# Patient Record
Sex: MALE | Race: WHITE | NOT HISPANIC OR LATINO | URBAN - METROPOLITAN AREA
[De-identification: names, ages, dates, MRNs, and addresses within clinical notes are randomized per-mention and may not be internally consistent; named-entity substitution may affect disease eponyms.]

---

## 2017-03-12 ENCOUNTER — INPATIENT (INPATIENT)
Facility: HOSPITAL | Age: 52
LOS: 4 days | Discharge: HOME HEALTH | End: 2017-03-17
Attending: HOSPITALIST | Admitting: HOSPITALIST
Payer: MEDICARE

## 2017-03-12 VITALS
DIASTOLIC BLOOD PRESSURE: 68 MMHG | HEART RATE: 67 BPM | SYSTOLIC BLOOD PRESSURE: 218 MMHG | RESPIRATION RATE: 22 BRPM | TEMPERATURE: 98 F | OXYGEN SATURATION: 99 %

## 2017-03-12 DIAGNOSIS — R55 SYNCOPE AND COLLAPSE: ICD-10-CM

## 2017-03-12 DIAGNOSIS — F32.9 MAJOR DEPRESSIVE DISORDER, SINGLE EPISODE, UNSPECIFIED: ICD-10-CM

## 2017-03-12 DIAGNOSIS — E16.2 HYPOGLYCEMIA, UNSPECIFIED: ICD-10-CM

## 2017-03-12 DIAGNOSIS — R79.89 OTHER SPECIFIED ABNORMAL FINDINGS OF BLOOD CHEMISTRY: ICD-10-CM

## 2017-03-12 DIAGNOSIS — M25.571 PAIN IN RIGHT ANKLE AND JOINTS OF RIGHT FOOT: ICD-10-CM

## 2017-03-12 DIAGNOSIS — R63.8 OTHER SYMPTOMS AND SIGNS CONCERNING FOOD AND FLUID INTAKE: ICD-10-CM

## 2017-03-12 DIAGNOSIS — T14.90 INJURY, UNSPECIFIED: ICD-10-CM

## 2017-03-12 DIAGNOSIS — Z29.9 ENCOUNTER FOR PROPHYLACTIC MEASURES, UNSPECIFIED: ICD-10-CM

## 2017-03-12 DIAGNOSIS — I21.4 NON-ST ELEVATION (NSTEMI) MYOCARDIAL INFARCTION: ICD-10-CM

## 2017-03-12 DIAGNOSIS — R40.4 TRANSIENT ALTERATION OF AWARENESS: ICD-10-CM

## 2017-03-12 DIAGNOSIS — N17.9 ACUTE KIDNEY FAILURE, UNSPECIFIED: ICD-10-CM

## 2017-03-12 DIAGNOSIS — E11.649 TYPE 2 DIABETES MELLITUS WITH HYPOGLYCEMIA WITHOUT COMA: ICD-10-CM

## 2017-03-12 LAB
ALBUMIN SERPL ELPH-MCNC: 3.6 G/DL — SIGNIFICANT CHANGE UP (ref 3.3–5)
ALP SERPL-CCNC: 168 U/L — HIGH (ref 40–120)
ALT FLD-CCNC: 33 U/L — SIGNIFICANT CHANGE UP (ref 4–41)
AMPHET UR-MCNC: NEGATIVE — SIGNIFICANT CHANGE UP
APAP SERPL-MCNC: < 15 UG/ML — LOW (ref 15–25)
APPEARANCE UR: CLEAR — SIGNIFICANT CHANGE UP
AST SERPL-CCNC: 47 U/L — HIGH (ref 4–40)
BACTERIA # UR AUTO: SIGNIFICANT CHANGE UP
BARBITURATES MEASUREMENT: NEGATIVE — SIGNIFICANT CHANGE UP
BARBITURATES UR SCN-MCNC: NEGATIVE — SIGNIFICANT CHANGE UP
BASE EXCESS BLDV CALC-SCNC: -5.4 MMOL/L — SIGNIFICANT CHANGE UP
BASOPHILS # BLD AUTO: 0.03 K/UL — SIGNIFICANT CHANGE UP (ref 0–0.2)
BASOPHILS NFR BLD AUTO: 0.2 % — SIGNIFICANT CHANGE UP (ref 0–2)
BENZODIAZ SERPL-MCNC: NEGATIVE — SIGNIFICANT CHANGE UP
BENZODIAZ UR-MCNC: NEGATIVE — SIGNIFICANT CHANGE UP
BILIRUB SERPL-MCNC: 0.2 MG/DL — SIGNIFICANT CHANGE UP (ref 0.2–1.2)
BILIRUB UR-MCNC: NEGATIVE — SIGNIFICANT CHANGE UP
BLOOD GAS VENOUS - CREATININE: 3.32 MG/DL — HIGH (ref 0.5–1.3)
BLOOD UR QL VISUAL: HIGH
BUN SERPL-MCNC: 42 MG/DL — HIGH (ref 7–23)
CALCIUM SERPL-MCNC: 8.7 MG/DL — SIGNIFICANT CHANGE UP (ref 8.4–10.5)
CANNABINOIDS UR-MCNC: NEGATIVE — SIGNIFICANT CHANGE UP
CHLORIDE BLDV-SCNC: 117 MMOL/L — HIGH (ref 96–108)
CHLORIDE SERPL-SCNC: 110 MMOL/L — HIGH (ref 98–107)
CK MB BLD-MCNC: 1.7 — SIGNIFICANT CHANGE UP (ref 0–2.5)
CK MB BLD-MCNC: 14.83 NG/ML — HIGH (ref 1–6.6)
CK MB BLD-MCNC: 16.93 NG/ML — HIGH (ref 1–6.6)
CK SERPL-CCNC: 1022 U/L — HIGH (ref 30–200)
CK SERPL-CCNC: 984 U/L — HIGH (ref 30–200)
CK SERPL-CCNC: 984 U/L — HIGH (ref 30–200)
CO2 SERPL-SCNC: 17 MMOL/L — LOW (ref 22–31)
COCAINE METAB.OTHER UR-MCNC: NEGATIVE — SIGNIFICANT CHANGE UP
COLOR SPEC: SIGNIFICANT CHANGE UP
CREAT ?TM UR-MCNC: 26.52 MG/DL — SIGNIFICANT CHANGE UP
CREAT SERPL-MCNC: 3.13 MG/DL — HIGH (ref 0.5–1.3)
EOSINOPHIL # BLD AUTO: 0.02 K/UL — SIGNIFICANT CHANGE UP (ref 0–0.5)
EOSINOPHIL NFR BLD AUTO: 0.1 % — SIGNIFICANT CHANGE UP (ref 0–6)
ETHANOL BLD-MCNC: < 10 MG/DL — SIGNIFICANT CHANGE UP
GAS PNL BLDV: 135 MMOL/L — LOW (ref 136–146)
GLUCOSE BLDV-MCNC: 62 — LOW (ref 70–99)
GLUCOSE SERPL-MCNC: 61 MG/DL — LOW (ref 70–99)
GLUCOSE UR-MCNC: 250 — SIGNIFICANT CHANGE UP
HBA1C BLD-MCNC: 4.8 % — SIGNIFICANT CHANGE UP (ref 4–5.6)
HCO3 BLDV-SCNC: 19 MMOL/L — LOW (ref 20–27)
HCT VFR BLD CALC: 36.1 % — LOW (ref 39–50)
HCT VFR BLDV CALC: 35.7 % — LOW (ref 39–51)
HGB BLD-MCNC: 11.4 G/DL — LOW (ref 13–17)
HGB BLDV-MCNC: 11.6 G/DL — LOW (ref 13–17)
IMM GRANULOCYTES NFR BLD AUTO: 0.3 % — SIGNIFICANT CHANGE UP (ref 0–1.5)
KETONES UR-MCNC: NEGATIVE — SIGNIFICANT CHANGE UP
LACTATE BLDV-MCNC: 1.7 MMOL/L — SIGNIFICANT CHANGE UP (ref 0.5–2)
LEUKOCYTE ESTERASE UR-ACNC: NEGATIVE — SIGNIFICANT CHANGE UP
LITHIUM SERPL-MCNC: 1.56 MMOL/L — CRITICAL HIGH (ref 0.6–1.2)
LYMPHOCYTES # BLD AUTO: 13.4 % — SIGNIFICANT CHANGE UP (ref 13–44)
LYMPHOCYTES # BLD AUTO: 2.46 K/UL — SIGNIFICANT CHANGE UP (ref 1–3.3)
MAGNESIUM SERPL-MCNC: 2.9 MG/DL — HIGH (ref 1.6–2.6)
MCHC RBC-ENTMCNC: 30.2 PG — SIGNIFICANT CHANGE UP (ref 27–34)
MCHC RBC-ENTMCNC: 31.6 % — LOW (ref 32–36)
MCV RBC AUTO: 95.5 FL — SIGNIFICANT CHANGE UP (ref 80–100)
METHADONE UR-MCNC: NEGATIVE — SIGNIFICANT CHANGE UP
MONOCYTES # BLD AUTO: 0.79 K/UL — SIGNIFICANT CHANGE UP (ref 0–0.9)
MONOCYTES NFR BLD AUTO: 4.3 % — SIGNIFICANT CHANGE UP (ref 2–14)
MUCOUS THREADS # UR AUTO: SIGNIFICANT CHANGE UP
NEUTROPHILS # BLD AUTO: 15.03 K/UL — HIGH (ref 1.8–7.4)
NEUTROPHILS NFR BLD AUTO: 81.7 % — HIGH (ref 43–77)
NITRITE UR-MCNC: NEGATIVE — SIGNIFICANT CHANGE UP
OPIATES UR-MCNC: NEGATIVE — SIGNIFICANT CHANGE UP
OXYCODONE UR-MCNC: NEGATIVE — SIGNIFICANT CHANGE UP
PCO2 BLDV: 48 MMHG — SIGNIFICANT CHANGE UP (ref 41–51)
PCP UR-MCNC: NEGATIVE — SIGNIFICANT CHANGE UP
PH BLDV: 7.25 PH — LOW (ref 7.32–7.43)
PH UR: 6.5 — SIGNIFICANT CHANGE UP (ref 4.6–8)
PLATELET # BLD AUTO: 511 K/UL — HIGH (ref 150–400)
PMV BLD: 11.8 FL — SIGNIFICANT CHANGE UP (ref 7–13)
PO2 BLDV: 30 MMHG — LOW (ref 35–40)
POTASSIUM BLDV-SCNC: 5.2 MMOL/L — HIGH (ref 3.4–4.5)
POTASSIUM SERPL-MCNC: 5 MMOL/L — SIGNIFICANT CHANGE UP (ref 3.5–5.3)
POTASSIUM SERPL-SCNC: 5 MMOL/L — SIGNIFICANT CHANGE UP (ref 3.5–5.3)
POTASSIUM UR-SCNC: 6.6 MEQ/L — SIGNIFICANT CHANGE UP
PROT SERPL-MCNC: 7 G/DL — SIGNIFICANT CHANGE UP (ref 6–8.3)
PROT UR-MCNC: 500 — HIGH
PROT UR-MCNC: > 200 MG/DL — SIGNIFICANT CHANGE UP
RBC # BLD: 3.78 M/UL — LOW (ref 4.2–5.8)
RBC # FLD: 14.6 % — HIGH (ref 10.3–14.5)
RBC CASTS # UR COMP ASSIST: SIGNIFICANT CHANGE UP (ref 0–?)
SALICYLATES SERPL-MCNC: < 5 MG/DL — LOW (ref 15–30)
SAO2 % BLDV: 49.4 % — LOW (ref 60–85)
SODIUM SERPL-SCNC: 142 MMOL/L — SIGNIFICANT CHANGE UP (ref 135–145)
SODIUM UR-SCNC: 79 MEQ/L — SIGNIFICANT CHANGE UP
SP GR SPEC: 1.01 — SIGNIFICANT CHANGE UP (ref 1–1.03)
SQUAMOUS # UR AUTO: SIGNIFICANT CHANGE UP
TROPONIN T SERPL-MCNC: 0.12 NG/ML — HIGH (ref 0–0.06)
TROPONIN T SERPL-MCNC: 0.14 NG/ML — HIGH (ref 0–0.06)
TSH SERPL-MCNC: 1.28 UIU/ML — SIGNIFICANT CHANGE UP (ref 0.27–4.2)
UROBILINOGEN FLD QL: NORMAL E.U. — SIGNIFICANT CHANGE UP (ref 0.1–0.2)
UUN UR-MCNC: 213 MG/DL — SIGNIFICANT CHANGE UP
VALPROATE SERPL-MCNC: 50.4 UG/ML — SIGNIFICANT CHANGE UP (ref 50–100)
WBC # BLD: 18.38 K/UL — HIGH (ref 3.8–10.5)
WBC # FLD AUTO: 18.38 K/UL — HIGH (ref 3.8–10.5)
WBC UR QL: SIGNIFICANT CHANGE UP (ref 0–?)

## 2017-03-12 PROCEDURE — 99223 1ST HOSP IP/OBS HIGH 75: CPT | Mod: GC

## 2017-03-12 PROCEDURE — 71010: CPT | Mod: 26

## 2017-03-12 PROCEDURE — 70450 CT HEAD/BRAIN W/O DYE: CPT | Mod: 26

## 2017-03-12 PROCEDURE — 73610 X-RAY EXAM OF ANKLE: CPT | Mod: 26,RT

## 2017-03-12 RX ORDER — SODIUM CHLORIDE 9 MG/ML
1000 INJECTION, SOLUTION INTRAVENOUS
Qty: 0 | Refills: 0 | Status: DISCONTINUED | OUTPATIENT
Start: 2017-03-12 | End: 2017-03-12

## 2017-03-12 RX ORDER — DEXTROSE 50 % IN WATER 50 %
50 SYRINGE (ML) INTRAVENOUS ONCE
Qty: 0 | Refills: 0 | Status: COMPLETED | OUTPATIENT
Start: 2017-03-12 | End: 2017-03-12

## 2017-03-12 RX ORDER — ATORVASTATIN CALCIUM 80 MG/1
40 TABLET, FILM COATED ORAL AT BEDTIME
Qty: 0 | Refills: 0 | Status: DISCONTINUED | OUTPATIENT
Start: 2017-03-12 | End: 2017-03-17

## 2017-03-12 RX ORDER — LABETALOL HCL 100 MG
100 TABLET ORAL
Qty: 0 | Refills: 0 | Status: DISCONTINUED | OUTPATIENT
Start: 2017-03-12 | End: 2017-03-15

## 2017-03-12 RX ORDER — ACETAMINOPHEN 500 MG
650 TABLET ORAL EVERY 6 HOURS
Qty: 0 | Refills: 0 | Status: DISCONTINUED | OUTPATIENT
Start: 2017-03-12 | End: 2017-03-13

## 2017-03-12 RX ORDER — HEPARIN SODIUM 5000 [USP'U]/ML
5000 INJECTION INTRAVENOUS; SUBCUTANEOUS EVERY 8 HOURS
Qty: 0 | Refills: 0 | Status: DISCONTINUED | OUTPATIENT
Start: 2017-03-12 | End: 2017-03-17

## 2017-03-12 RX ORDER — METOPROLOL TARTRATE 50 MG
5 TABLET ORAL ONCE
Qty: 0 | Refills: 0 | Status: COMPLETED | OUTPATIENT
Start: 2017-03-12 | End: 2017-03-12

## 2017-03-12 RX ORDER — SODIUM CHLORIDE 9 MG/ML
1000 INJECTION INTRAMUSCULAR; INTRAVENOUS; SUBCUTANEOUS ONCE
Qty: 0 | Refills: 0 | Status: COMPLETED | OUTPATIENT
Start: 2017-03-12 | End: 2017-03-12

## 2017-03-12 RX ORDER — ASPIRIN/CALCIUM CARB/MAGNESIUM 324 MG
325 TABLET ORAL ONCE
Qty: 0 | Refills: 0 | Status: COMPLETED | OUTPATIENT
Start: 2017-03-12 | End: 2017-03-12

## 2017-03-12 RX ORDER — SODIUM CHLORIDE 9 MG/ML
1000 INJECTION, SOLUTION INTRAVENOUS
Qty: 0 | Refills: 0 | Status: DISCONTINUED | OUTPATIENT
Start: 2017-03-12 | End: 2017-03-13

## 2017-03-12 RX ORDER — ASPIRIN/CALCIUM CARB/MAGNESIUM 324 MG
81 TABLET ORAL DAILY
Qty: 0 | Refills: 0 | Status: DISCONTINUED | OUTPATIENT
Start: 2017-03-12 | End: 2017-03-16

## 2017-03-12 RX ORDER — SODIUM CHLORIDE 9 MG/ML
1000 INJECTION INTRAMUSCULAR; INTRAVENOUS; SUBCUTANEOUS
Qty: 0 | Refills: 0 | Status: DISCONTINUED | OUTPATIENT
Start: 2017-03-12 | End: 2017-03-12

## 2017-03-12 RX ADMIN — SODIUM CHLORIDE 100 MILLILITER(S): 9 INJECTION, SOLUTION INTRAVENOUS at 19:44

## 2017-03-12 RX ADMIN — Medication 50 MILLILITER(S): at 20:58

## 2017-03-12 RX ADMIN — Medication 2 MILLIGRAM(S): at 16:36

## 2017-03-12 RX ADMIN — SODIUM CHLORIDE 1000 MILLILITER(S): 9 INJECTION INTRAMUSCULAR; INTRAVENOUS; SUBCUTANEOUS at 16:35

## 2017-03-12 RX ADMIN — Medication 50 MILLILITER(S): at 16:35

## 2017-03-12 RX ADMIN — Medication 5 MILLIGRAM(S): at 17:22

## 2017-03-12 RX ADMIN — HEPARIN SODIUM 5000 UNIT(S): 5000 INJECTION INTRAVENOUS; SUBCUTANEOUS at 23:42

## 2017-03-12 RX ADMIN — Medication 2 MILLIGRAM(S): at 18:03

## 2017-03-12 RX ADMIN — Medication 325 MILLIGRAM(S): at 16:36

## 2017-03-12 RX ADMIN — SODIUM CHLORIDE 1000 MILLILITER(S): 9 INJECTION INTRAMUSCULAR; INTRAVENOUS; SUBCUTANEOUS at 17:00

## 2017-03-12 RX ADMIN — ATORVASTATIN CALCIUM 40 MILLIGRAM(S): 80 TABLET, FILM COATED ORAL at 23:42

## 2017-03-12 RX ADMIN — Medication 100 MILLIGRAM(S): at 23:42

## 2017-03-12 RX ADMIN — Medication 81 MILLIGRAM(S): at 23:42

## 2017-03-12 NOTE — ED PROVIDER NOTE - CONSTITUTIONAL, MLM
normal... Tremulous, awake, alert, oriented to person, place, time/situation and in no apparent distress.

## 2017-03-12 NOTE — ED PROVIDER NOTE - ATTENDING CONTRIBUTION TO CARE
DR. BURNETT, ATTENDING MD-  I performed a face to face bedside interview with patient regarding history of present illness, review of symptoms and past medical history. I completed an independent physical exam.  I have discussed patient's plan of care with the resident.   Documentation as above in the note.  HPI: 50 yo man w/ h/o hld, dm, depression p/w hypoglycemia  EXAM: Awake and alert, smiling. Head atraumatic, poor hygiene.   MDM: will obtain labs and imaging. Will need to admit.

## 2017-03-12 NOTE — H&P ADULT. - PROBLEM SELECTOR PROBLEM 4
Type 2 diabetes mellitus with hypoglycemia without coma, without long-term current use of insulin NEEL (acute kidney injury)

## 2017-03-12 NOTE — H&P ADULT. - ASSESSMENT
50 y/o M with PMHx of HLD, DM2, depression, and bipolar disorder p/w hypoglycemia and AMS. 52 y/o M with PMHx of HLD, DM2, depression, and bipolar disorder p/w hypoglycemia and AMS, found to have NSTEMI and NEEL.

## 2017-03-12 NOTE — H&P ADULT. - PROBLEM SELECTOR PLAN 6
- on olanzapine, and trazodone at home   - f/u psyc recs - on olanzapine, and trazodone at home   - EMS documents patient was on Klonopin (please verify medications with OP pharmacy in the AM)  - f/u psyc recs  - continues on 1:1 observation  - continued evaluation for any acute signs/symptoms

## 2017-03-12 NOTE — H&P ADULT. - PROBLEM SELECTOR PLAN 1
- - unclear cause, possible from lithium tox, Depakote level normal,   - Psyc consulted   - QTc prolongated >500 so no antipsychotics  - no acute path on CT head, f/u official read - unclear cause, possible from lithium tox, Depakote level normal,   - Psyc consulted   - QTc prolongated >500 so no antipsychotics  - no acute path on CT head, f/u official read  - Ativan PRN agitation, no antipsyc 2/2 prolonged QTc  - elevated WBC- no source of infection, cont to monitor - unclear cause, possible from lithium tox, Depakote level normal,   - could be due to combination of issues  - EMS documents patient's medication as klonopin; not in current medication formulary, but signs of mental disturbances in the ED could be secondary to withdrawal.  Toxicology negative  - Psyc consulted   - QTc prolongated >500 so no antipsychotics  - no acute path on CT head, f/u official read  - Ativan PRN agitation, no antipsyc 2/2 prolonged QTc  - elevated WBC- no source of infection (CXR negative, urinalysis negative, afebrile);  cont to monitor - but consider aspiration since patient was unable to clear secretions when found by EMS

## 2017-03-12 NOTE — H&P ADULT. - RS GEN PE MLT RESP DETAILS PC
good air movement/breath sounds equal/normal/airway patent/respirations non-labored/clear to auscultation bilaterally

## 2017-03-12 NOTE — H&P ADULT. - PROBLEM SELECTOR PLAN 8
- DVT ppx- HSQ - at base of right great toe, puncture-like  - no oozing  - in the setting of diabetes mellitus  - consider podiatry consult/wound care consult in the AM  - consider X-ray of the area if necessary

## 2017-03-12 NOTE — H&P ADULT. - PROBLEM SELECTOR PLAN 2
- Improved, on D5 NS @100cc/hr   - A1c 4.8 and taking a diabetic med - FS = 33, then 41 with patient unresponsive and not clearing secretions, per EMS' documentation  - A1c 4.8 and taking a diabetic med  - Improved, on D5 NS @100cc/hr   - FS Q2 hours for now since last finger stick while on D5 NS was 66 (will change IVF to D10 NS if still with hypoglycemic trend)  - f/u electrolytes

## 2017-03-12 NOTE — H&P ADULT. - HISTORY OF PRESENT ILLNESS
In ED,   T- 97.8, HR 67, /68, RR 22, SpO2- 99 onRA  Given 2L NS, 2amps dextrose, Ativan 2mg IVx2, lopressor 5mg IV,   Labs- WBC 18.38, Platelets 511, Cr 3.13, , Trops 0.14, lactate 1.4, A1c 4.5   U tox- clear   Cards consulted  CXR- clear lungs  CT head- 50 y/o M with PMHx of HLD, DM2, depression, and bipolar disorder p/w hypoglycemia and AMS. Due to AMS patient was unable to provide a history. History obtained from ED note and from mother at bedside. Mother states that the patient had been at his usual state of health until last night when he fell over 2-3 times due to weakness. After the last fall, he was unable to stand up without assistance, but was able to crawl into his bed. This morning, he did not wake up and when his mother went to check on him, he was unarousable sleeping with his eyes open. He also had nocturia x3 overnight. She states that she has never witnessed him acting this altered and unresponsive and doesn't have nocturia at baseline. She notified EMS, and he had a FS of 30 on arrival. They gave glucagon en route. In ED, pt had nausea, vomiting, agitation trying to kick staff and his mother, with possible hallucinations, so he was given 2mg ativan IV along with 2amps of dextrose. Pt's mental status did not improve, so a CT head was done. During the exam, patient ran out of CT room and had to be chased and was given another 2mg ativan IV. Patient still remained altered and pulled out his IV. One to one was started to monitor the patient.     Mother denies any F/C, nausea, vomiting, CP, SOB, cough, diarrhea, abdominal pain, dysuria, trauma, or skin rashes over before today. Patient takes lithium, Depakote, olanzapine, and trazodone for his depression and bipolar disorder.      In ED,   T- 97.8, HR 67, /68, RR 22, SpO2- 99 onRA  Given 2L NS, 2amps dextrose, Ativan 2mg IVx2, lopressor 5mg IV,   Labs- WBC 18.38, Platelets 511, Cr 3.13, , Trops 0.14, lactate 1.4, A1c 4.5   U tox- clear   Cards consulted  CXR- clear lungs  CT head- read pending 50 y/o M with PMHx of HLD, DM2, depression, and bipolar disorder p/w hypoglycemia and AMS. Due to AMS patient was unable to provide a history. History obtained from ED note and from mother at bedside. Mother states that the patient had been at his usual state of health until last night when he fell over 2-3 times due to weakness. After the last fall, he was unable to stand up without assistance, but was able to crawl into his bed. This morning, he did not wake up and when his mother went to check on him, he was unarousable sleeping with his eyes open. He also had nocturia x3 overnight. She states that she has never witnessed him acting this altered and unresponsive and doesn't have nocturia at baseline. She notified EMS, and he had a FS of 30 on arrival. They gave glucagon en route. In ED, pt had nausea, vomiting, agitation trying to kick staff and his mother, with possible hallucinations, so he was given 2mg ativan IV along with 2amps of dextrose. Pt's mental status did not improve, so a CT head was done. During the exam, patient ran out of CT room and had to be chased and was given another 2mg ativan IV. Patient still remained altered and pulled out his IV. One to one was started to monitor the patient.     Mother denies any F/C, nausea, vomiting, CP, SOB, cough, diarrhea, abdominal pain, dysuria, trauma, or skin rashes over before today. Patient takes lithium, Depakote, olanzapine, and trazodone for his depression and bipolar disorder.      In ED,   T- 97.8, HR 67, /68, RR 22, SpO2- 99 onRA  Given 2L NS, 2amps dextrose, Ativan 2mg IVx2, lopressor 5mg IV,   Labs- WBC 18.38, Platelets 511, Cr 3.13, , Trops 0.14, lactate 1.4, A1c 4.5   U tox- clear   Cards consulted  CXR- clear lungs  CT head-No CT evidence of acute intracranial hemorrhage, mass or displaced calvarial fracture. Right basal ganglia/internal capsule chronic-appearing lacunar infarcts. 52 y/o M with PMHx of HLD, DM2, depression, and bipolar disorder p/w hypoglycemia and AMS. Due to AMS patient was unable to provide a history. History obtained from ED note and from mother at bedside. Mother states that the patient had been at his usual state of health until last night when he fell over 2-3 times due to weakness. After the last fall, he was unable to stand up without assistance, but was able to crawl into his bed. This morning, he did not wake up and when his mother went to check on him, he was unarousable sleeping with his eyes open. He also had nocturia x3 overnight. She states that she has never witnessed him acting this altered and unresponsive and doesn't have nocturia at baseline. She notified EMS, and he had a FS of 33 upon their arrival, which was recorded at 41 16 minutes later.  Blood pressure and pulse were recorded at 228/116, 75 and 222/102, 74 - 10 minutes apart. Mother, per documentation, told the EMS team that patient was in the unresponsive state for approximately 2 hours prior to their arrival.  EMS documents that patient was unresponsive and unable to clear secretions when seen; suctioned.  Five minutes after EMS' intervention, patient became responsive.  They administered glucagon IM en route (at 15:28) because of unsuccessful attempts to obtain IV access. In ED, pt had nausea, vomiting, agitation trying to kick staff and his mother, with possible hallucinations, so he was given 2mg ativan IV along with 2amps of dextrose. Pt's mental status did not improve, so a CT head was done. During the exam, patient ran out of CT room and had to be chased and was given another 2mg ativan IV. Patient still remained altered and pulled out his IV. One to one was started to monitor the patient and maintain safety.    Mother denies any F/C, nausea, vomiting, CP, SOB, cough, diarrhea, abdominal pain, dysuria, trauma, or skin rashes over before today. Patient takes lithium, Depakote, olanzapine, and trazodone for his depression and bipolar disorder.      In ED,   T- 97.8, HR 67, /68, RR 22, SpO2- 99 onRA  Given 2L NS, 2amps dextrose, Ativan 2mg IVx2, lopressor 5mg IV,   Labs- WBC 18.38, Platelets 511, Cr 3.13, , Trops 0.14, lactate 1.4, A1c 4.5   U tox- clear   Cards consulted  CXR- clear lungs  CT head-No CT evidence of acute intracranial hemorrhage, mass or displaced calvarial fracture. Right basal ganglia/internal capsule chronic-appearing lacunar infarcts.

## 2017-03-12 NOTE — H&P ADULT. - PMH
Depression    Diabetes mellitus    High cholesterol Bipolar affective disorder in remission    Depression    Diabetes mellitus    High cholesterol

## 2017-03-12 NOTE — H&P ADULT. - PROBLEM SELECTOR PROBLEM 7
Prophylactic measure Nutrition, metabolism, and development symptoms Right ankle pain, unspecified chronicity

## 2017-03-12 NOTE — ED PROVIDER NOTE - PROGRESS NOTE DETAILS
Ciro MI- pt received as sign out, found to have prolonged qt with inverted t waves in 1, avl, v4, v5, v6 which is new and changed from old ekg, pt is shaking though denies alcohol abuse, suspicion for drug withdrawal. will try ativan, repeat ekg, called cardiology [facility] PGY3: Repeat EKG now w/ twi v4-v6, biphasic tw v3, mild elevation in v2/v3. Cardiology consulted, STEMI called. Per cardiology attending, no STEMI or emergent cath, likely admit on tele w/ heparin for ekg changes and elevated troponin. Awaiting official recommendations from cardiology. Ciro MI- mother at bedside who witnessed falls and live with him, pt is poor historian and gets agitated when mo is telling the history.Per mother, pt has h/o dm, bipolar and complaint with meds, takes trazodone, olanzapaine, divalproate and depakoe, glimipride for dm and was shaking and then fell 4 times isnce yesterday and today when he fell to the floor at ground level, he was unresponsive so mom called EMS and was found to be hypoglycemic, last Ciro MI- pt wet the bed and was found standing , pt is trying to his mother only. Will continue to observe and may need inpatient psych consult for behavioral issues as well Ciro MI- mother at bedside who witnessed falls and live with him, pt is poor historian and gets agitated when mo is telling the history. Mother denies any history pf alcohol or drug abuse. Per mother, pt has h/o dm, bipolar and complaint with meds, takes trazodone, olanzapaine, divalproate and depakote, glimipride for dm and was shaking and then fell 4 times since yesterday and today when he fell to the floor at ground level, he was unresponsive so mom called EMS and was found to be hypoglycemic, hypoglycemia resolved but pt found to have inverted t waves and st depression on the monitor, no chest pain, pt seen by cardio and recommend serial ck, trop.Baed on multipel falls and agitation towards mother, will do CT head to r/o any ICH form the falls, pt accepted to medicine on tele by Dr. Hernández Ciro MI- pt jumped off the ct table, ho hit to the head but he is repeatedly getting ot of bed, will give ativan

## 2017-03-12 NOTE — ED PROVIDER NOTE - MEDICAL DECISION MAKING DETAILS
52 yo man w/ hypoglycemia. Likely 2/2 to lack of po in setting of fall (potentially 2/2 etoh withdrawal); however, will investigate for infectious or cardiac etiology.

## 2017-03-12 NOTE — H&P ADULT. - PROBLEM SELECTOR PLAN 3
- EKG showed TWI in V4-V6, ST elevation in V3, CK and trops elevated  - Cards consulted- loaded with ASA, c/w 81mg  - c/w lipitor - EKG showed TWI in V4-V6, ST elevation in V3, CK and trops elevated  - Cards consulted- loaded with ASA, c/w 81mg  - c/w lipitor  - trending Adalid - EKG showed TWI in V4-V6, ST elevation in V3, CK and trops elevated ro 0.14  - Cards consulted- loaded with ASA, c/w 81mg  - c/w lipitor  - trending Adalid, repeat ECG  - TTE in AM (ordered)

## 2017-03-12 NOTE — H&P ADULT. - PROBLEM SELECTOR PLAN 7
- Diet- regular diet - although confused, patient indicates pain of the area, states pain is "on and off;" unable to provide additional information  - examination noted enlargement of the right ankle, compared with the left, with some possible patchy erythematous changes - could be chronic  - appears to be some tenderness to moderate palpation  - x-ray of the right ankle ordered (please follow-up)  - tylenol PRN mild pain  - periodic examination for any untoward changes

## 2017-03-12 NOTE — H&P ADULT. - PROBLEM SELECTOR PLAN 4
- BUN/Cr elevated from baseline  - Possible prerenal from dehydration vs lithium tox  - Giving IV fluids - BUN/Cr elevated from baseline  - Possible prerenal from dehydration vs lithium tox  - Giving IV fluids, check renal US and urine lytes - BUN/Cr elevated from baseline (currently 42/3.13, previously 27/1.61 in 05/2015)  - Possible prerenal from dehydration vs lithium tox  - Giving IV fluids, check renal US and urine lytes  - f/u for improvement  - consider nephrology consult if any sign of worsening

## 2017-03-12 NOTE — ED BEHAVIORAL HEALTH NOTE - BEHAVIORAL HEALTH NOTE
Pt seen, delirious, unable to participate meaningfully in interview making frequent irrelevant statements, collateral obtained by primary team from mother.     Hold current home regimen of Lithium 300mg BID, Zyprexa 20mg PO qHS, Trazodone 100mg qHS    Lower Depakote to 250mg PO BID.    Would continue serial EKG's, QTc remains over 500ms, AVOID ANTIPSYCHOTICS. F/u cardiology recommendations.    PRN ativan 1mg PO/IM/IV q6H PRN agitation.    Pt to be seen for full evaluation tomorrow. Pt seen, delirious, unable to participate meaningfully in interview at this time and making frequent irrelevant statements, collateral obtained by primary team from mother. Chart reviewed and pt has multiple medical issues with AMS and recommends as following:    Hold current home regimen of Lithium 300mg BID, Zyprexa 20mg PO qHS, Trazodone 100mg qHS since pt's EKG: abnormal with prolongation of QTc>500 although improving from 540 to 507 at this time and has NEEL and Lithium level: 1.56     Pt is on 500 mg Depakote BID and level: 50 today and recommends to lower Depakote to 250mg PO BID at this time to avoid discontinue symptoms and will re-evaluate in AM for continue tapering or other management depending on medical issues .    Would continue serial EKG's, QTc remains over 500ms, AVOID ANTIPSYCHOTICS. F/u cardiology recommendations.    PRN ativan 1mg PO/IM/IV q6H PRN agitation.    Pt to be seen for full evaluation tomorrow. Pt seen, delirious, unable to participate meaningfully in interview at this time and making frequent irrelevant statements, collateral obtained by primary team from mother. Chart reviewed and pt has multiple medical issues with AMS and recommends as following:    Hold current home regimen of Lithium 300mg BID, Zyprexa 20mg PO qHS, Trazodone 100mg qHS since pt's EKG: abnormal with prolongation of QTc>500 although improving from 540 to 507 at this time and has STEVE: BUN/Cr: 40/3.13 and Lithium level: 1.56     Pt is on 500 mg Depakote BID and level: 50.4 today and AST/ALT: 47/33and recommends to lower Depakote to 250mg PO BID at this time to avoid discontinue symptoms and ARF and will re-evaluate in AM for continue tapering or other management depending on medical issues.    Would continue to monitor QTC with serial EKG's, QTc remains over 500ms, AVOID ANTIPSYCHOTICS at this time and F/u cardiology recommendations.    PRN ativan 1mg PO/IM/IV q6H PRN agitation.    Pt will be followed up by psychiatric consult team for full evaluation tomorrow. Pt seen, delirious, unable to participate meaningfully in interview at this time and making frequent irrelevant statements, collateral obtained by primary team from mother. Chart reviewed and pt has multiple medical issues with AMS and recommends as following:    Hold current home regimen of Lithium 300mg BID, Zyprexa 20mg PO qHS, Trazodone 100mg qHS since pt's EKG: abnormal with prolongation of QTc>500 although improving from 540 to 507 at this time and has STEVE: BUN/Cr: 40/3.13 and Lithium level: 1.56     Pt is on 500 mg Depakote BID and level: 50.4 today and AST/ALT: 47/33and recommends to lower Depakote to 250mg PO BID at this time due to ARF and avoid discontinue symptoms and need to be re-evaluated in AM for continue tapering or other management depending on medical issues.    Would continue to monitor QTC with serial EKG's, QTc remains over 500ms, AVOID ANTIPSYCHOTICS at this time and F/u cardiology recommendations.    PRN ativan 1mg PO/IM/IV q6H PRN agitation.    The case was signed out to psychiatric consult team for full evaluation tomorrow Pt seen, delirious, unable to participate meaningfully in interview at this time and making frequent irrelevant statements, collateral obtained by primary team from mother. Chart reviewed and pt has multiple medical issues with AMS and recommends as following:    Hold current home regimen of Lithium 300mg BID, Zyprexa 20mg PO qHS, Trazodone 100mg qHS since pt's EKG: abnormal with prolongation of QTc>500 although improving from 540 to 507 at this time and has STEVE: BUN/Cr: 40/3.13 and Lithium level: 1.56     Pt is on 500 mg Depakote BID and level: 50.4 today and AST/ALT: 47/33and recommends to lower Depakote to 250mg PO BID at this time due to ARF and avoid discontinue symptoms and need to be re-evaluated in AM for continue tapering or other management depending on medical issues.    Would continue to monitor QTC with serial EKG's, QTc remains over 500ms, AVOID ANTIPSYCHOTICS at this time and F/u cardiology recommendations.    PRN ativan 1mg PO/IM/IV q6H PRN agitation.    The case was signed out to psychiatric consult team for full evaluation tomorrow    Further management can be supported by the following reference:   J Psychiatr Pract. 2014 Sep;20(5):338-44. doi: 10.1097/01.pra.[id number].  Management of psychosis and agitation in medical-surgical patients who have or are at risk for prolonged QT interval.  Francisco WATERS, Archie STOCKTON. Pt seen, delirious, unable to participate meaningfully in interview at this time and making frequent irrelevant statements, collateral obtained by primary team from mother. Chart reviewed and pt has multiple medical issues with AMS and recommends as following:    Hold current home regimen of Lithium 300mg BID, Zyprexa 20mg PO qHS, Trazodone 100mg qHS since pt's EKG: abnormal with prolongation of QTc>500 although improving from 540 to 507 at this time and has STEVE: BUN/Cr: 40/3.13 and Lithium level: 1.56     Pt is on 500 mg Depakote BID and level: 50.4 today and AST/ALT: 47/33and recommends to lower Depakote to 250mg PO BID at this time due to ARF and avoid discontinue symptoms and need to be re-evaluated in AM for continue tapering or other management depending on medical issues.    Would continue to monitor QTC with serial EKG's, QTc remains over 500ms, AVOID ANTIPSYCHOTICS at this time and F/u cardiology recommendations.    PRN ativan 1mg PO/IM/IV q6H PRN agitation.    Continue one-to-one observation for safety     The case was signed out to psychiatric consult team for full evaluation tomorrow    Further management can be supported by the following reference:   J Psychiatr Pract. 2014 Sep;20(5):338-44. doi: 10.1097/01.pra.[id number].  Management of psychosis and agitation in medical-surgical patients who have or are at risk for prolonged QT interval.  Francisco WATERS, Archie STOCKTON. Pt seen, delirious, unable to participate meaningfully in interview at this time and making frequent irrelevant statements, collateral obtained by primary team from mother. Chart reviewed and pt has multiple medical issues with AMS and recommends as following:    Hold current home regimen of Lithium 300mg BID, Zyprexa 20mg PO qHS, Trazodone 100mg qHS since pt's EKG: abnormal with prolongation of QTc>500 although improving from 540 to 507 at this time and has STEVE: BUN/Cr: 40/3.13 and Lithium level: 1.56     Pt is on 500 mg Depakote BID and level: 50.4 today and AST/ALT: 47/33and recommends to lower Depakote to 250mg PO BID at this time due to ARF and avoid discontinue symptoms and need to be re-evaluated in AM for continue tapering or other management depending on medical issues.    Would continue to monitor QTC with serial EKG's, QTc remains over 500ms, AVOID ANTIPSYCHOTICS at this time and F/u cardiology recommendations.    PRN ativan 1mg PO/IM/IV q6H PRN agitation- Benzo is better choice at this time due to significant QT prolongation but tries to use lower doses as possible since pt is delirious     Continue one-to-one observation for safety     The case was signed out to psychiatric consult team for full evaluation tomorrow    Further management can be supported by the following reference:   J Psychiatr Pract. 2014 Sep;20(5):338-44. doi: 10.1097/01.pra.[email or web address].  Management of psychosis and agitation in medical-surgical patients who have or are at risk for prolonged QT interval.  Francisco WATERS, Archie STOCKTON.

## 2017-03-12 NOTE — ED PROVIDER NOTE - OBJECTIVE STATEMENT
52 yo man w/ h/o hld, dm, depression p/w hypoglycemia. States last night he was feeling very unsteady and had fall for unknown reason. Today was found to be altered on floor, EMS noted FS 30 upon arrival. Takes glimepiride for DM. 50 yo man w/ h/o hld, dm, depression p/w hypoglycemia. States last night he was feeling very unsteady and had fall for unknown reason. Today was found to be altered on floor, EMS noted FS 30 upon arrival, received glucagon en route. Takes glimepiride for DM. Denies fever, chest pain, sob, cough, trauma.

## 2017-03-12 NOTE — ED ADULT TRIAGE NOTE - CHIEF COMPLAINT QUOTE
Pt unresponsive in field   finger stick in field 33 oral glucose and 1mg glucogan im given . Pts arrives awake  repeat finger stick 56. Pt vomiting on ambulance bay brought into TRA

## 2017-03-12 NOTE — ED ADULT NURSE NOTE - OBJECTIVE STATEMENT
Pt brought in by EMS initially unresponsive, low blood sugar of 33 taken in the field. Pt presents to ED awake, fs in amb bay 56 after receiving 1mg glucagon by EMS. Pt denies any present pain, tremulous. Pt denies sob breathing even and unlabored resp. Pt denies cp/discomfort, denies headache/dizziness. Abdomen soft, non-tender, non-distended. Skin is cool dry and intact. IVL placed, labs drawn and sent as ordered. 2amp's of d50 given, will recheck fs in 15 mins. Report given to primary RN Toña. Will continue to monitor.

## 2017-03-12 NOTE — H&P ADULT. - PROBLEM SELECTOR PROBLEM 5
Depression, unspecified depression type Type 2 diabetes mellitus with hypoglycemia without coma, without long-term current use of insulin

## 2017-03-12 NOTE — H&P ADULT. - PROBLEM SELECTOR PLAN 5
- A1c 4.8 and taking a diabetic med that starts with an "S"  - taken off metformin 2/2 hyperkalemia and worsening kidney function, also stopped glyburide per mother - A1c 4.8 and taking a diabetic med that starts with an "S"  - taken off metformin 2/2 hyperkalemia and worsening kidney function, also stopped glyburide per mother  - f/u with outpatient pharmacy in the AM re current medication list (please call)

## 2017-03-13 LAB
24R-OH-CALCIDIOL SERPL-MCNC: 5.2 NG/ML — LOW (ref 30–100)
ALBUMIN SERPL ELPH-MCNC: 3 G/DL — LOW (ref 3.3–5)
ALP SERPL-CCNC: 152 U/L — HIGH (ref 40–120)
ALT FLD-CCNC: 25 U/L — SIGNIFICANT CHANGE UP (ref 4–41)
AST SERPL-CCNC: 37 U/L — SIGNIFICANT CHANGE UP (ref 4–40)
BASOPHILS # BLD AUTO: 0.05 K/UL — SIGNIFICANT CHANGE UP (ref 0–0.2)
BASOPHILS NFR BLD AUTO: 0.4 % — SIGNIFICANT CHANGE UP (ref 0–2)
BILIRUB SERPL-MCNC: 0.3 MG/DL — SIGNIFICANT CHANGE UP (ref 0.2–1.2)
BUN SERPL-MCNC: 43 MG/DL — HIGH (ref 7–23)
CALCIUM SERPL-MCNC: 8.5 MG/DL — SIGNIFICANT CHANGE UP (ref 8.4–10.5)
CHLORIDE SERPL-SCNC: 110 MMOL/L — HIGH (ref 98–107)
CK MB BLD-MCNC: 12.89 NG/ML — HIGH (ref 1–6.6)
CK MB BLD-MCNC: 9.72 NG/ML — HIGH (ref 1–6.6)
CK SERPL-CCNC: 1013 U/L — HIGH (ref 30–200)
CK SERPL-CCNC: 811 U/L — HIGH (ref 30–200)
CO2 SERPL-SCNC: 19 MMOL/L — LOW (ref 22–31)
CREAT SERPL-MCNC: 2.99 MG/DL — HIGH (ref 0.5–1.3)
EOSINOPHIL # BLD AUTO: 0.11 K/UL — SIGNIFICANT CHANGE UP (ref 0–0.5)
EOSINOPHIL NFR BLD AUTO: 0.8 % — SIGNIFICANT CHANGE UP (ref 0–6)
FOLATE SERPL-MCNC: 5.4 NG/ML — SIGNIFICANT CHANGE UP (ref 4.7–20)
GLUCOSE SERPL-MCNC: 130 MG/DL — HIGH (ref 70–99)
HCT VFR BLD CALC: 33.8 % — LOW (ref 39–50)
HCT VFR BLD CALC: 34.4 % — LOW (ref 39–50)
HGB BLD-MCNC: 10.5 G/DL — LOW (ref 13–17)
HGB BLD-MCNC: 10.7 G/DL — LOW (ref 13–17)
IMM GRANULOCYTES NFR BLD AUTO: 0.2 % — SIGNIFICANT CHANGE UP (ref 0–1.5)
LITHIUM SERPL-MCNC: 1.39 MMOL/L — HIGH (ref 0.6–1.2)
LYMPHOCYTES # BLD AUTO: 2.78 K/UL — SIGNIFICANT CHANGE UP (ref 1–3.3)
LYMPHOCYTES # BLD AUTO: 20.2 % — SIGNIFICANT CHANGE UP (ref 13–44)
MAGNESIUM SERPL-MCNC: 2.7 MG/DL — HIGH (ref 1.6–2.6)
MCHC RBC-ENTMCNC: 29.7 PG — SIGNIFICANT CHANGE UP (ref 27–34)
MCHC RBC-ENTMCNC: 30 PG — SIGNIFICANT CHANGE UP (ref 27–34)
MCHC RBC-ENTMCNC: 31.1 % — LOW (ref 32–36)
MCHC RBC-ENTMCNC: 31.1 % — LOW (ref 32–36)
MCV RBC AUTO: 95.8 FL — SIGNIFICANT CHANGE UP (ref 80–100)
MCV RBC AUTO: 96.4 FL — SIGNIFICANT CHANGE UP (ref 80–100)
MONOCYTES # BLD AUTO: 1.27 K/UL — HIGH (ref 0–0.9)
MONOCYTES NFR BLD AUTO: 9.2 % — SIGNIFICANT CHANGE UP (ref 2–14)
NEUTROPHILS # BLD AUTO: 9.51 K/UL — HIGH (ref 1.8–7.4)
NEUTROPHILS NFR BLD AUTO: 69.2 % — SIGNIFICANT CHANGE UP (ref 43–77)
PHOSPHATE SERPL-MCNC: 4.8 MG/DL — HIGH (ref 2.5–4.5)
PLATELET # BLD AUTO: 387 K/UL — SIGNIFICANT CHANGE UP (ref 150–400)
PLATELET # BLD AUTO: 390 K/UL — SIGNIFICANT CHANGE UP (ref 150–400)
PMV BLD: 11.4 FL — SIGNIFICANT CHANGE UP (ref 7–13)
PMV BLD: 11.5 FL — SIGNIFICANT CHANGE UP (ref 7–13)
POTASSIUM SERPL-MCNC: 4.7 MMOL/L — SIGNIFICANT CHANGE UP (ref 3.5–5.3)
POTASSIUM SERPL-SCNC: 4.7 MMOL/L — SIGNIFICANT CHANGE UP (ref 3.5–5.3)
PROT SERPL-MCNC: 6 G/DL — SIGNIFICANT CHANGE UP (ref 6–8.3)
RBC # BLD: 3.53 M/UL — LOW (ref 4.2–5.8)
RBC # BLD: 3.57 M/UL — LOW (ref 4.2–5.8)
RBC # FLD: 14.6 % — HIGH (ref 10.3–14.5)
RBC # FLD: 14.7 % — HIGH (ref 10.3–14.5)
SODIUM SERPL-SCNC: 144 MMOL/L — SIGNIFICANT CHANGE UP (ref 135–145)
TROPONIN T SERPL-MCNC: 0.08 NG/ML — HIGH (ref 0–0.06)
TROPONIN T SERPL-MCNC: 0.11 NG/ML — HIGH (ref 0–0.06)
WBC # BLD: 13.75 K/UL — HIGH (ref 3.8–10.5)
WBC # BLD: 15.43 K/UL — HIGH (ref 3.8–10.5)
WBC # FLD AUTO: 13.75 K/UL — HIGH (ref 3.8–10.5)
WBC # FLD AUTO: 15.43 K/UL — HIGH (ref 3.8–10.5)

## 2017-03-13 PROCEDURE — 99254 IP/OBS CNSLTJ NEW/EST MOD 60: CPT | Mod: GC

## 2017-03-13 PROCEDURE — 99233 SBSQ HOSP IP/OBS HIGH 50: CPT | Mod: GC

## 2017-03-13 PROCEDURE — 99223 1ST HOSP IP/OBS HIGH 75: CPT

## 2017-03-13 PROCEDURE — 99222 1ST HOSP IP/OBS MODERATE 55: CPT | Mod: GC

## 2017-03-13 RX ORDER — CLONAZEPAM 1 MG
0.5 TABLET ORAL EVERY 12 HOURS
Qty: 0 | Refills: 0 | Status: DISCONTINUED | OUTPATIENT
Start: 2017-03-13 | End: 2017-03-17

## 2017-03-13 RX ORDER — ISOSORBIDE MONONITRATE 60 MG/1
20 TABLET, EXTENDED RELEASE ORAL
Qty: 0 | Refills: 0 | Status: DISCONTINUED | OUTPATIENT
Start: 2017-03-13 | End: 2017-03-13

## 2017-03-13 RX ORDER — ISOSORBIDE DINITRATE 5 MG/1
10 TABLET ORAL
Qty: 0 | Refills: 0 | Status: DISCONTINUED | OUTPATIENT
Start: 2017-03-13 | End: 2017-03-13

## 2017-03-13 RX ORDER — OLANZAPINE 15 MG/1
10 TABLET, FILM COATED ORAL AT BEDTIME
Qty: 0 | Refills: 0 | Status: DISCONTINUED | OUTPATIENT
Start: 2017-03-13 | End: 2017-03-14

## 2017-03-13 RX ORDER — ISOSORBIDE MONONITRATE 60 MG/1
30 TABLET, EXTENDED RELEASE ORAL DAILY
Qty: 0 | Refills: 0 | Status: DISCONTINUED | OUTPATIENT
Start: 2017-03-13 | End: 2017-03-13

## 2017-03-13 RX ORDER — ERGOCALCIFEROL 1.25 MG/1
50000 CAPSULE ORAL
Qty: 0 | Refills: 0 | Status: DISCONTINUED | OUTPATIENT
Start: 2017-03-13 | End: 2017-03-14

## 2017-03-13 RX ORDER — OLANZAPINE 15 MG/1
5 TABLET, FILM COATED ORAL EVERY 6 HOURS
Qty: 0 | Refills: 0 | Status: DISCONTINUED | OUTPATIENT
Start: 2017-03-13 | End: 2017-03-17

## 2017-03-13 RX ORDER — HYDRALAZINE HCL 50 MG
10 TABLET ORAL ONCE
Qty: 0 | Refills: 0 | Status: COMPLETED | OUTPATIENT
Start: 2017-03-13 | End: 2017-03-13

## 2017-03-13 RX ORDER — ACETAMINOPHEN 500 MG
650 TABLET ORAL EVERY 6 HOURS
Qty: 0 | Refills: 0 | Status: DISCONTINUED | OUTPATIENT
Start: 2017-03-13 | End: 2017-03-17

## 2017-03-13 RX ORDER — DIVALPROEX SODIUM 500 MG/1
1000 TABLET, DELAYED RELEASE ORAL AT BEDTIME
Qty: 0 | Refills: 0 | Status: DISCONTINUED | OUTPATIENT
Start: 2017-03-13 | End: 2017-03-17

## 2017-03-13 RX ORDER — SODIUM CHLORIDE 9 MG/ML
1000 INJECTION, SOLUTION INTRAVENOUS
Qty: 0 | Refills: 0 | Status: DISCONTINUED | OUTPATIENT
Start: 2017-03-13 | End: 2017-03-13

## 2017-03-13 RX ORDER — SODIUM BICARBONATE 1 MEQ/ML
650 SYRINGE (ML) INTRAVENOUS
Qty: 0 | Refills: 0 | Status: DISCONTINUED | OUTPATIENT
Start: 2017-03-13 | End: 2017-03-13

## 2017-03-13 RX ORDER — SODIUM BICARBONATE 1 MEQ/ML
650 SYRINGE (ML) INTRAVENOUS THREE TIMES A DAY
Qty: 0 | Refills: 0 | Status: DISCONTINUED | OUTPATIENT
Start: 2017-03-13 | End: 2017-03-17

## 2017-03-13 RX ORDER — ERGOCALCIFEROL 1.25 MG/1
50000 CAPSULE ORAL
Qty: 0 | Refills: 0 | Status: DISCONTINUED | OUTPATIENT
Start: 2017-03-13 | End: 2017-03-13

## 2017-03-13 RX ORDER — ISOSORBIDE DINITRATE 5 MG/1
10 TABLET ORAL
Qty: 0 | Refills: 0 | Status: DISCONTINUED | OUTPATIENT
Start: 2017-03-13 | End: 2017-03-14

## 2017-03-13 RX ORDER — SODIUM CHLORIDE 9 MG/ML
1000 INJECTION INTRAMUSCULAR; INTRAVENOUS; SUBCUTANEOUS
Qty: 0 | Refills: 0 | Status: DISCONTINUED | OUTPATIENT
Start: 2017-03-13 | End: 2017-03-15

## 2017-03-13 RX ADMIN — Medication 10 MILLIGRAM(S): at 08:50

## 2017-03-13 RX ADMIN — ISOSORBIDE DINITRATE 10 MILLIGRAM(S): 5 TABLET ORAL at 18:33

## 2017-03-13 RX ADMIN — DIVALPROEX SODIUM 1000 MILLIGRAM(S): 500 TABLET, DELAYED RELEASE ORAL at 21:16

## 2017-03-13 RX ADMIN — OLANZAPINE 10 MILLIGRAM(S): 15 TABLET, FILM COATED ORAL at 21:16

## 2017-03-13 RX ADMIN — SODIUM CHLORIDE 75 MILLILITER(S): 9 INJECTION, SOLUTION INTRAVENOUS at 05:33

## 2017-03-13 RX ADMIN — Medication 2 MILLIGRAM(S): at 02:36

## 2017-03-13 RX ADMIN — Medication 81 MILLIGRAM(S): at 12:12

## 2017-03-13 RX ADMIN — Medication 0.5 MILLIGRAM(S): at 12:12

## 2017-03-13 RX ADMIN — ISOSORBIDE MONONITRATE 20 MILLIGRAM(S): 60 TABLET, EXTENDED RELEASE ORAL at 12:28

## 2017-03-13 RX ADMIN — Medication 650 MILLIGRAM(S): at 21:17

## 2017-03-13 RX ADMIN — ATORVASTATIN CALCIUM 40 MILLIGRAM(S): 80 TABLET, FILM COATED ORAL at 21:17

## 2017-03-13 RX ADMIN — SODIUM CHLORIDE 100 MILLILITER(S): 9 INJECTION INTRAMUSCULAR; INTRAVENOUS; SUBCUTANEOUS at 23:06

## 2017-03-13 RX ADMIN — HEPARIN SODIUM 5000 UNIT(S): 5000 INJECTION INTRAVENOUS; SUBCUTANEOUS at 21:17

## 2017-03-13 RX ADMIN — Medication 100 MILLIGRAM(S): at 18:33

## 2017-03-13 RX ADMIN — HEPARIN SODIUM 5000 UNIT(S): 5000 INJECTION INTRAVENOUS; SUBCUTANEOUS at 05:19

## 2017-03-13 RX ADMIN — Medication 2 MILLIGRAM(S): at 04:32

## 2017-03-13 RX ADMIN — HEPARIN SODIUM 5000 UNIT(S): 5000 INJECTION INTRAVENOUS; SUBCUTANEOUS at 14:16

## 2017-03-13 RX ADMIN — SODIUM CHLORIDE 100 MILLILITER(S): 9 INJECTION INTRAMUSCULAR; INTRAVENOUS; SUBCUTANEOUS at 12:33

## 2017-03-13 RX ADMIN — Medication 100 MILLIGRAM(S): at 05:19

## 2017-03-13 NOTE — ED ADULT NURSE REASSESSMENT NOTE - NS ED NURSE REASSESS COMMENT FT1
Break coverage RN, received pt to [facility], pt noted to be agitated, pulling at lines and verbally abusive towards staff, medicated as ordered, will reassess.

## 2017-03-13 NOTE — PROVIDER CONTACT NOTE (OTHER) - ACTION/TREATMENT ORDERED:
medication given will monitor
awaiting medication from pharmacy then to dispense and closely monitor

## 2017-03-13 NOTE — PROVIDER CONTACT NOTE (OTHER) - ASSESSMENT
alert with confusion to date and year
denies chest pain, sob, or headache
patient asymptomatic
patient denies chest pain, sob, head ache

## 2017-03-14 LAB
BUN SERPL-MCNC: 40 MG/DL — HIGH (ref 7–23)
CALCIUM SERPL-MCNC: 8.1 MG/DL — LOW (ref 8.4–10.5)
CHLORIDE SERPL-SCNC: 114 MMOL/L — HIGH (ref 98–107)
CK MB BLD-MCNC: 8.42 NG/ML — HIGH (ref 1–6.6)
CK SERPL-CCNC: 621 U/L — HIGH (ref 30–200)
CO2 SERPL-SCNC: 17 MMOL/L — LOW (ref 22–31)
CREAT SERPL-MCNC: 3.1 MG/DL — HIGH (ref 0.5–1.3)
GLUCOSE SERPL-MCNC: 231 MG/DL — HIGH (ref 70–99)
HCT VFR BLD CALC: 32 % — LOW (ref 39–50)
HGB BLD-MCNC: 9.9 G/DL — LOW (ref 13–17)
LITHIUM SERPL-MCNC: 1.15 MMOL/L — SIGNIFICANT CHANGE UP (ref 0.6–1.2)
MAGNESIUM SERPL-MCNC: 2.5 MG/DL — SIGNIFICANT CHANGE UP (ref 1.6–2.6)
MCHC RBC-ENTMCNC: 30.3 PG — SIGNIFICANT CHANGE UP (ref 27–34)
MCHC RBC-ENTMCNC: 30.9 % — LOW (ref 32–36)
MCV RBC AUTO: 97.9 FL — SIGNIFICANT CHANGE UP (ref 80–100)
PHOSPHATE SERPL-MCNC: 4.4 MG/DL — SIGNIFICANT CHANGE UP (ref 2.5–4.5)
PLATELET # BLD AUTO: 401 K/UL — HIGH (ref 150–400)
PMV BLD: 12 FL — SIGNIFICANT CHANGE UP (ref 7–13)
POTASSIUM SERPL-MCNC: 5.1 MMOL/L — SIGNIFICANT CHANGE UP (ref 3.5–5.3)
POTASSIUM SERPL-SCNC: 5.1 MMOL/L — SIGNIFICANT CHANGE UP (ref 3.5–5.3)
PTH-INTACT SERPL-MCNC: 237.3 PG/ML — HIGH (ref 15–65)
RBC # BLD: 3.27 M/UL — LOW (ref 4.2–5.8)
RBC # FLD: 14.7 % — HIGH (ref 10.3–14.5)
SODIUM SERPL-SCNC: 145 MMOL/L — SIGNIFICANT CHANGE UP (ref 135–145)
SPECIMEN SOURCE: SIGNIFICANT CHANGE UP
SPECIMEN SOURCE: SIGNIFICANT CHANGE UP
TROPONIN T SERPL-MCNC: 0.09 NG/ML — HIGH (ref 0–0.06)
VIT B12 SERPL-MCNC: 802 PG/ML — SIGNIFICANT CHANGE UP (ref 200–900)
WBC # BLD: 14.08 K/UL — HIGH (ref 3.8–10.5)
WBC # FLD AUTO: 14.08 K/UL — HIGH (ref 3.8–10.5)

## 2017-03-14 PROCEDURE — 76770 US EXAM ABDO BACK WALL COMP: CPT | Mod: 26

## 2017-03-14 PROCEDURE — 93306 TTE W/DOPPLER COMPLETE: CPT | Mod: 26

## 2017-03-14 PROCEDURE — 99233 SBSQ HOSP IP/OBS HIGH 50: CPT | Mod: GC

## 2017-03-14 PROCEDURE — 99223 1ST HOSP IP/OBS HIGH 75: CPT | Mod: GC

## 2017-03-14 RX ORDER — GLUCAGON INJECTION, SOLUTION 0.5 MG/.1ML
1 INJECTION, SOLUTION SUBCUTANEOUS ONCE
Qty: 0 | Refills: 0 | Status: DISCONTINUED | OUTPATIENT
Start: 2017-03-14 | End: 2017-03-17

## 2017-03-14 RX ORDER — DIVALPROEX SODIUM 500 MG/1
500 TABLET, DELAYED RELEASE ORAL
Qty: 0 | Refills: 0 | Status: CANCELLED | OUTPATIENT
Start: 2018-02-15 | End: 2017-03-17

## 2017-03-14 RX ORDER — INSULIN LISPRO 100/ML
VIAL (ML) SUBCUTANEOUS
Qty: 0 | Refills: 0 | Status: DISCONTINUED | OUTPATIENT
Start: 2017-03-14 | End: 2017-03-14

## 2017-03-14 RX ORDER — OLANZAPINE 15 MG/1
20 TABLET, FILM COATED ORAL AT BEDTIME
Qty: 0 | Refills: 0 | Status: DISCONTINUED | OUTPATIENT
Start: 2017-03-14 | End: 2017-03-17

## 2017-03-14 RX ORDER — DEXTROSE 50 % IN WATER 50 %
25 SYRINGE (ML) INTRAVENOUS ONCE
Qty: 0 | Refills: 0 | Status: DISCONTINUED | OUTPATIENT
Start: 2017-03-14 | End: 2017-03-17

## 2017-03-14 RX ORDER — ISOSORBIDE DINITRATE 5 MG/1
10 TABLET ORAL ONCE
Qty: 0 | Refills: 0 | Status: COMPLETED | OUTPATIENT
Start: 2017-03-14 | End: 2017-03-14

## 2017-03-14 RX ORDER — INSULIN LISPRO 100/ML
VIAL (ML) SUBCUTANEOUS AT BEDTIME
Qty: 0 | Refills: 0 | Status: DISCONTINUED | OUTPATIENT
Start: 2017-03-14 | End: 2017-03-17

## 2017-03-14 RX ORDER — INSULIN LISPRO 100/ML
VIAL (ML) SUBCUTANEOUS AT BEDTIME
Qty: 0 | Refills: 0 | Status: DISCONTINUED | OUTPATIENT
Start: 2017-03-14 | End: 2017-03-14

## 2017-03-14 RX ORDER — DEXTROSE 50 % IN WATER 50 %
12.5 SYRINGE (ML) INTRAVENOUS ONCE
Qty: 0 | Refills: 0 | Status: DISCONTINUED | OUTPATIENT
Start: 2017-03-14 | End: 2017-03-17

## 2017-03-14 RX ORDER — ERGOCALCIFEROL 1.25 MG/1
50000 CAPSULE ORAL
Qty: 0 | Refills: 0 | Status: DISCONTINUED | OUTPATIENT
Start: 2017-03-14 | End: 2017-03-17

## 2017-03-14 RX ORDER — SODIUM CHLORIDE 9 MG/ML
1000 INJECTION, SOLUTION INTRAVENOUS
Qty: 0 | Refills: 0 | Status: DISCONTINUED | OUTPATIENT
Start: 2017-03-14 | End: 2017-03-17

## 2017-03-14 RX ORDER — DEXTROSE 50 % IN WATER 50 %
1 SYRINGE (ML) INTRAVENOUS ONCE
Qty: 0 | Refills: 0 | Status: DISCONTINUED | OUTPATIENT
Start: 2017-03-14 | End: 2017-03-17

## 2017-03-14 RX ORDER — INSULIN LISPRO 100/ML
VIAL (ML) SUBCUTANEOUS
Qty: 0 | Refills: 0 | Status: DISCONTINUED | OUTPATIENT
Start: 2017-03-14 | End: 2017-03-17

## 2017-03-14 RX ORDER — ISOSORBIDE DINITRATE 5 MG/1
20 TABLET ORAL
Qty: 0 | Refills: 0 | Status: DISCONTINUED | OUTPATIENT
Start: 2017-03-14 | End: 2017-03-15

## 2017-03-14 RX ADMIN — Medication 100 MILLIGRAM(S): at 17:27

## 2017-03-14 RX ADMIN — Medication 650 MILLIGRAM(S): at 16:40

## 2017-03-14 RX ADMIN — OLANZAPINE 20 MILLIGRAM(S): 15 TABLET, FILM COATED ORAL at 21:38

## 2017-03-14 RX ADMIN — Medication 100 MILLIGRAM(S): at 05:31

## 2017-03-14 RX ADMIN — Medication 81 MILLIGRAM(S): at 12:13

## 2017-03-14 RX ADMIN — SODIUM CHLORIDE 100 MILLILITER(S): 9 INJECTION INTRAMUSCULAR; INTRAVENOUS; SUBCUTANEOUS at 17:27

## 2017-03-14 RX ADMIN — Medication 650 MILLIGRAM(S): at 05:31

## 2017-03-14 RX ADMIN — ATORVASTATIN CALCIUM 40 MILLIGRAM(S): 80 TABLET, FILM COATED ORAL at 21:38

## 2017-03-14 RX ADMIN — DIVALPROEX SODIUM 1000 MILLIGRAM(S): 500 TABLET, DELAYED RELEASE ORAL at 21:39

## 2017-03-14 RX ADMIN — Medication 650 MILLIGRAM(S): at 21:38

## 2017-03-14 RX ADMIN — ISOSORBIDE DINITRATE 10 MILLIGRAM(S): 5 TABLET ORAL at 06:56

## 2017-03-14 RX ADMIN — HEPARIN SODIUM 5000 UNIT(S): 5000 INJECTION INTRAVENOUS; SUBCUTANEOUS at 21:39

## 2017-03-14 RX ADMIN — ERGOCALCIFEROL 50000 UNIT(S): 1.25 CAPSULE ORAL at 16:40

## 2017-03-14 RX ADMIN — HEPARIN SODIUM 5000 UNIT(S): 5000 INJECTION INTRAVENOUS; SUBCUTANEOUS at 05:31

## 2017-03-14 RX ADMIN — HEPARIN SODIUM 5000 UNIT(S): 5000 INJECTION INTRAVENOUS; SUBCUTANEOUS at 16:41

## 2017-03-14 RX ADMIN — ISOSORBIDE DINITRATE 10 MILLIGRAM(S): 5 TABLET ORAL at 05:31

## 2017-03-14 RX ADMIN — Medication 2: at 12:12

## 2017-03-14 RX ADMIN — ISOSORBIDE DINITRATE 20 MILLIGRAM(S): 5 TABLET ORAL at 17:26

## 2017-03-15 ENCOUNTER — TRANSCRIPTION ENCOUNTER (OUTPATIENT)
Age: 52
End: 2017-03-15

## 2017-03-15 LAB
ANA TITR SER: NEGATIVE — SIGNIFICANT CHANGE UP
APPEARANCE UR: CLEAR — SIGNIFICANT CHANGE UP
BILIRUB UR-MCNC: NEGATIVE — SIGNIFICANT CHANGE UP
BLOOD UR QL VISUAL: HIGH
BUN SERPL-MCNC: 44 MG/DL — HIGH (ref 7–23)
C3 SERPL-MCNC: 104.6 MG/DL — SIGNIFICANT CHANGE UP (ref 90–180)
C4 SERPL-MCNC: 37.9 MG/DL — SIGNIFICANT CHANGE UP (ref 10–40)
CALCIUM SERPL-MCNC: 7.8 MG/DL — LOW (ref 8.4–10.5)
CHLORIDE SERPL-SCNC: 113 MMOL/L — HIGH (ref 98–107)
CHLORIDE UR-SCNC: 57 MMOL/L — SIGNIFICANT CHANGE UP
CO2 SERPL-SCNC: 17 MMOL/L — LOW (ref 22–31)
COLOR SPEC: SIGNIFICANT CHANGE UP
CREAT ?TM UR-MCNC: 34.96 MG/DL — SIGNIFICANT CHANGE UP
CREAT SERPL-MCNC: 3.08 MG/DL — HIGH (ref 0.5–1.3)
GLUCOSE SERPL-MCNC: 90 MG/DL — SIGNIFICANT CHANGE UP (ref 70–99)
GLUCOSE UR-MCNC: 100 — SIGNIFICANT CHANGE UP
HBV CORE IGM SER-ACNC: NONREACTIVE — SIGNIFICANT CHANGE UP
HBV SURFACE AB SER-ACNC: NONREACTIVE — SIGNIFICANT CHANGE UP
HCT VFR BLD CALC: 29.6 % — LOW (ref 39–50)
HCV AB S/CO SERPL IA: 0.09 S/CO — SIGNIFICANT CHANGE UP
HCV AB SERPL-IMP: SIGNIFICANT CHANGE UP
HGB BLD-MCNC: 9.2 G/DL — LOW (ref 13–17)
HIV1 AG SER QL: SIGNIFICANT CHANGE UP
HIV1+2 AB SPEC QL: SIGNIFICANT CHANGE UP
HYALINE CASTS # UR AUTO: SIGNIFICANT CHANGE UP (ref 0–?)
KETONES UR-MCNC: NEGATIVE — SIGNIFICANT CHANGE UP
LEUKOCYTE ESTERASE UR-ACNC: NEGATIVE — SIGNIFICANT CHANGE UP
MAGNESIUM SERPL-MCNC: 2.4 MG/DL — SIGNIFICANT CHANGE UP (ref 1.6–2.6)
MCHC RBC-ENTMCNC: 30.2 PG — SIGNIFICANT CHANGE UP (ref 27–34)
MCHC RBC-ENTMCNC: 31.1 % — LOW (ref 32–36)
MCV RBC AUTO: 97 FL — SIGNIFICANT CHANGE UP (ref 80–100)
MUCOUS THREADS # UR AUTO: SIGNIFICANT CHANGE UP
NITRITE UR-MCNC: NEGATIVE — SIGNIFICANT CHANGE UP
OSMOLALITY UR: 263 MOSMO/KG — SIGNIFICANT CHANGE UP (ref 50–1200)
PH UR: 6.5 — SIGNIFICANT CHANGE UP (ref 4.6–8)
PHOSPHATE SERPL-MCNC: 4.4 MG/DL — SIGNIFICANT CHANGE UP (ref 2.5–4.5)
PLATELET # BLD AUTO: 366 K/UL — SIGNIFICANT CHANGE UP (ref 150–400)
PMV BLD: 12.2 FL — SIGNIFICANT CHANGE UP (ref 7–13)
POTASSIUM SERPL-MCNC: 5.2 MMOL/L — SIGNIFICANT CHANGE UP (ref 3.5–5.3)
POTASSIUM SERPL-SCNC: 5.2 MMOL/L — SIGNIFICANT CHANGE UP (ref 3.5–5.3)
POTASSIUM UR-SCNC: 9.7 MEQ/L — SIGNIFICANT CHANGE UP
PROT UR-MCNC: 300 — SIGNIFICANT CHANGE UP
PROT UR-MCNC: > 200 MG/DL — SIGNIFICANT CHANGE UP
PROT UR-MCNC: > 200 MG/DL — SIGNIFICANT CHANGE UP
RBC # BLD: 3.05 M/UL — LOW (ref 4.2–5.8)
RBC # FLD: 14.5 % — SIGNIFICANT CHANGE UP (ref 10.3–14.5)
RBC CASTS # UR COMP ASSIST: SIGNIFICANT CHANGE UP (ref 0–?)
SODIUM SERPL-SCNC: 143 MMOL/L — SIGNIFICANT CHANGE UP (ref 135–145)
SODIUM UR-SCNC: 74 MEQ/L — SIGNIFICANT CHANGE UP
SP GR SPEC: 1.01 — SIGNIFICANT CHANGE UP (ref 1–1.03)
UROBILINOGEN FLD QL: NORMAL E.U. — SIGNIFICANT CHANGE UP (ref 0.1–0.2)
WBC # BLD: 11.48 K/UL — HIGH (ref 3.8–10.5)
WBC # FLD AUTO: 11.48 K/UL — HIGH (ref 3.8–10.5)
WBC UR QL: SIGNIFICANT CHANGE UP (ref 0–?)

## 2017-03-15 PROCEDURE — 99233 SBSQ HOSP IP/OBS HIGH 50: CPT | Mod: GC

## 2017-03-15 PROCEDURE — 86334 IMMUNOFIX E-PHORESIS SERUM: CPT | Mod: 26

## 2017-03-15 PROCEDURE — 99232 SBSQ HOSP IP/OBS MODERATE 35: CPT

## 2017-03-15 RX ORDER — ISOSORBIDE MONONITRATE 60 MG/1
60 TABLET, EXTENDED RELEASE ORAL AT BEDTIME
Qty: 0 | Refills: 0 | Status: DISCONTINUED | OUTPATIENT
Start: 2017-03-15 | End: 2017-03-16

## 2017-03-15 RX ORDER — ISOSORBIDE DINITRATE 5 MG/1
20 TABLET ORAL THREE TIMES A DAY
Qty: 0 | Refills: 0 | Status: DISCONTINUED | OUTPATIENT
Start: 2017-03-15 | End: 2017-03-15

## 2017-03-15 RX ORDER — SODIUM POLYSTYRENE SULFONATE 4.1 MEQ/G
30 POWDER, FOR SUSPENSION ORAL
Qty: 0 | Refills: 0 | COMMUNITY

## 2017-03-15 RX ORDER — SODIUM BICARBONATE 1 MEQ/ML
1 SYRINGE (ML) INTRAVENOUS
Qty: 0 | Refills: 0 | COMMUNITY
Start: 2017-03-15

## 2017-03-15 RX ORDER — LABETALOL HCL 100 MG
100 TABLET ORAL
Qty: 0 | Refills: 0 | Status: DISCONTINUED | OUTPATIENT
Start: 2017-03-15 | End: 2017-03-16

## 2017-03-15 RX ORDER — BACLOFEN 100 %
1 POWDER (GRAM) MISCELLANEOUS
Qty: 0 | Refills: 0 | COMMUNITY

## 2017-03-15 RX ORDER — LITHIUM CARBONATE 300 MG/1
300 TABLET, EXTENDED RELEASE ORAL
Qty: 0 | Refills: 0 | COMMUNITY

## 2017-03-15 RX ORDER — ISOSORBIDE DINITRATE 5 MG/1
20 TABLET ORAL ONCE
Qty: 0 | Refills: 0 | Status: COMPLETED | OUTPATIENT
Start: 2017-03-15 | End: 2017-03-15

## 2017-03-15 RX ADMIN — HEPARIN SODIUM 5000 UNIT(S): 5000 INJECTION INTRAVENOUS; SUBCUTANEOUS at 13:22

## 2017-03-15 RX ADMIN — Medication 100 MILLIGRAM(S): at 05:25

## 2017-03-15 RX ADMIN — Medication 650 MILLIGRAM(S): at 21:51

## 2017-03-15 RX ADMIN — Medication 100 MILLIGRAM(S): at 17:34

## 2017-03-15 RX ADMIN — Medication 650 MILLIGRAM(S): at 13:23

## 2017-03-15 RX ADMIN — ISOSORBIDE DINITRATE 20 MILLIGRAM(S): 5 TABLET ORAL at 13:23

## 2017-03-15 RX ADMIN — ISOSORBIDE MONONITRATE 60 MILLIGRAM(S): 60 TABLET, EXTENDED RELEASE ORAL at 21:51

## 2017-03-15 RX ADMIN — ISOSORBIDE DINITRATE 20 MILLIGRAM(S): 5 TABLET ORAL at 10:11

## 2017-03-15 RX ADMIN — ATORVASTATIN CALCIUM 40 MILLIGRAM(S): 80 TABLET, FILM COATED ORAL at 21:49

## 2017-03-15 RX ADMIN — HEPARIN SODIUM 5000 UNIT(S): 5000 INJECTION INTRAVENOUS; SUBCUTANEOUS at 21:50

## 2017-03-15 RX ADMIN — OLANZAPINE 20 MILLIGRAM(S): 15 TABLET, FILM COATED ORAL at 21:51

## 2017-03-15 RX ADMIN — DIVALPROEX SODIUM 1000 MILLIGRAM(S): 500 TABLET, DELAYED RELEASE ORAL at 21:50

## 2017-03-15 RX ADMIN — ISOSORBIDE DINITRATE 20 MILLIGRAM(S): 5 TABLET ORAL at 05:25

## 2017-03-15 RX ADMIN — Medication 650 MILLIGRAM(S): at 05:25

## 2017-03-15 RX ADMIN — Medication 81 MILLIGRAM(S): at 13:07

## 2017-03-15 RX ADMIN — HEPARIN SODIUM 5000 UNIT(S): 5000 INJECTION INTRAVENOUS; SUBCUTANEOUS at 05:25

## 2017-03-15 NOTE — DISCHARGE NOTE ADULT - CARE PROVIDER_API CALL
Dawit Brar), Critical Care Medicine; Internal Medicine; Pulmonary Disease; Sleep Medicine  [address] [address]  Phone: [phone]  Fax: [phone] Dawit Brar), Critical Care Medicine; Internal Medicine; Pulmonary Disease; Sleep Medicine  [address] [facility] [address]  [place] [address]  Phone: [phone]  Fax: [phone]    Lucinda Beaver), Internal Medicine  [address] [place] [address]  Phone: [phone]  Fax: [phone]    Stu Veliz), Cardiovascular Disease; Internal Medicine; Nuclear Cardiology  [address] [place] [address] [place] [address]  Phone: [phone]  Fax: [phone]

## 2017-03-15 NOTE — DISCHARGE NOTE ADULT - SECONDARY DIAGNOSIS.
Bipolar affective disorder in remission Depression, unspecified depression type NEEL (acute kidney injury) NSTEMI, initial episode of care Hypertensive urgency

## 2017-03-15 NOTE — DISCHARGE NOTE ADULT - MEDICATION SUMMARY - MEDICATIONS TO STOP TAKING
I will STOP taking the medications listed below when I get home from the hospital:    metFORMIN  --  by mouth    glimepiride  --  by mouth    cefuroxime  --  by mouth    lithium  -- 300 milligram(s) by mouth 2 times a day    sodium polystyrene sulfonate oral and rectal powder  -- 30 gram(s) by mouth once a day, As Needed    baclofen 20 mg oral tablet  -- 1 tab(s) by mouth once a day

## 2017-03-15 NOTE — DISCHARGE NOTE ADULT - PATIENT PORTAL LINK FT
“You can access the [facility] Patient Portal, offered by [facility], by registering with the following website: [email or web address]”

## 2017-03-15 NOTE — DISCHARGE NOTE ADULT - HOSPITAL COURSE
50 y/o M with PMHx of HLD, DM2, depression, and bipolar disorder p/w hypoglycemia and AMS, found to have lithium toxicity, NSTEMI, NEEL and hypertensive urgency.     In ED,   T- 97.8, HR 67, /68, RR 22, SpO2- 99 onRA  Given 2L NS, 2amps dextrose, Ativan 2mg IVx2, lopressor 5mg IV,   Labs- WBC 18.38, Platelets 511, Cr 3.13, , Trops 0.14, lactate 1.4, A1c 4.5   U tox- clear   Cards consulted  CXR- clear lungs  CT head-No CT evidence of acute intracranial hemorrhage, mass or displaced calvarial fracture. Right basal ganglia/internal capsule chronic-appearing lacunar infarcts.    AMS-    NEEL on CKD-    DM2 c/b hypoglycemia-    NSTEMI w/ HFrEF 50 y/o M with PMHx of HLD, DM2, depression, and bipolar disorder p/w hypoglycemia and AMS, found to have lithium toxicity, NSTEMI, NEEL and hypertensive urgency.     In ED,   T- 97.8, HR 67, /68, RR 22, SpO2- 99 onRA  Given 2L NS, 2amps dextrose, Ativan 2mg IVx2, lopressor 5mg IV,   Labs- WBC 18.38, Platelets 511, Cr 3.13, , Trops 0.14, lactate 1.4, A1c 4.5   U tox- clear   Cards consulted  CXR- clear lungs  CT head-No CT evidence of acute intracranial hemorrhage, mass or displaced calvarial fracture. Right basal ganglia/internal capsule chronic-appearing lacunar infarcts.    AMS- CT head showed no acute path. The causes for the AMS include the hypoglycemia on admission and the lithium toxicity.     NEEL on CKD-    DM2 c/b hypoglycemia- Patient's A1c was 4.8 indicating he does not need any diabetes medications. All were stopped at discharge. He is to follow up with endocrine after discharge.     NSTEMI w/ HFrEF 52 y/o M with PMHx of HLD, DM2, depression, and bipolar disorder p/w hypoglycemia and AMS, found to have lithium toxicity, NSTEMI, NEEL and hypertensive urgency.     In ED,   T- 97.8, HR 67, /68, RR 22, SpO2- 99 onRA  Given 2L NS, 2amps dextrose, Ativan 2mg IVx2, lopressor 5mg IV,   Labs- WBC 18.38, Platelets 511, Cr 3.13, , Trops 0.14, lactate 1.4, A1c 4.5   U tox- clear   Cards consulted  CXR- clear lungs  CT head-No CT evidence of acute intracranial hemorrhage, mass or displaced calvarial fracture. Right basal ganglia/internal capsule chronic-appearing lacunar infarcts.    AMS- CT head showed no acute path. The causes for the AMS include the hypoglycemia on admission and the lithium toxicity. Due to the kidney failure, lithium will not be resumed. His mental status has returned to baseline during admission.     NEEL on CKD- Cr 1.6 in May 2015, and now >3, appears to be intrarenal. Nephrology following. Autoimmune workup has been neg so far, likely 2/2 HTN and DM2. Started on sodium bicarb and Vit D. Renal US showed Parenchymal renal disease.    DM2 c/b hypoglycemia- Patient's A1c was 4.8 indicating he does not need any diabetes medications. All were stopped at discharge. He is to follow up with endocrine after discharge.     NSTEMI w/ HFrEF-   TTE showed- 1. Severe concentric left ventricular hypertrophy. 2. Mild segmental left ventricular systolic dysfunction. The inferior wall is hypokinetic. 3. The right ventricle is not well visualized; grossly normal right ventricular systolic function.     Hypertension- BP was found to be very labile and in hypertensive urgency with systolics sustained >200 52 y/o M with PMHx of HLD, DM2, depression, and bipolar disorder p/w hypoglycemia and AMS, found to have lithium toxicity, NSTEMI, NEEL and hypertensive urgency.     In ED,   T- 97.8, HR 67, /68, RR 22, SpO2- 99 onRA  Given 2L NS, 2amps dextrose, Ativan 2mg IVx2, lopressor 5mg IV,   Labs- WBC 18.38, Platelets 511, Cr 3.13, , Trops 0.14, lactate 1.4, A1c 4.5   U tox- clear   Cards consulted  CXR- clear lungs  CT head-No CT evidence of acute intracranial hemorrhage, mass or displaced calvarial fracture. Right basal ganglia/internal capsule chronic-appearing lacunar infarcts.    AMS- CT head showed no acute path. The causes for the AMS include the hypoglycemia on admission and the lithium toxicity. Due to the kidney failure, lithium will not be resumed. His mental status has returned to baseline during admission.     NEEL on CKD- Cr 1.6 in May 2015, and now >3, appears to be intrarenal. Nephrology following. Autoimmune workup has been neg so far, likely 2/2 HTN and DM2. Started on sodium bicarb and Vit D. Renal US showed Parenchymal renal disease.    DM2 c/b hypoglycemia- Patient's A1c was 4.8 indicating he does not need any diabetes medications. All were stopped at discharge. He is to follow up with endocrine after discharge.     NSTEMI w/ HFrEF-   TTE showed- 1. Severe concentric left ventricular hypertrophy. 2. Mild segmental left ventricular systolic dysfunction. The inferior wall is hypokinetic. 3. The right ventricle is not well visualized; grossly normal right ventricular systolic function.   Patient presented with elevated Adalid (, Trops 0.14,) with EKG changes (TWI in V4-V6, ST elevation in V3), Cards was consulted but was not convinced pt had a MI. Started pt on labetalol and ASA.     Hypertension- BP was found to be very labile and in hypertensive urgency with systolics sustained >200. Patient was started on Isordil which was titrated up and then transitioned to Imdur before discharge. 50 y/o M with PMHx of HLD, DM2, depression, and bipolar disorder p/w hypoglycemia and AMS, found to have lithium toxicity, NSTEMI, NEEL and hypertensive urgency.     In ED,   T- 97.8, HR 67, /68, RR 22, SpO2- 99 onRA  Given 2L NS, 2amps dextrose, Ativan 2mg IVx2, lopressor 5mg IV,   Labs- WBC 18.38, Platelets 511, Cr 3.13, , Trops 0.14, lactate 1.4, A1c 4.5   U tox- clear   Cards consulted  CXR- clear lungs  CT head-No CT evidence of acute intracranial hemorrhage, mass or displaced calvarial fracture. Right basal ganglia/internal capsule chronic-appearing lacunar infarcts.    AMS- CT head showed no acute path. The causes for the AMS include the hypoglycemia on admission and the lithium toxicity. Due to the kidney failure, lithium will not be resumed. His mental status has returned to baseline during admission.     NEEL on CKD- Cr 1.6 in May 2015, and now >3, appears to be intrarenal. Nephrology following. Autoimmune workup has been neg so far, likely 2/2 HTN and DM2. Started on sodium bicarb and Vit D. Renal US showed Parenchymal renal disease. Patient will follow up with nephrology as an outpatient for further workup and management.     DM2 c/b hypoglycemia- Patient's A1c was 4.8 indicating he does not need any diabetes medications. All were stopped at discharge. He is to follow up with endocrine after discharge.     NSTEMI w/ HFrEF-   TTE showed- 1. Severe concentric left ventricular hypertrophy. 2. Mild segmental left ventricular systolic dysfunction. The inferior wall is hypokinetic. 3. The right ventricle is not well visualized; grossly normal right ventricular systolic function.   Patient presented with elevated Adalid (, Trops 0.14,) with EKG changes (TWI in V4-V6, ST elevation in V3), Cards was consulted but was not convinced pt had a MI. Started pt on labetalol and ASA. Patient will follow up with cardiology as an outpatient.     Hypertension- BP was found to be very labile and in hypertensive urgency with systolics sustained >200. Patient was started on Isordil which was titrated up and then transitioned to Imdur before discharge.     Patient was discharged home in stable condition with instructions to follow up with his PCP, nephrology, Cardiology, and psychiatry.   Patient also agreed to medication checks at home by [facility] staff. 52 y/o M with PMHx of HLD, DM2, depression, and bipolar disorder p/w hypoglycemia and AMS, found to have lithium toxicity, NSTEMI, NEEL and hypertensive urgency.     In ED,   T- 97.8, HR 67, /68, RR 22, SpO2- 99 onRA  Given 2L NS, 2amps dextrose, Ativan 2mg IVx2, lopressor 5mg IV,   Labs- WBC 18.38, Platelets 511, Cr 3.13, , Trops 0.14, lactate 1.4, A1c 4.5   U tox- clear   Cards consulted  CXR- clear lungs  CT head-No CT evidence of acute intracranial hemorrhage, mass or displaced calvarial fracture. Right basal ganglia/internal capsule chronic-appearing lacunar infarcts.    AMS- CT head showed no acute path. The causes for the AMS include the hypoglycemia on admission and the lithium toxicity. Due to the kidney failure, lithium will not be resumed. His mental status has returned to baseline during admission.     NEEL on CKD- Cr 1.6 in May 2015, and now >3, appears to be intrarenal. Nephrology following. Autoimmune workup has been neg so far, likely 2/2 HTN and DM2. Started on sodium bicarb and Vit D. Renal US showed Parenchymal renal disease. Patient will follow up with nephrology as an outpatient for further workup and management.   You will have a kidney biopsy     DM2 c/b hypoglycemia- Patient's A1c was 4.8 indicating he does not need any diabetes medications. All were stopped at discharge. He is to follow up with endocrine and his PCPafter discharge.     NSTEMI w/ HFrEF-   TTE showed- 1. Severe concentric left ventricular hypertrophy. 2. Mild segmental left ventricular systolic dysfunction. The inferior wall is hypokinetic. 3. The right ventricle is not well visualized; grossly normal right ventricular systolic function.   Patient presented with elevated Adalid (, Trops 0.14,) with EKG changes (TWI in V4-V6, ST elevation in V3), Cards was consulted but was not convinced pt had a MI. Started pt on labetalol and ASA. Patient will follow up with cardiology as an outpatient.     Hypertension- BP was found to be very labile and in hypertensive urgency with systolics sustained >200. Patient was started on Isordil which was titrated up and then transitioned to Imdur before discharge. Lasix was also started to control his hypertension and improve his kidney dysfunction.     Patient was discharged home in stable condition with instructions to follow up with his PCP, nephrology, Cardiology, and psychiatry.   Patient also agreed to medication checks at home by [facility] staff. 52 y/o M with PMHx of HLD, DM2, depression, and bipolar disorder p/w hypoglycemia and AMS, found to have lithium toxicity, NSTEMI, NEEL and hypertensive urgency.     In ED,   T- 97.8, HR 67, /68, RR 22, SpO2- 99 onRA  Given 2L NS, 2amps dextrose, Ativan 2mg IVx2, lopressor 5mg IV,   Labs- WBC 18.38, Platelets 511, Cr 3.13, , Trops 0.14, lactate 1.4, A1c 4.5   U tox- clear   Cards consulted  CXR- clear lungs  CT head-No CT evidence of acute intracranial hemorrhage, mass or displaced calvarial fracture. Right basal ganglia/internal capsule chronic-appearing lacunar infarcts.    AMS- CT head showed no acute path. The causes for the AMS include the hypoglycemia on admission and the lithium toxicity. Due to the kidney failure, lithium will not be resumed. His mental status has returned to baseline during admission.     NEEL on CKD- Cr 1.6 in May 2015, and now >3, appears to be intrarenal. Nephrology following. Autoimmune workup has been neg so far, likely 2/2 HTN and DM2. Started on sodium bicarb and Vit D. Renal US showed Parenchymal renal disease. Patient will follow up with nephrology as an outpatient for further workup and management.   You will have a kidney biopsy     DM2 c/b hypoglycemia- Patient's A1c was 4.8. Patient's home DM meds were stopped and he was started on Trajenta 5mg per endocrine. He is to follow up with endocrine and his PCP after discharge.     NSTEMI w/ HFrEF-   TTE showed- 1. Severe concentric left ventricular hypertrophy. 2. Mild segmental left ventricular systolic dysfunction. The inferior wall is hypokinetic. 3. The right ventricle is not well visualized; grossly normal right ventricular systolic function.   Patient presented with elevated Adalid (, Trops 0.14,) with EKG changes (TWI in V4-V6, ST elevation in V3), Cards was consulted but was not convinced pt had a MI. Started pt on labetalol and ASA. Labetalol was chaged to Coreg for better BP contriol and ASA was stopped for one week for renal biopsy. Patient will follow up with cardiology as an outpatient.     Hypertension- BP was found to be very labile and in hypertensive urgency with systolics sustained >200. Patient was started on Isordil which was titrated up and then transitioned to Imdur before discharge. Lasix was also started to control his hypertension and improve his kidney dysfunction.     Patient was discharged home in stable condition with instructions to follow up with his PCP, nephrology, Cardiology, and psychiatry.   Patient also agreed to medication checks at home by [facility] staff.   Patient is to resume ASA 81mg after the renal biopsy. 50 y/o M with PMHx of HLD, DM2, depression, and bipolar disorder p/w hypoglycemia and AMS, found to have lithium toxicity, NSTEMI, NEEL and hypertensive urgency.     In ED,   T- 97.8, HR 67, /68, RR 22, SpO2- 99 onRA  Given 2L NS, 2amps dextrose, Ativan 2mg IVx2, lopressor 5mg IV,   Labs- WBC 18.38, Platelets 511, Cr 3.13, , Trops 0.14, lactate 1.4, A1c 4.5   U tox- clear   Cards consulted  CXR- clear lungs  CT head-No CT evidence of acute intracranial hemorrhage, mass or displaced calvarial fracture. Right basal ganglia/internal capsule chronic-appearing lacunar infarcts.    AMS- CT head showed no acute path. The causes for the AMS include the hypoglycemia on admission and the lithium toxicity. Due to the kidney failure, lithium will not be resumed. His mental status has returned to baseline during admission.     NEEL on CKD- Cr 1.6 in May 2015, and now >3, appears to be intrarenal. Nephrology following. Autoimmune workup has been neg so far, likely 2/2 HTN and DM2. Started on sodium bicarb and Vit D. Renal US showed Parenchymal renal disease. Patient will follow up with nephrology as an outpatient for further workup and management.   You will have a kidney biopsy     DM2 c/b hypoglycemia- Patient's A1c was 4.8. Patient's home DM meds were stopped and he was started on Tradjenta 5mg per endocrine. He is to follow up with endocrine and his PCP after discharge.     NSTEMI w/ HFrEF-   TTE showed- 1. Severe concentric left ventricular hypertrophy. 2. Mild segmental left ventricular systolic dysfunction. The inferior wall is hypokinetic. 3. The right ventricle is not well visualized; grossly normal right ventricular systolic function.   Patient presented with elevated Adalid (, Trops 0.14,) with EKG changes (TWI in V4-V6, ST elevation in V3), Cards was consulted but was not convinced pt had a MI. Started pt on labetalol and ASA. Labetalol was changed to Coreg for better BP control and ASA was stopped for one week for renal biopsy. Patient will follow up with cardiology as an outpatient.     Hypertension- BP was found to be very labile and in hypertensive urgency with systolics sustained >200. Patient was started on Isordil which was titrated up and then transitioned to Imdur before discharge. Lasix was also started to control his hypertension and improve his kidney dysfunction.     Patient was discharged home in stable condition with instructions to follow up with his PCP, nephrology, Cardiology, and psychiatry.   Patient also agreed to medication checks at home by [facility] staff.   Patient is to resume ASA 81mg after the renal biopsy. 52 y/o M with PMHx of HLD, DM2, depression, and bipolar disorder p/w hypoglycemia and AMS, found to have lithium toxicity, NSTEMI, NEEL and hypertensive urgency.     In ED,   T- 97.8, HR 67, /68, RR 22, SpO2- 99 onRA  Given 2L NS, 2amps dextrose, Ativan 2mg IVx2, lopressor 5mg IV,   Labs- WBC 18.38, Platelets 511, Cr 3.13, , Trops 0.14, lactate 1.4, A1c 4.5   U tox- clear   Cards consulted  CXR- clear lungs  CT head-No CT evidence of acute intracranial hemorrhage, mass or displaced calvarial fracture. Right basal ganglia/internal capsule chronic-appearing lacunar infarcts.    AMS- CT head showed no acute path. The causes for the AMS include the hypoglycemia on admission and the lithium toxicity. Due to the kidney failure, lithium will not be resumed. His mental status has returned to baseline during admission.     NEEL on CKD- Cr 1.6 in May 2015, and now >3, appears to be intrarenal. Nephrology following. Autoimmune workup has been neg so far, likely 2/2 HTN and DM2. Started on sodium bicarb and Vit D. Renal US showed Parenchymal renal disease. Patient will follow up with nephrology as an outpatient for further workup and management.   He will have a kidney biopsy done as an outpatient. 3/20 at 3pm Interventional Radiology consultation and 3/23 at 1pm Kidney biopsy at [facility].     DM2 c/b hypoglycemia- Patient's A1c was 4.8. Patient's home DM meds were stopped and he was started on Tradjenta 5mg per endocrine. He is to follow up with endocrine and his PCP after discharge.     NSTEMI w/ HFrEF-   TTE showed- 1. Severe concentric left ventricular hypertrophy. 2. Mild segmental left ventricular systolic dysfunction. The inferior wall is hypokinetic. 3. The right ventricle is not well visualized; grossly normal right ventricular systolic function.   Patient presented with elevated Adalid (, Trops 0.14,) with EKG changes (TWI in V4-V6, ST elevation in V3), Cards was consulted but was not convinced pt had a MI. Started pt on labetalol and ASA. Labetalol was changed to Coreg for better BP control and ASA was stopped for one week for renal biopsy. Patient will follow up with cardiology as an outpatient.     Hypertension- BP was found to be very labile and in hypertensive urgency with systolics sustained >200. Patient was started on Isordil which was titrated up and then transitioned to Imdur before discharge. Lasix was also started to control his hypertension and improve his kidney dysfunction.     Patient was discharged home in stable condition with instructions to follow up with his PCP, nephrology, Cardiology, and psychiatry.   Patient also agreed to medication checks at home by [facility] staff.   Patient is to resume ASA 81mg after the renal biopsy. 50 y/o M with PMHx of HLD, DM2, depression, and bipolar disorder p/w toxic metabolic encephalopathy secondary to severe hypoglycemia secondary to medication, found to have NEEL and hypertensive urgency with NSTEMI and concern for  lithium toxicity.    In ED, T- 97.8, HR 67, /68, RR 22, SpO2- 99 onRA  Given 2L NS, 2amps dextrose, Ativan 2mg IVx2, lopressor 5mg IV,   Labs- WBC 18.38, Platelets 511, Cr 3.13, , Trops 0.14, lactate 1.4, A1c 4.5; U tox- clear   CXR- clear lungs  CT head-No CT evidence of acute intracranial hemorrhage, mass or displaced calvarial fracture. Right basal ganglia/internal capsule chronic-appearing lacunar infarcts.    Pt was admitted to the medicine service with telemetry monitoring. The causes for his encephalopathy include hypoglycemia on admission and the lithium toxicity. Due to the kidney failure, lithium will not be resumed. His mental status has returned to baseline during admission. He had NEEL on CKD with metabolic acidosis (Of note, Cr 1.6 in May 2015).  Nephrology service consulted.  Autoimmune workup has been neg so far; appears to be likely 2/2 HTN and DM2. Started on sodium bicarb and Vit D. Renal US showed parenchymal renal disease. Patient will follow up with nephrology as an outpatient for further workup and management.  He will have a kidney biopsy done as an outpatient. 3/20 at 3pm Interventional Radiology consultation and 3/23 at 1pm Kidney biopsy at [facility].     For diabetes, c/b hypoglycemia and an A1c of 4.8% - his home oral antiglycemic meds were stopped and he was started on Tradjenta 5mg per endocrinology recommendations. He is to follow up with endocrine and his PCP after discharge.     For Bipolar d/o, psychiatry recommended to discontinue Lithium given pt'e renal failure and optimize dosage of Depakote and Trazodone, with the addition of Zyprexa to his regimen.     For NSTEMI w/ HFrEF, he was monitored on telemetry and underwent TTE to evaluate LV function.     TTE showed- 1. Severe concentric left ventricular hypertrophy. 2. Mild segmental left ventricular systolic dysfunction. The inferior wall is hypokinetic. 3. The right ventricle is not well visualized; grossly normal right ventricular systolic function.   Patient presented with elevated Adalid (, Trops 0.14,) with EKG changes (TWI in V4-V6, ST elevation in V3), Cards was consulted but was not convinced pt had a MI. Started pt on labetalol and ASA. Labetalol was changed to Coreg for better BP control and ASA was stopped for one week for renal biopsy. Patient will follow up with cardiology as an outpatient.  Hypertension- BP was found to be very labile and in hypertensive urgency with systolics sustained >200. Patient was started on Isordil which was titrated up and then transitioned to Imdur before discharge. Lasix was also started to control his hypertension and improve his kidney dysfunction.     Patient was discharged home in stable condition with instructions to follow up with his PCP, nephrology, Cardiology, endocrinology, and psychiatry.   [facility] also [facility]   Patient is to resume ASA 81mg after the renal biopsy.

## 2017-03-15 NOTE — DISCHARGE NOTE ADULT - CARE PLAN
Principal Discharge DX:	Type 2 diabetes mellitus with hypoglycemia without coma, without long-term current use of insulin  Goal:	Follow up with your PCP  Instructions for follow-up, activity and diet:	Your blood sugars have been well controlled without any diabetes medication. Please stop taking any medications for diabetes to prevent having low blood sugar again.  Secondary Diagnosis:	Bipolar affective disorder in remission  Instructions for follow-up, activity and diet:	Your Bipolar disorder has been stable, but your lithium level was very high likely from your new kidney failure. Please stop taking your lithium to prevent lithium toxicity and follow up with your psychiatrist to determine a better medication regiment. Please continue your Depakote after discharge.  Secondary Diagnosis:	Depression, unspecified depression type  Instructions for follow-up, activity and diet:	Please continue your Olanzapine after discharge.  Secondary Diagnosis:	NEEL (acute kidney injury)  Instructions for follow-up, activity and diet:	Your kidney function has gotten worsen from last admission. Please follow up with your PCP and the nephrologists after discharge to monitor your kidney function.  Secondary Diagnosis:	NSTEMI, initial episode of care  Instructions for follow-up, activity and diet:	You also had a mild heart attack on admission as well. We started you on a baby aspirin and labetalol. You will need to follow up with cardiology after discharge for further management.  Secondary Diagnosis:	Hypertensive urgency  Instructions for follow-up, activity and diet:	Your blood pressures have been very high during this admission as well. We started you on new blood pressure medications to control it. Please follow up with your PCP after discharge. Principal Discharge DX:	Type 2 diabetes mellitus with hypoglycemia without coma, without long-term current use of insulin  Goal:	Follow up with your PCP  Instructions for follow-up, activity and diet:	Your blood sugars have been well controlled without any diabetes medication. Please stop taking any medications for diabetes to prevent having low blood sugar again.  Secondary Diagnosis:	Bipolar affective disorder in remission  Instructions for follow-up, activity and diet:	Your Bipolar disorder has been stable, but your lithium level was very high likely from your new kidney failure. Please stop taking your lithium to prevent lithium toxicity and follow up with your psychiatrist to determine a better medication regiment. Please continue your Depakote after discharge.  Secondary Diagnosis:	Depression, unspecified depression type  Instructions for follow-up, activity and diet:	Please continue your Olanzapine after discharge.  Secondary Diagnosis:	NEEL (acute kidney injury)  Instructions for follow-up, activity and diet:	Your kidney function has gotten worsen from last admission. We started you on lasix to help your kidney function. Please follow up with your PCP and the nephrologists after discharge to monitor your kidney function. You will also have a kidney biopsy done 1 week after discharge. You will need to start taking aspirin 81mg after the biopsy is done.  Secondary Diagnosis:	NSTEMI, initial episode of care  Instructions for follow-up, activity and diet:	You also had a mild heart attack on admission as well. We started you on a baby aspirin and Coreg. You will need to follow up with cardiology after discharge for further management. We stopped your aspirin for one week until you get your kidney biopsy.  Secondary Diagnosis:	Hypertensive urgency  Instructions for follow-up, activity and diet:	Your blood pressures have been very high during this admission as well. We started you on new blood pressure medications to control it. Please follow up with your PCP after discharge. Principal Discharge DX:	Type 2 diabetes mellitus with hypoglycemia without coma, without long-term current use of insulin  Goal:	Follow up with your PCP  Instructions for follow-up, activity and diet:	Your blood sugars have been well controlled without any diabetes medication. Please stop taking any medications you have at home for diabetes to prevent having low blood sugar again. We will start you on Trajenta 5mg on discharge.  Secondary Diagnosis:	Bipolar affective disorder in remission  Instructions for follow-up, activity and diet:	Your Bipolar disorder has been stable, but your lithium level was very high likely from your new kidney failure. Please stop taking your lithium to prevent lithium toxicity and follow up with your psychiatrist to determine a better medication regiment. Please continue your Depakote after discharge.  Secondary Diagnosis:	Depression, unspecified depression type  Instructions for follow-up, activity and diet:	Please continue your Olanzapine after discharge.  Secondary Diagnosis:	NEEL (acute kidney injury)  Instructions for follow-up, activity and diet:	Your kidney function has gotten worsen from last admission. We started you on lasix to help your kidney function. Please follow up with your PCP and the nephrologists after discharge to monitor your kidney function. You will also have a kidney biopsy done 1 week after discharge. You will need to start taking aspirin 81mg after the biopsy is done.  Secondary Diagnosis:	NSTEMI, initial episode of care  Instructions for follow-up, activity and diet:	You also had a mild heart attack on admission as well. We started you on a baby aspirin and Coreg. You will need to follow up with cardiology after discharge for further management. We stopped your aspirin for one week until you get your kidney biopsy.  Secondary Diagnosis:	Hypertensive urgency  Instructions for follow-up, activity and diet:	Your blood pressures have been very high during this admission as well. We started you on new blood pressure medications to control it. Please follow up with your PCP after discharge. Principal Discharge DX:	Type 2 diabetes mellitus with hypoglycemia without coma, without long-term current use of insulin  Goal:	Follow up with your PCP  Instructions for follow-up, activity and diet:	Your blood sugars have been well controlled without any diabetes medication. Please stop taking any medications you have at home for diabetes to prevent having low blood sugar again. We will start you on Tradjenta 5mg on discharge.  Secondary Diagnosis:	Bipolar affective disorder in remission  Instructions for follow-up, activity and diet:	Your Bipolar disorder has been stable, but your lithium level was very high likely from your new kidney failure. Please stop taking your lithium to prevent lithium toxicity and follow up with your psychiatrist to determine a better medication regiment. Please continue your Depakote after discharge.  Secondary Diagnosis:	Depression, unspecified depression type  Instructions for follow-up, activity and diet:	Please continue your Olanzapine after discharge.  Secondary Diagnosis:	NEEL (acute kidney injury)  Instructions for follow-up, activity and diet:	Your kidney function has gotten worsen from last admission. We started you on lasix to help your kidney function. Please follow up with your PCP and the nephrologists after discharge to monitor your kidney function. You will also have a kidney biopsy done 1 week after discharge. You will need to start taking aspirin 81mg after the biopsy is done.  Secondary Diagnosis:	NSTEMI, initial episode of care  Instructions for follow-up, activity and diet:	You also had a mild heart attack on admission as well. We started you on a baby aspirin and Coreg. You will need to follow up with cardiology after discharge for further management. We stopped your aspirin for one week until you get your kidney biopsy.  Secondary Diagnosis:	Hypertensive urgency  Instructions for follow-up, activity and diet:	Your blood pressures have been very high during this admission as well. We started you on new blood pressure medications to control it. Please follow up with your PCP after discharge.

## 2017-03-15 NOTE — DISCHARGE NOTE ADULT - MEDICATION SUMMARY - MEDICATIONS TO CHANGE
I will SWITCH the dose or number of times a day I take the medications listed below when I get home from the hospital:    ergocalciferol  --  by mouth

## 2017-03-15 NOTE — DISCHARGE NOTE ADULT - HOME CARE AGENCY
[facility] [phone].  Nurse to visit on the day after discharge.  Other appropriate services to be arranged thereafter.

## 2017-03-15 NOTE — DISCHARGE NOTE ADULT - MEDICATION SUMMARY - MEDICATIONS TO TAKE
I will START or STAY ON the medications listed below when I get home from the hospital:    sodium bicarbonate 650 mg oral tablet  -- 1 tab(s) by mouth 3 times a day  -- Indication: For Chronic Kidney Disease     isosorbide mononitrate 120 mg oral tablet, extended release  -- 1 tab(s) by mouth once a day (at bedtime)  -- Indication: For Hypertensive urgency    divalproex sodium  -- 500 milligram(s) by mouth 2 times a day  -- Indication: For Bipolar affective disorder in remission    traZODone  -- 100 milligram(s) by mouth once a day (at bedtime)  -- Indication: For Bipolar affective disorder in remission    Tradjenta 5 mg oral tablet  -- 1 tab(s) by mouth once a day  -- Check with your doctor before becoming pregnant.  Obtain medical advice before taking any non-prescription drugs as some may affect the action of this medication.    -- Indication: For Type 2 diabetes mellitus with hypoglycemia without coma, without long-term current use of insulin    atorvastatin  -- 40 milligram(s) by mouth once a day (at bedtime)  -- Indication: For NSTEMI, initial episode of care    OLANZapine 20 mg oral tablet  -- 1 tab(s) by mouth once a day (at bedtime)  -- Indication: For Bipolar affective disorder in remission    carvedilol 12.5 mg oral tablet  -- 1 tab(s) by mouth every 12 hours  -- Indication: For Hypertensive urgency    furosemide 40 mg oral tablet  -- 1 tab(s) by mouth once a day  -- Indication: For Chronic Kidney Disease     ergocalciferol 50,000 intl units (1.25 mg) oral capsule  -- 1 cap(s) by mouth once a week  -- Indication: For Chronic Kidney Disease

## 2017-03-15 NOTE — DISCHARGE NOTE ADULT - PLAN OF CARE
Follow up with your PCP Your blood sugars have been well controlled without any diabetes medication. Please stop taking any medications for diabetes to prevent having low blood sugar again. Your Bipolar disorder has been stable, but your lithium level was very high likely from your new kidney failure. Please stop taking your lithium to prevent lithium toxicity and follow up with your psychiatrist to determine a better medication regiment. Please continue your Depakote after discharge. Please continue your Olanzapine after discharge. Your kidney function has gotten worsen from last admission. Please follow up with your PCP and the nephrologists after discharge to monitor your kidney function. You also had a mild heart attack on admission as well. We started you on a baby aspirin and labetalol. You will need to follow up with cardiology after discharge for further management. Your blood pressures have been very high during this admission as well. We started you on new blood pressure medications to control it. Please follow up with your PCP after discharge. Your kidney function has gotten worsen from last admission. We started you on lasix to help your kidney function. Please follow up with your PCP and the nephrologists after discharge to monitor your kidney function. You will also have a kidney biopsy done 1 week after discharge. You will need to start taking aspirin 81mg after the biopsy is done. You also had a mild heart attack on admission as well. We started you on a baby aspirin and Coreg. You will need to follow up with cardiology after discharge for further management. We stopped your aspirin for one week until you get your kidney biopsy. Your blood sugars have been well controlled without any diabetes medication. Please stop taking any medications you have at home for diabetes to prevent having low blood sugar again. We will start you on Trajenta 5mg on discharge. Your blood sugars have been well controlled without any diabetes medication. Please stop taking any medications you have at home for diabetes to prevent having low blood sugar again. We will start you on Tradjenta 5mg on discharge.

## 2017-03-15 NOTE — DISCHARGE NOTE ADULT - ADDITIONAL INSTRUCTIONS
Please follow up with your PCP in 1-2 weeks after discharge.   Please call_________ and make an appointment with the nephrology clinic to monitor your kidney function.   Please call__________to make an appointment with Cardiology.   Please follow up with your psychiatrist in 1-2 weeks after discharge as well. Please follow up with your PCP in 1-2 weeks after discharge.   Please follow up in the nephrology clinic on [address] [place] [address] (2nd floor) on 3/28 at 1pm.   Please call [phone] to make an appointment with Cardiology under Dr. Stu Veliz.   Please follow up with your psychiatrist in 1-2 weeks after discharge as well. You can call [phone] (option 1) to schedule a an appointment with a new psychiatrist apart of [facility]. It may take up to 2 months to make a new appointment, so please follow up with your own psychiatrist until then. Please follow up with your PCP in 1-2 weeks after discharge.     Please follow up in the nephrology clinic on [address] [facility] [place] (2nd floor) on 3/28 at 1pm.   Kidney biopsy- You have 2 appointments made for the kidney biopsy. The first is a consultation and the second is the actual procedure. The office is located in [facility] on the second floor room [address].   3/20 at 3pm Interventional Radiology consultation and 3/23 at 1pm Kidney biopsy at [facility].    Please call [phone] to make an appointment with Cardiology under Dr. Stu Veliz.     Please follow up with your psychiatrist in 1-2 weeks after discharge as well. You can call [phone] (select option 1) to schedule a an appointment with a new psychiatrist apart of [facility]. It may take up to 2 months to make a new appointment, so please follow up with your own psychiatrist until then.

## 2017-03-15 NOTE — DISCHARGE NOTE ADULT - CARE PROVIDERS DIRECT ADDRESSES
,DirectAddress_Unknown,DirectAddress_Unknown ,DirectAddress_Unknown,[email or web address],DirectAddress_Unknown

## 2017-03-16 LAB
BUN SERPL-MCNC: 44 MG/DL — HIGH (ref 7–23)
BUN SERPL-MCNC: 50 MG/DL — HIGH (ref 7–23)
CALCIUM SERPL-MCNC: 8.1 MG/DL — LOW (ref 8.4–10.5)
CALCIUM SERPL-MCNC: 8.1 MG/DL — LOW (ref 8.4–10.5)
CHLORIDE SERPL-SCNC: 112 MMOL/L — HIGH (ref 98–107)
CHLORIDE SERPL-SCNC: 116 MMOL/L — HIGH (ref 98–107)
CO2 SERPL-SCNC: 17 MMOL/L — LOW (ref 22–31)
CO2 SERPL-SCNC: 18 MMOL/L — LOW (ref 22–31)
CREAT SERPL-MCNC: 3.13 MG/DL — HIGH (ref 0.5–1.3)
CREAT SERPL-MCNC: 3.48 MG/DL — HIGH (ref 0.5–1.3)
CREAT UR-MCNC: 35 MG/DL — SIGNIFICANT CHANGE UP
DSDNA AB SER-ACNC: <12 IU/ML — SIGNIFICANT CHANGE UP
GLUCOSE SERPL-MCNC: 112 MG/DL — HIGH (ref 70–99)
GLUCOSE SERPL-MCNC: 75 MG/DL — SIGNIFICANT CHANGE UP (ref 70–99)
HCT VFR BLD CALC: 25.4 % — LOW (ref 39–50)
HGB BLD-MCNC: 8.1 G/DL — LOW (ref 13–17)
MAGNESIUM SERPL-MCNC: 2.4 MG/DL — SIGNIFICANT CHANGE UP (ref 1.6–2.6)
MCHC RBC-ENTMCNC: 30.5 PG — SIGNIFICANT CHANGE UP (ref 27–34)
MCHC RBC-ENTMCNC: 31.9 % — LOW (ref 32–36)
MCV RBC AUTO: 95.5 FL — SIGNIFICANT CHANGE UP (ref 80–100)
MICROALBUMIN UR-MCNC: 231.3 MG/DL — SIGNIFICANT CHANGE UP
MICROALBUMIN/CREAT UR-RTO: 6609 UG/MG — HIGH (ref 0–30)
PHOSPHATE SERPL-MCNC: 4.5 MG/DL — SIGNIFICANT CHANGE UP (ref 2.5–4.5)
PLATELET # BLD AUTO: 314 K/UL — SIGNIFICANT CHANGE UP (ref 150–400)
PMV BLD: 11.5 FL — SIGNIFICANT CHANGE UP (ref 7–13)
POTASSIUM SERPL-MCNC: 5.5 MMOL/L — HIGH (ref 3.5–5.3)
POTASSIUM SERPL-MCNC: 5.7 MMOL/L — HIGH (ref 3.5–5.3)
POTASSIUM SERPL-SCNC: 5.5 MMOL/L — HIGH (ref 3.5–5.3)
POTASSIUM SERPL-SCNC: 5.7 MMOL/L — HIGH (ref 3.5–5.3)
RBC # BLD: 2.66 M/UL — LOW (ref 4.2–5.8)
RBC # FLD: 14.6 % — HIGH (ref 10.3–14.5)
SODIUM SERPL-SCNC: 142 MMOL/L — SIGNIFICANT CHANGE UP (ref 135–145)
SODIUM SERPL-SCNC: 144 MMOL/L — SIGNIFICANT CHANGE UP (ref 135–145)
WBC # BLD: 8.82 K/UL — SIGNIFICANT CHANGE UP (ref 3.8–10.5)
WBC # FLD AUTO: 8.82 K/UL — SIGNIFICANT CHANGE UP (ref 3.8–10.5)

## 2017-03-16 PROCEDURE — 99233 SBSQ HOSP IP/OBS HIGH 50: CPT | Mod: GC

## 2017-03-16 PROCEDURE — 93010 ELECTROCARDIOGRAM REPORT: CPT

## 2017-03-16 PROCEDURE — 99232 SBSQ HOSP IP/OBS MODERATE 35: CPT | Mod: GC

## 2017-03-16 RX ORDER — ISOSORBIDE MONONITRATE 60 MG/1
90 TABLET, EXTENDED RELEASE ORAL AT BEDTIME
Qty: 0 | Refills: 0 | Status: DISCONTINUED | OUTPATIENT
Start: 2017-03-16 | End: 2017-03-17

## 2017-03-16 RX ORDER — FUROSEMIDE 40 MG
40 TABLET ORAL ONCE
Qty: 0 | Refills: 0 | Status: COMPLETED | OUTPATIENT
Start: 2017-03-16 | End: 2017-03-16

## 2017-03-16 RX ORDER — ALBUTEROL 90 UG/1
2.5 AEROSOL, METERED ORAL ONCE
Qty: 0 | Refills: 0 | Status: DISCONTINUED | OUTPATIENT
Start: 2017-03-16 | End: 2017-03-16

## 2017-03-16 RX ORDER — FUROSEMIDE 40 MG
40 TABLET ORAL DAILY
Qty: 0 | Refills: 0 | Status: DISCONTINUED | OUTPATIENT
Start: 2017-03-16 | End: 2017-03-16

## 2017-03-16 RX ORDER — INSULIN HUMAN 100 [IU]/ML
10 INJECTION, SOLUTION SUBCUTANEOUS ONCE
Qty: 0 | Refills: 0 | Status: COMPLETED | OUTPATIENT
Start: 2017-03-16 | End: 2017-03-16

## 2017-03-16 RX ORDER — DEXTROSE 50 % IN WATER 50 %
50 SYRINGE (ML) INTRAVENOUS ONCE
Qty: 0 | Refills: 0 | Status: COMPLETED | OUTPATIENT
Start: 2017-03-16 | End: 2017-03-16

## 2017-03-16 RX ORDER — FUROSEMIDE 40 MG
40 TABLET ORAL DAILY
Qty: 0 | Refills: 0 | Status: DISCONTINUED | OUTPATIENT
Start: 2017-03-17 | End: 2017-03-17

## 2017-03-16 RX ORDER — SODIUM POLYSTYRENE SULFONATE 4.1 MEQ/G
30 POWDER, FOR SUSPENSION ORAL ONCE
Qty: 0 | Refills: 0 | Status: COMPLETED | OUTPATIENT
Start: 2017-03-16 | End: 2017-03-16

## 2017-03-16 RX ORDER — ALBUTEROL 90 UG/1
10 AEROSOL, METERED ORAL ONCE
Qty: 0 | Refills: 0 | Status: COMPLETED | OUTPATIENT
Start: 2017-03-16 | End: 2017-03-16

## 2017-03-16 RX ORDER — ISOSORBIDE MONONITRATE 60 MG/1
30 TABLET, EXTENDED RELEASE ORAL ONCE
Qty: 0 | Refills: 0 | Status: COMPLETED | OUTPATIENT
Start: 2017-03-16 | End: 2017-03-16

## 2017-03-16 RX ORDER — SODIUM POLYSTYRENE SULFONATE 4.1 MEQ/G
30 POWDER, FOR SUSPENSION ORAL ONCE
Qty: 0 | Refills: 0 | Status: DISCONTINUED | OUTPATIENT
Start: 2017-03-16 | End: 2017-03-16

## 2017-03-16 RX ORDER — CARVEDILOL PHOSPHATE 80 MG/1
12.5 CAPSULE, EXTENDED RELEASE ORAL EVERY 12 HOURS
Qty: 0 | Refills: 0 | Status: DISCONTINUED | OUTPATIENT
Start: 2017-03-16 | End: 2017-03-17

## 2017-03-16 RX ADMIN — Medication 50 MILLILITER(S): at 11:17

## 2017-03-16 RX ADMIN — ALBUTEROL 5 MILLIGRAM(S): 90 AEROSOL, METERED ORAL at 11:49

## 2017-03-16 RX ADMIN — Medication 650 MILLIGRAM(S): at 06:05

## 2017-03-16 RX ADMIN — DIVALPROEX SODIUM 1000 MILLIGRAM(S): 500 TABLET, DELAYED RELEASE ORAL at 21:32

## 2017-03-16 RX ADMIN — Medication 40 MILLIGRAM(S): at 18:42

## 2017-03-16 RX ADMIN — Medication 81 MILLIGRAM(S): at 12:15

## 2017-03-16 RX ADMIN — SODIUM POLYSTYRENE SULFONATE 30 GRAM(S): 4.1 POWDER, FOR SUSPENSION ORAL at 10:26

## 2017-03-16 RX ADMIN — Medication 100 MILLIGRAM(S): at 06:05

## 2017-03-16 RX ADMIN — HEPARIN SODIUM 5000 UNIT(S): 5000 INJECTION INTRAVENOUS; SUBCUTANEOUS at 06:05

## 2017-03-16 RX ADMIN — ISOSORBIDE MONONITRATE 90 MILLIGRAM(S): 60 TABLET, EXTENDED RELEASE ORAL at 21:32

## 2017-03-16 RX ADMIN — Medication 650 MILLIGRAM(S): at 12:15

## 2017-03-16 RX ADMIN — HEPARIN SODIUM 5000 UNIT(S): 5000 INJECTION INTRAVENOUS; SUBCUTANEOUS at 12:15

## 2017-03-16 RX ADMIN — Medication: at 11:40

## 2017-03-16 RX ADMIN — ISOSORBIDE MONONITRATE 30 MILLIGRAM(S): 60 TABLET, EXTENDED RELEASE ORAL at 07:48

## 2017-03-16 RX ADMIN — ATORVASTATIN CALCIUM 40 MILLIGRAM(S): 80 TABLET, FILM COATED ORAL at 21:32

## 2017-03-16 RX ADMIN — CARVEDILOL PHOSPHATE 12.5 MILLIGRAM(S): 80 CAPSULE, EXTENDED RELEASE ORAL at 17:48

## 2017-03-16 RX ADMIN — Medication 650 MILLIGRAM(S): at 21:32

## 2017-03-16 RX ADMIN — INSULIN HUMAN 10 UNIT(S): 100 INJECTION, SOLUTION SUBCUTANEOUS at 11:20

## 2017-03-16 RX ADMIN — OLANZAPINE 20 MILLIGRAM(S): 15 TABLET, FILM COATED ORAL at 21:32

## 2017-03-16 RX ADMIN — SODIUM POLYSTYRENE SULFONATE 30 GRAM(S): 4.1 POWDER, FOR SUSPENSION ORAL at 19:51

## 2017-03-17 VITALS — DIASTOLIC BLOOD PRESSURE: 59 MMHG | SYSTOLIC BLOOD PRESSURE: 149 MMHG | HEART RATE: 50 BPM

## 2017-03-17 LAB
BUN SERPL-MCNC: 51 MG/DL — HIGH (ref 7–23)
CALCIUM SERPL-MCNC: 7.8 MG/DL — LOW (ref 8.4–10.5)
CHLORIDE SERPL-SCNC: 110 MMOL/L — HIGH (ref 98–107)
CO2 SERPL-SCNC: 17 MMOL/L — LOW (ref 22–31)
CREAT SERPL-MCNC: 3.27 MG/DL — HIGH (ref 0.5–1.3)
GAS PNL BLDMV: SIGNIFICANT CHANGE UP
GLUCOSE SERPL-MCNC: 135 MG/DL — HIGH (ref 70–99)
HCT VFR BLD CALC: 25.8 % — LOW (ref 39–50)
HGB BLD-MCNC: 8.2 G/DL — LOW (ref 13–17)
MAGNESIUM SERPL-MCNC: 2.1 MG/DL — SIGNIFICANT CHANGE UP (ref 1.6–2.6)
MCHC RBC-ENTMCNC: 30.4 PG — SIGNIFICANT CHANGE UP (ref 27–34)
MCHC RBC-ENTMCNC: 31.8 % — LOW (ref 32–36)
MCV RBC AUTO: 95.6 FL — SIGNIFICANT CHANGE UP (ref 80–100)
PHOSPHATE SERPL-MCNC: 4.6 MG/DL — HIGH (ref 2.5–4.5)
PLATELET # BLD AUTO: 310 K/UL — SIGNIFICANT CHANGE UP (ref 150–400)
PMV BLD: 11.6 FL — SIGNIFICANT CHANGE UP (ref 7–13)
POTASSIUM SERPL-MCNC: 5.2 MMOL/L — SIGNIFICANT CHANGE UP (ref 3.5–5.3)
POTASSIUM SERPL-SCNC: 5.2 MMOL/L — SIGNIFICANT CHANGE UP (ref 3.5–5.3)
RBC # BLD: 2.7 M/UL — LOW (ref 4.2–5.8)
RBC # FLD: 14.7 % — HIGH (ref 10.3–14.5)
SODIUM SERPL-SCNC: 140 MMOL/L — SIGNIFICANT CHANGE UP (ref 135–145)
VIT B1 SERPL-MCNC: 159.5 NMOL/L — SIGNIFICANT CHANGE UP (ref 66.5–200)
WBC # BLD: 8.18 K/UL — SIGNIFICANT CHANGE UP (ref 3.8–10.5)
WBC # FLD AUTO: 8.18 K/UL — SIGNIFICANT CHANGE UP (ref 3.8–10.5)

## 2017-03-17 PROCEDURE — 99239 HOSP IP/OBS DSCHRG MGMT >30: CPT

## 2017-03-17 PROCEDURE — 99232 SBSQ HOSP IP/OBS MODERATE 35: CPT

## 2017-03-17 PROCEDURE — 99232 SBSQ HOSP IP/OBS MODERATE 35: CPT | Mod: GC

## 2017-03-17 RX ORDER — ISOSORBIDE MONONITRATE 60 MG/1
30 TABLET, EXTENDED RELEASE ORAL ONCE
Qty: 0 | Refills: 0 | Status: DISCONTINUED | OUTPATIENT
Start: 2017-03-17 | End: 2017-03-17

## 2017-03-17 RX ORDER — SODIUM BICARBONATE 1 MEQ/ML
1 SYRINGE (ML) INTRAVENOUS
Qty: 90 | Refills: 0 | OUTPATIENT
Start: 2017-03-17 | End: 2017-04-16

## 2017-03-17 RX ORDER — ISOSORBIDE MONONITRATE 60 MG/1
1 TABLET, EXTENDED RELEASE ORAL
Qty: 30 | Refills: 0 | OUTPATIENT
Start: 2017-03-17 | End: 2017-04-16

## 2017-03-17 RX ORDER — ISOSORBIDE MONONITRATE 60 MG/1
120 TABLET, EXTENDED RELEASE ORAL AT BEDTIME
Qty: 0 | Refills: 0 | Status: DISCONTINUED | OUTPATIENT
Start: 2017-03-17 | End: 2017-03-17

## 2017-03-17 RX ORDER — ERGOCALCIFEROL 1.25 MG/1
1 CAPSULE ORAL
Qty: 4 | Refills: 0 | OUTPATIENT
Start: 2017-03-17 | End: 2017-04-16

## 2017-03-17 RX ORDER — ISOSORBIDE DINITRATE 5 MG/1
10 TABLET ORAL ONCE
Qty: 0 | Refills: 0 | Status: COMPLETED | OUTPATIENT
Start: 2017-03-17 | End: 2017-03-17

## 2017-03-17 RX ORDER — CARVEDILOL PHOSPHATE 80 MG/1
1 CAPSULE, EXTENDED RELEASE ORAL
Qty: 60 | Refills: 0 | OUTPATIENT
Start: 2017-03-17 | End: 2017-04-16

## 2017-03-17 RX ORDER — LINAGLIPTIN 5 MG/1
1 TABLET, FILM COATED ORAL
Qty: 30 | Refills: 0 | OUTPATIENT
Start: 2017-03-17 | End: 2017-04-16

## 2017-03-17 RX ORDER — FUROSEMIDE 40 MG
1 TABLET ORAL
Qty: 30 | Refills: 0 | OUTPATIENT
Start: 2017-03-17 | End: 2017-04-16

## 2017-03-17 RX ADMIN — Medication 2: at 11:37

## 2017-03-17 RX ADMIN — HEPARIN SODIUM 5000 UNIT(S): 5000 INJECTION INTRAVENOUS; SUBCUTANEOUS at 13:35

## 2017-03-17 RX ADMIN — Medication 650 MILLIGRAM(S): at 13:35

## 2017-03-17 RX ADMIN — CARVEDILOL PHOSPHATE 12.5 MILLIGRAM(S): 80 CAPSULE, EXTENDED RELEASE ORAL at 18:00

## 2017-03-17 RX ADMIN — Medication 40 MILLIGRAM(S): at 06:57

## 2017-03-17 RX ADMIN — Medication 650 MILLIGRAM(S): at 06:15

## 2017-03-17 RX ADMIN — CARVEDILOL PHOSPHATE 12.5 MILLIGRAM(S): 80 CAPSULE, EXTENDED RELEASE ORAL at 06:15

## 2017-03-17 RX ADMIN — ISOSORBIDE DINITRATE 10 MILLIGRAM(S): 5 TABLET ORAL at 13:40

## 2017-03-18 LAB
BACTERIA BLD CULT: SIGNIFICANT CHANGE UP
BACTERIA BLD CULT: SIGNIFICANT CHANGE UP

## 2017-03-19 LAB
DEPRECATED KAPPA LC FREE/LAMBDA SER: 8.63 — SIGNIFICANT CHANGE UP (ref 2.04–10.37)
KAPPA LC UR-MCNC: 158 MG/L — HIGH (ref 1.35–24.19)
LAMBDA LC UR-MCNC: 18.3 MG/L — HIGH (ref 0.24–6.66)

## 2017-03-20 ENCOUNTER — APPOINTMENT (OUTPATIENT)
Dept: INTERVENTIONAL RADIOLOGY/VASCULAR | Facility: CLINIC | Age: 52
End: 2017-03-20

## 2017-03-20 VITALS — BODY MASS INDEX: 23.62 KG/M2 | WEIGHT: 165 LBS | HEIGHT: 70 IN | RESPIRATION RATE: 18 BRPM

## 2017-03-21 LAB
APTT BLD: 27.9 SEC
INR PPP: 0.94 RATIO
PT BLD: 10.6 SEC

## 2017-03-23 ENCOUNTER — OUTPATIENT (OUTPATIENT)
Dept: OUTPATIENT SERVICES | Facility: HOSPITAL | Age: 52
LOS: 1 days | End: 2017-03-23
Payer: COMMERCIAL

## 2017-03-23 DIAGNOSIS — N28.9 DISORDER OF KIDNEY AND URETER, UNSPECIFIED: ICD-10-CM

## 2017-03-23 DIAGNOSIS — N17.9 ACUTE KIDNEY FAILURE, UNSPECIFIED: ICD-10-CM

## 2017-03-23 PROCEDURE — 74150 CT ABDOMEN W/O CONTRAST: CPT | Mod: 26

## 2017-04-10 ENCOUNTER — APPOINTMENT (OUTPATIENT)
Dept: NEPHROLOGY | Facility: CLINIC | Age: 52
End: 2017-04-10

## 2017-04-28 ENCOUNTER — APPOINTMENT (OUTPATIENT)
Dept: GASTROENTEROLOGY | Facility: CLINIC | Age: 52
End: 2017-04-28

## 2017-05-12 ENCOUNTER — APPOINTMENT (OUTPATIENT)
Dept: CARDIOLOGY | Facility: CLINIC | Age: 52
End: 2017-05-12

## 2017-08-17 ENCOUNTER — INPATIENT (INPATIENT)
Facility: HOSPITAL | Age: 52
LOS: 5 days | Discharge: LEFT BEFORE DISCHARGE | End: 2017-08-23
Attending: SURGERY | Admitting: SURGERY
Payer: MEDICARE

## 2017-08-17 VITALS
SYSTOLIC BLOOD PRESSURE: 185 MMHG | DIASTOLIC BLOOD PRESSURE: 70 MMHG | TEMPERATURE: 98 F | HEART RATE: 72 BPM | RESPIRATION RATE: 14 BRPM | OXYGEN SATURATION: 100 %

## 2017-08-17 DIAGNOSIS — L03.115 CELLULITIS OF RIGHT LOWER LIMB: ICD-10-CM

## 2017-08-17 LAB
ALBUMIN SERPL ELPH-MCNC: 2.9 G/DL — LOW (ref 3.3–5)
ALP SERPL-CCNC: 73 U/L — SIGNIFICANT CHANGE UP (ref 40–120)
ALT FLD-CCNC: 40 U/L — SIGNIFICANT CHANGE UP (ref 4–41)
AST SERPL-CCNC: 22 U/L — SIGNIFICANT CHANGE UP (ref 4–40)
BASE EXCESS BLDV CALC-SCNC: 0 MMOL/L — SIGNIFICANT CHANGE UP
BASOPHILS # BLD AUTO: 0.02 K/UL — SIGNIFICANT CHANGE UP (ref 0–0.2)
BASOPHILS NFR BLD AUTO: 0.2 % — SIGNIFICANT CHANGE UP (ref 0–2)
BILIRUB SERPL-MCNC: 0.2 MG/DL — SIGNIFICANT CHANGE UP (ref 0.2–1.2)
BLOOD GAS VENOUS - CREATININE: 3.02 MG/DL — HIGH (ref 0.5–1.3)
BUN SERPL-MCNC: 50 MG/DL — HIGH (ref 7–23)
CALCIUM SERPL-MCNC: 8.7 MG/DL — SIGNIFICANT CHANGE UP (ref 8.4–10.5)
CHLORIDE BLDV-SCNC: 104 MMOL/L — SIGNIFICANT CHANGE UP (ref 96–108)
CHLORIDE SERPL-SCNC: 100 MMOL/L — SIGNIFICANT CHANGE UP (ref 98–107)
CO2 SERPL-SCNC: 22 MMOL/L — SIGNIFICANT CHANGE UP (ref 22–31)
CREAT SERPL-MCNC: 2.96 MG/DL — HIGH (ref 0.5–1.3)
EOSINOPHIL # BLD AUTO: 0.03 K/UL — SIGNIFICANT CHANGE UP (ref 0–0.5)
EOSINOPHIL NFR BLD AUTO: 0.2 % — SIGNIFICANT CHANGE UP (ref 0–6)
GAS PNL BLDV: 133 MMOL/L — LOW (ref 136–146)
GLUCOSE BLDV-MCNC: 208 — HIGH (ref 70–99)
GLUCOSE SERPL-MCNC: 208 MG/DL — HIGH (ref 70–99)
HCO3 BLDV-SCNC: 23 MMOL/L — SIGNIFICANT CHANGE UP (ref 20–27)
HCT VFR BLD CALC: 28.5 % — LOW (ref 39–50)
HCT VFR BLDV CALC: 29.8 % — LOW (ref 39–51)
HGB BLD-MCNC: 9.4 G/DL — LOW (ref 13–17)
HGB BLDV-MCNC: 9.6 G/DL — LOW (ref 13–17)
IMM GRANULOCYTES # BLD AUTO: 0.07 # — SIGNIFICANT CHANGE UP
IMM GRANULOCYTES NFR BLD AUTO: 0.5 % — SIGNIFICANT CHANGE UP (ref 0–1.5)
LACTATE BLDV-MCNC: 0.8 MMOL/L — SIGNIFICANT CHANGE UP (ref 0.5–2)
LYMPHOCYTES # BLD AUTO: 1.53 K/UL — SIGNIFICANT CHANGE UP (ref 1–3.3)
LYMPHOCYTES # BLD AUTO: 11.7 % — LOW (ref 13–44)
MCHC RBC-ENTMCNC: 29.6 PG — SIGNIFICANT CHANGE UP (ref 27–34)
MCHC RBC-ENTMCNC: 33 % — SIGNIFICANT CHANGE UP (ref 32–36)
MCV RBC AUTO: 89.6 FL — SIGNIFICANT CHANGE UP (ref 80–100)
MONOCYTES # BLD AUTO: 0.91 K/UL — HIGH (ref 0–0.9)
MONOCYTES NFR BLD AUTO: 6.9 % — SIGNIFICANT CHANGE UP (ref 2–14)
NEUTROPHILS # BLD AUTO: 10.55 K/UL — HIGH (ref 1.8–7.4)
NEUTROPHILS NFR BLD AUTO: 80.5 % — HIGH (ref 43–77)
NRBC # FLD: 0 — SIGNIFICANT CHANGE UP
PCO2 BLDV: 50 MMHG — SIGNIFICANT CHANGE UP (ref 41–51)
PH BLDV: 7.32 PH — SIGNIFICANT CHANGE UP (ref 7.32–7.43)
PLATELET # BLD AUTO: 374 K/UL — SIGNIFICANT CHANGE UP (ref 150–400)
PMV BLD: 10.2 FL — SIGNIFICANT CHANGE UP (ref 7–13)
PO2 BLDV: < 24 MMHG — LOW (ref 35–40)
POTASSIUM BLDV-SCNC: 4.6 MMOL/L — HIGH (ref 3.4–4.5)
POTASSIUM SERPL-MCNC: 4.9 MMOL/L — SIGNIFICANT CHANGE UP (ref 3.5–5.3)
POTASSIUM SERPL-SCNC: 4.9 MMOL/L — SIGNIFICANT CHANGE UP (ref 3.5–5.3)
PROT SERPL-MCNC: 6.9 G/DL — SIGNIFICANT CHANGE UP (ref 6–8.3)
RBC # BLD: 3.18 M/UL — LOW (ref 4.2–5.8)
RBC # FLD: 12.5 % — SIGNIFICANT CHANGE UP (ref 10.3–14.5)
SAO2 % BLDV: 27.2 % — LOW (ref 60–85)
SODIUM SERPL-SCNC: 137 MMOL/L — SIGNIFICANT CHANGE UP (ref 135–145)
WBC # BLD: 13.11 K/UL — HIGH (ref 3.8–10.5)
WBC # FLD AUTO: 13.11 K/UL — HIGH (ref 3.8–10.5)

## 2017-08-17 PROCEDURE — 73630 X-RAY EXAM OF FOOT: CPT | Mod: 26,RT

## 2017-08-17 PROCEDURE — 71010: CPT | Mod: 26

## 2017-08-17 PROCEDURE — 99222 1ST HOSP IP/OBS MODERATE 55: CPT

## 2017-08-17 RX ORDER — OLANZAPINE 15 MG/1
20 TABLET, FILM COATED ORAL AT BEDTIME
Qty: 0 | Refills: 0 | Status: DISCONTINUED | OUTPATIENT
Start: 2017-08-17 | End: 2017-08-23

## 2017-08-17 RX ORDER — HEPARIN SODIUM 5000 [USP'U]/ML
5000 INJECTION INTRAVENOUS; SUBCUTANEOUS EVERY 8 HOURS
Qty: 0 | Refills: 0 | Status: DISCONTINUED | OUTPATIENT
Start: 2017-08-17 | End: 2017-08-23

## 2017-08-17 RX ORDER — VANCOMYCIN HCL 1 G
1000 VIAL (EA) INTRAVENOUS ONCE
Qty: 0 | Refills: 0 | Status: COMPLETED | OUTPATIENT
Start: 2017-08-17 | End: 2017-08-18

## 2017-08-17 RX ORDER — INSULIN LISPRO 100/ML
VIAL (ML) SUBCUTANEOUS EVERY 6 HOURS
Qty: 0 | Refills: 0 | Status: DISCONTINUED | OUTPATIENT
Start: 2017-08-17 | End: 2017-08-21

## 2017-08-17 RX ORDER — CARVEDILOL PHOSPHATE 80 MG/1
12.5 CAPSULE, EXTENDED RELEASE ORAL EVERY 12 HOURS
Qty: 0 | Refills: 0 | Status: DISCONTINUED | OUTPATIENT
Start: 2017-08-17 | End: 2017-08-23

## 2017-08-17 RX ORDER — SODIUM CHLORIDE 9 MG/ML
1000 INJECTION, SOLUTION INTRAVENOUS
Qty: 0 | Refills: 0 | Status: DISCONTINUED | OUTPATIENT
Start: 2017-08-17 | End: 2017-08-19

## 2017-08-17 RX ORDER — PIPERACILLIN AND TAZOBACTAM 4; .5 G/20ML; G/20ML
3.38 INJECTION, POWDER, LYOPHILIZED, FOR SOLUTION INTRAVENOUS ONCE
Qty: 0 | Refills: 0 | Status: COMPLETED | OUTPATIENT
Start: 2017-08-17 | End: 2017-08-17

## 2017-08-17 RX ORDER — TRAZODONE HCL 50 MG
100 TABLET ORAL AT BEDTIME
Qty: 0 | Refills: 0 | Status: DISCONTINUED | OUTPATIENT
Start: 2017-08-17 | End: 2017-08-23

## 2017-08-17 RX ORDER — DIVALPROEX SODIUM 500 MG/1
1000 TABLET, DELAYED RELEASE ORAL AT BEDTIME
Qty: 0 | Refills: 0 | Status: DISCONTINUED | OUTPATIENT
Start: 2017-08-17 | End: 2017-08-23

## 2017-08-17 RX ORDER — ATORVASTATIN CALCIUM 80 MG/1
40 TABLET, FILM COATED ORAL AT BEDTIME
Qty: 0 | Refills: 0 | Status: DISCONTINUED | OUTPATIENT
Start: 2017-08-17 | End: 2017-08-23

## 2017-08-17 RX ADMIN — PIPERACILLIN AND TAZOBACTAM 200 GRAM(S): 4; .5 INJECTION, POWDER, LYOPHILIZED, FOR SOLUTION INTRAVENOUS at 22:13

## 2017-08-17 RX ADMIN — Medication 100 MILLIGRAM(S): at 21:18

## 2017-08-17 NOTE — H&P ADULT - ASSESSMENT
52M p/w infected non-healing wound of the right first toe  - IV hydration  - IV abx  - podiatry consult  - KIRSTEN PVRs  - nephrology consult

## 2017-08-17 NOTE — ED PROVIDER NOTE - ATTENDING CONTRIBUTION TO CARE
Locurto  pt with 1 week rt foot pain erythema  drainage    exam  pt in NAD  clear lungs  card RRR S1S2  rt foot  erythema  gt tooe and dorsum  ulcer seen  no active dischaerge  pulses intact  antibiotics ordered  surgery called

## 2017-08-17 NOTE — ED ADULT NURSE REASSESSMENT NOTE - NS ED NURSE REASSESS COMMENT FT1
pt. in NAD at this time, agreed to go to OR. as per MD Allore, Podiatrist, pt. will go to OR tomorrow morning. pt. on NPO. pt. admitted, verbal report given to HOMERO Nicolas. cary morel.

## 2017-08-17 NOTE — ED ADULT NURSE REASSESSMENT NOTE - NS ED NURSE REASSESS COMMENT FT1
pt. going for OR, report given to HOMERO Jones from OR. pt. currently refusing to go to OR, Podiatrist at bedside to get consent. awaits final dispo. will continue to provide nsg care

## 2017-08-17 NOTE — H&P ADULT - NSHPPHYSICALEXAM_GEN_ALL_CORE
General: NAD, appears slightly disheveled  Neurology: A&Ox3, nonfocal  Respiratory: CTA B/L  CV: RRR, S1S2, no murmurs, rubs or gallops  Abdominal: Soft, NT, ND   Extremities: RLE swollen, with some erythema starting at the right big toe with some extension up the foot  area of ulceration with some dry gangrene and surrounding purulent drainage  Palpable DP b/l

## 2017-08-17 NOTE — ED ADULT TRIAGE NOTE - CHIEF COMPLAINT QUOTE
Pt was sent in by his PMD for a foot infection. Pt right foot great toe is noted with an open ulcer and foul odor. Pt has HX of DM.

## 2017-08-17 NOTE — CONSULT NOTE ADULT - ASSESSMENT
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 Pt took hours to present to the ER and ate a small meal around 9 pm. Due to the increased risk with anesthesia, the pt, pt's mother, attending, and anesthesiologist all agreed that the best course of intervention was to wait the protocol 8 hours before taking pt for hallux amp.   - Bedside I & D performed in attempt to release some of the gas  - Dressed with betadine DSD  - Pt is scheduled for the OR at 7:30 am Friday 8/18  - MAR clearance requested  - Pt seen with attending

## 2017-08-17 NOTE — ED ADULT NURSE NOTE - OBJECTIVE STATEMENT
Patient received into room 7 AA&Ox3 sent by MD for pain to right great toe d/t infection - redness and swelling noted to great toe. VSS on RA. Patient denies chest pain, N/V, SOB, fever, chills at this time. 20g PIV in place to right AC, labs drawn - will continue to monitor.

## 2017-08-17 NOTE — ED PROVIDER NOTE - OBJECTIVE STATEMENT
53 y/o M with PMH of DM, HLD, bipolar p/w foot pain x 3-4 days. Pain is right 1st digit with redness, swelling, worsening, with pus drainage. Pt sent in by podiatry, Dr. Olivier. Pt denies fevers, chills, nausea, vomiting, dizziness, abd pain. Pt also c/o dry cough, chronic, smoker.

## 2017-08-17 NOTE — CONSULT NOTE ADULT - SUBJECTIVE AND OBJECTIVE BOX
Patient is a 52y old  Male who presents with a chief complaint of right great toe wound (17 Aug 2017 20:28)      HPI:  52M with DM, HLD, HTN, bipolar disorder sent from his podiatrist, Dr. Olivier, with right great toe pain. The patient states that he first noticed the pain about one week ago and his podiatrist had been debriding in the office. He does notice some increased redness today. Patient is ambulatory at baseline but does endorse claudication after 1-2 blocks. He also notes some "shooting pain" up his legs even while seated about once every ten minutes. Patient has had no fevers or chills, but has noticed some drainage from the site. (17 Aug 2017 20:28)    Pod consulted for hallux infection. Pt has been seen by Dr. Watkins for many years for the ulceration. Today in the office gas was noted on xray and pt was sent immediately to the ER. Pt did not show up to the ER for a few hours. Pt states that he ate a small meal around 9 pm. Pt denies N/V/F/C or SOB, but states that he has lost about 40 lbs in the past couple of months.       PAST MEDICAL & SURGICAL HISTORY:  Bipolar affective disorder in remission  High cholesterol  Depression  Diabetes mellitus  ORIF right lower leg- 1995      MEDICATIONS  (STANDING):  diVALproex DR 1000 milliGRAM(s) Oral at bedtime  traZODone 100 milliGRAM(s) Oral at bedtime  atorvastatin 40 milliGRAM(s) Oral at bedtime  OLANZapine 20 milliGRAM(s) Oral at bedtime  carvedilol 12.5 milliGRAM(s) Oral every 12 hours  vancomycin  IVPB 1000 milliGRAM(s) IV Intermittent once  heparin  Injectable 5000 Unit(s) SubCutaneous every 8 hours  lactated ringers. 1000 milliLiter(s) (75 mL/Hr) IV Continuous <Continuous>  insulin lispro (HumaLOG) corrective regimen sliding scale   SubCutaneous every 6 hours    MEDICATIONS  (PRN):      Allergies    No Known Allergies    Intolerances        VITALS:    Vital Signs Last 24 Hrs  T(C): 36.6 (17 Aug 2017 23:21), Max: 36.8 (17 Aug 2017 16:45)  T(F): 97.9 (17 Aug 2017 23:21), Max: 98.2 (17 Aug 2017 16:45)  HR: 55 (17 Aug 2017 23:21) (55 - 74)  BP: 202/77 (17 Aug 2017 23:21) (164/82 - 202/77)  BP(mean): --  RR: 18 (17 Aug 2017 23:21) (14 - 18)  SpO2: 100% (17 Aug 2017 23:21) (100% - 100%)    LABS:                          9.4    13.11 )-----------( 374      ( 17 Aug 2017 19:10 )             28.5       08-17    137  |  100  |  50<H>  ----------------------------<  208<H>  4.9   |  22  |  2.96<H>    Ca    8.7      17 Aug 2017 19:10    TPro  6.9  /  Alb  2.9<L>  /  TBili  0.2  /  DBili  x   /  AST  22  /  ALT  40  /  AlkPhos  73  08-17      CAPILLARY BLOOD GLUCOSE  214 (17 Aug 2017 16:45)              LOWER EXTREMITY PHYSICAL EXAM:    Vasular: DP/PT 1/4, B/L, CFT <3 seconds B/L, Temperature gradient warm, B/L.   Neuro: Epicritic sensation diminished lion.   Musculoskeletal/Ortho: No amputations or obvious deformity   Skin:   Wound #1: ulceration of the right medial hallux to subq tissue  Size: 2.3 x 2.7 cm x 1.5 cm  Wound bed: fibrogranular, with an unstageable eschar overlying a portion  Drainage: Purulent, and serosanguinous   Odor: Fecal  Periwound: Erythema  Etiology: pressure, neuropathy    Prelim XR of right foot demonstrates gas lateral to the hallux. Purulent drainage, malodor, surrounding erythema, and edema all suggest gas gangrene

## 2017-08-17 NOTE — ED ADULT NURSE REASSESSMENT NOTE - NS ED NURSE REASSESS COMMENT FT1
pt. a&ox3, appears in NAD, breathes with ease, noted redness and swelling on rt foot, diabetic ulcer noted. antibiotics started. pt. feeling agitated, wants to eat, MD made aware. on NPO as per MD. admitted. will continue to monitor

## 2017-08-17 NOTE — ED PROVIDER NOTE - MEDICAL DECISION MAKING DETAILS
diabetic foot, no crepitus, sent in by podiatry, labs, cultures, abx, vasc surgery c/s, admit for further w/u and management

## 2017-08-18 ENCOUNTER — RESULT REVIEW (OUTPATIENT)
Age: 52
End: 2017-08-18

## 2017-08-18 ENCOUNTER — TRANSCRIPTION ENCOUNTER (OUTPATIENT)
Age: 52
End: 2017-08-18

## 2017-08-18 DIAGNOSIS — E11.9 TYPE 2 DIABETES MELLITUS WITHOUT COMPLICATIONS: ICD-10-CM

## 2017-08-18 DIAGNOSIS — F31.9 BIPOLAR DISORDER, UNSPECIFIED: ICD-10-CM

## 2017-08-18 DIAGNOSIS — I77.1 STRICTURE OF ARTERY: ICD-10-CM

## 2017-08-18 DIAGNOSIS — I10 ESSENTIAL (PRIMARY) HYPERTENSION: ICD-10-CM

## 2017-08-18 DIAGNOSIS — E78.00 PURE HYPERCHOLESTEROLEMIA, UNSPECIFIED: ICD-10-CM

## 2017-08-18 DIAGNOSIS — Z01.818 ENCOUNTER FOR OTHER PREPROCEDURAL EXAMINATION: ICD-10-CM

## 2017-08-18 DIAGNOSIS — A48.0 GAS GANGRENE: ICD-10-CM

## 2017-08-18 LAB
APPEARANCE UR: CLEAR — SIGNIFICANT CHANGE UP
BILIRUB UR-MCNC: NEGATIVE — SIGNIFICANT CHANGE UP
BLOOD UR QL VISUAL: NEGATIVE — SIGNIFICANT CHANGE UP
COLOR SPEC: SIGNIFICANT CHANGE UP
GLUCOSE UR-MCNC: 300 — SIGNIFICANT CHANGE UP
GRAM STN WND: SIGNIFICANT CHANGE UP
GRAM STN WND: SIGNIFICANT CHANGE UP
HBA1C BLD-MCNC: 7.8 % — HIGH (ref 4–5.6)
KETONES UR-MCNC: NEGATIVE — SIGNIFICANT CHANGE UP
LEUKOCYTE ESTERASE UR-ACNC: NEGATIVE — SIGNIFICANT CHANGE UP
NITRITE UR-MCNC: NEGATIVE — SIGNIFICANT CHANGE UP
PH UR: 6.5 — SIGNIFICANT CHANGE UP (ref 4.6–8)
PROT UR-MCNC: 300 — SIGNIFICANT CHANGE UP
RBC CASTS # UR COMP ASSIST: SIGNIFICANT CHANGE UP (ref 0–?)
SP GR SPEC: 1.01 — SIGNIFICANT CHANGE UP (ref 1–1.03)
SPECIMEN SOURCE: SIGNIFICANT CHANGE UP
UROBILINOGEN FLD QL: NORMAL E.U. — SIGNIFICANT CHANGE UP (ref 0.1–0.2)
WBC UR QL: SIGNIFICANT CHANGE UP (ref 0–?)

## 2017-08-18 PROCEDURE — 73630 X-RAY EXAM OF FOOT: CPT | Mod: 26,RT

## 2017-08-18 PROCEDURE — 99232 SBSQ HOSP IP/OBS MODERATE 35: CPT

## 2017-08-18 PROCEDURE — 88305 TISSUE EXAM BY PATHOLOGIST: CPT | Mod: 26

## 2017-08-18 PROCEDURE — 99223 1ST HOSP IP/OBS HIGH 75: CPT | Mod: GC

## 2017-08-18 PROCEDURE — 88311 DECALCIFY TISSUE: CPT | Mod: 26

## 2017-08-18 PROCEDURE — 93010 ELECTROCARDIOGRAM REPORT: CPT

## 2017-08-18 RX ORDER — ONDANSETRON 8 MG/1
4 TABLET, FILM COATED ORAL ONCE
Qty: 0 | Refills: 0 | Status: DISCONTINUED | OUTPATIENT
Start: 2017-08-18 | End: 2017-08-18

## 2017-08-18 RX ORDER — VANCOMYCIN HCL 1 G
1000 VIAL (EA) INTRAVENOUS ONCE
Qty: 0 | Refills: 0 | Status: COMPLETED | OUTPATIENT
Start: 2017-08-18 | End: 2017-08-18

## 2017-08-18 RX ORDER — MORPHINE SULFATE 50 MG/1
2 CAPSULE, EXTENDED RELEASE ORAL EVERY 6 HOURS
Qty: 0 | Refills: 0 | Status: DISCONTINUED | OUTPATIENT
Start: 2017-08-18 | End: 2017-08-23

## 2017-08-18 RX ORDER — FUROSEMIDE 40 MG
40 TABLET ORAL DAILY
Qty: 0 | Refills: 0 | Status: DISCONTINUED | OUTPATIENT
Start: 2017-08-18 | End: 2017-08-23

## 2017-08-18 RX ORDER — ISOSORBIDE MONONITRATE 60 MG/1
120 TABLET, EXTENDED RELEASE ORAL DAILY
Qty: 0 | Refills: 0 | Status: DISCONTINUED | OUTPATIENT
Start: 2017-08-18 | End: 2017-08-23

## 2017-08-18 RX ORDER — OXYCODONE AND ACETAMINOPHEN 5; 325 MG/1; MG/1
1 TABLET ORAL EVERY 6 HOURS
Qty: 0 | Refills: 0 | Status: DISCONTINUED | OUTPATIENT
Start: 2017-08-18 | End: 2017-08-23

## 2017-08-18 RX ORDER — HYDRALAZINE HCL 50 MG
10 TABLET ORAL ONCE
Qty: 0 | Refills: 0 | Status: COMPLETED | OUTPATIENT
Start: 2017-08-18 | End: 2017-08-18

## 2017-08-18 RX ORDER — SODIUM BICARBONATE 1 MEQ/ML
650 SYRINGE (ML) INTRAVENOUS THREE TIMES A DAY
Qty: 0 | Refills: 0 | Status: DISCONTINUED | OUTPATIENT
Start: 2017-08-18 | End: 2017-08-23

## 2017-08-18 RX ORDER — PIPERACILLIN AND TAZOBACTAM 4; .5 G/20ML; G/20ML
3.38 INJECTION, POWDER, LYOPHILIZED, FOR SOLUTION INTRAVENOUS EVERY 12 HOURS
Qty: 0 | Refills: 0 | Status: DISCONTINUED | OUTPATIENT
Start: 2017-08-18 | End: 2017-08-21

## 2017-08-18 RX ORDER — ACETAMINOPHEN 500 MG
650 TABLET ORAL EVERY 6 HOURS
Qty: 0 | Refills: 0 | Status: DISCONTINUED | OUTPATIENT
Start: 2017-08-18 | End: 2017-08-23

## 2017-08-18 RX ORDER — VANCOMYCIN HCL 1 G
VIAL (EA) INTRAVENOUS
Qty: 0 | Refills: 0 | Status: DISCONTINUED | OUTPATIENT
Start: 2017-08-18 | End: 2017-08-20

## 2017-08-18 RX ORDER — HYDROMORPHONE HYDROCHLORIDE 2 MG/ML
0.25 INJECTION INTRAMUSCULAR; INTRAVENOUS; SUBCUTANEOUS
Qty: 0 | Refills: 0 | Status: DISCONTINUED | OUTPATIENT
Start: 2017-08-18 | End: 2017-08-18

## 2017-08-18 RX ORDER — VANCOMYCIN HCL 1 G
1000 VIAL (EA) INTRAVENOUS EVERY 24 HOURS
Qty: 0 | Refills: 0 | Status: DISCONTINUED | OUTPATIENT
Start: 2017-08-19 | End: 2017-08-20

## 2017-08-18 RX ADMIN — HEPARIN SODIUM 5000 UNIT(S): 5000 INJECTION INTRAVENOUS; SUBCUTANEOUS at 22:41

## 2017-08-18 RX ADMIN — CARVEDILOL PHOSPHATE 12.5 MILLIGRAM(S): 80 CAPSULE, EXTENDED RELEASE ORAL at 06:17

## 2017-08-18 RX ADMIN — OLANZAPINE 20 MILLIGRAM(S): 15 TABLET, FILM COATED ORAL at 22:33

## 2017-08-18 RX ADMIN — DIVALPROEX SODIUM 1000 MILLIGRAM(S): 500 TABLET, DELAYED RELEASE ORAL at 00:14

## 2017-08-18 RX ADMIN — Medication 10 MILLIGRAM(S): at 22:35

## 2017-08-18 RX ADMIN — SODIUM CHLORIDE 75 MILLILITER(S): 9 INJECTION, SOLUTION INTRAVENOUS at 21:48

## 2017-08-18 RX ADMIN — HEPARIN SODIUM 5000 UNIT(S): 5000 INJECTION INTRAVENOUS; SUBCUTANEOUS at 15:23

## 2017-08-18 RX ADMIN — Medication 250 MILLIGRAM(S): at 19:33

## 2017-08-18 RX ADMIN — SODIUM CHLORIDE 75 MILLILITER(S): 9 INJECTION, SOLUTION INTRAVENOUS at 13:10

## 2017-08-18 RX ADMIN — Medication 650 MILLIGRAM(S): at 22:32

## 2017-08-18 RX ADMIN — OLANZAPINE 20 MILLIGRAM(S): 15 TABLET, FILM COATED ORAL at 00:15

## 2017-08-18 RX ADMIN — PIPERACILLIN AND TAZOBACTAM 25 GRAM(S): 4; .5 INJECTION, POWDER, LYOPHILIZED, FOR SOLUTION INTRAVENOUS at 22:33

## 2017-08-18 RX ADMIN — SODIUM CHLORIDE 75 MILLILITER(S): 9 INJECTION, SOLUTION INTRAVENOUS at 00:09

## 2017-08-18 RX ADMIN — Medication 100 MILLIGRAM(S): at 22:33

## 2017-08-18 RX ADMIN — Medication 250 MILLIGRAM(S): at 00:09

## 2017-08-18 RX ADMIN — ISOSORBIDE MONONITRATE 120 MILLIGRAM(S): 60 TABLET, EXTENDED RELEASE ORAL at 17:40

## 2017-08-18 RX ADMIN — ATORVASTATIN CALCIUM 40 MILLIGRAM(S): 80 TABLET, FILM COATED ORAL at 00:11

## 2017-08-18 RX ADMIN — Medication 40 MILLIGRAM(S): at 17:40

## 2017-08-18 RX ADMIN — DIVALPROEX SODIUM 1000 MILLIGRAM(S): 500 TABLET, DELAYED RELEASE ORAL at 22:32

## 2017-08-18 RX ADMIN — Medication 100 MILLIGRAM(S): at 00:15

## 2017-08-18 RX ADMIN — Medication 10 MILLIGRAM(S): at 15:23

## 2017-08-18 RX ADMIN — ATORVASTATIN CALCIUM 40 MILLIGRAM(S): 80 TABLET, FILM COATED ORAL at 22:32

## 2017-08-18 RX ADMIN — CARVEDILOL PHOSPHATE 12.5 MILLIGRAM(S): 80 CAPSULE, EXTENDED RELEASE ORAL at 17:40

## 2017-08-18 RX ADMIN — CARVEDILOL PHOSPHATE 12.5 MILLIGRAM(S): 80 CAPSULE, EXTENDED RELEASE ORAL at 00:11

## 2017-08-18 NOTE — DISCHARGE NOTE ADULT - CARE PLAN
Instructions for follow-up, activity and diet:	Pt expressed that he has a 40lbs weight loss over the last few months. Advised pt to follow up as outpatient for workup regarding weight loss. Instructions for follow-up, activity and diet:	Pt expressed that he has a 40lbs weight loss over the last few months. Advised pt to follow up as outpatient for workup regarding weight loss.  Goal:	RIGHT foot partial first ray resection  Instructions for follow-up, activity and diet:	- Weight Bearing: Weight bearing as tolerated in forefoot relief shoe  - Antibiotics: Continue with antibiotics as prescribed till completion per ID  - Dressing: Keep dressing clean, dry and intact till follow-up with Dr. Watkins  - Follow-up: Follow-up in Dr. Watkins's office in 3-5 days at  [phone] Goal:	Ambulate, void, tolerate PO, control pain  Instructions for follow-up, activity and diet:	Pt expressed that he has a 40lbs weight loss over the last few months. Advised pt to follow up as outpatient for workup regarding weight loss.  8/21: pt was planned for a CT abdomen and pelvis that was scheduled for 1 pm, but pt adamantly refused the CT scan. Explained to the pt that he was scheduled and timing is often difficult for imaging. Pt continued to refuse. All RBA of obtaining the CT were explained and discussed in full detail. Pt expressed full understanding and continued to refuse.      It has been explained to the patient that given his current medical issues, it is incredibly important for him to follow up as an outpatient for his poorly controlled hypertension, diabetes, and his recent significant weight loss. Patient expressed full understanding of these issues and plans once discharged.  Goal:	RIGHT foot partial first ray resection  Instructions for follow-up, activity and diet:	- Weight Bearing: Weight bearing as tolerated in forefoot relief shoe  - Antibiotics: Continue with antibiotics as prescribed till completion per ID  - Dressing: Keep dressing clean, dry and intact till follow-up with Dr. Watkins  - Follow-up: Follow-up in Dr. Watkins's office in 3-5 days at  [phone] Goal:	Ambulate, void, tolerate PO, control pain  Instructions for follow-up, activity and diet:	Pt expressed that he has a 40lbs weight loss over the last few months. Advised pt to follow up as outpatient for workup regarding weight loss.  8/21: pt was planned for a CT abdomen and pelvis that was scheduled for 1 pm, but pt adamantly refused the CT scan. Explained to the pt that he was scheduled and timing is often difficult for imaging. Pt continued to refuse. All RBA of obtaining the CT were explained and discussed in full detail. Pt expressed full understanding and continued to refuse.  8/22: CT abdomen pelvis obtained. < from: CT Abdomen and Pelvis w/ Oral Cont (08.22.17 @ 14:48) >  IMPRESSION: Soft tissue thickening at the level of the GE junction, not apparent on   the prior exam. Recommend endoscopy to evaluate for possible pathology at   this level.  8/23: upper endoscopy scheduled today, but pt refused treatment. All RBA of tx were discussed along with the potential harms of refusing tx, pt expressed full understanding and refused treatment.  Also made patient aware that his potassium was elevated 5.8, and that he would need medication in order to treat the hyperkalemia. Pt refused to take the medication ordered, stated that he would like to eat breakfast this morning and sign out AMA. Pt was made aware of the risks associated with high potassium, pt stated he will follow up with his outside PMD to treat his high potassium.   It has been explained to the patient that given his current medical issues, it is incredibly important for him to follow up as an outpatient for his poorly controlled hypertension, diabetes, his recent significant weight loss, new findings on his CT scan, and elevated potassium. Patient expressed full understanding of these issues and plans prior to signing out against medical advice. All necessary prescriptions have been sent to the pt's pharmacy, stress immediate follow up with his PCP, Infectious Disease, Podiatry, Nephrology, and Gastroenterology doctors.  Goal:	RIGHT foot partial first ray resection  Instructions for follow-up, activity and diet:	- Weight Bearing: Weight bearing as tolerated in forefoot relief shoe  - Antibiotics: Continue with antibiotics as prescribed till completion per ID  - Dressing: Keep dressing clean, dry and intact till follow-up with Dr. Watkins  - Follow-up: Follow-up in Dr. Watkins's office in 3-5 days at  [phone]  Goal:	Signing out AMA  Instructions for follow-up, activity and diet:	Pt is not being discharged. Pt is leaving Against Medical Advice. All necessary paper work has been documented and signed by the patient. Goal:	Ambulate, void, tolerate PO, control pain  Instructions for follow-up, activity and diet:	Pt expressed that he has a 40lbs weight loss over the last few months. Advised pt to follow up as outpatient for workup regarding weight loss.  8/21: pt was planned for a CT abdomen and pelvis that was scheduled for 1 pm, but pt adamantly refused the CT scan. Explained to the pt that he was scheduled and timing is often difficult for imaging. Pt continued to refuse. All RBA of obtaining the CT were explained and discussed in full detail. Pt expressed full understanding and continued to refuse.  8/22: CT abdomen pelvis obtained. < from: CT Abdomen and Pelvis w/ Oral Cont (08.22.17 @ 14:48) >  IMPRESSION: Soft tissue thickening at the level of the GE junction, not apparent on   the prior exam. Recommend endoscopy to evaluate for possible pathology at   this level.  8/23: upper endoscopy scheduled today, but pt refused treatment. All RBA of tx were discussed along with the potential harms of refusing tx, pt expressed full understanding and refused treatment.  Also made patient aware that his potassium was elevated 5.8, and that he would need medication in order to treat the hyperkalemia. Pt refused to take the medication ordered, stated that he would like to eat breakfast this morning and sign out AMA. Pt was made aware of the risks associated with high potassium, pt stated he will follow up with his outside PMD to treat his high potassium.   It has been explained to the patient that given his current medical issues, it is incredibly important for him to follow up as an outpatient for his poorly controlled hypertension, diabetes, his recent significant weight loss, new findings on his CT scan, and elevated potassium. Patient expressed full understanding of these issues and plans prior to signing out against medical advice. All necessary prescriptions have been sent to the pt's pharmacy, stress immediate follow up with his PCP, Infectious Disease, Podiatry, Nephrology, and Gastroenterology doctors.  Goal:	RIGHT foot partial first ray resection  Instructions for follow-up, activity and diet:	- Weight Bearing: Weight bearing as tolerated in forefoot relief shoe  - Antibiotics: Continue with antibiotics as prescribed till completion per ID  - Dressing: Keep dressing clean, dry and intact till follow-up with Dr. Watkins  - Follow-up: Follow-up in Dr. Watkins's office in 3-5 days at  [phone]  Goal:	Signing out AMA  Instructions for follow-up, activity and diet:	Pt is not being discharged. Pt is leaving Against Medical Advice. All necessary paper work has been documented and signed by the patient. Principal Discharge DX:	Cellulitis of right lower extremity  Goal:	Ambulate, void, tolerate PO, control pain  Instructions for follow-up, activity and diet:	Pt expressed that he has a 40lbs weight loss over the last few months. Advised pt to follow up as outpatient for workup regarding weight loss.    8/21: pt was planned for a CT abdomen and pelvis that was scheduled for 1 pm, but pt adamantly refused the CT scan. Explained to the pt that he was scheduled and timing is often difficult for imaging. Pt continued to refuse. All Risk Benefits and Alternatives of obtaining the CT were explained and discussed in full detail. Pt expressed full understanding and continued to refuse.  8/22: CT abdomen pelvis obtained. < from: CT Abdomen and Pelvis w/ Oral Cont (08.22.17 @ 14:48) >  IMPRESSION: Soft tissue thickening at the level of the GE junction, not apparent on   the prior exam. Recommend endoscopy to evaluate for possible pathology at   this level.  8/23: upper endoscopy scheduled today, but pt refused. All Risk Benefits and Alternatives of treatment were discussed and risk of possible death if he refuses his workup and treatment was made clear to pt and pt expressed full understanding and refused treatment.  A Pt was made aware of the risks associated with high potassium, Pt stated he will follow up with his outside PMD for his high potassium monitoring.   It has been explained to the patient that given his current medical issues, it is incredibly important for him to follow up as an outpatient for his poorly controlled hypertension, diabetes, his recent significant weight loss, new findings on his CT scan, and elevated potassium. Patient expressed full understanding of these issues and plans prior to signing out against medical advice. All necessary prescriptions have been sent to the pt's pharmacy, stress immediate follow up with his PCP, Infectious Disease, Podiatry, Nephrology, and Gastroenterology doctors.  Goal:	RIGHT foot partial first ray resection  Instructions for follow-up, activity and diet:	- Weight Bearing: Weight bearing as tolerated in forefoot relief shoe  - Antibiotics: Continue with antibiotics as prescribed till completion per ID  - Dressing: Keep dressing clean, dry and intact till follow-up with Dr. Watkins  - Follow-up: Follow-up in Dr. Watkins's office in 3-5 days at  [phone]    -Follow up with infectious disease  within 5 days of The patient was discharged home in stable condition to be followed up as an outpatient.arge as final cultures are pending Principal Discharge DX:	Cellulitis of right lower extremity  Goal:	Ambulate, void, tolerate PO, control pain  Instructions for follow-up, activity and diet:	Pt expressed that he has a 40lbs weight loss over the last few months. Advised pt to follow up as outpatient for workup regarding weight loss.    8/21: pt was planned for a CT abdomen and pelvis that was scheduled for 1 pm, but pt adamantly refused the CT scan. Explained to the pt that he was scheduled and timing is often difficult for imaging. Pt continued to refuse. All Risk Benefits and Alternatives of obtaining the CT were explained and discussed in full detail. Pt expressed full understanding and continued to refuse.  8/22: CT abdomen pelvis obtained. < from: CT Abdomen and Pelvis w/ Oral Cont (08.22.17 @ 14:48) >  IMPRESSION: Soft tissue thickening at the level of the GE junction, not apparent on   the prior exam. Recommend endoscopy to evaluate for possible pathology at   this level.  8/23: upper endoscopy scheduled today, but pt refused. All Risk Benefits and Alternatives of treatment were discussed and risk of possible death if he refuses his workup and treatment was made clear to pt and pt expressed full understanding and refused treatment.  A Pt was made aware of the risks associated with high potassium, Pt stated he will follow up with his outside PMD for his high potassium monitoring.   It has been explained to the patient that given his current medical issues, it is incredibly important for him to follow up as an outpatient for his poorly controlled hypertension, diabetes, his recent significant weight loss, new findings on his CT scan, and elevated potassium. Patient expressed full understanding of these issues and plans prior to signing out against medical advice. All necessary prescriptions have been sent to the pt's pharmacy, stress immediate follow up with his PCP, Infectious Disease, Podiatry, Nephrology, and Gastroenterology doctors.  Goal:	RIGHT foot partial first ray resection  Instructions for follow-up, activity and diet:	- Weight Bearing: Weight bearing as tolerated in forefoot relief shoe  - Antibiotics: Continue with antibiotics as prescribed till completion per ID  - Dressing: Keep dressing clean, dry and intact till follow-up with Dr. Watkins  - Follow-up: Follow-up in Dr. Watkins's office in 3-5 days at  [phone]    -Follow up with infectious disease  within 5 days of The patient was discharged home in stable condition to be followed up as an outpatient.arge as final cultures are pending

## 2017-08-18 NOTE — DISCHARGE NOTE ADULT - CONDITIONS AT DISCHARGE
Pt is not being discharged. Pt is leaving Against Medical Advice. All necessary paper work has been documented and signed by the patient.

## 2017-08-18 NOTE — DISCHARGE NOTE ADULT - CARE PROVIDER_API CALL
Beny Watkins (DPGRIFFIN), Podiatric Medicine and Surgery  [address] [place] [address]  Phone: [phone]  Fax: [phone]    Justin Olivares (MD; MBBS), Infectious Disease; Internal Medicine  [address] [place] [address]  Phone: [phone]  Fax: [phone]    Benjamin Agrawal), Vascular Surgery  [address] [facility]  [address]  [place] [address]  Phone: [phone]  Fax: [phone]    Aidan Diaz), Internal Medicine; Nephrology  [address] [facility]  [address]  [place] [address]  Phone: [phone]  Fax: [phone] Beny Watkins (DPM), Podiatric Medicine and Surgery  [address] [place] [address]  Phone: [phone]  Fax: [phone]    Justin Olivares (MD; MBBS), Infectious Disease; Internal Medicine  [address] [place] [address]  Phone: [phone]  Fax: [phone]    Aidan Diaz (MD), Internal Medicine; Nephrology  [address] [place] [address]  Phone: [phone]  Fax: [phone]    Fritz Caban (DO), Internal Medicine  [address] [place] [address]  Phone: [phone]  Fax: [phone]

## 2017-08-18 NOTE — CHART NOTE - NSCHARTNOTEFT_GEN_A_CORE
Pt refusing BP meds, Heparin, insulin injections. Pt states he does not have high blood pressure and does not need to take his other medications. AAOx3. Explains that he knows when his body needs medications and right now he feels good. Explained to the pt that his blood pressure is elevated and the associated risks involved with dangerously high blood pressure. Also explained that his finger sticks are elevated (319) and it would behoove him to take his insulin. Furthermore, discussed the risks of not taking SQH and the reasons why he is ordered it. Pt agreed to take his meds after our lengthy discussion. His nurse has been notified. Pt refusing BP meds, Heparin, insulin injections. He is also refusing to have his vitals taken. Pt states he does not have high blood pressure and does not need to take his other medications. AAOx3. Explains that he knows when his body needs medications and right now he feels good. Explained to the pt that his blood pressure is elevated and the associated risks involved with dangerously high blood pressure. Also explained that his finger sticks are elevated (319) and it would behoove him to take his insulin. Furthermore, discussed the risks of not taking SQH and the reasons why he is ordered it. Pt agreed to take his meds after our lengthy discussion. His nurse has been notified.

## 2017-08-18 NOTE — DISCHARGE NOTE ADULT - HOSPITAL COURSE
Pt refusing BP meds, Heparin, insulin injections. He is also refusing to have his vitals taken. Pt states he does not have high blood pressure and does not need to take his other medications. AAOx3. Explains that he knows when his body needs medications and right now he feels good. Explained to the pt that his blood pressure is elevated and the associated risks involved with dangerously high blood pressure. Also explained that his finger sticks are elevated (319) and it would behoove him to take his insulin. Furthermore, discussed the risks of not taking SQH and the reasons why he is ordered it. Pt agreed to take his meds after our lengthy discussion. His nurse has been notified..    Pt expressed that he has a 40lbs weight loss over the last few months. Advised pt to follow up as outpatient for workup regarding weight loss. Patient is a 52M with DM, HLD, HTN, bipolar disorder was seen by his podiatrist on 8/17 for right great toe pain and was subsequently sent to the ER for concerns of gas gangrene on radiograph. Patient went for a  RF hallux amputation on 8/18.     In the days following his surgery the Pt refused BP meds, heparin, and insulin injections. He is also refusing to have his vitals taken. Pt states he does not have high blood pressure and does not need to take his other medications. AAOx3. Explains that he knows when his body needs medications and right now he feels good. Explained to the pt that his blood pressure is elevated and the associated risks involved with dangerously high blood pressure. Also explained that his finger sticks are elevated (319) and it would behoove him to take his insulin. Furthermore, discussed the risks of not taking SQH and the reasons why he is ordered it. Pt agreed to take his meds after our lengthy discussion. His nurse has been notified..    Pt expressed that he has a 40lbs weight loss over the last few months. Advised pt to follow up as outpatient for workup regarding weight loss.    In the context of the patient understanding his elevated blood pressure and blood glucose, he is otherwise hemodynamically stable and ready for discharge. It has been explained to the patient that given his current medical issues, it is incredibly important for him to follow up as an outpatient for his poorly controlled hypertension, diabetes, and his recent significant weight loss. Patient is a 52M with DM, HLD, HTN, bipolar disorder was seen by his podiatrist on 8/17 for right great toe pain and was subsequently sent to the ER for concerns of gas gangrene on radiograph. Patient went for a  RF hallux amputation on 8/18.     In the days following his surgery the Pt refused BP meds, heparin, and insulin injections. He is also refusing to have his vitals taken. Pt states he does not have high blood pressure and does not need to take his other medications. AAOx3. Explains that he knows when his body needs medications and right now he feels good. Explained to the pt that his blood pressure is elevated and the associated risks involved with dangerously high blood pressure. Also explained that his finger sticks are elevated (319) and it would behoove him to take his insulin. Furthermore, discussed the risks of not taking SQH and the reasons why he is ordered it. Pt agreed to take his meds after our lengthy discussion. His nurse has been notified..    Pt expressed that he has a 40lbs weight loss over the last few months. Advised pt to follow up as outpatient for workup regarding weight loss.    In the context of the patient understanding his elevated blood pressure and blood glucose, he is otherwise hemodynamically stable and ready for discharge. It has been explained to the patient that given his current medical issues, it is incredibly important for him to follow up as an outpatient for his poorly controlled hypertension, diabetes, and his recent significant weight loss. Patient expressed full understanding of these issues and plans once discharged. Patient is a 52M with DM, HLD, HTN, bipolar disorder was seen by his podiatrist on 8/17 for right great toe pain and was subsequently sent to the ER for concerns of gas gangrene on radiograph. Patient went for a  RF hallux amputation on 8/18.     In the days following his surgery the Pt refused BP meds, heparin, and insulin injections. He is also refusing to have his vitals taken. Pt states he does not have high blood pressure and does not need to take his other medications. AAOx3. Explains that he knows when his body needs medications and right now he feels good. Explained to the pt that his blood pressure is elevated and the associated risks involved with dangerously high blood pressure. Also explained that his finger sticks are elevated (319) and it would behoove him to take his insulin. Furthermore, discussed the risks of not taking SQH and the reasons why he is ordered it. Pt agreed to take his meds after our lengthy discussion. His nurse has been notified..    Pt expressed that he has a 40lbs weight loss over the last few months. Advised pt to follow up as outpatient for workup regarding weight loss.    8/21: pt was planned for a CT abdomen and pelvis that was scheduled for 1 pm, but pt adamantly refused the CT scan because it was too late in the afternoon and he did not want to miss his lunch. Explained to the pt that he was scheduled and timing is often difficult for imaging. Pt continued to refuse. All RBA of obtaining the CT were explained and discussed in full detail. Pt expressed full understanding and continued to refuse.    In the context of the patient understanding his elevated blood pressure and blood glucose, he is otherwise hemodynamically stable and ready for discharge. It has been explained to the patient that given his current medical issues, it is incredibly important for him to follow up as an outpatient for his poorly controlled hypertension, diabetes, and his recent significant weight loss. Patient expressed full understanding of these issues and plans once discharged. Patient is a 52M with DM, HLD, HTN, bipolar disorder was seen by his podiatrist on 8/17 for right great toe pain and was subsequently sent to the ER for concerns of gas gangrene on radiograph. Patient went for a  RF hallux amputation on 8/18.     In the days following his surgery the Pt refused BP meds, heparin, and insulin injections. He is also refusing to have his vitals taken. Pt states he does not have high blood pressure and does not need to take his other medications. AAOx3. Explains that he knows when his body needs medications and right now he feels good. Explained to the pt that his blood pressure is elevated and the associated risks involved with dangerously high blood pressure. Also explained that his finger sticks are elevated (319) and it would behoove him to take his insulin. Furthermore, discussed the risks of not taking SQH and the reasons why he is ordered it. Pt agreed to take his meds after our lengthy discussion. His nurse has been notified..    Pt expressed that he has a 40lbs weight loss over the last few months. Advised pt to follow up as outpatient for workup regarding weight loss.    8/21: pt was planned for a CT abdomen and pelvis that was scheduled for 1 pm, but pt adamantly refused the CT scan because it was too late in the afternoon and he did not want to miss his lunch. Explained to the pt that he was scheduled and timing is often difficult for imaging. Pt continued to refuse. All RBA of obtaining the CT were explained and discussed in full detail. Pt expressed full understanding and continued to refuse.    8/22: CT abdomen/Pelvis obtained. < from: CT Abdomen and Pelvis w/ Oral Cont (08.22.17 @ 14:48) >  IMPRESSION:   Soft tissue thickening at the level of the GE junction, not apparent on   the prior exam. Recommend endoscopy to evaluate for possible pathology at   this level.    Pt was scheduled for an upper endoscopy for 8/23 8/23: Pt refused upper endoscopy and asking to leave AMA. Blood work returned in the AM demonstrating high potassium levels (5.8), Kayexalate was ordered, but pt continued to refuse treatment and stated that he will see his PCP and take the medication as an outpatient. All risks of refusing treatment and signing out AMA were discussed in full detail. Pt coherently explained that he would like to go home and expressed full understanding of all the risks.  Pt was also seen by the Medical MAR, where the pt stated he does not want further treatment and will be leaving the hospital today AMA.    In the context of the patient understanding his elevated blood pressure, blood glucose, and potassium, he is otherwise hemodynamically stable. Patient is aware that we, as the medical providers, do not recommend that he leaves the hospital and should continue with his medical work up and treatment. It has been explained to the patient that given his current medical issues, it is incredibly important for him to follow up as an outpatient for his poorly controlled hypertension, diabetes, potassium, his recent significant weight loss, his RLE s/p amputation of the hallux, and his CT scan findings. Patient expressed full understanding of these issues and plans to follow up with his PCP and medical team as an outpatient.    Pt is not being discharged. Pt is leaving Against Medical Advice. All necessary paper work has been documented and signed by the patient. All necessary prescriptions have been sent to the pt's pharmacy, stress immediate follow up with his PCP, Infectious Disease, Podiatry, Nephrology, and Gastroenterology doctors Patient is a 52M with DM, HLD, HTN, bipolar disorder was seen by his podiatrist on 8/17 for right great toe pain and was subsequently sent to the ER for concerns of gas gangrene on radiograph. Patient went for a  RF hallux amputation on 8/18.     In the days following his surgery the Pt refused BP meds, heparin, and insulin injections. He is also refusing to have his vitals taken. Pt states he does not have high blood pressure and does not need to take his other medications. AAOx3. Explains that he knows when his body needs medications and right now he feels good. Explained to the pt that his blood pressure is elevated and the associated risks involved with dangerously high blood pressure. Also explained that his finger sticks are elevated (319) and it would behoove him to take his insulin. Furthermore, discussed the risks of not taking SQH and the reasons why he is ordered it. Pt agreed to take his meds after our lengthy discussion. His nurse has been notified..    Pt expressed that he has a 40lbs weight loss over the last few months. Advised pt to follow up as outpatient for workup regarding weight loss.    8/21: pt was planned for a CT abdomen and pelvis that was scheduled for 1 pm, but pt adamantly refused the CT scan because it was too late in the afternoon and he did not want to miss his lunch. Explained to the pt that he was scheduled and timing is often difficult for imaging. Pt continued to refuse. All RBA of obtaining the CT were explained and discussed in full detail. Pt expressed full understanding and continued to refuse.    8/22: CT abdomen/Pelvis obtained. < from: CT Abdomen and Pelvis w/ Oral Cont (08.22.17 @ 14:48) >  IMPRESSION:   Soft tissue thickening at the level of the GE junction, not apparent on   the prior exam. Recommend endoscopy to evaluate for possible pathology at   this level.    Pt was scheduled for an upper endoscopy for 8/23 8/23: Pt refused upper endoscopy and asking to leave AMA. Blood work returned in the AM demonstrating high potassium levels (5.8), Kayexalate was ordered, but pt continued to refuse treatment and stated that he will see his PCP and take the medication as an outpatient. All risks of refusing treatment and signing out AMA were discussed in full detail. Pt coherently explained that he would like to go home and expressed full understanding of all the risks.  Pt was also seen by the Medical MAR, where the pt stated he does not want further treatment and will be leaving the hospital today AMA.    In the context of the patient understanding his elevated blood pressure, blood glucose, and potassium, he is otherwise hemodynamically stable. Patient is aware that we, as the medical providers, do not recommend that he leaves the hospital and should continue with his medical work up and treatment. It has been explained to the patient that given his current medical issues, it is incredibly important for him to follow up as an outpatient for his poorly controlled hypertension, diabetes, potassium, his recent significant weight loss, his RLE s/p amputation of the hallux, and his CT scan findings. Patient expressed full understanding of these issues and plans to follow up with his PCP and medical team as an outpatient.    Pt is not being discharged. Pt is leaving Against Medical Advice. All necessary paper work has been documented and signed by the patient. All necessary prescriptions have been sent to the pt's pharmacy, stress immediate follow up with his PCP, Infectious Disease, Podiatry, Nephrology, and Gastroenterology doctors    All necessary prescriptions have been sent to the pt's pharmacy, stress immediate follow up with his PCP, Infectious Disease, Podiatry, Nephrology, and Gastroenterology doctors.  Please follow up with  - the gastrointestinal doctor for out patient endoscopy.    **Of note, pt refused inpatient workup for weight loss, CA 19-9 was elevated and concern for possible malignancy was made clear to the patient.  After thorough discussion pt stated that he understands the risks of not continuing workup and will follow up outpatient with vascular, his pmd, GI, podiatry and infectious disease for continued workup outpatient.  Pt able to repeat back and expresses full understanding of concern for malignancy, worsening malignancy and risk if untreated including death. Patient is a 52M with DM, HLD, HTN, bipolar disorder was seen by his podiatrist on 8/17 for right great toe pain and was subsequently sent to the ER for concerns of gas gangrene on radiograph. Patient went for a  RF hallux amputation on 8/18.     In the days following his surgery the Pt refused BP meds, heparin, and insulin injections. He is also refusing to have his vitals taken. Pt states he does not have high blood pressure and does not need to take his other medications. AAOx3. Explains that he knows when his body needs medications and right now he feels good. Explained to the pt that his blood pressure is elevated and the associated risks involved with dangerously high blood pressure. Also explained that his finger sticks are elevated (319) and it would behoove him to take his insulin. Furthermore, discussed the risks of not taking SQH and the reasons why he is ordered it. Pt agreed to take his meds after our lengthy discussion. His nurse has been notified..    Pt expressed that he has a 40lbs weight loss over the last few months. Advised pt to follow up as outpatient for workup regarding weight loss.    8/21: pt was planned for a CT abdomen and pelvis that was scheduled for 1 pm, but pt adamantly refused the CT scan because it was too late in the afternoon and he did not want to miss his lunch. Explained to the pt that he was scheduled and timing is often difficult for imaging. Pt continued to refuse. All RBA of obtaining the CT were explained and discussed in full detail. Pt expressed full understanding and continued to refuse.    8/22: CT abdomen/Pelvis obtained. < from: CT Abdomen and Pelvis w/ Oral Cont (08.22.17 @ 14:48) >  IMPRESSION:   Soft tissue thickening at the level of the GE junction, not apparent on   the prior exam. Recommend endoscopy to evaluate for possible pathology at   this level.    Pt was scheduled for an upper endoscopy for 8/23 8/23: Pt refused upper endoscopy and asking to leave AMA. Blood work returned in the AM demonstrating high potassium levels (5.8), Kayexalate was ordered, but pt continued to refuse treatment and stated that he will see his PCP and take the medication as an outpatient. All risks of refusing treatment and signing out AMA were discussed in full detail. Pt coherently explained that he would like to go home and expressed full understanding of all the risks.  Pt was also seen by the Medical MAR, where the pt stated he does not want further treatment and will be leaving the hospital today AMA.    In the context of the patient understanding his elevated blood pressure, blood glucose, and potassium, he is otherwise hemodynamically stable. Patient is aware that we, as the medical providers, do not recommend that he leaves the hospital and should continue with his medical work up and treatment. It has been explained to the patient that given his current medical issues, it is incredibly important for him to follow up as an outpatient for his poorly controlled hypertension, diabetes, potassium, his recent significant weight loss, his RLE s/p amputation of the hallux, and his CT scan findings. Patient expressed full understanding of these issues and plans to follow up with his PCP and medical team as an outpatient.    Pt is not being discharged. Pt is leaving Against Medical Advice. All necessary paper work has been documented and signed by the patient. All necessary prescriptions have been sent to the pt's pharmacy, stress immediate follow up with his PCP, Infectious Disease, Podiatry, Nephrology, and Gastroenterology doctors    All necessary prescriptions have been sent to the pt's pharmacy, stress immediate follow up with his PCP, Infectious Disease, Podiatry, Nephrology, and Gastroenterology doctors.  Please follow up with  - the gastrointestinal doctor for out patient endoscopy.    **Of note, pt was going to be discharged AMA as he refused treatment of hyperkalemia.  However, pt then agreed to take kaexalate today and tomorrow and was deemed stable for discharge after taking it. Also, pt refused inpatient workup for weight loss, CA 19-9 was elevated and concern for possible malignancy was made clear to the patient.  After thorough discussion pt stated that he understands the risks of not continuing workup and will follow up outpatient with vascular, his pmd, GI, podiatry and infectious disease for continued workup outpatient.  Pt able to repeat back and expresses full understanding of concern for malignancy, worsening malignancy and risk if untreated including death.

## 2017-08-18 NOTE — CONSULT NOTE ADULT - SUBJECTIVE AND OBJECTIVE BOX
52M with DM2, HLD, HTN, CKD, bipolar disorder sent from his podiatrist, Dr. Olivier, with right great toe pain. The patient states that he first noticed the pain about one week ago and his podiatrist had been debriding in the office. He does notice some increased redness today. In the ED Podiatry consulted for hallux infection. Pt has been seen by Dr. Watkins for many years for the ulceration. Today in the office gas was noted on xray and pt was sent immediately to the ER. Initially planned for immediate surgery but postponed as pt recently ate. Medicine consulted for pre-op clearance.    At bedside, pt denies any history of cardiac disease other than HTN. Pt notes that he typically takes his BP regularly. Pt denies any chest pain, RENEE, palpitations. Pt denies any respiratory disease history with no SOB, cough, wheezing. Pt states that he is able to walk 1-2 blocks without SOB but needs to stop 2/2 LE pain. Pt denies N/V/F/C. He notes a 40 lbs wt loss in the past couple of months.       PAST MEDICAL & SURGICAL HISTORY:  Bipolar affective disorder in remission  High cholesterol  Depression  Diabetes mellitus  ORIF right lower leg-       FAMILY HISTORY:  No pertinent family history in first degree relatives      SOCIAL HISTORY:  Smoking: Current Smoker  Substance Use: No  EtOH Use: No    Allergies  No Known Allergies      HOME MEDICATIONS:  · 	Tradjenta 5 mg oral tablet: 1 tab(s) orally once a day  · 	sodium bicarbonate 650 mg oral tablet: 1 tab(s) orally 3 times a day  · 	ergocalciferol 50,000 intl units (1.25 mg) oral capsule: 1 cap(s) orally once a week  · 	isosorbide mononitrate 120 mg oral tablet, extended release: 1 tab(s) orally once a day (at bedtime)  · 	carvedilol 12.5 mg oral tablet: 1 tab(s) orally every 12 hours  · 	furosemide 40 mg oral tablet: 1 tab(s) orally once a day  · 	divalproex sodium: 500 milligram(s) orally 2 times a day  · 	traZODone: 100 milligram(s) orally once a day (at bedtime)  · 	atorvastatin: 40 milligram(s) orally once a day (at bedtime)          · 	OLANZapine 20 mg oral tablet: 1 tab(s) orally once a day (at bedtime)    REVIEW OF SYSTEMS:  General:  No fever, fatigue, chills or weight change  Eyes:  No changes in vision  ENT:  No sore throat, rhinorrhea, epistaxis,   CV:  No chest pain or palpitations   Resp:  No SOB, cough, tachypnea or wheezing  GI: No abd pain, no nausea, no vomiting, no diarrhea or constipation  :  No dysuria  Muscle:  No muscle pain  Neuro:  No weakness, no paresthesia,   Heme:  No petechiae, ecchymosis, easy bruisability  Skin:  No rash, no edema    OBJECTIVE:  Vital Signs Last 24 Hrs  T(C): 36.6 (17 Aug 2017 23:21), Max: 36.8 (17 Aug 2017 16:45)  T(F): 97.9 (17 Aug 2017 23:21), Max: 98.2 (17 Aug 2017 16:45)  HR: 60 (18 Aug 2017 00:15) (55 - 74)  BP: 165/67 (18 Aug 2017 00:15) (164/82 - 202/77)  BP(mean): --  RR: 18 (17 Aug 2017 23:21) (14 - 18)  SpO2: 100% (17 Aug 2017 23:21) (100% - 100%)      PHYSICAL EXAM:  Constitutional: NAD  Eyes: PERRL, clear conjunctiva  ENMT: Moist mucus membranes  Respiratory: CTAB, no wheezing or crackles  Cardiovascular: RRR, no murmurs  Gastrointestinal: Soft NT/ND nl bowel sounds  Extremities: No pedal edema  Vascular: normal radial pulses  Neurological: AOx3  Skin: Warm and dry, r medial hallux bandaged, dressing C/D/I  Psychiatric: Normal affect         @ 07:01  -  - @ 02:31  --------------------------------------------------------  IN: 400 mL / OUT: 650 mL / NET: -250 mL      CAPILLARY BLOOD GLUCOSE  175 (18 Aug 2017 00:15)      HOSPITAL MEDICATIONS:  MEDICATIONS  (STANDING):  diVALproex DR 1000 milliGRAM(s) Oral at bedtime  traZODone 100 milliGRAM(s) Oral at bedtime  atorvastatin 40 milliGRAM(s) Oral at bedtime  OLANZapine 20 milliGRAM(s) Oral at bedtime  carvedilol 12.5 milliGRAM(s) Oral every 12 hours  heparin  Injectable 5000 Unit(s) SubCutaneous every 8 hours  lactated ringers. 1000 milliLiter(s) (75 mL/Hr) IV Continuous <Continuous>  insulin lispro (HumaLOG) corrective regimen sliding scale   SubCutaneous every 6 hours      LABS:                        9.4    13.11 )-----------( 374      ( 17 Aug 2017 19:10 )             28.5     Hgb Trend: 9.4<--      137  |  100  |  50<H>  ----------------------------<  208<H>  4.9   |  22  |  2.96<H>    Ca    8.7      17 Aug 2017 19:10    TPro  6.9  /  Alb  2.9<L>  /  TBili  0.2  /  DBili  x   /  AST  22  /  ALT  40  /  AlkPhos  73      Creatinine Trend: 2.96<--    Urinalysis Basic - ( 18 Aug 2017 01:32 )    Color: PLYEL / Appearance: CLEAR / S.006 / pH: 6.5  Gluc: 300 / Ketone: NEGATIVE  / Bili: NEGATIVE / Urobili: NORMAL E.U.   Blood: NEGATIVE / Protein: 300 / Nitrite: NEGATIVE   Leuk Esterase: NEGATIVE / RBC: 0-2 / WBC 0-2   Sq Epi: x / Non Sq Epi: x / Bacteria: x        Venous Blood Gas:   @ 19:10  7.32/50/< 24/23/27.2  VBG Lactate: 0.8      RADIOLOGY:  [X] Reviewed and interpreted by me  CXR : Clear lungs    EKG:  No EKG in chart from this visit, pt has previous EKG from 2017 which shows some T wave inversions in inferior and lateral leads. Per nurse pt refused EKG at bedside this evening.    TTE 3/2017:  1. Severe concentric left ventricular hypertrophy.  2. Mild segmental left ventricular systolic dysfunction.  The inferior wall is hypokinetic.  3. The right ventricle is not well visualized; grossly  normal right ventricular systolic function.

## 2017-08-18 NOTE — BRIEF OPERATIVE NOTE - OPERATION/FINDINGS
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 Good residual bone quality

## 2017-08-18 NOTE — PROGRESS NOTE ADULT - SUBJECTIVE AND OBJECTIVE BOX
Vascular Team Progress Note    S: Patient resting in bed. No acute events overnight. Denies fever/chills/N/V. Pain well controlled. Pt planned for OR today for RF hallux amputation with Dr. Watkins at 7:30AM    T(C): 37.2 (08-18-17 @ 06:51), Max: 37.2 (08-18-17 @ 06:51)  HR: 54 (08-18-17 @ 06:51) (54 - 74)  BP: 184/93 (08-18-17 @ 06:51) (144/67 - 202/77)  RR: 16 (08-18-17 @ 06:51) (14 - 18)  SpO2: 96% (08-18-17 @ 06:51) (96% - 100%)  Wt(kg): --    08-17 @ 07:01  -  08-18 @ 07:00  --------------------------------------------------------  IN:    IV PiggyBack: 250 mL    lactated ringers.: 150 mL  Total IN: 400 mL    OUT:    Voided: 1050 mL  Total OUT: 1050 mL    Total NET: -650 mL                            8.8    10.74 )-----------( 353      ( 18 Aug 2017 02:50 )             26.4     CAPILLARY BLOOD GLUCOSE  104 (18 Aug 2017 05:50)  105 (18 Aug 2017 05:10)  175 (18 Aug 2017 00:15)  214 (17 Aug 2017 16:45)      Physical Exam: General: NAD, appears slightly disheveled  Neurology: A&Ox3, nonfocal  Extremities: RLE swollen, with some erythema starting at the right big toe with some extension up the foot  area of ulceration with some dry gangrene and surrounding purulent drainage  Palpable DP b/l      A/P: 52yMale p/w infected non-healing wound of the right first toe   To OR today at for RF hallux amputation with Podiatry Dr. Watkins at 7:30AM as add-on.   CXR on sunrise.  EKG on sunrise.  MAR suggests further cardiac clearance, Podiatry has spoken to pt and understands has emergent soft tissue emphysema, some risk, is documented in chart. Vascular Team Progress Note    S: Patient resting in bed. No acute events overnight. Denies fever/chills/N/V. Pain well controlled. Pt planned for OR today for RF hallux amputation with Dr. Watkins at 7:30AM    T(C): 37.2 (08-18-17 @ 06:51), Max: 37.2 (08-18-17 @ 06:51)  HR: 54 (08-18-17 @ 06:51) (54 - 74)  BP: 184/93 (08-18-17 @ 06:51) (144/67 - 202/77)  RR: 16 (08-18-17 @ 06:51) (14 - 18)  SpO2: 96% (08-18-17 @ 06:51) (96% - 100%)  Wt(kg): --    08-17 @ 07:01  -  08-18 @ 07:00  --------------------------------------------------------  IN:    IV PiggyBack: 250 mL    lactated ringers.: 150 mL  Total IN: 400 mL    OUT:    Voided: 1050 mL  Total OUT: 1050 mL    Total NET: -650 mL                            8.8    10.74 )-----------( 353      ( 18 Aug 2017 02:50 )             26.4     08-18    136  |  102  |  51<H>  ----------------------------<  155<H>  4.5   |  21<L>  |  2.86<H>    Ca    8.6      18 Aug 2017 02:50    TPro  6.9  /  Alb  2.9<L>  /  TBili  0.2  /  DBili  x   /  AST  22  /  ALT  40  /  AlkPhos  73  08-17    PT/INR - ( 18 Aug 2017 02:50 )   PT: 10.2 SEC;   INR: 0.91          PTT - ( 18 Aug 2017 02:50 )  PTT:25.5 SEC    CAPILLARY BLOOD GLUCOSE  104 (18 Aug 2017 05:50)  105 (18 Aug 2017 05:10)  175 (18 Aug 2017 00:15)  214 (17 Aug 2017 16:45)      Physical Exam: General: NAD, appears slightly disheveled  Neurology: A&Ox3, nonfocal  Extremities: RLE swollen, with some erythema starting at the right big toe with some extension up the foot  area of ulceration with some dry gangrene and surrounding purulent drainage  Palpable DP b/l      A/P: 52yMale p/w infected non-healing wound of the right first toe   To OR today at for RF hallux amputation with Podiatry Dr. Watkins at 7:30AM as add-on.   CXR on sunrise.  EKG on sunrise.  MAR suggests further cardiac clearance, Podiatry has spoken to pt and understands has emergent soft tissue emphysema, some risk, is documented in chart.

## 2017-08-18 NOTE — DISCHARGE NOTE ADULT - PATIENT PORTAL LINK FT
“You can access the [employer] Patient Portal, offered by [facility], by registering with the following website: [email or web address]”

## 2017-08-18 NOTE — DISCHARGE NOTE ADULT - PROVIDER TOKENS
TOKEN:'[id number]:'[id number]' TOKEN:'[id number]:'[id number]'

## 2017-08-18 NOTE — CONSULT NOTE ADULT - PROBLEM SELECTOR RECOMMENDATION 9
- Pt has recent cardiac history without any further cardiac workup as outpt  - Pt must have new EKG and likely needs TTE to evaluate LV dysfunction  - Would recommend cardiac consult for clearance given - Pt has recent cardiac history without any further cardiac workup as outpt  - Pt must have new EKG and likely needs TTE to evaluate LV dysfunction  - Would recommend cardiac consult for clearance

## 2017-08-18 NOTE — DISCHARGE NOTE ADULT - MEDICATION SUMMARY - MEDICATIONS TO TAKE
I will START or STAY ON the medications listed below when I get home from the hospital:    Rolling Walker  -- Dispense 1 Rolling Walker  -- Indication: For PT    sodium bicarbonate 650 mg oral tablet  -- 1 tab(s) by mouth 3 times a day  -- Indication: For Renal disease    isosorbide mononitrate 120 mg oral tablet, extended release  -- 1 tab(s) by mouth once a day (at bedtime)  -- Indication: For HTN    divalproex sodium  -- 500 milligram(s) by mouth 2 times a day  -- Indication: For Bipolar affective disorder, remission status unspecified    traZODone  -- 100 milligram(s) by mouth once a day (at bedtime)  -- Indication: For Bipolar affective disorder, remission status unspecified    Tradjenta 5 mg oral tablet  -- 1 tab(s) by mouth once a day  -- Check with your doctor before becoming pregnant.  Obtain medical advice before taking any non-prescription drugs as some may affect the action of this medication.    -- Indication: For Diabetes    atorvastatin  -- 40 milligram(s) by mouth once a day (at bedtime)  -- Indication: For High cholesterol    OLANZapine 20 mg oral tablet  -- 1 tab(s) by mouth once a day (at bedtime)  -- Indication: For Bipolar affective disorder, remission status unspecified    carvedilol 12.5 mg oral tablet  -- 1 tab(s) by mouth every 12 hours  -- Indication: For HTN    furosemide 40 mg oral tablet  -- 1 tab(s) by mouth once a day  -- Indication: For HTN    ciprofloxacin 250 mg oral tablet  -- 1 tab(s) by mouth once a day  -- Indication: For Infection    ergocalciferol 50,000 intl units (1.25 mg) oral capsule  -- 1 cap(s) by mouth once a week  -- Indication: For Renal disease I will START or STAY ON the medications listed below when I get home from the hospital:    Rolling Walker  -- Dispense 1 Rolling Walker  -- Indication: For PT    sodium bicarbonate 650 mg oral tablet  -- 1 tab(s) by mouth 3 times a day  -- Indication: For Renal disease    isosorbide mononitrate 120 mg oral tablet, extended release  -- 1 tab(s) by mouth once a day (at bedtime)  -- Indication: For HTN    divalproex sodium  -- 500 milligram(s) by mouth 2 times a day  -- Indication: For Bipolar affective disorder, remission status unspecified    traZODone  -- 100 milligram(s) by mouth once a day (at bedtime)  -- Indication: For Bipolar affective disorder, remission status unspecified    Tradjenta 5 mg oral tablet  -- 1 tab(s) by mouth once a day  -- Check with your doctor before becoming pregnant.  Obtain medical advice before taking any non-prescription drugs as some may affect the action of this medication.    -- Indication: For Diabetes    atorvastatin  -- 40 milligram(s) by mouth once a day (at bedtime)  -- Indication: For High cholesterol    OLANZapine 20 mg oral tablet  -- 1 tab(s) by mouth once a day (at bedtime)  -- Indication: For Bipolar affective disorder, remission status unspecified    carvedilol 12.5 mg oral tablet  -- 1 tab(s) by mouth every 12 hours  -- Indication: For HTN    Kayexalate oral and rectal powder  -- 15 gram(s) by mouth and rectally once x 1 days  -- Not to be used with sorbitol  Shake well before use.    -- Indication: For Hyperkalemia    furosemide 40 mg oral tablet  -- 1 tab(s) by mouth once a day  -- Indication: For HTN    ciprofloxacin 250 mg oral tablet  -- 1 tab(s) by mouth once a day  -- Indication: For Infection    ergocalciferol 50,000 intl units (1.25 mg) oral capsule  -- 1 cap(s) by mouth once a week  -- Indication: For Renal disease I will START or STAY ON the medications listed below when I get home from the hospital:    Rolling Walker  -- Dispense 1 Rolling Walker  -- Indication: For PT    sodium bicarbonate 650 mg oral tablet  -- 1 tab(s) by mouth 3 times a day  -- Indication: For Renal disease    isosorbide mononitrate 120 mg oral tablet, extended release  -- 1 tab(s) by mouth once a day (at bedtime)  -- Indication: For HTN    divalproex sodium  -- 500 milligram(s) by mouth 2 times a day  -- Indication: For Bipolar affective disorder, remission status unspecified    traZODone  -- 100 milligram(s) by mouth once a day (at bedtime)  -- Indication: For Bipolar affective disorder, remission status unspecified    Tradjenta 5 mg oral tablet  -- 1 tab(s) by mouth once a day  -- Check with your doctor before becoming pregnant.  Obtain medical advice before taking any non-prescription drugs as some may affect the action of this medication.    -- Indication: For Diabetes    Prandin 0.5 mg oral tablet  -- 1 tab(s) by mouth 3 times a day (before meals)  -- Do not drink alcoholic beverages when taking this medication.  It is very important that you take or use this exactly as directed.  Do not skip doses or discontinue unless directed by your doctor.  Obtain medical advice before taking any non-prescription drugs as some may affect the action of this medication.    -- Indication: For Diabetes    atorvastatin  -- 40 milligram(s) by mouth once a day (at bedtime)  -- Indication: For High cholesterol    OLANZapine 20 mg oral tablet  -- 1 tab(s) by mouth once a day (at bedtime)  -- Indication: For Bipolar affective disorder, remission status unspecified    carvedilol 12.5 mg oral tablet  -- 1 tab(s) by mouth every 12 hours  -- Indication: For HTN    Kayexalate oral and rectal powder  -- 15 gram(s) by mouth and rectally once x 1 days  -- Not to be used with sorbitol  Shake well before use.    -- Indication: For Hyperkalemia    furosemide 40 mg oral tablet  -- 1 tab(s) by mouth once a day  -- Indication: For HTN    ciprofloxacin 250 mg oral tablet  -- 1 tab(s) by mouth once a day  -- Indication: For Infection    ergocalciferol 50,000 intl units (1.25 mg) oral capsule  -- 1 cap(s) by mouth once a week  -- Indication: For Renal disease

## 2017-08-18 NOTE — CONSULT NOTE ADULT - ASSESSMENT
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

## 2017-08-18 NOTE — DISCHARGE NOTE ADULT - ADDITIONAL INSTRUCTIONS
All necessary prescriptions have been sent to the pt's pharmacy, stress immediate follow up with his PCP, Infectious Disease, Podiatry, Nephrology, and Gastroenterology doctors All necessary prescriptions have been sent to the pt's pharmacy, stress immediate follow up with his PCP, Infectious Disease, Podiatry, Nephrology, and Gastroenterology doctors.  Please follow up with  - the gastrointestinal doctor for out patient endoscopy.    **Of note, pt refused inpatient workup for weight loss, CA 19-9 was elevated and concern for possible malignancy was made clear to the patient.  After thorough discussion pt stated that he understands the risks of not continuing workup and will follow up outpatient with vascular, his pmd, GI, podiatry and infectious disease for continued workup outpatient.  Pt able to repeat back and expresses full understanding of concern for malignancy, worsening malignancy and risk if untreated including death. All necessary prescriptions have been sent to the pt's pharmacy, stress immediate follow up with his PCP, Infectious Disease, Podiatry, Nephrology, and Gastroenterology doctors.  Please follow up with  - the gastrointestinal doctor for out patient endoscopy.    **Of note, pt refused inpatient workup for weight loss, CA 19-9 was elevated and concern for possible malignancy was made clear to the patient.  After thorough discussion pt stated that he understands the risks of not continuing workup and will follow up outpatient with vascular, his pmd (you were started on prandin sent to pharmacy), GI, podiatry and infectious disease for continued workup outpatient.  Pt able to repeat back and expresses full understanding of concern for malignancy, worsening malignancy and risk if untreated including death.    BRING ALL DISCHARGE PAPERWORK WITH YOU PLEASE TO YOUR FOLLOW UP APPOINTMENTS

## 2017-08-18 NOTE — PROGRESS NOTE ADULT - SUBJECTIVE AND OBJECTIVE BOX
Patient is a 52y old  Male who presents with a chief complaint of right foot (18 Aug 2017 00:17)      INTERVAL HPI/OVERNIGHT EVENTS:   Pt is scheduled for RF hallux amputation with Dr. Watkins at 7:30AM as add-on. Patient is aware of procedure and is NPO since midnight.    MEDICATIONS  (STANDING):  diVALproex DR 1000 milliGRAM(s) Oral at bedtime  traZODone 100 milliGRAM(s) Oral at bedtime  atorvastatin 40 milliGRAM(s) Oral at bedtime  OLANZapine 20 milliGRAM(s) Oral at bedtime  carvedilol 12.5 milliGRAM(s) Oral every 12 hours  heparin  Injectable 5000 Unit(s) SubCutaneous every 8 hours  lactated ringers. 1000 milliLiter(s) (75 mL/Hr) IV Continuous <Continuous>  insulin lispro (HumaLOG) corrective regimen sliding scale   SubCutaneous every 6 hours    MEDICATIONS  (PRN):      Allergies    No Known Allergies    Intolerances        Vital Signs Last 24 Hrs  T(C): 36.7 (18 Aug 2017 05:10), Max: 36.8 (17 Aug 2017 16:45)  T(F): 98.1 (18 Aug 2017 05:10), Max: 98.2 (17 Aug 2017 16:45)  HR: 59 (18 Aug 2017 05:10) (55 - 74)  BP: 144/67 (18 Aug 2017 05:10) (144/67 - 202/77)  BP(mean): --  RR: 16 (18 Aug 2017 05:10) (14 - 18)  SpO2: 99% (18 Aug 2017 05:10) (99% - 100%)    LABS:                        8.8    10.74 )-----------( 353      ( 18 Aug 2017 02:50 )             26.4     18    136  |  102  |  51<H>  ----------------------------<  155<H>  4.5   |  21<L>  |  2.86<H>    Ca    8.6      18 Aug 2017 02:50    TPro  6.9  /  Alb  2.9<L>  /  TBili  0.2  /  DBili  x   /  AST  22  /  ALT  40  /  AlkPhos  73  08-17    PT/INR - ( 18 Aug 2017 02:50 )   PT: 10.2 SEC;   INR: 0.91          PTT - ( 18 Aug 2017 02:50 )  PTT:25.5 SEC  Urinalysis Basic - ( 18 Aug 2017 01:32 )    Color: PLYEL / Appearance: CLEAR / S.006 / pH: 6.5  Gluc: 300 / Ketone: NEGATIVE  / Bili: NEGATIVE / Urobili: NORMAL E.U.   Blood: NEGATIVE / Protein: 300 / Nitrite: NEGATIVE   Leuk Esterase: NEGATIVE / RBC: 0-2 / WBC 0-2   Sq Epi: x / Non Sq Epi: x / Bacteria: x      CAPILLARY BLOOD GLUCOSE  104 (18 Aug 2017 05:50)  175 (18 Aug 2017 00:15)  214 (17 Aug 2017 16:45)          RADIOLOGY & ADDITIONAL TESTS:    Xray: gas hallux (resident read) waiting final read

## 2017-08-18 NOTE — DISCHARGE NOTE ADULT - CARE PROVIDERS DIRECT ADDRESSES
,DirectAddress_Unknown,[email or web address],DirectAddress_Unknown ,DirectAddress_Unknown,[email or web address],DirectAddress_Unknown,DirectAddress_Unknown

## 2017-08-18 NOTE — DISCHARGE NOTE ADULT - PLAN OF CARE
Pt expressed that he has a 40lbs weight loss over the last few months. Advised pt to follow up as outpatient for workup regarding weight loss. RIGHT foot partial first ray resection - Weight Bearing: Weight bearing as tolerated in forefoot relief shoe  - Antibiotics: Continue with antibiotics as prescribed till completion per ID  - Dressing: Keep dressing clean, dry and intact till follow-up with Dr. Watkins  - Follow-up: Follow-up in Dr. Watkins's office in 3-5 days at  [phone] Ambulate, void, tolerate PO, control pain Pt expressed that he has a 40lbs weight loss over the last few months. Advised pt to follow up as outpatient for workup regarding weight loss.  8/21: pt was planned for a CT abdomen and pelvis that was scheduled for 1 pm, but pt adamantly refused the CT scan. Explained to the pt that he was scheduled and timing is often difficult for imaging. Pt continued to refuse. All RBA of obtaining the CT were explained and discussed in full detail. Pt expressed full understanding and continued to refuse.      It has been explained to the patient that given his current medical issues, it is incredibly important for him to follow up as an outpatient for his poorly controlled hypertension, diabetes, and his recent significant weight loss. Patient expressed full understanding of these issues and plans once discharged. Signing out AMA Pt is not being discharged. Pt is leaving Against Medical Advice. All necessary paper work has been documented and signed by the patient. Pt expressed that he has a 40lbs weight loss over the last few months. Advised pt to follow up as outpatient for workup regarding weight loss.  8/21: pt was planned for a CT abdomen and pelvis that was scheduled for 1 pm, but pt adamantly refused the CT scan. Explained to the pt that he was scheduled and timing is often difficult for imaging. Pt continued to refuse. All RBA of obtaining the CT were explained and discussed in full detail. Pt expressed full understanding and continued to refuse.  8/22: CT abdomen pelvis obtained. < from: CT Abdomen and Pelvis w/ Oral Cont (08.22.17 @ 14:48) >  IMPRESSION: Soft tissue thickening at the level of the GE junction, not apparent on   the prior exam. Recommend endoscopy to evaluate for possible pathology at   this level.  8/23: upper endoscopy scheduled today, but pt refused treatment. All RBA of tx were discussed along with the potential harms of refusing tx, pt expressed full understanding and refused treatment.  Also made patient aware that his potassium was elevated 5.8, and that he would need medication in order to treat the hyperkalemia. Pt refused to take the medication ordered, stated that he would like to eat breakfast this morning and sign out AMA. Pt was made aware of the risks associated with high potassium, pt stated he will follow up with his outside PMD to treat his high potassium.   It has been explained to the patient that given his current medical issues, it is incredibly important for him to follow up as an outpatient for his poorly controlled hypertension, diabetes, his recent significant weight loss, new findings on his CT scan, and elevated potassium. Patient expressed full understanding of these issues and plans prior to signing out against medical advice. All necessary prescriptions have been sent to the pt's pharmacy, stress immediate follow up with his PCP, Infectious Disease, Podiatry, Nephrology, and Gastroenterology doctors. - Weight Bearing: Weight bearing as tolerated in forefoot relief shoe  - Antibiotics: Continue with antibiotics as prescribed till completion per ID  - Dressing: Keep dressing clean, dry and intact till follow-up with Dr. Watkins  - Follow-up: Follow-up in Dr. Watkins's office in 3-5 days at  [phone]    -Follow up with infectious disease  within 5 days of The patient was discharged home in stable condition to be followed up as an outpatientcrow as final cultures are pending Pt expressed that he has a 40lbs weight loss over the last few months. Advised pt to follow up as outpatient for workup regarding weight loss.    8/21: pt was planned for a CT abdomen and pelvis that was scheduled for 1 pm, but pt adamantly refused the CT scan. Explained to the pt that he was scheduled and timing is often difficult for imaging. Pt continued to refuse. All Risk Benefits and Alternatives of obtaining the CT were explained and discussed in full detail. Pt expressed full understanding and continued to refuse.  8/22: CT abdomen pelvis obtained. < from: CT Abdomen and Pelvis w/ Oral Cont (08.22.17 @ 14:48) >  IMPRESSION: Soft tissue thickening at the level of the GE junction, not apparent on   the prior exam. Recommend endoscopy to evaluate for possible pathology at   this level.  8/23: upper endoscopy scheduled today, but pt refused. All Risk Benefits and Alternatives of treatment were discussed and risk of possible death if he refuses his workup and treatment was made clear to pt and pt expressed full understanding and refused treatment.  A Pt was made aware of the risks associated with high potassium, Pt stated he will follow up with his outside PMD for his high potassium monitoring.   It has been explained to the patient that given his current medical issues, it is incredibly important for him to follow up as an outpatient for his poorly controlled hypertension, diabetes, his recent significant weight loss, new findings on his CT scan, and elevated potassium. Patient expressed full understanding of these issues and plans prior to signing out against medical advice. All necessary prescriptions have been sent to the pt's pharmacy, stress immediate follow up with his PCP, Infectious Disease, Podiatry, Nephrology, and Gastroenterology doctors.

## 2017-08-18 NOTE — PROGRESS NOTE ADULT - ASSESSMENT
To OR today at for RF hallux amputation with Dr. Watkins at 7:30AM as add-on.   CXR on sunrise.  EKG on sunrise.  MAR suggests further cardiac clearance, spoken with understands pt has emergent soft tissue emphysema, some risk, is documented in chart.  Consent signed and in chart.  Procedure was explained to patient in detail. All alternatives, risks and complications were discussed. All questions answered.

## 2017-08-19 ENCOUNTER — TRANSCRIPTION ENCOUNTER (OUTPATIENT)
Age: 52
End: 2017-08-19

## 2017-08-19 LAB
BUN SERPL-MCNC: 50 MG/DL — HIGH (ref 7–23)
CALCIUM SERPL-MCNC: 8.2 MG/DL — LOW (ref 8.4–10.5)
CHLORIDE SERPL-SCNC: 105 MMOL/L — SIGNIFICANT CHANGE UP (ref 98–107)
CO2 SERPL-SCNC: 22 MMOL/L — SIGNIFICANT CHANGE UP (ref 22–31)
CREAT SERPL-MCNC: 2.69 MG/DL — HIGH (ref 0.5–1.3)
GLUCOSE SERPL-MCNC: 105 MG/DL — HIGH (ref 70–99)
HCT VFR BLD CALC: 27.1 % — LOW (ref 39–50)
HGB BLD-MCNC: 8.9 G/DL — LOW (ref 13–17)
MAGNESIUM SERPL-MCNC: 2 MG/DL — SIGNIFICANT CHANGE UP (ref 1.6–2.6)
MCHC RBC-ENTMCNC: 29.7 PG — SIGNIFICANT CHANGE UP (ref 27–34)
MCHC RBC-ENTMCNC: 32.8 % — SIGNIFICANT CHANGE UP (ref 32–36)
MCV RBC AUTO: 90.3 FL — SIGNIFICANT CHANGE UP (ref 80–100)
NRBC # FLD: 0 — SIGNIFICANT CHANGE UP
PHOSPHATE SERPL-MCNC: 4.1 MG/DL — SIGNIFICANT CHANGE UP (ref 2.5–4.5)
PLATELET # BLD AUTO: 364 K/UL — SIGNIFICANT CHANGE UP (ref 150–400)
PMV BLD: 10.3 FL — SIGNIFICANT CHANGE UP (ref 7–13)
POTASSIUM SERPL-MCNC: 5.3 MMOL/L — SIGNIFICANT CHANGE UP (ref 3.5–5.3)
POTASSIUM SERPL-SCNC: 5.3 MMOL/L — SIGNIFICANT CHANGE UP (ref 3.5–5.3)
RBC # BLD: 3 M/UL — LOW (ref 4.2–5.8)
RBC # FLD: 12.3 % — SIGNIFICANT CHANGE UP (ref 10.3–14.5)
SODIUM SERPL-SCNC: 140 MMOL/L — SIGNIFICANT CHANGE UP (ref 135–145)
WBC # BLD: 9.41 K/UL — SIGNIFICANT CHANGE UP (ref 3.8–10.5)
WBC # FLD AUTO: 9.41 K/UL — SIGNIFICANT CHANGE UP (ref 3.8–10.5)

## 2017-08-19 PROCEDURE — 99232 SBSQ HOSP IP/OBS MODERATE 35: CPT

## 2017-08-19 RX ORDER — HYDRALAZINE HCL 50 MG
10 TABLET ORAL ONCE
Qty: 0 | Refills: 0 | Status: COMPLETED | OUTPATIENT
Start: 2017-08-19 | End: 2017-08-19

## 2017-08-19 RX ORDER — SODIUM CHLORIDE 9 MG/ML
3 INJECTION INTRAMUSCULAR; INTRAVENOUS; SUBCUTANEOUS EVERY 8 HOURS
Qty: 0 | Refills: 0 | Status: DISCONTINUED | OUTPATIENT
Start: 2017-08-19 | End: 2017-08-23

## 2017-08-19 RX ADMIN — Medication: at 23:33

## 2017-08-19 RX ADMIN — Medication 10 MILLIGRAM(S): at 14:36

## 2017-08-19 RX ADMIN — PIPERACILLIN AND TAZOBACTAM 25 GRAM(S): 4; .5 INJECTION, POWDER, LYOPHILIZED, FOR SOLUTION INTRAVENOUS at 17:48

## 2017-08-19 RX ADMIN — ISOSORBIDE MONONITRATE 120 MILLIGRAM(S): 60 TABLET, EXTENDED RELEASE ORAL at 12:55

## 2017-08-19 RX ADMIN — Medication 250 MILLIGRAM(S): at 21:18

## 2017-08-19 RX ADMIN — DIVALPROEX SODIUM 1000 MILLIGRAM(S): 500 TABLET, DELAYED RELEASE ORAL at 21:11

## 2017-08-19 RX ADMIN — Medication 10 MILLIGRAM(S): at 21:24

## 2017-08-19 RX ADMIN — CARVEDILOL PHOSPHATE 12.5 MILLIGRAM(S): 80 CAPSULE, EXTENDED RELEASE ORAL at 17:48

## 2017-08-19 RX ADMIN — SODIUM CHLORIDE 3 MILLILITER(S): 9 INJECTION INTRAMUSCULAR; INTRAVENOUS; SUBCUTANEOUS at 21:17

## 2017-08-19 RX ADMIN — ATORVASTATIN CALCIUM 40 MILLIGRAM(S): 80 TABLET, FILM COATED ORAL at 21:11

## 2017-08-19 RX ADMIN — HEPARIN SODIUM 5000 UNIT(S): 5000 INJECTION INTRAVENOUS; SUBCUTANEOUS at 21:11

## 2017-08-19 RX ADMIN — CARVEDILOL PHOSPHATE 12.5 MILLIGRAM(S): 80 CAPSULE, EXTENDED RELEASE ORAL at 05:44

## 2017-08-19 RX ADMIN — Medication 10 MILLIGRAM(S): at 19:53

## 2017-08-19 RX ADMIN — HEPARIN SODIUM 5000 UNIT(S): 5000 INJECTION INTRAVENOUS; SUBCUTANEOUS at 14:36

## 2017-08-19 RX ADMIN — OLANZAPINE 20 MILLIGRAM(S): 15 TABLET, FILM COATED ORAL at 21:11

## 2017-08-19 RX ADMIN — Medication 100 MILLIGRAM(S): at 21:11

## 2017-08-19 RX ADMIN — Medication 650 MILLIGRAM(S): at 14:36

## 2017-08-19 RX ADMIN — PIPERACILLIN AND TAZOBACTAM 25 GRAM(S): 4; .5 INJECTION, POWDER, LYOPHILIZED, FOR SOLUTION INTRAVENOUS at 09:51

## 2017-08-19 RX ADMIN — Medication 6: at 12:55

## 2017-08-19 RX ADMIN — Medication 2: at 17:47

## 2017-08-19 RX ADMIN — Medication 650 MILLIGRAM(S): at 21:11

## 2017-08-19 NOTE — PHYSICAL THERAPY INITIAL EVALUATION ADULT - ADDITIONAL COMMENTS
Patient lives in a home with mother; patient's bedroom is in basement with a flight of steps to enter

## 2017-08-19 NOTE — PROGRESS NOTE ADULT - ASSESSMENT
51 yo M s/p right foot hallux amp  - Pt seen and examined  - No signs of active infection present  - Drain left intact, will remove drain tomorrow, and throw final suture  - Cont abx   - Pt pain is well controlled at this time  - Awaiting OR data for d/c plan  - Pt seen with attending

## 2017-08-19 NOTE — PHYSICAL THERAPY INITIAL EVALUATION ADULT - GENERAL OBSERVATIONS, REHAB EVAL
Patient received supine in bed; No apparent distress. (+) lori Patient received supine in bed; No apparent distress. (+) heplock, right foot dressing

## 2017-08-19 NOTE — PROGRESS NOTE ADULT - ASSESSMENT
52M  s/p rt toe 1 amp - IV abx  continue woundcare  continue abx  d/w pt his recent wt loss approx 30 lbs in past 3 mo and recommended GI eval and malignancy w/u  pt wants to think about it and will let me know

## 2017-08-19 NOTE — PROGRESS NOTE ADULT - SUBJECTIVE AND OBJECTIVE BOX
Patient is a 52y old  Male who presents with a chief complaint of right foot (18 Aug 2017 17:55)       INTERVAL HPI/OVERNIGHT EVENTS:  Patient seen and evaluated at bedside.  Pt is resting comfortable in NAD. Denies N/V/F/C. Pain well controlled at this time.    Allergies    No Known Allergies    Intolerances        Vital Signs Last 24 Hrs  T(C): 36.8 (19 Aug 2017 11:42), Max: 37.3 (18 Aug 2017 13:10)  T(F): 98.3 (19 Aug 2017 11:42), Max: 99.1 (18 Aug 2017 13:10)  HR: 53 (19 Aug 2017 11:42) (53 - 69)  BP: 191/72 (19 Aug 2017 11:42) (152/69 - 194/56)  BP(mean): --  RR: 18 (19 Aug 2017 11:42) (16 - 18)  SpO2: 98% (19 Aug 2017 11:42) (98% - 99%)    LABS:                        8.9    9.41  )-----------( 364      ( 19 Aug 2017 06:30 )             27.1     08-19    140  |  105  |  50<H>  ----------------------------<  105<H>  5.3   |  22  |  2.69<H>    Ca    8.2<L>      19 Aug 2017 06:30  Phos  4.1     -  Mg     2.0     -19    TPro  6.9  /  Alb  2.9<L>  /  TBili  0.2  /  DBili  x   /  AST  22  /  ALT  40  /  AlkPhos  73  08-17    PT/INR - ( 18 Aug 2017 02:50 )   PT: 10.2 SEC;   INR: 0.91          PTT - ( 18 Aug 2017 02:50 )  PTT:25.5 SEC  Urinalysis Basic - ( 18 Aug 2017 01:32 )    Color: PLYEL / Appearance: CLEAR / S.006 / pH: 6.5  Gluc: 300 / Ketone: NEGATIVE  / Bili: NEGATIVE / Urobili: NORMAL E.U.   Blood: NEGATIVE / Protein: 300 / Nitrite: NEGATIVE   Leuk Esterase: NEGATIVE / RBC: 0-2 / WBC 0-2   Sq Epi: x / Non Sq Epi: x / Bacteria: x      CAPILLARY BLOOD GLUCOSE  215 (19 Aug 2017 12:34)  116 (19 Aug 2017 09:04)  248 (18 Aug 2017 21:27)  246 (18 Aug 2017 17:02)  319 (18 Aug 2017 13:07)          Lower Extremity Physical Exam:  s/p right foot hallux amputation. Sutures intact, skin edges well coapted, drain in place. No surrounding erythema, no drainage, no malodor, no purulence, no signs of active infection present.     RADIOLOGY & ADDITIONAL TESTS:  < from: Xray Foot AP + Lateral + Oblique, Right (17 @ 09:53) >  Currently status post partial 1st ray amputation through mid metatarsal   shaft with sharp and smooth osseous stump margin and with post surgical   changes and Penrose type drain in the overlying soft tissues.    No proximally tracking gas collections beyond the amputation site and no   focal areas of osteomyelitis.    Remainder of the foot is unchanged.    < end of copied text >

## 2017-08-19 NOTE — PROGRESS NOTE ADULT - SUBJECTIVE AND OBJECTIVE BOX
ANESTHESIA POSTOP CHECK    52y Male POSTOP DAY 1     No COMPLAINTS    NO APPARENT ANESTHESIA COMPLICATIONS

## 2017-08-19 NOTE — PROGRESS NOTE ADULT - SUBJECTIVE AND OBJECTIVE BOX
Vascular Team Progress Note    S: POD 1 s/p Partial amputation of first ray of right foot by open approach. Patient resting in bed. pt refusing insulin and BP meds overnight despite education. Denies fever/chills/N/V. Pain well controlled.       T(C): 36.8 (08-19-17 @ 05:37), Max: 37.3 (08-18-17 @ 13:10)  HR: 59 (08-19-17 @ 05:37) (59 - 69)  BP: 152/69 (08-19-17 @ 05:37) (152/69 - 194/56)  RR: 17 (08-19-17 @ 05:37) (12 - 18)  SpO2: 99% (08-19-17 @ 05:37) (98% - 100%)  Wt(kg): --    08-18 @ 07:01  -  08-19 @ 07:00  --------------------------------------------------------  IN:    IV PiggyBack: 250 mL    lactated ringers.: 1575 mL  Total IN: 1825 mL    OUT:    Voided: 2750 mL  Total OUT: 2750 mL    Total NET: -925 mL      Physical Exam: General: NAD, appears slightly disheveled  Neurology: A&Ox3, nonfocal  Extremities: Dressing intact, no heme/streakthrough appreciated. no tenderness to palpation, mild edema.   Palpable DP b/l      Culture - Surg Site Aerob/Anaer w/Gm St (08.18.17 @ 15:34)    Gram Stain Wound:   NOS^No Organisms Seen  WBC^White Blood Cells  QNTY CELLS IN GRAM STAIN: RARE (1+)        A/P: 52yMale POD 1 s/p Partial amputation of first ray of right foot by open approach with podiatry  - reg diet  - IV abx  - f/u gram stain/cultures  - wound care by podiatry   - encourage pt to take meds  - DVT ppx Vascular Team Progress Note    S: POD 1 s/p Partial amputation of first ray of right foot by open approach. Patient resting in bed. pt refusing insulin and BP meds overnight despite education. Denies fever/chills/N/V. Pain well controlled.       T(C): 36.8 (08-19-17 @ 05:37), Max: 37.3 (08-18-17 @ 13:10)  HR: 59 (08-19-17 @ 05:37) (59 - 69)  BP: 152/69 (08-19-17 @ 05:37) (152/69 - 194/56)  RR: 17 (08-19-17 @ 05:37) (12 - 18)  SpO2: 99% (08-19-17 @ 05:37) (98% - 100%)  Wt(kg): --    08-18 @ 07:01  -  08-19 @ 07:00  --------------------------------------------------------  IN:    IV PiggyBack: 250 mL    lactated ringers.: 1575 mL  Total IN: 1825 mL    OUT:    Voided: 2750 mL  Total OUT: 2750 mL    Total NET: -925 mL                          8.9    9.41  )-----------( 364      ( 19 Aug 2017 06:30 )             27.1   08-19    140  |  105  |  50<H>  ----------------------------<  105<H>  5.3   |  22  |  2.69<H>    Ca    8.2<L>      19 Aug 2017 06:30  Phos  4.1     08-19  Mg     2.0     08-19    TPro  6.9  /  Alb  2.9<L>  /  TBili  0.2  /  DBili  x   /  AST  22  /  ALT  40  /  AlkPhos  73  08-17      Physical Exam: General: NAD, appears slightly disheveled  Neurology: A&Ox3, nonfocal  Extremities: Dressing intact, no heme/streakthrough appreciated. no tenderness to palpation, mild edema.   Palpable DP b/l      Culture - Surg Site Aerob/Anaer w/Gm St (08.18.17 @ 15:34)    Gram Stain Wound:   NOS^No Organisms Seen  WBC^White Blood Cells  QNTY CELLS IN GRAM STAIN: RARE (1+)        A/P: 52yMale POD 1 s/p Partial amputation of first ray of right foot by open approach with podiatry  - reg diet  - IV abx  - f/u gram stain/cultures  - wound care by podiatry   - encourage pt to take meds  - DVT ppx

## 2017-08-20 LAB
BUN SERPL-MCNC: 50 MG/DL — HIGH (ref 7–23)
CALCIUM SERPL-MCNC: 8.4 MG/DL — SIGNIFICANT CHANGE UP (ref 8.4–10.5)
CHLORIDE SERPL-SCNC: 105 MMOL/L — SIGNIFICANT CHANGE UP (ref 98–107)
CO2 SERPL-SCNC: 21 MMOL/L — LOW (ref 22–31)
CREAT SERPL-MCNC: 2.94 MG/DL — HIGH (ref 0.5–1.3)
GLUCOSE SERPL-MCNC: 91 MG/DL — SIGNIFICANT CHANGE UP (ref 70–99)
HCT VFR BLD CALC: 26.5 % — LOW (ref 39–50)
HGB BLD-MCNC: 8.7 G/DL — LOW (ref 13–17)
MAGNESIUM SERPL-MCNC: 2 MG/DL — SIGNIFICANT CHANGE UP (ref 1.6–2.6)
MCHC RBC-ENTMCNC: 29.4 PG — SIGNIFICANT CHANGE UP (ref 27–34)
MCHC RBC-ENTMCNC: 32.8 % — SIGNIFICANT CHANGE UP (ref 32–36)
MCV RBC AUTO: 89.5 FL — SIGNIFICANT CHANGE UP (ref 80–100)
NRBC # FLD: 0 — SIGNIFICANT CHANGE UP
PHOSPHATE SERPL-MCNC: 4.3 MG/DL — SIGNIFICANT CHANGE UP (ref 2.5–4.5)
PLATELET # BLD AUTO: 340 K/UL — SIGNIFICANT CHANGE UP (ref 150–400)
PMV BLD: 10.4 FL — SIGNIFICANT CHANGE UP (ref 7–13)
POTASSIUM SERPL-MCNC: 5.1 MMOL/L — SIGNIFICANT CHANGE UP (ref 3.5–5.3)
POTASSIUM SERPL-SCNC: 5.1 MMOL/L — SIGNIFICANT CHANGE UP (ref 3.5–5.3)
RBC # BLD: 2.96 M/UL — LOW (ref 4.2–5.8)
RBC # FLD: 12.4 % — SIGNIFICANT CHANGE UP (ref 10.3–14.5)
SODIUM SERPL-SCNC: 138 MMOL/L — SIGNIFICANT CHANGE UP (ref 135–145)
VANCOMYCIN TROUGH SERPL-MCNC: 23.9 UG/ML — HIGH (ref 10–20)
WBC # BLD: 8.76 K/UL — SIGNIFICANT CHANGE UP (ref 3.8–10.5)
WBC # FLD AUTO: 8.76 K/UL — SIGNIFICANT CHANGE UP (ref 3.8–10.5)

## 2017-08-20 PROCEDURE — 99232 SBSQ HOSP IP/OBS MODERATE 35: CPT

## 2017-08-20 PROCEDURE — 93923 UPR/LXTR ART STDY 3+ LVLS: CPT | Mod: 26

## 2017-08-20 RX ORDER — VANCOMYCIN HCL 1 G
750 VIAL (EA) INTRAVENOUS EVERY 24 HOURS
Qty: 0 | Refills: 0 | Status: DISCONTINUED | OUTPATIENT
Start: 2017-08-20 | End: 2017-08-21

## 2017-08-20 RX ADMIN — OLANZAPINE 20 MILLIGRAM(S): 15 TABLET, FILM COATED ORAL at 22:43

## 2017-08-20 RX ADMIN — Medication 150 MILLIGRAM(S): at 23:11

## 2017-08-20 RX ADMIN — SODIUM CHLORIDE 3 MILLILITER(S): 9 INJECTION INTRAMUSCULAR; INTRAVENOUS; SUBCUTANEOUS at 22:43

## 2017-08-20 RX ADMIN — SODIUM CHLORIDE 3 MILLILITER(S): 9 INJECTION INTRAMUSCULAR; INTRAVENOUS; SUBCUTANEOUS at 05:18

## 2017-08-20 RX ADMIN — SODIUM CHLORIDE 3 MILLILITER(S): 9 INJECTION INTRAMUSCULAR; INTRAVENOUS; SUBCUTANEOUS at 14:29

## 2017-08-20 RX ADMIN — Medication 650 MILLIGRAM(S): at 22:43

## 2017-08-20 RX ADMIN — DIVALPROEX SODIUM 1000 MILLIGRAM(S): 500 TABLET, DELAYED RELEASE ORAL at 22:43

## 2017-08-20 RX ADMIN — CARVEDILOL PHOSPHATE 12.5 MILLIGRAM(S): 80 CAPSULE, EXTENDED RELEASE ORAL at 18:09

## 2017-08-20 RX ADMIN — CARVEDILOL PHOSPHATE 12.5 MILLIGRAM(S): 80 CAPSULE, EXTENDED RELEASE ORAL at 05:57

## 2017-08-20 RX ADMIN — Medication 650 MILLIGRAM(S): at 05:58

## 2017-08-20 RX ADMIN — Medication 40 MILLIGRAM(S): at 05:57

## 2017-08-20 RX ADMIN — Medication 100 MILLIGRAM(S): at 22:43

## 2017-08-20 RX ADMIN — ATORVASTATIN CALCIUM 40 MILLIGRAM(S): 80 TABLET, FILM COATED ORAL at 22:43

## 2017-08-20 RX ADMIN — HEPARIN SODIUM 5000 UNIT(S): 5000 INJECTION INTRAVENOUS; SUBCUTANEOUS at 05:57

## 2017-08-20 RX ADMIN — ISOSORBIDE MONONITRATE 120 MILLIGRAM(S): 60 TABLET, EXTENDED RELEASE ORAL at 14:27

## 2017-08-20 RX ADMIN — PIPERACILLIN AND TAZOBACTAM 25 GRAM(S): 4; .5 INJECTION, POWDER, LYOPHILIZED, FOR SOLUTION INTRAVENOUS at 05:58

## 2017-08-20 RX ADMIN — HEPARIN SODIUM 5000 UNIT(S): 5000 INJECTION INTRAVENOUS; SUBCUTANEOUS at 14:28

## 2017-08-20 RX ADMIN — Medication 6: at 23:11

## 2017-08-20 RX ADMIN — PIPERACILLIN AND TAZOBACTAM 25 GRAM(S): 4; .5 INJECTION, POWDER, LYOPHILIZED, FOR SOLUTION INTRAVENOUS at 18:09

## 2017-08-20 RX ADMIN — HEPARIN SODIUM 5000 UNIT(S): 5000 INJECTION INTRAVENOUS; SUBCUTANEOUS at 22:43

## 2017-08-20 RX ADMIN — Medication 6: at 14:25

## 2017-08-20 RX ADMIN — Medication 650 MILLIGRAM(S): at 14:27

## 2017-08-20 NOTE — PROGRESS NOTE ADULT - ASSESSMENT
patient is a 52 year old male POD2 s/p right foot hallux amp. Labs and physical exams are unremarkable    - penrose was removed with minimal drainage, retention suture tied off  - Cont with vanc and zosyn  - Pt pain is well controlled at this time  - Awaiting OR culturefor d/c plan  - d/w pt his recent wt loss approx 30 lbs in past 3 mo and recommended GI eval and malignancy w/u. inpatient/outpatient patient is a 52 year old male POD2 s/p right foot hallux amp. Labs and physical exams are unremarkable    - penrose was removed with minimal drainage, retention suture tied off  - Cont with vanc and zosyn  - Pt pain is well controlled at this time  - Awaiting OR culturefor d/c plan  - d/w pt his recent wt loss approx 30 lbs in past 3 mo and recommended GI eval and malignancy w/u. inpatient/outpatient  pt refuses  eval that this time

## 2017-08-20 NOTE — PROGRESS NOTE ADULT - SUBJECTIVE AND OBJECTIVE BOX
C-Team progress Note    STATUS POST: partial amputation of RLE hallux     POST OPERATIVE DAY #2    patient was seen and examined this morning. Patient BP was elevated overnight and he received two doses of hydralazine. There was no other event overnight. He does not report pain, fever, n/v, chest pain, or shortness of breath. Pain well controlled.       OBJECTIVE:   T(C): 37.1 (08-20-17 @ 05:20), Max: 37.4 (08-19-17 @ 17:24)  HR: 59 (08-20-17 @ 07:39) (53 - 68)  BP: 167/71 (08-20-17 @ 07:39) (160/62 - 195/73)  RR: 18 (08-20-17 @ 05:20) (18 - 18)  SpO2: 98% (08-20-17 @ 05:20) (98% - 100%)  Wt(kg): --  CAPILLARY BLOOD GLUCOSE  349 (19 Aug 2017 23:25)  193 (19 Aug 2017 17:24)  215 (19 Aug 2017 12:34)  116 (19 Aug 2017 09:04)        I&O's Detail    19 Aug 2017 07:01  -  20 Aug 2017 07:00  --------------------------------------------------------  IN:    Oral Fluid: 240 mL  Total IN: 240 mL    OUT:    Voided: 3150 mL  Total OUT: 3150 mL    Total NET: -2910 mL        PHYSICAL EXAM:  Gen: Well-nourished, well-developed, A&O x3, sleeping in bed in no acute distress  CV: RRR, normal S1, S2  Resp: CTAB, unlabored   Abdomen: Soft, nontender, nondistended  Extremities: All 4 extremities warm and well perfused, my edema on RLE  Dressing: clean, dry, intact, no tenderness to palpation, mild edema.   Palpable DP b/l C-Team progress Note    STATUS POST: partial amputation of RLE hallux     POST OPERATIVE DAY #2    patient was seen and examined this morning. Patient BP was elevated overnight and he received two doses of hydralazine. There was no other event overnight. He does not report pain, fever, n/v, chest pain, or shortness of breath. Pain well controlled.       OBJECTIVE:   T(C): 37.1 (08-20-17 @ 05:20), Max: 37.4 (08-19-17 @ 17:24)  HR: 59 (08-20-17 @ 07:39) (53 - 68)  BP: 167/71 (08-20-17 @ 07:39) (160/62 - 195/73)  RR: 18 (08-20-17 @ 05:20) (18 - 18)  SpO2: 98% (08-20-17 @ 05:20) (98% - 100%)  Wt(kg): --  CAPILLARY BLOOD GLUCOSE  349 (19 Aug 2017 23:25)  193 (19 Aug 2017 17:24)  215 (19 Aug 2017 12:34)  116 (19 Aug 2017 09:04)        I&O's Detail    19 Aug 2017 07:01  -  20 Aug 2017 07:00  --------------------------------------------------------  IN:    Oral Fluid: 240 mL  Total IN: 240 mL    OUT:    Voided: 3150 mL  Total OUT: 3150 mL    Total NET: -2910 mL                          8.7    8.76  )-----------( 340      ( 20 Aug 2017 05:27 )             26.5   08-20    138  |  105  |  50<H>  ----------------------------<  91  5.1   |  21<L>  |  2.94<H>    Ca    8.4      20 Aug 2017 05:22  Phos  4.3     08-20  Mg     2.0     08-20        PHYSICAL EXAM:  Gen: Well-nourished, well-developed, A&O x3, sleeping in bed in no acute distress  CV: RRR, normal S1, S2  Resp: CTAB, unlabored   Abdomen: Soft, nontender, nondistended  Extremities: All 4 extremities warm and well perfused, my edema on RLE  Dressing: clean, dry, intact, no tenderness to palpation, mild edema.   Palpable DP b/l

## 2017-08-20 NOTE — PROGRESS NOTE ADULT - SUBJECTIVE AND OBJECTIVE BOX
Patient is a 52y old  Male who presents with a chief complaint of right foot (18 Aug 2017 17:55)       INTERVAL HPI/OVERNIGHT EVENTS:  Patient seen and evaluated at bedside.  Pt is resting comfortable in NAD. Denies N/V/F/C.    Allergies    No Known Allergies    Intolerances        Vital Signs Last 24 Hrs  T(C): 37.1 (20 Aug 2017 05:20), Max: 37.4 (19 Aug 2017 17:24)  T(F): 98.7 (20 Aug 2017 05:20), Max: 99.3 (19 Aug 2017 17:24)  HR: 60 (20 Aug 2017 05:20) (53 - 68)  BP: 193/78 (20 Aug 2017 05:20) (160/62 - 195/73)  BP(mean): --  RR: 18 (20 Aug 2017 05:20) (18 - 18)  SpO2: 98% (20 Aug 2017 05:20) (98% - 100%)    LABS:                        8.7    8.76  )-----------( 340      ( 20 Aug 2017 05:27 )             26.5     08-20    138  |  105  |  50<H>  ----------------------------<  91  5.1   |  21<L>  |  2.94<H>    Ca    8.4      20 Aug 2017 05:22  Phos  4.3     08-20  Mg     2.0     08-20          CAPILLARY BLOOD GLUCOSE  349 (19 Aug 2017 23:25)  193 (19 Aug 2017 17:24)  215 (19 Aug 2017 12:34)  116 (19 Aug 2017 09:04)          Lower Extremity Physical Exam:  s/p right foot hallux amputation. Sutures intact, skin edges well coapted, drain in place. No surrounding erythema, no drainage, no malodor, no purulence, no signs of active infection present. Skin warm, good cap fill    RADIOLOGY & ADDITIONAL TESTS:

## 2017-08-20 NOTE — PROGRESS NOTE ADULT - ASSESSMENT
53 yo M s/p right foot hallux amp  - Pt seen and examined  - No signs of active infection present  - Drain removed with minimal drainage, retention suture tied off  - Cont abx   - Pt pain is well controlled at this time  - Awaiting OR data for d/c plan  - d/w attending

## 2017-08-21 DIAGNOSIS — R63.4 ABNORMAL WEIGHT LOSS: ICD-10-CM

## 2017-08-21 DIAGNOSIS — R94.6 ABNORMAL RESULTS OF THYROID FUNCTION STUDIES: ICD-10-CM

## 2017-08-21 DIAGNOSIS — E11.65 TYPE 2 DIABETES MELLITUS WITH HYPERGLYCEMIA: ICD-10-CM

## 2017-08-21 LAB
BUN SERPL-MCNC: 55 MG/DL — HIGH (ref 7–23)
BUN SERPL-MCNC: 55 MG/DL — HIGH (ref 7–23)
CALCIUM SERPL-MCNC: 8.5 MG/DL — SIGNIFICANT CHANGE UP (ref 8.4–10.5)
CALCIUM SERPL-MCNC: 8.9 MG/DL — SIGNIFICANT CHANGE UP (ref 8.4–10.5)
CHLORIDE SERPL-SCNC: 102 MMOL/L — SIGNIFICANT CHANGE UP (ref 98–107)
CHLORIDE SERPL-SCNC: 103 MMOL/L — SIGNIFICANT CHANGE UP (ref 98–107)
CO2 SERPL-SCNC: 23 MMOL/L — SIGNIFICANT CHANGE UP (ref 22–31)
CO2 SERPL-SCNC: 23 MMOL/L — SIGNIFICANT CHANGE UP (ref 22–31)
CREAT SERPL-MCNC: 3.33 MG/DL — HIGH (ref 0.5–1.3)
CREAT SERPL-MCNC: 3.7 MG/DL — HIGH (ref 0.5–1.3)
CULTURE - SURGICAL SITE: SIGNIFICANT CHANGE UP
GLUCOSE SERPL-MCNC: 161 MG/DL — HIGH (ref 70–99)
GLUCOSE SERPL-MCNC: 179 MG/DL — HIGH (ref 70–99)
GRAM STN WND: SIGNIFICANT CHANGE UP
HCT VFR BLD CALC: 29.4 % — LOW (ref 39–50)
HGB BLD-MCNC: 9.4 G/DL — LOW (ref 13–17)
MAGNESIUM SERPL-MCNC: 2 MG/DL — SIGNIFICANT CHANGE UP (ref 1.6–2.6)
MCHC RBC-ENTMCNC: 28.9 PG — SIGNIFICANT CHANGE UP (ref 27–34)
MCHC RBC-ENTMCNC: 32 % — SIGNIFICANT CHANGE UP (ref 32–36)
MCV RBC AUTO: 90.5 FL — SIGNIFICANT CHANGE UP (ref 80–100)
METHOD TYPE: SIGNIFICANT CHANGE UP
NRBC # FLD: 0 — SIGNIFICANT CHANGE UP
ORGANISM # SPEC MICROSCOPIC CNT: SIGNIFICANT CHANGE UP
PHOSPHATE SERPL-MCNC: 4.6 MG/DL — HIGH (ref 2.5–4.5)
PLATELET # BLD AUTO: 376 K/UL — SIGNIFICANT CHANGE UP (ref 150–400)
PMV BLD: 10.2 FL — SIGNIFICANT CHANGE UP (ref 7–13)
POTASSIUM SERPL-MCNC: 5.4 MMOL/L — HIGH (ref 3.5–5.3)
POTASSIUM SERPL-MCNC: 5.8 MMOL/L — HIGH (ref 3.5–5.3)
POTASSIUM SERPL-SCNC: 5.4 MMOL/L — HIGH (ref 3.5–5.3)
POTASSIUM SERPL-SCNC: 5.8 MMOL/L — HIGH (ref 3.5–5.3)
RBC # BLD: 3.25 M/UL — LOW (ref 4.2–5.8)
RBC # FLD: 12.3 % — SIGNIFICANT CHANGE UP (ref 10.3–14.5)
SODIUM SERPL-SCNC: 137 MMOL/L — SIGNIFICANT CHANGE UP (ref 135–145)
SODIUM SERPL-SCNC: 138 MMOL/L — SIGNIFICANT CHANGE UP (ref 135–145)
TSH SERPL-MCNC: 0.01 UIU/ML — LOW (ref 0.27–4.2)
WBC # BLD: 10.17 K/UL — SIGNIFICANT CHANGE UP (ref 3.8–10.5)
WBC # FLD AUTO: 10.17 K/UL — SIGNIFICANT CHANGE UP (ref 3.8–10.5)

## 2017-08-21 PROCEDURE — 99223 1ST HOSP IP/OBS HIGH 75: CPT

## 2017-08-21 PROCEDURE — 93010 ELECTROCARDIOGRAM REPORT: CPT

## 2017-08-21 PROCEDURE — 99232 SBSQ HOSP IP/OBS MODERATE 35: CPT

## 2017-08-21 PROCEDURE — 99255 IP/OBS CONSLTJ NEW/EST HI 80: CPT

## 2017-08-21 RX ORDER — GLUCAGON INJECTION, SOLUTION 0.5 MG/.1ML
1 INJECTION, SOLUTION SUBCUTANEOUS ONCE
Qty: 0 | Refills: 0 | Status: DISCONTINUED | OUTPATIENT
Start: 2017-08-21 | End: 2017-08-23

## 2017-08-21 RX ORDER — INSULIN LISPRO 100/ML
3 VIAL (ML) SUBCUTANEOUS
Qty: 0 | Refills: 0 | Status: DISCONTINUED | OUTPATIENT
Start: 2017-08-21 | End: 2017-08-23

## 2017-08-21 RX ORDER — DEXTROSE 50 % IN WATER 50 %
25 SYRINGE (ML) INTRAVENOUS ONCE
Qty: 0 | Refills: 0 | Status: DISCONTINUED | OUTPATIENT
Start: 2017-08-21 | End: 2017-08-23

## 2017-08-21 RX ORDER — SODIUM CHLORIDE 9 MG/ML
1000 INJECTION INTRAMUSCULAR; INTRAVENOUS; SUBCUTANEOUS ONCE
Qty: 0 | Refills: 0 | Status: COMPLETED | OUTPATIENT
Start: 2017-08-21 | End: 2017-08-22

## 2017-08-21 RX ORDER — HYDRALAZINE HCL 50 MG
10 TABLET ORAL ONCE
Qty: 0 | Refills: 0 | Status: COMPLETED | OUTPATIENT
Start: 2017-08-21 | End: 2017-08-21

## 2017-08-21 RX ORDER — SODIUM CHLORIDE 9 MG/ML
1000 INJECTION, SOLUTION INTRAVENOUS
Qty: 0 | Refills: 0 | Status: DISCONTINUED | OUTPATIENT
Start: 2017-08-21 | End: 2017-08-23

## 2017-08-21 RX ORDER — FUROSEMIDE 40 MG
40 TABLET ORAL ONCE
Qty: 0 | Refills: 0 | Status: COMPLETED | OUTPATIENT
Start: 2017-08-21 | End: 2017-08-21

## 2017-08-21 RX ORDER — DEXTROSE 50 % IN WATER 50 %
12.5 SYRINGE (ML) INTRAVENOUS ONCE
Qty: 0 | Refills: 0 | Status: DISCONTINUED | OUTPATIENT
Start: 2017-08-21 | End: 2017-08-23

## 2017-08-21 RX ORDER — INSULIN LISPRO 100/ML
VIAL (ML) SUBCUTANEOUS AT BEDTIME
Qty: 0 | Refills: 0 | Status: DISCONTINUED | OUTPATIENT
Start: 2017-08-21 | End: 2017-08-23

## 2017-08-21 RX ORDER — AZTREONAM 2 G
1 VIAL (EA) INJECTION
Qty: 14 | Refills: 0 | OUTPATIENT
Start: 2017-08-21 | End: 2017-08-28

## 2017-08-21 RX ORDER — CALCIUM GLUCONATE 100 MG/ML
1 VIAL (ML) INTRAVENOUS ONCE
Qty: 1 | Refills: 0 | Status: COMPLETED | OUTPATIENT
Start: 2017-08-21 | End: 2017-08-21

## 2017-08-21 RX ORDER — INSULIN LISPRO 100/ML
VIAL (ML) SUBCUTANEOUS
Qty: 0 | Refills: 0 | Status: DISCONTINUED | OUTPATIENT
Start: 2017-08-21 | End: 2017-08-23

## 2017-08-21 RX ORDER — DEXTROSE 50 % IN WATER 50 %
1 SYRINGE (ML) INTRAVENOUS ONCE
Qty: 0 | Refills: 0 | Status: DISCONTINUED | OUTPATIENT
Start: 2017-08-21 | End: 2017-08-23

## 2017-08-21 RX ORDER — HYDRALAZINE HCL 50 MG
10 TABLET ORAL ONCE
Qty: 0 | Refills: 0 | Status: DISCONTINUED | OUTPATIENT
Start: 2017-08-21 | End: 2017-08-21

## 2017-08-21 RX ORDER — CIPROFLOXACIN LACTATE 400MG/40ML
250 VIAL (ML) INTRAVENOUS DAILY
Qty: 0 | Refills: 0 | Status: DISCONTINUED | OUTPATIENT
Start: 2017-08-21 | End: 2017-08-23

## 2017-08-21 RX ADMIN — Medication 3: at 17:53

## 2017-08-21 RX ADMIN — SODIUM CHLORIDE 3 MILLILITER(S): 9 INJECTION INTRAMUSCULAR; INTRAVENOUS; SUBCUTANEOUS at 23:00

## 2017-08-21 RX ADMIN — Medication 4: at 12:57

## 2017-08-21 RX ADMIN — HEPARIN SODIUM 5000 UNIT(S): 5000 INJECTION INTRAVENOUS; SUBCUTANEOUS at 13:44

## 2017-08-21 RX ADMIN — CARVEDILOL PHOSPHATE 12.5 MILLIGRAM(S): 80 CAPSULE, EXTENDED RELEASE ORAL at 06:05

## 2017-08-21 RX ADMIN — SODIUM CHLORIDE 3 MILLILITER(S): 9 INJECTION INTRAMUSCULAR; INTRAVENOUS; SUBCUTANEOUS at 06:04

## 2017-08-21 RX ADMIN — Medication 10 MILLIGRAM(S): at 01:52

## 2017-08-21 RX ADMIN — DIVALPROEX SODIUM 1000 MILLIGRAM(S): 500 TABLET, DELAYED RELEASE ORAL at 23:04

## 2017-08-21 RX ADMIN — Medication 650 MILLIGRAM(S): at 06:04

## 2017-08-21 RX ADMIN — PIPERACILLIN AND TAZOBACTAM 25 GRAM(S): 4; .5 INJECTION, POWDER, LYOPHILIZED, FOR SOLUTION INTRAVENOUS at 06:05

## 2017-08-21 RX ADMIN — Medication 2: at 09:12

## 2017-08-21 RX ADMIN — Medication 650 MILLIGRAM(S): at 13:44

## 2017-08-21 RX ADMIN — Medication 100 MILLIGRAM(S): at 23:02

## 2017-08-21 RX ADMIN — ATORVASTATIN CALCIUM 40 MILLIGRAM(S): 80 TABLET, FILM COATED ORAL at 23:02

## 2017-08-21 RX ADMIN — Medication 3 UNIT(S): at 17:53

## 2017-08-21 RX ADMIN — ISOSORBIDE MONONITRATE 120 MILLIGRAM(S): 60 TABLET, EXTENDED RELEASE ORAL at 12:08

## 2017-08-21 RX ADMIN — Medication 40 MILLIGRAM(S): at 06:04

## 2017-08-21 RX ADMIN — HEPARIN SODIUM 5000 UNIT(S): 5000 INJECTION INTRAVENOUS; SUBCUTANEOUS at 23:03

## 2017-08-21 RX ADMIN — OLANZAPINE 20 MILLIGRAM(S): 15 TABLET, FILM COATED ORAL at 23:02

## 2017-08-21 RX ADMIN — Medication 40 MILLIGRAM(S): at 23:06

## 2017-08-21 RX ADMIN — Medication 1: at 23:03

## 2017-08-21 RX ADMIN — SODIUM CHLORIDE 3 MILLILITER(S): 9 INJECTION INTRAMUSCULAR; INTRAVENOUS; SUBCUTANEOUS at 12:08

## 2017-08-21 RX ADMIN — Medication 250 MILLIGRAM(S): at 12:55

## 2017-08-21 RX ADMIN — Medication 650 MILLIGRAM(S): at 23:02

## 2017-08-21 RX ADMIN — Medication 200 GRAM(S): at 23:23

## 2017-08-21 RX ADMIN — HEPARIN SODIUM 5000 UNIT(S): 5000 INJECTION INTRAVENOUS; SUBCUTANEOUS at 06:05

## 2017-08-21 RX ADMIN — CARVEDILOL PHOSPHATE 12.5 MILLIGRAM(S): 80 CAPSULE, EXTENDED RELEASE ORAL at 17:07

## 2017-08-21 NOTE — CONSULT NOTE ADULT - ASSESSMENT
52M with DM, HLD, HTN, bipolar disorder sent from his podiatrist, Dr. Olivier, with right great toe pain. The patient is now s/p partial amputation of RLE hallux. While admitted the patient offered concern over unintentional weight loss of 20-30lbs over the past 6 months. r/o malignancy

## 2017-08-21 NOTE — PROGRESS NOTE ADULT - SUBJECTIVE AND OBJECTIVE BOX
C-Team progress Note    STATUS POST: partial amputation of RLE hallux     POST OPERATIVE DAY #3    patient was seen and examined this morning. Patient BP was elevated overnight, received hydralazine. There was no other events overnight. He does not report pain, fever, n/v, chest pain, or shortness of breath. Pain well controlled.       OBJECTIVE:   T(C): 36.8 (08-21-17 @ 06:03), Max: 93.1 (08-20-17 @ 18:48)  HR: 57 (08-21-17 @ 07:15) (55 - 74)  BP: 145/74 (08-21-17 @ 07:15) (140/75 - 195/72)  RR: 16 (08-21-17 @ 06:03) (16 - 18)  SpO2: 98% (08-21-17 @ 06:03) (98% - 100%)  Wt(kg): --    08-20 @ 07:01  -  08-21 @ 07:00  --------------------------------------------------------  IN:  Total IN: 0 mL    OUT:    Voided: 3075 mL  Total OUT: 3075 mL    Total NET: -3075 mL      PHYSICAL EXAM:  Gen: Well-nourished, well-developed, A&O x3, sleeping in bed in no acute distress  Extremities: All 4 extremities warm and well perfused  Dressing: clean, dry, intact, no tenderness to palpation, mild edema.   Palpable DP b/l C-Team progress Note    STATUS POST: partial amputation of RLE hallux     POST OPERATIVE DAY #3    patient was seen and examined this morning. Patient BP was elevated overnight, received hydralazine. There was no other events overnight. He does not report pain, fever, n/v, chest pain, or shortness of breath. Pain well controlled.       OBJECTIVE:   T(C): 36.8 (08-21-17 @ 06:03), Max: 93.1 (08-20-17 @ 18:48)  HR: 57 (08-21-17 @ 07:15) (55 - 74)  BP: 145/74 (08-21-17 @ 07:15) (140/75 - 195/72)  RR: 16 (08-21-17 @ 06:03) (16 - 18)  SpO2: 98% (08-21-17 @ 06:03) (98% - 100%)  Wt(kg): --    08-20 @ 07:01  -  08-21 @ 07:00  --------------------------------------------------------  IN:  Total IN: 0 mL    OUT:    Voided: 3075 mL  Total OUT: 3075 mL    Total NET: -3075 mL                          9.4    10.17 )-----------( 376      ( 21 Aug 2017 05:25 )             29.4   08-21    138  |  103  |  55<H>  ----------------------------<  179<H>  5.4<H>   |  23  |  3.33<H>    Ca    8.9      21 Aug 2017 05:25  Phos  4.3     08-20  Mg     2.0     08-20        PHYSICAL EXAM:  Gen: Well-nourished, well-developed, A&O x3, sleeping in bed in no acute distress  Extremities: All 4 extremities warm and well perfused  Dressing: clean, dry, intact, no tenderness to palpation, mild edema.   Palpable DP b/l

## 2017-08-21 NOTE — CHART NOTE - NSCHARTNOTEFT_GEN_A_CORE
Pt refusing to take oral contrast and obtain CT abdomen/pelvis because it was too late in the afternoon and he did not want to miss his lunch. Explained to the pt that he was scheduled and timing is often difficult for imaging. Pt continued to refuse. All RBA of obtaining the CT were explained and discussed in full detail. Pt expressed full understanding and continued to refuse.

## 2017-08-21 NOTE — CONSULT NOTE ADULT - SUBJECTIVE AND OBJECTIVE BOX
Chief Complaint:  Patient is a 52y old  Male who presents with a chief complaint of R foot gas gangrene (18 Aug 2017 17:55)    Bipolar affective disorder in remission  High cholesterol  Depression  Diabetes mellitus  Diabetes mellitus  ORIF right lower leg-      HPI:  52M with DM, HLD, HTN, bipolar disorder sent from his podiatrist, Dr. Olivier, with right great toe pain. The patient is now s/p partial amputation of RLE hallux. While admitted the patient offered concern over unintentional weight loss of 20-30lbs over the past 6 months. He reports that during that time his appetite remained 'strong' and that he would eat and want to have more but just wouldnt be able to keep the weight on. He denies any family history of cancer in his family. He denies any changes to his diet, dysphagia, odynophagia, melena, brbpr, hematemesis, recent travel, recreational drug use or etoh abuse. Admits to some mild left abdominal tenderness. Denies EGD or Colonoscopy in the past,      No Known Allergies      diVALproex DR 1000 milliGRAM(s) Oral at bedtime  traZODone 100 milliGRAM(s) Oral at bedtime  atorvastatin 40 milliGRAM(s) Oral at bedtime  OLANZapine 20 milliGRAM(s) Oral at bedtime  carvedilol 12.5 milliGRAM(s) Oral every 12 hours  heparin  Injectable 5000 Unit(s) SubCutaneous every 8 hours  insulin lispro (HumaLOG) corrective regimen sliding scale   SubCutaneous every 6 hours  acetaminophen   Tablet. 650 milliGRAM(s) Oral every 6 hours PRN  oxyCODONE    5 mG/acetaminophen 325 mG 1 Tablet(s) Oral every 6 hours PRN  morphine  - Injectable 2 milliGRAM(s) IV Push every 6 hours PRN  furosemide    Tablet 40 milliGRAM(s) Oral daily  sodium bicarbonate 650 milliGRAM(s) Oral three times a day  isosorbide   mononitrate ER Tablet (IMDUR) 120 milliGRAM(s) Oral daily  sodium chloride 0.9% lock flush 3 milliLiter(s) IV Push every 8 hours  ciprofloxacin     Tablet 250 milliGRAM(s) Oral daily        FAMILY HISTORY:  No pertinent family history in first degree relatives        Review of Systems:    General:  +wt loss, No fevers, chills, night sweats, fatigue  Eyes:  Good vision, no reported pain  ENT:  No sore throat, pain, runny nose, dysphagia  CV:  No pain, palpitations, no lightheadedness  Resp:  No dyspnea, cough, tachypnea, wheezing  GI: denies n/v/d/c, abdominal pain, melena or brbpr   :  No pain, bleeding, incontinence, nocturia  Muscle:  No pain, weakness  Neuro:  No weakness, tingling, memory problems  Psych:  No fatigue, insomnia, mood problems, depression  Endocrine:  No polyuria, polydypsia, cold/heat intolerance  Heme:  No petechiae, ecchymosis, easy bruisability  Skin:  No rash, tattoos, scars, edema    Relevant Family History:   n/c    Relevant Social History: n/c      Physical Exam:    Vital Signs:  Vital Signs Last 24 Hrs  T(C): 37.1 (21 Aug 2017 12:06), Max: 93.1 (20 Aug 2017 18:48)  T(F): 98.7 (21 Aug 2017 12:06), Max: 199.6 (20 Aug 2017 18:48)  HR: 53 (21 Aug 2017 12:06) (53 - 74)  BP: 153/79 (21 Aug 2017 12:06) (137/76 - 195/72)  BP(mean): --  RR: 17 (21 Aug 2017 12:06) (16 - 18)  SpO2: 98% (21 Aug 2017 12:06) (98% - 100%)  Daily     Daily Weight in k (21 Aug 2017 01:33)    General:  Appears stated age, well-groomed, nad  HEENT:  NC/AT,  conjunctivae clear and pink, no thyromegaly, nodules, adenopathy, no JVD  Chest:  Full & symmetric excursion, no increased effort, breath sounds clear  Cardiovascular:  Regular rhythm, S1, S2, no murmur/rub/S3/S4, no abdominal bruit, no edema  Abdomen:  Soft, +kia ttp left abd, non-distended, normoactive bowel sounds,  no masses ,no hepatosplenomeagaly, no signs of chronic liver disease  Extremities:  no cyanosis,clubbing or edema  Skin:  No rash/erythema/ecchymoses/petechiae/wounds/abscess/warm/dry  Neuro/Psych:  A&Ox3  , no asterixis, no tremor, no encephalopathy    Laboratory:                            9.4    10.17 )-----------( 376      ( 21 Aug 2017 05:25 )             29.4     08-21    138  |  103  |  55<H>  ----------------------------<  179<H>  5.4<H>   |  23  |  3.33<H>    Ca    8.9      21 Aug 2017 05:25  Phos  4.3     08-20  Mg     2.0     08-20              Imaging:      CT Abd - pending

## 2017-08-21 NOTE — CONSULT NOTE ADULT - SUBJECTIVE AND OBJECTIVE BOX
HPI:  52M with DM2, HLD, HTN, bipolar disorder sent from his podiatrist, Dr. Olivier, with right great toe pain. The patient states that he first noticed the pain about one week ago and his podiatrist had been debriding in the office. History of DM2 for "a while." Patient takes a pill at home (tradjenta 5mg daily). He does not check fingersticks at home. Not adherent to DM diet. He is concerned about recent 30 lbs weight loss despite eating well and having a good appetite. He reports being told of a thyroid issue in he past but is unsure of any further details. Never took medications or had testing done for thyroid. He denies palpitations, heat/cold intolerance, changes in skin. he does report fatigue.  Per chart patient refused insulin and other meds overnight.      PAST MEDICAL & SURGICAL HISTORY:  Bipolar affective disorder in remission  High cholesterol  Depression  Diabetes mellitus  ORIF right lower leg- 1995      FAMILY HISTORY:  No pertinent family history in first degree relatives      Social History: + smoker    Outpatient Medications:   	Tradjenta 5 mg oral tablet: 1 tab(s) orally once a day  · 	sodium bicarbonate 650 mg oral tablet: 1 tab(s) orally 3 times a day  · 	ergocalciferol 50,000 intl units (1.25 mg) oral capsule: 1 cap(s) orally once a week  · 	isosorbide mononitrate 120 mg oral tablet, extended release: 1 tab(s) orally once a day (at bedtime)  · 	carvedilol 12.5 mg oral tablet: 1 tab(s) orally every 12 hours  · 	furosemide 40 mg oral tablet: 1 tab(s) orally once a day  · 	divalproex sodium: 500 milligram(s) orally 2 times a day  · 	traZODone: 100 milligram(s) orally once a day (at bedtime)  · 	atorvastatin: 40 milligram(s) orally once a day (at bedtime)  · 	OLANZapine 20 mg oral tablet: 1 tab(s) orally once a day (at bedtime)    . .    MEDICATIONS  (STANDING):  diVALproex DR 1000 milliGRAM(s) Oral at bedtime  traZODone 100 milliGRAM(s) Oral at bedtime  atorvastatin 40 milliGRAM(s) Oral at bedtime  OLANZapine 20 milliGRAM(s) Oral at bedtime  carvedilol 12.5 milliGRAM(s) Oral every 12 hours  heparin  Injectable 5000 Unit(s) SubCutaneous every 8 hours  insulin lispro (HumaLOG) corrective regimen sliding scale   SubCutaneous every 6 hours  furosemide    Tablet 40 milliGRAM(s) Oral daily  sodium bicarbonate 650 milliGRAM(s) Oral three times a day  isosorbide   mononitrate ER Tablet (IMDUR) 120 milliGRAM(s) Oral daily  sodium chloride 0.9% lock flush 3 milliLiter(s) IV Push every 8 hours  ciprofloxacin     Tablet 250 milliGRAM(s) Oral daily    MEDICATIONS  (PRN):  acetaminophen   Tablet. 650 milliGRAM(s) Oral every 6 hours PRN Mild Pain (1 - 3)  oxyCODONE    5 mG/acetaminophen 325 mG 1 Tablet(s) Oral every 6 hours PRN Moderate Pain (4 - 6)  morphine  - Injectable 2 milliGRAM(s) IV Push every 6 hours PRN Severe Pain (7 - 10)      Allergies    No Known Allergies    Intolerances      Review of Systems:  Constitutional: No fever  Eyes: No blurry vision  Neuro: No tremors  HEENT: No pain  Cardiovascular: No chest pain, palpitations  Respiratory: No SOB, no cough  GI: No nausea, vomiting, abdominal pain  : No dysuria  Endocrine: no polyuria, polydipsia  Hem/lymph: no swelling  Osteoporosis: no fractures    ALL OTHER SYSTEMS REVIEWED AND NEGATIVE      PHYSICAL EXAM:  VITALS: T(C): 37.1 (08-21-17 @ 12:06)  T(F): 98.7 (08-21-17 @ 12:06), Max: 199.6 (08-20-17 @ 18:48)  HR: 53 (08-21-17 @ 12:06) (53 - 74)  BP: 153/79 (08-21-17 @ 12:06) (137/76 - 195/72)  RR:  (16 - 18)  SpO2:  (98% - 100%)  Wt(kg): --  GENERAL: NAD, well-groomed, well-developed  EYES: No proptosis, no lid lag, anicteric  HEENT:  Atraumatic, Normocephalic, moist mucous membranes  THYROID: Normal size, no palpable nodules  RESPIRATORY: Clear to auscultation bilaterally; No rales, rhonchi, wheezing  CARDIOVASCULAR: Regular rate and rhythm; No murmurs; no peripheral edema  GI: Soft, nontender, non distended, normal bowel sounds  SKIN: R foot in dressing  MUSCULOSKELETAL: Full range of motion, normal strength  NEURO: extraocular movements intact, no tremor  PSYCH: Alert and oriented x 3, normal affect, normal mood    CAPILLARY BLOOD GLUCOSE  217 (08-21 @ 12:35)  178 (08-21 @ 08:50)  294 (08-20 @ 22:24)  270 (08-20 @ 12:38)  130 (08-20 @ 09:22)  349 (08-19 @ 23:25)  193 (08-19 @ 17:24)  215 (08-19 @ 12:34)  116 (08-19 @ 09:04)  248 (08-18 @ 21:27)  246 (08-18 @ 17:02)                            9.4    10.17 )-----------( 376      ( 21 Aug 2017 05:25 )             29.4       08-21    138  |  103  |  55<H>  ----------------------------<  179<H>  5.4<H>   |  23  |  3.33<H>    EGFR if : 23  EGFR if non : 20    Ca    8.9      08-21  Mg     2.0     08-20  Phos  4.3     08-20        Thyroid Function Tests:  08-21 @ 12:13 TSH 0.01 FreeT4 -- T3 -- Anti TPO -- Anti Thyroglobulin Ab -- TSI --      Hemoglobin A1C, Whole Blood: 7.8 % <H> [4.0 - 5.6] (08-18-17 @ 02:50)          Radiology:

## 2017-08-21 NOTE — PROGRESS NOTE ADULT - ASSESSMENT
51 yo M s/p right foot hallux amp  - Pt seen and examined  - No signs of active infection present  - Cont abx   - Pt pain is well controlled at this time  - Awaiting OR data for d/c plan  - Dressed w/ betadine & DSD  - d/w attending

## 2017-08-21 NOTE — CONSULT NOTE ADULT - ASSESSMENT
52M with bipolar disorder, DM2, CKD, a/w R great toe wound s/p partial amputation of R first ray, found with suppressed TSH.

## 2017-08-21 NOTE — CONSULT NOTE ADULT - PROBLEM SELECTOR RECOMMENDATION 9
Explained to patient that oral agents are not used inpatient.  He is agreeable to receiving insulin doses while inpatient.  Will add Humalog 3/3/3 and change correction scale to low qac and low bedtime scale. No Lantus at this time.  Upon dc resume Tradjenta 5mg daily and will consider adding prandin. Explained to patient that oral agents are not used inpatient.  He is agreeable to receiving insulin doses while inpatient.  Will add Humalog 3/3/3 and change correction scale to low qac and low bedtime scale. No Lantus at this time.  HbA1c 7.8%. Fair control, but goal < 7%  Upon dc resume Tradjenta 5mg daily and will consider adding prandin.

## 2017-08-21 NOTE — CONSULT NOTE ADULT - PROBLEM SELECTOR RECOMMENDATION 9
-daily labs  -check iron studies  -CT Abd/Pelvis pending   -encourage po intake  -will likely need inpatient vs outpatient colonoscopy  -consider checking CEA and CA 19-9  -further recommendations pending CT Abd results

## 2017-08-21 NOTE — PROGRESS NOTE ADULT - ASSESSMENT
52 year old male POD3 s/p right foot hallux amp  - Cont with vanc and zosyn. Plan for D/C with Bactrim x 7 days as per Pods  - Dressing changes by Pods  - Awaiting OR culturefor d/c plan  - d/w pt his recent wt loss approx 30 lbs in past 3 mo and recommended GI eval and malignancy w/u. GI recommended outpatient workup. Pt made aware and acknowledged.   - Dispo: D/c home planning

## 2017-08-21 NOTE — PROGRESS NOTE ADULT - SUBJECTIVE AND OBJECTIVE BOX
Patient is a 52y old  Male who presents with a chief complaint of right foot (18 Aug 2017 17:55)       INTERVAL HPI/OVERNIGHT EVENTS:  Patient seen and evaluated at bedside.  Pt is resting comfortable in NAD. Denies N/V/F/C.    Allergies  No Known Allergies      Vital Signs Last 24 Hrs  T(C): 36.8 (21 Aug 2017 06:03), Max: 93.1 (20 Aug 2017 18:48)  T(F): 98.2 (21 Aug 2017 06:03), Max: 199.6 (20 Aug 2017 18:48)  HR: 57 (21 Aug 2017 07:15) (55 - 74)  BP: 145/74 (21 Aug 2017 07:15) (140/75 - 195/72)  BP(mean): --  RR: 16 (21 Aug 2017 06:03) (16 - 18)  SpO2: 98% (21 Aug 2017 06:03) (98% - 100%)                          9.4    10.17 )-----------( 376      ( 21 Aug 2017 05:25 )             29.4   08-21    138  |  103  |  55<H>  ----------------------------<  179<H>  5.4<H>   |  23  |  3.33<H>    Ca    8.9      21 Aug 2017 05:25  Phos  4.3     08-20  Mg     2.0     08-20    CAPILLARY BLOOD GLUCOSE  294 (20 Aug 2017 22:24)  270 (20 Aug 2017 12:38)  130 (20 Aug 2017 09:22)        Lower Extremity Physical Exam:  s/p right foot hallux amputation. Sutures intact, skin edges well coapted. No surrounding erythema, no drainage, no malodor, no purulence, no signs of active acute infection present. Skin warm, good cap fill

## 2017-08-22 DIAGNOSIS — E11.9 TYPE 2 DIABETES MELLITUS WITHOUT COMPLICATIONS: ICD-10-CM

## 2017-08-22 DIAGNOSIS — I10 ESSENTIAL (PRIMARY) HYPERTENSION: ICD-10-CM

## 2017-08-22 DIAGNOSIS — N18.4 CHRONIC KIDNEY DISEASE, STAGE 4 (SEVERE): ICD-10-CM

## 2017-08-22 DIAGNOSIS — R19.7 DIARRHEA, UNSPECIFIED: ICD-10-CM

## 2017-08-22 LAB
-  AMIKACIN: SIGNIFICANT CHANGE UP
-  AZTREONAM: SIGNIFICANT CHANGE UP
-  CEFEPIME: SIGNIFICANT CHANGE UP
-  CEFTAZIDIME: SIGNIFICANT CHANGE UP
-  CIPROFLOXACIN: SIGNIFICANT CHANGE UP
-  GENTAMICIN: SIGNIFICANT CHANGE UP
-  IMIPENEM: SIGNIFICANT CHANGE UP
-  LEVOFLOXACIN: SIGNIFICANT CHANGE UP
-  MEROPENEM: SIGNIFICANT CHANGE UP
-  PIPERACILLIN/TAZOBACTAM: SIGNIFICANT CHANGE UP
-  TOBRAMYCIN: SIGNIFICANT CHANGE UP
BACTERIA BLD CULT: SIGNIFICANT CHANGE UP
BACTERIA BLD CULT: SIGNIFICANT CHANGE UP
BUN SERPL-MCNC: 57 MG/DL — HIGH (ref 7–23)
BUN SERPL-MCNC: 59 MG/DL — HIGH (ref 7–23)
CALCIUM SERPL-MCNC: 8.5 MG/DL — SIGNIFICANT CHANGE UP (ref 8.4–10.5)
CALCIUM SERPL-MCNC: 8.7 MG/DL — SIGNIFICANT CHANGE UP (ref 8.4–10.5)
CHLORIDE SERPL-SCNC: 104 MMOL/L — SIGNIFICANT CHANGE UP (ref 98–107)
CHLORIDE SERPL-SCNC: 107 MMOL/L — SIGNIFICANT CHANGE UP (ref 98–107)
CO2 SERPL-SCNC: 18 MMOL/L — LOW (ref 22–31)
CO2 SERPL-SCNC: 20 MMOL/L — LOW (ref 22–31)
CREAT SERPL-MCNC: 3.28 MG/DL — HIGH (ref 0.5–1.3)
CREAT SERPL-MCNC: 3.42 MG/DL — HIGH (ref 0.5–1.3)
CULTURE - SURGICAL SITE: SIGNIFICANT CHANGE UP
GLUCOSE SERPL-MCNC: 131 MG/DL — HIGH (ref 70–99)
GLUCOSE SERPL-MCNC: 351 MG/DL — HIGH (ref 70–99)
MAGNESIUM SERPL-MCNC: 2 MG/DL — SIGNIFICANT CHANGE UP (ref 1.6–2.6)
PHOSPHATE SERPL-MCNC: 4.8 MG/DL — HIGH (ref 2.5–4.5)
POTASSIUM SERPL-MCNC: 5.3 MMOL/L — SIGNIFICANT CHANGE UP (ref 3.5–5.3)
POTASSIUM SERPL-MCNC: 6.5 MMOL/L — CRITICAL HIGH (ref 3.5–5.3)
POTASSIUM SERPL-SCNC: 5.3 MMOL/L — SIGNIFICANT CHANGE UP (ref 3.5–5.3)
POTASSIUM SERPL-SCNC: 6.5 MMOL/L — CRITICAL HIGH (ref 3.5–5.3)
SODIUM SERPL-SCNC: 137 MMOL/L — SIGNIFICANT CHANGE UP (ref 135–145)
SODIUM SERPL-SCNC: 139 MMOL/L — SIGNIFICANT CHANGE UP (ref 135–145)
T3 SERPL-MCNC: 99.6 NG/DL — SIGNIFICANT CHANGE UP (ref 80–200)
T4 FREE SERPL-MCNC: 2.09 NG/DL — HIGH (ref 0.9–1.8)
THYROPEROXIDASE AB SERPL-ACNC: <10 IU/ML — SIGNIFICANT CHANGE UP (ref 0–34)
TSH SERPL-MCNC: < 0.1 UIU/ML — LOW (ref 0.27–4.2)

## 2017-08-22 PROCEDURE — 93010 ELECTROCARDIOGRAM REPORT: CPT

## 2017-08-22 PROCEDURE — 76775 US EXAM ABDO BACK WALL LIM: CPT | Mod: 26

## 2017-08-22 PROCEDURE — 76536 US EXAM OF HEAD AND NECK: CPT | Mod: 26

## 2017-08-22 PROCEDURE — 99232 SBSQ HOSP IP/OBS MODERATE 35: CPT

## 2017-08-22 PROCEDURE — 99254 IP/OBS CNSLTJ NEW/EST MOD 60: CPT | Mod: GC

## 2017-08-22 PROCEDURE — 74176 CT ABD & PELVIS W/O CONTRAST: CPT | Mod: 26

## 2017-08-22 PROCEDURE — 99222 1ST HOSP IP/OBS MODERATE 55: CPT | Mod: GC

## 2017-08-22 RX ORDER — HYDRALAZINE HCL 50 MG
10 TABLET ORAL ONCE
Qty: 0 | Refills: 0 | Status: COMPLETED | OUTPATIENT
Start: 2017-08-22 | End: 2017-08-22

## 2017-08-22 RX ORDER — HYDRALAZINE HCL 50 MG
25 TABLET ORAL THREE TIMES A DAY
Qty: 0 | Refills: 0 | Status: DISCONTINUED | OUTPATIENT
Start: 2017-08-22 | End: 2017-08-23

## 2017-08-22 RX ORDER — SODIUM POLYSTYRENE SULFONATE 4.1 MEQ/G
15 POWDER, FOR SUSPENSION ORAL ONCE
Qty: 0 | Refills: 0 | Status: DISCONTINUED | OUTPATIENT
Start: 2017-08-22 | End: 2017-08-22

## 2017-08-22 RX ORDER — LACTOBACILLUS ACIDOPHILUS 100MM CELL
1 CAPSULE ORAL DAILY
Qty: 0 | Refills: 0 | Status: DISCONTINUED | OUTPATIENT
Start: 2017-08-22 | End: 2017-08-23

## 2017-08-22 RX ORDER — DEXTROSE 50 % IN WATER 50 %
50 SYRINGE (ML) INTRAVENOUS ONCE
Qty: 0 | Refills: 0 | Status: COMPLETED | OUTPATIENT
Start: 2017-08-22 | End: 2017-08-22

## 2017-08-22 RX ORDER — INSULIN HUMAN 100 [IU]/ML
10 INJECTION, SOLUTION SUBCUTANEOUS ONCE
Qty: 0 | Refills: 0 | Status: COMPLETED | OUTPATIENT
Start: 2017-08-22 | End: 2017-08-22

## 2017-08-22 RX ORDER — SODIUM CHLORIDE 9 MG/ML
1000 INJECTION INTRAMUSCULAR; INTRAVENOUS; SUBCUTANEOUS ONCE
Qty: 0 | Refills: 0 | Status: COMPLETED | OUTPATIENT
Start: 2017-08-22 | End: 2017-08-22

## 2017-08-22 RX ORDER — DEXTROSE MONOHYDRATE, SODIUM CHLORIDE, AND POTASSIUM CHLORIDE 50; .745; 4.5 G/1000ML; G/1000ML; G/1000ML
1000 INJECTION, SOLUTION INTRAVENOUS
Qty: 0 | Refills: 0 | Status: DISCONTINUED | OUTPATIENT
Start: 2017-08-22 | End: 2017-08-23

## 2017-08-22 RX ORDER — FUROSEMIDE 40 MG
40 TABLET ORAL
Qty: 0 | Refills: 0 | Status: COMPLETED | OUTPATIENT
Start: 2017-08-22 | End: 2017-08-22

## 2017-08-22 RX ADMIN — Medication 650 MILLIGRAM(S): at 22:12

## 2017-08-22 RX ADMIN — SODIUM CHLORIDE 3 MILLILITER(S): 9 INJECTION INTRAMUSCULAR; INTRAVENOUS; SUBCUTANEOUS at 05:00

## 2017-08-22 RX ADMIN — HEPARIN SODIUM 5000 UNIT(S): 5000 INJECTION INTRAVENOUS; SUBCUTANEOUS at 13:34

## 2017-08-22 RX ADMIN — SODIUM CHLORIDE 1000 MILLILITER(S): 9 INJECTION INTRAMUSCULAR; INTRAVENOUS; SUBCUTANEOUS at 00:01

## 2017-08-22 RX ADMIN — Medication 10 MILLIGRAM(S): at 16:21

## 2017-08-22 RX ADMIN — SODIUM CHLORIDE 3 MILLILITER(S): 9 INJECTION INTRAMUSCULAR; INTRAVENOUS; SUBCUTANEOUS at 13:35

## 2017-08-22 RX ADMIN — INSULIN HUMAN 10 UNIT(S): 100 INJECTION, SOLUTION SUBCUTANEOUS at 04:54

## 2017-08-22 RX ADMIN — Medication 650 MILLIGRAM(S): at 13:35

## 2017-08-22 RX ADMIN — HEPARIN SODIUM 5000 UNIT(S): 5000 INJECTION INTRAVENOUS; SUBCUTANEOUS at 22:12

## 2017-08-22 RX ADMIN — Medication: at 13:34

## 2017-08-22 RX ADMIN — Medication 40 MILLIGRAM(S): at 06:00

## 2017-08-22 RX ADMIN — Medication 100 MILLIGRAM(S): at 22:12

## 2017-08-22 RX ADMIN — DEXTROSE MONOHYDRATE, SODIUM CHLORIDE, AND POTASSIUM CHLORIDE 50 MILLILITER(S): 50; .745; 4.5 INJECTION, SOLUTION INTRAVENOUS at 19:53

## 2017-08-22 RX ADMIN — Medication 1 TABLET(S): at 16:21

## 2017-08-22 RX ADMIN — Medication 3 UNIT(S): at 18:04

## 2017-08-22 RX ADMIN — ATORVASTATIN CALCIUM 40 MILLIGRAM(S): 80 TABLET, FILM COATED ORAL at 22:12

## 2017-08-22 RX ADMIN — Medication 4: at 18:04

## 2017-08-22 RX ADMIN — Medication 40 MILLIGRAM(S): at 07:17

## 2017-08-22 RX ADMIN — Medication 2: at 22:36

## 2017-08-22 RX ADMIN — Medication 3 UNIT(S): at 09:58

## 2017-08-22 RX ADMIN — DIVALPROEX SODIUM 1000 MILLIGRAM(S): 500 TABLET, DELAYED RELEASE ORAL at 22:12

## 2017-08-22 RX ADMIN — Medication 25 MILLIGRAM(S): at 22:36

## 2017-08-22 RX ADMIN — OLANZAPINE 20 MILLIGRAM(S): 15 TABLET, FILM COATED ORAL at 22:12

## 2017-08-22 RX ADMIN — Medication 50 MILLILITER(S): at 04:54

## 2017-08-22 RX ADMIN — Medication 250 MILLIGRAM(S): at 13:34

## 2017-08-22 RX ADMIN — CARVEDILOL PHOSPHATE 12.5 MILLIGRAM(S): 80 CAPSULE, EXTENDED RELEASE ORAL at 18:04

## 2017-08-22 RX ADMIN — Medication 650 MILLIGRAM(S): at 05:00

## 2017-08-22 RX ADMIN — SODIUM CHLORIDE 3 MILLILITER(S): 9 INJECTION INTRAMUSCULAR; INTRAVENOUS; SUBCUTANEOUS at 22:12

## 2017-08-22 RX ADMIN — SODIUM CHLORIDE 1000 MILLILITER(S): 9 INJECTION INTRAMUSCULAR; INTRAVENOUS; SUBCUTANEOUS at 06:00

## 2017-08-22 RX ADMIN — CARVEDILOL PHOSPHATE 12.5 MILLIGRAM(S): 80 CAPSULE, EXTENDED RELEASE ORAL at 05:00

## 2017-08-22 RX ADMIN — HEPARIN SODIUM 5000 UNIT(S): 5000 INJECTION INTRAVENOUS; SUBCUTANEOUS at 05:00

## 2017-08-22 RX ADMIN — ISOSORBIDE MONONITRATE 120 MILLIGRAM(S): 60 TABLET, EXTENDED RELEASE ORAL at 13:35

## 2017-08-22 RX ADMIN — Medication 3 UNIT(S): at 13:34

## 2017-08-22 NOTE — PROVIDER CONTACT NOTE (CRITICAL VALUE NOTIFICATION) - ASSESSMENT
Pt. is a&oX4, VSS. After being given 40mg IVP lasix, NS bolus X1, and 1 g calcium gluconate, repeat potassium went from 5.8 to 6.5.

## 2017-08-22 NOTE — CONSULT NOTE ADULT - SUBJECTIVE AND OBJECTIVE BOX
Patient is a 52y old  Male who presents with a chief complaint of R foot gas gangrene (18 Aug 2017 17:55)      52M with DM 2, HLD, HTN, bipolar disorder sent from his podiatrist, Dr. Olivier, with right great toe pain. Patient admitted to vascular service for partial ray amputation of right hallux. Patient doing well post-op.     MEDICATIONS  (STANDING):  diVALproex DR 1000 milliGRAM(s) Oral at bedtime  traZODone 100 milliGRAM(s) Oral at bedtime  atorvastatin 40 milliGRAM(s) Oral at bedtime  OLANZapine 20 milliGRAM(s) Oral at bedtime  carvedilol 12.5 milliGRAM(s) Oral every 12 hours  heparin  Injectable 5000 Unit(s) SubCutaneous every 8 hours  furosemide    Tablet 40 milliGRAM(s) Oral daily  sodium bicarbonate 650 milliGRAM(s) Oral three times a day  isosorbide   mononitrate ER Tablet (IMDUR) 120 milliGRAM(s) Oral daily  sodium chloride 0.9% lock flush 3 milliLiter(s) IV Push every 8 hours  ciprofloxacin     Tablet 250 milliGRAM(s) Oral daily  insulin lispro Injectable (HumaLOG) 3 Unit(s) SubCutaneous three times a day before meals  insulin lispro (HumaLOG) corrective regimen sliding scale   SubCutaneous three times a day before meals  insulin lispro (HumaLOG) corrective regimen sliding scale   SubCutaneous at bedtime  dextrose 5%. 1000 milliLiter(s) (50 mL/Hr) IV Continuous <Continuous>  dextrose 50% Injectable 12.5 Gram(s) IV Push once  dextrose 50% Injectable 25 Gram(s) IV Push once  dextrose 50% Injectable 25 Gram(s) IV Push once  lactobacillus acidophilus 1 Tablet(s) Oral daily  hydrALAZINE 25 milliGRAM(s) Oral three times a day    MEDICATIONS  (PRN):  acetaminophen   Tablet. 650 milliGRAM(s) Oral every 6 hours PRN Mild Pain (1 - 3)  oxyCODONE    5 mG/acetaminophen 325 mG 1 Tablet(s) Oral every 6 hours PRN Moderate Pain (4 - 6)  morphine  - Injectable 2 milliGRAM(s) IV Push every 6 hours PRN Severe Pain (7 - 10)  dextrose Gel 1 Dose(s) Oral once PRN Blood Glucose LESS THAN 70 milliGRAM(s)/deciLiter  glucagon  Injectable 1 milliGRAM(s) IntraMuscular once PRN Glucose <70 milliGRAM(s)/deciLiter        CAPILLARY BLOOD GLUCOSE  349 (22 Aug 2017 17:39)  238 (22 Aug 2017 12:30)  128 (22 Aug 2017 08:55)  279 (21 Aug 2017 21:38)        I&O's Summary    21 Aug 2017 07:01  -  22 Aug 2017 07:00  --------------------------------------------------------  IN: 0 mL / OUT: 2650 mL / NET: -2650 mL    22 Aug 2017 07:01  -  22 Aug 2017 17:56  --------------------------------------------------------  IN: 0 mL / OUT: 1600 mL / NET: -1600 mL        PHYSICAL EXAM:  GENERAL: NAD, well-developed  HEAD:  Atraumatic, Normocephalic  EYES: EOMI, PERRLA, conjunctiva and sclera clear  NECK: Supple, No JVD  CHEST/LUNG: Clear to auscultation bilaterally; No wheeze  HEART: Regular rate and rhythm; No murmurs, rubs, or gallops  ABDOMEN: Soft, Nontender, Nondistended; Bowel sounds present  EXTREMITIES:  2+ Peripheral Pulses, No clubbing, cyanosis, or edema  PSYCH: AAOx3  NEUROLOGY: non-focal  SKIN: No rashes or lesions    LABS:                        9.4    10.17 )-----------( 376      ( 21 Aug 2017 05:25 )             29.4     WBC Trend: 10.17<--, 8.76<--, 9.41<--  08-22    139  |  107  |  57<H>  ----------------------------<  131<H>  5.3   |  20<L>  |  3.28<H>    Ca    8.5      22 Aug 2017 07:30  Phos  4.8     08-22  Mg     2.0     08-22      Creatinine Trend: 3.28<--, 3.42<--, 3.70<--, 3.33<--, 2.94<--, 2.69<--              RADIOLOGY & ADDITIONAL TESTS:    Imaging Personally Reviewed:    Consultant(s) Notes Reviewed:      Care Discussed with Consultants/Other Providers: Patient is a 52y old  Male who presents with a chief complaint of R foot gas gangrene (18 Aug 2017 17:55)      52M with DM 2, HLD, HTN, bipolar disorder sent from his podiatrist, Dr. Olivier, with right great toe pain found to have gas gangrene. Patient admitted to vascular service for partial ray amputation of right hallux. Patient doing well post-op. Denies fever, CP, SOB, abdominal pain. Complains of 2 loose stools per day since admission. No melena, hematochezia. Consult for management of chronic medical problems particularly BP control.      MEDICATIONS  (STANDING):  diVALproex DR 1000 milliGRAM(s) Oral at bedtime  traZODone 100 milliGRAM(s) Oral at bedtime  atorvastatin 40 milliGRAM(s) Oral at bedtime  OLANZapine 20 milliGRAM(s) Oral at bedtime  carvedilol 12.5 milliGRAM(s) Oral every 12 hours  heparin  Injectable 5000 Unit(s) SubCutaneous every 8 hours  furosemide    Tablet 40 milliGRAM(s) Oral daily  sodium bicarbonate 650 milliGRAM(s) Oral three times a day  isosorbide   mononitrate ER Tablet (IMDUR) 120 milliGRAM(s) Oral daily  sodium chloride 0.9% lock flush 3 milliLiter(s) IV Push every 8 hours  ciprofloxacin     Tablet 250 milliGRAM(s) Oral daily  insulin lispro Injectable (HumaLOG) 3 Unit(s) SubCutaneous three times a day before meals  insulin lispro (HumaLOG) corrective regimen sliding scale   SubCutaneous three times a day before meals  insulin lispro (HumaLOG) corrective regimen sliding scale   SubCutaneous at bedtime  dextrose 5%. 1000 milliLiter(s) (50 mL/Hr) IV Continuous <Continuous>  dextrose 50% Injectable 12.5 Gram(s) IV Push once  dextrose 50% Injectable 25 Gram(s) IV Push once  dextrose 50% Injectable 25 Gram(s) IV Push once  lactobacillus acidophilus 1 Tablet(s) Oral daily  hydrALAZINE 25 milliGRAM(s) Oral three times a day    MEDICATIONS  (PRN):  acetaminophen   Tablet. 650 milliGRAM(s) Oral every 6 hours PRN Mild Pain (1 - 3)  oxyCODONE    5 mG/acetaminophen 325 mG 1 Tablet(s) Oral every 6 hours PRN Moderate Pain (4 - 6)  morphine  - Injectable 2 milliGRAM(s) IV Push every 6 hours PRN Severe Pain (7 - 10)  dextrose Gel 1 Dose(s) Oral once PRN Blood Glucose LESS THAN 70 milliGRAM(s)/deciLiter  glucagon  Injectable 1 milliGRAM(s) IntraMuscular once PRN Glucose <70 milliGRAM(s)/deciLiter        CAPILLARY BLOOD GLUCOSE  349 (22 Aug 2017 17:39)  238 (22 Aug 2017 12:30)  128 (22 Aug 2017 08:55)  279 (21 Aug 2017 21:38)        I&O's Summary    21 Aug 2017 07:01  -  22 Aug 2017 07:00  --------------------------------------------------------  IN: 0 mL / OUT: 2650 mL / NET: -2650 mL    22 Aug 2017 07:01  -  22 Aug 2017 17:56  --------------------------------------------------------  IN: 0 mL / OUT: 1600 mL / NET: -1600 mL        PHYSICAL EXAM:    VS: T 98, /80, HR 54, RR 18, 99% on RA    GENERAL: NAD, well-developed  HEAD:  Atraumatic, Normocephalic  EYES: EOMI, PERRLA, conjunctiva and sclera clear  NECK: Supple, No JVD  CHEST/LUNG: Clear to auscultation bilaterally; No wheeze  HEART: Regular rate and rhythm; No murmurs, rubs, or gallops  ABDOMEN: Soft, Nontender, Nondistended; Bowel sounds present  EXTREMITIES:  2+ Peripheral Pulses, No clubbing, cyanosis, or edema, right foot wrapped in bandage  PSYCH: AAOx3  NEUROLOGY: non-focal  SKIN: No rashes or lesions    LABS:                        9.4    10.17 )-----------( 376      ( 21 Aug 2017 05:25 )             29.4     WBC Trend: 10.17<--, 8.76<--, 9.41<--  08-22    139  |  107  |  57<H>  ----------------------------<  131<H>  5.3   |  20<L>  |  3.28<H>    Ca    8.5      22 Aug 2017 07:30  Phos  4.8     08-22  Mg     2.0     08-22      Creatinine Trend: 3.28<--, 3.42<--, 3.70<--, 3.33<--, 2.94<--, 2.69<--

## 2017-08-22 NOTE — CONSULT NOTE ADULT - ATTENDING COMMENTS
52 male with right 1st toe gangrene, s/p amputation with pseudomonas in cx  -stable  -F/u path  -cont cipro for now  -wound clean    Left message for Dr Watkins from podiatry to discuss case    Justin Olivares  Attending Physician   Division of Infectious Disease  Pager #[phone]  After 5pm/weekend #[phone]

## 2017-08-22 NOTE — CONSULT NOTE ADULT - ASSESSMENT
53 y/o w/ hx of diabetes, HTN, bipolar, CKD p/w infected non-healing ulcer of great toe complicated by osteo and gas gangrene.  Currently s/p R hallux amputation with clear margins on Xray, doing well.      -Wound cx growing pseudomonas, susceptible to cipro, can continue current regimen  -w/u for weight loss as per primary team 51 y/o w/ hx of diabetes, HTN, bipolar, CKD p/w infected non-healing ulcer of great toe complicated by osteo and gas gangrene.  Currently s/p R hallux amputation with clear margins on Xray, doing well.      -Wound cx growing multiple oganisms, will d/w attending regarding abx regimen.    -w/u for weight loss as per primary team 51 y/o w/ hx of diabetes, HTN, bipolar, CKD p/w infected non-healing ulcer of great toe complicated by osteo and gas gangrene.  Currently s/p R hallux amputation with clear margins on Xray, doing well.      -Continue with cipro for 4-5 days until path results return, will consider adding or dcing abx at that time  -w/u for weight loss as per primary team

## 2017-08-22 NOTE — CONSULT NOTE ADULT - PROBLEM SELECTOR RECOMMENDATION 3
- c/w ISS and Humalog 3 units TID  - Tradjenta 5 mg daily and start Prandin 0.5 mg TID per endocrinology
agree with atorvastatin 40mg daily
- Monitor FSG   - Insulin sliding scale

## 2017-08-22 NOTE — PROGRESS NOTE ADULT - SUBJECTIVE AND OBJECTIVE BOX
Subjective  51 yo with advanced diabetic nephropathy known to Dr. Diaz underwent resection of an infected toe on 8/18 His baseline creatinine is around 3.1      PHYSICAL EXAM:  Vitals:  T(F): 97.8 (08-22-17 @ 08:55), Max: 98.4 (08-21-17 @ 21:53)  HR: 54 (08-22-17 @ 08:55)  BP: 171/64 (08-22-17 @ 08:55)  BP(mean): --  RR: 18 (08-22-17 @ 08:55)  SpO2: 99% (08-22-17 @ 08:55)  Wt(kg): --  Constitutional: no acute distress  HEENT:  NC, ext ears nl, oropharynx clear,  nose nl  Neck: No JVD, bruit, adenopathy or thyromegaly  Eyes: PERRL, no discharge, no icterus  Respiratory: cta/p bilat, no wheezes, rales, nl effort  Cardiovascular: regular rate, S1 and S2 no rub or gallop  Abd: : BS+, soft, NT , no rebound or guarding, no bruits  Extremities: foot bandaged  Neurological: A/O x 3, nl coordination and tone    I and O's:    08-21 @ 07:01  -  08-22 @ 07:00  --------------------------------------------------------  IN: 0 mL / OUT: 2650 mL / NET: -2650 mL    08-22 @ 07:01  -  08-22 @ 12:27  --------------------------------------------------------  IN: 0 mL / OUT: 1000 mL / NET: -1000 mL            REVIEW OF SYSTEMS:c/o weakness    Allergies    No Known Allergies    Intolerances        MEDICATIONS  (STANDING):  diVALproex DR 1000 milliGRAM(s) Oral at bedtime  traZODone 100 milliGRAM(s) Oral at bedtime  atorvastatin 40 milliGRAM(s) Oral at bedtime  OLANZapine 20 milliGRAM(s) Oral at bedtime  carvedilol 12.5 milliGRAM(s) Oral every 12 hours  heparin  Injectable 5000 Unit(s) SubCutaneous every 8 hours  furosemide    Tablet 40 milliGRAM(s) Oral daily  sodium bicarbonate 650 milliGRAM(s) Oral three times a day  isosorbide   mononitrate ER Tablet (IMDUR) 120 milliGRAM(s) Oral daily  sodium chloride 0.9% lock flush 3 milliLiter(s) IV Push every 8 hours  ciprofloxacin     Tablet 250 milliGRAM(s) Oral daily  insulin lispro Injectable (HumaLOG) 3 Unit(s) SubCutaneous three times a day before meals  insulin lispro (HumaLOG) corrective regimen sliding scale   SubCutaneous three times a day before meals  insulin lispro (HumaLOG) corrective regimen sliding scale   SubCutaneous at bedtime  dextrose 5%. 1000 milliLiter(s) (50 mL/Hr) IV Continuous <Continuous>  dextrose 50% Injectable 12.5 Gram(s) IV Push once  dextrose 50% Injectable 25 Gram(s) IV Push once  dextrose 50% Injectable 25 Gram(s) IV Push once  lactobacillus acidophilus 1 Tablet(s) Oral daily      Sodium,Serum 139    08-22 @ 07:30  Sodium,Serum 137    08-22 @ 03:00  Sodium,Serum 137    08-21 @ 15:23  Sodium,Serum 138    08-21 @ 05:25  Sodium,Serum 138    08-20 @ 05:22  Sodium,Serum 140    08-19 @ 06:30    Potassium,Serum 5.3    08-22 @ 07:30  Potassium,Serum 6.5    08-22 @ 03:00  Potassium,Serum 5.8    08-21 @ 15:23  Potassium,Serum 5.4    08-21 @ 05:25  Potassium,Serum 5.1    08-20 @ 05:22  Potassium,Serum 5.3    08-19 @ 06:30    Chloride,Serum 107    08-22 @ 07:30  Chloride,Serum 104    08-22 @ 03:00  Chloride,Serum 102    08-21 @ 15:23  Chloride,Serum 103    08-21 @ 05:25  Chloride,Serum 105    08-20 @ 05:22  Chloride,Serum 105    08-19 @ 06:30    CO2, Serum 20    08-22 @ 07:30  CO2, Serum 18    08-22 @ 03:00  CO2, Serum 23    08-21 @ 15:23  CO2, Serum 23    08-21 @ 05:25  CO2, Serum 21    08-20 @ 05:22  CO2, Serum 22    08-19 @ 06:30    BUN 57    08-22 @ 07:30  BUN 59    08-22 @ 03:00  BUN 55    08-21 @ 15:23  BUN 55    08-21 @ 05:25  BUN 50    08-20 @ 05:22  BUN 50    08-19 @ 06:30    Creatinine, Serum 3.28    08-22 @ 07:30  Creatinine, Serum 3.42    08-22 @ 03:00  Creatinine, Serum 3.70    08-21 @ 15:23  Creatinine, Serum 3.33    08-21 @ 05:25  Creatinine, Serum 2.94    08-20 @ 05:22  Creatinine, Serum 2.69    08-19 @ 06:30      08-22    139  |  107  |  57<H>  ----------------------------<  131<H>  5.3   |  20<L>  |  3.28<H>    Ca    8.5      22 Aug 2017 07:30  Phos  4.8     08-22  Mg     2.0     08-22        Urine Studies:      Urine chemistry:   Urine Na:   Urine Creatinine:   Urine Protein/Cr ratio:  Urine K:   Urine Osm:   24 Hr urine studies:

## 2017-08-22 NOTE — CONSULT NOTE ADULT - PROBLEM SELECTOR RECOMMENDATION 4
- Possibly Abx associated  - If persistent watery diarrhea, consider checking stool sample for C. diff
- Continue

## 2017-08-22 NOTE — PROGRESS NOTE ADULT - SUBJECTIVE AND OBJECTIVE BOX
INTERVAL HPI/OVERNIGHT EVENTS:    pt with no abdominal complaints  denies n/v/d/c, abdominal pain, melena or brbpr     MEDICATIONS  (STANDING):  diVALproex DR 1000 milliGRAM(s) Oral at bedtime  traZODone 100 milliGRAM(s) Oral at bedtime  atorvastatin 40 milliGRAM(s) Oral at bedtime  OLANZapine 20 milliGRAM(s) Oral at bedtime  carvedilol 12.5 milliGRAM(s) Oral every 12 hours  heparin  Injectable 5000 Unit(s) SubCutaneous every 8 hours  furosemide    Tablet 40 milliGRAM(s) Oral daily  sodium bicarbonate 650 milliGRAM(s) Oral three times a day  isosorbide   mononitrate ER Tablet (IMDUR) 120 milliGRAM(s) Oral daily  sodium chloride 0.9% lock flush 3 milliLiter(s) IV Push every 8 hours  ciprofloxacin     Tablet 250 milliGRAM(s) Oral daily  insulin lispro Injectable (HumaLOG) 3 Unit(s) SubCutaneous three times a day before meals  insulin lispro (HumaLOG) corrective regimen sliding scale   SubCutaneous three times a day before meals  insulin lispro (HumaLOG) corrective regimen sliding scale   SubCutaneous at bedtime  dextrose 5%. 1000 milliLiter(s) (50 mL/Hr) IV Continuous <Continuous>  dextrose 50% Injectable 12.5 Gram(s) IV Push once  dextrose 50% Injectable 25 Gram(s) IV Push once  dextrose 50% Injectable 25 Gram(s) IV Push once    MEDICATIONS  (PRN):  acetaminophen   Tablet. 650 milliGRAM(s) Oral every 6 hours PRN Mild Pain (1 - 3)  oxyCODONE    5 mG/acetaminophen 325 mG 1 Tablet(s) Oral every 6 hours PRN Moderate Pain (4 - 6)  morphine  - Injectable 2 milliGRAM(s) IV Push every 6 hours PRN Severe Pain (7 - 10)  dextrose Gel 1 Dose(s) Oral once PRN Blood Glucose LESS THAN 70 milliGRAM(s)/deciLiter  glucagon  Injectable 1 milliGRAM(s) IntraMuscular once PRN Glucose <70 milliGRAM(s)/deciLiter      Allergies    No Known Allergies    Intolerances        Review of Systems:    General:  No wt loss, fevers, chills, night sweats, fatigue   Eyes:  Good vision, no reported pain  ENT:  No sore throat, pain, runny nose, dysphagia  CV:  No pain, palpitations, hypo/hypertension  Resp:  No dyspnea, cough, tachypnea, wheezing  GI:  No pain, No nausea, No vomiting, No diarrhea, No constipation, No weight loss, No fever, No pruritis, No rectal bleeding, No melena, No dysphagia  :  No pain, bleeding, incontinence, nocturia  Muscle:  No pain, weakness  Neuro:  No weakness, tingling, memory problems  Psych:  No fatigue, insomnia, mood problems, depression  Endocrine:  No polyuria, polydypsia, cold/heat intolerance  Heme:  No petechiae, ecchymosis, easy bruisability  Skin:  No rash, tattoos, scars, edema      Vital Signs Last 24 Hrs  T(C): 36.6 (22 Aug 2017 08:55), Max: 37.1 (21 Aug 2017 12:06)  T(F): 97.8 (22 Aug 2017 08:55), Max: 98.7 (21 Aug 2017 12:06)  HR: 54 (22 Aug 2017 08:55) (53 - 61)  BP: 171/64 (22 Aug 2017 08:55) (153/79 - 175/77)  BP(mean): --  RR: 18 (22 Aug 2017 08:55) (17 - 19)  SpO2: 99% (22 Aug 2017 08:55) (97% - 100%)    PHYSICAL EXAM:    Constitutional: NAD  HEENT: EOMI, throat clear  Neck: No LAD, supple  Respiratory: CTA and P  Cardiovascular: S1 and S2, RRR, no M  Gastrointestinal: BS+, soft, NT/ND, neg HSM,  Extremities: No peripheral edema, neg clubbing, cyanosis  Vascular: 2+ peripheral pulses  Neurological: A/O x 3, no focal deficits  Psychiatric: Normal mood, normal affect  Skin: No rashes      LABS:                        9.4    10.17 )-----------( 376      ( 21 Aug 2017 05:25 )             29.4     08-22    139  |  107  |  57<H>  ----------------------------<  131<H>  5.3   |  20<L>  |  3.28<H>    Ca    8.5      22 Aug 2017 07:30  Phos  4.8     08-22  Mg     2.0     08-22            RADIOLOGY & ADDITIONAL TESTS:

## 2017-08-22 NOTE — CONSULT NOTE ADULT - PROBLEM SELECTOR PROBLEM 3
Diabetes
High cholesterol
Type 2 diabetes mellitus without complication, without long-term current use of insulin

## 2017-08-22 NOTE — CONSULT NOTE ADULT - ASSESSMENT
52M with DM 2, HLD, HTN, bipolar disorder sent from his podiatrist, Dr. Olivier, with right great toe pain found to have gas gangrene s/p partial ray amputation of right hallux. Medicine consulted for uncontrolled HTN.

## 2017-08-22 NOTE — PROGRESS NOTE ADULT - ASSESSMENT
51 yo M s/p right foot hallux amp  - Pt seen and examined  - No signs of active infection present  - Cont abx   - Pt pain is well controlled at this time  - Awaiting OR data and ID recs for d/c plan  - Dressed w/ betadine & DSD  - d/w attending

## 2017-08-22 NOTE — CONSULT NOTE ADULT - SUBJECTIVE AND OBJECTIVE BOX
HPI:  52M with DM, HLD, HTN, bipolar disorder sent from his podiatrist, Dr. Olivier, with right great toe pain. The patient states that he first noticed the pain about one week PTA and his podiatrist had been debriding in the office. He also noticed some increased redness. Patient is ambulatory at baseline but does endorse claudication after 1-2 blocks. He also notes some "shooting pain" up his legs even while seated about once every ten minutes. Patient had no fevers or chills, but had noticed some drainage from the site. (17 Aug 2017 20:28)    Patient seen and examined at bedside, currently s/p R hallux amputation.  Denies any pain currently, states that it hurts when he walks.  Denies any N/V/F/C/CP/SOB diarrhea.  ROS otherwise negative.  Endorses a 30 lb weight loss after the past month, stated that he previously had decreased appetite but currently no issues eating.  Endorses a chronic cough that is sometimes productive of greenish yellow sputum, current smoker 1 PPD.      PAST MEDICAL & SURGICAL HISTORY:  Bipolar affective disorder in remission  High cholesterol  Depression  Diabetes mellitus  ORIF right lower leg- 1995      Allergies  No Known Allergies        ANTIMICROBIALS:  ciprofloxacin     Tablet 250 daily      OTHER MEDS: MEDICATIONS  (STANDING):  diVALproex DR 1000 at bedtime  traZODone 100 at bedtime  atorvastatin 40 at bedtime  OLANZapine 20 at bedtime  carvedilol 12.5 every 12 hours  heparin  Injectable 5000 every 8 hours  acetaminophen   Tablet. 650 every 6 hours PRN  oxyCODONE    5 mG/acetaminophen 325 mG 1 every 6 hours PRN  morphine  - Injectable 2 every 6 hours PRN  furosemide    Tablet 40 daily  isosorbide   mononitrate ER Tablet (IMDUR) 120 daily  insulin lispro Injectable (HumaLOG) 3 three times a day before meals  insulin lispro (HumaLOG) corrective regimen sliding scale  three times a day before meals  insulin lispro (HumaLOG) corrective regimen sliding scale  at bedtime  dextrose Gel 1 once PRN  dextrose 50% Injectable 12.5 once  dextrose 50% Injectable 25 once  dextrose 50% Injectable 25 once  glucagon  Injectable 1 once PRN      SOCIAL HISTORY: [ ] etoh [ ] tobacco [ ] former smoker [ ] IVDU    FAMILY HISTORY:  No pertinent family history in first degree relatives      REVIEW OF SYSTEMS  [  ] ROS unobtainable because:    [x  ] All other systems negative except as noted below:	    Constitutional:  [ ] fever [x ] weight loss  Skin:  [ ] rash [ ] phlebitis	  Eyes: [ ] icterus [ ] inflammation	  ENMT: [ ] discharge [ ] thrush [ ] ulcers [ ] exudates  Respiratory: [ ] dyspnea [ ] hemoptysis [x ] cough [ x] sputum	  Cardiovascular:  [ ] chest pain [ ] palpitations [ ] edema	  Gastrointestinal:  [ ] nausea [ ] vomiting [ ] diarrhea [ ] constipation [ ] pain	  Genitourinary:  [ ] dysuria [ ] frequency [ ] hematuria [ ] discharge [ ] flank pain  Musculoskeletal:  [ ] myalgias [ ] arthralgias [ ] arthritis	  Neurological:  [ ] headache [ ] seizures	  Psychiatric:  [ ] anxiety [x] depression	  Hematology/Lymphatics:  [ ] lymphadenopathy  Endocrine:  [ ] adrenal [ ] thyroid  Allergic/Immunologic:	 [ ] transplant [ ] seasonal    Vital Signs Last 24 Hrs  T(F): 97.8 (08-22-17 @ 08:55), Max: 199.6 (08-20-17 @ 18:48)    Vital Signs Last 24 Hrs  HR: 54 (08-22-17 @ 08:55) (53 - 61)  BP: 171/64 (08-22-17 @ 08:55) (153/79 - 175/77)  RR: 18 (08-22-17 @ 08:55)  SpO2: 99% (08-22-17 @ 08:55) (97% - 100%)  Wt(kg): --    PHYSICAL EXAM:  General: cachectic  EYES: [ x] PERRL [ ] white sclera [ ] icterus  ENT:  [x ] normal [ ] supple [ ] thrush [ ] pharyngeal exudate  Cardiovascular:   [ ] murmur [ x] normal [ ] PPM/AICD  Respiratory:  [ x] clear to ausculation bilaterally  GI:  [x ] soft, non-tender, normal bowel sounds  :  [ ] cobos x[ ] no CVA tenderness   Musculoskeletal:  x] no synovitis  Neurologic:  [ x] non-focal exam   Skin:  [ x] no rash  Lymph: [x ] no lymphadenopathy  Psychiatric:  [x ] appropriate affect [ x] alert & oriented  Lines:  [x ] no phlebitis [ ] central line                                9.4    10.17 )-----------( 376      ( 21 Aug 2017 05:25 )             29.4       08-22    139  |  107  |  57<H>  ----------------------------<  131<H>  5.3   |  20<L>  |  3.28<H>    Ca    8.5      22 Aug 2017 07:30  Phos  4.8     08-22  Mg     2.0     08-22          MICROBIOLOGY:    Wound cx growing pseudomonas            RADIOLOGY:  < from: Xray Foot AP + Lateral + Oblique, Right (08.18.17 @ 09:53) >  Currently status post partial 1st ray amputation through mid metatarsal   shaft with sharp and smooth osseous stump margin and with post surgical   changes and Penrose type drain in the overlying soft tissues.    No proximally tracking gas collections beyond the amputation site and no   focal areas of osteomyelitis.    < end of copied text > HPI:  52M with DM, HLD, HTN, bipolar disorder sent from his podiatrist, Dr. Olivier, with right great toe pain. The patient states that he first noticed the pain about one week PTA and his podiatrist had been debriding in the office. He also noticed some increased redness. Patient is ambulatory at baseline but does endorse claudication after 1-2 blocks. He also notes some "shooting pain" up his legs even while seated about once every ten minutes. Patient had no fevers or chills, but had noticed some drainage from the site. (17 Aug 2017 20:28)    Patient seen and examined at bedside, currently s/p R hallux amputation.  Denies any pain currently, states that it hurts when he walks.  Denies any N/V/F/C/CP/SOB diarrhea.  ROS otherwise negative.  Endorses a 30 lb weight loss after the past month, stated that he previously had decreased appetite but currently no issues eating.  Endorses a chronic cough that is sometimes productive of greenish yellow sputum, current smoker 1 PPD.      PAST MEDICAL & SURGICAL HISTORY:  Bipolar affective disorder in remission  High cholesterol  Depression  Diabetes mellitus  ORIF right lower leg- 1995      Allergies  No Known Allergies        ANTIMICROBIALS:  ciprofloxacin     Tablet 250 daily      OTHER MEDS: MEDICATIONS  (STANDING):  diVALproex DR 1000 at bedtime  traZODone 100 at bedtime  atorvastatin 40 at bedtime  OLANZapine 20 at bedtime  carvedilol 12.5 every 12 hours  heparin  Injectable 5000 every 8 hours  acetaminophen   Tablet. 650 every 6 hours PRN  oxyCODONE    5 mG/acetaminophen 325 mG 1 every 6 hours PRN  morphine  - Injectable 2 every 6 hours PRN  furosemide    Tablet 40 daily  isosorbide   mononitrate ER Tablet (IMDUR) 120 daily  insulin lispro Injectable (HumaLOG) 3 three times a day before meals  insulin lispro (HumaLOG) corrective regimen sliding scale  three times a day before meals  insulin lispro (HumaLOG) corrective regimen sliding scale  at bedtime  dextrose Gel 1 once PRN  dextrose 50% Injectable 12.5 once  dextrose 50% Injectable 25 once  dextrose 50% Injectable 25 once  glucagon  Injectable 1 once PRN      SOCIAL HISTORY: [ ] etoh [ ] tobacco [ ] former smoker [ ] IVDU    FAMILY HISTORY:  No pertinent family history in first degree relatives      REVIEW OF SYSTEMS  [  ] ROS unobtainable because:    [x  ] All other systems negative except as noted below:	    Constitutional:  [ ] fever [x ] weight loss  Skin:  [ ] rash [ ] phlebitis	  Eyes: [ ] icterus [ ] inflammation	  ENMT: [ ] discharge [ ] thrush [ ] ulcers [ ] exudates  Respiratory: [ ] dyspnea [ ] hemoptysis [x ] cough [ x] sputum	  Cardiovascular:  [ ] chest pain [ ] palpitations [ ] edema	  Gastrointestinal:  [ ] nausea [ ] vomiting [ ] diarrhea [ ] constipation [ ] pain	  Genitourinary:  [ ] dysuria [ ] frequency [ ] hematuria [ ] discharge [ ] flank pain  Musculoskeletal:  [ ] myalgias [ ] arthralgias [ ] arthritis	  Neurological:  [ ] headache [ ] seizures	  Psychiatric:  [ ] anxiety [x] depression	  Hematology/Lymphatics:  [ ] lymphadenopathy  Endocrine:  [ ] adrenal [ ] thyroid  Allergic/Immunologic:	 [ ] transplant [ ] seasonal    Vital Signs Last 24 Hrs  T(F): 97.8 (08-22-17 @ 08:55), Max: 199.6 (08-20-17 @ 18:48)    Vital Signs Last 24 Hrs  HR: 54 (08-22-17 @ 08:55) (53 - 61)  BP: 171/64 (08-22-17 @ 08:55) (153/79 - 175/77)  RR: 18 (08-22-17 @ 08:55)  SpO2: 99% (08-22-17 @ 08:55) (97% - 100%)  Wt(kg): --    PHYSICAL EXAM:  General: cachectic  EYES: [ x] PERRL [ ] white sclera [ ] icterus  ENT:  [x ] normal [ ] supple [ ] thrush [ ] pharyngeal exudate  Cardiovascular:   [ ] murmur [ x] normal [ ] PPM/AICD  Respiratory:  [ x] clear to ausculation bilaterally  GI:  [x ] soft, non-tender, normal bowel sounds  :  [ ] cobos x[ ] no CVA tenderness   Musculoskeletal:  x] no synovitis  Neurologic:  [ x] non-focal exam   Skin:  amputation c/d/i, no drainage, minimal erythema around suture site  Lymph: [x ] no lymphadenopathy  Psychiatric:  [x ] appropriate affect [ x] alert & oriented  Lines:  [x ] no phlebitis [ ] central line                                9.4    10.17 )-----------( 376      ( 21 Aug 2017 05:25 )             29.4       08-22    139  |  107  |  57<H>  ----------------------------<  131<H>  5.3   |  20<L>  |  3.28<H>    Ca    8.5      22 Aug 2017 07:30  Phos  4.8     08-22  Mg     2.0     08-22          MICROBIOLOGY:    Wound cx growing pseudomonas, staph species, corynebacter            RADIOLOGY:  < from: Xray Foot AP + Lateral + Oblique, Right (08.18.17 @ 09:53) >  Currently status post partial 1st ray amputation through mid metatarsal   shaft with sharp and smooth osseous stump margin and with post surgical   changes and Penrose type drain in the overlying soft tissues.    No proximally tracking gas collections beyond the amputation site and no   focal areas of osteomyelitis.    < end of copied text >

## 2017-08-22 NOTE — PROGRESS NOTE ADULT - ASSESSMENT
52 year old male POD4 s/p right foot hallux amp  - Cont with vanc and zosyn. Plan for D/C with Bactrim x 7 days as per Pods  - Dressing changes by Pods  - Awaiting OR culturefor d/c plan  - d/w pt his recent wt loss approx 30 lbs in past 3 mo and recommended GI eval and malignancy w/u. GI recommended outpatient workup. Pt made aware and acknowledged.   - Dispo: D/c home planning 52 year old male POD4 s/p right foot hallux amp  - Cont with vanc and zosyn. Plan for D/C with Bactrim x 7 days as per Pods  - Dressing changes by Pods  - Awaiting OR culturefor d/c plan  - d/w pt his recent wt loss approx 30 lbs in past 3 mo and recommended GI eval and malignancy w/u. GI recommended outpatient workup. Pt made aware and acknowledged.   continue  w/u for wt loss  - Dispo: D/c home planning 52 year old male POD4 s/p right foot hallux amp  culture is back and is positive for pseudomonas change vanc and zosyn to cipro 250 mg daily- Dressing changes by Pods  - HTN: Hydralazine 25 mg TID  - d/w pt his recent wt loss approx 30 lbs in past 3 mo and recommended GI eval and malignancy w/u. GI recommended outpatient workup. CT result is pending  continue  w/u for wt loss  -Tradjenta 5 mg daily and start Prandin 0.5 mg TID per endocrinology  -will likely need inpatient vs outpatient colonoscopy  -consider checking CEA and CA 19-9  - Dispo: D/c home planning 52 year old male POD4 s/p right foot hallux amp  culture is back and is positive for pseudomonas change vanc and zosyn to cipro 250 mg daily- Dressing changes by Pods  - HTN: Hydralazine 25 mg TID  - d/w pt his recent wt loss approx 30 lbs in past 3 mo and recommended GI eval and malignancy w/u. GI recommended outpatient workup. CT result is pending  continue  w/u for wt loss. Pt wants to do follow up outpatient.  All necessary prescriptions have been sent to the pt's pharmacy, stress immediate follow up with his PCP, Infectious Disease, Podiatry, Nephrology, and Gastroenterology doctors.  Pt told to f/u with  - the gastrointestinal doctor for out patient endoscopy.  -Pt noted to have hyperkalemia and took kaexalate prior to discharge.  Pt given prescription to take kaexalate tomorrow and follow up with his doctor within the week for repeat blood test.   **Of note, pt refused inpatient workup for weight loss, CA 19-9 was elevated and concern for possible malignancy was made clear to the patient.  After thorough discussion pt stated that he understands the risks of not continuing workup and will follow up outpatient with vascular, his pmd, GI, podiatry and infectious disease for continued workup outpatient.  Pt able to repeat back and expresses full understanding of concern for malignancy, worsening malignancy and risk if untreated including death.   -Tradjenta 5 mg daily and start Prandin 0.5 mg TID per endocrinology.  Rx sent to pharmacy  -will likely need outpatient colonoscopy  - Dispo: D/c home planning

## 2017-08-22 NOTE — PROGRESS NOTE ADULT - ASSESSMENT
Impression-51 yo with advanced DM nephropathy S/P resection of an infected toe   He has had severe hyperkalemia in the past and should remain on NaHCO3 with Kayexalate or Veltassa for K>5.8  Avoid ACE/ARB's  Low K diet

## 2017-08-22 NOTE — PROGRESS NOTE ADULT - SUBJECTIVE AND OBJECTIVE BOX
Chief Complaint: hyperthyroidism    History: Patient denies complaints. Asking about discharge.    MEDICATIONS  (STANDING):  diVALproex DR 1000 milliGRAM(s) Oral at bedtime  traZODone 100 milliGRAM(s) Oral at bedtime  atorvastatin 40 milliGRAM(s) Oral at bedtime  OLANZapine 20 milliGRAM(s) Oral at bedtime  carvedilol 12.5 milliGRAM(s) Oral every 12 hours  heparin  Injectable 5000 Unit(s) SubCutaneous every 8 hours  furosemide    Tablet 40 milliGRAM(s) Oral daily  sodium bicarbonate 650 milliGRAM(s) Oral three times a day  isosorbide   mononitrate ER Tablet (IMDUR) 120 milliGRAM(s) Oral daily  sodium chloride 0.9% lock flush 3 milliLiter(s) IV Push every 8 hours  ciprofloxacin     Tablet 250 milliGRAM(s) Oral daily  insulin lispro Injectable (HumaLOG) 3 Unit(s) SubCutaneous three times a day before meals  insulin lispro (HumaLOG) corrective regimen sliding scale   SubCutaneous three times a day before meals  insulin lispro (HumaLOG) corrective regimen sliding scale   SubCutaneous at bedtime  dextrose 5%. 1000 milliLiter(s) (50 mL/Hr) IV Continuous <Continuous>  dextrose 50% Injectable 12.5 Gram(s) IV Push once  dextrose 50% Injectable 25 Gram(s) IV Push once  dextrose 50% Injectable 25 Gram(s) IV Push once  lactobacillus acidophilus 1 Tablet(s) Oral daily    MEDICATIONS  (PRN):  acetaminophen   Tablet. 650 milliGRAM(s) Oral every 6 hours PRN Mild Pain (1 - 3)  oxyCODONE    5 mG/acetaminophen 325 mG 1 Tablet(s) Oral every 6 hours PRN Moderate Pain (4 - 6)  morphine  - Injectable 2 milliGRAM(s) IV Push every 6 hours PRN Severe Pain (7 - 10)  dextrose Gel 1 Dose(s) Oral once PRN Blood Glucose LESS THAN 70 milliGRAM(s)/deciLiter  glucagon  Injectable 1 milliGRAM(s) IntraMuscular once PRN Glucose <70 milliGRAM(s)/deciLiter      Allergies    No Known Allergies    Intolerances      Review of Systems:  Constitutional: No fever  Eyes: No blurry vision  Neuro: No tremors  HEENT: No pain  Cardiovascular: No chest pain, palpitations  Respiratory: No SOB, no cough  GI: No nausea, vomiting, abdominal pain  : No dysuria  Skin: no rash  Psych: no depression  Endocrine: no polyuria, polydipsia  Hem/lymph: no swelling  Osteoporosis: no fractures    ALL OTHER SYSTEMS REVIEWED AND NEGATIVE      PHYSICAL EXAM:  VITALS: T(C): 36.7 (08-22-17 @ 13:35)  T(F): 98 (08-22-17 @ 13:35), Max: 98.4 (08-21-17 @ 21:53)  HR: 54 (08-22-17 @ 13:35) (54 - 61)  BP: 187/80 (08-22-17 @ 13:35) (169/59 - 187/80)  RR:  (18 - 19)  SpO2:  (97% - 100%)  Wt(kg): --  GENERAL: NAD, well-groomed, well-developed  EYES: No proptosis, no lid lag, anicteric  HEENT:  Atraumatic, Normocephalic, moist mucous membranes  SKIN: Dry, intact, RLE with dressing  NEURO: extraocular movements intact, no tremor  PSYCH: Alert and oriented x 3, flat affect    CAPILLARY BLOOD GLUCOSE  238 (08-22 @ 12:30)  128 (08-22 @ 08:55)  279 (08-21 @ 21:38)  283 (08-21 @ 17:32)  217 (08-21 @ 12:35)  178 (08-21 @ 08:50)  294 (08-20 @ 22:24)  270 (08-20 @ 12:38)  130 (08-20 @ 09:22)  349 (08-19 @ 23:25)  193 (08-19 @ 17:24)      08-22    139  |  107  |  57<H>  ----------------------------<  131<H>  5.3   |  20<L>  |  3.28<H>    EGFR if : 24  EGFR if non : 20    Ca    8.5      08-22  Mg     2.0     08-22  Phos  4.8     08-22            Thyroid Function Tests:  08-22 @ 03:00 TSH < 0.10 FreeT4 2.09 T3 99.6 Anti TPO -- Anti Thyroglobulin Ab -- TSI --  08-21 @ 12:13 TSH 0.01 FreeT4 -- T3 -- Anti TPO -- Anti Thyroglobulin Ab -- TSI --      Hemoglobin A1C, Whole Blood: 7.8 % <H> [4.0 - 5.6] (08-18-17 @ 02:50)

## 2017-08-22 NOTE — PROGRESS NOTE ADULT - SUBJECTIVE AND OBJECTIVE BOX
C Team Progress Note    Status post: partial amputation of R hallux for gas gangere    POD #4    SUBJECTIVE:   patient was seen and examined this morning. His blood pressure was elevated overnight but otherwise, there was no acute event. He does not report pain, fever, n/v, chest pain, or shortness of breath. Yesterday, he refused to have CT scan, but today morning he is convinced to have it done later today      OBJECTIVE:   T(C): 36.6 (08-22-17 @ 08:55), Max: 37.1 (08-21-17 @ 12:06)  HR: 54 (08-22-17 @ 08:55) (53 - 61)  BP: 171/64 (08-22-17 @ 08:55) (153/79 - 175/77)  RR: 18 (08-22-17 @ 08:55) (17 - 19)  SpO2: 99% (08-22-17 @ 08:55) (97% - 100%)  Wt(kg): --  CAPILLARY BLOOD GLUCOSE  128 (22 Aug 2017 08:55)  279 (21 Aug 2017 21:38)  283 (21 Aug 2017 17:32)  217 (21 Aug 2017 12:35)        I&O's Detail    21 Aug 2017 07:01  -  22 Aug 2017 07:00  --------------------------------------------------------  IN:  Total IN: 0 mL    OUT:    Voided: 2650 mL  Total OUT: 2650 mL    Total NET: -2650 mL      22 Aug 2017 07:01  -  22 Aug 2017 10:48  --------------------------------------------------------  IN:  Total IN: 0 mL    OUT:    Voided: 500 mL  Total OUT: 500 mL    Total NET: -500 mL          PHYSICAL EXAM:  Gen: Well-nourished, well-developed, A&O x3, sleeping in bed in no acute distress  Extremities: All 4 extremities warm and well perfused  Dressing: clean, dry, intact, no tenderness to palpation, mild edema.   Palpable DP b/l

## 2017-08-22 NOTE — CONSULT NOTE ADULT - PROBLEM SELECTOR RECOMMENDATION 9
- Uncontrolled BP on Carvedilol and Lasix  - Start hydralazine 25 mg TID  - Can uptitrate hydralazine to 50 mg TID and/or start amlodipine 5 mg daily if BP still uncontrolled  - Goal BP <140/90

## 2017-08-22 NOTE — CONSULT NOTE ADULT - PROBLEM SELECTOR RECOMMENDATION 2
- Cr stable  - c/w NaHCO3  - Avoid nephrotoxins  - f/u renal recs
-now s/p partial amputation of RLE hallux  -podiatry/surgery following/appreciated  -bacid while on abx
R/o hyperthyroidism, given recent weight loss.  Repeat TSH  check free T4, Total T3,  TPO AB, TSH receptor Ab  thyroid ultrasound  already on beta blocker (carvedilol) with mild bradycardia.
- Monitor BP  - Continue home meds

## 2017-08-23 VITALS
DIASTOLIC BLOOD PRESSURE: 58 MMHG | RESPIRATION RATE: 17 BRPM | OXYGEN SATURATION: 96 % | HEART RATE: 54 BPM | SYSTOLIC BLOOD PRESSURE: 148 MMHG | TEMPERATURE: 98 F

## 2017-08-23 DIAGNOSIS — A49.8 OTHER BACTERIAL INFECTIONS OF UNSPECIFIED SITE: ICD-10-CM

## 2017-08-23 DIAGNOSIS — E87.5 HYPERKALEMIA: ICD-10-CM

## 2017-08-23 DIAGNOSIS — I96 GANGRENE, NOT ELSEWHERE CLASSIFIED: ICD-10-CM

## 2017-08-23 LAB
BUN SERPL-MCNC: 61 MG/DL — HIGH (ref 7–23)
CALCIUM SERPL-MCNC: 8.7 MG/DL — SIGNIFICANT CHANGE UP (ref 8.4–10.5)
CANCER AG19-9 SERPL-ACNC: 63.1 U/ML — HIGH
CEA SERPL-MCNC: 6.8 NG/ML — HIGH (ref 0.2–3.8)
CHLORIDE SERPL-SCNC: 104 MMOL/L — SIGNIFICANT CHANGE UP (ref 98–107)
CO2 SERPL-SCNC: 20 MMOL/L — LOW (ref 22–31)
CREAT SERPL-MCNC: 3.19 MG/DL — HIGH (ref 0.5–1.3)
GLUCOSE SERPL-MCNC: 202 MG/DL — HIGH (ref 70–99)
HCT VFR BLD CALC: 25.8 % — LOW (ref 39–50)
HGB BLD-MCNC: 8.5 G/DL — LOW (ref 13–17)
MCHC RBC-ENTMCNC: 29.6 PG — SIGNIFICANT CHANGE UP (ref 27–34)
MCHC RBC-ENTMCNC: 32.9 % — SIGNIFICANT CHANGE UP (ref 32–36)
MCV RBC AUTO: 89.9 FL — SIGNIFICANT CHANGE UP (ref 80–100)
NRBC # FLD: 0 — SIGNIFICANT CHANGE UP
PLATELET # BLD AUTO: 379 K/UL — SIGNIFICANT CHANGE UP (ref 150–400)
PMV BLD: 10.4 FL — SIGNIFICANT CHANGE UP (ref 7–13)
POTASSIUM SERPL-MCNC: 5.8 MMOL/L — HIGH (ref 3.5–5.3)
POTASSIUM SERPL-SCNC: 5.8 MMOL/L — HIGH (ref 3.5–5.3)
RBC # BLD: 2.87 M/UL — LOW (ref 4.2–5.8)
RBC # FLD: 12.4 % — SIGNIFICANT CHANGE UP (ref 10.3–14.5)
SODIUM SERPL-SCNC: 136 MMOL/L — SIGNIFICANT CHANGE UP (ref 135–145)
TSH RECEP AB FLD-ACNC: <0.5 IU/L — SIGNIFICANT CHANGE UP (ref 0–1.75)
WBC # BLD: 8.66 K/UL — SIGNIFICANT CHANGE UP (ref 3.8–10.5)
WBC # FLD AUTO: 8.66 K/UL — SIGNIFICANT CHANGE UP (ref 3.8–10.5)

## 2017-08-23 PROCEDURE — 99232 SBSQ HOSP IP/OBS MODERATE 35: CPT

## 2017-08-23 RX ORDER — REPAGLINIDE 1 MG/1
1 TABLET ORAL
Qty: 21 | Refills: 0 | OUTPATIENT
Start: 2017-08-23 | End: 2017-09-22

## 2017-08-23 RX ORDER — FUROSEMIDE 40 MG
20 TABLET ORAL ONCE
Qty: 0 | Refills: 0 | Status: DISCONTINUED | OUTPATIENT
Start: 2017-08-23 | End: 2017-08-23

## 2017-08-23 RX ORDER — CIPROFLOXACIN LACTATE 400MG/40ML
1 VIAL (ML) INTRAVENOUS
Qty: 5 | Refills: 0 | OUTPATIENT
Start: 2017-08-23 | End: 2017-08-28

## 2017-08-23 RX ORDER — SODIUM POLYSTYRENE SULFONATE 4.1 MEQ/G
15 POWDER, FOR SUSPENSION ORAL
Qty: 1 | Refills: 0 | OUTPATIENT
Start: 2017-08-23 | End: 2017-08-24

## 2017-08-23 RX ORDER — SODIUM POLYSTYRENE SULFONATE 4.1 MEQ/G
15 POWDER, FOR SUSPENSION ORAL ONCE
Qty: 0 | Refills: 0 | Status: DISCONTINUED | OUTPATIENT
Start: 2017-08-23 | End: 2017-08-23

## 2017-08-23 RX ORDER — SODIUM CHLORIDE 9 MG/ML
1000 INJECTION INTRAMUSCULAR; INTRAVENOUS; SUBCUTANEOUS
Qty: 0 | Refills: 0 | Status: DISCONTINUED | OUTPATIENT
Start: 2017-08-23 | End: 2017-08-23

## 2017-08-23 RX ADMIN — SODIUM CHLORIDE 3 MILLILITER(S): 9 INJECTION INTRAMUSCULAR; INTRAVENOUS; SUBCUTANEOUS at 05:30

## 2017-08-23 RX ADMIN — Medication 25 MILLIGRAM(S): at 05:31

## 2017-08-23 RX ADMIN — CARVEDILOL PHOSPHATE 12.5 MILLIGRAM(S): 80 CAPSULE, EXTENDED RELEASE ORAL at 05:31

## 2017-08-23 RX ADMIN — Medication 40 MILLIGRAM(S): at 05:31

## 2017-08-23 RX ADMIN — HEPARIN SODIUM 5000 UNIT(S): 5000 INJECTION INTRAVENOUS; SUBCUTANEOUS at 05:31

## 2017-08-23 RX ADMIN — Medication 650 MILLIGRAM(S): at 05:31

## 2017-08-23 NOTE — PROGRESS NOTE ADULT - ASSESSMENT
52 year old male POD5 s/p right foot hallux amp. Pt denying upper endoscopy and medication, would like to sign out AMA  - c/w cipro 250 mg daily- Dressing changes by Pods  - HTN: Hydralazine 25 mg TID  - CT results: Soft tissue thickening at the level of the GE junction, not apparent on the prior exam. Recommend endoscopy to evaluate for possible pathology at  this level.  - Normal thyroid u/s, renal u/s consistent with renal disease  -Tradjenta 5 mg daily and start Prandin 0.5 mg TID per endocrinology  - CEA elevated: 6.8. f/u CA 19-9  - Dispo: D/c home/signing out AMA 52 year old male POD5 s/p right foot hallux amp. Pt denying upper endoscopy and medication, would like to sign out AMA  - c/w cipro 250 mg daily- Dressing changes by Pods  - HTN: Hydralazine 25 mg TID  - CT results: Soft tissue thickening at the level of the GE junction, not apparent on the prior exam. Recommend endoscopy to evaluate for possible pathology at  this level.  - Normal thyroid u/s, renal u/s consistent with renal disease  -Tradjenta 5 mg daily and start Prandin 0.5 mg TID per endocrinology  - CEA elevated: 6.8. f/u CA 19-9  - Hyperkalemia: d/c KCl in IVF, kayexalate 15gm po x1, 2gm K diet  - Dispo: D/c home/signing out AMA

## 2017-08-23 NOTE — PROGRESS NOTE ADULT - PROBLEM SELECTOR PLAN 3
-patient refusing Kayexalate; discusses risks of not having K lowered, but wishing to sign out AMA, tired of procedures  -recommend low potassium diet as outpatient

## 2017-08-23 NOTE — PROGRESS NOTE ADULT - ATTENDING COMMENTS
as above
pt to be d/c ama as per pt request
Justin Olivares  Attending Physician   Division of Infectious Disease  Pager #[phone]  After 5pm/weekend #[phone]
as above
as above
Pt left AMA before I saw the patient today  Plan of care d/w Dr. Ayala

## 2017-08-23 NOTE — PROVIDER CONTACT NOTE (OTHER) - SITUATION
PT is refusing CT scan, requesting to go tomorrow
K 5.8
notified him of pt's bp elevated.
notified him that pt's bp still elevated.
patient bp is high

## 2017-08-23 NOTE — PROVIDER CONTACT NOTE (OTHER) - ASSESSMENT
awake and alert, no acute distress noted.
pt is asymptomatic
pt stable. asymptomatic.
Patient resting comfortably offering no complaints.  NAD noted.

## 2017-08-23 NOTE — PROGRESS NOTE ADULT - PROBLEM SELECTOR PROBLEM 2
Gas gangrene of foot
Low TSH level
Arterial insufficiency with ischemic ulcer
Arterial insufficiency with ischemic ulcer
Essential hypertension
Pseudomonas aeruginosa infection

## 2017-08-23 NOTE — PROGRESS NOTE ADULT - ASSESSMENT
52M with DM 2, HLD, HTN, bipolar disorder sent from his podiatrist, Dr. Olivier, with right great toe pain found to have gas gangrene s/p partial ray amputation of right hallux. Medicine consulted for uncontrolled HTN. Mild hyperkalemia this morning of 5.8. Patient wishing to leave AMA this morning.

## 2017-08-23 NOTE — PROGRESS NOTE ADULT - SUBJECTIVE AND OBJECTIVE BOX
Patient is a 52y Male  being evaluated for  hyperkalemia CKD 4      who reports no complaints overnight.    wants to go home  Voiding without difficulty. No dysuria, hematuria, urgency.      PHYSICAL EXAM:  Vitals:T(C): 36.7 (08-23-17 @ 05:29), Max: 36.9 (08-22-17 @ 22:32)  HR: 54 (08-23-17 @ 05:29) (54 - 60)  BP: 148/58 (08-23-17 @ 05:29) (148/58 - 187/80)  RR: 17 (08-23-17 @ 05:29) (17 - 20)  SpO2: 96% (08-23-17 @ 05:29) (96% - 99%)  Wt(kg): --    General: no acute distress  HEENT:  NC, ext ears nl, oropharynx clear,  nose nl  Neck: No JVD, bruit, adenopathy or thyromegaly  Eyes: PERRL, no discharge, no icterus  Respiratory: cta/p bilat, no wheezes, rales, nl effort  Cardiovascular: regular rate, S1 and S2 no rub or gallop  Abd: : BS+, soft, NT , no rebound or guarding, no bruits  Extremities: no edema, no cyanosis, palpable pulses      I and O's:  08-22 @ 07:01  -  08-23 @ 07:00  --------------------------------------------------------  IN: 600 mL / OUT: 2600 mL / NET: -2000 mL    08-23 @ 07:01  -  08-23 @ 08:43  --------------------------------------------------------  IN: 0 mL / OUT: 600 mL / NET: -600 mL              REVIEW OF SYSTEMS:  CONSTITUTIONAL: No weakness, fevers or chills  RESPIRATORY: No cough, wheezing, hemoptysis; No shortness of breath  CARDIOVASCULAR: No chest pain or palpitations  GI : no abd pain, nausea or vomiting  GENITOURINARY: No dysuria, frequency or hematuria  All other review of systems is negative unless indicated above.    Allergies    No Known Allergies    Intolerances          MEDICATIONS  (STANDING):  diVALproex DR 1000 milliGRAM(s) Oral at bedtime  traZODone 100 milliGRAM(s) Oral at bedtime  atorvastatin 40 milliGRAM(s) Oral at bedtime  OLANZapine 20 milliGRAM(s) Oral at bedtime  carvedilol 12.5 milliGRAM(s) Oral every 12 hours  heparin  Injectable 5000 Unit(s) SubCutaneous every 8 hours  furosemide    Tablet 40 milliGRAM(s) Oral daily  sodium bicarbonate 650 milliGRAM(s) Oral three times a day  isosorbide   mononitrate ER Tablet (IMDUR) 120 milliGRAM(s) Oral daily  sodium chloride 0.9% lock flush 3 milliLiter(s) IV Push every 8 hours  ciprofloxacin     Tablet 250 milliGRAM(s) Oral daily  insulin lispro Injectable (HumaLOG) 3 Unit(s) SubCutaneous three times a day before meals  insulin lispro (HumaLOG) corrective regimen sliding scale   SubCutaneous three times a day before meals  insulin lispro (HumaLOG) corrective regimen sliding scale   SubCutaneous at bedtime  dextrose 5%. 1000 milliLiter(s) (50 mL/Hr) IV Continuous <Continuous>  dextrose 50% Injectable 12.5 Gram(s) IV Push once  dextrose 50% Injectable 25 Gram(s) IV Push once  dextrose 50% Injectable 25 Gram(s) IV Push once  lactobacillus acidophilus 1 Tablet(s) Oral daily  hydrALAZINE 25 milliGRAM(s) Oral three times a day  sodium chloride 0.9%. 1000 milliLiter(s) (30 mL/Hr) IV Continuous <Continuous>  sodium polystyrene sulfonate Suspension 15 Gram(s) Oral once      Sodium,Serum 136    08-23 @ 05:41  Sodium,Serum 139    08-22 @ 07:30  Sodium,Serum 137    08-22 @ 03:00  Sodium,Serum 137    08-21 @ 15:23  Sodium,Serum 138    08-21 @ 05:25  Sodium,Serum 138    08-20 @ 05:22    Potassium,Serum 5.8    08-23 @ 05:41  Potassium,Serum 5.3    08-22 @ 07:30  Potassium,Serum 6.5    08-22 @ 03:00  Potassium,Serum 5.8    08-21 @ 15:23  Potassium,Serum 5.4    08-21 @ 05:25  Potassium,Serum 5.1    08-20 @ 05:22    Chloride,Serum 104    08-23 @ 05:41  Chloride,Serum 107    08-22 @ 07:30  Chloride,Serum 104    08-22 @ 03:00  Chloride,Serum 102    08-21 @ 15:23  Chloride,Serum 103    08-21 @ 05:25  Chloride,Serum 105    08-20 @ 05:22    CO2, Serum 20    08-23 @ 05:41  CO2, Serum 20    08-22 @ 07:30  CO2, Serum 18    08-22 @ 03:00  CO2, Serum 23    08-21 @ 15:23  CO2, Serum 23    08-21 @ 05:25  CO2, Serum 21    08-20 @ 05:22    BUN 61    08-23 @ 05:41  BUN 57    08-22 @ 07:30  BUN 59    08-22 @ 03:00  BUN 55    08-21 @ 15:23  BUN 55    08-21 @ 05:25  BUN 50    08-20 @ 05:22    Creatinine, Serum 3.19    08-23 @ 05:41  Creatinine, Serum 3.28    08-22 @ 07:30  Creatinine, Serum 3.42    08-22 @ 03:00  Creatinine, Serum 3.70    08-21 @ 15:23  Creatinine, Serum 3.33    08-21 @ 05:25  Creatinine, Serum 2.94    08-20 @ 05:22      08-23    136  |  104  |  61<H>  ----------------------------<  202<H>  5.8<H>   |  20<L>  |  3.19<H>    Ca    8.7      23 Aug 2017 05:41  Phos  4.8     08-22  Mg     2.0     08-22                              8.5    8.66  )-----------( 379      ( 23 Aug 2017 05:41 )             25.8

## 2017-08-23 NOTE — PROVIDER CONTACT NOTE (MEDICATION) - ASSESSMENT
pts blood sugar was 248. When told pt he was going to receive insulin via injection pt refused saying he only takes insulin PO. Pt educated on importance of receiving insulin. Pt continues to refuse. MD made aware.
Pt agitated and yelling, no acute distress. Pt states he is not taking the kaexalate and not going for his procedure this am.
Pt. is a&oX4. /72, other VSS. HR at baseline. Pt. denies pain or headache.

## 2017-08-23 NOTE — PROGRESS NOTE ADULT - PROBLEM SELECTOR PLAN 5
c/w ISS and Humalog 3 units TID  - Tradjenta 5 mg daily and start Prandin 0.5 mg TID per endocrinology.

## 2017-08-23 NOTE — PROGRESS NOTE ADULT - SUBJECTIVE AND OBJECTIVE BOX
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 His blood pressure was elevated overnight but otherwise, there were no acute event. Pt is refusing his upper endoscopy today and is requesting to leave AMA. All RBA of tx were discussed with the pt, but he continues to refuse his meds and would like to leave the hospital. Potassium elevated (5.8), but pt is refusing kayexalate. He does not report pain, fever, n/v, chest pain, or shortness of breath.       OBJECTIVE:   T(C): 36.7 (08-23-17 @ 05:29), Max: 36.9 (08-22-17 @ 22:32)  HR: 54 (08-23-17 @ 05:29) (54 - 60)  BP: 148/58 (08-23-17 @ 05:29) (148/58 - 187/80)  RR: 17 (08-23-17 @ 05:29) (17 - 20)  SpO2: 96% (08-23-17 @ 05:29) (96% - 99%)  Wt(kg): --    08-22 @ 07:01  -  08-23 @ 07:00  --------------------------------------------------------  IN:    dextrose 5% + sodium chloride 0.45% with potassium chloride 20 mEq/L: 600 mL  Total IN: 600 mL    OUT:    Voided: 2600 mL  Total OUT: 2600 mL    Total NET: -2000 mL      08-23 @ 07:01  -  08-23 @ 09:42  --------------------------------------------------------  IN:  Total IN: 0 mL    OUT:    Voided: 600 mL  Total OUT: 600 mL    Total NET: -600 mL        PHYSICAL EXAM:  Gen: A&O x3, sleeping in bed in no acute distress  Extremities: All 4 extremities warm and well perfused  Dressing: clean, dry, intact, no tenderness to palpation, mild edema.   Palpable DP b/l    Carcinoembryonic Antigen (08.23.17 @ 05:41)    Carcinoembryonic Antigen: 6.8: Reference Range: Non smokers: Less than 3.9 ng/mL  Smokers:     Less than 5.5 ng/mL    < from: CT Abdomen and Pelvis w/ Oral Cont (08.22.17 @ 14:48) >  IMPRESSION:   Soft tissue thickening at the level of the GE junction, not apparent on   the prior exam. Recommend endoscopy to evaluate for possible pathology at   this level.

## 2017-08-23 NOTE — PROGRESS NOTE ADULT - SUBJECTIVE AND OBJECTIVE BOX
Patient is a 52y old  Male who presents with a chief complaint of R foot gas gangrene (18 Aug 2017 17:55)      SUBJECTIVE / OVERNIGHT EVENTS:    POD #5 for parital amputation of R hallux for gas gangrene. Discussed case with primary team, patient wanting to leave AMA. When evaluating patient, patient able to verbalize understanding of possibility of worsening clinical progression. Refusing upper endoscopy since he is "tired of more tests." Has family at home (mother) who can help him with continuing care. Reports that he is trying to find a new PMD. Seen by ID, recommended continuing w/ ciprofloxacin x5 days. Patient denies any fevers, chills, pain, shortness of breath; reports he feels very hungry, wants to eat something this morning.      MEDICATIONS  (STANDING):  diVALproex DR 1000 milliGRAM(s) Oral at bedtime  traZODone 100 milliGRAM(s) Oral at bedtime  atorvastatin 40 milliGRAM(s) Oral at bedtime  OLANZapine 20 milliGRAM(s) Oral at bedtime  carvedilol 12.5 milliGRAM(s) Oral every 12 hours  heparin  Injectable 5000 Unit(s) SubCutaneous every 8 hours  furosemide    Tablet 40 milliGRAM(s) Oral daily  sodium bicarbonate 650 milliGRAM(s) Oral three times a day  isosorbide   mononitrate ER Tablet (IMDUR) 120 milliGRAM(s) Oral daily  sodium chloride 0.9% lock flush 3 milliLiter(s) IV Push every 8 hours  ciprofloxacin     Tablet 250 milliGRAM(s) Oral daily  insulin lispro Injectable (HumaLOG) 3 Unit(s) SubCutaneous three times a day before meals  insulin lispro (HumaLOG) corrective regimen sliding scale   SubCutaneous three times a day before meals  insulin lispro (HumaLOG) corrective regimen sliding scale   SubCutaneous at bedtime  dextrose 5%. 1000 milliLiter(s) (50 mL/Hr) IV Continuous <Continuous>  dextrose 50% Injectable 12.5 Gram(s) IV Push once  dextrose 50% Injectable 25 Gram(s) IV Push once  dextrose 50% Injectable 25 Gram(s) IV Push once  lactobacillus acidophilus 1 Tablet(s) Oral daily  hydrALAZINE 25 milliGRAM(s) Oral three times a day  sodium polystyrene sulfonate Suspension 15 Gram(s) Oral once    MEDICATIONS  (PRN):  acetaminophen   Tablet. 650 milliGRAM(s) Oral every 6 hours PRN Mild Pain (1 - 3)  oxyCODONE    5 mG/acetaminophen 325 mG 1 Tablet(s) Oral every 6 hours PRN Moderate Pain (4 - 6)  morphine  - Injectable 2 milliGRAM(s) IV Push every 6 hours PRN Severe Pain (7 - 10)  dextrose Gel 1 Dose(s) Oral once PRN Blood Glucose LESS THAN 70 milliGRAM(s)/deciLiter  glucagon  Injectable 1 milliGRAM(s) IntraMuscular once PRN Glucose <70 milliGRAM(s)/deciLiter    CAPILLARY BLOOD GLUCOSE  200 (23 Aug 2017 08:51)  315 (22 Aug 2017 22:32)  349 (22 Aug 2017 17:39)  238 (22 Aug 2017 12:30)    I&O's Summary    22 Aug 2017 07:01  -  23 Aug 2017 07:00  --------------------------------------------------------  IN: 600 mL / OUT: 2600 mL / NET: -2000 mL    23 Aug 2017 07:01  -  23 Aug 2017 11:59  --------------------------------------------------------  IN: 0 mL / OUT: 600 mL / NET: -600 mL    Vital Signs Last 24 Hrs  T(C): 36.7 (23 Aug 2017 05:29), Max: 36.9 (22 Aug 2017 22:32)  T(F): 98.1 (23 Aug 2017 05:29), Max: 98.5 (22 Aug 2017 22:32)  HR: 54 (23 Aug 2017 05:29) (54 - 60)  BP: 148/58 (23 Aug 2017 05:29) (148/58 - 187/80)  RR: 17 (23 Aug 2017 05:29) (17 - 20)  SpO2: 96% (23 Aug 2017 05:29) (96% - 99%)    PHYSICAL EXAM:  GENERAL: NAD, well-developed  HEAD:  Atraumatic, Normocephalic  EYES: EOMI, PERRLA, conjunctiva and sclera clear  NECK: Supple, No JVD  CHEST/LUNG: Clear to auscultation bilaterally; No wheeze  HEART: Regular rate and rhythm; No murmurs, rubs, or gallops  ABDOMEN: Soft, Nontender, Nondistended; Bowel sounds present  EXTREMITIES:  No clubbing, cyanosis, or edema; warm, well perfused. Dressing clean, dry, intact, no tenderness to palpation  PSYCH: AAOx3  NEUROLOGY: non-focal  SKIN: No rashes or lesions    LABS:                        8.5    8.66  )-----------( 379      ( 23 Aug 2017 05:41 )             25.8     WBC Trend: 8.66<--, 10.17<--, 8.76<--  08-23    136  |  104  |  61<H>  ----------------------------<  202<H>  5.8<H>   |  20<L>  |  3.19<H>    Ca    8.7      23 Aug 2017 05:41  Phos  4.8     08-22  Mg     2.0     08-22      Creatinine Trend: 3.19<--, 3.28<--, 3.42<--, 3.70<--, 3.33<--, 2.94<--              RADIOLOGY & ADDITIONAL TESTS:    Imaging Personally Reviewed:    Consultant(s) Notes Reviewed:  Infectious disease, vascular, nephrology  ID: per surgeon discussion, thinks all infected bone/tissue out; plan for cipro 250 mg po daily x 5 days for pseudomonas infection; f/u path - if margins still +ve, will need longer course abx.   Nephrology: d/c KCl in IVF; kayexalate 15gm po x1 (refusing); 2gm K diet    Care Discussed with Consultants/Other Providers: primary team

## 2017-08-23 NOTE — PROVIDER CONTACT NOTE (OTHER) - ACTION/TREATMENT ORDERED:
Lasix, Imdur and Coreg given as ordered. Will continue to monitor.
No actions at this time
Pt states he normally runs high blood pressures. Will continue to monitor and await orders if needed.
will f/u by md
MD aware, states to repeat BMP.  Awaiting order, will continue to monitor.

## 2017-08-23 NOTE — PROGRESS NOTE ADULT - ASSESSMENT
51 y/o w/ hx of diabetes, HTN, bipolar, CKD p/w infected non-healing ulcer of great toe complicated by osteo and gas gangrene.  Currently s/p R hallux amputation with clear margins on Xray, doing well.

## 2017-08-23 NOTE — PROGRESS NOTE ADULT - PROBLEM SELECTOR PLAN 6
Resolving; possibly Abx associated  - If persistent watery diarrhea, consider checking stool sample for C. diff

## 2017-08-23 NOTE — PROGRESS NOTE ADULT - SUBJECTIVE AND OBJECTIVE BOX
Patient is a 52y old  Male who presents with a chief complaint of R foot gas gangrene (18 Aug 2017 17:55)       INTERVAL HPI/OVERNIGHT EVENTS:  Patient seen and evaluated at bedside.  Pt is resting comfortable in NAD. Denies N/V/F/C.      Allergies    No Known Allergies    Intolerances        Vital Signs Last 24 Hrs  T(C): 36.7 (23 Aug 2017 05:29), Max: 36.9 (22 Aug 2017 22:32)  T(F): 98.1 (23 Aug 2017 05:29), Max: 98.5 (22 Aug 2017 22:32)  HR: 54 (23 Aug 2017 05:29) (54 - 60)  BP: 148/58 (23 Aug 2017 05:29) (148/58 - 187/80)  BP(mean): --  RR: 17 (23 Aug 2017 05:29) (17 - 20)  SpO2: 96% (23 Aug 2017 05:29) (96% - 99%)    LABS:                        8.5    8.66  )-----------( 379      ( 23 Aug 2017 05:41 )             25.8     08-23    136  |  104  |  61<H>  ----------------------------<  202<H>  5.8<H>   |  20<L>  |  3.19<H>    Ca    8.7      23 Aug 2017 05:41  Phos  4.8     08-22  Mg     2.0     08-22          CAPILLARY BLOOD GLUCOSE  315 (22 Aug 2017 22:32)  349 (22 Aug 2017 17:39)  238 (22 Aug 2017 12:30)  128 (22 Aug 2017 08:55)          Lower Extremity Physical Exam:  Patient is a 52y old  Male who presents with a chief complaint of R foot gas gangrene (18 Aug 2017 17:55)       INTERVAL HPI/OVERNIGHT EVENTS:  Patient seen and evaluated at bedside.  Pt is resting comfortable in NAD. Denies N/V/F/C.  Pain rated at X/10    Allergies    No Known Allergies    Intolerances        Vital Signs Last 24 Hrs  T(C): 36.7 (23 Aug 2017 05:29), Max: 36.9 (22 Aug 2017 22:32)  T(F): 98.1 (23 Aug 2017 05:29), Max: 98.5 (22 Aug 2017 22:32)  HR: 54 (23 Aug 2017 05:29) (54 - 60)  BP: 148/58 (23 Aug 2017 05:29) (148/58 - 187/80)  BP(mean): --  RR: 17 (23 Aug 2017 05:29) (17 - 20)  SpO2: 96% (23 Aug 2017 05:29) (96% - 99%)    LABS:                        8.5    8.66  )-----------( 379      ( 23 Aug 2017 05:41 )             25.8     08-23    136  |  104  |  61<H>  ----------------------------<  202<H>  5.8<H>   |  20<L>  |  3.19<H>    Ca    8.7      23 Aug 2017 05:41  Phos  4.8     08-22  Mg     2.0     08-22          CAPILLARY BLOOD GLUCOSE  315 (22 Aug 2017 22:32)  349 (22 Aug 2017 17:39)  238 (22 Aug 2017 12:30)  128 (22 Aug 2017 08:55)          Lower Extremity Physical Exam:  s/p right foot hallux amputation. Sutures intact, skin edges well coapted. No surrounding erythema, no drainage, no malodor, no purulence, no signs of active acute infection present. Skin warm, good cap fill

## 2017-08-23 NOTE — PROGRESS NOTE ADULT - PROBLEM SELECTOR PROBLEM 1
Arterial insufficiency with ischemic ulcer
Gas gangrene of foot
Uncontrolled type 2 diabetes mellitus with hyperglycemia, without long-term current use of insulin
Unintentional weight loss
Arterial insufficiency with ischemic ulcer
Gas gangrene of foot
Toe gangrene
Toe gangrene

## 2017-08-23 NOTE — PROGRESS NOTE ADULT - SUBJECTIVE AND OBJECTIVE BOX
NANCY SEPULVEDA 52y [id number]    Patient is a 52y old  Male who presents with a chief complaint of R foot gas gangrene (18 Aug 2017 17:55)      Follow Up/CC: Toe gangrene     Interval History/ROS: feels ok, signing out AMA    Allergies    No Known Allergies    Intolerances        ANTIMICROBIALS:  ciprofloxacin     Tablet 250 daily      MEDICATIONS  (STANDING):  diVALproex DR 1000 milliGRAM(s) Oral at bedtime  traZODone 100 milliGRAM(s) Oral at bedtime  atorvastatin 40 milliGRAM(s) Oral at bedtime  OLANZapine 20 milliGRAM(s) Oral at bedtime  carvedilol 12.5 milliGRAM(s) Oral every 12 hours  heparin  Injectable 5000 Unit(s) SubCutaneous every 8 hours  furosemide    Tablet 40 milliGRAM(s) Oral daily  sodium bicarbonate 650 milliGRAM(s) Oral three times a day  isosorbide   mononitrate ER Tablet (IMDUR) 120 milliGRAM(s) Oral daily  sodium chloride 0.9% lock flush 3 milliLiter(s) IV Push every 8 hours  ciprofloxacin     Tablet 250 milliGRAM(s) Oral daily  insulin lispro Injectable (HumaLOG) 3 Unit(s) SubCutaneous three times a day before meals  insulin lispro (HumaLOG) corrective regimen sliding scale   SubCutaneous three times a day before meals  insulin lispro (HumaLOG) corrective regimen sliding scale   SubCutaneous at bedtime  dextrose 5%. 1000 milliLiter(s) (50 mL/Hr) IV Continuous <Continuous>  dextrose 50% Injectable 12.5 Gram(s) IV Push once  dextrose 50% Injectable 25 Gram(s) IV Push once  dextrose 50% Injectable 25 Gram(s) IV Push once  lactobacillus acidophilus 1 Tablet(s) Oral daily  hydrALAZINE 25 milliGRAM(s) Oral three times a day  sodium chloride 0.9%. 1000 milliLiter(s) (30 mL/Hr) IV Continuous <Continuous>  sodium polystyrene sulfonate Suspension 15 Gram(s) Oral once    MEDICATIONS  (PRN):  acetaminophen   Tablet. 650 milliGRAM(s) Oral every 6 hours PRN Mild Pain (1 - 3)  oxyCODONE    5 mG/acetaminophen 325 mG 1 Tablet(s) Oral every 6 hours PRN Moderate Pain (4 - 6)  morphine  - Injectable 2 milliGRAM(s) IV Push every 6 hours PRN Severe Pain (7 - 10)  dextrose Gel 1 Dose(s) Oral once PRN Blood Glucose LESS THAN 70 milliGRAM(s)/deciLiter  glucagon  Injectable 1 milliGRAM(s) IntraMuscular once PRN Glucose <70 milliGRAM(s)/deciLiter        Vital Signs Last 24 Hrs  T(C): 36.7 (23 Aug 2017 05:29), Max: 36.9 (22 Aug 2017 22:32)  T(F): 98.1 (23 Aug 2017 05:29), Max: 98.5 (22 Aug 2017 22:32)  HR: 54 (23 Aug 2017 05:29) (54 - 60)  BP: 148/58 (23 Aug 2017 05:29) (148/58 - 187/80)  BP(mean): --  RR: 17 (23 Aug 2017 05:29) (17 - 20)  SpO2: 96% (23 Aug 2017 05:29) (96% - 99%)    CBC Full  -  ( 23 Aug 2017 05:41 )  WBC Count : 8.66 K/uL  Hemoglobin : 8.5 g/dL  Hematocrit : 25.8 %  Platelet Count - Automated : 379 K/uL  Mean Cell Volume : 89.9 fL  Mean Cell Hemoglobin : 29.6 pg  Mean Cell Hemoglobin Concentration : 32.9 %  Auto Neutrophil # : x  Auto Lymphocyte # : x  Auto Monocyte # : x  Auto Eosinophil # : x  Auto Basophil # : x  Auto Neutrophil % : x  Auto Lymphocyte % : x  Auto Monocyte % : x  Auto Eosinophil % : x  Auto Basophil % : x    08-23    136  |  104  |  61<H>  ----------------------------<  202<H>  5.8<H>   |  20<L>  |  3.19<H>    Ca    8.7      23 Aug 2017 05:41  Phos  4.8     08-22  Mg     2.0     08-22            MICROBIOLOGY:          v            RADIOLOGY    CXR:    CT HEAD:    CT CHEST:    CT ABDOMEN:    MRI:    OTHER:

## 2017-08-23 NOTE — PROGRESS NOTE ADULT - ASSESSMENT
Imp-  52y Male with CKD 4  s/p toe amp  stable renal function  hyperkalemia (has required kayexalate as outpt in past)    Rec  d/c KCl in IVF  kayexalate 15gm po x1   2gm K diet

## 2017-08-23 NOTE — PROGRESS NOTE ADULT - ASSESSMENT
51 yo M s/p right foot hallux amp  - Pt seen and examined  - No signs of active infection present  - Cont abx   - Pt pain is well controlled at this time  - Dressed w/ betadine & DSD

## 2017-08-23 NOTE — PROVIDER CONTACT NOTE (MEDICATION) - ACTION/TREATMENT ORDERED:
No action/treatment at this time. Continue to monitor FS.
MD Mabry notified, will given hydralazine and recheck BP.
none at this time

## 2017-08-23 NOTE — PROGRESS NOTE ADULT - PROBLEM SELECTOR PLAN 1
as above
continue woundcare and abx  d/c plan w ID recommendations
-continue w/ abx as per infectious disease and per primary team
as above  temitope/pvr pending
continue woundcare and abx  d/c plan w ID recommendations
-daily labs  -check iron studies  -CT Abd/Pelvis pending   -encourage po intake  -will likely need inpatient vs outpatient colonoscopy  -consider checking CEA and CA 19-9  -further recommendations pending CT Abd results
While inpatient continue with Humalog 3/3/3 plus low correction scales.    Upon dc resume Tradjenta 5mg daily and add prandin 0.5mg qac TID
-s/p amputation  -D/w Dr Styles - intraop thinks all infected bone/tissue out  -possibly signing out AMA

## 2017-08-23 NOTE — PROGRESS NOTE ADULT - SUBJECTIVE AND OBJECTIVE BOX
Hypertension overnight. Charting states that MD was made aware and approved one dose of hydralazine. I did not discuss this pressure with RN staff and did not write for any additional antihypertensive medication.    Delano Childers, PGY1

## 2017-08-23 NOTE — PROGRESS NOTE ADULT - PROBLEM SELECTOR PLAN 2
as above
- Started hydralazine 25 mg TID on 8/22/17  - OK to increase to hydralazine 50 mg TID and to follow up with outpatient PMD, may start amlodipine 5 mg QD  - Goal BP <140/90 (still above goal)
as above
-now s/p partial amputation of RLE hallux  -podiatry/surgery following/appreciated  -bacid while on abx
labwork consistent with mild hyperthyroidism.  Thyroid US showing no discrete nodules.  Etiology most likely thyroiditis vs. Graves' disease.   TSH receptor Ab and TPO Ab pending.  HR well controlled on beta blocker.  Patient can follow up as outpatient for further management and workup of hyperthyroidism.  F/u with endocrine at [phone].
-cipro 250 mg po daily x 5 days  -f/u path - if margins still +ve, will need longer course abx

## 2017-08-29 ENCOUNTER — APPOINTMENT (OUTPATIENT)
Dept: GASTROENTEROLOGY | Facility: CLINIC | Age: 52
End: 2017-08-29
Payer: MEDICARE

## 2017-08-29 DIAGNOSIS — R63.4 ABNORMAL WEIGHT LOSS: ICD-10-CM

## 2017-08-29 DIAGNOSIS — R93.5 ABNORMAL FINDINGS ON DIAGNOSTIC IMAGING OF OTHER ABDOMINAL REGIONS, INCLUDING RETROPERITONEUM: ICD-10-CM

## 2017-08-29 PROCEDURE — 99204 OFFICE O/P NEW MOD 45 MIN: CPT

## 2017-08-29 RX ORDER — DIVALPROEX SODIUM 125 MG/1
125 TABLET, DELAYED RELEASE ORAL
Refills: 0 | Status: ACTIVE | COMMUNITY

## 2017-09-01 VITALS
HEIGHT: 70 IN | WEIGHT: 164 LBS | OXYGEN SATURATION: 100 % | HEART RATE: 82 BPM | SYSTOLIC BLOOD PRESSURE: 130 MMHG | DIASTOLIC BLOOD PRESSURE: 70 MMHG | BODY MASS INDEX: 23.48 KG/M2 | TEMPERATURE: 98 F

## 2017-09-01 PROBLEM — R63.4 WEIGHT LOSS, ABNORMAL: Status: ACTIVE | Noted: 2017-09-01

## 2017-09-05 LAB — SURGICAL PATHOLOGY STUDY: SIGNIFICANT CHANGE UP

## 2017-09-07 ENCOUNTER — APPOINTMENT (OUTPATIENT)
Dept: GASTROENTEROLOGY | Facility: CLINIC | Age: 52
End: 2017-09-07

## 2017-09-13 ENCOUNTER — INPATIENT (INPATIENT)
Facility: HOSPITAL | Age: 52
LOS: 21 days | Discharge: HOME | End: 2017-10-05
Attending: SURGERY | Admitting: SURGERY
Payer: MEDICARE

## 2017-09-13 VITALS
SYSTOLIC BLOOD PRESSURE: 166 MMHG | HEART RATE: 64 BPM | DIASTOLIC BLOOD PRESSURE: 56 MMHG | OXYGEN SATURATION: 99 % | TEMPERATURE: 99 F | RESPIRATION RATE: 16 BRPM

## 2017-09-13 DIAGNOSIS — L08.9 LOCAL INFECTION OF THE SKIN AND SUBCUTANEOUS TISSUE, UNSPECIFIED: ICD-10-CM

## 2017-09-13 DIAGNOSIS — Z89.411 ACQUIRED ABSENCE OF RIGHT GREAT TOE: Chronic | ICD-10-CM

## 2017-09-13 LAB
ALBUMIN SERPL ELPH-MCNC: 3.2 G/DL — LOW (ref 3.3–5)
ALP SERPL-CCNC: 67 U/L — SIGNIFICANT CHANGE UP (ref 40–120)
ALT FLD-CCNC: 39 U/L — SIGNIFICANT CHANGE UP (ref 4–41)
APTT BLD: 25.6 SEC — LOW (ref 27.5–37.4)
AST SERPL-CCNC: 27 U/L — SIGNIFICANT CHANGE UP (ref 4–40)
BASOPHILS # BLD AUTO: 0.04 K/UL — SIGNIFICANT CHANGE UP (ref 0–0.2)
BASOPHILS NFR BLD AUTO: 0.2 % — SIGNIFICANT CHANGE UP (ref 0–2)
BILIRUB SERPL-MCNC: 0.2 MG/DL — SIGNIFICANT CHANGE UP (ref 0.2–1.2)
BLD GP AB SCN SERPL QL: NEGATIVE — SIGNIFICANT CHANGE UP
BUN SERPL-MCNC: 56 MG/DL — HIGH (ref 7–23)
CALCIUM SERPL-MCNC: 9.2 MG/DL — SIGNIFICANT CHANGE UP (ref 8.4–10.5)
CHLORIDE SERPL-SCNC: 99 MMOL/L — SIGNIFICANT CHANGE UP (ref 98–107)
CO2 SERPL-SCNC: 21 MMOL/L — LOW (ref 22–31)
CREAT SERPL-MCNC: 3.04 MG/DL — HIGH (ref 0.5–1.3)
CRP SERPL-MCNC: 223.5 MG/L — SIGNIFICANT CHANGE UP
EOSINOPHIL # BLD AUTO: 0.03 K/UL — SIGNIFICANT CHANGE UP (ref 0–0.5)
EOSINOPHIL NFR BLD AUTO: 0.1 % — SIGNIFICANT CHANGE UP (ref 0–6)
ERYTHROCYTE [SEDIMENTATION RATE] IN BLOOD: 116 MM/HR — HIGH (ref 1–15)
GLUCOSE SERPL-MCNC: 250 MG/DL — HIGH (ref 70–99)
HCT VFR BLD CALC: 27.7 % — LOW (ref 39–50)
HGB BLD-MCNC: 8.7 G/DL — LOW (ref 13–17)
IMM GRANULOCYTES # BLD AUTO: 0.14 # — SIGNIFICANT CHANGE UP
IMM GRANULOCYTES NFR BLD AUTO: 0.7 % — SIGNIFICANT CHANGE UP (ref 0–1.5)
INR BLD: 1 — SIGNIFICANT CHANGE UP (ref 0.88–1.17)
LYMPHOCYTES # BLD AUTO: 10.8 % — LOW (ref 13–44)
LYMPHOCYTES # BLD AUTO: 2.22 K/UL — SIGNIFICANT CHANGE UP (ref 1–3.3)
MCHC RBC-ENTMCNC: 28.9 PG — SIGNIFICANT CHANGE UP (ref 27–34)
MCHC RBC-ENTMCNC: 31.4 % — LOW (ref 32–36)
MCV RBC AUTO: 92 FL — SIGNIFICANT CHANGE UP (ref 80–100)
MONOCYTES # BLD AUTO: 1.6 K/UL — HIGH (ref 0–0.9)
MONOCYTES NFR BLD AUTO: 7.8 % — SIGNIFICANT CHANGE UP (ref 2–14)
NEUTROPHILS # BLD AUTO: 16.51 K/UL — HIGH (ref 1.8–7.4)
NEUTROPHILS NFR BLD AUTO: 80.4 % — HIGH (ref 43–77)
NRBC # FLD: 0 — SIGNIFICANT CHANGE UP
PLATELET # BLD AUTO: 334 K/UL — SIGNIFICANT CHANGE UP (ref 150–400)
PMV BLD: 10.7 FL — SIGNIFICANT CHANGE UP (ref 7–13)
POTASSIUM SERPL-MCNC: 4.9 MMOL/L — SIGNIFICANT CHANGE UP (ref 3.5–5.3)
POTASSIUM SERPL-SCNC: 4.9 MMOL/L — SIGNIFICANT CHANGE UP (ref 3.5–5.3)
PROT SERPL-MCNC: 7.1 G/DL — SIGNIFICANT CHANGE UP (ref 6–8.3)
PROTHROM AB SERPL-ACNC: 11.2 SEC — SIGNIFICANT CHANGE UP (ref 9.8–13.1)
RBC # BLD: 3.01 M/UL — LOW (ref 4.2–5.8)
RBC # FLD: 13.4 % — SIGNIFICANT CHANGE UP (ref 10.3–14.5)
REVIEW TO FOLLOW: YES — SIGNIFICANT CHANGE UP
RH IG SCN BLD-IMP: POSITIVE — SIGNIFICANT CHANGE UP
SODIUM SERPL-SCNC: 134 MMOL/L — LOW (ref 135–145)
WBC # BLD: 20.54 K/UL — HIGH (ref 3.8–10.5)
WBC # FLD AUTO: 20.54 K/UL — HIGH (ref 3.8–10.5)

## 2017-09-13 PROCEDURE — 99222 1ST HOSP IP/OBS MODERATE 55: CPT

## 2017-09-13 PROCEDURE — 73630 X-RAY EXAM OF FOOT: CPT | Mod: 26,RT

## 2017-09-13 RX ORDER — SODIUM CHLORIDE 9 MG/ML
1000 INJECTION, SOLUTION INTRAVENOUS
Qty: 0 | Refills: 0 | Status: DISCONTINUED | OUTPATIENT
Start: 2017-09-13 | End: 2017-10-05

## 2017-09-13 RX ORDER — OLANZAPINE 15 MG/1
20 TABLET, FILM COATED ORAL AT BEDTIME
Qty: 0 | Refills: 0 | Status: DISCONTINUED | OUTPATIENT
Start: 2017-09-13 | End: 2017-09-28

## 2017-09-13 RX ORDER — PIPERACILLIN AND TAZOBACTAM 4; .5 G/20ML; G/20ML
3.38 INJECTION, POWDER, LYOPHILIZED, FOR SOLUTION INTRAVENOUS EVERY 8 HOURS
Qty: 0 | Refills: 0 | Status: DISCONTINUED | OUTPATIENT
Start: 2017-09-14 | End: 2017-09-16

## 2017-09-13 RX ORDER — SODIUM BICARBONATE 1 MEQ/ML
650 SYRINGE (ML) INTRAVENOUS THREE TIMES A DAY
Qty: 0 | Refills: 0 | Status: DISCONTINUED | OUTPATIENT
Start: 2017-09-13 | End: 2017-09-28

## 2017-09-13 RX ORDER — DEXTROSE 50 % IN WATER 50 %
12.5 SYRINGE (ML) INTRAVENOUS ONCE
Qty: 0 | Refills: 0 | Status: DISCONTINUED | OUTPATIENT
Start: 2017-09-13 | End: 2017-10-05

## 2017-09-13 RX ORDER — PIPERACILLIN AND TAZOBACTAM 4; .5 G/20ML; G/20ML
3.38 INJECTION, POWDER, LYOPHILIZED, FOR SOLUTION INTRAVENOUS ONCE
Qty: 0 | Refills: 0 | Status: COMPLETED | OUTPATIENT
Start: 2017-09-13 | End: 2017-09-13

## 2017-09-13 RX ORDER — INSULIN LISPRO 100/ML
VIAL (ML) SUBCUTANEOUS AT BEDTIME
Qty: 0 | Refills: 0 | Status: DISCONTINUED | OUTPATIENT
Start: 2017-09-13 | End: 2017-09-28

## 2017-09-13 RX ORDER — DEXTROSE 50 % IN WATER 50 %
1 SYRINGE (ML) INTRAVENOUS ONCE
Qty: 0 | Refills: 0 | Status: DISCONTINUED | OUTPATIENT
Start: 2017-09-13 | End: 2017-10-05

## 2017-09-13 RX ORDER — INSULIN LISPRO 100/ML
VIAL (ML) SUBCUTANEOUS
Qty: 0 | Refills: 0 | Status: DISCONTINUED | OUTPATIENT
Start: 2017-09-13 | End: 2017-09-28

## 2017-09-13 RX ORDER — DEXTROSE 50 % IN WATER 50 %
25 SYRINGE (ML) INTRAVENOUS ONCE
Qty: 0 | Refills: 0 | Status: DISCONTINUED | OUTPATIENT
Start: 2017-09-13 | End: 2017-10-05

## 2017-09-13 RX ORDER — DIVALPROEX SODIUM 500 MG/1
500 TABLET, DELAYED RELEASE ORAL
Qty: 0 | Refills: 0 | Status: DISCONTINUED | OUTPATIENT
Start: 2017-09-13 | End: 2017-09-28

## 2017-09-13 RX ORDER — CARVEDILOL PHOSPHATE 80 MG/1
12.5 CAPSULE, EXTENDED RELEASE ORAL EVERY 12 HOURS
Qty: 0 | Refills: 0 | Status: DISCONTINUED | OUTPATIENT
Start: 2017-09-13 | End: 2017-09-28

## 2017-09-13 RX ORDER — ATORVASTATIN CALCIUM 80 MG/1
40 TABLET, FILM COATED ORAL AT BEDTIME
Qty: 0 | Refills: 0 | Status: DISCONTINUED | OUTPATIENT
Start: 2017-09-13 | End: 2017-09-28

## 2017-09-13 RX ORDER — TRAZODONE HCL 50 MG
100 TABLET ORAL AT BEDTIME
Qty: 0 | Refills: 0 | Status: DISCONTINUED | OUTPATIENT
Start: 2017-09-13 | End: 2017-09-28

## 2017-09-13 RX ORDER — VANCOMYCIN HCL 1 G
1000 VIAL (EA) INTRAVENOUS ONCE
Qty: 0 | Refills: 0 | Status: COMPLETED | OUTPATIENT
Start: 2017-09-13 | End: 2017-09-14

## 2017-09-13 RX ORDER — GLUCAGON INJECTION, SOLUTION 0.5 MG/.1ML
1 INJECTION, SOLUTION SUBCUTANEOUS ONCE
Qty: 0 | Refills: 0 | Status: DISCONTINUED | OUTPATIENT
Start: 2017-09-13 | End: 2017-10-05

## 2017-09-13 RX ORDER — HEPARIN SODIUM 5000 [USP'U]/ML
5000 INJECTION INTRAVENOUS; SUBCUTANEOUS EVERY 8 HOURS
Qty: 0 | Refills: 0 | Status: DISCONTINUED | OUTPATIENT
Start: 2017-09-13 | End: 2017-09-28

## 2017-09-13 RX ORDER — ISOSORBIDE MONONITRATE 60 MG/1
120 TABLET, EXTENDED RELEASE ORAL DAILY
Qty: 0 | Refills: 0 | Status: DISCONTINUED | OUTPATIENT
Start: 2017-09-13 | End: 2017-10-05

## 2017-09-13 RX ADMIN — PIPERACILLIN AND TAZOBACTAM 200 GRAM(S): 4; .5 INJECTION, POWDER, LYOPHILIZED, FOR SOLUTION INTRAVENOUS at 23:00

## 2017-09-13 RX ADMIN — HEPARIN SODIUM 5000 UNIT(S): 5000 INJECTION INTRAVENOUS; SUBCUTANEOUS at 23:27

## 2017-09-13 NOTE — ED PROVIDER NOTE - SKIN COLOR
R foot wound at incision site from great toe amputation, 2cm defect with purulence. +erythema, +tenderness to palpation, +edema. Palpable DP. B/L.

## 2017-09-13 NOTE — PROGRESS NOTE ADULT - ASSESSMENT
A/P: 52 year old male RF hallux amp site w/ exposed bone, infected, and RF lateral 5th met shaft wound 2/2 dressing, infected  - Pt was examined and evaluated  - Hallux surgical site flushed with sterile saline prior to examination. Exposed bone noted.   - Culture taken from surgical site  - Wound packed with sterile saline soaked gauze, and betadine'd applied to 5th met wound.   - No emergent signs warranting OR tonight  - Will continue to monitor  - Rec vasc surg consult as per last admit  - Rec GI consult as per last admit  - Rec ID consult   - Pod surg planning pending vasc rec on vascular status and healing potential  - c/w vanco, zosyn pending ED wound culture  - d/w attending     Thank you for the consult  Pod surg #[id number]

## 2017-09-13 NOTE — H&P ADULT - NSHPLABSRESULTS_GEN_ALL_CORE
Labs:                        8.7    20.54 )-----------( 334      ( 13 Sep 2017 21:41 )             27.7           PT/INR - ( 13 Sep 2017 21:41 )   PT: 11.2 SEC;   INR: 1.00          PTT - ( 13 Sep 2017 21:41 )  PTT:25.6 SEC        Comprehensive Metabolic Panel (09.13.17 @ 21:41)    Sodium, Serum: 134 mmol/L    Potassium, Serum: 4.9 mmol/L    Chloride, Serum: 99 mmol/L    Carbon Dioxide, Serum: 21 mmol/L    Blood Urea Nitrogen, Serum: 56 mg/dL    Creatinine, Serum: 3.04 mg/dL    Glucose, Serum: 250 mg/dL    Calcium, Total Serum: 9.2 mg/dL    Protein Total, Serum: 7.1 g/dL    Albumin, Serum: 3.2 g/dL    Bilirubin Total, Serum: 0.2 mg/dL    Alkaline Phosphatase, Serum: 67    Aspartate Aminotransferase (AST/SGOT): 27 u/L    Alanine Aminotransferase (ALT/SGPT): 39 u/L          Imaging  Xray R foot - pending read

## 2017-09-13 NOTE — PROGRESS NOTE ADULT - SUBJECTIVE AND OBJECTIVE BOX
Patient is a 52y old  Male who presents with a chief complaint of infected R 1st toe amp site (13 Sep 2017 22:10)     INTERVAL HPI/OVERNIGHT EVENTS:  Patient seen and evaluated at bedside.  Pt is resting comfortable in NAD. Denies N/V/F/C.  Pain rated at 2/10    Allergies    No Known Allergies    Intolerances    Vital Signs Last 24 Hrs  T(C): 37.3 (13 Sep 2017 18:01), Max: 37.3 (13 Sep 2017 18:01)  T(F): 99.2 (13 Sep 2017 18:01), Max: 99.2 (13 Sep 2017 18:01)  HR: 64 (13 Sep 2017 18:01) (64 - 64)  BP: 166/56 (13 Sep 2017 18:01) (166/56 - 166/56)  BP(mean): --  RR: 16 (13 Sep 2017 18:01) (16 - 16)  SpO2: 99% (13 Sep 2017 18:01) (99% - 99%)    LABS:                        8.7    20.54 )-----------( 334      ( 13 Sep 2017 21:41 )             27.7     09-13    134<L>  |  99  |  56<H>  ----------------------------<  250<H>  4.9   |  21<L>  |  3.04<H>    Ca    9.2      13 Sep 2017 21:41    TPro  7.1  /  Alb  3.2<L>  /  TBili  0.2  /  DBili  x   /  AST  27  /  ALT  39  /  AlkPhos  67  09-13    PT/INR - ( 13 Sep 2017 21:41 )   PT: 11.2 SEC;   INR: 1.00          PTT - ( 13 Sep 2017 21:41 )  PTT:25.6 SEC    C-Reactive Protein, Serum (09.13.17 @ 21:41)    C-Reactive Protein, Serum: 223.5 mg/L    Sedimentation Rate, Erythrocyte (09.13.17 @ 21:41)    Sedimentation Rate, Erythrocyte: 116 mm/hr    CAPILLARY BLOOD GLUCOSE  315 (13 Sep 2017 18:01)    Lower Extremity Physical Exam:  Vascular: DP/PT 2/4 B/L, CFT <3 sec x 10, Temp gradient warm to warm B/L  Neurology: Epicritic sensation intact to level of digits, B/L  Musculoskeletal/Ortho: No pain with palpation of hallux amp site. No pain with palpation of 5th met wound. No pain with ROM of 5th met wound.   Skin:   Wound #1: RF hallux amp site  Size: 1.3 cm x 1.4cm x bone  Etiology: surgical   Depth: Exposed bone  Wound bed: fibronectroic  Drainage: none  Odor: none  Periwound: hyperkeratotic    Increase erythema periwound streaking to dorsum of foot + demetri-wound erythema to 5th met wound. No purulent drainage. No fluctuance or crepitus noted to wounds.     RADIOLOGY & ADDITIONAL TESTS:

## 2017-09-13 NOTE — H&P ADULT - NSHPPHYSICALEXAM_GEN_ALL_CORE
Physical Exam  T(C): 37.3 (09-13-17 @ 18:01), Max: 37.3 (09-13-17 @ 18:01)  HR: 64 (09-13-17 @ 18:01) (64 - 64)  BP: 166/56 (09-13-17 @ 18:01) (166/56 - 166/56)  RR: 16 (09-13-17 @ 18:01) (16 - 16)  SpO2: 99% (09-13-17 @ 18:01) (99% - 99%)  Wt(kg): --  Tmax: T(C): , Max: 37.3 (09-13-17 @ 18:01)  Wt(kg): --    Gen: NAD  HEENT: normocephalic, atraumatic, no scleral icterus  CV: RRR  Pulm: nonlabored respirations  Abd: Soft, ND, NTP, no rebound, no guarding, no palpable organomegaly or pulsatile masses  Ext: warm, no edema B/L  R foot with 1 cm opening on medial aspect of 1st ray amp incision, purulent drainage, bone palpable.

## 2017-09-13 NOTE — H&P ADULT - ASSESSMENT
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  - admit to vascular surgery  - podiatry evaluation; will likely need further debridement, amputation given exposed bone on exam  - IV antibiotics  - Previous hospitalization started workup for unintentional weight loss; needs GI f/u. Will reconsult [facility]  - KIRSTEN/ PVRs from 8/20 reviewed  - Discussed with attending Tanja

## 2017-09-13 NOTE — ED PROVIDER NOTE - OBJECTIVE STATEMENT
52M with PMHx DM, HLD, HTN, Bipolar D/O and non-healing ulcer of the right foot s/p R great toe amputation August 2017 by Dr. Wtakins (Podiatry) sent to ED by Dr. Watkins for purulent drainage from surgical site wound. Patient states notes increased redness around the wound, denies increased pain. Denies F/C/NS. States he continues to take antibiotics for the wound. 52M with PMHx DM, HLD, HTN, Bipolar D/O and non-healing ulcer of the right foot s/p R great toe amputation August 2017 by Dr. Watkins (Podiatry) sent to ED by Dr. Watkins for purulent drainage from surgical site wound. Patient states notes increased redness around the wound, denies increased pain. Denies F/C/NS. States he continues to take Cipro for his wound. Patient ambulates with cane at baseline.

## 2017-09-13 NOTE — ED ADULT TRIAGE NOTE - CHIEF COMPLAINT QUOTE
s/p toe amputation on right foot 1 month ago, states that it has been infected for "a while" and he was " sent for an operation by a doctor".  Pt states that he has been taking Cipro for 1 month.   in triage

## 2017-09-13 NOTE — H&P ADULT - NSHPREVIEWOFSYSTEMS_GEN_ALL_CORE
REVIEW OF SYSTEMS:  CONSTITUTIONAL: +CHILLS. No fever, weight loss, or fatigue  EYES: No eye pain, visual disturbances, or discharge  RESPIRATORY: No cough, wheezing, chills or hemoptysis; No shortness of breath  CARDIOVASCULAR: No chest pain, palpitations, dizziness, or leg swelling  GASTROINTESTINAL: No abdominal or epigastric pain. No nausea, vomiting, or hematemesis; No diarrhea or constipation. No melena or hematochezia.  GENITOURINARY: No dysuria, frequency, hematuria, or incontinence  NEUROLOGICAL: No headaches, memory loss, loss of strength, numbness, or tremors  MUSCULOSKELETAL: +R FOOT pain

## 2017-09-13 NOTE — ED PROVIDER NOTE - ATTENDING CONTRIBUTION TO CARE
Dr. Hill: I have personally performed a face to face bedside history and physical examination of this patient. I have discussed the history, examination, review of systems, assessment and plan of management with the resident. I have reviewed the electronic medical record and amended it to reflect my history, review of systems, physical exam, assessment and plan.    Attending Exam - Dr. Hill: GEN: well appearing, NAD  HEENT: +PERRL, EOMI  RESP: CTAB, no signs of resipiratory distress CV: s1s2 RRR ABD: soft/non tender/non distended  MSK: s/p L big toe amputation / swelling, normal range of motion, spine grossly normal NEURO: alert, non focal exam SKIN: normal color / temperature / condition. L foot has open wound with no surrounding erythema, scant purulent DC

## 2017-09-13 NOTE — H&P ADULT - NSHPSOCIALHISTORY_GEN_ALL_CORE
Social  Current smoker, 1/2 to 3/4 ppd x 25 years  Rare EtOH, 2 drinks/month  Lives with mother  Retired [place]

## 2017-09-13 NOTE — H&P ADULT - HISTORY OF PRESENT ILLNESS
CC: Patient is a 52y old male who presents with a chief complaint of infected R 1st toe amp site      Patient is a 52y year old male with PMHx of bipolar disorder, DM, recently admitted 8/17-8-23 for 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 He is sent in today by his podiatrist Dr Olivier with concern for infection of his amp site. The patient reports that he has followed up with Dr. Olivier approximately 5 times in the past 3 weeks since discharge, and notes that his visit on Monday was fine, but today (Wednesday), Dr. Olivier was concerned for infection. Denies recent fevers; notes chills approx 1-2 weeks ago that resolved. Is ambulatory with a cane. Reports taking cipro daily since discharge, but has not followed up with Dr. Olivares as indicated in discharge instructions. Has not followed up with GI, but has an appointment with Dr. Cross for an EGD/colonoscopy.      PMH  Bipolar affective disorder in remission  Depression  Diabetes mellitus  Hyperlipidemia    PSH  Amputated great toe of right foot 8/18/2017  ORIF right lower leg- 1995    MEDS  see eMAR    Allergies  No Known Allergies or Intolerances

## 2017-09-13 NOTE — ED PROVIDER NOTE - MEDICAL DECISION MAKING DETAILS
52M with PMHx DM, HLD, HTN, Bipolar D/O and right non-healing foot ulcer s/p great toe amputation here with increased redness and purulent drainage from non-healing wound at surgical site.   -Denies F/C/NS. Check labs, Foot XR  -Consult Podiatry  -Surgical cultures growing pseudomonas, give dose of zosyn

## 2017-09-14 DIAGNOSIS — R68.81 EARLY SATIETY: ICD-10-CM

## 2017-09-14 DIAGNOSIS — N18.9 CHRONIC KIDNEY DISEASE, UNSPECIFIED: ICD-10-CM

## 2017-09-14 DIAGNOSIS — I77.1 STRICTURE OF ARTERY: ICD-10-CM

## 2017-09-14 LAB
BLD GP AB SCN SERPL QL: NEGATIVE — SIGNIFICANT CHANGE UP
BUN SERPL-MCNC: 57 MG/DL — HIGH (ref 7–23)
CALCIUM SERPL-MCNC: 8.8 MG/DL — SIGNIFICANT CHANGE UP (ref 8.4–10.5)
CHLORIDE SERPL-SCNC: 105 MMOL/L — SIGNIFICANT CHANGE UP (ref 98–107)
CO2 SERPL-SCNC: 20 MMOL/L — LOW (ref 22–31)
CREAT SERPL-MCNC: 2.85 MG/DL — HIGH (ref 0.5–1.3)
FERRITIN SERPL-MCNC: 386.1 NG/ML — SIGNIFICANT CHANGE UP (ref 30–400)
GLUCOSE SERPL-MCNC: 172 MG/DL — HIGH (ref 70–99)
GRAM STN SPEC: SIGNIFICANT CHANGE UP
HCT VFR BLD CALC: 21.7 % — LOW (ref 39–50)
HCT VFR BLD CALC: 22.1 % — LOW (ref 39–50)
HGB BLD-MCNC: 7 G/DL — CRITICAL LOW (ref 13–17)
HGB BLD-MCNC: 7.2 G/DL — LOW (ref 13–17)
IRON SATN MFR SERPL: 19 UG/DL — LOW (ref 45–165)
IRON SATN MFR SERPL: 194 UG/DL — SIGNIFICANT CHANGE UP (ref 155–535)
MAGNESIUM SERPL-MCNC: 2.1 MG/DL — SIGNIFICANT CHANGE UP (ref 1.6–2.6)
MCHC RBC-ENTMCNC: 29.3 PG — SIGNIFICANT CHANGE UP (ref 27–34)
MCHC RBC-ENTMCNC: 29.5 PG — SIGNIFICANT CHANGE UP (ref 27–34)
MCHC RBC-ENTMCNC: 32.3 % — SIGNIFICANT CHANGE UP (ref 32–36)
MCHC RBC-ENTMCNC: 32.6 % — SIGNIFICANT CHANGE UP (ref 32–36)
MCV RBC AUTO: 90.6 FL — SIGNIFICANT CHANGE UP (ref 80–100)
MCV RBC AUTO: 90.8 FL — SIGNIFICANT CHANGE UP (ref 80–100)
NRBC # FLD: 0 — SIGNIFICANT CHANGE UP
NRBC # FLD: 0 — SIGNIFICANT CHANGE UP
PHOSPHATE SERPL-MCNC: 3.9 MG/DL — SIGNIFICANT CHANGE UP (ref 2.5–4.5)
PLATELET # BLD AUTO: 260 K/UL — SIGNIFICANT CHANGE UP (ref 150–400)
PLATELET # BLD AUTO: 272 K/UL — SIGNIFICANT CHANGE UP (ref 150–400)
PMV BLD: 10.6 FL — SIGNIFICANT CHANGE UP (ref 7–13)
PMV BLD: 10.8 FL — SIGNIFICANT CHANGE UP (ref 7–13)
POTASSIUM SERPL-MCNC: 4.8 MMOL/L — SIGNIFICANT CHANGE UP (ref 3.5–5.3)
POTASSIUM SERPL-SCNC: 4.8 MMOL/L — SIGNIFICANT CHANGE UP (ref 3.5–5.3)
RBC # BLD: 2.39 M/UL — LOW (ref 4.2–5.8)
RBC # BLD: 2.44 M/UL — LOW (ref 4.2–5.8)
RBC # FLD: 13.3 % — SIGNIFICANT CHANGE UP (ref 10.3–14.5)
RBC # FLD: 13.4 % — SIGNIFICANT CHANGE UP (ref 10.3–14.5)
RH IG SCN BLD-IMP: POSITIVE — SIGNIFICANT CHANGE UP
SODIUM SERPL-SCNC: 138 MMOL/L — SIGNIFICANT CHANGE UP (ref 135–145)
SPECIMEN SOURCE: SIGNIFICANT CHANGE UP
UIBC SERPL-MCNC: 175 UG/DL — SIGNIFICANT CHANGE UP (ref 110–370)
WBC # BLD: 12.7 K/UL — HIGH (ref 3.8–10.5)
WBC # BLD: 12.75 K/UL — HIGH (ref 3.8–10.5)
WBC # FLD AUTO: 12.7 K/UL — HIGH (ref 3.8–10.5)
WBC # FLD AUTO: 12.75 K/UL — HIGH (ref 3.8–10.5)

## 2017-09-14 PROCEDURE — 99221 1ST HOSP IP/OBS SF/LOW 40: CPT | Mod: GC

## 2017-09-14 PROCEDURE — 99253 IP/OBS CNSLTJ NEW/EST LOW 45: CPT | Mod: GC

## 2017-09-14 PROCEDURE — 99232 SBSQ HOSP IP/OBS MODERATE 35: CPT

## 2017-09-14 RX ORDER — HYDRALAZINE HCL 50 MG
10 TABLET ORAL ONCE
Qty: 0 | Refills: 0 | Status: COMPLETED | OUTPATIENT
Start: 2017-09-14 | End: 2017-09-14

## 2017-09-14 RX ORDER — OXYCODONE HYDROCHLORIDE 5 MG/1
10 TABLET ORAL EVERY 4 HOURS
Qty: 0 | Refills: 0 | Status: DISCONTINUED | OUTPATIENT
Start: 2017-09-14 | End: 2017-09-20

## 2017-09-14 RX ORDER — ACETAMINOPHEN 500 MG
650 TABLET ORAL EVERY 6 HOURS
Qty: 0 | Refills: 0 | Status: DISCONTINUED | OUTPATIENT
Start: 2017-09-14 | End: 2017-09-28

## 2017-09-14 RX ORDER — OXYCODONE HYDROCHLORIDE 5 MG/1
5 TABLET ORAL EVERY 4 HOURS
Qty: 0 | Refills: 0 | Status: DISCONTINUED | OUTPATIENT
Start: 2017-09-14 | End: 2017-09-14

## 2017-09-14 RX ADMIN — Medication 8: at 19:05

## 2017-09-14 RX ADMIN — Medication 10 MILLIGRAM(S): at 22:51

## 2017-09-14 RX ADMIN — DIVALPROEX SODIUM 500 MILLIGRAM(S): 500 TABLET, DELAYED RELEASE ORAL at 22:51

## 2017-09-14 RX ADMIN — Medication 2: at 13:22

## 2017-09-14 RX ADMIN — Medication 100 MILLIGRAM(S): at 22:51

## 2017-09-14 RX ADMIN — Medication 250 MILLIGRAM(S): at 01:42

## 2017-09-14 RX ADMIN — OLANZAPINE 20 MILLIGRAM(S): 15 TABLET, FILM COATED ORAL at 00:02

## 2017-09-14 RX ADMIN — OXYCODONE HYDROCHLORIDE 10 MILLIGRAM(S): 5 TABLET ORAL at 21:42

## 2017-09-14 RX ADMIN — Medication 650 MILLIGRAM(S): at 22:52

## 2017-09-14 RX ADMIN — OLANZAPINE 20 MILLIGRAM(S): 15 TABLET, FILM COATED ORAL at 22:51

## 2017-09-14 RX ADMIN — PIPERACILLIN AND TAZOBACTAM 25 GRAM(S): 4; .5 INJECTION, POWDER, LYOPHILIZED, FOR SOLUTION INTRAVENOUS at 09:37

## 2017-09-14 RX ADMIN — HEPARIN SODIUM 5000 UNIT(S): 5000 INJECTION INTRAVENOUS; SUBCUTANEOUS at 22:51

## 2017-09-14 RX ADMIN — OXYCODONE HYDROCHLORIDE 10 MILLIGRAM(S): 5 TABLET ORAL at 20:48

## 2017-09-14 RX ADMIN — PIPERACILLIN AND TAZOBACTAM 25 GRAM(S): 4; .5 INJECTION, POWDER, LYOPHILIZED, FOR SOLUTION INTRAVENOUS at 18:55

## 2017-09-14 RX ADMIN — ATORVASTATIN CALCIUM 40 MILLIGRAM(S): 80 TABLET, FILM COATED ORAL at 22:51

## 2017-09-14 RX ADMIN — Medication 2: at 09:30

## 2017-09-14 RX ADMIN — Medication 100 MILLIGRAM(S): at 01:18

## 2017-09-14 RX ADMIN — ISOSORBIDE MONONITRATE 120 MILLIGRAM(S): 60 TABLET, EXTENDED RELEASE ORAL at 10:38

## 2017-09-14 RX ADMIN — ATORVASTATIN CALCIUM 40 MILLIGRAM(S): 80 TABLET, FILM COATED ORAL at 00:02

## 2017-09-14 RX ADMIN — Medication 650 MILLIGRAM(S): at 13:22

## 2017-09-14 RX ADMIN — CARVEDILOL PHOSPHATE 12.5 MILLIGRAM(S): 80 CAPSULE, EXTENDED RELEASE ORAL at 19:07

## 2017-09-14 RX ADMIN — CARVEDILOL PHOSPHATE 12.5 MILLIGRAM(S): 80 CAPSULE, EXTENDED RELEASE ORAL at 10:38

## 2017-09-14 NOTE — PHYSICAL THERAPY INITIAL EVALUATION ADULT - PERTINENT HX OF CURRENT PROBLEM, REHAB EVAL
Patient is a 52 y.o male with gangrene of his right 1st toe. Past medical history of bipolar and diabetes.

## 2017-09-14 NOTE — CONSULT NOTE ADULT - PROBLEM SELECTOR RECOMMENDATION 2
- please check iron, TIBC, ferritin  - please check reticulocyte count  - check the above labs PRIOR to any transfusion  - patient declined colonoscopy - it was explained to him that he may have colon mass/cancer that could be accounting for his anemia and weight loss, and that early detection could be life saving or prolonging, but despite this explanation patient refuses colonoscopy at this time

## 2017-09-14 NOTE — PROGRESS NOTE ADULT - SUBJECTIVE AND OBJECTIVE BOX
Patient is a 52y old  Male who presents with a chief complaint of right foot (14 Sep 2017 02:55)       INTERVAL HPI/OVERNIGHT EVENTS:  Patient seen and evaluated at bedside.  Pt is resting comfortable in NAD. Denies N/V/F/C.  Pain rated at X/10    Allergies    No Known Allergies    Intolerances        Vital Signs Last 24 Hrs  T(C): 37 (14 Sep 2017 09:45), Max: 37.3 (13 Sep 2017 18:01)  T(F): 98.6 (14 Sep 2017 09:45), Max: 99.2 (13 Sep 2017 18:01)  HR: 66 (14 Sep 2017 11:45) (63 - 80)  BP: 134/55 (14 Sep 2017 11:45) (134/55 - 209/82)  BP(mean): --  RR: 18 (14 Sep 2017 09:45) (16 - 18)  SpO2: 97% (14 Sep 2017 09:45) (97% - 100%)    LABS:                        7.0    12.70 )-----------( 272      ( 14 Sep 2017 12:06 )             21.7     09-14    138  |  105  |  57<H>  ----------------------------<  172<H>  4.8   |  20<L>  |  2.85<H>    Ca    8.8      14 Sep 2017 12:06  Phos  3.9     09-14  Mg     2.1     09-14    TPro  7.1  /  Alb  3.2<L>  /  TBili  0.2  /  DBili  x   /  AST  27  /  ALT  39  /  AlkPhos  67  09-13    PT/INR - ( 13 Sep 2017 21:41 )   PT: 11.2 SEC;   INR: 1.00          PTT - ( 13 Sep 2017 21:41 )  PTT:25.6 SEC    CAPILLARY BLOOD GLUCOSE  182 (14 Sep 2017 13:04)  152 (14 Sep 2017 09:01)  315 (13 Sep 2017 18:01)          Lower Extremity Physical Exam:  Vascular: DP/PT 2/4 B/L, CFT <3 sec x 10, Temp gradient warm to warm B/L  Neurology: Epicritic sensation intact to level of digits, B/L  Musculoskeletal/Ortho: No pain with palpation of hallux amp site. No pain with palpation of 5th met wound. No pain with ROM of 5th met wound.   Skin:   Wound #1: RF hallux amp site  Size: 1.3 cm x 1.4cm x bone  Etiology: surgical   Depth: Exposed bone  Wound bed: fibronectroic  Drainage: none  Odor: none  Periwound: hyperkeratotic    Increase erythema periwound streaking to dorsum of foot + demetri-wound erythema to 5th met wound. No purulent drainage. No fluctuance or crepitus noted to wounds.     RADIOLOGY & ADDITIONAL TESTS:

## 2017-09-14 NOTE — PROGRESS NOTE ADULT - SUBJECTIVE AND OBJECTIVE BOX
Vascular Surgery Progress Note    S: Pt seen this AM. No acute events overnight. Remains afebrile.       O: Physical Exam:  T(C): 37 (09-14-17 @ 09:45), Max: 37.3 (09-13-17 @ 18:01)  HR: 63 (09-14-17 @ 09:45) (63 - 80)  BP: 193/87 (09-14-17 @ 09:45) (166/56 - 209/82)  RR: 18 (09-14-17 @ 09:45) (16 - 18)  SpO2: 97% (09-14-17 @ 09:45) (97% - 100%)    09-14-17 @ 07:01  -  09-14-17 @ 10:31  --------------------------------------------------------  IN: 0 mL / OUT: 500 mL / NET: -500 mL        Gen: NAD  Resp: Non labored breathing  Cards: RRR -rgm  GI: NT/ND abd  Ext: R foot amputation site/5th digit infected but covered. Currently CDI    Labs:                          8.7    20.54 )-----------( 334      ( 13 Sep 2017 21:41 )             27.7 Vascular Surgery Progress Note    S: Pt seen this AM. No acute events overnight. Remains afebrile.   pt w/o new c/o    O: Physical Exam:  T(C): 37 (09-14-17 @ 09:45), Max: 37.3 (09-13-17 @ 18:01)  HR: 63 (09-14-17 @ 09:45) (63 - 80)  BP: 193/87 (09-14-17 @ 09:45) (166/56 - 209/82)  RR: 18 (09-14-17 @ 09:45) (16 - 18)  SpO2: 97% (09-14-17 @ 09:45) (97% - 100%)    09-14-17 @ 07:01  -  09-14-17 @ 10:31  --------------------------------------------------------  IN: 0 mL / OUT: 500 mL / NET: -500 mL        Gen: NAD cooperative   Resp: Non labored breathing  Cards: RRR -rgm  GI: NT/ND abd  Ext: R foot amputation site/5th digit infected but covered. Currently CDI limited healing and granulation tissue remains     Labs:                                             8.7    20.54 )-----------( 334      ( 13 Sep 2017 21:41 )             27.7

## 2017-09-14 NOTE — ED ADULT NURSE NOTE - OBJECTIVE STATEMENT
pt received to rm 9 a&ox3 and ambulatory at baseline. pt had rt big toe removed and now has infection to drain site. pt was told by his podiatrist to come to ed to have it checked. upon inspection pt has 1x.5 open wound to both sides of rt foot. redness around site noted as well. pt has hx of dm. pt denies cp, sob, fever/chills. 20g iv placed in rt ac, labs drawn. will continue to monitor.

## 2017-09-14 NOTE — CONSULT NOTE ADULT - SUBJECTIVE AND OBJECTIVE BOX
Chief Complaint:  Patient is a 52y old  Male who presents with a chief complaint of right foot (14 Sep 2017 02:55)      HPI: 51 yo man with DM and PVD here for wound infection from recent toe amputation who notes weight loss over the past many months associated with early satiety and decreased appetite. He denies dysphagia, odynophagia, nausea, vomiting, regurgitation. He reports having brown, formed BMs every other day. He denies melena or hematochezia. During prior hospitalization when he underwent toe amputation he had CT chest/abd/plvs for weight loss and was noted to have thickening of the distal esophagus and gastric cardia. He is a smoker. He denies EtOH use. He denies drug use. Of note he reports subjective fevers with night sweats intermittently.      Allergies:  No Known Allergies      Home Medications:    Hospital Medications:  heparin  Injectable 5000 Unit(s) SubCutaneous every 8 hours  sodium bicarbonate 650 milliGRAM(s) Oral three times a day  isosorbide   mononitrate ER Tablet (IMDUR) 120 milliGRAM(s) Oral daily  diVALproex  milliGRAM(s) Oral two times a day  traZODone 100 milliGRAM(s) Oral at bedtime  atorvastatin 40 milliGRAM(s) Oral at bedtime  OLANZapine 20 milliGRAM(s) Oral at bedtime  carvedilol 12.5 milliGRAM(s) Oral every 12 hours  insulin lispro (HumaLOG) corrective regimen sliding scale   SubCutaneous three times a day before meals  insulin lispro (HumaLOG) corrective regimen sliding scale   SubCutaneous at bedtime  dextrose 5%. 1000 milliLiter(s) IV Continuous <Continuous>  dextrose Gel 1 Dose(s) Oral once PRN  dextrose 50% Injectable 12.5 Gram(s) IV Push once  dextrose 50% Injectable 25 Gram(s) IV Push once  dextrose 50% Injectable 25 Gram(s) IV Push once  glucagon  Injectable 1 milliGRAM(s) IntraMuscular once PRN  piperacillin/tazobactam IVPB. 3.375 Gram(s) IV Intermittent every 8 hours      PMHX/PSHX:  Bipolar affective disorder in remission  High cholesterol  Depression  Diabetes mellitus  Diabetes mellitus  Amputated great toe of right foot  ORIF right lower leg-       Family history:  No pertinent family history in first degree relatives      Social History:     ROS:     General:  (+) wt loss, (+) fevers, (+) chills, (+) night sweats   Eyes:  Good vision, no reported pain  ENT:  No sore throat, pain, runny nose  CV:  No chest pain, SOB, palpitations, orthopnea  Resp:  No cough, SOB, wheezing  GI:  See HPI  :  No pain, bleeding, incontinence, nocturia  Muscle:  No pain, weakness  Neuro:  No weakness, tingling, memory problems  Psych:  No fatigue, insomnia, mood problems, depression  Endocrine:  No cold/heat intolerance  Heme:  No petechiae, ecchymosis, easy bruising  Skin:  No rash, edema      PHYSICAL EXAM:     GENERAL: NAD, frail given age  HEENT: sclera anicteric, mild temporal wasting  HEART:  RRR, no MRG, no edema  ABDOMEN:  Soft, non-tender, non-distended, no masses appreciated, no hepatosplenomegaly appreciated  EXTREMITIES:  wound dressing on R foot, s/p toe amputation  SKIN:  No rash  NEURO:  Sleepy, somnolent MS, orientedx3     Vital Signs:  Vital Signs Last 24 Hrs  T(C): 37 (14 Sep 2017 09:45), Max: 37.3 (13 Sep 2017 18:01)  T(F): 98.6 (14 Sep 2017 09:45), Max: 99.2 (13 Sep 2017 18:01)  HR: 64 (14 Sep 2017 10:44) (63 - 80)  BP: 187/66 (14 Sep 2017 10:44) (166/56 - 209/82)  BP(mean): --  RR: 18 (14 Sep 2017 09:45) (16 - 18)  SpO2: 97% (14 Sep 2017 09:45) (97% - 100%)  Daily     Daily Weight in k.6 (14 Sep 2017 02:55)    LABS:                        7.0    12.70 )-----------( 272      ( 14 Sep 2017 12:06 )             21.7     09-13    134<L>  |  99  |  56<H>  ----------------------------<  250<H>  4.9   |  21<L>  |  3.04<H>    Ca    9.2      13 Sep 2017 21:41    TPro  7.1  /  Alb  3.2<L>  /  TBili  0.2  /  DBili  x   /  AST  27  /  ALT  39  /  AlkPhos  67  09-13    LIVER FUNCTIONS - ( 13 Sep 2017 21:41 )  Alb: 3.2 g/dL / Pro: 7.1 g/dL / ALK PHOS: 67 u/L / ALT: 39 u/L / AST: 27 u/L / GGT: x           PT/INR - ( 13 Sep 2017 21:41 )   PT: 11.2 SEC;   INR: 1.00          PTT - ( 13 Sep 2017 21:41 )  PTT:25.6 SEC    Imaging:  < from: CT Abdomen and Pelvis w/ Oral Cont (17 @ 14:48) >  IMPRESSION:   Soft tissue thickening at the level of the GE junction, not apparent on   the prior exam. Recommend endoscopy to evaluate for possible pathology at   this level.    PERITONEUM: No ascites.    < end of copied text > Chief Complaint:  Patient is a 52y old  Male who presents with a chief complaint of right foot (14 Sep 2017 02:55)      HPI: 51 yo man with DM and PVD here for wound infection from recent toe amputation who notes weight loss over the past many months associated with early satiety and decreased appetite. He denies dysphagia, odynophagia, nausea, vomiting, regurgitation. He reports having brown, formed BMs every other day. He denies melena or hematochezia. During prior hospitalization when he underwent toe amputation he had CT chest/abd/plvs for weight loss and was noted to have thickening of the distal esophagus and gastric cardia. He is a smoker. He denies EtOH use. He denies drug use. Of note he reports subjective fevers with night sweats intermittently.      Allergies:  No Known Allergies      Home Medications:    Hospital Medications:  heparin  Injectable 5000 Unit(s) SubCutaneous every 8 hours  sodium bicarbonate 650 milliGRAM(s) Oral three times a day  isosorbide   mononitrate ER Tablet (IMDUR) 120 milliGRAM(s) Oral daily  diVALproex  milliGRAM(s) Oral two times a day  traZODone 100 milliGRAM(s) Oral at bedtime  atorvastatin 40 milliGRAM(s) Oral at bedtime  OLANZapine 20 milliGRAM(s) Oral at bedtime  carvedilol 12.5 milliGRAM(s) Oral every 12 hours  insulin lispro (HumaLOG) corrective regimen sliding scale   SubCutaneous three times a day before meals  insulin lispro (HumaLOG) corrective regimen sliding scale   SubCutaneous at bedtime  dextrose 5%. 1000 milliLiter(s) IV Continuous <Continuous>  dextrose Gel 1 Dose(s) Oral once PRN  dextrose 50% Injectable 12.5 Gram(s) IV Push once  dextrose 50% Injectable 25 Gram(s) IV Push once  dextrose 50% Injectable 25 Gram(s) IV Push once  glucagon  Injectable 1 milliGRAM(s) IntraMuscular once PRN  piperacillin/tazobactam IVPB. 3.375 Gram(s) IV Intermittent every 8 hours      PMHX/PSHX:  Bipolar affective disorder in remission  High cholesterol  Depression  Diabetes mellitus  Diabetes mellitus  Amputated great toe of right foot  ORIF right lower leg-       Family history:  Mother has had colonic polyps resected.  There is no other family history of peptic ulcer disease, gastric cancer, colon polyps, colon cancer, celiac disease, biliary, hepatic, pancreatic disease.  None of the female relatives have breast, uterine or ovarian cancer.       Social History:     ROS:     General:  (+) wt loss, (+) fevers, (+) chills, (+) night sweats   Eyes:  Good vision, no reported pain  ENT:  No sore throat, pain, runny nose  CV:  No chest pain, SOB, palpitations, orthopnea  Resp:  No cough, SOB, wheezing  GI:  See HPI  :  No pain, bleeding, incontinence, nocturia  Muscle:  No pain, weakness  Neuro:  No weakness, tingling, memory problems  Psych:  No fatigue, insomnia, mood problems, depression  Endocrine:  No cold/heat intolerance  Heme:  No petechiae, ecchymosis, easy bruising  Skin:  No rash, edema      PHYSICAL EXAM:     GENERAL: NAD, frail given age  HEENT: sclera anicteric, mild temporal wasting  HEART:  RRR, no MRG, no edema  ABDOMEN:  Soft, non-tender, non-distended, no masses appreciated, no hepatosplenomegaly appreciated  EXTREMITIES:  wound dressing on R foot, s/p toe amputation  SKIN:  No rash  NEURO:  Sleepy, somnolent MS, orientedx3     Vital Signs:  Vital Signs Last 24 Hrs  T(C): 37 (14 Sep 2017 09:45), Max: 37.3 (13 Sep 2017 18:01)  T(F): 98.6 (14 Sep 2017 09:45), Max: 99.2 (13 Sep 2017 18:01)  HR: 64 (14 Sep 2017 10:44) (63 - 80)  BP: 187/66 (14 Sep 2017 10:44) (166/56 - 209/82)  BP(mean): --  RR: 18 (14 Sep 2017 09:45) (16 - 18)  SpO2: 97% (14 Sep 2017 09:45) (97% - 100%)  Daily     Daily Weight in k.6 (14 Sep 2017 02:55)    LABS:                        7.0    12.70 )-----------( 272      ( 14 Sep 2017 12:06 )             21.7     09-13    134<L>  |  99  |  56<H>  ----------------------------<  250<H>  4.9   |  21<L>  |  3.04<H>    Ca    9.2      13 Sep 2017 21:41    TPro  7.1  /  Alb  3.2<L>  /  TBili  0.2  /  DBili  x   /  AST  27  /  ALT  39  /  AlkPhos  67  09-13    LIVER FUNCTIONS - ( 13 Sep 2017 21:41 )  Alb: 3.2 g/dL / Pro: 7.1 g/dL / ALK PHOS: 67 u/L / ALT: 39 u/L / AST: 27 u/L / GGT: x           PT/INR - ( 13 Sep 2017 21:41 )   PT: 11.2 SEC;   INR: 1.00          PTT - ( 13 Sep 2017 21:41 )  PTT:25.6 SEC    Imaging:  < from: CT Abdomen and Pelvis w/ Oral Cont (17 @ 14:48) >  IMPRESSION:   Soft tissue thickening at the level of the GE junction, not apparent on   the prior exam. Recommend endoscopy to evaluate for possible pathology at   this level.    PERITONEUM: No ascites.    < end of copied text >

## 2017-09-14 NOTE — PHYSICAL THERAPY INITIAL EVALUATION ADULT - ADDITIONAL COMMENTS
Patient reports he lives in an apartment with his mother and does not have to climb steps. Patient denies use of assistive device.

## 2017-09-14 NOTE — PROGRESS NOTE ADULT - ASSESSMENT
53 yo male with infected R foot hallux amp site and infected 5th digit:  - Pain control  - F/u KIRSTEN/PVR  - Wound washes and packing per Pods  - F/x wound cultures   - Cont IV abx 53 yo male with infected R foot hallux amp site and infected 5th digit:  - Pain control  - Wound washes and packing per Pods  - F/x wound cultures   - Cont IV abx

## 2017-09-14 NOTE — CONSULT NOTE ADULT - ASSESSMENT
51 yo man with DM, PVD, here with post-op wound infection, GI consulted for weight loss, early satiety and abnormal CT showing thickening of the distal esophagus/gastric cardia raising suspicion and concern for esophageal Ca (adenoCa more likely based on location vs SCC given h/o smoking) vs gastric Ca. Of note, patient is also significantly anemic - no work-up done yet, however no overt GI bleeding. Given PVD and CKD suspect patient has degree of anemia of chronic inflammation (common in PVD/osteomyelitis) vs decreased production given CKD vs GI blood loss - esophageal, gastric or colon cancer. 51 yo man with DM, PVD, here with post-op wound infection, GI consulted for weight loss, early satiety and abnormal CT showing thickening of the distal esophagus/gastric cardia raising suspicion and concern for esophageal/gastric cancer (adenocarcinoma more likely based on location vs SCC given h/o smoking) vs gastric Ca. Of note, patient is also significantly anemic - no work-up done yet, however no overt GI bleeding. Given PVD and CKD suspect patient has degree of anemia of chronic inflammation (common in PVD/osteomyelitis) vs decreased production given CKD vs GI blood loss due to esophageal, gastric or colon cancer increased risk due to mother's history of colonic polyps).   Patient does not wish colonoscopy at this time in spite of mother's advice to have colonoscopy.

## 2017-09-14 NOTE — PROGRESS NOTE ADULT - ASSESSMENT
A/P: 52 year old male RF hallux amp site w/ exposed bone, infected, and RF lateral 5th met shaft wound 2/2 dressing, infected  - Pt was examined and evaluated  - MRI ordered  - Wound packed with betadine gauze and betadine'd applied to 5th met wound.   - WBC trending down  - Pod surg planning pending [facility] rec on vascular status and healing potential, will follow angio on Monday, booked for OR Tuesday at 5:00PM  - c/w vanco, zosyn pending ED wound culture  - MRI ordered  - d/w attending

## 2017-09-15 ENCOUNTER — RESULT REVIEW (OUTPATIENT)
Age: 52
End: 2017-09-15

## 2017-09-15 ENCOUNTER — APPOINTMENT (OUTPATIENT)
Dept: GASTROENTEROLOGY | Facility: HOSPITAL | Age: 52
End: 2017-09-15

## 2017-09-15 LAB
BUN SERPL-MCNC: 55 MG/DL — HIGH (ref 7–23)
CALCIUM SERPL-MCNC: 8.7 MG/DL — SIGNIFICANT CHANGE UP (ref 8.4–10.5)
CHLORIDE SERPL-SCNC: 106 MMOL/L — SIGNIFICANT CHANGE UP (ref 98–107)
CO2 SERPL-SCNC: 20 MMOL/L — LOW (ref 22–31)
CREAT SERPL-MCNC: 3 MG/DL — HIGH (ref 0.5–1.3)
GLUCOSE SERPL-MCNC: 175 MG/DL — HIGH (ref 70–99)
HCT VFR BLD CALC: 23.4 % — LOW (ref 39–50)
HGB BLD-MCNC: 7.3 G/DL — LOW (ref 13–17)
MAGNESIUM SERPL-MCNC: 2.1 MG/DL — SIGNIFICANT CHANGE UP (ref 1.6–2.6)
MCHC RBC-ENTMCNC: 28.7 PG — SIGNIFICANT CHANGE UP (ref 27–34)
MCHC RBC-ENTMCNC: 31.2 % — LOW (ref 32–36)
MCV RBC AUTO: 92.1 FL — SIGNIFICANT CHANGE UP (ref 80–100)
NRBC # FLD: 0 — SIGNIFICANT CHANGE UP
PHOSPHATE SERPL-MCNC: 4.3 MG/DL — SIGNIFICANT CHANGE UP (ref 2.5–4.5)
PLATELET # BLD AUTO: 263 K/UL — SIGNIFICANT CHANGE UP (ref 150–400)
PMV BLD: 10.8 FL — SIGNIFICANT CHANGE UP (ref 7–13)
POTASSIUM SERPL-MCNC: 5.1 MMOL/L — SIGNIFICANT CHANGE UP (ref 3.5–5.3)
POTASSIUM SERPL-SCNC: 5.1 MMOL/L — SIGNIFICANT CHANGE UP (ref 3.5–5.3)
RBC # BLD: 2.54 M/UL — LOW (ref 4.2–5.8)
RBC # FLD: 13.3 % — SIGNIFICANT CHANGE UP (ref 10.3–14.5)
SODIUM SERPL-SCNC: 137 MMOL/L — SIGNIFICANT CHANGE UP (ref 135–145)
WBC # BLD: 10.26 K/UL — SIGNIFICANT CHANGE UP (ref 3.8–10.5)
WBC # FLD AUTO: 10.26 K/UL — SIGNIFICANT CHANGE UP (ref 3.8–10.5)

## 2017-09-15 PROCEDURE — 99232 SBSQ HOSP IP/OBS MODERATE 35: CPT

## 2017-09-15 PROCEDURE — 88312 SPECIAL STAINS GROUP 1: CPT | Mod: 26

## 2017-09-15 PROCEDURE — 88305 TISSUE EXAM BY PATHOLOGIST: CPT | Mod: 26

## 2017-09-15 PROCEDURE — 43239 EGD BIOPSY SINGLE/MULTIPLE: CPT | Mod: GC

## 2017-09-15 RX ORDER — PANTOPRAZOLE SODIUM 20 MG/1
40 TABLET, DELAYED RELEASE ORAL
Qty: 0 | Refills: 0 | Status: DISCONTINUED | OUTPATIENT
Start: 2017-09-15 | End: 2017-09-28

## 2017-09-15 RX ORDER — HYDRALAZINE HCL 50 MG
10 TABLET ORAL ONCE
Qty: 0 | Refills: 0 | Status: COMPLETED | OUTPATIENT
Start: 2017-09-15 | End: 2017-09-15

## 2017-09-15 RX ORDER — SODIUM CHLORIDE 9 MG/ML
1000 INJECTION, SOLUTION INTRAVENOUS
Qty: 0 | Refills: 0 | Status: DISCONTINUED | OUTPATIENT
Start: 2017-09-15 | End: 2017-09-15

## 2017-09-15 RX ADMIN — PIPERACILLIN AND TAZOBACTAM 25 GRAM(S): 4; .5 INJECTION, POWDER, LYOPHILIZED, FOR SOLUTION INTRAVENOUS at 01:31

## 2017-09-15 RX ADMIN — PIPERACILLIN AND TAZOBACTAM 25 GRAM(S): 4; .5 INJECTION, POWDER, LYOPHILIZED, FOR SOLUTION INTRAVENOUS at 09:56

## 2017-09-15 RX ADMIN — Medication 2: at 22:22

## 2017-09-15 RX ADMIN — Medication 650 MILLIGRAM(S): at 13:38

## 2017-09-15 RX ADMIN — OXYCODONE HYDROCHLORIDE 10 MILLIGRAM(S): 5 TABLET ORAL at 09:56

## 2017-09-15 RX ADMIN — ISOSORBIDE MONONITRATE 120 MILLIGRAM(S): 60 TABLET, EXTENDED RELEASE ORAL at 13:37

## 2017-09-15 RX ADMIN — Medication 650 MILLIGRAM(S): at 22:22

## 2017-09-15 RX ADMIN — OXYCODONE HYDROCHLORIDE 10 MILLIGRAM(S): 5 TABLET ORAL at 10:55

## 2017-09-15 RX ADMIN — SODIUM CHLORIDE 50 MILLILITER(S): 9 INJECTION, SOLUTION INTRAVENOUS at 06:09

## 2017-09-15 RX ADMIN — HEPARIN SODIUM 5000 UNIT(S): 5000 INJECTION INTRAVENOUS; SUBCUTANEOUS at 22:22

## 2017-09-15 RX ADMIN — Medication 100 MILLIGRAM(S): at 22:22

## 2017-09-15 RX ADMIN — OLANZAPINE 20 MILLIGRAM(S): 15 TABLET, FILM COATED ORAL at 22:22

## 2017-09-15 RX ADMIN — SODIUM CHLORIDE 50 MILLILITER(S): 9 INJECTION, SOLUTION INTRAVENOUS at 17:44

## 2017-09-15 RX ADMIN — HEPARIN SODIUM 5000 UNIT(S): 5000 INJECTION INTRAVENOUS; SUBCUTANEOUS at 13:37

## 2017-09-15 RX ADMIN — DIVALPROEX SODIUM 500 MILLIGRAM(S): 500 TABLET, DELAYED RELEASE ORAL at 22:22

## 2017-09-15 RX ADMIN — CARVEDILOL PHOSPHATE 12.5 MILLIGRAM(S): 80 CAPSULE, EXTENDED RELEASE ORAL at 17:43

## 2017-09-15 RX ADMIN — PIPERACILLIN AND TAZOBACTAM 25 GRAM(S): 4; .5 INJECTION, POWDER, LYOPHILIZED, FOR SOLUTION INTRAVENOUS at 17:42

## 2017-09-15 RX ADMIN — Medication 10 MILLIGRAM(S): at 23:45

## 2017-09-15 RX ADMIN — ATORVASTATIN CALCIUM 40 MILLIGRAM(S): 80 TABLET, FILM COATED ORAL at 22:22

## 2017-09-15 RX ADMIN — Medication: at 09:57

## 2017-09-15 RX ADMIN — Medication 2: at 17:48

## 2017-09-15 NOTE — PROGRESS NOTE ADULT - ASSESSMENT
53 yo male with infected R foot hallux amp site and infected 5th digit HOD2:  - Pain control  - Wound washes and packing per Pods  - F/x wound cultures  - Cont IV abx  - Plan for RLE angio 51 yo male with infected R foot hallux amp site and infected 5th digit HOD2:  - Pain control  - Wound washes and packing per Pods  - F/x wound cultures  - Cont IV abx  - Plan for RLE angio mon

## 2017-09-15 NOTE — PROGRESS NOTE ADULT - SUBJECTIVE AND OBJECTIVE BOX
Patient is a 52y old  Male who presents with a chief complaint of right foot (14 Sep 2017 02:55)       INTERVAL HPI/OVERNIGHT EVENTS:  Patient seen and evaluated at bedside.  Pt is resting comfortable in NAD. Denies N/V/F/C.      Allergies    No Known Allergies    Intolerances        Vital Signs Last 24 Hrs  T(C): 37.1 (15 Sep 2017 06:08), Max: 37.1 (14 Sep 2017 14:22)  T(F): 98.8 (15 Sep 2017 06:08), Max: 98.8 (14 Sep 2017 14:22)  HR: 64 (15 Sep 2017 06:08) (60 - 66)  BP: 141/68 (15 Sep 2017 06:08) (134/55 - 195/73)  BP(mean): --  RR: 17 (15 Sep 2017 06:08) (16 - 20)  SpO2: 98% (15 Sep 2017 06:08) (97% - 99%)    LABS:                        7.3    10.26 )-----------( 263      ( 15 Sep 2017 06:55 )             23.4     09-14    138  |  105  |  57<H>  ----------------------------<  172<H>  4.8   |  20<L>  |  2.85<H>    Ca    8.8      14 Sep 2017 12:06  Phos  3.9     09-14  Mg     2.1     09-14    TPro  7.1  /  Alb  3.2<L>  /  TBili  0.2  /  DBili  x   /  AST  27  /  ALT  39  /  AlkPhos  67  09-13    PT/INR - ( 13 Sep 2017 21:41 )   PT: 11.2 SEC;   INR: 1.00        PTT - ( 13 Sep 2017 21:41 )  PTT:25.6 SEC    CAPILLARY BLOOD GLUCOSE  202 (15 Sep 2017 07:41)  202 (15 Sep 2017 03:49)  161 (14 Sep 2017 21:31)  335 (14 Sep 2017 17:38)  182 (14 Sep 2017 13:04)  152 (14 Sep 2017 09:01)          Lower Extremity Physical Exam:  Vascular: DP/PT 2/4 B/L, CFT <3 sec x 10, Temp gradient warm to warm B/L  Neurology: Epicritic sensation intact to level of digits, B/L  Musculoskeletal/Ortho: No pain with palpation of hallux amp site. No pain with palpation of 5th met wound. No pain with ROM of 5th met wound.   Skin:   Wound #1: RF hallux amp site  Size: 1.3 cm x 1.4cm x bone  Etiology: surgical   Depth: Exposed bone  Wound bed: fibronectroic  Drainage: none  Odor: none  Periwound: hyperkeratotic    Erythema periwound streaking to dorsum of foot + demetri-wound erythema to 5th met wound. No purulent drainage. No fluctuance or crepitus noted to wounds.

## 2017-09-15 NOTE — PROGRESS NOTE ADULT - ASSESSMENT
- Pt was examined and evaluated  - Wound packed with betadine gauze and betadine'd applied to 5th met wound.   - Pod surg planning pending [facility] rec on vascular status and healing potential, will follow angio on Monday, booked for OR Tuesday at 5:00PM  - c/w vanco, zosyn pending ED wound culture  - awaiting MRI  - d/w attending

## 2017-09-16 DIAGNOSIS — E11.29 TYPE 2 DIABETES MELLITUS WITH OTHER DIABETIC KIDNEY COMPLICATION: ICD-10-CM

## 2017-09-16 DIAGNOSIS — F31.70 BIPOLAR DISORDER, CURRENTLY IN REMISSION, MOST RECENT EPISODE UNSPECIFIED: ICD-10-CM

## 2017-09-16 DIAGNOSIS — Z01.818 ENCOUNTER FOR OTHER PREPROCEDURAL EXAMINATION: ICD-10-CM

## 2017-09-16 DIAGNOSIS — L03.039 CELLULITIS OF UNSPECIFIED TOE: ICD-10-CM

## 2017-09-16 DIAGNOSIS — N18.4 CHRONIC KIDNEY DISEASE, STAGE 4 (SEVERE): ICD-10-CM

## 2017-09-16 DIAGNOSIS — I10 ESSENTIAL (PRIMARY) HYPERTENSION: ICD-10-CM

## 2017-09-16 LAB
-  CEFAZOLIN: SIGNIFICANT CHANGE UP
-  CIPROFLOXACIN: SIGNIFICANT CHANGE UP
-  CLINDAMYCIN: SIGNIFICANT CHANGE UP
-  DAPTOMYCIN: SIGNIFICANT CHANGE UP
-  ERYTHROMYCIN: SIGNIFICANT CHANGE UP
-  GENTAMICIN: SIGNIFICANT CHANGE UP
-  LINEZOLID: SIGNIFICANT CHANGE UP
-  MOXIFLOXACIN(AEROBIC): SIGNIFICANT CHANGE UP
-  OXACILLIN: SIGNIFICANT CHANGE UP
-  PENICILLIN: SIGNIFICANT CHANGE UP
-  RIFAMPIN.: SIGNIFICANT CHANGE UP
-  TETRACYCLINE: SIGNIFICANT CHANGE UP
-  TRIMETHOPRIM/SULFAMETHOXAZOLE: SIGNIFICANT CHANGE UP
-  VANCOMYCIN: SIGNIFICANT CHANGE UP
BUN SERPL-MCNC: 54 MG/DL — HIGH (ref 7–23)
BUN SERPL-MCNC: 58 MG/DL — HIGH (ref 7–23)
CALCIUM SERPL-MCNC: 8.6 MG/DL — SIGNIFICANT CHANGE UP (ref 8.4–10.5)
CALCIUM SERPL-MCNC: 8.8 MG/DL — SIGNIFICANT CHANGE UP (ref 8.4–10.5)
CHLORIDE SERPL-SCNC: 105 MMOL/L — SIGNIFICANT CHANGE UP (ref 98–107)
CHLORIDE SERPL-SCNC: 107 MMOL/L — SIGNIFICANT CHANGE UP (ref 98–107)
CO2 SERPL-SCNC: 20 MMOL/L — LOW (ref 22–31)
CO2 SERPL-SCNC: 21 MMOL/L — LOW (ref 22–31)
CREAT SERPL-MCNC: 3.19 MG/DL — HIGH (ref 0.5–1.3)
CREAT SERPL-MCNC: 3.29 MG/DL — HIGH (ref 0.5–1.3)
CULTURE RESULTS: SIGNIFICANT CHANGE UP
GLUCOSE SERPL-MCNC: 206 MG/DL — HIGH (ref 70–99)
GLUCOSE SERPL-MCNC: 207 MG/DL — HIGH (ref 70–99)
GRAM STN SPEC: SIGNIFICANT CHANGE UP
HCT VFR BLD CALC: 25.6 % — LOW (ref 39–50)
HGB BLD-MCNC: 8.2 G/DL — LOW (ref 13–17)
MCHC RBC-ENTMCNC: 29.7 PG — SIGNIFICANT CHANGE UP (ref 27–34)
MCHC RBC-ENTMCNC: 32 % — SIGNIFICANT CHANGE UP (ref 32–36)
MCV RBC AUTO: 92.8 FL — SIGNIFICANT CHANGE UP (ref 80–100)
METHOD TYPE: SIGNIFICANT CHANGE UP
NRBC # FLD: 0 — SIGNIFICANT CHANGE UP
ORGANISM # SPEC MICROSCOPIC CNT: SIGNIFICANT CHANGE UP
ORGANISM # SPEC MICROSCOPIC CNT: SIGNIFICANT CHANGE UP
PLATELET # BLD AUTO: 288 K/UL — SIGNIFICANT CHANGE UP (ref 150–400)
PMV BLD: 11 FL — SIGNIFICANT CHANGE UP (ref 7–13)
POTASSIUM SERPL-MCNC: 5.5 MMOL/L — HIGH (ref 3.5–5.3)
POTASSIUM SERPL-MCNC: 5.5 MMOL/L — HIGH (ref 3.5–5.3)
POTASSIUM SERPL-SCNC: 5.5 MMOL/L — HIGH (ref 3.5–5.3)
POTASSIUM SERPL-SCNC: 5.5 MMOL/L — HIGH (ref 3.5–5.3)
RBC # BLD: 2.76 M/UL — LOW (ref 4.2–5.8)
RBC # FLD: 13.2 % — SIGNIFICANT CHANGE UP (ref 10.3–14.5)
RETICS #: 0 10X6/UL — LOW (ref 0.02–0.07)
RETICS/RBC NFR: 1.5 % — SIGNIFICANT CHANGE UP (ref 0.5–2.5)
SODIUM SERPL-SCNC: 139 MMOL/L — SIGNIFICANT CHANGE UP (ref 135–145)
SODIUM SERPL-SCNC: 140 MMOL/L — SIGNIFICANT CHANGE UP (ref 135–145)
WBC # BLD: 9.03 K/UL — SIGNIFICANT CHANGE UP (ref 3.8–10.5)
WBC # FLD AUTO: 9.03 K/UL — SIGNIFICANT CHANGE UP (ref 3.8–10.5)

## 2017-09-16 PROCEDURE — 99223 1ST HOSP IP/OBS HIGH 75: CPT | Mod: GC

## 2017-09-16 PROCEDURE — 93010 ELECTROCARDIOGRAM REPORT: CPT

## 2017-09-16 RX ORDER — VANCOMYCIN HCL 1 G
VIAL (EA) INTRAVENOUS
Qty: 0 | Refills: 0 | Status: DISCONTINUED | OUTPATIENT
Start: 2017-09-16 | End: 2017-09-16

## 2017-09-16 RX ORDER — FUROSEMIDE 40 MG
80 TABLET ORAL ONCE
Qty: 0 | Refills: 0 | Status: COMPLETED | OUTPATIENT
Start: 2017-09-16 | End: 2017-09-16

## 2017-09-16 RX ORDER — PIPERACILLIN AND TAZOBACTAM 4; .5 G/20ML; G/20ML
3.38 INJECTION, POWDER, LYOPHILIZED, FOR SOLUTION INTRAVENOUS EVERY 12 HOURS
Qty: 0 | Refills: 0 | Status: DISCONTINUED | OUTPATIENT
Start: 2017-09-16 | End: 2017-09-22

## 2017-09-16 RX ORDER — SODIUM CHLORIDE 9 MG/ML
1000 INJECTION INTRAMUSCULAR; INTRAVENOUS; SUBCUTANEOUS ONCE
Qty: 0 | Refills: 0 | Status: COMPLETED | OUTPATIENT
Start: 2017-09-16 | End: 2017-09-16

## 2017-09-16 RX ORDER — SODIUM CHLORIDE 9 MG/ML
1000 INJECTION INTRAMUSCULAR; INTRAVENOUS; SUBCUTANEOUS
Qty: 0 | Refills: 0 | Status: DISCONTINUED | OUTPATIENT
Start: 2017-09-16 | End: 2017-09-18

## 2017-09-16 RX ORDER — VANCOMYCIN HCL 1 G
1000 VIAL (EA) INTRAVENOUS ONCE
Qty: 0 | Refills: 0 | Status: COMPLETED | OUTPATIENT
Start: 2017-09-16 | End: 2017-09-16

## 2017-09-16 RX ADMIN — DIVALPROEX SODIUM 500 MILLIGRAM(S): 500 TABLET, DELAYED RELEASE ORAL at 09:43

## 2017-09-16 RX ADMIN — ATORVASTATIN CALCIUM 40 MILLIGRAM(S): 80 TABLET, FILM COATED ORAL at 21:08

## 2017-09-16 RX ADMIN — Medication 6: at 13:11

## 2017-09-16 RX ADMIN — Medication 6: at 18:59

## 2017-09-16 RX ADMIN — DIVALPROEX SODIUM 500 MILLIGRAM(S): 500 TABLET, DELAYED RELEASE ORAL at 21:08

## 2017-09-16 RX ADMIN — PIPERACILLIN AND TAZOBACTAM 25 GRAM(S): 4; .5 INJECTION, POWDER, LYOPHILIZED, FOR SOLUTION INTRAVENOUS at 08:33

## 2017-09-16 RX ADMIN — HEPARIN SODIUM 5000 UNIT(S): 5000 INJECTION INTRAVENOUS; SUBCUTANEOUS at 21:08

## 2017-09-16 RX ADMIN — Medication 650 MILLIGRAM(S): at 06:01

## 2017-09-16 RX ADMIN — PIPERACILLIN AND TAZOBACTAM 25 GRAM(S): 4; .5 INJECTION, POWDER, LYOPHILIZED, FOR SOLUTION INTRAVENOUS at 18:59

## 2017-09-16 RX ADMIN — SODIUM CHLORIDE 1000 MILLILITER(S): 9 INJECTION INTRAMUSCULAR; INTRAVENOUS; SUBCUTANEOUS at 21:07

## 2017-09-16 RX ADMIN — Medication 80 MILLIGRAM(S): at 21:08

## 2017-09-16 RX ADMIN — Medication: at 22:27

## 2017-09-16 RX ADMIN — OLANZAPINE 20 MILLIGRAM(S): 15 TABLET, FILM COATED ORAL at 21:08

## 2017-09-16 RX ADMIN — Medication 100 MILLIGRAM(S): at 21:08

## 2017-09-16 RX ADMIN — Medication 2: at 09:42

## 2017-09-16 RX ADMIN — PIPERACILLIN AND TAZOBACTAM 25 GRAM(S): 4; .5 INJECTION, POWDER, LYOPHILIZED, FOR SOLUTION INTRAVENOUS at 00:57

## 2017-09-16 RX ADMIN — Medication 650 MILLIGRAM(S): at 15:18

## 2017-09-16 RX ADMIN — ISOSORBIDE MONONITRATE 120 MILLIGRAM(S): 60 TABLET, EXTENDED RELEASE ORAL at 13:11

## 2017-09-16 RX ADMIN — HEPARIN SODIUM 5000 UNIT(S): 5000 INJECTION INTRAVENOUS; SUBCUTANEOUS at 06:01

## 2017-09-16 RX ADMIN — Medication 250 MILLIGRAM(S): at 16:46

## 2017-09-16 RX ADMIN — SODIUM CHLORIDE 75 MILLILITER(S): 9 INJECTION INTRAMUSCULAR; INTRAVENOUS; SUBCUTANEOUS at 15:20

## 2017-09-16 RX ADMIN — Medication 650 MILLIGRAM(S): at 21:08

## 2017-09-16 RX ADMIN — SODIUM CHLORIDE 75 MILLILITER(S): 9 INJECTION INTRAMUSCULAR; INTRAVENOUS; SUBCUTANEOUS at 21:14

## 2017-09-16 RX ADMIN — CARVEDILOL PHOSPHATE 12.5 MILLIGRAM(S): 80 CAPSULE, EXTENDED RELEASE ORAL at 06:01

## 2017-09-16 RX ADMIN — PANTOPRAZOLE SODIUM 40 MILLIGRAM(S): 20 TABLET, DELAYED RELEASE ORAL at 06:01

## 2017-09-16 NOTE — PROGRESS NOTE ADULT - ASSESSMENT
51 yo male with infected R foot hallux amp site and infected 5th digit HOD3:  - Pain control  - Wound washes and packing per Pods  - F/x wound cultures  - Cont IV abx  - Plan for RLE angio mon  - f/u EGD results, biopsies 51 yo male with infected R foot hallux amp site and infected 5th digit HOD4:  - Pain control  - F/x wound cultures  - Cont IV abx  - Plan for RLE angio monday  - f/u EGD results, biopsies  - Wound washes and packing per Podiatry

## 2017-09-16 NOTE — CHART NOTE - NSCHARTNOTEFT_GEN_A_CORE
Paged earlier about pt's elevated SBP. Pt has a hx of not taking medications and today did not take his BP meds in the AM. Gave 10mg of hydralazine which did help bring the SBP down. Rechecking BP again to see if SBP is more appropriate. Pt has been comfortable and sleeping through elevated BP. Will continue to moniter and manage.    Team C ~[id number]

## 2017-09-16 NOTE — CONSULT NOTE ADULT - PROBLEM SELECTOR RECOMMENDATION 2
- wound cultures growing staph aureus, would add on vancomycin at 15-20mg/kg and check daily van levels prior to further doses given CKD   - can c/w zosyn until further culture data returns, but would change to q12h dosing given CKD  - WBC downtrending, c/t monitor   - pt remains afebrile and otherwise HD stable  - wound care per surgery and podiatry - wound cultures growing staph aureus, would add on vancomycin at 15-20mg/kg and check daily vanco levels prior to further doses given CKD   - can c/w zosyn until further culture data returns, but would change to q12h dosing given CKD  - WBC downtrending, c/t monitor   - pt remains afebrile and otherwise HD stable  - wound care per surgery and podiatry

## 2017-09-16 NOTE — CONSULT NOTE ADULT - PROBLEM SELECTOR RECOMMENDATION 5
pt with known CKD4, with mild increase in Cr and with hyperkalemia to 5.5 today   c/w sodium bicarb  given one dose Kayexalate now, repeat BMP after   check EKG  gentle IVF and close monitoring of BMP prior to and after angiogram pt with known CKD4, with mild increase in Cr and with hyperkalemia to 5.5 today   c/w sodium bicarb  given one dose Kayexalate now, repeat BMP after; place on low K diet    very gentle IVF and close monitoring of BMP prior to and after angiogram pt with known CKD4, with mild increase in Cr and with hyperkalemia to 5.5 today   c/w sodium bicarb  please give one dose Kayexalate now, repeat BMP after; place on low K diet    very gentle IVF and close monitoring of BMP prior to and after angiogram

## 2017-09-16 NOTE — CONSULT NOTE ADULT - PROBLEM SELECTOR RECOMMENDATION 6
likely 2/2 mild iron deficiency and chronic inflammation in setting of CKD  Hb stable and at baseline, c/t monitor, transfuse Hb <7.0  f/u GI recs and EGD biopsy results

## 2017-09-16 NOTE — PROGRESS NOTE ADULT - ASSESSMENT
- Pt was examined and evaluated  - Wound packed with betadine gauze and betadine'd applied to 5th met wound.   - Pod surg planning pending [facility] rec on vascular status and healing potential, will follow angio on Monday, booked for OR Tuesday at 5:00PM  - c/w vanco, zosyn pending ED wound culture  - awaiting MRI  - d/w attending - Pt was examined and evaluated  - Wound packed with betadine gauze and betadine'd applied to 5th met wound.   - Pod surg planning pending [facility] rec on vascular status and healing potential, will follow angio on Monday, booked for OR Tuesday at 5:00PM  - c/w vanco, zosyn pending ED wound culture  - awaiting MRI  - requesting medical clearance  - d/w attending

## 2017-09-16 NOTE — CONSULT NOTE ADULT - ASSESSMENT
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

## 2017-09-16 NOTE — CONSULT NOTE ADULT - PROBLEM SELECTOR RECOMMENDATION 9
- plan for podiatric surgery Tuesday for infected surgical site; pending vascular RLE angiogram for healing potential   - pt is medically optimized and is intermediate risk for intermediate surgery - plan for podiatric surgery Tuesday for infected surgical site; pending vascular RLE angiogram for healing potential - plan for podiatric surgery Tuesday for infected surgical site; pending vascular RLE angiogram for healing potential  - EKG reviewed without significant changes concerning for ischemia at this time - plan for podiatric surgery Tuesday for infected surgical site; pending vascular RLE angiogram for healing potential  - EKG reviewed without significant changes concerning for ischemia at this time  - pt is intermediate risk for intermediate procedure;

## 2017-09-16 NOTE — CONSULT NOTE ADULT - SUBJECTIVE AND OBJECTIVE BOX
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 Sent by his podiatrist Dr Olivier with concern for infection of his amp site at a follow up visit. Denies recent fevers; notes chills approx 1-2 weeks ago that resolved. Is ambulatory with a cane. Reports taking cipro daily since discharge, but has not followed up with Dr. Olivares.    Admitted with elevated WBC count and inflamm markers - started on vanco/zosyn, with improvement in WBC count.   Pt noted with anemia and hx of weight loss, early satiety with CT showing abnormal thickening at distal esophagus/gastric cardia. Was supposed to follow up with GI as outpt, but has not done so yet. Anemia at baseline, with iron studies showing mixed iron deficiency and chronic inflammation anemia. GI consulted and s/p EGD showing esophagitis and gastric nodule with bx taken.      PAST MEDICAL & SURGICAL HISTORY:  Bipolar affective disorder in remission  High cholesterol  Depression  Diabetes mellitus  Amputated great toe of right foot  ORIF right lower leg- 1995      Allergies    No Known Allergies    FAMILY HISTORY:  No pertinent family history in first degree relatives      Social History:  Smoking:  Alcohol:  Illicit Substance:     REVIEW OF SYSTEMS:  CONSTITUTIONAL: No fever, +weight loss, no fatigue  EYES: No eye pain, visual disturbances, or discharge  ENMT:  No difficulty hearing, tinnitus, vertigo; No sinus or throat pain  NECK: No pain or stiffness  RESPIRATORY: No cough, wheezing, chills or hemoptysis; No shortness of breath  CARDIOVASCULAR: No chest pain, palpitations, dizziness, or leg swelling  GASTROINTESTINAL: No abdominal or epigastric pain. No nausea, vomiting, or hematemesis; No diarrhea or constipation. No melena or hematochezia.  GENITOURINARY: No dysuria, frequency, hematuria, or incontinence  NEUROLOGICAL: No headaches, memory loss, loss of strength, numbness, or tremors  SKIN: No itching, burning, rashes, or lesions   MUSCULOSKELETAL: No joint pain or swelling; No muscle, back, or extremity pain  PSYCHIATRIC: No depression, anxiety, mood swings, or difficulty sleeping    MEDICATIONS  (STANDING):  heparin  Injectable 5000 Unit(s) SubCutaneous every 8 hours  sodium bicarbonate 650 milliGRAM(s) Oral three times a day  isosorbide   mononitrate ER Tablet (IMDUR) 120 milliGRAM(s) Oral daily  diVALproex  milliGRAM(s) Oral two times a day  traZODone 100 milliGRAM(s) Oral at bedtime  atorvastatin 40 milliGRAM(s) Oral at bedtime  OLANZapine 20 milliGRAM(s) Oral at bedtime  carvedilol 12.5 milliGRAM(s) Oral every 12 hours  insulin lispro (HumaLOG) corrective regimen sliding scale   SubCutaneous three times a day before meals  insulin lispro (HumaLOG) corrective regimen sliding scale   SubCutaneous at bedtime  dextrose 5%. 1000 milliLiter(s) (50 mL/Hr) IV Continuous <Continuous>  dextrose 50% Injectable 12.5 Gram(s) IV Push once  dextrose 50% Injectable 25 Gram(s) IV Push once  dextrose 50% Injectable 25 Gram(s) IV Push once  piperacillin/tazobactam IVPB. 3.375 Gram(s) IV Intermittent every 8 hours  pantoprazole    Tablet 40 milliGRAM(s) Oral before breakfast    MEDICATIONS  (PRN):  dextrose Gel 1 Dose(s) Oral once PRN Blood Glucose LESS THAN 70 milliGRAM(s)/deciliter  glucagon  Injectable 1 milliGRAM(s) IntraMuscular once PRN Glucose LESS THAN 70 milligrams/deciliter  oxyCODONE    IR 5 milliGRAM(s) Oral every 4 hours PRN Moderate Pain (4 - 6)  oxyCODONE    IR 10 milliGRAM(s) Oral every 4 hours PRN Severe Pain (7 - 10)  acetaminophen   Tablet. 650 milliGRAM(s) Oral every 6 hours PRN Mild Pain (1 - 3)      Vital Signs Last 24 Hrs  T(C): 36.9 (16 Sep 2017 05:59), Max: 36.9 (15 Sep 2017 13:32)  T(F): 98.4 (16 Sep 2017 05:59), Max: 98.5 (15 Sep 2017 13:32)  HR: 64 (16 Sep 2017 05:59) (54 - 65)  BP: 164/74 (16 Sep 2017 05:59) (159/70 - 191/60)  BP(mean): --  RR: 17 (16 Sep 2017 05:59) (16 - 18)  SpO2: 97% (16 Sep 2017 05:59) (96% - 99%)    PHYSICAL EXAM:  GENERAL: NAD, well-groomed, well-developed  HEAD:  Atraumatic, Normocephalic  EYES: EOMI, PERRLA, conjunctiva and sclera clear  ENMT: No tonsillar erythema, exudates, or enlargement; Moist mucous membranes, Good dentition, No lesions  NECK: Supple, No JVD, Normal thyroid  NERVOUS SYSTEM:  Alert & Oriented X3, Good concentration; Motor Strength 5/5 B/L upper and lower extremities; DTRs 2+ intact and symmetric  CHEST/LUNG: Clear to percussion bilaterally; No rales, rhonchi, wheezing, or rubs  HEART: Regular rate and rhythm; No murmurs, rubs, or gallops  ABDOMEN: Soft, Nontender, Nondistended; Bowel sounds present  EXTREMITIES:  2+ Peripheral Pulses, No clubbing, cyanosis, or edema  LYMPH: No lymphadenopathy noted  SKIN: No rashes or lesions    LABS:                        8.2    9.03  )-----------( 288      ( 16 Sep 2017 06:00 )             25.6     09-16    139  |  105  |  58<H>  ----------------------------<  206<H>  5.5<H>   |  21<L>  |  3.29<H>    Ca    8.8      16 Sep 2017 06:00  Phos  4.3     09-15  Mg     2.1     09-15            I&O's Summary    15 Sep 2017 07:01  -  16 Sep 2017 07:00  --------------------------------------------------------  IN: 1200 mL / OUT: 2075 mL / NET: -875 mL    16 Sep 2017 07:01  -  16 Sep 2017 10:41  --------------------------------------------------------  IN: 0 mL / OUT: 800 mL / NET: -800 mL        RADIOLOGY & ADDITIONAL TESTS:    Imaging Personally Reviewed:  [ ] YES  [ ] NO    Consultant(s) Notes Reviewed:  [ ] YES  [ ] NO    Care Discussed with Consultants/Other Providers [ ] YES  [ ] NO Patient is a 52y old  Male who presents with a chief complaint of right foot (14 Sep 2017 02:55)    INTERVAL HPI/OVERNIGHT EVENTS:  Patient is a 52y year old male with PMHx of bipolar disorder, DM, CKD, anemia, recently admitted 8/17-8-23 for 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 Sent by his podiatrist Dr Olivier with concern for infection of his amp site at a follow up visit. Denies recent fevers; notes chills approx 1-2 weeks ago that resolved. Is ambulatory with a cane. Reports taking cipro daily since discharge, but has not followed up with Dr. Olivares.    Admitted with elevated WBC count and inflamm markers - started on vanco/zosyn, with improvement in WBC count.   Pt noted with anemia and hx of weight loss, early satiety with CT showing abnormal thickening at distal esophagus/gastric cardia. Was supposed to follow up with GI as outpt, but has not done so yet. Anemia at baseline, with iron studies showing mixed iron deficiency and chronic inflammation anemia. GI consulted and s/p EGD showing esophagitis and gastric nodule with bx taken.      PAST MEDICAL & SURGICAL HISTORY:  Bipolar affective disorder in remission  High cholesterol  Depression  Diabetes mellitus  Amputated great toe of right foot  ORIF right lower leg- 1995      Allergies: No Known Allergies    FAMILY HISTORY:  No pertinent family history in first degree relatives    Social History:  Smoking: current smoker, ~20pack years  Alcohol: denies current use  Illicit Substance: occ THC use (last few weeks ago: denies other illicit drug use     REVIEW OF SYSTEMS:  CONSTITUTIONAL: No fever, +weight loss, no fatigue  EYES: No eye pain, visual disturbances, or discharge  ENMT:  No difficulty hearing, tinnitus, vertigo; No sinus or throat pain  NECK: No pain or stiffness  RESPIRATORY: No cough, wheezing, chills or hemoptysis; No shortness of breath  CARDIOVASCULAR: No chest pain, palpitations, dizziness, or leg swelling  GASTROINTESTINAL: No abdominal or epigastric pain. No nausea, vomiting, or hematemesis; No diarrhea or constipation. No melena or hematochezia.  GENITOURINARY: No dysuria, frequency, hematuria, or incontinence  NEUROLOGICAL: No headaches, memory loss, loss of strength, numbness, or tremors  SKIN: No itching, rashes, or lesions   MUSCULOSKELETAL: No joint pain or swelling; No muscle, back, or extremity pain  PSYCHIATRIC: No depression, anxiety, or difficulty sleeping    MEDICATIONS  (STANDING):  heparin  Injectable 5000 Unit(s) SubCutaneous every 8 hours  sodium bicarbonate 650 milliGRAM(s) Oral three times a day  isosorbide   mononitrate ER Tablet (IMDUR) 120 milliGRAM(s) Oral daily  diVALproex  milliGRAM(s) Oral two times a day  traZODone 100 milliGRAM(s) Oral at bedtime  atorvastatin 40 milliGRAM(s) Oral at bedtime  OLANZapine 20 milliGRAM(s) Oral at bedtime  carvedilol 12.5 milliGRAM(s) Oral every 12 hours  insulin lispro (HumaLOG) corrective regimen sliding scale   SubCutaneous three times a day before meals  insulin lispro (HumaLOG) corrective regimen sliding scale   SubCutaneous at bedtime  dextrose 5%. 1000 milliLiter(s) (50 mL/Hr) IV Continuous <Continuous>  dextrose 50% Injectable 12.5 Gram(s) IV Push once  dextrose 50% Injectable 25 Gram(s) IV Push once  dextrose 50% Injectable 25 Gram(s) IV Push once  piperacillin/tazobactam IVPB. 3.375 Gram(s) IV Intermittent every 8 hours  pantoprazole    Tablet 40 milliGRAM(s) Oral before breakfast    MEDICATIONS  (PRN):  dextrose Gel 1 Dose(s) Oral once PRN Blood Glucose LESS THAN 70 milliGRAM(s)/deciliter  glucagon  Injectable 1 milliGRAM(s) IntraMuscular once PRN Glucose LESS THAN 70 milligrams/deciliter  oxyCODONE    IR 5 milliGRAM(s) Oral every 4 hours PRN Moderate Pain (4 - 6)  oxyCODONE    IR 10 milliGRAM(s) Oral every 4 hours PRN Severe Pain (7 - 10)  acetaminophen   Tablet. 650 milliGRAM(s) Oral every 6 hours PRN Mild Pain (1 - 3)      Vital Signs Last 24 Hrs  T(C): 36.9 (16 Sep 2017 05:59), Max: 36.9 (15 Sep 2017 13:32)  T(F): 98.4 (16 Sep 2017 05:59), Max: 98.5 (15 Sep 2017 13:32)  HR: 64 (16 Sep 2017 05:59) (54 - 65)  BP: 164/74 (16 Sep 2017 05:59) (159/70 - 191/60)  BP(mean): --  RR: 17 (16 Sep 2017 05:59) (16 - 18)  SpO2: 97% (16 Sep 2017 05:59) (96% - 99%)    I&O's Summary    15 Sep 2017 07:01  -  16 Sep 2017 07:00  --------------------------------------------------------  IN: 1200 mL / OUT: 2075 mL / NET: -875 mL    16 Sep 2017 07:01  -  16 Sep 2017 10:41  --------------------------------------------------------  IN: 0 mL / OUT: 800 mL / NET: -800 mL      PHYSICAL EXAM:  GENERAL: NAD, thin man  HEAD:  Atraumatic, Normocephalic  EYES: EOMI, conjunctiva and sclera clear  ENMT: Moist mucous membranes  NECK: Supple, No JVD  NERVOUS SYSTEM:  Alert & Oriented X3, motor Strength 5/5 B/L upper and lower extremities  CHEST/LUNG: Clear to auscultation bilaterally; no rales, rhonchi, wheezing, or rubs  HEART: Regular rate and rhythm; No murmurs, rubs, or gallops  ABDOMEN: Soft, Nontender, Nondistended; Bowel sounds present  EXTREMITIES:  2+ Peripheral Pulses, No clubbing, cyanosis, or edema; R foot bandaged   LYMPH: No lymphadenopathy noted  SKIN: No rashes or lesions    LABS:                        8.2    9.03  )-----------( 288      ( 16 Sep 2017 06:00 )             25.6     09-16    139  |  105  |  58<H>  ----------------------------<  206<H>  5.5<H>   |  21<L>  |  3.29<H>    Ca    8.8      16 Sep 2017 06:00  Phos  4.3     09-15  Mg     2.1     09-15    EKG   Personally reviewed by me: Sinus bradycardia at 48BPM; with TWI in I, AVL and V6. Compared to prior EKGs from 8/2017, with reversal of TWI noted on prior EKG in leads V4/V5. Voltage criteria for LVH. . No STEs.     RADIOLOGY & ADDITIONAL TESTS:    Imaging Personally Reviewed:  [x] YES  [ ] NO    Consultant(s) Notes Reviewed:  [X] YES  [ ] NO    Care Discussed with Consultants/Other Providers [ ] YES  [ ] NO

## 2017-09-16 NOTE — PROGRESS NOTE ADULT - SUBJECTIVE AND OBJECTIVE BOX
Vascular Surgery (C Team)     Subjective: Pt seen/examined at bedside. No issues overnight. EGD yesterday. Tolerated procedure w/o issue. Pain well controlled. Afebrile. Denied fever, chills, n/v, SOB or CP.     MEDICATIONS  (STANDING):  heparin  Injectable 5000 Unit(s) SubCutaneous every 8 hours  sodium bicarbonate 650 milliGRAM(s) Oral three times a day  isosorbide   mononitrate ER Tablet (IMDUR) 120 milliGRAM(s) Oral daily  diVALproex  milliGRAM(s) Oral two times a day  traZODone 100 milliGRAM(s) Oral at bedtime  atorvastatin 40 milliGRAM(s) Oral at bedtime  OLANZapine 20 milliGRAM(s) Oral at bedtime  carvedilol 12.5 milliGRAM(s) Oral every 12 hours  insulin lispro (HumaLOG) corrective regimen sliding scale   SubCutaneous three times a day before meals  insulin lispro (HumaLOG) corrective regimen sliding scale   SubCutaneous at bedtime  dextrose 5%. 1000 milliLiter(s) (50 mL/Hr) IV Continuous <Continuous>  dextrose 50% Injectable 12.5 Gram(s) IV Push once  dextrose 50% Injectable 25 Gram(s) IV Push once  dextrose 50% Injectable 25 Gram(s) IV Push once  piperacillin/tazobactam IVPB. 3.375 Gram(s) IV Intermittent every 8 hours  pantoprazole    Tablet 40 milliGRAM(s) Oral before breakfast    MEDICATIONS  (PRN):  dextrose Gel 1 Dose(s) Oral once PRN Blood Glucose LESS THAN 70 milliGRAM(s)/deciliter  glucagon  Injectable 1 milliGRAM(s) IntraMuscular once PRN Glucose LESS THAN 70 milligrams/deciliter  oxyCODONE    IR 5 milliGRAM(s) Oral every 4 hours PRN Moderate Pain (4 - 6)  oxyCODONE    IR 10 milliGRAM(s) Oral every 4 hours PRN Severe Pain (7 - 10)  acetaminophen   Tablet. 650 milliGRAM(s) Oral every 6 hours PRN Mild Pain (1 - 3)    heparin  Injectable 5000 Unit(s) SubCutaneous every 8 hours  sodium bicarbonate 650 milliGRAM(s) Oral three times a day  isosorbide   mononitrate ER Tablet (IMDUR) 120 milliGRAM(s) Oral daily  diVALproex  milliGRAM(s) Oral two times a day  traZODone 100 milliGRAM(s) Oral at bedtime  atorvastatin 40 milliGRAM(s) Oral at bedtime  OLANZapine 20 milliGRAM(s) Oral at bedtime  carvedilol 12.5 milliGRAM(s) Oral every 12 hours  insulin lispro (HumaLOG) corrective regimen sliding scale   SubCutaneous three times a day before meals  insulin lispro (HumaLOG) corrective regimen sliding scale   SubCutaneous at bedtime  dextrose 5%. 1000 milliLiter(s) IV Continuous <Continuous>  dextrose Gel 1 Dose(s) Oral once PRN  dextrose 50% Injectable 12.5 Gram(s) IV Push once  dextrose 50% Injectable 25 Gram(s) IV Push once  dextrose 50% Injectable 25 Gram(s) IV Push once  glucagon  Injectable 1 milliGRAM(s) IntraMuscular once PRN  piperacillin/tazobactam IVPB. 3.375 Gram(s) IV Intermittent every 8 hours  oxyCODONE    IR 5 milliGRAM(s) Oral every 4 hours PRN  oxyCODONE    IR 10 milliGRAM(s) Oral every 4 hours PRN  acetaminophen   Tablet. 650 milliGRAM(s) Oral every 6 hours PRN  pantoprazole    Tablet 40 milliGRAM(s) Oral before breakfast    Allergies    No Known Allergies    Intolerances          Vital Signs Last 24 Hrs  T(C): 36.9 (16 Sep 2017 05:59), Max: 36.9 (15 Sep 2017 09:48)  T(F): 98.4 (16 Sep 2017 05:59), Max: 98.5 (15 Sep 2017 13:32)  HR: 64 (16 Sep 2017 05:59) (54 - 65)  BP: 164/74 (16 Sep 2017 05:59) (159/70 - 191/60)  BP(mean): --  RR: 17 (16 Sep 2017 05:59) (16 - 18)  SpO2: 97% (16 Sep 2017 05:59) (96% - 99%)    I&O's Summary    14 Sep 2017 07:01  -  15 Sep 2017 07:00  --------------------------------------------------------  IN: 250 mL / OUT: 2425 mL / NET: -2175 mL    15 Sep 2017 07:01  -  16 Sep 2017 06:37  --------------------------------------------------------  IN: 1200 mL / OUT: 2075 mL / NET: -875 mL        Physical Exam:  Gen: NAD   Resp: Non labored breathing  Cards: RRR -rgm  Abd: NT/ND   Ext: R foot amputation site/5th digit infected but covered. Currently CDI limited healing and granulation tissue remains    LABS:                        7.3    10.26 )-----------( 263      ( 15 Sep 2017 06:55 )             23.4     09-15    137  |  106  |  55<H>  ----------------------------<  175<H>  5.1   |  20<L>  |  3.00<H>    Ca    8.7      15 Sep 2017 06:55  Phos  4.3     09-15  Mg     2.1     09-15            CAPILLARY BLOOD GLUCOSE  280 (15 Sep 2017 21:35)  199 (15 Sep 2017 17:43)  119 (15 Sep 2017 13:32)  176 (15 Sep 2017 09:07)  202 (15 Sep 2017 07:41)          RADIOLOGY & ADDITIONAL TESTS: Vascular Surgery (C Team)     Subjective: Pt seen/examined at bedside. Elevated BP overnight but pt has hx of refusing medications. BP was managed with 10mg of hydralazine; improved. EGD yesterday. Waiting on bx results. Pain controlled. Afebrile. Denied fever, chills, n/v, SOB or CP.     MEDICATIONS  (STANDING):  heparin  Injectable 5000 Unit(s) SubCutaneous every 8 hours  sodium bicarbonate 650 milliGRAM(s) Oral three times a day  isosorbide   mononitrate ER Tablet (IMDUR) 120 milliGRAM(s) Oral daily  diVALproex  milliGRAM(s) Oral two times a day  traZODone 100 milliGRAM(s) Oral at bedtime  atorvastatin 40 milliGRAM(s) Oral at bedtime  OLANZapine 20 milliGRAM(s) Oral at bedtime  carvedilol 12.5 milliGRAM(s) Oral every 12 hours  insulin lispro (HumaLOG) corrective regimen sliding scale   SubCutaneous three times a day before meals  insulin lispro (HumaLOG) corrective regimen sliding scale   SubCutaneous at bedtime  dextrose 5%. 1000 milliLiter(s) (50 mL/Hr) IV Continuous <Continuous>  dextrose 50% Injectable 12.5 Gram(s) IV Push once  dextrose 50% Injectable 25 Gram(s) IV Push once  dextrose 50% Injectable 25 Gram(s) IV Push once  piperacillin/tazobactam IVPB. 3.375 Gram(s) IV Intermittent every 8 hours  pantoprazole    Tablet 40 milliGRAM(s) Oral before breakfast    MEDICATIONS  (PRN):  dextrose Gel 1 Dose(s) Oral once PRN Blood Glucose LESS THAN 70 milliGRAM(s)/deciliter  glucagon  Injectable 1 milliGRAM(s) IntraMuscular once PRN Glucose LESS THAN 70 milligrams/deciliter  oxyCODONE    IR 5 milliGRAM(s) Oral every 4 hours PRN Moderate Pain (4 - 6)  oxyCODONE    IR 10 milliGRAM(s) Oral every 4 hours PRN Severe Pain (7 - 10)  acetaminophen   Tablet. 650 milliGRAM(s) Oral every 6 hours PRN Mild Pain (1 - 3)    heparin  Injectable 5000 Unit(s) SubCutaneous every 8 hours  sodium bicarbonate 650 milliGRAM(s) Oral three times a day  isosorbide   mononitrate ER Tablet (IMDUR) 120 milliGRAM(s) Oral daily  diVALproex  milliGRAM(s) Oral two times a day  traZODone 100 milliGRAM(s) Oral at bedtime  atorvastatin 40 milliGRAM(s) Oral at bedtime  OLANZapine 20 milliGRAM(s) Oral at bedtime  carvedilol 12.5 milliGRAM(s) Oral every 12 hours  insulin lispro (HumaLOG) corrective regimen sliding scale   SubCutaneous three times a day before meals  insulin lispro (HumaLOG) corrective regimen sliding scale   SubCutaneous at bedtime  dextrose 5%. 1000 milliLiter(s) IV Continuous <Continuous>  dextrose Gel 1 Dose(s) Oral once PRN  dextrose 50% Injectable 12.5 Gram(s) IV Push once  dextrose 50% Injectable 25 Gram(s) IV Push once  dextrose 50% Injectable 25 Gram(s) IV Push once  glucagon  Injectable 1 milliGRAM(s) IntraMuscular once PRN  piperacillin/tazobactam IVPB. 3.375 Gram(s) IV Intermittent every 8 hours  oxyCODONE    IR 5 milliGRAM(s) Oral every 4 hours PRN  oxyCODONE    IR 10 milliGRAM(s) Oral every 4 hours PRN  acetaminophen   Tablet. 650 milliGRAM(s) Oral every 6 hours PRN  pantoprazole    Tablet 40 milliGRAM(s) Oral before breakfast    Allergies    No Known Allergies    Intolerances          Vital Signs Last 24 Hrs  T(C): 36.9 (16 Sep 2017 05:59), Max: 36.9 (15 Sep 2017 09:48)  T(F): 98.4 (16 Sep 2017 05:59), Max: 98.5 (15 Sep 2017 13:32)  HR: 64 (16 Sep 2017 05:59) (54 - 65)  BP: 164/74 (16 Sep 2017 05:59) (159/70 - 191/60)  BP(mean): --  RR: 17 (16 Sep 2017 05:59) (16 - 18)  SpO2: 97% (16 Sep 2017 05:59) (96% - 99%)    I&O's Summary    14 Sep 2017 07:01  -  15 Sep 2017 07:00  --------------------------------------------------------  IN: 250 mL / OUT: 2425 mL / NET: -2175 mL    15 Sep 2017 07:01  -  16 Sep 2017 06:37  --------------------------------------------------------  IN: 1200 mL / OUT: 2075 mL / NET: -875 mL        Physical Exam:  Gen: NAD   Resp: Non labored breathing  Cards: RRR -rgm  Abd: NT/ND   Ext: R foot amputation site/5th digit infected but covered. Currently CDI limited healing and granulation tissue remains    LABS:                        7.3    10.26 )-----------( 263      ( 15 Sep 2017 06:55 )             23.4     09-15    137  |  106  |  55<H>  ----------------------------<  175<H>  5.1   |  20<L>  |  3.00<H>    Ca    8.7      15 Sep 2017 06:55  Phos  4.3     09-15  Mg     2.1     09-15            CAPILLARY BLOOD GLUCOSE  280 (15 Sep 2017 21:35)  199 (15 Sep 2017 17:43)  119 (15 Sep 2017 13:32)  176 (15 Sep 2017 09:07)  202 (15 Sep 2017 07:41)          RADIOLOGY & ADDITIONAL TESTS:

## 2017-09-16 NOTE — PROGRESS NOTE ADULT - SUBJECTIVE AND OBJECTIVE BOX
Patient is a 52y old  Male who presents with a chief complaint of right foot (14 Sep 2017 02:55)       INTERVAL HPI/OVERNIGHT EVENTS:  Patient seen and evaluated at bedside.  Pt is resting comfortable in NAD. Denies N/V/F/C.  Pain rated at X/10    Allergies    No Known Allergies    Intolerances        Vital Signs Last 24 Hrs  T(C): 36.9 (16 Sep 2017 05:59), Max: 36.9 (15 Sep 2017 09:48)  T(F): 98.4 (16 Sep 2017 05:59), Max: 98.5 (15 Sep 2017 13:32)  HR: 64 (16 Sep 2017 05:59) (54 - 65)  BP: 164/74 (16 Sep 2017 05:59) (159/70 - 191/60)  BP(mean): --  RR: 17 (16 Sep 2017 05:59) (16 - 18)  SpO2: 97% (16 Sep 2017 05:59) (96% - 99%)    LABS:                        8.2    9.03  )-----------( 288      ( 16 Sep 2017 06:00 )             25.6     09-15    137  |  106  |  55<H>  ----------------------------<  175<H>  5.1   |  20<L>  |  3.00<H>    Ca    8.7      15 Sep 2017 06:55  Phos  4.3     09-15  Mg     2.1     09-15          CAPILLARY BLOOD GLUCOSE  181 (16 Sep 2017 08:49)  280 (15 Sep 2017 21:35)  199 (15 Sep 2017 17:43)  119 (15 Sep 2017 13:32)          Lower Extremity Physical Exam:  Vascular: DP/PT 2/4 B/L, CFT <3 sec x 10, Temp gradient warm to warm B/L  Neurology: Epicritic sensation intact to level of digits, B/L  Musculoskeletal/Ortho: No pain with palpation of hallux amp site. No pain with palpation of 5th met wound. No pain with ROM of 5th met wound.   Skin:   Wound #1: RF hallux amp site  Size: 1.3 cm x 1.4cm x bone  Etiology: surgical   Depth: Exposed bone  Wound bed: fibronectroic  Drainage: none  Odor: none  Periwound: hyperkeratotic    Erythema periwound streaking to dorsum of foot + demetri-wound erythema to 5th met wound. No purulent drainage. No fluctuance or crepitus noted to wounds.     RADIOLOGY & ADDITIONAL TESTS:

## 2017-09-16 NOTE — CONSULT NOTE ADULT - PROBLEM SELECTOR RECOMMENDATION 3
- BP not optimally controlled at this time, despite taking oral antiHTN medications per chart   - can consider starting Norvasc 10mg PO QD, will take 2-3 days prior to seeing affects  - c/w hydralazine 10mg IVP prn for SBP>180

## 2017-09-17 LAB
APPEARANCE UR: CLEAR — SIGNIFICANT CHANGE UP
APTT BLD: 24.2 SEC — LOW (ref 27.5–37.4)
BILIRUB UR-MCNC: NEGATIVE — SIGNIFICANT CHANGE UP
BLD GP AB SCN SERPL QL: NEGATIVE — SIGNIFICANT CHANGE UP
BLOOD UR QL VISUAL: NEGATIVE — SIGNIFICANT CHANGE UP
BUN SERPL-MCNC: 54 MG/DL — HIGH (ref 7–23)
BUN SERPL-MCNC: 59 MG/DL — HIGH (ref 7–23)
CALCIUM SERPL-MCNC: 8.3 MG/DL — LOW (ref 8.4–10.5)
CALCIUM SERPL-MCNC: 8.5 MG/DL — SIGNIFICANT CHANGE UP (ref 8.4–10.5)
CHLORIDE SERPL-SCNC: 104 MMOL/L — SIGNIFICANT CHANGE UP (ref 98–107)
CHLORIDE SERPL-SCNC: 106 MMOL/L — SIGNIFICANT CHANGE UP (ref 98–107)
CO2 SERPL-SCNC: 20 MMOL/L — LOW (ref 22–31)
CO2 SERPL-SCNC: 20 MMOL/L — LOW (ref 22–31)
COLOR SPEC: SIGNIFICANT CHANGE UP
CREAT SERPL-MCNC: 3.14 MG/DL — HIGH (ref 0.5–1.3)
CREAT SERPL-MCNC: 3.25 MG/DL — HIGH (ref 0.5–1.3)
GLUCOSE SERPL-MCNC: 200 MG/DL — HIGH (ref 70–99)
GLUCOSE SERPL-MCNC: 206 MG/DL — HIGH (ref 70–99)
GLUCOSE UR-MCNC: 50 — SIGNIFICANT CHANGE UP
HCT VFR BLD CALC: 23.4 % — LOW (ref 39–50)
HGB BLD-MCNC: 7.5 G/DL — LOW (ref 13–17)
INR BLD: 0.9 — SIGNIFICANT CHANGE UP (ref 0.88–1.17)
KETONES UR-MCNC: NEGATIVE — SIGNIFICANT CHANGE UP
LEUKOCYTE ESTERASE UR-ACNC: NEGATIVE — SIGNIFICANT CHANGE UP
MAGNESIUM SERPL-MCNC: 2 MG/DL — SIGNIFICANT CHANGE UP (ref 1.6–2.6)
MCHC RBC-ENTMCNC: 29.2 PG — SIGNIFICANT CHANGE UP (ref 27–34)
MCHC RBC-ENTMCNC: 32.1 % — SIGNIFICANT CHANGE UP (ref 32–36)
MCV RBC AUTO: 91.1 FL — SIGNIFICANT CHANGE UP (ref 80–100)
MUCOUS THREADS # UR AUTO: SIGNIFICANT CHANGE UP
NITRITE UR-MCNC: NEGATIVE — SIGNIFICANT CHANGE UP
NRBC # FLD: 0 — SIGNIFICANT CHANGE UP
PH UR: 6.5 — SIGNIFICANT CHANGE UP (ref 4.6–8)
PHOSPHATE SERPL-MCNC: 4.1 MG/DL — SIGNIFICANT CHANGE UP (ref 2.5–4.5)
PLATELET # BLD AUTO: 282 K/UL — SIGNIFICANT CHANGE UP (ref 150–400)
PMV BLD: 10.7 FL — SIGNIFICANT CHANGE UP (ref 7–13)
POTASSIUM SERPL-MCNC: 5.2 MMOL/L — SIGNIFICANT CHANGE UP (ref 3.5–5.3)
POTASSIUM SERPL-MCNC: 5.3 MMOL/L — SIGNIFICANT CHANGE UP (ref 3.5–5.3)
POTASSIUM SERPL-SCNC: 5.2 MMOL/L — SIGNIFICANT CHANGE UP (ref 3.5–5.3)
POTASSIUM SERPL-SCNC: 5.3 MMOL/L — SIGNIFICANT CHANGE UP (ref 3.5–5.3)
PROT UR-MCNC: 100 — SIGNIFICANT CHANGE UP
PROTHROM AB SERPL-ACNC: 10.1 SEC — SIGNIFICANT CHANGE UP (ref 9.8–13.1)
RBC # BLD: 2.57 M/UL — LOW (ref 4.2–5.8)
RBC # FLD: 13.1 % — SIGNIFICANT CHANGE UP (ref 10.3–14.5)
RBC CASTS # UR COMP ASSIST: SIGNIFICANT CHANGE UP (ref 0–?)
RH IG SCN BLD-IMP: POSITIVE — SIGNIFICANT CHANGE UP
SODIUM SERPL-SCNC: 136 MMOL/L — SIGNIFICANT CHANGE UP (ref 135–145)
SODIUM SERPL-SCNC: 137 MMOL/L — SIGNIFICANT CHANGE UP (ref 135–145)
SP GR SPEC: 1.01 — SIGNIFICANT CHANGE UP (ref 1–1.03)
UROBILINOGEN FLD QL: NORMAL E.U. — SIGNIFICANT CHANGE UP (ref 0.1–0.2)
VANCOMYCIN TROUGH SERPL-MCNC: 16.5 UG/ML — SIGNIFICANT CHANGE UP (ref 10–20)
WBC # BLD: 8.63 K/UL — SIGNIFICANT CHANGE UP (ref 3.8–10.5)
WBC # FLD AUTO: 8.63 K/UL — SIGNIFICANT CHANGE UP (ref 3.8–10.5)
WBC UR QL: SIGNIFICANT CHANGE UP (ref 0–?)

## 2017-09-17 PROCEDURE — 71010: CPT | Mod: 26

## 2017-09-17 RX ORDER — HYDRALAZINE HCL 50 MG
10 TABLET ORAL ONCE
Qty: 0 | Refills: 0 | Status: COMPLETED | OUTPATIENT
Start: 2017-09-17 | End: 2017-09-17

## 2017-09-17 RX ORDER — SODIUM CHLORIDE 9 MG/ML
1000 INJECTION INTRAMUSCULAR; INTRAVENOUS; SUBCUTANEOUS ONCE
Qty: 0 | Refills: 0 | Status: DISCONTINUED | OUTPATIENT
Start: 2017-09-17 | End: 2017-09-17

## 2017-09-17 RX ORDER — SODIUM CHLORIDE 9 MG/ML
1000 INJECTION, SOLUTION INTRAVENOUS ONCE
Qty: 0 | Refills: 0 | Status: DISCONTINUED | OUTPATIENT
Start: 2017-09-17 | End: 2017-09-17

## 2017-09-17 RX ORDER — SODIUM CHLORIDE 9 MG/ML
1000 INJECTION INTRAMUSCULAR; INTRAVENOUS; SUBCUTANEOUS ONCE
Qty: 0 | Refills: 0 | Status: DISCONTINUED | OUTPATIENT
Start: 2017-09-17 | End: 2017-09-18

## 2017-09-17 RX ORDER — ACETYLCYSTEINE 200 MG/ML
1200 VIAL (ML) MISCELLANEOUS
Qty: 0 | Refills: 0 | Status: DISCONTINUED | OUTPATIENT
Start: 2017-09-17 | End: 2017-09-17

## 2017-09-17 RX ORDER — SODIUM CHLORIDE 9 MG/ML
1000 INJECTION INTRAMUSCULAR; INTRAVENOUS; SUBCUTANEOUS ONCE
Qty: 0 | Refills: 0 | Status: COMPLETED | OUTPATIENT
Start: 2017-09-17 | End: 2017-09-17

## 2017-09-17 RX ORDER — FUROSEMIDE 40 MG
80 TABLET ORAL ONCE
Qty: 0 | Refills: 0 | Status: COMPLETED | OUTPATIENT
Start: 2017-09-17 | End: 2017-09-17

## 2017-09-17 RX ORDER — HYDRALAZINE HCL 50 MG
10 TABLET ORAL EVERY 4 HOURS
Qty: 0 | Refills: 0 | Status: DISCONTINUED | OUTPATIENT
Start: 2017-09-17 | End: 2017-09-28

## 2017-09-17 RX ORDER — AMLODIPINE BESYLATE 2.5 MG/1
5 TABLET ORAL DAILY
Qty: 0 | Refills: 0 | Status: DISCONTINUED | OUTPATIENT
Start: 2017-09-17 | End: 2017-09-18

## 2017-09-17 RX ORDER — ACETYLCYSTEINE 200 MG/ML
1200 VIAL (ML) MISCELLANEOUS
Qty: 0 | Refills: 0 | Status: COMPLETED | OUTPATIENT
Start: 2017-09-17 | End: 2017-09-19

## 2017-09-17 RX ADMIN — SODIUM CHLORIDE 75 MILLILITER(S): 9 INJECTION INTRAMUSCULAR; INTRAVENOUS; SUBCUTANEOUS at 18:42

## 2017-09-17 RX ADMIN — ISOSORBIDE MONONITRATE 120 MILLIGRAM(S): 60 TABLET, EXTENDED RELEASE ORAL at 13:18

## 2017-09-17 RX ADMIN — Medication 650 MILLIGRAM(S): at 14:07

## 2017-09-17 RX ADMIN — SODIUM CHLORIDE 1000 MILLILITER(S): 9 INJECTION INTRAMUSCULAR; INTRAVENOUS; SUBCUTANEOUS at 09:00

## 2017-09-17 RX ADMIN — SODIUM CHLORIDE 500 MILLILITER(S): 9 INJECTION INTRAMUSCULAR; INTRAVENOUS; SUBCUTANEOUS at 15:08

## 2017-09-17 RX ADMIN — Medication 10 MILLIGRAM(S): at 14:13

## 2017-09-17 RX ADMIN — HEPARIN SODIUM 5000 UNIT(S): 5000 INJECTION INTRAVENOUS; SUBCUTANEOUS at 23:03

## 2017-09-17 RX ADMIN — Medication 6: at 13:17

## 2017-09-17 RX ADMIN — PANTOPRAZOLE SODIUM 40 MILLIGRAM(S): 20 TABLET, DELAYED RELEASE ORAL at 06:04

## 2017-09-17 RX ADMIN — Medication 10 MILLIGRAM(S): at 22:22

## 2017-09-17 RX ADMIN — Medication 4: at 09:00

## 2017-09-17 RX ADMIN — OLANZAPINE 20 MILLIGRAM(S): 15 TABLET, FILM COATED ORAL at 23:05

## 2017-09-17 RX ADMIN — Medication 4: at 23:04

## 2017-09-17 RX ADMIN — PIPERACILLIN AND TAZOBACTAM 25 GRAM(S): 4; .5 INJECTION, POWDER, LYOPHILIZED, FOR SOLUTION INTRAVENOUS at 06:03

## 2017-09-17 RX ADMIN — HEPARIN SODIUM 5000 UNIT(S): 5000 INJECTION INTRAVENOUS; SUBCUTANEOUS at 06:04

## 2017-09-17 RX ADMIN — Medication 10 MILLIGRAM(S): at 23:33

## 2017-09-17 RX ADMIN — Medication 10 MILLIGRAM(S): at 02:23

## 2017-09-17 RX ADMIN — ATORVASTATIN CALCIUM 40 MILLIGRAM(S): 80 TABLET, FILM COATED ORAL at 23:03

## 2017-09-17 RX ADMIN — HEPARIN SODIUM 5000 UNIT(S): 5000 INJECTION INTRAVENOUS; SUBCUTANEOUS at 14:07

## 2017-09-17 RX ADMIN — DIVALPROEX SODIUM 500 MILLIGRAM(S): 500 TABLET, DELAYED RELEASE ORAL at 10:39

## 2017-09-17 RX ADMIN — Medication 4: at 18:39

## 2017-09-17 RX ADMIN — PIPERACILLIN AND TAZOBACTAM 25 GRAM(S): 4; .5 INJECTION, POWDER, LYOPHILIZED, FOR SOLUTION INTRAVENOUS at 18:40

## 2017-09-17 RX ADMIN — DIVALPROEX SODIUM 500 MILLIGRAM(S): 500 TABLET, DELAYED RELEASE ORAL at 23:03

## 2017-09-17 RX ADMIN — Medication 100 MILLIGRAM(S): at 23:03

## 2017-09-17 RX ADMIN — Medication 80 MILLIGRAM(S): at 09:00

## 2017-09-17 RX ADMIN — Medication 650 MILLIGRAM(S): at 23:03

## 2017-09-17 RX ADMIN — Medication 650 MILLIGRAM(S): at 06:04

## 2017-09-17 RX ADMIN — AMLODIPINE BESYLATE 5 MILLIGRAM(S): 2.5 TABLET ORAL at 10:39

## 2017-09-17 NOTE — PROGRESS NOTE ADULT - ASSESSMENT
- Pt was examined and evaluated  - Wound packed with betadine gauze and betadine'd applied to 5th met wound.   - Pod surg planning pending [facility] rec on vascular status and healing potential, will follow angio on Monday, booked for OR Tuesday at 5 PM  - c/w vanco, zosyn pending ED wound culture  - awaiting MRI  - Please document medical clearance  - d/w attending

## 2017-09-17 NOTE — PROVIDER CONTACT NOTE (OTHER) - ACTION/TREATMENT ORDERED:
Medication will be ordered for elevated BP. Will re-assess after administration of med. Will continue to monitor pt.

## 2017-09-17 NOTE — PROGRESS NOTE ADULT - SUBJECTIVE AND OBJECTIVE BOX
Vascular Surgery (C Team)     Subjective: Pt seen/examined at bedside. Overnight, was hypertensive to 220/70, received IV hydralazine and responded appropriately. Given persistent hyperkalemia patient received 1 liter bolus and Lasix, remained asymptomatic. Otherwise denied any fever, chills, CP, SOB, or uncontrolled pain this AM.     MEDICATIONS  (STANDING):  heparin  Injectable 5000 Unit(s) SubCutaneous every 8 hours  sodium bicarbonate 650 milliGRAM(s) Oral three times a day  isosorbide   mononitrate ER Tablet (IMDUR) 120 milliGRAM(s) Oral daily  diVALproex  milliGRAM(s) Oral two times a day  traZODone 100 milliGRAM(s) Oral at bedtime  atorvastatin 40 milliGRAM(s) Oral at bedtime  OLANZapine 20 milliGRAM(s) Oral at bedtime  carvedilol 12.5 milliGRAM(s) Oral every 12 hours  insulin lispro (HumaLOG) corrective regimen sliding scale   SubCutaneous three times a day before meals  insulin lispro (HumaLOG) corrective regimen sliding scale   SubCutaneous at bedtime  dextrose 5%. 1000 milliLiter(s) (50 mL/Hr) IV Continuous <Continuous>  dextrose 50% Injectable 12.5 Gram(s) IV Push once  dextrose 50% Injectable 25 Gram(s) IV Push once  dextrose 50% Injectable 25 Gram(s) IV Push once  pantoprazole    Tablet 40 milliGRAM(s) Oral before breakfast  sodium chloride 0.9%. 1000 milliLiter(s) (75 mL/Hr) IV Continuous <Continuous>  piperacillin/tazobactam IVPB. 3.375 Gram(s) IV Intermittent every 12 hours    MEDICATIONS  (PRN):  dextrose Gel 1 Dose(s) Oral once PRN Blood Glucose LESS THAN 70 milliGRAM(s)/deciliter  glucagon  Injectable 1 milliGRAM(s) IntraMuscular once PRN Glucose LESS THAN 70 milligrams/deciliter  oxyCODONE    IR 5 milliGRAM(s) Oral every 4 hours PRN Moderate Pain (4 - 6)  oxyCODONE    IR 10 milliGRAM(s) Oral every 4 hours PRN Severe Pain (7 - 10)  acetaminophen   Tablet. 650 milliGRAM(s) Oral every 6 hours PRN Mild Pain (1 - 3)    heparin  Injectable 5000 Unit(s) SubCutaneous every 8 hours  sodium bicarbonate 650 milliGRAM(s) Oral three times a day  isosorbide   mononitrate ER Tablet (IMDUR) 120 milliGRAM(s) Oral daily  diVALproex  milliGRAM(s) Oral two times a day  traZODone 100 milliGRAM(s) Oral at bedtime  atorvastatin 40 milliGRAM(s) Oral at bedtime  OLANZapine 20 milliGRAM(s) Oral at bedtime  carvedilol 12.5 milliGRAM(s) Oral every 12 hours  insulin lispro (HumaLOG) corrective regimen sliding scale   SubCutaneous three times a day before meals  insulin lispro (HumaLOG) corrective regimen sliding scale   SubCutaneous at bedtime  dextrose 5%. 1000 milliLiter(s) IV Continuous <Continuous>  dextrose Gel 1 Dose(s) Oral once PRN  dextrose 50% Injectable 12.5 Gram(s) IV Push once  dextrose 50% Injectable 25 Gram(s) IV Push once  dextrose 50% Injectable 25 Gram(s) IV Push once  glucagon  Injectable 1 milliGRAM(s) IntraMuscular once PRN  oxyCODONE    IR 5 milliGRAM(s) Oral every 4 hours PRN  oxyCODONE    IR 10 milliGRAM(s) Oral every 4 hours PRN  acetaminophen   Tablet. 650 milliGRAM(s) Oral every 6 hours PRN  pantoprazole    Tablet 40 milliGRAM(s) Oral before breakfast  sodium chloride 0.9%. 1000 milliLiter(s) IV Continuous <Continuous>  piperacillin/tazobactam IVPB. 3.375 Gram(s) IV Intermittent every 12 hours    Allergies    No Known Allergies    Intolerances          Vital Signs Last 24 Hrs  T(C): 36.9 (17 Sep 2017 06:01), Max: 37.1 (16 Sep 2017 21:57)  T(F): 98.5 (17 Sep 2017 06:01), Max: 98.7 (16 Sep 2017 21:57)  HR: 52 (17 Sep 2017 06:01) (51 - 59)  BP: 165/60 (17 Sep 2017 06:01) (136/54 - 220/70)  BP(mean): --  RR: 18 (17 Sep 2017 06:01) (17 - 18)  SpO2: 99% (17 Sep 2017 06:01) (98% - 99%)    I&O's Summary    15 Sep 2017 07:01  -  16 Sep 2017 07:00  --------------------------------------------------------  IN: 1200 mL / OUT: 2075 mL / NET: -875 mL    16 Sep 2017 07:01  -  17 Sep 2017 06:27  --------------------------------------------------------  IN: 1825 mL / OUT: 3300 mL / NET: -1475 mL        Physical Exam:  Gen: NAD   Resp: Non labored breathing  Cards: RRR -rgm  Abd: NT/ND   Ext: R foot amputation site/5th digit infected but covered. Currently CDI limited healing and granulation tissue remains    LABS:                        8.2    9.03  )-----------( 288      ( 16 Sep 2017 06:00 )             25.6     09-16    140  |  107  |  54<H>  ----------------------------<  207<H>  5.5<H>   |  20<L>  |  3.19<H>    Ca    8.6      16 Sep 2017 14:43  Phos  4.3     09-15  Mg     2.1     09-15            CAPILLARY BLOOD GLUCOSE  273 (16 Sep 2017 21:57)  257 (16 Sep 2017 16:58)  269 (16 Sep 2017 12:47)  181 (16 Sep 2017 08:49)          RADIOLOGY & ADDITIONAL TESTS: Pre- Op Note   Vascular Surgery (C Team)     Subjective: Pt seen/examined at bedside. Overnight, was hypertensive to 220/70, received IV hydralazine and responded appropriately. Given persistent hyperkalemia patient received 1 liter bolus and Lasix, remained asymptomatic. Otherwise denied any fever, chills, CP, SOB, or uncontrolled pain this AM.     MEDICATIONS  (STANDING):  heparin  Injectable 5000 Unit(s) SubCutaneous every 8 hours  sodium bicarbonate 650 milliGRAM(s) Oral three times a day  isosorbide   mononitrate ER Tablet (IMDUR) 120 milliGRAM(s) Oral daily  diVALproex  milliGRAM(s) Oral two times a day  traZODone 100 milliGRAM(s) Oral at bedtime  atorvastatin 40 milliGRAM(s) Oral at bedtime  OLANZapine 20 milliGRAM(s) Oral at bedtime  carvedilol 12.5 milliGRAM(s) Oral every 12 hours  insulin lispro (HumaLOG) corrective regimen sliding scale   SubCutaneous three times a day before meals  insulin lispro (HumaLOG) corrective regimen sliding scale   SubCutaneous at bedtime  dextrose 5%. 1000 milliLiter(s) (50 mL/Hr) IV Continuous <Continuous>  dextrose 50% Injectable 12.5 Gram(s) IV Push once  dextrose 50% Injectable 25 Gram(s) IV Push once  dextrose 50% Injectable 25 Gram(s) IV Push once  pantoprazole    Tablet 40 milliGRAM(s) Oral before breakfast  sodium chloride 0.9%. 1000 milliLiter(s) (75 mL/Hr) IV Continuous <Continuous>  piperacillin/tazobactam IVPB. 3.375 Gram(s) IV Intermittent every 12 hours    MEDICATIONS  (PRN):  dextrose Gel 1 Dose(s) Oral once PRN Blood Glucose LESS THAN 70 milliGRAM(s)/deciliter  glucagon  Injectable 1 milliGRAM(s) IntraMuscular once PRN Glucose LESS THAN 70 milligrams/deciliter  oxyCODONE    IR 5 milliGRAM(s) Oral every 4 hours PRN Moderate Pain (4 - 6)  oxyCODONE    IR 10 milliGRAM(s) Oral every 4 hours PRN Severe Pain (7 - 10)  acetaminophen   Tablet. 650 milliGRAM(s) Oral every 6 hours PRN Mild Pain (1 - 3)    heparin  Injectable 5000 Unit(s) SubCutaneous every 8 hours  sodium bicarbonate 650 milliGRAM(s) Oral three times a day  isosorbide   mononitrate ER Tablet (IMDUR) 120 milliGRAM(s) Oral daily  diVALproex  milliGRAM(s) Oral two times a day  traZODone 100 milliGRAM(s) Oral at bedtime  atorvastatin 40 milliGRAM(s) Oral at bedtime  OLANZapine 20 milliGRAM(s) Oral at bedtime  carvedilol 12.5 milliGRAM(s) Oral every 12 hours  insulin lispro (HumaLOG) corrective regimen sliding scale   SubCutaneous three times a day before meals  insulin lispro (HumaLOG) corrective regimen sliding scale   SubCutaneous at bedtime  dextrose 5%. 1000 milliLiter(s) IV Continuous <Continuous>  dextrose Gel 1 Dose(s) Oral once PRN  dextrose 50% Injectable 12.5 Gram(s) IV Push once  dextrose 50% Injectable 25 Gram(s) IV Push once  dextrose 50% Injectable 25 Gram(s) IV Push once  glucagon  Injectable 1 milliGRAM(s) IntraMuscular once PRN  oxyCODONE    IR 5 milliGRAM(s) Oral every 4 hours PRN  oxyCODONE    IR 10 milliGRAM(s) Oral every 4 hours PRN  acetaminophen   Tablet. 650 milliGRAM(s) Oral every 6 hours PRN  pantoprazole    Tablet 40 milliGRAM(s) Oral before breakfast  sodium chloride 0.9%. 1000 milliLiter(s) IV Continuous <Continuous>  piperacillin/tazobactam IVPB. 3.375 Gram(s) IV Intermittent every 12 hours    Allergies    No Known Allergies    Intolerances          Vital Signs Last 24 Hrs  T(C): 36.9 (17 Sep 2017 06:01), Max: 37.1 (16 Sep 2017 21:57)  T(F): 98.5 (17 Sep 2017 06:01), Max: 98.7 (16 Sep 2017 21:57)  HR: 52 (17 Sep 2017 06:01) (51 - 59)  BP: 165/60 (17 Sep 2017 06:01) (136/54 - 220/70)  BP(mean): --  RR: 18 (17 Sep 2017 06:01) (17 - 18)  SpO2: 99% (17 Sep 2017 06:01) (98% - 99%)    I&O's Summary    15 Sep 2017 07:01  -  16 Sep 2017 07:00  --------------------------------------------------------  IN: 1200 mL / OUT: 2075 mL / NET: -875 mL    16 Sep 2017 07:01  -  17 Sep 2017 06:27  --------------------------------------------------------  IN: 1825 mL / OUT: 3300 mL / NET: -1475 mL        Physical Exam:  Gen: NAD   Resp: Non labored breathing  Cards: RRR -rgm  Abd: NT/ND   Ext: R foot amputation site/5th digit infected but covered. Currently CDI limited healing and granulation tissue remains    LABS:                        8.2    9.03  )-----------( 288      ( 16 Sep 2017 06:00 )             25.6     09-16    140  |  107  |  54<H>  ----------------------------<  207<H>  5.5<H>   |  20<L>  |  3.19<H>    Ca    8.6      16 Sep 2017 14:43  Phos  4.3     09-15  Mg     2.1     09-15            CAPILLARY BLOOD GLUCOSE  273 (16 Sep 2017 21:57)  257 (16 Sep 2017 16:58)  269 (16 Sep 2017 12:47)  181 (16 Sep 2017 08:49)          RADIOLOGY & ADDITIONAL TESTS:

## 2017-09-17 NOTE — PROGRESS NOTE ADULT - ASSESSMENT
53 yo male with infected R foot hallux amp site and infected 5th digit HOD5:      - Plan for RLE angio tomorrow; consent in chart, NPO @ midnight, Pre-op labs sent.   - Monitor BP, consider starting amlodipine for HTN   - Pain control  -  Wound Cx growing MRSA  - Cont IV abx: Zosyn and Vanc  - f/u EGD results, biopsies  - Wound washes and packing per Podiatry 53 yo male with infected R foot hallux amp site and infected 5th digit HOD5:      - Plan for RLE angio tomorrow; consent in chart, NPO @ midnight, Pre-op labs sent.   - IV Lasix 80, I L NS bolus to keep patient in fluid balance in setting of NEEL  - Strict I's and O's every 4 hours  - Monitor BP, consider starting amlodipine for HTN   - Pain control  -  Wound Cx growing MRSA  - Cont IV abx: Zosyn and Vanc  - f/u EGD results, biopsies  - Wound washes and packing per Podiatry 51 yo male with infected R foot hallux amp site and infected 5th digit HOD5:      - Plan for RLE angio tomorrow; consent in chart, NPO @ midnight, Pre-op : CXR, EKG, T and S, PTT, PT/INR, CBC, UA  - IV Lasix 80, I L NS bolus to keep patient in fluid balance in setting of NEEL  - Strict I's and O's every 4 hours  - Monitor BP, consider starting amlodipine for HTN   - Pain control  -  Wound Cx growing MRSA  - Cont IV abx: Zosyn and Vanc  - f/u EGD results, biopsies  - Wound washes and packing per Podiatry

## 2017-09-17 NOTE — PROVIDER CONTACT NOTE (OTHER) - ACTION/TREATMENT ORDERED:
RN made MD De La Rosa aware that B/P was elevated. Hydralazine IVP was given as ordered and second bolus was only given after b/p decreased over 2 hours.  Pt also on maintenance fluid of NS @ 75cc/hr.

## 2017-09-17 NOTE — PROGRESS NOTE ADULT - SUBJECTIVE AND OBJECTIVE BOX
Patient is a 52y old  Male who presents with a chief complaint of right foot (14 Sep 2017 02:55)       INTERVAL HPI/OVERNIGHT EVENTS:  Patient seen and evaluated at bedside.  Pt is resting comfortable in NAD. Denies N/V/F/C.  Pain rated at X/10    Allergies    No Known Allergies    Intolerances    Vital Signs Last 24 Hrs  T(C): 36.9 (17 Sep 2017 06:01), Max: 37.1 (16 Sep 2017 21:57)  T(F): 98.5 (17 Sep 2017 06:01), Max: 98.7 (16 Sep 2017 21:57)  HR: 52 (17 Sep 2017 06:01) (51 - 59)  BP: 165/60 (17 Sep 2017 06:01) (136/54 - 220/70)  BP(mean): --  RR: 18 (17 Sep 2017 06:01) (17 - 18)  SpO2: 99% (17 Sep 2017 06:01) (98% - 99%)    LABS:                        7.5    8.63  )-----------( 282      ( 17 Sep 2017 06:15 )             23.4     09-17    137  |  106  |  59<H>  ----------------------------<  206<H>  5.3   |  20<L>  |  3.25<H>    Ca    8.5      17 Sep 2017 06:15  Phos  4.1     09-17  Mg     2.0     09-17      PTT - ( 17 Sep 2017 06:18 )  PTT:24.2 SEC    CAPILLARY BLOOD GLUCOSE  217 (17 Sep 2017 08:54)  273 (16 Sep 2017 21:57)  257 (16 Sep 2017 16:58)  269 (16 Sep 2017 12:47)    Lower Extremity Physical Exam:  VVascular: DP/PT 2/4 B/L, CFT <3 sec x 10, Temp gradient warm to warm B/L  Neurology: Epicritic sensation intact to level of digits, B/L  Musculoskeletal/Ortho: No pain with palpation of hallux amp site. No pain with palpation of 5th met wound. No pain with ROM of 5th met wound.   Skin:   Wound #1: RF hallux amp site  Size: 1.3 cm x 1.4cm x bone  Etiology: surgical   Depth: Exposed bone  Wound bed: fibronectroic  Drainage: none  Odor: none  Periwound: hyperkeratotic    Erythema periwound streaking to dorsum of foot + demetri-wound erythema to 5th met wound. No purulent drainage. No fluctuance or crepitus noted to wounds.     RADIOLOGY & ADDITIONAL TESTS:

## 2017-09-17 NOTE — PROVIDER CONTACT NOTE (OTHER) - RECOMMENDATIONS
Ns bolus ordered along with Lasix 80mg given as ordered. Chest Xray done. 2nd Ns 1000cc bolus ordered. Dr's made aware of b/p and bolus not given until b/p decreased.

## 2017-09-17 NOTE — CHART NOTE - NSCHARTNOTEFT_GEN_A_CORE
Notified by HOMERO Liu that she does not feel comfortable giving patient 1 liter Normal Saline Bolus. Explained necessity for bolus given hyperkalemia and planned RLE angiogram tomorrow morning. Patient ordered for IV hydralazine for SBP>180, explained she can give these medications as ordered to manage hypertension. HOMERO Liu stated she did not feel comfortable "playing with the patient's blood pressure" "I think he's going to stroke". Patient is asymptomatic , lying in bed comfortably. HOMERO Liu stated she would not give the patient the fluid bolus, the team should use other interventions for the hyperkalemia and prevention of further kidney injury with angiogram. Explained we would still like fluid bolus given.

## 2017-09-18 LAB
BUN SERPL-MCNC: 55 MG/DL — HIGH (ref 7–23)
CALCIUM SERPL-MCNC: 8.2 MG/DL — LOW (ref 8.4–10.5)
CHLORIDE SERPL-SCNC: 107 MMOL/L — SIGNIFICANT CHANGE UP (ref 98–107)
CO2 SERPL-SCNC: 18 MMOL/L — LOW (ref 22–31)
CREAT SERPL-MCNC: 3.08 MG/DL — HIGH (ref 0.5–1.3)
GLUCOSE SERPL-MCNC: 205 MG/DL — HIGH (ref 70–99)
HCT VFR BLD CALC: 21 % — CRITICAL LOW (ref 39–50)
HCT VFR BLD CALC: 25.1 % — LOW (ref 39–50)
HGB BLD-MCNC: 6.7 G/DL — CRITICAL LOW (ref 13–17)
HGB BLD-MCNC: 7.9 G/DL — LOW (ref 13–17)
MAGNESIUM SERPL-MCNC: 1.7 MG/DL — SIGNIFICANT CHANGE UP (ref 1.6–2.6)
MCHC RBC-ENTMCNC: 28.5 PG — SIGNIFICANT CHANGE UP (ref 27–34)
MCHC RBC-ENTMCNC: 29.4 PG — SIGNIFICANT CHANGE UP (ref 27–34)
MCHC RBC-ENTMCNC: 31.5 % — LOW (ref 32–36)
MCHC RBC-ENTMCNC: 31.9 % — LOW (ref 32–36)
MCV RBC AUTO: 90.6 FL — SIGNIFICANT CHANGE UP (ref 80–100)
MCV RBC AUTO: 92.1 FL — SIGNIFICANT CHANGE UP (ref 80–100)
NRBC # FLD: 0 — SIGNIFICANT CHANGE UP
NRBC # FLD: 0 — SIGNIFICANT CHANGE UP
PHOSPHATE SERPL-MCNC: 3.9 MG/DL — SIGNIFICANT CHANGE UP (ref 2.5–4.5)
PLATELET # BLD AUTO: 299 K/UL — SIGNIFICANT CHANGE UP (ref 150–400)
PLATELET # BLD AUTO: 300 K/UL — SIGNIFICANT CHANGE UP (ref 150–400)
PMV BLD: 10.3 FL — SIGNIFICANT CHANGE UP (ref 7–13)
PMV BLD: 10.6 FL — SIGNIFICANT CHANGE UP (ref 7–13)
POTASSIUM SERPL-MCNC: 5 MMOL/L — SIGNIFICANT CHANGE UP (ref 3.5–5.3)
POTASSIUM SERPL-SCNC: 5 MMOL/L — SIGNIFICANT CHANGE UP (ref 3.5–5.3)
RBC # BLD: 2.28 M/UL — LOW (ref 4.2–5.8)
RBC # BLD: 2.77 M/UL — LOW (ref 4.2–5.8)
RBC # FLD: 13.2 % — SIGNIFICANT CHANGE UP (ref 10.3–14.5)
RBC # FLD: 13.3 % — SIGNIFICANT CHANGE UP (ref 10.3–14.5)
SODIUM SERPL-SCNC: 137 MMOL/L — SIGNIFICANT CHANGE UP (ref 135–145)
WBC # BLD: 8.04 K/UL — SIGNIFICANT CHANGE UP (ref 3.8–10.5)
WBC # BLD: 8.29 K/UL — SIGNIFICANT CHANGE UP (ref 3.8–10.5)
WBC # FLD AUTO: 8.04 K/UL — SIGNIFICANT CHANGE UP (ref 3.8–10.5)
WBC # FLD AUTO: 8.29 K/UL — SIGNIFICANT CHANGE UP (ref 3.8–10.5)

## 2017-09-18 PROCEDURE — 36245 INS CATH ABD/L-EXT ART 1ST: CPT | Mod: RT

## 2017-09-18 PROCEDURE — 75625 CONTRAST EXAM ABDOMINL AORTA: CPT | Mod: 26

## 2017-09-18 PROCEDURE — 75710 ARTERY X-RAYS ARM/LEG: CPT | Mod: 26

## 2017-09-18 RX ORDER — AMLODIPINE BESYLATE 2.5 MG/1
5 TABLET ORAL ONCE
Qty: 0 | Refills: 0 | Status: COMPLETED | OUTPATIENT
Start: 2017-09-18 | End: 2017-09-18

## 2017-09-18 RX ORDER — SODIUM CHLORIDE 9 MG/ML
1000 INJECTION INTRAMUSCULAR; INTRAVENOUS; SUBCUTANEOUS
Qty: 0 | Refills: 0 | Status: DISCONTINUED | OUTPATIENT
Start: 2017-09-18 | End: 2017-09-19

## 2017-09-18 RX ORDER — AMLODIPINE BESYLATE 2.5 MG/1
10 TABLET ORAL DAILY
Qty: 0 | Refills: 0 | Status: DISCONTINUED | OUTPATIENT
Start: 2017-09-18 | End: 2017-09-28

## 2017-09-18 RX ORDER — SODIUM CHLORIDE 9 MG/ML
1000 INJECTION INTRAMUSCULAR; INTRAVENOUS; SUBCUTANEOUS
Qty: 0 | Refills: 0 | Status: DISCONTINUED | OUTPATIENT
Start: 2017-09-18 | End: 2017-09-18

## 2017-09-18 RX ADMIN — DIVALPROEX SODIUM 500 MILLIGRAM(S): 500 TABLET, DELAYED RELEASE ORAL at 22:24

## 2017-09-18 RX ADMIN — PIPERACILLIN AND TAZOBACTAM 25 GRAM(S): 4; .5 INJECTION, POWDER, LYOPHILIZED, FOR SOLUTION INTRAVENOUS at 05:14

## 2017-09-18 RX ADMIN — CARVEDILOL PHOSPHATE 12.5 MILLIGRAM(S): 80 CAPSULE, EXTENDED RELEASE ORAL at 17:19

## 2017-09-18 RX ADMIN — CARVEDILOL PHOSPHATE 12.5 MILLIGRAM(S): 80 CAPSULE, EXTENDED RELEASE ORAL at 05:16

## 2017-09-18 RX ADMIN — HEPARIN SODIUM 5000 UNIT(S): 5000 INJECTION INTRAVENOUS; SUBCUTANEOUS at 22:23

## 2017-09-18 RX ADMIN — Medication 650 MILLIGRAM(S): at 15:36

## 2017-09-18 RX ADMIN — ATORVASTATIN CALCIUM 40 MILLIGRAM(S): 80 TABLET, FILM COATED ORAL at 22:24

## 2017-09-18 RX ADMIN — AMLODIPINE BESYLATE 5 MILLIGRAM(S): 2.5 TABLET ORAL at 16:31

## 2017-09-18 RX ADMIN — Medication 650 MILLIGRAM(S): at 22:24

## 2017-09-18 RX ADMIN — OLANZAPINE 20 MILLIGRAM(S): 15 TABLET, FILM COATED ORAL at 22:24

## 2017-09-18 RX ADMIN — Medication 1200 MILLIGRAM(S): at 05:16

## 2017-09-18 RX ADMIN — HEPARIN SODIUM 5000 UNIT(S): 5000 INJECTION INTRAVENOUS; SUBCUTANEOUS at 15:36

## 2017-09-18 RX ADMIN — HEPARIN SODIUM 5000 UNIT(S): 5000 INJECTION INTRAVENOUS; SUBCUTANEOUS at 05:16

## 2017-09-18 RX ADMIN — Medication 650 MILLIGRAM(S): at 05:16

## 2017-09-18 RX ADMIN — Medication 1: at 22:23

## 2017-09-18 RX ADMIN — Medication 100 MILLIGRAM(S): at 22:24

## 2017-09-18 RX ADMIN — ISOSORBIDE MONONITRATE 120 MILLIGRAM(S): 60 TABLET, EXTENDED RELEASE ORAL at 15:35

## 2017-09-18 RX ADMIN — Medication 4: at 17:24

## 2017-09-18 RX ADMIN — Medication 1200 MILLIGRAM(S): at 17:19

## 2017-09-18 RX ADMIN — Medication 4: at 09:29

## 2017-09-18 RX ADMIN — SODIUM CHLORIDE 125 MILLILITER(S): 9 INJECTION INTRAMUSCULAR; INTRAVENOUS; SUBCUTANEOUS at 00:54

## 2017-09-18 RX ADMIN — SODIUM CHLORIDE 100 MILLILITER(S): 9 INJECTION INTRAMUSCULAR; INTRAVENOUS; SUBCUTANEOUS at 15:40

## 2017-09-18 RX ADMIN — Medication 10 MILLIGRAM(S): at 15:35

## 2017-09-18 RX ADMIN — SODIUM CHLORIDE 125 MILLILITER(S): 9 INJECTION INTRAMUSCULAR; INTRAVENOUS; SUBCUTANEOUS at 09:32

## 2017-09-18 RX ADMIN — AMLODIPINE BESYLATE 5 MILLIGRAM(S): 2.5 TABLET ORAL at 05:16

## 2017-09-18 RX ADMIN — PIPERACILLIN AND TAZOBACTAM 25 GRAM(S): 4; .5 INJECTION, POWDER, LYOPHILIZED, FOR SOLUTION INTRAVENOUS at 17:25

## 2017-09-18 NOTE — PROGRESS NOTE ADULT - ASSESSMENT
52y Male s/p angiogram  - Pain control  - NPO for podiatry OR tomorrow  - Out of bed and encourage early ambulation  - Incentive spirometry

## 2017-09-18 NOTE — PROGRESS NOTE ADULT - ASSESSMENT
53 yo male with infected R foot hallux amp site and infected 5th digit HOD6:  - Plan for RLE angio this AM   - NPO/IVF  - Strict I's and O's every 4 hours  - Pain control  - Cont IV abx: Zosyn and Vanc  - f/u EGD results, biopsies  - Wound washes and packing per Podiatry

## 2017-09-18 NOTE — CONSULT NOTE ADULT - ASSESSMENT
history of bipolar d/o - previously on lithium  DM  HTN  CKD 4  PAD- s/p right first ray partial amputation - now with potential infected site  s/p angiogram this am- given mucomyst/ IV fluids prior to procedure  monitor renal function closely post procedure  anemia- likely multifactorial- evidence of iron deficiency/ GE junction mass- continue GI evaluation  may benefit from ALCIDES in the future if no evidence of malignancy/ iron stores repleted  dose medications for GFR approx 20- 25 cc/ min  avoid NSAIDs  monitor vancomycin levels if continued  BP elevated- increase amlodipine to 10 mg daily, hold ACE/ ARB given advanced CKD/ angiogram  can add oral hydralazine 25 mg TId if remains elevated  will follow with you.     Thank you,

## 2017-09-18 NOTE — PROGRESS NOTE ADULT - SUBJECTIVE AND OBJECTIVE BOX
Post-procedure note    S: Patient underwent angiogram and tolerated procedure without  issue and sent back to floor.  Patient denies chest pain, shortness of breath, nausea, vomiting, lightheadedness, or dizziness.  Pain was well controlled.      O:T(C): 36.8 (09-18-17 @ 21:53), Max: 36.8 (09-18-17 @ 17:18)  HR: 62 (09-18-17 @ 21:53) (62 - 76)  BP: 152/59 (09-18-17 @ 21:53) (139/58 - 202/87)  RR: 21 (09-18-17 @ 21:53) (20 - 22)  SpO2: 99% (09-18-17 @ 21:53) (99% - 99%)  Wt(kg): --                        7.9    8.04  )-----------( 299      ( 18 Sep 2017 15:19 )             25.1        09-18    137  |  107  |  55<H>  ----------------------------<  205<H>  5.0   |  18<L>  |  3.08<H>    Ca    8.2<L>      18 Sep 2017 09:08  Phos  3.9     09-18  Mg     1.7     09-18      Gen: NAD   Resp: Non labored breathing  Cards: RRR  Abd: NT/ND  Ext: R foot amputation site/5th digit infected but covered. Currently CDI limited healing and granulation tissue remains

## 2017-09-18 NOTE — PROGRESS NOTE ADULT - SUBJECTIVE AND OBJECTIVE BOX
Vascular Surgery Progress Note    S: Pt seen this AM. Pain controlled. Planned for RLE angio this AM.      O: Physical Exam:  T(C): 36.9 (09-18-17 @ 05:12), Max: 37.1 (09-18-17 @ 01:37)  HR: 63 (09-18-17 @ 05:12) (51 - 88)  BP: 146/64 (09-18-17 @ 05:12) (146/64 - 200/81)  RR: 18 (09-18-17 @ 05:12) (18 - 20)  SpO2: 100% (09-18-17 @ 05:12) (98% - 100%)    09-17-17 @ 07:01  -  09-18-17 @ 07:00  --------------------------------------------------------  IN: 4075 mL / OUT: 3650 mL / NET: 425 mL    09-18-17 @ 07:01  -  09-18-17 @ 09:21  --------------------------------------------------------  IN: 0 mL / OUT: 750 mL / NET: -750 mL      Gen: NAD   Resp: Non labored breathing  Cards: RRR -rgm  Abd: NT/ND  Ext: R foot amputation site/5th digit infected but covered. Currently CDI limited healing and granulation tissue remains      Labs: Vascular Surgery Progress Note    S: Pt seen this AM. Pain controlled. Planned for RLE angio this AM.      O: Physical Exam:  T(C): 36.9 (09-18-17 @ 05:12), Max: 37.1 (09-18-17 @ 01:37)  HR: 63 (09-18-17 @ 05:12) (51 - 88)  BP: 146/64 (09-18-17 @ 05:12) (146/64 - 200/81)  RR: 18 (09-18-17 @ 05:12) (18 - 20)  SpO2: 100% (09-18-17 @ 05:12) (98% - 100%)    09-17-17 @ 07:01  -  09-18-17 @ 07:00  --------------------------------------------------------  IN: 4075 mL / OUT: 3650 mL / NET: 425 mL    09-18-17 @ 07:01  -  09-18-17 @ 09:21  --------------------------------------------------------  IN: 0 mL / OUT: 750 mL / NET: -750 mL      Gen: NAD   Resp: Non labored breathing  Cards: RRR -rgm  Abd: NT/ND  Ext: R foot amputation site/5th digit infected but covered. Currently CDI limited healing and granulation tissue remains      Labs:                        6.7    8.29  )-----------( 300      ( 18 Sep 2017 09:08 )             21.0   09-18    137  |  107  |  55<H>  ----------------------------<  205<H>  5.0   |  18<L>  |  3.08<H>    Ca    8.2<L>      18 Sep 2017 09:08  Phos  3.9     09-18  Mg     1.7     09-18

## 2017-09-18 NOTE — PROGRESS NOTE ADULT - SUBJECTIVE AND OBJECTIVE BOX
Patient is a 52y old  Male who presents with a chief complaint of right foot (14 Sep 2017 02:55)       INTERVAL HPI/OVERNIGHT EVENTS:  Patient seen and evaluated at bedside.  Pt is resting comfortable in NAD. Denies N/V/F/C.  Pain rated at 0/10. Pt concerned that bandage is wet. Doesnt know hoe it got wet.    Allergies    No Known Allergies    Intolerances        Vital Signs Last 24 Hrs  T(C): 36.9 (18 Sep 2017 05:12), Max: 37.1 (18 Sep 2017 01:37)  T(F): 98.5 (18 Sep 2017 05:12), Max: 98.7 (18 Sep 2017 01:37)  HR: 63 (18 Sep 2017 05:12) (51 - 88)  BP: 146/64 (18 Sep 2017 05:12) (146/64 - 200/81)  BP(mean): --  RR: 18 (18 Sep 2017 05:12) (18 - 20)  SpO2: 100% (18 Sep 2017 05:12) (98% - 100%)    LABS:                        7.5    8.63  )-----------( 282      ( 17 Sep 2017 06:15 )             23.4     09-17    136  |  104  |  54<H>  ----------------------------<  200<H>  5.2   |  20<L>  |  3.14<H>    Ca    8.3<L>      17 Sep 2017 21:15  Phos  4.1       Mg     2.0     -17      PT/INR - ( 17 Sep 2017 11:07 )   PT: 10.1 SEC;   INR: 0.90          PTT - ( 17 Sep 2017 06:18 )  PTT:24.2 SEC  Urinalysis Basic - ( 17 Sep 2017 10:23 )    Color: COLORL / Appearance: CLEAR / S.007 / pH: 6.5  Gluc: 50 / Ketone: NEGATIVE  / Bili: NEGATIVE / Urobili: NORMAL E.U.   Blood: NEGATIVE / Protein: 100 / Nitrite: NEGATIVE   Leuk Esterase: NEGATIVE / RBC: 0-2 / WBC 0-2   Sq Epi: x / Non Sq Epi: x / Bacteria: x      CAPILLARY BLOOD GLUCOSE  323 (17 Sep 2017 22:38)  238 (17 Sep 2017 18:04)  258 (17 Sep 2017 13:03)  217 (17 Sep 2017 08:54)          Lower Extremity Physical Exam:  Vascular: DP/PT 2/4 B/L, CFT <3 sec x 10, Temp gradient warm to warm B/L  Neurology: Epicritic sensation intact to level of digits, B/L  Musculoskeletal/Ortho: No pain with palpation of hallux amp site. No pain with palpation of 5th met wound. No pain with ROM of 5th met wound.   Skin:   Wound #1: RF hallux amp site  Size: 1.3 cm x 1.4cm x bone  Etiology: surgical   Depth: Exposed bone  Wound bed: fibronectroic  Drainage: none  Odor: none  Periwound: hyperkeratotic    RADIOLOGY & ADDITIONAL TESTS:

## 2017-09-18 NOTE — CONSULT NOTE ADULT - SUBJECTIVE AND OBJECTIVE BOX
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 He is sent in today by his podiatrist Dr Olivier with concern for infection of his amp site. The patient reports that he has followed up with Dr. Olivier approximately 5 times in the past 3 weeks since discharge, and notes that his visit on Monday was fine, but today (Wednesday), Dr. Olivier was concerned for infection. Denies recent fevers; notes chills approx 1-2 weeks ago that resolved. Is ambulatory with a cane. Reports taking cipro daily since discharge, but has not followed up with Dr. Olivares as indicated in discharge instructions. Has not followed up with GI, but has an appointment with Dr. Cross for an EGD/colonoscopy.    patient known to Dr. Diaz- history of CKD 4- recent creatinine 3.1 mg/dl . now with right leg wound/ infection and worsening anemia.   s/p angiogram this morning, he may require LE partial amputation once anemia issues are resolved. he denies chest pain / shortness of breath currently. only mild right leg pain. no urinary complaints currently. he was noted to have mass at GE junction on CT - had EGD/ biopsy results of which are pending.       PMH  Bipolar affective disorder in remission  Depression  Diabetes mellitus  Hyperlipidemia    PSH  Amputated great toe of right foot 2017  ORIF right lower leg-     MEDS  see eMAR    Allergies  No Known Allergies or Intolerances (13 Sep 2017 22:10)      PAST MEDICAL & SURGICAL HISTORY:  Bipolar affective disorder in remission  High cholesterol  Depression  Diabetes mellitus  Amputated great toe of right foot  ORIF right lower leg-       Allergies    No Known Allergies    Intolerances        Social History: + current smoker 1/2 PPD, rare etoh, no ivdu     FAMILY HISTORY:  No pertinent family history in first degree relatives      PHYSICAL EXAM:  T(F): 97.6 (17 @ 15:06), Max: 98.7 (17 @ 01:37)  HR: 62 (17 @ 15:06)  BP: 207/84 (17 @ 15:06)  BP(mean): --  RR: 22 (17 @ 15:06)  SpO2: 100% (17 @ 15:06)  Wt(kg): --    Constitutional: no acute distress, well developed  Head: NC AT  Mouth/Throat : clear and moist   Eyes: PERRL, no discharge, no icterus  Neck: No JVD, bruit, adenopathy   Respiratory: cta/p bilat, no wheezes, rales, nl effort  Cardiovascular: regular rate, S1 and S2 no rub or gallop  Abd: : +BS, soft, NT , no rebound or guarding, no bruits  Musculoskeletal -  no edema or tenderness  Lymph nodes: no cervical adenopathy  Extremities: right foot dressed, no edema, no rash   Neurological: A/O x 3, nl coordination and tone, nl reflexes  Skin: no rash or erythema      I and O's:     @ 07:01  -   @ 07:00  --------------------------------------------------------  IN: 4075 mL / OUT: 3650 mL / NET: 425 mL     @ 07:01  -   @ 15:34  --------------------------------------------------------  IN: 250 mL / OUT: 1150 mL / NET: -900 mL        Height (cm): 180.34 ( @ 22:04)  Weight (kg): 53.5 ( @ 22:04)  BMI (kg/m2): 16.5 ( @ 22:04)      REVIEW OF SYSTEMS  CONSTITUTIONAL: No weakness, fevers or chills  HENT : no sore throat  Eyes: no blurred vision or photphobia  RESPIRATORY: No cough, wheezing, hemoptysis; No shortness of breath  CARDIOVASCULAR: No chest pain or palpitations, orthopnea, PND  GI : no abd pains, nausea or vomiting, rectal bleeding, constipation, diarrhea, GERD, melena, n/v  GENITOURINARY: No dysuria, frequency or hematuria, flank pain, urgency  Musculoskeletal:  No back pain, joint pain, myalgias  Skin : no itching or rash  Neuro: No dizziness, focal weakness, HA  Endo/Heme: No polydipsia. No easy bruising  Psychiatric: No depression or memory loss  All other review of systems is negative unless indicated above.      MEDICATIONS  (STANDING):  heparin  Injectable 5000 Unit(s) SubCutaneous every 8 hours  sodium bicarbonate 650 milliGRAM(s) Oral three times a day  isosorbide   mononitrate ER Tablet (IMDUR) 120 milliGRAM(s) Oral daily  diVALproex  milliGRAM(s) Oral two times a day  traZODone 100 milliGRAM(s) Oral at bedtime  atorvastatin 40 milliGRAM(s) Oral at bedtime  OLANZapine 20 milliGRAM(s) Oral at bedtime  carvedilol 12.5 milliGRAM(s) Oral every 12 hours  insulin lispro (HumaLOG) corrective regimen sliding scale   SubCutaneous three times a day before meals  insulin lispro (HumaLOG) corrective regimen sliding scale   SubCutaneous at bedtime  dextrose 5%. 1000 milliLiter(s) (50 mL/Hr) IV Continuous <Continuous>  dextrose 50% Injectable 12.5 Gram(s) IV Push once  dextrose 50% Injectable 25 Gram(s) IV Push once  dextrose 50% Injectable 25 Gram(s) IV Push once  pantoprazole    Tablet 40 milliGRAM(s) Oral before breakfast  piperacillin/tazobactam IVPB. 3.375 Gram(s) IV Intermittent every 12 hours  amLODIPine   Tablet 5 milliGRAM(s) Oral daily  acetylcysteine  Oral Solution 1200 milliGRAM(s) Oral two times a day  sodium chloride 0.9%. 1000 milliLiter(s) (50 mL/Hr) IV Continuous <Continuous>  sodium chloride 0.9%. 1000 milliLiter(s) (100 mL/Hr) IV Continuous <Continuous>      LABS:  CBC Full  -  ( 18 Sep 2017 09:08 )  WBC Count : 8.29 K/uL  Hemoglobin : 6.7 g/dL  Hematocrit : 21.0 %  Platelet Count - Automated : 300 K/uL  Mean Cell Volume : 92.1 fL  Mean Cell Hemoglobin : 29.4 pg  Mean Cell Hemoglobin Concentration : 31.9 %  Auto Neutrophil # : x  Auto Lymphocyte # : x  Auto Monocyte # : x  Auto Eosinophil # : x  Auto Basophil # : x  Auto Neutrophil % : x  Auto Lymphocyte % : x  Auto Monocyte % : x  Auto Eosinophil % : x  Auto Basophil % : x        137  |  107  |  55<H>  ----------------------------<  205<H>  5.0   |  18<L>  |  3.08<H>    Ca    8.2<L>      18 Sep 2017 09:08  Phos  3.9       Mg     1.7             Urine Studies:  Urinalysis Basic - ( 17 Sep 2017 10:23 )    Color: COLORL / Appearance: CLEAR / S.007 / pH: 6.5  Gluc: 50 / Ketone: NEGATIVE  / Bili: NEGATIVE / Urobili: NORMAL E.U.   Blood: NEGATIVE / Protein: 100 / Nitrite: NEGATIVE   Leuk Esterase: NEGATIVE / RBC: 0-2 / WBC 0-2   Sq Epi: x / Non Sq Epi: x / Bacteria: x        Urine chemistry:   Urine Na:   Urine Creatinine:   Urine Protein/Cr ratio:  Urine K:   Urine Osm:   24 Hr urine studies:       Imp:  52y Male

## 2017-09-19 ENCOUNTER — RESULT REVIEW (OUTPATIENT)
Age: 52
End: 2017-09-19

## 2017-09-19 DIAGNOSIS — F31.9 BIPOLAR DISORDER, UNSPECIFIED: ICD-10-CM

## 2017-09-19 LAB
BUN SERPL-MCNC: 52 MG/DL — HIGH (ref 7–23)
CALCIUM SERPL-MCNC: 8.4 MG/DL — SIGNIFICANT CHANGE UP (ref 8.4–10.5)
CHLORIDE SERPL-SCNC: 109 MMOL/L — HIGH (ref 98–107)
CO2 SERPL-SCNC: 19 MMOL/L — LOW (ref 22–31)
CREAT SERPL-MCNC: 3.06 MG/DL — HIGH (ref 0.5–1.3)
GLUCOSE SERPL-MCNC: 156 MG/DL — HIGH (ref 70–99)
HCT VFR BLD CALC: 22.6 % — LOW (ref 39–50)
HGB BLD-MCNC: 7.2 G/DL — LOW (ref 13–17)
INR BLD: 0.91 — SIGNIFICANT CHANGE UP (ref 0.88–1.17)
MCHC RBC-ENTMCNC: 29.1 PG — SIGNIFICANT CHANGE UP (ref 27–34)
MCHC RBC-ENTMCNC: 31.9 % — LOW (ref 32–36)
MCV RBC AUTO: 91.5 FL — SIGNIFICANT CHANGE UP (ref 80–100)
NRBC # FLD: 0 — SIGNIFICANT CHANGE UP
PLATELET # BLD AUTO: 309 K/UL — SIGNIFICANT CHANGE UP (ref 150–400)
PMV BLD: 10.7 FL — SIGNIFICANT CHANGE UP (ref 7–13)
POTASSIUM SERPL-MCNC: 5.2 MMOL/L — SIGNIFICANT CHANGE UP (ref 3.5–5.3)
POTASSIUM SERPL-SCNC: 5.2 MMOL/L — SIGNIFICANT CHANGE UP (ref 3.5–5.3)
PROTHROM AB SERPL-ACNC: 10.2 SEC — SIGNIFICANT CHANGE UP (ref 9.8–13.1)
RBC # BLD: 2.47 M/UL — LOW (ref 4.2–5.8)
RBC # FLD: 13.6 % — SIGNIFICANT CHANGE UP (ref 10.3–14.5)
SODIUM SERPL-SCNC: 140 MMOL/L — SIGNIFICANT CHANGE UP (ref 135–145)
WBC # BLD: 9.31 K/UL — SIGNIFICANT CHANGE UP (ref 3.8–10.5)
WBC # FLD AUTO: 9.31 K/UL — SIGNIFICANT CHANGE UP (ref 3.8–10.5)

## 2017-09-19 PROCEDURE — 88305 TISSUE EXAM BY PATHOLOGIST: CPT | Mod: 26

## 2017-09-19 PROCEDURE — 99232 SBSQ HOSP IP/OBS MODERATE 35: CPT

## 2017-09-19 PROCEDURE — 88311 DECALCIFY TISSUE: CPT | Mod: 26

## 2017-09-19 PROCEDURE — 90792 PSYCH DIAG EVAL W/MED SRVCS: CPT

## 2017-09-19 PROCEDURE — 73718 MRI LOWER EXTREMITY W/O DYE: CPT | Mod: 26,RT

## 2017-09-19 PROCEDURE — 73630 X-RAY EXAM OF FOOT: CPT | Mod: 26,RT

## 2017-09-19 RX ORDER — FENTANYL CITRATE 50 UG/ML
25 INJECTION INTRAVENOUS
Qty: 0 | Refills: 0 | Status: DISCONTINUED | OUTPATIENT
Start: 2017-09-19 | End: 2017-09-20

## 2017-09-19 RX ORDER — OXYCODONE HYDROCHLORIDE 5 MG/1
5 TABLET ORAL EVERY 4 HOURS
Qty: 0 | Refills: 0 | Status: DISCONTINUED | OUTPATIENT
Start: 2017-09-19 | End: 2017-09-20

## 2017-09-19 RX ADMIN — Medication 10 MILLIGRAM(S): at 20:40

## 2017-09-19 RX ADMIN — Medication 650 MILLIGRAM(S): at 23:09

## 2017-09-19 RX ADMIN — PIPERACILLIN AND TAZOBACTAM 25 GRAM(S): 4; .5 INJECTION, POWDER, LYOPHILIZED, FOR SOLUTION INTRAVENOUS at 17:43

## 2017-09-19 RX ADMIN — HEPARIN SODIUM 5000 UNIT(S): 5000 INJECTION INTRAVENOUS; SUBCUTANEOUS at 23:10

## 2017-09-19 RX ADMIN — ATORVASTATIN CALCIUM 40 MILLIGRAM(S): 80 TABLET, FILM COATED ORAL at 23:09

## 2017-09-19 RX ADMIN — HEPARIN SODIUM 5000 UNIT(S): 5000 INJECTION INTRAVENOUS; SUBCUTANEOUS at 14:08

## 2017-09-19 RX ADMIN — CARVEDILOL PHOSPHATE 12.5 MILLIGRAM(S): 80 CAPSULE, EXTENDED RELEASE ORAL at 17:43

## 2017-09-19 RX ADMIN — DIVALPROEX SODIUM 500 MILLIGRAM(S): 500 TABLET, DELAYED RELEASE ORAL at 23:10

## 2017-09-19 RX ADMIN — Medication 100 MILLIGRAM(S): at 23:09

## 2017-09-19 RX ADMIN — SODIUM CHLORIDE 50 MILLILITER(S): 9 INJECTION INTRAMUSCULAR; INTRAVENOUS; SUBCUTANEOUS at 07:55

## 2017-09-19 RX ADMIN — SODIUM CHLORIDE 50 MILLILITER(S): 9 INJECTION INTRAMUSCULAR; INTRAVENOUS; SUBCUTANEOUS at 09:30

## 2017-09-19 RX ADMIN — ISOSORBIDE MONONITRATE 120 MILLIGRAM(S): 60 TABLET, EXTENDED RELEASE ORAL at 12:20

## 2017-09-19 RX ADMIN — Medication 1200 MILLIGRAM(S): at 05:21

## 2017-09-19 RX ADMIN — Medication 4: at 09:24

## 2017-09-19 RX ADMIN — HEPARIN SODIUM 5000 UNIT(S): 5000 INJECTION INTRAVENOUS; SUBCUTANEOUS at 05:22

## 2017-09-19 RX ADMIN — PANTOPRAZOLE SODIUM 40 MILLIGRAM(S): 20 TABLET, DELAYED RELEASE ORAL at 06:32

## 2017-09-19 RX ADMIN — PIPERACILLIN AND TAZOBACTAM 25 GRAM(S): 4; .5 INJECTION, POWDER, LYOPHILIZED, FOR SOLUTION INTRAVENOUS at 05:22

## 2017-09-19 RX ADMIN — Medication 650 MILLIGRAM(S): at 05:22

## 2017-09-19 RX ADMIN — AMLODIPINE BESYLATE 10 MILLIGRAM(S): 2.5 TABLET ORAL at 05:23

## 2017-09-19 RX ADMIN — CARVEDILOL PHOSPHATE 12.5 MILLIGRAM(S): 80 CAPSULE, EXTENDED RELEASE ORAL at 05:22

## 2017-09-19 RX ADMIN — Medication 650 MILLIGRAM(S): at 14:08

## 2017-09-19 RX ADMIN — OLANZAPINE 20 MILLIGRAM(S): 15 TABLET, FILM COATED ORAL at 23:09

## 2017-09-19 NOTE — BEHAVIORAL HEALTH ASSESSMENT NOTE - NSBHCHARTREVIEWVS_PSY_A_CORE FT
Vital Signs Last 24 Hrs  T(C): 36.8 (19 Sep 2017 14:08), Max: 37.1 (19 Sep 2017 00:54)  T(F): 98.2 (19 Sep 2017 14:08), Max: 98.8 (19 Sep 2017 00:54)  HR: 62 (19 Sep 2017 14:08) (56 - 76)  BP: 144/63 (19 Sep 2017 14:08) (139/58 - 202/87)  BP(mean): --  RR: 16 (19 Sep 2017 14:08) (16 - 22)  SpO2: 97% (19 Sep 2017 14:08) (97% - 100%)

## 2017-09-19 NOTE — BEHAVIORAL HEALTH ASSESSMENT NOTE - HPI (INCLUDE ILLNESS QUALITY, SEVERITY, DURATION, TIMING, CONTEXT, MODIFYING FACTORS, ASSOCIATED SIGNS AND SYMPTOMS)
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 He was sent in again by his podiatrist Dr Olivier with concern for infection of his amp site. The patient reports that he has followed up with Dr. Olivier approximately 5 times in the past 3 weeks since discharge, and Dr. Olivier was concerned for infection.  The patient is very drowsy and unwilling to speak much about his decision.  He says that he is willing to accept antibiotics and other medications for the infection.  He is willing to have an endoscopy but not willing to have a colonoscopy at this time.  His mother, ([phone]) says that her son had been doing better when he was on Lithium, but it had to be discontinued because of chronic renal insufficiency.  He has been stable on the Olanzapine. She understands that he should have a colonoscopy and says that she and his family will speak with him again about this. Pt has no acute symptoms of psychosis and no thoughts of harming himself or others.

## 2017-09-19 NOTE — BEHAVIORAL HEALTH ASSESSMENT NOTE - SUMMARY
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 He was sent in again by his podiatrist Dr Olivier with concern for infection of his amp site. The patient reports that he has followed up with Dr. Olivier approximately 5 times in the past 3 weeks since discharge, and Dr. Olivier was concerned for infection.  The patient is very drowsy and unwilling to speak much about his decision.  He says that he is willing to accept antibiotics and other medications for the infection.  He is willing to have an endoscopy but not willing to have a colonoscopy at this time.  His mother, ([phone]) says that her son had been doing better when he was on Lithium, but it had to be discontinued because of chronic renal insufficiency.  He has been stable on the Olanzapine. She understands that he should have a colonoscopy and says that she and his family will speak with him again about this. Pt has no acute symptoms of psychosis and no thoughts of harming himself or others. Pt has capacity to participate in medical decisions and discharge planning.

## 2017-09-19 NOTE — BEHAVIORAL HEALTH ASSESSMENT NOTE - NSBHCHARTREVIEWLAB_PSY_A_CORE FT
7.2    9.31  )-----------( 309      ( 19 Sep 2017 05:25 )             22.6   09-19    140  |  109<H>  |  52<H>  ----------------------------<  156<H>  5.2   |  19<L>  |  3.06<H>    Ca    8.4      19 Sep 2017 05:25  Phos  3.9     09-18  Mg     1.7     09-18

## 2017-09-19 NOTE — PROGRESS NOTE ADULT - SUBJECTIVE AND OBJECTIVE BOX
Subjective  Resting comfortably  S/P angiogram yesterday      PHYSICAL EXAM:  Vitals:  T(F): 97.6 (09-19-17 @ 11:01), Max: 98.8 (09-19-17 @ 00:54)  HR: 56 (09-19-17 @ 11:01)  BP: 187/75 (09-19-17 @ 11:01)  BP(mean): --  RR: 18 (09-19-17 @ 11:01)  SpO2: 100% (09-19-17 @ 11:01)  Wt(kg): --  Constitutional: no acute distress  HEENT:  NC, ext ears nl, oropharynx clear,  nose nl  Neck: No JVD, bruit, adenopathy or thyromegaly  Eyes: PERRL, no discharge, no icterus  Respiratory: cta/p bilat, no wheezes, rales, nl effort  Cardiovascular: regular rate, S1 and S2 no rub or gallop  Abd: : BS+, soft, NT , no rebound or guarding, no bruits  Extremities: left foot bandaged  Neurological: A/O x 3, nl coordination and tone    I and O's:    09-18 @ 07:01  -  09-19 @ 07:00  --------------------------------------------------------  IN: 350 mL / OUT: 2450 mL / NET: -2100 mL    09-19 @ 07:01  -  09-19 @ 11:13  --------------------------------------------------------  IN: 50 mL / OUT: 0 mL / NET: 50 mL        Height (cm): 180.34 (09-17 @ 22:04)  Weight (kg): 53.5 (09-17 @ 22:04)  BMI (kg/m2): 16.5 (09-17 @ 22:04)    REVIEW OF SYSTEMS:  CONSTITUTIONAL: No weakness, fevers or chills  RESPIRATORY: No cough, wheezing, hemoptysis; No shortness of breath  CARDIOVASCULAR: No chest pain or palpitations  GI : no abd pains, nausea or vomiting  GENITOURINARY: No dysuria, frequency or hematuria  All other review of systems is negative unless indicated above.    Allergies    No Known Allergies    Intolerances        MEDICATIONS  (STANDING):  heparin  Injectable 5000 Unit(s) SubCutaneous every 8 hours  sodium bicarbonate 650 milliGRAM(s) Oral three times a day  isosorbide   mononitrate ER Tablet (IMDUR) 120 milliGRAM(s) Oral daily  diVALproex  milliGRAM(s) Oral two times a day  traZODone 100 milliGRAM(s) Oral at bedtime  atorvastatin 40 milliGRAM(s) Oral at bedtime  OLANZapine 20 milliGRAM(s) Oral at bedtime  carvedilol 12.5 milliGRAM(s) Oral every 12 hours  insulin lispro (HumaLOG) corrective regimen sliding scale   SubCutaneous three times a day before meals  insulin lispro (HumaLOG) corrective regimen sliding scale   SubCutaneous at bedtime  dextrose 5%. 1000 milliLiter(s) (50 mL/Hr) IV Continuous <Continuous>  dextrose 50% Injectable 12.5 Gram(s) IV Push once  dextrose 50% Injectable 25 Gram(s) IV Push once  dextrose 50% Injectable 25 Gram(s) IV Push once  pantoprazole    Tablet 40 milliGRAM(s) Oral before breakfast  piperacillin/tazobactam IVPB. 3.375 Gram(s) IV Intermittent every 12 hours  acetylcysteine  Oral Solution 1200 milliGRAM(s) Oral two times a day  sodium chloride 0.9%. 1000 milliLiter(s) (50 mL/Hr) IV Continuous <Continuous>  sodium chloride 0.9%. 1000 milliLiter(s) (100 mL/Hr) IV Continuous <Continuous>  amLODIPine   Tablet 10 milliGRAM(s) Oral daily      Sodium,Serum 140    09-19 @ 05:25  Sodium,Serum 137    09-18 @ 09:08  Sodium,Serum 136    09-17 @ 21:15  Sodium,Serum 137    09-17 @ 06:15  Sodium,Serum 140    09-16 @ 14:43  Sodium,Serum 139    09-16 @ 06:00    Potassium,Serum 5.2    09-19 @ 05:25  Potassium,Serum 5.0    09-18 @ 09:08  Potassium,Serum 5.2    09-17 @ 21:15  Potassium,Serum 5.3    09-17 @ 06:15  Potassium,Serum 5.5    09-16 @ 14:43  Potassium,Serum 5.5    09-16 @ 06:00    Chloride,Serum 109    09-19 @ 05:25  Chloride,Serum 107    09-18 @ 09:08  Chloride,Serum 104    09-17 @ 21:15  Chloride,Serum 106    09-17 @ 06:15  Chloride,Serum 107    09-16 @ 14:43  Chloride,Serum 105    09-16 @ 06:00    CO2, Serum 19    09-19 @ 05:25  CO2, Serum 18    09-18 @ 09:08  CO2, Serum 20    09-17 @ 21:15  CO2, Serum 20    09-17 @ 06:15  CO2, Serum 20    09-16 @ 14:43  CO2, Serum 21    09-16 @ 06:00    BUN 52    09-19 @ 05:25  BUN 55    09-18 @ 09:08  BUN 54    09-17 @ 21:15  BUN 59    09-17 @ 06:15  BUN 54    09-16 @ 14:43  BUN 58    09-16 @ 06:00    Creatinine, Serum 3.06    09-19 @ 05:25  Creatinine, Serum 3.08    09-18 @ 09:08  Creatinine, Serum 3.14    09-17 @ 21:15  Creatinine, Serum 3.25    09-17 @ 06:15  Creatinine, Serum 3.19    09-16 @ 14:43  Creatinine, Serum 3.29    09-16 @ 06:00      09-19    140  |  109<H>  |  52<H>  ----------------------------<  156<H>  5.2   |  19<L>  |  3.06<H>    Ca    8.4      19 Sep 2017 05:25  Phos  3.9     09-18  Mg     1.7     09-18        Urine Studies:      Urine chemistry:   Urine Na:   Urine Creatinine:   Urine Protein/Cr ratio:  Urine K:   Urine Osm:   24 Hr urine studies:

## 2017-09-19 NOTE — BEHAVIORAL HEALTH ASSESSMENT NOTE - NSBHCHARTREVIEWINVESTIGATE_PSY_A_CORE FT
Ventricular Rate 52 BPM    Atrial Rate 52 BPM    P-R Interval 164 ms    QRS Duration 100 ms    Q-T Interval 482 ms    QTC Calculation(Bezet) 448 ms    P Axis 53 degrees    R Axis 66 degrees    T Axis 157 degrees    Diagnosis Line Sinus bradycardia  ST & T wave abnormality, consider inferolateral ischemia  Abnormal ECG

## 2017-09-19 NOTE — PROGRESS NOTE ADULT - SUBJECTIVE AND OBJECTIVE BOX
Patient is a 52y old  Male who presents with a chief complaint of right foot (14 Sep 2017 02:55)      INTERVAL HPI/OVERNIGHT EVENTS:   Pt is scheduled for revisional 1st met amp right foot w/ Dr Watkins. Patient is aware of procedure and is NPO since midnight.    MEDICATIONS  (STANDING):  heparin  Injectable 5000 Unit(s) SubCutaneous every 8 hours  sodium bicarbonate 650 milliGRAM(s) Oral three times a day  isosorbide   mononitrate ER Tablet (IMDUR) 120 milliGRAM(s) Oral daily  diVALproex  milliGRAM(s) Oral two times a day  traZODone 100 milliGRAM(s) Oral at bedtime  atorvastatin 40 milliGRAM(s) Oral at bedtime  OLANZapine 20 milliGRAM(s) Oral at bedtime  carvedilol 12.5 milliGRAM(s) Oral every 12 hours  insulin lispro (HumaLOG) corrective regimen sliding scale   SubCutaneous three times a day before meals  insulin lispro (HumaLOG) corrective regimen sliding scale   SubCutaneous at bedtime  dextrose 5%. 1000 milliLiter(s) (50 mL/Hr) IV Continuous <Continuous>  dextrose 50% Injectable 12.5 Gram(s) IV Push once  dextrose 50% Injectable 25 Gram(s) IV Push once  dextrose 50% Injectable 25 Gram(s) IV Push once  pantoprazole    Tablet 40 milliGRAM(s) Oral before breakfast  piperacillin/tazobactam IVPB. 3.375 Gram(s) IV Intermittent every 12 hours  acetylcysteine  Oral Solution 1200 milliGRAM(s) Oral two times a day  sodium chloride 0.9%. 1000 milliLiter(s) (50 mL/Hr) IV Continuous <Continuous>  sodium chloride 0.9%. 1000 milliLiter(s) (100 mL/Hr) IV Continuous <Continuous>  amLODIPine   Tablet 10 milliGRAM(s) Oral daily    MEDICATIONS  (PRN):  dextrose Gel 1 Dose(s) Oral once PRN Blood Glucose LESS THAN 70 milliGRAM(s)/deciliter  glucagon  Injectable 1 milliGRAM(s) IntraMuscular once PRN Glucose LESS THAN 70 milligrams/deciliter  oxyCODONE    IR 5 milliGRAM(s) Oral every 4 hours PRN Moderate Pain (4 - 6)  oxyCODONE    IR 10 milliGRAM(s) Oral every 4 hours PRN Severe Pain (7 - 10)  acetaminophen   Tablet. 650 milliGRAM(s) Oral every 6 hours PRN Mild Pain (1 - 3)  hydrALAZINE Injectable 10 milliGRAM(s) IV Push every 4 hours PRN SBP>160      Allergies    No Known Allergies    Intolerances        Vital Signs Last 24 Hrs  T(C): 36.9 (19 Sep 2017 05:21), Max: 37.1 (19 Sep 2017 00:54)  T(F): 98.4 (19 Sep 2017 05:21), Max: 98.8 (19 Sep 2017 00:54)  HR: 60 (19 Sep 2017 05:21) (57 - 76)  BP: 158/63 (19 Sep 2017 05:21) (139/58 - 207/84)  BP(mean): --  RR: 17 (19 Sep 2017 05:21) (17 - 22)  SpO2: 100% (19 Sep 2017 05:21) (99% - 100%)    LABS:                        7.9    8.04  )-----------( 299      ( 18 Sep 2017 15:19 )             25.1     09-18    137  |  107  |  55<H>  ----------------------------<  205<H>  5.0   |  18<L>  |  3.08<H>    Ca    8.2<L>      18 Sep 2017 09:08  Phos  3.9     09-18  Mg     1.7     -18      PT/INR - ( 17 Sep 2017 11:07 )   PT: 10.1 SEC;   INR: 0.90            Urinalysis Basic - ( 17 Sep 2017 10:23 )    Color: COLORL / Appearance: CLEAR / S.007 / pH: 6.5  Gluc: 50 / Ketone: NEGATIVE  / Bili: NEGATIVE / Urobili: NORMAL E.U.   Blood: NEGATIVE / Protein: 100 / Nitrite: NEGATIVE   Leuk Esterase: NEGATIVE / RBC: 0-2 / WBC 0-2   Sq Epi: x / Non Sq Epi: x / Bacteria: x      CAPILLARY BLOOD GLUCOSE  259 (18 Sep 2017 21:53)  238 (18 Sep 2017 17:41)  169 (18 Sep 2017 15:06)  226 (18 Sep 2017 08:35)          RADIOLOGY & ADDITIONAL TESTS:

## 2017-09-19 NOTE — PROGRESS NOTE ADULT - SUBJECTIVE AND OBJECTIVE BOX
[facility] GENERAL SURGERY POST-OP NOTE    SUBJECTIVE: Pt seen and evaluated at bedside. Resting comfortably in bed. Mother by bedside. Pain controlled. Denies nausea/vomiting, CP, palpitations, SOB, lightheaded, dizziness. Voiding. Tolerating PO intake.     Objective:  Gen: NAD, AAOx3  Pulm: b/l chest rise. No work on breathing  Card: RRR  Abd: soft, NT/ND. Dressings CDI  Ext: R foot extremity covered with dressing, CDI    Vital Signs Last 24 Hrs  T(C): 36.8 (19 Sep 2017 20:05), Max: 37.1 (19 Sep 2017 00:54)  T(F): 98.2 (19 Sep 2017 20:05), Max: 98.8 (19 Sep 2017 00:54)  HR: 53 (19 Sep 2017 20:45) (53 - 68)  BP: 169/61 (19 Sep 2017 20:45) (144/63 - 187/75)  BP(mean): --  RR: 15 (19 Sep 2017 20:45) (15 - 20)  SpO2: 98% (19 Sep 2017 20:45) (97% - 100%)  I&O's Summary    18 Sep 2017 07:01  -  19 Sep 2017 07:00  --------------------------------------------------------  IN: 350 mL / OUT: 2450 mL / NET: -2100 mL    19 Sep 2017 07:01  -  19 Sep 2017 23:24  --------------------------------------------------------  IN: 350 mL / OUT: 1150 mL / NET: -800 mL      I&O's Detail    18 Sep 2017 07:01  -  19 Sep 2017 07:00  --------------------------------------------------------  IN:    sodium chloride 0.9%: 250 mL    sodium chloride 0.9%.: 100 mL  Total IN: 350 mL    OUT:    Voided: 2450 mL  Total OUT: 2450 mL    Total NET: -2100 mL      19 Sep 2017 07:01  -  19 Sep 2017 23:24  --------------------------------------------------------  IN:    IV PiggyBack: 100 mL    sodium chloride 0.9%.: 250 mL  Total IN: 350 mL    OUT:    Voided: 1150 mL  Total OUT: 1150 mL    Total NET: -800 mL          MEDICATIONS  (STANDING):  heparin  Injectable 5000 Unit(s) SubCutaneous every 8 hours  sodium bicarbonate 650 milliGRAM(s) Oral three times a day  isosorbide   mononitrate ER Tablet (IMDUR) 120 milliGRAM(s) Oral daily  diVALproex  milliGRAM(s) Oral two times a day  traZODone 100 milliGRAM(s) Oral at bedtime  atorvastatin 40 milliGRAM(s) Oral at bedtime  OLANZapine 20 milliGRAM(s) Oral at bedtime  carvedilol 12.5 milliGRAM(s) Oral every 12 hours  insulin lispro (HumaLOG) corrective regimen sliding scale   SubCutaneous three times a day before meals  insulin lispro (HumaLOG) corrective regimen sliding scale   SubCutaneous at bedtime  dextrose 5%. 1000 milliLiter(s) (50 mL/Hr) IV Continuous <Continuous>  dextrose 50% Injectable 12.5 Gram(s) IV Push once  dextrose 50% Injectable 25 Gram(s) IV Push once  dextrose 50% Injectable 25 Gram(s) IV Push once  pantoprazole    Tablet 40 milliGRAM(s) Oral before breakfast  piperacillin/tazobactam IVPB. 3.375 Gram(s) IV Intermittent every 12 hours  sodium chloride 0.9%. 1000 milliLiter(s) (50 mL/Hr) IV Continuous <Continuous>  sodium chloride 0.9%. 1000 milliLiter(s) (100 mL/Hr) IV Continuous <Continuous>  amLODIPine   Tablet 10 milliGRAM(s) Oral daily    MEDICATIONS  (PRN):  dextrose Gel 1 Dose(s) Oral once PRN Blood Glucose LESS THAN 70 milliGRAM(s)/deciliter  glucagon  Injectable 1 milliGRAM(s) IntraMuscular once PRN Glucose LESS THAN 70 milligrams/deciliter  oxyCODONE    IR 5 milliGRAM(s) Oral every 4 hours PRN Moderate Pain (4 - 6)  oxyCODONE    IR 10 milliGRAM(s) Oral every 4 hours PRN Severe Pain (7 - 10)  acetaminophen   Tablet. 650 milliGRAM(s) Oral every 6 hours PRN Mild Pain (1 - 3)  hydrALAZINE Injectable 10 milliGRAM(s) IV Push every 4 hours PRN SBP>160  fentaNYL    Injectable 25 MICROGram(s) IV Push every 5 minutes PRN Moderate Pain  oxyCODONE    IR 5 milliGRAM(s) Oral every 4 hours PRN For moderate pain      LABS:                        7.2    9.31  )-----------( 309      ( 19 Sep 2017 05:25 )             22.6     09-19    140  |  109<H>  |  52<H>  ----------------------------<  156<H>  5.2   |  19<L>  |  3.06<H>    Ca    8.4      19 Sep 2017 05:25  Phos  3.9     09-18  Mg     1.7     09-18      PT/INR - ( 19 Sep 2017 05:25 )   PT: 10.2 SEC;   INR: 0.91                RADIOLOGY & ADDITIONAL STUDIES:      A/P: 52y Male  s/p   - Pain control  - Strict I/O's  - F/U GI fxn  - OOB/DVT ppx  - AM labs

## 2017-09-19 NOTE — BRIEF OPERATIVE NOTE - PROCEDURE
<<-----Click on this checkbox to enter Procedure Amputation of digit of foot  09/19/2017    Active  ERNESTINE

## 2017-09-19 NOTE — PROGRESS NOTE ADULT - SUBJECTIVE AND OBJECTIVE BOX
Vascular Surgery Progress Note    S: Pt seen at bedside. No acute events overnight. Remains afebrile.      O: Physical Exam:  T(C): 36.4 (09-19-17 @ 11:01), Max: 37.1 (09-19-17 @ 00:54)  HR: 56 (09-19-17 @ 11:01) (56 - 76)  BP: 187/75 (09-19-17 @ 11:01) (139/58 - 207/84)  RR: 18 (09-19-17 @ 11:01) (17 - 22)  SpO2: 100% (09-19-17 @ 11:01) (99% - 100%)    09-18-17 @ 07:01  -  09-19-17 @ 07:00  --------------------------------------------------------  IN: 350 mL / OUT: 2450 mL / NET: -2100 mL    09-19-17 @ 07:01  -  09-19-17 @ 11:16  --------------------------------------------------------  IN: 50 mL / OUT: 0 mL / NET: 50 mL      Gen: NAD   Resp: Non labored breathing  Cards: RRR -rgm  Abd: NT/ND  Ext: R foot amputation site/5th digit infected but covered. Currently CDI    Labs: Vascular Surgery Progress Note    S: Pt seen at bedside. No acute events overnight. Remains afebrile.      O: Physical Exam:  T(C): 36.4 (09-19-17 @ 11:01), Max: 37.1 (09-19-17 @ 00:54)  HR: 56 (09-19-17 @ 11:01) (56 - 76)  BP: 187/75 (09-19-17 @ 11:01) (139/58 - 207/84)  RR: 18 (09-19-17 @ 11:01) (17 - 22)  SpO2: 100% (09-19-17 @ 11:01) (99% - 100%)    09-18-17 @ 07:01  -  09-19-17 @ 07:00  --------------------------------------------------------  IN: 350 mL / OUT: 2450 mL / NET: -2100 mL    09-19-17 @ 07:01  -  09-19-17 @ 11:16  --------------------------------------------------------  IN: 50 mL / OUT: 0 mL / NET: 50 mL      Gen: NAD   Resp: Non labored breathing  Cards: RRR -rgm  Abd: NT/ND  Ext: R foot amputation site/5th digit infected but covered. Currently CDI    Labs:                        7.2    9.31  )-----------( 309      ( 19 Sep 2017 05:25 )             22.6   09-19    140  |  109<H>  |  52<H>  ----------------------------<  156<H>  5.2   |  19<L>  |  3.06<H>    Ca    8.4      19 Sep 2017 05:25  Phos  3.9     09-18  Mg     1.7     09-18    PT/INR - ( 19 Sep 2017 05:25 )   PT: 10.2 SEC;   INR: 0.91

## 2017-09-19 NOTE — PROGRESS NOTE ADULT - ASSESSMENT
53 yo male with infected R foot hallux amp site and infected 5th digit HOD7:  - RLE angio shows SFA occlusion; no vasc interventions until anemia workup resolves   - NPO/IVF  - OR tonight w/ Pods for right foot revisional 1st met amputation  - Pain control  - Cont IV abx: Zosyn and Vanc  - f/u EGD results, biopsies  - Wound washes and packing per Podiatry 53 yo male with infected R foot hallux amp site and infected 5th digit HOD7:  - RLE angio shows SFA diffuse dz and high grade stenosis proximally; no vasc interventions until anemia workup resolves since any  open vs endo intervention will need anticoag rx   - NPO/IVF  - OR tonight w/ Pods for right foot revisional 1st met amputation  - Pain control  - Cont IV abx: Zosyn and Vanc  - f/u EGD results, biopsies  - Wound washes and packing per Podiatry

## 2017-09-19 NOTE — PROGRESS NOTE ADULT - ASSESSMENT
Plan:   To OR today at 6pmwith Dr. meredith for right foot revisional 1st met amp.   CXR on sunrise.  EKG on sunrise.  Medical clearance since9/17 and documented in chart.  Consent signed and in chart.  Procedure was explained to patient in detail. All alternatives, risks and complications were discussed. All questions answered.

## 2017-09-19 NOTE — PROGRESS NOTE ADULT - ASSESSMENT
Advanced but stable CKD secondary to DM nephropathy S/P LE angiogram  OR today for amputation by podiatry  Continue NaHCO3  No post-op NSAIDS

## 2017-09-20 LAB
BACTERIA BLD CULT: SIGNIFICANT CHANGE UP
BACTERIA BLD CULT: SIGNIFICANT CHANGE UP
BLD GP AB SCN SERPL QL: NEGATIVE — SIGNIFICANT CHANGE UP
BUN SERPL-MCNC: 49 MG/DL — HIGH (ref 7–23)
BUN SERPL-MCNC: 50 MG/DL — HIGH (ref 7–23)
BUN SERPL-MCNC: 51 MG/DL — HIGH (ref 7–23)
CALCIUM SERPL-MCNC: 8.1 MG/DL — LOW (ref 8.4–10.5)
CALCIUM SERPL-MCNC: 8.1 MG/DL — LOW (ref 8.4–10.5)
CALCIUM SERPL-MCNC: 8.2 MG/DL — LOW (ref 8.4–10.5)
CHLORIDE SERPL-SCNC: 106 MMOL/L — SIGNIFICANT CHANGE UP (ref 98–107)
CHLORIDE SERPL-SCNC: 108 MMOL/L — HIGH (ref 98–107)
CHLORIDE SERPL-SCNC: 109 MMOL/L — HIGH (ref 98–107)
CO2 SERPL-SCNC: 17 MMOL/L — LOW (ref 22–31)
CO2 SERPL-SCNC: 18 MMOL/L — LOW (ref 22–31)
CO2 SERPL-SCNC: 19 MMOL/L — LOW (ref 22–31)
CREAT SERPL-MCNC: 2.9 MG/DL — HIGH (ref 0.5–1.3)
CREAT SERPL-MCNC: 2.92 MG/DL — HIGH (ref 0.5–1.3)
CREAT SERPL-MCNC: 2.93 MG/DL — HIGH (ref 0.5–1.3)
CULTURE - ACID FAST SMEAR CONCENTRATED: SIGNIFICANT CHANGE UP
FERRITIN SERPL-MCNC: 283.4 NG/ML — SIGNIFICANT CHANGE UP (ref 30–400)
GLUCOSE SERPL-MCNC: 103 MG/DL — HIGH (ref 70–99)
GLUCOSE SERPL-MCNC: 148 MG/DL — HIGH (ref 70–99)
GLUCOSE SERPL-MCNC: 268 MG/DL — HIGH (ref 70–99)
GRAM STN WND: SIGNIFICANT CHANGE UP
HCT VFR BLD CALC: 18.9 % — CRITICAL LOW (ref 39–50)
HCT VFR BLD CALC: 19.6 % — CRITICAL LOW (ref 39–50)
HGB BLD-MCNC: 5.9 G/DL — CRITICAL LOW (ref 13–17)
HGB BLD-MCNC: 6.1 G/DL — CRITICAL LOW (ref 13–17)
IRON SATN MFR SERPL: 215 UG/DL — SIGNIFICANT CHANGE UP (ref 155–535)
IRON SATN MFR SERPL: 56 UG/DL — SIGNIFICANT CHANGE UP (ref 45–165)
MAGNESIUM SERPL-MCNC: 1.7 MG/DL — SIGNIFICANT CHANGE UP (ref 1.6–2.6)
MCHC RBC-ENTMCNC: 28.4 PG — SIGNIFICANT CHANGE UP (ref 27–34)
MCHC RBC-ENTMCNC: 28.4 PG — SIGNIFICANT CHANGE UP (ref 27–34)
MCHC RBC-ENTMCNC: 31.1 % — LOW (ref 32–36)
MCHC RBC-ENTMCNC: 31.2 % — LOW (ref 32–36)
MCV RBC AUTO: 90.9 FL — SIGNIFICANT CHANGE UP (ref 80–100)
MCV RBC AUTO: 91.2 FL — SIGNIFICANT CHANGE UP (ref 80–100)
NRBC # FLD: 0 — SIGNIFICANT CHANGE UP
NRBC # FLD: 0 — SIGNIFICANT CHANGE UP
PHOSPHATE SERPL-MCNC: 4.1 MG/DL — SIGNIFICANT CHANGE UP (ref 2.5–4.5)
PLATELET # BLD AUTO: 307 K/UL — SIGNIFICANT CHANGE UP (ref 150–400)
PLATELET # BLD AUTO: 316 K/UL — SIGNIFICANT CHANGE UP (ref 150–400)
PMV BLD: 10.4 FL — SIGNIFICANT CHANGE UP (ref 7–13)
PMV BLD: 10.5 FL — SIGNIFICANT CHANGE UP (ref 7–13)
POTASSIUM SERPL-MCNC: 5.5 MMOL/L — HIGH (ref 3.5–5.3)
POTASSIUM SERPL-MCNC: 5.7 MMOL/L — HIGH (ref 3.5–5.3)
POTASSIUM SERPL-MCNC: 5.8 MMOL/L — HIGH (ref 3.5–5.3)
POTASSIUM SERPL-SCNC: 5.5 MMOL/L — HIGH (ref 3.5–5.3)
POTASSIUM SERPL-SCNC: 5.7 MMOL/L — HIGH (ref 3.5–5.3)
POTASSIUM SERPL-SCNC: 5.8 MMOL/L — HIGH (ref 3.5–5.3)
RBC # BLD: 2.08 M/UL — LOW (ref 4.2–5.8)
RBC # BLD: 2.15 M/UL — LOW (ref 4.2–5.8)
RBC # FLD: 13.5 % — SIGNIFICANT CHANGE UP (ref 10.3–14.5)
RBC # FLD: 13.5 % — SIGNIFICANT CHANGE UP (ref 10.3–14.5)
RH IG SCN BLD-IMP: POSITIVE — SIGNIFICANT CHANGE UP
SODIUM SERPL-SCNC: 136 MMOL/L — SIGNIFICANT CHANGE UP (ref 135–145)
SODIUM SERPL-SCNC: 139 MMOL/L — SIGNIFICANT CHANGE UP (ref 135–145)
SODIUM SERPL-SCNC: 140 MMOL/L — SIGNIFICANT CHANGE UP (ref 135–145)
SPECIMEN SOURCE: SIGNIFICANT CHANGE UP
SPECIMEN SOURCE: SIGNIFICANT CHANGE UP
UIBC SERPL-MCNC: 159 UG/DL — SIGNIFICANT CHANGE UP (ref 110–370)
WBC # BLD: 10.09 K/UL — SIGNIFICANT CHANGE UP (ref 3.8–10.5)
WBC # BLD: 11.68 K/UL — HIGH (ref 3.8–10.5)
WBC # FLD AUTO: 10.09 K/UL — SIGNIFICANT CHANGE UP (ref 3.8–10.5)
WBC # FLD AUTO: 11.68 K/UL — HIGH (ref 3.8–10.5)

## 2017-09-20 PROCEDURE — 93010 ELECTROCARDIOGRAM REPORT: CPT

## 2017-09-20 PROCEDURE — 99232 SBSQ HOSP IP/OBS MODERATE 35: CPT

## 2017-09-20 RX ORDER — SODIUM POLYSTYRENE SULFONATE 4.1 MEQ/G
15 POWDER, FOR SUSPENSION ORAL ONCE
Qty: 0 | Refills: 0 | Status: COMPLETED | OUTPATIENT
Start: 2017-09-20 | End: 2017-09-20

## 2017-09-20 RX ORDER — SODIUM POLYSTYRENE SULFONATE 4.1 MEQ/G
15 POWDER, FOR SUSPENSION ORAL ONCE
Qty: 0 | Refills: 0 | Status: DISCONTINUED | OUTPATIENT
Start: 2017-09-20 | End: 2017-09-20

## 2017-09-20 RX ORDER — FUROSEMIDE 40 MG
80 TABLET ORAL ONCE
Qty: 0 | Refills: 0 | Status: COMPLETED | OUTPATIENT
Start: 2017-09-20 | End: 2017-09-20

## 2017-09-20 RX ORDER — CALCIUM GLUCONATE 100 MG/ML
1 VIAL (ML) INTRAVENOUS DAILY
Qty: 0 | Refills: 0 | Status: DISCONTINUED | OUTPATIENT
Start: 2017-09-20 | End: 2017-09-28

## 2017-09-20 RX ORDER — SODIUM CHLORIDE 9 MG/ML
1000 INJECTION INTRAMUSCULAR; INTRAVENOUS; SUBCUTANEOUS ONCE
Qty: 0 | Refills: 0 | Status: COMPLETED | OUTPATIENT
Start: 2017-09-20 | End: 2017-09-20

## 2017-09-20 RX ORDER — ERYTHROPOIETIN 10000 [IU]/ML
10000 INJECTION, SOLUTION INTRAVENOUS; SUBCUTANEOUS ONCE
Qty: 0 | Refills: 0 | Status: COMPLETED | OUTPATIENT
Start: 2017-09-20 | End: 2017-09-20

## 2017-09-20 RX ADMIN — SODIUM CHLORIDE 500 MILLILITER(S): 9 INJECTION INTRAMUSCULAR; INTRAVENOUS; SUBCUTANEOUS at 18:07

## 2017-09-20 RX ADMIN — Medication 80 MILLIGRAM(S): at 18:06

## 2017-09-20 RX ADMIN — OXYCODONE HYDROCHLORIDE 10 MILLIGRAM(S): 5 TABLET ORAL at 00:27

## 2017-09-20 RX ADMIN — CARVEDILOL PHOSPHATE 12.5 MILLIGRAM(S): 80 CAPSULE, EXTENDED RELEASE ORAL at 05:53

## 2017-09-20 RX ADMIN — OXYCODONE HYDROCHLORIDE 10 MILLIGRAM(S): 5 TABLET ORAL at 15:03

## 2017-09-20 RX ADMIN — Medication 200 GRAM(S): at 18:39

## 2017-09-20 RX ADMIN — Medication 2: at 18:05

## 2017-09-20 RX ADMIN — PANTOPRAZOLE SODIUM 40 MILLIGRAM(S): 20 TABLET, DELAYED RELEASE ORAL at 06:03

## 2017-09-20 RX ADMIN — ATORVASTATIN CALCIUM 40 MILLIGRAM(S): 80 TABLET, FILM COATED ORAL at 21:18

## 2017-09-20 RX ADMIN — SODIUM POLYSTYRENE SULFONATE 15 GRAM(S): 4.1 POWDER, FOR SUSPENSION ORAL at 10:38

## 2017-09-20 RX ADMIN — ISOSORBIDE MONONITRATE 120 MILLIGRAM(S): 60 TABLET, EXTENDED RELEASE ORAL at 15:06

## 2017-09-20 RX ADMIN — HEPARIN SODIUM 5000 UNIT(S): 5000 INJECTION INTRAVENOUS; SUBCUTANEOUS at 21:16

## 2017-09-20 RX ADMIN — HEPARIN SODIUM 5000 UNIT(S): 5000 INJECTION INTRAVENOUS; SUBCUTANEOUS at 05:55

## 2017-09-20 RX ADMIN — OXYCODONE HYDROCHLORIDE 10 MILLIGRAM(S): 5 TABLET ORAL at 16:02

## 2017-09-20 RX ADMIN — PIPERACILLIN AND TAZOBACTAM 25 GRAM(S): 4; .5 INJECTION, POWDER, LYOPHILIZED, FOR SOLUTION INTRAVENOUS at 18:14

## 2017-09-20 RX ADMIN — OLANZAPINE 20 MILLIGRAM(S): 15 TABLET, FILM COATED ORAL at 21:17

## 2017-09-20 RX ADMIN — ERYTHROPOIETIN 10000 UNIT(S): 10000 INJECTION, SOLUTION INTRAVENOUS; SUBCUTANEOUS at 10:36

## 2017-09-20 RX ADMIN — Medication 4: at 09:29

## 2017-09-20 RX ADMIN — DIVALPROEX SODIUM 500 MILLIGRAM(S): 500 TABLET, DELAYED RELEASE ORAL at 21:18

## 2017-09-20 RX ADMIN — AMLODIPINE BESYLATE 10 MILLIGRAM(S): 2.5 TABLET ORAL at 05:53

## 2017-09-20 RX ADMIN — Medication 100 MILLIGRAM(S): at 21:18

## 2017-09-20 RX ADMIN — Medication 650 MILLIGRAM(S): at 15:03

## 2017-09-20 RX ADMIN — PIPERACILLIN AND TAZOBACTAM 25 GRAM(S): 4; .5 INJECTION, POWDER, LYOPHILIZED, FOR SOLUTION INTRAVENOUS at 05:55

## 2017-09-20 RX ADMIN — Medication 650 MILLIGRAM(S): at 21:17

## 2017-09-20 RX ADMIN — CARVEDILOL PHOSPHATE 12.5 MILLIGRAM(S): 80 CAPSULE, EXTENDED RELEASE ORAL at 18:06

## 2017-09-20 RX ADMIN — OXYCODONE HYDROCHLORIDE 10 MILLIGRAM(S): 5 TABLET ORAL at 01:00

## 2017-09-20 RX ADMIN — Medication 650 MILLIGRAM(S): at 05:53

## 2017-09-20 NOTE — PROGRESS NOTE BEHAVIORAL HEALTH - NSBHCHARTREVIEWLAB_PSY_A_CORE FT
5.9    11.68 )-----------( 307      ( 20 Sep 2017 13:34 )             18.9   09-20    139  |  108<H>  |  49<H>  ----------------------------<  103<H>  5.8<H>   |  18<L>  |  2.92<H>    Ca    8.1<L>      20 Sep 2017 13:34

## 2017-09-20 NOTE — PROGRESS NOTE ADULT - SUBJECTIVE AND OBJECTIVE BOX
Patient is a 52y Male  being evaluated for CKD4   who reports foot pain overnight.  Voiding without difficulty. No dysuria, hematuria, urgency.        PHYSICAL EXAM:  Vitals:T(C): 37.4 (09-20-17 @ 05:51), Max: 37.4 (09-20-17 @ 05:51)  HR: 76 (09-20-17 @ 05:51) (53 - 76)  BP: 173/68 (09-20-17 @ 05:51) (144/63 - 187/75)  RR: 18 (09-20-17 @ 05:51) (15 - 18)  SpO2: 100% (09-20-17 @ 05:51) (97% - 100%)  Wt(kg): --    General: no acute distress  HEENT:  NC, ext ears nl, oropharynx clear,  nose nl  Neck: No JVD, bruit, adenopathy or thyromegaly  Eyes: PERRL, no discharge, no icterus  Respiratory: cta/p bilat, no wheezes, rales, nl effort  Cardiovascular: regular rate, S1 and S2 no rub or gallop  Abd: : BS+, soft, NT , no rebound or guarding, no bruits  Extremities: dresiing to Rt foot no edema  Neurological: A/O x 3, nl coordination and tone    I and O's:  09-19 @ 07:01  -  09-20 @ 07:00  --------------------------------------------------------  IN: 830 mL / OUT: 2150 mL / NET: -1320 mL              REVIEW OF SYSTEMS:  CONSTITUTIONAL: No weakness, fevers or chills  RESPIRATORY: No cough, wheezing, hemoptysis; No shortness of breath  CARDIOVASCULAR: No chest pain or palpitations  GI : no abd pain, nausea or vomiting  GENITOURINARY: No dysuria, frequency or hematuria  All other review of systems is negative unless indicated above.    Allergies    No Known Allergies    Intolerances          MEDICATIONS  (STANDING):  heparin  Injectable 5000 Unit(s) SubCutaneous every 8 hours  sodium bicarbonate 650 milliGRAM(s) Oral three times a day  isosorbide   mononitrate ER Tablet (IMDUR) 120 milliGRAM(s) Oral daily  diVALproex  milliGRAM(s) Oral two times a day  traZODone 100 milliGRAM(s) Oral at bedtime  atorvastatin 40 milliGRAM(s) Oral at bedtime  OLANZapine 20 milliGRAM(s) Oral at bedtime  carvedilol 12.5 milliGRAM(s) Oral every 12 hours  insulin lispro (HumaLOG) corrective regimen sliding scale   SubCutaneous three times a day before meals  insulin lispro (HumaLOG) corrective regimen sliding scale   SubCutaneous at bedtime  dextrose 5%. 1000 milliLiter(s) (50 mL/Hr) IV Continuous <Continuous>  dextrose 50% Injectable 12.5 Gram(s) IV Push once  dextrose 50% Injectable 25 Gram(s) IV Push once  dextrose 50% Injectable 25 Gram(s) IV Push once  pantoprazole    Tablet 40 milliGRAM(s) Oral before breakfast  piperacillin/tazobactam IVPB. 3.375 Gram(s) IV Intermittent every 12 hours  amLODIPine   Tablet 10 milliGRAM(s) Oral daily  sodium polystyrene sulfonate Suspension 15 Gram(s) Oral once      Sodium,Serum 140    09-20 @ 05:45  Sodium,Serum 140    09-19 @ 05:25  Sodium,Serum 137    09-18 @ 09:08  Sodium,Serum 136    09-17 @ 21:15  Sodium,Serum 137    09-17 @ 06:15  Sodium,Serum 140    09-16 @ 14:43    Potassium,Serum 5.5    09-20 @ 05:45  Potassium,Serum 5.2    09-19 @ 05:25  Potassium,Serum 5.0    09-18 @ 09:08  Potassium,Serum 5.2    09-17 @ 21:15  Potassium,Serum 5.3    09-17 @ 06:15  Potassium,Serum 5.5    09-16 @ 14:43    Chloride,Serum 109    09-20 @ 05:45  Chloride,Serum 109    09-19 @ 05:25  Chloride,Serum 107    09-18 @ 09:08  Chloride,Serum 104    09-17 @ 21:15  Chloride,Serum 106    09-17 @ 06:15  Chloride,Serum 107    09-16 @ 14:43    CO2, Serum 17    09-20 @ 05:45  CO2, Serum 19    09-19 @ 05:25  CO2, Serum 18    09-18 @ 09:08  CO2, Serum 20    09-17 @ 21:15  CO2, Serum 20    09-17 @ 06:15  CO2, Serum 20    09-16 @ 14:43    BUN 51    09-20 @ 05:45  BUN 52    09-19 @ 05:25  BUN 55    09-18 @ 09:08  BUN 54    09-17 @ 21:15  BUN 59    09-17 @ 06:15  BUN 54    09-16 @ 14:43    Creatinine, Serum 2.90    09-20 @ 05:45  Creatinine, Serum 3.06    09-19 @ 05:25  Creatinine, Serum 3.08    09-18 @ 09:08  Creatinine, Serum 3.14    09-17 @ 21:15  Creatinine, Serum 3.25    09-17 @ 06:15  Creatinine, Serum 3.19    09-16 @ 14:43      09-20    140  |  109<H>  |  51<H>  ----------------------------<  268<H>  5.5<H>   |  17<L>  |  2.90<H>    Ca    8.1<L>      20 Sep 2017 05:45  Phos  3.9     09-18  Mg     1.7     09-18                              6.1    10.09 )-----------( 316      ( 20 Sep 2017 05:45 )             19.6

## 2017-09-20 NOTE — PROGRESS NOTE BEHAVIORAL HEALTH - NSBHCHARTREVIEWVS_PSY_A_CORE FT
Vital Signs Last 24 Hrs  T(C): 37.6 (20 Sep 2017 14:17), Max: 38 (20 Sep 2017 10:10)  T(F): 99.7 (20 Sep 2017 14:17), Max: 100.4 (20 Sep 2017 10:10)  HR: 71 (20 Sep 2017 14:17) (53 - 76)  BP: 154/68 (20 Sep 2017 14:17) (151/81 - 181/73)  BP(mean): --  RR: 18 (20 Sep 2017 14:17) (15 - 18)  SpO2: 95% (20 Sep 2017 14:17) (95% - 100%)

## 2017-09-20 NOTE — PROGRESS NOTE ADULT - SUBJECTIVE AND OBJECTIVE BOX
Patient is a 52y old  Male who presents with a chief complaint of right foot (14 Sep 2017 02:55)       INTERVAL HPI/OVERNIGHT EVENTS:  Patient seen and evaluated at bedside.  Pt is resting comfortable in NAD. Denies N/V/F/C.      Allergies    No Known Allergies    Intolerances        Vital Signs Last 24 Hrs  T(C): 37.4 (20 Sep 2017 05:51), Max: 37.4 (20 Sep 2017 05:51)  T(F): 99.4 (20 Sep 2017 05:51), Max: 99.4 (20 Sep 2017 05:51)  HR: 76 (20 Sep 2017 05:51) (53 - 76)  BP: 173/68 (20 Sep 2017 05:51) (144/63 - 187/75)  BP(mean): --  RR: 18 (20 Sep 2017 05:51) (15 - 18)  SpO2: 100% (20 Sep 2017 05:51) (97% - 100%)    LABS:                        6.1    10.09 )-----------( 316      ( 20 Sep 2017 05:45 )             19.6     09-20    140  |  109<H>  |  51<H>  ----------------------------<  268<H>  5.5<H>   |  17<L>  |  2.90<H>    Ca    8.1<L>      20 Sep 2017 05:45  Phos  3.9     09-18  Mg     1.7     09-18      PT/INR - ( 19 Sep 2017 05:25 )   PT: 10.2 SEC;   INR: 0.91              CAPILLARY BLOOD GLUCOSE  228 (20 Sep 2017 08:42)  112 (19 Sep 2017 17:41)  112 (19 Sep 2017 15:49)  121 (19 Sep 2017 12:55)  162 (19 Sep 2017 11:01)  219 (19 Sep 2017 09:09)          Lower Extremity Physical Exam:  s/p R foot 1st metatarsal resection. Sutures intact, CFT <2 s along all edges of incision site. Skin edges well coapted. No dehissence. No drainage, no hematoma, no fluctuance, no erythema, no edema, no clinical signs of active infection at this time.     Right lateral 5th met head has a small eschar. Periwound is normal skin, eschar is well adhered, no fluctuance, no erythema, no malodor no drainage. Eschar is stable. 1.5 x 1.5 cm

## 2017-09-20 NOTE — PROGRESS NOTE BEHAVIORAL HEALTH - SUMMARY
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 He was sent in again by his podiatrist Dr Olivier with concern for infection of his amp site. The patient reports that he has followed up with Dr. Olivier approximately 5 times in the past 3 weeks since discharge, and Dr. Olivier was concerned for infection.  The patient is very drowsy and unwilling to speak much about his decision.  He says that he is willing to accept antibiotics and other medications for the infection.  He is willing to have an endoscopy but not willing to have a colonoscopy at this time.  His mother, ([phone]) says that her son had been doing better when he was on Lithium, but it had to be discontinued because of chronic renal insufficiency.  He has been stable on the Olanzapine. She understands that he should have a colonoscopy and says that she and his family will speak with him again about this. Pt has no acute symptoms of psychosis and no thoughts of harming himself or others. Pt is currently drowsy and unable to talk about his medical care.

## 2017-09-20 NOTE — PROGRESS NOTE ADULT - ASSESSMENT
53 yo M s/p R foot 1st metatarsal resection  - Pt seen and examined  - No signs of active infection present. Surgical site appears to be viable and healing well  - R lateral 5th met head has an eschar that we will monitor closely. Likely 2/2 to bandage and pressure of lying in bed  - Z float boots to offload the eschar and prevent any heel pressure ulcers  - Betadine and padding applied to eschar, DSD to surgical site  - Awaiting final OR data   - Seen with attending

## 2017-09-20 NOTE — PROGRESS NOTE ADULT - ASSESSMENT
KD 4 - stable p angiogram  s/p toe amputation  mild hyperkalemia   kayexalate 15gm x1  2gm K diet  anemia - transfuse as per surgery  will give procrit 10,ooo units - check fe studies  monitor I&O's, electrolytes and renal function

## 2017-09-20 NOTE — PROGRESS NOTE ADULT - SUBJECTIVE AND OBJECTIVE BOX
Vascular Surgery (C Team)     Subjective: Pt seen/examined at bedside. No issues overnight. Post-op CBC showed Hct drop to 22.6 from 25.1 , patient made aware explained risk and benefits of blood transfusion patient adamantly refused any sort of blood products or transfusion. This morning pain well controlled . Denied any fever, chills, CP, SOB , nausea or vomiting     MEDICATIONS  (STANDING):  heparin  Injectable 5000 Unit(s) SubCutaneous every 8 hours  sodium bicarbonate 650 milliGRAM(s) Oral three times a day  isosorbide   mononitrate ER Tablet (IMDUR) 120 milliGRAM(s) Oral daily  diVALproex  milliGRAM(s) Oral two times a day  traZODone 100 milliGRAM(s) Oral at bedtime  atorvastatin 40 milliGRAM(s) Oral at bedtime  OLANZapine 20 milliGRAM(s) Oral at bedtime  carvedilol 12.5 milliGRAM(s) Oral every 12 hours  insulin lispro (HumaLOG) corrective regimen sliding scale   SubCutaneous three times a day before meals  insulin lispro (HumaLOG) corrective regimen sliding scale   SubCutaneous at bedtime  dextrose 5%. 1000 milliLiter(s) (50 mL/Hr) IV Continuous <Continuous>  dextrose 50% Injectable 12.5 Gram(s) IV Push once  dextrose 50% Injectable 25 Gram(s) IV Push once  dextrose 50% Injectable 25 Gram(s) IV Push once  pantoprazole    Tablet 40 milliGRAM(s) Oral before breakfast  piperacillin/tazobactam IVPB. 3.375 Gram(s) IV Intermittent every 12 hours  amLODIPine   Tablet 10 milliGRAM(s) Oral daily  sodium polystyrene sulfonate Suspension 15 Gram(s) Oral once  epoetin gustavo Injectable 51151 Unit(s) SubCutaneous once    MEDICATIONS  (PRN):  dextrose Gel 1 Dose(s) Oral once PRN Blood Glucose LESS THAN 70 milliGRAM(s)/deciliter  glucagon  Injectable 1 milliGRAM(s) IntraMuscular once PRN Glucose LESS THAN 70 milligrams/deciliter  oxyCODONE    IR 5 milliGRAM(s) Oral every 4 hours PRN Moderate Pain (4 - 6)  oxyCODONE    IR 10 milliGRAM(s) Oral every 4 hours PRN Severe Pain (7 - 10)  acetaminophen   Tablet. 650 milliGRAM(s) Oral every 6 hours PRN Mild Pain (1 - 3)  hydrALAZINE Injectable 10 milliGRAM(s) IV Push every 4 hours PRN SBP>160    heparin  Injectable 5000 Unit(s) SubCutaneous every 8 hours  sodium bicarbonate 650 milliGRAM(s) Oral three times a day  isosorbide   mononitrate ER Tablet (IMDUR) 120 milliGRAM(s) Oral daily  diVALproex  milliGRAM(s) Oral two times a day  traZODone 100 milliGRAM(s) Oral at bedtime  atorvastatin 40 milliGRAM(s) Oral at bedtime  OLANZapine 20 milliGRAM(s) Oral at bedtime  carvedilol 12.5 milliGRAM(s) Oral every 12 hours  insulin lispro (HumaLOG) corrective regimen sliding scale   SubCutaneous three times a day before meals  insulin lispro (HumaLOG) corrective regimen sliding scale   SubCutaneous at bedtime  dextrose 5%. 1000 milliLiter(s) IV Continuous <Continuous>  dextrose Gel 1 Dose(s) Oral once PRN  dextrose 50% Injectable 12.5 Gram(s) IV Push once  dextrose 50% Injectable 25 Gram(s) IV Push once  dextrose 50% Injectable 25 Gram(s) IV Push once  glucagon  Injectable 1 milliGRAM(s) IntraMuscular once PRN  oxyCODONE    IR 5 milliGRAM(s) Oral every 4 hours PRN  oxyCODONE    IR 10 milliGRAM(s) Oral every 4 hours PRN  acetaminophen   Tablet. 650 milliGRAM(s) Oral every 6 hours PRN  pantoprazole    Tablet 40 milliGRAM(s) Oral before breakfast  piperacillin/tazobactam IVPB. 3.375 Gram(s) IV Intermittent every 12 hours  hydrALAZINE Injectable 10 milliGRAM(s) IV Push every 4 hours PRN  amLODIPine   Tablet 10 milliGRAM(s) Oral daily  sodium polystyrene sulfonate Suspension 15 Gram(s) Oral once  epoetin gustavo Injectable 45728 Unit(s) SubCutaneous once    Allergies    No Known Allergies    Intolerances          Vital Signs Last 24 Hrs  T(C): 37.4 (20 Sep 2017 05:51), Max: 37.4 (20 Sep 2017 05:51)  T(F): 99.4 (20 Sep 2017 05:51), Max: 99.4 (20 Sep 2017 05:51)  HR: 76 (20 Sep 2017 05:51) (53 - 76)  BP: 173/68 (20 Sep 2017 05:51) (144/63 - 187/75)  BP(mean): --  RR: 18 (20 Sep 2017 05:51) (15 - 18)  SpO2: 100% (20 Sep 2017 05:51) (97% - 100%)    I&O's Summary    19 Sep 2017 07:01  -  20 Sep 2017 07:00  --------------------------------------------------------  IN: 830 mL / OUT: 2150 mL / NET: -1320 mL        Physical Exam:  Gen: NAD, AAOx3  Pulm: breathing comfortably on RA  Card: RRR  Abd: soft, NT/ND.  Ext: R foot extremity covered with dressing, CDI    LABS:                        6.1    10.09 )-----------( 316      ( 20 Sep 2017 05:45 )             19.6     09-20    140  |  109<H>  |  51<H>  ----------------------------<  268<H>  5.5<H>   |  17<L>  |  2.90<H>    Ca    8.1<L>      20 Sep 2017 05:45  Phos  3.9     09-18  Mg     1.7     09-18      PT/INR - ( 19 Sep 2017 05:25 )   PT: 10.2 SEC;   INR: 0.91                CAPILLARY BLOOD GLUCOSE  112 (19 Sep 2017 17:41)  112 (19 Sep 2017 15:49)  121 (19 Sep 2017 12:55)  162 (19 Sep 2017 11:01)  219 (19 Sep 2017 09:09)          RADIOLOGY & ADDITIONAL TESTS:

## 2017-09-20 NOTE — PROGRESS NOTE ADULT - ASSESSMENT
A/P: 52y Male POD 1 s/p Right 1st toe amputation   - Pain control  - Strict I/O's  - F/U GI fxn  - OOB/DVT ppx  - AM labs  - Nephrology reccs appreciated:  give procrit 10,ooo units ,  check fe studies, monitor I&O's, electrolytes and renal function, 2gm K diet  - Psych reccs appreciated: Patient has capacity to participate in his medical decision making A/P: 52y Male POD 1 s/p Right 1st toe amputation     - d/w pt colonoscopy w/u for the anemia and wt loss  d/w pt that a rle revasc intervention will not be possible till this w/u is completed  pt to decide

## 2017-09-20 NOTE — PROGRESS NOTE BEHAVIORAL HEALTH - NSBHFUPINTERVALHXFT_PSY_A_CORE
Pt's parents are at his bedside expecting their son to have a blood transfusion soon.  They are concerned and want to encourage him to have a colonoscopy once he is able to have one.

## 2017-09-21 ENCOUNTER — TRANSCRIPTION ENCOUNTER (OUTPATIENT)
Age: 52
End: 2017-09-21

## 2017-09-21 DIAGNOSIS — D50.0 IRON DEFICIENCY ANEMIA SECONDARY TO BLOOD LOSS (CHRONIC): ICD-10-CM

## 2017-09-21 LAB
BUN SERPL-MCNC: 45 MG/DL — HIGH (ref 7–23)
BUN SERPL-MCNC: 47 MG/DL — HIGH (ref 7–23)
CALCIUM SERPL-MCNC: 8.3 MG/DL — LOW (ref 8.4–10.5)
CALCIUM SERPL-MCNC: 8.7 MG/DL — SIGNIFICANT CHANGE UP (ref 8.4–10.5)
CHLORIDE SERPL-SCNC: 104 MMOL/L — SIGNIFICANT CHANGE UP (ref 98–107)
CHLORIDE SERPL-SCNC: 105 MMOL/L — SIGNIFICANT CHANGE UP (ref 98–107)
CO2 SERPL-SCNC: 19 MMOL/L — LOW (ref 22–31)
CO2 SERPL-SCNC: 19 MMOL/L — LOW (ref 22–31)
CREAT SERPL-MCNC: 3.06 MG/DL — HIGH (ref 0.5–1.3)
CREAT SERPL-MCNC: 3.12 MG/DL — HIGH (ref 0.5–1.3)
GLUCOSE SERPL-MCNC: 84 MG/DL — SIGNIFICANT CHANGE UP (ref 70–99)
GLUCOSE SERPL-MCNC: 90 MG/DL — SIGNIFICANT CHANGE UP (ref 70–99)
HCT VFR BLD CALC: 21.4 % — LOW (ref 39–50)
HCT VFR BLD CALC: 23.7 % — LOW (ref 39–50)
HGB BLD-MCNC: 7 G/DL — CRITICAL LOW (ref 13–17)
HGB BLD-MCNC: 7.9 G/DL — LOW (ref 13–17)
MAGNESIUM SERPL-MCNC: 1.6 MG/DL — SIGNIFICANT CHANGE UP (ref 1.6–2.6)
MCHC RBC-ENTMCNC: 29 PG — SIGNIFICANT CHANGE UP (ref 27–34)
MCHC RBC-ENTMCNC: 29.5 PG — SIGNIFICANT CHANGE UP (ref 27–34)
MCHC RBC-ENTMCNC: 32.7 % — SIGNIFICANT CHANGE UP (ref 32–36)
MCHC RBC-ENTMCNC: 33.3 % — SIGNIFICANT CHANGE UP (ref 32–36)
MCV RBC AUTO: 88.4 FL — SIGNIFICANT CHANGE UP (ref 80–100)
MCV RBC AUTO: 88.8 FL — SIGNIFICANT CHANGE UP (ref 80–100)
NRBC # FLD: 0 — SIGNIFICANT CHANGE UP
NRBC # FLD: 0 — SIGNIFICANT CHANGE UP
PHOSPHATE SERPL-MCNC: 5.3 MG/DL — HIGH (ref 2.5–4.5)
PLATELET # BLD AUTO: 311 K/UL — SIGNIFICANT CHANGE UP (ref 150–400)
PLATELET # BLD AUTO: 317 K/UL — SIGNIFICANT CHANGE UP (ref 150–400)
PMV BLD: 10.3 FL — SIGNIFICANT CHANGE UP (ref 7–13)
PMV BLD: 10.4 FL — SIGNIFICANT CHANGE UP (ref 7–13)
POTASSIUM SERPL-MCNC: 4.9 MMOL/L — SIGNIFICANT CHANGE UP (ref 3.5–5.3)
POTASSIUM SERPL-MCNC: 4.9 MMOL/L — SIGNIFICANT CHANGE UP (ref 3.5–5.3)
POTASSIUM SERPL-SCNC: 4.9 MMOL/L — SIGNIFICANT CHANGE UP (ref 3.5–5.3)
POTASSIUM SERPL-SCNC: 4.9 MMOL/L — SIGNIFICANT CHANGE UP (ref 3.5–5.3)
RBC # BLD: 2.41 M/UL — LOW (ref 4.2–5.8)
RBC # BLD: 2.68 M/UL — LOW (ref 4.2–5.8)
RBC # FLD: 13.6 % — SIGNIFICANT CHANGE UP (ref 10.3–14.5)
RBC # FLD: 13.7 % — SIGNIFICANT CHANGE UP (ref 10.3–14.5)
SODIUM SERPL-SCNC: 137 MMOL/L — SIGNIFICANT CHANGE UP (ref 135–145)
SODIUM SERPL-SCNC: 139 MMOL/L — SIGNIFICANT CHANGE UP (ref 135–145)
SURGICAL PATHOLOGY STUDY: SIGNIFICANT CHANGE UP
TRANSFERRIN SERPL-MCNC: 182 MG/DL — LOW (ref 200–360)
WBC # BLD: 12.24 K/UL — HIGH (ref 3.8–10.5)
WBC # BLD: 12.54 K/UL — HIGH (ref 3.8–10.5)
WBC # FLD AUTO: 12.24 K/UL — HIGH (ref 3.8–10.5)
WBC # FLD AUTO: 12.54 K/UL — HIGH (ref 3.8–10.5)

## 2017-09-21 PROCEDURE — 99232 SBSQ HOSP IP/OBS MODERATE 35: CPT | Mod: GC

## 2017-09-21 PROCEDURE — 99232 SBSQ HOSP IP/OBS MODERATE 35: CPT

## 2017-09-21 RX ORDER — SODIUM POLYSTYRENE SULFONATE 4.1 MEQ/G
15 POWDER, FOR SUSPENSION ORAL ONCE
Qty: 0 | Refills: 0 | Status: COMPLETED | OUTPATIENT
Start: 2017-09-21 | End: 2017-09-21

## 2017-09-21 RX ADMIN — DIVALPROEX SODIUM 500 MILLIGRAM(S): 500 TABLET, DELAYED RELEASE ORAL at 12:50

## 2017-09-21 RX ADMIN — HEPARIN SODIUM 5000 UNIT(S): 5000 INJECTION INTRAVENOUS; SUBCUTANEOUS at 13:02

## 2017-09-21 RX ADMIN — OLANZAPINE 20 MILLIGRAM(S): 15 TABLET, FILM COATED ORAL at 21:27

## 2017-09-21 RX ADMIN — Medication 2: at 21:29

## 2017-09-21 RX ADMIN — HEPARIN SODIUM 5000 UNIT(S): 5000 INJECTION INTRAVENOUS; SUBCUTANEOUS at 21:27

## 2017-09-21 RX ADMIN — Medication 650 MILLIGRAM(S): at 05:46

## 2017-09-21 RX ADMIN — SODIUM POLYSTYRENE SULFONATE 15 GRAM(S): 4.1 POWDER, FOR SUSPENSION ORAL at 19:14

## 2017-09-21 RX ADMIN — Medication 650 MILLIGRAM(S): at 21:27

## 2017-09-21 RX ADMIN — CARVEDILOL PHOSPHATE 12.5 MILLIGRAM(S): 80 CAPSULE, EXTENDED RELEASE ORAL at 19:08

## 2017-09-21 RX ADMIN — DIVALPROEX SODIUM 500 MILLIGRAM(S): 500 TABLET, DELAYED RELEASE ORAL at 21:27

## 2017-09-21 RX ADMIN — HEPARIN SODIUM 5000 UNIT(S): 5000 INJECTION INTRAVENOUS; SUBCUTANEOUS at 05:45

## 2017-09-21 RX ADMIN — CARVEDILOL PHOSPHATE 12.5 MILLIGRAM(S): 80 CAPSULE, EXTENDED RELEASE ORAL at 05:46

## 2017-09-21 RX ADMIN — ISOSORBIDE MONONITRATE 120 MILLIGRAM(S): 60 TABLET, EXTENDED RELEASE ORAL at 12:51

## 2017-09-21 RX ADMIN — PIPERACILLIN AND TAZOBACTAM 25 GRAM(S): 4; .5 INJECTION, POWDER, LYOPHILIZED, FOR SOLUTION INTRAVENOUS at 05:45

## 2017-09-21 RX ADMIN — PANTOPRAZOLE SODIUM 40 MILLIGRAM(S): 20 TABLET, DELAYED RELEASE ORAL at 07:37

## 2017-09-21 RX ADMIN — AMLODIPINE BESYLATE 10 MILLIGRAM(S): 2.5 TABLET ORAL at 05:46

## 2017-09-21 RX ADMIN — PIPERACILLIN AND TAZOBACTAM 25 GRAM(S): 4; .5 INJECTION, POWDER, LYOPHILIZED, FOR SOLUTION INTRAVENOUS at 19:08

## 2017-09-21 RX ADMIN — Medication 650 MILLIGRAM(S): at 13:02

## 2017-09-21 RX ADMIN — ATORVASTATIN CALCIUM 40 MILLIGRAM(S): 80 TABLET, FILM COATED ORAL at 21:27

## 2017-09-21 RX ADMIN — Medication 4: at 19:09

## 2017-09-21 RX ADMIN — Medication 200 GRAM(S): at 14:17

## 2017-09-21 RX ADMIN — Medication 2: at 12:50

## 2017-09-21 RX ADMIN — Medication 100 MILLIGRAM(S): at 21:27

## 2017-09-21 NOTE — PROGRESS NOTE ADULT - SUBJECTIVE AND OBJECTIVE BOX
Patient is a 52y old  Male who presents with a chief complaint of right foot (14 Sep 2017 02:55)       INTERVAL HPI/OVERNIGHT EVENTS:  Patient seen and evaluated at bedside.  Pt is resting comfortable in NAD. Denies N/V/F/C.      Allergies    No Known Allergies    Intolerances        Vital Signs Last 24 Hrs  T(C): 36.9 (21 Sep 2017 05:23), Max: 38 (20 Sep 2017 10:10)  T(F): 98.4 (21 Sep 2017 05:23), Max: 100.4 (20 Sep 2017 10:10)  HR: 60 (21 Sep 2017 05:23) (58 - 77)  BP: 166/60 (21 Sep 2017 05:23) (154/68 - 168/67)  BP(mean): --  RR: 16 (21 Sep 2017 05:23) (16 - 20)  SpO2: 99% (21 Sep 2017 05:23) (95% - 99%)    LABS:                        7.9    12.24 )-----------( 317      ( 21 Sep 2017 06:30 )             23.7     09-21    139  |  105  |  45<H>  ----------------------------<  84  4.9   |  19<L>  |  3.12<H>    Ca    8.7      21 Sep 2017 06:00  Phos  5.3     09-21  Mg     1.6     09-21          CAPILLARY BLOOD GLUCOSE  102 (21 Sep 2017 08:41)  114 (20 Sep 2017 21:28)  154 (20 Sep 2017 17:45)  108 (20 Sep 2017 12:44)          Lower Extremity Physical Exam:  s/p R foot 1st metatarsal resection. Sutures intact, CFT <2 s along all edges of incision site. Skin edges well coapted. No dehissence. No drainage, no hematoma, no fluctuance, no erythema, no edema, no clinical signs of active infection at this time.     Right lateral 5th met head has a small eschar. Periwound is normal skin, eschar is well adhered, no fluctuance, no erythema, no malodor no drainage. Eschar is stable. 1.5 x 1.5 cm    Appearance improving

## 2017-09-21 NOTE — PROGRESS NOTE ADULT - SUBJECTIVE AND OBJECTIVE BOX
Patient is a 52y old  Male who presents with a chief complaint of right foot (14 Sep 2017 02:55)      Vascular Surgery Attending Progress Note    Interval HPI:  pt wants to proceed w colonoscopy at this time     Medications:  heparin  Injectable 5000 Unit(s) SubCutaneous every 8 hours  sodium bicarbonate 650 milliGRAM(s) Oral three times a day  isosorbide   mononitrate ER Tablet (IMDUR) 120 milliGRAM(s) Oral daily  diVALproex  milliGRAM(s) Oral two times a day  traZODone 100 milliGRAM(s) Oral at bedtime  atorvastatin 40 milliGRAM(s) Oral at bedtime  OLANZapine 20 milliGRAM(s) Oral at bedtime  carvedilol 12.5 milliGRAM(s) Oral every 12 hours  insulin lispro (HumaLOG) corrective regimen sliding scale   SubCutaneous three times a day before meals  insulin lispro (HumaLOG) corrective regimen sliding scale   SubCutaneous at bedtime  dextrose 5%. 1000 milliLiter(s) IV Continuous <Continuous>  dextrose Gel 1 Dose(s) Oral once PRN  dextrose 50% Injectable 12.5 Gram(s) IV Push once  dextrose 50% Injectable 25 Gram(s) IV Push once  dextrose 50% Injectable 25 Gram(s) IV Push once  glucagon  Injectable 1 milliGRAM(s) IntraMuscular once PRN  oxyCODONE    IR 5 milliGRAM(s) Oral every 4 hours PRN  oxyCODONE    IR 10 milliGRAM(s) Oral every 4 hours PRN  acetaminophen   Tablet. 650 milliGRAM(s) Oral every 6 hours PRN  pantoprazole    Tablet 40 milliGRAM(s) Oral before breakfast  piperacillin/tazobactam IVPB. 3.375 Gram(s) IV Intermittent every 12 hours  hydrALAZINE Injectable 10 milliGRAM(s) IV Push every 4 hours PRN  amLODIPine   Tablet 10 milliGRAM(s) Oral daily  calcium gluconate IVPB 1 Gram(s) IV Intermittent daily      Vital Signs Last 24 Hrs  T(C): 36.9 (21 Sep 2017 05:23), Max: 38 (20 Sep 2017 10:10)  T(F): 98.4 (21 Sep 2017 05:23), Max: 100.4 (20 Sep 2017 10:10)  HR: 60 (21 Sep 2017 05:23) (58 - 77)  BP: 166/60 (21 Sep 2017 05:23) (154/68 - 168/67)  BP(mean): --  RR: 16 (21 Sep 2017 05:23) (16 - 20)  SpO2: 99% (21 Sep 2017 05:23) (95% - 99%)  I&O's Summary    20 Sep 2017 07:01  -  21 Sep 2017 07:00  --------------------------------------------------------  IN: 0 mL / OUT: 4550 mL / NET: -4550 mL    21 Sep 2017 07:01  -  21 Sep 2017 09:20  --------------------------------------------------------  IN: 0 mL / OUT: 700 mL / NET: -700 mL        Physical Exam:  Neuro  A&Ox3 VSS  Vascular:  stable dressing c/d/i  Musculoskeletal:wnl    LABS:                        7.9    12.24 )-----------( 317      ( 21 Sep 2017 06:30 )             23.7     09-21    139  |  105  |  45<H>  ----------------------------<  84  4.9   |  19<L>  |  3.12<H>    Ca    8.7      21 Sep 2017 06:00  Phos  5.3     09-21  Mg     1.6     09-21          DEREJE JACOBO MD  [phone]

## 2017-09-21 NOTE — PROGRESS NOTE ADULT - ASSESSMENT
CKD 4 - stable s/p angiogram  s/p toe amputation  mild hyperkalemia - improved after kayexelate   continue 2gm K diet  anemia - transfuse PRBCs prn  as per surgery  given procrit 10,ooo units   monitor I&O's, electrolytes and renal function

## 2017-09-21 NOTE — PROGRESS NOTE ADULT - ASSESSMENT
51 yo M s/p R foot 1st metatarsal resection  - Pt seen and examined  - No signs of active infection present. Surgical site appears to be viable and healing well  - R lateral 5th met head has an eschar that we will monitor closely. Likely 2/2 to bandage and pressure of lying in bed  - Z float boots to offload the eschar and prevent any heel pressure ulcers  - H/H up to 7.9 after receiving transfusion  - Betadine and padding applied to eschar, DSD to surgical site  - Awaiting final OR data   - Seen by attending

## 2017-09-21 NOTE — PROGRESS NOTE ADULT - PROBLEM SELECTOR PLAN 1
- plan for colonoscopy, tentatively scheduled for early next week   - patient ate solid food for lunch today so cannot prep for procedure tomorrow

## 2017-09-21 NOTE — PROGRESS NOTE ADULT - SUBJECTIVE AND OBJECTIVE BOX
Patient is a 52y Male  being evaluated for                 who reports no complaints overnight.        PHYSICAL EXAM:  Vitals:  T(F): 98.4 (09-21-17 @ 05:23), Max: 100.4 (09-20-17 @ 10:10)  HR: 60 (09-21-17 @ 05:23)  BP: 166/60 (09-21-17 @ 05:23)  BP(mean): --  RR: 16 (09-21-17 @ 05:23)  SpO2: 99% (09-21-17 @ 05:23)  Wt(kg): --  Constitutional: no acute distress  HEENT:  NC, ext ears nl, oropharynx clear,  nose nl  Neck: No JVD, bruit, adenopathy or thyromegaly  Eyes: PERRL, no discharge, no icterus  Respiratory: cta/p bilat, no wheezes, rales, nl effort  Cardiovascular: regular rate, S1 and S2 no rub or gallop  Abd: : BS+, soft, NT , no rebound or guarding, no bruits  Extremities: no edema or cyanosis, palpable pulses  Neurological: A/O x 3, nl coordination and tone    I and O's:    09-20 @ 07:01  -  09-21 @ 07:00  --------------------------------------------------------  IN: 0 mL / OUT: 4550 mL / NET: -4550 mL        Height (cm): 180.34 (09-19 @ 15:54)  Weight (kg): 53.5 (09-19 @ 15:54)  BMI (kg/m2): 16.5 (09-19 @ 15:54)    REVIEW OF SYSTEMS:  CONSTITUTIONAL: No weakness, fevers or chills  RESPIRATORY: No cough, wheezing, hemoptysis; No shortness of breath  CARDIOVASCULAR: No chest pain or palpitations  GI : no abd pains, nausea or vomiting  GENITOURINARY: No dysuria, frequency or hematuria  All other review of systems is negative unless indicated above.    Allergies    No Known Allergies    Intolerances        MEDICATIONS  (STANDING):  heparin  Injectable 5000 Unit(s) SubCutaneous every 8 hours  sodium bicarbonate 650 milliGRAM(s) Oral three times a day  isosorbide   mononitrate ER Tablet (IMDUR) 120 milliGRAM(s) Oral daily  diVALproex  milliGRAM(s) Oral two times a day  traZODone 100 milliGRAM(s) Oral at bedtime  atorvastatin 40 milliGRAM(s) Oral at bedtime  OLANZapine 20 milliGRAM(s) Oral at bedtime  carvedilol 12.5 milliGRAM(s) Oral every 12 hours  insulin lispro (HumaLOG) corrective regimen sliding scale   SubCutaneous three times a day before meals  insulin lispro (HumaLOG) corrective regimen sliding scale   SubCutaneous at bedtime  dextrose 5%. 1000 milliLiter(s) (50 mL/Hr) IV Continuous <Continuous>  dextrose 50% Injectable 12.5 Gram(s) IV Push once  dextrose 50% Injectable 25 Gram(s) IV Push once  dextrose 50% Injectable 25 Gram(s) IV Push once  pantoprazole    Tablet 40 milliGRAM(s) Oral before breakfast  piperacillin/tazobactam IVPB. 3.375 Gram(s) IV Intermittent every 12 hours  amLODIPine   Tablet 10 milliGRAM(s) Oral daily  calcium gluconate IVPB 1 Gram(s) IV Intermittent daily      LABS:  CBC Full  -  ( 21 Sep 2017 06:30 )  WBC Count : 12.24 K/uL  Hemoglobin : 7.9 g/dL  Hematocrit : 23.7 %  Platelet Count - Automated : 317 K/uL  Mean Cell Volume : 88.4 fL  Mean Cell Hemoglobin : 29.5 pg  Mean Cell Hemoglobin Concentration : 33.3 %  Auto Neutrophil # : x  Auto Lymphocyte # : x  Auto Monocyte # : x  Auto Eosinophil # : x  Auto Basophil # : x  Auto Neutrophil % : x  Auto Lymphocyte % : x  Auto Monocyte % : x  Auto Eosinophil % : x  Auto Basophil % : x    09-21    139  |  105  |  45<H>  ----------------------------<  84  4.9   |  19<L>  |  3.12<H>    Ca    8.7      21 Sep 2017 06:00  Phos  5.3     09-21  Mg     1.6     09-21        Urine Studies:      Urine chemistry:   Urine Na:   Urine Creatinine:   Urine Protein/Cr ratio:  Urine K:   Urine Osm:   24 Hr urine studies:       Imp:  52y Male Patient is a 52y Male  being evaluated for  CKD 4              no distress, no specific complaints         PHYSICAL EXAM:  Vitals:  T(F): 98.4 (09-21-17 @ 05:23), Max: 100.4 (09-20-17 @ 10:10)  HR: 60 (09-21-17 @ 05:23)  BP: 166/60 (09-21-17 @ 05:23)  BP(mean): --  RR: 16 (09-21-17 @ 05:23)  SpO2: 99% (09-21-17 @ 05:23)  Wt(kg): --  Constitutional: no acute distress  HEENT:  NC, ext ears nl, oropharynx clear,  nose nl  Neck: No JVD, bruit, adenopathy or thyromegaly  Eyes: PERRL, no discharge, no icterus  Respiratory: cta/p bilat, no wheezes, rales, nl effort  Cardiovascular: regular rate, S1 and S2 no rub or gallop  Abd: : BS+, soft, NT , no rebound or guarding, no bruits  Extremities: no edema right foot dressed,   Neurological: A/O x 3, nl coordination and tone    I and O's:    09-20 @ 07:01  -  09-21 @ 07:00  --------------------------------------------------------  IN: 0 mL / OUT: 4550 mL / NET: -4550 mL        Height (cm): 180.34 (09-19 @ 15:54)  Weight (kg): 53.5 (09-19 @ 15:54)  BMI (kg/m2): 16.5 (09-19 @ 15:54)    REVIEW OF SYSTEMS:  CONSTITUTIONAL: No weakness, fevers or chills  RESPIRATORY: No cough, wheezing, hemoptysis; No shortness of breath  CARDIOVASCULAR: No chest pain or palpitations  GI : no abd pains, nausea or vomiting  GENITOURINARY: No dysuria, frequency or hematuria  All other review of systems is negative unless indicated above.    Allergies    No Known Allergies    Intolerances        MEDICATIONS  (STANDING):  heparin  Injectable 5000 Unit(s) SubCutaneous every 8 hours  sodium bicarbonate 650 milliGRAM(s) Oral three times a day  isosorbide   mononitrate ER Tablet (IMDUR) 120 milliGRAM(s) Oral daily  diVALproex  milliGRAM(s) Oral two times a day  traZODone 100 milliGRAM(s) Oral at bedtime  atorvastatin 40 milliGRAM(s) Oral at bedtime  OLANZapine 20 milliGRAM(s) Oral at bedtime  carvedilol 12.5 milliGRAM(s) Oral every 12 hours  insulin lispro (HumaLOG) corrective regimen sliding scale   SubCutaneous three times a day before meals  insulin lispro (HumaLOG) corrective regimen sliding scale   SubCutaneous at bedtime  dextrose 5%. 1000 milliLiter(s) (50 mL/Hr) IV Continuous <Continuous>  dextrose 50% Injectable 12.5 Gram(s) IV Push once  dextrose 50% Injectable 25 Gram(s) IV Push once  dextrose 50% Injectable 25 Gram(s) IV Push once  pantoprazole    Tablet 40 milliGRAM(s) Oral before breakfast  piperacillin/tazobactam IVPB. 3.375 Gram(s) IV Intermittent every 12 hours  amLODIPine   Tablet 10 milliGRAM(s) Oral daily  calcium gluconate IVPB 1 Gram(s) IV Intermittent daily      LABS:  CBC Full  -  ( 21 Sep 2017 06:30 )  WBC Count : 12.24 K/uL  Hemoglobin : 7.9 g/dL  Hematocrit : 23.7 %  Platelet Count - Automated : 317 K/uL  Mean Cell Volume : 88.4 fL  Mean Cell Hemoglobin : 29.5 pg  Mean Cell Hemoglobin Concentration : 33.3 %  Auto Neutrophil # : x  Auto Lymphocyte # : x  Auto Monocyte # : x  Auto Eosinophil # : x  Auto Basophil # : x  Auto Neutrophil % : x  Auto Lymphocyte % : x  Auto Monocyte % : x  Auto Eosinophil % : x  Auto Basophil % : x    09-21    139  |  105  |  45<H>  ----------------------------<  84  4.9   |  19<L>  |  3.12<H>    Ca    8.7      21 Sep 2017 06:00  Phos  5.3     09-21  Mg     1.6     09-21        Urine Studies:      Urine chemistry:   Urine Na:   Urine Creatinine:   Urine Protein/Cr ratio:  Urine K:   Urine Osm:   24 Hr urine studies:       Imp:  52y Male

## 2017-09-21 NOTE — PROGRESS NOTE ADULT - ASSESSMENT
52y Male POD 2 s/p Right 1st toe amputation     - Patient now agreeable to colonoscopy, reconsulted GI, will see patient to prep/consent for colonoscopy   - Attending has discussed with pt that a rle revasc intervention not possible until this w/u is completed  - Pods : awaiting final OR data, Betadine and padding applied to eschar, DSD to surgical site  - f/u AM labs

## 2017-09-21 NOTE — PROGRESS NOTE ADULT - SUBJECTIVE AND OBJECTIVE BOX
Chief Complaint:  Patient is a 52y old  Male who presents with a chief complaint of right foot (14 Sep 2017 02:55)      Interval Events: Gi team recalled as patient is now agreeable to colonoscopy. No abd pain, no overt GI bleeding.     Allergies:  No Known Allergies      Hospital Medications:  heparin  Injectable 5000 Unit(s) SubCutaneous every 8 hours  sodium bicarbonate 650 milliGRAM(s) Oral three times a day  isosorbide   mononitrate ER Tablet (IMDUR) 120 milliGRAM(s) Oral daily  diVALproex  milliGRAM(s) Oral two times a day  traZODone 100 milliGRAM(s) Oral at bedtime  atorvastatin 40 milliGRAM(s) Oral at bedtime  OLANZapine 20 milliGRAM(s) Oral at bedtime  carvedilol 12.5 milliGRAM(s) Oral every 12 hours  insulin lispro (HumaLOG) corrective regimen sliding scale   SubCutaneous three times a day before meals  insulin lispro (HumaLOG) corrective regimen sliding scale   SubCutaneous at bedtime  dextrose 5%. 1000 milliLiter(s) IV Continuous <Continuous>  dextrose Gel 1 Dose(s) Oral once PRN  dextrose 50% Injectable 12.5 Gram(s) IV Push once  dextrose 50% Injectable 25 Gram(s) IV Push once  dextrose 50% Injectable 25 Gram(s) IV Push once  glucagon  Injectable 1 milliGRAM(s) IntraMuscular once PRN  oxyCODONE    IR 5 milliGRAM(s) Oral every 4 hours PRN  oxyCODONE    IR 10 milliGRAM(s) Oral every 4 hours PRN  acetaminophen   Tablet. 650 milliGRAM(s) Oral every 6 hours PRN  pantoprazole    Tablet 40 milliGRAM(s) Oral before breakfast  piperacillin/tazobactam IVPB. 3.375 Gram(s) IV Intermittent every 12 hours  hydrALAZINE Injectable 10 milliGRAM(s) IV Push every 4 hours PRN  amLODIPine   Tablet 10 milliGRAM(s) Oral daily  calcium gluconate IVPB 1 Gram(s) IV Intermittent daily  sodium polystyrene sulfonate Suspension 15 Gram(s) Oral once      PMHX/PSHX:  Bipolar affective disorder in remission  High cholesterol  Depression  Diabetes mellitus  Diabetes mellitus  Amputated great toe of right foot  ORIF right lower leg- 1995      Family history:  No pertinent family history in first degree relatives      ROS:     General:  No wt loss, fevers, chills, night sweats, fatigue   Eyes:  Good vision, no reported pain  ENT:  No sore throat, pain, runny nose  CV:  No chest pain, palpitations  Resp:  No cough, wheezing, SOB  GI:  See HPI  :  No pain, bleeding, incontinence, nocturia  Muscle:  No pain, weakness  Neuro:  No weakness, tingling, memory problems  Psych:  No fatigue, insomnia, mood problems, depression  Endocrine:  No cold/heat intolerance  Heme:  No petechiae, ecchymosis, easy bruising  Skin:  No rash, edema      PHYSICAL EXAM:   Vital Signs:  Vital Signs Last 24 Hrs  T(C): 37.1 (21 Sep 2017 10:31), Max: 37.5 (21 Sep 2017 01:54)  T(F): 98.8 (21 Sep 2017 10:31), Max: 99.5 (21 Sep 2017 01:54)  HR: 81 (21 Sep 2017 10:31) (58 - 81)  BP: 150/70 (21 Sep 2017 10:31) (150/70 - 168/67)  BP(mean): --  RR: 18 (21 Sep 2017 10:31) (16 - 20)  SpO2: 94% (21 Sep 2017 10:31) (94% - 99%)  Daily     Daily     GENERAL:  NAD  HEENT:  sclera anicteric  CHEST:  no respiratory distress, CTAB  HEART:  RRR, no MRG, no edema  ABDOMEN:  Soft, non-tender, non-distended, no masses , no hepato-splenomegaly,   EXTREMITIES:  toe amputation  SKIN:  No rash  NEURO:  Alert, oriented      LABS:                        7.9    12.24 )-----------( 317      ( 21 Sep 2017 06:30 )             23.7     09-21    139  |  105  |  45<H>  ----------------------------<  84  4.9   |  19<L>  |  3.12<H>    Ca    8.7      21 Sep 2017 06:00  Phos  5.3     09-21  Mg     1.6     09-21      Ferritin, Serum: 283.4 ng/mL   Iron Total, Serum: 56 ug/dL  Total Iron Binding Capacity.: 215 ug/dL                     Imaging:

## 2017-09-21 NOTE — PROGRESS NOTE ADULT - ASSESSMENT
A/P: 52y Male POD 1 s/p Right 1st toe amputation     -will  d/w GI to proceed colonoscopy w/u for the anemia and wt loss  d/w pt that a rle revasc intervention will not be possible till this w/u is completed

## 2017-09-21 NOTE — PROGRESS NOTE ADULT - SUBJECTIVE AND OBJECTIVE BOX
Vascular Surgery (C Team)     Subjective: Pt seen/examined at bedside. In evening, patient became agreeable to blood transfusion for is anemia, received 2 U of pRBC, tolerated the transfusion without issue. Evening labs revealed elevated potassium , patient did not want kayexalate enema but was agreeable to furosemide for removal of the excess potassium. Otherwise, this morning endorsed good pain control. Tolerating diet. Denied n/v, fever, chills, SOB or CP.     MEDICATIONS  (STANDING):  heparin  Injectable 5000 Unit(s) SubCutaneous every 8 hours  sodium bicarbonate 650 milliGRAM(s) Oral three times a day  isosorbide   mononitrate ER Tablet (IMDUR) 120 milliGRAM(s) Oral daily  diVALproex  milliGRAM(s) Oral two times a day  traZODone 100 milliGRAM(s) Oral at bedtime  atorvastatin 40 milliGRAM(s) Oral at bedtime  OLANZapine 20 milliGRAM(s) Oral at bedtime  carvedilol 12.5 milliGRAM(s) Oral every 12 hours  insulin lispro (HumaLOG) corrective regimen sliding scale   SubCutaneous three times a day before meals  insulin lispro (HumaLOG) corrective regimen sliding scale   SubCutaneous at bedtime  dextrose 5%. 1000 milliLiter(s) (50 mL/Hr) IV Continuous <Continuous>  dextrose 50% Injectable 12.5 Gram(s) IV Push once  dextrose 50% Injectable 25 Gram(s) IV Push once  dextrose 50% Injectable 25 Gram(s) IV Push once  pantoprazole    Tablet 40 milliGRAM(s) Oral before breakfast  piperacillin/tazobactam IVPB. 3.375 Gram(s) IV Intermittent every 12 hours  amLODIPine   Tablet 10 milliGRAM(s) Oral daily  calcium gluconate IVPB 1 Gram(s) IV Intermittent daily    MEDICATIONS  (PRN):  dextrose Gel 1 Dose(s) Oral once PRN Blood Glucose LESS THAN 70 milliGRAM(s)/deciliter  glucagon  Injectable 1 milliGRAM(s) IntraMuscular once PRN Glucose LESS THAN 70 milligrams/deciliter  oxyCODONE    IR 5 milliGRAM(s) Oral every 4 hours PRN Moderate Pain (4 - 6)  oxyCODONE    IR 10 milliGRAM(s) Oral every 4 hours PRN Severe Pain (7 - 10)  acetaminophen   Tablet. 650 milliGRAM(s) Oral every 6 hours PRN Mild Pain (1 - 3)  hydrALAZINE Injectable 10 milliGRAM(s) IV Push every 4 hours PRN SBP>160    heparin  Injectable 5000 Unit(s) SubCutaneous every 8 hours  sodium bicarbonate 650 milliGRAM(s) Oral three times a day  isosorbide   mononitrate ER Tablet (IMDUR) 120 milliGRAM(s) Oral daily  diVALproex  milliGRAM(s) Oral two times a day  traZODone 100 milliGRAM(s) Oral at bedtime  atorvastatin 40 milliGRAM(s) Oral at bedtime  OLANZapine 20 milliGRAM(s) Oral at bedtime  carvedilol 12.5 milliGRAM(s) Oral every 12 hours  insulin lispro (HumaLOG) corrective regimen sliding scale   SubCutaneous three times a day before meals  insulin lispro (HumaLOG) corrective regimen sliding scale   SubCutaneous at bedtime  dextrose 5%. 1000 milliLiter(s) IV Continuous <Continuous>  dextrose Gel 1 Dose(s) Oral once PRN  dextrose 50% Injectable 12.5 Gram(s) IV Push once  dextrose 50% Injectable 25 Gram(s) IV Push once  dextrose 50% Injectable 25 Gram(s) IV Push once  glucagon  Injectable 1 milliGRAM(s) IntraMuscular once PRN  oxyCODONE    IR 5 milliGRAM(s) Oral every 4 hours PRN  oxyCODONE    IR 10 milliGRAM(s) Oral every 4 hours PRN  acetaminophen   Tablet. 650 milliGRAM(s) Oral every 6 hours PRN  pantoprazole    Tablet 40 milliGRAM(s) Oral before breakfast  piperacillin/tazobactam IVPB. 3.375 Gram(s) IV Intermittent every 12 hours  hydrALAZINE Injectable 10 milliGRAM(s) IV Push every 4 hours PRN  amLODIPine   Tablet 10 milliGRAM(s) Oral daily  calcium gluconate IVPB 1 Gram(s) IV Intermittent daily    Allergies    No Known Allergies    Intolerances          Vital Signs Last 24 Hrs  T(C): 37.1 (21 Sep 2017 10:31), Max: 37.6 (20 Sep 2017 14:17)  T(F): 98.8 (21 Sep 2017 10:31), Max: 99.7 (20 Sep 2017 14:17)  HR: 81 (21 Sep 2017 10:31) (58 - 81)  BP: 150/70 (21 Sep 2017 10:31) (150/70 - 168/67)  BP(mean): --  RR: 18 (21 Sep 2017 10:31) (16 - 20)  SpO2: 94% (21 Sep 2017 10:31) (94% - 99%)    I&O's Summary    20 Sep 2017 07:01  -  21 Sep 2017 07:00  --------------------------------------------------------  IN: 0 mL / OUT: 4550 mL / NET: -4550 mL    21 Sep 2017 07:01  -  21 Sep 2017 13:34  --------------------------------------------------------  IN: 0 mL / OUT: 1475 mL / NET: -1475 mL        Physical Exam:  Gen: NAD, AAOx3  Pulm: breathing comfortably on RA  Card: RRR  Abd: soft, NT/ND.  Ext: R foot extremity covered with dressing, CDI    LABS:                        7.9    12.24 )-----------( 317      ( 21 Sep 2017 06:30 )             23.7     09-21    139  |  105  |  45<H>  ----------------------------<  84  4.9   |  19<L>  |  3.12<H>    Ca    8.7      21 Sep 2017 06:00  Phos  5.3     09-21  Mg     1.6     09-21            CAPILLARY BLOOD GLUCOSE  193 (21 Sep 2017 12:40)  102 (21 Sep 2017 08:41)  114 (20 Sep 2017 21:28)  154 (20 Sep 2017 17:45)          RADIOLOGY & ADDITIONAL TESTS:

## 2017-09-21 NOTE — PROGRESS NOTE ADULT - ASSESSMENT
53 yo man with PVD, DM, anemia (AOCD) and recent significant weight loss (40 lbs) had EGD last week which was normal. Patient with FHx of colon polyps, initially patient was declining colonoscopy but now is agreeable.

## 2017-09-22 LAB
-  CEFAZOLIN: SIGNIFICANT CHANGE UP
-  CIPROFLOXACIN: SIGNIFICANT CHANGE UP
-  CLINDAMYCIN: SIGNIFICANT CHANGE UP
-  DAPTOMYCIN: SIGNIFICANT CHANGE UP
-  ERYTHROMYCIN: SIGNIFICANT CHANGE UP
-  GENTAMICIN: SIGNIFICANT CHANGE UP
-  LINEZOLID: SIGNIFICANT CHANGE UP
-  MOXIFLOXACIN(AEROBIC): SIGNIFICANT CHANGE UP
-  OXACILLIN: SIGNIFICANT CHANGE UP
-  PENICILLIN: SIGNIFICANT CHANGE UP
-  RIFAMPIN.: SIGNIFICANT CHANGE UP
-  TETRACYCLINE: SIGNIFICANT CHANGE UP
-  TRIMETHOPRIM/SULFAMETHOXAZOLE: SIGNIFICANT CHANGE UP
-  VANCOMYCIN: SIGNIFICANT CHANGE UP
BUN SERPL-MCNC: 46 MG/DL — HIGH (ref 7–23)
CALCIUM SERPL-MCNC: 8.2 MG/DL — LOW (ref 8.4–10.5)
CHLORIDE SERPL-SCNC: 101 MMOL/L — SIGNIFICANT CHANGE UP (ref 98–107)
CO2 SERPL-SCNC: 18 MMOL/L — LOW (ref 22–31)
CREAT SERPL-MCNC: 3.38 MG/DL — HIGH (ref 0.5–1.3)
CULTURE - SURGICAL SITE: SIGNIFICANT CHANGE UP
GLUCOSE SERPL-MCNC: 191 MG/DL — HIGH (ref 70–99)
GRAM STN WND: SIGNIFICANT CHANGE UP
HCT VFR BLD CALC: 20.7 % — CRITICAL LOW (ref 39–50)
HGB BLD-MCNC: 6.8 G/DL — CRITICAL LOW (ref 13–17)
MCHC RBC-ENTMCNC: 28.7 PG — SIGNIFICANT CHANGE UP (ref 27–34)
MCHC RBC-ENTMCNC: 32.9 % — SIGNIFICANT CHANGE UP (ref 32–36)
MCV RBC AUTO: 87.3 FL — SIGNIFICANT CHANGE UP (ref 80–100)
METHOD TYPE: SIGNIFICANT CHANGE UP
NRBC # FLD: 0 — SIGNIFICANT CHANGE UP
ORGANISM # SPEC MICROSCOPIC CNT: SIGNIFICANT CHANGE UP
ORGANISM # SPEC MICROSCOPIC CNT: SIGNIFICANT CHANGE UP
PLATELET # BLD AUTO: 362 K/UL — SIGNIFICANT CHANGE UP (ref 150–400)
PMV BLD: 10.4 FL — SIGNIFICANT CHANGE UP (ref 7–13)
POTASSIUM SERPL-MCNC: 4.9 MMOL/L — SIGNIFICANT CHANGE UP (ref 3.5–5.3)
POTASSIUM SERPL-SCNC: 4.9 MMOL/L — SIGNIFICANT CHANGE UP (ref 3.5–5.3)
RBC # BLD: 2.37 M/UL — LOW (ref 4.2–5.8)
RBC # FLD: 13.4 % — SIGNIFICANT CHANGE UP (ref 10.3–14.5)
SODIUM SERPL-SCNC: 135 MMOL/L — SIGNIFICANT CHANGE UP (ref 135–145)
SPECIMEN SOURCE: SIGNIFICANT CHANGE UP
WBC # BLD: 12.8 K/UL — HIGH (ref 3.8–10.5)
WBC # FLD AUTO: 12.8 K/UL — HIGH (ref 3.8–10.5)

## 2017-09-22 PROCEDURE — 99232 SBSQ HOSP IP/OBS MODERATE 35: CPT

## 2017-09-22 PROCEDURE — 99232 SBSQ HOSP IP/OBS MODERATE 35: CPT | Mod: GC

## 2017-09-22 RX ORDER — VANCOMYCIN HCL 1 G
1000 VIAL (EA) INTRAVENOUS ONCE
Qty: 0 | Refills: 0 | Status: COMPLETED | OUTPATIENT
Start: 2017-09-22 | End: 2017-09-22

## 2017-09-22 RX ADMIN — Medication 2: at 22:40

## 2017-09-22 RX ADMIN — Medication 200 GRAM(S): at 11:14

## 2017-09-22 RX ADMIN — ATORVASTATIN CALCIUM 40 MILLIGRAM(S): 80 TABLET, FILM COATED ORAL at 22:39

## 2017-09-22 RX ADMIN — OLANZAPINE 20 MILLIGRAM(S): 15 TABLET, FILM COATED ORAL at 22:39

## 2017-09-22 RX ADMIN — Medication 100 MILLIGRAM(S): at 22:39

## 2017-09-22 RX ADMIN — Medication 650 MILLIGRAM(S): at 05:31

## 2017-09-22 RX ADMIN — HEPARIN SODIUM 5000 UNIT(S): 5000 INJECTION INTRAVENOUS; SUBCUTANEOUS at 13:24

## 2017-09-22 RX ADMIN — Medication 250 MILLIGRAM(S): at 18:58

## 2017-09-22 RX ADMIN — Medication 2: at 09:39

## 2017-09-22 RX ADMIN — ISOSORBIDE MONONITRATE 120 MILLIGRAM(S): 60 TABLET, EXTENDED RELEASE ORAL at 11:15

## 2017-09-22 RX ADMIN — HEPARIN SODIUM 5000 UNIT(S): 5000 INJECTION INTRAVENOUS; SUBCUTANEOUS at 22:40

## 2017-09-22 RX ADMIN — CARVEDILOL PHOSPHATE 12.5 MILLIGRAM(S): 80 CAPSULE, EXTENDED RELEASE ORAL at 05:31

## 2017-09-22 RX ADMIN — HEPARIN SODIUM 5000 UNIT(S): 5000 INJECTION INTRAVENOUS; SUBCUTANEOUS at 05:31

## 2017-09-22 RX ADMIN — DIVALPROEX SODIUM 500 MILLIGRAM(S): 500 TABLET, DELAYED RELEASE ORAL at 09:39

## 2017-09-22 RX ADMIN — PIPERACILLIN AND TAZOBACTAM 25 GRAM(S): 4; .5 INJECTION, POWDER, LYOPHILIZED, FOR SOLUTION INTRAVENOUS at 05:31

## 2017-09-22 RX ADMIN — Medication 650 MILLIGRAM(S): at 22:39

## 2017-09-22 RX ADMIN — AMLODIPINE BESYLATE 10 MILLIGRAM(S): 2.5 TABLET ORAL at 05:31

## 2017-09-22 RX ADMIN — Medication 650 MILLIGRAM(S): at 13:24

## 2017-09-22 RX ADMIN — DIVALPROEX SODIUM 500 MILLIGRAM(S): 500 TABLET, DELAYED RELEASE ORAL at 22:39

## 2017-09-22 RX ADMIN — PANTOPRAZOLE SODIUM 40 MILLIGRAM(S): 20 TABLET, DELAYED RELEASE ORAL at 07:00

## 2017-09-22 RX ADMIN — Medication 4: at 13:24

## 2017-09-22 NOTE — PROGRESS NOTE ADULT - ASSESSMENT
1-Slight worsening of renal function probably related to severe anemia  Colonoscopy pending  Procrit for the anemia of CKD  ?transfusion

## 2017-09-22 NOTE — DIETITIAN INITIAL EVALUATION ADULT. - NS AS NUTRI INTERV MEDICAL AND FOOD SUPPLEMENTS
Commercial beverage/3)  Add Glucerna Shake 8oz. 3x daily (will provide additional ~660kcals, 30g protein) 3)  Add Nepro 2x daily (850 kcals, 38.2g protein)./Commercial beverage

## 2017-09-22 NOTE — DIETITIAN INITIAL EVALUATION ADULT. - OTHER INFO
Patient seen for length of stay.  Patient was unaware of UBW, but reported weight loss but also was unsure of time period.  Medical Record notes recent 40 pounds loss.  Patient reports consuming 90%-100% of his tray.  Patient was open to trying a supplement.  Patient was provided a refresh on diabetes education.  Patient was not fully awake during conversation and needs reinforcement of the information.  Recommend for patient to receive counseling as an outpatient for both Diabetes Nutrition and Renal Nutrition.  No known food allergies.

## 2017-09-22 NOTE — DIETITIAN INITIAL EVALUATION ADULT. - NS AS NUTRI INTERV MEALS SNACK
[place] - modified diet/1)  continue with consistent carbohydrate diet with renal restrictions, low sodium and low potassium, 2)  Monitor weights, labs, BM's, skin integrity, p.o. intake./General/healthful diet/Carbohydrate - modified diet

## 2017-09-22 NOTE — DIETITIAN INITIAL EVALUATION ADULT. - ORAL INTAKE PTA
Patient reports good appetite and normal intake PTA, however diet recall reveals two meals of fast food for lunch and dinner each day.  Father reported that patient also consumes iced coffee in the mornings./fair

## 2017-09-22 NOTE — DIETITIAN INITIAL EVALUATION ADULT. - PERTINENT LABORATORY DATA
H&H 6.8/20.7 (L), CO2 18 (L), Creatinine 3.38 (H), Glucose 191 (H), Calcium 8.2 (L), Phosphorus 5.3 (H), 8/18 - HgbA1C 7.8

## 2017-09-22 NOTE — PROGRESS NOTE ADULT - SUBJECTIVE AND OBJECTIVE BOX
Subjective  Feels tired      PHYSICAL EXAM:  Vitals:  T(F): 98.4 (09-22-17 @ 05:24), Max: 98.8 (09-21-17 @ 10:31)  HR: 64 (09-22-17 @ 05:24)  BP: 160/60 (09-22-17 @ 05:24)  BP(mean): --  RR: 16 (09-22-17 @ 05:24)  SpO2: 100% (09-22-17 @ 05:24)  Wt(kg): --  Constitutional: no acute distress  HEENT:  NC, ext ears nl, oropharynx clear,  nose nl  Neck: No JVD, bruit, adenopathy or thyromegaly  Eyes: PERRL, no discharge, no icterus  Respiratory: cta/p bilat, no wheezes, rales, nl effort  Cardiovascular: regular rate, S1 and S2 no rub or gallop  Abd: : BS+, soft, NT , no rebound or guarding, no bruits  Extremities: no edema or cyanosis, palpable pulses  Neurological: A/O x 3, nl coordination and tone    I and O's:    09-21 @ 07:01  -  09-22 @ 07:00  --------------------------------------------------------  IN: 0 mL / OUT: 3625 mL / NET: -3625 mL    09-22 @ 07:01  -  09-22 @ 09:30  --------------------------------------------------------  IN: 0 mL / OUT: 600 mL / NET: -600 mL        Height (cm): 180.34 (09-19 @ 15:54)  Weight (kg): 53.5 (09-19 @ 15:54)  BMI (kg/m2): 16.5 (09-19 @ 15:54)    REVIEW OF SYSTEMS:  CONSTITUTIONAL: No weakness, fevers or chills  RESPIRATORY: No cough, wheezing, hemoptysis; No shortness of breath  CARDIOVASCULAR: No chest pain or palpitations  GI : no abd pains, nausea or vomiting  GENITOURINARY: No dysuria, frequency or hematuria  All other review of systems is negative unless indicated above.    Allergies    No Known Allergies    Intolerances        MEDICATIONS  (STANDING):  heparin  Injectable 5000 Unit(s) SubCutaneous every 8 hours  sodium bicarbonate 650 milliGRAM(s) Oral three times a day  isosorbide   mononitrate ER Tablet (IMDUR) 120 milliGRAM(s) Oral daily  diVALproex  milliGRAM(s) Oral two times a day  traZODone 100 milliGRAM(s) Oral at bedtime  atorvastatin 40 milliGRAM(s) Oral at bedtime  OLANZapine 20 milliGRAM(s) Oral at bedtime  carvedilol 12.5 milliGRAM(s) Oral every 12 hours  insulin lispro (HumaLOG) corrective regimen sliding scale   SubCutaneous three times a day before meals  insulin lispro (HumaLOG) corrective regimen sliding scale   SubCutaneous at bedtime  dextrose 5%. 1000 milliLiter(s) (50 mL/Hr) IV Continuous <Continuous>  dextrose 50% Injectable 12.5 Gram(s) IV Push once  dextrose 50% Injectable 25 Gram(s) IV Push once  dextrose 50% Injectable 25 Gram(s) IV Push once  pantoprazole    Tablet 40 milliGRAM(s) Oral before breakfast  piperacillin/tazobactam IVPB. 3.375 Gram(s) IV Intermittent every 12 hours  amLODIPine   Tablet 10 milliGRAM(s) Oral daily  calcium gluconate IVPB 1 Gram(s) IV Intermittent daily      Sodium,Serum 135    09-22 @ 05:15  Sodium,Serum 139    09-21 @ 06:00  Sodium,Serum 137    09-21 @ 00:50  Sodium,Serum 136    09-20 @ 16:30  Sodium,Serum 139    09-20 @ 13:34  Sodium,Serum 140    09-20 @ 05:45  Sodium,Serum 140    09-19 @ 05:25    Potassium,Serum 4.9    09-22 @ 05:15  Potassium,Serum 4.9    09-21 @ 06:00  Potassium,Serum 4.9    09-21 @ 00:50  Potassium,Serum 5.7    09-20 @ 16:30  Potassium,Serum 5.8    09-20 @ 13:34  Potassium,Serum 5.5    09-20 @ 05:45  Potassium,Serum 5.2    09-19 @ 05:25    Chloride,Serum 101    09-22 @ 05:15  Chloride,Serum 105    09-21 @ 06:00  Chloride,Serum 104    09-21 @ 00:50  Chloride,Serum 106    09-20 @ 16:30  Chloride,Serum 108    09-20 @ 13:34  Chloride,Serum 109    09-20 @ 05:45  Chloride,Serum 109    09-19 @ 05:25    CO2, Serum 18    09-22 @ 05:15  CO2, Serum 19    09-21 @ 06:00  CO2, Serum 19    09-21 @ 00:50  CO2, Serum 19    09-20 @ 16:30  CO2, Serum 18    09-20 @ 13:34  CO2, Serum 17    09-20 @ 05:45  CO2, Serum 19    09-19 @ 05:25    BUN 46    09-22 @ 05:15  BUN 45    09-21 @ 06:00  BUN 47    09-21 @ 00:50  BUN 50    09-20 @ 16:30  BUN 49    09-20 @ 13:34  BUN 51    09-20 @ 05:45  BUN 52    09-19 @ 05:25    Creatinine, Serum 3.38    09-22 @ 05:15  Creatinine, Serum 3.12    09-21 @ 06:00  Creatinine, Serum 3.06    09-21 @ 00:50  Creatinine, Serum 2.93    09-20 @ 16:30  Creatinine, Serum 2.92    09-20 @ 13:34  Creatinine, Serum 2.90    09-20 @ 05:45  Creatinine, Serum 3.06    09-19 @ 05:25      09-22    135  |  101  |  46<H>  ----------------------------<  191<H>  4.9   |  18<L>  |  3.38<H>    Ca    8.2<L>      22 Sep 2017 05:15  Phos  5.3     09-21  Mg     1.6     09-21        Urine Studies:      Urine chemistry:   Urine Na:   Urine Creatinine:   Urine Protein/Cr ratio:  Urine K:   Urine Osm:   24 Hr urine studies:

## 2017-09-22 NOTE — PROGRESS NOTE ADULT - ASSESSMENT
51 yo man with PVD, DM, anemia (AOCD) and recent significant weight loss (40 lbs) had EGD last week which was normal. Patient with FHx of colon polyps, initially patient was declining colonoscopy but now is agreeable.

## 2017-09-22 NOTE — PROGRESS NOTE ADULT - SUBJECTIVE AND OBJECTIVE BOX
Patient is a 52y old  Male who presents with a chief complaint of right foot (14 Sep 2017 02:55)  S/p right partial 1st ray amputation, POD #3.       INTERVAL HPI/OVERNIGHT EVENTS:  Patient seen and evaluated at bedside.  Pt is resting comfortable in NAD. Denies N/V/F/C.  Pain rated at 2/10    Allergies    No Known Allergies    Intolerances        Vital Signs Last 24 Hrs  T(C): 36.9 (22 Sep 2017 05:24), Max: 37.1 (21 Sep 2017 10:31)  T(F): 98.4 (22 Sep 2017 05:24), Max: 98.8 (21 Sep 2017 10:31)  HR: 64 (22 Sep 2017 05:24) (60 - 81)  BP: 160/60 (22 Sep 2017 05:24) (150/70 - 175/75)  BP(mean): --  RR: 16 (22 Sep 2017 05:24) (16 - 19)  SpO2: 100% (22 Sep 2017 05:24) (94% - 100%)    LABS:                        6.8    12.80 )-----------( 362      ( 22 Sep 2017 05:15 )             20.7     09-22    135  |  101  |  46<H>  ----------------------------<  191<H>  4.9   |  18<L>  |  3.38<H>    Ca    8.2<L>      22 Sep 2017 05:15  Phos  5.3     09-21  Mg     1.6     09-21          CAPILLARY BLOOD GLUCOSE  280 (21 Sep 2017 21:25)  231 (21 Sep 2017 17:47)  193 (21 Sep 2017 12:40)  102 (21 Sep 2017 08:41)          Lower Extremity Physical Exam:  s/p R foot 1st metatarsal resection. Sutures intact, CFT <2 s along all edges of incision site. Skin edges well coapted. No dehiscence. Released 1 x suture expressed approx 10cc of hematoma, no abscess, no erythema, no edema, no clinical signs of active infection at this time.    Right lateral 5th met head has a small eschar. Periwound is normal skin, eschar is well adhered, no fluctuance, no erythema, no malodor no drainage. Eschar is stable. 1.5 x 1.5 cm      RADIOLOGY & ADDITIONAL TESTS:

## 2017-09-22 NOTE — PROGRESS NOTE ADULT - PROBLEM SELECTOR PLAN 1
- serial CBC, transfuse as needed to maintain hgb > 7  - plan for colonoscopy on Monday  - clear liquid diet on Sunday  - NPO after midnight Sunday -> Monday

## 2017-09-22 NOTE — PROGRESS NOTE ADULT - ASSESSMENT
52y Male POD 3 s/p Right 1st toe amputation:  - Pain control  - Tolerating diet  - GI on board for colonoscopy for most likely next Monday 9/25   - Attending has discussed with pt that a rle revasc intervention not possible until this w/u is completed  - Pods : awaiting final OR data, Betadine and padding applied to eschar, DSD to surgical site  - f/u AM labs 52y Male POD 3 s/p Right 1st toe amputation:  - Pain control  - Tolerating diet  - GI on board for colonoscopy for most likely next Monday 9/25   - Attending has discussed with pt that a rle revasc intervention not possible until this w/u is completed  - Pods : awaiting final OR data, Betadine and padding applied to eschar, DSD to surgical site  - f/u AM labs  following  completion of GI w/u will d/w pt rle revasc intervention

## 2017-09-22 NOTE — PROGRESS NOTE ADULT - ASSESSMENT
53 yo M s/p R foot 1st metatarsal resection and lateral 5th met eschar  - Pt seen and examined  - No signs of active infection present. Surgical site appears to be viable and healing well  - Removed 1 x suture, expressed approx 10cc hematoma from surgical site, flushed and packed  - R lateral 5th met head has an eschar that we will monitor closely. Likely 2/2 to bandage and pressure of lying in bed  - Z float boots to offload the eschar and prevent any heel pressure ulcers  - Betadine and padding applied to eschar, DSD to surgical site  - Awaiting final OR data   - Seen by attending

## 2017-09-22 NOTE — DIETITIAN INITIAL EVALUATION ADULT. - ENERGY NEEDS
Ht:  71", Wt:  117.9 pounds, , %IBW 69%, BMI 16.4  No edema currently noted, Ronal Score 20, skin intact  Fluids per medical team.

## 2017-09-22 NOTE — DIETITIAN INITIAL EVALUATION ADULT. - DIET TYPE
renal replacement pts:no protein restr,no conc K & phos, low sodium/regular/consistent carbohydrate (no snacks)

## 2017-09-22 NOTE — CHART NOTE - NSCHARTNOTEFT_GEN_A_CORE
NUTRITION SERVICES                                                                                  MALNUTRITION ALERT     Attention Health Care Provider: Upon nutritional assessment by the Registered Dietitian your patient was determined to meet criteria / has evidence of the following diagnosis/diagnoses:    [ ] Mild Protein Calorie Malnutrition   [ ] Moderate Protein Calorie Malnutrition   [x ] Severe Protein Calorie Malnutrition   [ ] Unspecified Protein Calorie Malnutrition   [ ] Underweight / BMI <19  [ ] Morbid Obesity / BMI >40          By signing this assessment you are acknowledging the diagnosis/diagnoses.       PLAN OF CARE: Refer to Initial Dietitian Evaluation or Nutrition Follow-Up Documentation for Nutritional Recommendations.   1)  continue with consistent carbohydrate diet with renal restrictions, low sodium and low potassium,   2)  Monitor weights, labs, BM's, skin integrity, p.o. intake.  3)  Add Nepro 2x daily (850 kcals, 38.2g protein).

## 2017-09-22 NOTE — PROGRESS NOTE ADULT - SUBJECTIVE AND OBJECTIVE BOX
Chief Complaint:  Patient is a 52y old  Male who presents with a chief complaint of right foot (14 Sep 2017 02:55)      Interval Events: Patient receiving 2 U PRBCs today for hgb 6.8, decreased from 7.0 yesterday. No abd pain, no overt GI bleeding. Patient reports having green BM today.     Allergies:  No Known Allergies      Hospital Medications:  heparin  Injectable 5000 Unit(s) SubCutaneous every 8 hours  sodium bicarbonate 650 milliGRAM(s) Oral three times a day  isosorbide   mononitrate ER Tablet (IMDUR) 120 milliGRAM(s) Oral daily  diVALproex  milliGRAM(s) Oral two times a day  traZODone 100 milliGRAM(s) Oral at bedtime  atorvastatin 40 milliGRAM(s) Oral at bedtime  OLANZapine 20 milliGRAM(s) Oral at bedtime  carvedilol 12.5 milliGRAM(s) Oral every 12 hours  insulin lispro (HumaLOG) corrective regimen sliding scale   SubCutaneous three times a day before meals  insulin lispro (HumaLOG) corrective regimen sliding scale   SubCutaneous at bedtime  dextrose 5%. 1000 milliLiter(s) IV Continuous <Continuous>  dextrose Gel 1 Dose(s) Oral once PRN  dextrose 50% Injectable 12.5 Gram(s) IV Push once  dextrose 50% Injectable 25 Gram(s) IV Push once  dextrose 50% Injectable 25 Gram(s) IV Push once  glucagon  Injectable 1 milliGRAM(s) IntraMuscular once PRN  oxyCODONE    IR 5 milliGRAM(s) Oral every 4 hours PRN  oxyCODONE    IR 10 milliGRAM(s) Oral every 4 hours PRN  acetaminophen   Tablet. 650 milliGRAM(s) Oral every 6 hours PRN  pantoprazole    Tablet 40 milliGRAM(s) Oral before breakfast  piperacillin/tazobactam IVPB. 3.375 Gram(s) IV Intermittent every 12 hours  hydrALAZINE Injectable 10 milliGRAM(s) IV Push every 4 hours PRN  amLODIPine   Tablet 10 milliGRAM(s) Oral daily  calcium gluconate IVPB 1 Gram(s) IV Intermittent daily  sodium polystyrene sulfonate Suspension 15 Gram(s) Oral once      PMHX/PSHX:  Bipolar affective disorder in remission  High cholesterol  Depression  Diabetes mellitus  Diabetes mellitus  Amputated great toe of right foot  ORIF right lower leg- 1995      Family history:  No pertinent family history in first degree relatives      ROS:     General:  (+) wt loss, fevers, chills, night sweats, fatigue   Eyes:  Good vision, no reported pain  ENT:  No sore throat, pain, runny nose  CV:  No chest pain, palpitations  Resp:  No cough, wheezing, SOB  GI:  See HPI  :  No pain, bleeding, incontinence, nocturia  Muscle:  RLE pain with standing  Neuro:  No weakness, tingling, memory problems  Psych:  No fatigue, insomnia, mood problems, depression  Endocrine:  No cold/heat intolerance  Heme:  No petechiae, ecchymosis, easy bruising  Skin:  No rash, edema      PHYSICAL EXAM:   Vital Signs Last 24 Hrs  T(C): 36.9 (22 Sep 2017 16:00), Max: 37 (22 Sep 2017 01:19)  T(F): 98.5 (22 Sep 2017 16:00), Max: 98.6 (22 Sep 2017 01:19)  HR: 56 (22 Sep 2017 16:00) (51 - 64)  BP: 151/58 (22 Sep 2017 16:00) (134/61 - 179/73)  BP(mean): --  RR: 16 (22 Sep 2017 16:00) (16 - 19)  SpO2: 100% (22 Sep 2017 16:00) (96% - 100%)    GENERAL:  NAD  HEENT:  sclera anicteric  CHEST:  no respiratory distress, CTAB  HEART:  RRR, no MRG, no edema  ABDOMEN:  Soft, non-tender, non-distended, no masses , no hepato-splenomegaly,   EXTREMITIES:  toe amputation  SKIN:  No rash  NEURO:  Alert, oriented      LABS:                           6.8    12.80 )-----------( 362      ( 22 Sep 2017 05:15 )             20.7     09-22    135  |  101  |  46<H>  ----------------------------<  191<H>  4.9   |  18<L>  |  3.38<H>    Ca    8.2<L>      22 Sep 2017 05:15  Phos  5.3     09-21  Mg     1.6     09-21        Ferritin, Serum: 283.4 ng/mL   Iron Total, Serum: 56 ug/dL  Total Iron Binding Capacity.: 215 ug/dL                     Imaging: (14 Sep 2017 02:55)      Interval Events: Patient receiving 2 U PRBCs today for hgb 6.8, decreased from 7.0 yesterday. No abd pain, no overt GI bleeding. Patient reports having green BM today.     Allergies:  No Known Allergies      Hospital Medications:  heparin  Injectable 5000 Unit(s) SubCutaneous every 8 hours  sodium bicarbonate 650 milliGRAM(s) Oral three times a day  isosorbide   mononitrate ER Tablet (IMDUR) 120 milliGRAM(s) Oral daily  diVALproex  milliGRAM(s) Oral two times a day  traZODone 100 milliGRAM(s) Oral at bedtime  atorvastatin 40 milliGRAM(s) Oral at bedtime  OLANZapine 20 milliGRAM(s) Oral at bedtime  carvedilol 12.5 milliGRAM(s) Oral every 12 hours  insulin lispro (HumaLOG) corrective regimen sliding scale   SubCutaneous three times a day before meals  insulin lispro (HumaLOG) corrective regimen sliding scale   SubCutaneous at bedtime  dextrose 5%. 1000 milliLiter(s) IV Continuous <Continuous>  dextrose Gel 1 Dose(s) Oral once PRN  dextrose 50% Injectable 12.5 Gram(s) IV Push once  dextrose 50% Injectable 25 Gram(s) IV Push once  dextrose 50% Injectable 25 Gram(s) IV Push once  glucagon  Injectable 1 milliGRAM(s) IntraMuscular once PRN  oxyCODONE    IR 5 milliGRAM(s) Oral every 4 hours PRN  oxyCODONE    IR 10 milliGRAM(s) Oral every 4 hours PRN  acetaminophen   Tablet. 650 milliGRAM(s) Oral every 6 hours PRN  pantoprazole    Tablet 40 milliGRAM(s) Oral before breakfast  piperacillin/tazobactam IVPB. 3.375 Gram(s) IV Intermittent every 12 hours  hydrALAZINE Injectable 10 milliGRAM(s) IV Push every 4 hours PRN  amLODIPine   Tablet 10 milliGRAM(s) Oral daily  calcium gluconate IVPB 1 Gram(s) IV Intermittent daily  sodium polystyrene sulfonate Suspension 15 Gram(s) Oral once      PMHX/PSHX:  Bipolar affective disorder in remission  High cholesterol  Depression  Diabetes mellitus  Diabetes mellitus  Amputated great toe of right foot  ORIF right lower leg- 1995      Family history:  No pertinent family history in first degree relatives      ROS:     General:  (+) wt loss, fevers, chills, night sweats, fatigue   Eyes:  Good vision, no reported pain  ENT:  No sore throat, pain, runny nose  CV:  No chest pain, palpitations  Resp:  No cough, wheezing, SOB  GI:  See HPI  :  No pain, bleeding, incontinence, nocturia  Muscle:  RLE pain with standing  Neuro:  No weakness, tingling, memory problems  Psych:  No fatigue, insomnia, mood problems, depression  Endocrine:  No cold/heat intolerance  Heme:  No petechiae, ecchymosis, easy bruising  Skin:  No rash, edema      PHYSICAL EXAM:   Vital Signs Last 24 Hrs  T(C): 36.9 (22 Sep 2017 16:00), Max: 37 (22 Sep 2017 01:19)  T(F): 98.5 (22 Sep 2017 16:00), Max: 98.6 (22 Sep 2017 01:19)  HR: 56 (22 Sep 2017 16:00) (51 - 64)  BP: 151/58 (22 Sep 2017 16:00) (134/61 - 179/73)  BP(mean): --  RR: 16 (22 Sep 2017 16:00) (16 - 19)  SpO2: 100% (22 Sep 2017 16:00) (96% - 100%)    GENERAL:  NAD  HEENT:  sclera anicteric  CHEST:  no respiratory distress, CTAB  HEART:  RRR, no MRG, no edema  ABDOMEN:  Soft, non-tender, non-distended, no masses , no hepato-splenomegaly,   EXTREMITIES:  toe amputation  SKIN:  No rash  NEURO:  Alert, oriented      LABS:                           6.8    12.80 )-----------( 362      ( 22 Sep 2017 05:15 )             20.7     09-22    135  |  101  |  46<H>  ----------------------------<  191<H>  4.9   |  18<L>  |  3.38<H>    Ca    8.2<L>      22 Sep 2017 05:15  Phos  5.3     09-21  Mg     1.6     09-21        Ferritin, Serum: 283.4 ng/mL   Iron Total, Serum: 56 ug/dL  Total Iron Binding Capacity.: 215 ug/dL                     Imaging:

## 2017-09-22 NOTE — DIETITIAN INITIAL EVALUATION ADULT. - NS AS NUTRI INTERV ED CONTENT
4)  Provided very basic diabetes nutrition education, since patient was very tired.  5) Patient is recommended to see outpatient dietitian for both diabetes and renal nutrition education.  Handout provided for type 2 diabetes education./Recommended modifications/Purpose of the nutrition education 4)  Provided very basic diabetes nutrition education, since patient was very tired.  5) Patient is recommended to see outpatient dietitian for both diabetes and renal nutrition education.  Handout provided for type 2 diabetes education, and for high and low potassium and phosphorus foods./Purpose of the nutrition education/Recommended modifications

## 2017-09-22 NOTE — PROGRESS NOTE ADULT - SUBJECTIVE AND OBJECTIVE BOX
Vascular Surgery Progress Note    S: Pt seen at bedside. Pain controlled. Tolerating diet.       O: Physical Exam:  T(C): 36.9 (09-22-17 @ 05:24), Max: 37.1 (09-21-17 @ 10:31)  HR: 64 (09-22-17 @ 05:24) (60 - 81)  BP: 160/60 (09-22-17 @ 05:24) (150/70 - 175/75)  RR: 16 (09-22-17 @ 05:24) (16 - 19)  SpO2: 100% (09-22-17 @ 05:24) (94% - 100%)    09-21-17 @ 07:01  -  09-22-17 @ 07:00  --------------------------------------------------------  IN: 0 mL / OUT: 3625 mL / NET: -3625 mL        Gen: NAD   Pulm: non labored breathing  Card: RRR -rgm  Abd: soft, NT/ND  Ext: R foot extremity covered with dressing, CDI      Labs: Vascular Surgery Progress Note    S: Pt seen at bedside. Pain controlled. Tolerating diet. pt states that he is willing to proceed w colonoscopy      O: Physical Exam:  T(C): 36.9 (09-22-17 @ 05:24), Max: 37.1 (09-21-17 @ 10:31)  HR: 64 (09-22-17 @ 05:24) (60 - 81)  BP: 160/60 (09-22-17 @ 05:24) (150/70 - 175/75)  RR: 16 (09-22-17 @ 05:24) (16 - 19)  SpO2: 100% (09-22-17 @ 05:24) (94% - 100%)    09-21-17 @ 07:01  -  09-22-17 @ 07:00  --------------------------------------------------------  IN: 0 mL / OUT: 3625 mL / NET: -3625 mL        Gen: NAD   Pulm: non labored breathing  Card: RRR -rgm  Abd: soft, NT/ND  Ext: R foot extremity covered with dressing, CDI      Labs:                          6.8    12.80 )-----------( 362      ( 22 Sep 2017 05:15 )             20.7   09-22    135  |  101  |  46<H>  ----------------------------<  191<H>  4.9   |  18<L>  |  3.38<H>    Ca    8.2<L>      22 Sep 2017 05:15

## 2017-09-23 LAB
BUN SERPL-MCNC: 53 MG/DL — HIGH (ref 7–23)
CALCIUM SERPL-MCNC: 8.7 MG/DL — SIGNIFICANT CHANGE UP (ref 8.4–10.5)
CHLORIDE SERPL-SCNC: 105 MMOL/L — SIGNIFICANT CHANGE UP (ref 98–107)
CO2 SERPL-SCNC: 20 MMOL/L — LOW (ref 22–31)
CREAT SERPL-MCNC: 3.1 MG/DL — HIGH (ref 0.5–1.3)
GLUCOSE SERPL-MCNC: 157 MG/DL — HIGH (ref 70–99)
HCT VFR BLD CALC: 28.7 % — LOW (ref 39–50)
HGB BLD-MCNC: 9.6 G/DL — LOW (ref 13–17)
MCHC RBC-ENTMCNC: 29.2 PG — SIGNIFICANT CHANGE UP (ref 27–34)
MCHC RBC-ENTMCNC: 33.4 % — SIGNIFICANT CHANGE UP (ref 32–36)
MCV RBC AUTO: 87.2 FL — SIGNIFICANT CHANGE UP (ref 80–100)
NRBC # FLD: 0 — SIGNIFICANT CHANGE UP
PLATELET # BLD AUTO: 419 K/UL — HIGH (ref 150–400)
PMV BLD: 10.5 FL — SIGNIFICANT CHANGE UP (ref 7–13)
POTASSIUM SERPL-MCNC: 4.6 MMOL/L — SIGNIFICANT CHANGE UP (ref 3.5–5.3)
POTASSIUM SERPL-SCNC: 4.6 MMOL/L — SIGNIFICANT CHANGE UP (ref 3.5–5.3)
RBC # BLD: 3.29 M/UL — LOW (ref 4.2–5.8)
RBC # FLD: 13.7 % — SIGNIFICANT CHANGE UP (ref 10.3–14.5)
SODIUM SERPL-SCNC: 139 MMOL/L — SIGNIFICANT CHANGE UP (ref 135–145)
VANCOMYCIN FLD-MCNC: 12.2 UG/ML — SIGNIFICANT CHANGE UP
WBC # BLD: 13.77 K/UL — HIGH (ref 3.8–10.5)
WBC # FLD AUTO: 13.77 K/UL — HIGH (ref 3.8–10.5)

## 2017-09-23 RX ORDER — VANCOMYCIN HCL 1 G
1000 VIAL (EA) INTRAVENOUS EVERY 24 HOURS
Qty: 0 | Refills: 0 | Status: DISCONTINUED | OUTPATIENT
Start: 2017-09-23 | End: 2017-09-23

## 2017-09-23 RX ORDER — VANCOMYCIN HCL 1 G
1000 VIAL (EA) INTRAVENOUS ONCE
Qty: 0 | Refills: 0 | Status: COMPLETED | OUTPATIENT
Start: 2017-09-23 | End: 2017-09-23

## 2017-09-23 RX ORDER — HYDRALAZINE HCL 50 MG
10 TABLET ORAL ONCE
Qty: 0 | Refills: 0 | Status: COMPLETED | OUTPATIENT
Start: 2017-09-23 | End: 2017-09-23

## 2017-09-23 RX ADMIN — Medication 6: at 13:18

## 2017-09-23 RX ADMIN — DIVALPROEX SODIUM 500 MILLIGRAM(S): 500 TABLET, DELAYED RELEASE ORAL at 23:51

## 2017-09-23 RX ADMIN — CARVEDILOL PHOSPHATE 12.5 MILLIGRAM(S): 80 CAPSULE, EXTENDED RELEASE ORAL at 05:25

## 2017-09-23 RX ADMIN — HEPARIN SODIUM 5000 UNIT(S): 5000 INJECTION INTRAVENOUS; SUBCUTANEOUS at 13:18

## 2017-09-23 RX ADMIN — HEPARIN SODIUM 5000 UNIT(S): 5000 INJECTION INTRAVENOUS; SUBCUTANEOUS at 05:25

## 2017-09-23 RX ADMIN — HEPARIN SODIUM 5000 UNIT(S): 5000 INJECTION INTRAVENOUS; SUBCUTANEOUS at 23:52

## 2017-09-23 RX ADMIN — Medication 10 MILLIGRAM(S): at 20:01

## 2017-09-23 RX ADMIN — Medication 650 MILLIGRAM(S): at 13:19

## 2017-09-23 RX ADMIN — PANTOPRAZOLE SODIUM 40 MILLIGRAM(S): 20 TABLET, DELAYED RELEASE ORAL at 06:39

## 2017-09-23 RX ADMIN — Medication 650 MILLIGRAM(S): at 23:52

## 2017-09-23 RX ADMIN — AMLODIPINE BESYLATE 10 MILLIGRAM(S): 2.5 TABLET ORAL at 05:25

## 2017-09-23 RX ADMIN — Medication 200 GRAM(S): at 18:43

## 2017-09-23 RX ADMIN — CARVEDILOL PHOSPHATE 12.5 MILLIGRAM(S): 80 CAPSULE, EXTENDED RELEASE ORAL at 18:08

## 2017-09-23 RX ADMIN — OLANZAPINE 20 MILLIGRAM(S): 15 TABLET, FILM COATED ORAL at 23:51

## 2017-09-23 RX ADMIN — ISOSORBIDE MONONITRATE 120 MILLIGRAM(S): 60 TABLET, EXTENDED RELEASE ORAL at 13:18

## 2017-09-23 RX ADMIN — Medication 650 MILLIGRAM(S): at 05:25

## 2017-09-23 RX ADMIN — Medication 100 MILLIGRAM(S): at 23:51

## 2017-09-23 RX ADMIN — ATORVASTATIN CALCIUM 40 MILLIGRAM(S): 80 TABLET, FILM COATED ORAL at 23:50

## 2017-09-23 RX ADMIN — Medication 4: at 18:08

## 2017-09-23 RX ADMIN — Medication 250 MILLIGRAM(S): at 22:07

## 2017-09-23 NOTE — PROGRESS NOTE ADULT - ASSESSMENT
Patient is a 52y old  Male who presents with a chief complaint of right foot (14 Sep 2017 02:55)       INTERVAL HPI/OVERNIGHT EVENTS:  Patient seen and evaluated at bedside.  Pt is resting comfortable in NAD. Denies N/V/F/C.  Pain rated at X/10    Allergies    No Known Allergies    Intolerances        Vital Signs Last 24 Hrs  T(C): 36.8 (23 Sep 2017 05:23), Max: 37 (22 Sep 2017 09:43)  T(F): 98.2 (23 Sep 2017 05:23), Max: 98.6 (22 Sep 2017 09:43)  HR: 60 (23 Sep 2017 05:23) (51 - 61)  BP: 160/47 (23 Sep 2017 05:23) (134/61 - 211/87)  BP(mean): --  RR: 17 (23 Sep 2017 05:23) (16 - 20)  SpO2: 95% (23 Sep 2017 05:23) (95% - 100%)    LABS:                        9.6    13.77 )-----------( 419      ( 23 Sep 2017 05:30 )             28.7     09-23    139  |  105  |  53<H>  ----------------------------<  157<H>  4.6   |  20<L>  |  3.10<H>    Ca    8.7      23 Sep 2017 05:30          CAPILLARY BLOOD GLUCOSE  267 (22 Sep 2017 21:20)  140 (22 Sep 2017 17:49)  235 (22 Sep 2017 12:59)  162 (22 Sep 2017 09:03)      53 yo M s/p R foot 1st metatarsal resection and lateral 5th met eschar  - Pt seen and examined  - No signs of active infection present. Surgical site appears to be viable and healing well  - Please have Z float boots on at all times in bed   - Betadine and padding applied to eschar, DSD to surgical site  - OR culture is growing MRSA  - Awaiting final OR data   - Seen w/ attending

## 2017-09-23 NOTE — PROGRESS NOTE ADULT - ASSESSMENT
1-Slight worsening of renal function probably related to severe anemia now improved after transfusion  Colonoscopy pending monday  Procrit for the anemia of CKD  Continue NaHCO3 supplementation

## 2017-09-23 NOTE — PROGRESS NOTE ADULT - SUBJECTIVE AND OBJECTIVE BOX
Subjective  Feels better today      PHYSICAL EXAM:  Vitals:  T(F): 98.2 (09-23-17 @ 05:23), Max: 98.6 (09-22-17 @ 21:20)  HR: 60 (09-23-17 @ 05:23)  BP: 160/47 (09-23-17 @ 05:23)  BP(mean): --  RR: 17 (09-23-17 @ 05:23)  SpO2: 95% (09-23-17 @ 05:23)  Wt(kg): --  Constitutional: no acute distress  HEENT:  NC, ext ears nl, oropharynx clear,  nose nl  Neck: No JVD, bruit, adenopathy or thyromegaly  Eyes: PERRL, no discharge, no icterus  Respiratory: cta/p bilat, no wheezes, rales, nl effort  Cardiovascular: regular rate, S1 and S2 no rub or gallop  Abd: : BS+, soft, NT , no rebound or guarding, no bruits  Extremities: right foot bandaged  Neurological: A/O x 3, nl coordination and tone    I and O's:    09-22 @ 07:01  -  09-23 @ 07:00  --------------------------------------------------------  IN: 300 mL / OUT: 2475 mL / NET: -2175 mL    09-23 @ 07:01  -  09-23 @ 10:02  --------------------------------------------------------  IN: 0 mL / OUT: 700 mL / NET: -700 mL            REVIEW OF SYSTEMS:  CONSTITUTIONAL: No weakness, fevers or chills  RESPIRATORY: No cough, wheezing, hemoptysis; No shortness of breath  CARDIOVASCULAR: No chest pain or palpitations  GI : no abd pains, nausea or vomiting  GENITOURINARY: No dysuria, frequency or hematuria  All other review of systems is negative unless indicated above.    Allergies    No Known Allergies    Intolerances        MEDICATIONS  (STANDING):  heparin  Injectable 5000 Unit(s) SubCutaneous every 8 hours  sodium bicarbonate 650 milliGRAM(s) Oral three times a day  isosorbide   mononitrate ER Tablet (IMDUR) 120 milliGRAM(s) Oral daily  diVALproex  milliGRAM(s) Oral two times a day  traZODone 100 milliGRAM(s) Oral at bedtime  atorvastatin 40 milliGRAM(s) Oral at bedtime  OLANZapine 20 milliGRAM(s) Oral at bedtime  carvedilol 12.5 milliGRAM(s) Oral every 12 hours  insulin lispro (HumaLOG) corrective regimen sliding scale   SubCutaneous three times a day before meals  insulin lispro (HumaLOG) corrective regimen sliding scale   SubCutaneous at bedtime  dextrose 5%. 1000 milliLiter(s) (50 mL/Hr) IV Continuous <Continuous>  dextrose 50% Injectable 12.5 Gram(s) IV Push once  dextrose 50% Injectable 25 Gram(s) IV Push once  dextrose 50% Injectable 25 Gram(s) IV Push once  pantoprazole    Tablet 40 milliGRAM(s) Oral before breakfast  amLODIPine   Tablet 10 milliGRAM(s) Oral daily  calcium gluconate IVPB 1 Gram(s) IV Intermittent daily  vancomycin  IVPB 1000 milliGRAM(s) IV Intermittent every 24 hours      Sodium,Serum 139    09-23 @ 05:30  Sodium,Serum 135    09-22 @ 05:15  Sodium,Serum 139    09-21 @ 06:00  Sodium,Serum 137    09-21 @ 00:50  Sodium,Serum 136    09-20 @ 16:30  Sodium,Serum 139    09-20 @ 13:34  Sodium,Serum 140    09-20 @ 05:45    Potassium,Serum 4.6    09-23 @ 05:30  Potassium,Serum 4.9    09-22 @ 05:15  Potassium,Serum 4.9    09-21 @ 06:00  Potassium,Serum 4.9    09-21 @ 00:50  Potassium,Serum 5.7    09-20 @ 16:30  Potassium,Serum 5.8    09-20 @ 13:34  Potassium,Serum 5.5    09-20 @ 05:45    Chloride,Serum 105    09-23 @ 05:30  Chloride,Serum 101    09-22 @ 05:15  Chloride,Serum 105    09-21 @ 06:00  Chloride,Serum 104    09-21 @ 00:50  Chloride,Serum 106    09-20 @ 16:30  Chloride,Serum 108    09-20 @ 13:34  Chloride,Serum 109    09-20 @ 05:45    CO2, Serum 20    09-23 @ 05:30  CO2, Serum 18    09-22 @ 05:15  CO2, Serum 19    09-21 @ 06:00  CO2, Serum 19    09-21 @ 00:50  CO2, Serum 19    09-20 @ 16:30  CO2, Serum 18    09-20 @ 13:34  CO2, Serum 17    09-20 @ 05:45    BUN 53    09-23 @ 05:30  BUN 46    09-22 @ 05:15  BUN 45    09-21 @ 06:00  BUN 47    09-21 @ 00:50  BUN 50    09-20 @ 16:30  BUN 49    09-20 @ 13:34  BUN 51    09-20 @ 05:45    Creatinine, Serum 3.10    09-23 @ 05:30  Creatinine, Serum 3.38    09-22 @ 05:15  Creatinine, Serum 3.12    09-21 @ 06:00  Creatinine, Serum 3.06    09-21 @ 00:50  Creatinine, Serum 2.93    09-20 @ 16:30  Creatinine, Serum 2.92    09-20 @ 13:34  Creatinine, Serum 2.90    09-20 @ 05:45      09-23    139  |  105  |  53<H>  ----------------------------<  157<H>  4.6   |  20<L>  |  3.10<H>    Ca    8.7      23 Sep 2017 05:30        Urine Studies:      Urine chemistry:   Urine Na:   Urine Creatinine:   Urine Protein/Cr ratio:  Urine K:   Urine Osm:   24 Hr urine studies:

## 2017-09-23 NOTE — PROGRESS NOTE ADULT - ASSESSMENT
52y Male POD 4 s/p Right 1st toe amputation:  - Pain control  - Tolerating diet  - GI on board for colonoscopy for most likely next Monday 9/25. Prep tomorrow   - Attending has discussed with pt that a rle revasc intervention not possible until this w/u is completed  - Pods : MRSA +, will cont on Vancomycin with appropriate dosing. Betadine and padding applied to eschar, DSD to surgical site  - F/u AM labs

## 2017-09-23 NOTE — PROGRESS NOTE ADULT - SUBJECTIVE AND OBJECTIVE BOX
Vascular Surgery Progress Note    S: Pt seen this AM. Pain controlled. Remains afebrile. Tolerating PO intake.      O: Physical Exam:  T(C): 36.6 (09-23-17 @ 10:10), Max: 37 (09-22-17 @ 21:20)  HR: 62 (09-23-17 @ 10:10) (51 - 62)  BP: 169/74 (09-23-17 @ 10:10) (134/61 - 211/87)  RR: 16 (09-23-17 @ 10:10) (16 - 20)  SpO2: 96% (09-23-17 @ 10:10) (95% - 100%)    09-22-17 @ 07:01  -  09-23-17 @ 07:00  --------------------------------------------------------  IN: 300 mL / OUT: 2475 mL / NET: -2175 mL    09-23-17 @ 07:01  -  09-23-17 @ 11:29  --------------------------------------------------------  IN: 0 mL / OUT: 700 mL / NET: -700 mL      Gen: NAD   Pulm: non labored breathing  Card: RRR -rgm  Abd: soft, NT/ND  Ext: R foot extremity covered with dressing, CDI    Labs:

## 2017-09-23 NOTE — PROGRESS NOTE ADULT - SUBJECTIVE AND OBJECTIVE BOX
Patient is a 52y old  Male who presents with a chief complaint of right foot (14 Sep 2017 02:55)       INTERVAL HPI/OVERNIGHT EVENTS:  Patient seen and evaluated at bedside.  Pt is resting comfortable in NAD. Denies N/V/F/C.      Allergies    No Known Allergies    Intolerances        Vital Signs Last 24 Hrs  T(C): 36.8 (23 Sep 2017 05:23), Max: 37 (22 Sep 2017 09:43)  T(F): 98.2 (23 Sep 2017 05:23), Max: 98.6 (22 Sep 2017 09:43)  HR: 60 (23 Sep 2017 05:23) (51 - 61)  BP: 160/47 (23 Sep 2017 05:23) (134/61 - 211/87)  BP(mean): --  RR: 17 (23 Sep 2017 05:23) (16 - 20)  SpO2: 95% (23 Sep 2017 05:23) (95% - 100%)    LABS:                        9.6    13.77 )-----------( 419      ( 23 Sep 2017 05:30 )             28.7     09-23    139  |  105  |  53<H>  ----------------------------<  157<H>  4.6   |  20<L>  |  3.10<H>    Ca    8.7      23 Sep 2017 05:30          CAPILLARY BLOOD GLUCOSE  267 (22 Sep 2017 21:20)  140 (22 Sep 2017 17:49)  235 (22 Sep 2017 12:59)  162 (22 Sep 2017 09:03)          Lower Extremity Physical Exam:  s/p R foot 1st metatarsal resection. Sutures intact, CFT <2 s along all edges of incision site. Skin edges well coapted. No dehiscence, minimal amount of drainage noted from hematoma site.     Right lateral 5th met head has a small eschar. Periwound is normal skin, eschar is well adhered, no fluctuance, no erythema, no malodor no drainage. Eschar is stable. 1.5 x 1.5 cm

## 2017-09-24 LAB
APTT BLD: 26.4 SEC — LOW (ref 27.5–37.4)
BLD GP AB SCN SERPL QL: NEGATIVE — SIGNIFICANT CHANGE UP
BUN SERPL-MCNC: 56 MG/DL — HIGH (ref 7–23)
CALCIUM SERPL-MCNC: 8.6 MG/DL — SIGNIFICANT CHANGE UP (ref 8.4–10.5)
CHLORIDE SERPL-SCNC: 107 MMOL/L — SIGNIFICANT CHANGE UP (ref 98–107)
CO2 SERPL-SCNC: 21 MMOL/L — LOW (ref 22–31)
CREAT SERPL-MCNC: 2.93 MG/DL — HIGH (ref 0.5–1.3)
GLUCOSE SERPL-MCNC: 216 MG/DL — HIGH (ref 70–99)
HCT VFR BLD CALC: 29.7 % — LOW (ref 39–50)
HGB BLD-MCNC: 10 G/DL — LOW (ref 13–17)
INR BLD: 0.95 — SIGNIFICANT CHANGE UP (ref 0.88–1.17)
MCHC RBC-ENTMCNC: 29.9 PG — SIGNIFICANT CHANGE UP (ref 27–34)
MCHC RBC-ENTMCNC: 33.7 % — SIGNIFICANT CHANGE UP (ref 32–36)
MCV RBC AUTO: 88.7 FL — SIGNIFICANT CHANGE UP (ref 80–100)
NRBC # FLD: 0 — SIGNIFICANT CHANGE UP
PLATELET # BLD AUTO: 461 K/UL — HIGH (ref 150–400)
PMV BLD: 9.9 FL — SIGNIFICANT CHANGE UP (ref 7–13)
POTASSIUM SERPL-MCNC: 5.3 MMOL/L — SIGNIFICANT CHANGE UP (ref 3.5–5.3)
POTASSIUM SERPL-SCNC: 5.3 MMOL/L — SIGNIFICANT CHANGE UP (ref 3.5–5.3)
PROTHROM AB SERPL-ACNC: 10.6 SEC — SIGNIFICANT CHANGE UP (ref 9.8–13.1)
RBC # BLD: 3.35 M/UL — LOW (ref 4.2–5.8)
RBC # FLD: 13.5 % — SIGNIFICANT CHANGE UP (ref 10.3–14.5)
RH IG SCN BLD-IMP: POSITIVE — SIGNIFICANT CHANGE UP
SODIUM SERPL-SCNC: 140 MMOL/L — SIGNIFICANT CHANGE UP (ref 135–145)
VANCOMYCIN TROUGH SERPL-MCNC: 20.9 UG/ML — HIGH (ref 10–20)
WBC # BLD: 13.88 K/UL — HIGH (ref 3.8–10.5)
WBC # FLD AUTO: 13.88 K/UL — HIGH (ref 3.8–10.5)

## 2017-09-24 PROCEDURE — 99232 SBSQ HOSP IP/OBS MODERATE 35: CPT

## 2017-09-24 PROCEDURE — 71010: CPT | Mod: 26

## 2017-09-24 RX ORDER — SOD SULF/SODIUM/NAHCO3/KCL/PEG
1000 SOLUTION, RECONSTITUTED, ORAL ORAL EVERY 4 HOURS
Qty: 0 | Refills: 0 | Status: COMPLETED | OUTPATIENT
Start: 2017-09-24 | End: 2017-09-24

## 2017-09-24 RX ORDER — HYDRALAZINE HCL 50 MG
10 TABLET ORAL ONCE
Qty: 0 | Refills: 0 | Status: COMPLETED | OUTPATIENT
Start: 2017-09-24 | End: 2017-09-24

## 2017-09-24 RX ORDER — SODIUM CHLORIDE 9 MG/ML
1000 INJECTION INTRAMUSCULAR; INTRAVENOUS; SUBCUTANEOUS
Qty: 0 | Refills: 0 | Status: DISCONTINUED | OUTPATIENT
Start: 2017-09-24 | End: 2017-09-25

## 2017-09-24 RX ORDER — FLUCONAZOLE 150 MG/1
TABLET ORAL
Qty: 0 | Refills: 0 | Status: DISCONTINUED | OUTPATIENT
Start: 2017-09-24 | End: 2017-09-28

## 2017-09-24 RX ORDER — FLUCONAZOLE 150 MG/1
400 TABLET ORAL ONCE
Qty: 0 | Refills: 0 | Status: COMPLETED | OUTPATIENT
Start: 2017-09-24 | End: 2017-09-24

## 2017-09-24 RX ORDER — FLUCONAZOLE 150 MG/1
200 TABLET ORAL DAILY
Qty: 0 | Refills: 0 | Status: DISCONTINUED | OUTPATIENT
Start: 2017-09-25 | End: 2017-09-28

## 2017-09-24 RX ADMIN — FLUCONAZOLE 400 MILLIGRAM(S): 150 TABLET ORAL at 17:33

## 2017-09-24 RX ADMIN — SODIUM CHLORIDE 75 MILLILITER(S): 9 INJECTION INTRAMUSCULAR; INTRAVENOUS; SUBCUTANEOUS at 11:31

## 2017-09-24 RX ADMIN — ATORVASTATIN CALCIUM 40 MILLIGRAM(S): 80 TABLET, FILM COATED ORAL at 21:24

## 2017-09-24 RX ADMIN — Medication 100 MILLIGRAM(S): at 23:02

## 2017-09-24 RX ADMIN — Medication 2: at 09:34

## 2017-09-24 RX ADMIN — Medication 10 MILLIGRAM(S): at 21:24

## 2017-09-24 RX ADMIN — OLANZAPINE 20 MILLIGRAM(S): 15 TABLET, FILM COATED ORAL at 23:02

## 2017-09-24 RX ADMIN — Medication 10 MILLIGRAM(S): at 17:33

## 2017-09-24 RX ADMIN — HEPARIN SODIUM 5000 UNIT(S): 5000 INJECTION INTRAVENOUS; SUBCUTANEOUS at 21:25

## 2017-09-24 RX ADMIN — Medication 4: at 17:32

## 2017-09-24 RX ADMIN — Medication 650 MILLIGRAM(S): at 15:52

## 2017-09-24 RX ADMIN — Medication 10 MILLIGRAM(S): at 23:01

## 2017-09-24 RX ADMIN — CARVEDILOL PHOSPHATE 12.5 MILLIGRAM(S): 80 CAPSULE, EXTENDED RELEASE ORAL at 06:55

## 2017-09-24 RX ADMIN — DIVALPROEX SODIUM 500 MILLIGRAM(S): 500 TABLET, DELAYED RELEASE ORAL at 23:02

## 2017-09-24 RX ADMIN — PANTOPRAZOLE SODIUM 40 MILLIGRAM(S): 20 TABLET, DELAYED RELEASE ORAL at 06:55

## 2017-09-24 RX ADMIN — AMLODIPINE BESYLATE 10 MILLIGRAM(S): 2.5 TABLET ORAL at 06:55

## 2017-09-24 RX ADMIN — CARVEDILOL PHOSPHATE 12.5 MILLIGRAM(S): 80 CAPSULE, EXTENDED RELEASE ORAL at 17:34

## 2017-09-24 RX ADMIN — SODIUM CHLORIDE 75 MILLILITER(S): 9 INJECTION INTRAMUSCULAR; INTRAVENOUS; SUBCUTANEOUS at 21:24

## 2017-09-24 RX ADMIN — Medication 650 MILLIGRAM(S): at 21:24

## 2017-09-24 RX ADMIN — Medication 200 GRAM(S): at 12:47

## 2017-09-24 RX ADMIN — DIVALPROEX SODIUM 500 MILLIGRAM(S): 500 TABLET, DELAYED RELEASE ORAL at 09:34

## 2017-09-24 RX ADMIN — HEPARIN SODIUM 5000 UNIT(S): 5000 INJECTION INTRAVENOUS; SUBCUTANEOUS at 06:55

## 2017-09-24 RX ADMIN — HEPARIN SODIUM 5000 UNIT(S): 5000 INJECTION INTRAVENOUS; SUBCUTANEOUS at 13:42

## 2017-09-24 RX ADMIN — Medication 1000 MILLILITER(S): at 17:35

## 2017-09-24 RX ADMIN — Medication 1000 MILLILITER(S): at 21:25

## 2017-09-24 RX ADMIN — Medication 650 MILLIGRAM(S): at 06:55

## 2017-09-24 RX ADMIN — ISOSORBIDE MONONITRATE 120 MILLIGRAM(S): 60 TABLET, EXTENDED RELEASE ORAL at 11:32

## 2017-09-24 NOTE — PROGRESS NOTE ADULT - ASSESSMENT
52y Male POD 5 s/p Right 1st toe amputation:  - Pain control  - On CLD  - GI on board for colonoscopy for most likely tomorrow. Prep today   - Attending has discussed with pt that a rle revasc intervention not possible until this w/u is completed  - Pods : MRSA +, will cont on Vancomycin with appropriate dosing. Betadine and padding applied to eschar, DSD to surgical site  - F/u AM labs

## 2017-09-24 NOTE — PROGRESS NOTE ADULT - SUBJECTIVE AND OBJECTIVE BOX
Patient is a 52y old  Male who presents with a chief complaint of right foot (14 Sep 2017 02:55)       INTERVAL HPI/OVERNIGHT EVENTS:  Patient seen and evaluated at bedside.  Pt is resting comfortable in NAD. Denies N/V/F/C.    Allergies    No Known Allergies    Intolerances        Vital Signs Last 24 Hrs  T(C): 36.9 (24 Sep 2017 05:26), Max: 37.1 (24 Sep 2017 01:43)  T(F): 98.5 (24 Sep 2017 05:26), Max: 98.7 (24 Sep 2017 01:43)  HR: 60 (24 Sep 2017 05:26) (50 - 62)  BP: 169/71 (24 Sep 2017 05:26) (158/65 - 185/79)  BP(mean): --  RR: 16 (24 Sep 2017 05:26) (16 - 20)  SpO2: 97% (24 Sep 2017 05:26) (96% - 97%)    LABS:                        10.0   13.88 )-----------( 461      ( 24 Sep 2017 06:20 )             29.7     09-24    140  |  107  |  56<H>  ----------------------------<  216<H>  5.3   |  21<L>  |  2.93<H>    Ca    8.6      24 Sep 2017 06:20      PT/INR - ( 24 Sep 2017 06:20 )   PT: 10.6 SEC;   INR: 0.95          PTT - ( 24 Sep 2017 06:20 )  PTT:26.4 SEC    CAPILLARY BLOOD GLUCOSE  121 (23 Sep 2017 22:19)  236 (23 Sep 2017 18:31)  278 (23 Sep 2017 12:54)  148 (23 Sep 2017 08:46)          Lower Extremity Physical Exam:  s/p R foot 1st metatarsal resection. Sutures intact, CFT <2 s along all edges of incision site. Skin edges well coapted with central popped suture for prior hematoma. No dehiscence, minimal amount of drainage noted from hematoma site -  serosanguinous.     Right lateral 5th met head has a small eschar. Periwound is normal skin, eschar is well adhered, no fluctuance, no erythema, no malodor no drainage. Eschar is stable. 1.5 x 1.5 cm    RADIOLOGY & ADDITIONAL TESTS:

## 2017-09-24 NOTE — PROVIDER CONTACT NOTE (OTHER) - ACTION/TREATMENT ORDERED:
Provider aware and states will order another dose of Hydralazine 10mg IV push. Will administer as ordered and continue to monitor.

## 2017-09-24 NOTE — PROVIDER CONTACT NOTE (OTHER) - ACTION/TREATMENT ORDERED:
Provider aware, states to recheck on patient's L arm and then will reassess plan. Will recheck and notifty provider.

## 2017-09-24 NOTE — PROGRESS NOTE ADULT - SUBJECTIVE AND OBJECTIVE BOX
GASTROENTEROLOGY PROGRESS NOTE:    CC: Wet gangrene of RLE s/p toe amputation, anemia       Interval Events: No acute overnight events, seen by vascular surgery and podiatry this morning. No overt GI bleeding. Hgb has been stable over last 24 hours, pt is agreeable to colonoscopy tomorrow.     Allergies:  No Known Allergies      Medications: reviewed     PMHX/PSHX:  Bipolar affective disorder in remission  High cholesterol  Depression  Diabetes mellitus  Diabetes mellitus  Amputated great toe of right foot  ORIF right lower leg-       Family history:  No pertinent family history in first degree relatives      ROS:     General:  (+) wt loss, fevers, chills, night sweats, fatigue   Eyes:  Good vision, no reported pain  ENT:  No sore throat, pain, runny nose  CV:  No chest pain, palpitations  Resp:  No cough, wheezing, SOB  GI:  no abdominal pain, no overt bleeding   :  No pain, bleeding, incontinence, nocturia  Muscle:  RLE pain with standing  Neuro:  No weakness, tingling, memory problems  Psych:  No fatigue, insomnia, mood problems, depression  Endocrine:  No cold/heat intolerance  Heme:  No petechiae, ecchymosis, easy bruising  Skin:  +LE skin lesion with toe amputation       PHYSICAL EXAM:   GENERAL:  NAD  HEENT:  sclera anicteric  CHEST:  no respiratory distress, CTAB  HEART:  RRR, no MRG, no edema  ABDOMEN:  Soft, non-tender, non-distended, no masses , no hepato-splenomegaly,   EXTREMITIES:  toe amputation  SKIN:  No rash  NEURO:  Alert, oriented          Vital Signs:  Vital Signs Last 24 Hrs  T(C): 36.9 (24 Sep 2017 05:26), Max: 37.1 (24 Sep 2017 01:43)  T(F): 98.5 (24 Sep 2017 05:26), Max: 98.7 (24 Sep 2017 01:43)  HR: 60 (24 Sep 2017 05:26) (50 - 62)  BP: 169/71 (24 Sep 2017 05:26) (158/65 - 185/79)  BP(mean): --  RR: 16 (24 Sep 2017 05:26) (16 - 20)  SpO2: 97% (24 Sep 2017 05:26) (96% - 97%)  Daily     Daily Weight in k.7 (24 Sep 2017 01:43)    LABS:                        10.0   13.88 )-----------( 461      ( 24 Sep 2017 06:20 )             29.7   Hemoglobin: 10.0 g/dL (17 @ 06:20)  Hemoglobin: 9.6 g/dL (17 @ 05:30)  [facility]  Hemoglobin: 6.8 g/dL (17 @ 05:15)  Hemoglobin: 7.9 g/dL (17 @ 06:30)  Hemoglobin: 7.0 g/dL (17 @ 00:50)  [facility]        140  |  107  |  56<H>  ----------------------------<  216<H>  5.3   |  21<L>  |  2.93<H>    Ca    8.6      24 Sep 2017 06:20        PT/INR - ( 24 Sep 2017 06:20 )   PT: 10.6 SEC;   INR: 0.95          PTT - ( 24 Sep 2017 06:20 )  PTT:26.4 SEC        Iron with Total Binding Capacity (17 @ 13:34)    Iron Total, Serum: 56 ug/dL    Unsaturated Iron Binding Capacity: 159 ug/dL    Total Iron Binding Capacity.: 215 ug/dL    Ferritin, Serum: 283.4 ng/mL (17 @ 13:34)          Imaging:    < from: Upper Endoscopy (09.15.17 @ 11:54) >  [facility]  _______________________________________________________________________________  Patient Name: Nancy Lloyd            Procedure Date: 9/15/2017 11:54 AM  MRN: [id number]                     Account Number: [id number]  YOB: 1965              Admit Type: Inpatient  Room: 3                               Gender: Male  Attending MD: COLLETTE KWAN MD    _______________________________________________________________________________     Procedure:           Upper GI endoscopy  Indications:         Abnormal CT of the GI tract showing thickening of the                        distal esophgus  Providers:           COLLETTE KWAN MD, AMRIT LERMA MD (Fellow)  Medicines:           See theAnesthesia note for documentation of the                        administered medications  Complications:       No immediate complications.  Procedure:           Pre-Anesthesia Assessment:                       - Prior to the procedure, a History and Physical was                        performed, and patient medications, allergies and                        sensitivities were reviewed. The patient's tolerance of                        previous anesthesia was reviewed.                       - The risks and benefits of the procedure and the                        sedation options and risks were discussed with the                        patient. All questions were answered and informed                        consent was obtained.       - Patient identification and proposed procedure were                        verified prior to the procedure by the physician, the                        nurse and the anesthesiologist. The procedure was                        verified in the endoscopy suite.                       After obtaining informed consent, the endoscope was                        passed under direct vision. Throughout the procedure,                        the patient's blood pressure, pulse, and oxygen          saturations were monitored continuously. The Endoscope                        was introduced through the mouth, and advanced to the                        second part of duodenum. The upper GI endoscopy was                        accomplished without difficulty. The patient tolerated                        the procedure well.                                                                                   Findings:       LA Grade B (one or more mucosal breaks greater than 5 mm, not extending        between the tops of two mucosal folds) esophagitis with no bleeding was        found 30 cm from the incisors. There was an esophageal nodule in the        distal esophagus in the region of the esophgitis. Biopsies were taken        with a cold forceps for histology and to assess for Ramirez's esophagus.       The exam of the esophagus was otherwise normal.       Scattered moderate inflammation characterized by erythema was found in        the gastric body consistent with punctate contusions. Biopsies were        taken with a cold forceps (antrum and incisura) for Helicobacter pylori        testing.       The exam of the stomach was otherwise normal.       The examined duodenum was normal.                                       Impression:          - LA Grade B reflux esophagitis. Rule out Ramirez's                        esophagus. Biopsied.                       - Esophageal nodule in the distal esophgus. Biopsied.   - Gastritis. Biopsied.                       - Normal examined duodenum.  Recommendation:      - Await pathology results.                       - Use Protonix (pantoprazole) 40 mg PO daily.                       - Given family history of colon polyps, anemia (elevated                        ferritin, consistent with anemia of chronic                        inflammation) and no prior colon cancer screening,                        patient should have colonoscopy.    < end of copied text >    Surgical Pathology Report (09.15.17 @ 15:59)    Surgical Pathology Report:   ACCESSION No:  [id number]    NANCY LLOYD                       3        Addendum Report          Addendum  PAS stain for fungus was performed on Specimens 2, 4, 5 and 6.    Fungal spores are identified in Specimen 6; no fungal organisms  are identified in Specimens 2, 4 and 5.    Verified by: Marsha Swan M.D.  (Electronic Signature)  Reported on: 17 10:29 EDT,6 [place]  [address]  _________________________________________________________________  ____      Surgical Final Report          Final Diagnosis  1-Gastric, biopsy:  - Mild chronic inactive gastritis, Warthin-Starry stain is  negative for H.pylori like  microorganisms    2-Esophageal nodule, biopsy:  - Hyperplastic squamous mucosa with mild chronic  inflamamtion  - Detached fragments of acute inflammatory exudates,  consistent wtih ulcer  - PAS stain pending and addendum to follow    3-Esophagus, @ 41cm, biopsy:  - Unremarkable squamous mucosa and columnar mucosa with  intestinal metaplasia  and mild chronic inflammation, negative for dysplasia  - Clinical correlation is necessary for diagnosis of  Ramirez's Esophagus    4-Esophagus, @ 37cm, biopsy:  - Squamous mucosa with marked acute and chronic  inflamamtion  - PAS stain pending and addendum to follow    5-Esophagus, @ 34 cm, biopsy:  - Squamous mucosa with marked acute and chronic  inflamamtion  - PAS stain pending and addendum to follow            NANCY LLOYD                       3        Surgical Final Report            6-Esophagus, @31 cm, biopsy:  - Squamous mucosa with mild chronic and acute inflamamtion  - Detached fragments of acute inflammatory exudates,  consistent wtih ulcer  - PAS stain pending and addendum to follow    Verified by: TEE DUNN M.D.  (Electronic Signature)  Reported on: 17 15:02 EDT,6 [place]  [address]  _________________________________________________________________  ____    Clinical History  abnormal CT showing distal esophageal thickening    Specimen(s) Submitted  1-Gastric, biopsy  2-Esophageal nodule  3-Esophagus, @ 41cm  4-Esophagus, @ 37cm  5-Esophagus, @ 34 cm  6-Esophagus, @31 cm    Gross Description  1.   The specimen is received in formalin and the specimen  container is labeled: Gastric.  It consistsof two fragments of  tan-pink, soft tissue, each measures 0.2 cm in greatest  dimension.  Entirely submitted.  One cassette.    2.   The specimen is received in formalin and the specimen  container is labeled: Esophageal nodule.  It consists of two  fragments of tan-pink, soft tissue, measuring 0.1 and 0.2 cm in  greatest dimension.  Entirely submitted.  One cassette.    3.   The specimen is received in formalin and the specimen  container is labeled: Esophageal  41cm.  It consists of two  fragments of tan-pink, soft tissue, each measures 0.2 cm in  greatest dimension.  Entirely submitted.  One cassette.    4.   The specimen is received in formalin and the specimen  container is labeled: Esophageal at 37 cm.  It consists of two  fragments of tan-pink, soft tissue, less than 0.1 and 0.2 cm in  greatest dimension.  Entirely submitted.  One cassette.    5.   The specimen is received in formalin and the specimen  container is labeled: Esophageal at 34 cm.  It consists of            NANCY LLOYD                       3        Surgical Final Report          two fragments of tan-pink, soft tissue, each measures 0.2 cm in  greatest dimension.  Entirely submitted.  One cassette.    6.   The specimen is received in formalin and the specimen  container is labeled: Esophageal  31 cm.  It consists of two  fragments of tan-pink, soft tissue, measuring 0.1 and 0.2 cm in  greatest dimension.  Entirely submitted.  One cassette.    In addition to other data that may appear on the specimen  containers, all labels have been inspected to confirm the  presence of the patient's name and date of birth.    XW  Fri  September 15, 2017 06:15 PM

## 2017-09-24 NOTE — PROGRESS NOTE ADULT - SUBJECTIVE AND OBJECTIVE BOX
Subjective  Feels tired      PHYSICAL EXAM:  Vitals:  T(F): 98 (09-24-17 @ 11:47), Max: 98.7 (09-24-17 @ 01:43)  HR: 52 (09-24-17 @ 11:47)  BP: 146/63 (09-24-17 @ 11:47)  BP(mean): --  RR: 18 (09-24-17 @ 11:47)  SpO2: 97% (09-24-17 @ 11:47)  Wt(kg): --  Constitutional: no acute distress  HEENT:  NC, ext ears nl, oropharynx clear,  nose nl  Neck: No JVD, bruit, adenopathy or thyromegaly  Eyes: PERRL, no discharge, no icterus  Respiratory: cta/p bilat, no wheezes, rales, nl effort  Cardiovascular: regular rate, S1 and S2 no rub or gallop  Abd: : BS+, soft, NT , no rebound or guarding, no bruits  Extremities: foot bandaged  Neurological: A/O x 3, nl coordination and tone    I and O's:    09-23 @ 07:01  -  09-24 @ 07:00  --------------------------------------------------------  IN: 0 mL / OUT: 2200 mL / NET: -2200 mL            REVIEW OF SYSTEMS:  CONSTITUTIONAL: No weakness, fevers or chills  RESPIRATORY: No cough, wheezing, hemoptysis; No shortness of breath  CARDIOVASCULAR: No chest pain or palpitations  GI : no abd pains, nausea or vomiting  GENITOURINARY: No dysuria, frequency or hematuria  All other review of systems is negative unless indicated above.    Allergies    No Known Allergies    Intolerances        MEDICATIONS  (STANDING):  heparin  Injectable 5000 Unit(s) SubCutaneous every 8 hours  sodium bicarbonate 650 milliGRAM(s) Oral three times a day  isosorbide   mononitrate ER Tablet (IMDUR) 120 milliGRAM(s) Oral daily  diVALproex  milliGRAM(s) Oral two times a day  traZODone 100 milliGRAM(s) Oral at bedtime  atorvastatin 40 milliGRAM(s) Oral at bedtime  OLANZapine 20 milliGRAM(s) Oral at bedtime  carvedilol 12.5 milliGRAM(s) Oral every 12 hours  insulin lispro (HumaLOG) corrective regimen sliding scale   SubCutaneous three times a day before meals  insulin lispro (HumaLOG) corrective regimen sliding scale   SubCutaneous at bedtime  dextrose 5%. 1000 milliLiter(s) (50 mL/Hr) IV Continuous <Continuous>  dextrose 50% Injectable 12.5 Gram(s) IV Push once  dextrose 50% Injectable 25 Gram(s) IV Push once  dextrose 50% Injectable 25 Gram(s) IV Push once  pantoprazole    Tablet 40 milliGRAM(s) Oral before breakfast  amLODIPine   Tablet 10 milliGRAM(s) Oral daily  calcium gluconate IVPB 1 Gram(s) IV Intermittent daily  polyethylene glycol/electrolyte Solution 1000 milliLiter(s) Oral every 4 hours  bisacodyl 10 milliGRAM(s) Oral every 4 hours  sodium chloride 0.9%. 1000 milliLiter(s) (75 mL/Hr) IV Continuous <Continuous>      Sodium,Serum 140    09-24 @ 06:20  Sodium,Serum 139    09-23 @ 05:30  Sodium,Serum 135    09-22 @ 05:15  Sodium,Serum 139    09-21 @ 06:00  Sodium,Serum 137    09-21 @ 00:50  Sodium,Serum 136    09-20 @ 16:30  Sodium,Serum 139    09-20 @ 13:34    Potassium,Serum 5.3    09-24 @ 06:20  Potassium,Serum 4.6    09-23 @ 05:30  Potassium,Serum 4.9    09-22 @ 05:15  Potassium,Serum 4.9    09-21 @ 06:00  Potassium,Serum 4.9    09-21 @ 00:50  Potassium,Serum 5.7    09-20 @ 16:30  Potassium,Serum 5.8    09-20 @ 13:34    Chloride,Serum 107    09-24 @ 06:20  Chloride,Serum 105    09-23 @ 05:30  Chloride,Serum 101    09-22 @ 05:15  Chloride,Serum 105    09-21 @ 06:00  Chloride,Serum 104    09-21 @ 00:50  Chloride,Serum 106    09-20 @ 16:30  Chloride,Serum 108    09-20 @ 13:34    CO2, Serum 21    09-24 @ 06:20  CO2, Serum 20    09-23 @ 05:30  CO2, Serum 18    09-22 @ 05:15  CO2, Serum 19    09-21 @ 06:00  CO2, Serum 19    09-21 @ 00:50  CO2, Serum 19    09-20 @ 16:30  CO2, Serum 18    09-20 @ 13:34    BUN 56    09-24 @ 06:20  BUN 53    09-23 @ 05:30  BUN 46    09-22 @ 05:15  BUN 45    09-21 @ 06:00  BUN 47    09-21 @ 00:50  BUN 50    09-20 @ 16:30  BUN 49    09-20 @ 13:34    Creatinine, Serum 2.93    09-24 @ 06:20  Creatinine, Serum 3.10    09-23 @ 05:30  Creatinine, Serum 3.38    09-22 @ 05:15  Creatinine, Serum 3.12    09-21 @ 06:00  Creatinine, Serum 3.06    09-21 @ 00:50  Creatinine, Serum 2.93    09-20 @ 16:30  Creatinine, Serum 2.92    09-20 @ 13:34      09-24    140  |  107  |  56<H>  ----------------------------<  216<H>  5.3   |  21<L>  |  2.93<H>    Ca    8.6      24 Sep 2017 06:20        Urine Studies:      Urine chemistry:   Urine Na:   Urine Creatinine:   Urine Protein/Cr ratio:  Urine K:   Urine Osm:   24 Hr urine studies:

## 2017-09-24 NOTE — PROVIDER CONTACT NOTE (OTHER) - ACTION/TREATMENT ORDERED:
Provider aware and states to administer Hydralazine as ordered PRN and reassess BP. Will administer medication and reassess patient and continue to monitor.

## 2017-09-24 NOTE — PROGRESS NOTE ADULT - ASSESSMENT
53 yo M s/p R foot 1st metatarsal resection and well adhered lateral 5th met eschar  - Pt seen and examined  - No signs of active infection present. Surgical site appears to be viable and healing well  - Please have Z float boots on at all times in bed   - Betadine and padding applied to eschar, DSD to surgical site  - OR culture is growing MRSA  - Awaiting final OR data   - Planning for delayed primary closure tomorrow at 8AM bedside for popped suture site (prior hematoma)  - d/w attending

## 2017-09-24 NOTE — PROGRESS NOTE ADULT - ASSESSMENT
Impression:    51 yo man with PVD, DM, anemia (anemia of chronic inflammation) and recent significant weight loss (40 lbs) presenting with wet gangrene of RLE s/p toe amputation (culture +MRSA), s/p EGD consistent with esophagitis (pathology shows fungal spores), concern for possible underlying colonic malignancy in setting of weight loss and persistent anemia requiring PRBC.   1)Persistent Anemia- requiring PRBC x 3, blood work consistent with anemia of chronic inflammation but no etiology for weight loss found to date. Concern for underlying colonic malignancy.   2)Fungal Esophagitis  3)MRSA infection of RLE, wet gangrene s/p toe amputation, plan for possible bedside delayed primary closure tomorrow)- d/w Podiatry team, will be giving only local anesthesia, plan for procedure if needed at 8am Monday    Plan:  1)Pt agreeable to colonoscopy tomorrow, Clear liquid diet today, NPO past MN, plan for Colonoscopy tomorrow afternoon  Risks, benefits, alternatives described to patient including, but not limited to, bleeding, infection, organ damage, perforation, missed lesions and risks of anesthesia. Patient understands and agrees to these risks.  2)Would start oral Fluconazole for treatment of fungal esophagitis  3)f/u Podiatry and vacular surgery recs, c/w antibiotics for MRSA infection   4)c/w PPI PO daily, pathology from EGD reviewed

## 2017-09-24 NOTE — PROGRESS NOTE ADULT - SUBJECTIVE AND OBJECTIVE BOX
Vascular Surgery Progress Note    S: Pt seen this AM. Pain controlled. Remains afebrile. Overnight, high BPs that were managed with hydralazine 10mg.      O: Physical Exam:  T(C): 36.9 (09-24-17 @ 05:26), Max: 37.1 (09-24-17 @ 01:43)  HR: 60 (09-24-17 @ 05:26) (50 - 62)  BP: 169/71 (09-24-17 @ 05:26) (158/65 - 185/79)  RR: 16 (09-24-17 @ 05:26) (16 - 20)  SpO2: 97% (09-24-17 @ 05:26) (96% - 97%)    09-23-17 @ 07:01  -  09-24-17 @ 07:00  --------------------------------------------------------  IN: 0 mL / OUT: 2200 mL / NET: -2200 mL    Gen: NAD   Pulm: non labored breathing  Card: RRR -rgm  Abd: soft, NT/ND  Ext: R foot extremity covered with dressing, CDI      Labs:

## 2017-09-25 LAB
BUN SERPL-MCNC: 43 MG/DL — HIGH (ref 7–23)
CALCIUM SERPL-MCNC: 9 MG/DL — SIGNIFICANT CHANGE UP (ref 8.4–10.5)
CHLORIDE SERPL-SCNC: 109 MMOL/L — HIGH (ref 98–107)
CO2 SERPL-SCNC: 16 MMOL/L — LOW (ref 22–31)
CREAT SERPL-MCNC: 2.8 MG/DL — HIGH (ref 0.5–1.3)
GLUCOSE SERPL-MCNC: 135 MG/DL — HIGH (ref 70–99)
HCT VFR BLD CALC: 31.3 % — LOW (ref 39–50)
HGB BLD-MCNC: 10.5 G/DL — LOW (ref 13–17)
MAGNESIUM SERPL-MCNC: 1.9 MG/DL — SIGNIFICANT CHANGE UP (ref 1.6–2.6)
MCHC RBC-ENTMCNC: 29.8 PG — SIGNIFICANT CHANGE UP (ref 27–34)
MCHC RBC-ENTMCNC: 33.5 % — SIGNIFICANT CHANGE UP (ref 32–36)
MCV RBC AUTO: 88.9 FL — SIGNIFICANT CHANGE UP (ref 80–100)
NRBC # FLD: 0 — SIGNIFICANT CHANGE UP
PHOSPHATE SERPL-MCNC: 4.7 MG/DL — HIGH (ref 2.5–4.5)
PLATELET # BLD AUTO: 470 K/UL — HIGH (ref 150–400)
PMV BLD: 9.9 FL — SIGNIFICANT CHANGE UP (ref 7–13)
POTASSIUM SERPL-MCNC: 5.1 MMOL/L — SIGNIFICANT CHANGE UP (ref 3.5–5.3)
POTASSIUM SERPL-SCNC: 5.1 MMOL/L — SIGNIFICANT CHANGE UP (ref 3.5–5.3)
RBC # BLD: 3.52 M/UL — LOW (ref 4.2–5.8)
RBC # FLD: 13.8 % — SIGNIFICANT CHANGE UP (ref 10.3–14.5)
SODIUM SERPL-SCNC: 140 MMOL/L — SIGNIFICANT CHANGE UP (ref 135–145)
VANCOMYCIN FLD-MCNC: 14.8 UG/ML — SIGNIFICANT CHANGE UP
WBC # BLD: 12.74 K/UL — HIGH (ref 3.8–10.5)
WBC # FLD AUTO: 12.74 K/UL — HIGH (ref 3.8–10.5)

## 2017-09-25 PROCEDURE — 99232 SBSQ HOSP IP/OBS MODERATE 35: CPT

## 2017-09-25 PROCEDURE — 45378 DIAGNOSTIC COLONOSCOPY: CPT | Mod: GC

## 2017-09-25 RX ORDER — SOD SULF/SODIUM/NAHCO3/KCL/PEG
1000 SOLUTION, RECONSTITUTED, ORAL ORAL EVERY 12 HOURS
Qty: 0 | Refills: 0 | Status: COMPLETED | OUTPATIENT
Start: 2017-09-26 | End: 2017-09-27

## 2017-09-25 RX ORDER — SOD SULF/SODIUM/NAHCO3/KCL/PEG
1000 SOLUTION, RECONSTITUTED, ORAL ORAL EVERY 6 HOURS
Qty: 0 | Refills: 0 | Status: DISCONTINUED | OUTPATIENT
Start: 2017-09-25 | End: 2017-09-25

## 2017-09-25 RX ORDER — SODIUM CHLORIDE 9 MG/ML
1000 INJECTION INTRAMUSCULAR; INTRAVENOUS; SUBCUTANEOUS
Qty: 0 | Refills: 0 | Status: DISCONTINUED | OUTPATIENT
Start: 2017-09-25 | End: 2017-09-25

## 2017-09-25 RX ADMIN — Medication 100 MILLIGRAM(S): at 22:40

## 2017-09-25 RX ADMIN — Medication 650 MILLIGRAM(S): at 22:40

## 2017-09-25 RX ADMIN — Medication 650 MILLIGRAM(S): at 05:29

## 2017-09-25 RX ADMIN — CARVEDILOL PHOSPHATE 12.5 MILLIGRAM(S): 80 CAPSULE, EXTENDED RELEASE ORAL at 18:12

## 2017-09-25 RX ADMIN — OLANZAPINE 20 MILLIGRAM(S): 15 TABLET, FILM COATED ORAL at 22:40

## 2017-09-25 RX ADMIN — Medication 5 MILLIGRAM(S): at 18:11

## 2017-09-25 RX ADMIN — ATORVASTATIN CALCIUM 40 MILLIGRAM(S): 80 TABLET, FILM COATED ORAL at 22:40

## 2017-09-25 RX ADMIN — AMLODIPINE BESYLATE 10 MILLIGRAM(S): 2.5 TABLET ORAL at 05:29

## 2017-09-25 RX ADMIN — SODIUM CHLORIDE 50 MILLILITER(S): 9 INJECTION INTRAMUSCULAR; INTRAVENOUS; SUBCUTANEOUS at 13:07

## 2017-09-25 RX ADMIN — FLUCONAZOLE 200 MILLIGRAM(S): 150 TABLET ORAL at 13:07

## 2017-09-25 RX ADMIN — SODIUM CHLORIDE 50 MILLILITER(S): 9 INJECTION INTRAMUSCULAR; INTRAVENOUS; SUBCUTANEOUS at 05:30

## 2017-09-25 RX ADMIN — PANTOPRAZOLE SODIUM 40 MILLIGRAM(S): 20 TABLET, DELAYED RELEASE ORAL at 05:30

## 2017-09-25 RX ADMIN — HEPARIN SODIUM 5000 UNIT(S): 5000 INJECTION INTRAVENOUS; SUBCUTANEOUS at 05:30

## 2017-09-25 RX ADMIN — ISOSORBIDE MONONITRATE 120 MILLIGRAM(S): 60 TABLET, EXTENDED RELEASE ORAL at 13:07

## 2017-09-25 RX ADMIN — Medication 4: at 23:14

## 2017-09-25 RX ADMIN — HEPARIN SODIUM 5000 UNIT(S): 5000 INJECTION INTRAVENOUS; SUBCUTANEOUS at 22:40

## 2017-09-25 RX ADMIN — DIVALPROEX SODIUM 500 MILLIGRAM(S): 500 TABLET, DELAYED RELEASE ORAL at 22:40

## 2017-09-25 RX ADMIN — DIVALPROEX SODIUM 500 MILLIGRAM(S): 500 TABLET, DELAYED RELEASE ORAL at 09:53

## 2017-09-25 RX ADMIN — Medication 200 GRAM(S): at 13:06

## 2017-09-25 NOTE — PROGRESS NOTE ADULT - SUBJECTIVE AND OBJECTIVE BOX
Vascular Surgery Progress Note    S: Pt seen this AM. Pain controlled. Remains afebrile. Patient became hypertensive overnight again and was treated with Hydralazine.     O: Physical Exam:  T(C): 36.9 (09-24-17 @ 05:26), Max: 37.1 (09-24-17 @ 01:43)  HR: 60 (09-24-17 @ 05:26) (50 - 62)  BP: 169/71 (09-24-17 @ 05:26) (158/65 - 185/79)  RR: 16 (09-24-17 @ 05:26) (16 - 20)  SpO2: 97% (09-24-17 @ 05:26) (96% - 97%)    09-23-17 @ 07:01  -  09-24-17 @ 07:00  --------------------------------------------------------  IN: 0 mL / OUT: 2200 mL / NET: -2200 mL    Gen: NAD   Pulm: non labored breathing  Abd: soft, NT/ND  Ext: R foot extremity covered with dressing, CDI      Labs:                        10.5   12.74 )-----------( 470      ( 25 Sep 2017 05:35 )             31.3     09-25    140  |  109<H>  |  43<H>  ----------------------------<  135<H>  5.1   |  16<L>  |  2.80<H>    Ca    9.0      25 Sep 2017 05:35  Phos  4.7     09-25  Mg     1.9     09-25        PT/INR - ( 24 Sep 2017 06:20 )   PT: 10.6 SEC;   INR: 0.95          PTT - ( 24 Sep 2017 06:20 )  PTT:26.4 SEC Vascular Surgery Progress Note    S: Pt seen this AM. Pain controlled. Remains afebrile. Patient became hypertensive overnight again and was treated with Hydralazine.     O: Physical Exam:  T(C): 36.9 (09-24-17 @ 05:26), Max: 37.1 (09-24-17 @ 01:43)  HR: 60 (09-24-17 @ 05:26) (50 - 62)  BP: 169/71 (09-24-17 @ 05:26) (158/65 - 185/79)  RR: 16 (09-24-17 @ 05:26) (16 - 20)  SpO2: 97% (09-24-17 @ 05:26) (96% - 97%)    09-23-17 @ 07:01  -  09-24-17 @ 07:00  --------------------------------------------------------  IN: 0 mL / OUT: 2200 mL / NET: -2200 mL    Gen: NAD   Pulm: non labored breathing  Abd: soft, NT/ND  Ext: R foot extremity covered with dressing, CDI  foot wound stable       Labs:                        10.5   12.74 )-----------( 470      ( 25 Sep 2017 05:35 )             31.3     09-25    140  |  109<H>  |  43<H>  ----------------------------<  135<H>  5.1   |  16<L>  |  2.80<H>    Ca    9.0      25 Sep 2017 05:35  Phos  4.7     09-25  Mg     1.9     09-25        PT/INR - ( 24 Sep 2017 06:20 )   PT: 10.6 SEC;   INR: 0.95          PTT - ( 24 Sep 2017 06:20 )  PTT:26.4 SEC

## 2017-09-25 NOTE — PROGRESS NOTE ADULT - ASSESSMENT
52y Male POD  s/p Right 1st toe amputation:  - colonoscopy today to evaluate for malignancy  - Pain control  - Pods : MRSA +, will cont on Vancomycin with appropriate dosing. Betadine and padding applied to eschar, DSD to surgical site  - f/u GI recs  - appreciate renal recs- consider starting ace/arb for HTN 52y Male POD  s/p Right 1st toe amputation:  - colonoscopy to evaluate for malignancy not completed due to limited prep  - Pain control  - Pods : MRSA +, will cont on Vancomycin with appropriate dosing. Betadine and padding applied to eschar, DSD to surgical site  - f/u GI recs  - appreciate renal recs- consider starting ace/arb for HTN

## 2017-09-25 NOTE — PROGRESS NOTE ADULT - ASSESSMENT
53 yo M s/p R foot 1st metatarsal resection and well adhered lateral 5th met eschar  - Pt seen and examined  - No signs of active infection present. Surgical site appears to be viable and healing well  - Please have Z float boots on at all times in bed   - Betadine and padding applied to eschar, DSD to surgical site  - OR culture is growing MRSA  - Awaiting final OR data   - 3.5ml of 1% lidocaine plain was used in a linton block fashion. Delayed primary closure performed using 4-0 nylon. Patient tolerated procedure well.   - d/w attending

## 2017-09-25 NOTE — PROGRESS NOTE ADULT - SUBJECTIVE AND OBJECTIVE BOX
EGD report (full report to follow on [place])    Findings:  A moderate amount of stool was found in the entire colon, interfering with visualization.  Lavage of the area was performed using a moderate amount of sterile water, resulting in incomplete clearance with continued poor visualization.  A 10 mm polyp was found in the ascending colon.  The polyp was sessile. No large masses grossly appreciated.    Impression  - The procedure was aborted due to poor bowel prep with stool present.   - Stool in the entire examined colon. Poor visualization.  - One10 mm polyp in the ascending colon.   - Was able to reach the cecum but did not insert colonoscopy.  - No specimens collected.     Recommendations  - Return patient to [facility] for ongoing care.   - NPO today.  - Plan for repeat prep tonight and repeat colonoscopy tomorrow (we will order Moviprep) EGD report (full report to follow on [place])    Findings:  A moderate amount of stool was found in the entire colon, interfering with visualization.  Lavage of the area was performed using a moderate amount of sterile water, resulting in incomplete clearance with continued poor visualization.  A 10 mm polyp was found in the ascending colon.  The polyp was sessile. No large masses grossly appreciated.    Impression  - The procedure was aborted due to poor bowel prep with stool present.   - Stool in the entire examined colon. Poor visualization.  - One10 mm polyp in the ascending colon.   - Was able to reach the cecum but did not insert colonoscopy.  - No specimens collected.     Recommendations  - Return patient to hospital [facility] for ongoing care.   - Clears for today and tomorrow and NPO after midnight Tuesday night  - Plan for repeat prep Tuesday night and repeat Colonoscopy on Tuesday (patient refusing to have repeat colonoscopy tomorrow, we will order Moviprep)

## 2017-09-25 NOTE — PROVIDER CONTACT NOTE (OTHER) - ACTION/TREATMENT ORDERED:
Provider aware and states to hold Hydralazine for now. Provider also states will decrease IV fluids to 50ml/hr. Will follow orders and continue to monitor patient.

## 2017-09-25 NOTE — PROVIDER CONTACT NOTE (OTHER) - ACTION/TREATMENT ORDERED:
Provider aware and states will order another 1x dose of Hydralazine 10mg IV push.  Will administer as ordered and continue to monitor.

## 2017-09-25 NOTE — PROVIDER CONTACT NOTE (OTHER) - SITUATION
Pt's BP now 167/68. HR 61. Pt has PRN stating to administer Hydralazine 10mg IV push for Systolic over 160. However, patient already received Hydralazine 10mg IV push 2 times.

## 2017-09-25 NOTE — PROGRESS NOTE ADULT - SUBJECTIVE AND OBJECTIVE BOX
Patient is a 52y old  Male who presents with a chief complaint of right foot (14 Sep 2017 02:55)       INTERVAL HPI/OVERNIGHT EVENTS:  Patient seen and evaluated at bedside.  Pt is resting comfortable in NAD. Denies N/V/F/C.  Pain rated at 0/10    Allergies    No Known Allergies    Intolerances    Vital Signs Last 24 Hrs  T(C): 36.9 (25 Sep 2017 05:28), Max: 36.9 (25 Sep 2017 05:28)  T(F): 98.5 (25 Sep 2017 05:28), Max: 98.5 (25 Sep 2017 05:28)  HR: 61 (25 Sep 2017 05:28) (50 - 73)  BP: 159/71 (25 Sep 2017 05:28) (128/65 - 199/84)  BP(mean): --  RR: 16 (25 Sep 2017 05:28) (16 - 18)  SpO2: 98% (25 Sep 2017 05:28) (95% - 98%)    LABS:                        10.5   12.74 )-----------( 470      ( 25 Sep 2017 05:35 )             31.3     09-25    140  |  109<H>  |  43<H>  ----------------------------<  135<H>  5.1   |  16<L>  |  2.80<H>    Ca    9.0      25 Sep 2017 05:35  Phos  4.7     09-25  Mg     1.9     09-25      PT/INR - ( 24 Sep 2017 06:20 )   PT: 10.6 SEC;   INR: 0.95          PTT - ( 24 Sep 2017 06:20 )  PTT:26.4 SEC    CAPILLARY BLOOD GLUCOSE  122 (24 Sep 2017 22:32)  274 (24 Sep 2017 17:25)  110 (24 Sep 2017 12:30)  168 (24 Sep 2017 09:08)    Lower Extremity Physical Exam:  s/p R foot 1st metatarsal resection. Sutures intact, CFT <2 s along all edges of incision site. Skin edges well coapted with central popped suture for prior hematoma. No dehiscence, minimal amount of drainage noted from hematoma site -  serosanguinous.     Right lateral 5th met head has a small eschar. Periwound is normal skin, eschar is well adhered, no fluctuance, no erythema, no malodor no drainage. Eschar is stable. 1.5 x 1.5 cm    RADIOLOGY & ADDITIONAL TESTS:

## 2017-09-25 NOTE — PROVIDER CONTACT NOTE (OTHER) - SITUATION
MD Palmer #[id number] informed that patient's mom brought him pizza and he ate it. Patient's mom was called earlier by the day nurse and was informed that patient could only have a clear diet.

## 2017-09-25 NOTE — PROGRESS NOTE ADULT - SUBJECTIVE AND OBJECTIVE BOX
Patient is a 52y Male  being evaluated for CKD 4              no specific complaints        PHYSICAL EXAM:  Vitals:  T(F): 98.5 (09-25-17 @ 05:28), Max: 98.5 (09-25-17 @ 05:28)  HR: 61 (09-25-17 @ 05:28)  BP: 159/71 (09-25-17 @ 05:28)  BP(mean): --  RR: 16 (09-25-17 @ 05:28)  SpO2: 98% (09-25-17 @ 05:28)  Wt(kg): --  Constitutional: no acute distress  HEENT:  NC, ext ears nl, oropharynx clear,  nose nl  Neck: No JVD, bruit, adenopathy or thyromegaly  Eyes: PERRL, no discharge, no icterus  Respiratory: cta/p bilat, no wheezes, rales, nl effort  Cardiovascular: regular rate, S1 and S2 no rub or gallop  Abd: : BS+, soft, NT , no rebound or guarding, no bruits  Extremities: no edema or cyanosis, palpable pulses  Neurological: A/O x 3, nl coordination and tone    I and O's:    09-24 @ 07:01  -  09-25 @ 07:00  --------------------------------------------------------  IN: 0 mL / OUT: 2100 mL / NET: -2100 mL            REVIEW OF SYSTEMS:  CONSTITUTIONAL: No weakness, fevers or chills  RESPIRATORY: No cough, wheezing, hemoptysis; No shortness of breath  CARDIOVASCULAR: No chest pain or palpitations  GI : no abd pains, nausea or vomiting  GENITOURINARY: No dysuria, frequency or hematuria  All other review of systems is negative unless indicated above.    Allergies    No Known Allergies    Intolerances        MEDICATIONS  (STANDING):  heparin  Injectable 5000 Unit(s) SubCutaneous every 8 hours  sodium bicarbonate 650 milliGRAM(s) Oral three times a day  isosorbide   mononitrate ER Tablet (IMDUR) 120 milliGRAM(s) Oral daily  diVALproex  milliGRAM(s) Oral two times a day  traZODone 100 milliGRAM(s) Oral at bedtime  atorvastatin 40 milliGRAM(s) Oral at bedtime  OLANZapine 20 milliGRAM(s) Oral at bedtime  carvedilol 12.5 milliGRAM(s) Oral every 12 hours  insulin lispro (HumaLOG) corrective regimen sliding scale   SubCutaneous three times a day before meals  insulin lispro (HumaLOG) corrective regimen sliding scale   SubCutaneous at bedtime  dextrose 5%. 1000 milliLiter(s) (50 mL/Hr) IV Continuous <Continuous>  dextrose 50% Injectable 12.5 Gram(s) IV Push once  dextrose 50% Injectable 25 Gram(s) IV Push once  dextrose 50% Injectable 25 Gram(s) IV Push once  pantoprazole    Tablet 40 milliGRAM(s) Oral before breakfast  amLODIPine   Tablet 10 milliGRAM(s) Oral daily  calcium gluconate IVPB 1 Gram(s) IV Intermittent daily  fluconAZOLE   Tablet      fluconAZOLE   Tablet 200 milliGRAM(s) Oral daily  sodium chloride 0.9%. 1000 milliLiter(s) (50 mL/Hr) IV Continuous <Continuous>      LABS:  CBC Full  -  ( 25 Sep 2017 05:35 )  WBC Count : 12.74 K/uL  Hemoglobin : 10.5 g/dL  Hematocrit : 31.3 %  Platelet Count - Automated : 470 K/uL  Mean Cell Volume : 88.9 fL  Mean Cell Hemoglobin : 29.8 pg  Mean Cell Hemoglobin Concentration : 33.5 %  Auto Neutrophil # : x  Auto Lymphocyte # : x  Auto Monocyte # : x  Auto Eosinophil # : x  Auto Basophil # : x  Auto Neutrophil % : x  Auto Lymphocyte % : x  Auto Monocyte % : x  Auto Eosinophil % : x  Auto Basophil % : x    09-25    140  |  109<H>  |  43<H>  ----------------------------<  135<H>  5.1   |  16<L>  |  2.80<H>    Ca    9.0      25 Sep 2017 05:35  Phos  4.7     09-25  Mg     1.9     09-25        Urine Studies:      Urine chemistry:   Urine Na:   Urine Creatinine:   Urine Protein/Cr ratio:  Urine K:   Urine Osm:   24 Hr urine studies:       Imp:  52y Male Patient is a 52y Male  being evaluated for CKD 4              no specific complaints/ no SOB, minimal foot pain         PHYSICAL EXAM:  Vitals:  T(F): 98.5 (09-25-17 @ 05:28), Max: 98.5 (09-25-17 @ 05:28)  HR: 61 (09-25-17 @ 05:28)  BP: 159/71 (09-25-17 @ 05:28)  BP(mean): --  RR: 16 (09-25-17 @ 05:28)  SpO2: 98% (09-25-17 @ 05:28)  Wt(kg): --  Constitutional: no acute distress  HEENT:  NC, ext ears nl, oropharynx clear,  nose nl  Neck: No JVD, bruit, adenopathy or thyromegaly  Eyes: PERRL, no discharge, no icterus  Respiratory: cta/p bilat, no wheezes, rales, nl effort  Cardiovascular: regular rate, S1 and S2 no rub or gallop  Abd: : BS+, soft, NT , no rebound or guarding, no bruits  Extremities: no edema or cyanosis, right foot dressed   Neurological: A/O x 3, nl coordination and tone    I and O's:    09-24 @ 07:01  -  09-25 @ 07:00  --------------------------------------------------------  IN: 0 mL / OUT: 2100 mL / NET: -2100 mL            REVIEW OF SYSTEMS:  CONSTITUTIONAL: No weakness, fevers or chills  RESPIRATORY: No cough, wheezing, hemoptysis; No shortness of breath  CARDIOVASCULAR: No chest pain or palpitations  GI : no abd pains, nausea or vomiting  GENITOURINARY: No dysuria, frequency or hematuria  All other review of systems is negative unless indicated above.    Allergies    No Known Allergies    Intolerances        MEDICATIONS  (STANDING):  heparin  Injectable 5000 Unit(s) SubCutaneous every 8 hours  sodium bicarbonate 650 milliGRAM(s) Oral three times a day  isosorbide   mononitrate ER Tablet (IMDUR) 120 milliGRAM(s) Oral daily  diVALproex  milliGRAM(s) Oral two times a day  traZODone 100 milliGRAM(s) Oral at bedtime  atorvastatin 40 milliGRAM(s) Oral at bedtime  OLANZapine 20 milliGRAM(s) Oral at bedtime  carvedilol 12.5 milliGRAM(s) Oral every 12 hours  insulin lispro (HumaLOG) corrective regimen sliding scale   SubCutaneous three times a day before meals  insulin lispro (HumaLOG) corrective regimen sliding scale   SubCutaneous at bedtime  dextrose 5%. 1000 milliLiter(s) (50 mL/Hr) IV Continuous <Continuous>  dextrose 50% Injectable 12.5 Gram(s) IV Push once  dextrose 50% Injectable 25 Gram(s) IV Push once  dextrose 50% Injectable 25 Gram(s) IV Push once  pantoprazole    Tablet 40 milliGRAM(s) Oral before breakfast  amLODIPine   Tablet 10 milliGRAM(s) Oral daily  calcium gluconate IVPB 1 Gram(s) IV Intermittent daily  fluconAZOLE   Tablet      fluconAZOLE   Tablet 200 milliGRAM(s) Oral daily  sodium chloride 0.9%. 1000 milliLiter(s) (50 mL/Hr) IV Continuous <Continuous>      LABS:  CBC Full  -  ( 25 Sep 2017 05:35 )  WBC Count : 12.74 K/uL  Hemoglobin : 10.5 g/dL  Hematocrit : 31.3 %  Platelet Count - Automated : 470 K/uL  Mean Cell Volume : 88.9 fL  Mean Cell Hemoglobin : 29.8 pg  Mean Cell Hemoglobin Concentration : 33.5 %  Auto Neutrophil # : x  Auto Lymphocyte # : x  Auto Monocyte # : x  Auto Eosinophil # : x  Auto Basophil # : x  Auto Neutrophil % : x  Auto Lymphocyte % : x  Auto Monocyte % : x  Auto Eosinophil % : x  Auto Basophil % : x    09-25    140  |  109<H>  |  43<H>  ----------------------------<  135<H>  5.1   |  16<L>  |  2.80<H>    Ca    9.0      25 Sep 2017 05:35  Phos  4.7     09-25  Mg     1.9     09-25        Urine Studies:      Urine chemistry:   Urine Na:   Urine Creatinine:   Urine Protein/Cr ratio:  Urine K:   Urine Osm:   24 Hr urine studies:       Imp:  52y Male

## 2017-09-25 NOTE — CONSULT NOTE ADULT - SUBJECTIVE AND OBJECTIVE BOX
CHIEF COMPLAINT:    HISTORY OF PRESENT ILLNESS: Pt is a 53 y/o man with h/o DM, HTN, PAD, CKD who p/w c/o           Allergies    No Known Allergies    Intolerances    	    MEDICATIONS:  heparin  Injectable 5000 Unit(s) SubCutaneous every 8 hours  isosorbide   mononitrate ER Tablet (IMDUR) 120 milliGRAM(s) Oral daily  carvedilol 12.5 milliGRAM(s) Oral every 12 hours  hydrALAZINE Injectable 10 milliGRAM(s) IV Push every 4 hours PRN  amLODIPine   Tablet 10 milliGRAM(s) Oral daily  enalapril 5 milliGRAM(s) Oral daily    fluconAZOLE   Tablet      fluconAZOLE   Tablet 200 milliGRAM(s) Oral daily      diVALproex  milliGRAM(s) Oral two times a day  traZODone 100 milliGRAM(s) Oral at bedtime  OLANZapine 20 milliGRAM(s) Oral at bedtime  acetaminophen   Tablet. 650 milliGRAM(s) Oral every 6 hours PRN    pantoprazole    Tablet 40 milliGRAM(s) Oral before breakfast    atorvastatin 40 milliGRAM(s) Oral at bedtime  insulin lispro (HumaLOG) corrective regimen sliding scale   SubCutaneous three times a day before meals  insulin lispro (HumaLOG) corrective regimen sliding scale   SubCutaneous at bedtime  dextrose Gel 1 Dose(s) Oral once PRN  dextrose 50% Injectable 12.5 Gram(s) IV Push once  dextrose 50% Injectable 25 Gram(s) IV Push once  dextrose 50% Injectable 25 Gram(s) IV Push once  glucagon  Injectable 1 milliGRAM(s) IntraMuscular once PRN    sodium bicarbonate 650 milliGRAM(s) Oral three times a day  dextrose 5%. 1000 milliLiter(s) IV Continuous <Continuous>  calcium gluconate IVPB 1 Gram(s) IV Intermittent daily  sodium chloride 0.9%. 1000 milliLiter(s) IV Continuous <Continuous>      PAST MEDICAL & SURGICAL HISTORY:  Bipolar affective disorder in remission  High cholesterol  Depression  Diabetes mellitus  Amputated great toe of right foot  ORIF right lower leg- 1995      FAMILY HISTORY:  No pertinent family history in first degree relatives      SOCIAL HISTORY:    [ ] Non-smoker  [ ] Smoker  [ ] Alcohol      REVIEW OF SYSTEMS:  General: no fatigue/malaise, weight loss/gain.  Skin: no rashes.  Ophthalmologic: no blurred vision, no loss of vision. 	  ENT: no sore throat, rhinorrhea, sinus congestion.  Respiratory: no SOB, cough or wheeze.  Gastrointestinal:  no N/V/D, no melena/hematemesis/hematochezia.  Genitourinary: no dysuria/hesitancy or hematuria.  Musculoskeletal: no myalgias or arthralgias.  Neurological: no changes in vision or hearing, no lightheadedness/dizziness, no syncope/near syncope	  Psychiatric: no unusual stress/anxiety.   Hematology/Lymphatics: no unusual bleeding, bruising and no lymphadenopathy.  Endocrine: no unusual thirst.   All others negative except as stated above and in HPI.    PHYSICAL EXAM:  T(C): 36.9 (09-25-17 @ 09:41), Max: 36.9 (09-25-17 @ 01:23)  HR: 70 (09-25-17 @ 09:41) (50 - 73)  BP: 161/59 (09-25-17 @ 09:41) (159/71 - 199/84)  RR: 18 (09-25-17 @ 09:41) (16 - 18)  SpO2: 98% (09-25-17 @ 09:41) (95% - 98%)  Wt(kg): --  I&O's Summary    24 Sep 2017 07:01  -  25 Sep 2017 07:00  --------------------------------------------------------  IN: 0 mL / OUT: 2100 mL / NET: -2100 mL    25 Sep 2017 07:01  -  25 Sep 2017 14:54  --------------------------------------------------------  IN: 0 mL / OUT: 575 mL / NET: -575 mL        Appearance: Normal	  HEENT:   Normal oral mucosa, PERRL, EOMI	  Lymphatic: No lymphadenopathy  Cardiovascular: Normal S1 S2, No JVD, No murmurs, No edema  Respiratory: Lungs clear to auscultation	  Psychiatry: A & O x 3, Mood & affect appropriate  Gastrointestinal:  Soft, Non-tender, + BS	  Skin: No rashes, No ecchymoses, No cyanosis	  Neurologic: Non-focal  Extremities: Normal range of motion, No clubbing, cyanosis or edema  Vascular: Peripheral pulses palpable 2+ bilaterally        LABS:	 	    CBC Full  -  ( 25 Sep 2017 05:35 )  WBC Count : 12.74 K/uL  Hemoglobin : 10.5 g/dL  Hematocrit : 31.3 %  Platelet Count - Automated : 470 K/uL  Mean Cell Volume : 88.9 fL  Mean Cell Hemoglobin : 29.8 pg  Mean Cell Hemoglobin Concentration : 33.5 %  Auto Neutrophil # : x  Auto Lymphocyte # : x  Auto Monocyte # : x  Auto Eosinophil # : x  Auto Basophil # : x  Auto Neutrophil % : x  Auto Lymphocyte % : x  Auto Monocyte % : x  Auto Eosinophil % : x  Auto Basophil % : x    09-25    140  |  109<H>  |  43<H>  ----------------------------<  135<H>  5.1   |  16<L>  |  2.80<H>  09-24    140  |  107  |  56<H>  ----------------------------<  216<H>  5.3   |  21<L>  |  2.93<H>    Ca    9.0      25 Sep 2017 05:35  Ca    8.6      24 Sep 2017 06:20  Phos  4.7     09-25  Mg     1.9     09-25    TELEMETRY: 	    ECG: Sinus Rhythm with Biphasic T Wave in V6 (lateral TWIs noted on previous tracings), LVH by Voltage Criteria 	      PREVIOUS DIAGNOSTIC TESTING:      Echocardiogram:    < from: Transthoracic Echocardiogram (03.14.17 @ 18:55) >  CONCLUSIONS:  1. Severe concentric left ventricular hypertrophy.  2. Mild segmental left ventricular systolic dysfunction.  The inferior wall is hypokinetic.  3. The right ventricle is not well visualized; grossly  normal right ventricular systolic function.    1.	ASSESSMENT/PLAN: CHIEF COMPLAINT:    HISTORY OF PRESENT ILLNESS: Pt is a 51 y/o man with h/o DM, HTN, PAD, CKD who presented with Toe Gangrene. Cardiology consulted for pre-operative risk stratification for possible SFA Bypass. The patient reports that prior to recent toe amputation, he was able to ambulate easily for more than one block or up one flight of steps without dyspnea or chest pain. He follows with a Cardiologist, but does appear to have seen him recently.     Allergies    No Known Allergies    Intolerances    	    MEDICATIONS:  heparin  Injectable 5000 Unit(s) SubCutaneous every 8 hours  isosorbide   mononitrate ER Tablet (IMDUR) 120 milliGRAM(s) Oral daily  carvedilol 12.5 milliGRAM(s) Oral every 12 hours  hydrALAZINE Injectable 10 milliGRAM(s) IV Push every 4 hours PRN  amLODIPine   Tablet 10 milliGRAM(s) Oral daily  enalapril 5 milliGRAM(s) Oral daily    fluconAZOLE   Tablet      fluconAZOLE   Tablet 200 milliGRAM(s) Oral daily      diVALproex  milliGRAM(s) Oral two times a day  traZODone 100 milliGRAM(s) Oral at bedtime  OLANZapine 20 milliGRAM(s) Oral at bedtime  acetaminophen   Tablet. 650 milliGRAM(s) Oral every 6 hours PRN    pantoprazole    Tablet 40 milliGRAM(s) Oral before breakfast    atorvastatin 40 milliGRAM(s) Oral at bedtime  insulin lispro (HumaLOG) corrective regimen sliding scale   SubCutaneous three times a day before meals  insulin lispro (HumaLOG) corrective regimen sliding scale   SubCutaneous at bedtime  dextrose Gel 1 Dose(s) Oral once PRN  dextrose 50% Injectable 12.5 Gram(s) IV Push once  dextrose 50% Injectable 25 Gram(s) IV Push once  dextrose 50% Injectable 25 Gram(s) IV Push once  glucagon  Injectable 1 milliGRAM(s) IntraMuscular once PRN    sodium bicarbonate 650 milliGRAM(s) Oral three times a day  dextrose 5%. 1000 milliLiter(s) IV Continuous <Continuous>  calcium gluconate IVPB 1 Gram(s) IV Intermittent daily  sodium chloride 0.9%. 1000 milliLiter(s) IV Continuous <Continuous>      PAST MEDICAL & SURGICAL HISTORY:  Bipolar affective disorder in remission  High cholesterol  Depression  Diabetes mellitus  Amputated great toe of right foot  ORIF right lower leg- 1995      FAMILY HISTORY:  No pertinent family history in first degree relatives      SOCIAL HISTORY: Current Smoker        REVIEW OF SYSTEMS:  General: no fatigue/malaise, weight loss/gain.  Skin: no rashes.  Ophthalmologic: no blurred vision, no loss of vision. 	  ENT: no sore throat, rhinorrhea, sinus congestion.  Respiratory: no SOB, cough or wheeze.  Gastrointestinal:  no N/V/D, no melena/hematemesis/hematochezia.  Genitourinary: no dysuria/hesitancy or hematuria.  Musculoskeletal: no myalgias or arthralgias.  Neurological: no changes in vision or hearing, no lightheadedness/dizziness, no syncope/near syncope	  Psychiatric: no unusual stress/anxiety.   Hematology/Lymphatics: no unusual bleeding, bruising and no lymphadenopathy.  Endocrine: no unusual thirst.   All others negative except as stated above and in HPI.    PHYSICAL EXAM:  T(C): 36.9 (09-25-17 @ 09:41), Max: 36.9 (09-25-17 @ 01:23)  HR: 70 (09-25-17 @ 09:41) (50 - 73)  BP: 161/59 (09-25-17 @ 09:41) (159/71 - 199/84)  RR: 18 (09-25-17 @ 09:41) (16 - 18)  SpO2: 98% (09-25-17 @ 09:41) (95% - 98%)  Wt(kg): --  I&O's Summary    24 Sep 2017 07:01  -  25 Sep 2017 07:00  --------------------------------------------------------  IN: 0 mL / OUT: 2100 mL / NET: -2100 mL    25 Sep 2017 07:01  -  25 Sep 2017 14:54  --------------------------------------------------------  IN: 0 mL / OUT: 575 mL / NET: -575 mL        Appearance: Normal	  HEENT:   Normal oral mucosa, PERRL, EOMI	  Lymphatic: No lymphadenopathy  Cardiovascular: Normal S1 S2, No JVD, No murmurs, No edema  Respiratory: Lungs clear to auscultation	  Psychiatry: A & O x 3, Mood & affect appropriate  Gastrointestinal:  Soft, Non-tender, + BS	  Skin: No rashes, No ecchymoses, No cyanosis	  Neurologic: Non-focal  Extremities: RLE in bandage        LABS:	 	    CBC Full  -  ( 25 Sep 2017 05:35 )  WBC Count : 12.74 K/uL  Hemoglobin : 10.5 g/dL  Hematocrit : 31.3 %  Platelet Count - Automated : 470 K/uL  Mean Cell Volume : 88.9 fL  Mean Cell Hemoglobin : 29.8 pg  Mean Cell Hemoglobin Concentration : 33.5 %  Auto Neutrophil # : x  Auto Lymphocyte # : x  Auto Monocyte # : x  Auto Eosinophil # : x  Auto Basophil # : x  Auto Neutrophil % : x  Auto Lymphocyte % : x  Auto Monocyte % : x  Auto Eosinophil % : x  Auto Basophil % : x    09-25    140  |  109<H>  |  43<H>  ----------------------------<  135<H>  5.1   |  16<L>  |  2.80<H>  09-24    140  |  107  |  56<H>  ----------------------------<  216<H>  5.3   |  21<L>  |  2.93<H>    Ca    9.0      25 Sep 2017 05:35  Ca    8.6      24 Sep 2017 06:20  Phos  4.7     09-25  Mg     1.9     09-25    ECG: Sinus Rhythm with Biphasic T Wave in V6 (lateral TWIs noted on previous tracings), LVH by Voltage Criteria 	      PREVIOUS DIAGNOSTIC TESTING:      Echocardiogram:    < from: Transthoracic Echocardiogram (03.14.17 @ 18:55) >  CONCLUSIONS:  1. Severe concentric left ventricular hypertrophy.  2. Mild segmental left ventricular systolic dysfunction.  The inferior wall is hypokinetic.  3. The right ventricle is not well visualized; grossly  normal right ventricular systolic function.    ASSESSMENT/PLAN: 51 y/o man with h/o DM, HTN, PAD, CKD who presented with Toe Gangrene. Cardiology consulted for pre-operative risk stratification for possible SFA Bypass    1) Pre-Operative Risk Stratification - the patient appears to have >4 METS functional capacity and there is no clear signs of active cardiac disease  - will finalize risk assessment and recommendations further testing (if needed) with attending

## 2017-09-25 NOTE — PROGRESS NOTE ADULT - ASSESSMENT
CKD 4- renal function stable/ near baseline  anemia- H/H improved- s/p procrit   continue current management  if BP remains high consider cautious diuretic or ACE/ ARB CKD 4- renal function stable/ near baseline  anemia- H/H improved- s/p procrit   continue current management  if BP remains high consider cautious diuretic or ACE/ ARB  PAD- s/p right foot/ ray amputation- management per podiatry/ vascular surgery  avoid NSAIDs

## 2017-09-26 LAB
APTT BLD: 26.5 SEC — LOW (ref 27.5–37.4)
BUN SERPL-MCNC: 40 MG/DL — HIGH (ref 7–23)
CALCIUM SERPL-MCNC: 8.9 MG/DL — SIGNIFICANT CHANGE UP (ref 8.4–10.5)
CHLORIDE SERPL-SCNC: 106 MMOL/L — SIGNIFICANT CHANGE UP (ref 98–107)
CO2 SERPL-SCNC: 19 MMOL/L — LOW (ref 22–31)
CREAT SERPL-MCNC: 2.78 MG/DL — HIGH (ref 0.5–1.3)
GLUCOSE SERPL-MCNC: 261 MG/DL — HIGH (ref 70–99)
HCT VFR BLD CALC: 29.6 % — LOW (ref 39–50)
HGB BLD-MCNC: 9.5 G/DL — LOW (ref 13–17)
INR BLD: 0.95 — SIGNIFICANT CHANGE UP (ref 0.88–1.17)
MCHC RBC-ENTMCNC: 29.4 PG — SIGNIFICANT CHANGE UP (ref 27–34)
MCHC RBC-ENTMCNC: 32.1 % — SIGNIFICANT CHANGE UP (ref 32–36)
MCV RBC AUTO: 91.6 FL — SIGNIFICANT CHANGE UP (ref 80–100)
NRBC # FLD: 0 — SIGNIFICANT CHANGE UP
PLATELET # BLD AUTO: 489 K/UL — HIGH (ref 150–400)
PMV BLD: 10 FL — SIGNIFICANT CHANGE UP (ref 7–13)
POTASSIUM SERPL-MCNC: 4.9 MMOL/L — SIGNIFICANT CHANGE UP (ref 3.5–5.3)
POTASSIUM SERPL-SCNC: 4.9 MMOL/L — SIGNIFICANT CHANGE UP (ref 3.5–5.3)
PROTHROM AB SERPL-ACNC: 10.6 SEC — SIGNIFICANT CHANGE UP (ref 9.8–13.1)
RBC # BLD: 3.23 M/UL — LOW (ref 4.2–5.8)
RBC # FLD: 13.8 % — SIGNIFICANT CHANGE UP (ref 10.3–14.5)
SODIUM SERPL-SCNC: 138 MMOL/L — SIGNIFICANT CHANGE UP (ref 135–145)
SPECIMEN SOURCE: SIGNIFICANT CHANGE UP
WBC # BLD: 11.7 K/UL — HIGH (ref 3.8–10.5)
WBC # FLD AUTO: 11.7 K/UL — HIGH (ref 3.8–10.5)

## 2017-09-26 PROCEDURE — 99232 SBSQ HOSP IP/OBS MODERATE 35: CPT | Mod: GC

## 2017-09-26 PROCEDURE — 99232 SBSQ HOSP IP/OBS MODERATE 35: CPT

## 2017-09-26 PROCEDURE — 93306 TTE W/DOPPLER COMPLETE: CPT | Mod: 26

## 2017-09-26 RX ORDER — VANCOMYCIN HCL 1 G
500 VIAL (EA) INTRAVENOUS
Qty: 0 | Refills: 0 | Status: DISCONTINUED | OUTPATIENT
Start: 2017-09-26 | End: 2017-09-26

## 2017-09-26 RX ORDER — VANCOMYCIN HCL 1 G
500 VIAL (EA) INTRAVENOUS ONCE
Qty: 0 | Refills: 0 | Status: COMPLETED | OUTPATIENT
Start: 2017-09-26 | End: 2017-09-26

## 2017-09-26 RX ADMIN — Medication 200 GRAM(S): at 13:45

## 2017-09-26 RX ADMIN — Medication 100 MILLIGRAM(S): at 22:17

## 2017-09-26 RX ADMIN — DIVALPROEX SODIUM 500 MILLIGRAM(S): 500 TABLET, DELAYED RELEASE ORAL at 22:16

## 2017-09-26 RX ADMIN — CARVEDILOL PHOSPHATE 12.5 MILLIGRAM(S): 80 CAPSULE, EXTENDED RELEASE ORAL at 05:45

## 2017-09-26 RX ADMIN — AMLODIPINE BESYLATE 10 MILLIGRAM(S): 2.5 TABLET ORAL at 05:45

## 2017-09-26 RX ADMIN — PANTOPRAZOLE SODIUM 40 MILLIGRAM(S): 20 TABLET, DELAYED RELEASE ORAL at 05:49

## 2017-09-26 RX ADMIN — Medication 650 MILLIGRAM(S): at 05:45

## 2017-09-26 RX ADMIN — HEPARIN SODIUM 5000 UNIT(S): 5000 INJECTION INTRAVENOUS; SUBCUTANEOUS at 22:16

## 2017-09-26 RX ADMIN — Medication 650 MILLIGRAM(S): at 13:44

## 2017-09-26 RX ADMIN — ATORVASTATIN CALCIUM 40 MILLIGRAM(S): 80 TABLET, FILM COATED ORAL at 22:16

## 2017-09-26 RX ADMIN — Medication 1000 MILLILITER(S): at 18:46

## 2017-09-26 RX ADMIN — Medication 2: at 18:35

## 2017-09-26 RX ADMIN — Medication 100 MILLIGRAM(S): at 02:16

## 2017-09-26 RX ADMIN — ISOSORBIDE MONONITRATE 120 MILLIGRAM(S): 60 TABLET, EXTENDED RELEASE ORAL at 13:45

## 2017-09-26 RX ADMIN — Medication 2: at 09:44

## 2017-09-26 RX ADMIN — HEPARIN SODIUM 5000 UNIT(S): 5000 INJECTION INTRAVENOUS; SUBCUTANEOUS at 13:45

## 2017-09-26 RX ADMIN — FLUCONAZOLE 200 MILLIGRAM(S): 150 TABLET ORAL at 13:45

## 2017-09-26 RX ADMIN — Medication 10 MILLIGRAM(S): at 09:58

## 2017-09-26 RX ADMIN — Medication 10 MILLIGRAM(S): at 22:18

## 2017-09-26 RX ADMIN — Medication 650 MILLIGRAM(S): at 22:16

## 2017-09-26 RX ADMIN — Medication 5 MILLIGRAM(S): at 05:49

## 2017-09-26 RX ADMIN — HEPARIN SODIUM 5000 UNIT(S): 5000 INJECTION INTRAVENOUS; SUBCUTANEOUS at 05:45

## 2017-09-26 RX ADMIN — OLANZAPINE 20 MILLIGRAM(S): 15 TABLET, FILM COATED ORAL at 22:17

## 2017-09-26 RX ADMIN — DIVALPROEX SODIUM 500 MILLIGRAM(S): 500 TABLET, DELAYED RELEASE ORAL at 09:44

## 2017-09-26 NOTE — PROGRESS NOTE ADULT - ASSESSMENT
51 yo M s/p R foot 1st metatarsal resection and well adhered lateral 5th met eschar  - Pt seen and examined  - No signs of active infection present. Surgical site appears to be viable and healing well  - Please have Z float boots on at all times in bed   - Betadine and padding applied to eschar, DSD to surgical site  - OR culture is growing MRSA  - Awaiting final OR data   - d/w attending

## 2017-09-26 NOTE — PROGRESS NOTE ADULT - SUBJECTIVE AND OBJECTIVE BOX
Subjective  Resting comfortably      PHYSICAL EXAM:  Vitals:  T(F): 97.9 (09-26-17 @ 09:52), Max: 98 (09-26-17 @ 05:41)  HR: 58 (09-26-17 @ 09:52)  BP: 162/74 (09-26-17 @ 09:52)  BP(mean): --  RR: 19 (09-26-17 @ 09:52)  SpO2: 99% (09-26-17 @ 09:52)  Wt(kg): --  Constitutional: no acute distress  HEENT:  NC, ext ears nl, oropharynx clear,  nose nl  Neck: No JVD, bruit, adenopathy or thyromegaly  Eyes: PERRL, no discharge, no icterus  Respiratory: cta/p bilat, no wheezes, rales, nl effort  Cardiovascular: regular rate, S1 and S2 no rub or gallop  Abd: : BS+, soft, NT , no rebound or guarding, no bruits  Extremities: foot bandaged  Neurological: A/O x 3, nl coordination and tone    I and O's:    09-25 @ 07:01  -  09-26 @ 07:00  --------------------------------------------------------  IN: 100 mL / OUT: 1775 mL / NET: -1675 mL            REVIEW OF SYSTEMS:  CONSTITUTIONAL: No weakness, fevers or chills  RESPIRATORY: No cough, wheezing, hemoptysis; No shortness of breath  CARDIOVASCULAR: No chest pain or palpitations  GI : no abd pains, nausea or vomiting  GENITOURINARY: No dysuria, frequency or hematuria  All other review of systems is negative unless indicated above.    Allergies    No Known Allergies    Intolerances        MEDICATIONS  (STANDING):  heparin  Injectable 5000 Unit(s) SubCutaneous every 8 hours  sodium bicarbonate 650 milliGRAM(s) Oral three times a day  isosorbide   mononitrate ER Tablet (IMDUR) 120 milliGRAM(s) Oral daily  diVALproex  milliGRAM(s) Oral two times a day  traZODone 100 milliGRAM(s) Oral at bedtime  atorvastatin 40 milliGRAM(s) Oral at bedtime  OLANZapine 20 milliGRAM(s) Oral at bedtime  carvedilol 12.5 milliGRAM(s) Oral every 12 hours  insulin lispro (HumaLOG) corrective regimen sliding scale   SubCutaneous three times a day before meals  insulin lispro (HumaLOG) corrective regimen sliding scale   SubCutaneous at bedtime  dextrose 5%. 1000 milliLiter(s) (50 mL/Hr) IV Continuous <Continuous>  dextrose 50% Injectable 12.5 Gram(s) IV Push once  dextrose 50% Injectable 25 Gram(s) IV Push once  dextrose 50% Injectable 25 Gram(s) IV Push once  pantoprazole    Tablet 40 milliGRAM(s) Oral before breakfast  amLODIPine   Tablet 10 milliGRAM(s) Oral daily  calcium gluconate IVPB 1 Gram(s) IV Intermittent daily  fluconAZOLE   Tablet      fluconAZOLE   Tablet 200 milliGRAM(s) Oral daily  enalapril 5 milliGRAM(s) Oral daily  polyethylene glycol/electrolyte Solution 1000 milliLiter(s) Oral every 12 hours  bisacodyl 10 milliGRAM(s) Oral once      Sodium,Serum 138    09-26 @ 06:00  Sodium,Serum 140    09-25 @ 05:35  Sodium,Serum 140    09-24 @ 06:20  Sodium,Serum 139    09-23 @ 05:30    Potassium,Serum 4.9    09-26 @ 06:00  Potassium,Serum 5.1    09-25 @ 05:35  Potassium,Serum 5.3    09-24 @ 06:20  Potassium,Serum 4.6    09-23 @ 05:30    Chloride,Serum 106    09-26 @ 06:00  Chloride,Serum 109    09-25 @ 05:35  Chloride,Serum 107    09-24 @ 06:20  Chloride,Serum 105    09-23 @ 05:30    CO2, Serum 19    09-26 @ 06:00  CO2, Serum 16    09-25 @ 05:35  CO2, Serum 21    09-24 @ 06:20  CO2, Serum 20    09-23 @ 05:30    BUN 40    09-26 @ 06:00  BUN 43    09-25 @ 05:35  BUN 56    09-24 @ 06:20  BUN 53    09-23 @ 05:30    Creatinine, Serum 2.78    09-26 @ 06:00  Creatinine, Serum 2.80    09-25 @ 05:35  Creatinine, Serum 2.93    09-24 @ 06:20  Creatinine, Serum 3.10    09-23 @ 05:30      09-26    138  |  106  |  40<H>  ----------------------------<  261<H>  4.9   |  19<L>  |  2.78<H>    Ca    8.9      26 Sep 2017 06:00  Phos  4.7     09-25  Mg     1.9     09-25        Urine Studies:      Urine chemistry:   Urine Na:   Urine Creatinine:   Urine Protein/Cr ratio:  Urine K:   Urine Osm:   24 Hr urine studies:

## 2017-09-26 NOTE — CHART NOTE - NSCHARTNOTEFT_GEN_A_CORE
Patient refusing Vein Mapping for OR planning. Had long discussion with patient over necessity to go to vascular lab for operation planning, currently schedule for OR on Thursday. 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 Wound healing is compromised by lack of blood flow and that recurrent infection puts him at the risk of needing a future amputation of the lower extremity. Patient repeated back all risks, and each portion of the discussion. Alert and fully oriented, continues to refused vein mapping and now refusing bypass surgery for Thursday. He understands that without the vein mapping, he will not be able to go to the OR and that tomorrow he is to have a colonoscopy for persistent anemia so it cannot be done then.

## 2017-09-26 NOTE — PROGRESS NOTE ADULT - SUBJECTIVE AND OBJECTIVE BOX
Patient is a 52y old  Male who presents with a chief complaint of right foot (14 Sep 2017 02:55)       INTERVAL HPI/OVERNIGHT EVENTS:  Patient seen and evaluated at bedside.  Pt is resting comfortable in NAD. Denies N/V/F/C.  Pain rated at 0/10    Allergies    No Known Allergies    Intolerances    Vital Signs Last 24 Hrs  T(C): 36.7 (26 Sep 2017 05:41), Max: 36.9 (25 Sep 2017 09:41)  T(F): 98 (26 Sep 2017 05:41), Max: 98.5 (25 Sep 2017 09:41)  HR: 58 (26 Sep 2017 05:41) (51 - 99)  BP: 154/66 (26 Sep 2017 05:41) (154/66 - 189/81)  BP(mean): --  RR: 18 (26 Sep 2017 05:41) (18 - 18)  SpO2: 99% (26 Sep 2017 05:41) (97% - 100%)    LABS:                        9.5    11.70 )-----------( 489      ( 26 Sep 2017 06:00 )             29.6     09-26    138  |  106  |  40<H>  ----------------------------<  261<H>  4.9   |  19<L>  |  2.78<H>    Ca    8.9      26 Sep 2017 06:00  Phos  4.7     09-25  Mg     1.9     09-25      PT/INR - ( 26 Sep 2017 06:00 )   PT: 10.6 SEC;   INR: 0.95          PTT - ( 26 Sep 2017 06:00 )  PTT:26.5 SEC    CAPILLARY BLOOD GLUCOSE  315 (25 Sep 2017 22:50)  129 (25 Sep 2017 18:00)  135 (25 Sep 2017 12:50)  127 (25 Sep 2017 08:46)    Lower Extremity Physical Exam:  s/p R foot 1st metatarsal resection. Sutures intact, CFT <2 s along all edges of incision site. Skin edges well coapted with central popped suture for prior hematoma. No dehiscence, minimal amount of drainage noted from hematoma site -  serosanguinous.     Right lateral 5th met head has a small eschar. Periwound is normal skin, eschar is well adhered, no fluctuance, no erythema, no malodor no drainage. Eschar is stable. 1.5 x 1.5 cm    RADIOLOGY & ADDITIONAL TESTS:

## 2017-09-26 NOTE — PROGRESS NOTE ADULT - SUBJECTIVE AND OBJECTIVE BOX
Patient is a 52y old  Male who presents with a chief complaint of right foot (14 Sep 2017 02:55)      Vascular Surgery Attending Progress Note    Interval HPI: pt is refusing  colonoscopy at this time     Medications:  heparin  Injectable 5000 Unit(s) SubCutaneous every 8 hours  sodium bicarbonate 650 milliGRAM(s) Oral three times a day  isosorbide   mononitrate ER Tablet (IMDUR) 120 milliGRAM(s) Oral daily  diVALproex  milliGRAM(s) Oral two times a day  traZODone 100 milliGRAM(s) Oral at bedtime  atorvastatin 40 milliGRAM(s) Oral at bedtime  OLANZapine 20 milliGRAM(s) Oral at bedtime  carvedilol 12.5 milliGRAM(s) Oral every 12 hours  insulin lispro (HumaLOG) corrective regimen sliding scale   SubCutaneous three times a day before meals  insulin lispro (HumaLOG) corrective regimen sliding scale   SubCutaneous at bedtime  dextrose 5%. 1000 milliLiter(s) IV Continuous <Continuous>  dextrose Gel 1 Dose(s) Oral once PRN  dextrose 50% Injectable 12.5 Gram(s) IV Push once  dextrose 50% Injectable 25 Gram(s) IV Push once  dextrose 50% Injectable 25 Gram(s) IV Push once  glucagon  Injectable 1 milliGRAM(s) IntraMuscular once PRN  acetaminophen   Tablet. 650 milliGRAM(s) Oral every 6 hours PRN  pantoprazole    Tablet 40 milliGRAM(s) Oral before breakfast  hydrALAZINE Injectable 10 milliGRAM(s) IV Push every 4 hours PRN  amLODIPine   Tablet 10 milliGRAM(s) Oral daily  calcium gluconate IVPB 1 Gram(s) IV Intermittent daily  fluconAZOLE   Tablet      fluconAZOLE   Tablet 200 milliGRAM(s) Oral daily  polyethylene glycol/electrolyte Solution 1000 milliLiter(s) Oral every 12 hours  enalapril 5 milliGRAM(s) Oral daily      Vital Signs Last 24 Hrs  T(C): 36.7 (26 Sep 2017 18:33), Max: 36.8 (26 Sep 2017 13:41)  T(F): 98.1 (26 Sep 2017 18:33), Max: 98.2 (26 Sep 2017 13:41)  HR: 56 (26 Sep 2017 18:33) (51 - 74)  BP: 135/66 (26 Sep 2017 18:33) (130/63 - 162/74)  BP(mean): --  RR: 16 (26 Sep 2017 18:33) (16 - 19)  SpO2: 100% (26 Sep 2017 18:33) (98% - 100%)  I&O's Summary    25 Sep 2017 07:01  -  26 Sep 2017 07:00  --------------------------------------------------------  IN: 100 mL / OUT: 1775 mL / NET: -1675 mL    26 Sep 2017 07:01  -  26 Sep 2017 19:50  --------------------------------------------------------  IN: 0 mL / OUT: 750 mL / NET: -750 mL        Physical Exam:  Neuro  A&Ox3 VSS  Vascular:  wound ischemia remains     LABS:                        9.5    11.70 )-----------( 489      ( 26 Sep 2017 06:00 )             29.6     09-26    138  |  106  |  40<H>  ----------------------------<  261<H>  4.9   |  19<L>  |  2.78<H>    Ca    8.9      26 Sep 2017 06:00  Phos  4.7     09-25  Mg     1.9     09-25      PT/INR - ( 26 Sep 2017 06:00 )   PT: 10.6 SEC;   INR: 0.95          PTT - ( 26 Sep 2017 06:00 )  PTT:26.5 SEC    DEREJE JACOBO MD  [phone]

## 2017-09-26 NOTE — PROGRESS NOTE ADULT - ASSESSMENT
52y Male POD  s/p Right 1st toe amputation:  - colonoscopy to evaluate for malignancy not completed due to limited prep  - Pain control  - Pods : MRSA +, will cont on Vancomycin with appropriate dosing. Betadine and padding applied to eschar, DSD to surgical site  - f/u GI recs  - appreciate renal recs- consider starting ace/arb for HTN

## 2017-09-26 NOTE — PROGRESS NOTE ADULT - ASSESSMENT
Impression:    1) Anemia of chronic disease, no overt GI bleeding, colonoscopy done 9/25 - poor bowel prep, no obvious colon mass   2) Fungal esophagitis  3) MRSA infection of RLE, wet gangrene s/p top amputation    Plan:  - plan for repeat colonoscopy tomorrow, clear liquid today, NPO after midnight - risks/benefits/alternatives discussed.   - continue with fluconazole for fungal esophagitis   - daily PPI (take 30 minutes prior to breakfast)

## 2017-09-26 NOTE — PROGRESS NOTE ADULT - SUBJECTIVE AND OBJECTIVE BOX
Chief Complaint:  anemia       Interval Events: s/p colonoscopy yesterday - poor bowel prep, no obvious colon mass seen. No acute events overnight. No abdominal pain/nausea/vomting.     Allergies:  No Known Allergies      Home Medications:    Hospital Medications:  heparin  Injectable 5000 Unit(s) SubCutaneous every 8 hours  sodium bicarbonate 650 milliGRAM(s) Oral three times a day  isosorbide   mononitrate ER Tablet (IMDUR) 120 milliGRAM(s) Oral daily  diVALproex  milliGRAM(s) Oral two times a day  traZODone 100 milliGRAM(s) Oral at bedtime  atorvastatin 40 milliGRAM(s) Oral at bedtime  OLANZapine 20 milliGRAM(s) Oral at bedtime  carvedilol 12.5 milliGRAM(s) Oral every 12 hours  insulin lispro (HumaLOG) corrective regimen sliding scale   SubCutaneous three times a day before meals  insulin lispro (HumaLOG) corrective regimen sliding scale   SubCutaneous at bedtime  dextrose 5%. 1000 milliLiter(s) IV Continuous <Continuous>  dextrose Gel 1 Dose(s) Oral once PRN  dextrose 50% Injectable 12.5 Gram(s) IV Push once  dextrose 50% Injectable 25 Gram(s) IV Push once  dextrose 50% Injectable 25 Gram(s) IV Push once  glucagon  Injectable 1 milliGRAM(s) IntraMuscular once PRN  acetaminophen   Tablet. 650 milliGRAM(s) Oral every 6 hours PRN  pantoprazole    Tablet 40 milliGRAM(s) Oral before breakfast  hydrALAZINE Injectable 10 milliGRAM(s) IV Push every 4 hours PRN  amLODIPine   Tablet 10 milliGRAM(s) Oral daily  calcium gluconate IVPB 1 Gram(s) IV Intermittent daily  fluconAZOLE   Tablet      fluconAZOLE   Tablet 200 milliGRAM(s) Oral daily  enalapril 5 milliGRAM(s) Oral daily  polyethylene glycol/electrolyte Solution 1000 milliLiter(s) Oral every 12 hours  bisacodyl 10 milliGRAM(s) Oral once      PMHX/PSHX:  Bipolar affective disorder in remission  High cholesterol  Depression  Diabetes mellitus  Diabetes mellitus  Amputated great toe of right foot  ORIF right lower leg-       Family history:  No pertinent family history in first degree relatives      ROS: as per HPI       PHYSICAL EXAM:   Vital Signs:  Vital Signs Last 24 Hrs  T(C): 36.6 (26 Sep 2017 09:52), Max: 36.7 (26 Sep 2017 05:41)  T(F): 97.9 (26 Sep 2017 09:52), Max: 98 (26 Sep 2017 05:41)  HR: 58 (26 Sep 2017 09:52) (51 - 99)  BP: 162/74 (26 Sep 2017 09:52) (154/66 - 189/81)  BP(mean): --  RR: 19 (26 Sep 2017 09:52) (18 - 19)  SpO2: 99% (26 Sep 2017 09:52) (97% - 100%)  Daily     Daily Weight in k.8 (26 Sep 2017 01:29)    GENERAL:  NAD  HEENT:  sclera anicteric  CHEST:  no respiratory distress, CTAB  HEART:  RRR, no MRG, no edema  ABDOMEN:  Soft, non-tender, non-distended, no masses , no hepato-splenomegaly,   EXTREMITIES:  toe amputation  SKIN:  No rash  NEURO:  Alert, oriented      LABS:                        9.5    11.70 )-----------( 489      ( 26 Sep 2017 06:00 )             29.6     09-26    138  |  106  |  40<H>  ----------------------------<  261<H>  4.9   |  19<L>  |  2.78<H>    Ca    8.9      26 Sep 2017 06:00  Phos  4.7     09-25  Mg     1.9     09-25        PT/INR - ( 26 Sep 2017 06:00 )   PT: 10.6 SEC;   INR: 0.95          PTT - ( 26 Sep 2017 06:00 )  PTT:26.5 SEC        Imaging:

## 2017-09-27 ENCOUNTER — RESULT REVIEW (OUTPATIENT)
Age: 52
End: 2017-09-27

## 2017-09-27 LAB
BLD GP AB SCN SERPL QL: NEGATIVE — SIGNIFICANT CHANGE UP
BUN SERPL-MCNC: 37 MG/DL — HIGH (ref 7–23)
CALCIUM SERPL-MCNC: 8.8 MG/DL — SIGNIFICANT CHANGE UP (ref 8.4–10.5)
CHLORIDE SERPL-SCNC: 107 MMOL/L — SIGNIFICANT CHANGE UP (ref 98–107)
CO2 SERPL-SCNC: 18 MMOL/L — LOW (ref 22–31)
CREAT SERPL-MCNC: 2.84 MG/DL — HIGH (ref 0.5–1.3)
GLUCOSE SERPL-MCNC: 92 MG/DL — SIGNIFICANT CHANGE UP (ref 70–99)
HCT VFR BLD CALC: 28.6 % — LOW (ref 39–50)
HGB BLD-MCNC: 9.5 G/DL — LOW (ref 13–17)
MAGNESIUM SERPL-MCNC: 1.8 MG/DL — SIGNIFICANT CHANGE UP (ref 1.6–2.6)
MCHC RBC-ENTMCNC: 30.1 PG — SIGNIFICANT CHANGE UP (ref 27–34)
MCHC RBC-ENTMCNC: 33.2 % — SIGNIFICANT CHANGE UP (ref 32–36)
MCV RBC AUTO: 90.5 FL — SIGNIFICANT CHANGE UP (ref 80–100)
NRBC # FLD: 0 — SIGNIFICANT CHANGE UP
PHOSPHATE SERPL-MCNC: 4.9 MG/DL — HIGH (ref 2.5–4.5)
PLATELET # BLD AUTO: 461 K/UL — HIGH (ref 150–400)
PMV BLD: 9.9 FL — SIGNIFICANT CHANGE UP (ref 7–13)
POTASSIUM SERPL-MCNC: 4.7 MMOL/L — SIGNIFICANT CHANGE UP (ref 3.5–5.3)
POTASSIUM SERPL-SCNC: 4.7 MMOL/L — SIGNIFICANT CHANGE UP (ref 3.5–5.3)
RBC # BLD: 3.16 M/UL — LOW (ref 4.2–5.8)
RBC # FLD: 13.7 % — SIGNIFICANT CHANGE UP (ref 10.3–14.5)
RH IG SCN BLD-IMP: POSITIVE — SIGNIFICANT CHANGE UP
SODIUM SERPL-SCNC: 141 MMOL/L — SIGNIFICANT CHANGE UP (ref 135–145)
SURGICAL PATHOLOGY STUDY: SIGNIFICANT CHANGE UP
VANCOMYCIN FLD-MCNC: 15 UG/ML — SIGNIFICANT CHANGE UP
WBC # BLD: 9.17 K/UL — SIGNIFICANT CHANGE UP (ref 3.8–10.5)
WBC # FLD AUTO: 9.17 K/UL — SIGNIFICANT CHANGE UP (ref 3.8–10.5)

## 2017-09-27 PROCEDURE — 88305 TISSUE EXAM BY PATHOLOGIST: CPT | Mod: 26

## 2017-09-27 PROCEDURE — 99232 SBSQ HOSP IP/OBS MODERATE 35: CPT | Mod: GC

## 2017-09-27 PROCEDURE — 99232 SBSQ HOSP IP/OBS MODERATE 35: CPT

## 2017-09-27 RX ORDER — VANCOMYCIN HCL 1 G
500 VIAL (EA) INTRAVENOUS ONCE
Qty: 0 | Refills: 0 | Status: COMPLETED | OUTPATIENT
Start: 2017-09-27 | End: 2017-09-27

## 2017-09-27 RX ORDER — SODIUM CHLORIDE 9 MG/ML
1000 INJECTION, SOLUTION INTRAVENOUS
Qty: 0 | Refills: 0 | Status: DISCONTINUED | OUTPATIENT
Start: 2017-09-27 | End: 2017-09-28

## 2017-09-27 RX ADMIN — HEPARIN SODIUM 5000 UNIT(S): 5000 INJECTION INTRAVENOUS; SUBCUTANEOUS at 22:34

## 2017-09-27 RX ADMIN — Medication 100 MILLIGRAM(S): at 14:45

## 2017-09-27 RX ADMIN — ATORVASTATIN CALCIUM 40 MILLIGRAM(S): 80 TABLET, FILM COATED ORAL at 22:34

## 2017-09-27 RX ADMIN — ISOSORBIDE MONONITRATE 120 MILLIGRAM(S): 60 TABLET, EXTENDED RELEASE ORAL at 12:28

## 2017-09-27 RX ADMIN — CARVEDILOL PHOSPHATE 12.5 MILLIGRAM(S): 80 CAPSULE, EXTENDED RELEASE ORAL at 20:09

## 2017-09-27 RX ADMIN — HEPARIN SODIUM 5000 UNIT(S): 5000 INJECTION INTRAVENOUS; SUBCUTANEOUS at 06:09

## 2017-09-27 RX ADMIN — Medication 650 MILLIGRAM(S): at 22:34

## 2017-09-27 RX ADMIN — Medication 1000 MILLILITER(S): at 06:10

## 2017-09-27 RX ADMIN — Medication 100 MILLIGRAM(S): at 22:34

## 2017-09-27 RX ADMIN — OLANZAPINE 20 MILLIGRAM(S): 15 TABLET, FILM COATED ORAL at 22:34

## 2017-09-27 RX ADMIN — FLUCONAZOLE 200 MILLIGRAM(S): 150 TABLET ORAL at 12:28

## 2017-09-27 RX ADMIN — DIVALPROEX SODIUM 500 MILLIGRAM(S): 500 TABLET, DELAYED RELEASE ORAL at 22:34

## 2017-09-27 RX ADMIN — AMLODIPINE BESYLATE 10 MILLIGRAM(S): 2.5 TABLET ORAL at 06:10

## 2017-09-27 RX ADMIN — Medication 200 GRAM(S): at 14:10

## 2017-09-27 RX ADMIN — HEPARIN SODIUM 5000 UNIT(S): 5000 INJECTION INTRAVENOUS; SUBCUTANEOUS at 13:13

## 2017-09-27 RX ADMIN — Medication 650 MILLIGRAM(S): at 13:13

## 2017-09-27 RX ADMIN — Medication 650 MILLIGRAM(S): at 06:09

## 2017-09-27 RX ADMIN — CARVEDILOL PHOSPHATE 12.5 MILLIGRAM(S): 80 CAPSULE, EXTENDED RELEASE ORAL at 06:10

## 2017-09-27 RX ADMIN — Medication 4: at 22:34

## 2017-09-27 RX ADMIN — Medication 5 MILLIGRAM(S): at 06:10

## 2017-09-27 NOTE — PROVIDER CONTACT NOTE (OTHER) - ACTION/TREATMENT ORDERED:
MD aware of HTN. Given PRN hydralazine as per order. MD aware of HTN. Given PRN hydralazine as per order.  Addendum: PT and family refusing hydralazine despite elevated BP. Educated on risks, states he needs time for it to come down.

## 2017-09-27 NOTE — PROGRESS NOTE ADULT - SUBJECTIVE AND OBJECTIVE BOX
Pt seen and examined. No complaints. tolerated bowel prep.             Objective:   Vitals:   Vital Signs Last 24 Hrs  T(C): 36.6 (27 Sep 2017 09:49), Max: 36.9 (27 Sep 2017 01:07)  T(F): 97.8 (27 Sep 2017 09:49), Max: 98.4 (27 Sep 2017 01:07)  HR: 52 (27 Sep 2017 09:49) (52 - 60)  BP: 129/61 (27 Sep 2017 09:49) (129/61 - 174/67)  BP(mean): --  RR: 18 (27 Sep 2017 09:49) (16 - 18)  SpO2: 99% (27 Sep 2017 09:49) (98% - 100%)  In/Outs:    09-26 @ 07:01  -  09-27 @ 07:00  --------------------------------------------------------  IN:  Total IN: 0 mL    OUT:    Voided: 1100 mL  Total OUT: 1100 mL    Total NET: -1100 mL        Physical Exam  General: NAD   Abdomen: soft, NT, ND  Rt foot dressing c/d/i    MEDICATIONS  (STANDING):  heparin  Injectable 5000 Unit(s) SubCutaneous every 8 hours  sodium bicarbonate 650 milliGRAM(s) Oral three times a day  isosorbide   mononitrate ER Tablet (IMDUR) 120 milliGRAM(s) Oral daily  diVALproex  milliGRAM(s) Oral two times a day  traZODone 100 milliGRAM(s) Oral at bedtime  atorvastatin 40 milliGRAM(s) Oral at bedtime  OLANZapine 20 milliGRAM(s) Oral at bedtime  carvedilol 12.5 milliGRAM(s) Oral every 12 hours  insulin lispro (HumaLOG) corrective regimen sliding scale   SubCutaneous three times a day before meals  insulin lispro (HumaLOG) corrective regimen sliding scale   SubCutaneous at bedtime  dextrose 5%. 1000 milliLiter(s) (50 mL/Hr) IV Continuous <Continuous>  dextrose 50% Injectable 12.5 Gram(s) IV Push once  dextrose 50% Injectable 25 Gram(s) IV Push once  dextrose 50% Injectable 25 Gram(s) IV Push once  pantoprazole    Tablet 40 milliGRAM(s) Oral before breakfast  amLODIPine   Tablet 10 milliGRAM(s) Oral daily  calcium gluconate IVPB 1 Gram(s) IV Intermittent daily  fluconAZOLE   Tablet      fluconAZOLE   Tablet 200 milliGRAM(s) Oral daily    MEDICATIONS  (PRN):  dextrose Gel 1 Dose(s) Oral once PRN Blood Glucose LESS THAN 70 milliGRAM(s)/deciliter  glucagon  Injectable 1 milliGRAM(s) IntraMuscular once PRN Glucose LESS THAN 70 milligrams/deciliter  acetaminophen   Tablet. 650 milliGRAM(s) Oral every 6 hours PRN Mild Pain (1 - 3)  hydrALAZINE Injectable 10 milliGRAM(s) IV Push every 4 hours PRN SBP>160      LABS:                        9.5    9.17  )-----------( 461      ( 27 Sep 2017 05:30 )             28.6     09-27    141  |  107  |  37<H>  ----------------------------<  92  4.7   |  18<L>  |  2.84<H>    Ca    8.8      27 Sep 2017 05:30  Phos  4.9     09-27  Mg     1.8     09-27      PT/INR - ( 26 Sep 2017 06:00 )   PT: 10.6 SEC;   INR: 0.95          PTT - ( 26 Sep 2017 06:00 )  PTT:26.5 SEC      A/P: 52M s/p R foot 1st metatarsal resection and well adhered lateral 5th met eschar  Chronic limb ischemia 2/2 R SFA occlusion  - Vanco level daily in AM, will dose by level  - pain control  - BP control  - appreciate cardiac clearance  - colonoscopy today  - tentative plan for R L ext bypass tomorrow Pt seen and examined. No complaints. tolerated bowel prep.   s/p colonoscopy and polypectomy           Objective:   Vitals:   Vital Signs Last 24 Hrs  T(C): 36.6 (27 Sep 2017 09:49), Max: 36.9 (27 Sep 2017 01:07)  T(F): 97.8 (27 Sep 2017 09:49), Max: 98.4 (27 Sep 2017 01:07)  HR: 52 (27 Sep 2017 09:49) (52 - 60)  BP: 129/61 (27 Sep 2017 09:49) (129/61 - 174/67)  BP(mean): --  RR: 18 (27 Sep 2017 09:49) (16 - 18)  SpO2: 99% (27 Sep 2017 09:49) (98% - 100%)  In/Outs:    09-26 @ 07:01  -  09-27 @ 07:00  --------------------------------------------------------  IN:  Total IN: 0 mL    OUT:    Voided: 1100 mL  Total OUT: 1100 mL    Total NET: -1100 mL        Physical Exam  General: NAD   Abdomen: soft, NT, ND  Rt foot dressing c/d/i    MEDICATIONS  (STANDING):  heparin  Injectable 5000 Unit(s) SubCutaneous every 8 hours  sodium bicarbonate 650 milliGRAM(s) Oral three times a day  isosorbide   mononitrate ER Tablet (IMDUR) 120 milliGRAM(s) Oral daily  diVALproex  milliGRAM(s) Oral two times a day  traZODone 100 milliGRAM(s) Oral at bedtime  atorvastatin 40 milliGRAM(s) Oral at bedtime  OLANZapine 20 milliGRAM(s) Oral at bedtime  carvedilol 12.5 milliGRAM(s) Oral every 12 hours  insulin lispro (HumaLOG) corrective regimen sliding scale   SubCutaneous three times a day before meals  insulin lispro (HumaLOG) corrective regimen sliding scale   SubCutaneous at bedtime  dextrose 5%. 1000 milliLiter(s) (50 mL/Hr) IV Continuous <Continuous>  dextrose 50% Injectable 12.5 Gram(s) IV Push once  dextrose 50% Injectable 25 Gram(s) IV Push once  dextrose 50% Injectable 25 Gram(s) IV Push once  pantoprazole    Tablet 40 milliGRAM(s) Oral before breakfast  amLODIPine   Tablet 10 milliGRAM(s) Oral daily  calcium gluconate IVPB 1 Gram(s) IV Intermittent daily  fluconAZOLE   Tablet      fluconAZOLE   Tablet 200 milliGRAM(s) Oral daily    MEDICATIONS  (PRN):  dextrose Gel 1 Dose(s) Oral once PRN Blood Glucose LESS THAN 70 milliGRAM(s)/deciliter  glucagon  Injectable 1 milliGRAM(s) IntraMuscular once PRN Glucose LESS THAN 70 milligrams/deciliter  acetaminophen   Tablet. 650 milliGRAM(s) Oral every 6 hours PRN Mild Pain (1 - 3)  hydrALAZINE Injectable 10 milliGRAM(s) IV Push every 4 hours PRN SBP>160      LABS:                        9.5    9.17  )-----------( 461      ( 27 Sep 2017 05:30 )             28.6     09-27    141  |  107  |  37<H>  ----------------------------<  92  4.7   |  18<L>  |  2.84<H>    Ca    8.8      27 Sep 2017 05:30  Phos  4.9     09-27  Mg     1.8     09-27      PT/INR - ( 26 Sep 2017 06:00 )   PT: 10.6 SEC;   INR: 0.95          PTT - ( 26 Sep 2017 06:00 )  PTT:26.5 SEC

## 2017-09-27 NOTE — CHART NOTE - NSCHARTNOTEFT_GEN_A_CORE
Source: Patient [ X ]    Family [ ]     other [ X ]    Patient seen for length of stay and malnutrition follow-up. Patient NPO for colonoscopy secondary to persistent anemia today. Patient repeatedly asks for food, drinks, supplements during visit. Denies any issues chewing/swallowing, nausea/vomiting/constipation/diarrhea at this time.       CURRENT DIET :   NPO after midnight   Clear Liquids       _______WEIGHTS:________  # with 40# weight loss   Admit (9/14) 54.6kg/120.3#    Current (9/27) 52.7kg/116.1#       Nutrition focused physical exam conducted - found signs of malnutrition [ ]absent [ ]present   Subcutaneous fat loss: [moderate ] Orbital fat pads region, [ moderate ]Buccal fat region, [ moderate]Triceps region,  [ moderate ]Ribs region  Muscle wasting: [severe ]Temples region, [severe]Clavicle region, [moderate]Shoulder region, [severe ]Interosseous region  EDEMA: 2+ right foot   SKIN: left groin, 2nd right toe skin wounds; no pressure injuries noted       _______PERTINENT MEDICATIONS:________     sodium bicarbonate  isosorbide   mononitrate ER Tablet (IMDUR)  atorvastatin  carvedilol  insulin lispro (HumaLOG) corrective regimen sliding scale  insulin lispro (HumaLOG) corrective regimen sliding scale  dextrose 5%.  dextrose 50% Injectable  dextrose 50% Injectable  dextrose 50% Injectable  pantoprazole    Tablet  amLODIPine   Tablet  calcium gluconate IVPB      _______PERTINENT LABS:_________    Sodium, Serum: 141 (09-27 @ 05:30)  Potassium, Serum: 4.7 (09-27 @ 05:30)  Glucose, Serum: 92 (09-27 @ 05:30)  Blood Urea Nitrogen, Serum: 37 <H> (09-27 @ 05:30)  Phosphorus Level, Serum: 4.9 <H> (09-27 @ 05:30)  Magnesium, Serum: 1.8 (09-27 @ 05:30)      FINGER STICKS:   CAPILLARY BLOOD GLUCOSE  113 (27 Sep 2017 12:53)  110 (27 Sep 2017 08:44)  102 (27 Sep 2017 06:40)  128 (26 Sep 2017 22:46)  179 (26 Sep 2017 18:11)        Estimated Needs:   [X] no change since previous assessment  [ ] recalculated:       Previous Nutrition Diagnosis:   [ X ] Malnutrition, severe  Nutrition Diagnosis is [ X ] ongoing  [ ] resolved [ ] not applicable          ___________________ADDITIONAL RECOMMENDATIONS___________________  1) Resume PO diet when medically feasible: Consistent Carbohydrate (evening snack), No Concentrated Phosphorus as tolerated   2) Upon diet resumed, please order Nepro 2x daily (850 kcals, 38.2g protein) for supplementation              Mabel Moses, GILBERT, CDN, CDE (Pager [phone])

## 2017-09-27 NOTE — PROGRESS NOTE ADULT - SUBJECTIVE AND OBJECTIVE BOX
GI Procedure Note (Full Note to follow on sunrise)    Procedure: Colonoscopy    Indication: Anemia, previous C-scope with poor bowel prep    Findings:   - The perianal and digital rectal examinations were normal.   - A 6 mm polyp was found in the ascending colon. The polyp was removed with a cold snare.  Resection and retrieval were complete.  - Internal hemorrhoids were found during retroflexion.  - The exam was otherwise without abnormality.    Impression:  - One 6 mm polyp in the ascending colon, removed with a cold snare.  Resected and retrieved.   - Internal hemorrhoids.   - The examination was otherwise normal.    Recommendations:   - Return patient to [facility] for ongoing care.   - Resume regular diet.   - Care as per primary team.   - Repeat colonoscopy in 5 years.    Daniel Abraham   GI Fellow

## 2017-09-27 NOTE — PROVIDER CONTACT NOTE (OTHER) - BACKGROUND
PT /86 upon admission back from colonoscopy. Unable to reach MD at that time. Given 1800 coreg as per order.

## 2017-09-27 NOTE — PROGRESS NOTE ADULT - ASSESSMENT
52y Male s/p colonoscopy and polypectomy   pt at this time is refusing  rle bypass for foot and cierra salvage   pt and fam understand this   at this time   d/c plan w platal and woundcare and abx as per id

## 2017-09-27 NOTE — PROVIDER CONTACT NOTE (OTHER) - RECOMMENDATIONS
BP treatment? PT has PRN hydralazine order for SBP> 160. MD asked to order diet for patient (as pt already eating )

## 2017-09-27 NOTE — PROGRESS NOTE ADULT - ASSESSMENT
Imp-  52y Male with CKD 4  chronic met acidosis - stable - cont oral bicarbonate  PVD s/p toe amp  HTN  pt has had issues with chronic hyperkalemia as outpt - has had to be maintained on oral kayexalate, therefore would not recommend ACE or ARB (I stopped enalapril)  can add oral hydralazine if needed

## 2017-09-27 NOTE — PROGRESS NOTE ADULT - SUBJECTIVE AND OBJECTIVE BOX
Patient is a 52y Male  being evaluated for   CKD 4  drowsy, no new complaints      PHYSICAL EXAM:  Vitals:T(C): 36.5 (09-27-17 @ 06:07), Max: 36.9 (09-27-17 @ 01:07)  HR: 58 (09-27-17 @ 06:07) (56 - 60)  BP: 152/69 (09-27-17 @ 06:07) (130/63 - 174/67)  RR: 16 (09-27-17 @ 06:07) (16 - 19)  SpO2: 98% (09-27-17 @ 06:07) (98% - 100%)  Wt(kg): --    General: no acute distress  HEENT:  NC, ext ears nl, oropharynx clear,  nose nl  Neck: No JVD, bruit, adenopathy or thyromegaly  Eyes: PERRL, no discharge, no icterus  Respiratory: cta/p bilat, no wheezes, rales, nl effort  Cardiovascular: regular rate, S1 and S2 no rub or gallop  Abd: : BS+, soft, NT , no rebound or guarding, no bruits  Extremities: no edema, no cyanosis    I and O's:  09-26 @ 07:01  -  09-27 @ 07:00  --------------------------------------------------------  IN: 0 mL / OUT: 1100 mL / NET: -1100 mL              REVIEW OF SYSTEMS:  CONSTITUTIONAL: No weakness, fevers or chills  RESPIRATORY: No cough, wheezing, hemoptysis; No shortness of breath  CARDIOVASCULAR: No chest pain or palpitations  GI : no abd pain, nausea or vomiting  GENITOURINARY: No dysuria, frequency or hematuria  All other review of systems is negative unless indicated above.    Allergies    No Known Allergies    Intolerances          MEDICATIONS  (STANDING):  heparin  Injectable 5000 Unit(s) SubCutaneous every 8 hours  sodium bicarbonate 650 milliGRAM(s) Oral three times a day  isosorbide   mononitrate ER Tablet (IMDUR) 120 milliGRAM(s) Oral daily  diVALproex  milliGRAM(s) Oral two times a day  traZODone 100 milliGRAM(s) Oral at bedtime  atorvastatin 40 milliGRAM(s) Oral at bedtime  OLANZapine 20 milliGRAM(s) Oral at bedtime  carvedilol 12.5 milliGRAM(s) Oral every 12 hours  insulin lispro (HumaLOG) corrective regimen sliding scale   SubCutaneous three times a day before meals  insulin lispro (HumaLOG) corrective regimen sliding scale   SubCutaneous at bedtime  dextrose 5%. 1000 milliLiter(s) (50 mL/Hr) IV Continuous <Continuous>  dextrose 50% Injectable 12.5 Gram(s) IV Push once  dextrose 50% Injectable 25 Gram(s) IV Push once  dextrose 50% Injectable 25 Gram(s) IV Push once  pantoprazole    Tablet 40 milliGRAM(s) Oral before breakfast  amLODIPine   Tablet 10 milliGRAM(s) Oral daily  calcium gluconate IVPB 1 Gram(s) IV Intermittent daily  fluconAZOLE   Tablet      fluconAZOLE   Tablet 200 milliGRAM(s) Oral daily      Sodium,Serum 141    09-27 @ 05:30  Sodium,Serum 138    09-26 @ 06:00  Sodium,Serum 140    09-25 @ 05:35  Sodium,Serum 140    09-24 @ 06:20    Potassium,Serum 4.7    09-27 @ 05:30  Potassium,Serum 4.9    09-26 @ 06:00  Potassium,Serum 5.1    09-25 @ 05:35  Potassium,Serum 5.3    09-24 @ 06:20    Chloride,Serum 107    09-27 @ 05:30  Chloride,Serum 106    09-26 @ 06:00  Chloride,Serum 109    09-25 @ 05:35  Chloride,Serum 107    09-24 @ 06:20    CO2, Serum 18    09-27 @ 05:30  CO2, Serum 19    09-26 @ 06:00  CO2, Serum 16    09-25 @ 05:35  CO2, Serum 21    09-24 @ 06:20    BUN 37    09-27 @ 05:30  BUN 40    09-26 @ 06:00  BUN 43    09-25 @ 05:35  BUN 56    09-24 @ 06:20    Creatinine, Serum 2.84    09-27 @ 05:30  Creatinine, Serum 2.78    09-26 @ 06:00  Creatinine, Serum 2.80    09-25 @ 05:35  Creatinine, Serum 2.93    09-24 @ 06:20      09-27    141  |  107  |  37<H>  ----------------------------<  92  4.7   |  18<L>  |  2.84<H>    Ca    8.8      27 Sep 2017 05:30  Phos  4.9     09-27  Mg     1.8     09-27                              9.5    9.17  )-----------( 461      ( 27 Sep 2017 05:30 )             28.6

## 2017-09-28 ENCOUNTER — TRANSCRIPTION ENCOUNTER (OUTPATIENT)
Age: 52
End: 2017-09-28

## 2017-09-28 LAB
BASE EXCESS BLDA CALC-SCNC: -4.1 MMOL/L — SIGNIFICANT CHANGE UP
BUN SERPL-MCNC: 38 MG/DL — HIGH (ref 7–23)
CA-I BLDA-SCNC: 1.28 MMOL/L — SIGNIFICANT CHANGE UP (ref 1.15–1.29)
CALCIUM SERPL-MCNC: 8.7 MG/DL — SIGNIFICANT CHANGE UP (ref 8.4–10.5)
CHLORIDE SERPL-SCNC: 104 MMOL/L — SIGNIFICANT CHANGE UP (ref 98–107)
CO2 SERPL-SCNC: 21 MMOL/L — LOW (ref 22–31)
CREAT SERPL-MCNC: 2.84 MG/DL — HIGH (ref 0.5–1.3)
GLUCOSE BLDA-MCNC: 156 MG/DL — HIGH (ref 70–99)
GLUCOSE SERPL-MCNC: 221 MG/DL — HIGH (ref 70–99)
HCO3 BLDA-SCNC: 21 MMOL/L — LOW (ref 22–26)
HCT VFR BLD CALC: 28.8 % — LOW (ref 39–50)
HCT VFR BLDA CALC: 30.5 % — LOW (ref 39–51)
HGB BLD-MCNC: 9.3 G/DL — LOW (ref 13–17)
HGB BLDA-MCNC: 9.8 G/DL — LOW (ref 13–17)
MAGNESIUM SERPL-MCNC: 1.8 MG/DL — SIGNIFICANT CHANGE UP (ref 1.6–2.6)
MCHC RBC-ENTMCNC: 29.2 PG — SIGNIFICANT CHANGE UP (ref 27–34)
MCHC RBC-ENTMCNC: 32.3 % — SIGNIFICANT CHANGE UP (ref 32–36)
MCV RBC AUTO: 90.3 FL — SIGNIFICANT CHANGE UP (ref 80–100)
NRBC # FLD: 0 — SIGNIFICANT CHANGE UP
PCO2 BLDA: 39 MMHG — SIGNIFICANT CHANGE UP (ref 35–48)
PH BLDA: 7.35 PH — SIGNIFICANT CHANGE UP (ref 7.35–7.45)
PHOSPHATE SERPL-MCNC: 3.8 MG/DL — SIGNIFICANT CHANGE UP (ref 2.5–4.5)
PLATELET # BLD AUTO: 504 K/UL — HIGH (ref 150–400)
PMV BLD: 10.2 FL — SIGNIFICANT CHANGE UP (ref 7–13)
PO2 BLDA: 139 MMHG — HIGH (ref 83–108)
POTASSIUM BLDA-SCNC: 4.8 MMOL/L — HIGH (ref 3.4–4.5)
POTASSIUM SERPL-MCNC: 4.9 MMOL/L — SIGNIFICANT CHANGE UP (ref 3.5–5.3)
POTASSIUM SERPL-SCNC: 4.9 MMOL/L — SIGNIFICANT CHANGE UP (ref 3.5–5.3)
RBC # BLD: 3.19 M/UL — LOW (ref 4.2–5.8)
RBC # FLD: 13.7 % — SIGNIFICANT CHANGE UP (ref 10.3–14.5)
SAO2 % BLDA: 98.7 % — SIGNIFICANT CHANGE UP (ref 95–99)
SODIUM BLDA-SCNC: 138 MMOL/L — SIGNIFICANT CHANGE UP (ref 136–146)
SODIUM SERPL-SCNC: 140 MMOL/L — SIGNIFICANT CHANGE UP (ref 135–145)
VANCOMYCIN TROUGH SERPL-MCNC: 19.2 UG/ML — SIGNIFICANT CHANGE UP (ref 10–20)
WBC # BLD: 9.15 K/UL — SIGNIFICANT CHANGE UP (ref 3.8–10.5)
WBC # FLD AUTO: 9.15 K/UL — SIGNIFICANT CHANGE UP (ref 3.8–10.5)

## 2017-09-28 PROCEDURE — 45385 COLONOSCOPY W/LESION REMOVAL: CPT | Mod: GC

## 2017-09-28 PROCEDURE — 35656 BPG FEMORAL-POPLITEAL: CPT | Mod: RT

## 2017-09-28 RX ORDER — HYDROMORPHONE HYDROCHLORIDE 2 MG/ML
30 INJECTION INTRAMUSCULAR; INTRAVENOUS; SUBCUTANEOUS
Qty: 0 | Refills: 0 | Status: DISCONTINUED | OUTPATIENT
Start: 2017-09-28 | End: 2017-10-02

## 2017-09-28 RX ORDER — ONDANSETRON 8 MG/1
4 TABLET, FILM COATED ORAL EVERY 6 HOURS
Qty: 0 | Refills: 0 | Status: DISCONTINUED | OUTPATIENT
Start: 2017-09-28 | End: 2017-10-02

## 2017-09-28 RX ORDER — ATORVASTATIN CALCIUM 80 MG/1
40 TABLET, FILM COATED ORAL AT BEDTIME
Qty: 0 | Refills: 0 | Status: DISCONTINUED | OUTPATIENT
Start: 2017-09-28 | End: 2017-10-05

## 2017-09-28 RX ORDER — ACETAMINOPHEN 500 MG
650 TABLET ORAL EVERY 6 HOURS
Qty: 0 | Refills: 0 | Status: DISCONTINUED | OUTPATIENT
Start: 2017-09-28 | End: 2017-10-05

## 2017-09-28 RX ORDER — TRAZODONE HCL 50 MG
100 TABLET ORAL AT BEDTIME
Qty: 0 | Refills: 0 | Status: DISCONTINUED | OUTPATIENT
Start: 2017-09-28 | End: 2017-10-05

## 2017-09-28 RX ORDER — INSULIN LISPRO 100/ML
VIAL (ML) SUBCUTANEOUS
Qty: 0 | Refills: 0 | Status: DISCONTINUED | OUTPATIENT
Start: 2017-09-28 | End: 2017-10-05

## 2017-09-28 RX ORDER — OLANZAPINE 15 MG/1
20 TABLET, FILM COATED ORAL AT BEDTIME
Qty: 0 | Refills: 0 | Status: DISCONTINUED | OUTPATIENT
Start: 2017-09-28 | End: 2017-10-05

## 2017-09-28 RX ORDER — AMLODIPINE BESYLATE 2.5 MG/1
10 TABLET ORAL DAILY
Qty: 0 | Refills: 0 | Status: DISCONTINUED | OUTPATIENT
Start: 2017-09-28 | End: 2017-10-05

## 2017-09-28 RX ORDER — HYDROMORPHONE HYDROCHLORIDE 2 MG/ML
0.5 INJECTION INTRAMUSCULAR; INTRAVENOUS; SUBCUTANEOUS
Qty: 0 | Refills: 0 | Status: DISCONTINUED | OUTPATIENT
Start: 2017-09-28 | End: 2017-10-02

## 2017-09-28 RX ORDER — SODIUM CHLORIDE 9 MG/ML
1000 INJECTION, SOLUTION INTRAVENOUS
Qty: 0 | Refills: 0 | Status: DISCONTINUED | OUTPATIENT
Start: 2017-09-28 | End: 2017-09-29

## 2017-09-28 RX ORDER — DIVALPROEX SODIUM 500 MG/1
500 TABLET, DELAYED RELEASE ORAL
Qty: 0 | Refills: 0 | Status: DISCONTINUED | OUTPATIENT
Start: 2017-09-28 | End: 2017-10-05

## 2017-09-28 RX ORDER — SODIUM BICARBONATE 1 MEQ/ML
650 SYRINGE (ML) INTRAVENOUS THREE TIMES A DAY
Qty: 0 | Refills: 0 | Status: DISCONTINUED | OUTPATIENT
Start: 2017-09-28 | End: 2017-09-29

## 2017-09-28 RX ORDER — GABAPENTIN 400 MG/1
100 CAPSULE ORAL EVERY 12 HOURS
Qty: 0 | Refills: 0 | Status: DISCONTINUED | OUTPATIENT
Start: 2017-09-28 | End: 2017-10-05

## 2017-09-28 RX ORDER — HEPARIN SODIUM 5000 [USP'U]/ML
5000 INJECTION INTRAVENOUS; SUBCUTANEOUS EVERY 8 HOURS
Qty: 0 | Refills: 0 | Status: DISCONTINUED | OUTPATIENT
Start: 2017-09-28 | End: 2017-10-05

## 2017-09-28 RX ORDER — HYDRALAZINE HCL 50 MG
10 TABLET ORAL EVERY 4 HOURS
Qty: 0 | Refills: 0 | Status: DISCONTINUED | OUTPATIENT
Start: 2017-09-28 | End: 2017-10-05

## 2017-09-28 RX ORDER — PANTOPRAZOLE SODIUM 20 MG/1
40 TABLET, DELAYED RELEASE ORAL
Qty: 0 | Refills: 0 | Status: DISCONTINUED | OUTPATIENT
Start: 2017-09-28 | End: 2017-10-05

## 2017-09-28 RX ORDER — CALCIUM GLUCONATE 100 MG/ML
1 VIAL (ML) INTRAVENOUS DAILY
Qty: 0 | Refills: 0 | Status: DISCONTINUED | OUTPATIENT
Start: 2017-09-28 | End: 2017-09-28

## 2017-09-28 RX ORDER — NALOXONE HYDROCHLORIDE 4 MG/.1ML
0.1 SPRAY NASAL
Qty: 0 | Refills: 0 | Status: DISCONTINUED | OUTPATIENT
Start: 2017-09-28 | End: 2017-10-02

## 2017-09-28 RX ORDER — INSULIN LISPRO 100/ML
VIAL (ML) SUBCUTANEOUS EVERY 6 HOURS
Qty: 0 | Refills: 0 | Status: DISCONTINUED | OUTPATIENT
Start: 2017-09-28 | End: 2017-09-28

## 2017-09-28 RX ORDER — FLUCONAZOLE 150 MG/1
200 TABLET ORAL DAILY
Qty: 0 | Refills: 0 | Status: DISCONTINUED | OUTPATIENT
Start: 2017-09-28 | End: 2017-10-05

## 2017-09-28 RX ORDER — CARVEDILOL PHOSPHATE 80 MG/1
12.5 CAPSULE, EXTENDED RELEASE ORAL EVERY 12 HOURS
Qty: 0 | Refills: 0 | Status: DISCONTINUED | OUTPATIENT
Start: 2017-09-28 | End: 2017-10-05

## 2017-09-28 RX ORDER — INSULIN LISPRO 100/ML
VIAL (ML) SUBCUTANEOUS AT BEDTIME
Qty: 0 | Refills: 0 | Status: DISCONTINUED | OUTPATIENT
Start: 2017-09-28 | End: 2017-10-05

## 2017-09-28 RX ADMIN — HYDROMORPHONE HYDROCHLORIDE 30 MILLILITER(S): 2 INJECTION INTRAMUSCULAR; INTRAVENOUS; SUBCUTANEOUS at 14:05

## 2017-09-28 RX ADMIN — ATORVASTATIN CALCIUM 40 MILLIGRAM(S): 80 TABLET, FILM COATED ORAL at 23:23

## 2017-09-28 RX ADMIN — CARVEDILOL PHOSPHATE 12.5 MILLIGRAM(S): 80 CAPSULE, EXTENDED RELEASE ORAL at 17:35

## 2017-09-28 RX ADMIN — HEPARIN SODIUM 5000 UNIT(S): 5000 INJECTION INTRAVENOUS; SUBCUTANEOUS at 06:37

## 2017-09-28 RX ADMIN — Medication 650 MILLIGRAM(S): at 17:34

## 2017-09-28 RX ADMIN — DIVALPROEX SODIUM 500 MILLIGRAM(S): 500 TABLET, DELAYED RELEASE ORAL at 17:35

## 2017-09-28 RX ADMIN — Medication 3: at 23:23

## 2017-09-28 RX ADMIN — Medication 650 MILLIGRAM(S): at 06:37

## 2017-09-28 RX ADMIN — SODIUM CHLORIDE 50 MILLILITER(S): 9 INJECTION, SOLUTION INTRAVENOUS at 17:33

## 2017-09-28 RX ADMIN — ISOSORBIDE MONONITRATE 120 MILLIGRAM(S): 60 TABLET, EXTENDED RELEASE ORAL at 18:11

## 2017-09-28 RX ADMIN — OLANZAPINE 20 MILLIGRAM(S): 15 TABLET, FILM COATED ORAL at 23:24

## 2017-09-28 RX ADMIN — HYDROMORPHONE HYDROCHLORIDE 30 MILLILITER(S): 2 INJECTION INTRAMUSCULAR; INTRAVENOUS; SUBCUTANEOUS at 20:38

## 2017-09-28 RX ADMIN — Medication 100 MILLIGRAM(S): at 23:24

## 2017-09-28 RX ADMIN — FLUCONAZOLE 200 MILLIGRAM(S): 150 TABLET ORAL at 18:11

## 2017-09-28 RX ADMIN — Medication 10 MILLIGRAM(S): at 19:39

## 2017-09-28 RX ADMIN — SODIUM CHLORIDE 50 MILLILITER(S): 9 INJECTION, SOLUTION INTRAVENOUS at 13:28

## 2017-09-28 RX ADMIN — SODIUM CHLORIDE 100 MILLILITER(S): 9 INJECTION, SOLUTION INTRAVENOUS at 06:36

## 2017-09-28 RX ADMIN — HYDROMORPHONE HYDROCHLORIDE 30 MILLILITER(S): 2 INJECTION INTRAMUSCULAR; INTRAVENOUS; SUBCUTANEOUS at 17:33

## 2017-09-28 RX ADMIN — Medication 2: at 06:37

## 2017-09-28 RX ADMIN — HEPARIN SODIUM 5000 UNIT(S): 5000 INJECTION INTRAVENOUS; SUBCUTANEOUS at 17:36

## 2017-09-28 RX ADMIN — Medication 3: at 17:34

## 2017-09-28 RX ADMIN — Medication 650 MILLIGRAM(S): at 23:24

## 2017-09-28 RX ADMIN — SODIUM CHLORIDE 50 MILLILITER(S): 9 INJECTION, SOLUTION INTRAVENOUS at 23:24

## 2017-09-28 RX ADMIN — GABAPENTIN 100 MILLIGRAM(S): 400 CAPSULE ORAL at 18:11

## 2017-09-28 RX ADMIN — PANTOPRAZOLE SODIUM 40 MILLIGRAM(S): 20 TABLET, DELAYED RELEASE ORAL at 06:36

## 2017-09-28 NOTE — BRIEF OPERATIVE NOTE - POST-OP DX
Osteomyelitis of ankle or foot, acute, right  09/19/2017    Active  Ralph Pizarro  Peripheral arterial disease  09/28/2017    Active  Scott Montes
Osteomyelitis of ankle or foot, acute, right  09/19/2017    Active  Ralph Pizarro

## 2017-09-28 NOTE — PROVIDER CONTACT NOTE (OTHER) - ACTION/TREATMENT ORDERED:
MD aware of elevated BP and med administration. As per MD given PRN hydralazine if BP still elevated on recheck.

## 2017-09-28 NOTE — PRE-OP CHECKLIST - PATIENT PROBLEMS/NEEDS
Patient expressed no known problems or needs

## 2017-09-28 NOTE — PROGRESS NOTE ADULT - SUBJECTIVE AND OBJECTIVE BOX
Vascular Surgery Post-Op Note    Pre-Op Dx: SFA occlusion  Procedure: Creation of femoropopliteal arterial bypass using graft  Amputation of digit of foot    Surgeon: Dr. Agrawal    Subjective:   Pt seen and examined at the bedside. Pt w/ no complaints. Denies F/C/N/V. Pain controlled with medication.     Vital Signs Last 24 Hrs  T(C): 36.8 (28 Sep 2017 15:00), Max: 36.8 (28 Sep 2017 04:34)  T(F): 98.2 (28 Sep 2017 15:00), Max: 98.3 (28 Sep 2017 04:34)  HR: 56 (28 Sep 2017 16:00) (44 - 75)  BP: 151/64 (28 Sep 2017 15:45) (125/44 - 195/69)  BP(mean): --  RR: 22 (28 Sep 2017 16:00) (11 - 22)  SpO2: 98% (28 Sep 2017 16:00) (96% - 100%)    Physical Exam:  General: NAD, resting comfortably in bed  Neuro: A/O x 3, no focal deficits  Pulmonary: Nonlabored breathing, no respiratory distress  Incision: groin incision c,d,i. Medial knee incision covered with ace wrap.  dressing c,d,i  Extremities: Left palpable PT/DP.  Right palpable PT.  DP covered by ace wrap.        LABS:                        9.3    9.15  )-----------( 504      ( 28 Sep 2017 06:00 )             28.8     09-28    140  |  104  |  38<H>  ----------------------------<  221<H>  4.9   |  21<L>  |  2.84<H>    Ca    8.7      28 Sep 2017 06:00  Phos  3.8     09-28  Mg     1.8     09-28        CAPILLARY BLOOD GLUCOSE  186 (28 Sep 2017 12:45)  218 (28 Sep 2017 06:33)  218 (28 Sep 2017 04:34)  346 (27 Sep 2017 22:22)      Assessment:52y Male s/p Creation of femoropopliteal arterial bypass using graft, doing well    Plan:   - stable to leave PACU back to floor  - regular diet  - ivf  - pain control  - keep cobos in place overnight  - q4 pulse checks, contact team if exam changes  - dvt ppx    [phone]

## 2017-09-28 NOTE — PRE-OP CHECKLIST - HOW ADMINISTERED
See MAR for last dose taken

## 2017-09-28 NOTE — BRIEF OPERATIVE NOTE - OPERATION/FINDINGS
Right Femoral to above knee popliteal artery bypass using 6mm ringed PTFE. patient had strongly palpable DP and PT pulses post-op
XXX cc of purulent fluid. Bone cortex of cuneiform hard, white, and void of all necrotic tissue.

## 2017-09-28 NOTE — PRE-OP CHECKLIST - HAIR REMOVAL
hair removal not indicated

## 2017-09-28 NOTE — PROGRESS NOTE ADULT - ASSESSMENT
52y Male s/p colonoscopy and polypectomy   - OR today for fem pop bypass  - f/u GI recs  - pain control  - regular diet post op  -dvt ppx

## 2017-09-28 NOTE — PROVIDER CONTACT NOTE (MEDICATION) - ACTION/TREATMENT ORDERED:
Md aware. Do not give hydralazine since patient will be going to OR.
C team notified during rounds, OK to hold morning meds.
MD Medina notified, will give hydralazine 10mg and recheck BP manually in 1 hour.
MD Zamora notified, will give hydralazine 10mg and recheck BP in 1 hour.

## 2017-09-28 NOTE — PROVIDER CONTACT NOTE (MEDICATION) - SITUATION
Patients /72; HR 58. Unable to give coreg and norvasc due to parameters. Hydralazine IVP prdered PRN for bp >160. do you want me to give that instead

## 2017-09-28 NOTE — BRIEF OPERATIVE NOTE - PROCEDURE
<<-----Click on this checkbox to enter Procedure Creation of femoropopliteal arterial bypass using graft  09/28/2017    Active  [id number]

## 2017-09-28 NOTE — PROGRESS NOTE ADULT - SUBJECTIVE AND OBJECTIVE BOX
Pt seen and examined. No complaints. He is still agreeable to going to the operating room today for bypass.     Objective:   T(C): 36.8 (09-28-17 @ 07:43), Max: 36.8 (09-28-17 @ 04:34)  HR: 55 (09-28-17 @ 07:43) (53 - 70)  BP: 195/69 (09-28-17 @ 07:43) (158/65 - 195/69)  RR: 16 (09-28-17 @ 07:43) (16 - 18)  SpO2: 98% (09-28-17 @ 07:43) (96% - 100%)  Wt(kg): --    09-27 @ 07:01  -  09-28 @ 07:00  --------------------------------------------------------  IN:    dextrose 5% + sodium chloride 0.45%.: 1200 mL    IV PiggyBack: 200 mL  Total IN: 1400 mL    OUT:    Voided: 1000 mL  Total OUT: 1000 mL    Total NET: 400 mL      Physical Exam  General: NAD   Abdomen: soft, NT, ND  Rt foot dressing c/d/i                          9.3    9.15  )-----------( 504      ( 28 Sep 2017 06:00 )             28.8     09-28    140  |  104  |  38<H>  ----------------------------<  221<H>  4.9   |  21<L>  |  2.84<H>    Ca    8.7      28 Sep 2017 06:00  Phos  3.8     09-28  Mg     1.8     09-28

## 2017-09-29 ENCOUNTER — TRANSCRIPTION ENCOUNTER (OUTPATIENT)
Age: 52
End: 2017-09-29

## 2017-09-29 LAB
BUN SERPL-MCNC: 41 MG/DL — HIGH (ref 7–23)
BUN SERPL-MCNC: 42 MG/DL — HIGH (ref 7–23)
CALCIUM SERPL-MCNC: 8.4 MG/DL — SIGNIFICANT CHANGE UP (ref 8.4–10.5)
CALCIUM SERPL-MCNC: 8.6 MG/DL — SIGNIFICANT CHANGE UP (ref 8.4–10.5)
CHLORIDE SERPL-SCNC: 103 MMOL/L — SIGNIFICANT CHANGE UP (ref 98–107)
CHLORIDE SERPL-SCNC: 105 MMOL/L — SIGNIFICANT CHANGE UP (ref 98–107)
CO2 SERPL-SCNC: 18 MMOL/L — LOW (ref 22–31)
CO2 SERPL-SCNC: 18 MMOL/L — LOW (ref 22–31)
CREAT SERPL-MCNC: 2.71 MG/DL — HIGH (ref 0.5–1.3)
CREAT SERPL-MCNC: 2.86 MG/DL — HIGH (ref 0.5–1.3)
GLUCOSE SERPL-MCNC: 252 MG/DL — HIGH (ref 70–99)
GLUCOSE SERPL-MCNC: 275 MG/DL — HIGH (ref 70–99)
HCT VFR BLD CALC: 28.5 % — LOW (ref 39–50)
HGB BLD-MCNC: 9.4 G/DL — LOW (ref 13–17)
MAGNESIUM SERPL-MCNC: 1.7 MG/DL — SIGNIFICANT CHANGE UP (ref 1.6–2.6)
MCHC RBC-ENTMCNC: 29.7 PG — SIGNIFICANT CHANGE UP (ref 27–34)
MCHC RBC-ENTMCNC: 33 % — SIGNIFICANT CHANGE UP (ref 32–36)
MCV RBC AUTO: 90.2 FL — SIGNIFICANT CHANGE UP (ref 80–100)
NRBC # FLD: 0 — SIGNIFICANT CHANGE UP
PHOSPHATE SERPL-MCNC: 3.7 MG/DL — SIGNIFICANT CHANGE UP (ref 2.5–4.5)
PLATELET # BLD AUTO: 352 K/UL — SIGNIFICANT CHANGE UP (ref 150–400)
PMV BLD: SIGNIFICANT CHANGE UP FL (ref 7–13)
POTASSIUM SERPL-MCNC: 4.9 MMOL/L — SIGNIFICANT CHANGE UP (ref 3.5–5.3)
POTASSIUM SERPL-MCNC: 5.8 MMOL/L — HIGH (ref 3.5–5.3)
POTASSIUM SERPL-SCNC: 4.9 MMOL/L — SIGNIFICANT CHANGE UP (ref 3.5–5.3)
POTASSIUM SERPL-SCNC: 5.8 MMOL/L — HIGH (ref 3.5–5.3)
RBC # BLD: 3.16 M/UL — LOW (ref 4.2–5.8)
RBC # FLD: SIGNIFICANT CHANGE UP % (ref 10.3–14.5)
SODIUM SERPL-SCNC: 135 MMOL/L — SIGNIFICANT CHANGE UP (ref 135–145)
SODIUM SERPL-SCNC: 135 MMOL/L — SIGNIFICANT CHANGE UP (ref 135–145)
SURGICAL PATHOLOGY STUDY: SIGNIFICANT CHANGE UP
VANCOMYCIN TROUGH SERPL-MCNC: 12.3 UG/ML — SIGNIFICANT CHANGE UP (ref 10–20)
WBC # BLD: 17.19 K/UL — HIGH (ref 3.8–10.5)
WBC # FLD AUTO: 17.19 K/UL — HIGH (ref 3.8–10.5)

## 2017-09-29 RX ORDER — SODIUM CHLORIDE 9 MG/ML
1000 INJECTION INTRAMUSCULAR; INTRAVENOUS; SUBCUTANEOUS
Qty: 0 | Refills: 0 | Status: DISCONTINUED | OUTPATIENT
Start: 2017-09-29 | End: 2017-10-04

## 2017-09-29 RX ORDER — VANCOMYCIN HCL 1 G
500 VIAL (EA) INTRAVENOUS ONCE
Qty: 0 | Refills: 0 | Status: COMPLETED | OUTPATIENT
Start: 2017-09-29 | End: 2017-09-29

## 2017-09-29 RX ORDER — FUROSEMIDE 40 MG
80 TABLET ORAL ONCE
Qty: 0 | Refills: 0 | Status: COMPLETED | OUTPATIENT
Start: 2017-09-29 | End: 2017-09-29

## 2017-09-29 RX ORDER — SODIUM POLYSTYRENE SULFONATE 4.1 MEQ/G
30 POWDER, FOR SUSPENSION ORAL ONCE
Qty: 0 | Refills: 0 | Status: COMPLETED | OUTPATIENT
Start: 2017-09-29 | End: 2017-09-29

## 2017-09-29 RX ORDER — SODIUM BICARBONATE 1 MEQ/ML
1300 SYRINGE (ML) INTRAVENOUS THREE TIMES A DAY
Qty: 0 | Refills: 0 | Status: DISCONTINUED | OUTPATIENT
Start: 2017-09-29 | End: 2017-10-05

## 2017-09-29 RX ORDER — SODIUM CHLORIDE 9 MG/ML
1000 INJECTION INTRAMUSCULAR; INTRAVENOUS; SUBCUTANEOUS ONCE
Qty: 0 | Refills: 0 | Status: COMPLETED | OUTPATIENT
Start: 2017-09-29 | End: 2017-09-29

## 2017-09-29 RX ORDER — CILOSTAZOL 100 MG/1
100 TABLET ORAL DAILY
Qty: 0 | Refills: 0 | Status: DISCONTINUED | OUTPATIENT
Start: 2017-09-29 | End: 2017-10-05

## 2017-09-29 RX ORDER — LANOLIN ALCOHOL/MO/W.PET/CERES
3 CREAM (GRAM) TOPICAL ONCE
Qty: 0 | Refills: 0 | Status: COMPLETED | OUTPATIENT
Start: 2017-09-29 | End: 2017-09-29

## 2017-09-29 RX ORDER — INSULIN GLARGINE 100 [IU]/ML
8 INJECTION, SOLUTION SUBCUTANEOUS AT BEDTIME
Qty: 0 | Refills: 0 | Status: DISCONTINUED | OUTPATIENT
Start: 2017-09-29 | End: 2017-10-05

## 2017-09-29 RX ADMIN — Medication 100 MILLIGRAM(S): at 10:19

## 2017-09-29 RX ADMIN — Medication 10 MILLIGRAM(S): at 22:44

## 2017-09-29 RX ADMIN — Medication 2: at 22:40

## 2017-09-29 RX ADMIN — HYDROMORPHONE HYDROCHLORIDE 30 MILLILITER(S): 2 INJECTION INTRAMUSCULAR; INTRAVENOUS; SUBCUTANEOUS at 20:28

## 2017-09-29 RX ADMIN — SODIUM CHLORIDE 30 MILLILITER(S): 9 INJECTION INTRAMUSCULAR; INTRAVENOUS; SUBCUTANEOUS at 10:20

## 2017-09-29 RX ADMIN — PANTOPRAZOLE SODIUM 40 MILLIGRAM(S): 20 TABLET, DELAYED RELEASE ORAL at 06:33

## 2017-09-29 RX ADMIN — Medication 5: at 18:18

## 2017-09-29 RX ADMIN — HEPARIN SODIUM 5000 UNIT(S): 5000 INJECTION INTRAVENOUS; SUBCUTANEOUS at 18:20

## 2017-09-29 RX ADMIN — Medication 650 MILLIGRAM(S): at 06:33

## 2017-09-29 RX ADMIN — HYDROMORPHONE HYDROCHLORIDE 30 MILLILITER(S): 2 INJECTION INTRAMUSCULAR; INTRAVENOUS; SUBCUTANEOUS at 08:03

## 2017-09-29 RX ADMIN — Medication 3 MILLIGRAM(S): at 01:42

## 2017-09-29 RX ADMIN — GABAPENTIN 100 MILLIGRAM(S): 400 CAPSULE ORAL at 06:33

## 2017-09-29 RX ADMIN — Medication 100 MILLIGRAM(S): at 22:40

## 2017-09-29 RX ADMIN — GABAPENTIN 100 MILLIGRAM(S): 400 CAPSULE ORAL at 18:20

## 2017-09-29 RX ADMIN — ISOSORBIDE MONONITRATE 120 MILLIGRAM(S): 60 TABLET, EXTENDED RELEASE ORAL at 11:57

## 2017-09-29 RX ADMIN — SODIUM CHLORIDE 1000 MILLILITER(S): 9 INJECTION INTRAMUSCULAR; INTRAVENOUS; SUBCUTANEOUS at 10:19

## 2017-09-29 RX ADMIN — ATORVASTATIN CALCIUM 40 MILLIGRAM(S): 80 TABLET, FILM COATED ORAL at 22:40

## 2017-09-29 RX ADMIN — Medication 80 MILLIGRAM(S): at 11:55

## 2017-09-29 RX ADMIN — Medication 1300 MILLIGRAM(S): at 22:40

## 2017-09-29 RX ADMIN — DIVALPROEX SODIUM 500 MILLIGRAM(S): 500 TABLET, DELAYED RELEASE ORAL at 18:20

## 2017-09-29 RX ADMIN — SODIUM POLYSTYRENE SULFONATE 30 GRAM(S): 4.1 POWDER, FOR SUSPENSION ORAL at 08:03

## 2017-09-29 RX ADMIN — OLANZAPINE 20 MILLIGRAM(S): 15 TABLET, FILM COATED ORAL at 22:40

## 2017-09-29 RX ADMIN — Medication 3: at 12:46

## 2017-09-29 RX ADMIN — Medication 1300 MILLIGRAM(S): at 13:49

## 2017-09-29 RX ADMIN — CILOSTAZOL 100 MILLIGRAM(S): 100 TABLET ORAL at 18:22

## 2017-09-29 RX ADMIN — DIVALPROEX SODIUM 500 MILLIGRAM(S): 500 TABLET, DELAYED RELEASE ORAL at 06:33

## 2017-09-29 RX ADMIN — HEPARIN SODIUM 5000 UNIT(S): 5000 INJECTION INTRAVENOUS; SUBCUTANEOUS at 10:46

## 2017-09-29 RX ADMIN — HEPARIN SODIUM 5000 UNIT(S): 5000 INJECTION INTRAVENOUS; SUBCUTANEOUS at 01:35

## 2017-09-29 RX ADMIN — Medication 10 MILLIGRAM(S): at 06:32

## 2017-09-29 RX ADMIN — FLUCONAZOLE 200 MILLIGRAM(S): 150 TABLET ORAL at 11:57

## 2017-09-29 RX ADMIN — INSULIN GLARGINE 8 UNIT(S): 100 INJECTION, SOLUTION SUBCUTANEOUS at 22:41

## 2017-09-29 RX ADMIN — Medication 2: at 10:18

## 2017-09-29 NOTE — DISCHARGE NOTE ADULT - MEDICATION SUMMARY - MEDICATIONS TO TAKE
I will START or STAY ON the medications listed below when I get home from the hospital:    Rolling Walker  -- Dispense 1 Rolling Walker  -- Indication: For walking    acetaminophen 325 mg oral tablet  -- 2 tab(s) by mouth every 6 hours, As needed, Mild Pain (1 - 3)  -- Indication: For Pain    oxyCODONE 5 mg oral tablet  -- 1 tab(s) by mouth every 4 hours, As needed, Moderate and Severe Pain  -- Indication: For Pain    isosorbide mononitrate 120 mg oral tablet, extended release  -- 1 tab(s) by mouth once a day (at bedtime)  -- Indication: For Angina    divalproex sodium  -- 500 milligram(s) by mouth 2 times a day  -- Indication: For seizure    gabapentin 100 mg oral capsule  -- 1 cap(s) by mouth every 12 hours  -- Indication: For seizure    traZODone  -- 100 milligram(s) by mouth once a day (at bedtime)  -- Indication: For Bipolar 1 disorder    Tradjenta 5 mg oral tablet  -- 1 tab(s) by mouth once a day  -- Check with your doctor before becoming pregnant.  Obtain medical advice before taking any non-prescription drugs as some may affect the action of this medication.    -- Indication: For diabetes    Prandin 0.5 mg oral tablet  -- 1 tab(s) by mouth 3 times a day (before meals)  -- Do not drink alcoholic beverages when taking this medication.  It is very important that you take or use this exactly as directed.  Do not skip doses or discontinue unless directed by your doctor.  Obtain medical advice before taking any non-prescription drugs as some may affect the action of this medication.    -- Indication: For diabetes     OLANZapine 20 mg oral tablet  -- 1 tab(s) by mouth once a day (at bedtime)  -- Indication: For Bipolar 1 disorder    carvedilol 12.5 mg oral tablet  -- 1 tab(s) by mouth every 12 hours  -- Indication: For hypertension    amLODIPine 10 mg oral tablet  -- 1 tab(s) by mouth once a day  -- Indication: For hypertension    Kayexalate oral and rectal powder  -- 15 gram(s) by mouth and rectally once x 1 days  -- Not to be used with sorbitol  Shake well before use.    -- Indication: For hyperkalemia    melatonin 3 mg oral tablet  -- 1 tab(s) by mouth once a day (at bedtime)  -- Indication: For insomnia    pantoprazole 40 mg oral delayed release tablet  -- 1 tab(s) by mouth once a day (before a meal)  -- Indication: For GERD    ergocalciferol 50,000 intl units (1.25 mg) oral capsule  -- 1 cap(s) by mouth once a week  -- Indication: For CKD (chronic kidney disease) stage 4, GFR 15-29 ml/min

## 2017-09-29 NOTE — PROGRESS NOTE ADULT - ASSESSMENT
53 yo M s/p R foot 1st metatarsal resection and well adhered lateral 5th met eschar  - Pt seen and examined  - No signs of active infection present. Surgical site appears to be viable and healing well  - Please have Z float boots on at all times in bed   - Betadine and padding applied to eschar, DSD to surgical site  - stable from podiatry stand point   - d/w attending

## 2017-09-29 NOTE — PROGRESS NOTE ADULT - SUBJECTIVE AND OBJECTIVE BOX
C Team Surgery Progress Note     S: Patient resting comfortably on morning rounds. Pain well-controlled currently.        MEDICATIONS  (STANDING):  isosorbide   mononitrate ER Tablet (IMDUR) 120 milliGRAM(s) Oral daily  dextrose 5%. 1000 milliLiter(s) (50 mL/Hr) IV Continuous <Continuous>  dextrose 50% Injectable 12.5 Gram(s) IV Push once  dextrose 50% Injectable 25 Gram(s) IV Push once  dextrose 50% Injectable 25 Gram(s) IV Push once  insulin lispro (HumaLOG) corrective regimen sliding scale   SubCutaneous at bedtime  heparin  Injectable 5000 Unit(s) SubCutaneous every 8 hours  diVALproex  milliGRAM(s) Oral two times a day  traZODone 100 milliGRAM(s) Oral at bedtime  atorvastatin 40 milliGRAM(s) Oral at bedtime  OLANZapine 20 milliGRAM(s) Oral at bedtime  carvedilol 12.5 milliGRAM(s) Oral every 12 hours  pantoprazole    Tablet 40 milliGRAM(s) Oral before breakfast  amLODIPine   Tablet 10 milliGRAM(s) Oral daily  fluconAZOLE   Tablet 200 milliGRAM(s) Oral daily  HYDROmorphone PCA (1 mG/mL) 30 milliLiter(s) PCA Continuous PCA Continuous  gabapentin 100 milliGRAM(s) Oral every 12 hours  insulin lispro (HumaLOG) corrective regimen sliding scale   SubCutaneous three times a day before meals  sodium chloride 0.9%. 1000 milliLiter(s) (30 mL/Hr) IV Continuous <Continuous>  sodium bicarbonate 1300 milliGRAM(s) Oral three times a day  cilostazol 100 milliGRAM(s) Oral daily    MEDICATIONS  (PRN):  dextrose Gel 1 Dose(s) Oral once PRN Blood Glucose LESS THAN 70 milliGRAM(s)/deciliter  glucagon  Injectable 1 milliGRAM(s) IntraMuscular once PRN Glucose LESS THAN 70 milligrams/deciliter  acetaminophen   Tablet. 650 milliGRAM(s) Oral every 6 hours PRN Mild Pain (1 - 3)  hydrALAZINE Injectable 10 milliGRAM(s) IV Push every 4 hours PRN SBP>160  HYDROmorphone PCA (1 mG/mL) Rescue Clinician Bolus 0.5 milliGRAM(s) IV Push every 15 minutes PRN for Pain Scale GREATER THAN 6  naloxone Injectable 0.1 milliGRAM(s) IV Push every 3 minutes PRN For ANY of the following changes in patient status:  A. RR LESS THAN 10 breaths per minute, B. Oxygen saturation LESS THAN 90%, C. Sedation score of 6  ondansetron Injectable 4 milliGRAM(s) IV Push every 6 hours PRN Nausea      Physical Exam:    Afebrile, HR: 61, BP: 146/57, RR: 18, 96%        09-28-17 @ 07:01  -  09-29-17 @ 07:00  --------------------------------------------------------  IN: 590 mL / OUT: 1525 mL / NET: -935 mL    09-29-17 @ 07:01 - 09-29-17 @ 12:27  --------------------------------------------------------  IN: 0 mL / OUT: 800 mL / NET: -800 mL      General: NAD, AOx3  RLE: Dressing clean, dry, intact.   Pulses exam: palpable DP and PT       LABS:                        9.4    17.19 )-----------( 352      ( 29 Sep 2017 06:50 )             28.5     09-29    135  |  105  |  42<H>  ----------------------------<  252<H>  5.8<H>   |  18<L>  |  2.86<H>    Ca    8.6      29 Sep 2017 06:50  Phos  3.7     09-29  Mg     1.7     09-29 C Team Surgery Progress Note     S: Patient resting comfortably on morning rounds. Pain well-controlled currently.        MEDICATIONS  (STANDING):  isosorbide   mononitrate ER Tablet (IMDUR) 120 milliGRAM(s) Oral daily  dextrose 5%. 1000 milliLiter(s) (50 mL/Hr) IV Continuous <Continuous>  dextrose 50% Injectable 12.5 Gram(s) IV Push once  dextrose 50% Injectable 25 Gram(s) IV Push once  dextrose 50% Injectable 25 Gram(s) IV Push once  insulin lispro (HumaLOG) corrective regimen sliding scale   SubCutaneous at bedtime  heparin  Injectable 5000 Unit(s) SubCutaneous every 8 hours  diVALproex  milliGRAM(s) Oral two times a day  traZODone 100 milliGRAM(s) Oral at bedtime  atorvastatin 40 milliGRAM(s) Oral at bedtime  OLANZapine 20 milliGRAM(s) Oral at bedtime  carvedilol 12.5 milliGRAM(s) Oral every 12 hours  pantoprazole    Tablet 40 milliGRAM(s) Oral before breakfast  amLODIPine   Tablet 10 milliGRAM(s) Oral daily  fluconAZOLE   Tablet 200 milliGRAM(s) Oral daily  HYDROmorphone PCA (1 mG/mL) 30 milliLiter(s) PCA Continuous PCA Continuous  gabapentin 100 milliGRAM(s) Oral every 12 hours  insulin lispro (HumaLOG) corrective regimen sliding scale   SubCutaneous three times a day before meals  sodium chloride 0.9%. 1000 milliLiter(s) (30 mL/Hr) IV Continuous <Continuous>  sodium bicarbonate 1300 milliGRAM(s) Oral three times a day  cilostazol 100 milliGRAM(s) Oral daily    MEDICATIONS  (PRN):  dextrose Gel 1 Dose(s) Oral once PRN Blood Glucose LESS THAN 70 milliGRAM(s)/deciliter  glucagon  Injectable 1 milliGRAM(s) IntraMuscular once PRN Glucose LESS THAN 70 milligrams/deciliter  acetaminophen   Tablet. 650 milliGRAM(s) Oral every 6 hours PRN Mild Pain (1 - 3)  hydrALAZINE Injectable 10 milliGRAM(s) IV Push every 4 hours PRN SBP>160  HYDROmorphone PCA (1 mG/mL) Rescue Clinician Bolus 0.5 milliGRAM(s) IV Push every 15 minutes PRN for Pain Scale GREATER THAN 6  naloxone Injectable 0.1 milliGRAM(s) IV Push every 3 minutes PRN For ANY of the following changes in patient status:  A. RR LESS THAN 10 breaths per minute, B. Oxygen saturation LESS THAN 90%, C. Sedation score of 6  ondansetron Injectable 4 milliGRAM(s) IV Push every 6 hours PRN Nausea      Physical Exam:    Afebrile, HR: 61, BP: 146/57, RR: 18, 96%        09-28-17 @ 07:01  -  09-29-17 @ 07:00  --------------------------------------------------------  IN: 590 mL / OUT: 1525 mL / NET: -935 mL    09-29-17 @ 07:01 - 09-29-17 @ 12:27  --------------------------------------------------------  IN: 0 mL / OUT: 800 mL / NET: -800 mL      General: NAD, AOx3  RLE: Dressing clean, dry, intact.   Pulses exam: palpable DP and PT  both +2 bounding   rle warm well perf       LABS:                        9.4    17.19 )-----------( 352      ( 29 Sep 2017 06:50 )             28.5     09-29    135  |  105  |  42<H>  ----------------------------<  252<H>  5.8<H>   |  18<L>  |  2.86<H>    Ca    8.6      29 Sep 2017 06:50  Phos  3.7     09-29  Mg     1.7     09-29

## 2017-09-29 NOTE — PROGRESS NOTE ADULT - ASSESSMENT
A/P: 52M w/ non-healing wound RLE, SFA occlusion now s/p Right Fem - above knee Pop Bypass     - pain control   - regular diet as tolerated   - am labs  - F/U Renal recommendations   - pletal, SQH  - OOB to chair            C Team Surgery   [id number]

## 2017-09-29 NOTE — DISCHARGE NOTE ADULT - PATIENT PORTAL LINK FT
“You can access the [facility] Patient Portal, offered by [facility], by registering with the following website: [email or web address]”

## 2017-09-29 NOTE — DISCHARGE NOTE ADULT - ADDITIONAL INSTRUCTIONS
Please follow up with Dr. Agrawal within 1-2 weeks after discharge from the hospital. You may call [phone] to schedule an appointment. Please follow up with Dr. Diaz within 1-2 weeks from discharge as well.

## 2017-09-29 NOTE — DISCHARGE NOTE ADULT - PLAN OF CARE
- weight bearing as tolerated to the heel of right foot   - daily dressing changes with betadine, 4x4 gauze, ABD pads, and kerlix  - follow up with Dr. Watkins within one week of discharge (call  to make an appointment) foot wound healing CKD IV Stop lisinopril for now  Low potassium Diet  Follow up with Dr. Diaz this week to possibly resume your lisinopril Antibiotics for MRSA in culture from right toe, bone negative for OM continue antibiotics for ..... GI Repeat colonoscopy in 5 years Stop lisinopril for now  Low potassium low phosphorus Diet, 2 cans of nephro daily  Follow up with Dr. Diaz this week to possibly resume your lisinopril continue antibiotics until 10/4 Stop lisinopril for now, continue daily lasix  Low potassium low phosphorus Diet, 2 cans of nephro daily  Follow up with Dr. Diaz this week to possibly resume your lisinopril

## 2017-09-29 NOTE — DISCHARGE NOTE ADULT - INSTRUCTIONS
Resume a low potassium diet.  Take kayexalate daily to keep potassium low.  Also eat foods low in sugar to help keep diabetes in control.

## 2017-09-29 NOTE — DISCHARGE NOTE ADULT - CARE PLAN
Goal:	foot wound healing  Instructions for follow-up, activity and diet:	- weight bearing as tolerated to the heel of right foot   - daily dressing changes with betadine, 4x4 gauze, ABD pads, and kerlix  - follow up with Dr. Watkins within one week of discharge (call  to make an appointment) Goal:	foot wound healing  Instructions for follow-up, activity and diet:	- weight bearing as tolerated to the heel of right foot   - daily dressing changes with betadine, 4x4 gauze, ABD pads, and kerlix  - follow up with Dr. Watkins within one week of discharge (call  to make an appointment)  Goal:	CKD IV  Instructions for follow-up, activity and diet:	Stop lisinopril for now  Low potassium Diet  Follow up with Dr. Diaz this week to possibly resume your lisinopril  Goal:	Antibiotics for MRSA in culture from right toe, bone negative for OM  Instructions for follow-up, activity and diet:	continue antibiotics for .....  Goal:	GI  Instructions for follow-up, activity and diet:	Repeat colonoscopy in 5 years Goal:	foot wound healing  Instructions for follow-up, activity and diet:	- weight bearing as tolerated to the heel of right foot   - daily dressing changes with betadine, 4x4 gauze, ABD pads, and kerlix  - follow up with Dr. Watkins within one week of discharge (call  to make an appointment)  Goal:	CKD IV  Instructions for follow-up, activity and diet:	Stop lisinopril for now  Low potassium low phosphorus Diet, 2 cans of nephro daily  Follow up with Dr. Diaz this week to possibly resume your lisinopril  Goal:	Antibiotics for MRSA in culture from right toe, bone negative for OM  Instructions for follow-up, activity and diet:	continue antibiotics until 10/4  Goal:	GI  Instructions for follow-up, activity and diet:	Repeat colonoscopy in 5 years Goal:	foot wound healing  Instructions for follow-up, activity and diet:	- weight bearing as tolerated to the heel of right foot   - daily dressing changes with betadine, 4x4 gauze, ABD pads, and kerlix  - follow up with Dr. Watkins within one week of discharge (call  to make an appointment)  Goal:	CKD IV  Instructions for follow-up, activity and diet:	Stop lisinopril for now, continue daily lasix  Low potassium low phosphorus Diet, 2 cans of nephro daily  Follow up with Dr. Diaz this week to possibly resume your lisinopril  Goal:	Antibiotics for MRSA in culture from right toe, bone negative for OM  Instructions for follow-up, activity and diet:	continue antibiotics until 10/4  Goal:	GI  Instructions for follow-up, activity and diet:	Repeat colonoscopy in 5 years Principal Discharge DX:	Arterial insufficiency with ischemic ulcer  Goal:	foot wound healing  Instructions for follow-up, activity and diet:	- weight bearing as tolerated to the heel of right foot   - daily dressing changes with betadine, 4x4 gauze, ABD pads, and kerlix  - follow up with Dr. Watkins within one week of discharge (call  to make an appointment)  Goal:	CKD IV  Instructions for follow-up, activity and diet:	Stop lisinopril for now, continue daily lasix  Low potassium low phosphorus Diet, 2 cans of nephro daily  Follow up with Dr. Diaz this week to possibly resume your lisinopril  Goal:	Antibiotics for MRSA in culture from right toe, bone negative for OM  Instructions for follow-up, activity and diet:	continue antibiotics until 10/4  Goal:	GI  Instructions for follow-up, activity and diet:	Repeat colonoscopy in 5 years

## 2017-09-29 NOTE — PROGRESS NOTE ADULT - SUBJECTIVE AND OBJECTIVE BOX
Patient is a 52y old  Male who presents with a chief complaint of right foot (14 Sep 2017 02:55)       INTERVAL HPI/OVERNIGHT EVENTS:  Patient seen and evaluated at bedside.  Pt is resting comfortable in NAD. Denies N/V/F/C.      Allergies    No Known Allergies    Intolerances        Vital Signs Last 24 Hrs  T(C): 36.4 (29 Sep 2017 06:29), Max: 36.9 (29 Sep 2017 01:28)  T(F): 97.5 (29 Sep 2017 06:29), Max: 98.5 (29 Sep 2017 01:28)  HR: 56 (29 Sep 2017 06:29) (44 - 75)  BP: 169/69 (29 Sep 2017 06:29) (125/44 - 183/78)  BP(mean): --  RR: 18 (29 Sep 2017 06:29) (11 - 22)  SpO2: 97% (29 Sep 2017 06:29) (96% - 100%)    LABS:                        9.4    17.19 )-----------( 352      ( 29 Sep 2017 06:50 )             28.5     09-29    135  |  105  |  42<H>  ----------------------------<  252<H>  5.8<H>   |  18<L>  |  2.86<H>    Ca    8.6      29 Sep 2017 06:50  Phos  3.7     09-29  Mg     1.7     09-29          CAPILLARY BLOOD GLUCOSE  397 (28 Sep 2017 22:16)  286 (28 Sep 2017 17:29)  186 (28 Sep 2017 12:45)          Lower Extremity Physical Exam:  s/p R foot 1st metatarsal resection. Sutures intact, CFT <2 s along all edges of incision site. Skin edges well coapted with central popped suture for prior hematoma. No dehiscence, minimal amount of drainage noted from hematoma site -  serosanguinous.     Right lateral 5th met head has a small eschar. Periwound is normal skin, eschar is well adhered, no fluctuance, no erythema, no malodor no drainage. Eschar is stable. 1.5 x 1.5 cm

## 2017-09-29 NOTE — PROGRESS NOTE ADULT - ASSESSMENT
1-Stable post-op renal function with mild hyperkalemia  Will restrict dietary K  Start NaHCo3  Repeat K later this afternoon

## 2017-09-29 NOTE — DISCHARGE NOTE ADULT - HOSPITAL COURSE
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 He was sent in on 9/13 by his podiatrist Dr Olivier with concern for infection of his amp site. He has a non-healing ulcer at medial aspect of toe amp site. He went for an angiogram revealing R SFA occlusion, he went for a 1st digit toe amputation on 9/19 with podiatry wound culture growing MRSA, bone negative. ID called for recommendations.   Patient scheduled for outaptient scope and had one in house, A 6 mm polyp was found in the ascending colon and was biopsied and revealed Tubular adenoma.  Patient with CKD IV, during admission his cr increased to 3.36 and K to 5.5, as per renal hold lisinopril for now and put patient on low potassium diet. 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 He was sent in on 9/13 by his podiatrist Dr Olivier with concern for infection of his amp site. He has a non-healing ulcer at medial aspect of toe amp site. He went for an angiogram revealing R SFA occlusion, he went for a 1st digit toe amputation on 9/19 with podiatry wound culture growing MRSA, bone negative. ID called for recommendations.   Patient scheduled for outaptient scope and had one in house, A 6 mm polyp was found in the ascending colon and was biopsied and revealed Tubular adenoma.  Patient with CKD IV, during admission his cr increased to 3.36 and K to 5.5, as per renal hold lisinopril for now and put patient on low potassium low phosphorus diet. 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 He was sent in on 9/13 by his podiatrist Dr Olivier with concern for infection of his amp site. He has a non-healing ulcer at medial aspect of toe amp site. He went for an angiogram revealing R SFA occlusion, he went for a 1st digit toe amputation on 9/19 with podiatry wound culture growing MRSA, bone negative. ID called for recommendations.   Patient scheduled for outaptient scope and had one in house, A 6 mm polyp was found in the ascending colon and was biopsied and revealed Tubular adenoma, he is to follow up with gi in 5 years.  Patient with CKD IV, during admission his cr increased to 3.56 and K to 5.8, as per renal hold lisinopril for now and put patient on low potassium low phosphorus diet. Resume home lasix. Patient with Malnutrition, severe, seen by nutritionist.  During hospital course patients diet was slowly advanced as tolerated.  At this time, pt is tolerating a regular diet.  Pt has been deemed stable for discharge at this time.

## 2017-09-29 NOTE — DISCHARGE NOTE ADULT - CARE PROVIDERS DIRECT ADDRESSES
[email or web address]

## 2017-09-29 NOTE — DISCHARGE NOTE ADULT - CARE PROVIDER_API CALL
Benjamin Agrawal), Vascular Surgery  [address] [facility]  [address]  [place] [address]  Phone: [phone]  Fax: [phone]    Aidan Diaz), Internal Medicine; Nephrology  [address] [facility]  [address]  [place] [address]  Phone: [phone]  Fax: [phone]    Gabriel Cross), Gastroenterology; Internal Medicine  [address] [facility]  [address]  [place] [address]  Phone: [phone]  Fax: [phone]

## 2017-09-29 NOTE — PROGRESS NOTE ADULT - SUBJECTIVE AND OBJECTIVE BOX
Subjective  S/P LE bypass      PHYSICAL EXAM:  Vitals:  T(F): 97.5 (09-29-17 @ 06:29), Max: 98.5 (09-29-17 @ 01:28)  HR: 56 (09-29-17 @ 06:29)  BP: 169/69 (09-29-17 @ 06:29)  BP(mean): --  RR: 18 (09-29-17 @ 06:29)  SpO2: 97% (09-29-17 @ 06:29)  Wt(kg): --  Constitutional: no acute distress  HEENT:  NC, ext ears nl, oropharynx clear,  nose nl  Neck: No JVD, bruit, adenopathy or thyromegaly  Eyes: PERRL, no discharge, no icterus  Respiratory: cta/p bilat, no wheezes, rales, nl effort  Cardiovascular: regular rate, S1 and S2 no rub or gallop  Abd: : BS+, soft, NT , no rebound or guarding, no bruits  Extremities: right foot bandaged  Neurological: A/O x 3, nl coordination and tone    I and O's:    09-28 @ 07:01  -  09-29 @ 07:00  --------------------------------------------------------  IN: 590 mL / OUT: 1525 mL / NET: -935 mL    09-29 @ 07:01  -  09-29 @ 09:38  --------------------------------------------------------  IN: 0 mL / OUT: 800 mL / NET: -800 mL        Height (cm): 180.34 (09-28 @ 04:34)  Weight (kg): 53.5 (09-28 @ 04:34)  BMI (kg/m2): 16.5 (09-28 @ 04:34)    REVIEW OF SYSTEMS:  CONSTITUTIONAL: No weakness, fevers or chills  RESPIRATORY: No cough, wheezing, hemoptysis; No shortness of breath  CARDIOVASCULAR: No chest pain or palpitations  GI : no abd pains, nausea or vomiting  GENITOURINARY: No dysuria, frequency or hematuria  All other review of systems is negative unless indicated above.    Allergies    No Known Allergies    Intolerances        MEDICATIONS  (STANDING):  isosorbide   mononitrate ER Tablet (IMDUR) 120 milliGRAM(s) Oral daily  dextrose 5%. 1000 milliLiter(s) (50 mL/Hr) IV Continuous <Continuous>  dextrose 50% Injectable 12.5 Gram(s) IV Push once  dextrose 50% Injectable 25 Gram(s) IV Push once  dextrose 50% Injectable 25 Gram(s) IV Push once  insulin lispro (HumaLOG) corrective regimen sliding scale   SubCutaneous at bedtime  heparin  Injectable 5000 Unit(s) SubCutaneous every 8 hours  sodium bicarbonate 650 milliGRAM(s) Oral three times a day  diVALproex  milliGRAM(s) Oral two times a day  traZODone 100 milliGRAM(s) Oral at bedtime  atorvastatin 40 milliGRAM(s) Oral at bedtime  OLANZapine 20 milliGRAM(s) Oral at bedtime  carvedilol 12.5 milliGRAM(s) Oral every 12 hours  pantoprazole    Tablet 40 milliGRAM(s) Oral before breakfast  amLODIPine   Tablet 10 milliGRAM(s) Oral daily  fluconAZOLE   Tablet 200 milliGRAM(s) Oral daily  HYDROmorphone PCA (1 mG/mL) 30 milliLiter(s) PCA Continuous PCA Continuous  gabapentin 100 milliGRAM(s) Oral every 12 hours  insulin lispro (HumaLOG) corrective regimen sliding scale   SubCutaneous three times a day before meals  sodium chloride 0.9%. 1000 milliLiter(s) (30 mL/Hr) IV Continuous <Continuous>  vancomycin  IVPB 500 milliGRAM(s) IV Intermittent once  sodium chloride 0.9% Bolus 1000 milliLiter(s) IV Bolus once  furosemide   Injectable 80 milliGRAM(s) IV Push once      Sodium,Serum 135    09-29 @ 06:50  Sodium,Serum 140    09-28 @ 06:00  Sodium,Serum 141    09-27 @ 05:30  Sodium,Serum 138    09-26 @ 06:00    Potassium,Serum 5.8    09-29 @ 06:50  Potassium,Serum 4.9    09-28 @ 06:00  Potassium,Serum 4.7    09-27 @ 05:30  Potassium,Serum 4.9    09-26 @ 06:00    Chloride,Serum 105    09-29 @ 06:50  Chloride,Serum 104    09-28 @ 06:00  Chloride,Serum 107    09-27 @ 05:30  Chloride,Serum 106    09-26 @ 06:00    CO2, Serum 18    09-29 @ 06:50  CO2, Serum 21    09-28 @ 06:00  CO2, Serum 18    09-27 @ 05:30  CO2, Serum 19    09-26 @ 06:00    BUN 42    09-29 @ 06:50  BUN 38    09-28 @ 06:00  BUN 37    09-27 @ 05:30  BUN 40    09-26 @ 06:00    Creatinine, Serum 2.86    09-29 @ 06:50  Creatinine, Serum 2.84    09-28 @ 06:00  Creatinine, Serum 2.84    09-27 @ 05:30  Creatinine, Serum 2.78    09-26 @ 06:00      09-29    135  |  105  |  42<H>  ----------------------------<  252<H>  5.8<H>   |  18<L>  |  2.86<H>    Ca    8.6      29 Sep 2017 06:50  Phos  3.7     09-29  Mg     1.7     09-29        Urine Studies:      Urine chemistry:   Urine Na:   Urine Creatinine:   Urine Protein/Cr ratio:  Urine K:   Urine Osm:   24 Hr urine studies:

## 2017-09-29 NOTE — PROGRESS NOTE ADULT - SUBJECTIVE AND OBJECTIVE BOX
Anesthesia Pain Management Service    SUBJECTIVE: Patient is doing well with IV PCA and no significant problems reported.    Pain Scale Score	At rest: ___ 	With Activity: ___ 	[X ] Refer to charted pain scores    THERAPY:    [ ] IV PCA Morphine		[ ] 5 mg/mL	[ ] 1 mg/mL  [X ] IV PCA Hydromorphone	[ ] 5 mg/mL	[X ] 1 mg/mL  [ ] IV PCA Fentanyl		[ ] 50 micrograms/mL    Demand dose __0.2_ lockout __6_ (minutes) Continuous Rate _0__ Total: ___  Daily      MEDICATIONS  (STANDING):  isosorbide   mononitrate ER Tablet (IMDUR) 120 milliGRAM(s) Oral daily  dextrose 5%. 1000 milliLiter(s) (50 mL/Hr) IV Continuous <Continuous>  dextrose 50% Injectable 12.5 Gram(s) IV Push once  dextrose 50% Injectable 25 Gram(s) IV Push once  dextrose 50% Injectable 25 Gram(s) IV Push once  insulin lispro (HumaLOG) corrective regimen sliding scale   SubCutaneous at bedtime  heparin  Injectable 5000 Unit(s) SubCutaneous every 8 hours  diVALproex  milliGRAM(s) Oral two times a day  traZODone 100 milliGRAM(s) Oral at bedtime  atorvastatin 40 milliGRAM(s) Oral at bedtime  OLANZapine 20 milliGRAM(s) Oral at bedtime  carvedilol 12.5 milliGRAM(s) Oral every 12 hours  pantoprazole    Tablet 40 milliGRAM(s) Oral before breakfast  amLODIPine   Tablet 10 milliGRAM(s) Oral daily  fluconAZOLE   Tablet 200 milliGRAM(s) Oral daily  HYDROmorphone PCA (1 mG/mL) 30 milliLiter(s) PCA Continuous PCA Continuous  gabapentin 100 milliGRAM(s) Oral every 12 hours  insulin lispro (HumaLOG) corrective regimen sliding scale   SubCutaneous three times a day before meals  sodium chloride 0.9%. 1000 milliLiter(s) (30 mL/Hr) IV Continuous <Continuous>  sodium bicarbonate 1300 milliGRAM(s) Oral three times a day  cilostazol 100 milliGRAM(s) Oral daily    MEDICATIONS  (PRN):  dextrose Gel 1 Dose(s) Oral once PRN Blood Glucose LESS THAN 70 milliGRAM(s)/deciliter  glucagon  Injectable 1 milliGRAM(s) IntraMuscular once PRN Glucose LESS THAN 70 milligrams/deciliter  acetaminophen   Tablet. 650 milliGRAM(s) Oral every 6 hours PRN Mild Pain (1 - 3)  hydrALAZINE Injectable 10 milliGRAM(s) IV Push every 4 hours PRN SBP>160  HYDROmorphone PCA (1 mG/mL) Rescue Clinician Bolus 0.5 milliGRAM(s) IV Push every 15 minutes PRN for Pain Scale GREATER THAN 6  naloxone Injectable 0.1 milliGRAM(s) IV Push every 3 minutes PRN For ANY of the following changes in patient status:  A. RR LESS THAN 10 breaths per minute, B. Oxygen saturation LESS THAN 90%, C. Sedation score of 6  ondansetron Injectable 4 milliGRAM(s) IV Push every 6 hours PRN Nausea      OBJECTIVE:    Sedation Score:	[ X] Alert	[ ] Drowsy 	[ ] Arousable	[ ] Asleep	[ ] Unresponsive    Side Effects:	[X ] None	[ ] Nausea	[ ] Vomiting	[ ] Pruritus  		[ ] Other:    Vital Signs Last 24 Hrs  T(C): 37 (29 Sep 2017 11:52), Max: 37 (29 Sep 2017 11:52)  T(F): 98.6 (29 Sep 2017 11:52), Max: 98.6 (29 Sep 2017 11:52)  HR: 61 (29 Sep 2017 11:52) (51 - 65)  BP: 146/57 (29 Sep 2017 11:52) (146/57 - 183/78)  BP(mean): --  RR: 18 (29 Sep 2017 10:00) (18 - 22)  SpO2: 96% (29 Sep 2017 11:52) (96% - 99%)    ASSESSMENT/ PLAN    Therapy to  be:	[ ] Continue   [ X] Discontinued   [X ] Change to prn Analgesics    Documentation and Verification of current medications:   [X] Done	[ ] Not done, not elligible    Comments: PRN Oral/IV opioids and/or Adjuvant medication to be ordered at this point.

## 2017-09-30 LAB
BUN SERPL-MCNC: 49 MG/DL — HIGH (ref 7–23)
CALCIUM SERPL-MCNC: 8.5 MG/DL — SIGNIFICANT CHANGE UP (ref 8.4–10.5)
CHLORIDE SERPL-SCNC: 106 MMOL/L — SIGNIFICANT CHANGE UP (ref 98–107)
CO2 SERPL-SCNC: 18 MMOL/L — LOW (ref 22–31)
CREAT SERPL-MCNC: 2.94 MG/DL — HIGH (ref 0.5–1.3)
GLUCOSE SERPL-MCNC: 160 MG/DL — HIGH (ref 70–99)
HCT VFR BLD CALC: 28.1 % — LOW (ref 39–50)
HGB BLD-MCNC: 9.2 G/DL — LOW (ref 13–17)
MAGNESIUM SERPL-MCNC: 1.5 MG/DL — LOW (ref 1.6–2.6)
MCHC RBC-ENTMCNC: 29.9 PG — SIGNIFICANT CHANGE UP (ref 27–34)
MCHC RBC-ENTMCNC: 32.7 % — SIGNIFICANT CHANGE UP (ref 32–36)
MCV RBC AUTO: 91.2 FL — SIGNIFICANT CHANGE UP (ref 80–100)
NRBC # FLD: 0 — SIGNIFICANT CHANGE UP
PHOSPHATE SERPL-MCNC: 3.7 MG/DL — SIGNIFICANT CHANGE UP (ref 2.5–4.5)
PLATELET # BLD AUTO: 359 K/UL — SIGNIFICANT CHANGE UP (ref 150–400)
PMV BLD: 10.1 FL — SIGNIFICANT CHANGE UP (ref 7–13)
POTASSIUM SERPL-MCNC: 5.2 MMOL/L — SIGNIFICANT CHANGE UP (ref 3.5–5.3)
POTASSIUM SERPL-SCNC: 5.2 MMOL/L — SIGNIFICANT CHANGE UP (ref 3.5–5.3)
RBC # BLD: 3.08 M/UL — LOW (ref 4.2–5.8)
RBC # FLD: 13.9 % — SIGNIFICANT CHANGE UP (ref 10.3–14.5)
SODIUM SERPL-SCNC: 139 MMOL/L — SIGNIFICANT CHANGE UP (ref 135–145)
VANCOMYCIN TROUGH SERPL-MCNC: 14 UG/ML — SIGNIFICANT CHANGE UP (ref 10–20)
WBC # BLD: 13.85 K/UL — HIGH (ref 3.8–10.5)
WBC # FLD AUTO: 13.85 K/UL — HIGH (ref 3.8–10.5)

## 2017-09-30 RX ORDER — LANOLIN ALCOHOL/MO/W.PET/CERES
3 CREAM (GRAM) TOPICAL ONCE
Qty: 0 | Refills: 0 | Status: COMPLETED | OUTPATIENT
Start: 2017-09-30 | End: 2017-09-30

## 2017-09-30 RX ORDER — VANCOMYCIN HCL 1 G
500 VIAL (EA) INTRAVENOUS ONCE
Qty: 0 | Refills: 0 | Status: COMPLETED | OUTPATIENT
Start: 2017-09-30 | End: 2017-09-30

## 2017-09-30 RX ORDER — LISINOPRIL 2.5 MG/1
5 TABLET ORAL DAILY
Qty: 0 | Refills: 0 | Status: DISCONTINUED | OUTPATIENT
Start: 2017-09-30 | End: 2017-10-02

## 2017-09-30 RX ADMIN — HEPARIN SODIUM 5000 UNIT(S): 5000 INJECTION INTRAVENOUS; SUBCUTANEOUS at 09:55

## 2017-09-30 RX ADMIN — LISINOPRIL 5 MILLIGRAM(S): 2.5 TABLET ORAL at 09:55

## 2017-09-30 RX ADMIN — Medication 3 MILLIGRAM(S): at 01:39

## 2017-09-30 RX ADMIN — HYDROMORPHONE HYDROCHLORIDE 30 MILLILITER(S): 2 INJECTION INTRAMUSCULAR; INTRAVENOUS; SUBCUTANEOUS at 08:14

## 2017-09-30 RX ADMIN — Medication 1300 MILLIGRAM(S): at 05:48

## 2017-09-30 RX ADMIN — DIVALPROEX SODIUM 500 MILLIGRAM(S): 500 TABLET, DELAYED RELEASE ORAL at 05:48

## 2017-09-30 RX ADMIN — FLUCONAZOLE 200 MILLIGRAM(S): 150 TABLET ORAL at 12:20

## 2017-09-30 RX ADMIN — Medication 3 MILLIGRAM(S): at 23:10

## 2017-09-30 RX ADMIN — HEPARIN SODIUM 5000 UNIT(S): 5000 INJECTION INTRAVENOUS; SUBCUTANEOUS at 18:51

## 2017-09-30 RX ADMIN — ISOSORBIDE MONONITRATE 120 MILLIGRAM(S): 60 TABLET, EXTENDED RELEASE ORAL at 12:22

## 2017-09-30 RX ADMIN — Medication 100 MILLIGRAM(S): at 22:55

## 2017-09-30 RX ADMIN — HYDROMORPHONE HYDROCHLORIDE 30 MILLILITER(S): 2 INJECTION INTRAMUSCULAR; INTRAVENOUS; SUBCUTANEOUS at 20:36

## 2017-09-30 RX ADMIN — Medication 1300 MILLIGRAM(S): at 22:55

## 2017-09-30 RX ADMIN — SODIUM CHLORIDE 30 MILLILITER(S): 9 INJECTION INTRAMUSCULAR; INTRAVENOUS; SUBCUTANEOUS at 20:37

## 2017-09-30 RX ADMIN — GABAPENTIN 100 MILLIGRAM(S): 400 CAPSULE ORAL at 18:52

## 2017-09-30 RX ADMIN — Medication 1: at 18:50

## 2017-09-30 RX ADMIN — HEPARIN SODIUM 5000 UNIT(S): 5000 INJECTION INTRAVENOUS; SUBCUTANEOUS at 01:49

## 2017-09-30 RX ADMIN — PANTOPRAZOLE SODIUM 40 MILLIGRAM(S): 20 TABLET, DELAYED RELEASE ORAL at 06:00

## 2017-09-30 RX ADMIN — ATORVASTATIN CALCIUM 40 MILLIGRAM(S): 80 TABLET, FILM COATED ORAL at 22:55

## 2017-09-30 RX ADMIN — Medication 1300 MILLIGRAM(S): at 13:30

## 2017-09-30 RX ADMIN — CARVEDILOL PHOSPHATE 12.5 MILLIGRAM(S): 80 CAPSULE, EXTENDED RELEASE ORAL at 18:52

## 2017-09-30 RX ADMIN — Medication 500 MILLIGRAM(S): at 13:30

## 2017-09-30 RX ADMIN — AMLODIPINE BESYLATE 10 MILLIGRAM(S): 2.5 TABLET ORAL at 05:48

## 2017-09-30 RX ADMIN — GABAPENTIN 100 MILLIGRAM(S): 400 CAPSULE ORAL at 05:49

## 2017-09-30 RX ADMIN — OLANZAPINE 20 MILLIGRAM(S): 15 TABLET, FILM COATED ORAL at 22:54

## 2017-09-30 RX ADMIN — Medication 10 MILLIGRAM(S): at 02:44

## 2017-09-30 RX ADMIN — SODIUM CHLORIDE 30 MILLILITER(S): 9 INJECTION INTRAMUSCULAR; INTRAVENOUS; SUBCUTANEOUS at 08:14

## 2017-09-30 RX ADMIN — INSULIN GLARGINE 8 UNIT(S): 100 INJECTION, SOLUTION SUBCUTANEOUS at 22:55

## 2017-09-30 RX ADMIN — CILOSTAZOL 100 MILLIGRAM(S): 100 TABLET ORAL at 12:20

## 2017-09-30 RX ADMIN — Medication 1: at 09:54

## 2017-09-30 RX ADMIN — SODIUM CHLORIDE 30 MILLILITER(S): 9 INJECTION INTRAMUSCULAR; INTRAVENOUS; SUBCUTANEOUS at 12:19

## 2017-09-30 RX ADMIN — CARVEDILOL PHOSPHATE 12.5 MILLIGRAM(S): 80 CAPSULE, EXTENDED RELEASE ORAL at 05:48

## 2017-09-30 RX ADMIN — Medication 10 MILLIGRAM(S): at 07:30

## 2017-09-30 RX ADMIN — DIVALPROEX SODIUM 500 MILLIGRAM(S): 500 TABLET, DELAYED RELEASE ORAL at 18:52

## 2017-09-30 NOTE — PROGRESS NOTE ADULT - ASSESSMENT
52M w/ non-healing wound RLE, SFA occlusion now POD 2 s/p Right Fem- above knee Pop Bypass     - pain control   - regular diet as tolerated   - Daily labs, daily vanc trough; give 500mg IV vanc today to maintain trough level b/w 10-20 (today's vanc trough was 14.0)  - F/U Renal recommendations   - Continue pletal 50BID, SQH  - OOB to chair   -Physical Therapy consulted as patient has not been seen post-operatively.  They will evaluate patient and determine what type of rehab/care he needs.  Will plan dispo to rehab based on PT's recs.  Spoke to  about rehab planning.  Will f/u with  (Pgr 1161) once PT has recommendations.  Likely patient will be discharged to rehab on Monday. 52M w/ non-healing wound RLE, SFA occlusion now POD 2 s/p Right Fem- above knee Pop Bypass with rle excellent perfusion      - pain control   - regular diet as tolerated   - Daily labs, daily vanc trough; give 500mg IV vanc today to maintain trough level b/w 10-20 (today's vanc trough was 14.0)  - F/U Renal recommendations   - Continue pletal 50BID, SQH  - OOB to chair   -Physical Therapy consulted as patient has not been seen post-operatively.  They will evaluate patient and determine what type of rehab/care he needs.  Will plan dispo to rehab based on PT's recs.  Spoke to  about rehab planning.  Will f/u with  (Pgr 1161) once PT has recommendations.  Likely patient will be discharged to rehab on Monday.

## 2017-09-30 NOTE — PROGRESS NOTE ADULT - SUBJECTIVE AND OBJECTIVE BOX
Surgery C Team (Vascular) Progress Note:    HD 17 POD 2 s/p fem-pop bypass    Subjective:  No acute overnight events. Patient examined at bedside, resting comfortably.  He remained asleep during the exam.  No apparent complaints.    Objective:  T(C): 36.8 (30 Sep 2017 12:22), Max: 37.1 (30 Sep 2017 05:43)  T(F): 98.2 (30 Sep 2017 12:22), Max: 98.8 (30 Sep 2017 05:43)  HR: 68 (30 Sep 2017 12:22) (59 - 75)  BP: 131/53 (30 Sep 2017 12:22) (131/53 - 167/64)  RR: 18 (30 Sep 2017 09:35) (18 - 20)  SpO2: 96% (30 Sep 2017 12:22) (94% - 99%)                          9.2    13.85 )-----------( 359      ( 30 Sep 2017 06:30 )             28.1       09-30    139  |  106  |  49<H>  ----------------------------<  160<H>  5.2   |  18<L>  |  2.94<H>    Ca    8.5      30 Sep 2017 06:30  Phos  3.7     09-30  Mg     1.5     09-30        I&O's Detail    29 Sep 2017 07:01  -  30 Sep 2017 07:00  --------------------------------------------------------  IN:    sodium chloride 0.9%.: 660 mL  Total IN: 660 mL    OUT:    Indwelling Catheter - Urethral: 3100 mL    Voided: 2200 mL  Total OUT: 5300 mL    Total NET: -4640 mL      30 Sep 2017 07:01  -  30 Sep 2017 14:10  --------------------------------------------------------  IN:  Total IN: 0 mL    OUT:    Voided: 500 mL  Total OUT: 500 mL    Total NET: -500 mL    Focused Physical Exam:  General: NAD  Resp: Nonlabored breathing  Extremities: RLE Dressing clean, dry, intact - no sign of infection.  Pulses exam: palpable DP and PT  both +2.  RLE warm well perfused.

## 2017-09-30 NOTE — PROGRESS NOTE ADULT - SUBJECTIVE AND OBJECTIVE BOX
having mild- mod pain right foot  no other complaints  urinating without problem    MEDICATIONS  (STANDING):  isosorbide   mononitrate ER Tablet (IMDUR) 120 milliGRAM(s) Oral daily  dextrose 5%. 1000 milliLiter(s) (50 mL/Hr) IV Continuous <Continuous>  dextrose 50% Injectable 12.5 Gram(s) IV Push once  dextrose 50% Injectable 25 Gram(s) IV Push once  dextrose 50% Injectable 25 Gram(s) IV Push once  insulin lispro (HumaLOG) corrective regimen sliding scale   SubCutaneous at bedtime  heparin  Injectable 5000 Unit(s) SubCutaneous every 8 hours  diVALproex  milliGRAM(s) Oral two times a day  traZODone 100 milliGRAM(s) Oral at bedtime  atorvastatin 40 milliGRAM(s) Oral at bedtime  OLANZapine 20 milliGRAM(s) Oral at bedtime  carvedilol 12.5 milliGRAM(s) Oral every 12 hours  pantoprazole    Tablet 40 milliGRAM(s) Oral before breakfast  amLODIPine   Tablet 10 milliGRAM(s) Oral daily  fluconAZOLE   Tablet 200 milliGRAM(s) Oral daily  HYDROmorphone PCA (1 mG/mL) 30 milliLiter(s) PCA Continuous PCA Continuous  gabapentin 100 milliGRAM(s) Oral every 12 hours  insulin lispro (HumaLOG) corrective regimen sliding scale   SubCutaneous three times a day before meals  sodium chloride 0.9%. 1000 milliLiter(s) (30 mL/Hr) IV Continuous <Continuous>  sodium bicarbonate 1300 milliGRAM(s) Oral three times a day  cilostazol 100 milliGRAM(s) Oral daily  insulin glargine Injectable (LANTUS) 8 Unit(s) SubCutaneous at bedtime    MEDICATIONS  (PRN):  dextrose Gel 1 Dose(s) Oral once PRN Blood Glucose LESS THAN 70 milliGRAM(s)/deciliter  glucagon  Injectable 1 milliGRAM(s) IntraMuscular once PRN Glucose LESS THAN 70 milligrams/deciliter  acetaminophen   Tablet. 650 milliGRAM(s) Oral every 6 hours PRN Mild Pain (1 - 3)  hydrALAZINE Injectable 10 milliGRAM(s) IV Push every 4 hours PRN SBP>160  HYDROmorphone PCA (1 mG/mL) Rescue Clinician Bolus 0.5 milliGRAM(s) IV Push every 15 minutes PRN for Pain Scale GREATER THAN 6  naloxone Injectable 0.1 milliGRAM(s) IV Push every 3 minutes PRN For ANY of the following changes in patient status:  A. RR LESS THAN 10 breaths per minute, B. Oxygen saturation LESS THAN 90%, C. Sedation score of 6  ondansetron Injectable 4 milliGRAM(s) IV Push every 6 hours PRN Nausea    Vital Signs Last 24 Hrs  T(C): 37.1 (30 Sep 2017 05:43), Max: 37.1 (30 Sep 2017 05:43)  T(F): 98.8 (30 Sep 2017 05:43), Max: 98.8 (30 Sep 2017 05:43)  HR: 75 (30 Sep 2017 05:43) (59 - 75)  BP: 166/66 (30 Sep 2017 05:43) (146/57 - 168/71)  BP(mean): --  RR: 18 (30 Sep 2017 05:43) (18 - 20)  SpO2: 96% (30 Sep 2017 05:43) (94% - 99%)    exam:-   comfortable but sleepy (just awoke)  lungs-clear  CV- s1 s2 holosystolic murmur  Extrem- no edema, right foot with dressing    09-29    135  |  103  |  41<H>  ----------------------------<  275<H>  4.9   |  18<L>  |  2.71<H>    Ca    8.4      29 Sep 2017 14:20  Phos  3.7     09-29  Mg     1.7     09-29                          9.2    13.85 )-----------( 359      ( 30 Sep 2017 06:30 )             28.1

## 2017-09-30 NOTE — PROGRESS NOTE ADULT - ASSESSMENT
CKD at baseline  met acidosis now on oral bicarb  no other changes made  would continue to follow biochem profile   will follow

## 2017-10-01 LAB
BUN SERPL-MCNC: 62 MG/DL — HIGH (ref 7–23)
CALCIUM SERPL-MCNC: 8.5 MG/DL — SIGNIFICANT CHANGE UP (ref 8.4–10.5)
CHLORIDE SERPL-SCNC: 106 MMOL/L — SIGNIFICANT CHANGE UP (ref 98–107)
CO2 SERPL-SCNC: 19 MMOL/L — LOW (ref 22–31)
CREAT SERPL-MCNC: 3.36 MG/DL — HIGH (ref 0.5–1.3)
GLUCOSE SERPL-MCNC: 157 MG/DL — HIGH (ref 70–99)
HCT VFR BLD CALC: 28.4 % — LOW (ref 39–50)
HGB BLD-MCNC: 9.3 G/DL — LOW (ref 13–17)
MAGNESIUM SERPL-MCNC: 1.8 MG/DL — SIGNIFICANT CHANGE UP (ref 1.6–2.6)
MCHC RBC-ENTMCNC: 29.4 PG — SIGNIFICANT CHANGE UP (ref 27–34)
MCHC RBC-ENTMCNC: 32.7 % — SIGNIFICANT CHANGE UP (ref 32–36)
MCV RBC AUTO: 89.9 FL — SIGNIFICANT CHANGE UP (ref 80–100)
NRBC # FLD: 0 — SIGNIFICANT CHANGE UP
PHOSPHATE SERPL-MCNC: 5.9 MG/DL — HIGH (ref 2.5–4.5)
PLATELET # BLD AUTO: 357 K/UL — SIGNIFICANT CHANGE UP (ref 150–400)
PMV BLD: 10.3 FL — SIGNIFICANT CHANGE UP (ref 7–13)
POTASSIUM SERPL-MCNC: 4.9 MMOL/L — SIGNIFICANT CHANGE UP (ref 3.5–5.3)
POTASSIUM SERPL-SCNC: 4.9 MMOL/L — SIGNIFICANT CHANGE UP (ref 3.5–5.3)
RBC # BLD: 3.16 M/UL — LOW (ref 4.2–5.8)
RBC # FLD: 13.7 % — SIGNIFICANT CHANGE UP (ref 10.3–14.5)
SODIUM SERPL-SCNC: 140 MMOL/L — SIGNIFICANT CHANGE UP (ref 135–145)
VANCOMYCIN FLD-MCNC: 11 UG/ML — SIGNIFICANT CHANGE UP
WBC # BLD: 11.62 K/UL — HIGH (ref 3.8–10.5)
WBC # FLD AUTO: 11.62 K/UL — HIGH (ref 3.8–10.5)

## 2017-10-01 RX ORDER — VANCOMYCIN HCL 1 G
500 VIAL (EA) INTRAVENOUS ONCE
Qty: 0 | Refills: 0 | Status: COMPLETED | OUTPATIENT
Start: 2017-10-01 | End: 2017-10-01

## 2017-10-01 RX ADMIN — Medication 1300 MILLIGRAM(S): at 06:11

## 2017-10-01 RX ADMIN — SODIUM CHLORIDE 30 MILLILITER(S): 9 INJECTION INTRAMUSCULAR; INTRAVENOUS; SUBCUTANEOUS at 08:35

## 2017-10-01 RX ADMIN — DIVALPROEX SODIUM 500 MILLIGRAM(S): 500 TABLET, DELAYED RELEASE ORAL at 06:11

## 2017-10-01 RX ADMIN — Medication 1300 MILLIGRAM(S): at 22:59

## 2017-10-01 RX ADMIN — LISINOPRIL 5 MILLIGRAM(S): 2.5 TABLET ORAL at 06:10

## 2017-10-01 RX ADMIN — ISOSORBIDE MONONITRATE 120 MILLIGRAM(S): 60 TABLET, EXTENDED RELEASE ORAL at 12:21

## 2017-10-01 RX ADMIN — CARVEDILOL PHOSPHATE 12.5 MILLIGRAM(S): 80 CAPSULE, EXTENDED RELEASE ORAL at 06:11

## 2017-10-01 RX ADMIN — HEPARIN SODIUM 5000 UNIT(S): 5000 INJECTION INTRAVENOUS; SUBCUTANEOUS at 17:47

## 2017-10-01 RX ADMIN — Medication 3: at 12:20

## 2017-10-01 RX ADMIN — ATORVASTATIN CALCIUM 40 MILLIGRAM(S): 80 TABLET, FILM COATED ORAL at 22:58

## 2017-10-01 RX ADMIN — INSULIN GLARGINE 8 UNIT(S): 100 INJECTION, SOLUTION SUBCUTANEOUS at 22:59

## 2017-10-01 RX ADMIN — GABAPENTIN 100 MILLIGRAM(S): 400 CAPSULE ORAL at 17:48

## 2017-10-01 RX ADMIN — AMLODIPINE BESYLATE 10 MILLIGRAM(S): 2.5 TABLET ORAL at 06:10

## 2017-10-01 RX ADMIN — Medication 1: at 09:39

## 2017-10-01 RX ADMIN — Medication 3: at 17:47

## 2017-10-01 RX ADMIN — OLANZAPINE 20 MILLIGRAM(S): 15 TABLET, FILM COATED ORAL at 22:58

## 2017-10-01 RX ADMIN — Medication 1300 MILLIGRAM(S): at 14:24

## 2017-10-01 RX ADMIN — GABAPENTIN 100 MILLIGRAM(S): 400 CAPSULE ORAL at 06:11

## 2017-10-01 RX ADMIN — HYDROMORPHONE HYDROCHLORIDE 30 MILLILITER(S): 2 INJECTION INTRAMUSCULAR; INTRAVENOUS; SUBCUTANEOUS at 08:34

## 2017-10-01 RX ADMIN — PANTOPRAZOLE SODIUM 40 MILLIGRAM(S): 20 TABLET, DELAYED RELEASE ORAL at 06:11

## 2017-10-01 RX ADMIN — HEPARIN SODIUM 5000 UNIT(S): 5000 INJECTION INTRAVENOUS; SUBCUTANEOUS at 01:19

## 2017-10-01 RX ADMIN — CARVEDILOL PHOSPHATE 12.5 MILLIGRAM(S): 80 CAPSULE, EXTENDED RELEASE ORAL at 17:48

## 2017-10-01 RX ADMIN — HYDROMORPHONE HYDROCHLORIDE 30 MILLILITER(S): 2 INJECTION INTRAMUSCULAR; INTRAVENOUS; SUBCUTANEOUS at 20:11

## 2017-10-01 RX ADMIN — DIVALPROEX SODIUM 500 MILLIGRAM(S): 500 TABLET, DELAYED RELEASE ORAL at 17:48

## 2017-10-01 RX ADMIN — FLUCONAZOLE 200 MILLIGRAM(S): 150 TABLET ORAL at 12:21

## 2017-10-01 RX ADMIN — Medication 100 MILLIGRAM(S): at 09:56

## 2017-10-01 RX ADMIN — CILOSTAZOL 100 MILLIGRAM(S): 100 TABLET ORAL at 12:21

## 2017-10-01 RX ADMIN — Medication 100 MILLIGRAM(S): at 22:58

## 2017-10-01 RX ADMIN — HEPARIN SODIUM 5000 UNIT(S): 5000 INJECTION INTRAVENOUS; SUBCUTANEOUS at 08:38

## 2017-10-01 RX ADMIN — Medication 1: at 22:58

## 2017-10-01 NOTE — PROGRESS NOTE ADULT - SUBJECTIVE AND OBJECTIVE BOX
Patient is a 52y old  Male who presents with a chief complaint of right foot (14 Sep 2017 02:55)       INTERVAL HPI/OVERNIGHT EVENTS:  Patient seen and evaluated at bedside.  Pt is resting comfortable in NAD. Denies N/V/F/C.      Allergies    No Known Allergies    Intolerances        Vital Signs Last 24 Hrs  T(C): 36.8 (01 Oct 2017 06:08), Max: 36.9 (30 Sep 2017 22:02)  T(F): 98.3 (01 Oct 2017 06:08), Max: 98.4 (30 Sep 2017 22:02)  HR: 64 (01 Oct 2017 06:08) (64 - 68)  BP: 148/70 (01 Oct 2017 06:08) (131/53 - 148/70)  BP(mean): --  RR: 18 (01 Oct 2017 06:08) (18 - 18)  SpO2: 96% (01 Oct 2017 06:08) (94% - 98%)    LABS:                        9.3    11.62 )-----------( 357      ( 01 Oct 2017 06:25 )             28.4     10-01    140  |  106  |  62<H>  ----------------------------<  157<H>  4.9   |  19<L>  |  3.36<H>    Ca    8.5      01 Oct 2017 06:25  Phos  5.9     10-01  Mg     1.8     10-01          CAPILLARY BLOOD GLUCOSE  152 (01 Oct 2017 08:51)  250 (30 Sep 2017 22:02)  167 (30 Sep 2017 18:13)  104 (30 Sep 2017 12:53)          Lower Extremity Physical Exam:  s/p R foot 1st metatarsal resection. Sutures intact, CFT <2 s along all edges of incision site. Skin edges well coapted with central popped suture for prior hematoma. No dehiscence, minimal amount of drainage noted from hematoma site -  serosanguinous.     Right lateral 5th met head has a small eschar. Periwound is normal skin, eschar is well adhered, no fluctuance, no erythema, no malodor no drainage. Eschar is stable. 1.5 x 1.5 cm

## 2017-10-01 NOTE — PROGRESS NOTE ADULT - PROBLEM SELECTOR PLAN 1
pletal 50 bid  continue abx f/u vanco level  tent d/c to rehab mon pletal 50 bid  continue abx f/u vanco level  tent d/c to rehab mon after picc placement

## 2017-10-01 NOTE — PROGRESS NOTE ADULT - SUBJECTIVE AND OBJECTIVE BOX
Surgery C Team (Vascular) Progress Note:    Subjective:  Patient seen and examined.  No complaints overnight,     Objective:  T(C): 36.8 (10-01-17 @ 06:08), Max: 36.9 (09-30-17 @ 22:02)  HR: 64 (10-01-17 @ 06:08) (64 - 68)  BP: 148/70 (10-01-17 @ 06:08) (131/53 - 148/70)  RR: 18 (10-01-17 @ 06:08) (18 - 18)  SpO2: 96% (10-01-17 @ 06:08) (94% - 98%)  Wt(kg): --    09-30 @ 07:01  -  10-01 @ 07:00  --------------------------------------------------------  IN:    sodium chloride 0.9%.: 540 mL  Total IN: 540 mL    OUT:    Voided: 1200 mL  Total OUT: 1200 mL    Total NET: -660 mL      10-01 @ 07:01  -  10-01 @ 10:16  --------------------------------------------------------  IN:    IV PiggyBack: 100 mL    Oral Fluid: 240 mL    sodium chloride 0.9%.: 90 mL  Total IN: 430 mL    OUT:    Voided: 600 mL  Total OUT: 600 mL    Total NET: -170 mL      Physical Exam:  General: NAD  Resp: Nonlabored breathing  Extremities: RLE Dressing clean, dry, intact - no sign of infection.  Pulses exam: palpable DP and PT  both +2.  RLE warm well perfused.

## 2017-10-01 NOTE — PROGRESS NOTE ADULT - ASSESSMENT
51 yo M s/p R foot 1st metatarsal resection and well adhered lateral 5th met eschar  - Pt seen and examined  - No signs of active infection present. Surgical site appears to be viable and healing well  - Please have Z float boots on at all times in bed   - Betadine and padding applied to eschar, DSD to surgical site  - attending recommends 2 weeks of IV vancomycin   - d/w attending

## 2017-10-01 NOTE — PROGRESS NOTE ADULT - ASSESSMENT
52M w/ non-healing wound RLE, SFA occlusion now POD 3 s/p Right Fem- above knee Pop Bypass with rle excellent perfusion   - pain control   - regular diet as tolerated   - 500mg IV Vanc daily  - Daily labs, daily vanc trough  - IR for PICC tomorrow  - Continue pletal 50BID, SQH  - OOB to chair  -Physical Therapy consulted as patient has not been seen post-operatively.  They will evaluate patient and determine what type of rehab/care he needs.  Will plan dispo to rehab based on PT's recs.  Spoke to  about rehab planning.  Will f/u with  (Pgr 1161) once PT has recommendations.  Likely patient will be discharged to rehab on Monday.

## 2017-10-02 LAB
BUN SERPL-MCNC: 62 MG/DL — HIGH (ref 7–23)
CALCIUM SERPL-MCNC: 8.4 MG/DL — SIGNIFICANT CHANGE UP (ref 8.4–10.5)
CHLORIDE SERPL-SCNC: 105 MMOL/L — SIGNIFICANT CHANGE UP (ref 98–107)
CO2 SERPL-SCNC: 20 MMOL/L — LOW (ref 22–31)
CREAT SERPL-MCNC: 3.36 MG/DL — HIGH (ref 0.5–1.3)
GLUCOSE SERPL-MCNC: 165 MG/DL — HIGH (ref 70–99)
HCT VFR BLD CALC: 28.8 % — LOW (ref 39–50)
HGB BLD-MCNC: 9.1 G/DL — LOW (ref 13–17)
MAGNESIUM SERPL-MCNC: 2 MG/DL — SIGNIFICANT CHANGE UP (ref 1.6–2.6)
MCHC RBC-ENTMCNC: 28.8 PG — SIGNIFICANT CHANGE UP (ref 27–34)
MCHC RBC-ENTMCNC: 31.6 % — LOW (ref 32–36)
MCV RBC AUTO: 91.1 FL — SIGNIFICANT CHANGE UP (ref 80–100)
NRBC # FLD: 0 — SIGNIFICANT CHANGE UP
PHOSPHATE SERPL-MCNC: 4.6 MG/DL — HIGH (ref 2.5–4.5)
PLATELET # BLD AUTO: 347 K/UL — SIGNIFICANT CHANGE UP (ref 150–400)
PMV BLD: 10.6 FL — SIGNIFICANT CHANGE UP (ref 7–13)
POTASSIUM SERPL-MCNC: 5.5 MMOL/L — HIGH (ref 3.5–5.3)
POTASSIUM SERPL-SCNC: 5.5 MMOL/L — HIGH (ref 3.5–5.3)
RBC # BLD: 3.16 M/UL — LOW (ref 4.2–5.8)
RBC # FLD: 13.7 % — SIGNIFICANT CHANGE UP (ref 10.3–14.5)
SODIUM SERPL-SCNC: 139 MMOL/L — SIGNIFICANT CHANGE UP (ref 135–145)
VANCOMYCIN FLD-MCNC: 15.6 UG/ML — SIGNIFICANT CHANGE UP
WBC # BLD: 12.3 K/UL — HIGH (ref 3.8–10.5)
WBC # FLD AUTO: 12.3 K/UL — HIGH (ref 3.8–10.5)

## 2017-10-02 PROCEDURE — 99222 1ST HOSP IP/OBS MODERATE 55: CPT | Mod: GC

## 2017-10-02 RX ORDER — OXYCODONE HYDROCHLORIDE 5 MG/1
1 TABLET ORAL
Qty: 0 | Refills: 0 | COMMUNITY
Start: 2017-10-02

## 2017-10-02 RX ORDER — LANOLIN ALCOHOL/MO/W.PET/CERES
3 CREAM (GRAM) TOPICAL AT BEDTIME
Qty: 0 | Refills: 0 | Status: DISCONTINUED | OUTPATIENT
Start: 2017-10-02 | End: 2017-10-05

## 2017-10-02 RX ORDER — OXYCODONE HYDROCHLORIDE 5 MG/1
5 TABLET ORAL EVERY 4 HOURS
Qty: 0 | Refills: 0 | Status: DISCONTINUED | OUTPATIENT
Start: 2017-10-02 | End: 2017-10-05

## 2017-10-02 RX ORDER — PANTOPRAZOLE SODIUM 20 MG/1
1 TABLET, DELAYED RELEASE ORAL
Qty: 0 | Refills: 0 | COMMUNITY
Start: 2017-10-02

## 2017-10-02 RX ORDER — HYDROMORPHONE HYDROCHLORIDE 2 MG/ML
0.5 INJECTION INTRAMUSCULAR; INTRAVENOUS; SUBCUTANEOUS EVERY 6 HOURS
Qty: 0 | Refills: 0 | Status: DISCONTINUED | OUTPATIENT
Start: 2017-10-02 | End: 2017-10-05

## 2017-10-02 RX ORDER — VANCOMYCIN HCL 1 G
500 VIAL (EA) INTRAVENOUS ONCE
Qty: 0 | Refills: 0 | Status: COMPLETED | OUTPATIENT
Start: 2017-10-02 | End: 2017-10-02

## 2017-10-02 RX ORDER — ACETAMINOPHEN 500 MG
2 TABLET ORAL
Qty: 0 | Refills: 0 | DISCHARGE
Start: 2017-10-02

## 2017-10-02 RX ORDER — GABAPENTIN 400 MG/1
1 CAPSULE ORAL
Qty: 0 | Refills: 0 | COMMUNITY
Start: 2017-10-02

## 2017-10-02 RX ORDER — AMLODIPINE BESYLATE 2.5 MG/1
1 TABLET ORAL
Qty: 0 | Refills: 0 | COMMUNITY
Start: 2017-10-02

## 2017-10-02 RX ORDER — LANOLIN ALCOHOL/MO/W.PET/CERES
1 CREAM (GRAM) TOPICAL
Qty: 0 | Refills: 0 | COMMUNITY
Start: 2017-10-02

## 2017-10-02 RX ADMIN — Medication 1300 MILLIGRAM(S): at 14:43

## 2017-10-02 RX ADMIN — GABAPENTIN 100 MILLIGRAM(S): 400 CAPSULE ORAL at 05:14

## 2017-10-02 RX ADMIN — Medication 1: at 23:40

## 2017-10-02 RX ADMIN — PANTOPRAZOLE SODIUM 40 MILLIGRAM(S): 20 TABLET, DELAYED RELEASE ORAL at 06:16

## 2017-10-02 RX ADMIN — INSULIN GLARGINE 8 UNIT(S): 100 INJECTION, SOLUTION SUBCUTANEOUS at 23:38

## 2017-10-02 RX ADMIN — HEPARIN SODIUM 5000 UNIT(S): 5000 INJECTION INTRAVENOUS; SUBCUTANEOUS at 19:35

## 2017-10-02 RX ADMIN — DIVALPROEX SODIUM 500 MILLIGRAM(S): 500 TABLET, DELAYED RELEASE ORAL at 05:13

## 2017-10-02 RX ADMIN — HEPARIN SODIUM 5000 UNIT(S): 5000 INJECTION INTRAVENOUS; SUBCUTANEOUS at 00:13

## 2017-10-02 RX ADMIN — Medication 100 MILLIGRAM(S): at 23:39

## 2017-10-02 RX ADMIN — SODIUM CHLORIDE 30 MILLILITER(S): 9 INJECTION INTRAMUSCULAR; INTRAVENOUS; SUBCUTANEOUS at 12:34

## 2017-10-02 RX ADMIN — CILOSTAZOL 100 MILLIGRAM(S): 100 TABLET ORAL at 12:29

## 2017-10-02 RX ADMIN — Medication 3: at 17:59

## 2017-10-02 RX ADMIN — AMLODIPINE BESYLATE 10 MILLIGRAM(S): 2.5 TABLET ORAL at 05:13

## 2017-10-02 RX ADMIN — Medication 1300 MILLIGRAM(S): at 23:39

## 2017-10-02 RX ADMIN — OLANZAPINE 20 MILLIGRAM(S): 15 TABLET, FILM COATED ORAL at 23:37

## 2017-10-02 RX ADMIN — HYDROMORPHONE HYDROCHLORIDE 30 MILLILITER(S): 2 INJECTION INTRAMUSCULAR; INTRAVENOUS; SUBCUTANEOUS at 08:20

## 2017-10-02 RX ADMIN — CARVEDILOL PHOSPHATE 12.5 MILLIGRAM(S): 80 CAPSULE, EXTENDED RELEASE ORAL at 17:59

## 2017-10-02 RX ADMIN — SODIUM CHLORIDE 30 MILLILITER(S): 9 INJECTION INTRAMUSCULAR; INTRAVENOUS; SUBCUTANEOUS at 08:21

## 2017-10-02 RX ADMIN — ISOSORBIDE MONONITRATE 120 MILLIGRAM(S): 60 TABLET, EXTENDED RELEASE ORAL at 12:29

## 2017-10-02 RX ADMIN — ATORVASTATIN CALCIUM 40 MILLIGRAM(S): 80 TABLET, FILM COATED ORAL at 23:37

## 2017-10-02 RX ADMIN — FLUCONAZOLE 200 MILLIGRAM(S): 150 TABLET ORAL at 12:29

## 2017-10-02 RX ADMIN — Medication 2: at 14:42

## 2017-10-02 RX ADMIN — LISINOPRIL 5 MILLIGRAM(S): 2.5 TABLET ORAL at 05:14

## 2017-10-02 RX ADMIN — SODIUM CHLORIDE 30 MILLILITER(S): 9 INJECTION INTRAMUSCULAR; INTRAVENOUS; SUBCUTANEOUS at 23:39

## 2017-10-02 RX ADMIN — HEPARIN SODIUM 5000 UNIT(S): 5000 INJECTION INTRAVENOUS; SUBCUTANEOUS at 12:29

## 2017-10-02 RX ADMIN — GABAPENTIN 100 MILLIGRAM(S): 400 CAPSULE ORAL at 17:59

## 2017-10-02 RX ADMIN — Medication 1300 MILLIGRAM(S): at 05:13

## 2017-10-02 RX ADMIN — DIVALPROEX SODIUM 500 MILLIGRAM(S): 500 TABLET, DELAYED RELEASE ORAL at 17:59

## 2017-10-02 RX ADMIN — Medication 3 MILLIGRAM(S): at 23:39

## 2017-10-02 RX ADMIN — CARVEDILOL PHOSPHATE 12.5 MILLIGRAM(S): 80 CAPSULE, EXTENDED RELEASE ORAL at 05:13

## 2017-10-02 RX ADMIN — Medication 100 MILLIGRAM(S): at 12:34

## 2017-10-02 NOTE — CHART NOTE - NSCHARTNOTEFT_GEN_A_CORE
Source: Patient [ X ]    Family [ ]     other [ ]    Patient seen for length of stay, malnutrition follow-up. Observed patient with 100% PO intake at lunch today. Denies food allergies, GI distress (nausea/vomiting/constipation/diarrhea), or issues with chewing/swallowing. On visit, patient dismissed this RD, stating that everything is fine and does not need anything.   9/28 OR for fem-pop bypass      CURRENT DIET :   Consistent Carbohydrate (no snack), No Concentrated Potassium       _______WEIGHTS:________  (9/29) 117.9#/ 53.5kg   Admit (9/14) 120.3#/ 54.6kg   Reflects 2.4# weight loss possible     Nutrition focused physical exam conducted - found signs of malnutrition [ ]absent [ X ]present   Subcutaneous fat loss: [moderate ] Orbital fat pads region, [ moderate ]Buccal fat region, [ moderate]Triceps region,  [ moderate ]Ribs region  Muscle wasting: [severe ]Temples region, [severe]Clavicle region, [moderate]Shoulder region, [severe ]Interosseous region    EDEMA: none  SKIN: left groin, right groin, right leg, 2nd right toe skin wounds; no pressure injuries noted       _______PERTINENT MEDICATIONS:________     isosorbide   mononitrate ER Tablet (IMDUR)  dextrose 5%.  dextrose 50% Injectable  dextrose 50% Injectable  dextrose 50% Injectable  insulin lispro (HumaLOG) corrective regimen sliding scale  atorvastatin  carvedilol  pantoprazole    Tablet  amLODIPine   Tablet  insulin lispro (HumaLOG) corrective regimen sliding scale  sodium chloride 0.9%.  sodium bicarbonate  insulin glargine Injectable (LANTUS)  lisinopril      _______PERTINENT LABS:_________    Sodium, Serum: 139 (10-02 @ 06:10)  Potassium, Serum: 5.5 <H> (10-02 @ 06:10)  Glucose, Serum: 165 <H> (10-02 @ 06:10)  Blood Urea Nitrogen, Serum: 62 <H> (10-02 @ 06:10)  Magnesium, Serum: 2.0 (10-02 @ 06:10)  Phosphorus Level, Serum: 4.6 <H> (10-02 @ 06:10)      FINGER STICKS:   CAPILLARY BLOOD GLUCOSE  241 (02 Oct 2017 13:02)  145 (02 Oct 2017 09:46)  251 (01 Oct 2017 21:23)  279 (01 Oct 2017 17:43)        Estimated Needs:   [ X ] no change since previous assessment  [ ] recalculated:       Previous Nutrition Diagnosis:   [ X ] Malnutrition, severe  Nutrition Diagnosis is [ X ] ongoing  [ ] resolved [ ] not applicable          New Nutrition Diagnosis: [ X ] not applicable    [ ] Inadequate Protein Energy Intake   [ ]Inadequate Oral Intake   [ ] Excessive Energy Intake   [ ] Underweight   [ ] Increased Nutrient Needs   [ ] Overweight/Obesity   [ ] Altered GI Function   [ ] Unintended Weight Loss   [ ] Food & Nutrition Related Knowledge Deficit  [ ] Limited Adherence to nutrition related recommendations   [ ] Malnutrition    [ ] other:            ___________________ADDITIONAL RECOMMENDATIONS___________________  1) Recommend Consistent Carbohydrate (no snack), No Concentrated Potassium and No Concentrated Phosphorus diet   2) Recommend Nepro 2x daily (850 kcals, 38.2g protein).           Mabel Moses, MARIAMN, CDN, CDE (Pager [phone])

## 2017-10-02 NOTE — PROGRESS NOTE ADULT - SUBJECTIVE AND OBJECTIVE BOX
Anesthesia Pain Management Service- Attending Addendum    SUBJECTIVE: Patient's pain control adequate- no using IV PCA    Therapy:	  [ X] IV PCA	   [ ] Epidural           [ ] s/p Spinal Opoid              [ ] Postpartum infusion	  [ ] Patient controlled regional anesthesia (PCRA)    [ ] prn Analgesics    Allergies    No Known Allergies    Intolerances      MEDICATIONS  (STANDING):  isosorbide   mononitrate ER Tablet (IMDUR) 120 milliGRAM(s) Oral daily  dextrose 5%. 1000 milliLiter(s) (50 mL/Hr) IV Continuous <Continuous>  dextrose 50% Injectable 12.5 Gram(s) IV Push once  dextrose 50% Injectable 25 Gram(s) IV Push once  dextrose 50% Injectable 25 Gram(s) IV Push once  insulin lispro (HumaLOG) corrective regimen sliding scale   SubCutaneous at bedtime  heparin  Injectable 5000 Unit(s) SubCutaneous every 8 hours  diVALproex  milliGRAM(s) Oral two times a day  traZODone 100 milliGRAM(s) Oral at bedtime  atorvastatin 40 milliGRAM(s) Oral at bedtime  OLANZapine 20 milliGRAM(s) Oral at bedtime  carvedilol 12.5 milliGRAM(s) Oral every 12 hours  pantoprazole    Tablet 40 milliGRAM(s) Oral before breakfast  amLODIPine   Tablet 10 milliGRAM(s) Oral daily  fluconAZOLE   Tablet 200 milliGRAM(s) Oral daily  gabapentin 100 milliGRAM(s) Oral every 12 hours  insulin lispro (HumaLOG) corrective regimen sliding scale   SubCutaneous three times a day before meals  sodium chloride 0.9%. 1000 milliLiter(s) (30 mL/Hr) IV Continuous <Continuous>  sodium bicarbonate 1300 milliGRAM(s) Oral three times a day  cilostazol 100 milliGRAM(s) Oral daily  insulin glargine Injectable (LANTUS) 8 Unit(s) SubCutaneous at bedtime  lisinopril 5 milliGRAM(s) Oral daily  melatonin 3 milliGRAM(s) Oral at bedtime    MEDICATIONS  (PRN):  dextrose Gel 1 Dose(s) Oral once PRN Blood Glucose LESS THAN 70 milliGRAM(s)/deciliter  glucagon  Injectable 1 milliGRAM(s) IntraMuscular once PRN Glucose LESS THAN 70 milligrams/deciliter  acetaminophen   Tablet. 650 milliGRAM(s) Oral every 6 hours PRN Mild Pain (1 - 3)  hydrALAZINE Injectable 10 milliGRAM(s) IV Push every 4 hours PRN SBP>160  oxyCODONE    IR 5 milliGRAM(s) Oral every 4 hours PRN Moderate and Severe Pain  HYDROmorphone  Injectable 0.5 milliGRAM(s) IV Push every 6 hours PRN Severe Pain unrelieved by Oxycodone IR      OBJECTIVE:   [X] No new signs     [ ] Other:    Side Effects:  [X ] None			[ ] Other:      ASSESSMENT/PLAN  -Discontinue current therapy    [ ] Therapy changed to:    [ ] IV PCA       [ ] Epidural     [ X] prn Analgesics     Comments: Pain management per primary team, APS to sign off

## 2017-10-02 NOTE — CHART NOTE - NSCHARTNOTEFT_GEN_A_CORE
Patient hyperkalemic today to 5.5, nephrology called, spoke with Dr. Yeung who recommended stopping lisinopril and placing patient on low potassium diet. Discussed with C team surgery    Iveth Jang [phone]

## 2017-10-02 NOTE — PROGRESS NOTE ADULT - ASSESSMENT
CKD4  PAD s/p LE bypass  metabolic acidosis- stable on oral bicarbonate   creatinine slightly above baseline post- operatively  likely in response to resumption of ACE inhibitor  stable over past 24 hours  non oliguric/ lytes acceptable   continue current management, consider hold lisinopril temporarily if creatinine rising  monitor vancomycin levels  no NSAIDs  no current indication for dialysis

## 2017-10-02 NOTE — PROGRESS NOTE ADULT - SUBJECTIVE AND OBJECTIVE BOX
Patient is a 52y Male  being evaluated for NEEL/ CKD 4    no new complaints, no shortness of breath     PHYSICAL EXAM:  Vitals:  T(F): 98.3 (10-02-17 @ 05:12), Max: 98.7 (10-02-17 @ 01:09)  HR: 70 (10-02-17 @ 05:12)  BP: 152/67 (10-02-17 @ 05:12)  BP(mean): --  RR: 19 (10-02-17 @ 05:12)  SpO2: 100% (10-02-17 @ 05:12)  Wt(kg): --  Constitutional: no acute distress  HEENT:  NC, ext ears nl, oropharynx clear,  nose nl  Neck: No JVD, bruit, adenopathy or thyromegaly  Eyes: PERRL, no discharge, no icterus  Respiratory: cta/p bilat, no wheezes, rales, nl effort  Cardiovascular: regular rate, S1 and S2 no rub or gallop  Abd: : BS+, soft, NT , no rebound or guarding, no bruits  Extremities: no edema or cyanosis, palpable pulses  Neurological: A/O x 3, nl coordination and tone    I and O's:    10-01 @ 07:01  -  10-02 @ 07:00  --------------------------------------------------------  IN: 1450 mL / OUT: 3930 mL / NET: -2480 mL    10-02 @ 07:01  -  10-02 @ 11:53  --------------------------------------------------------  IN: 0 mL / OUT: 700 mL / NET: -700 mL            REVIEW OF SYSTEMS:  CONSTITUTIONAL: No weakness, fevers or chills  RESPIRATORY: No cough, wheezing, hemoptysis; No shortness of breath  CARDIOVASCULAR: No chest pain or palpitations  GI : no abd pains, nausea or vomiting  GENITOURINARY: No dysuria, frequency or hematuria  All other review of systems is negative unless indicated above.    Allergies    No Known Allergies    Intolerances        MEDICATIONS  (STANDING):  isosorbide   mononitrate ER Tablet (IMDUR) 120 milliGRAM(s) Oral daily  dextrose 5%. 1000 milliLiter(s) (50 mL/Hr) IV Continuous <Continuous>  dextrose 50% Injectable 12.5 Gram(s) IV Push once  dextrose 50% Injectable 25 Gram(s) IV Push once  dextrose 50% Injectable 25 Gram(s) IV Push once  insulin lispro (HumaLOG) corrective regimen sliding scale   SubCutaneous at bedtime  heparin  Injectable 5000 Unit(s) SubCutaneous every 8 hours  diVALproex  milliGRAM(s) Oral two times a day  traZODone 100 milliGRAM(s) Oral at bedtime  atorvastatin 40 milliGRAM(s) Oral at bedtime  OLANZapine 20 milliGRAM(s) Oral at bedtime  carvedilol 12.5 milliGRAM(s) Oral every 12 hours  pantoprazole    Tablet 40 milliGRAM(s) Oral before breakfast  amLODIPine   Tablet 10 milliGRAM(s) Oral daily  fluconAZOLE   Tablet 200 milliGRAM(s) Oral daily  gabapentin 100 milliGRAM(s) Oral every 12 hours  insulin lispro (HumaLOG) corrective regimen sliding scale   SubCutaneous three times a day before meals  sodium chloride 0.9%. 1000 milliLiter(s) (30 mL/Hr) IV Continuous <Continuous>  sodium bicarbonate 1300 milliGRAM(s) Oral three times a day  cilostazol 100 milliGRAM(s) Oral daily  insulin glargine Injectable (LANTUS) 8 Unit(s) SubCutaneous at bedtime  lisinopril 5 milliGRAM(s) Oral daily  melatonin 3 milliGRAM(s) Oral at bedtime  vancomycin  IVPB 500 milliGRAM(s) IV Intermittent once      LABS:  CBC Full  -  ( 02 Oct 2017 06:10 )  WBC Count : 12.30 K/uL  Hemoglobin : 9.1 g/dL  Hematocrit : 28.8 %  Platelet Count - Automated : 347 K/uL  Mean Cell Volume : 91.1 fL  Mean Cell Hemoglobin : 28.8 pg  Mean Cell Hemoglobin Concentration : 31.6 %  Auto Neutrophil # : x  Auto Lymphocyte # : x  Auto Monocyte # : x  Auto Eosinophil # : x  Auto Basophil # : x  Auto Neutrophil % : x  Auto Lymphocyte % : x  Auto Monocyte % : x  Auto Eosinophil % : x  Auto Basophil % : x    10-02    139  |  105  |  62<H>  ----------------------------<  165<H>  5.5<H>   |  20<L>  |  3.36<H>    Ca    8.4      02 Oct 2017 06:10  Phos  4.6     10-02  Mg     2.0     10-02        Urine Studies:      Urine chemistry:   Urine Na:   Urine Creatinine:   Urine Protein/Cr ratio:  Urine K:   Urine Osm:   24 Hr urine studies:       Imp:  52y Male Patient is a 52y Male  being evaluated for NEEL/ CKD 4    no new complaints, no shortness of breath / minimal leg pain     PHYSICAL EXAM:  Vitals:  T(F): 98.3 (10-02-17 @ 05:12), Max: 98.7 (10-02-17 @ 01:09)  HR: 70 (10-02-17 @ 05:12)  BP: 152/67 (10-02-17 @ 05:12)  BP(mean): --  RR: 19 (10-02-17 @ 05:12)  SpO2: 100% (10-02-17 @ 05:12)  Wt(kg): --  Constitutional: no acute distress  HEENT:  NC, ext ears nl, oropharynx clear,  nose nl  Neck: No JVD, bruit, adenopathy or thyromegaly  Eyes: PERRL, no discharge, no icterus  Respiratory: cta/p bilat, no wheezes, rales, nl effort  Cardiovascular: regular rate, S1 and S2 no rub or gallop  Abd: : BS+, soft, NT , no rebound or guarding, no bruits  Extremities: no edema or cyanosis, palpable pulses  Neurological: A/O x 3, nl coordination and tone    I and O's:    10-01 @ 07:01  -  10-02 @ 07:00  --------------------------------------------------------  IN: 1450 mL / OUT: 3930 mL / NET: -2480 mL    10-02 @ 07:01  -  10-02 @ 11:53  --------------------------------------------------------  IN: 0 mL / OUT: 700 mL / NET: -700 mL            REVIEW OF SYSTEMS:  CONSTITUTIONAL: No weakness, fevers or chills  RESPIRATORY: No cough, wheezing, hemoptysis; No shortness of breath  CARDIOVASCULAR: No chest pain or palpitations  GI : no abd pains, nausea or vomiting  GENITOURINARY: No dysuria, frequency or hematuria  All other review of systems is negative unless indicated above.    Allergies    No Known Allergies    Intolerances        MEDICATIONS  (STANDING):  isosorbide   mononitrate ER Tablet (IMDUR) 120 milliGRAM(s) Oral daily  dextrose 5%. 1000 milliLiter(s) (50 mL/Hr) IV Continuous <Continuous>  dextrose 50% Injectable 12.5 Gram(s) IV Push once  dextrose 50% Injectable 25 Gram(s) IV Push once  dextrose 50% Injectable 25 Gram(s) IV Push once  insulin lispro (HumaLOG) corrective regimen sliding scale   SubCutaneous at bedtime  heparin  Injectable 5000 Unit(s) SubCutaneous every 8 hours  diVALproex  milliGRAM(s) Oral two times a day  traZODone 100 milliGRAM(s) Oral at bedtime  atorvastatin 40 milliGRAM(s) Oral at bedtime  OLANZapine 20 milliGRAM(s) Oral at bedtime  carvedilol 12.5 milliGRAM(s) Oral every 12 hours  pantoprazole    Tablet 40 milliGRAM(s) Oral before breakfast  amLODIPine   Tablet 10 milliGRAM(s) Oral daily  fluconAZOLE   Tablet 200 milliGRAM(s) Oral daily  gabapentin 100 milliGRAM(s) Oral every 12 hours  insulin lispro (HumaLOG) corrective regimen sliding scale   SubCutaneous three times a day before meals  sodium chloride 0.9%. 1000 milliLiter(s) (30 mL/Hr) IV Continuous <Continuous>  sodium bicarbonate 1300 milliGRAM(s) Oral three times a day  cilostazol 100 milliGRAM(s) Oral daily  insulin glargine Injectable (LANTUS) 8 Unit(s) SubCutaneous at bedtime  lisinopril 5 milliGRAM(s) Oral daily  melatonin 3 milliGRAM(s) Oral at bedtime  vancomycin  IVPB 500 milliGRAM(s) IV Intermittent once      LABS:  CBC Full  -  ( 02 Oct 2017 06:10 )  WBC Count : 12.30 K/uL  Hemoglobin : 9.1 g/dL  Hematocrit : 28.8 %  Platelet Count - Automated : 347 K/uL  Mean Cell Volume : 91.1 fL  Mean Cell Hemoglobin : 28.8 pg  Mean Cell Hemoglobin Concentration : 31.6 %  Auto Neutrophil # : x  Auto Lymphocyte # : x  Auto Monocyte # : x  Auto Eosinophil # : x  Auto Basophil # : x  Auto Neutrophil % : x  Auto Lymphocyte % : x  Auto Monocyte % : x  Auto Eosinophil % : x  Auto Basophil % : x    10-02    139  |  105  |  62<H>  ----------------------------<  165<H>  5.5<H>   |  20<L>  |  3.36<H>    Ca    8.4      02 Oct 2017 06:10  Phos  4.6     10-02  Mg     2.0     10-02        Urine Studies:      Urine chemistry:   Urine Na:   Urine Creatinine:   Urine Protein/Cr ratio:  Urine K:   Urine Osm:   24 Hr urine studies:       Imp:  52y Male

## 2017-10-02 NOTE — CHART NOTE - NSCHARTNOTEFT_GEN_A_CORE
Patient Age: 52y    Patient Gender:Male    Procedure (including site / side if known): Pic line    Diagnosis / Indication: MRSA OM Right foot    Interventional Radiology Attending Physician:    Ordering Attending Physician:    Pertinent Medical History:    PAST MEDICAL & SURGICAL HISTORY:  Bipolar affective disorder in remission  High cholesterol  Depression  Diabetes mellitus  Amputated great toe of right foot  ORIF right lower leg- 1995        Pertinent Labs:                         9.1    12.30 )-----------( 347      ( 02 Oct 2017 06:10 )             28.8       10-02    139  |  105  |  62<H>  ----------------------------<  165<H>  5.5<H>   |  20<L>  |  3.36<H>    Ca    8.4      02 Oct 2017 06:10  Phos  4.6     10-02  Mg     2.0     10-02            Patient and Family aware:   [x]Y   [  ]N

## 2017-10-02 NOTE — CONSULT NOTE ADULT - ASSESSMENT
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 Readmitted 9/13/17 for concern of surgical site infection, s/p revisional right foot first ray amputation 9/19 with culture growing MRSA and pathology showing chronic osteomyelitis. 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 Readmitted 9/13/17 for concern of surgical site infection, s/p revisional right foot first ray amputation 9/19 with culture growing MRSA and pathology showing chronic osteomyelitis. Also underwent lower extremity angiogram 9/18 showing SFA occlusion s/p Right Femoral to above knee popliteal artery bypass 9/28. 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 s/p lower extremity angiogram 9/18 showing SFA occlusion s/p Right Femoral to above knee popliteal artery bypass 9/28.   Clinically stable and afebrile, on day 12 of Vancomycin IV with levels at goal. Should not need 6 weeks of antibiotics for osteomyelitis as the margins were clear on pathology. Vascular surgical site is clean.     Recommend  -completed 14 days of Vancomycin 500mg IV daily - complete two more days inpatient rather than placing a PICC line, also given poor renal function   -monitor renal function and vancomycin levels     Discussed with Dr Davis and primary team

## 2017-10-02 NOTE — CONSULT NOTE ADULT - SUBJECTIVE AND OBJECTIVE BOX
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 He was readmitted 9/13/17 for concern of surgical site infection.   He denies fevers or chills. He is just tired.     PMH  Bipolar affective disorder in remission  Depression  Diabetes mellitus  Hyperlipidemia    PSH  Amputated great toe of right foot 8/18/2017  ORIF right lower leg- 1995    MEDS  see eMAR    Allergies  No Known Allergies or Intolerances (13 Sep 2017 22:10)      PAST MEDICAL & SURGICAL HISTORY:  Bipolar affective disorder in remission  High cholesterol  Depression  Diabetes mellitus  Amputated great toe of right foot  ORIF right lower leg- 1995      Social history:     Smoking,    ETOH,      IVDU       FAMILY HISTORY:  No pertinent family history in first degree relatives  - Reviewed,Non contributory   REVIEW OF SYSTEMS  General:	Denies any malaise fatigue or chills. Fevers absent    Skin:No rash  	  Ophthalmologic:Denies any visual complaints,discharge redness or photophobia  	  ENMT:No nasal discharge,headache,sinus congestion or throat pain.No dental complaints    Respiratory and Thorax:No cough,sputum or chest pain.Denies shortness of breath  	  Cardiovascular:	No chest pain,palpitaions or dizziness    Gastrointestinal:	NO nausea,abdominal pain or diarrhea.    Genitourinary:	No dysuria,frequency. No flank pain    Musculoskeletal:	No joint swelling or pain.No weakness    Neurological:No confusion,diziness.No extremity weakness.No bladder or bowel incontinence	    Psychiatric:No delusions or hallucinations	    Hematology/Lymphatics:	No LN swelling.No gum bleeding     Endocrine:	No recent weight gain or loss.No abnormal heat/cold intolerance    Allergic/Immunologic:	No hives or rash   Allergies    No Known Allergies    Intolerances        Antimicrobials:    fluconAZOLE   Tablet 200 milliGRAM(s) Oral daily  vancomycin  IVPB 500 milliGRAM(s) IV Intermittent once        Vital Signs Last 24 Hrs  T(C): 36.8 (02 Oct 2017 05:12), Max: 37.1 (02 Oct 2017 01:09)  T(F): 98.3 (02 Oct 2017 05:12), Max: 98.7 (02 Oct 2017 01:09)  HR: 70 (02 Oct 2017 05:12) (66 - 70)  BP: 152/67 (02 Oct 2017 05:12) (141/58 - 158/61)  BP(mean): --  RR: 19 (02 Oct 2017 05:12) (15 - 19)  SpO2: 100% (02 Oct 2017 05:12) (97% - 100%)    PHYSICAL EXAM:Pleasant patient in no acute distress.      Constitutional:Comfortable.Awake and alert  No cachexia     Eyes:PERRL EOMI.NO discharge or conjunctival injection    ENMT:No sinus tenderness.No thrush.No pharyngeal exudate or erythema.Fair dental hygiene    Neck:Supple,No LN,no JVD      Respiratory:Good air entry bilaterally,CTA    Cardiovascular:S1 S2 wnl, No murmurs,rub or gallops    Gastrointestinal:Soft BS(+) no tenderness no masses ,No rebound or guarding    Genitourinary:No CVA tendereness     Rectal:    Extremities:No cyanosis,clubbing or edema.    Vascular:peripheral pulses felt    Neurological:AAO X 3,No grossly focal deficits    Skin:No rash     Lymph Nodes:No palpable LNs    Musculoskeletal:No joint swelling or LOM    Psychiatric:Affect normal.                                9.1    12.30 )-----------( 347      ( 02 Oct 2017 06:10 )             28.8         10-02    139  |  105  |  62<H>  ----------------------------<  165<H>  5.5<H>   |  20<L>  |  3.36<H>    Ca    8.4      02 Oct 2017 06:10  Phos  4.6     10-02  Mg     2.0     10-02        RECENT CULTURES:  Culture - Surg Site Aerob/Anaer w/Gm St (09.19.17 @ 20:39)    -  Ciprofloxacin: R >2 LUZMARIA    -  Cefazolin: R 16 LUZMARIA    -  Trimethoprim/Sulfamethoxazole: S <=0.5/9.5 LUZMARIA    -  Vancomycin: S 2 LUZMARIA    -  Tetra/Doxy: S <=4 LUZMARIA    -  Penicillin: R >8 LUZMARIA    -  Oxacillin: R >2 LUZMARIA    -  Rifampin: S <=1 LUZMARIA    -  Linezolid: S 4 LUZMARIA    -  Moxifloxacin(Aerobic): R 2 LUZMARIA    -  Clindamycin: S <=0.5 LUZMARIA    -  Daptomycin: S 1 LUZMARIA    -  Gentamicin: S <=4 LUZMARIA    -  Erythromycin: R >4 LUZMARIA    Organism Identification: Staph. aureus *MRSA*    Pathology 9/19/17   Final Diagnosis  1. Clean margin medial cuneiform, excision  - Bony tissue, negative for osteomyelitis    2. First metatarsal bone, resection  - Bony tissue with chronic osteomyelitis    Radiology: HPI:  52M with Bipolar Disorder, DM and CKD, was admitted in August for gas gangrene of his right foot first digit s/p partial ray amputation 8/18 by podiatry with cultures growing pansensitive Pseudomonas and he was discharged on Ciprofloxacin for at least 5 days pending pathology report. Readmitted 9/13/17 for concern of surgical site infection, s/p revisional right foot first ray amputation 9/19 with culture growing MRSA and pathology showing chronic osteomyelitis with clean margins. Also underwent lower extremity angiogram 9/18 showing SFA occlusion s/p Right Femoral to above knee popliteal artery bypass 9/28.   He denies fevers or chills. Constipated for two days and has pain to his foot and right groin but otherwise feels ok. He is just tired.   ID consulted for assistance with antibiotics.     Allergies  No Known Allergies or Intolerances (13 Sep 2017 22:10)    PAST MEDICAL & SURGICAL HISTORY:  Bipolar affective disorder in remission  High cholesterol  Depression  Diabetes mellitus  Amputated great toe of right foot  ORIF right lower leg- 1995    Social history:     Smoking - former, quit since being in the hospital two weeks ago, ETOH occasional, former cocaine   Lives home with his mother     FAMILY HISTORY:  Noncontributory     REVIEW OF SYSTEMS  General: Fatigue, no fevers or chills.     Skin:No rash  	  Ophthalmologic:Denies any visual complaints,discharge redness or photophobia  	  ENMT:No nasal discharge,headache,sinus congestion or throat pain.No dental complaints    Respiratory and Thorax:No cough,sputum or chest pain.Denies shortness of breath  	  Cardiovascular:	No chest pain,palpitaions or dizziness    Gastrointestinal:	NO nausea,abdominal pain or diarrhea. Constipation.     Genitourinary:	No dysuria,frequency. No flank pain    Musculoskeletal:	 foot pain as per HPI     Neurological:No confusion,diziness.No extremity weakness.No bladder or bowel incontinence	    Psychiatric:No delusions or hallucinations	    Hematology/Lymphatics:	No LN swelling.No gum bleeding     Endocrine:	No recent weight gain or loss.No abnormal heat/cold intolerance    Allergic/Immunologic:	No hives or rash   Allergies    No Known Allergies    Intolerances    Antimicrobials:    fluconAZOLE   Tablet 200 milliGRAM(s) Oral daily  vancomycin  IVPB 500 milliGRAM(s) IV Intermittent once    Vital Signs Last 24 Hrs  T(C): 36.8 (02 Oct 2017 05:12), Max: 37.1 (02 Oct 2017 01:09)  T(F): 98.3 (02 Oct 2017 05:12), Max: 98.7 (02 Oct 2017 01:09)  HR: 70 (02 Oct 2017 05:12) (66 - 70)  BP: 152/67 (02 Oct 2017 05:12) (141/58 - 158/61)  BP(mean): --  RR: 19 (02 Oct 2017 05:12) (15 - 19)  SpO2: 100% (02 Oct 2017 05:12) (97% - 100%)    PHYSICAL EXAM:  Constitutional: Sleeping deeply, wakes up slowly. Comfortable.Awake and alert  No cachexia     Eyes:PERRL EOMI.NO discharge or conjunctival injection    ENMT:No sinus tenderness.No thrush.No pharyngeal exudate or erythema.Fair dental hygiene    Neck:Supple,No LN,no JVD      Respiratory:Good air entry bilaterally,CTA    Cardiovascular:S1 S2 wnl, No murmurs,rub or gallops    Gastrointestinal:Soft BS(+) no tenderness no masses ,No rebound or guarding    Genitourinary:No CVA tendereness     Rectal:    Extremities:No cyanosis,clubbing or edema.    Vascular:peripheral pulses felt    Neurological:AAO X 3,No grossly focal deficits    Skin:No rash     Lymph Nodes:No palpable LNs    Musculoskeletal:No joint swelling or LOM    Psychiatric:Affect normal.                                9.1    12.30 )-----------( 347      ( 02 Oct 2017 06:10 )             28.8         10-02    139  |  105  |  62<H>  ----------------------------<  165<H>  5.5<H>   |  20<L>  |  3.36<H>    Ca    8.4      02 Oct 2017 06:10  Phos  4.6     10-02  Mg     2.0     10-02        RECENT CULTURES:  Culture - Surg Site Aerob/Anaer w/Gm St (09.19.17 @ 20:39)    -  Ciprofloxacin: R >2 LUZMARIA    -  Cefazolin: R 16 LUZMARIA    -  Trimethoprim/Sulfamethoxazole: S <=0.5/9.5 LUZMARIA    -  Vancomycin: S 2 LUZMARIA    -  Tetra/Doxy: S <=4 LUZMARIA    -  Penicillin: R >8 LUZMARIA    -  Oxacillin: R >2 LUZMARIA    -  Rifampin: S <=1 LUZMARIA    -  Linezolid: S 4 LUZMARIA    -  Moxifloxacin(Aerobic): R 2 LUZMARIA    -  Clindamycin: S <=0.5 LUZMARIA    -  Daptomycin: S 1 LUZMARIA    -  Gentamicin: S <=4 LUZMARIA    -  Erythromycin: R >4 LUZMARIA    Organism Identification: Staph. aureus *MRSA*    Pathology 9/19/17   Final Diagnosis  1. Clean margin medial cuneiform, excision  - Bony tissue, negative for osteomyelitis    2. First metatarsal bone, resection  - Bony tissue with chronic osteomyelitis    Radiology: HPI:  52M with Bipolar Disorder, DM and CKD, was admitted in August for gas gangrene of his right foot first digit s/p partial ray amputation 8/18 by podiatry with cultures growing pansensitive Pseudomonas and he was discharged on Ciprofloxacin for at least 5 days pending pathology report. Readmitted 9/13/17 for concern of surgical site infection, s/p revisional right foot first ray amputation 9/19 with culture growing MRSA and pathology showing chronic osteomyelitis with clean margins. Also underwent lower extremity angiogram 9/18 showing SFA occlusion s/p Right Femoral to above knee popliteal artery bypass 9/28.   He denies fevers or chills. Constipated for two days and has pain to his foot and right groin but otherwise feels ok. He is just tired.   ID consulted for assistance with antibiotics.     Allergies  No Known Allergies or Intolerances (13 Sep 2017 22:10)    PAST MEDICAL & SURGICAL HISTORY:  Bipolar affective disorder in remission  High cholesterol  Depression  Diabetes mellitus  Amputated great toe of right foot  ORIF right lower leg- 1995    Social history:     Smoking - former, quit since being in the hospital two weeks ago, ETOH occasional, former cocaine   Lives home with his mother     FAMILY HISTORY:  Noncontributory     REVIEW OF SYSTEMS  General: Fatigue, no fevers or chills.     Skin:No rash  	  Ophthalmologic:Denies any visual complaints,discharge redness or photophobia  	  ENMT:No nasal discharge,headache,sinus congestion or throat pain.No dental complaints    Respiratory and Thorax:No cough,sputum or chest pain.Denies shortness of breath  	  Cardiovascular:	No chest pain,palpitaions or dizziness    Gastrointestinal:	NO nausea,abdominal pain or diarrhea. Constipation.     Genitourinary:	No dysuria,frequency. No flank pain    Musculoskeletal:	 foot pain as per HPI     Neurological:No confusion,diziness.No extremity weakness.No bladder or bowel incontinence	    Psychiatric:No delusions or hallucinations	    Hematology/Lymphatics:	No LN swelling.No gum bleeding     Endocrine:	No recent weight gain or loss.No abnormal heat/cold intolerance    Allergic/Immunologic:	No hives or rash   Allergies    No Known Allergies    Intolerances    Antimicrobials:    fluconAZOLE   Tablet 200 milliGRAM(s) Oral daily  vancomycin  IVPB 500 milliGRAM(s) IV Intermittent once    Vital Signs Last 24 Hrs  T(C): 36.8 (02 Oct 2017 05:12), Max: 37.1 (02 Oct 2017 01:09)  T(F): 98.3 (02 Oct 2017 05:12), Max: 98.7 (02 Oct 2017 01:09)  HR: 70 (02 Oct 2017 05:12) (66 - 70)  BP: 152/67 (02 Oct 2017 05:12) (141/58 - 158/61)  BP(mean): --  RR: 19 (02 Oct 2017 05:12) (15 - 19)  SpO2: 100% (02 Oct 2017 05:12) (97% - 100%)    PHYSICAL EXAM:  Constitutional: Sleeping deeply, wakes up slowly, groggy. Comfortable. Chronically ill appearing. No cachexia   Eyes: PER. NO discharge or conjunctival injection  ENMT: No sinus tenderness. No thrush. No pharyngeal exudate or erythema. Fair dental hygiene  Neck: Supple, No LN, no JVD  Respiratory: Good air entry bilaterally, CTA  Cardiovascular:S1 S2 wnl, No murmurs, rub or gallops  Gastrointestinal: Soft BS(+) no tenderness no masses ,No rebound or guarding    Genitourinary:No CVA tendereness     Rectal:    Extremities:No cyanosis,clubbing or edema.    Vascular:peripheral pulses felt    Neurological:AAO X 3,No grossly focal deficits    Skin:No rash     Lymph Nodes:No palpable LNs    Musculoskeletal:No joint swelling or LOM    Psychiatric:Affect normal.                                9.1    12.30 )-----------( 347      ( 02 Oct 2017 06:10 )             28.8         10-02    139  |  105  |  62<H>  ----------------------------<  165<H>  5.5<H>   |  20<L>  |  3.36<H>    Ca    8.4      02 Oct 2017 06:10  Phos  4.6     10-02  Mg     2.0     10-02        RECENT CULTURES:  Culture - Surg Site Aerob/Anaer w/Gm St (09.19.17 @ 20:39)    -  Ciprofloxacin: R >2 LUZMARIA    -  Cefazolin: R 16 LUZMARIA    -  Trimethoprim/Sulfamethoxazole: S <=0.5/9.5 LUZMARIA    -  Vancomycin: S 2 LUZMARIA    -  Tetra/Doxy: S <=4 LUZMARIA    -  Penicillin: R >8 LUZMARIA    -  Oxacillin: R >2 LUZMARIA    -  Rifampin: S <=1 LUZMARIA    -  Linezolid: S 4 LUZMARIA    -  Moxifloxacin(Aerobic): R 2 LUZMARIA    -  Clindamycin: S <=0.5 LUZMARIA    -  Daptomycin: S 1 LUZMARIA    -  Gentamicin: S <=4 LUZMARIA    -  Erythromycin: R >4 LUZMARIA    Organism Identification: Staph. aureus *MRSA*    Pathology 9/19/17   Final Diagnosis  1. Clean margin medial cuneiform, excision  - Bony tissue, negative for osteomyelitis    2. First metatarsal bone, resection  - Bony tissue with chronic osteomyelitis    Radiology: HPI:  52M with Bipolar Disorder, DM and CKD, was admitted in August for gas gangrene of his right foot first digit s/p partial ray amputation 8/18 by podiatry with cultures growing pansensitive Pseudomonas and he was discharged on Ciprofloxacin for at least 5 days pending pathology report. Readmitted 9/13/17 for concern of surgical site infection, s/p revisional right foot first ray amputation 9/19 with culture growing MRSA and pathology showing chronic osteomyelitis with clean margins. Also underwent lower extremity angiogram 9/18 showing SFA occlusion s/p Right Femoral to above knee popliteal artery bypass 9/28.   He denies fevers or chills. Constipated for two days and has pain to his foot and right groin but otherwise feels ok. He is just tired.   ID consulted for assistance with antibiotics.     Allergies  No Known Allergies or Intolerances (13 Sep 2017 22:10)    PAST MEDICAL & SURGICAL HISTORY:  Bipolar affective disorder in remission  High cholesterol  Depression  Diabetes mellitus  Amputated great toe of right foot  ORIF right lower leg- 1995    Social history:     Smoking - former, quit since being in the hospital two weeks ago, ETOH occasional, former cocaine   Lives home with his mother     FAMILY HISTORY:  Noncontributory     REVIEW OF SYSTEMS  General: Fatigue, no fevers or chills.     Skin:No rash  	  Ophthalmologic:Denies any visual complaints,discharge redness or photophobia  	  ENMT:No nasal discharge,headache,sinus congestion or throat pain.No dental complaints    Respiratory and Thorax:No cough,sputum or chest pain.Denies shortness of breath  	  Cardiovascular:	No chest pain,palpitaions or dizziness    Gastrointestinal:	NO nausea,abdominal pain or diarrhea. Constipation.     Genitourinary:	No dysuria,frequency. No flank pain    Musculoskeletal:	 foot pain as per HPI     Neurological:No confusion,diziness.No extremity weakness.No bladder or bowel incontinence	    Psychiatric:No delusions or hallucinations	    Hematology/Lymphatics:	No LN swelling.No gum bleeding     Endocrine:	No recent weight gain or loss.No abnormal heat/cold intolerance    Allergic/Immunologic:	No hives or rash   Allergies    No Known Allergies    Intolerances    Antimicrobials:    fluconAZOLE   Tablet 200 milliGRAM(s) Oral daily  vancomycin  IVPB 500 milliGRAM(s) IV Intermittent once    Vital Signs Last 24 Hrs  T(C): 36.8 (02 Oct 2017 05:12), Max: 37.1 (02 Oct 2017 01:09)  T(F): 98.3 (02 Oct 2017 05:12), Max: 98.7 (02 Oct 2017 01:09)  HR: 70 (02 Oct 2017 05:12) (66 - 70)  BP: 152/67 (02 Oct 2017 05:12) (141/58 - 158/61)  BP(mean): --  RR: 19 (02 Oct 2017 05:12) (15 - 19)  SpO2: 100% (02 Oct 2017 05:12) (97% - 100%)    PHYSICAL EXAM:  Constitutional: Sleeping deeply, wakes up slowly, groggy. Comfortable. Chronically ill appearing. No cachexia   Eyes: PER. NO discharge or conjunctival injection  ENMT: No sinus tenderness. No thrush. No pharyngeal exudate or erythema. Fair dental hygiene  Neck: Supple, No LN, no JVD  Respiratory: Good air entry bilaterally, CTA  Cardiovascular:S1 S2 wnl, No murmurs, rub or gallops  Gastrointestinal: Soft BS(+) no tenderness no masses ,No rebound or guarding  Genitourinary:No CVA tendereness   Extremities:No cyanosis,clubbing or edema.  Neurological:AAO X 3,No grossly focal deficits  Skin:No rash. Right inguinal area surgical site intact, staples in place, no drainage or erythema or signs of infection   Lymph Nodes: No palpable LNs  Musculoskeletal: Right foot bandaged, patient declined undressing and exam   Psychiatric: grumpy                           9.1    12.30 )-----------( 347      ( 02 Oct 2017 06:10 )             28.8         10-02    139  |  105  |  62<H>  ----------------------------<  165<H>  5.5<H>   |  20<L>  |  3.36<H>    Ca    8.4      02 Oct 2017 06:10  Phos  4.6     10-02  Mg     2.0     10-02        RECENT CULTURES:  Culture - Surg Site Aerob/Anaer w/Gm St (09.19.17 @ 20:39)    -  Ciprofloxacin: R >2 LUZMARIA    -  Cefazolin: R 16 LUZMARIA    -  Trimethoprim/Sulfamethoxazole: S <=0.5/9.5 LUZMARIA    -  Vancomycin: S 2 LUZMARIA    -  Tetra/Doxy: S <=4 LUZMARIA    -  Penicillin: R >8 LUZMARIA    -  Oxacillin: R >2 ULZMARIA    -  Rifampin: S <=1 LUZMARIA    -  Linezolid: S 4 LUZMARIA    -  Moxifloxacin(Aerobic): R 2 LUZMARIA    -  Clindamycin: S <=0.5 LUZMARIA    -  Daptomycin: S 1 LUZMARIA    -  Gentamicin: S <=4 LUZMARIA    -  Erythromycin: R >4 LUZMARIA    Organism Identification: Staph. aureus *MRSA*    Pathology 9/19/17   Final Diagnosis  1. Clean margin medial cuneiform, excision  - Bony tissue, negative for osteomyelitis    2. First metatarsal bone, resection  - Bony tissue with chronic osteomyelitis    Radiology:  MRI Foot w/o Cont, Right (09.19.17 @ 13:13)   Diffuse 1st metatarsal stump remnant osteomyelitis.    No suspected discrete drainable fluid collections despite lack of IV   contrast.    Generalized atrophy of the plantar musculature. Mild generalized   increased T2 signal in some of the remnant plantar muscle tissue may   reflect degree of inflammatory reaction versus ongoing atrophic change.

## 2017-10-02 NOTE — CONSULT NOTE ADULT - CONSULT REASON
CKD 4
Foot infection
Pre-op medical clearance
Weight loss and abnormal CT scan
Pre-Op Risk Stratification

## 2017-10-02 NOTE — CONSULT NOTE ADULT - ATTENDING COMMENTS
No active cardiac conditions. May safely proceed with planned procedure, including the use of GET or sedation as clinically indicated.
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 Readmitted 9/13/17 for concern of surgical site infection, s/p revisional right foot first ray amputation 9/19 with culture growing MRSA and pathology showing chronic osteomyelitis of infection site, but had clean margins at borders.   Also underwent lower extremity angiogram 9/18 showing SFA occlusion s/p Right Femoral to above knee popliteal artery bypass 9/28.     Now day #12 of IV vancomycin s/p surgery.  Would complete 2 more days of vanco 500 mg IV daily.  Trough has been acceptable.  Monitor renal function.  No need for PICC as had clean margins and pt will be getting 2 weeks of antibiotic post surgery.  D/w Vascular.      Karen Davis MD  [phone] (pager)  [phone] (office)
I have seen and evaluated the patient with the GI Fellow and GI Team.  I agree with the findings, formulation and plan of care as documented in the resident / fellows note, except as noted.
Pt seen and examined.  Agree w/ Dr. Damon's note as above.   Pt is medically optimized for OR debridement, planned for Monday.

## 2017-10-02 NOTE — PROGRESS NOTE ADULT - ASSESSMENT
52M w/ non-healing wound RLE, SFA occlusion now POD 4 s/p Right Fem- above knee Pop Bypass with rle excellent perfusion   - 500mg IV Vanc daily for 2 weeks as per PODS attending  - PICC today in IR  - daily vanc trough as patient has been very sensitive to it  - upward trending BUN/Cr, f/u renal recs  - incisions to open air  - pain control   - regular diet as tolerated   - Continue pletal 50BID, SQH  - OOB to chair  - f/u PT recs for dispo

## 2017-10-02 NOTE — PROGRESS NOTE ADULT - SUBJECTIVE AND OBJECTIVE BOX
Surgery C Team (Vascular) Progress Note:    Subjective:  Patient seen and examined.  No complaints overnight.     T(C): 36.8 (10-02-17 @ 05:12), Max: 37.1 (10-02-17 @ 01:09)  HR: 70 (10-02-17 @ 05:12) (62 - 70)  BP: 152/67 (10-02-17 @ 05:12) (141/58 - 158/63)  RR: 19 (10-02-17 @ 05:12) (15 - 19)  SpO2: 100% (10-02-17 @ 05:12) (97% - 100%)  Wt(kg): --    10-01 @ 07:01  -  10-02 @ 07:00  --------------------------------------------------------  IN:    IV PiggyBack: 100 mL    Oral Fluid: 720 mL    sodium chloride 0.9%.: 630 mL  Total IN: 1450 mL    OUT:    Voided: 3930 mL  Total OUT: 3930 mL    Total NET: -2480 mL      Physical Exam:  General: NAD  Resp: Nonlabored breathing  Extremities: RLE groin and thigh incisions clean, dry, intact. no erythema or exudates.  R foot dressing intact.  No sign of active foot infection, ampuation healing well.  Pulses exam: palpable DP and PT  both +2.  RLE warm well perfused.

## 2017-10-03 LAB
BUN SERPL-MCNC: 63 MG/DL — HIGH (ref 7–23)
BUN SERPL-MCNC: 64 MG/DL — HIGH (ref 7–23)
BUN SERPL-MCNC: 65 MG/DL — HIGH (ref 7–23)
CALCIUM SERPL-MCNC: 8.1 MG/DL — LOW (ref 8.4–10.5)
CALCIUM SERPL-MCNC: 8.5 MG/DL — SIGNIFICANT CHANGE UP (ref 8.4–10.5)
CALCIUM SERPL-MCNC: 8.9 MG/DL — SIGNIFICANT CHANGE UP (ref 8.4–10.5)
CHLORIDE SERPL-SCNC: 104 MMOL/L — SIGNIFICANT CHANGE UP (ref 98–107)
CHLORIDE SERPL-SCNC: 107 MMOL/L — SIGNIFICANT CHANGE UP (ref 98–107)
CHLORIDE SERPL-SCNC: 112 MMOL/L — HIGH (ref 98–107)
CO2 SERPL-SCNC: 21 MMOL/L — LOW (ref 22–31)
CO2 SERPL-SCNC: 22 MMOL/L — SIGNIFICANT CHANGE UP (ref 22–31)
CO2 SERPL-SCNC: 22 MMOL/L — SIGNIFICANT CHANGE UP (ref 22–31)
CREAT SERPL-MCNC: 3.34 MG/DL — HIGH (ref 0.5–1.3)
CREAT SERPL-MCNC: 3.38 MG/DL — HIGH (ref 0.5–1.3)
CREAT SERPL-MCNC: 3.57 MG/DL — HIGH (ref 0.5–1.3)
GLUCOSE SERPL-MCNC: 182 MG/DL — HIGH (ref 70–99)
GLUCOSE SERPL-MCNC: 229 MG/DL — HIGH (ref 70–99)
GLUCOSE SERPL-MCNC: 297 MG/DL — HIGH (ref 70–99)
HCT VFR BLD CALC: 26 % — LOW (ref 39–50)
HGB BLD-MCNC: 8.3 G/DL — LOW (ref 13–17)
MCHC RBC-ENTMCNC: 29 PG — SIGNIFICANT CHANGE UP (ref 27–34)
MCHC RBC-ENTMCNC: 31.9 % — LOW (ref 32–36)
MCV RBC AUTO: 90.9 FL — SIGNIFICANT CHANGE UP (ref 80–100)
NRBC # FLD: 0 — SIGNIFICANT CHANGE UP
PLATELET # BLD AUTO: 341 K/UL — SIGNIFICANT CHANGE UP (ref 150–400)
PMV BLD: 10.9 FL — SIGNIFICANT CHANGE UP (ref 7–13)
POTASSIUM SERPL-MCNC: 5.8 MMOL/L — HIGH (ref 3.5–5.3)
POTASSIUM SERPL-MCNC: 6.3 MMOL/L — CRITICAL HIGH (ref 3.5–5.3)
POTASSIUM SERPL-MCNC: 6.8 MMOL/L — CRITICAL HIGH (ref 3.5–5.3)
POTASSIUM SERPL-SCNC: 5.8 MMOL/L — HIGH (ref 3.5–5.3)
POTASSIUM SERPL-SCNC: 6.3 MMOL/L — CRITICAL HIGH (ref 3.5–5.3)
POTASSIUM SERPL-SCNC: 6.8 MMOL/L — CRITICAL HIGH (ref 3.5–5.3)
RBC # BLD: 2.86 M/UL — LOW (ref 4.2–5.8)
RBC # FLD: 13.4 % — SIGNIFICANT CHANGE UP (ref 10.3–14.5)
SODIUM SERPL-SCNC: 137 MMOL/L — SIGNIFICANT CHANGE UP (ref 135–145)
SODIUM SERPL-SCNC: 139 MMOL/L — SIGNIFICANT CHANGE UP (ref 135–145)
SODIUM SERPL-SCNC: 145 MMOL/L — SIGNIFICANT CHANGE UP (ref 135–145)
VANCOMYCIN TROUGH SERPL-MCNC: 17.3 UG/ML — SIGNIFICANT CHANGE UP (ref 10–20)
WBC # BLD: 10.51 K/UL — HIGH (ref 3.8–10.5)
WBC # FLD AUTO: 10.51 K/UL — HIGH (ref 3.8–10.5)

## 2017-10-03 PROCEDURE — 93010 ELECTROCARDIOGRAM REPORT: CPT

## 2017-10-03 RX ORDER — INSULIN HUMAN 100 [IU]/ML
10 INJECTION, SOLUTION SUBCUTANEOUS ONCE
Qty: 0 | Refills: 0 | Status: COMPLETED | OUTPATIENT
Start: 2017-10-03 | End: 2017-10-03

## 2017-10-03 RX ORDER — DEXTROSE 50 % IN WATER 50 %
25 SYRINGE (ML) INTRAVENOUS ONCE
Qty: 0 | Refills: 0 | Status: COMPLETED | OUTPATIENT
Start: 2017-10-03 | End: 2017-10-03

## 2017-10-03 RX ORDER — VANCOMYCIN HCL 1 G
500 VIAL (EA) INTRAVENOUS ONCE
Qty: 0 | Refills: 0 | Status: COMPLETED | OUTPATIENT
Start: 2017-10-03 | End: 2017-10-03

## 2017-10-03 RX ORDER — CALCIUM GLUCONATE 100 MG/ML
1 VIAL (ML) INTRAVENOUS ONCE
Qty: 0 | Refills: 0 | Status: COMPLETED | OUTPATIENT
Start: 2017-10-03 | End: 2017-10-03

## 2017-10-03 RX ORDER — ALBUTEROL 90 UG/1
2.5 AEROSOL, METERED ORAL ONCE
Qty: 0 | Refills: 0 | Status: COMPLETED | OUTPATIENT
Start: 2017-10-03 | End: 2017-10-03

## 2017-10-03 RX ORDER — SODIUM POLYSTYRENE SULFONATE 4.1 MEQ/G
30 POWDER, FOR SUSPENSION ORAL ONCE
Qty: 0 | Refills: 0 | Status: COMPLETED | OUTPATIENT
Start: 2017-10-03 | End: 2017-10-03

## 2017-10-03 RX ORDER — SODIUM POLYSTYRENE SULFONATE 4.1 MEQ/G
30 POWDER, FOR SUSPENSION ORAL ONCE
Qty: 0 | Refills: 0 | Status: DISCONTINUED | OUTPATIENT
Start: 2017-10-03 | End: 2017-10-03

## 2017-10-03 RX ORDER — FUROSEMIDE 40 MG
40 TABLET ORAL DAILY
Qty: 0 | Refills: 0 | Status: DISCONTINUED | OUTPATIENT
Start: 2017-10-03 | End: 2017-10-05

## 2017-10-03 RX ADMIN — INSULIN HUMAN 10 UNIT(S): 100 INJECTION, SOLUTION SUBCUTANEOUS at 07:20

## 2017-10-03 RX ADMIN — Medication 2: at 18:06

## 2017-10-03 RX ADMIN — HEPARIN SODIUM 5000 UNIT(S): 5000 INJECTION INTRAVENOUS; SUBCUTANEOUS at 21:21

## 2017-10-03 RX ADMIN — INSULIN GLARGINE 8 UNIT(S): 100 INJECTION, SOLUTION SUBCUTANEOUS at 22:18

## 2017-10-03 RX ADMIN — Medication 40 MILLIGRAM(S): at 11:27

## 2017-10-03 RX ADMIN — FLUCONAZOLE 200 MILLIGRAM(S): 150 TABLET ORAL at 11:27

## 2017-10-03 RX ADMIN — HEPARIN SODIUM 5000 UNIT(S): 5000 INJECTION INTRAVENOUS; SUBCUTANEOUS at 06:10

## 2017-10-03 RX ADMIN — Medication 25 MILLILITER(S): at 07:18

## 2017-10-03 RX ADMIN — Medication 25 MILLILITER(S): at 19:06

## 2017-10-03 RX ADMIN — PANTOPRAZOLE SODIUM 40 MILLIGRAM(S): 20 TABLET, DELAYED RELEASE ORAL at 06:10

## 2017-10-03 RX ADMIN — DIVALPROEX SODIUM 500 MILLIGRAM(S): 500 TABLET, DELAYED RELEASE ORAL at 06:10

## 2017-10-03 RX ADMIN — OLANZAPINE 20 MILLIGRAM(S): 15 TABLET, FILM COATED ORAL at 21:20

## 2017-10-03 RX ADMIN — INSULIN HUMAN 10 UNIT(S): 100 INJECTION, SOLUTION SUBCUTANEOUS at 19:07

## 2017-10-03 RX ADMIN — Medication 100 GRAM(S): at 19:06

## 2017-10-03 RX ADMIN — Medication 1300 MILLIGRAM(S): at 06:10

## 2017-10-03 RX ADMIN — ALBUTEROL 2.5 MILLIGRAM(S): 90 AEROSOL, METERED ORAL at 19:45

## 2017-10-03 RX ADMIN — GABAPENTIN 100 MILLIGRAM(S): 400 CAPSULE ORAL at 18:05

## 2017-10-03 RX ADMIN — Medication 1300 MILLIGRAM(S): at 13:09

## 2017-10-03 RX ADMIN — Medication 100 MILLIGRAM(S): at 21:20

## 2017-10-03 RX ADMIN — CILOSTAZOL 100 MILLIGRAM(S): 100 TABLET ORAL at 11:27

## 2017-10-03 RX ADMIN — Medication 25 MILLILITER(S): at 23:13

## 2017-10-03 RX ADMIN — ATORVASTATIN CALCIUM 40 MILLIGRAM(S): 80 TABLET, FILM COATED ORAL at 21:20

## 2017-10-03 RX ADMIN — CARVEDILOL PHOSPHATE 12.5 MILLIGRAM(S): 80 CAPSULE, EXTENDED RELEASE ORAL at 18:05

## 2017-10-03 RX ADMIN — ISOSORBIDE MONONITRATE 120 MILLIGRAM(S): 60 TABLET, EXTENDED RELEASE ORAL at 11:27

## 2017-10-03 RX ADMIN — Medication 1300 MILLIGRAM(S): at 21:21

## 2017-10-03 RX ADMIN — Medication 100 GRAM(S): at 07:18

## 2017-10-03 RX ADMIN — Medication 3 MILLIGRAM(S): at 23:13

## 2017-10-03 RX ADMIN — SODIUM CHLORIDE 30 MILLILITER(S): 9 INJECTION INTRAMUSCULAR; INTRAVENOUS; SUBCUTANEOUS at 21:21

## 2017-10-03 RX ADMIN — Medication 1: at 22:18

## 2017-10-03 RX ADMIN — ALBUTEROL 2.5 MILLIGRAM(S): 90 AEROSOL, METERED ORAL at 23:17

## 2017-10-03 RX ADMIN — DIVALPROEX SODIUM 500 MILLIGRAM(S): 500 TABLET, DELAYED RELEASE ORAL at 18:05

## 2017-10-03 RX ADMIN — CARVEDILOL PHOSPHATE 12.5 MILLIGRAM(S): 80 CAPSULE, EXTENDED RELEASE ORAL at 06:11

## 2017-10-03 RX ADMIN — SODIUM POLYSTYRENE SULFONATE 30 GRAM(S): 4.1 POWDER, FOR SUSPENSION ORAL at 18:50

## 2017-10-03 RX ADMIN — AMLODIPINE BESYLATE 10 MILLIGRAM(S): 2.5 TABLET ORAL at 06:10

## 2017-10-03 RX ADMIN — Medication 1: at 13:09

## 2017-10-03 RX ADMIN — Medication 100 MILLIGRAM(S): at 09:48

## 2017-10-03 RX ADMIN — GABAPENTIN 100 MILLIGRAM(S): 400 CAPSULE ORAL at 06:10

## 2017-10-03 RX ADMIN — HEPARIN SODIUM 5000 UNIT(S): 5000 INJECTION INTRAVENOUS; SUBCUTANEOUS at 13:09

## 2017-10-03 RX ADMIN — INSULIN HUMAN 10 UNIT(S): 100 INJECTION, SOLUTION SUBCUTANEOUS at 23:14

## 2017-10-03 NOTE — PROGRESS NOTE ADULT - ASSESSMENT
1-Progressive renal failure-voiding well w/o clinical evidence of lower urinary tract obstruction  Cannot exclude nephrotoxic effect of vancomycin  He is on NaHCO3 and a low K diet  IV insulin given this AM with D50  Will give another dose of Kayexalate today-Veltassa not available  Will restart loop diuretic to increase K secretion

## 2017-10-03 NOTE — PROGRESS NOTE ADULT - SUBJECTIVE AND OBJECTIVE BOX
Patient is a 52y old  Male who presents with a chief complaint of right foot (14 Sep 2017 02:55)       INTERVAL HPI/OVERNIGHT EVENTS:  Patient seen and evaluated at bedside.  Pt is resting comfortable in NAD. Denies N/V/F/C.  Pain rated at 0/10    Allergies    No Known Allergies    Intolerances    Vital Signs Last 24 Hrs  T(C): 37 (03 Oct 2017 06:09), Max: 37 (03 Oct 2017 06:09)  T(F): 98.6 (03 Oct 2017 06:09), Max: 98.6 (03 Oct 2017 06:09)  HR: 74 (03 Oct 2017 06:09) (66 - 74)  BP: 152/61 (03 Oct 2017 06:09) (135/86 - 160/69)  BP(mean): --  RR: 19 (03 Oct 2017 06:09) (16 - 21)  SpO2: 99% (03 Oct 2017 06:09) (95% - 100%)    LABS:                        8.3    10.51 )-----------( 341      ( 03 Oct 2017 05:10 )             26.0     10-03    139  |  107  |  65<H>  ----------------------------<  229<H>  5.8<H>   |  21<L>  |  3.57<H>    Ca    8.1<L>      03 Oct 2017 05:10  Phos  4.6     10-02  Mg     2.0     10-02    CAPILLARY BLOOD GLUCOSE  168 (03 Oct 2017 07:22)  279 (02 Oct 2017 22:46)  294 (02 Oct 2017 17:27)  241 (02 Oct 2017 13:02)  145 (02 Oct 2017 09:46)    Lower Extremity Physical Exam:  s/p R foot 1st metatarsal resection. Sutures intact, CFT <2 s along all edges of incision site. Skin edges well coapted with central popped suture for prior hematoma. No dehiscence, minimal amount of drainage noted from hematoma site -  serosanguinous.     Right lateral 5th met head has a small eschar. Periwound is normal skin, eschar is well adhered, no fluctuance, no erythema, no malodor no drainage. Eschar is stable. 1.5 x 1.5 cm    RADIOLOGY & ADDITIONAL TESTS:

## 2017-10-03 NOTE — PROGRESS NOTE ADULT - SUBJECTIVE AND OBJECTIVE BOX
Subjective  Sleeping this AM      PHYSICAL EXAM:  Vitals:  T(F): 97.9 (10-03-17 @ 09:11), Max: 98.6 (10-03-17 @ 06:09)  HR: 71 (10-03-17 @ 09:11)  BP: 144/58 (10-03-17 @ 09:11)  BP(mean): --  RR: 20 (10-03-17 @ 09:11)  SpO2: 96% (10-03-17 @ 09:11)  Wt(kg): --  Constitutional: no acute distress  HEENT:  NC, ext ears nl, oropharynx clear,  nose nl  Neck: No JVD, bruit, adenopathy or thyromegaly  Eyes: PERRL, no discharge, no icterus  Respiratory: cta/p bilat, no wheezes, rales, nl effort  Cardiovascular: regular rate, S1 and S2 no rub or gallop  Abd: : BS+, soft, NT , no rebound or guarding, no bruits  Extremities: foot bandaged  Neurological: A/O x 3, nl coordination and tone    I and O's:    10-02 @ 07:01  -  10-03 @ 07:00  --------------------------------------------------------  IN: 640 mL / OUT: 3900 mL / NET: -3260 mL            REVIEW OF SYSTEMS:  CONSTITUTIONAL: No weakness, fevers or chills  RESPIRATORY: No cough, wheezing, hemoptysis; No shortness of breath  CARDIOVASCULAR: No chest pain or palpitations  GI : no abd pains, nausea or vomiting  GENITOURINARY: No dysuria, frequency or hematuria  All other review of systems is negative unless indicated above.    Allergies    No Known Allergies    Intolerances        MEDICATIONS  (STANDING):  amLODIPine   Tablet 10 milliGRAM(s) Oral daily  atorvastatin 40 milliGRAM(s) Oral at bedtime  carvedilol 12.5 milliGRAM(s) Oral every 12 hours  cilostazol 100 milliGRAM(s) Oral daily  dextrose 5%. 1000 milliLiter(s) (50 mL/Hr) IV Continuous <Continuous>  dextrose 50% Injectable 12.5 Gram(s) IV Push once  dextrose 50% Injectable 25 Gram(s) IV Push once  dextrose 50% Injectable 25 Gram(s) IV Push once  diVALproex  milliGRAM(s) Oral two times a day  fluconAZOLE   Tablet 200 milliGRAM(s) Oral daily  gabapentin 100 milliGRAM(s) Oral every 12 hours  heparin  Injectable 5000 Unit(s) SubCutaneous every 8 hours  insulin glargine Injectable (LANTUS) 8 Unit(s) SubCutaneous at bedtime  insulin lispro (HumaLOG) corrective regimen sliding scale   SubCutaneous three times a day before meals  insulin lispro (HumaLOG) corrective regimen sliding scale   SubCutaneous at bedtime  isosorbide   mononitrate ER Tablet (IMDUR) 120 milliGRAM(s) Oral daily  melatonin 3 milliGRAM(s) Oral at bedtime  OLANZapine 20 milliGRAM(s) Oral at bedtime  pantoprazole    Tablet 40 milliGRAM(s) Oral before breakfast  sodium bicarbonate 1300 milliGRAM(s) Oral three times a day  sodium chloride 0.9%. 1000 milliLiter(s) (30 mL/Hr) IV Continuous <Continuous>  traZODone 100 milliGRAM(s) Oral at bedtime      Sodium,Serum 139    10-03 @ 05:10  Sodium,Serum 139    10-02 @ 06:10  Sodium,Serum 140    10-01 @ 06:25  Sodium,Serum 139    09-30 @ 06:30  Sodium,Serum 135    09-29 @ 14:20    Potassium,Serum 5.8    10-03 @ 05:10  Potassium,Serum 5.5    10-02 @ 06:10  Potassium,Serum 4.9    10-01 @ 06:25  Potassium,Serum 5.2    09-30 @ 06:30  Potassium,Serum 4.9    09-29 @ 14:20    Chloride,Serum 107    10-03 @ 05:10  Chloride,Serum 105    10-02 @ 06:10  Chloride,Serum 106    10-01 @ 06:25  Chloride,Serum 106    09-30 @ 06:30  Chloride,Serum 103    09-29 @ 14:20    CO2, Serum 21    10-03 @ 05:10  CO2, Serum 20    10-02 @ 06:10  CO2, Serum 19    10-01 @ 06:25  CO2, Serum 18    09-30 @ 06:30  CO2, Serum 18    09-29 @ 14:20    BUN 65    10-03 @ 05:10  BUN 62    10-02 @ 06:10  BUN 62    10-01 @ 06:25  BUN 49    09-30 @ 06:30  BUN 41    09-29 @ 14:20    Creatinine, Serum 3.57    10-03 @ 05:10  Creatinine, Serum 3.36    10-02 @ 06:10  Creatinine, Serum 3.36    10-01 @ 06:25  Creatinine, Serum 2.94    09-30 @ 06:30  Creatinine, Serum 2.71    09-29 @ 14:20      10-03    139  |  107  |  65<H>  ----------------------------<  229<H>  5.8<H>   |  21<L>  |  3.57<H>    Ca    8.1<L>      03 Oct 2017 05:10  Phos  4.6     10-02  Mg     2.0     10-02        Urine Studies:      Urine chemistry:   Urine Na:   Urine Creatinine:   Urine Protein/Cr ratio:  Urine K:   Urine Osm:   24 Hr urine studies:

## 2017-10-03 NOTE — PROGRESS NOTE ADULT - ASSESSMENT
52M w/ non-healing wound RLE, SFA occlusion now POD 5 s/p Right Fem- above knee Pop Bypass with rle excellent perfusion   - follow up K+ and glucose levels  - 500mg IV Vanc until tomorrow per ID  - kayexelate for hyperkalema and restart loop diuretic per renal  - incisions to open air  - pain control   - regular diet as tolerated   - Continue pletal 50BID, SQH  - OOB to chair  - f/u PT recs for dispo

## 2017-10-03 NOTE — PROGRESS NOTE ADULT - SUBJECTIVE AND OBJECTIVE BOX
Surgery C Team (Vascular) Progress Note:    Subjective:  Patient seen and examined.  No complaints overnight. Potassium 5.8 this morning, patient received insulin, calcium, and glucose.     T(C): 36.6 (10-03-17 @ 09:11), Max: 37 (10-03-17 @ 06:09)  HR: 71 (10-03-17 @ 09:11) (66 - 74)  BP: 144/58 (10-03-17 @ 09:11) (135/86 - 160/69)  RR: 20 (10-03-17 @ 09:11) (16 - 21)  SpO2: 96% (10-03-17 @ 09:11) (95% - 100%)  Wt(kg): --    10-02 @ 07:01  -  10-03 @ 07:00  --------------------------------------------------------  IN:    IV PiggyBack: 100 mL    sodium chloride 0.9%.: 540 mL  Total IN: 640 mL    OUT:    Voided: 3900 mL  Total OUT: 3900 mL    Total NET: -3260 mL      Physical Exam:  General: NAD  Resp: Nonlabored breathing  Extremities: RLE groin and thigh incisions clean, dry, intact. no erythema or exudates.  R foot dressing intact.  No sign of active foot infection, ampuation healing well.  Pulses exam: palpable DP and PT  both +2.  RLE warm well perfused. Surgery C Team (Vascular) Progress Note:    Subjective:  Patient seen and examined.  No complaints overnight. Potassium 5.8 this morning, patient received insulin, calcium, and glucose.     T(C): 36.6 (10-03-17 @ 09:11), Max: 37 (10-03-17 @ 06:09)  HR: 71 (10-03-17 @ 09:11) (66 - 74)  BP: 144/58 (10-03-17 @ 09:11) (135/86 - 160/69)  RR: 20 (10-03-17 @ 09:11) (16 - 21)  SpO2: 96% (10-03-17 @ 09:11) (95% - 100%)  Wt(kg): --    10-02 @ 07:01  -  10-03 @ 07:00  --------------------------------------------------------  IN:    IV PiggyBack: 100 mL    sodium chloride 0.9%.: 540 mL  Total IN: 640 mL    OUT:    Voided: 3900 mL  Total OUT: 3900 mL    Total NET: -3260 mL      Physical Exam:  General: NAD  Resp: Nonlabored breathing  Extremities: RLE groin and thigh incisions clean, dry, intact. no erythema or exudates.  R foot dressing intact.  No sign of active foot infection, ampuation healing well.  Pulses exam: palpable DP and PT  both +2.  RLE warm well perfused.                          8.3    10.51 )-----------( 341      ( 03 Oct 2017 05:10 )             26.0   10-03    145  |  112<H>  |  64<H>  ----------------------------<  182<H>  6.8<HH>   |  22  |  3.38<H>    Ca    8.9      03 Oct 2017 16:20  Phos  4.6     10-02  Mg     2.0     10-02

## 2017-10-04 LAB
BLD GP AB SCN SERPL QL: NEGATIVE — SIGNIFICANT CHANGE UP
BUN SERPL-MCNC: 60 MG/DL — HIGH (ref 7–23)
BUN SERPL-MCNC: 64 MG/DL — HIGH (ref 7–23)
BUN SERPL-MCNC: 65 MG/DL — HIGH (ref 7–23)
CALCIUM SERPL-MCNC: 8.4 MG/DL — SIGNIFICANT CHANGE UP (ref 8.4–10.5)
CALCIUM SERPL-MCNC: 8.5 MG/DL — SIGNIFICANT CHANGE UP (ref 8.4–10.5)
CALCIUM SERPL-MCNC: 8.6 MG/DL — SIGNIFICANT CHANGE UP (ref 8.4–10.5)
CHLORIDE SERPL-SCNC: 101 MMOL/L — SIGNIFICANT CHANGE UP (ref 98–107)
CHLORIDE SERPL-SCNC: 105 MMOL/L — SIGNIFICANT CHANGE UP (ref 98–107)
CHLORIDE SERPL-SCNC: 107 MMOL/L — SIGNIFICANT CHANGE UP (ref 98–107)
CO2 SERPL-SCNC: 20 MMOL/L — LOW (ref 22–31)
CO2 SERPL-SCNC: 23 MMOL/L — SIGNIFICANT CHANGE UP (ref 22–31)
CO2 SERPL-SCNC: 23 MMOL/L — SIGNIFICANT CHANGE UP (ref 22–31)
CREAT SERPL-MCNC: 3.24 MG/DL — HIGH (ref 0.5–1.3)
CREAT SERPL-MCNC: 3.35 MG/DL — HIGH (ref 0.5–1.3)
CREAT SERPL-MCNC: 3.39 MG/DL — HIGH (ref 0.5–1.3)
GLUCOSE SERPL-MCNC: 176 MG/DL — HIGH (ref 70–99)
GLUCOSE SERPL-MCNC: 207 MG/DL — HIGH (ref 70–99)
GLUCOSE SERPL-MCNC: 343 MG/DL — HIGH (ref 70–99)
HCT VFR BLD CALC: 26.7 % — LOW (ref 39–50)
HGB BLD-MCNC: 8.5 G/DL — LOW (ref 13–17)
MCHC RBC-ENTMCNC: 28.9 PG — SIGNIFICANT CHANGE UP (ref 27–34)
MCHC RBC-ENTMCNC: 31.8 % — LOW (ref 32–36)
MCV RBC AUTO: 90.8 FL — SIGNIFICANT CHANGE UP (ref 80–100)
NRBC # FLD: 0 — SIGNIFICANT CHANGE UP
PLATELET # BLD AUTO: 337 K/UL — SIGNIFICANT CHANGE UP (ref 150–400)
PMV BLD: 10.8 FL — SIGNIFICANT CHANGE UP (ref 7–13)
POTASSIUM SERPL-MCNC: 5.5 MMOL/L — HIGH (ref 3.5–5.3)
POTASSIUM SERPL-MCNC: 5.9 MMOL/L — HIGH (ref 3.5–5.3)
POTASSIUM SERPL-MCNC: 6.2 MMOL/L — CRITICAL HIGH (ref 3.5–5.3)
POTASSIUM SERPL-SCNC: 5.5 MMOL/L — HIGH (ref 3.5–5.3)
POTASSIUM SERPL-SCNC: 5.9 MMOL/L — HIGH (ref 3.5–5.3)
POTASSIUM SERPL-SCNC: 6.2 MMOL/L — CRITICAL HIGH (ref 3.5–5.3)
RBC # BLD: 2.94 M/UL — LOW (ref 4.2–5.8)
RBC # FLD: 13.5 % — SIGNIFICANT CHANGE UP (ref 10.3–14.5)
RH IG SCN BLD-IMP: POSITIVE — SIGNIFICANT CHANGE UP
SODIUM SERPL-SCNC: 138 MMOL/L — SIGNIFICANT CHANGE UP (ref 135–145)
SODIUM SERPL-SCNC: 140 MMOL/L — SIGNIFICANT CHANGE UP (ref 135–145)
SODIUM SERPL-SCNC: 142 MMOL/L — SIGNIFICANT CHANGE UP (ref 135–145)
VANCOMYCIN FLD-MCNC: 21.1 UG/ML — SIGNIFICANT CHANGE UP
WBC # BLD: 9.83 K/UL — SIGNIFICANT CHANGE UP (ref 3.8–10.5)
WBC # FLD AUTO: 9.83 K/UL — SIGNIFICANT CHANGE UP (ref 3.8–10.5)

## 2017-10-04 PROCEDURE — 99232 SBSQ HOSP IP/OBS MODERATE 35: CPT

## 2017-10-04 RX ORDER — INSULIN HUMAN 100 [IU]/ML
10 INJECTION, SOLUTION SUBCUTANEOUS ONCE
Qty: 0 | Refills: 0 | Status: COMPLETED | OUTPATIENT
Start: 2017-10-04 | End: 2017-10-04

## 2017-10-04 RX ORDER — ALBUTEROL 90 UG/1
2.5 AEROSOL, METERED ORAL ONCE
Qty: 0 | Refills: 0 | Status: COMPLETED | OUTPATIENT
Start: 2017-10-04 | End: 2017-10-04

## 2017-10-04 RX ORDER — SODIUM POLYSTYRENE SULFONATE 4.1 MEQ/G
30 POWDER, FOR SUSPENSION ORAL ONCE
Qty: 0 | Refills: 0 | Status: COMPLETED | OUTPATIENT
Start: 2017-10-04 | End: 2017-10-04

## 2017-10-04 RX ORDER — DEXTROSE 50 % IN WATER 50 %
50 SYRINGE (ML) INTRAVENOUS ONCE
Qty: 0 | Refills: 0 | Status: COMPLETED | OUTPATIENT
Start: 2017-10-04 | End: 2017-10-04

## 2017-10-04 RX ORDER — VANCOMYCIN HCL 1 G
500 VIAL (EA) INTRAVENOUS ONCE
Qty: 0 | Refills: 0 | Status: COMPLETED | OUTPATIENT
Start: 2017-10-04 | End: 2017-10-04

## 2017-10-04 RX ADMIN — DIVALPROEX SODIUM 500 MILLIGRAM(S): 500 TABLET, DELAYED RELEASE ORAL at 05:36

## 2017-10-04 RX ADMIN — Medication 100 MILLIGRAM(S): at 22:24

## 2017-10-04 RX ADMIN — FLUCONAZOLE 200 MILLIGRAM(S): 150 TABLET ORAL at 11:55

## 2017-10-04 RX ADMIN — Medication 1300 MILLIGRAM(S): at 05:36

## 2017-10-04 RX ADMIN — Medication 1: at 22:24

## 2017-10-04 RX ADMIN — CARVEDILOL PHOSPHATE 12.5 MILLIGRAM(S): 80 CAPSULE, EXTENDED RELEASE ORAL at 18:07

## 2017-10-04 RX ADMIN — Medication 3: at 13:10

## 2017-10-04 RX ADMIN — CILOSTAZOL 100 MILLIGRAM(S): 100 TABLET ORAL at 11:55

## 2017-10-04 RX ADMIN — ATORVASTATIN CALCIUM 40 MILLIGRAM(S): 80 TABLET, FILM COATED ORAL at 22:24

## 2017-10-04 RX ADMIN — Medication 1300 MILLIGRAM(S): at 22:24

## 2017-10-04 RX ADMIN — Medication 3 MILLIGRAM(S): at 22:24

## 2017-10-04 RX ADMIN — ALBUTEROL 2.5 MILLIGRAM(S): 90 AEROSOL, METERED ORAL at 06:56

## 2017-10-04 RX ADMIN — Medication 40 MILLIGRAM(S): at 05:36

## 2017-10-04 RX ADMIN — AMLODIPINE BESYLATE 10 MILLIGRAM(S): 2.5 TABLET ORAL at 05:36

## 2017-10-04 RX ADMIN — INSULIN HUMAN 10 UNIT(S): 100 INJECTION, SOLUTION SUBCUTANEOUS at 06:52

## 2017-10-04 RX ADMIN — GABAPENTIN 100 MILLIGRAM(S): 400 CAPSULE ORAL at 05:36

## 2017-10-04 RX ADMIN — ISOSORBIDE MONONITRATE 120 MILLIGRAM(S): 60 TABLET, EXTENDED RELEASE ORAL at 11:55

## 2017-10-04 RX ADMIN — INSULIN GLARGINE 8 UNIT(S): 100 INJECTION, SOLUTION SUBCUTANEOUS at 22:24

## 2017-10-04 RX ADMIN — SODIUM POLYSTYRENE SULFONATE 30 GRAM(S): 4.1 POWDER, FOR SUSPENSION ORAL at 09:12

## 2017-10-04 RX ADMIN — PANTOPRAZOLE SODIUM 40 MILLIGRAM(S): 20 TABLET, DELAYED RELEASE ORAL at 05:36

## 2017-10-04 RX ADMIN — Medication 50 MILLILITER(S): at 06:52

## 2017-10-04 RX ADMIN — Medication 5: at 18:07

## 2017-10-04 RX ADMIN — HEPARIN SODIUM 5000 UNIT(S): 5000 INJECTION INTRAVENOUS; SUBCUTANEOUS at 13:10

## 2017-10-04 RX ADMIN — Medication 100 MILLIGRAM(S): at 14:45

## 2017-10-04 RX ADMIN — OLANZAPINE 20 MILLIGRAM(S): 15 TABLET, FILM COATED ORAL at 22:24

## 2017-10-04 RX ADMIN — GABAPENTIN 100 MILLIGRAM(S): 400 CAPSULE ORAL at 18:07

## 2017-10-04 RX ADMIN — Medication 1300 MILLIGRAM(S): at 13:10

## 2017-10-04 RX ADMIN — HEPARIN SODIUM 5000 UNIT(S): 5000 INJECTION INTRAVENOUS; SUBCUTANEOUS at 22:24

## 2017-10-04 RX ADMIN — DIVALPROEX SODIUM 500 MILLIGRAM(S): 500 TABLET, DELAYED RELEASE ORAL at 18:07

## 2017-10-04 RX ADMIN — HEPARIN SODIUM 5000 UNIT(S): 5000 INJECTION INTRAVENOUS; SUBCUTANEOUS at 05:36

## 2017-10-04 NOTE — PROVIDER CONTACT NOTE (CRITICAL VALUE NOTIFICATION) - ASSESSMENT
Patient is asymptomatic
pt axox3. v/s/s. no s/s of bleeding noted. Right foot dsg c/d/i.
Denies CP/SOB.  NAD noted.
AOx3, denies dizziness / lightheadedness, no SOB
Denies CP/SOB.  NAD noted.  MD assessed patient ~15 minutes ago.
pt is asymptomatic

## 2017-10-04 NOTE — CHART NOTE - NSCHARTNOTESELECT_GEN_ALL_CORE
Hyperkalemia/Event Note
Event Note
Event Note/PreIR Note
Event Note/Vascular Surgery
Hyperkalemia/Event Note
MALNUTRITION ALERT/Nutrition Services
NUTRITION FOLLOW-UP/Nutrition Services
NUTRITION FOLLOW-UP/Nutrition Services
Refusal of Care/Event Note

## 2017-10-04 NOTE — PROGRESS NOTE ADULT - ASSESSMENT
Imp-  52y Male with hx CKD 4  chronic hyperkalemia (maintained on kayexalate as an outpt)  recent hyperkalemia likely due to ACEI use (now dc'ed)  ordered for insulin and albuterol  will redose kayexalate 30gm today   cont oral bicarbonate and lasix  2gm K diet (reviewed with pt - he was eating dried friuts and nuts from home)  No indications for dialysis Imp-  52y Male with hx CKD 4  chronic hyperkalemia (maintained on kayexalate as an outpt)  recent hyperkalemia likely due to ACEI use (now dc'ed)  ordered for insulin and albuterol  will redose kayexalate 30gm today   cont oral bicarbonate and lasix  2gm K diet (reviewed with pt - he was eating dried friuts and nuts from home)  No indications for dialysis  Avoid ACE/ARB and NSAIDS

## 2017-10-04 NOTE — PROGRESS NOTE ADULT - SUBJECTIVE AND OBJECTIVE BOX
52yPatient is a 52y old  Male who presents with a chief complaint of right foot (14 Sep 2017 02:55)      Interval history:  No new complaints.  Feeling well.  No fevers or chills.    Day #14 of vancomycin today    ROS: otherwise negative      Antimicrobials:    fluconAZOLE   Tablet 200 milliGRAM(s) Oral daily      Vital Signs Last 24 Hrs  T(C): 36.8 (10-04-17 @ 11:33), Max: 37.1 (10-04-17 @ 05:24)  T(F): 98.3 (10-04-17 @ 11:33), Max: 98.7 (10-04-17 @ 05:24)  HR: 71 (10-04-17 @ 11:33) (63 - 76)  BP: 137/64 (10-04-17 @ 11:33) (130/51 - 153/62)  BP(mean): --  RR: 16 (10-04-17 @ 11:33) (16 - 18)  SpO2: 97% (10-04-17 @ 11:33) (94% - 98%)    PHYSICAL EXAM:  Constitutional: Awake, no distress.  Eyes: PER. NO discharge or conjunctival injection  ENMT: No sinus tenderness. No thrush. No pharyngeal exudate or erythema.   Neck: Supple, No LN, no JVD  Respiratory: Good air entry bilaterally, CTA  Cardiovascular:S1 S2 wnl, No murmurs, rub or gallops  Gastrointestinal: Soft BS(+) no tenderness no masses ,No rebound or guarding  Genitourinary:No CVA tendereness   Extremities:No cyanosis,clubbing or edema.  Neurological:AAO X 3,No grossly focal deficits  Skin:No rash. Right inguinal area surgical site intact, staples in place, no drainage or erythema or signs of infection. Right inner thigh incision c/d/i. staples in place.  Lymph Nodes: No palpable LNs  Musculoskeletal: Right foot bandaged, patient declined undressing and exam. Reviewed podiatry note, no signs of infection.                             8.5    9.83  )-----------( 337      ( 04 Oct 2017 04:00 )             26.7   10-04    142  |  107  |  60<H>  ----------------------------<  176<H>  6.2<HH>   |  20<L>  |  3.39<H>    Ca    8.6      04 Oct 2017 04:00            RECENT CULTURES:  Culture - Yeast and Fungus (09.19.17 @ 20:39)    Culture - Yeast and Fungus:   CULTURE NEGATIVE FOR YEASTS AND MOLDS AFTER 2 DAYS  CULTURE NEGATIVE FOR YEASTS AND MOLDS   AFTER 5 DAYS  CULTURE NEGATIVE FOR YEASTS AND MOLDS   AFTER 1 WEEK  CULTURE NEGATIVE FOR YEASTS AND MOLDS   AFTER 2 WEEKS    Specimen Source: FOOT    Culture - Surg Site Aerob/Anaer w/Gm St (09.19.17 @ 20:39)    Gram Stain Wound:   WBC White Blood Cells  QNTY CELLS IN GRAM STAIN: RARE (1+)  NOS^No Organisms Seen    -  Cefazolin: R 16 LUZMARIA    -  Ciprofloxacin: R >2 LUZMARIA    -  Clindamycin: S <=0.5 LUZMARIA    -  Daptomycin: S 1 LUZMARIA    -  Erythromycin: R >4 LUZMARIA    -  Gentamicin: S <=4 LUZMARIA    -  Linezolid: S 4 LUZMARIA    -  Moxifloxacin(Aerobic): R 2 LUZMARIA    -  Oxacillin: R >2 LUZMARIA    -  Penicillin: R >8 LUZMARIA    -  Rifampin: S <=1 LUZMARIA    -  Tetra/Doxy: S <=4 LUZMARIA    -  Trimethoprim/Sulfamethoxazole: S <=0.5/9.5 LUZMARIA    -  Vancomycin: S 2 LUZMARIA    Culture - Surgical Site:   RARE  SEE  FOR CRITICAL NOTIFICATION.    Specimen Source: FOOT    Organism Identification: Staph. aureus *MRSA*    Organism: Staph. aureus *MRSA*  OXICILLIN-RESISTANT staphylococci should be regarded as  RESISTANT to ALL other Beta-Lactams regardless of the  in-vitro results obtained.  These include: ALL  Penicillins, Cephalosporins, Amoxicillin-clavulanic  acid, Ticarcillin-clavulanic acid,  Ampicillin-sulbactam, and Imipenem.    Method Type: MICROSCAN POS COMBO 34    Culture - Acid Fast - Other w/Smear (09.19.17 @ 20:39)    Culture - Acid Fast - Other w/Smear:   ------------------ PRELIMINARY --------------------             NO ACID FAST BACILLI ISOLATED  AFTER 2 WEEKS' INCUBATION    Specimen Source: FOOT      NANCY SEPULVEDA JOE                       1        Surgical Final Report          Final Diagnosis  1. Clean margin medial cuneiform, excision  - Bony tissue, negative for osteomyelitis    2. First metatarsal bone, resection  - Bony tissue with chronic osteomyelitis    Verified by: Price Marques MD  (Electronic Signature)  Reported on: 09/27/17 17:07 EDT,[address] [place], [place] [id number]  _________________________________________________________________  ____    Clinical History  .....    Specimen(s) Submitted  1     Clean margin medial cuneiform  2     1st metatarsal infection    Gross Description  1.   The specimen is received in formalin and the specimen  container is labeled: Clear margin medial cuneiform.  It consists  of a 0.8 x 0.5 x 0.4 cm aggregate of three fragments of dark tan  bony tissue.  Entirely submitted.  One cassette following  fixation and decalcification.    2.   The specimen is received in formalin and the specimen  container is labeled: First metatarsal infected.  It consists of  a 5.0 cm in length portion of tan bone with maximum diameter  ranging from 1.2-3.5 cm.  The resection margin is shaved.  The  metatarsal head shows intact shiny articular cartilage.  Representative sections submitted.  Two cassettes following  fixation and decalcification:  1A= resection margin  1B= longitudinal sections with articular head    In addition to other data that may appear on the specimen  containers, all labels have been inspected to confirm the  presence of the patient's name and date of birth.  JD 09/22/17 03:29

## 2017-10-04 NOTE — PROVIDER CONTACT NOTE (OTHER) - ASSESSMENT
BP-220/70 HR-59. Pt was bolused earlier this night despite already elevated blood pressure based off of MD orders due to hyperkalemia. Lasix 80mg PO was given for bolus. Pt remains asymptomatic. MD made aware of situation
pt BP-195/77. Pt was screaming/yelling at mother at bedside. Pt was re-assessed 15 minutes later with bp still high. Pt has 1U PRBC infusing at this time. Pt was told to remain calm. Pt refusing any medication related to lowering his blood pressure.
pt blood pressure elevated after 1L Bolus. (181/89) HR-59. all other v/s/s. Pt does not complain of distress/discomfort. Lung sounds clear. Asymptomatic.
/74. HR 64. PT not sitting still/ compliant for BP. PT concerned about eating.
/78. PT denies pain.
AOX3, hx of bipolar dso.
AOx4, denies chest pain, HA, no SOB. Denies any pain.
AOx4, denies chest pain, HA, no SOB. Denies pain.
Patient axox4 , however refuses to comply with diet orders. VSS.
Patient sleeping at present, NAD noted.
Pt alert and oriented and asymptomatic. Pt denies chest pain and SOB.
Pt alert and oriented, asymptomatic, denies chest pain/SOB. Pt ordered for Normal Saline @75
Pt alert and oriented, denies chest pain/SOB. Pt asymptomatic. Pt has already received 2 doses of Hydralazine 10mg IV push
Pt alert and oriented, no distress, asymptomatic, denies chest pain/SOB.
Pt resting comfortably, asymptomatic. Pt denies chest pain/SOB.
Pt's K+ was 5.3. Dr. De La Rosa aware. Also made aware of elevated b/p.
pt is asymptomatic with a BP of 209/82. pt is aware of high BP and is refusing any medication that would have been prescribed stating "let the pressure drop on its own"

## 2017-10-04 NOTE — PROVIDER CONTACT NOTE (CRITICAL VALUE NOTIFICATION) - PERSON GIVING RESULT:
Hematology Salazar Melendez
Jonah Massey
Mario Blackburn
Salazar Melendez
Sharron Bean
chemistry, Francesco Naqvi
Dora Olivares

## 2017-10-04 NOTE — CHART NOTE - NSCHARTNOTEFT_GEN_A_CORE
RN called resident to discuss critical value   K 6.2  Insulin 10 mg iv push + D50 50mg ordered, albuterol ordered    Patient was refusing but now agreeable     Will call Renal this am, will follow up bmp at 10 am, RN aware to draw bmp RN called resident to discuss critical value   K 6.2  Insulin 10 mg iv push + D50 50mg ordered, albuterol ordered    Patient was refusing but now agreeable     Will call Renal this am, will follow up bmp at 5pm, Discussed with Dr. Diaz (nephrology)

## 2017-10-04 NOTE — PROGRESS NOTE ADULT - SUBJECTIVE AND OBJECTIVE BOX
Patient is a 52y Male  being evaluated for   CKD, hyperkalemia       who reports no complaints overnight.        PHYSICAL EXAM:  Vitals:T(C): 37.1 (10-04-17 @ 05:24), Max: 37.1 (10-04-17 @ 05:24)  HR: 63 (10-04-17 @ 06:56) (63 - 76)  BP: 151/67 (10-04-17 @ 05:24) (130/51 - 153/62)  RR: 18 (10-04-17 @ 05:24) (16 - 20)  SpO2: 96% (10-04-17 @ 05:24) (94% - 98%)  Wt(kg): --    General: no acute distress  HEENT:  NC, ext ears nl, oropharynx clear,  nose nl  Neck: No JVD, bruit, adenopathy or thyromegaly  Eyes: PERRL, no discharge, no icterus  Respiratory: cta/p bilat, no wheezes, rales, nl effort  Cardiovascular: regular rate, S1 and S2 no rub or gallop  Abd: : BS+, soft, NT , no rebound or guarding, no bruits  Extremities: no edema, no cyanosis,dressing to LE      I and O's:  10-03 @ 07:01  -  10-04 @ 07:00  --------------------------------------------------------  IN: 580 mL / OUT: 2800 mL / NET: -2220 mL    10-04 @ 07:01  -  10-04 @ 08:39  --------------------------------------------------------  IN: 0 mL / OUT: 700 mL / NET: -700 mL              REVIEW OF SYSTEMS:  CONSTITUTIONAL: No weakness, fevers or chills  RESPIRATORY: No cough No wheezing No hemoptysis; No shortness of breath  CARDIOVASCULAR: No chest pain No palpitations  GI : no abd pain No nausea No vomiting  GENITOURINARY: No dysuria, frequency or hematuria  All other review of systems is negative unless indicated above.    Allergies    No Known Allergies    Intolerances          MEDICATIONS  (STANDING):  amLODIPine   Tablet 10 milliGRAM(s) Oral daily  atorvastatin 40 milliGRAM(s) Oral at bedtime  carvedilol 12.5 milliGRAM(s) Oral every 12 hours  cilostazol 100 milliGRAM(s) Oral daily  dextrose 5%. 1000 milliLiter(s) (50 mL/Hr) IV Continuous <Continuous>  dextrose 50% Injectable 12.5 Gram(s) IV Push once  dextrose 50% Injectable 25 Gram(s) IV Push once  dextrose 50% Injectable 25 Gram(s) IV Push once  diVALproex  milliGRAM(s) Oral two times a day  fluconAZOLE   Tablet 200 milliGRAM(s) Oral daily  furosemide    Tablet 40 milliGRAM(s) Oral daily  gabapentin 100 milliGRAM(s) Oral every 12 hours  heparin  Injectable 5000 Unit(s) SubCutaneous every 8 hours  insulin glargine Injectable (LANTUS) 8 Unit(s) SubCutaneous at bedtime  insulin lispro (HumaLOG) corrective regimen sliding scale   SubCutaneous three times a day before meals  insulin lispro (HumaLOG) corrective regimen sliding scale   SubCutaneous at bedtime  isosorbide   mononitrate ER Tablet (IMDUR) 120 milliGRAM(s) Oral daily  melatonin 3 milliGRAM(s) Oral at bedtime  OLANZapine 20 milliGRAM(s) Oral at bedtime  pantoprazole    Tablet 40 milliGRAM(s) Oral before breakfast  sodium bicarbonate 1300 milliGRAM(s) Oral three times a day  sodium chloride 0.9%. 1000 milliLiter(s) (30 mL/Hr) IV Continuous <Continuous>  sodium polystyrene sulfonate Suspension 30 Gram(s) Oral once  traZODone 100 milliGRAM(s) Oral at bedtime      Sodium,Serum 142    10-04 @ 04:00  Sodium,Serum 140    10-04 @ 00:40  Sodium,Serum 137    10-03 @ 21:40  Sodium,Serum 145    10-03 @ 16:20  Sodium,Serum 139    10-03 @ 05:10  Sodium,Serum 139    10-02 @ 06:10  Sodium,Serum 140    10-01 @ 06:25    Potassium,Serum 6.2    10-04 @ 04:00  Potassium,Serum 5.9    10-04 @ 00:40  Potassium,Serum 6.3    10-03 @ 21:40  Potassium,Serum 6.8    10-03 @ 16:20  Potassium,Serum 5.8    10-03 @ 05:10  Potassium,Serum 5.5    10-02 @ 06:10  Potassium,Serum 4.9    10-01 @ 06:25    Chloride,Serum 107    10-04 @ 04:00  Chloride,Serum 105    10-04 @ 00:40  Chloride,Serum 104    10-03 @ 21:40  Chloride,Serum 112    10-03 @ 16:20  Chloride,Serum 107    10-03 @ 05:10  Chloride,Serum 105    10-02 @ 06:10  Chloride,Serum 106    10-01 @ 06:25    CO2, Serum 20    10-04 @ 04:00  CO2, Serum 23    10-04 @ 00:40  CO2, Serum 22    10-03 @ 21:40  CO2, Serum 22    10-03 @ 16:20  CO2, Serum 21    10-03 @ 05:10  CO2, Serum 20    10-02 @ 06:10  CO2, Serum 19    10-01 @ 06:25    BUN 60    10-04 @ 04:00  BUN 64    10-04 @ 00:40  BUN 63    10-03 @ 21:40  BUN 64    10-03 @ 16:20  BUN 65    10-03 @ 05:10  BUN 62    10-02 @ 06:10  BUN 62    10-01 @ 06:25    Creatinine, Serum 3.39    10-04 @ 04:00  Creatinine, Serum 3.35    10-04 @ 00:40  Creatinine, Serum 3.34    10-03 @ 21:40  Creatinine, Serum 3.38    10-03 @ 16:20  Creatinine, Serum 3.57    10-03 @ 05:10  Creatinine, Serum 3.36    10-02 @ 06:10  Creatinine, Serum 3.36    10-01 @ 06:25      10-04    142  |  107  |  60<H>  ----------------------------<  176<H>  6.2<HH>   |  20<L>  |  3.39<H>    Ca    8.6      04 Oct 2017 04:00                              8.5    9.83  )-----------( 337      ( 04 Oct 2017 04:00 )             26.7

## 2017-10-04 NOTE — PROGRESS NOTE ADULT - ASSESSMENT
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 Readmitted 9/13/17 for concern of surgical site infection, s/p revisional right foot first ray amputation 9/19 with culture growing MRSA and pathology showing chronic osteomyelitis of infection site, but had clean margins at borders.   Also underwent lower extremity angiogram 9/18 showing SFA occlusion s/p Right Femoral to above knee popliteal artery bypass 9/28.     Now day #14 of IV vancomycin s/p surgery.  Would complete vancomycin today.    No need for PICC as had clean margins and pt will be getting 2 weeks of antibiotic post surgery.    Will sign off at this time.    Please re-consult as needed.    Karen Davis MD  [phone] (pager)  [phone] (office)

## 2017-10-04 NOTE — PROVIDER CONTACT NOTE (CRITICAL VALUE NOTIFICATION) - ACTION/TREATMENT ORDERED:
no further interventions at this time.  get occult blood next time patient has bowel movement
awaiting orders
MD aware, will order treatment.  Awaiting order, will continue to monitor.
1 unit PRBC ordered to be transfused.
MD aware, stat EKG to be done.  Will continue to monitor.
none at this time; will continue to monitor.

## 2017-10-05 VITALS
RESPIRATION RATE: 18 BRPM | DIASTOLIC BLOOD PRESSURE: 71 MMHG | SYSTOLIC BLOOD PRESSURE: 145 MMHG | OXYGEN SATURATION: 97 % | HEART RATE: 75 BPM | TEMPERATURE: 98 F

## 2017-10-05 LAB
BUN SERPL-MCNC: 70 MG/DL — HIGH (ref 7–23)
CALCIUM SERPL-MCNC: 8.2 MG/DL — LOW (ref 8.4–10.5)
CHLORIDE SERPL-SCNC: 100 MMOL/L — SIGNIFICANT CHANGE UP (ref 98–107)
CO2 SERPL-SCNC: 25 MMOL/L — SIGNIFICANT CHANGE UP (ref 22–31)
CREAT SERPL-MCNC: 3.22 MG/DL — HIGH (ref 0.5–1.3)
GLUCOSE SERPL-MCNC: 144 MG/DL — HIGH (ref 70–99)
POTASSIUM SERPL-MCNC: 5 MMOL/L — SIGNIFICANT CHANGE UP (ref 3.5–5.3)
POTASSIUM SERPL-SCNC: 5 MMOL/L — SIGNIFICANT CHANGE UP (ref 3.5–5.3)
SODIUM SERPL-SCNC: 136 MMOL/L — SIGNIFICANT CHANGE UP (ref 135–145)

## 2017-10-05 RX ORDER — ATORVASTATIN CALCIUM 80 MG/1
40 TABLET, FILM COATED ORAL
Qty: 0 | Refills: 0 | COMMUNITY

## 2017-10-05 RX ADMIN — GABAPENTIN 100 MILLIGRAM(S): 400 CAPSULE ORAL at 17:06

## 2017-10-05 RX ADMIN — DIVALPROEX SODIUM 500 MILLIGRAM(S): 500 TABLET, DELAYED RELEASE ORAL at 05:29

## 2017-10-05 RX ADMIN — HEPARIN SODIUM 5000 UNIT(S): 5000 INJECTION INTRAVENOUS; SUBCUTANEOUS at 05:29

## 2017-10-05 RX ADMIN — DIVALPROEX SODIUM 500 MILLIGRAM(S): 500 TABLET, DELAYED RELEASE ORAL at 17:04

## 2017-10-05 RX ADMIN — CILOSTAZOL 100 MILLIGRAM(S): 100 TABLET ORAL at 10:55

## 2017-10-05 RX ADMIN — FLUCONAZOLE 200 MILLIGRAM(S): 150 TABLET ORAL at 10:56

## 2017-10-05 RX ADMIN — PANTOPRAZOLE SODIUM 40 MILLIGRAM(S): 20 TABLET, DELAYED RELEASE ORAL at 05:29

## 2017-10-05 RX ADMIN — Medication 3: at 17:03

## 2017-10-05 RX ADMIN — CARVEDILOL PHOSPHATE 12.5 MILLIGRAM(S): 80 CAPSULE, EXTENDED RELEASE ORAL at 17:04

## 2017-10-05 RX ADMIN — Medication 1300 MILLIGRAM(S): at 05:29

## 2017-10-05 RX ADMIN — GABAPENTIN 100 MILLIGRAM(S): 400 CAPSULE ORAL at 05:29

## 2017-10-05 RX ADMIN — Medication 40 MILLIGRAM(S): at 05:29

## 2017-10-05 RX ADMIN — ISOSORBIDE MONONITRATE 120 MILLIGRAM(S): 60 TABLET, EXTENDED RELEASE ORAL at 10:55

## 2017-10-05 RX ADMIN — AMLODIPINE BESYLATE 10 MILLIGRAM(S): 2.5 TABLET ORAL at 05:29

## 2017-10-05 RX ADMIN — CARVEDILOL PHOSPHATE 12.5 MILLIGRAM(S): 80 CAPSULE, EXTENDED RELEASE ORAL at 05:29

## 2017-10-05 NOTE — PROGRESS NOTE ADULT - PROBLEM SELECTOR PLAN 2
GI w/u ongoing
as above  or cancelled
as above  tent OR thurs for rle bypass if cleared by GI and cleared by cardiology
renal input appreciated
renal input appreciated will monitor closely to see of pt need hd  and shiley placement
GI w/u ongoing
as above
as above  or cancelled

## 2017-10-05 NOTE — PROGRESS NOTE ADULT - NSHPATTENDINGPLANDISCUSS_GEN_ALL_CORE
surg ho
surg ho
surg hop
surg ho
surg hop
surg ho
surg hop
surg ho
surg ho

## 2017-10-05 NOTE — PROGRESS NOTE ADULT - SUBJECTIVE AND OBJECTIVE BOX
C TEAM SURGERY PROGRESS NOTE    SUBJECTIVE: Patient seen and examined at bedside, patient without complaints.     Vital Signs Last 24 Hrs  T(C): 37.1 (05 Oct 2017 05:28), Max: 37.1 (05 Oct 2017 01:17)  T(F): 98.7 (05 Oct 2017 05:28), Max: 98.7 (05 Oct 2017 01:17)  HR: 65 (05 Oct 2017 05:28) (65 - 79)  BP: 151/67 (05 Oct 2017 05:28) (133/53 - 171/72)  BP(mean): --  RR: 16 (05 Oct 2017 05:28) (16 - 20)  SpO2: 95% (05 Oct 2017 05:28) (94% - 99%)  I&O's Detail    04 Oct 2017 07:01  -  05 Oct 2017 07:00  --------------------------------------------------------  IN:    IV PiggyBack: 100 mL    sodium chloride 0.9%: 240 mL  Total IN: 340 mL    OUT:    Voided: 4400 mL  Total OUT: 4400 mL    Total NET: -4060 mL        MEDICATIONS  (STANDING):  amLODIPine   Tablet 10 milliGRAM(s) Oral daily  atorvastatin 40 milliGRAM(s) Oral at bedtime  carvedilol 12.5 milliGRAM(s) Oral every 12 hours  cilostazol 100 milliGRAM(s) Oral daily  dextrose 5%. 1000 milliLiter(s) (50 mL/Hr) IV Continuous <Continuous>  dextrose 50% Injectable 12.5 Gram(s) IV Push once  dextrose 50% Injectable 25 Gram(s) IV Push once  dextrose 50% Injectable 25 Gram(s) IV Push once  diVALproex  milliGRAM(s) Oral two times a day  fluconAZOLE   Tablet 200 milliGRAM(s) Oral daily  furosemide    Tablet 40 milliGRAM(s) Oral daily  gabapentin 100 milliGRAM(s) Oral every 12 hours  heparin  Injectable 5000 Unit(s) SubCutaneous every 8 hours  insulin glargine Injectable (LANTUS) 8 Unit(s) SubCutaneous at bedtime  insulin lispro (HumaLOG) corrective regimen sliding scale   SubCutaneous three times a day before meals  insulin lispro (HumaLOG) corrective regimen sliding scale   SubCutaneous at bedtime  isosorbide   mononitrate ER Tablet (IMDUR) 120 milliGRAM(s) Oral daily  melatonin 3 milliGRAM(s) Oral at bedtime  OLANZapine 20 milliGRAM(s) Oral at bedtime  pantoprazole    Tablet 40 milliGRAM(s) Oral before breakfast  sodium bicarbonate 1300 milliGRAM(s) Oral three times a day  traZODone 100 milliGRAM(s) Oral at bedtime    MEDICATIONS  (PRN):  acetaminophen   Tablet. 650 milliGRAM(s) Oral every 6 hours PRN Mild Pain (1 - 3)  dextrose Gel 1 Dose(s) Oral once PRN Blood Glucose LESS THAN 70 milliGRAM(s)/deciliter  glucagon  Injectable 1 milliGRAM(s) IntraMuscular once PRN Glucose LESS THAN 70 milligrams/deciliter  hydrALAZINE Injectable 10 milliGRAM(s) IV Push every 4 hours PRN SBP>160  HYDROmorphone  Injectable 0.5 milliGRAM(s) IV Push every 6 hours PRN Severe Pain unrelieved by Oxycodone IR  oxyCODONE    IR 5 milliGRAM(s) Oral every 4 hours PRN Moderate and Severe Pain    Physical Exam:  General: NAD  Resp: Nonlabored breathing  Extremities: RLE groin and thigh incisions clean, dry, intact. no erythema or exudates.  R foot dressing intact.  No sign of active foot infection, ampuation healing well.  Pulses exam: palpable DP and PT  both +2.  RLE warm well perfused.    LABS:                        8.5    9.83  )-----------( 337      ( 04 Oct 2017 04:00 )             26.7     10-05    136  |  100  |  70<H>  ----------------------------<  144<H>  5.0   |  25  |  3.22<H>    Ca    8.2<L>      05 Oct 2017 05:10      52M w/ non-healing wound RLE, SFA occlusion now POD 5 s/p Right Fem- above knee Pop Bypass with rle excellent perfusion   - follow up K+ and glucose levels  - kayexelate for hyperkalema and restart loop diuretic per renal  - incisions to open air  - pain control   - regular diet as tolerated   - Continue pletal 50BID, SQH  - OOB to chair  - f/u PT recs for dispo

## 2017-10-05 NOTE — PROGRESS NOTE ADULT - PROBLEM SELECTOR PROBLEM 2
Anemia due to blood loss
Arterial insufficiency with ischemic ulcer
Arterial insufficiency with ischemic ulcer
CKD (chronic kidney disease) stage 4, GFR 15-29 ml/min
CKD (chronic kidney disease) stage 4, GFR 15-29 ml/min
Anemia due to blood loss
Arterial insufficiency with ischemic ulcer
Arterial insufficiency with ischemic ulcer

## 2017-10-05 NOTE — PROGRESS NOTE ADULT - ASSESSMENT
51 yo M s/p R foot 1st metatarsal resection and well adhered lateral 5th met eschar  - Pt seen and examined  - No signs of active infection present. Surgical site appears to be viable and healing well  - Please have Z float boots on at all times in bed   - Betadine and padding applied to eschar, DSD to surgical site  - antibiotics per ID recs  - remaining sutures removed at bedside - pt tolerated procedure well  - stable for discharge from podiatry stand point  - seen w/ attending

## 2017-10-05 NOTE — PROGRESS NOTE ADULT - SUBJECTIVE AND OBJECTIVE BOX
Patient is no longer on contact. Patient finished antibiotic course per infectious disease. Stable for discharge to rehab at present time.

## 2017-10-05 NOTE — PROGRESS NOTE ADULT - SUBJECTIVE AND OBJECTIVE BOX
Patient is a 52y old  Male who presents with a chief complaint of RLE infection (29 Sep 2017 11:42)       INTERVAL HPI/OVERNIGHT EVENTS:  Patient seen and evaluated at bedside.  Pt is resting comfortable in NAD. Denies N/V/F/C.      Allergies    No Known Allergies    Intolerances        Vital Signs Last 24 Hrs  T(C): 36.8 (05 Oct 2017 10:08), Max: 37.1 (05 Oct 2017 01:17)  T(F): 98.2 (05 Oct 2017 10:08), Max: 98.7 (05 Oct 2017 01:17)  HR: 67 (05 Oct 2017 10:08) (65 - 79)  BP: 164/78 (05 Oct 2017 10:08) (150/58 - 171/72)  BP(mean): --  RR: 19 (05 Oct 2017 10:08) (16 - 20)  SpO2: 97% (05 Oct 2017 10:08) (94% - 99%)    LABS:                        8.5    9.83  )-----------( 337      ( 04 Oct 2017 04:00 )             26.7     10-05    136  |  100  |  70<H>  ----------------------------<  144<H>  5.0   |  25  |  3.22<H>    Ca    8.2<L>      05 Oct 2017 05:10          CAPILLARY BLOOD GLUCOSE  257 (05 Oct 2017 13:07)  137 (05 Oct 2017 08:35)  291 (04 Oct 2017 22:15)  366 (04 Oct 2017 17:49)          Lower Extremity Physical Exam:  s/p R foot 1st metatarsal resection. 2 sutures intact, CFT <2 s along all edges of incision site. Skin edges well coapted with central popped suture for prior hematoma. No dehiscence    Right lateral 5th met head has a small eschar. Periwound is normal skin, eschar is well adhered, no fluctuance, no erythema, no malodor no drainage. Eschar is stable. 1.5 x 1.5 cm

## 2017-10-05 NOTE — PROGRESS NOTE ADULT - ATTENDING COMMENTS
Vascular sx attending covering for Dr. Agrawal  Pt. was seen and examined. Agree with above.   R leg PVD s/p 4th toe amp, pending revascularization when medically optimized.   No acute events overnight. Hemodynamically stable  Pt. is pending colonoscopy by GI on Monday for anemia.   Hg is stable now at 9  will continue to trend  bowel prep tomorrow    Plan d/w the team.
as above
I have seen and examined this patient with Dr. Cheng. Agree with above.
as above
I have seen and examined this pt with Dr Cheng. Agree with above.
as above

## 2017-10-05 NOTE — PROGRESS NOTE ADULT - PROBLEM SELECTOR PROBLEM 1
Anemia in chronic kidney disease, unspecified CKD stage
Anemia in chronic kidney disease, unspecified CKD stage
Anemia due to blood loss
Anemia due to blood loss
Anemia in chronic kidney disease, unspecified CKD stage
Arterial insufficiency with ischemic ulcer
Anemia due to blood loss
Arterial insufficiency with ischemic ulcer
Anemia due to blood loss
Arterial insufficiency with ischemic ulcer

## 2017-10-05 NOTE — PROGRESS NOTE ADULT - ASSESSMENT
52M w/ non-healing wound RLE, SFA occlusion now POD 5 s/p Right Fem- above knee Pop Bypass with rle excellent perfusion   - follow up K+ and glucose levels  - f/u renal recommendations  d/c plan if cleared by renal

## 2017-10-17 LAB — FUNGUS SPEC QL CULT: SIGNIFICANT CHANGE UP

## 2017-10-23 ENCOUNTER — APPOINTMENT (OUTPATIENT)
Dept: VASCULAR SURGERY | Facility: CLINIC | Age: 52
End: 2017-10-23
Payer: MEDICARE

## 2017-10-23 VITALS
HEIGHT: 70 IN | HEART RATE: 73 BPM | BODY MASS INDEX: 23.48 KG/M2 | SYSTOLIC BLOOD PRESSURE: 202 MMHG | WEIGHT: 164 LBS | DIASTOLIC BLOOD PRESSURE: 100 MMHG | TEMPERATURE: 97.6 F

## 2017-10-23 VITALS — HEART RATE: 73 BPM | SYSTOLIC BLOOD PRESSURE: 189 MMHG | DIASTOLIC BLOOD PRESSURE: 98 MMHG

## 2017-10-23 PROCEDURE — 99024 POSTOP FOLLOW-UP VISIT: CPT

## 2017-10-23 RX ORDER — CLONAZEPAM 0.5 MG/1
0.5 TABLET ORAL
Qty: 30 | Refills: 0 | Status: ACTIVE | COMMUNITY
Start: 2017-05-01

## 2017-10-27 ENCOUNTER — CHART COPY (OUTPATIENT)
Age: 52
End: 2017-10-27

## 2017-10-27 DIAGNOSIS — L03.119 CELLULITIS OF UNSPECIFIED PART OF LIMB: ICD-10-CM

## 2017-10-31 LAB — ACID FAST STN SPEC: SIGNIFICANT CHANGE UP

## 2017-11-07 ENCOUNTER — APPOINTMENT (OUTPATIENT)
Dept: VASCULAR SURGERY | Facility: CLINIC | Age: 52
End: 2017-11-07
Payer: MEDICARE

## 2017-11-07 VITALS
TEMPERATURE: 97.9 F | BODY MASS INDEX: 23.48 KG/M2 | HEIGHT: 70 IN | HEART RATE: 69 BPM | WEIGHT: 164 LBS | SYSTOLIC BLOOD PRESSURE: 166 MMHG | DIASTOLIC BLOOD PRESSURE: 88 MMHG

## 2017-11-07 PROCEDURE — 93926 LOWER EXTREMITY STUDY: CPT | Mod: RT

## 2017-11-07 PROCEDURE — 99406 BEHAV CHNG SMOKING 3-10 MIN: CPT

## 2017-11-07 PROCEDURE — 99024 POSTOP FOLLOW-UP VISIT: CPT

## 2018-02-28 ENCOUNTER — APPOINTMENT (OUTPATIENT)
Dept: VASCULAR SURGERY | Facility: CLINIC | Age: 53
End: 2018-02-28
Payer: MEDICARE

## 2018-02-28 VITALS
TEMPERATURE: 97.5 F | HEART RATE: 70 BPM | WEIGHT: 164 LBS | HEIGHT: 70 IN | DIASTOLIC BLOOD PRESSURE: 93 MMHG | BODY MASS INDEX: 23.48 KG/M2 | SYSTOLIC BLOOD PRESSURE: 219 MMHG

## 2018-02-28 PROCEDURE — 99213 OFFICE O/P EST LOW 20 MIN: CPT | Mod: 25

## 2018-02-28 PROCEDURE — 99406 BEHAV CHNG SMOKING 3-10 MIN: CPT

## 2018-05-02 NOTE — PRE-OP CHECKLIST - TEMPERATURE IN CELSIUS (DEGREES C)
Floor nurse asked for me to call [facility] to verify pts BISOPROLOL.  Called [facility] @ [phone] to verify dose of BISOPROLOL, called nurse and pharmacist of the changes.  
36.8
36.9
37.1
36.9

## 2018-06-04 NOTE — PROGRESS NOTE ADULT - PROVIDER SPECIALTY LIST ADULT
Anesthesia
Gastroenterology
Nephrology
Pain Medicine
Pain Medicine
Podiatry
This is  4 weeks  Postop examination   following LEEP   patient denied having bleeding discharge or cramps   her path report was normal   a previous biopsies showed ELIANA 2       Physical exam:    Normal vulva vagina   cervix is well-healed   uterus anteverted flexed mobile no adnexal masses were noted       impression normal postop recovery     plan recheck Pap 6 months
Vascular Surgery
Nephrology
Surgery
Gastroenterology
Infectious Disease
Nephrology
Nephrology
Podiatry
Vascular Surgery
Gastroenterology
Gastroenterology

## 2018-06-22 ENCOUNTER — APPOINTMENT (OUTPATIENT)
Dept: VASCULAR SURGERY | Facility: CLINIC | Age: 53
End: 2018-06-22
Payer: MEDICARE

## 2018-06-22 VITALS
BODY MASS INDEX: 22.96 KG/M2 | SYSTOLIC BLOOD PRESSURE: 233 MMHG | HEIGHT: 71 IN | DIASTOLIC BLOOD PRESSURE: 104 MMHG | HEART RATE: 67 BPM | TEMPERATURE: 97.5 F | WEIGHT: 164 LBS

## 2018-06-22 VITALS — DIASTOLIC BLOOD PRESSURE: 108 MMHG | HEART RATE: 65 BPM | SYSTOLIC BLOOD PRESSURE: 240 MMHG

## 2018-06-22 PROCEDURE — 93926 LOWER EXTREMITY STUDY: CPT | Mod: RT

## 2018-06-22 PROCEDURE — 99214 OFFICE O/P EST MOD 30 MIN: CPT

## 2018-06-22 RX ORDER — ERYTHROPOIETIN 20000 [IU]/ML
20000 INJECTION, SOLUTION INTRAVENOUS; SUBCUTANEOUS
Qty: 4 | Refills: 0 | Status: ACTIVE | COMMUNITY
Start: 2018-03-02

## 2018-07-24 PROBLEM — F31.70 BIPOLAR DISORDER, CURRENTLY IN REMISSION, MOST RECENT EPISODE UNSPECIFIED: Chronic | Status: ACTIVE | Noted: 2017-03-12

## 2018-07-31 ENCOUNTER — RECORD ABSTRACTING (OUTPATIENT)
Age: 53
End: 2018-07-31

## 2018-08-20 ENCOUNTER — APPOINTMENT (OUTPATIENT)
Dept: PULMONOLOGY | Facility: CLINIC | Age: 53
End: 2018-08-20

## 2018-08-30 ENCOUNTER — APPOINTMENT (OUTPATIENT)
Dept: PULMONOLOGY | Facility: CLINIC | Age: 53
End: 2018-08-30

## 2018-09-05 ENCOUNTER — APPOINTMENT (OUTPATIENT)
Dept: VASCULAR SURGERY | Facility: CLINIC | Age: 53
End: 2018-09-05
Payer: MEDICARE

## 2018-09-05 VITALS
DIASTOLIC BLOOD PRESSURE: 93 MMHG | WEIGHT: 160 LBS | HEART RATE: 74 BPM | TEMPERATURE: 98.9 F | SYSTOLIC BLOOD PRESSURE: 191 MMHG | HEIGHT: 71 IN | BODY MASS INDEX: 22.4 KG/M2

## 2018-09-05 DIAGNOSIS — I73.9 PERIPHERAL VASCULAR DISEASE, UNSPECIFIED: ICD-10-CM

## 2018-09-05 PROCEDURE — 99214 OFFICE O/P EST MOD 30 MIN: CPT

## 2018-09-24 ENCOUNTER — RX RENEWAL (OUTPATIENT)
Age: 53
End: 2018-09-24

## 2018-10-12 ENCOUNTER — RX RENEWAL (OUTPATIENT)
Age: 53
End: 2018-10-12

## 2018-10-17 ENCOUNTER — APPOINTMENT (OUTPATIENT)
Age: 53
End: 2018-10-17
Payer: MEDICARE

## 2018-10-17 ENCOUNTER — APPOINTMENT (OUTPATIENT)
Dept: VASCULAR SURGERY | Facility: CLINIC | Age: 53
End: 2018-10-17
Payer: MEDICARE

## 2018-10-17 VITALS
WEIGHT: 160 LBS | HEIGHT: 70 IN | BODY MASS INDEX: 22.9 KG/M2 | SYSTOLIC BLOOD PRESSURE: 159 MMHG | DIASTOLIC BLOOD PRESSURE: 89 MMHG | TEMPERATURE: 98 F | HEART RATE: 66 BPM

## 2018-10-17 PROCEDURE — 99214 OFFICE O/P EST MOD 30 MIN: CPT

## 2018-10-17 PROCEDURE — 99406 BEHAV CHNG SMOKING 3-10 MIN: CPT

## 2018-10-17 PROCEDURE — 93880 EXTRACRANIAL BILAT STUDY: CPT

## 2018-10-22 ENCOUNTER — APPOINTMENT (OUTPATIENT)
Dept: CT IMAGING | Facility: IMAGING CENTER | Age: 53
End: 2018-10-22

## 2018-10-24 ENCOUNTER — APPOINTMENT (OUTPATIENT)
Dept: PULMONOLOGY | Facility: CLINIC | Age: 53
End: 2018-10-24
Payer: MEDICARE

## 2018-10-24 ENCOUNTER — LABORATORY RESULT (OUTPATIENT)
Age: 53
End: 2018-10-24

## 2018-10-24 VITALS
RESPIRATION RATE: 16 BRPM | HEART RATE: 69 BPM | TEMPERATURE: 97.8 F | OXYGEN SATURATION: 95 % | BODY MASS INDEX: 22.9 KG/M2 | WEIGHT: 160 LBS | SYSTOLIC BLOOD PRESSURE: 194 MMHG | HEIGHT: 70 IN | DIASTOLIC BLOOD PRESSURE: 89 MMHG

## 2018-10-24 DIAGNOSIS — Z86.79 PERSONAL HISTORY OF OTHER DISEASES OF THE CIRCULATORY SYSTEM: ICD-10-CM

## 2018-10-24 LAB
ALBUMIN: 150
BILIRUB UR QL STRIP: NEGATIVE
CLARITY UR: CLEAR
COLLECTION METHOD: NORMAL
CREATININE: 50
GLUCOSE BLDC GLUCOMTR-MCNC: 92
GLUCOSE UR-MCNC: 100
HBA1C MFR BLD HPLC: 6.1
HCG UR QL: 0.2 EU/DL
HGB UR QL STRIP.AUTO: NORMAL
KETONES UR-MCNC: NEGATIVE
LEUKOCYTE ESTERASE UR QL STRIP: NEGATIVE
MICROALBUMIN/CREAT UR TEST STR-RTO: NORMAL
NITRITE UR QL STRIP: NEGATIVE
PH UR STRIP: 7
PROT UR STRIP-MCNC: 300
SP GR UR STRIP: 1.02

## 2018-10-24 PROCEDURE — 82962 GLUCOSE BLOOD TEST: CPT

## 2018-10-24 PROCEDURE — 82570 ASSAY OF URINE CREATININE: CPT | Mod: QW

## 2018-10-24 PROCEDURE — 83036 HEMOGLOBIN GLYCOSYLATED A1C: CPT | Mod: QW

## 2018-10-24 PROCEDURE — G0009: CPT

## 2018-10-24 PROCEDURE — 90670 PCV13 VACCINE IM: CPT

## 2018-10-24 PROCEDURE — 99214 OFFICE O/P EST MOD 30 MIN: CPT | Mod: 25

## 2018-10-24 PROCEDURE — 81002 URINALYSIS NONAUTO W/O SCOPE: CPT | Mod: 59

## 2018-10-24 PROCEDURE — 81003 URINALYSIS AUTO W/O SCOPE: CPT | Mod: QW

## 2018-10-24 PROCEDURE — 36415 COLL VENOUS BLD VENIPUNCTURE: CPT

## 2018-10-24 PROCEDURE — 82044 UR ALBUMIN SEMIQUANTITATIVE: CPT | Mod: QW

## 2018-10-25 PROBLEM — Z86.79 HISTORY OF ABNORMAL ELECTROCARDIOGRAPHY: Status: RESOLVED | Noted: 2018-10-25 | Resolved: 2018-10-25

## 2018-10-25 RX ORDER — CILOSTAZOL 50 MG/1
50 TABLET ORAL
Qty: 60 | Refills: 6 | Status: DISCONTINUED | COMMUNITY
Start: 2018-06-22 | End: 2018-10-25

## 2018-10-25 RX ORDER — CILOSTAZOL 50 MG/1
50 TABLET ORAL
Qty: 60 | Refills: 6 | Status: DISCONTINUED | COMMUNITY
Start: 2017-10-23 | End: 2018-10-25

## 2018-10-25 RX ORDER — SULFAMETHOXAZOLE AND TRIMETHOPRIM 800; 160 MG/1; MG/1
800-160 TABLET ORAL
Refills: 0 | Status: DISCONTINUED | COMMUNITY
End: 2018-10-25

## 2018-10-25 RX ORDER — CEPHALEXIN 500 MG/1
500 CAPSULE ORAL 3 TIMES DAILY
Qty: 30 | Refills: 1 | Status: DISCONTINUED | COMMUNITY
Start: 2017-10-27 | End: 2018-10-25

## 2018-10-31 ENCOUNTER — RX RENEWAL (OUTPATIENT)
Age: 53
End: 2018-10-31

## 2018-11-26 ENCOUNTER — APPOINTMENT (OUTPATIENT)
Dept: MRI IMAGING | Facility: IMAGING CENTER | Age: 53
End: 2018-11-26
Payer: MEDICARE

## 2018-11-26 ENCOUNTER — OUTPATIENT (OUTPATIENT)
Dept: OUTPATIENT SERVICES | Facility: HOSPITAL | Age: 53
LOS: 1 days | End: 2018-11-26
Payer: MEDICARE

## 2018-11-26 DIAGNOSIS — Z00.8 ENCOUNTER FOR OTHER GENERAL EXAMINATION: ICD-10-CM

## 2018-11-26 DIAGNOSIS — Z89.411 ACQUIRED ABSENCE OF RIGHT GREAT TOE: Chronic | ICD-10-CM

## 2018-11-26 PROCEDURE — 70551 MRI BRAIN STEM W/O DYE: CPT | Mod: 26

## 2018-11-26 PROCEDURE — 82565 ASSAY OF CREATININE: CPT

## 2018-11-26 PROCEDURE — 70551 MRI BRAIN STEM W/O DYE: CPT

## 2018-12-03 LAB
25(OH)D3 SERPL-MCNC: 10.3 NG/ML
ALBUMIN SERPL ELPH-MCNC: 3.6 G/DL
ALP BLD-CCNC: 118 U/L
ALT SERPL-CCNC: 17 U/L
ANION GAP SERPL CALC-SCNC: 16 MMOL/L
AST SERPL-CCNC: 18 U/L
BASOPHILS # BLD AUTO: 0.04 K/UL
BASOPHILS NFR BLD AUTO: 0.5 %
BILIRUB SERPL-MCNC: <0.2 MG/DL
BUN SERPL-MCNC: 84 MG/DL
CALCIUM SERPL-MCNC: 7.9 MG/DL
CHLORIDE SERPL-SCNC: 106 MMOL/L
CHOLEST SERPL-MCNC: 400 MG/DL
CHOLEST/HDLC SERPL: 7.5 RATIO
CO2 SERPL-SCNC: 19 MMOL/L
CREAT SERPL-MCNC: 6.19 MG/DL
EOSINOPHIL # BLD AUTO: 0.25 K/UL
EOSINOPHIL NFR BLD AUTO: 3.1 %
GLUCOSE SERPL-MCNC: 96 MG/DL
HCT VFR BLD CALC: 28.9 %
HCV AB SER QL: NONREACTIVE
HCV S/CO RATIO: 0.12 S/CO
HDLC SERPL-MCNC: 53 MG/DL
HGB BLD-MCNC: 9.5 G/DL
IMM GRANULOCYTES NFR BLD AUTO: 0.5 %
LDLC SERPL CALC-MCNC: 313 MG/DL
LYMPHOCYTES # BLD AUTO: 2.47 K/UL
LYMPHOCYTES NFR BLD AUTO: 30.9 %
MAN DIFF?: NORMAL
MCHC RBC-ENTMCNC: 31.4 PG
MCHC RBC-ENTMCNC: 32.9 GM/DL
MCV RBC AUTO: 95.4 FL
MONOCYTES # BLD AUTO: 0.69 K/UL
MONOCYTES NFR BLD AUTO: 8.6 %
NEUTROPHILS # BLD AUTO: 4.5 K/UL
NEUTROPHILS NFR BLD AUTO: 56.4 %
PLATELET # BLD AUTO: 300 K/UL
POTASSIUM SERPL-SCNC: 5.9 MMOL/L
PROT SERPL-MCNC: 6.5 G/DL
PSA SERPL-MCNC: 1.11 NG/ML
RBC # BLD: 3.03 M/UL
RBC # FLD: 15 %
SODIUM SERPL-SCNC: 141 MMOL/L
T3 SERPL-MCNC: 59 NG/DL
T3RU NFR SERPL: 0.99 INDEX
T4 FREE SERPL-MCNC: 0.8 NG/DL
T4 SERPL-MCNC: 4.3 UG/DL
TRIGL SERPL-MCNC: 172 MG/DL
TSH SERPL-ACNC: 1.15 UIU/ML
WBC # FLD AUTO: 7.99 K/UL

## 2018-12-11 ENCOUNTER — APPOINTMENT (OUTPATIENT)
Dept: VASCULAR SURGERY | Facility: CLINIC | Age: 53
End: 2018-12-11
Payer: MEDICARE

## 2018-12-11 VITALS
HEIGHT: 70 IN | TEMPERATURE: 97.9 F | DIASTOLIC BLOOD PRESSURE: 95 MMHG | BODY MASS INDEX: 22.9 KG/M2 | WEIGHT: 160 LBS | HEART RATE: 62 BPM | SYSTOLIC BLOOD PRESSURE: 190 MMHG

## 2018-12-11 PROCEDURE — 93923 UPR/LXTR ART STDY 3+ LVLS: CPT

## 2018-12-11 PROCEDURE — 99214 OFFICE O/P EST MOD 30 MIN: CPT | Mod: 25

## 2018-12-11 PROCEDURE — 99406 BEHAV CHNG SMOKING 3-10 MIN: CPT

## 2018-12-11 PROCEDURE — 99214 OFFICE O/P EST MOD 30 MIN: CPT

## 2018-12-18 ENCOUNTER — APPOINTMENT (OUTPATIENT)
Dept: VASCULAR SURGERY | Facility: CLINIC | Age: 53
End: 2018-12-18
Payer: MEDICARE

## 2018-12-18 VITALS
DIASTOLIC BLOOD PRESSURE: 75 MMHG | WEIGHT: 165 LBS | TEMPERATURE: 97.5 F | SYSTOLIC BLOOD PRESSURE: 189 MMHG | HEART RATE: 63 BPM | BODY MASS INDEX: 23.62 KG/M2 | HEIGHT: 70 IN

## 2018-12-18 DIAGNOSIS — M79.89 OTHER SPECIFIED SOFT TISSUE DISORDERS: ICD-10-CM

## 2018-12-18 PROCEDURE — 93970 EXTREMITY STUDY: CPT

## 2018-12-18 PROCEDURE — 99214 OFFICE O/P EST MOD 30 MIN: CPT

## 2018-12-18 PROCEDURE — 93926 LOWER EXTREMITY STUDY: CPT | Mod: RT

## 2018-12-26 ENCOUNTER — APPOINTMENT (OUTPATIENT)
Dept: PULMONOLOGY | Facility: CLINIC | Age: 53
End: 2018-12-26
Payer: MEDICARE

## 2018-12-26 VITALS
OXYGEN SATURATION: 98 % | TEMPERATURE: 97.6 F | DIASTOLIC BLOOD PRESSURE: 95 MMHG | SYSTOLIC BLOOD PRESSURE: 182 MMHG | HEART RATE: 64 BPM | RESPIRATION RATE: 16 BRPM

## 2018-12-26 LAB — GLUCOSE BLDC GLUCOMTR-MCNC: 94

## 2018-12-26 PROCEDURE — 36415 COLL VENOUS BLD VENIPUNCTURE: CPT

## 2018-12-26 PROCEDURE — 82962 GLUCOSE BLOOD TEST: CPT

## 2018-12-26 PROCEDURE — 99214 OFFICE O/P EST MOD 30 MIN: CPT | Mod: 25

## 2018-12-29 ENCOUNTER — RECORD ABSTRACTING (OUTPATIENT)
Age: 53
End: 2018-12-29

## 2018-12-30 LAB
ALBUMIN SERPL ELPH-MCNC: 3.4 G/DL
ALP BLD-CCNC: 108 U/L
ALT SERPL-CCNC: 22 U/L
ANION GAP SERPL CALC-SCNC: 12 MMOL/L
AST SERPL-CCNC: 22 U/L
BASOPHILS # BLD AUTO: 0.03 K/UL
BASOPHILS NFR BLD AUTO: 0.3 %
BILIRUB SERPL-MCNC: <0.2 MG/DL
BUN SERPL-MCNC: 67 MG/DL
CALCIUM SERPL-MCNC: 8.1 MG/DL
CHLORIDE SERPL-SCNC: 105 MMOL/L
CO2 SERPL-SCNC: 20 MMOL/L
CREAT SERPL-MCNC: 5.67 MG/DL
EOSINOPHIL # BLD AUTO: 0.26 K/UL
EOSINOPHIL NFR BLD AUTO: 2.4 %
GLUCOSE SERPL-MCNC: 100 MG/DL
HCT VFR BLD CALC: 28.9 %
HGB BLD-MCNC: 9.1 G/DL
IMM GRANULOCYTES NFR BLD AUTO: 1.6 %
LYMPHOCYTES # BLD AUTO: 2.11 K/UL
LYMPHOCYTES NFR BLD AUTO: 19.2 %
MAN DIFF?: NORMAL
MCHC RBC-ENTMCNC: 31.5 GM/DL
MCHC RBC-ENTMCNC: 31.6 PG
MCV RBC AUTO: 100.3 FL
MONOCYTES # BLD AUTO: 0.9 K/UL
MONOCYTES NFR BLD AUTO: 8.2 %
NEUTROPHILS # BLD AUTO: 7.51 K/UL
NEUTROPHILS NFR BLD AUTO: 68.3 %
PLATELET # BLD AUTO: 324 K/UL
POTASSIUM SERPL-SCNC: 5.6 MMOL/L
PROT SERPL-MCNC: 6.3 G/DL
RBC # BLD: 2.88 M/UL
RBC # FLD: 15.7 %
SODIUM SERPL-SCNC: 137 MMOL/L
WBC # FLD AUTO: 10.99 K/UL

## 2019-01-04 ENCOUNTER — APPOINTMENT (OUTPATIENT)
Dept: PULMONOLOGY | Facility: CLINIC | Age: 54
End: 2019-01-04

## 2019-01-08 ENCOUNTER — APPOINTMENT (OUTPATIENT)
Dept: VASCULAR SURGERY | Facility: CLINIC | Age: 54
End: 2019-01-08
Payer: MEDICARE

## 2019-01-08 VITALS
BODY MASS INDEX: 23.62 KG/M2 | WEIGHT: 165 LBS | DIASTOLIC BLOOD PRESSURE: 92 MMHG | HEIGHT: 70 IN | TEMPERATURE: 98 F | HEART RATE: 67 BPM | SYSTOLIC BLOOD PRESSURE: 186 MMHG
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
## 2019-01-08 PROCEDURE — 99213 OFFICE O/P EST LOW 20 MIN: CPT | Mod: 25

## 2019-01-08 PROCEDURE — 99406 BEHAV CHNG SMOKING 3-10 MIN: CPT
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
## 2019-01-08 NOTE — DATA REVIEWED
[FreeTextEntry1] : offered pt rt fem pop byp duplex today pt wants to kaia \par \par 11/7/2017 RLE Arterial Duplex patent rt fem pop byp \par \par 6/22/2018 RLE Arterial Duplex patent rt cfa to ak pop a  ptfe byp no flow restrictive lesions\par \par 10/17/2018 Carotid Duplex  Rt ICA  less 50% stenosis \par                          Lt  ICA  less 50% stenosis  \par                          Lion Ant Vertebral Arterial Flow \par \par 12/11/2018 TEMITOPE/PVR RLE no sig art occ dz LLE mild ifragenic art occ dz rt temitope .86 lt temitope .70\par \par 12/18/2018  RLE Arterial Duplex patent fem pop no hematoma no flow restrictions\par \par 12/18/2018  venous Duplex lion le no acute dvt svt\par \par

## 2019-01-08 NOTE — PHYSICAL EXAM
[Normal Breath Sounds] : Normal breath sounds [Right Carotid Bruit] : right carotid bruit heard [Left Carotid Bruit] : left carotid bruit heard [2+] : right 2+ [1+] : left 1+ [No HSM] : no hepatosplenomegaly [No Rash or Lesion] : No rash or lesion [Alert] : alert [Oriented to Person] : oriented to person [Oriented to Place] : oriented to place [Oriented to Time] : oriented to time [Calm] : calm [JVD] : no jugular venous distention  [Ankle Swelling (On Exam)] : not present [Varicose Veins Of Lower Extremities] : not present [] : not present [Abdomen Masses] : No abdominal masses [Tender] : was nontender [Stool Sample Taken] : No stool obtained  on rectal exam [de-identified] : nad [de-identified] : wnl [FreeTextEntry1] : mod art insuff w mod trophic skin changes \par no ulcers \par rle warm well perfused \par lion le grossly normal motor and sensory fx \par rt groin and alexandrea medial prox 1/2 of thigh ecchimotic changes have resolved and no  tenderness,\par palp lumpin qenvf4th unchanged  [de-identified] : wnl [de-identified] : wnl no rle deficits noted  [de-identified] : cn 2-12 lion grossly intact [de-identified] : cooperative

## 2019-01-08 NOTE — HISTORY OF PRESENT ILLNESS
[FreeTextEntry1] : pt still smokes sev  cigs per day\par pt is on pletal 50 bid \par since last ov pt states a rt groin lump remain no pain c/o\par pt is completing a po abx regimen rx by Dr Brar\par pt also c/o ongoing rt foot numbness unchanged from last ov
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
## 2019-01-09 ENCOUNTER — APPOINTMENT (OUTPATIENT)
Dept: PULMONOLOGY | Facility: CLINIC | Age: 54
End: 2019-01-09
Payer: MEDICARE

## 2019-01-09 VITALS — OXYGEN SATURATION: 99 % | HEART RATE: 66 BPM | DIASTOLIC BLOOD PRESSURE: 81 MMHG | SYSTOLIC BLOOD PRESSURE: 175 MMHG

## 2019-01-09 DIAGNOSIS — L02.91 CUTANEOUS ABSCESS, UNSPECIFIED: ICD-10-CM

## 2019-01-09 PROCEDURE — 99213 OFFICE O/P EST LOW 20 MIN: CPT

## 2019-01-09 RX ORDER — ISOSORBIDE MONONITRATE 60 MG/1
60 TABLET, EXTENDED RELEASE ORAL
Refills: 0 | Status: DISCONTINUED | COMMUNITY
End: 2019-01-09

## 2019-01-10 PROBLEM — L02.91 ABSCESS: Status: ACTIVE | Noted: 2018-12-26

## 2019-01-10 RX ORDER — CILOSTAZOL 50 MG/1
50 TABLET ORAL
Qty: 60 | Refills: 6 | Status: DISCONTINUED | COMMUNITY
Start: 2017-11-07 | End: 2019-01-10

## 2019-01-10 RX ORDER — CLINDAMYCIN HYDROCHLORIDE 300 MG/1
300 CAPSULE ORAL EVERY 6 HOURS
Qty: 28 | Refills: 0 | Status: DISCONTINUED | COMMUNITY
Start: 2018-12-26 | End: 2019-01-09

## 2019-01-10 NOTE — ASSESSMENT
[FreeTextEntry1] : The abscess area appears improved. Whether this represented spontaneous improvement or response to antibiotics is unclear.\par \par Reviewed medication renewed gabapentin

## 2019-01-10 NOTE — PHYSICAL EXAM
[Normal Conjunctiva] : the conjunctiva exhibited no abnormalities [Eyelids - No Xanthelasma] : the eyelids demonstrated no xanthelasmas [Neck Appearance] : the appearance of the neck was normal [Neck Cervical Mass (___cm)] : no neck mass was observed [Jugular Venous Distention Increased] : there was no jugular-venous distention [Thyroid Diffuse Enlargement] : the thyroid was not enlarged [Thyroid Nodule] : there were no palpable thyroid nodules [Heart Rate And Rhythm] : heart rate and rhythm were normal [Heart Sounds] : normal S1 and S2 [Murmurs] : no murmurs present [Respiration, Rhythm And Depth] : normal respiratory rhythm and effort [Exaggerated Use Of Accessory Muscles For Inspiration] : no accessory muscle use [Auscultation Breath Sounds / Voice Sounds] : lungs were clear to auscultation bilaterally [Abdomen Soft] : soft [Abdomen Tenderness] : non-tender [] : no hepato-splenomegaly [Abdomen Mass (___ Cm)] : no abdominal mass palpated [FreeTextEntry1] : very poor dentition [FreeTextEntry2] : Prior abnormal area improved with only a minimal area of hardness appreciated.
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
## 2019-01-19 NOTE — REVIEW OF SYSTEMS
Patient verbalizes understanding of DCI and followup care  Ambulatory off unit without assistance  Family driving home        Donald Humphrey RN  01/19/19 [id number] [Fatigue] : fatigue [Poor Appetite] : poor appetite [As Noted in HPI] : as noted in HPI [Numbness] : numbness [Negative] : Hematologic [FreeTextEntry6] : pain in R thigh

## 2019-02-05 ENCOUNTER — APPOINTMENT (OUTPATIENT)
Dept: PULMONOLOGY | Facility: CLINIC | Age: 54
End: 2019-02-05
Payer: MEDICARE

## 2019-02-05 VITALS — DIASTOLIC BLOOD PRESSURE: 100 MMHG | SYSTOLIC BLOOD PRESSURE: 190 MMHG

## 2019-02-05 VITALS — OXYGEN SATURATION: 95 % | RESPIRATION RATE: 14 BRPM | TEMPERATURE: 98.4 F | HEART RATE: 62 BPM

## 2019-02-05 DIAGNOSIS — M70.20 OLECRANON BURSITIS, UNSPECIFIED ELBOW: ICD-10-CM

## 2019-02-05 PROCEDURE — 99213 OFFICE O/P EST LOW 20 MIN: CPT | Mod: 25

## 2019-02-05 PROCEDURE — 36415 COLL VENOUS BLD VENIPUNCTURE: CPT

## 2019-02-06 LAB
ALBUMIN SERPL ELPH-MCNC: 3.5 G/DL
ALP BLD-CCNC: 79 U/L
ALT SERPL-CCNC: 23 U/L
ANION GAP SERPL CALC-SCNC: 14 MMOL/L
AST SERPL-CCNC: 24 U/L
BACTERIA UR CULT: NORMAL
BASOPHILS # BLD AUTO: 0.02 K/UL
BASOPHILS NFR BLD AUTO: 0.2 %
BILIRUB SERPL-MCNC: <0.2 MG/DL
BUN SERPL-MCNC: 90 MG/DL
CALCIUM SERPL-MCNC: 8.6 MG/DL
CHLORIDE SERPL-SCNC: 106 MMOL/L
CO2 SERPL-SCNC: 20 MMOL/L
CREAT SERPL-MCNC: 6.05 MG/DL
EOSINOPHIL # BLD AUTO: 0.14 K/UL
EOSINOPHIL NFR BLD AUTO: 1.7 %
GLUCOSE SERPL-MCNC: 77 MG/DL
HCT VFR BLD CALC: 26.9 %
HGB BLD-MCNC: 8.2 G/DL
IMM GRANULOCYTES NFR BLD AUTO: 0.7 %
LYMPHOCYTES # BLD AUTO: 2.59 K/UL
LYMPHOCYTES NFR BLD AUTO: 31.7 %
MAN DIFF?: NORMAL
MCHC RBC-ENTMCNC: 30.5 GM/DL
MCHC RBC-ENTMCNC: 30.8 PG
MCV RBC AUTO: 101.1 FL
MONOCYTES # BLD AUTO: 0.89 K/UL
MONOCYTES NFR BLD AUTO: 10.9 %
NEUTROPHILS # BLD AUTO: 4.48 K/UL
NEUTROPHILS NFR BLD AUTO: 54.8 %
PLATELET # BLD AUTO: 324 K/UL
POTASSIUM SERPL-SCNC: 5.7 MMOL/L
PROT SERPL-MCNC: 6.1 G/DL
RBC # BLD: 2.66 M/UL
RBC # FLD: 15.1 %
SODIUM SERPL-SCNC: 139 MMOL/L
URATE SERPL-MCNC: 7.6 MG/DL
WBC # FLD AUTO: 8.18 K/UL

## 2019-02-07 NOTE — REVIEW OF SYSTEMS
[Fatigue] : fatigue [Poor Appetite] : poor appetite [As Noted in HPI] : as noted in HPI [Numbness] : numbness [Negative] : Hematologic [FreeTextEntry6] : pain in R thigh

## 2019-02-07 NOTE — HISTORY OF PRESENT ILLNESS
[FreeTextEntry1] : Here for swelling over right elbow. Groin pain improved. Does not have scheduled appointment with nephrology for followup

## 2019-02-07 NOTE — PHYSICAL EXAM
[Normal Conjunctiva] : the conjunctiva exhibited no abnormalities [Eyelids - No Xanthelasma] : the eyelids demonstrated no xanthelasmas [Neck Appearance] : the appearance of the neck was normal [Neck Cervical Mass (___cm)] : no neck mass was observed [Jugular Venous Distention Increased] : there was no jugular-venous distention [Thyroid Diffuse Enlargement] : the thyroid was not enlarged [Thyroid Nodule] : there were no palpable thyroid nodules [Heart Rate And Rhythm] : heart rate and rhythm were normal [Heart Sounds] : normal S1 and S2 [Murmurs] : no murmurs present [Respiration, Rhythm And Depth] : normal respiratory rhythm and effort [Exaggerated Use Of Accessory Muscles For Inspiration] : no accessory muscle use [Auscultation Breath Sounds / Voice Sounds] : lungs were clear to auscultation bilaterally [Abdomen Soft] : soft [Abdomen Tenderness] : non-tender [] : no hepato-splenomegaly [Abdomen Mass (___ Cm)] : no abdominal mass palpated [FreeTextEntry1] : Swelling noted over right elbow consistent with olecranon bursitis. No to minimal redness. [FreeTextEntry2] : Prior abnormal area improved with only a minimal area of hardness appreciated. [Deep Tendon Reflexes (DTR)] : deep tendon reflexes were 2+ and symmetric [Sensation] : the sensory exam was normal to light touch and pinprick [No Focal Deficits] : no focal deficits

## 2019-02-07 NOTE — ASSESSMENT
[FreeTextEntry1] : Acute on chronic kidney disease and evidence of olecranon bursitis. This is probably not related to hyperuricemia and is more likely traumatic. Given history of diabetes patient had drainage and I referred him for surgical consultation.\par \par His creatinine continues to worsen and he will need a dialysis access placed shortly. I referred him back to nephrology. He should probably begin receiving medication to boost his hemoglobin as he is coming more anemic related to his kidney disease

## 2019-02-14 ENCOUNTER — APPOINTMENT (OUTPATIENT)
Dept: PULMONOLOGY | Facility: CLINIC | Age: 54
End: 2019-02-14

## 2019-03-12 ENCOUNTER — APPOINTMENT (OUTPATIENT)
Dept: VASCULAR SURGERY | Facility: CLINIC | Age: 54
End: 2019-03-12
Payer: MEDICARE

## 2019-03-12 ENCOUNTER — APPOINTMENT (OUTPATIENT)
Dept: PULMONOLOGY | Facility: CLINIC | Age: 54
End: 2019-03-12

## 2019-03-12 VITALS
HEART RATE: 59 BPM | DIASTOLIC BLOOD PRESSURE: 92 MMHG | TEMPERATURE: 97.9 F | WEIGHT: 165 LBS | SYSTOLIC BLOOD PRESSURE: 220 MMHG | HEIGHT: 71 IN | BODY MASS INDEX: 23.1 KG/M2

## 2019-03-12 PROCEDURE — 99213 OFFICE O/P EST LOW 20 MIN: CPT | Mod: 25

## 2019-03-12 PROCEDURE — G0365: CPT

## 2019-03-12 PROCEDURE — 99406 BEHAV CHNG SMOKING 3-10 MIN: CPT

## 2019-03-12 NOTE — HISTORY OF PRESENT ILLNESS
[FreeTextEntry1] : pt still smokes sev  cigs per day\par pt is on pletal 50 bid \par since last ov pt states a rt groin lump remain w slight discomfort \par pt was referrrd back by Dr Diaz for eval for HD access\par pt is rt handed

## 2019-03-12 NOTE — PHYSICAL EXAM
[Normal Breath Sounds] : Normal breath sounds [Right Carotid Bruit] : right carotid bruit heard [Left Carotid Bruit] : left carotid bruit heard [1+] : left 1+ [No HSM] : no hepatosplenomegaly [No Rash or Lesion] : No rash or lesion [Alert] : alert [Oriented to Person] : oriented to person [Oriented to Place] : oriented to place [Oriented to Time] : oriented to time [Calm] : calm [JVD] : no jugular venous distention  [2+] : left 2+ [Ankle Swelling (On Exam)] : not present [Varicose Veins Of Lower Extremities] : not present [] : not present [Abdomen Masses] : No abdominal masses [Tender] : was nontender [Stool Sample Taken] : No stool obtained  on rectal exam [de-identified] : nad [de-identified] : wnl [FreeTextEntry1] : mod art insuff w mod trophic skin changes \par no ulcers \par rle warm well perfused \par lion le grossly normal motor and sensory fx \par rt groin no  tenderness,\par palp lumpin groin.5cm [de-identified] : wnl [de-identified] : wnl no rle deficits noted  [de-identified] : cn 2-12 lion grossly intact [de-identified] : cooperative

## 2019-03-12 NOTE — ASSESSMENT
[Arterial/Venous Disease] : arterial/venous disease [Medication Management] : medication management [Foot care/Footwear] : foot care/footwear [Smoking Cessation] : smoking cessation [FreeTextEntry1] : impression arterial insuff s/p bypass  doing well despite pt ongoing smoking\par pt presents for eval for HD access \par \par [employer] managemnt exercise program, protective measures\par continue pletal 50 bid\par ov w rle fem pop byp  s/o stenosis 12mo 6//2019\par bedside d/w pt and mother  to cut back and quit smoking pt is aware of the dec  byp patency due to smoking \par ov w temitope/pvr s/o art insuff 12mo  12/2019\par ov w carotid duplex s/o stenosis 3 years  10/2021\par Indications risks and benefits of arm dialysis access were explained to pt who understands\par d/w pt lue avf poss g due to limited vein resource pt wants to consider rue avf hd access\par pt will call w final decision re hd access laterality\par \par letter faxed to Dr LISSETTE Watkins DPM, Dr TED Diaz MD, Dr LIA Brar MD

## 2019-03-12 NOTE — DATA REVIEWED
[FreeTextEntry1] : offered pt rt fem pop byp duplex today pt wants to kaia \par \par 11/7/2017 RLE Arterial Duplex patent rt fem pop byp \par \par 6/22/2018 RLE Arterial Duplex patent rt cfa to ak pop a  ptfe byp no flow restrictive lesions\par \par 10/17/2018 Carotid Duplex  Rt ICA  less 50% stenosis \par                          Lt  ICA  less 50% stenosis  \par                          Lion Ant Vertebral Arterial Flow \par \par 12/11/2018 TEMITOPE/PVR RLE no sig art occ dz LLE mild ifragenic art occ dz rt temitope .86 lt temitope .70\par \par 12/18/2018  RLE Arterial Duplex patent fem pop no hematoma no flow restrictions\par \par 12/18/2018  venous Duplex lion le no acute dvt svt\par \par  3/12/2019 Vein Mapping  RUE adequate  cephalic vein from wrist , adequate basilic vein from elbow\par                                           LUE  limited vein resource  cephalic at wrist level,, mod basilic vein resource at elbow\par \par

## 2019-03-25 ENCOUNTER — APPOINTMENT (OUTPATIENT)
Dept: PULMONOLOGY | Facility: CLINIC | Age: 54
End: 2019-03-25
Payer: MEDICARE

## 2019-03-25 VITALS
DIASTOLIC BLOOD PRESSURE: 88 MMHG | SYSTOLIC BLOOD PRESSURE: 169 MMHG | HEART RATE: 60 BPM | OXYGEN SATURATION: 97 % | RESPIRATION RATE: 16 BRPM

## 2019-03-25 DIAGNOSIS — Z01.818 ENCOUNTER FOR OTHER PREPROCEDURAL EXAMINATION: ICD-10-CM

## 2019-03-25 PROCEDURE — 99213 OFFICE O/P EST LOW 20 MIN: CPT

## 2019-03-26 ENCOUNTER — OUTPATIENT (OUTPATIENT)
Dept: OUTPATIENT SERVICES | Facility: HOSPITAL | Age: 54
LOS: 1 days | End: 2019-03-26
Payer: MEDICARE

## 2019-03-26 VITALS
TEMPERATURE: 98 F | HEART RATE: 62 BPM | SYSTOLIC BLOOD PRESSURE: 166 MMHG | HEIGHT: 71 IN | OXYGEN SATURATION: 98 % | WEIGHT: 162.04 LBS | DIASTOLIC BLOOD PRESSURE: 90 MMHG | RESPIRATION RATE: 14 BRPM

## 2019-03-26 DIAGNOSIS — E11.9 TYPE 2 DIABETES MELLITUS WITHOUT COMPLICATIONS: ICD-10-CM

## 2019-03-26 DIAGNOSIS — N18.9 CHRONIC KIDNEY DISEASE, UNSPECIFIED: ICD-10-CM

## 2019-03-26 DIAGNOSIS — Z89.411 ACQUIRED ABSENCE OF RIGHT GREAT TOE: Chronic | ICD-10-CM

## 2019-03-26 DIAGNOSIS — I10 ESSENTIAL (PRIMARY) HYPERTENSION: ICD-10-CM

## 2019-03-26 DIAGNOSIS — R06.83 SNORING: ICD-10-CM

## 2019-03-26 LAB
ALBUMIN SERPL ELPH-MCNC: 3.5 G/DL — SIGNIFICANT CHANGE UP (ref 3.3–5)
ALP SERPL-CCNC: 85 U/L — SIGNIFICANT CHANGE UP (ref 40–120)
ALT FLD-CCNC: 18 U/L — SIGNIFICANT CHANGE UP (ref 4–41)
ANION GAP SERPL CALC-SCNC: 12 MMO/L — SIGNIFICANT CHANGE UP (ref 7–14)
AST SERPL-CCNC: 20 U/L — SIGNIFICANT CHANGE UP (ref 4–40)
BILIRUB SERPL-MCNC: < 0.2 MG/DL — LOW (ref 0.2–1.2)
BUN SERPL-MCNC: 91 MG/DL — HIGH (ref 7–23)
CALCIUM SERPL-MCNC: 8.6 MG/DL — SIGNIFICANT CHANGE UP (ref 8.4–10.5)
CHLORIDE SERPL-SCNC: 107 MMOL/L — SIGNIFICANT CHANGE UP (ref 98–107)
CO2 SERPL-SCNC: 20 MMOL/L — LOW (ref 22–31)
CREAT SERPL-MCNC: 6.87 MG/DL — HIGH (ref 0.5–1.3)
GLUCOSE SERPL-MCNC: 66 MG/DL — LOW (ref 70–99)
HBA1C BLD-MCNC: 4.6 % — SIGNIFICANT CHANGE UP (ref 4–5.6)
HCT VFR BLD CALC: 34.1 % — LOW (ref 39–50)
HGB BLD-MCNC: 10.6 G/DL — LOW (ref 13–17)
MCHC RBC-ENTMCNC: 31.1 % — LOW (ref 32–36)
MCHC RBC-ENTMCNC: 31.4 PG — SIGNIFICANT CHANGE UP (ref 27–34)
MCV RBC AUTO: 100.9 FL — HIGH (ref 80–100)
NRBC # FLD: 0 K/UL — SIGNIFICANT CHANGE UP (ref 0–0)
PLATELET # BLD AUTO: 310 K/UL — SIGNIFICANT CHANGE UP (ref 150–400)
PMV BLD: 10.7 FL — SIGNIFICANT CHANGE UP (ref 7–13)
POTASSIUM SERPL-MCNC: 5 MMOL/L — SIGNIFICANT CHANGE UP (ref 3.5–5.3)
POTASSIUM SERPL-SCNC: 5 MMOL/L — SIGNIFICANT CHANGE UP (ref 3.5–5.3)
PROT SERPL-MCNC: 7 G/DL — SIGNIFICANT CHANGE UP (ref 6–8.3)
RBC # BLD: 3.38 M/UL — LOW (ref 4.2–5.8)
RBC # FLD: 14.6 % — HIGH (ref 10.3–14.5)
SODIUM SERPL-SCNC: 139 MMOL/L — SIGNIFICANT CHANGE UP (ref 135–145)
WBC # BLD: 9.86 K/UL — SIGNIFICANT CHANGE UP (ref 3.8–10.5)
WBC # FLD AUTO: 9.86 K/UL — SIGNIFICANT CHANGE UP (ref 3.8–10.5)

## 2019-03-26 PROCEDURE — 93010 ELECTROCARDIOGRAM REPORT: CPT

## 2019-03-26 RX ORDER — TRAZODONE HCL 50 MG
100 TABLET ORAL
Qty: 0 | Refills: 0 | COMMUNITY

## 2019-03-26 RX ORDER — SODIUM CHLORIDE 9 MG/ML
1000 INJECTION INTRAMUSCULAR; INTRAVENOUS; SUBCUTANEOUS
Qty: 0 | Refills: 0 | Status: DISCONTINUED | OUTPATIENT
Start: 2019-04-01 | End: 2019-04-16

## 2019-03-26 RX ORDER — OLANZAPINE 15 MG/1
1 TABLET, FILM COATED ORAL
Qty: 0 | Refills: 0 | COMMUNITY

## 2019-03-26 RX ORDER — DIVALPROEX SODIUM 500 MG/1
500 TABLET, DELAYED RELEASE ORAL
Qty: 0 | Refills: 0 | COMMUNITY

## 2019-03-26 NOTE — H&P PST ADULT - HISTORY OF PRESENT ILLNESS
53 year old male with a history of Progressive renal disease. Patient was referred to Dr. North. Pre op diagnosis: Chronic kidney disease, unspecified. Patient is scheduled for Left arm arterio venous fistula, possible graft scheduled on 4/1/2019.     Patient is a poor historian. Patient is a 53 year old male with a history of Progressive renal disease. Patient was referred to Dr. North. Pre op diagnosis: Chronic kidney disease, unspecified. Patient is scheduled for Left arm arterio venous fistula, possible graft scheduled on 4/1/2019.     Patient is a poor historian.

## 2019-03-26 NOTE — H&P PST ADULT - RS GEN PE MLT RESP DETAILS PC
respirations non-labored/no rales/normal/no chest wall tenderness/airway patent/breath sounds equal/no intercostal retractions/good air movement/no rhonchi/no wheezes/clear to auscultation bilaterally no rhonchi/no rales/respirations non-labored/airway patent/no chest wall tenderness/breath sounds equal/good air movement/no wheezes/clear to auscultation bilaterally

## 2019-03-26 NOTE — H&P PST ADULT - NEGATIVE PSYCHIATRIC SYMPTOMS
no insomnia/no paranoia/no mood swings/no agitation/no visual hallucinations/no suicidal ideation/no anxiety/no auditory hallucinations/no hyperactivity

## 2019-03-26 NOTE — H&P PST ADULT - NEGATIVE BREAST SYMPTOMS
no nipple discharge R/no breast tenderness L/no breast lump L/no breast lump R/no nipple discharge L/no breast tenderness R

## 2019-03-26 NOTE — H&P PST ADULT - NEUROLOGICAL DETAILS
responds to pain/responds to verbal commands/normal strength/alert and oriented x 3/cranial nerves intact/no spontaneous movement responds to verbal commands/alert and oriented x 3/normal strength/responds to pain/sensation intact

## 2019-03-26 NOTE — H&P PST ADULT - GASTROINTESTINAL DETAILS
nontender/no masses palpable/no rebound tenderness/bowel sounds normal/no bruit/no rigidity/soft/no guarding/no distention nontender/no distention/bowel sounds normal/no rebound tenderness/no rigidity/no guarding/soft

## 2019-03-26 NOTE — H&P PST ADULT - LYMPHATIC
anterior cervical L/supraclavicular R/supraclavicular L/posterior cervical R/anterior cervical R/posterior cervical L

## 2019-03-26 NOTE — H&P PST ADULT - NSICDXPROBLEM_GEN_ALL_CORE_FT
PROBLEM DIAGNOSES  Problem: Chronic kidney disease, unspecified  Assessment and Plan: Patient is scheduled for Left arm arterio venous fistula, possible graft scheduled on 4/1/2019.   Preop instructions , surgical scrub provided. Pt stated understanding.  Pending medical evaluation per surgeon and PST -Patient is a poor historian. Patient with elevated BP - Initial reading 170/100, repeat 166/90. - Dr. Brar- [phone].   Discussed case with Dr. Lund.       Problem: Hypertension  Assessment and Plan: Patient instructed to take Carvedilol in the AM of surgery with a sip of water.     Problem: DM (diabetes mellitus)  Assessment and Plan: Patient with Diabetes - OR booking notified.   Patient instructed last dose of Tradjenta is on 3/31/2019 am.     Problem: Snoring  Assessment and Plan: RAMANA precautions, OR booking notified.

## 2019-03-26 NOTE — H&P PST ADULT - NEGATIVE NEUROLOGICAL SYMPTOMS
no loss of consciousness/no hemiparesis/no headache/no weakness/no facial palsy/no transient paralysis/no generalized seizures/no focal seizures/no tremors/no vertigo/no paresthesias/no confusion

## 2019-03-26 NOTE — H&P PST ADULT - NEGATIVE MUSCULOSKELETAL SYMPTOMS
no muscle cramps/no muscle weakness/no joint swelling/no myalgia/no leg pain R/no arthritis/no leg pain L/no neck pain/no arm pain L/no arm pain R

## 2019-03-26 NOTE — H&P PST ADULT - SKIN COMMENTS
right great toe exostosis, hallux valgus-  no drainage noted, no erythema, warmth or swelling-  wound 0.5 cm x 1 cm Right great toe ambulation - scar noted

## 2019-03-26 NOTE — H&P PST ADULT - NSICDXPASTMEDICALHX_GEN_ALL_CORE_FT
PAST MEDICAL HISTORY:  Anemia     Arterial insufficiency     Bipolar affective disorder in remission     Chronic kidney disease, unspecified     Depression     Diabetes mellitus Patient does not routinely check FS.    High cholesterol     Hypertension

## 2019-03-26 NOTE — H&P PST ADULT - NEGATIVE ENMT SYMPTOMS
no post-nasal discharge/no abnormal taste sensation/no gum bleeding/no sinus symptoms/no recurrent cold sores/no dysphagia/no hearing difficulty/no vertigo/no nasal congestion/no throat pain/no ear pain/no tinnitus/no nasal discharge

## 2019-03-26 NOTE — H&P PST ADULT - NEGATIVE GENERAL GENITOURINARY SYMPTOMS
no hematuria/no flank pain L/no urinary hesitancy/no flank pain R/no nocturia/no urine discoloration/normal urinary frequency/no incontinence/no dysuria/no bladder infections

## 2019-03-27 ENCOUNTER — APPOINTMENT (OUTPATIENT)
Dept: ORTHOPEDIC SURGERY | Facility: CLINIC | Age: 54
End: 2019-03-27

## 2019-03-31 ENCOUNTER — TRANSCRIPTION ENCOUNTER (OUTPATIENT)
Age: 54
End: 2019-03-31

## 2019-04-01 ENCOUNTER — OUTPATIENT (OUTPATIENT)
Dept: OUTPATIENT SERVICES | Facility: HOSPITAL | Age: 54
LOS: 1 days | Discharge: HOME | End: 2019-04-01
Payer: MEDICARE

## 2019-04-01 ENCOUNTER — APPOINTMENT (OUTPATIENT)
Dept: VASCULAR SURGERY | Facility: HOSPITAL | Age: 54
End: 2019-04-01

## 2019-04-01 VITALS
OXYGEN SATURATION: 98 % | SYSTOLIC BLOOD PRESSURE: 165 MMHG | TEMPERATURE: 98 F | HEART RATE: 57 BPM | RESPIRATION RATE: 16 BRPM | WEIGHT: 162.04 LBS | DIASTOLIC BLOOD PRESSURE: 75 MMHG | HEIGHT: 71 IN

## 2019-04-01 VITALS
HEART RATE: 51 BPM | RESPIRATION RATE: 15 BRPM | OXYGEN SATURATION: 97 % | DIASTOLIC BLOOD PRESSURE: 65 MMHG | SYSTOLIC BLOOD PRESSURE: 147 MMHG

## 2019-04-01 DIAGNOSIS — N18.9 CHRONIC KIDNEY DISEASE, UNSPECIFIED: ICD-10-CM

## 2019-04-01 DIAGNOSIS — Z89.411 ACQUIRED ABSENCE OF RIGHT GREAT TOE: Chronic | ICD-10-CM

## 2019-04-01 PROBLEM — Z01.818 PREOPERATIVE EXAMINATION: Status: ACTIVE | Noted: 2019-04-01

## 2019-04-01 LAB
GLUCOSE BLDV-MCNC: 85 — SIGNIFICANT CHANGE UP (ref 70–99)
POTASSIUM BLDV-SCNC: 4.8 MMOL/L — HIGH (ref 3.4–4.5)

## 2019-04-01 PROCEDURE — 36821 AV FUSION DIRECT ANY SITE: CPT | Mod: LT

## 2019-04-01 RX ORDER — ACETAMINOPHEN WITH CODEINE 300MG-30MG
1 TABLET ORAL
Qty: 15 | Refills: 0
Start: 2019-04-01

## 2019-04-01 NOTE — HISTORY OF PRESENT ILLNESS
[Smoker] : smoker [(Patient denies any chest pain, claudication, dyspnea on exertion, orthopnea, palpitations or syncope)] : Patient denies any chest pain, claudication, dyspnea on exertion, orthopnea, palpitations or syncope [Coronary Artery Disease] : no coronary artery disease [Asthma] : no asthma [COPD] : no COPD [FreeTextEntry1] : Dialysis access [FreeTextEntry2] : 4/1/19 [FreeTextEntry3] : [facility] [FreeTextEntry4] : Progressive renal disease

## 2019-04-01 NOTE — ASU DISCHARGE PLAN (ADULT/PEDIATRIC) - CALL YOUR DOCTOR IF YOU HAVE ANY OF THE FOLLOWING:
Fever greater than (need to indicate Fahrenheit or Celsius)/Wound/Surgical Site with redness, or foul smelling discharge or pus/Swelling that gets worse/Pain not relieved by Medications/Bleeding that does not stop

## 2019-04-01 NOTE — CHART NOTE - NSCHARTNOTEFT_GEN_A_CORE
Pre-op diagnosis: end stage renal disease    Post-op diagnosis: end stage renal disease    Procedure: left radiocephalic arteriovenous fistula creation     No complication    Disposition: home

## 2019-04-01 NOTE — PHYSICAL EXAM

## 2019-04-01 NOTE — ASU DISCHARGE PLAN (ADULT/PEDIATRIC) - ASU DC SPECIAL INSTRUCTIONSFT
Please keep your arm sling on, please keep your left arm and hand elevated with pillow or cushion when sitting or sleeping.   The outer dressing can come out on Wednesday, please leave the steri-strips intact and paint with betadine daily.   Please follow up with Dr. Agrawal within 1 month of your discharge, please call office to set up the appointment.

## 2019-04-04 ENCOUNTER — APPOINTMENT (OUTPATIENT)
Dept: PULMONOLOGY | Facility: CLINIC | Age: 54
End: 2019-04-04

## 2019-04-29 PROBLEM — I10 ESSENTIAL (PRIMARY) HYPERTENSION: Chronic | Status: ACTIVE | Noted: 2019-03-26

## 2019-04-29 PROBLEM — D64.9 ANEMIA, UNSPECIFIED: Chronic | Status: ACTIVE | Noted: 2019-03-26

## 2019-04-29 PROBLEM — I77.1 STRICTURE OF ARTERY: Chronic | Status: ACTIVE | Noted: 2019-03-26

## 2019-05-03 ENCOUNTER — APPOINTMENT (OUTPATIENT)
Dept: PULMONOLOGY | Facility: CLINIC | Age: 54
End: 2019-05-03
Payer: MEDICARE

## 2019-05-03 VITALS — SYSTOLIC BLOOD PRESSURE: 190 MMHG | HEART RATE: 65 BPM | OXYGEN SATURATION: 100 % | DIASTOLIC BLOOD PRESSURE: 90 MMHG

## 2019-05-03 LAB
GLUCOSE BLDC GLUCOMTR-MCNC: 103
HBA1C MFR BLD HPLC: 5.8

## 2019-05-03 PROCEDURE — 99214 OFFICE O/P EST MOD 30 MIN: CPT | Mod: 25

## 2019-05-03 PROCEDURE — 83036 HEMOGLOBIN GLYCOSYLATED A1C: CPT | Mod: QW

## 2019-05-03 PROCEDURE — 36415 COLL VENOUS BLD VENIPUNCTURE: CPT

## 2019-05-03 PROCEDURE — 82962 GLUCOSE BLOOD TEST: CPT

## 2019-05-04 NOTE — ASSESSMENT
[FreeTextEntry1] : advanced kidney disease\par will need HD soon\par anemia secondary to kidney disease\par

## 2019-05-04 NOTE — PHYSICAL EXAM
[Normal Conjunctiva] : the conjunctiva exhibited no abnormalities [Eyelids - No Xanthelasma] : the eyelids demonstrated no xanthelasmas [Neck Appearance] : the appearance of the neck was normal [Neck Cervical Mass (___cm)] : no neck mass was observed [Jugular Venous Distention Increased] : there was no jugular-venous distention [Thyroid Diffuse Enlargement] : the thyroid was not enlarged [Thyroid Nodule] : there were no palpable thyroid nodules [Heart Rate And Rhythm] : heart rate and rhythm were normal [Heart Sounds] : normal S1 and S2 [Murmurs] : no murmurs present [Respiration, Rhythm And Depth] : normal respiratory rhythm and effort [Exaggerated Use Of Accessory Muscles For Inspiration] : no accessory muscle use [Auscultation Breath Sounds / Voice Sounds] : lungs were clear to auscultation bilaterally [Abdomen Soft] : soft [] : no hepato-splenomegaly [Abdomen Tenderness] : non-tender [Abdomen Mass (___ Cm)] : no abdominal mass palpated [FreeTextEntry1] : graft access warm [Deep Tendon Reflexes (DTR)] : deep tendon reflexes were 2+ and symmetric [Sensation] : the sensory exam was normal to light touch and pinprick [No Focal Deficits] : no focal deficits

## 2019-05-06 LAB
ALBUMIN SERPL ELPH-MCNC: 3.3 G/DL
ALP BLD-CCNC: 71 U/L
ALT SERPL-CCNC: 21 U/L
ANION GAP SERPL CALC-SCNC: 15 MMOL/L
AST SERPL-CCNC: 20 U/L
BASOPHILS # BLD AUTO: 0.04 K/UL
BASOPHILS NFR BLD AUTO: 0.4 %
BILIRUB SERPL-MCNC: <0.2 MG/DL
BUN SERPL-MCNC: 94 MG/DL
CALCIUM SERPL-MCNC: 7.9 MG/DL
CHLORIDE SERPL-SCNC: 104 MMOL/L
CO2 SERPL-SCNC: 20 MMOL/L
CREAT SERPL-MCNC: 7.25 MG/DL
EOSINOPHIL # BLD AUTO: 0.42 K/UL
EOSINOPHIL NFR BLD AUTO: 4.4 %
GLUCOSE SERPL-MCNC: 95 MG/DL
HCT VFR BLD CALC: 26.8 %
HGB BLD-MCNC: 7.9 G/DL
IMM GRANULOCYTES NFR BLD AUTO: 1.1 %
LYMPHOCYTES # BLD AUTO: 2.72 K/UL
LYMPHOCYTES NFR BLD AUTO: 28.4 %
MAN DIFF?: NORMAL
MCHC RBC-ENTMCNC: 29.5 GM/DL
MCHC RBC-ENTMCNC: 30.6 PG
MCV RBC AUTO: 103.9 FL
MONOCYTES # BLD AUTO: 1 K/UL
MONOCYTES NFR BLD AUTO: 10.4 %
NEUTROPHILS # BLD AUTO: 5.29 K/UL
NEUTROPHILS NFR BLD AUTO: 55.3 %
PLATELET # BLD AUTO: 272 K/UL
POTASSIUM SERPL-SCNC: 5.3 MMOL/L
PROT SERPL-MCNC: 6.6 G/DL
RBC # BLD: 2.58 M/UL
RBC # FLD: 14.8 %
SODIUM SERPL-SCNC: 139 MMOL/L
TSH SERPL-ACNC: 1.28 UIU/ML
WBC # FLD AUTO: 9.58 K/UL

## 2019-05-13 ENCOUNTER — APPOINTMENT (OUTPATIENT)
Dept: VASCULAR SURGERY | Facility: CLINIC | Age: 54
End: 2019-05-13
Payer: MEDICARE

## 2019-05-13 ENCOUNTER — APPOINTMENT (OUTPATIENT)
Dept: VASCULAR SURGERY | Facility: CLINIC | Age: 54
End: 2019-05-13

## 2019-05-13 VITALS
WEIGHT: 165 LBS | TEMPERATURE: 97.5 F | BODY MASS INDEX: 23.1 KG/M2 | SYSTOLIC BLOOD PRESSURE: 200 MMHG | HEART RATE: 59 BPM | DIASTOLIC BLOOD PRESSURE: 102 MMHG | HEIGHT: 71 IN

## 2019-05-13 PROCEDURE — 99024 POSTOP FOLLOW-UP VISIT: CPT

## 2019-05-13 NOTE — ASSESSMENT
[Arterial/Venous Disease] : arterial/venous disease [Medication Management] : medication management [Foot care/Footwear] : foot care/footwear [Smoking Cessation] : smoking cessation [FreeTextEntry1] : impression arterial insuff s/p bypass  doing well despite pt ongoing smoking\par lue avf maturing well \par \par [facility] manag[facility]nt exercise program, protective measures\par continue pletal 50 bid\par bedside d/w pt and mother  to cut back and quit smoking pt is aware of the dec  byp patency due to smoking \par 10 min at bedside\par ov w temitope/pvr s/o art insuff 12mo  12/2019\par ov w rle fem pop byp  s/o stenosis 12mo 12//2019\par ov w carotid duplex s/o stenosis 3 years  10/2021\par ov 1 mo to re eval lue avf \par \par letter faxed to Dr TED Diaz MD, Dr LIA Brar MD

## 2019-05-13 NOTE — HISTORY OF PRESENT ILLNESS
[FreeTextEntry1] : pt still smokes 7-8 cigs per day and sev  pot joints per day \par pt is on pletal 50 bid \par pt f/u for lue avf eval

## 2019-05-13 NOTE — PHYSICAL EXAM
[Right Carotid Bruit] : right carotid bruit heard [Left Carotid Bruit] : left carotid bruit heard [Normal Breath Sounds] : Normal breath sounds [2+] : right 2+ [1+] : left 1+ [No HSM] : no hepatosplenomegaly [Alert] : alert [Oriented to Person] : oriented to person [No Rash or Lesion] : No rash or lesion [Oriented to Time] : oriented to time [Oriented to Place] : oriented to place [Calm] : calm [JVD] : no jugular venous distention  [Varicose Veins Of Lower Extremities] : not present [Ankle Swelling (On Exam)] : not present [] : not present [Abdomen Masses] : No abdominal masses [Stool Sample Taken] : No stool obtained  on rectal exam [Tender] : was nontender [FreeTextEntry1] : mod art insuff w mod trophic skin changes \par no ulcers \par rle warm well perfused \par lion le grossly normal motor and sensory fx \par lue avf w good bruit and thrill  [de-identified] : wnl [de-identified] : nad [de-identified] : cn 2-12 lion grossly intact [de-identified] : wnl no rle deficits noted  [de-identified] : wnl [de-identified] : cooperative

## 2019-06-04 ENCOUNTER — RX RENEWAL (OUTPATIENT)
Age: 54
End: 2019-06-04

## 2019-06-05 ENCOUNTER — RX RENEWAL (OUTPATIENT)
Age: 54
End: 2019-06-05

## 2019-06-18 ENCOUNTER — APPOINTMENT (OUTPATIENT)
Dept: PULMONOLOGY | Facility: CLINIC | Age: 54
End: 2019-06-18

## 2019-06-19 ENCOUNTER — APPOINTMENT (OUTPATIENT)
Dept: PULMONOLOGY | Facility: CLINIC | Age: 54
End: 2019-06-19

## 2019-06-26 ENCOUNTER — APPOINTMENT (OUTPATIENT)
Dept: VASCULAR SURGERY | Facility: CLINIC | Age: 54
End: 2019-06-26

## 2019-07-01 ENCOUNTER — RX RENEWAL (OUTPATIENT)
Age: 54
End: 2019-07-01

## 2019-07-12 ENCOUNTER — INPATIENT (INPATIENT)
Facility: HOSPITAL | Age: 54
LOS: 6 days | Discharge: NURSING, REHAB OR LONG-TERM CARE | End: 2019-07-19
Attending: HOSPITALIST | Admitting: HOSPITALIST
Payer: MEDICARE

## 2019-07-12 VITALS
DIASTOLIC BLOOD PRESSURE: 64 MMHG | RESPIRATION RATE: 16 BRPM | HEART RATE: 88 BPM | TEMPERATURE: 99 F | SYSTOLIC BLOOD PRESSURE: 166 MMHG | OXYGEN SATURATION: 100 %

## 2019-07-12 DIAGNOSIS — Z89.411 ACQUIRED ABSENCE OF RIGHT GREAT TOE: Chronic | ICD-10-CM

## 2019-07-12 LAB
ALBUMIN SERPL ELPH-MCNC: 3 G/DL — LOW (ref 3.3–5)
ALP SERPL-CCNC: 57 U/L — SIGNIFICANT CHANGE UP (ref 40–120)
ALT FLD-CCNC: 25 U/L — SIGNIFICANT CHANGE UP (ref 4–41)
ANION GAP SERPL CALC-SCNC: 16 MMO/L — HIGH (ref 7–14)
APAP SERPL-MCNC: < 15 UG/ML — LOW (ref 15–25)
APPEARANCE UR: CLEAR — SIGNIFICANT CHANGE UP
AST SERPL-CCNC: 33 U/L — SIGNIFICANT CHANGE UP (ref 4–40)
BACTERIA # UR AUTO: NEGATIVE — SIGNIFICANT CHANGE UP
BASE EXCESS BLDV CALC-SCNC: -9.1 MMOL/L — SIGNIFICANT CHANGE UP
BASOPHILS # BLD AUTO: 0.01 K/UL — SIGNIFICANT CHANGE UP (ref 0–0.2)
BASOPHILS NFR BLD AUTO: 0.1 % — SIGNIFICANT CHANGE UP (ref 0–2)
BILIRUB SERPL-MCNC: < 0.2 MG/DL — LOW (ref 0.2–1.2)
BILIRUB UR-MCNC: NEGATIVE — SIGNIFICANT CHANGE UP
BLOOD GAS VENOUS - CREATININE: 8.89 MG/DL — HIGH (ref 0.5–1.3)
BLOOD GAS VENOUS - FIO2: 21 — SIGNIFICANT CHANGE UP
BLOOD UR QL VISUAL: SIGNIFICANT CHANGE UP
BUN SERPL-MCNC: 124 MG/DL — HIGH (ref 7–23)
CALCIUM SERPL-MCNC: 8 MG/DL — LOW (ref 8.4–10.5)
CHLORIDE BLDV-SCNC: 110 MMOL/L — HIGH (ref 96–108)
CHLORIDE SERPL-SCNC: 101 MMOL/L — SIGNIFICANT CHANGE UP (ref 98–107)
CO2 SERPL-SCNC: 17 MMOL/L — LOW (ref 22–31)
COLOR SPEC: SIGNIFICANT CHANGE UP
CREAT SERPL-MCNC: 8.78 MG/DL — HIGH (ref 0.5–1.3)
EOSINOPHIL # BLD AUTO: 0.01 K/UL — SIGNIFICANT CHANGE UP (ref 0–0.5)
EOSINOPHIL NFR BLD AUTO: 0.1 % — SIGNIFICANT CHANGE UP (ref 0–6)
ETHANOL BLD-MCNC: < 10 MG/DL — SIGNIFICANT CHANGE UP
GAS PNL BLDV: 131 MMOL/L — LOW (ref 136–146)
GLUCOSE BLDV-MCNC: 71 MG/DL — SIGNIFICANT CHANGE UP (ref 70–99)
GLUCOSE SERPL-MCNC: 76 MG/DL — SIGNIFICANT CHANGE UP (ref 70–99)
GLUCOSE UR-MCNC: 50 — SIGNIFICANT CHANGE UP
HCO3 BLDV-SCNC: 17 MMOL/L — LOW (ref 20–27)
HCT VFR BLD CALC: 23.3 % — LOW (ref 39–50)
HCT VFR BLDV CALC: 23.6 % — LOW (ref 39–51)
HGB BLD-MCNC: 7.2 G/DL — LOW (ref 13–17)
HGB BLDV-MCNC: 7.6 G/DL — LOW (ref 13–17)
HYALINE CASTS # UR AUTO: NEGATIVE — SIGNIFICANT CHANGE UP
IMM GRANULOCYTES NFR BLD AUTO: 4.1 % — HIGH (ref 0–1.5)
KETONES UR-MCNC: NEGATIVE — SIGNIFICANT CHANGE UP
LACTATE BLDV-MCNC: 0.5 MMOL/L — SIGNIFICANT CHANGE UP (ref 0.5–2)
LEUKOCYTE ESTERASE UR-ACNC: NEGATIVE — SIGNIFICANT CHANGE UP
LYMPHOCYTES # BLD AUTO: 1.5 K/UL — SIGNIFICANT CHANGE UP (ref 1–3.3)
LYMPHOCYTES # BLD AUTO: 12 % — LOW (ref 13–44)
MCHC RBC-ENTMCNC: 30.9 % — LOW (ref 32–36)
MCHC RBC-ENTMCNC: 31.9 PG — SIGNIFICANT CHANGE UP (ref 27–34)
MCV RBC AUTO: 103.1 FL — HIGH (ref 80–100)
MONOCYTES # BLD AUTO: 0.86 K/UL — SIGNIFICANT CHANGE UP (ref 0–0.9)
MONOCYTES NFR BLD AUTO: 6.9 % — SIGNIFICANT CHANGE UP (ref 2–14)
NEUTROPHILS # BLD AUTO: 9.56 K/UL — HIGH (ref 1.8–7.4)
NEUTROPHILS NFR BLD AUTO: 76.8 % — SIGNIFICANT CHANGE UP (ref 43–77)
NITRITE UR-MCNC: NEGATIVE — SIGNIFICANT CHANGE UP
NRBC # FLD: 0 K/UL — SIGNIFICANT CHANGE UP (ref 0–0)
OB PNL STL: NEGATIVE — SIGNIFICANT CHANGE UP
PCO2 BLDV: 44 MMHG — SIGNIFICANT CHANGE UP (ref 41–51)
PH BLDV: 7.22 PH — LOW (ref 7.32–7.43)
PH UR: 6.5 — SIGNIFICANT CHANGE UP (ref 5–8)
PLATELET # BLD AUTO: 272 K/UL — SIGNIFICANT CHANGE UP (ref 150–400)
PMV BLD: 10.1 FL — SIGNIFICANT CHANGE UP (ref 7–13)
PO2 BLDV: 44 MMHG — HIGH (ref 35–40)
POTASSIUM BLDV-SCNC: 4.7 MMOL/L — HIGH (ref 3.4–4.5)
POTASSIUM SERPL-MCNC: 4.9 MMOL/L — SIGNIFICANT CHANGE UP (ref 3.5–5.3)
POTASSIUM SERPL-SCNC: 4.9 MMOL/L — SIGNIFICANT CHANGE UP (ref 3.5–5.3)
PROT SERPL-MCNC: 6.5 G/DL — SIGNIFICANT CHANGE UP (ref 6–8.3)
PROT UR-MCNC: 300 — HIGH
RBC # BLD: 2.26 M/UL — LOW (ref 4.2–5.8)
RBC # FLD: 16.9 % — HIGH (ref 10.3–14.5)
RBC CASTS # UR COMP ASSIST: SIGNIFICANT CHANGE UP (ref 0–?)
SALICYLATES SERPL-MCNC: < 5 MG/DL — LOW (ref 15–30)
SAO2 % BLDV: 69.8 % — SIGNIFICANT CHANGE UP (ref 60–85)
SODIUM SERPL-SCNC: 134 MMOL/L — LOW (ref 135–145)
SP GR SPEC: 1.01 — SIGNIFICANT CHANGE UP (ref 1–1.04)
SQUAMOUS # UR AUTO: SIGNIFICANT CHANGE UP
UROBILINOGEN FLD QL: NORMAL — SIGNIFICANT CHANGE UP
WBC # BLD: 12.45 K/UL — HIGH (ref 3.8–10.5)
WBC # FLD AUTO: 12.45 K/UL — HIGH (ref 3.8–10.5)
WBC UR QL: SIGNIFICANT CHANGE UP (ref 0–?)

## 2019-07-12 PROCEDURE — 71045 X-RAY EXAM CHEST 1 VIEW: CPT | Mod: 26

## 2019-07-12 RX ORDER — SODIUM CHLORIDE 9 MG/ML
1000 INJECTION INTRAMUSCULAR; INTRAVENOUS; SUBCUTANEOUS ONCE
Refills: 0 | Status: COMPLETED | OUTPATIENT
Start: 2019-07-12 | End: 2019-07-12

## 2019-07-12 RX ADMIN — SODIUM CHLORIDE 1000 MILLILITER(S): 9 INJECTION INTRAMUSCULAR; INTRAVENOUS; SUBCUTANEOUS at 20:51

## 2019-07-12 NOTE — ED PROVIDER NOTE - ATTENDING CONTRIBUTION TO CARE
I have personally performed a face to face bedside history and physical examination of this patient. I have discussed the history, examination, review of systems, assessment and plan of management with the resident. I have reviewed the electronic medical record and amended it to reflect my history, review of systems, physical exam, assessment and plan.    4yo M htn, bipolar, dm, ckd, anemia p/w generalized weakness x several days.  Was found in front of a car repair shop where he states he had to sit down because he was too fatigued.  Denies syncope, trauma, chest pain.  Reports exertional sob.  Denies illicit drug use or etoh.   VSS afebrile.  Exam non-toxic appearing, +pale, no focal neuro deficit, abdomen soft, lungs clear.   unclear etiology of sx at this time.  No clear infectious etiology.  Possible metabolic, amando, anemia.  Will send labs.  Dispo pending.

## 2019-07-12 NOTE — ED PROVIDER NOTE - CLINICAL SUMMARY MEDICAL DECISION MAKING FREE TEXT BOX
Generalized weakness unclear on etiology. Hx of recent vertiginous episode but none since. Labs, fluids, ekg, CTH, reassess.

## 2019-07-12 NOTE — ED PROVIDER NOTE - PMH
Anemia    Arterial insufficiency    Bipolar affective disorder in remission    Chronic kidney disease, unspecified    Depression    Diabetes mellitus  Patient does not routinely check FS.  High cholesterol    Hypertension

## 2019-07-12 NOTE — ED ADULT NURSE NOTE - OBJECTIVE STATEMENT
ems called and picked up patient outside of car shop for appearing weak and altered . Endorsing weakness x 1 day. Currently A&Ox3. Denies CP, SOB, dizziness, n/v/d. hx Bipolar, DM  Pt is in room 20. Pt is alert and oriented times three. Pt has a positive thrill and bruit to left arm that is being prepped for dialysis. Pt evaluated by MD. IVL placed to right ac 29 gauge and labs drawn and sent. Waiting for further orders and dispo.   HOMERO De Jesus

## 2019-07-12 NOTE — ED PROVIDER NOTE - PHYSICAL EXAMINATION
Gen: Jaundice appearing, NAD  Head: NCAT  HEENT: PERRL, MMM, normal conjunctiva, neck supple  Lung: CTAB, no adventitious sounds  CV: RRR, no murmurs  Abd: soft, NTND, no rebound or guarding, no CVAT  MSK: No edema, no visible deformities  Neuro: CN II-XII grossly intact. 5/5 strength and normal sensation in all extremities. Ambulatory with stable gait. minimal slurring of words (pt thinks voice is same)  Skin: Warm and dry, no evidence of rash  Psych: normal mood and affect

## 2019-07-12 NOTE — ED ADULT TRIAGE NOTE - CHIEF COMPLAINT QUOTE
ems called and picked up patient outside of car shop for appearing weak and altered . Endorsing weakness x 1 day. Currently A&Ox3. Denies CP, SOB, dizziness, n/v/d. hx Bipolar, DM

## 2019-07-12 NOTE — ED PROVIDER NOTE - OBJECTIVE STATEMENT
mother: [phone] 52yo M htn, bipolar, dm, ckd, anemia p/w generalized weakness x several days. BIBEMS for feeling weak and sitting in front of a car shop. Denies fever, chills, melena, n/v/d, cp, sob, cough. UPDATE: Per mother, pt has been having increasing falls chronically and falling asleep easily. Unsure what workup his doctors have done for him for sx. Pt denies recent falls but states had a "vertigo episode" last week. No prior hx. No current dizziness, focal numbness, weakness. Denies etoh use.    mother: [phone]

## 2019-07-13 ENCOUNTER — TRANSCRIPTION ENCOUNTER (OUTPATIENT)
Age: 54
End: 2019-07-13

## 2019-07-13 DIAGNOSIS — N18.6 END STAGE RENAL DISEASE: ICD-10-CM

## 2019-07-13 DIAGNOSIS — E78.00 PURE HYPERCHOLESTEROLEMIA, UNSPECIFIED: ICD-10-CM

## 2019-07-13 DIAGNOSIS — F31.9 BIPOLAR DISORDER, UNSPECIFIED: ICD-10-CM

## 2019-07-13 DIAGNOSIS — I10 ESSENTIAL (PRIMARY) HYPERTENSION: ICD-10-CM

## 2019-07-13 DIAGNOSIS — D72.829 ELEVATED WHITE BLOOD CELL COUNT, UNSPECIFIED: ICD-10-CM

## 2019-07-13 DIAGNOSIS — Z29.9 ENCOUNTER FOR PROPHYLACTIC MEASURES, UNSPECIFIED: ICD-10-CM

## 2019-07-13 DIAGNOSIS — G93.41 METABOLIC ENCEPHALOPATHY: ICD-10-CM

## 2019-07-13 DIAGNOSIS — D64.9 ANEMIA, UNSPECIFIED: ICD-10-CM

## 2019-07-13 DIAGNOSIS — E11.9 TYPE 2 DIABETES MELLITUS WITHOUT COMPLICATIONS: ICD-10-CM

## 2019-07-13 DIAGNOSIS — E87.2 ACIDOSIS: ICD-10-CM

## 2019-07-13 DIAGNOSIS — N19 UNSPECIFIED KIDNEY FAILURE: ICD-10-CM

## 2019-07-13 DIAGNOSIS — E83.39 OTHER DISORDERS OF PHOSPHORUS METABOLISM: ICD-10-CM

## 2019-07-13 LAB
ANION GAP SERPL CALC-SCNC: 18 MMO/L — HIGH (ref 7–14)
ANISOCYTOSIS BLD QL: SLIGHT — SIGNIFICANT CHANGE UP
APTT BLD: 29.7 SEC — SIGNIFICANT CHANGE UP (ref 27.5–36.3)
BASE EXCESS BLDV CALC-SCNC: -10.7 MMOL/L — SIGNIFICANT CHANGE UP
BASOPHILS # BLD AUTO: 0.05 K/UL — SIGNIFICANT CHANGE UP (ref 0–0.2)
BASOPHILS NFR BLD AUTO: 0.4 % — SIGNIFICANT CHANGE UP (ref 0–2)
BASOPHILS NFR SPEC: 0 % — SIGNIFICANT CHANGE UP (ref 0–2)
BLASTS # FLD: 0 % — SIGNIFICANT CHANGE UP (ref 0–0)
BUN SERPL-MCNC: 126 MG/DL — HIGH (ref 7–23)
CALCIUM SERPL-MCNC: 8.2 MG/DL — LOW (ref 8.4–10.5)
CHLORIDE SERPL-SCNC: 104 MMOL/L — SIGNIFICANT CHANGE UP (ref 98–107)
CO2 SERPL-SCNC: 14 MMOL/L — LOW (ref 22–31)
CREAT SERPL-MCNC: 8.47 MG/DL — HIGH (ref 0.5–1.3)
EOSINOPHIL # BLD AUTO: 0.07 K/UL — SIGNIFICANT CHANGE UP (ref 0–0.5)
EOSINOPHIL NFR BLD AUTO: 0.5 % — SIGNIFICANT CHANGE UP (ref 0–6)
EOSINOPHIL NFR FLD: 0 % — SIGNIFICANT CHANGE UP (ref 0–6)
FOLATE SERPL-MCNC: 5.1 NG/ML — SIGNIFICANT CHANGE UP (ref 4.7–20)
GAS PNL BLDV: 134 MMOL/L — LOW (ref 136–146)
GLUCOSE BLDV-MCNC: 75 MG/DL — SIGNIFICANT CHANGE UP (ref 70–99)
GLUCOSE SERPL-MCNC: 79 MG/DL — SIGNIFICANT CHANGE UP (ref 70–99)
HBV SURFACE AG SER-ACNC: NEGATIVE — SIGNIFICANT CHANGE UP
HCO3 BLDV-SCNC: 16 MMOL/L — LOW (ref 20–27)
HCT VFR BLD CALC: 25.1 % — LOW (ref 39–50)
HCT VFR BLDV CALC: 23.2 % — LOW (ref 39–51)
HGB BLD-MCNC: 7.4 G/DL — LOW (ref 13–17)
HGB BLDV-MCNC: 7.4 G/DL — LOW (ref 13–17)
IMM GRANULOCYTES NFR BLD AUTO: 4.9 % — HIGH (ref 0–1.5)
INR BLD: 0.89 — SIGNIFICANT CHANGE UP (ref 0.88–1.17)
LYMPHOCYTES # BLD AUTO: 1.86 K/UL — SIGNIFICANT CHANGE UP (ref 1–3.3)
LYMPHOCYTES # BLD AUTO: 13.6 % — SIGNIFICANT CHANGE UP (ref 13–44)
LYMPHOCYTES NFR SPEC AUTO: 12.2 % — LOW (ref 13–44)
MACROCYTES BLD QL: SLIGHT — SIGNIFICANT CHANGE UP
MAGNESIUM SERPL-MCNC: 2.6 MG/DL — SIGNIFICANT CHANGE UP (ref 1.6–2.6)
MCHC RBC-ENTMCNC: 29.5 % — LOW (ref 32–36)
MCHC RBC-ENTMCNC: 30.5 PG — SIGNIFICANT CHANGE UP (ref 27–34)
MCV RBC AUTO: 103.3 FL — HIGH (ref 80–100)
METAMYELOCYTES # FLD: 0.9 % — SIGNIFICANT CHANGE UP (ref 0–1)
MONOCYTES # BLD AUTO: 1.34 K/UL — HIGH (ref 0–0.9)
MONOCYTES NFR BLD AUTO: 9.8 % — SIGNIFICANT CHANGE UP (ref 2–14)
MONOCYTES NFR BLD: 2.6 % — SIGNIFICANT CHANGE UP (ref 2–9)
MYELOCYTES NFR BLD: 0 % — SIGNIFICANT CHANGE UP (ref 0–0)
NEUTROPHIL AB SER-ACNC: 81.7 % — HIGH (ref 43–77)
NEUTROPHILS # BLD AUTO: 9.68 K/UL — HIGH (ref 1.8–7.4)
NEUTROPHILS NFR BLD AUTO: 70.8 % — SIGNIFICANT CHANGE UP (ref 43–77)
NEUTS BAND # BLD: 1.7 % — SIGNIFICANT CHANGE UP (ref 0–6)
NRBC # FLD: 0.02 K/UL — SIGNIFICANT CHANGE UP (ref 0–0)
OTHER - HEMATOLOGY %: 0 — SIGNIFICANT CHANGE UP
OVALOCYTES BLD QL SMEAR: SLIGHT — SIGNIFICANT CHANGE UP
PCO2 BLDV: 44 MMHG — SIGNIFICANT CHANGE UP (ref 41–51)
PH BLDV: 7.19 PH — CRITICAL LOW (ref 7.32–7.43)
PHOSPHATE SERPL-MCNC: 6.6 MG/DL — HIGH (ref 2.5–4.5)
PLATELET # BLD AUTO: 288 K/UL — SIGNIFICANT CHANGE UP (ref 150–400)
PLATELET COUNT - ESTIMATE: NORMAL — SIGNIFICANT CHANGE UP
PMV BLD: 10.1 FL — SIGNIFICANT CHANGE UP (ref 7–13)
PO2 BLDV: 108 MMHG — HIGH (ref 35–40)
POIKILOCYTOSIS BLD QL AUTO: SLIGHT — SIGNIFICANT CHANGE UP
POTASSIUM BLDV-SCNC: 4.2 MMOL/L — SIGNIFICANT CHANGE UP (ref 3.4–4.5)
POTASSIUM SERPL-MCNC: 4.5 MMOL/L — SIGNIFICANT CHANGE UP (ref 3.5–5.3)
POTASSIUM SERPL-SCNC: 4.5 MMOL/L — SIGNIFICANT CHANGE UP (ref 3.5–5.3)
PROMYELOCYTES # FLD: 0 % — SIGNIFICANT CHANGE UP (ref 0–0)
PROTHROM AB SERPL-ACNC: 9.8 SEC — SIGNIFICANT CHANGE UP (ref 9.8–13.1)
RBC # BLD: 2.43 M/UL — LOW (ref 4.2–5.8)
RBC # FLD: 16.7 % — HIGH (ref 10.3–14.5)
SAO2 % BLDV: 96.7 % — HIGH (ref 60–85)
SODIUM SERPL-SCNC: 136 MMOL/L — SIGNIFICANT CHANGE UP (ref 135–145)
TSH SERPL-MCNC: 1.54 UIU/ML — SIGNIFICANT CHANGE UP (ref 0.27–4.2)
VARIANT LYMPHS # BLD: 0.9 % — SIGNIFICANT CHANGE UP
VIT B12 SERPL-MCNC: 1244 PG/ML — HIGH (ref 200–900)
WBC # BLD: 13.67 K/UL — HIGH (ref 3.8–10.5)
WBC # FLD AUTO: 13.67 K/UL — HIGH (ref 3.8–10.5)

## 2019-07-13 PROCEDURE — 99221 1ST HOSP IP/OBS SF/LOW 40: CPT | Mod: 57

## 2019-07-13 PROCEDURE — 99223 1ST HOSP IP/OBS HIGH 75: CPT | Mod: GC

## 2019-07-13 PROCEDURE — 70450 CT HEAD/BRAIN W/O DYE: CPT | Mod: 26

## 2019-07-13 PROCEDURE — 71045 X-RAY EXAM CHEST 1 VIEW: CPT | Mod: 26

## 2019-07-13 RX ORDER — SODIUM CHLORIDE 9 MG/ML
1000 INJECTION, SOLUTION INTRAVENOUS
Refills: 0 | Status: DISCONTINUED | OUTPATIENT
Start: 2019-07-14 | End: 2019-07-15

## 2019-07-13 RX ORDER — DIVALPROEX SODIUM 500 MG/1
500 TABLET, DELAYED RELEASE ORAL THREE TIMES A DAY
Refills: 0 | Status: DISCONTINUED | OUTPATIENT
Start: 2019-07-13 | End: 2019-07-19

## 2019-07-13 RX ORDER — CILOSTAZOL 100 MG/1
50 TABLET ORAL
Refills: 0 | Status: DISCONTINUED | OUTPATIENT
Start: 2019-07-13 | End: 2019-07-19

## 2019-07-13 RX ORDER — HEPARIN SODIUM 5000 [USP'U]/ML
5000 INJECTION INTRAVENOUS; SUBCUTANEOUS EVERY 12 HOURS
Refills: 0 | Status: DISCONTINUED | OUTPATIENT
Start: 2019-07-13 | End: 2019-07-19

## 2019-07-13 RX ORDER — INSULIN LISPRO 100/ML
VIAL (ML) SUBCUTANEOUS
Refills: 0 | Status: DISCONTINUED | OUTPATIENT
Start: 2019-07-13 | End: 2019-07-19

## 2019-07-13 RX ORDER — AMLODIPINE BESYLATE 2.5 MG/1
10 TABLET ORAL DAILY
Refills: 0 | Status: DISCONTINUED | OUTPATIENT
Start: 2019-07-13 | End: 2019-07-19

## 2019-07-13 RX ORDER — TRAZODONE HCL 50 MG
100 TABLET ORAL DAILY
Refills: 0 | Status: DISCONTINUED | OUTPATIENT
Start: 2019-07-13 | End: 2019-07-19

## 2019-07-13 RX ORDER — HYDRALAZINE HCL 50 MG
50 TABLET ORAL
Refills: 0 | Status: DISCONTINUED | OUTPATIENT
Start: 2019-07-13 | End: 2019-07-16

## 2019-07-13 RX ORDER — OLANZAPINE 15 MG/1
20 TABLET, FILM COATED ORAL AT BEDTIME
Refills: 0 | Status: DISCONTINUED | OUTPATIENT
Start: 2019-07-13 | End: 2019-07-19

## 2019-07-13 RX ORDER — ONDANSETRON 8 MG/1
4 TABLET, FILM COATED ORAL EVERY 6 HOURS
Refills: 0 | Status: DISCONTINUED | OUTPATIENT
Start: 2019-07-13 | End: 2019-07-19

## 2019-07-13 RX ORDER — INSULIN LISPRO 100/ML
VIAL (ML) SUBCUTANEOUS AT BEDTIME
Refills: 0 | Status: DISCONTINUED | OUTPATIENT
Start: 2019-07-13 | End: 2019-07-19

## 2019-07-13 RX ORDER — DEXTROSE 50 % IN WATER 50 %
25 SYRINGE (ML) INTRAVENOUS ONCE
Refills: 0 | Status: DISCONTINUED | OUTPATIENT
Start: 2019-07-13 | End: 2019-07-19

## 2019-07-13 RX ORDER — SODIUM BICARBONATE 1 MEQ/ML
650 SYRINGE (ML) INTRAVENOUS
Refills: 0 | Status: DISCONTINUED | OUTPATIENT
Start: 2019-07-13 | End: 2019-07-19

## 2019-07-13 RX ORDER — GABAPENTIN 400 MG/1
100 CAPSULE ORAL THREE TIMES A DAY
Refills: 0 | Status: DISCONTINUED | OUTPATIENT
Start: 2019-07-13 | End: 2019-07-16

## 2019-07-13 RX ORDER — DEXTROSE 50 % IN WATER 50 %
12.5 SYRINGE (ML) INTRAVENOUS ONCE
Refills: 0 | Status: DISCONTINUED | OUTPATIENT
Start: 2019-07-13 | End: 2019-07-19

## 2019-07-13 RX ORDER — SODIUM CHLORIDE 9 MG/ML
1000 INJECTION, SOLUTION INTRAVENOUS
Refills: 0 | Status: DISCONTINUED | OUTPATIENT
Start: 2019-07-13 | End: 2019-07-19

## 2019-07-13 RX ORDER — GLUCAGON INJECTION, SOLUTION 0.5 MG/.1ML
1 INJECTION, SOLUTION SUBCUTANEOUS ONCE
Refills: 0 | Status: DISCONTINUED | OUTPATIENT
Start: 2019-07-13 | End: 2019-07-19

## 2019-07-13 RX ORDER — CARVEDILOL PHOSPHATE 80 MG/1
12.5 CAPSULE, EXTENDED RELEASE ORAL EVERY 12 HOURS
Refills: 0 | Status: DISCONTINUED | OUTPATIENT
Start: 2019-07-13 | End: 2019-07-16

## 2019-07-13 RX ORDER — DEXTROSE 50 % IN WATER 50 %
15 SYRINGE (ML) INTRAVENOUS ONCE
Refills: 0 | Status: DISCONTINUED | OUTPATIENT
Start: 2019-07-13 | End: 2019-07-19

## 2019-07-13 RX ORDER — FERROUS SULFATE 325(65) MG
325 TABLET ORAL DAILY
Refills: 0 | Status: DISCONTINUED | OUTPATIENT
Start: 2019-07-13 | End: 2019-07-19

## 2019-07-13 RX ADMIN — DIVALPROEX SODIUM 500 MILLIGRAM(S): 500 TABLET, DELAYED RELEASE ORAL at 23:00

## 2019-07-13 RX ADMIN — ONDANSETRON 4 MILLIGRAM(S): 8 TABLET, FILM COATED ORAL at 17:48

## 2019-07-13 RX ADMIN — HEPARIN SODIUM 5000 UNIT(S): 5000 INJECTION INTRAVENOUS; SUBCUTANEOUS at 17:48

## 2019-07-13 RX ADMIN — Medication 650 MILLIGRAM(S): at 17:48

## 2019-07-13 RX ADMIN — GABAPENTIN 100 MILLIGRAM(S): 400 CAPSULE ORAL at 23:00

## 2019-07-13 RX ADMIN — CARVEDILOL PHOSPHATE 12.5 MILLIGRAM(S): 80 CAPSULE, EXTENDED RELEASE ORAL at 17:48

## 2019-07-13 RX ADMIN — GABAPENTIN 100 MILLIGRAM(S): 400 CAPSULE ORAL at 15:23

## 2019-07-13 RX ADMIN — OLANZAPINE 20 MILLIGRAM(S): 15 TABLET, FILM COATED ORAL at 23:58

## 2019-07-13 RX ADMIN — CILOSTAZOL 50 MILLIGRAM(S): 100 TABLET ORAL at 23:00

## 2019-07-13 RX ADMIN — AMLODIPINE BESYLATE 10 MILLIGRAM(S): 2.5 TABLET ORAL at 17:48

## 2019-07-13 RX ADMIN — Medication 50 MILLIGRAM(S): at 17:48

## 2019-07-13 RX ADMIN — Medication 325 MILLIGRAM(S): at 15:23

## 2019-07-13 RX ADMIN — DIVALPROEX SODIUM 500 MILLIGRAM(S): 500 TABLET, DELAYED RELEASE ORAL at 15:23

## 2019-07-13 NOTE — PROGRESS NOTE ADULT - PROBLEM SELECTOR PLAN 5
Afebrile and asymptomatic (no cough, Afebrile and no infectious symptoms (no cough, dysuria, diarrhea). CXR without consolidations.   - Unclear if leukocytosis is new vs baseline - previously, his WBC ranged from 9-13. Pt is afebrile with no infectious symptoms (no cough, dysuria, diarrhea). CXR without consolidations. Low concern for active infection at the moment.  - trend CBC and monitor wbc Unclear if leukocytosis is new vs baseline - previously, his WBC ranged from 9-13. Pt is afebrile with no infectious symptoms (no cough, dysuria, diarrhea). CXR without consolidations, negative UA. Low concern for active infection at the moment.  - trend CBC and monitor wbc

## 2019-07-13 NOTE — CHART NOTE - NSCHARTNOTEFT_GEN_A_CORE
Pre-operative Dx:  Procedure:  Surgeon:    Labs:                          7.4    13.67 )-----------( 288      ( 13 Jul 2019 07:09 )             25.1     07-13    136  |  104  |  126<H>  ----------------------------<  79  4.5   |  14<L>  |  8.47<H>    Ca    8.2<L>      13 Jul 2019 07:09  Phos  6.6     07-13  Mg     2.6     07-13    TPro  6.5  /  Alb  3.0<L>  /  TBili  < 0.2<L>  /  DBili  x   /  AST  33  /  ALT  25  /  AlkPhos  57  07-12    LIVER FUNCTIONS - ( 12 Jul 2019 20:05 )  Alb: 3.0 g/dL / Pro: 6.5 g/dL / ALK PHOS: 57 u/L / ALT: 25 u/L / AST: 33 u/L / GGT: x           PT/INR - ( 13 Jul 2019 17:51 )   PT: 9.8 SEC;   INR: 0.89          PTT - ( 13 Jul 2019 17:51 )  PTT:29.7 SEC  CAPILLARY BLOOD GLUCOSE      POCT Blood Glucose.: 92 mg/dL (13 Jul 2019 17:33)  POCT Blood Glucose.: 141 mg/dL (13 Jul 2019 12:50)  POCT Blood Glucose.: 83 mg/dL (13 Jul 2019 08:33)      Type & Screen:  Urinalysis:    CXR:  EKG:    Plan:  - OR For ***  - NPO at Midnight  - IVF while NPO  - HD tonight to optimize  - Cleared by ***  - Follow up AM labs: CBC, CMP  - VTE PPX  - Consent Obtained from *** Pre-operative Dx: HD Access  Procedure:  Surgeon: Tanja DING     Labs:                          7.4    13.67 )-----------( 288      ( 2019 07:09 )             25.1     -    136  |  104  |  126<H>  ----------------------------<  79  4.5   |  14<L>  |  8.47<H>    Ca    8.2<L>      2019 07:09  Phos  6.6       Mg     2.6         TPro  6.5  /  Alb  3.0<L>  /  TBili  < 0.2<L>  /  DBili  x   /  AST  33  /  ALT  25  /  AlkPhos  57      LIVER FUNCTIONS - ( 2019 20:05 )  Alb: 3.0 g/dL / Pro: 6.5 g/dL / ALK PHOS: 57 u/L / ALT: 25 u/L / AST: 33 u/L / GGT: x           PT/INR - ( 2019 17:51 )   PT: 9.8 SEC;   INR: 0.89          PTT - ( 2019 17:51 )  PTT:29.7 SEC  CAPILLARY BLOOD GLUCOSE      POCT Blood Glucose.: 92 mg/dL (2019 17:33)  POCT Blood Glucose.: 141 mg/dL (2019 12:50)  POCT Blood Glucose.: 83 mg/dL (2019 08:33)      Type & Screen: ***   Urinalysis: ***    CXR: 2019; Mild pulmonary edema  EK2019;  Sinus rhythm with 1st degree A-V block, Cannot rule out Anterior infarct , age undetermined  Abnormal ECG    Plan:  - OR For ***  - NPO at Midnight  - IVF while NPO  - HD tonight to optimize  - Cleared by ***  - Follow up AM labs: CBC, CMP  - VTE PPX  - Consent Obtained from *** Pre-operative Dx: HD Access  Procedure:  Surgeon: Tanja DING     Labs:                          7.4    13.67 )-----------( 288      ( 2019 07:09 )             25.1     -    136  |  104  |  126<H>  ----------------------------<  79  4.5   |  14<L>  |  8.47<H>    Ca    8.2<L>      2019 07:09  Phos  6.6       Mg     2.6         TPro  6.5  /  Alb  3.0<L>  /  TBili  < 0.2<L>  /  DBili  x   /  AST  33  /  ALT  25  /  AlkPhos  57      LIVER FUNCTIONS - ( 2019 20:05 )  Alb: 3.0 g/dL / Pro: 6.5 g/dL / ALK PHOS: 57 u/L / ALT: 25 u/L / AST: 33 u/L / GGT: x           PT/INR - ( 2019 17:51 )   PT: 9.8 SEC;   INR: 0.89          PTT - ( 2019 17:51 )  PTT:29.7 SEC  CAPILLARY BLOOD GLUCOSE      POCT Blood Glucose.: 92 mg/dL (2019 17:33)  POCT Blood Glucose.: 141 mg/dL (2019 12:50)  POCT Blood Glucose.: 83 mg/dL (2019 08:33)      Type & Screen: ***   Urinalysis: ***    CXR: 2019; Mild pulmonary edema  [id number];  Sinus rhythm with 1st degree A-V block, Cannot rule out Anterior infarct , age undetermined  Abnormal ECG    Plan:  - OR For ***  - NPO at Midnight  - IVF while NPO  - HD tonight to optimize  - Cleared by Primary Medicine Team    - Follow up AM labs: CBC, CMP  - VTE PPX  - Consent Obtained from *** Pre-operative Dx: HD Access  Procedure:  Surgeon: Tanja DING     Labs:                          7.4    13.67 )-----------( 288      ( 2019 07:09 )             25.1     -    136  |  104  |  126<H>  ----------------------------<  79  4.5   |  14<L>  |  8.47<H>    Ca    8.2<L>      2019 07:09  Phos  6.6       Mg     2.6         TPro  6.5  /  Alb  3.0<L>  /  TBili  < 0.2<L>  /  DBili  x   /  AST  33  /  ALT  25  /  AlkPhos  57      LIVER FUNCTIONS - ( 2019 20:05 )  Alb: 3.0 g/dL / Pro: 6.5 g/dL / ALK PHOS: 57 u/L / ALT: 25 u/L / AST: 33 u/L / GGT: x           PT/INR - ( 2019 17:51 )   PT: 9.8 SEC;   INR: 0.89          PTT - ( 2019 17:51 )  PTT:29.7 SEC  CAPILLARY BLOOD GLUCOSE      POCT Blood Glucose.: 92 mg/dL (2019 17:33)  POCT Blood Glucose.: 141 mg/dL (2019 12:50)  POCT Blood Glucose.: 83 mg/dL (2019 08:33)      Type & Screen: ***   Urinalysis: 2019; negative for UTI  CXR: 2019; Mild pulmonary edema  [id number];  Sinus rhythm with 1st degree A-V block, Cannot rule out Anterior infarct , age undetermined  Abnormal ECG    Plan:  - OR For ***  - NPO at Midnight  - IVF while NPO  - HD tonight to optimize  - Cleared by Primary Medicine Team    - Follow up AM labs: CBC, CMP  - VTE PPX  - Consent Obtained from *** Pre-operative Dx: HD Access  Procedure: left arm fistulagram, possible revision, possible right permacath  Surgeon: Tanja DING     Labs:                          7.4    13.67 )-----------( 288      ( 2019 07:09 )             25.1     07-13    136  |  104  |  126<H>  ----------------------------<  79  4.5   |  14<L>  |  8.47<H>    Ca    8.2<L>      2019 07:09  Phos  6.6       Mg     2.6         TPro  6.5  /  Alb  3.0<L>  /  TBili  < 0.2<L>  /  DBili  x   /  AST  33  /  ALT  25  /  AlkPhos  57  07-12    LIVER FUNCTIONS - ( 2019 20:05 )  Alb: 3.0 g/dL / Pro: 6.5 g/dL / ALK PHOS: 57 u/L / ALT: 25 u/L / AST: 33 u/L / GGT: x           PT/INR - ( 2019 17:51 )   PT: 9.8 SEC;   INR: 0.89          PTT - ( 2019 17:51 )  PTT:29.7 SEC      CAPILLARY BLOOD GLUCOSE      POCT Blood Glucose.: 92 mg/dL (2019 17:33)  POCT Blood Glucose.: 141 mg/dL (2019 12:50)  POCT Blood Glucose.: 83 mg/dL (2019 08:33)      Type & Screen: (2019); ***  Urinalysis: 2019; negative for UTI  CXR: 2019; Mild pulmonary edema  [id number];  Sinus rhythm with 1st degree A-V block, Cannot rule out Anterior infarct , age undetermined  Abnormal ECG    Plan:  - OR For left arm fistulagram, possible revision, possible right permacath  - NPO at Midnight  - IVF while NPO  - HD tonight to optimize  - Cleared by Primary Medicine Team    - Follow up AM labs: CBC, CMP  - VTE PPX  - Consent Obtained from Mr. Lloyd    Vascular Team C #[id number]

## 2019-07-13 NOTE — PROGRESS NOTE ADULT - PROBLEM SELECTOR PLAN 4
Patient is on tradjenta at home.  - Will start on SSI and renal diet. hyperphosphatemia i/s/o of ESRD.   - f/u PTH  - renal-low-phos diet  - trend phos level

## 2019-07-13 NOTE — PROGRESS NOTE ADULT - SUBJECTIVE AND OBJECTIVE BOX
Verena Garcia PGY1  pager: [phone]    Patient is a 53y old  Male who presents with a chief complaint of Generalized weakness (2019 05:50) due to uremia      INTERVAL HPI/OVERNIGHT EVENTS:  Further history taken from mother: pt has been more sleepy over the past 6 months - sleeping 16-20 hours per day. He has been falling more frequently over the past 3 days due to poor balance - last fall was yesterday. She denies LOC, head trauma with fall or seizures. Pt has been increasingly weak - unable to get up from bed when lying down. Per mother, pt does not have problems with memory - he is able to recognize people and take his medications. No pruritis, bruising, or n/v.     This morning pt is very drowsy, easily arousable, but quickly falls back asleep. He is able to follow commands and answer questions. He reports mild SOB. Denies nausea/vomiting, swelling of LE.         MEDICATIONS  (STANDING):  amLODIPine   Tablet 10 milliGRAM(s) Oral daily  carvedilol 12.5 milliGRAM(s) Oral every 12 hours  cilostazol 50 milliGRAM(s) Oral two times a day  dextrose 5%. 1000 milliLiter(s) (50 mL/Hr) IV Continuous <Continuous>  dextrose 50% Injectable 12.5 Gram(s) IV Push once  dextrose 50% Injectable 25 Gram(s) IV Push once  dextrose 50% Injectable 25 Gram(s) IV Push once  diVALproex  milliGRAM(s) Oral three times a day  ferrous    sulfate 325 milliGRAM(s) Oral daily  gabapentin 100 milliGRAM(s) Oral three times a day  hydrALAZINE 50 milliGRAM(s) Oral two times a day  insulin lispro (HumaLOG) corrective regimen sliding scale   SubCutaneous three times a day before meals  insulin lispro (HumaLOG) corrective regimen sliding scale   SubCutaneous at bedtime  OLANZapine 20 milliGRAM(s) Oral daily  sodium bicarbonate 650 milliGRAM(s) Oral two times a day  traZODone 100 milliGRAM(s) Oral daily    MEDICATIONS  (PRN):  dextrose 40% Gel 15 Gram(s) Oral once PRN Blood Glucose LESS THAN 70 milliGRAM(s)/deciliter  glucagon  Injectable 1 milliGRAM(s) IntraMuscular once PRN Glucose LESS THAN 70 milligrams/deciliter      Allergies: No Known Allergies      Vital Signs Last 24 Hrs  T(C): 36.4 (2019 01:43), Max: 37.1 (2019 16:28)  T(F): 97.5 (2019 01:43), Max: 98.7 (2019 16:28)  HR: 64 (2019 06:17) (62 - 88)  BP: 177/63 (2019 06:17) (127/83 - 177/63)  RR: 19 (2019 06:17) (16 - 19)  SpO2: 95% (2019 06:17) (95% - 100%)    PHYSICAL EXAM:  GENERAL: Drowsy, easily arousable.   HEENT: Atraumatic, mild erythema of the L conjunctiva. EOMI, dry oral mucosa  CHEST/LUNG: Difficult to assess lung sounds with transmitted upper airway sound. No dyspnea  HEART: Regular rate and rhythm; S1 and S2,  no murmurs, rubs, or gallops.  ABDOMEN: Soft, not tender to palpation.  No masses or HSM appreciated.  No distension.  Bowel sounds present.  EXTREMITIES: Asterixis present. No clubbing, cyanosis, or edema.    SKIN: ecchymosis in left arm.   NEURO:  Alert and oriented x 2 (self and location).     LABS:                                   7.4    13.67 )-----------( 288      ( 2019 07:09 )             25.1     *new leukocytosis (baseline WBC ~9)  *macrocytic anemia        136  |  104  |  126<H>  ----------------------------<  79  4.5   |  14<L>  |  8.47<H>    Ca    8.2<L>      2019 07:09  Phos  6.6     07-13  Mg     2.6     07-13    TPro  6.5  /  Alb  3.0<L>  /  TBili  < 0.2<L>  /  DBili  x   /  AST  33  /  ALT  25  /  AlkPhos  57  07-12    *creatinine 8.47 from 6.87 (3/2019)     pH: 7.19     Urinalysis Basic - ( 2019 21:21 )    Color: LIGHT YELLOW / Appearance: CLEAR / S.011 / pH: 6.5  Gluc: 50 / Ketone: NEGATIVE  / Bili: NEGATIVE / Urobili: NORMAL   Blood: TRACE / Protein: 300 / Nitrite: NEGATIVE   Leuk Esterase: NEGATIVE / RBC: 3-5 / WBC 3-5   Sq Epi: OCC / Non Sq Epi: x / Bacteria: NEGATIVE      CT Head: No acute intracranial hemorrhage, mass effect, midline shift or extra axial collections. Redemonstration of multiple punctate chronic appearing right sided basal ganglia infarcts extending to centrum semiovale.If symptoms persists, consider short-term follow-up or MRI may be obtained for further evaluation provided no MR contraindications.    CXR: Mild pulmonary edema. Verena Garcia PGY1  pager: [phone]    Patient is a 53y old  Male who presents with a chief complaint of Generalized weakness (13 Jul 2019 05:50) due to uremia      INTERVAL HPI/OVERNIGHT EVENTS:  Further history taken from mother: pt has been more sleepy over the past 6 months - sleeping 16-20 hours per day. He has been falling more frequently over the past 3 days due to poor balance - last fall was yesterday. She denies LOC, head trauma with fall or seizures. Pt has been increasingly weak - unable to get up from bed when lying down. Per mother, pt does not have problems with memory - he is able to recognize people and take his medications. No pruritis, bruising, or n/v.     This morning pt is very drowsy, easily arousable, but quickly falls back asleep. He is able to follow commands and answer questions. He reports mild SOB. Denies nausea/vomiting, swelling of LE.         MEDICATIONS  (STANDING):  amLODIPine   Tablet 10 milliGRAM(s) Oral daily  carvedilol 12.5 milliGRAM(s) Oral every 12 hours  cilostazol 50 milliGRAM(s) Oral two times a day  dextrose 5%. 1000 milliLiter(s) (50 mL/Hr) IV Continuous <Continuous>  dextrose 50% Injectable 12.5 Gram(s) IV Push once  dextrose 50% Injectable 25 Gram(s) IV Push once  dextrose 50% Injectable 25 Gram(s) IV Push once  diVALproex  milliGRAM(s) Oral three times a day  ferrous    sulfate 325 milliGRAM(s) Oral daily  gabapentin 100 milliGRAM(s) Oral three times a day  hydrALAZINE 50 milliGRAM(s) Oral two times a day  insulin lispro (HumaLOG) corrective regimen sliding scale   SubCutaneous three times a day before meals  insulin lispro (HumaLOG) corrective regimen sliding scale   SubCutaneous at bedtime  OLANZapine 20 milliGRAM(s) Oral daily  sodium bicarbonate 650 milliGRAM(s) Oral two times a day  traZODone 100 milliGRAM(s) Oral daily    MEDICATIONS  (PRN):  dextrose 40% Gel 15 Gram(s) Oral once PRN Blood Glucose LESS THAN 70 milliGRAM(s)/deciliter  glucagon  Injectable 1 milliGRAM(s) IntraMuscular once PRN Glucose LESS THAN 70 milligrams/deciliter      Allergies: No Known Allergies      Vital Signs Last 24 Hrs  T(C): 36.4 (13 Jul 2019 01:43), Max: 37.1 (12 Jul 2019 16:28)  T(F): 97.5 (13 Jul 2019 01:43), Max: 98.7 (12 Jul 2019 16:28)  HR: 64 (13 Jul 2019 06:17) (62 - 88)  BP: 177/63 (13 Jul 2019 06:17) (127/83 - 177/63)  RR: 19 (13 Jul 2019 06:17) (16 - 19)  SpO2: 95% (13 Jul 2019 06:17) (95% - 100%)    PHYSICAL EXAM:  GENERAL: Drowsy, easily arousable.   HEENT: Atraumatic, mild erythema of the L conjunctiva, no ocular discharge. EOMI, dry oral mucosa  CHEST/LUNG: Difficult to assess lung sounds with transmitted upper airway sound. No tachypnea.   HEART: Regular rate and rhythm; S1 and S2,  no murmurs, rubs, or gallops.  ABDOMEN: Soft, not tender to palpation.  No masses or HSM appreciated.  No distension.  Bowel sounds present.  EXTREMITIES: Asterixis present. No clubbing, cyanosis, or edema.    SKIN: ecchymosis in left arm.   NEURO:  Alert and oriented x 2 (self and location).     LABS:                                   7.4    13.67 )-----------( 288      ( 13 Jul 2019 07:09 )             25.1     *leukocytosis - unclear what his baseline is, previous WBC usually range from 9-14   *macrocytic anemia (10.6 in 3/2019)    136  |  104  |  126<H>  ----------------------------<  79  4.5   |  14<L>  |  8.47<H>    *creatinine 8.47 from 6.87 (3/2019)   Ca    9.0 corrected      13 Jul 2019 07:09  Phos  6.6     07-13  Mg     2.6     07-13    TPro  6.5  /  Alb  3.0<L>  /  TBili  < 0.2<L>  /  DBili  x   /  AST  33  /  ALT  25  /  AlkPhos  57  07-12    pH: 7.19     B12: 1244    Urinalysis Basic - ( 12 Jul 2019 21:21 ) 300 protein, 3-5wbc, negative bacteria, negative leuk esterase    CT Head: No acute intracranial hemorrhage, mass effect, midline shift or extra axial collections. Redemonstration of multiple punctate chronic appearing right sided basal ganglia infarcts extending to centrum semiovale.If symptoms persists, consider short-term follow-up or MRI may be obtained for further evaluation provided no MR contraindications.    CXR: Mild pulmonary edema.

## 2019-07-13 NOTE — PROGRESS NOTE ADULT - PROBLEM SELECTOR PLAN 3
Pt has worsening ESRD, creatinine 8.47 from 6.87 (3/2019). Patient had AV fistula place in 4/2019.  - f/u with vascular regarding AV fistula  - nephrology follow, recs appreciated    - dialysis Pt has worsening ESRD, creatinine 8.47 from 6.87 (3/2019) and hyperphosphatemia. Patient had AV fistula place in 4/2019.  - f/u with vascular regarding AV fistula  - nephrology following, recs appreciated    - dialysis Pt has worsening ESRD, creatinine 8.47 from 6.87 (3/2019) and hyperphosphatemia. Patient had AV fistula place in 4/2019, never had HD before.  - f/u with vascular regarding AV fistula  - nephrology following, recs appreciated    - dialysis Pt has worsening ESRD, creatinine 8.47 from 6.87 (3/2019) and hyperphosphatemia. Patient had AV fistula place in 4/2019, never had HD before.  - nephrology following, recs appreciated    - dialysis Pt has worsening ESRD, creatinine 8.47 from 6.87 (3/2019) and hyperphosphatemia. Patient had AV fistula place in 4/2019, never had HD before. Attempted to use AV fistula today, unable to access. Pt will go to OR for AV fistula revision tomorrow.   - nephrology and vascular following, recs appreciated    - OR tomorrow for AV fistula revision  - dialysis

## 2019-07-13 NOTE — CONSULT NOTE ADULT - PROBLEM SELECTOR RECOMMENDATION 3
Patient with anemia in the setting of ESRD. Hemoglobin below target range. Recommend sending Iron studies prior to considering ALCIDES therapy. Monitor hemoglobin. Transfuse PRN

## 2019-07-13 NOTE — CHART NOTE - NSCHARTNOTEFT_GEN_A_CORE
Spoke with medicine team, Dr. Ananda Garcia, indicated that avf is ok to trial dialysis. If further intervention is needed, a shiley can be placed Monday, and further AVF revision can be pursued with the Vascular Team C.    D/W Dr. Agrawal  Vascular Team C #[id number]

## 2019-07-13 NOTE — PROGRESS NOTE ADULT - PROBLEM SELECTOR PLAN 2
Patient with pH of 7.19, likely 2/2 uremia.  - urgent HD as above Patient with pH of 7.19, likely 2/2 uremia.   - urgent HD as above Patient with pH of 7.19, likely 2/2 uremia.   - urgent HD as above  - c/w sodium bicarb

## 2019-07-13 NOTE — H&P ADULT - PROBLEM SELECTOR PLAN 1
Patient with AMS, BUN of >120, not yet on HD.  - Will c/s Dr. Diaz this AM for urgent HD.  - F/u EKG. CTH shows chronic changes.

## 2019-07-13 NOTE — CHART NOTE - NSCHARTNOTEFT_GEN_A_CORE
Pt is a class II risk for a low risk procedure per Revised Cardiac Risk Index, with the only risk factor being elevated creatinine

## 2019-07-13 NOTE — H&P ADULT - ATTENDING COMMENTS
Patient was seen and evaluated, agree with above with the following additions.     53 y .o. man with multiple medical co-morbidities now with end stage renal disease  - Nephrology evaluation for urgent HD  - PO sodium bicarb for metabolic acidosis  - Consider erythropoietin

## 2019-07-13 NOTE — H&P ADULT - NSHPPHYSICALEXAM_GEN_ALL_CORE
Vital Signs Last 24 Hrs  T(C): 36.4 (13 Jul 2019 01:43), Max: 37.1 (12 Jul 2019 16:28)  T(F): 97.5 (13 Jul 2019 01:43), Max: 98.7 (12 Jul 2019 16:28)  HR: 64 (13 Jul 2019 06:17) (62 - 88)  BP: 177/63 (13 Jul 2019 06:17) (127/83 - 177/63)  BP(mean): --  RR: 19 (13 Jul 2019 06:17) (16 - 19)  SpO2: 95% (13 Jul 2019 06:17) (95% - 100%)

## 2019-07-13 NOTE — CONSULT NOTE ADULT - PROBLEM SELECTOR RECOMMENDATION 9
Patient with uremic encephalopathy secondary to [facility] sCr and BUN on 5/3/2019 was 7.25 and 94 respectively. Today BUN is 124 and sCr 8.47.  Patient had AV fistula placed 2 months ago in anticipation of hemodialysis. Discussed with patient and patient's mother, agreeable for HD. Recommend 1st session of HD today. Monitor labs Patient with uremic encephalopathy secondary to ESRD. [facility]EL reviewed, sCr and BUN on 5/3/2019 was 7.25 and 94 respectively. Today BUN is 124 and sCr 8.47.  Patient had AV fistula placed 2 months ago in anticipation of hemodialysis. Discussed with patient and patient's mother, agreeable for HD. Recommend 1st session of HD today for uremic encephalopathy. Monitor labs

## 2019-07-13 NOTE — PROGRESS NOTE ADULT - ASSESSMENT
53 M with HTN, bipolar disorder, DM2, ESRD not yet on HD s/p AV fistula 4/2019, presents with generalized weakness and somnolence, admitted for uremic encephalopathy and metabolic acidosis, with plans for urgent HD. 52yo M with HTN, bipolar disorder, DM2, ESRD s/p AV fistula 4/2019 not yet on HD , presents with generalized weakness and somnolence, admitted for uremic encephalopathy and metabolic acidosis, with plans for urgent HD.

## 2019-07-13 NOTE — H&P ADULT - HISTORY OF PRESENT ILLNESS
54yo M htn, bipolar, dm, ckd, anemia p/w generalized weakness x several days. BIBEMS for feeling weak and sitting in front of a car shop. Denies fever, chills, melena, n/v/d, cp, sob, cough. UPDATE: Per mother, pt has been having increasing falls chronically and falling asleep easily. Unsure what workup his doctors have done for him for sx. Pt denies recent falls but states had a "vertigo episode" last week. No prior hx. No current dizziness, focal numbness, weakness. Denies etoh use.    	mother: [phone] 54 M with HTN, bipolar disorder, DM2, ESRD not yet on HD, anemia presenting with generalized weakness for several days. Patient was brought to ED by EMS; he was sitting in front of a car shop, statin he felt weak. As per ED note, patient's mother reports he's been having increased falls, also falling asleep easily.    Patient had L arm AV fistula placed in 4/2019. I spoke to mother briefly; patient has not started HD yet. He follows with Dr. Diaz for renal. In ED, BUN was elevated to >120, Cr>8 and pH of 7.22. CTH was done for AMS, which showed 'redemonstration of multiple punctate chronic appearing right sided basal ganglia infarcts extending to centrum semiovale'.    mother: [phone] 53 M with HTN, bipolar disorder, DM2, ESRD not yet on HD, anemia presenting with generalized weakness for several days. Patient was brought to ED by EMS; he was sitting in front of a car shop, statin he felt weak. As per ED note, patient's mother reports he's been having increased falls, also falling asleep easily.    Patient had L arm AV fistula placed in 4/2019. I spoke to mother briefly; patient has not started HD yet. He follows with Dr. Diaz for renal. In ED, BUN was elevated to >120, Cr>8 and pH of 7.22. CTH was done for AMS, which showed 'redemonstration of multiple punctate chronic appearing right sided basal ganglia infarcts extending to centrum semiovale.    mother: [phone]

## 2019-07-13 NOTE — CONSULT NOTE ADULT - SUBJECTIVE AND OBJECTIVE BOX
General Surgery Consult  Consulting surgical team:  Consulting attending:    HPI:  53 M with HTN, bipolar disorder, DM2, ESRD not yet on HD, anemia presenting with generalized weakness for several days. Patient was brought to ED by EMS; he was sitting in front of a car shop, statin he felt weak. As per ED note, patient's mother reports he's been having increased falls, also falling asleep easily.    Patient had L arm AV fistula placed in 2019. I spoke to mother briefly; patient has not started HD yet. He follows with Dr. Diaz for renal. In ED, BUN was elevated to >120, Cr>8 and pH of 7.22. CTH was done for AMS, which showed 'redemonstration of multiple punctate chronic appearing right sided basal ganglia infarcts extending to centrum semiovale.    mother: [phone] (2019 05:50)      PAST MEDICAL HISTORY:  Anemia  Arterial insufficiency  Hypertension  Chronic kidney disease, unspecified  Bipolar affective disorder in remission  High cholesterol  Depression  Diabetes mellitus  Diabetes mellitus      PAST SURGICAL HISTORY:  Amputated great toe of right foot  ORIF right lower leg-       MEDICATIONS:  amLODIPine   Tablet 10 milliGRAM(s) Oral daily  carvedilol 12.5 milliGRAM(s) Oral every 12 hours  cilostazol 50 milliGRAM(s) Oral two times a day  dextrose 40% Gel 15 Gram(s) Oral once PRN  dextrose 5%. 1000 milliLiter(s) IV Continuous <Continuous>  dextrose 50% Injectable 12.5 Gram(s) IV Push once  dextrose 50% Injectable 25 Gram(s) IV Push once  dextrose 50% Injectable 25 Gram(s) IV Push once  diVALproex  milliGRAM(s) Oral three times a day  ferrous    sulfate 325 milliGRAM(s) Oral daily  gabapentin 100 milliGRAM(s) Oral three times a day  glucagon  Injectable 1 milliGRAM(s) IntraMuscular once PRN  heparin  Injectable 5000 Unit(s) SubCutaneous every 12 hours  hydrALAZINE 50 milliGRAM(s) Oral two times a day  insulin lispro (HumaLOG) corrective regimen sliding scale   SubCutaneous three times a day before meals  insulin lispro (HumaLOG) corrective regimen sliding scale   SubCutaneous at bedtime  OLANZapine 20 milliGRAM(s) Oral at bedtime  ondansetron    Tablet 4 milliGRAM(s) Oral every 6 hours PRN  sodium bicarbonate 650 milliGRAM(s) Oral two times a day  traZODone 100 milliGRAM(s) Oral daily      ALLERGIES:  No Known Allergies      VITALS & I/Os:  Vital Signs Last 24 Hrs  T(C): 36.4 (2019 01:43), Max: 37.1 (2019 16:28)  T(F): 97.5 (2019 01:43), Max: 98.7 (2019 16:28)  HR: 64 (2019 06:17) (62 - 88)  BP: 177/63 (2019 06:17) (127/83 - 177/63)  BP(mean): --  RR: 19 (2019 06:17) (16 - 19)  SpO2: 95% (2019 06:17) (95% - 100%)    I&O's Summary      PHYSICAL EXAM:  General: No acute distress  Respiratory: Nonlabored  Cardiovascular: RRR  Abdominal: Soft, nondistended, nontender. No rebound or guarding. No organomegaly, no palpable mass.  Extremities: Warm    LABS:                        7.4    13.67 )-----------( 288      ( 2019 07:09 )             25.1     07-13    136  |  104  |  126<H>  ----------------------------<  79  4.5   |  14<L>  |  8.47<H>    Ca    8.2<L>      2019 07:09  Phos  6.6     07-13  Mg     2.6     07-13    TPro  6.5  /  Alb  3.0<L>  /  TBili  < 0.2<L>  /  DBili  x   /  AST  33  /  ALT  25  /  AlkPhos  57  07-12    Lactate:  07-12 @ 20:05  0.5              Urinalysis Basic - ( 2019 21:21 )    Color: LIGHT YELLOW / Appearance: CLEAR / S.011 / pH: 6.5  Gluc: 50 / Ketone: NEGATIVE  / Bili: NEGATIVE / Urobili: NORMAL   Blood: TRACE / Protein: 300 / Nitrite: NEGATIVE   Leuk Esterase: NEGATIVE / RBC: 3-5 / WBC 3-5   Sq Epi: OCC / Non Sq Epi: x / Bacteria: NEGATIVE        IMAGING: Vascular Surgery Consult  Consulting surgical team: C-Team  Consulting attending: Dr. Agrawal    HPI:  53 M with HTN, bipolar disorder, DM2, ESRD not yet on HD, anemia presenting with generalized weakness for several days. Patient was brought to ED by EMS; he was sitting in front of a car shop, statin he felt weak. As per ED note, patient's mother reports he's been having increased falls, also falling asleep easily.    Patient had L arm radiocecphalic AV fistula placed in 2019. Patient has not started HD yet. He follows with Dr. Diaz for renal. In ED, BUN was elevated to >120, Cr>8 and pH of 7.22.   CTH was done for AMS, which showed 'redemonstration of multiple punctate chronic appearing right sided basal ganglia infarcts extending to centrum semiovale.    Patient has not pain in the arm, normal strength, sensation in the hand (left). He has not used the fistula yet for HD as he has not required HD.       PAST MEDICAL HISTORY:  Anemia  Arterial insufficiency  Hypertension  Chronic kidney disease, unspecified  Bipolar affective disorder in remission  High cholesterol  Depression  Diabetes mellitus  Diabetes mellitus      PAST SURGICAL HISTORY:  Amputated great toe of right foot  ORIF right lower leg-       MEDICATIONS:  amLODIPine   Tablet 10 milliGRAM(s) Oral daily  carvedilol 12.5 milliGRAM(s) Oral every 12 hours  cilostazol 50 milliGRAM(s) Oral two times a day  dextrose 40% Gel 15 Gram(s) Oral once PRN  dextrose 5%. 1000 milliLiter(s) IV Continuous <Continuous>  dextrose 50% Injectable 12.5 Gram(s) IV Push once  dextrose 50% Injectable 25 Gram(s) IV Push once  dextrose 50% Injectable 25 Gram(s) IV Push once  diVALproex  milliGRAM(s) Oral three times a day  ferrous    sulfate 325 milliGRAM(s) Oral daily  gabapentin 100 milliGRAM(s) Oral three times a day  glucagon  Injectable 1 milliGRAM(s) IntraMuscular once PRN  heparin  Injectable 5000 Unit(s) SubCutaneous every 12 hours  hydrALAZINE 50 milliGRAM(s) Oral two times a day  insulin lispro (HumaLOG) corrective regimen sliding scale   SubCutaneous three times a day before meals  insulin lispro (HumaLOG) corrective regimen sliding scale   SubCutaneous at bedtime  OLANZapine 20 milliGRAM(s) Oral at bedtime  ondansetron    Tablet 4 milliGRAM(s) Oral every 6 hours PRN  sodium bicarbonate 650 milliGRAM(s) Oral two times a day  traZODone 100 milliGRAM(s) Oral daily      ALLERGIES:  No Known Allergies      VITALS & I/Os:  Vital Signs Last 24 Hrs  T(C): 36.4 (2019 01:43), Max: 37.1 (2019 16:28)  T(F): 97.5 (2019 01:43), Max: 98.7 (2019 16:28)  HR: 64 (2019 06:17) (62 - 88)  BP: 177/63 (2019 06:17) (127/83 - 177/63)  BP(mean): --  RR: 19 (2019 06:17) (16 - 19)  SpO2: 95% (2019 06:17) (95% - 100%)    I&O's Summary      PHYSICAL EXAM:  General: No acute distress  Respiratory: Nonlabored  Cardiovascular: RRR  Abdominal: Soft, nondistended, nontender.   Extremities: Warm, Left arm AVF with thrill over forearm, palpable left radial and ulnar. Right DP/PT palpable, Right pop/fem palpable.     LABS:                        7.4    13.67 )-----------( 288      ( 2019 07:09 )             25.1     07-13    136  |  104  |  126<H>  ----------------------------<  79  4.5   |  14<L>  |  8.47<H>    Ca    8.2<L>      2019 07:09  Phos  6.6     07-13  Mg     2.6     07-13    TPro  6.5  /  Alb  3.0<L>  /  TBili  < 0.2<L>  /  DBili  x   /  AST  33  /  ALT  25  /  AlkPhos  57  07-12    Lactate:  07-12 @ 20:05  0.5              Urinalysis Basic - ( 2019 21:21 )    Color: LIGHT YELLOW / Appearance: CLEAR / S.011 / pH: 6.5  Gluc: 50 / Ketone: NEGATIVE  / Bili: NEGATIVE / Urobili: NORMAL   Blood: TRACE / Protein: 300 / Nitrite: NEGATIVE   Leuk Esterase: NEGATIVE / RBC: 3-5 / WBC 3-5   Sq Epi: OCC / Non Sq Epi: x / Bacteria: NEGATIVE        IMAGING: Vascular Surgery Consult  Consulting surgical team: C-Team  Consulting attending: Dr. Agrawal    HPI:  53 M with HTN, bipolar disorder, DM2, ESRD not yet on HD, anemia presenting with generalized weakness for several days. Patient was brought to ED by EMS; he was sitting in front of a car shop, statin he felt weak. As per ED note, patient's mother reports he's been having increased falls, also falling asleep easily.    Patient had L arm radiocecphalic AV fistula placed in 2019. Patient has not started HD yet. He follows with Dr. Diaz for renal. In ED, BUN was elevated to >120, Cr>8 and pH of 7.22.   CTH was done for AMS, which showed 'redemonstration of multiple punctate chronic appearing right sided basal ganglia infarcts extending to centrum semiovale.    Patient has not pain in the arm, normal strength, sensation in the hand (left). He has not used the fistula yet for HD as he has not required HD.       PAST MEDICAL HISTORY:  Anemia  Arterial insufficiency  Hypertension  Chronic kidney disease, unspecified  Bipolar affective disorder in remission  High cholesterol  Depression  Diabetes mellitus  Diabetes mellitus      PAST SURGICAL HISTORY:  Amputated great toe of right foot  ORIF right lower leg-       MEDICATIONS:  amLODIPine   Tablet 10 milliGRAM(s) Oral daily  carvedilol 12.5 milliGRAM(s) Oral every 12 hours  cilostazol 50 milliGRAM(s) Oral two times a day  dextrose 40% Gel 15 Gram(s) Oral once PRN  dextrose 5%. 1000 milliLiter(s) IV Continuous <Continuous>  dextrose 50% Injectable 12.5 Gram(s) IV Push once  dextrose 50% Injectable 25 Gram(s) IV Push once  dextrose 50% Injectable 25 Gram(s) IV Push once  diVALproex  milliGRAM(s) Oral three times a day  ferrous    sulfate 325 milliGRAM(s) Oral daily  gabapentin 100 milliGRAM(s) Oral three times a day  glucagon  Injectable 1 milliGRAM(s) IntraMuscular once PRN  heparin  Injectable 5000 Unit(s) SubCutaneous every 12 hours  hydrALAZINE 50 milliGRAM(s) Oral two times a day  insulin lispro (HumaLOG) corrective regimen sliding scale   SubCutaneous three times a day before meals  insulin lispro (HumaLOG) corrective regimen sliding scale   SubCutaneous at bedtime  OLANZapine 20 milliGRAM(s) Oral at bedtime  ondansetron    Tablet 4 milliGRAM(s) Oral every 6 hours PRN  sodium bicarbonate 650 milliGRAM(s) Oral two times a day  traZODone 100 milliGRAM(s) Oral daily      ALLERGIES:  No Known Allergies      VITALS & I/Os:  Vital Signs Last 24 Hrs  T(C): 36.4 (2019 01:43), Max: 37.1 (2019 16:28)  T(F): 97.5 (2019 01:43), Max: 98.7 (2019 16:28)  HR: 64 (2019 06:17) (62 - 88)  BP: 177/63 (2019 06:17) (127/83 - 177/63)  BP(mean): --  RR: 19 (2019 06:17) (16 - 19)  SpO2: 95% (2019 06:17) (95% - 100%)    I&O's Summary      PHYSICAL EXAM:  General: No acute distress  Respiratory: Nonlabored  Cardiovascular: RRR  Abdominal: Soft, nondistended, nontender.   Extremities: Warm, Left arm AVF with thrill over forearm, palpable left radial and ulnar. Right DP/PT palpable, Right pop/fem palpable.     LABS:                        7.4    13.67 )-----------( 288      ( 2019 07:09 )             25.1     07-13    136  |  104  |  126<H>  ----------------------------<  79  4.5   |  14<L>  |  8.47<H>    Ca    8.2<L>      2019 07:09  Phos  6.6     07-13  Mg     2.6     07-13    TPro  6.5  /  Alb  3.0<L>  /  TBili  < 0.2<L>  /  DBili  x   /  AST  33  /  ALT  25  /  AlkPhos  57  07-12    Lactate:  07-12 @ 20:05  0.5              Urinalysis Basic - ( 2019 21:21 )    Color: LIGHT YELLOW / Appearance: CLEAR / S.011 / pH: 6.5  Gluc: 50 / Ketone: NEGATIVE  / Bili: NEGATIVE / Urobili: NORMAL   Blood: TRACE / Protein: 300 / Nitrite: NEGATIVE   Leuk Esterase: NEGATIVE / RBC: 3-5 / WBC 3-5   Sq Epi: OCC / Non Sq Epi: x / Bacteria: NEGATIVE

## 2019-07-13 NOTE — H&P ADULT - ASSESSMENT
53 M with HTN, bipolar disorder, DM2, ESRD not yet on HD, anemia presenting with generalized weakness for several days, admitted for uremic encephalopathy, likely requiring HD initiation. 53 M with HTN, bipolar disorder, DM2, ESRD not yet on HD, anemia presenting with generalized weakness for several days, admitted for uremic encephalopathy and metabolic acidosis, likely needs HD initiation.

## 2019-07-13 NOTE — CONSULT NOTE ADULT - ASSESSMENT
- Attending to see patient shortly  - Full note to follow 53M Hx DM, Htn, Right fem to above knee pop bypass with PTFE 9/17, Left radiocephalic AVF 4/1/19 presents with fatigue for 3 days in need of HD for uremia.     - Attending to see patient shortly  - Full note to follow 53M Hx DM, Htn, Right fem to above knee pop bypass with PTFE 9/17, Left radiocephalic AVF 4/1/19 presents with fatigue for 3 days in need of HD for uremia.     -pt is cleared to try lue avf hd access  if there are any issues will need lue avf fistulogram and possible revision   will follow

## 2019-07-13 NOTE — H&P ADULT - NSHPLABSRESULTS_GEN_ALL_CORE
CBC Full  -  ( 12 Jul 2019 20:05 )  WBC Count : 12.45 K/uL  RBC Count : 2.26 M/uL  Hemoglobin : 7.2 g/dL  Hematocrit : 23.3 %  Platelet Count - Automated : 272 K/uL  Mean Cell Volume : 103.1 fL  Mean Cell Hemoglobin : 31.9 pg  Mean Cell Hemoglobin Concentration : 30.9 %  Auto Neutrophil # : 9.56 K/uL  Auto Lymphocyte # : 1.50 K/uL  Auto Monocyte # : 0.86 K/uL  Auto Eosinophil # : 0.01 K/uL  Auto Basophil # : 0.01 K/uL  Auto Neutrophil % : 76.8 %  Auto Lymphocyte % : 12.0 %  Auto Monocyte % : 6.9 %  Auto Eosinophil % : 0.1 %  Auto Basophil % : 0.1 %    07-12    134<L>  |  101  |  124<H>  ----------------------------<  76  4.9   |  17<L>  |  8.78<H>    Ca    8.0<L>      12 Jul 2019 20:05    TPro  6.5  /  Alb  3.0<L>  /  TBili  < 0.2<L>  /  DBili  x   /  AST  33  /  ALT  25  /  AlkPhos  57  07-12 CBC Full  -  ( 12 Jul 2019 20:05 )  WBC Count : 12.45 K/uL  RBC Count : 2.26 M/uL  Hemoglobin : 7.2 g/dL  Hematocrit : 23.3 %  Platelet Count - Automated : 272 K/uL  Mean Cell Volume : 103.1 fL  Mean Cell Hemoglobin : 31.9 pg  Mean Cell Hemoglobin Concentration : 30.9 %  Auto Neutrophil # : 9.56 K/uL  Auto Lymphocyte # : 1.50 K/uL  Auto Monocyte # : 0.86 K/uL  Auto Eosinophil # : 0.01 K/uL  Auto Basophil # : 0.01 K/uL  Auto Neutrophil % : 76.8 %  Auto Lymphocyte % : 12.0 %  Auto Monocyte % : 6.9 %  Auto Eosinophil % : 0.1 %  Auto Basophil % : 0.1 %    07-12    134<L>  |  101  |  124<H>  ----------------------------<  76  4.9   |  17<L>  |  8.78<H>    Ca    8.0<L>      12 Jul 2019 20:05    TPro  6.5  /  Alb  3.0<L>  /  TBili  < 0.2<L>  /  DBili  x   /  AST  33  /  ALT  25  /  AlkPhos  57  07-12    CTH:  No acute intracranial hemorrhage, mass effect, midline shift or extra   axial collections.   Redemonstration of multiple punctate chronic appearing right sided basal   ganglia infarcts extending to centrum semiovale. If symptoms persists,   consider short-term follow-up or MRI may be obtained for further   evaluation provided no MR contraindications.

## 2019-07-13 NOTE — CONSULT NOTE ADULT - PROBLEM SELECTOR RECOMMENDATION 4
Pt. with hyperphosphatemia in setting of ESRD. Check intact PTH level. Low phosphorus diet advised. Monitor serum phosphorus

## 2019-07-13 NOTE — PATIENT PROFILE ADULT - NSPRESCRALCFREQ_GEN_A_NUR
Patient will continue on Cosopt (Timolol/Dorzolamide) which is a blue top drop 2x/day (12 hours apart) in the RIGHT EYE ONLY and Alphagan (Brimonidine) which is a purple top drop 2x/day (12 hours apart) in the RIGHT EYE ONLY, and Travatan which is a teal top drop at bedtime in the RIGHT EYE ONLY.  Patient will return to clinic in 3-4 months with repeat IOP check, visual field test and discussion about further surgery in the right eye.        Never

## 2019-07-13 NOTE — CHART NOTE - NSCHARTNOTEFT_GEN_A_CORE
Attempted to contact surgery multiple times to consult for the patient's AV fistula.    Paged [phone]. Got a response and was asked to page [phone] for consults.    Paged [phone] 3 separate times and have not received a response after over 1.5 hours.    Paged [phone] 2 more times and received no response after 45 minutes of waiting.    Called [phone] (listed spectra for vascular surgery) and received no call back.    Will continue to escalate. Attempted to contact surgery multiple times to consult for the patient's AV fistula.    Paged [phone]. Got a response and was asked to page [phone] for consults.    Paged [phone] 3 separate times and have not received a response after over 1.5 hours.    Paged [phone] 2 more times and received no response after 45 minutes of waiting.    Called [phone] (listed spectra for vascular surgery) and received no call back.    Will continue to escalate.    Edit: Received a call back from vascular after 1 hour and multiple pages.

## 2019-07-13 NOTE — PROGRESS NOTE ADULT - PROBLEM SELECTOR PLAN 6
pt with macrocytic anemia likely from Epo injection. stable, chronic   - monitor CBC pt with macrocytic anemia, likely from Epo injection. B12 and folate are normal. Current hbg and hct are lower than previous admission. However, no signs of active bleed.   - monitor CBC pt with macrocytic anemia, likely from Epo injection. B12 and folate are normal.  Current hbg and hct are lower than previous admission. However, no signs of active bleed. Iron studies pending  - monitor CBC  - f/u iron studies

## 2019-07-13 NOTE — PROGRESS NOTE ADULT - PROBLEM SELECTOR PLAN 1
Patient currently with AMS, asterixis, BUN of >120, creatinine >8, pH 7.19, not yet on HD. Private nephrologist is Dr. Diaz ([phone]).  - nephrology following, recs appreciated  - urgent HD   - F/u EKG for qtc. Pt with uremic encephalopathy secondary to ESRD. Currently with AMS (A&Ox2), asterixis, BUN of 124, creatinine 8.47, pH 7.19, not yet on HD. Private nephrologist is Dr. Aidan Diaz ([phone]) from [facility].  - nephrology following, recs appreciated  - urgent HD today  - F/u EKG for qtc. Pt with uremic encephalopathy secondary to ESRD. Currently with AMS (A&Ox2), asterixis, BUN of 124, creatinine 8.47, pH 7.19, not yet on HD. Private nephrologist is Dr. Aidan Diaz ([phone]) from [facility].  - nephrology following, recs appreciated  - urgent HD today (pending shiley cath by vascular)  - F/u EKG for qtc. Pt with uremic encephalopathy secondary to ESRD. Currently with AMS (A&Ox2), asterixis, BUN of 124, creatinine 8.47, pH 7.19, not yet on HD. Private nephrologist is Dr. Aidan Diaz ([phone]) from [facility].  - nephrology following, recs appreciated  - urgent HD today via shiley cath  - F/u EKG for qtc. Pt with uremic encephalopathy secondary to ESRD. Currently with AMS (A&Ox2), asterixis, BUN of 124, creatinine 8.47, pH 7.19, not yet on HD. Private nephrologist is Dr. Aidan Diaz ([phone]) from [facility]. Pt had HD today. He is at risk for dialysis disequilibrium syndrome.   - nephrology following, recs appreciated  - urgent HD today via shiley cath  - F/u EKG for qtc.  - Will monitor for dialysis disequilibrium syndrome (headache, nausea, blurred vision, dizziness)

## 2019-07-14 LAB
ALBUMIN SERPL ELPH-MCNC: 2.5 G/DL — LOW (ref 3.3–5)
ALP SERPL-CCNC: 56 U/L — SIGNIFICANT CHANGE UP (ref 40–120)
ALT FLD-CCNC: 22 U/L — SIGNIFICANT CHANGE UP (ref 4–41)
ANION GAP SERPL CALC-SCNC: 15 MMO/L — HIGH (ref 7–14)
APTT BLD: 28.1 SEC — SIGNIFICANT CHANGE UP (ref 27.5–36.3)
AST SERPL-CCNC: 25 U/L — SIGNIFICANT CHANGE UP (ref 4–40)
BASOPHILS # BLD AUTO: 0.04 K/UL — SIGNIFICANT CHANGE UP (ref 0–0.2)
BASOPHILS NFR BLD AUTO: 0.4 % — SIGNIFICANT CHANGE UP (ref 0–2)
BILIRUB SERPL-MCNC: < 0.2 MG/DL — LOW (ref 0.2–1.2)
BLD GP AB SCN SERPL QL: NEGATIVE — SIGNIFICANT CHANGE UP
BUN SERPL-MCNC: 101 MG/DL — HIGH (ref 7–23)
CALCIUM SERPL-MCNC: 7.8 MG/DL — LOW (ref 8.4–10.5)
CHLORIDE SERPL-SCNC: 105 MMOL/L — SIGNIFICANT CHANGE UP (ref 98–107)
CO2 SERPL-SCNC: 17 MMOL/L — LOW (ref 22–31)
CREAT SERPL-MCNC: 7.12 MG/DL — HIGH (ref 0.5–1.3)
EOSINOPHIL # BLD AUTO: 0.06 K/UL — SIGNIFICANT CHANGE UP (ref 0–0.5)
EOSINOPHIL NFR BLD AUTO: 0.5 % — SIGNIFICANT CHANGE UP (ref 0–6)
FERRITIN SERPL-MCNC: 416.1 NG/ML — HIGH (ref 30–400)
GLUCOSE SERPL-MCNC: 138 MG/DL — HIGH (ref 70–99)
HCT VFR BLD CALC: 23 % — LOW (ref 39–50)
HGB BLD-MCNC: 7.1 G/DL — LOW (ref 13–17)
IMM GRANULOCYTES NFR BLD AUTO: 3.8 % — HIGH (ref 0–1.5)
INR BLD: 0.89 — SIGNIFICANT CHANGE UP (ref 0.88–1.17)
IRON SATN MFR SERPL: 192 UG/DL — SIGNIFICANT CHANGE UP (ref 155–535)
IRON SATN MFR SERPL: 75 UG/DL — SIGNIFICANT CHANGE UP (ref 45–165)
LYMPHOCYTES # BLD AUTO: 1.34 K/UL — SIGNIFICANT CHANGE UP (ref 1–3.3)
LYMPHOCYTES # BLD AUTO: 12.2 % — LOW (ref 13–44)
MAGNESIUM SERPL-MCNC: 2.3 MG/DL — SIGNIFICANT CHANGE UP (ref 1.6–2.6)
MCHC RBC-ENTMCNC: 30.9 % — LOW (ref 32–36)
MCHC RBC-ENTMCNC: 31.6 PG — SIGNIFICANT CHANGE UP (ref 27–34)
MCV RBC AUTO: 102.2 FL — HIGH (ref 80–100)
MONOCYTES # BLD AUTO: 0.86 K/UL — SIGNIFICANT CHANGE UP (ref 0–0.9)
MONOCYTES NFR BLD AUTO: 7.8 % — SIGNIFICANT CHANGE UP (ref 2–14)
NEUTROPHILS # BLD AUTO: 8.25 K/UL — HIGH (ref 1.8–7.4)
NEUTROPHILS NFR BLD AUTO: 75.3 % — SIGNIFICANT CHANGE UP (ref 43–77)
NRBC # FLD: 0 K/UL — SIGNIFICANT CHANGE UP (ref 0–0)
PHOSPHATE SERPL-MCNC: 5.5 MG/DL — HIGH (ref 2.5–4.5)
PLATELET # BLD AUTO: 303 K/UL — SIGNIFICANT CHANGE UP (ref 150–400)
PMV BLD: 10.4 FL — SIGNIFICANT CHANGE UP (ref 7–13)
POTASSIUM SERPL-MCNC: 4.3 MMOL/L — SIGNIFICANT CHANGE UP (ref 3.5–5.3)
POTASSIUM SERPL-SCNC: 4.3 MMOL/L — SIGNIFICANT CHANGE UP (ref 3.5–5.3)
PROT SERPL-MCNC: 5.8 G/DL — LOW (ref 6–8.3)
PROTHROM AB SERPL-ACNC: 9.8 SEC — SIGNIFICANT CHANGE UP (ref 9.8–13.1)
PTH-INTACT SERPL-MCNC: 271.7 PG/ML — HIGH (ref 15–65)
RBC # BLD: 2.25 M/UL — LOW (ref 4.2–5.8)
RBC # FLD: 16.5 % — HIGH (ref 10.3–14.5)
RH IG SCN BLD-IMP: POSITIVE — SIGNIFICANT CHANGE UP
SODIUM SERPL-SCNC: 137 MMOL/L — SIGNIFICANT CHANGE UP (ref 135–145)
UIBC SERPL-MCNC: 116.5 UG/DL — SIGNIFICANT CHANGE UP (ref 110–370)
WBC # BLD: 10.97 K/UL — HIGH (ref 3.8–10.5)
WBC # FLD AUTO: 10.97 K/UL — HIGH (ref 3.8–10.5)

## 2019-07-14 PROCEDURE — 75827 VEIN X-RAY CHEST: CPT | Mod: 26,RT

## 2019-07-14 PROCEDURE — 71045 X-RAY EXAM CHEST 1 VIEW: CPT | Mod: 26

## 2019-07-14 PROCEDURE — 99233 SBSQ HOSP IP/OBS HIGH 50: CPT | Mod: GC

## 2019-07-14 PROCEDURE — 36901 INTRO CATH DIALYSIS CIRCUIT: CPT | Mod: LT

## 2019-07-14 PROCEDURE — 99232 SBSQ HOSP IP/OBS MODERATE 35: CPT | Mod: GC

## 2019-07-14 PROCEDURE — 36832 AV FISTULA REVISION OPEN: CPT | Mod: LT,59

## 2019-07-14 PROCEDURE — 36558 INSERT TUNNELED CV CATH: CPT | Mod: RT

## 2019-07-14 RX ADMIN — DIVALPROEX SODIUM 500 MILLIGRAM(S): 500 TABLET, DELAYED RELEASE ORAL at 13:33

## 2019-07-14 RX ADMIN — GABAPENTIN 100 MILLIGRAM(S): 400 CAPSULE ORAL at 06:34

## 2019-07-14 RX ADMIN — Medication 50 MILLIGRAM(S): at 06:34

## 2019-07-14 RX ADMIN — GABAPENTIN 100 MILLIGRAM(S): 400 CAPSULE ORAL at 13:32

## 2019-07-14 RX ADMIN — CILOSTAZOL 50 MILLIGRAM(S): 100 TABLET ORAL at 06:34

## 2019-07-14 RX ADMIN — Medication 325 MILLIGRAM(S): at 13:33

## 2019-07-14 RX ADMIN — DIVALPROEX SODIUM 500 MILLIGRAM(S): 500 TABLET, DELAYED RELEASE ORAL at 06:34

## 2019-07-14 RX ADMIN — AMLODIPINE BESYLATE 10 MILLIGRAM(S): 2.5 TABLET ORAL at 06:35

## 2019-07-14 RX ADMIN — SODIUM CHLORIDE 75 MILLILITER(S): 9 INJECTION, SOLUTION INTRAVENOUS at 00:02

## 2019-07-14 RX ADMIN — Medication 650 MILLIGRAM(S): at 06:34

## 2019-07-14 RX ADMIN — Medication 100 MILLIGRAM(S): at 13:32

## 2019-07-14 RX ADMIN — Medication 650 MILLIGRAM(S): at 18:23

## 2019-07-14 RX ADMIN — CILOSTAZOL 50 MILLIGRAM(S): 100 TABLET ORAL at 18:23

## 2019-07-14 RX ADMIN — CARVEDILOL PHOSPHATE 12.5 MILLIGRAM(S): 80 CAPSULE, EXTENDED RELEASE ORAL at 06:34

## 2019-07-14 NOTE — PROGRESS NOTE ADULT - PROBLEM SELECTOR PLAN 7
- Low sodium diet  - Continue home hydralazine, amlodipine, carvedilol Patient is on tradjenta at home.  - Will start on SSI and renal diet.

## 2019-07-14 NOTE — PROGRESS NOTE ADULT - PROBLEM SELECTOR PLAN 6
pt with macrocytic anemia, likely from Epo injection. B12 and folate are normal.  Current hbg and hct are lower than previous admission. However, no signs of active bleed. Iron studies pending  - monitor CBC  - f/u iron studies hypertensive last night as BP meds were held for HD. BP controlled with meds.   - Low sodium diet  - Continue home hydralazine, amlodipine, carvedilol

## 2019-07-14 NOTE — PROGRESS NOTE ADULT - SUBJECTIVE AND OBJECTIVE BOX
[facility] DIVISION OF KIDNEY DISEASES AND HYPERTENSION -- FOLLOW UP NOTE  --------------------------------------------------------------------------------  HPI: 53 M with HTN, bipolar disorder, DM2, ESRD not yet on HD, anemia, was brought in by EMS for generalized weakness. Nephrology was consulted for uremic encephalopathy. Patient reported having increasing weakness and lethargy at home. Follows with a Nephrologist - Dr. Diaz and had an AV fistula placed 2 months ago by Dr. Agrawal in anticipation for HD.  [facility] reviewed, last sCr prior to admission was 7.25 and BUN of 94 on 5/3/2019. On admission, sCr is 8.78 and BUN is 124. Attempted to have HD through previously placed AV fistula but patient had infiltrates while cannulating. Had non tunneled HD catheter placed in [facility] on 7/13/2019 and had 1 dialysis session. Patient is planned for AV fistula revision today.    Patient evaluated at bedside, sleeping comfortably. Patient only complains of hunger, no other complaints at this time.    PAST HISTORY  --------------------------------------------------------------------------------  No significant changes to PMH, PSH, FHx, SHx, unless otherwise noted    ALLERGIES & MEDICATIONS  --------------------------------------------------------------------------------  Allergies    No Known Allergies    Intolerances      Standing Inpatient Medications  amLODIPine   Tablet 10 milliGRAM(s) Oral daily  carvedilol 12.5 milliGRAM(s) Oral every 12 hours  cilostazol 50 milliGRAM(s) Oral two times a day  dextrose 5% + sodium chloride 0.45%. 1000 milliLiter(s) IV Continuous <Continuous>  dextrose 5%. 1000 milliLiter(s) IV Continuous <Continuous>  dextrose 50% Injectable 12.5 Gram(s) IV Push once  dextrose 50% Injectable 25 Gram(s) IV Push once  dextrose 50% Injectable 25 Gram(s) IV Push once  diVALproex  milliGRAM(s) Oral three times a day  ferrous    sulfate 325 milliGRAM(s) Oral daily  gabapentin 100 milliGRAM(s) Oral three times a day  heparin  Injectable 5000 Unit(s) SubCutaneous every 12 hours  hydrALAZINE 50 milliGRAM(s) Oral two times a day  insulin lispro (HumaLOG) corrective regimen sliding scale   SubCutaneous three times a day before meals  insulin lispro (HumaLOG) corrective regimen sliding scale   SubCutaneous at bedtime  OLANZapine 20 milliGRAM(s) Oral at bedtime  sodium bicarbonate 650 milliGRAM(s) Oral two times a day  traZODone 100 milliGRAM(s) Oral daily    REVIEW OF SYSTEMS  --------------------------------------------------------------------------------  Gen: No fevers/chills  Skin: No rashes  Respiratory: No dyspnea, cough  CV: No chest pain  GI: No abdominal pain, diarrhea  : No dysuria, hematuria  MSK: No  edema  Psych: No significant depression    All other systems were reviewed and are negative, except as noted.    VITALS/PHYSICAL EXAM  --------------------------------------------------------------------------------  T(C): 36.6 (07-14-19 @ 06:31), Max: 37 (07-14-19 @ 02:19)  HR: 66 (07-14-19 @ 06:31) (60 - 67)  BP: 148/80 (07-14-19 @ 06:31) (105/83 - 190/85)  RR: 17 (07-14-19 @ 06:31) (17 - 18)  SpO2: 94% (07-14-19 @ 06:31) (94% - 99%)  Wt(kg): --  Height (cm): 177.8 (07-13-19 @ 06:17)  Weight (kg): 75.6 (07-13-19 @ 06:17)  BMI (kg/m2): 23.9 (07-13-19 @ 06:17)  BSA (m2): 1.93 (07-13-19 @ 06:17)      07-13-19 @ 07:01  -  07-14-19 @ 07:00  --------------------------------------------------------  IN: 400 mL / OUT: 630 mL / NET: -230 mL        Physical Exam:  	Gen: NAD, still drowsy  	HEENT: MMM  	Pulm: CTA B/L  	CV: S1S2  	Abd: Soft, +BS   	Ext: No LE edema B/L  	Neuro: Awake  	Skin: Warm and dry  	Vascular access: LUE AV fistula with bruit and thrill, overlying skin intact, no signs of bleeding. RIJ non tunneled HD catheter, no hematoma or signs of bleeding    LABS/STUDIES  --------------------------------------------------------------------------------              7.1    10.97 >-----------<  303      [07-14-19 @ 02:43]              23.0     137  |  105  |  101  ----------------------------<  138      [07-14-19 @ 02:43]  4.3   |  17  |  7.12        Ca     7.8     [07-14-19 @ 02:43]      Mg     2.3     [07-14-19 @ 02:43]      Phos  5.5     [07-14-19 @ 02:43]    TPro  5.8  /  Alb  2.5  /  TBili  < 0.2  /  DBili  x   /  AST  25  /  ALT  22  /  AlkPhos  56  [07-14-19 @ 02:43]    PT/INR: PT 9.8  , INR 0.89       [07-14-19 @ 02:43]  PTT: 28.1       [07-14-19 @ 02:43]      Creatinine Trend:  SCr 7.12 [07-14 @ 02:43]  SCr 8.47 [07-13 @ 07:09]  SCr 8.78 [07-12 @ 20:05]

## 2019-07-14 NOTE — PROGRESS NOTE ADULT - PROBLEM SELECTOR PLAN 3
Patient with anemia in the setting of ESRD. Hemoglobin below target range. Iron studies consistent with AOCD. Monitor hemoglobin. Transfuse PRN.

## 2019-07-14 NOTE — PROGRESS NOTE ADULT - PROBLEM SELECTOR PLAN 4
Pt. with hyperphosphatemia in setting of ESRD. Low phosphorus diet advised. Monitor serum phosphorus.

## 2019-07-14 NOTE — PROGRESS NOTE ADULT - PROBLEM SELECTOR PLAN 1
Patient with uremic encephalopathy secondary to ESRD. [facility] reviewed, sCr and BUN on 5/3/2019 was 7.25 and 94 respectively. On admission BUN is 124 and sCr 8.47. Went for 1st dialysis yesterday through RIJ non tunneled HD catheter, tolerated well. BUN post HD is 101. Patient had AV fistula placed 2 months ago in anticipation of hemodialysis, attempted to cannulate but had infiltrates. Planned for revision today. Will schedule for 2nd HD session tomorrow. Monitor labs.

## 2019-07-14 NOTE — PROGRESS NOTE ADULT - SUBJECTIVE AND OBJECTIVE BOX
Patient is a 54y old  Male who presents with a chief complaint of Generalized weakness (2019 13:13)      INTERVAL HPI/OVERNIGHT EVENTS:  Overnight pt had first 2-hour HD via shiley cath, with no fluid removal. He was made NPO for AV fistula revision today.     REVIEW OF SYSTEMS:    MEDICATIONS  (STANDING):  amLODIPine   Tablet 10 milliGRAM(s) Oral daily  carvedilol 12.5 milliGRAM(s) Oral every 12 hours  cilostazol 50 milliGRAM(s) Oral two times a day  dextrose 5% + sodium chloride 0.45%. 1000 milliLiter(s) (75 mL/Hr) IV Continuous <Continuous>  dextrose 5%. 1000 milliLiter(s) (50 mL/Hr) IV Continuous <Continuous>  dextrose 50% Injectable 12.5 Gram(s) IV Push once  dextrose 50% Injectable 25 Gram(s) IV Push once  dextrose 50% Injectable 25 Gram(s) IV Push once  diVALproex  milliGRAM(s) Oral three times a day  ferrous    sulfate 325 milliGRAM(s) Oral daily  gabapentin 100 milliGRAM(s) Oral three times a day  heparin  Injectable 5000 Unit(s) SubCutaneous every 12 hours  hydrALAZINE 50 milliGRAM(s) Oral two times a day  insulin lispro (HumaLOG) corrective regimen sliding scale   SubCutaneous three times a day before meals  insulin lispro (HumaLOG) corrective regimen sliding scale   SubCutaneous at bedtime  OLANZapine 20 milliGRAM(s) Oral at bedtime  sodium bicarbonate 650 milliGRAM(s) Oral two times a day  traZODone 100 milliGRAM(s) Oral daily    MEDICATIONS  (PRN):  dextrose 40% Gel 15 Gram(s) Oral once PRN Blood Glucose LESS THAN 70 milliGRAM(s)/deciliter  glucagon  Injectable 1 milliGRAM(s) IntraMuscular once PRN Glucose LESS THAN 70 milligrams/deciliter  ondansetron    Tablet 4 milliGRAM(s) Oral every 6 hours PRN Nausea and/or Vomiting      Allergies    No Known Allergies    Intolerances        Vital Signs Last 24 Hrs  T(C): 37 (2019 02:19), Max: 37 (2019 02:19)  T(F): 98.6 (2019 02:19), Max: 98.6 (2019 02:19)  HR: 62 (2019 02:19) (60 - 67)  BP: 166/62 (2019 02:19) (105/83 - 190/85)  BP(mean): --  RR: 17 (2019 02:19) (17 - 18)  SpO2: 98% (2019 02:19) (98% - 99%)    PHYSICAL EXAM:  GENERAL: NAD.  CHEST/LUNG: Clear to auscultation; No rales, rhonchi, or wheezing.  Respiratory effort does not appear labored.  HEART: Regular rate and rhythm; S1 and S2,  no murmurs, rubs, or gallops.  ABDOMEN: Soft, not tender to palpation.  No masses or HSM appreciated.  No distension.  Bowel sounds present.  EXTREMITIES:  No clubbing, cyanosis, or edema.  Moves all extremities with strength 5/5.  SKIN: No obvious rashes or lesions.  Turgor okay.  NEURO:  Alert and oriented x 3, no focal sensory or  motor deficit, DTR 2+ bilaterally.    LABS:                        7.1    10.97 )-----------( 303      ( 2019 02:43 )             23.0     07-14    137  |  105  |  101<H>  ----------------------------<  138<H>  4.3   |  17<L>  |  7.12<H>    Ca    7.8<L>      2019 02:43  Phos  5.5     07-14  Mg     2.3     07-14    TPro  5.8<L>  /  Alb  2.5<L>  /  TBili  < 0.2<L>  /  DBili  x   /  AST  25  /  ALT  22  /  AlkPhos  56  07-14    PT/INR - ( 2019 02:43 )   PT: 9.8 SEC;   INR: 0.89          PTT - ( 2019 02:43 )  PTT:28.1 SEC  Urinalysis Basic - ( 2019 21:21 )    Color: LIGHT YELLOW / Appearance: CLEAR / S.011 / pH: 6.5  Gluc: 50 / Ketone: NEGATIVE  / Bili: NEGATIVE / Urobili: NORMAL   Blood: TRACE / Protein: 300 / Nitrite: NEGATIVE   Leuk Esterase: NEGATIVE / RBC: 3-5 / WBC 3-5   Sq Epi: OCC / Non Sq Epi: x / Bacteria: NEGATIVE      CAPILLARY BLOOD GLUCOSE      POCT Blood Glucose.: 92 mg/dL (2019 22:44)  POCT Blood Glucose.: 92 mg/dL (2019 17:33)  POCT Blood Glucose.: 141 mg/dL (2019 12:50)  POCT Blood Glucose.: 83 mg/dL (2019 08:33) Patient is a 54y old  Male who presents with a chief complaint of Generalized weakness (13 Jul 2019 13:13)      INTERVAL HPI/OVERNIGHT EVENTS:  Overnight pt had first 2-hour HD via shiley cath, with no fluid removal. He was made NPO for AV fistula revision today. This morning pt appeared more awake and confused than yesterday. Today he is A&Ox1 (yesterday A&Ox2), but able to stay awake with eyes closed during our interaction. Denies dizziness, nausea, pain, SOB, and headache.       MEDICATIONS  (STANDING):  amLODIPine   Tablet 10 milliGRAM(s) Oral daily  carvedilol 12.5 milliGRAM(s) Oral every 12 hours  cilostazol 50 milliGRAM(s) Oral two times a day  dextrose 5% + sodium chloride 0.45%. 1000 milliLiter(s) (75 mL/Hr) IV Continuous <Continuous>  dextrose 5%. 1000 milliLiter(s) (50 mL/Hr) IV Continuous <Continuous>  dextrose 50% Injectable 12.5 Gram(s) IV Push once  dextrose 50% Injectable 25 Gram(s) IV Push once  dextrose 50% Injectable 25 Gram(s) IV Push once  diVALproex  milliGRAM(s) Oral three times a day  ferrous    sulfate 325 milliGRAM(s) Oral daily  gabapentin 100 milliGRAM(s) Oral three times a day  heparin  Injectable 5000 Unit(s) SubCutaneous every 12 hours  hydrALAZINE 50 milliGRAM(s) Oral two times a day  insulin lispro (HumaLOG) corrective regimen sliding scale   SubCutaneous three times a day before meals  insulin lispro (HumaLOG) corrective regimen sliding scale   SubCutaneous at bedtime  OLANZapine 20 milliGRAM(s) Oral at bedtime  sodium bicarbonate 650 milliGRAM(s) Oral two times a day  traZODone 100 milliGRAM(s) Oral daily    MEDICATIONS  (PRN):  dextrose 40% Gel 15 Gram(s) Oral once PRN Blood Glucose LESS THAN 70 milliGRAM(s)/deciliter  glucagon  Injectable 1 milliGRAM(s) IntraMuscular once PRN Glucose LESS THAN 70 milligrams/deciliter  ondansetron    Tablet 4 milliGRAM(s) Oral every 6 hours PRN Nausea and/or Vomiting      Allergies: No Known Allergies        Vital Signs Last 24 Hrs  T(C): 37 (14 Jul 2019 02:19), Max: 37 (14 Jul 2019 02:19)  T(F): 98.6 (14 Jul 2019 02:19), Max: 98.6 (14 Jul 2019 02:19)  HR: 62 (14 Jul 2019 02:19) (60 - 67)  BP: 166/62 (14 Jul 2019 02:19) (105/83 - 190/85)  RR: 17 (14 Jul 2019 02:19) (17 - 18)  SpO2: 98% (14 Jul 2019 02:19) (98% - 99%)    PHYSICAL EXAM:  GENERAL: More awake than yesterday, although eyes closed during interaction  HEENT: PERRL, mild erythema of both eyes without purulent discharge.   CHEST/LUNG: Clear to auscultation; No rales, rhonchi, or wheezing.  Respiratory effort does not appear labored.  HEART: Regular rate and rhythm; S1 and S2,  no murmurs, rubs, or gallops.  ABDOMEN: Soft, not tender to palpation.  No masses or HSM appreciated.  No distension.  Bowel sounds present.  EXTREMITIES: +asterixis. No clubbing, cyanosis, or edema.  Moves all extremities with strength 5/5.  SKIN: No obvious rashes or lesions.  Turgor okay.  NEURO:  Alert and oriented x 1 (to self only).       LABS:                        7.1    10.97 )-----------( 303      ( 14 Jul 2019 02:43 )             23.0     - WBC: 10.97 (down from 13.67)  - H&H: 7.1/23.0 (down from 7.4/25.1)  - Iron studies: serum iron WNL, TIBC WNL, ferritin 416.1 (elevated)      137  |  105  |  101<H>  ----------------------------<  138<H>  4.3   |  17<L>  |  7.12<H>    -  (down from 126)  - Creatinine 7.12 (down from 8.47)    Ca   9.0 corrected      14 Jul 2019 02:43  Phos  5.5 (down from 6.6)    07-14  Mg     2.3     07-14  PTH: 271.7 (elevated)    TPro  5.8<L>  /  Alb  2.5<L>  /  TBili  < 0.2<L>  /  DBili  x   /  AST  25  /  ALT  22  /  AlkPhos  56  07-14        CAPILLARY BLOOD GLUCOSE      POCT Blood Glucose.: 92 mg/dL (13 Jul 2019 22:44)  POCT Blood Glucose.: 92 mg/dL (13 Jul 2019 17:33)  POCT Blood Glucose.: 141 mg/dL (13 Jul 2019 12:50)  POCT Blood Glucose.: 83 mg/dL (13 Jul 2019 08:33) Patient is a 54y old  Male who presents with a chief complaint of Generalized weakness (13 Jul 2019 13:13)      INTERVAL HPI/OVERNIGHT EVENTS:  Overnight pt had first 2-hour HD via shiley cath, with no fluid removal. He was made NPO for AV fistula revision today. This morning pt appeared more awake and confused than yesterday. Today he is A&Ox1 (yesterday A&Ox2), but able to stay awake with eyes closed during our interaction. Denies dizziness, nausea, pain, SOB, and headache.       MEDICATIONS  (STANDING):  amLODIPine   Tablet 10 milliGRAM(s) Oral daily  carvedilol 12.5 milliGRAM(s) Oral every 12 hours  cilostazol 50 milliGRAM(s) Oral two times a day  dextrose 5% + sodium chloride 0.45%. 1000 milliLiter(s) (75 mL/Hr) IV Continuous <Continuous>  dextrose 5%. 1000 milliLiter(s) (50 mL/Hr) IV Continuous <Continuous>  dextrose 50% Injectable 12.5 Gram(s) IV Push once  dextrose 50% Injectable 25 Gram(s) IV Push once  dextrose 50% Injectable 25 Gram(s) IV Push once  diVALproex  milliGRAM(s) Oral three times a day  ferrous    sulfate 325 milliGRAM(s) Oral daily  gabapentin 100 milliGRAM(s) Oral three times a day  heparin  Injectable 5000 Unit(s) SubCutaneous every 12 hours  hydrALAZINE 50 milliGRAM(s) Oral two times a day  insulin lispro (HumaLOG) corrective regimen sliding scale   SubCutaneous three times a day before meals  insulin lispro (HumaLOG) corrective regimen sliding scale   SubCutaneous at bedtime  OLANZapine 20 milliGRAM(s) Oral at bedtime  sodium bicarbonate 650 milliGRAM(s) Oral two times a day  traZODone 100 milliGRAM(s) Oral daily    MEDICATIONS  (PRN):  dextrose 40% Gel 15 Gram(s) Oral once PRN Blood Glucose LESS THAN 70 milliGRAM(s)/deciliter  glucagon  Injectable 1 milliGRAM(s) IntraMuscular once PRN Glucose LESS THAN 70 milligrams/deciliter  ondansetron    Tablet 4 milliGRAM(s) Oral every 6 hours PRN Nausea and/or Vomiting      Allergies: No Known Allergies        Vital Signs Last 24 Hrs  T(C): 37 (14 Jul 2019 02:19), Max: 37 (14 Jul 2019 02:19)  T(F): 98.6 (14 Jul 2019 02:19), Max: 98.6 (14 Jul 2019 02:19)  HR: 62 (14 Jul 2019 02:19) (60 - 67)  BP: 166/62 (14 Jul 2019 02:19) (105/83 - 190/85)  RR: 17 (14 Jul 2019 02:19) (17 - 18)  SpO2: 98% (14 Jul 2019 02:19) (98% - 99%)    PHYSICAL EXAM:  GENERAL: More awake than yesterday, although eyes closed during interaction  HEENT: PERRL, mild erythema of both eyes without purulent discharge. R sided shiley cath in place.    CHEST/LUNG: Clear to auscultation; No rales, rhonchi, or wheezing.  Respiratory effort does not appear labored.  HEART: Regular rate and rhythm; S1 and S2,  no murmurs, rubs, or gallops.  ABDOMEN: Soft, not tender to palpation.  No masses or HSM appreciated.  No distension.  Bowel sounds present.  EXTREMITIES: +asterixis. No clubbing, cyanosis, or edema.  Moves all extremities with strength 5/5.  SKIN: No obvious rashes or lesions.  Turgor okay.  NEURO:  Alert and oriented x 1 (to self only).       LABS:                        7.1    10.97 )-----------( 303      ( 14 Jul 2019 02:43 )             23.0     - WBC: 10.97 (down from 13.67)  - H&H: 7.1/23.0 (down from 7.4/25.1)  - Iron studies: serum iron WNL, TIBC WNL, ferritin 416.1 (elevated)      137  |  105  |  101<H>  ----------------------------<  138<H>  4.3   |  17<L>  |  7.12<H>    -  (down from 126)  - Creatinine 7.12 (down from 8.47)    Ca   9.0 corrected      14 Jul 2019 02:43  Phos  5.5 (down from 6.6)    07-14  Mg     2.3     07-14  PTH: 271.7 (elevated)    TPro  5.8<L>  /  Alb  2.5<L>  /  TBili  < 0.2<L>  /  DBili  x   /  AST  25  /  ALT  22  /  AlkPhos  56  07-14        CAPILLARY BLOOD GLUCOSE      POCT Blood Glucose.: 92 mg/dL (13 Jul 2019 22:44)  POCT Blood Glucose.: 92 mg/dL (13 Jul 2019 17:33)  POCT Blood Glucose.: 141 mg/dL (13 Jul 2019 12:50)  POCT Blood Glucose.: 83 mg/dL (13 Jul 2019 08:33)

## 2019-07-14 NOTE — PROGRESS NOTE ADULT - PROBLEM SELECTOR PLAN 3
Pt has worsening ESRD, creatinine 8.47 from 6.87 (3/2019) and hyperphosphatemia. Patient had AV fistula place in 4/2019, had first HD yesterday via shiley cath. Pt will go to OR today AV fistula revision.   - nephrology and vascular following, recs appreciated    - OR today for AV fistula revision  - dialysis (7/13)

## 2019-07-14 NOTE — PROGRESS NOTE ADULT - ASSESSMENT
52yo M with HTN, bipolar disorder, DM2, ESRD s/p AV fistula 4/2019 not yet on HD , presents with generalized weakness and somnolence, admitted for uremic encephalopathy and metabolic acidosis, s/p HD x1 (7/13)

## 2019-07-14 NOTE — PROGRESS NOTE ADULT - PROBLEM SELECTOR PLAN 5
Unclear if leukocytosis is new vs baseline - previously, his WBC ranged from 9-13. Pt is afebrile with no infectious symptoms (no cough, dysuria, diarrhea). CXR without consolidations, negative UA. Low concern for active infection at the moment.  - trend CBC and monitor wbc pt with macrocytic anemia, likely from Epo injection. B12 and folate are normal. Iron studies with anemia of chronic disease. Current hbg and hct are lower than previous admission. However, no signs of active bleed.  - f/u iron studies  - transfuse for Hgb <7.0

## 2019-07-14 NOTE — PROGRESS NOTE ADULT - PROBLEM SELECTOR PLAN 4
hyperphosphatemia i/s/o of ESRD.   - f/u PTH  - renal-low-phos diet  - trend phos level hyperphosphatemia i/s/o of ESRD, with secondary hyperparathyroidism. Levels improved post-HD.  - renal-low-phos diet  - trend phos level

## 2019-07-14 NOTE — PROGRESS NOTE ADULT - SUBJECTIVE AND OBJECTIVE BOX
Patient is a 54y old  Male who presents with a chief complaint of Generalized weakness (14 Jul 2019 08:40)      Vascular Surgery Attending Progress Note    Interval HPI: pt w/o c/o     Medications:  amLODIPine   Tablet 10 milliGRAM(s) Oral daily  carvedilol 12.5 milliGRAM(s) Oral every 12 hours  cilostazol 50 milliGRAM(s) Oral two times a day  dextrose 40% Gel 15 Gram(s) Oral once PRN  dextrose 5% + sodium chloride 0.45%. 1000 milliLiter(s) IV Continuous <Continuous>  dextrose 5%. 1000 milliLiter(s) IV Continuous <Continuous>  dextrose 50% Injectable 12.5 Gram(s) IV Push once  dextrose 50% Injectable 25 Gram(s) IV Push once  dextrose 50% Injectable 25 Gram(s) IV Push once  diVALproex  milliGRAM(s) Oral three times a day  ferrous    sulfate 325 milliGRAM(s) Oral daily  gabapentin 100 milliGRAM(s) Oral three times a day  glucagon  Injectable 1 milliGRAM(s) IntraMuscular once PRN  heparin  Injectable 5000 Unit(s) SubCutaneous every 12 hours  hydrALAZINE 50 milliGRAM(s) Oral two times a day  insulin lispro (HumaLOG) corrective regimen sliding scale   SubCutaneous three times a day before meals  insulin lispro (HumaLOG) corrective regimen sliding scale   SubCutaneous at bedtime  OLANZapine 20 milliGRAM(s) Oral at bedtime  ondansetron    Tablet 4 milliGRAM(s) Oral every 6 hours PRN  sodium bicarbonate 650 milliGRAM(s) Oral two times a day  traZODone 100 milliGRAM(s) Oral daily      Vital Signs Last 24 Hrs  T(C): 36.6 (14 Jul 2019 08:40), Max: 37 (14 Jul 2019 02:19)  T(F): 97.8 (14 Jul 2019 08:40), Max: 98.6 (14 Jul 2019 02:19)  HR: 56 (14 Jul 2019 08:40) (56 - 67)  BP: 137/57 (14 Jul 2019 08:40) (105/83 - 190/85)  BP(mean): --  RR: 16 (14 Jul 2019 08:40) (16 - 18)  SpO2: 95% (14 Jul 2019 08:40) (94% - 99%)  I&O's Summary    13 Jul 2019 07:01 - 14 Jul 2019 07:00  --------------------------------------------------------  IN: 400 mL / OUT: 630 mL / NET: -230 mL        Physical Exam:  Neuro  A&Ox3 VSS  Vascular:  yvettee cherif andrea  good bruit and thrill     LABS:                        7.1    10.97 )-----------( 303      ( 14 Jul 2019 02:43 )             23.0     07-14    137  |  105  |  101<H>  ----------------------------<  138<H>  4.3   |  17<L>  |  7.12<H>    Ca    7.8<L>      14 Jul 2019 02:43  Phos  5.5     07-14  Mg     2.3     07-14    TPro  5.8<L>  /  Alb  2.5<L>  /  TBili  < 0.2<L>  /  DBili  x   /  AST  25  /  ALT  22  /  AlkPhos  56  07-14    PT/INR - ( 14 Jul 2019 02:43 )   PT: 9.8 SEC;   INR: 0.89          PTT - ( 14 Jul 2019 02:43 )  PTT:28.1 SEC    DEREJE JACOBO MD  [phone]

## 2019-07-14 NOTE — PROGRESS NOTE ADULT - PROBLEM SELECTOR PLAN 1
Pt with uremic encephalopathy secondary to ESRD, s/p HD x1 (7/13). Currently with AMS (A&Ox2), asterixis, BUN of 124, creatinine 8.47, pH 7.19. Private nephrologist is Dr. Aidan Diaz ([phone]) from [facility]. He is at risk for dialysis disequilibrium syndrome.   - nephrology following, recs appreciated  - s/p HD yesterday (x 1) via shiley cath  - F/u EKG for qtc.  - Will monitor for dialysis disequilibrium syndrome (headache, nausea, blurred vision, dizziness) Pt with uremic encephalopathy secondary to ESRD, s/p HD x1 (7/13). Currently with AMS (A&Ox1, more awake), asterixis, improved BUN (101 from 124), and improved creatinine (7.12 from 8.47). Private nephrologist is Dr. Aidan Diaz ([phone]) from [facility]. He is at risk for dialysis disequilibrium syndrome.   - nephrology following, recs appreciated  - AV fistula revision today, 2nd HD tomorrow   - s/p HD yesterday (x 1) via shiley cath  - F/u EKG for qtc.  - Will monitor for dialysis disequilibrium syndrome (headache, nausea, blurred vision, dizziness)

## 2019-07-14 NOTE — PROGRESS NOTE ADULT - ASSESSMENT
53M Hx DM, Htn, Right fem to above knee pop bypass with PTFE 9/17, Left radiocephalic AVF 4/1/19 presents with fatigue for 3 days in need of HD for uremia.     -unable to access lue avf for HD yesterday  lyssaley placed  will proceed a lue avf fistulogram and possible revision possible rt permacath   Indications risks and benefits of left  arm  AV fistulogram, poos revision poss rt permacath were explained to patient who understands and consents

## 2019-07-15 DIAGNOSIS — N18.6 END STAGE RENAL DISEASE: ICD-10-CM

## 2019-07-15 LAB
ALBUMIN SERPL ELPH-MCNC: 2.6 G/DL — LOW (ref 3.3–5)
ALP SERPL-CCNC: 56 U/L — SIGNIFICANT CHANGE UP (ref 40–120)
ALT FLD-CCNC: 20 U/L — SIGNIFICANT CHANGE UP (ref 4–41)
ANION GAP SERPL CALC-SCNC: 14 MMO/L — SIGNIFICANT CHANGE UP (ref 7–14)
AST SERPL-CCNC: 23 U/L — SIGNIFICANT CHANGE UP (ref 4–40)
BASOPHILS # BLD AUTO: 0.05 K/UL — SIGNIFICANT CHANGE UP (ref 0–0.2)
BASOPHILS NFR BLD AUTO: 0.6 % — SIGNIFICANT CHANGE UP (ref 0–2)
BILIRUB SERPL-MCNC: < 0.2 MG/DL — LOW (ref 0.2–1.2)
BUN SERPL-MCNC: 97 MG/DL — HIGH (ref 7–23)
CALCIUM SERPL-MCNC: 7.9 MG/DL — LOW (ref 8.4–10.5)
CHLORIDE SERPL-SCNC: 107 MMOL/L — SIGNIFICANT CHANGE UP (ref 98–107)
CO2 SERPL-SCNC: 19 MMOL/L — LOW (ref 22–31)
CREAT SERPL-MCNC: 7.38 MG/DL — HIGH (ref 0.5–1.3)
EOSINOPHIL # BLD AUTO: 0.07 K/UL — SIGNIFICANT CHANGE UP (ref 0–0.5)
EOSINOPHIL NFR BLD AUTO: 0.8 % — SIGNIFICANT CHANGE UP (ref 0–6)
GLUCOSE SERPL-MCNC: 62 MG/DL — LOW (ref 70–99)
HBV CORE AB SER-ACNC: NONREACTIVE — SIGNIFICANT CHANGE UP
HBV SURFACE AB SER-ACNC: <3 MLU/ML — LOW
HCT VFR BLD CALC: 23.9 % — LOW (ref 39–50)
HCV AB S/CO SERPL IA: 0.1 S/CO — SIGNIFICANT CHANGE UP (ref 0–0.99)
HCV AB SERPL-IMP: SIGNIFICANT CHANGE UP
HGB BLD-MCNC: 7.2 G/DL — LOW (ref 13–17)
IMM GRANULOCYTES NFR BLD AUTO: 4.1 % — HIGH (ref 0–1.5)
LYMPHOCYTES # BLD AUTO: 1.5 K/UL — SIGNIFICANT CHANGE UP (ref 1–3.3)
LYMPHOCYTES # BLD AUTO: 17 % — SIGNIFICANT CHANGE UP (ref 13–44)
MAGNESIUM SERPL-MCNC: 2.2 MG/DL — SIGNIFICANT CHANGE UP (ref 1.6–2.6)
MCHC RBC-ENTMCNC: 30.1 % — LOW (ref 32–36)
MCHC RBC-ENTMCNC: 31 PG — SIGNIFICANT CHANGE UP (ref 27–34)
MCV RBC AUTO: 103 FL — HIGH (ref 80–100)
MONOCYTES # BLD AUTO: 0.85 K/UL — SIGNIFICANT CHANGE UP (ref 0–0.9)
MONOCYTES NFR BLD AUTO: 9.6 % — SIGNIFICANT CHANGE UP (ref 2–14)
NEUTROPHILS # BLD AUTO: 5.99 K/UL — SIGNIFICANT CHANGE UP (ref 1.8–7.4)
NEUTROPHILS NFR BLD AUTO: 67.9 % — SIGNIFICANT CHANGE UP (ref 43–77)
NRBC # FLD: 0.03 K/UL — SIGNIFICANT CHANGE UP (ref 0–0)
PHOSPHATE SERPL-MCNC: 6.4 MG/DL — HIGH (ref 2.5–4.5)
PLATELET # BLD AUTO: 267 K/UL — SIGNIFICANT CHANGE UP (ref 150–400)
PMV BLD: 10.2 FL — SIGNIFICANT CHANGE UP (ref 7–13)
POTASSIUM SERPL-MCNC: 4.6 MMOL/L — SIGNIFICANT CHANGE UP (ref 3.5–5.3)
POTASSIUM SERPL-SCNC: 4.6 MMOL/L — SIGNIFICANT CHANGE UP (ref 3.5–5.3)
PROT SERPL-MCNC: 6.1 G/DL — SIGNIFICANT CHANGE UP (ref 6–8.3)
RBC # BLD: 2.32 M/UL — LOW (ref 4.2–5.8)
RBC # FLD: 16.5 % — HIGH (ref 10.3–14.5)
SODIUM SERPL-SCNC: 140 MMOL/L — SIGNIFICANT CHANGE UP (ref 135–145)
WBC # BLD: 8.82 K/UL — SIGNIFICANT CHANGE UP (ref 3.8–10.5)
WBC # FLD AUTO: 8.82 K/UL — SIGNIFICANT CHANGE UP (ref 3.8–10.5)

## 2019-07-15 PROCEDURE — 99232 SBSQ HOSP IP/OBS MODERATE 35: CPT | Mod: GC

## 2019-07-15 PROCEDURE — 99233 SBSQ HOSP IP/OBS HIGH 50: CPT | Mod: GC

## 2019-07-15 RX ORDER — CHLORHEXIDINE GLUCONATE 213 G/1000ML
1 SOLUTION TOPICAL
Refills: 0 | Status: DISCONTINUED | OUTPATIENT
Start: 2019-07-15 | End: 2019-07-19

## 2019-07-15 RX ADMIN — AMLODIPINE BESYLATE 10 MILLIGRAM(S): 2.5 TABLET ORAL at 06:05

## 2019-07-15 RX ADMIN — Medication 650 MILLIGRAM(S): at 06:05

## 2019-07-15 RX ADMIN — Medication 100 MILLIGRAM(S): at 15:43

## 2019-07-15 RX ADMIN — CARVEDILOL PHOSPHATE 12.5 MILLIGRAM(S): 80 CAPSULE, EXTENDED RELEASE ORAL at 06:05

## 2019-07-15 RX ADMIN — CHLORHEXIDINE GLUCONATE 1 APPLICATION(S): 213 SOLUTION TOPICAL at 10:00

## 2019-07-15 RX ADMIN — HEPARIN SODIUM 5000 UNIT(S): 5000 INJECTION INTRAVENOUS; SUBCUTANEOUS at 06:06

## 2019-07-15 RX ADMIN — GABAPENTIN 100 MILLIGRAM(S): 400 CAPSULE ORAL at 15:43

## 2019-07-15 RX ADMIN — Medication 325 MILLIGRAM(S): at 17:57

## 2019-07-15 RX ADMIN — HEPARIN SODIUM 5000 UNIT(S): 5000 INJECTION INTRAVENOUS; SUBCUTANEOUS at 17:57

## 2019-07-15 RX ADMIN — OLANZAPINE 20 MILLIGRAM(S): 15 TABLET, FILM COATED ORAL at 21:40

## 2019-07-15 RX ADMIN — Medication 650 MILLIGRAM(S): at 17:57

## 2019-07-15 RX ADMIN — GABAPENTIN 100 MILLIGRAM(S): 400 CAPSULE ORAL at 21:40

## 2019-07-15 RX ADMIN — DIVALPROEX SODIUM 500 MILLIGRAM(S): 500 TABLET, DELAYED RELEASE ORAL at 21:40

## 2019-07-15 RX ADMIN — CILOSTAZOL 50 MILLIGRAM(S): 100 TABLET ORAL at 17:57

## 2019-07-15 RX ADMIN — DIVALPROEX SODIUM 500 MILLIGRAM(S): 500 TABLET, DELAYED RELEASE ORAL at 06:05

## 2019-07-15 RX ADMIN — GABAPENTIN 100 MILLIGRAM(S): 400 CAPSULE ORAL at 06:05

## 2019-07-15 RX ADMIN — CARVEDILOL PHOSPHATE 12.5 MILLIGRAM(S): 80 CAPSULE, EXTENDED RELEASE ORAL at 17:57

## 2019-07-15 RX ADMIN — Medication 50 MILLIGRAM(S): at 06:06

## 2019-07-15 RX ADMIN — DIVALPROEX SODIUM 500 MILLIGRAM(S): 500 TABLET, DELAYED RELEASE ORAL at 15:44

## 2019-07-15 RX ADMIN — CILOSTAZOL 50 MILLIGRAM(S): 100 TABLET ORAL at 06:05

## 2019-07-15 RX ADMIN — Medication 50 MILLIGRAM(S): at 17:57

## 2019-07-15 NOTE — PROGRESS NOTE ADULT - ASSESSMENT
Assessment/Plan:  54y Male now POD#0 s/p left arm fistulogram, ligation of accessory vein, exchange of Shiley for RIJ permacath    - Pain control  - Regular diet  - DVT ppx: Lovenox  - Out of bed and encourage early ambulation  - Incentive spirometry    Dispo: medicine floor    Vascular Surgery  [phone]

## 2019-07-15 NOTE — PROGRESS NOTE ADULT - SUBJECTIVE AND OBJECTIVE BOX
[facility] DIVISION OF KIDNEY DISEASES AND HYPERTENSION -- FOLLOW UP NOTE  --------------------------------------------------------------------------------  HPI: 53 M with HTN, bipolar disorder, DM2, ESRD not yet on HD, anemia, was brought in by EMS for generalized weakness. Nephrology was consulted for uremic encephalopathy. Patient reported having increasing weakness and lethargy at home. Follows with a Nephrologist - Dr. Diaz and had an AV fistula placed 2 months ago by Dr. Agrawal in anticipation for HD.  [facility] reviewed, last sCr prior to admission was 7.25 and BUN of 94 on 5/3/2019. On admission, sCr is 8.78 and BUN is 124. Attempted to have HD through previously placed AV fistula but patient had infiltrates while cannulating. Had non tunneled HD catheter placed in [facility] on 7/13/2019 and had 1 dialysis session. Patient went for AV fistula revision on 7/13/2019 and had a [facility] tunneled HD catheter placed as well.    Patient evaluated at bedside, sleeping comfortably. Denies any new complaints at this time.    PAST HISTORY  --------------------------------------------------------------------------------  No significant changes to PMH, PSH, FHx, SHx, unless otherwise noted    ALLERGIES & MEDICATIONS  --------------------------------------------------------------------------------  Allergies    No Known Allergies    Intolerances    Standing Inpatient Medications  amLODIPine   Tablet 10 milliGRAM(s) Oral daily  carvedilol 12.5 milliGRAM(s) Oral every 12 hours  cilostazol 50 milliGRAM(s) Oral two times a day  dextrose 5% + sodium chloride 0.45%. 1000 milliLiter(s) IV Continuous <Continuous>  dextrose 5%. 1000 milliLiter(s) IV Continuous <Continuous>  dextrose 50% Injectable 12.5 Gram(s) IV Push once  dextrose 50% Injectable 25 Gram(s) IV Push once  dextrose 50% Injectable 25 Gram(s) IV Push once  diVALproex  milliGRAM(s) Oral three times a day  ferrous    sulfate 325 milliGRAM(s) Oral daily  gabapentin 100 milliGRAM(s) Oral three times a day  heparin  Injectable 5000 Unit(s) SubCutaneous every 12 hours  hydrALAZINE 50 milliGRAM(s) Oral two times a day  insulin lispro (HumaLOG) corrective regimen sliding scale   SubCutaneous three times a day before meals  insulin lispro (HumaLOG) corrective regimen sliding scale   SubCutaneous at bedtime  OLANZapine 20 milliGRAM(s) Oral at bedtime  sodium bicarbonate 650 milliGRAM(s) Oral two times a day  traZODone 100 milliGRAM(s) Oral daily    REVIEW OF SYSTEMS  --------------------------------------------------------------------------------  Gen: (+) drowsy  Skin: No rashes  Respiratory: No dyspnea, cough  CV: No chest pain  GI: No abdominal pain, diarrhea  : No dysuria, hematuria  MSK: No  edema    All other systems were reviewed and are negative, except as noted.    VITALS/PHYSICAL EXAM  --------------------------------------------------------------------------------  T(C): 36.4 (07-15-19 @ 05:14), Max: 36.8 (07-15-19 @ 01:45)  HR: 64 (07-15-19 @ 05:14) (48 - 64)  BP: 153/106 (07-15-19 @ 05:14) (110/65 - 153/106)  RR: 17 (07-15-19 @ 05:14) (9 - 18)  SpO2: 93% (07-15-19 @ 05:14) (93% - 100%)  Wt(kg): --  Height (cm): 177.8 (07-14-19 @ 08:40)  Weight (kg): 75.6 (07-14-19 @ 08:40)  BMI (kg/m2): 23.9 (07-14-19 @ 08:40)  BSA (m2): 1.93 (07-14-19 @ 08:40)        Physical Exam:  	Gen: NAD, still drowsy  	HEENT: MMM  	Pulm: CTA B/L  	CV: S1S2  	Abd: Soft, +BS   	Ext: No LE edema B/L  	Neuro: Awake  	Skin: Warm and dry  	Vascular access: LUE AV fistula with bruit, overlying skin intact, no signs of bleeding. RIJ tunneled HD catheter, no hematoma or signs of bleeding    LABS/STUDIES  --------------------------------------------------------------------------------              7.1    10.97 >-----------<  303      [07-14-19 @ 02:43]              23.0     137  |  105  |  101  ----------------------------<  138      [07-14-19 @ 02:43]  4.3   |  17  |  7.12        Ca     7.8     [07-14-19 @ 02:43]      Mg     2.3     [07-14-19 @ 02:43]      Phos  5.5     [07-14-19 @ 02:43]    TPro  5.8  /  Alb  2.5  /  TBili  < 0.2  /  DBili  x   /  AST  25  /  ALT  22  /  AlkPhos  56  [07-14-19 @ 02:43]    PT/INR: PT 9.8  , INR 0.89       [07-14-19 @ 02:43]  PTT: 28.1       [07-14-19 @ 02:43]    Creatinine Trend:  SCr 7.12 [07-14 @ 02:43]  SCr 8.47 [07-13 @ 07:09]  SCr 8.78 [07-12 @ 20:05] [facility] DIVISION OF KIDNEY DISEASES AND HYPERTENSION -- FOLLOW UP NOTE  --------------------------------------------------------------------------------  HPI: 53 M with HTN, bipolar disorder, DM2, ESRD not yet on HD, anemia, was brought in by EMS for generalized weakness. Nephrology was consulted for uremic encephalopathy. Patient reported having increasing weakness and lethargy at home. Follows with nephrologist Dr. Diaz as outpatient and had an AV fistula placed 2 months ago by Dr. Agrawal in anticipation for HD.  [facility] reviewed, last Scr prior to admission was 7.25 and BUN of 94 on 5/3/2019. On admission, Scr is 8.78 and BUN is 124. Attempted to have HD through previously placed AV fistula but AVF infiltrated during cannulation. Had RIJ non tunneled HD catheter placed on 7/13/2019 and was initiated on HD. Patient subsequently underwent AVF revision and RIJ tunneled HD catheter placement on 7/14/19.    Patient evaluated at bedside, sleeping comfortably. Denies any new complaints at this time.    PAST HISTORY  --------------------------------------------------------------------------------  No significant changes to PMH, PSH, FHx, SHx, unless otherwise noted    ALLERGIES & MEDICATIONS  --------------------------------------------------------------------------------  Allergies    No Known Allergies    Intolerances    Standing Inpatient Medications  amLODIPine   Tablet 10 milliGRAM(s) Oral daily  carvedilol 12.5 milliGRAM(s) Oral every 12 hours  cilostazol 50 milliGRAM(s) Oral two times a day  dextrose 5% + sodium chloride 0.45%. 1000 milliLiter(s) IV Continuous <Continuous>  dextrose 5%. 1000 milliLiter(s) IV Continuous <Continuous>  dextrose 50% Injectable 12.5 Gram(s) IV Push once  dextrose 50% Injectable 25 Gram(s) IV Push once  dextrose 50% Injectable 25 Gram(s) IV Push once  diVALproex  milliGRAM(s) Oral three times a day  ferrous    sulfate 325 milliGRAM(s) Oral daily  gabapentin 100 milliGRAM(s) Oral three times a day  heparin  Injectable 5000 Unit(s) SubCutaneous every 12 hours  hydrALAZINE 50 milliGRAM(s) Oral two times a day  insulin lispro (HumaLOG) corrective regimen sliding scale   SubCutaneous three times a day before meals  insulin lispro (HumaLOG) corrective regimen sliding scale   SubCutaneous at bedtime  OLANZapine 20 milliGRAM(s) Oral at bedtime  sodium bicarbonate 650 milliGRAM(s) Oral two times a day  traZODone 100 milliGRAM(s) Oral daily    REVIEW OF SYSTEMS  --------------------------------------------------------------------------------  Gen: (+) drowsy  Respiratory: No dyspnea  CV: No chest pain  GI: No abdominal pain  MSK: No  edema    All other systems were reviewed and are negative, except as noted.    VITALS/PHYSICAL EXAM  --------------------------------------------------------------------------------  T(C): 36.4 (07-15-19 @ 05:14), Max: 36.8 (07-15-19 @ 01:45)  HR: 64 (07-15-19 @ 05:14) (48 - 64)  BP: 153/106 (07-15-19 @ 05:14) (110/65 - 153/106)  RR: 17 (07-15-19 @ 05:14) (9 - 18)  SpO2: 93% (07-15-19 @ 05:14) (93% - 100%)  Wt(kg): --  Height (cm): 177.8 (07-14-19 @ 08:40)  Weight (kg): 75.6 (07-14-19 @ 08:40)  BMI (kg/m2): 23.9 (07-14-19 @ 08:40)  BSA (m2): 1.93 (07-14-19 @ 08:40)    Physical Exam:  	Gen: NAD, still drowsy  	HEENT: MMM, no JVD  	Pulm: CTA B/L  	CV: S1S2  	Abd: Soft, +BS   	Ext: No LE edema B/L  	Neuro: Awake  	Skin: Warm and dry  	Vascular access: LUE AV fistula with bruit, overlying skin intact, no signs of bleeding. RIJ tunneled HD catheter site: no bleeding    LABS/STUDIES  --------------------------------------------------------------------------------              7.1    10.97 >-----------<  303      [07-14-19 @ 02:43]              23.0     137  |  105  |  101  ----------------------------<  138      [07-14-19 @ 02:43]  4.3   |  17  |  7.12        Ca     7.8     [07-14-19 @ 02:43]      Mg     2.3     [07-14-19 @ 02:43]      Phos  5.5     [07-14-19 @ 02:43]    TPro  5.8  /  Alb  2.5  /  TBili  < 0.2  /  DBili  x   /  AST  25  /  ALT  22  /  AlkPhos  56  [07-14-19 @ 02:43]    Creatinine Trend:  SCr 7.12 [07-14 @ 02:43]  SCr 8.47 [07-13 @ 07:09]  SCr 8.78 [07-12 @ 20:05]

## 2019-07-15 NOTE — PROGRESS NOTE ADULT - PROBLEM SELECTOR PLAN 4
Pt. with hyperphosphatemia in setting of ESRD. Low phosphorus diet advised. Monitor serum phosphorus. Pt. with hyperphosphatemia in setting of ESRD. Low phosphorus diet advised. Monitor serum phosphorus

## 2019-07-15 NOTE — PROGRESS NOTE ADULT - SUBJECTIVE AND OBJECTIVE BOX
Patient is a 54y old  Male who presents with a chief complaint of Generalized weakness (15 Jul 2019 01:02)      INTERVAL HPI/OVERNIGHT EVENTS:  Pt had AV fistula revision and RIJ permacath placement yesterday. Also was bradycardic to HR 48, coreg and hydralazine were held. This morning ___      MEDICATIONS  (STANDING):  amLODIPine   Tablet 10 milliGRAM(s) Oral daily  carvedilol 12.5 milliGRAM(s) Oral every 12 hours  cilostazol 50 milliGRAM(s) Oral two times a day  dextrose 5% + sodium chloride 0.45%. 1000 milliLiter(s) (75 mL/Hr) IV Continuous <Continuous>  dextrose 5%. 1000 milliLiter(s) (50 mL/Hr) IV Continuous <Continuous>  dextrose 50% Injectable 12.5 Gram(s) IV Push once  dextrose 50% Injectable 25 Gram(s) IV Push once  dextrose 50% Injectable 25 Gram(s) IV Push once  diVALproex  milliGRAM(s) Oral three times a day  ferrous    sulfate 325 milliGRAM(s) Oral daily  gabapentin 100 milliGRAM(s) Oral three times a day  heparin  Injectable 5000 Unit(s) SubCutaneous every 12 hours  hydrALAZINE 50 milliGRAM(s) Oral two times a day  insulin lispro (HumaLOG) corrective regimen sliding scale   SubCutaneous three times a day before meals  insulin lispro (HumaLOG) corrective regimen sliding scale   SubCutaneous at bedtime  OLANZapine 20 milliGRAM(s) Oral at bedtime  sodium bicarbonate 650 milliGRAM(s) Oral two times a day  traZODone 100 milliGRAM(s) Oral daily    MEDICATIONS  (PRN):  dextrose 40% Gel 15 Gram(s) Oral once PRN Blood Glucose LESS THAN 70 milliGRAM(s)/deciliter  glucagon  Injectable 1 milliGRAM(s) IntraMuscular once PRN Glucose LESS THAN 70 milligrams/deciliter  ondansetron    Tablet 4 milliGRAM(s) Oral every 6 hours PRN Nausea and/or Vomiting      Allergies    No Known Allergies        Vital Signs Last 24 Hrs  T(C): 36.4 (15 Jul 2019 05:14), Max: 36.8 (15 Jul 2019 01:45)  T(F): 97.6 (15 Jul 2019 05:14), Max: 98.3 (15 Jul 2019 01:45)  HR: 64 (15 Jul 2019 05:14) (48 - 64)  BP: 153/106 (15 Jul 2019 05:14) (110/65 - 153/106)  BP(mean): 70 (15 Jul 2019 00:00) (65 - 83)  RR: 17 (15 Jul 2019 05:14) (9 - 18)  SpO2: 93% (15 Jul 2019 05:14) (93% - 100%)    PHYSICAL EXAM:  GENERAL: NAD.  CHEST/LUNG: Clear to auscultation; No rales, rhonchi, or wheezing.  Respiratory effort does not appear labored.  HEART: Regular rate and rhythm; S1 and S2,  no murmurs, rubs, or gallops.  ABDOMEN: Soft, not tender to palpation.  No masses or HSM appreciated.  No distension.  Bowel sounds present.  EXTREMITIES:  No clubbing, cyanosis, or edema.  Moves all extremities with strength 5/5.  SKIN: No obvious rashes or lesions.  Turgor okay.  NEURO:  Alert and oriented x 3, no focal sensory or  motor deficit, DTR 2+ bilaterally.    LABS: Patient is a 54y old  Male who presents with a chief complaint of Generalized weakness (15 Jul 2019 01:02)      INTERVAL HPI/OVERNIGHT EVENTS:  Pt had AV fistula revision and RIJ permacath placement yesterday. Also was bradycardic to HR 48, coreg and hydralazine were held. This morning pt is A&Ox2 (to self and place) - improved from yesterday. Denies pain, dizziness, nausea, SOB.       MEDICATIONS  (STANDING):  amLODIPine   Tablet 10 milliGRAM(s) Oral daily  carvedilol 12.5 milliGRAM(s) Oral every 12 hours  cilostazol 50 milliGRAM(s) Oral two times a day  dextrose 5% + sodium chloride 0.45%. 1000 milliLiter(s) (75 mL/Hr) IV Continuous <Continuous>  dextrose 5%. 1000 milliLiter(s) (50 mL/Hr) IV Continuous <Continuous>  dextrose 50% Injectable 12.5 Gram(s) IV Push once  dextrose 50% Injectable 25 Gram(s) IV Push once  dextrose 50% Injectable 25 Gram(s) IV Push once  diVALproex  milliGRAM(s) Oral three times a day  ferrous    sulfate 325 milliGRAM(s) Oral daily  gabapentin 100 milliGRAM(s) Oral three times a day  heparin  Injectable 5000 Unit(s) SubCutaneous every 12 hours  hydrALAZINE 50 milliGRAM(s) Oral two times a day  insulin lispro (HumaLOG) corrective regimen sliding scale   SubCutaneous three times a day before meals  insulin lispro (HumaLOG) corrective regimen sliding scale   SubCutaneous at bedtime  OLANZapine 20 milliGRAM(s) Oral at bedtime  sodium bicarbonate 650 milliGRAM(s) Oral two times a day  traZODone 100 milliGRAM(s) Oral daily    MEDICATIONS  (PRN):  dextrose 40% Gel 15 Gram(s) Oral once PRN Blood Glucose LESS THAN 70 milliGRAM(s)/deciliter  glucagon  Injectable 1 milliGRAM(s) IntraMuscular once PRN Glucose LESS THAN 70 milligrams/deciliter  ondansetron    Tablet 4 milliGRAM(s) Oral every 6 hours PRN Nausea and/or Vomiting      Allergies    No Known Allergies        Vital Signs Last 24 Hrs  T(C): 36.4 (15 Jul 2019 05:14), Max: 36.8 (15 Jul 2019 01:45)  T(F): 97.6 (15 Jul 2019 05:14), Max: 98.3 (15 Jul 2019 01:45)  HR: 64 (15 Jul 2019 05:14) (48 - 64)  BP: 153/106 (15 Jul 2019 05:14) (110/65 - 153/106)  BP(mean): 70 (15 Jul 2019 00:00) (65 - 83)  RR: 17 (15 Jul 2019 05:14) (9 - 18)  SpO2: 93% (15 Jul 2019 05:14) (93% - 100%)    PHYSICAL EXAM:  GENERAL: Awake and less sleepy than initial presentation, eyes closed throughout interaction (unchanged from yesterday)  HEENT: PERRL, erythema of conjunctiva. RIJ permacath in place, site dry and clean.   CHEST/LUNG: Clear to auscultation; No rales, rhonchi, or wheezing.  Respiratory effort does not appear labored.  HEART: Regular rate and rhythm; S1 and S2,  no murmurs, rubs, or gallops.  ABDOMEN: Soft, not tender to palpation.  No masses or HSM appreciated.  No distension.  Bowel sounds present.  EXTREMITIES: +asterixis, L AVFistula revision site with dried blood stained gauze. WWP, moving all extremities   NEURO:  Alert and oriented x 2, improved from yesterday    LABS:	 	    CBC Full  -  ( 15 Jul 2019 05:50 )  WBC Count : 8.82 K/uL  Hemoglobin : 7.2 g/dL (stable from 7.1)  Hematocrit : 23.9 % (stable from 23.0)  Platelet Count - Automated : 267 K/uL  Mean Cell Volume : 103.0 fL  Mean Cell Hemoglobin : 31.0 pg  Mean Cell Hemoglobin Concentration : 30.1 %  Auto Neutrophil # : 5.99 K/uL  Auto Lymphocyte # : 1.50 K/uL  Auto Monocyte # : 0.85 K/uL  Auto Eosinophil # : 0.07 K/uL  Auto Basophil # : 0.05 K/uL  Auto Neutrophil % : 67.9 %  Auto Lymphocyte % : 17.0 %  Auto Monocyte % : 9.6 %  Auto Eosinophil % : 0.8 %  Auto Basophil % : 0.6 %    07-15    140  |  107  |  97<H>  ----------------------------<  62<L>  4.6   |  19<L>  |  7.38<H>  07-14    BUN: 126 ->101->97  Creatinine: 8.47 -> 7.12 -> 7.38      Ca    9.0 corrected      15 Jul 2019 05:50  Phos  6.4  (from 5.5)  Mg     2.2          TPro  6.1  /  Alb  2.6<L>  /  TBili  < 0.2<L>  /  DBili  x   /  AST  23  /  ALT  20  /  AlkPhos  56  07-15 Patient is a 54y old  Male who presents with a chief complaint of Generalized weakness (15 Jul 2019 01:02)      INTERVAL HPI/OVERNIGHT EVENTS:  Pt had AV fistula revision and RIJ permacath placement yesterday. Also was bradycardic to HR 48, coreg and hydralazine were held. This morning pt is A&Ox2 (to self and place) - improved from yesterday. Denies pain, dizziness, nausea, SOB.       MEDICATIONS  (STANDING):  amLODIPine   Tablet 10 milliGRAM(s) Oral daily  carvedilol 12.5 milliGRAM(s) Oral every 12 hours  cilostazol 50 milliGRAM(s) Oral two times a day  dextrose 5% + sodium chloride 0.45%. 1000 milliLiter(s) (75 mL/Hr) IV Continuous <Continuous>  dextrose 5%. 1000 milliLiter(s) (50 mL/Hr) IV Continuous <Continuous>  dextrose 50% Injectable 12.5 Gram(s) IV Push once  dextrose 50% Injectable 25 Gram(s) IV Push once  dextrose 50% Injectable 25 Gram(s) IV Push once  diVALproex  milliGRAM(s) Oral three times a day  ferrous    sulfate 325 milliGRAM(s) Oral daily  gabapentin 100 milliGRAM(s) Oral three times a day  heparin  Injectable 5000 Unit(s) SubCutaneous every 12 hours  hydrALAZINE 50 milliGRAM(s) Oral two times a day  insulin lispro (HumaLOG) corrective regimen sliding scale   SubCutaneous three times a day before meals  insulin lispro (HumaLOG) corrective regimen sliding scale   SubCutaneous at bedtime  OLANZapine 20 milliGRAM(s) Oral at bedtime  sodium bicarbonate 650 milliGRAM(s) Oral two times a day  traZODone 100 milliGRAM(s) Oral daily    MEDICATIONS  (PRN):  dextrose 40% Gel 15 Gram(s) Oral once PRN Blood Glucose LESS THAN 70 milliGRAM(s)/deciliter  glucagon  Injectable 1 milliGRAM(s) IntraMuscular once PRN Glucose LESS THAN 70 milligrams/deciliter  ondansetron    Tablet 4 milliGRAM(s) Oral every 6 hours PRN Nausea and/or Vomiting      Allergies    No Known Allergies        Vital Signs Last 24 Hrs  T(C): 36.4 (15 Jul 2019 05:14), Max: 36.8 (15 Jul 2019 01:45)  T(F): 97.6 (15 Jul 2019 05:14), Max: 98.3 (15 Jul 2019 01:45)  HR: 64 (15 Jul 2019 05:14) (48 - 64)  BP: 153/106 (15 Jul 2019 05:14) (110/65 - 153/106)  BP(mean): 70 (15 Jul 2019 00:00) (65 - 83)  RR: 17 (15 Jul 2019 05:14) (9 - 18)  SpO2: 93% (15 Jul 2019 05:14) (93% - 100%)    PHYSICAL EXAM:  GENERAL: Awake and less sleepy than initial presentation, eyes closed throughout interaction (unchanged from yesterday)  HEENT: PERRL, erythema of conjunctiva. RIJ permacath in place, site dry and clean.   CHEST/LUNG: Clear to auscultation; No rales, rhonchi, or wheezing.  Respiratory effort does not appear labored.  HEART: Regular rate and rhythm; S1 and S2,  no murmurs, rubs, or gallops.  ABDOMEN: Soft, not tender to palpation.  No masses or HSM appreciated.  No distension.  Bowel sounds present.  EXTREMITIES: +asterixis, L AVFistula revision site with dried blood stained gauze. WWP, moving all extremities   NEURO:  Alert and oriented x 2, improved from yesterday    LABS:	 	    CBC Full  -  ( 15 Jul 2019 05:50 )  WBC Count : 8.82 K/uL  Hemoglobin : 7.2 g/dL (stable from 7.1)  Hematocrit : 23.9 % (stable from 23.0)  Platelet Count - Automated : 267 K/uL  Mean Cell Volume : 103.0 fL  Mean Cell Hemoglobin : 31.0 pg  Mean Cell Hemoglobin Concentration : 30.1 %      140  |  107  |  97<H>  ----------------------------<  62<L>  4.6   |  19<L>  |  7.38<H>      BUN: 126 ->101->97  Creatinine: 8.47 -> 7.12 -> 7.38      Ca    9.0 corrected      15 Jul 2019 05:50  Phos  6.4  (from 5.5)  Mg     2.2          TPro  6.1  /  Alb  2.6<L>  /  TBili  < 0.2<L>  /  DBili  x   /  AST  23  /  ALT  20  /  AlkPhos  56  07-15

## 2019-07-15 NOTE — PROGRESS NOTE ADULT - PROBLEM SELECTOR PLAN 4
hyperphosphatemia i/s/o of ESRD, with secondary hyperparathyroidism. Levels improved post-HD.  - renal-low-phos diet  - trend phos level Patient with pH of 7.19, likely 2/2 uremia.   - urgent HD as above  - c/w sodium bicarb

## 2019-07-15 NOTE — PROGRESS NOTE ADULT - PROBLEM SELECTOR PLAN 2
Patient with pH of 7.19, likely 2/2 uremia.   - urgent HD as above  - c/w sodium bicarb Pt has worsening ESRD, creatinine 8.47 from 6.87 (3/2019) and hyperphosphatemia. Patient had AV fistula place in 4/2019, AV fistula revision 7/14 and RIJ permacath placement 7/14.   - nephrology and vascular following, recs appreciated    - HD today  - s/p HD x1 (7/13)

## 2019-07-15 NOTE — PROGRESS NOTE ADULT - ASSESSMENT
54yo M with HTN, bipolar disorder, DM2, ESRD s/p AV fistula 4/2019 not yet on HD , presents with generalized weakness and somnolence, admitted for uremic encephalopathy and metabolic acidosis, s/p HD x1 (7/13), AV fistula revision and RIJ permacath (7/14). 52yo M with HTN, bipolar disorder, DM2, ESRD s/p AV fistula 4/2019, presents with generalized weakness and somnolence, admitted for uremic encephalopathy and metabolic acidosis, s/p HD x1 (7/13), AV fistula revision and RIJ permacath (7/14). 54yo M with HTN, bipolar disorder, DM2, ESRD s/p AV fistula 4/2019, presents with generalized weakness and somnolence, admitted for uremic encephalopathy and metabolic acidosis, s/p AV fistula revision and RIJ permacath placement (7/14) , HD x2 (7/13, 7/15).

## 2019-07-15 NOTE — PROGRESS NOTE ADULT - PROBLEM SELECTOR PLAN 3
Patient with anemia in the setting of ESRD. Hemoglobin below target range. Iron studies consistent with AOCD. Monitor hemoglobin. Transfuse PRN. Patient with anemia in the setting of ESRD. Hemoglobin below target range. Hemoglobin low/stable at 7.2 today. Consider blood transfusion. Monitor hemoglobin

## 2019-07-15 NOTE — PROGRESS NOTE ADULT - SUBJECTIVE AND OBJECTIVE BOX
SURGERY POST-OP NOTE:    S: Patient underwent and tolerated procedure without issue and sent to PACU. Patient denies chest pain, shortness of breath, nausea, vomiting, lightheadedness, or dizziness. Pain was well controlled.      O:  T(C): 36.4 (07-15-19 @ 01:00), Max: 36.4 (07-15-19 @ 01:00)  HR: 58 (07-15-19 @ 01:00) (48 - 59)  BP: 129/52 (07-15-19 @ 01:00) (110/65 - 140/62)  RR: 13 (07-15-19 @ 01:00) (9 - 16)  SpO2: 94% (07-15-19 @ 01:00) (94% - 100%)  Wt(kg): --                        7.1    10.97 )-----------( 303      ( 14 Jul 2019 02:43 )             23.0        07-14    137  |  105  |  101<H>  ----------------------------<  138<H>  4.3   |  17<L>  |  7.12<H>    Ca    7.8<L>      14 Jul 2019 02:43  Phos  5.5     07-14  Mg     2.3     07-14    CXR: RIJ in place, no PTX    Gen: NAD, resting in bed, alert and responding appropriately  Resp: Non-labored respirations  Abd: Soft, nontender, nondistended  Ext:   Vasc: SURGERY POST-OP NOTE:    S: Patient underwent and tolerated procedure without issue and sent to PACU. Patient denies chest pain, shortness of breath, nausea, vomiting, lightheadedness, or dizziness. Pain was well controlled.      O:  T(C): 36.4 (07-15-19 @ 01:00), Max: 36.4 (07-15-19 @ 01:00)  HR: 58 (07-15-19 @ 01:00) (48 - 59)  BP: 129/52 (07-15-19 @ 01:00) (110/65 - 140/62)  RR: 13 (07-15-19 @ 01:00) (9 - 16)  SpO2: 94% (07-15-19 @ 01:00) (94% - 100%)  Wt(kg): --                        7.1    10.97 )-----------( 303      ( 14 Jul 2019 02:43 )             23.0        07-14    137  |  105  |  101<H>  ----------------------------<  138<H>  4.3   |  17<L>  |  7.12<H>    Ca    7.8<L>      14 Jul 2019 02:43  Phos  5.5     07-14  Mg     2.3     07-14    CXR: reviewed. RIJ in place, no PTX    Gen: NAD, resting in bed, responding appropriately, RIJ permacath in place, dressing c/d/i  CV: normal S1, s2  Resp: Non-labored respirations  Abd: Soft, nontender, nondistended  Vasc: distal UE LE warm, well perfused b/l; palpable thrill in left arm, radial pulse palpable; dressingsx3 c/d/i

## 2019-07-15 NOTE — PROGRESS NOTE ADULT - PROBLEM SELECTOR PLAN 1
Pt with uremic encephalopathy secondary to ESRD, s/p HD x1 (7/13) and AV fistula revision and RIJ permacath placement (7/14). Currently with AMS (A&Ox1, more awake), asterixis, improved BUN (101 from 124), and improved creatinine (7.12 from 8.47). Private nephrologist is Dr. Aidan Diaz ([phone]) from [facility]. Pt is at risk for dialysis disequilibrium syndrome.   - nephrology following, recs appreciated  - HD today  - Will monitor for dialysis disequilibrium syndrome (headache, nausea, blurred vision, dizziness)  - s/p AV fistula revision (7/14) and RIJ permacath placement (7/14)   - s/p HD x1 (7/13) Pt with uremic encephalopathy secondary to ESRD, s/p HD x1 (7/13) and AV fistula revision and RIJ permacath placement (7/14). Currently with improved AMS (A&Ox2, more awake), asterixis, improved BUN. Private nephrologist is Dr. Aidan Diaz ([phone]) from [facility]. Pt is at risk for dialysis disequilibrium syndrome.   - nephrology following, recs appreciated  - HD today  - Will monitor for dialysis disequilibrium syndrome (headache, nausea, blurred vision, dizziness)  - s/p AV fistula revision (7/14) and RIJ permacath placement (7/14)   - s/p HD x1 (7/13) Pt with uremic encephalopathy secondary to ESRD, s/p HD x2 (7/13, 7/15) and AV fistula revision and RIJ permacath placement (7/14). Currently with improved AMS (A&Ox2, more awake), asterixis, improved BUN. Private nephrologist is Dr. Aidan Diaz ([phone]) from [facility]. Pt is at risk for dialysis disequilibrium syndrome.   - nephrology following, recs appreciated  - HD today  - Will monitor for dialysis disequilibrium syndrome (headache, nausea, blurred vision, dizziness)  - s/p AV fistula revision (7/14) and RIJ permacath placement (7/14)   - s/p HD x2 (7/13, 7/15)

## 2019-07-15 NOTE — PROGRESS NOTE ADULT - PROBLEM SELECTOR PLAN 6
hypertensive last night as BP meds were held for HD. BP controlled with meds.   - Low sodium diet  - Continue home hydralazine, amlodipine, carvedilol

## 2019-07-15 NOTE — PROGRESS NOTE ADULT - PROBLEM SELECTOR PLAN 1
Patient with uremic encephalopathy secondary to ESRD. [facility] reviewed, sCr and BUN on 5/3/2019 was 7.25 and 94 respectively. On admission BUN is 124 and sCr 8.47. Went for 1st dialysis on 7/13/2019 through RIJ non tunneled HD catheter, tolerated well. BUN post HD is 101. Patient had AV fistula placed 2 months ago in anticipation of hemodialysis, attempted to cannulate but had infiltrates. Patient went for revision of AV fistula and had tunneled RIJ HD catheter placed on 7/14/2019. Plan for 2nd HD session today. Monitor labs. Pt. with advanced CKD, initiated on long-term HD on 7/13/19 for uremic encephalopathy. Pt. underwent AVF revision and RIJ tunneled HD catheter placement on 7/14/19. Plan for second HD treatment today. Monitor labs

## 2019-07-15 NOTE — PROGRESS NOTE ADULT - ASSESSMENT
54M now POD#1 s/p L arm fistulogram, ligation of accessory vein, exchange of shiley for RIJ permcath      - please proceed with dialysis  - pain ctrl PRN  - care per primary team      Ludwin Flores PGY-2  Vascular Surgery  [id number] 54M now POD#1 s/p L arm fistulogram, ligation of accessory vein, exchange of shiley for RIJ permcath      - please proceed with dialysis via right permacath   - pain ctrl PRN  - care per primary team  f/u ov in 2 weeks to elizabeth tello avf revision       Ludwin Flores PGY-2  Vascular Surgery  [phone]

## 2019-07-15 NOTE — PROGRESS NOTE ADULT - PROBLEM SELECTOR PLAN 3
Pt has worsening ESRD, creatinine 8.47 from 6.87 (3/2019) and hyperphosphatemia. Patient had AV fistula place in 4/2019, AV fistula revision 7/14 and RIJ permacath placement 7/14.   - nephrology and vascular following, recs appreciated    - HD today  - s/p HD x1 (7/13) hyperphosphatemia i/s/o of ESRD, with secondary hyperparathyroidism.   - renal-low-phos diet  - trend phos level  - HD today hyperphosphatemia i/s/o of ESRD, with secondary hyperparathyroidism.   - renal-low-phos diet  - trend phos level  - HD today  - per renal, no need for phosphate binder at this moment

## 2019-07-15 NOTE — PROGRESS NOTE ADULT - PROBLEM SELECTOR PLAN 2
BP at target range. Monitor BP on current BP medication. Low salt diet.  No UF with HD today. BP elevated. Plan for HD today. Low salt diet. Monitor BP

## 2019-07-15 NOTE — PROGRESS NOTE ADULT - SUBJECTIVE AND OBJECTIVE BOX
Vascular Surgery Progress Note     S: Pt seen and examined at bedside during AM rounds  - s/p AVF fistulogram, cutdown and ligation of accessory branches, RIJ permcath placement  - Pt states that pain is minimal  - denies f/c, n/v, CP, abd pain, SOB, lightheadedness.     O:    ***PHYSICAL EXAM***    Gen: NAD, laying in bed  HEENT: NC/AT  RESP: nonlabored breathing  EXT: LUE w/ weak thrill noted in the AVF site, L radial and ulnar pulses palpable, warm to touch.     MEDICATIONS  (STANDING):  amLODIPine   Tablet 10 milliGRAM(s) Oral daily  carvedilol 12.5 milliGRAM(s) Oral every 12 hours  cilostazol 50 milliGRAM(s) Oral two times a day  dextrose 5%. 1000 milliLiter(s) (50 mL/Hr) IV Continuous <Continuous>  dextrose 50% Injectable 12.5 Gram(s) IV Push once  dextrose 50% Injectable 25 Gram(s) IV Push once  dextrose 50% Injectable 25 Gram(s) IV Push once  diVALproex  milliGRAM(s) Oral three times a day  ferrous    sulfate 325 milliGRAM(s) Oral daily  gabapentin 100 milliGRAM(s) Oral three times a day  heparin  Injectable 5000 Unit(s) SubCutaneous every 12 hours  hydrALAZINE 50 milliGRAM(s) Oral two times a day  insulin lispro (HumaLOG) corrective regimen sliding scale   SubCutaneous three times a day before meals  insulin lispro (HumaLOG) corrective regimen sliding scale   SubCutaneous at bedtime  OLANZapine 20 milliGRAM(s) Oral at bedtime  sodium bicarbonate 650 milliGRAM(s) Oral two times a day  traZODone 100 milliGRAM(s) Oral daily    MEDICATIONS  (PRN):  dextrose 40% Gel 15 Gram(s) Oral once PRN Blood Glucose LESS THAN 70 milliGRAM(s)/deciliter  glucagon  Injectable 1 milliGRAM(s) IntraMuscular once PRN Glucose LESS THAN 70 milligrams/deciliter  ondansetron    Tablet 4 milliGRAM(s) Oral every 6 hours PRN Nausea and/or Vomiting          Vital Signs Last 24 Hrs  T(C): 36.4 (15 Jul 2019 05:14), Max: 36.8 (15 Jul 2019 01:45)  T(F): 97.6 (15 Jul 2019 05:14), Max: 98.3 (15 Jul 2019 01:45)  HR: 64 (15 Jul 2019 05:14) (48 - 64)  BP: 153/106 (15 Jul 2019 05:14) (110/65 - 153/106)  BP(mean): 70 (15 Jul 2019 00:00) (65 - 83)  RR: 17 (15 Jul 2019 05:14) (9 - 18)  SpO2: 93% (15 Jul 2019 05:14) (93% - 100%)          LABS:                        7.2    8.82  )-----------( 267      ( 15 Jul 2019 05:50 )             23.9     07-14    137  |  105  |  101<H>  ----------------------------<  138<H>  4.3   |  17<L>  |  7.12<H>    Ca    7.8<L>      14 Jul 2019 02:43  Phos  5.5     07-14  Mg     2.3     07-14    TPro  5.8<L>  /  Alb  2.5<L>  /  TBili  < 0.2<L>  /  DBili  x   /  AST  25  /  ALT  22  /  AlkPhos  56  07-14 Vascular Surgery Progress Note     S: Pt seen and examined at bedside during AM rounds  - s/p AVF fistulogram, cutdown and ligation of accessory branches, RIJ permcath placement  - Pt states that pain is minimal  - denies f/c, n/v, CP, abd pain, SOB, lightheadedness.     O:    ***PHYSICAL EXAM***    Gen: NAD, laying in bed  HEENT: NC/AT  RESP: nonlabored breathing  EXT: LUE w/ thrill noted in the AVF site, L radial and ulnar pulses palpable, warm to touch.     MEDICATIONS  (STANDING):  amLODIPine   Tablet 10 milliGRAM(s) Oral daily  carvedilol 12.5 milliGRAM(s) Oral every 12 hours  cilostazol 50 milliGRAM(s) Oral two times a day  dextrose 5%. 1000 milliLiter(s) (50 mL/Hr) IV Continuous <Continuous>  dextrose 50% Injectable 12.5 Gram(s) IV Push once  dextrose 50% Injectable 25 Gram(s) IV Push once  dextrose 50% Injectable 25 Gram(s) IV Push once  diVALproex  milliGRAM(s) Oral three times a day  ferrous    sulfate 325 milliGRAM(s) Oral daily  gabapentin 100 milliGRAM(s) Oral three times a day  heparin  Injectable 5000 Unit(s) SubCutaneous every 12 hours  hydrALAZINE 50 milliGRAM(s) Oral two times a day  insulin lispro (HumaLOG) corrective regimen sliding scale   SubCutaneous three times a day before meals  insulin lispro (HumaLOG) corrective regimen sliding scale   SubCutaneous at bedtime  OLANZapine 20 milliGRAM(s) Oral at bedtime  sodium bicarbonate 650 milliGRAM(s) Oral two times a day  traZODone 100 milliGRAM(s) Oral daily    MEDICATIONS  (PRN):  dextrose 40% Gel 15 Gram(s) Oral once PRN Blood Glucose LESS THAN 70 milliGRAM(s)/deciliter  glucagon  Injectable 1 milliGRAM(s) IntraMuscular once PRN Glucose LESS THAN 70 milligrams/deciliter  ondansetron    Tablet 4 milliGRAM(s) Oral every 6 hours PRN Nausea and/or Vomiting          Vital Signs Last 24 Hrs  T(C): 36.4 (15 Jul 2019 05:14), Max: 36.8 (15 Jul 2019 01:45)  T(F): 97.6 (15 Jul 2019 05:14), Max: 98.3 (15 Jul 2019 01:45)  HR: 64 (15 Jul 2019 05:14) (48 - 64)  BP: 153/106 (15 Jul 2019 05:14) (110/65 - 153/106)  BP(mean): 70 (15 Jul 2019 00:00) (65 - 83)  RR: 17 (15 Jul 2019 05:14) (9 - 18)  SpO2: 93% (15 Jul 2019 05:14) (93% - 100%)          LABS:                        7.2    8.82  )-----------( 267      ( 15 Jul 2019 05:50 )             23.9     07-14    137  |  105  |  101<H>  ----------------------------<  138<H>  4.3   |  17<L>  |  7.12<H>    Ca    7.8<L>      14 Jul 2019 02:43  Phos  5.5     07-14  Mg     2.3     07-14    TPro  5.8<L>  /  Alb  2.5<L>  /  TBili  < 0.2<L>  /  DBili  x   /  AST  25  /  ALT  22  /  AlkPhos  56  07-14

## 2019-07-15 NOTE — PROGRESS NOTE ADULT - PROBLEM SELECTOR PLAN 5
pt with macrocytic anemia, likely from Epo injection. B12 and folate are normal. Iron studies with anemia of chronic disease. Current hbg and hct are lower than previous admission. However, no signs of active bleed.  - f/u iron studies  - transfuse for Hgb <7.0 pt with acute on chronic macrocytic anemia. B12 and folate are normal. Iron studies with anemia of chronic disease. Current hbg and hct are lower than previous admission. However, no signs of active bleed.  - Per renal, pt would qualify for epo injections  - transfuse for Hgb <7.0

## 2019-07-16 ENCOUNTER — TRANSCRIPTION ENCOUNTER (OUTPATIENT)
Age: 54
End: 2019-07-16

## 2019-07-16 DIAGNOSIS — R00.1 BRADYCARDIA, UNSPECIFIED: ICD-10-CM

## 2019-07-16 LAB
ALBUMIN SERPL ELPH-MCNC: 2.6 G/DL — LOW (ref 3.3–5)
ALP SERPL-CCNC: 58 U/L — SIGNIFICANT CHANGE UP (ref 40–120)
ALT FLD-CCNC: 14 U/L — SIGNIFICANT CHANGE UP (ref 4–41)
ANION GAP SERPL CALC-SCNC: 13 MMO/L — SIGNIFICANT CHANGE UP (ref 7–14)
ANISOCYTOSIS BLD QL: SLIGHT — SIGNIFICANT CHANGE UP
AST SERPL-CCNC: 21 U/L — SIGNIFICANT CHANGE UP (ref 4–40)
BASOPHILS # BLD AUTO: 0.04 K/UL — SIGNIFICANT CHANGE UP (ref 0–0.2)
BASOPHILS NFR BLD AUTO: 0.5 % — SIGNIFICANT CHANGE UP (ref 0–2)
BASOPHILS NFR SPEC: 0 % — SIGNIFICANT CHANGE UP (ref 0–2)
BILIRUB SERPL-MCNC: < 0.2 MG/DL — LOW (ref 0.2–1.2)
BLASTS # FLD: 0 % — SIGNIFICANT CHANGE UP (ref 0–0)
BLD GP AB SCN SERPL QL: NEGATIVE — SIGNIFICANT CHANGE UP
BUN SERPL-MCNC: 62 MG/DL — HIGH (ref 7–23)
CALCIUM SERPL-MCNC: 7.9 MG/DL — LOW (ref 8.4–10.5)
CHLORIDE SERPL-SCNC: 104 MMOL/L — SIGNIFICANT CHANGE UP (ref 98–107)
CO2 SERPL-SCNC: 23 MMOL/L — SIGNIFICANT CHANGE UP (ref 22–31)
CREAT SERPL-MCNC: 5.59 MG/DL — HIGH (ref 0.5–1.3)
EOSINOPHIL # BLD AUTO: 0.11 K/UL — SIGNIFICANT CHANGE UP (ref 0–0.5)
EOSINOPHIL NFR BLD AUTO: 1.3 % — SIGNIFICANT CHANGE UP (ref 0–6)
EOSINOPHIL NFR FLD: 1.8 % — SIGNIFICANT CHANGE UP (ref 0–6)
GLUCOSE SERPL-MCNC: 87 MG/DL — SIGNIFICANT CHANGE UP (ref 70–99)
HCT VFR BLD CALC: 22.3 % — LOW (ref 39–50)
HCT VFR BLD CALC: 28.4 % — LOW (ref 39–50)
HGB BLD-MCNC: 6.7 G/DL — CRITICAL LOW (ref 13–17)
HGB BLD-MCNC: 9.2 G/DL — LOW (ref 13–17)
IMM GRANULOCYTES NFR BLD AUTO: 4 % — HIGH (ref 0–1.5)
LYMPHOCYTES # BLD AUTO: 1.76 K/UL — SIGNIFICANT CHANGE UP (ref 1–3.3)
LYMPHOCYTES # BLD AUTO: 20.3 % — SIGNIFICANT CHANGE UP (ref 13–44)
LYMPHOCYTES NFR SPEC AUTO: 18.4 % — SIGNIFICANT CHANGE UP (ref 13–44)
MACROCYTES BLD QL: SLIGHT — SIGNIFICANT CHANGE UP
MAGNESIUM SERPL-MCNC: 2.3 MG/DL — SIGNIFICANT CHANGE UP (ref 1.6–2.6)
MCHC RBC-ENTMCNC: 30 % — LOW (ref 32–36)
MCHC RBC-ENTMCNC: 30 PG — SIGNIFICANT CHANGE UP (ref 27–34)
MCV RBC AUTO: 100 FL — SIGNIFICANT CHANGE UP (ref 80–100)
METAMYELOCYTES # FLD: 0.9 % — SIGNIFICANT CHANGE UP (ref 0–1)
MICROCYTES BLD QL: SLIGHT — SIGNIFICANT CHANGE UP
MONOCYTES # BLD AUTO: 1.02 K/UL — HIGH (ref 0–0.9)
MONOCYTES NFR BLD AUTO: 11.8 % — SIGNIFICANT CHANGE UP (ref 2–14)
MONOCYTES NFR BLD: 7 % — SIGNIFICANT CHANGE UP (ref 2–9)
MYELOCYTES NFR BLD: 0.9 % — HIGH (ref 0–0)
NEUTROPHIL AB SER-ACNC: 64.9 % — SIGNIFICANT CHANGE UP (ref 43–77)
NEUTROPHILS # BLD AUTO: 5.38 K/UL — SIGNIFICANT CHANGE UP (ref 1.8–7.4)
NEUTROPHILS NFR BLD AUTO: 62.1 % — SIGNIFICANT CHANGE UP (ref 43–77)
NEUTS BAND # BLD: 2.6 % — SIGNIFICANT CHANGE UP (ref 0–6)
NRBC # FLD: 0.02 K/UL — SIGNIFICANT CHANGE UP (ref 0–0)
OTHER - HEMATOLOGY %: 0 — SIGNIFICANT CHANGE UP
OVALOCYTES BLD QL SMEAR: SLIGHT — SIGNIFICANT CHANGE UP
PHOSPHATE SERPL-MCNC: 5.7 MG/DL — HIGH (ref 2.5–4.5)
PLATELET # BLD AUTO: 234 K/UL — SIGNIFICANT CHANGE UP (ref 150–400)
PLATELET COUNT - ESTIMATE: NORMAL — SIGNIFICANT CHANGE UP
PMV BLD: 10.2 FL — SIGNIFICANT CHANGE UP (ref 7–13)
POIKILOCYTOSIS BLD QL AUTO: SLIGHT — SIGNIFICANT CHANGE UP
POLYCHROMASIA BLD QL SMEAR: SLIGHT — SIGNIFICANT CHANGE UP
POTASSIUM SERPL-MCNC: 3.9 MMOL/L — SIGNIFICANT CHANGE UP (ref 3.5–5.3)
POTASSIUM SERPL-SCNC: 3.9 MMOL/L — SIGNIFICANT CHANGE UP (ref 3.5–5.3)
PROMYELOCYTES # FLD: 0 % — SIGNIFICANT CHANGE UP (ref 0–0)
PROT SERPL-MCNC: 5.9 G/DL — LOW (ref 6–8.3)
RBC # BLD: 2.23 M/UL — LOW (ref 4.2–5.8)
RBC # FLD: 15.9 % — HIGH (ref 10.3–14.5)
RH IG SCN BLD-IMP: POSITIVE — SIGNIFICANT CHANGE UP
SODIUM SERPL-SCNC: 140 MMOL/L — SIGNIFICANT CHANGE UP (ref 135–145)
SPECIMEN SOURCE: SIGNIFICANT CHANGE UP
VARIANT LYMPHS # BLD: 3.5 % — SIGNIFICANT CHANGE UP
WBC # BLD: 8.66 K/UL — SIGNIFICANT CHANGE UP (ref 3.8–10.5)
WBC # FLD AUTO: 8.66 K/UL — SIGNIFICANT CHANGE UP (ref 3.8–10.5)

## 2019-07-16 PROCEDURE — 90935 HEMODIALYSIS ONE EVALUATION: CPT

## 2019-07-16 PROCEDURE — 99233 SBSQ HOSP IP/OBS HIGH 50: CPT | Mod: GC

## 2019-07-16 RX ORDER — CARVEDILOL PHOSPHATE 80 MG/1
6.25 CAPSULE, EXTENDED RELEASE ORAL EVERY 12 HOURS
Refills: 0 | Status: DISCONTINUED | OUTPATIENT
Start: 2019-07-16 | End: 2019-07-16

## 2019-07-16 RX ORDER — CARVEDILOL PHOSPHATE 80 MG/1
6.25 CAPSULE, EXTENDED RELEASE ORAL EVERY 12 HOURS
Refills: 0 | Status: DISCONTINUED | OUTPATIENT
Start: 2019-07-16 | End: 2019-07-19

## 2019-07-16 RX ORDER — ERYTHROPOIETIN 10000 [IU]/ML
4000 INJECTION, SOLUTION INTRAVENOUS; SUBCUTANEOUS
Refills: 0 | Status: DISCONTINUED | OUTPATIENT
Start: 2019-07-16 | End: 2019-07-18

## 2019-07-16 RX ORDER — GABAPENTIN 400 MG/1
100 CAPSULE ORAL DAILY
Refills: 0 | Status: DISCONTINUED | OUTPATIENT
Start: 2019-07-16 | End: 2019-07-19

## 2019-07-16 RX ORDER — HYDRALAZINE HCL 50 MG
50 TABLET ORAL EVERY 8 HOURS
Refills: 0 | Status: DISCONTINUED | OUTPATIENT
Start: 2019-07-16 | End: 2019-07-19

## 2019-07-16 RX ADMIN — Medication 50 MILLIGRAM(S): at 21:35

## 2019-07-16 RX ADMIN — Medication 325 MILLIGRAM(S): at 13:12

## 2019-07-16 RX ADMIN — OLANZAPINE 20 MILLIGRAM(S): 15 TABLET, FILM COATED ORAL at 21:35

## 2019-07-16 RX ADMIN — HEPARIN SODIUM 5000 UNIT(S): 5000 INJECTION INTRAVENOUS; SUBCUTANEOUS at 17:35

## 2019-07-16 RX ADMIN — CILOSTAZOL 50 MILLIGRAM(S): 100 TABLET ORAL at 17:32

## 2019-07-16 RX ADMIN — Medication 50 MILLIGRAM(S): at 13:12

## 2019-07-16 RX ADMIN — Medication 650 MILLIGRAM(S): at 06:11

## 2019-07-16 RX ADMIN — GABAPENTIN 100 MILLIGRAM(S): 400 CAPSULE ORAL at 06:12

## 2019-07-16 RX ADMIN — CILOSTAZOL 50 MILLIGRAM(S): 100 TABLET ORAL at 06:12

## 2019-07-16 RX ADMIN — GABAPENTIN 100 MILLIGRAM(S): 400 CAPSULE ORAL at 13:12

## 2019-07-16 RX ADMIN — CHLORHEXIDINE GLUCONATE 1 APPLICATION(S): 213 SOLUTION TOPICAL at 13:12

## 2019-07-16 RX ADMIN — Medication 100 MILLIGRAM(S): at 13:12

## 2019-07-16 RX ADMIN — ERYTHROPOIETIN 4000 UNIT(S): 10000 INJECTION, SOLUTION INTRAVENOUS; SUBCUTANEOUS at 07:56

## 2019-07-16 RX ADMIN — Medication 650 MILLIGRAM(S): at 17:32

## 2019-07-16 RX ADMIN — DIVALPROEX SODIUM 500 MILLIGRAM(S): 500 TABLET, DELAYED RELEASE ORAL at 21:35

## 2019-07-16 RX ADMIN — DIVALPROEX SODIUM 500 MILLIGRAM(S): 500 TABLET, DELAYED RELEASE ORAL at 13:12

## 2019-07-16 RX ADMIN — CARVEDILOL PHOSPHATE 6.25 MILLIGRAM(S): 80 CAPSULE, EXTENDED RELEASE ORAL at 17:35

## 2019-07-16 RX ADMIN — DIVALPROEX SODIUM 500 MILLIGRAM(S): 500 TABLET, DELAYED RELEASE ORAL at 06:12

## 2019-07-16 RX ADMIN — HEPARIN SODIUM 5000 UNIT(S): 5000 INJECTION INTRAVENOUS; SUBCUTANEOUS at 06:12

## 2019-07-16 NOTE — PROGRESS NOTE ADULT - PROBLEM SELECTOR PLAN 6
hypertensive last night as BP meds were held for HD. BP controlled with meds.   - Low sodium diet  - Continue home hydralazine, amlodipine, carvedilol - Low sodium diet  - Continue home hydralazine, amlodipine, carvedilol Patient with pH of 7.19, likely 2/2 uremia.   - urgent HD as above  - c/w sodium bicarb hyperphosphatemia i/s/o of ESRD, with secondary hyperparathyroidism.   - renal-low-phos diet  - trend phos level  - HD today  - per renal, no need for phosphate binder at this moment

## 2019-07-16 NOTE — PROGRESS NOTE ADULT - ASSESSMENT
52yo M with HTN, bipolar disorder, DM2, ESRD s/p AV fistula 4/2019, presents with generalized weakness and somnolence, admitted for uremic encephalopathy and metabolic acidosis, s/p AV fistula revision and RIJ permacath placement (7/14) , HD x2 (7/13, 7/15). 52yo M with HTN, bipolar disorder, DM2, ESRD s/p AV fistula 4/2019, presents with generalized weakness and somnolence, admitted for uremic encephalopathy and metabolic acidosis, s/p AV fistula revision and RIJ permacath placement (7/14) , HD x3 (7/13, 7/15, 7/16). 54yo M with HTN, bipolar disorder, schizophrenia, DM2, ESRD s/p AV fistula 4/2019, presents with generalized weakness and somnolence, admitted for uremic encephalopathy and metabolic acidosis, s/p AV fistula revision and RIJ permacath placement (7/14) , HD x3 (7/13, 7/15, 7/16).

## 2019-07-16 NOTE — PROGRESS NOTE ADULT - PROBLEM SELECTOR PLAN 7
Patient is on tradjenta at home.  - Will start on SSI and renal diet. Patient is on tradjenta at home.  - SSI and renal diet.  - f/u A1c Patient with pH of 7.19, likely 2/2 uremia.   - urgent HD as above  - c/w sodium bicarb

## 2019-07-16 NOTE — PROGRESS NOTE ADULT - PROBLEM SELECTOR PLAN 1
Pt. tolerating his 3rd HD treatment. BP stable during HD rounds. HD catheter functioning well. Pt. with anemia, initiated on ALCIDES treatment. Pt. to receive blood transfusion during HD today as per primary medical team. Monitor BP and labs

## 2019-07-16 NOTE — DISCHARGE NOTE PROVIDER - HOSPITAL COURSE
The patient is a 54 year old man with a PMH of ESRD s/p L arm AV fistula in 4/2019, never HD, HTN, DM2, who presented for generalized weakness and somnolence.         Patient was brought to ED by EMS; he was sitting in front of a car shop, stated he felt weak. Patient's mother reported he's been having increased falls, also falling asleep easily. In ED, BUN was elevated to 124, Cr 8.78 and pH of 7.22, found to have uremic encephalopathy and metabolic acidosis. CT of the head was performed due to AMS that showed redemonstration of multiple punctate chronic appearing right sided basal ganglia infarcts extending to centrum semiovale.  Patient received first round of dialysis via shiley cath on 7/13. Patient is s/p AV fistula revision and right IJ permacath placement on 7/14. Patient has received dialysis 2x via permacath (7/15, 7/16). Patient received 1 unit of pRBC on 7/16 for Hgb of 6.7. The patient is a 54 year old man with a PMH of ESRD s/p L arm AV fistula in 4/2019 (no HD prior to admission), HTN, DM2, who presented for generalized weakness and somnolence. Patient was brought to ED by EMS; he was sitting in front of a car shop, stated he felt weak. Patient's mother reported he's been having increased falls, also falling asleep easily. In ED, BUN was elevated to 124, Cr 8.78 and pH of 7.22, found to have uremic encephalopathy and metabolic acidosis. Also found with hyperphosphatemia (??? level??), which improved with low-phos diet and HD. CT of the head was performed due to AMS that showed chronic right sided basal ganglia infarcts extending to centrum semiovale.  Patient received first round of dialysis via shiley cath on 7/13. He had AV fistula revision and right IJ permacath placement on 7/14. Patient has received dialysis 2x via permacath (7/15, 7/16). Patient received 1 unit of pRBC on 7/16 for Hgb of 6.7. He was started on Epo injections by Renal for anemia from ESRD. The patient is a 54 year old man with a PMH of ESRD s/p L arm AV fistula in 4/2019 (no HD prior to admission), HTN, DM2, who presented for generalized weakness and somnolence. In the ED, physical exam was notable for AMS (initially A&Ox1) with asterixis. Workup was notable for elevated BUN to 124, Cr 8.78 and pH of 7.22. He was found to have uremic encephalopathy, metabolic acidosis, and hyperphosphatemia (6.6), which all improved with a renal-low-phos diet and multiple sessions of HD. CT of the head was performed due to AMS showed chronic right sided basal ganglia infarcts. On medicine floor, patient received first round of dialysis via shiley cath on 7/13. He had AV fistula revision and right IJ permacath placement on 7/14. Patient also received dialysis 2x via permacath (7/15, 7/16). For his anemia, pt received 2 units of pRBC on 7/16 for Hgb of 6.7. He was started on Epo injections by Renal. Additionally, pt was hypertensive to  and bradycardic to 48. Hydralazine 50BID was increased to TID. Coreg 12.5mg BID was decreased to 6.25mg BID. The patient is a 54 year old man with a PMH of ESRD s/p L arm AV fistula in 4/2019 (no HD prior to admission), HTN, DM2, who presented for generalized weakness and somnolence. In the ED, physical exam was notable for AMS (initially A&Ox1) with asterixis. Workup was notable for elevated BUN to 124, Cr 8.78 and pH of 7.22. He was found to have uremic encephalopathy, metabolic acidosis, and hyperphosphatemia (6.6), which all improved with a renal-low-phos diet and multiple sessions of HD. CT of the head was performed due to AMS showed chronic right sided basal ganglia infarcts. On medicine floor, patient received first round of dialysis via shiley cath on 7/13. He had AV fistula revision and right IJ permacath placement on 7/14. Patient has received dialysis 3x via permacath (7/15, 7/16, 7/18). For his anemia, pt received 2 units of pRBC on 7/16 for Hgb of 6.7. He was started on Epo injections by Renal. Additionally, pt was hypertensive to  and bradycardic to 48. Hydralazine 50BID was increased to TID, Coreg 12.5mg BID was decreased to 6.25mg BID, and lisinopril added 2.5 mg oral daily. Per nephrology recommendation, patient was started on sevelamer 800mg TID. The patient is a 54 year old man with a PMH of ESRD s/p L arm AV fistula in 4/2019 (no HD prior to admission), HTN, DM2, who presented for generalized weakness and somnolence. In the ED, physical exam was notable for AMS (initially A&Ox1) with asterixis. Workup was notable for elevated BUN to 124, Cr 8.78 and pH of 7.22. He was found to have uremic encephalopathy, metabolic acidosis, and hyperphosphatemia (6.6), which all improved with a renal-low-phos diet and multiple sessions of HD (total 4 sessions). CT of the head was performed due to AMS showed chronic right sided basal ganglia infarcts. On medicine floor, patient received first round of dialysis via shiley cath on 7/13. He had AV fistula revision and right IJ permacath placement on 7/14. Patient has received dialysis 3x via permacath (7/15, 7/16, 7/18). For his anemia, pt received 2 units of pRBC on 7/16 for Hgb of 6.7. He was started on epogen injections by Renal. Additionally, pt was hypertensive to  and bradycardic to 48. Hydralazine 50BID was increased to TID, Coreg 12.5mg BID was decreased to 6.25mg BID, and lisinopril 2.5 mg PO daily was added. Per nephrology recommendation, patient was started on sevelamer 800mg TID. The patient is a 54 year old man with a PMH of ESRD s/p L arm AV fistula in 4/2019 (no HD prior to admission), HTN, DM2, who presented for generalized weakness and somnolence. In the ED, physical exam was notable for AMS (initially A&Ox1) with asterixis. Workup was notable for elevated BUN to 124, Cr 8.78 and pH of 7.22. He was found to have uremic encephalopathy, metabolic acidosis, and hyperphosphatemia (6.6), which all improved with a renal-low-phos diet and multiple sessions of HD (total 4 sessions). CT of the head was performed due to AMS showed chronic right sided basal ganglia infarcts. On medicine floor, patient received first round of dialysis via shiley cath on 7/13. He had AV fistula revision and right IJ permacath placement on 7/14. Patient has received dialysis 3x via permacath (7/15, 7/16, 7/18). For his anemia, pt received 2 units of pRBC on 7/16 for Hgb of 6.7. He was started on epogen injections by Renal. Additionally, pt was hypertensive to  and bradycardic to 48. Hydralazine 50BID was increased to TID, Coreg 12.5mg BID was decreased to 6.25mg BID, and lisinopril 2.5 mg PO daily was added. Per nephrology recommendation, patient was started on sevelamer 800mg TID. Patient discharged to rehab where he will continue to receive HD.

## 2019-07-16 NOTE — PROGRESS NOTE ADULT - PROBLEM SELECTOR PLAN 5
pt with acute on chronic macrocytic anemia. B12 and folate are normal. Iron studies with anemia of chronic disease. Current hbg and hct are lower than previous admission. However, no signs of active bleed.  - Per renal, pt would qualify for epo injections  - transfuse for Hgb <7.0 pt with acute on chronic macrocytic anemia. B12 and folate are normal. Iron studies with anemia of chronic disease. Current hbg and hct are lower than previous admission. However, no signs of active bleed.  - epo injections 4000U (TTS)  - transfuse for Hgb <7.0  - s/p 1U pRBC (7/16) hyperphosphatemia i/s/o of ESRD, with secondary hyperparathyroidism.   - renal-low-phos diet  - trend phos level  - HD today  - per renal, no need for phosphate binder at this moment Pt has worsening ESRD, creatinine 8.47 from 6.87 (3/2019) and hyperphosphatemia. Patient had AV fistula place in 4/2019, AV fistula revision 7/14 and RIJ permacath placement 7/14.   - nephrology following, recs appreciated    - HD today  - f/u with  Smtiha regarding outpatient HD  - s/p HD x3

## 2019-07-16 NOTE — PROGRESS NOTE ADULT - SUBJECTIVE AND OBJECTIVE BOX
Patient is a 54y old  Male who presents with a chief complaint of Generalized weakness (15 Jul 2019 08:01)      INTERVAL HPI/OVERNIGHT EVENTS:  No acute events overnight.       MEDICATIONS  (STANDING):  amLODIPine   Tablet 10 milliGRAM(s) Oral daily  carvedilol 12.5 milliGRAM(s) Oral every 12 hours  chlorhexidine 4% Liquid 1 Application(s) Topical <User Schedule>  cilostazol 50 milliGRAM(s) Oral two times a day  dextrose 5%. 1000 milliLiter(s) (50 mL/Hr) IV Continuous <Continuous>  dextrose 50% Injectable 12.5 Gram(s) IV Push once  dextrose 50% Injectable 25 Gram(s) IV Push once  dextrose 50% Injectable 25 Gram(s) IV Push once  diVALproex  milliGRAM(s) Oral three times a day  epoetin gustavo Injectable 4000 Unit(s) SubCutaneous <User Schedule>  ferrous    sulfate 325 milliGRAM(s) Oral daily  gabapentin 100 milliGRAM(s) Oral three times a day  heparin  Injectable 5000 Unit(s) SubCutaneous every 12 hours  hydrALAZINE 50 milliGRAM(s) Oral two times a day  insulin lispro (HumaLOG) corrective regimen sliding scale   SubCutaneous three times a day before meals  insulin lispro (HumaLOG) corrective regimen sliding scale   SubCutaneous at bedtime  OLANZapine 20 milliGRAM(s) Oral at bedtime  sodium bicarbonate 650 milliGRAM(s) Oral two times a day  traZODone 100 milliGRAM(s) Oral daily    MEDICATIONS  (PRN):  dextrose 40% Gel 15 Gram(s) Oral once PRN Blood Glucose LESS THAN 70 milliGRAM(s)/deciliter  glucagon  Injectable 1 milliGRAM(s) IntraMuscular once PRN Glucose LESS THAN 70 milligrams/deciliter  ondansetron    Tablet 4 milliGRAM(s) Oral every 6 hours PRN Nausea and/or Vomiting      Allergies    No Known Allergies    Intolerances        Vital Signs Last 24 Hrs  T(C): 36.4 (16 Jul 2019 05:49), Max: 36.7 (15 Jul 2019 09:20)  T(F): 97.6 (16 Jul 2019 05:49), Max: 98 (15 Jul 2019 09:20)  HR: 62 (16 Jul 2019 05:49) (61 - 68)  BP: 161/74 (16 Jul 2019 05:49) (126/69 - 161/74)    RR: 17 (16 Jul 2019 05:49) (16 - 18)  SpO2: 95% (16 Jul 2019 05:49) (94% - 96%)    PHYSICAL EXAM:  GENERAL: NAD.  CHEST/LUNG: Clear to auscultation; No rales, rhonchi, or wheezing.  Respiratory effort does not appear labored.  HEART: Regular rate and rhythm; S1 and S2,  no murmurs, rubs, or gallops.  ABDOMEN: Soft, not tender to palpation.  No masses or HSM appreciated.  No distension.  Bowel sounds present.  EXTREMITIES:  No clubbing, cyanosis, or edema.  Moves all extremities with strength 5/5.  SKIN: No obvious rashes or lesions.  Turgor okay.  NEURO:  Alert and oriented x 3, no focal sensory or  motor deficit, DTR 2+ bilaterally.    LABS:                        7.2    8.82  )-----------( 267      ( 15 Jul 2019 05:50 )             23.9     07-15    140  |  107  |  97<H>  ----------------------------<  62<L>  4.6   |  19<L>  |  7.38<H>    Ca    7.9<L>      15 Jul 2019 05:50  Phos  6.4     07-15  Mg     2.2     07-15    TPro  6.1  /  Alb  2.6<L>  /  TBili  < 0.2<L>  /  DBili  x   /  AST  23  /  ALT  20  /  AlkPhos  56  07-15        CAPILLARY BLOOD GLUCOSE      POCT Blood Glucose.: 70 mg/dL (16 Jul 2019 05:46)  POCT Blood Glucose.: 100 mg/dL (15 Jul 2019 21:35)  POCT Blood Glucose.: 100 mg/dL (15 Jul 2019 17:29)  POCT Blood Glucose.: 120 mg/dL (15 Jul 2019 12:30)  POCT Blood Glucose.: 145 mg/dL (15 Jul 2019 10:33)  POCT Blood Glucose.: 74 mg/dL (15 Jul 2019 08:37) Verena Garcia PGY1  Pager [phone]    Patient is a 54y old  Male who presents with a chief complaint of Generalized weakness (15 Jul 2019 08:01)      INTERVAL HPI/OVERNIGHT EVENTS:  No acute events overnight. Pt had HD this morning.       MEDICATIONS  (STANDING):  amLODIPine   Tablet 10 milliGRAM(s) Oral daily  carvedilol 12.5 milliGRAM(s) Oral every 12 hours  chlorhexidine 4% Liquid 1 Application(s) Topical <User Schedule>  cilostazol 50 milliGRAM(s) Oral two times a day  dextrose 5%. 1000 milliLiter(s) (50 mL/Hr) IV Continuous <Continuous>  dextrose 50% Injectable 12.5 Gram(s) IV Push once  dextrose 50% Injectable 25 Gram(s) IV Push once  dextrose 50% Injectable 25 Gram(s) IV Push once  diVALproex  milliGRAM(s) Oral three times a day  epoetin gustavo Injectable 4000 Unit(s) SubCutaneous <User Schedule>  ferrous    sulfate 325 milliGRAM(s) Oral daily  gabapentin 100 milliGRAM(s) Oral three times a day  heparin  Injectable 5000 Unit(s) SubCutaneous every 12 hours  hydrALAZINE 50 milliGRAM(s) Oral two times a day  insulin lispro (HumaLOG) corrective regimen sliding scale   SubCutaneous three times a day before meals  insulin lispro (HumaLOG) corrective regimen sliding scale   SubCutaneous at bedtime  OLANZapine 20 milliGRAM(s) Oral at bedtime  sodium bicarbonate 650 milliGRAM(s) Oral two times a day  traZODone 100 milliGRAM(s) Oral daily    MEDICATIONS  (PRN):  dextrose 40% Gel 15 Gram(s) Oral once PRN Blood Glucose LESS THAN 70 milliGRAM(s)/deciliter  glucagon  Injectable 1 milliGRAM(s) IntraMuscular once PRN Glucose LESS THAN 70 milligrams/deciliter  ondansetron    Tablet 4 milliGRAM(s) Oral every 6 hours PRN Nausea and/or Vomiting      Allergies    No Known Allergies    Intolerances      Vital Signs Last 24 Hrs  T(C): 36.4 (16 Jul 2019 05:49), Max: 36.7 (15 Jul 2019 09:20)  T(F): 97.6 (16 Jul 2019 05:49), Max: 98 (15 Jul 2019 09:20)  HR: 62 (16 Jul 2019 05:49) (61 - 68)  BP: 161/74 (16 Jul 2019 05:49) (126/69 - 161/74)    RR: 17 (16 Jul 2019 05:49) (16 - 18)  SpO2: 95% (16 Jul 2019 05:49) (94% - 96%)    PHYSICAL EXAM:  GENERAL: NAD.  CHEST/LUNG: Clear to auscultation; No rales, rhonchi, or wheezing.  Respiratory effort does not appear labored.  HEART: Regular rate and rhythm; S1 and S2,  no murmurs, rubs, or gallops.  ABDOMEN: Soft, not tender to palpation.  No masses or HSM appreciated.  No distension.  Bowel sounds present.  EXTREMITIES:  No clubbing, cyanosis, or edema.  Moves all extremities with strength 5/5.  SKIN: No obvious rashes or lesions.  Turgor okay.  NEURO:  Alert and oriented x 3, no focal sensory or  motor deficit, DTR 2+ bilaterally.    LABS: Verena Garcia PGY1  Pager [phone]    Patient is a 54y old  Male who presents with a chief complaint of Generalized weakness (15 Jul 2019 08:01)      INTERVAL HPI/OVERNIGHT EVENTS:  No acute events overnight. Pt had HD this morning.       MEDICATIONS  (STANDING):  amLODIPine   Tablet 10 milliGRAM(s) Oral daily  carvedilol 12.5 milliGRAM(s) Oral every 12 hours  chlorhexidine 4% Liquid 1 Application(s) Topical <User Schedule>  cilostazol 50 milliGRAM(s) Oral two times a day  dextrose 5%. 1000 milliLiter(s) (50 mL/Hr) IV Continuous <Continuous>  dextrose 50% Injectable 12.5 Gram(s) IV Push once  dextrose 50% Injectable 25 Gram(s) IV Push once  dextrose 50% Injectable 25 Gram(s) IV Push once  diVALproex  milliGRAM(s) Oral three times a day  epoetin gustavo Injectable 4000 Unit(s) SubCutaneous <User Schedule>  ferrous    sulfate 325 milliGRAM(s) Oral daily  gabapentin 100 milliGRAM(s) Oral three times a day  heparin  Injectable 5000 Unit(s) SubCutaneous every 12 hours  hydrALAZINE 50 milliGRAM(s) Oral two times a day  insulin lispro (HumaLOG) corrective regimen sliding scale   SubCutaneous three times a day before meals  insulin lispro (HumaLOG) corrective regimen sliding scale   SubCutaneous at bedtime  OLANZapine 20 milliGRAM(s) Oral at bedtime  sodium bicarbonate 650 milliGRAM(s) Oral two times a day  traZODone 100 milliGRAM(s) Oral daily    MEDICATIONS  (PRN):  dextrose 40% Gel 15 Gram(s) Oral once PRN Blood Glucose LESS THAN 70 milliGRAM(s)/deciliter  glucagon  Injectable 1 milliGRAM(s) IntraMuscular once PRN Glucose LESS THAN 70 milligrams/deciliter  ondansetron    Tablet 4 milliGRAM(s) Oral every 6 hours PRN Nausea and/or Vomiting      Allergies    No Known Allergies    Intolerances      Vital Signs Last 24 Hrs  T(C): 36.4 (16 Jul 2019 05:49), Max: 36.7 (15 Jul 2019 09:20)  T(F): 97.6 (16 Jul 2019 05:49), Max: 98 (15 Jul 2019 09:20)  HR: 62 (16 Jul 2019 05:49) (61 - 68)  BP: 161/74 (16 Jul 2019 05:49) (126/69 - 161/74)    RR: 17 (16 Jul 2019 05:49) (16 - 18)  SpO2: 95% (16 Jul 2019 05:49) (94% - 96%)    PHYSICAL EXAM:  GENERAL: NAD.  CHEST/LUNG: Clear to auscultation; No rales, rhonchi, or wheezing.  Respiratory effort does not appear labored.  HEART: Regular rate and rhythm; S1 and S2,  no murmurs, rubs, or gallops.  ABDOMEN: Soft, not tender to palpation.  No masses or HSM appreciated.  No distension.  Bowel sounds present.  EXTREMITIES:  No clubbing, cyanosis, or edema.  Moves all extremities with strength 5/5.  SKIN: No obvious rashes or lesions.  Turgor okay.  NEURO:  Alert and oriented x 3, no focal sensory or  motor deficit, DTR 2+ bilaterally.    LABS:  LABS:	 	    CBC Full  -  ( 16 Jul 2019 06:30 )  WBC Count : 8.66 K/uL  Hemoglobin : 6.7 g/dL (down from 7.2)  Hematocrit : 22.3 % (down from 23.9)  Platelet Count - Automated : 234 K/uL  Mean Cell Volume : 100.0 fL  Mean Cell Hemoglobin : 30.0 pg  Mean Cell Hemoglobin Concentration : 30.0 %  Auto Neutrophil # : 5.38 K/uL  Auto Lymphocyte # : 1.76 K/uL  Auto Monocyte # : 1.02 K/uL  Auto Eosinophil # : 0.11 K/uL  Auto Basophil # : 0.04 K/uL  Auto Neutrophil % : 62.1 %  Auto Lymphocyte % : 20.3 %  Auto Monocyte % : 11.8 %  Auto Eosinophil % : 1.3 %  Auto Basophil % : 0.5 %    07-16    140  |  104  |  62<H>  ----------------------------<  87  3.9   |  23  |  5.59<H>    *BUN: 97 ->62    Ca  9.0 corrected     16 Jul 2019 06:30  Phos  5.7   (down from 6.4)  Mg     2.3     07-16      TPro  5.9<L>  /  Alb  2.6<L>  /  TBili  < 0.2<L>  /  DBili  x   /  AST  21  /  ALT  14  /  AlkPhos  58  07-16 Verena Garcia PGY1  Pager [phone]    Patient is a 54y old  Male who presents with a chief complaint of Generalized weakness (15 Jul 2019 08:01)      INTERVAL HPI/OVERNIGHT EVENTS:  No acute events overnight. Pt had HD this morning. After HD pt seemed more somnolent than usual. Easily arousable, but less responsive to questions. He is A&Ox2 (self and place). Denies nausea, headache, pain, or SOB.     Spoke to PCP and private nephrologist - pt has schizophrenia, has a history of not going to his appointments. Both physicians are concerned about pt compliance with outpatient dialysis.       Allergies  No Known Allergies  Intolerances      Vital Signs Last 24 Hrs  T(C): 36.4 (16 Jul 2019 05:49), Max: 36.7 (15 Jul 2019 09:20)  T(F): 97.6 (16 Jul 2019 05:49), Max: 98 (15 Jul 2019 09:20)  HR: 62 (16 Jul 2019 05:49) (61 - 68)  BP: 161/74 (16 Jul 2019 05:49) (126/69 - 161/74)    RR: 17 (16 Jul 2019 05:49) (16 - 18)  SpO2: 95% (16 Jul 2019 05:49) (94% - 96%)    PHYSICAL EXAM:  GENERAL: Awake, but answers after several repetition of questioning.   HEENT: EOMI, MMM.    CHEST/LUNG: Clear to auscultation; No rales, rhonchi, or wheezing.  Respiratory effort does not appear labored.  HEART: Regular rate and rhythm; S1 and S2,  no murmurs, rubs, or gallops.  ABDOMEN: Soft, not tender to palpation.  No masses or HSM appreciated.  No distension.   EXTREMITIES: Improved asterixis (no asterixis in R hand, mild in L hand). No clubbing, cyanosis, or edema.  Moves all extremities with strength 5/5.  SKIN: No obvious rashes or lesions.  Turgor okay.  NEURO:  Alert and oriented x 2 (self and place)    LABS:  CBC Full  -  ( 16 Jul 2019 06:30 )  WBC Count : 8.66 K/uL  Hemoglobin : 6.7 g/dL (down from 7.2)  Hematocrit : 22.3 % (down from 23.9)  Platelet Count - Automated : 234 K/uL  Mean Cell Volume : 100.0 fL  Mean Cell Hemoglobin : 30.0 pg  Mean Cell Hemoglobin Concentration : 30.0 %  Auto Neutrophil # : 5.38 K/uL  Auto Lymphocyte # : 1.76 K/uL  Auto Monocyte # : 1.02 K/uL  Auto Eosinophil # : 0.11 K/uL  Auto Basophil # : 0.04 K/uL  Auto Neutrophil % : 62.1 %  Auto Lymphocyte % : 20.3 %  Auto Monocyte % : 11.8 %  Auto Eosinophil % : 1.3 %  Auto Basophil % : 0.5 %    07-16    140  |  104  |  62<H>  ----------------------------<  87  3.9   |  23  |  5.59<H>    *BUN: 97 ->62    Ca  9.0 corrected     16 Jul 2019 06:30  Phos  5.7   (down from 6.4)  Mg     2.3     07-16      TPro  5.9<L>  /  Alb  2.6<L>  /  TBili  < 0.2<L>  /  DBili  x   /  AST  21  /  ALT  14  /  AlkPhos  58  07-16

## 2019-07-16 NOTE — DISCHARGE NOTE PROVIDER - NSDCCPCAREPLAN_GEN_ALL_CORE_FT
PRINCIPAL DISCHARGE DIAGNOSIS  Diagnosis: Uremia  Assessment and Plan of Treatment: You were admitted for uremic encephalopathy - this means that you were confused and more sleepy than usual because a toxin (urea nitrogen) was building up in your blood. This happened because your kidney condition was getting worst and your kidney could not filter out the toxin. The kidney doctors dialyzed you ___ times, which helped improve your mental status. PRINCIPAL DISCHARGE DIAGNOSIS  Diagnosis: Uremia  Assessment and Plan of Treatment: You were admitted for uremic encephalopathy - this means that you were confused and more sleepy than usual because a toxin (urea nitrogen) was building up in your blood. This happened because your kidney condition was getting worst and your kidney could not filter out the toxin. The kidney doctors dialyzed you 4 times, which helped improve your mental status. You had a permacath placed in your chest for dialysis. You also had surgery to revise the fistula in your forearm. You should continue to get dialysis during your time at rehab and when you leave rehab. You should follow up with the Vascular Doctor on August 2nd (Friday) 2:30pm so that they can check your fistula. You should also follow up with your primary care doctor on July 23rd(Tuesday) at 12:40pm, and your Kidney doctor on August 5th (Monday) at 3:30pm. If you feel dizzy, confused, tired or unwell in any way, please return to the emergency room.      SECONDARY DISCHARGE DIAGNOSES  Diagnosis: Hypertension  Assessment and Plan of Treatment: Your blood pressure was high during your hospital stay. We increased the frequency of your hydralazine 50mg from twice a day to three times a day. You should continue to take hydralazine 50mg three times a day. We also added a new medication (Lisinopril 2.5mg) which you should continue to take once daily. Please continue to take amlodipine once daily. We decreased your dose of Coreg (also known as carvedilol) from 12.5mg to 6.25mg because your heart rate was low. Please continue to take the Coreg 6.25 twice a day. Please follow up with your primary care doctor regarding your blood pressure. If you feel dizzy, have a headache, or feel unwell, please return to the emergency room.    Diagnosis: Hyperphosphatemia  Assessment and Plan of Treatment: Your phosphate level was higher than normal because your kidney could not excrete it. The phosphate level improved with dialysis. The Kidney doctors also started you on Sevelamer - a medication to help lower the level of phosphate in your body. You should continue to take Sevelamer 800mg 3 times a day. Please follow up with your Kidney doctor to monitor this level. If you feel weak, dizzy or confused, please return to the emergency room.    Diagnosis: Anemia  Assessment and Plan of Treatment: Your blood count was low during your hospital stay. You received 2 units of blood and were given epogen injection. The epogen injections help your body make more red blood cells. You will continue to get these injections during your dialysis session. You should follow up with your kidney doctor. If you feel dizzy, confused or well, please return to the emergency room. PRINCIPAL DISCHARGE DIAGNOSIS  Diagnosis: Uremia  Assessment and Plan of Treatment: You were admitted for uremic encephalopathy - this means that you were confused and more sleepy than usual because a toxin (urea nitrogen) was building up in your blood. This happened because your kidney condition was getting worst and your kidney could not filter out the toxin. The kidney doctors dialyzed you 4 times, which helped improve your mental status. You had a permacath placed in your chest for dialysis. You also had surgery to revise the fistula in your forearm. You should continue to get dialysis during your time at rehab and when you leave rehab. You should follow up with the Vascular Doctor on August 2nd (Friday) 2:30pm so that they can check your fistula. You should also follow up with your primary care doctor on July 23rd(Tuesday) at 12:40pm, and your Kidney doctor on August 5th (Monday) at 3:30pm. If you feel dizzy, confused, fevers/chills, chest pain, difficulty breathing, vomiting/inability to eat, tired or unwell in any way, please return to the emergency room.      SECONDARY DISCHARGE DIAGNOSES  Diagnosis: Hypertension  Assessment and Plan of Treatment: Your blood pressure was high during your hospital stay. We increased the frequency of your hydralazine 50mg from twice a day to three times a day. You should continue to take hydralazine 50mg three times a day. We also added a new medication (Lisinopril 2.5mg) which you should continue to take once daily. Please continue to take amlodipine once daily. We decreased your dose of Coreg (also known as carvedilol) from 12.5mg to 6.25mg because your heart rate was low. Please continue to take the Coreg 6.25 twice a day. Please follow up with your primary care doctor regarding your blood pressure. If you feel dizzy, have a headache, or feel unwell, please return to the emergency room.    Diagnosis: Hyperphosphatemia  Assessment and Plan of Treatment: Your phosphate level was higher than normal because your kidney could not excrete it. The phosphate level improved with dialysis. The Kidney doctors also started you on Sevelamer - a medication to help lower the level of phosphate in your body. You should continue to take Sevelamer 800mg 3 times a day. Please follow up with your Kidney doctor to monitor this level. If you feel weak, dizzy or confused, please return to the emergency room.    Diagnosis: Anemia  Assessment and Plan of Treatment: Your blood count was low during your hospital stay. You received 2 units of blood and were given epogen injection. The epogen injections help your body make more red blood cells. You will continue to get these injections during your dialysis session. You should follow up with your kidney doctor. If you feel dizzy, confused or well, please return to the emergency room.

## 2019-07-16 NOTE — PROGRESS NOTE ADULT - ASSESSMENT
Pt. with advanced CKD, initiated on HD during current hospital stay. Pt. deemed ESRD. Pt. seen during HD today. /80 at time of HD rounds.

## 2019-07-16 NOTE — DISCHARGE NOTE PROVIDER - NSDCFUADDAPPT_GEN_ALL_CORE_FT
Vascular appointment:   Dr. Agrawal - August 2nd (Friday) 2:30pm   [address], [address], [place], [place] [address] [phone]    Primary Care:  Dr. Brar - July 23rd(Tuesday) 12:40pm  [address], [place], [place] [id number] [phone]    Renal appointment:   Dr. Aidan Diaz - August 5th (Monday) at 3:30pm  [address], [place], [place] [id number] [phone]

## 2019-07-16 NOTE — PROGRESS NOTE ADULT - PROBLEM SELECTOR PLAN 1
Pt with uremic encephalopathy secondary to ESRD, s/p HD x2 (7/13, 7/15) and AV fistula revision and RIJ permacath placement (7/14). Currently with improved AMS (A&Ox2, more awake), asterixis, improved BUN. Private nephrologist is Dr. Aidan Diaz ([phone]) from [facility]. Pt is at risk for dialysis disequilibrium syndrome.   - nephrology following, recs appreciated  - Will monitor for dialysis disequilibrium syndrome (headache, nausea, blurred vision, dizziness)  - s/p AV fistula revision (7/14) and RIJ permacath placement (7/14)   - s/p HD x2 (7/13, 7/15) Pt with uremic encephalopathy secondary to ESRD, s/p HD x3 (7/13, 7/15, 7/16) and AV fistula revision and RIJ permacath placement (7/14). Currently with improved AMS (A&Ox2, more awake), asterixis, improved BUN. Private nephrologist is Dr. Aidan Diaz ([phone]) from [facility]. Pt is at risk for dialysis disequilibrium syndrome.   - nephrology following, recs appreciated  - Will monitor for dialysis disequilibrium syndrome (headache, nausea, blurred vision, dizziness)  - s/p AV fistula revision (7/14) and RIJ permacath placement (7/14)   - s/p HD x3 (7/13, 7/15, 7/16) Pt with uremic encephalopathy secondary to ESRD, s/p HD x3 (7/13, 7/15, 7/16) and AV fistula revision and RIJ permacath placement (7/14). Currently with improved AMS (A&Ox2, more awake), improved asterixis, improved BUN. Private nephrologist is Dr. Aidan Diaz ([phone]) from [facility]. Pt is at risk for dialysis disequilibrium syndrome.   - nephrology following, recs appreciated  - Will monitor for dialysis disequilibrium syndrome (headache, nausea, blurred vision, dizziness)  - s/p AV fistula revision (7/14) and RIJ permacath placement (7/14)   - s/p HD x3 (7/13, 7/15, 7/16)

## 2019-07-16 NOTE — PROGRESS NOTE ADULT - PROBLEM SELECTOR PLAN 3
hyperphosphatemia i/s/o of ESRD, with secondary hyperparathyroidism.   - renal-low-phos diet  - trend phos level  - HD today  - per renal, no need for phosphate binder at this moment pt with acute on chronic macrocytic anemia. B12 and folate are normal. Iron studies with anemia of chronic disease. Current hbg and hct are lower than previous admission. However, no signs of active bleed. Goal Hgb: 8.5 for outpatient dialysis  - epo injections 4000U (TTS)  - transfuse for Hgb <7.0  - s/p 2U pRBC (7/16)

## 2019-07-16 NOTE — PROGRESS NOTE ADULT - PROBLEM SELECTOR PLAN 2
Pt has worsening ESRD, creatinine 8.47 from 6.87 (3/2019) and hyperphosphatemia. Patient had AV fistula place in 4/2019, AV fistula revision 7/14 and RIJ permacath placement 7/14.   - nephrology and vascular following, recs appreciated    - HD today  - s/p HD x1 (7/13) Pt has worsening ESRD, creatinine 8.47 from 6.87 (3/2019) and hyperphosphatemia. Patient had AV fistula place in 4/2019, AV fistula revision 7/14 and RIJ permacath placement 7/14.   - nephrology following, recs appreciated    - HD today  - f/u with  [facility] regarding outpatient HD  - s/p HD x3 uncontrolled BP.  - Low sodium diet  - hydralazine 50 BID increased to TID   - Continue home hydralazine, amlodipine, carvedilol

## 2019-07-16 NOTE — PROGRESS NOTE ADULT - SUBJECTIVE AND OBJECTIVE BOX
[facility] DIVISION OF KIDNEY DISEASES AND HYPERTENSION --   --------------------------------------------------------------------------------  Chief Complaint: ESRD/Ongoing hemodialysis requirement    HPI: Pt. with advanced renal failure/CKD, initiated on long-term HD for uremia on 7/13/19. Pt. follows with nephrologist Dr. Diaz as outpatient and had an LUE AVF created ~2 months ago by Dr. Agrawal. AVF infiltrated during cannulation hence pt. underwent RIJ non tunneled HD catheter placement on 7/13/19. Pt. subsequently underwent AVF revision and RIJ tunneled HD catheter placement on 7/14/19.    Pt. seen and examined during HD today. Pt. says he feels better and denies CP, SOB, HA or dizziness ng HD rounds.    PAST HISTORY  --------------------------------------------------------------------------------  No significant changes to PMH, PSH, FHx, SHx, unless otherwise noted    ALLERGIES & MEDICATIONS  --------------------------------------------------------------------------------  Allergies    No Known Allergies    Intolerances    Standing Inpatient Medications  amLODIPine   Tablet 10 milliGRAM(s) Oral daily  carvedilol 12.5 milliGRAM(s) Oral every 12 hours  chlorhexidine 4% Liquid 1 Application(s) Topical <User Schedule>  cilostazol 50 milliGRAM(s) Oral two times a day  dextrose 5%. 1000 milliLiter(s) IV Continuous <Continuous>  dextrose 50% Injectable 12.5 Gram(s) IV Push once  dextrose 50% Injectable 25 Gram(s) IV Push once  dextrose 50% Injectable 25 Gram(s) IV Push once  diVALproex  milliGRAM(s) Oral three times a day  epoetin gustavo Injectable 4000 Unit(s) SubCutaneous <User Schedule>  ferrous    sulfate 325 milliGRAM(s) Oral daily  gabapentin 100 milliGRAM(s) Oral three times a day  heparin  Injectable 5000 Unit(s) SubCutaneous every 12 hours  hydrALAZINE 50 milliGRAM(s) Oral two times a day  insulin lispro (HumaLOG) corrective regimen sliding scale   SubCutaneous three times a day before meals  insulin lispro (HumaLOG) corrective regimen sliding scale   SubCutaneous at bedtime  OLANZapine 20 milliGRAM(s) Oral at bedtime  sodium bicarbonate 650 milliGRAM(s) Oral two times a day  traZODone 100 milliGRAM(s) Oral daily    PRN Inpatient Medications  dextrose 40% Gel 15 Gram(s) Oral once PRN  glucagon  Injectable 1 milliGRAM(s) IntraMuscular once PRN  ondansetron    Tablet 4 milliGRAM(s) Oral every 6 hours PRN    REVIEW OF SYSTEMS  --------------------------------------------------------------------------------  Gen: No fever  Respiratory: No dyspnea  CV: No chest pain  GI: No abdominal pain  Neuro: No dizziness  LE: No edema  VITALS/PHYSICAL EXAM  --------------------------------------------------------------------------------  T(C): 36.7 (07-16-19 @ 06:47), Max: 36.7 (07-15-19 @ 09:20)  HR: 62 (07-16-19 @ 06:47) (61 - 68)  BP: 173/84 (07-16-19 @ 06:47) (126/69 - 173/84)  RR: 16 (07-16-19 @ 06:47) (16 - 18)  SpO2: 95% (07-16-19 @ 05:49) (94% - 96%)  Wt(kg): --  Height (cm): 177.8 (07-14-19 @ 08:40)  Weight (kg): 75.6 (07-14-19 @ 08:40)  BMI (kg/m2): 23.9 (07-14-19 @ 08:40)  BSA (m2): 1.93 (07-14-19 @ 08:40)    07-15-19 @ 07:01  -  07-16-19 @ 07:00  --------------------------------------------------------  IN: 400 mL / OUT: 900 mL / NET: -500 mL    Physical Exam:  Gen: resting, NAD  	HEENT: No JVD  	Pulm: CTA B/L  	CV: S1S2+  	Abd: Soft  	LE: No edema  	Vascular access: Right IJ tunneled HD catheter site: no bleeding, LUE AVF site: surgical stiches seen, bruit heard  LABS/STUDIES  --------------------------------------------------------------------------------              6.7    8.66  >-----------<  234      [07-16-19 @ 06:30]              22.3     140  |  104  |  62  ----------------------------<  87      [07-16-19 @ 06:30]  3.9   |  23  |  5.59        Ca     7.9     [07-16-19 @ 06:30]      Mg     2.3     [07-16-19 @ 06:30]      Phos  5.7     [07-16-19 @ 06:30]    TPro  5.9  /  Alb  2.6  /  TBili  < 0.2  /  DBili  x   /  AST  21  /  ALT  14  /  AlkPhos  58  [07-16-19 @ 06:30]    HBsAb <3.0      [07-15-19 @ 10:17]  HBsAg NEGATIVE      [07-13-19 @ 07:09]  HBcAb Nonreactive      [07-15-19 @ 10:17]  HCV 0.10, Nonreactive Hepatitis C AB  S/CO Ratio                        Interpretation  < 1.00                                   Non-Reactive  1.00 - 4.99                         Weakly-Reactive  >= 5.00                                Reactive  Non-Reactive: [place] with a non-reactive HCV antibody  result is considered uninfected.  No further action is  needed unless recent infection is suspected.  In these  cases, consider repeat testing later to detect  seroconversion..  Weakly-Reactive: HCV antibody test is abnormal, HCV RNA  Qualitative test will follow.  Reactive: HCV antibody test is abnormal, HCV RNA  Qualitative test will follow.  Note: HCV antibody testing is performed on the [employer]   system.      [07-15-19 @ 10:17]

## 2019-07-16 NOTE — PROGRESS NOTE ADULT - PROBLEM SELECTOR PLAN 4
Patient with pH of 7.19, likely 2/2 uremia.   - urgent HD as above  - c/w sodium bicarb Pt has worsening ESRD, creatinine 8.47 from 6.87 (3/2019) and hyperphosphatemia. Patient had AV fistula place in 4/2019, AV fistula revision 7/14 and RIJ permacath placement 7/14.   - nephrology following, recs appreciated    - HD today  - f/u with  [facility] regarding outpatient HD  - s/p HD x3 bradycardic to 48 today.  - coreg decreased from 12.5 BID to 6.25 BID.

## 2019-07-17 LAB
ALBUMIN SERPL ELPH-MCNC: 2.5 G/DL — LOW (ref 3.3–5)
ALP SERPL-CCNC: 58 U/L — SIGNIFICANT CHANGE UP (ref 40–120)
ALT FLD-CCNC: 9 U/L — SIGNIFICANT CHANGE UP (ref 4–41)
ANION GAP SERPL CALC-SCNC: 13 MMO/L — SIGNIFICANT CHANGE UP (ref 7–14)
AST SERPL-CCNC: 22 U/L — SIGNIFICANT CHANGE UP (ref 4–40)
BASOPHILS # BLD AUTO: 0.07 K/UL — SIGNIFICANT CHANGE UP (ref 0–0.2)
BASOPHILS NFR BLD AUTO: 0.8 % — SIGNIFICANT CHANGE UP (ref 0–2)
BILIRUB SERPL-MCNC: < 0.2 MG/DL — LOW (ref 0.2–1.2)
BUN SERPL-MCNC: 40 MG/DL — HIGH (ref 7–23)
CALCIUM SERPL-MCNC: 7.9 MG/DL — LOW (ref 8.4–10.5)
CHLORIDE SERPL-SCNC: 102 MMOL/L — SIGNIFICANT CHANGE UP (ref 98–107)
CO2 SERPL-SCNC: 25 MMOL/L — SIGNIFICANT CHANGE UP (ref 22–31)
CREAT SERPL-MCNC: 4.11 MG/DL — HIGH (ref 0.5–1.3)
EOSINOPHIL # BLD AUTO: 0.11 K/UL — SIGNIFICANT CHANGE UP (ref 0–0.5)
EOSINOPHIL NFR BLD AUTO: 1.2 % — SIGNIFICANT CHANGE UP (ref 0–6)
GLUCOSE SERPL-MCNC: 56 MG/DL — LOW (ref 70–99)
HBA1C BLD-MCNC: 4.7 % — SIGNIFICANT CHANGE UP (ref 4–5.6)
HCT VFR BLD CALC: 28.6 % — LOW (ref 39–50)
HGB BLD-MCNC: 9.3 G/DL — LOW (ref 13–17)
IMM GRANULOCYTES NFR BLD AUTO: 4.3 % — HIGH (ref 0–1.5)
LYMPHOCYTES # BLD AUTO: 2.07 K/UL — SIGNIFICANT CHANGE UP (ref 1–3.3)
LYMPHOCYTES # BLD AUTO: 22.6 % — SIGNIFICANT CHANGE UP (ref 13–44)
MAGNESIUM SERPL-MCNC: 2 MG/DL — SIGNIFICANT CHANGE UP (ref 1.6–2.6)
MCHC RBC-ENTMCNC: 30.9 PG — SIGNIFICANT CHANGE UP (ref 27–34)
MCHC RBC-ENTMCNC: 32.5 % — SIGNIFICANT CHANGE UP (ref 32–36)
MCV RBC AUTO: 95 FL — SIGNIFICANT CHANGE UP (ref 80–100)
MONOCYTES # BLD AUTO: 1.41 K/UL — HIGH (ref 0–0.9)
MONOCYTES NFR BLD AUTO: 15.4 % — HIGH (ref 2–14)
NEUTROPHILS # BLD AUTO: 5.09 K/UL — SIGNIFICANT CHANGE UP (ref 1.8–7.4)
NEUTROPHILS NFR BLD AUTO: 55.7 % — SIGNIFICANT CHANGE UP (ref 43–77)
NRBC # FLD: 0 K/UL — SIGNIFICANT CHANGE UP (ref 0–0)
PHOSPHATE SERPL-MCNC: 5.2 MG/DL — HIGH (ref 2.5–4.5)
PLATELET # BLD AUTO: 248 K/UL — SIGNIFICANT CHANGE UP (ref 150–400)
PMV BLD: 9.9 FL — SIGNIFICANT CHANGE UP (ref 7–13)
POTASSIUM SERPL-MCNC: 3.9 MMOL/L — SIGNIFICANT CHANGE UP (ref 3.5–5.3)
POTASSIUM SERPL-SCNC: 3.9 MMOL/L — SIGNIFICANT CHANGE UP (ref 3.5–5.3)
PROT SERPL-MCNC: 6.2 G/DL — SIGNIFICANT CHANGE UP (ref 6–8.3)
RBC # BLD: 3.01 M/UL — LOW (ref 4.2–5.8)
RBC # FLD: 17.8 % — HIGH (ref 10.3–14.5)
SODIUM SERPL-SCNC: 140 MMOL/L — SIGNIFICANT CHANGE UP (ref 135–145)
WBC # BLD: 9.14 K/UL — SIGNIFICANT CHANGE UP (ref 3.8–10.5)
WBC # FLD AUTO: 9.14 K/UL — SIGNIFICANT CHANGE UP (ref 3.8–10.5)

## 2019-07-17 PROCEDURE — 99232 SBSQ HOSP IP/OBS MODERATE 35: CPT | Mod: GC

## 2019-07-17 RX ORDER — SEVELAMER CARBONATE 2400 MG/1
800 POWDER, FOR SUSPENSION ORAL
Refills: 0 | Status: DISCONTINUED | OUTPATIENT
Start: 2019-07-17 | End: 2019-07-19

## 2019-07-17 RX ADMIN — CILOSTAZOL 50 MILLIGRAM(S): 100 TABLET ORAL at 17:57

## 2019-07-17 RX ADMIN — CARVEDILOL PHOSPHATE 6.25 MILLIGRAM(S): 80 CAPSULE, EXTENDED RELEASE ORAL at 17:57

## 2019-07-17 RX ADMIN — Medication 325 MILLIGRAM(S): at 13:32

## 2019-07-17 RX ADMIN — DIVALPROEX SODIUM 500 MILLIGRAM(S): 500 TABLET, DELAYED RELEASE ORAL at 05:20

## 2019-07-17 RX ADMIN — DIVALPROEX SODIUM 500 MILLIGRAM(S): 500 TABLET, DELAYED RELEASE ORAL at 13:31

## 2019-07-17 RX ADMIN — CILOSTAZOL 50 MILLIGRAM(S): 100 TABLET ORAL at 05:21

## 2019-07-17 RX ADMIN — CHLORHEXIDINE GLUCONATE 1 APPLICATION(S): 213 SOLUTION TOPICAL at 10:30

## 2019-07-17 RX ADMIN — AMLODIPINE BESYLATE 10 MILLIGRAM(S): 2.5 TABLET ORAL at 05:21

## 2019-07-17 RX ADMIN — OLANZAPINE 20 MILLIGRAM(S): 15 TABLET, FILM COATED ORAL at 22:30

## 2019-07-17 RX ADMIN — DIVALPROEX SODIUM 500 MILLIGRAM(S): 500 TABLET, DELAYED RELEASE ORAL at 22:29

## 2019-07-17 RX ADMIN — Medication 100 MILLIGRAM(S): at 13:31

## 2019-07-17 RX ADMIN — Medication 650 MILLIGRAM(S): at 05:20

## 2019-07-17 RX ADMIN — Medication 2: at 12:30

## 2019-07-17 RX ADMIN — Medication 650 MILLIGRAM(S): at 17:57

## 2019-07-17 RX ADMIN — Medication 50 MILLIGRAM(S): at 05:20

## 2019-07-17 RX ADMIN — HEPARIN SODIUM 5000 UNIT(S): 5000 INJECTION INTRAVENOUS; SUBCUTANEOUS at 05:21

## 2019-07-17 RX ADMIN — Medication 50 MILLIGRAM(S): at 22:29

## 2019-07-17 RX ADMIN — CARVEDILOL PHOSPHATE 6.25 MILLIGRAM(S): 80 CAPSULE, EXTENDED RELEASE ORAL at 05:20

## 2019-07-17 RX ADMIN — HEPARIN SODIUM 5000 UNIT(S): 5000 INJECTION INTRAVENOUS; SUBCUTANEOUS at 17:57

## 2019-07-17 RX ADMIN — GABAPENTIN 100 MILLIGRAM(S): 400 CAPSULE ORAL at 13:32

## 2019-07-17 RX ADMIN — Medication 50 MILLIGRAM(S): at 13:32

## 2019-07-17 NOTE — PROGRESS NOTE ADULT - PROBLEM SELECTOR PLAN 8
Patient is on tradjenta at home.  - SSI and renal diet.  - f/u A1c pt with bipolar and schizophrenia. Per PCP, pt requires multiple repetitions of questions at baseline.  - continue home olanzapine DVT: HSQ   Renal diet  trazodone for sleep

## 2019-07-17 NOTE — PROGRESS NOTE ADULT - PROBLEM SELECTOR PLAN 1
Pt. with advanced CKD, initiated on long-term HD on 7/13/19 for uremic encephalopathy. Pt. underwent AVF revision and RIJ tunneled HD catheter placement on 7/14/19. Went for 3rd HD treatment yesterday. Plan for maintenance HD treatment tomorrow. Monitor labs

## 2019-07-17 NOTE — PROGRESS NOTE ADULT - PROBLEM SELECTOR PLAN 5
Pt has worsening ESRD, creatinine 8.47 from 6.87 (3/2019) and hyperphosphatemia. Patient had AV fistula place in 4/2019, AV fistula revision 7/14 and RIJ permacath placement 7/14.   - nephrology following, recs appreciated    - HD today  - f/u with  [facility] regarding outpatient HD  - s/p HD x3 hyperphosphatemia i/s/o of ESRD, with secondary hyperparathyroidism.   - renal-low-phos diet  - trend phos level  - HD today  - per renal, no need for phosphate binder at this moment

## 2019-07-17 NOTE — PROGRESS NOTE ADULT - ASSESSMENT
54yo M with HTN, bipolar disorder, schizophrenia, DM2, ESRD s/p AV fistula 4/2019, presents with generalized weakness and somnolence, admitted for uremic encephalopathy and metabolic acidosis, s/p AV fistula revision and RIJ permacath placement (7/14) , HD x3 (7/13, 7/15, 7/16). 52yo M with HTN, bipolar disorder, schizophrenia, CKD s/p AV fistula 4/2019, presents with generalized weakness and somnolence, admitted for uremic encephalopathy and metabolic acidosis, s/p AV fistula revision and RIJ permacath placement (7/14) , HD x3 (7/13, 7/15, 7/16).

## 2019-07-17 NOTE — PROGRESS NOTE ADULT - PROBLEM SELECTOR PLAN 6
hyperphosphatemia i/s/o of ESRD, with secondary hyperparathyroidism.   - renal-low-phos diet  - trend phos level  - HD today  - per renal, no need for phosphate binder at this moment Patient with pH of 7.19, likely 2/2 uremia.   - urgent HD as above  - c/w sodium bicarb Patient with pH of 7.19 on admission, likely 2/2 uremia.   - urgent HD as above  - c/w sodium bicarb

## 2019-07-17 NOTE — PROGRESS NOTE ADULT - SUBJECTIVE AND OBJECTIVE BOX
Patient is a 54y old  Male who presents with a chief complaint of Generalized weakness (16 Jul 2019 08:33)      INTERVAL HPI/OVERNIGHT EVENTS:  Yesterday hydralazine BID was increased to TID - SBP last night was elevated to 176, but better controlled this morning between 128-159. Coreg was decreased from 12.5mg to 6.25mg - HR was better, no bradycardia since. This morning ____.      MEDICATIONS  (STANDING):  amLODIPine   Tablet 10 milliGRAM(s) Oral daily  carvedilol 6.25 milliGRAM(s) Oral every 12 hours  chlorhexidine 4% Liquid 1 Application(s) Topical <User Schedule>  cilostazol 50 milliGRAM(s) Oral two times a day  dextrose 5%. 1000 milliLiter(s) (50 mL/Hr) IV Continuous <Continuous>  dextrose 50% Injectable 12.5 Gram(s) IV Push once  dextrose 50% Injectable 25 Gram(s) IV Push once  dextrose 50% Injectable 25 Gram(s) IV Push once  diVALproex  milliGRAM(s) Oral three times a day  epoetin gustavo Injectable 4000 Unit(s) SubCutaneous <User Schedule>  ferrous    sulfate 325 milliGRAM(s) Oral daily  gabapentin 100 milliGRAM(s) Oral daily  heparin  Injectable 5000 Unit(s) SubCutaneous every 12 hours  hydrALAZINE 50 milliGRAM(s) Oral every 8 hours  insulin lispro (HumaLOG) corrective regimen sliding scale   SubCutaneous three times a day before meals  insulin lispro (HumaLOG) corrective regimen sliding scale   SubCutaneous at bedtime  OLANZapine 20 milliGRAM(s) Oral at bedtime  sodium bicarbonate 650 milliGRAM(s) Oral two times a day  traZODone 100 milliGRAM(s) Oral daily    MEDICATIONS  (PRN):  dextrose 40% Gel 15 Gram(s) Oral once PRN Blood Glucose LESS THAN 70 milliGRAM(s)/deciliter  glucagon  Injectable 1 milliGRAM(s) IntraMuscular once PRN Glucose LESS THAN 70 milligrams/deciliter  ondansetron    Tablet 4 milliGRAM(s) Oral every 6 hours PRN Nausea and/or Vomiting      Allergies    No Known Allergies    Intolerances      Vital Signs Last 24 Hrs  T(C): 36.7 (17 Jul 2019 05:17), Max: 37 (16 Jul 2019 21:29)  T(F): 98.1 (17 Jul 2019 05:17), Max: 98.6 (16 Jul 2019 21:29)  HR: 65 (17 Jul 2019 05:17) (62 - 71)  BP: 159/66 (17 Jul 2019 05:17) (128/84 - 180/70)  RR: 17 (17 Jul 2019 05:17) (16 - 18)  SpO2: 95% (17 Jul 2019 05:17) (93% - 97%)    PHYSICAL EXAM:  GENERAL: NAD.  CHEST/LUNG: Clear to auscultation; No rales, rhonchi, or wheezing.  Respiratory effort does not appear labored.  HEART: Regular rate and rhythm; S1 and S2,  no murmurs, rubs, or gallops.  ABDOMEN: Soft, not tender to palpation.  No masses or HSM appreciated.  No distension.  Bowel sounds present.  EXTREMITIES:  No clubbing, cyanosis, or edema.  Moves all extremities with strength 5/5.  SKIN: No obvious rashes or lesions.  Turgor okay.  NEURO:  Alert and oriented x 3, no focal sensory or  motor deficit, DTR 2+ bilaterally.    LABS:    H&H: 9.2/ 28.4 (from 6.7/ 22.3) Patient is a 54y old  Male who presents with a chief complaint of Generalized weakness (16 Jul 2019 08:33)      INTERVAL HPI/OVERNIGHT EVENTS:  Yesterday hydralazine BID was increased to TID - SBP last night was elevated to 176, but better controlled this morning between 128-159. Coreg was decreased from 12.5mg to 6.25mg - HR was better, no bradycardia since. This morning pt is easily arousable, and A&Ox2. Mental status unchanged from yesterday. He denies nausea, dizziness, pain, or headaches.       MEDICATIONS  (STANDING):  amLODIPine   Tablet 10 milliGRAM(s) Oral daily  carvedilol 6.25 milliGRAM(s) Oral every 12 hours  chlorhexidine 4% Liquid 1 Application(s) Topical <User Schedule>  cilostazol 50 milliGRAM(s) Oral two times a day  dextrose 5%. 1000 milliLiter(s) (50 mL/Hr) IV Continuous <Continuous>  dextrose 50% Injectable 12.5 Gram(s) IV Push once  dextrose 50% Injectable 25 Gram(s) IV Push once  dextrose 50% Injectable 25 Gram(s) IV Push once  diVALproex  milliGRAM(s) Oral three times a day  epoetin gustavo Injectable 4000 Unit(s) SubCutaneous <User Schedule>  ferrous    sulfate 325 milliGRAM(s) Oral daily  gabapentin 100 milliGRAM(s) Oral daily  heparin  Injectable 5000 Unit(s) SubCutaneous every 12 hours  hydrALAZINE 50 milliGRAM(s) Oral every 8 hours  insulin lispro (HumaLOG) corrective regimen sliding scale   SubCutaneous three times a day before meals  insulin lispro (HumaLOG) corrective regimen sliding scale   SubCutaneous at bedtime  OLANZapine 20 milliGRAM(s) Oral at bedtime  sodium bicarbonate 650 milliGRAM(s) Oral two times a day  traZODone 100 milliGRAM(s) Oral daily    MEDICATIONS  (PRN):  dextrose 40% Gel 15 Gram(s) Oral once PRN Blood Glucose LESS THAN 70 milliGRAM(s)/deciliter  glucagon  Injectable 1 milliGRAM(s) IntraMuscular once PRN Glucose LESS THAN 70 milligrams/deciliter  ondansetron    Tablet 4 milliGRAM(s) Oral every 6 hours PRN Nausea and/or Vomiting      Allergies    No Known Allergies    Intolerances      Vital Signs Last 24 Hrs  T(C): 36.7 (17 Jul 2019 05:17), Max: 37 (16 Jul 2019 21:29)  T(F): 98.1 (17 Jul 2019 05:17), Max: 98.6 (16 Jul 2019 21:29)  HR: 65 (17 Jul 2019 05:17) (62 - 71)  BP: 159/66 (17 Jul 2019 05:17) (128/84 - 180/70)  RR: 17 (17 Jul 2019 05:17) (16 - 18)  SpO2: 95% (17 Jul 2019 05:17) (93% - 97%)    PHYSICAL EXAM:  GENERAL: NAD. Easily arousabe, but requires several repetitions of questions  HEENT: mild erythema of bilateral conjunctiva, PERRL, EOMI.   CHEST/LUNG: Clear to auscultation; No rales, rhonchi, or wheezing.  Respiratory effort does not appear labored.  HEART: Regular rate and rhythm; S1 and S2,  no murmurs, rubs, or gallops.  ABDOMEN: Soft, not tender to palpation.  No masses or HSM appreciated.  No distension.  Bowel sounds present.  EXTREMITIES: No asterixis of R hand, mild asterixis of L hand. Hematoma of R arm and L forearm. No clubbing, cyanosis, or edema.    SKIN: Hematoma of R arm and L forearm  NEURO:  Alert and oriented x 2, unchanged from yesterday    LABS:    H&H: 9.2/ 28.4 (from 6.7/ 22.3) Patient is a 54y old  Male who presents with a chief complaint of Generalized weakness (16 Jul 2019 08:33)      INTERVAL HPI/OVERNIGHT EVENTS:  Yesterday hydralazine BID was increased to TID - SBP last night was elevated to 176, but better controlled this morning between 128-159. Coreg was decreased from 12.5mg to 6.25mg - HR was better, no bradycardia since. This morning pt is easily arousable, and A&Ox2. Mental status unchanged from yesterday. He denies nausea, dizziness, pain, or headaches.       MEDICATIONS  (STANDING):  amLODIPine   Tablet 10 milliGRAM(s) Oral daily  carvedilol 6.25 milliGRAM(s) Oral every 12 hours  chlorhexidine 4% Liquid 1 Application(s) Topical <User Schedule>  cilostazol 50 milliGRAM(s) Oral two times a day  dextrose 5%. 1000 milliLiter(s) (50 mL/Hr) IV Continuous <Continuous>  dextrose 50% Injectable 12.5 Gram(s) IV Push once  dextrose 50% Injectable 25 Gram(s) IV Push once  dextrose 50% Injectable 25 Gram(s) IV Push once  diVALproex  milliGRAM(s) Oral three times a day  epoetin gustavo Injectable 4000 Unit(s) SubCutaneous <User Schedule>  ferrous    sulfate 325 milliGRAM(s) Oral daily  gabapentin 100 milliGRAM(s) Oral daily  heparin  Injectable 5000 Unit(s) SubCutaneous every 12 hours  hydrALAZINE 50 milliGRAM(s) Oral every 8 hours  insulin lispro (HumaLOG) corrective regimen sliding scale   SubCutaneous three times a day before meals  insulin lispro (HumaLOG) corrective regimen sliding scale   SubCutaneous at bedtime  OLANZapine 20 milliGRAM(s) Oral at bedtime  sodium bicarbonate 650 milliGRAM(s) Oral two times a day  traZODone 100 milliGRAM(s) Oral daily    MEDICATIONS  (PRN):  dextrose 40% Gel 15 Gram(s) Oral once PRN Blood Glucose LESS THAN 70 milliGRAM(s)/deciliter  glucagon  Injectable 1 milliGRAM(s) IntraMuscular once PRN Glucose LESS THAN 70 milligrams/deciliter  ondansetron    Tablet 4 milliGRAM(s) Oral every 6 hours PRN Nausea and/or Vomiting      Allergies    No Known Allergies    Intolerances      Vital Signs Last 24 Hrs  T(C): 36.7 (17 Jul 2019 05:17), Max: 37 (16 Jul 2019 21:29)  T(F): 98.1 (17 Jul 2019 05:17), Max: 98.6 (16 Jul 2019 21:29)  HR: 65 (17 Jul 2019 05:17) (62 - 71)  BP: 159/66 (17 Jul 2019 05:17) (128/84 - 180/70)  RR: 17 (17 Jul 2019 05:17) (16 - 18)  SpO2: 95% (17 Jul 2019 05:17) (93% - 97%)    PHYSICAL EXAM:  GENERAL: NAD. Easily arousabe, but requires several repetitions of questions  HEENT: mild erythema of bilateral conjunctiva, PERRL, EOMI.   CHEST/LUNG: Clear to auscultation; No rales, rhonchi, or wheezing.  Respiratory effort does not appear labored.  HEART: Regular rate and rhythm; S1 and S2,  no murmurs, rubs, or gallops.  ABDOMEN: Soft, not tender to palpation.  No masses or HSM appreciated.  No distension.  Bowel sounds present.  EXTREMITIES: No asterixis of R hand, mild asterixis of L hand. Hematoma of R arm and L forearm. No clubbing, cyanosis, or edema.    SKIN: Hematoma of R arm and L forearm  NEURO:  Alert and oriented x 2, unchanged from yesterday    LABS:                               9.3    9.14  )-----------( 248      ( 17 Jul 2019 05:13 )             28.6     07-17    140  |  102  |  40<H>  ----------------------------<  56<L>  3.9   |  25  |  4.11<H>    BUN: 40 from 62  Creatinine 4.11 from 5.59    Phos  5.2 from 5.7    07-17  Mg     2.0     07-17    TPro  6.2  /  Alb  2.5<L>  /  TBili  < 0.2<L>  /  DBili  x   /  AST  22  /  ALT  9   /  AlkPhos  58  07-17    HgA1c: 4.7 Patient is a 54y old  Male who presents with a chief complaint of Generalized weakness (16 Jul 2019 08:33)      INTERVAL HPI/OVERNIGHT EVENTS:  Yesterday hydralazine BID was increased to TID - SBP last night was elevated to 176, but better controlled this morning between 128-159. Coreg was decreased from 12.5mg to 6.25mg - HR was better, no bradycardia since. This morning pt is easily arousable, and A&Ox2. Mental status unchanged from yesterday. He denies nausea, dizziness, pain, or headaches.       MEDICATIONS  (STANDING):  amLODIPine   Tablet 10 milliGRAM(s) Oral daily  carvedilol 6.25 milliGRAM(s) Oral every 12 hours  chlorhexidine 4% Liquid 1 Application(s) Topical <User Schedule>  cilostazol 50 milliGRAM(s) Oral two times a day  dextrose 5%. 1000 milliLiter(s) (50 mL/Hr) IV Continuous <Continuous>  dextrose 50% Injectable 12.5 Gram(s) IV Push once  dextrose 50% Injectable 25 Gram(s) IV Push once  dextrose 50% Injectable 25 Gram(s) IV Push once  diVALproex  milliGRAM(s) Oral three times a day  epoetin gustavo Injectable 4000 Unit(s) SubCutaneous <User Schedule>  ferrous    sulfate 325 milliGRAM(s) Oral daily  gabapentin 100 milliGRAM(s) Oral daily  heparin  Injectable 5000 Unit(s) SubCutaneous every 12 hours  hydrALAZINE 50 milliGRAM(s) Oral every 8 hours  insulin lispro (HumaLOG) corrective regimen sliding scale   SubCutaneous three times a day before meals  insulin lispro (HumaLOG) corrective regimen sliding scale   SubCutaneous at bedtime  OLANZapine 20 milliGRAM(s) Oral at bedtime  sodium bicarbonate 650 milliGRAM(s) Oral two times a day  traZODone 100 milliGRAM(s) Oral daily    MEDICATIONS  (PRN):  dextrose 40% Gel 15 Gram(s) Oral once PRN Blood Glucose LESS THAN 70 milliGRAM(s)/deciliter  glucagon  Injectable 1 milliGRAM(s) IntraMuscular once PRN Glucose LESS THAN 70 milligrams/deciliter  ondansetron    Tablet 4 milliGRAM(s) Oral every 6 hours PRN Nausea and/or Vomiting      Allergies    No Known Allergies    Intolerances      Vital Signs Last 24 Hrs  T(C): 36.7 (17 Jul 2019 05:17), Max: 37 (16 Jul 2019 21:29)  T(F): 98.1 (17 Jul 2019 05:17), Max: 98.6 (16 Jul 2019 21:29)  HR: 65 (17 Jul 2019 05:17) (62 - 71)  BP: 159/66 (17 Jul 2019 05:17) (128/84 - 180/70)  RR: 17 (17 Jul 2019 05:17) (16 - 18)  SpO2: 95% (17 Jul 2019 05:17) (93% - 97%)    PHYSICAL EXAM:  GENERAL: NAD. Easily arousabe, but requires several repetitions of questions  HEENT: mild erythema of bilateral conjunctiva, PERRL, EOMI.   CHEST/LUNG: Clear to auscultation; No rales, rhonchi, or wheezing.  Respiratory effort does not appear labored.  HEART: Regular rate and rhythm; S1 and S2,  no murmurs, rubs, or gallops.  ABDOMEN: Soft, not tender to palpation.  No masses or HSM appreciated.  No distension.  Bowel sounds present.  EXTREMITIES: No asterixis of R hand, mild asterixis of L hand. Hematoma of R arm and L forearm. No clubbing, cyanosis, or edema.    SKIN: Hematoma of R arm and L forearm  NEURO:  Alert and oriented x 2, unchanged from yesterday    LABS:                               9.3    9.14  )-----------( 248      ( 17 Jul 2019 05:13 )             28.6     07-17    140  |  102  |  40<H>  ----------------------------<  56<L>  3.9   |  25  |  4.11<H>    BUN: 40 from 62  Creatinine 4.11 from 5.59    Phos  5.2 from 5.7    07-17  Mg     2.0     07-17    TPro  6.2  /  Alb  2.5<L>  /  TBili  < 0.2<L>  /  DBili  x   /  AST  22  /  ALT  9   /  AlkPhos  58  07-17    HgA1c: 4.7    Renal note reviewed

## 2019-07-17 NOTE — PROGRESS NOTE ADULT - SUBJECTIVE AND OBJECTIVE BOX
[facility] DIVISION OF KIDNEY DISEASES AND HYPERTENSION -- FOLLOW UP NOTE  --------------------------------------------------------------------------------  HPI: Pt. with advanced renal failure/CKD, initiated on long-term HD for uremia on 7/13/19. Pt. follows with nephrologist Dr. Diaz as outpatient and had an LUE AVF created ~2 months ago by Dr. Agrawal. AVF infiltrated during cannulation hence pt. underwent RIJ non tunneled HD catheter placement on 7/13/19. Pt. subsequently underwent AVF revision and RIJ tunneled HD catheter placement on 7/14/19. Patient went for 3rd dialysis session yesterday.     Patient evaluated at bedside, sleeping completely flat. No signs of volume overload, in no acute distress.    PAST HISTORY  --------------------------------------------------------------------------------  No significant changes to PMH, PSH, FHx, SHx, unless otherwise noted    ALLERGIES & MEDICATIONS  --------------------------------------------------------------------------------  Allergies    No Known Allergies    Intolerances    Standing Inpatient Medications  amLODIPine   Tablet 10 milliGRAM(s) Oral daily  carvedilol 6.25 milliGRAM(s) Oral every 12 hours  chlorhexidine 4% Liquid 1 Application(s) Topical <User Schedule>  cilostazol 50 milliGRAM(s) Oral two times a day  dextrose 5%. 1000 milliLiter(s) IV Continuous <Continuous>  dextrose 50% Injectable 12.5 Gram(s) IV Push once  dextrose 50% Injectable 25 Gram(s) IV Push once  dextrose 50% Injectable 25 Gram(s) IV Push once  diVALproex  milliGRAM(s) Oral three times a day  epoetin gustavo Injectable 4000 Unit(s) SubCutaneous <User Schedule>  ferrous    sulfate 325 milliGRAM(s) Oral daily  gabapentin 100 milliGRAM(s) Oral daily  heparin  Injectable 5000 Unit(s) SubCutaneous every 12 hours  hydrALAZINE 50 milliGRAM(s) Oral every 8 hours  insulin lispro (HumaLOG) corrective regimen sliding scale   SubCutaneous three times a day before meals  insulin lispro (HumaLOG) corrective regimen sliding scale   SubCutaneous at bedtime  OLANZapine 20 milliGRAM(s) Oral at bedtime  sodium bicarbonate 650 milliGRAM(s) Oral two times a day  traZODone 100 milliGRAM(s) Oral daily    REVIEW OF SYSTEMS  --------------------------------------------------------------------------------  Respiratory: No dyspnea  CV: No chest pain  GI: No abdominal pain  Neuro: No dizziness  LE: No edema    All other systems were reviewed and are negative, except as noted.    VITALS/PHYSICAL EXAM  --------------------------------------------------------------------------------  T(C): 36.7 (07-17-19 @ 05:17), Max: 37 (07-16-19 @ 21:29)  HR: 65 (07-17-19 @ 05:17) (62 - 71)  BP: 159/66 (07-17-19 @ 05:17) (128/84 - 176/72)  RR: 17 (07-17-19 @ 05:17) (17 - 18)  SpO2: 95% (07-17-19 @ 05:17) (95% - 97%)  Wt(kg): --    07-16-19 @ 07:01  -  07-17-19 @ 07:00  --------------------------------------------------------  IN: 910 mL / OUT: 2300 mL / NET: -1390 mL    07-17-19 @ 07:01  -  07-17-19 @ 13:16  --------------------------------------------------------  IN: 0 mL / OUT: 600 mL / NET: -600 mL        Physical Exam:  	Gen: NAD  	HEENT: MMM  	Pulm: CTA B/L  	CV: S1S2  	Abd: Soft, +BS   	Ext: No LE edema B/L  	Neuro: Sleeping but arousable  	Skin: Warm and dry  	Vascular access: Right IJ tunneled HD catheter, no hematoma, no signs of bleeding. LUE AV fistula with overlying ecchymosis, (+) bruit    LABS/STUDIES  --------------------------------------------------------------------------------              9.3    9.14  >-----------<  248      [07-17-19 @ 05:13]              28.6     140  |  102  |  40  ----------------------------<  56      [07-17-19 @ 05:13]  3.9   |  25  |  4.11        Ca     7.9     [07-17-19 @ 05:13]      Mg     2.0     [07-17-19 @ 05:13]      Phos  5.2     [07-17-19 @ 05:13]    TPro  6.2  /  Alb  2.5  /  TBili  < 0.2  /  DBili  x   /  AST  22  /  ALT  9   /  AlkPhos  58  [07-17-19 @ 05:13]    Creatinine Trend:  SCr 4.11 [07-17 @ 05:13]  SCr 5.59 [07-16 @ 06:30]  SCr 7.38 [07-15 @ 05:50]  SCr 7.12 [07-14 @ 02:43]  SCr 8.47 [07-13 @ 07:09]

## 2019-07-17 NOTE — PROGRESS NOTE ADULT - PROBLEM SELECTOR PLAN 3
pt with acute on chronic macrocytic anemia. B12 and folate are normal. Iron studies with anemia of chronic disease. Current hbg and hct are lower than previous admission. However, no signs of active bleed. Goal Hgb: 8.5 for outpatient dialysis  - epo injections 4000U (TTS)  - transfuse for Hgb <7.0  - s/p 2U pRBC (7/16) pt with acute on chronic macrocytic anemia likely from ESRD, on Epo. B12 and folate are normal. Iron studies with anemia of chronic disease.   - epo injections 4000U (TTS)  - transfuse for Hgb <7.0  - s/p 2U pRBC (7/16)

## 2019-07-17 NOTE — PHYSICAL THERAPY INITIAL EVALUATION ADULT - IMPAIRMENTS FOUND, PT EVAL
posture/cognitive impairment/aerobic capacity/endurance/arousal, attention, and cognition/gait, locomotion, and balance/gross motor/muscle strength

## 2019-07-17 NOTE — PROGRESS NOTE ADULT - PROBLEM SELECTOR PLAN 1
Pt with uremic encephalopathy secondary to ESRD, s/p HD x3 (7/13, 7/15, 7/16) and AV fistula revision and RIJ permacath placement (7/14). Currently with improved AMS (A&Ox2, more awake), improved asterixis, improved BUN. Private nephrologist is Dr. Aidan Diaz ([phone]) from [facility]. Pt is at risk for dialysis disequilibrium syndrome.   - nephrology following, recs appreciated  - Will monitor for dialysis disequilibrium syndrome (headache, nausea, blurred vision, dizziness)  - s/p AV fistula revision (7/14) and RIJ permacath placement (7/14)   - s/p HD x3 (7/13, 7/15, 7/16) Pt with uremic encephalopathy secondary to ESRD, s/p HD x3 (7/13, 7/15, 7/16) and AV fistula revision and RIJ permacath placement (7/14). Pt's mental status is likely at baseline, as per PCP pt previously required multiple repetitions of questions. Private nephrologist is Dr. Aidan Diaz ([phone]) from [facility]. Pt is at risk for dialysis disequilibrium syndrome.   - nephrology following, recs appreciated  - Will monitor for dialysis disequilibrium syndrome (headache, nausea, blurred vision, dizziness)  - s/p AV fistula revision (7/14) and RIJ permacath placement (7/14)   - s/p HD x3 (7/13, 7/15, 7/16)

## 2019-07-17 NOTE — PROGRESS NOTE ADULT - PROBLEM SELECTOR PLAN 7
Patient with pH of 7.19, likely 2/2 uremia.   - urgent HD as above  - c/w sodium bicarb Patient is on tradjenta at home.  - SSI and renal diet.  - f/u A1c pt with bipolar and schizophrenia. Per PCP, pt requires multiple repetitions of questions at baseline.  - continue home olanzapine

## 2019-07-17 NOTE — PROGRESS NOTE ADULT - PROBLEM SELECTOR PLAN 10
DVT: HSQ   Renal diet  trazodone for sleep

## 2019-07-17 NOTE — PROGRESS NOTE ADULT - PROBLEM SELECTOR PLAN 3
Patient with anemia in the setting of ESRD. Hemoglobin below target range. Received 2 units PRBCs on 7/16/2019, repeat Hgb 9.3 mg/dl. Continue Epogen. Monitor hemoglobin

## 2019-07-17 NOTE — PROGRESS NOTE ADULT - PROBLEM SELECTOR PLAN 2
uncontrolled BP.  - Low sodium diet  - hydralazine 50 BID increased to TID   - Continue home hydralazine, amlodipine, carvedilol SBP elevated to 170s overnight. Will monitor BP today  - Low sodium diet  - c/w hydralazine 50 TID   - Continue home amlodipine, carvedilol   - consider adding lisinopril if BP remains uncontrolled (ACE-inhibitor in proteinuric AKD may slow rate of CKD progression) ?? SBP elevated to 170s overnight. Will monitor BP today  - Low sodium diet  - c/w hydralazine 50 TID   - Continue home amlodipine, carvedilol   - consider adding lisinopril if BP remains uncontrolled (ACE-inhibitor in proteinuric AKD may slow rate of CKD progression)

## 2019-07-17 NOTE — PHYSICAL THERAPY INITIAL EVALUATION ADULT - PERTINENT HX OF CURRENT PROBLEM, REHAB EVAL
patient presents with generalized weakness and deconditioning. Past medical history includes: bipolar and schizophrenia

## 2019-07-17 NOTE — PROGRESS NOTE ADULT - PROBLEM SELECTOR PLAN 4
bradycardic to 48 today.  - coreg decreased from 12.5 BID to 6.25 BID. Pt has worsening ESRD, creatinine 8.47 from 6.87 (3/2019) and hyperphosphatemia. Patient had AV fistula place in 4/2019, AV fistula revision 7/14 and RIJ permacath placement 7/14.   - nephrology following, recs appreciated    - HD today  - f/u with  [facility] regarding outpatient HD  - s/p HD x3

## 2019-07-18 LAB
ALBUMIN SERPL ELPH-MCNC: 2.5 G/DL — LOW (ref 3.3–5)
ALP SERPL-CCNC: 59 U/L — SIGNIFICANT CHANGE UP (ref 40–120)
ALT FLD-CCNC: 9 U/L — SIGNIFICANT CHANGE UP (ref 4–41)
ANION GAP SERPL CALC-SCNC: 12 MMO/L — SIGNIFICANT CHANGE UP (ref 7–14)
AST SERPL-CCNC: 23 U/L — SIGNIFICANT CHANGE UP (ref 4–40)
BACTERIA NPH CULT: SIGNIFICANT CHANGE UP
BASOPHILS # BLD AUTO: 0.08 K/UL — SIGNIFICANT CHANGE UP (ref 0–0.2)
BASOPHILS NFR BLD AUTO: 0.9 % — SIGNIFICANT CHANGE UP (ref 0–2)
BILIRUB SERPL-MCNC: < 0.2 MG/DL — LOW (ref 0.2–1.2)
BUN SERPL-MCNC: 55 MG/DL — HIGH (ref 7–23)
CALCIUM SERPL-MCNC: 8 MG/DL — LOW (ref 8.4–10.5)
CHLORIDE SERPL-SCNC: 102 MMOL/L — SIGNIFICANT CHANGE UP (ref 98–107)
CO2 SERPL-SCNC: 25 MMOL/L — SIGNIFICANT CHANGE UP (ref 22–31)
CREAT SERPL-MCNC: 5.06 MG/DL — HIGH (ref 0.5–1.3)
EOSINOPHIL # BLD AUTO: 0.15 K/UL — SIGNIFICANT CHANGE UP (ref 0–0.5)
EOSINOPHIL NFR BLD AUTO: 1.6 % — SIGNIFICANT CHANGE UP (ref 0–6)
GLUCOSE SERPL-MCNC: 61 MG/DL — LOW (ref 70–99)
HCT VFR BLD CALC: 28.8 % — LOW (ref 39–50)
HGB BLD-MCNC: 9.3 G/DL — LOW (ref 13–17)
IMM GRANULOCYTES NFR BLD AUTO: 4.1 % — HIGH (ref 0–1.5)
LYMPHOCYTES # BLD AUTO: 2.59 K/UL — SIGNIFICANT CHANGE UP (ref 1–3.3)
LYMPHOCYTES # BLD AUTO: 27.8 % — SIGNIFICANT CHANGE UP (ref 13–44)
MAGNESIUM SERPL-MCNC: 2.1 MG/DL — SIGNIFICANT CHANGE UP (ref 1.6–2.6)
MCHC RBC-ENTMCNC: 30.2 PG — SIGNIFICANT CHANGE UP (ref 27–34)
MCHC RBC-ENTMCNC: 32.3 % — SIGNIFICANT CHANGE UP (ref 32–36)
MCV RBC AUTO: 93.5 FL — SIGNIFICANT CHANGE UP (ref 80–100)
MONOCYTES # BLD AUTO: 1.29 K/UL — HIGH (ref 0–0.9)
MONOCYTES NFR BLD AUTO: 13.8 % — SIGNIFICANT CHANGE UP (ref 2–14)
NEUTROPHILS # BLD AUTO: 4.84 K/UL — SIGNIFICANT CHANGE UP (ref 1.8–7.4)
NEUTROPHILS NFR BLD AUTO: 51.8 % — SIGNIFICANT CHANGE UP (ref 43–77)
NRBC # FLD: 0.02 K/UL — SIGNIFICANT CHANGE UP (ref 0–0)
PHOSPHATE SERPL-MCNC: 5.9 MG/DL — HIGH (ref 2.5–4.5)
PLATELET # BLD AUTO: 226 K/UL — SIGNIFICANT CHANGE UP (ref 150–400)
PMV BLD: 9.6 FL — SIGNIFICANT CHANGE UP (ref 7–13)
POTASSIUM SERPL-MCNC: 4.2 MMOL/L — SIGNIFICANT CHANGE UP (ref 3.5–5.3)
POTASSIUM SERPL-SCNC: 4.2 MMOL/L — SIGNIFICANT CHANGE UP (ref 3.5–5.3)
PROT SERPL-MCNC: 6.3 G/DL — SIGNIFICANT CHANGE UP (ref 6–8.3)
RBC # BLD: 3.08 M/UL — LOW (ref 4.2–5.8)
RBC # FLD: 17.4 % — HIGH (ref 10.3–14.5)
SODIUM SERPL-SCNC: 139 MMOL/L — SIGNIFICANT CHANGE UP (ref 135–145)
WBC # BLD: 9.33 K/UL — SIGNIFICANT CHANGE UP (ref 3.8–10.5)
WBC # FLD AUTO: 9.33 K/UL — SIGNIFICANT CHANGE UP (ref 3.8–10.5)

## 2019-07-18 PROCEDURE — 99232 SBSQ HOSP IP/OBS MODERATE 35: CPT | Mod: GC

## 2019-07-18 RX ORDER — ERYTHROPOIETIN 10000 [IU]/ML
4000 INJECTION, SOLUTION INTRAVENOUS; SUBCUTANEOUS
Refills: 0 | Status: DISCONTINUED | OUTPATIENT
Start: 2019-07-18 | End: 2019-07-19

## 2019-07-18 RX ORDER — LISINOPRIL 2.5 MG/1
2.5 TABLET ORAL DAILY
Refills: 0 | Status: DISCONTINUED | OUTPATIENT
Start: 2019-07-18 | End: 2019-07-19

## 2019-07-18 RX ADMIN — GABAPENTIN 100 MILLIGRAM(S): 400 CAPSULE ORAL at 12:54

## 2019-07-18 RX ADMIN — CILOSTAZOL 50 MILLIGRAM(S): 100 TABLET ORAL at 06:17

## 2019-07-18 RX ADMIN — DIVALPROEX SODIUM 500 MILLIGRAM(S): 500 TABLET, DELAYED RELEASE ORAL at 06:17

## 2019-07-18 RX ADMIN — DIVALPROEX SODIUM 500 MILLIGRAM(S): 500 TABLET, DELAYED RELEASE ORAL at 21:54

## 2019-07-18 RX ADMIN — ERYTHROPOIETIN 4000 UNIT(S): 10000 INJECTION, SOLUTION INTRAVENOUS; SUBCUTANEOUS at 06:17

## 2019-07-18 RX ADMIN — DIVALPROEX SODIUM 500 MILLIGRAM(S): 500 TABLET, DELAYED RELEASE ORAL at 14:01

## 2019-07-18 RX ADMIN — SEVELAMER CARBONATE 800 MILLIGRAM(S): 2400 POWDER, FOR SUSPENSION ORAL at 09:11

## 2019-07-18 RX ADMIN — OLANZAPINE 20 MILLIGRAM(S): 15 TABLET, FILM COATED ORAL at 21:54

## 2019-07-18 RX ADMIN — Medication 100 MILLIGRAM(S): at 12:55

## 2019-07-18 RX ADMIN — Medication 50 MILLIGRAM(S): at 14:01

## 2019-07-18 RX ADMIN — HEPARIN SODIUM 5000 UNIT(S): 5000 INJECTION INTRAVENOUS; SUBCUTANEOUS at 18:30

## 2019-07-18 RX ADMIN — SEVELAMER CARBONATE 800 MILLIGRAM(S): 2400 POWDER, FOR SUSPENSION ORAL at 18:30

## 2019-07-18 RX ADMIN — Medication 325 MILLIGRAM(S): at 12:55

## 2019-07-18 RX ADMIN — SEVELAMER CARBONATE 800 MILLIGRAM(S): 2400 POWDER, FOR SUSPENSION ORAL at 12:54

## 2019-07-18 RX ADMIN — Medication 650 MILLIGRAM(S): at 06:17

## 2019-07-18 RX ADMIN — CHLORHEXIDINE GLUCONATE 1 APPLICATION(S): 213 SOLUTION TOPICAL at 12:55

## 2019-07-18 RX ADMIN — HEPARIN SODIUM 5000 UNIT(S): 5000 INJECTION INTRAVENOUS; SUBCUTANEOUS at 06:17

## 2019-07-18 RX ADMIN — Medication 50 MILLIGRAM(S): at 09:11

## 2019-07-18 RX ADMIN — AMLODIPINE BESYLATE 10 MILLIGRAM(S): 2.5 TABLET ORAL at 09:11

## 2019-07-18 RX ADMIN — CARVEDILOL PHOSPHATE 6.25 MILLIGRAM(S): 80 CAPSULE, EXTENDED RELEASE ORAL at 09:12

## 2019-07-18 RX ADMIN — Medication 50 MILLIGRAM(S): at 21:54

## 2019-07-18 RX ADMIN — Medication 650 MILLIGRAM(S): at 18:30

## 2019-07-18 RX ADMIN — CILOSTAZOL 50 MILLIGRAM(S): 100 TABLET ORAL at 18:30

## 2019-07-18 RX ADMIN — CARVEDILOL PHOSPHATE 6.25 MILLIGRAM(S): 80 CAPSULE, EXTENDED RELEASE ORAL at 18:29

## 2019-07-18 NOTE — PROGRESS NOTE ADULT - PROBLEM SELECTOR PLAN 3
Patient with anemia in the setting of ESRD. Hemoglobin below target range. Received 2 units PRBCs on 7/16/2019, repeat Hgb 9.3 mg/dl and stable since 2 days. Continue Epogen. Monitor hemoglobin

## 2019-07-18 NOTE — PROGRESS NOTE ADULT - PROBLEM SELECTOR PLAN 3
pt with acute on chronic macrocytic anemia likely from ESRD, on Epo. B12 and folate are normal. Iron studies with anemia of chronic disease.   - epo injections 4000U (TTS)  - transfuse for Hgb <7.0  - s/p 2U pRBC (7/16)

## 2019-07-18 NOTE — PROGRESS NOTE ADULT - PROBLEM SELECTOR PLAN 5
hyperphosphatemia i/s/o of ESRD, with secondary hyperparathyroidism.   - renal-low-phos diet  - trend phos level  - HD today  - c/w sevelamer

## 2019-07-18 NOTE — PROGRESS NOTE ADULT - PROBLEM SELECTOR PLAN 2
SBP elevated to 170s overnight. Will monitor BP today  - Low sodium diet  - c/w hydralazine 50 TID   - Continue home amlodipine, carvedilol   - consider adding lisinopril if BP remains uncontrolled (ACE-inhibitor in proteinuric AKD may slow rate of CKD progression) SBP elevated to 170s overnight. Will monitor BP today  - Low sodium diet  - lisinopril 2.5mg  - c/w hydralazine 50 TID   - Continue home amlodipine, carvedilol SBP elevated to 170s overnight. Will monitor BP today  - Low sodium diet  - started lisinopril 2.5mg  - c/w hydralazine 50 TID   - Continue home amlodipine, carvedilol

## 2019-07-18 NOTE — PROGRESS NOTE ADULT - ASSESSMENT
54yo M with HTN, bipolar disorder, schizophrenia, CKD s/p AV fistula 4/2019, presents with generalized weakness and somnolence, admitted for uremic encephalopathy and metabolic acidosis, s/p AV fistula revision and RIJ permacath placement (7/14) , HD x3 (7/13, 7/15, 7/16). 54yo M with HTN, bipolar disorder, schizophrenia, CKD s/p AV fistula 4/2019, presents with generalized weakness and somnolence, admitted for uremic encephalopathy and metabolic acidosis, s/p AV fistula revision and RIJ permacath placement (7/14) , HD x4 (7/13, 7/15, 7/16, 7/18).

## 2019-07-18 NOTE — PROGRESS NOTE ADULT - PROBLEM SELECTOR PLAN 1
Pt. with advanced CKD, initiated on long-term HD on 7/13/19 for uremic encephalopathy. Pt. underwent AVF revision and RIJ tunneled HD catheter placement on 7/14/19. Currently receiving maintenance HD through R IJ tunneled catheter. HD nurse reports some blood flow issues, will continue to monitor. Monitor labs

## 2019-07-18 NOTE — PROGRESS NOTE ADULT - SUBJECTIVE AND OBJECTIVE BOX
Patient is a 54y old  Male who presents with a chief complaint of Generalized weakness (17 Jul 2019 13:16)      INTERVAL HPI/OVERNIGHT EVENTS:  Pt was hypertensive to  overnight, asymptomatic. Per Renal recs, pt was started on sevelamer yesterday.     REVIEW OF SYSTEMS:    MEDICATIONS  (STANDING):  amLODIPine   Tablet 10 milliGRAM(s) Oral daily  carvedilol 6.25 milliGRAM(s) Oral every 12 hours  chlorhexidine 4% Liquid 1 Application(s) Topical <User Schedule>  cilostazol 50 milliGRAM(s) Oral two times a day  dextrose 5%. 1000 milliLiter(s) (50 mL/Hr) IV Continuous <Continuous>  dextrose 50% Injectable 12.5 Gram(s) IV Push once  dextrose 50% Injectable 25 Gram(s) IV Push once  dextrose 50% Injectable 25 Gram(s) IV Push once  diVALproex  milliGRAM(s) Oral three times a day  epoetin gustavo Injectable 4000 Unit(s) SubCutaneous <User Schedule>  ferrous    sulfate 325 milliGRAM(s) Oral daily  gabapentin 100 milliGRAM(s) Oral daily  heparin  Injectable 5000 Unit(s) SubCutaneous every 12 hours  hydrALAZINE 50 milliGRAM(s) Oral every 8 hours  insulin lispro (HumaLOG) corrective regimen sliding scale   SubCutaneous three times a day before meals  insulin lispro (HumaLOG) corrective regimen sliding scale   SubCutaneous at bedtime  OLANZapine 20 milliGRAM(s) Oral at bedtime  sevelamer carbonate 800 milliGRAM(s) Oral three times a day with meals  sodium bicarbonate 650 milliGRAM(s) Oral two times a day  traZODone 100 milliGRAM(s) Oral daily    MEDICATIONS  (PRN):  dextrose 40% Gel 15 Gram(s) Oral once PRN Blood Glucose LESS THAN 70 milliGRAM(s)/deciliter  glucagon  Injectable 1 milliGRAM(s) IntraMuscular once PRN Glucose LESS THAN 70 milligrams/deciliter  ondansetron    Tablet 4 milliGRAM(s) Oral every 6 hours PRN Nausea and/or Vomiting      Allergies    No Known Allergies    Intolerances        Vital Signs Last 24 Hrs  T(C): 36.7 (18 Jul 2019 05:12), Max: 36.9 (17 Jul 2019 13:28)  T(F): 98 (18 Jul 2019 05:12), Max: 98.5 (17 Jul 2019 13:28)  HR: 63 (18 Jul 2019 05:12) (61 - 67)  BP: 184/78 (18 Jul 2019 05:12) (158/63 - 184/78)  BP(mean): --  RR: 18 (18 Jul 2019 05:12) (16 - 18)  SpO2: 97% (18 Jul 2019 05:12) (96% - 97%)    PHYSICAL EXAM:  GENERAL: NAD.  CHEST/LUNG: Clear to auscultation; No rales, rhonchi, or wheezing.  Respiratory effort does not appear labored.  HEART: Regular rate and rhythm; S1 and S2,  no murmurs, rubs, or gallops.  ABDOMEN: Soft, not tender to palpation.  No masses or HSM appreciated.  No distension.  Bowel sounds present.  EXTREMITIES:  No clubbing, cyanosis, or edema.  Moves all extremities with strength 5/5.  SKIN: No obvious rashes or lesions.  Turgor okay.  NEURO:  Alert and oriented x 3, no focal sensory or  motor deficit, DTR 2+ bilaterally.    LABS:                        9.3    9.14  )-----------( 248      ( 17 Jul 2019 05:13 )             28.6     07-17    140  |  102  |  40<H>  ----------------------------<  56<L>  3.9   |  25  |  4.11<H>    Ca    7.9<L>      17 Jul 2019 05:13  Phos  5.2     07-17  Mg     2.0     07-17    TPro  6.2  /  Alb  2.5<L>  /  TBili  < 0.2<L>  /  DBili  x   /  AST  22  /  ALT  9   /  AlkPhos  58  07-17        CAPILLARY BLOOD GLUCOSE      POCT Blood Glucose.: 81 mg/dL (18 Jul 2019 06:01)  POCT Blood Glucose.: 91 mg/dL (17 Jul 2019 22:25)  POCT Blood Glucose.: 75 mg/dL (17 Jul 2019 17:31)  POCT Blood Glucose.: 193 mg/dL (17 Jul 2019 12:18)  POCT Blood Glucose.: 135 mg/dL (17 Jul 2019 09:37)  POCT Blood Glucose.: 115 mg/dL (17 Jul 2019 09:20)  POCT Blood Glucose.: 82 mg/dL (17 Jul 2019 09:03)  POCT Blood Glucose.: 61 mg/dL (17 Jul 2019 08:44)  POCT Blood Glucose.: 61 mg/dL (17 Jul 2019 08:42)      Culture - Nose (collected 15 Jul 2019 17:40)  Source: NOSE  Preliminary Report (16 Jul 2019 09:43):    CULTURE IN PROGRESS, FURTHER REPORT TO FOLLOW. Patient is a 54y old  Male who presents with a chief complaint of Generalized weakness (17 Jul 2019 13:16)      INTERVAL HPI/OVERNIGHT EVENTS:  Pt was hypertensive to  overnight, started lisinopril 2.5 this morning. Per Renal recs, pt was started on sevelamer yesterday. This morning pt is at HD.     REVIEW OF SYSTEMS:    MEDICATIONS  (STANDING):  amLODIPine   Tablet 10 milliGRAM(s) Oral daily  carvedilol 6.25 milliGRAM(s) Oral every 12 hours  chlorhexidine 4% Liquid 1 Application(s) Topical <User Schedule>  cilostazol 50 milliGRAM(s) Oral two times a day  dextrose 5%. 1000 milliLiter(s) (50 mL/Hr) IV Continuous <Continuous>  dextrose 50% Injectable 12.5 Gram(s) IV Push once  dextrose 50% Injectable 25 Gram(s) IV Push once  dextrose 50% Injectable 25 Gram(s) IV Push once  diVALproex  milliGRAM(s) Oral three times a day  epoetin gustavo Injectable 4000 Unit(s) SubCutaneous <User Schedule>  ferrous    sulfate 325 milliGRAM(s) Oral daily  gabapentin 100 milliGRAM(s) Oral daily  heparin  Injectable 5000 Unit(s) SubCutaneous every 12 hours  hydrALAZINE 50 milliGRAM(s) Oral every 8 hours  insulin lispro (HumaLOG) corrective regimen sliding scale   SubCutaneous three times a day before meals  insulin lispro (HumaLOG) corrective regimen sliding scale   SubCutaneous at bedtime  OLANZapine 20 milliGRAM(s) Oral at bedtime  sevelamer carbonate 800 milliGRAM(s) Oral three times a day with meals  sodium bicarbonate 650 milliGRAM(s) Oral two times a day  traZODone 100 milliGRAM(s) Oral daily    MEDICATIONS  (PRN):  dextrose 40% Gel 15 Gram(s) Oral once PRN Blood Glucose LESS THAN 70 milliGRAM(s)/deciliter  glucagon  Injectable 1 milliGRAM(s) IntraMuscular once PRN Glucose LESS THAN 70 milligrams/deciliter  ondansetron    Tablet 4 milliGRAM(s) Oral every 6 hours PRN Nausea and/or Vomiting      Allergies    No Known Allergies    Intolerances        Vital Signs Last 24 Hrs  T(C): 36.7 (18 Jul 2019 05:12), Max: 36.9 (17 Jul 2019 13:28)  T(F): 98 (18 Jul 2019 05:12), Max: 98.5 (17 Jul 2019 13:28)  HR: 63 (18 Jul 2019 05:12) (61 - 67)  BP: 184/78 (18 Jul 2019 05:12) (158/63 - 184/78)  BP(mean): --  RR: 18 (18 Jul 2019 05:12) (16 - 18)  SpO2: 97% (18 Jul 2019 05:12) (96% - 97%)    PHYSICAL EXAM:  GENERAL: NAD.  CHEST/LUNG: Clear to auscultation; No rales, rhonchi, or wheezing.  Respiratory effort does not appear labored.  HEART: Regular rate and rhythm; S1 and S2,  no murmurs, rubs, or gallops.  ABDOMEN: Soft, not tender to palpation.  No masses or HSM appreciated.  No distension.  Bowel sounds present.  EXTREMITIES:  No clubbing, cyanosis, or edema.  Moves all extremities with strength 5/5.  SKIN: No obvious rashes or lesions.  Turgor okay.  NEURO:  Alert and oriented x 3, no focal sensory or  motor deficit, DTR 2+ bilaterally.      Labs:                        9.3    9.33  )-----------( 226      ( 18 Jul 2019 06:25 )             28.8     07-18    139  |  102  |  55<H>  ----------------------------<  61<L>  4.2   |  25  |  5.06<H>    Ca    9.2 corrected    18 Jul 2019 06:25  Phos  5.9     07-18  Mg     2.1     07-18    TPro  6.3  /  Alb  2.5<L>  /  TBili  < 0.2<L>  /  DBili  x   /  AST  23  /  ALT  9   /  AlkPhos  59  07-18 Patient is a 54y old  Male who presents with a chief complaint of Generalized weakness (17 Jul 2019 13:16)      INTERVAL HPI/OVERNIGHT EVENTS:  Pt was hypertensive to  overnight, started lisinopril 2.5 this morning. Per Renal recs, pt was started on sevelamer yesterday. This morning pt is at HD.     REVIEW OF SYSTEMS:    MEDICATIONS  (STANDING):  amLODIPine   Tablet 10 milliGRAM(s) Oral daily  carvedilol 6.25 milliGRAM(s) Oral every 12 hours  chlorhexidine 4% Liquid 1 Application(s) Topical <User Schedule>  cilostazol 50 milliGRAM(s) Oral two times a day  dextrose 5%. 1000 milliLiter(s) (50 mL/Hr) IV Continuous <Continuous>  dextrose 50% Injectable 12.5 Gram(s) IV Push once  dextrose 50% Injectable 25 Gram(s) IV Push once  dextrose 50% Injectable 25 Gram(s) IV Push once  diVALproex  milliGRAM(s) Oral three times a day  epoetin gustavo Injectable 4000 Unit(s) SubCutaneous <User Schedule>  ferrous    sulfate 325 milliGRAM(s) Oral daily  gabapentin 100 milliGRAM(s) Oral daily  heparin  Injectable 5000 Unit(s) SubCutaneous every 12 hours  hydrALAZINE 50 milliGRAM(s) Oral every 8 hours  insulin lispro (HumaLOG) corrective regimen sliding scale   SubCutaneous three times a day before meals  insulin lispro (HumaLOG) corrective regimen sliding scale   SubCutaneous at bedtime  OLANZapine 20 milliGRAM(s) Oral at bedtime  sevelamer carbonate 800 milliGRAM(s) Oral three times a day with meals  sodium bicarbonate 650 milliGRAM(s) Oral two times a day  traZODone 100 milliGRAM(s) Oral daily    MEDICATIONS  (PRN):  dextrose 40% Gel 15 Gram(s) Oral once PRN Blood Glucose LESS THAN 70 milliGRAM(s)/deciliter  glucagon  Injectable 1 milliGRAM(s) IntraMuscular once PRN Glucose LESS THAN 70 milligrams/deciliter  ondansetron    Tablet 4 milliGRAM(s) Oral every 6 hours PRN Nausea and/or Vomiting      Allergies    No Known Allergies    Intolerances        Vital Signs Last 24 Hrs  T(C): 36.7 (18 Jul 2019 05:12), Max: 36.9 (17 Jul 2019 13:28)  T(F): 98 (18 Jul 2019 05:12), Max: 98.5 (17 Jul 2019 13:28)  HR: 63 (18 Jul 2019 05:12) (61 - 67)  BP: 184/78 (18 Jul 2019 05:12) (158/63 - 184/78)  BP(mean): --  RR: 18 (18 Jul 2019 05:12) (16 - 18)  SpO2: 97% (18 Jul 2019 05:12) (96% - 97%)    PHYSICAL EXAM:  GENERAL: NAD.  CHEST/LUNG: Clear to auscultation; No rales, rhonchi, or wheezing.  Respiratory effort does not appear labored.  HEART: Regular rate and rhythm; S1 and S2,  no murmurs, rubs, or gallops.  ABDOMEN: Soft, not tender to palpation.  No masses or HSM appreciated.  No distension.  Bowel sounds present.  EXTREMITIES:  No clubbing, cyanosis, or edema.  Moves all extremities with strength 5/5.  SKIN: No obvious rashes or lesions.  Turgor okay.  NEURO:  Alert and oriented x 3, no focal sensory or  motor deficit, DTR 2+ bilaterally.      Labs:                        9.3    9.33  )-----------( 226      ( 18 Jul 2019 06:25 )             28.8     07-18    139  |  102  |  55<H>  ----------------------------<  61<L>  4.2   |  25  |  5.06<H>    Ca    9.2 corrected    18 Jul 2019 06:25  Phos  5.9     07-18  Mg     2.1     07-18    TPro  6.3  /  Alb  2.5<L>  /  TBili  < 0.2<L>  /  DBili  x   /  AST  23  /  ALT  9   /  AlkPhos  59  07-18    Renal note reviewed Patient is a 54y old  Male who presents with a chief complaint of Generalized weakness (17 Jul 2019 13:16)      INTERVAL HPI/OVERNIGHT EVENTS:  Pt was hypertensive to  overnight, started lisinopril 2.5 this morning. Per Renal recs, pt was started on sevelamer yesterday. This morning pt is at HD. This afternoon pt is sitting up at the edge of his bed. He is much more awake and interactive. Denies chest pain, headache, abdominal pain, dizziness.        MEDICATIONS  (STANDING):  amLODIPine   Tablet 10 milliGRAM(s) Oral daily  carvedilol 6.25 milliGRAM(s) Oral every 12 hours  chlorhexidine 4% Liquid 1 Application(s) Topical <User Schedule>  cilostazol 50 milliGRAM(s) Oral two times a day  dextrose 5%. 1000 milliLiter(s) (50 mL/Hr) IV Continuous <Continuous>  dextrose 50% Injectable 12.5 Gram(s) IV Push once  dextrose 50% Injectable 25 Gram(s) IV Push once  dextrose 50% Injectable 25 Gram(s) IV Push once  diVALproex  milliGRAM(s) Oral three times a day  epoetin gustavo Injectable 4000 Unit(s) SubCutaneous <User Schedule>  ferrous    sulfate 325 milliGRAM(s) Oral daily  gabapentin 100 milliGRAM(s) Oral daily  heparin  Injectable 5000 Unit(s) SubCutaneous every 12 hours  hydrALAZINE 50 milliGRAM(s) Oral every 8 hours  insulin lispro (HumaLOG) corrective regimen sliding scale   SubCutaneous three times a day before meals  insulin lispro (HumaLOG) corrective regimen sliding scale   SubCutaneous at bedtime  OLANZapine 20 milliGRAM(s) Oral at bedtime  sevelamer carbonate 800 milliGRAM(s) Oral three times a day with meals  sodium bicarbonate 650 milliGRAM(s) Oral two times a day  traZODone 100 milliGRAM(s) Oral daily    MEDICATIONS  (PRN):  dextrose 40% Gel 15 Gram(s) Oral once PRN Blood Glucose LESS THAN 70 milliGRAM(s)/deciliter  glucagon  Injectable 1 milliGRAM(s) IntraMuscular once PRN Glucose LESS THAN 70 milligrams/deciliter  ondansetron    Tablet 4 milliGRAM(s) Oral every 6 hours PRN Nausea and/or Vomiting      Allergies    No Known Allergies    Intolerances        Vital Signs Last 24 Hrs  T(C): 36.7 (18 Jul 2019 05:12), Max: 36.9 (17 Jul 2019 13:28)  T(F): 98 (18 Jul 2019 05:12), Max: 98.5 (17 Jul 2019 13:28)  HR: 63 (18 Jul 2019 05:12) (61 - 67)  BP: 184/78 (18 Jul 2019 05:12) (158/63 - 184/78)  RR: 18 (18 Jul 2019 05:12) (16 - 18)  SpO2: 97% (18 Jul 2019 05:12) (96% - 97%)    PHYSICAL EXAM:  GENERAL: NAD. Sitting up at the edge of his bed  HEENT: EOMI, PERRL, MMM  CHEST/LUNG: Clear to auscultation; No rales, rhonchi, or wheezing.  Respiratory effort does not appear labored.  HEART: Regular rate and rhythm; S1 and S2,  no murmurs, rubs, or gallops.  ABDOMEN: Soft, not tender to palpation.  No masses or HSM appreciated.  No distension.  Bowel sounds present.  : condom cath  EXTREMITIES:  No clubbing, cyanosis, or edema.  Moves all extremities with strength 5/5. No asterixis.   NEURO:  Alert and oriented x 2, no focal sensory or  motor deficit, DTR 2+ bilaterally.      Labs:                        9.3    9.33  )-----------( 226      ( 18 Jul 2019 06:25 )             28.8     07-18    139  |  102  |  55<H>  ----------------------------<  61<L>  4.2   |  25  |  5.06<H>    Ca    9.2 corrected    18 Jul 2019 06:25  Phos  5.9     07-18  Mg     2.1     07-18    TPro  6.3  /  Alb  2.5<L>  /  TBili  < 0.2<L>  /  DBili  x   /  AST  23  /  ALT  9   /  AlkPhos  59  07-18    Renal note reviewed

## 2019-07-18 NOTE — PROGRESS NOTE ADULT - PROBLEM SELECTOR PLAN 7
pt with bipolar and schizophrenia. Per PCP, pt requires multiple repetitions of questions at baseline.  - continue home olanzapine DVT: HSQ   Renal diet  trazodone for sleep

## 2019-07-18 NOTE — PROGRESS NOTE ADULT - PROBLEM SELECTOR PLAN 1
Pt with uremic encephalopathy secondary to ESRD, s/p HD x3 (7/13, 7/15, 7/16) and AV fistula revision and RIJ permacath placement (7/14). Pt's mental status is likely at baseline, as per PCP pt previously required multiple repetitions of questions. Private nephrologist is Dr. Aidan Diaz ([phone]) from [facility]. Pt is at risk for dialysis disequilibrium syndrome.   - nephrology following, recs appreciated  - dialysis today   - Will monitor for dialysis disequilibrium syndrome (headache, nausea, blurred vision, dizziness)  - s/p AV fistula revision (7/14) and RIJ permacath placement (7/14)   - s/p HD x3 (7/13, 7/15, 7/16, 7/18) Pt with uremic encephalopathy secondary to ESRD, s/p HD x3 (7/13, 7/15, 7/16) and AV fistula revision and RIJ permacath placement (7/14). Pt's mental status is likely at baseline, as per PCP pt previously required multiple repetitions of questions. Private nephrolog[facility][phone][facility]   - nephrology following, recs appreciated  - dialysis today   - s/p AV fistula revision (7/14) and RIJ permacath placement (7/14)   - s/p HD x3 (7/13, 7/15, 7/16, 7/18) Pt with uremic encephalopathy secondary to ESRD, s/p HD x4 (7/13, 7/15, 7/16, 7/18) and AV fistula revision and RIJ permacath placement (7/14). Pt's mental status is likely at baseline, as per PCP pt previously required multiple repetitions of questions. Private nephrologist is Dr[facility] ([phone]   - nephrology following, recs appreciated  - dialysis today   - s/p AV fistula revision (7/14) and RIJ permacath placement (7/14)   - s/p HD x4 (7/13, 7/15, 7/16, 7/18)

## 2019-07-18 NOTE — PROGRESS NOTE ADULT - PROBLEM SELECTOR PLAN 4
Pt. with hyperphosphatemia in setting of ESRD. Recommend starting Renvela 800 mg TID with meals. Low phosphorus diet advised. Monitor serum phosphorus

## 2019-07-18 NOTE — PROGRESS NOTE ADULT - PROBLEM SELECTOR PLAN 4
Pt has worsening ESRD, creatinine 8.47 from 6.87 (3/2019) and hyperphosphatemia. Patient had AV fistula place in 4/2019, AV fistula revision 7/14 and RIJ permacath placement 7/14.   - nephrology following, recs appreciated    - HD today  - f/u with  [facility] regarding outpatient HD  - s/p HD x3 Pt has worsening ESRD, creatinine 8.47 from 6.87 (3/2019) and hyperphosphatemia. Patient had AV fistula place in 4/2019, AV fistula revision 7/14 and RIJ permacath placement 7/14.   - nephrology following, recs appreciated    - HD today  - f/u with social work regarding outpatient HD  - s/p HD x3 Pt has worsening ESRD, creatinine 8.47 from 6.87 (3/2019) and hyperphosphatemia. Patient had AV fistula place in 4/2019, AV fistula revision 7/14 and RIJ permacath placement 7/14.   - nephrology following, recs appreciated    - HD today  - f/u with social work regarding outpatient HD  - s/p HD x4

## 2019-07-18 NOTE — PROGRESS NOTE ADULT - SUBJECTIVE AND OBJECTIVE BOX
[facility] DIVISION OF KIDNEY DISEASES AND HYPERTENSION -- FOLLOW UP NOTE  --------------------------------------------------------------------------------  HPI:  Pt. with advanced renal failure/CKD, initiated on long-term HD for uremia on 7/13/19. Pt. follows with nephrologist Dr. Diaz as outpatient and had an LUE AVF created ~2 months ago by Dr. Agrawal. AVF infiltrated during cannulation hence pt. underwent RIJ non tunneled HD catheter placement on 7/13/19. Pt. subsequently underwent AVF revision and RIJ tunneled HD catheter placement on 7/14/19. Patient currently on maintenance HD.    Patient evaluated in dialysis center, denies any complaints. Nurse reports lower blood flow through HD catheter. BP tolerating. More talkative and awake today.    PAST HISTORY  --------------------------------------------------------------------------------  No significant changes to PMH, PSH, FHx, SHx, unless otherwise noted    ALLERGIES & MEDICATIONS  --------------------------------------------------------------------------------  Allergies    No Known Allergies    Intolerances    Standing Inpatient Medications  amLODIPine   Tablet 10 milliGRAM(s) Oral daily  carvedilol 6.25 milliGRAM(s) Oral every 12 hours  chlorhexidine 4% Liquid 1 Application(s) Topical <User Schedule>  cilostazol 50 milliGRAM(s) Oral two times a day  dextrose 5%. 1000 milliLiter(s) IV Continuous <Continuous>  dextrose 50% Injectable 12.5 Gram(s) IV Push once  dextrose 50% Injectable 25 Gram(s) IV Push once  dextrose 50% Injectable 25 Gram(s) IV Push once  diVALproex  milliGRAM(s) Oral three times a day  epoetin gustavo Injectable 4000 Unit(s) SubCutaneous <User Schedule>  ferrous    sulfate 325 milliGRAM(s) Oral daily  gabapentin 100 milliGRAM(s) Oral daily  heparin  Injectable 5000 Unit(s) SubCutaneous every 12 hours  hydrALAZINE 50 milliGRAM(s) Oral every 8 hours  insulin lispro (HumaLOG) corrective regimen sliding scale   SubCutaneous three times a day before meals  insulin lispro (HumaLOG) corrective regimen sliding scale   SubCutaneous at bedtime  lisinopril 2.5 milliGRAM(s) Oral daily  OLANZapine 20 milliGRAM(s) Oral at bedtime  sevelamer carbonate 800 milliGRAM(s) Oral three times a day with meals  sodium bicarbonate 650 milliGRAM(s) Oral two times a day  traZODone 100 milliGRAM(s) Oral daily    REVIEW OF SYSTEMS  --------------------------------------------------------------------------------  Respiratory: No dyspnea  CV: No chest pain  GI: No abdominal pain  Neuro: No dizziness  LE: No edema    All other systems were reviewed and are negative, except as noted.    VITALS/PHYSICAL EXAM  --------------------------------------------------------------------------------  T(C): 37.1 (07-18-19 @ 10:15), Max: 37.1 (07-18-19 @ 10:15)  HR: 62 (07-18-19 @ 10:15) (61 - 67)  BP: 114/62 (07-18-19 @ 10:15) (114/62 - 185/82)  RR: 18 (07-18-19 @ 10:15) (16 - 18)  SpO2: 97% (07-18-19 @ 05:12) (96% - 97%)  Wt(kg): --        07-17-19 @ 07:01  -  07-18-19 @ 07:00  --------------------------------------------------------  IN: 0 mL / OUT: 2200 mL / NET: -2200 mL    Physical Exam:  	Gen: NAD  	HEENT: MMM  	Pulm: CTA B/L  	CV: S1S2  	Abd: Soft, +BS   	Ext: No LE edema B/L  	Neuro: Sleeping but arousable  	Skin: Warm and dry  	Vascular access: Right IJ tunneled HD catheter, no hematoma, no signs of bleeding. LUE AV fistula with overlying ecchymosis, (+) bruit    LABS/STUDIES  --------------------------------------------------------------------------------              9.3    9.33  >-----------<  226      [07-18-19 @ 06:25]              28.8     139  |  102  |  55  ----------------------------<  61      [07-18-19 @ 06:25]  4.2   |  25  |  5.06        Ca     8.0     [07-18-19 @ 06:25]      Mg     2.1     [07-18-19 @ 06:25]      Phos  5.9     [07-18-19 @ 06:25]    TPro  6.3  /  Alb  2.5  /  TBili  < 0.2  /  DBili  x   /  AST  23  /  ALT  9   /  AlkPhos  59  [07-18-19 @ 06:25]    Creatinine Trend:  SCr 5.06 [07-18 @ 06:25]  SCr 4.11 [07-17 @ 05:13]  SCr 5.59 [07-16 @ 06:30]  SCr 7.38 [07-15 @ 05:50]  SCr 7.12 [07-14 @ 02:43]

## 2019-07-18 NOTE — PROGRESS NOTE ADULT - PROBLEM SELECTOR PLAN 6
Patient with pH of 7.19 on admission, likely 2/2 uremia.   - urgent HD as above  - c/w sodium bicarb pt with bipolar and schizophrenia. Per PCP, pt requires multiple repetitions of questions at baseline.  - continue home olanzapine

## 2019-07-19 ENCOUNTER — TRANSCRIPTION ENCOUNTER (OUTPATIENT)
Age: 54
End: 2019-07-19

## 2019-07-19 VITALS — SYSTOLIC BLOOD PRESSURE: 147 MMHG | DIASTOLIC BLOOD PRESSURE: 59 MMHG | HEART RATE: 66 BPM

## 2019-07-19 DIAGNOSIS — N18.5 CHRONIC KIDNEY DISEASE, STAGE 5: ICD-10-CM

## 2019-07-19 LAB
ALBUMIN SERPL ELPH-MCNC: 2.5 G/DL — LOW (ref 3.3–5)
ALP SERPL-CCNC: 59 U/L — SIGNIFICANT CHANGE UP (ref 40–120)
ALT FLD-CCNC: 8 U/L — SIGNIFICANT CHANGE UP (ref 4–41)
ANION GAP SERPL CALC-SCNC: 11 MMO/L — SIGNIFICANT CHANGE UP (ref 7–14)
AST SERPL-CCNC: 23 U/L — SIGNIFICANT CHANGE UP (ref 4–40)
BASOPHILS # BLD AUTO: 0.09 K/UL — SIGNIFICANT CHANGE UP (ref 0–0.2)
BASOPHILS NFR BLD AUTO: 1 % — SIGNIFICANT CHANGE UP (ref 0–2)
BILIRUB SERPL-MCNC: < 0.2 MG/DL — LOW (ref 0.2–1.2)
BUN SERPL-MCNC: 44 MG/DL — HIGH (ref 7–23)
CALCIUM SERPL-MCNC: 8.4 MG/DL — SIGNIFICANT CHANGE UP (ref 8.4–10.5)
CHLORIDE SERPL-SCNC: 99 MMOL/L — SIGNIFICANT CHANGE UP (ref 98–107)
CO2 SERPL-SCNC: 25 MMOL/L — SIGNIFICANT CHANGE UP (ref 22–31)
CREAT SERPL-MCNC: 4.33 MG/DL — HIGH (ref 0.5–1.3)
EOSINOPHIL # BLD AUTO: 0.16 K/UL — SIGNIFICANT CHANGE UP (ref 0–0.5)
EOSINOPHIL NFR BLD AUTO: 1.7 % — SIGNIFICANT CHANGE UP (ref 0–6)
GLUCOSE SERPL-MCNC: 65 MG/DL — LOW (ref 70–99)
HCT VFR BLD CALC: 31 % — LOW (ref 39–50)
HGB BLD-MCNC: 9.8 G/DL — LOW (ref 13–17)
IMM GRANULOCYTES NFR BLD AUTO: 4.8 % — HIGH (ref 0–1.5)
LYMPHOCYTES # BLD AUTO: 2.58 K/UL — SIGNIFICANT CHANGE UP (ref 1–3.3)
LYMPHOCYTES # BLD AUTO: 27.7 % — SIGNIFICANT CHANGE UP (ref 13–44)
MAGNESIUM SERPL-MCNC: 2.1 MG/DL — SIGNIFICANT CHANGE UP (ref 1.6–2.6)
MCHC RBC-ENTMCNC: 30.4 PG — SIGNIFICANT CHANGE UP (ref 27–34)
MCHC RBC-ENTMCNC: 31.6 % — LOW (ref 32–36)
MCV RBC AUTO: 96.3 FL — SIGNIFICANT CHANGE UP (ref 80–100)
MONOCYTES # BLD AUTO: 1.36 K/UL — HIGH (ref 0–0.9)
MONOCYTES NFR BLD AUTO: 14.6 % — HIGH (ref 2–14)
NEUTROPHILS # BLD AUTO: 4.67 K/UL — SIGNIFICANT CHANGE UP (ref 1.8–7.4)
NEUTROPHILS NFR BLD AUTO: 50.2 % — SIGNIFICANT CHANGE UP (ref 43–77)
NRBC # FLD: 0 K/UL — SIGNIFICANT CHANGE UP (ref 0–0)
PHOSPHATE SERPL-MCNC: 4.9 MG/DL — HIGH (ref 2.5–4.5)
PLATELET # BLD AUTO: 252 K/UL — SIGNIFICANT CHANGE UP (ref 150–400)
PMV BLD: 9.6 FL — SIGNIFICANT CHANGE UP (ref 7–13)
POTASSIUM SERPL-MCNC: 4.5 MMOL/L — SIGNIFICANT CHANGE UP (ref 3.5–5.3)
POTASSIUM SERPL-SCNC: 4.5 MMOL/L — SIGNIFICANT CHANGE UP (ref 3.5–5.3)
PROT SERPL-MCNC: 6.3 G/DL — SIGNIFICANT CHANGE UP (ref 6–8.3)
RBC # BLD: 3.22 M/UL — LOW (ref 4.2–5.8)
RBC # FLD: 16.8 % — HIGH (ref 10.3–14.5)
SODIUM SERPL-SCNC: 135 MMOL/L — SIGNIFICANT CHANGE UP (ref 135–145)
WBC # BLD: 9.31 K/UL — SIGNIFICANT CHANGE UP (ref 3.8–10.5)
WBC # FLD AUTO: 9.31 K/UL — SIGNIFICANT CHANGE UP (ref 3.8–10.5)

## 2019-07-19 PROCEDURE — 99239 HOSP IP/OBS DSCHRG MGMT >30: CPT

## 2019-07-19 RX ORDER — ERYTHROPOIETIN 10000 [IU]/ML
0 INJECTION, SOLUTION INTRAVENOUS; SUBCUTANEOUS
Qty: 0 | Refills: 0 | DISCHARGE
Start: 2019-07-19

## 2019-07-19 RX ORDER — LISINOPRIL 2.5 MG/1
1 TABLET ORAL
Qty: 0 | Refills: 0 | DISCHARGE
Start: 2019-07-19

## 2019-07-19 RX ORDER — HYDRALAZINE HCL 50 MG
1 TABLET ORAL
Qty: 0 | Refills: 0 | DISCHARGE

## 2019-07-19 RX ORDER — LINAGLIPTIN 5 MG/1
1 TABLET, FILM COATED ORAL
Qty: 0 | Refills: 0 | DISCHARGE

## 2019-07-19 RX ORDER — PATIROMER 8.4 G/1
1 POWDER, FOR SUSPENSION ORAL
Qty: 0 | Refills: 0 | DISCHARGE

## 2019-07-19 RX ORDER — SEVELAMER CARBONATE 2400 MG/1
1 POWDER, FOR SUSPENSION ORAL
Qty: 0 | Refills: 0 | DISCHARGE
Start: 2019-07-19

## 2019-07-19 RX ORDER — CARVEDILOL PHOSPHATE 80 MG/1
1 CAPSULE, EXTENDED RELEASE ORAL
Qty: 0 | Refills: 0 | DISCHARGE

## 2019-07-19 RX ADMIN — DIVALPROEX SODIUM 500 MILLIGRAM(S): 500 TABLET, DELAYED RELEASE ORAL at 13:02

## 2019-07-19 RX ADMIN — SEVELAMER CARBONATE 800 MILLIGRAM(S): 2400 POWDER, FOR SUSPENSION ORAL at 13:03

## 2019-07-19 RX ADMIN — Medication 100 MILLIGRAM(S): at 13:03

## 2019-07-19 RX ADMIN — AMLODIPINE BESYLATE 10 MILLIGRAM(S): 2.5 TABLET ORAL at 06:11

## 2019-07-19 RX ADMIN — LISINOPRIL 2.5 MILLIGRAM(S): 2.5 TABLET ORAL at 06:11

## 2019-07-19 RX ADMIN — Medication 2: at 13:01

## 2019-07-19 RX ADMIN — Medication 650 MILLIGRAM(S): at 06:11

## 2019-07-19 RX ADMIN — HEPARIN SODIUM 5000 UNIT(S): 5000 INJECTION INTRAVENOUS; SUBCUTANEOUS at 18:33

## 2019-07-19 RX ADMIN — HEPARIN SODIUM 5000 UNIT(S): 5000 INJECTION INTRAVENOUS; SUBCUTANEOUS at 06:11

## 2019-07-19 RX ADMIN — DIVALPROEX SODIUM 500 MILLIGRAM(S): 500 TABLET, DELAYED RELEASE ORAL at 06:11

## 2019-07-19 RX ADMIN — CILOSTAZOL 50 MILLIGRAM(S): 100 TABLET ORAL at 18:33

## 2019-07-19 RX ADMIN — GABAPENTIN 100 MILLIGRAM(S): 400 CAPSULE ORAL at 13:03

## 2019-07-19 RX ADMIN — Medication 650 MILLIGRAM(S): at 18:33

## 2019-07-19 RX ADMIN — CHLORHEXIDINE GLUCONATE 1 APPLICATION(S): 213 SOLUTION TOPICAL at 13:03

## 2019-07-19 RX ADMIN — Medication 50 MILLIGRAM(S): at 06:11

## 2019-07-19 RX ADMIN — CARVEDILOL PHOSPHATE 6.25 MILLIGRAM(S): 80 CAPSULE, EXTENDED RELEASE ORAL at 06:11

## 2019-07-19 RX ADMIN — CARVEDILOL PHOSPHATE 6.25 MILLIGRAM(S): 80 CAPSULE, EXTENDED RELEASE ORAL at 18:33

## 2019-07-19 RX ADMIN — SEVELAMER CARBONATE 800 MILLIGRAM(S): 2400 POWDER, FOR SUSPENSION ORAL at 18:33

## 2019-07-19 RX ADMIN — Medication 325 MILLIGRAM(S): at 13:04

## 2019-07-19 RX ADMIN — CILOSTAZOL 50 MILLIGRAM(S): 100 TABLET ORAL at 06:11

## 2019-07-19 RX ADMIN — SEVELAMER CARBONATE 800 MILLIGRAM(S): 2400 POWDER, FOR SUSPENSION ORAL at 10:19

## 2019-07-19 NOTE — PROVIDER CONTACT NOTE (OTHER) - RECOMMENDATIONS
AVF needs to dilate more
apple juice 15gm
Notify MD
apple juice 15gm
continue to monitor.
give BP medications.
hold Coreg and Hydralazine as per parameters
notify MD, hold hydralazine as per parameters
notify MD. Administer scheduled BP medication. Continue to monitor.

## 2019-07-19 NOTE — PROGRESS NOTE ADULT - REASON FOR ADMISSION
Generalized weakness

## 2019-07-19 NOTE — PROVIDER CONTACT NOTE (OTHER) - SITUATION
FS 61
/78
/85. pts BP meds were held due to pt going to dialysis.
BP outside of parameter. IX=891/78
FS 61 at 8:42 and 08:44 Pt FS at 9:03 is 82
Pt had heart rate in the fifties at the end of treatment. Very lethargic
Pt scheduled for his 1st HD today, but unable to start HD. AVF with + bruit +thrill but when cannulating pt, access infiltrates.  Have tried cannulating 3x with 2 different nurses.
hr 58
low heart rate
patient /106

## 2019-07-19 NOTE — PROVIDER CONTACT NOTE (OTHER) - NAME OF MD/NP/PA/DO NOTIFIED:
Dr Velazquez
Alisson
Dr. Albarado
Dr. Garcia
Dr. Garcia
Dr. Naranjo #[id number]
Dr. Velazquez
Dr. Wynn, Dr. Escobedo
MD Vinson
Verena Lanier

## 2019-07-19 NOTE — MEDICAL STUDENT PROGRESS NOTE(EDUCATION) - SUBJECTIVE AND OBJECTIVE BOX
The patient is a 54 year old man with a PMH of ESRD with no prior dialysis before admission s/p AV fistula revision and right IJ permacath placement, HTN, schizophrenia who was admitted on 7/13 for generalized weakness.    Overnight events: No acute overnight events. Blood pressure has improved since the previous night (152/79 @ 21:15)     Subjective: Patient is awake and conversational. He complains that he has not been sleeping well because he is cold. He has been eating. Denies headaches, nausea, dizziness.     Vital Signs Last 24 Hrs  T(C): 36.6 (19 Jul 2019 05:43), Max: 37.1 (18 Jul 2019 10:15)  T(F): 97.9 (19 Jul 2019 05:43), Max: 98.7 (18 Jul 2019 10:15)  HR: 59 (19 Jul 2019 05:43) (59 - 69)  BP: 175/68 (19 Jul 2019 05:43) (107/94 - 175/68)  BP(mean): --  RR: 17 (19 Jul 2019 05:43) (17 - 18)  SpO2: 98% (19 Jul 2019 05:43) (97% - 98%)    I&O's Summary    18 Jul 2019 07:01  -  19 Jul 2019 07:00  --------------------------------------------------------  IN: 400 mL / OUT: 1860 mL / NET: -1460 mL        PHYSICAL EXAM:  GENERAL: Awake, lying in bed  CHEST/LUNG: Clear to auscultation; No rales, rhonchi, or wheezing.  Respiratory effort does not appear labored.  HEART: Regular rate and rhythm; S1 and S2,  no murmurs, rubs, or gallops.  ABDOMEN: Soft, not tender to palpation, non-distended.  Bowel sounds present.  EXTREMITIES:  No clubbing, cyanosis, or edema.   NEURO:  Alert and oriented x 2    LABS:                                   9.8    9.31  )-----------( 252      ( 19 Jul 2019 06:30 )             31.0     Hemoglobin 7/18-7/19: 9.3 --> 9.8  Hct 7/18-7/19: 28.8 --> 31    07-19    135  |  99  |  44<H>  ----------------------------<  65<L>  4.5   |  25  |  4.33<H>    BUN 7/18-7/19: 55 --> 44  Cr 7/18-7/19: 5.9 --> 4.9    Ca    8.4      19 Jul 2019 06:30  Phos  4.9     07-19  Mg     2.1     07-19    Phos 7/18-7/19: 5.9 --> 4.9    TPro  6.3  /  Alb  2.5<L>  /  TBili  < 0.2<L>  /  DBili  x   /  AST  23  /  ALT  8   /  AlkPhos  59  07-19          CAPILLARY BLOOD GLUCOSE      POCT Blood Glucose.: 127 mg/dL (18 Jul 2019 21:43)  POCT Blood Glucose.: 103 mg/dL (18 Jul 2019 17:17)  POCT Blood Glucose.: 101 mg/dL (18 Jul 2019 12:26)  POCT Blood Glucose.: 77 mg/dL (18 Jul 2019 08:38) The patient is a 54 year old man with a PMH of ESRD with no prior dialysis before admission s/p AV fistula revision and right IJ permacath placement, HTN, schizophrenia who was admitted on 7/13 for generalized weakness.    Overnight events: No acute overnight events. Blood pressure has improved since the previous night (152/79 @ 21:15)     Subjective: Patient is awake and conversational. He complains that he has not been sleeping well because he is cold. He has been eating. Denies headaches, nausea, dizziness.     MEDICATIONS  (PRN):  dextrose 40% Gel 15 Gram(s) Oral once PRN Blood Glucose LESS THAN 70 milliGRAM(s)/deciliter  glucagon  Injectable 1 milliGRAM(s) IntraMuscular once PRN Glucose LESS THAN 70 milligrams/deciliter  ondansetron    Tablet 4 milliGRAM(s) Oral every 6 hours PRN Nausea and/or Vomiting    MEDICATIONS  (STANDING):  amLODIPine   Tablet 10 milliGRAM(s) Oral daily  carvedilol 6.25 milliGRAM(s) Oral every 12 hours  chlorhexidine 4% Liquid 1 Application(s) Topical <User Schedule>  cilostazol 50 milliGRAM(s) Oral two times a day  dextrose 5%. 1000 milliLiter(s) (50 mL/Hr) IV Continuous <Continuous>  dextrose 50% Injectable 12.5 Gram(s) IV Push once  dextrose 50% Injectable 25 Gram(s) IV Push once  dextrose 50% Injectable 25 Gram(s) IV Push once  diVALproex  milliGRAM(s) Oral three times a day  epoetin gustavo Injectable 4000 Unit(s) IV Push <User Schedule>  ferrous    sulfate 325 milliGRAM(s) Oral daily  gabapentin 100 milliGRAM(s) Oral daily  heparin  Injectable 5000 Unit(s) SubCutaneous every 12 hours  hydrALAZINE 50 milliGRAM(s) Oral every 8 hours  insulin lispro (HumaLOG) corrective regimen sliding scale   SubCutaneous three times a day before meals  insulin lispro (HumaLOG) corrective regimen sliding scale   SubCutaneous at bedtime  lisinopril 2.5 milliGRAM(s) Oral daily  OLANZapine 20 milliGRAM(s) Oral at bedtime  sevelamer carbonate 800 milliGRAM(s) Oral three times a day with meals  sodium bicarbonate 650 milliGRAM(s) Oral two times a day  traZODone 100 milliGRAM(s) Oral daily      Objective    Vital Signs Last 24 Hrs  T(C): 36.6 (19 Jul 2019 05:43), Max: 37.1 (18 Jul 2019 10:15)  T(F): 97.9 (19 Jul 2019 05:43), Max: 98.7 (18 Jul 2019 10:15)  HR: 59 (19 Jul 2019 05:43) (59 - 69)  BP: 175/68 (19 Jul 2019 05:43) (107/94 - 175/68)  BP(mean): --  RR: 17 (19 Jul 2019 05:43) (17 - 18)  SpO2: 98% (19 Jul 2019 05:43) (97% - 98%)    I&O's Summary    18 Jul 2019 07:01  -  19 Jul 2019 07:00  --------------------------------------------------------  IN: 400 mL / OUT: 1860 mL / NET: -1460 mL        PHYSICAL EXAM:  GENERAL: Awake, lying in bed  CHEST/LUNG: Clear to auscultation; No rales, rhonchi, or wheezing.  Respiratory effort does not appear labored.  HEART: Regular rate and rhythm; S1 and S2,  no murmurs, rubs, or gallops.  ABDOMEN: Soft, not tender to palpation, non-distended.  Bowel sounds present.  EXTREMITIES:  No clubbing, cyanosis, or edema.   NEURO:  Alert and oriented x 2    LABS:                                   9.8    9.31  )-----------( 252      ( 19 Jul 2019 06:30 )             31.0     Hemoglobin 7/18-7/19: 9.3 --> 9.8  Hct 7/18-7/19: 28.8 --> 31    07-19    135  |  99  |  44<H>  ----------------------------<  65<L>  4.5   |  25  |  4.33<H>    BUN 7/18-7/19: 55 --> 44  Cr 7/18-7/19: 5.9 --> 4.9    Ca    8.4      19 Jul 2019 06:30  Phos  4.9     07-19  Mg     2.1     07-19    Phos 7/18-7/19: 5.9 --> 4.9    TPro  6.3  /  Alb  2.5<L>  /  TBili  < 0.2<L>  /  DBili  x   /  AST  23  /  ALT  8   /  AlkPhos  59  07-19          CAPILLARY BLOOD GLUCOSE      POCT Blood Glucose.: 127 mg/dL (18 Jul 2019 21:43)  POCT Blood Glucose.: 103 mg/dL (18 Jul 2019 17:17)  POCT Blood Glucose.: 101 mg/dL (18 Jul 2019 12:26)  POCT Blood Glucose.: 77 mg/dL (18 Jul 2019 08:38)

## 2019-07-19 NOTE — PROGRESS NOTE ADULT - PROBLEM SELECTOR PLAN 5
hyperphosphatemia i/s/o of ESRD, with secondary hyperparathyroidism.   - renal-low-phos diet  - trend phos level  - HD today  - c/w sevelamer pt with bipolar and schizophrenia. Per PCP, pt requires multiple repetitions of questions at baseline.  - continue home olanzapine

## 2019-07-19 NOTE — PROVIDER CONTACT NOTE (OTHER) - DATE AND TIME:
17-Jul-2019 08:45
13-Jul-2019 14:15
13-Jul-2019 17:00
13-Jul-2019 17:43
14-Jul-2019 18:30
15-Jul-2019 06:48
16-Jul-2019 09:35
17-Jul-2019 09:05
18-Jul-2019 05:30
19-Jul-2019 13:25

## 2019-07-19 NOTE — PROGRESS NOTE ADULT - PROBLEM SELECTOR PLAN 4
Pt has worsening ESRD, creatinine 8.47 from 6.87 (3/2019) and hyperphosphatemia. Patient had AV fistula place in 4/2019, AV fistula revision 7/14 and RIJ permacath placement 7/14.   - nephrology following, recs appreciated    - HD today  - f/u with social work regarding outpatient HD  - s/p HD x4 hyperphosphatemia i/s/o of ESRD, with secondary hyperparathyroidism.   - renal-low-phos diet  - trend phos level  - c/w sevelamer

## 2019-07-19 NOTE — PROGRESS NOTE ADULT - ASSESSMENT
52yo M with HTN, bipolar disorder, schizophrenia, CKD s/p AV fistula 4/2019, presents with generalized weakness and somnolence, admitted for uremic encephalopathy and metabolic acidosis, s/p AV fistula revision and RIJ permacath placement (7/14) , HD x4 (7/13, 7/15, 7/16, 7/18).

## 2019-07-19 NOTE — PROGRESS NOTE ADULT - SUBJECTIVE AND OBJECTIVE BOX
Patient is a 54y old  Male who presents with a chief complaint of Generalized weakness (18 Jul 2019 11:27)      INTERVAL HPI/OVERNIGHT EVENTS:  Started Lisinopril 2.5 yesterday - BP overnight was better controlled, although  this morning.     Vital Signs Last 24 Hrs  T(C): 36.6 (19 Jul 2019 05:43), Max: 37.1 (18 Jul 2019 10:15)  T(F): 97.9 (19 Jul 2019 05:43), Max: 98.7 (18 Jul 2019 10:15)  HR: 59 (19 Jul 2019 05:43) (59 - 69)  BP: 175/68 (19 Jul 2019 05:43) (107/94 - 175/68)  RR: 17 (19 Jul 2019 05:43) (17 - 18)  SpO2: 98% (19 Jul 2019 05:43) (97% - 98%)    PHYSICAL EXAM:  GENERAL: NAD.  CHEST/LUNG: Clear to auscultation; No rales, rhonchi, or wheezing.  Respiratory effort does not appear labored.  HEART: Regular rate and rhythm; S1 and S2,  no murmurs, rubs, or gallops.  ABDOMEN: Soft, not tender to palpation.  No masses or HSM appreciated.  No distension.  Bowel sounds present.  EXTREMITIES:  No clubbing, cyanosis, or edema.  Moves all extremities with strength 5/5.  SKIN: No obvious rashes or lesions.  Turgor okay.  NEURO:  Alert and oriented x 3, no focal sensory or  motor deficit, DTR 2+ bilaterally.    LABS:                        9.8    9.31  )-----------( 252      ( 19 Jul 2019 06:30 )             31.0         CAPILLARY BLOOD GLUCOSE      POCT Blood Glucose.: 127 mg/dL (18 Jul 2019 21:43)  POCT Blood Glucose.: 103 mg/dL (18 Jul 2019 17:17)  POCT Blood Glucose.: 101 mg/dL (18 Jul 2019 12:26)  POCT Blood Glucose.: 77 mg/dL (18 Jul 2019 08:38) Patient is a 54y old  Male who presents with a chief complaint of Generalized weakness (18 Jul 2019 11:27)      INTERVAL HPI/OVERNIGHT EVENTS:  Started Lisinopril 2.5 yesterday - BP overnight was better controlled, although  this morning. This morning pt is much more awake and interactive. He is still A&Ox2. Denies nausea, dizziness, headache, SOB, pain.     Vital Signs Last 24 Hrs  T(C): 36.6 (19 Jul 2019 05:43), Max: 37.1 (18 Jul 2019 10:15)  T(F): 97.9 (19 Jul 2019 05:43), Max: 98.7 (18 Jul 2019 10:15)  HR: 59 (19 Jul 2019 05:43) (59 - 69)  BP: 175/68 (19 Jul 2019 05:43) (107/94 - 175/68)  RR: 17 (19 Jul 2019 05:43) (17 - 18)  SpO2: 98% (19 Jul 2019 05:43) (97% - 98%)    PHYSICAL EXAM:  GENERAL: NAD. awake and requesting for things  HEENT: PERRL, EOMI, MMM  CHEST/LUNG: Permacath site clean. Clear to auscultation; No rales, rhonchi, or wheezing.  Respiratory effort does not appear labored.  HEART: Regular rate and rhythm; S1 and S2,  no murmurs, rubs, or gallops.  ABDOMEN: Soft, not tender to palpation.  No masses or HSM appreciated.  No distension.  Bowel sounds present.  EXTREMITIES:  No clubbing, cyanosis, or edema.  Moves all extremities   SKIN: No obvious rashes or lesions.  Turgor okay.  NEURO:  Alert and oriented x2    LABS:                        9.8    9.31  )-----------( 252      ( 19 Jul 2019 06:30 )             31.0     07-19    135  |  99  |  44<H>  ----------------------------<  65<L>  4.5   |  25  |  4.33<H>    Ca    9.6 corrected     19 Jul 2019 06:30  Phos  4.9  (from 5.9)   07-19  Mg     2.1     07-19    TPro  6.3  /  Alb  2.5<L>  /  TBili  < 0.2<L>  /  DBili  x   /  AST  23  /  ALT  8   /  AlkPhos  59  07-19      CAPILLARY BLOOD GLUCOSE      POCT Blood Glucose.: 127 mg/dL (18 Jul 2019 21:43)  POCT Blood Glucose.: 103 mg/dL (18 Jul 2019 17:17)  POCT Blood Glucose.: 101 mg/dL (18 Jul 2019 12:26)  POCT Blood Glucose.: 77 mg/dL (18 Jul 2019 08:38) Patient is a 54y old  Male who presents with a chief complaint of Generalized weakness (18 Jul 2019 11:27)      INTERVAL HPI/OVERNIGHT EVENTS:  Started Lisinopril 2.5 yesterday - BP overnight was better controlled, although  this morning. This morning pt is much more awake and interactive. He is still A&Ox2. Denies nausea, dizziness, headache, SOB, pain.     Vital Signs Last 24 Hrs  T(C): 36.6 (19 Jul 2019 05:43), Max: 37.1 (18 Jul 2019 10:15)  T(F): 97.9 (19 Jul 2019 05:43), Max: 98.7 (18 Jul 2019 10:15)  HR: 59 (19 Jul 2019 05:43) (59 - 69)  BP: 175/68 (19 Jul 2019 05:43) (107/94 - 175/68)  RR: 17 (19 Jul 2019 05:43) (17 - 18)  SpO2: 98% (19 Jul 2019 05:43) (97% - 98%)    PHYSICAL EXAM:  GENERAL: NAD. awake and requesting for things  HEENT: PERRL, EOMI, MMM  CHEST/LUNG: Permacath site clean. Clear to auscultation; No rales, rhonchi, or wheezing.  Respiratory effort does not appear labored.  HEART: Regular rate and rhythm; S1 and S2,  no murmurs, rubs, or gallops.  ABDOMEN: Soft, not tender to palpation.  No masses or HSM appreciated.  No distension.  Bowel sounds present.  EXTREMITIES:  No clubbing, cyanosis, or edema.  Moves all extremities   SKIN: No obvious rashes or lesions.  Turgor okay.  NEURO:  Alert and oriented x2    LABS:                        9.8    9.31  )-----------( 252      ( 19 Jul 2019 06:30 )             31.0     07-19    135  |  99  |  44<H>  ----------------------------<  65<L>  4.5   |  25  |  4.33<H>    Ca    9.6 corrected     19 Jul 2019 06:30  Phos  4.9  (from 5.9)   07-19  Mg     2.1     07-19    TPro  6.3  /  Alb  2.5<L>  /  TBili  < 0.2<L>  /  DBili  x   /  AST  23  /  ALT  8   /  AlkPhos  59  07-19      CAPILLARY BLOOD GLUCOSE      POCT Blood Glucose.: 127 mg/dL (18 Jul 2019 21:43)  POCT Blood Glucose.: 103 mg/dL (18 Jul 2019 17:17)  POCT Blood Glucose.: 101 mg/dL (18 Jul 2019 12:26)  POCT Blood Glucose.: 77 mg/dL (18 Jul 2019 08:38)      Renal note reviewed.  discussed with renal Dr. arroyo, stable for dc

## 2019-07-19 NOTE — PROGRESS NOTE ADULT - PROVIDER SPECIALTY LIST ADULT
Internal Medicine
Nephrology
Vascular Surgery
Internal Medicine
Internal Medicine

## 2019-07-19 NOTE — PROGRESS NOTE ADULT - ATTENDING COMMENTS
I have seen and examined the patient today and agree with  the  evaluation, assessment and plan of the surgical house officer
I have seen and examined the patient today and agree with  the  evaluation, assessment and plan of the surgical house officer
Pt seen and examined with HS on above date.  Agree with above HS note.  Plan discussed with House staff.    54 M bipolar, CKD, a/w uremic encephalopathy, now ESRD on HD.  s/p permacath, AVF revision.  Now on regular HD.  o/p HD center setup.  Anemia improved s/p PRBCs.  Plan for rehab placement.
Pt seen and examined with HS on above date.  Agree with above HS note.  Plan discussed with House staff.    54 M bipolar, CKD, a/w uremic encephalopathy, now ESRD on HD.  s/p permacath, AVF revision.  HTN remains uncontrolled, will change hydralazine to TID.  worsening anemia this AM, will transfuse.
Pt seen and examined with HS on above date.  Agree with above HS note.  Plan discussed with House staff.    54 M bipolar, CKD, a/w uremic encephalopathy, now ESRD on HD.  s/p permacath, AVF revision.  Now on regular HD.  anemia stable, stable for dc to rehab prior to o/p new HD center.  dc time 40 minutes
Pt seen and examined with HS on above date.  Agree with above HS note.  Plan discussed with House staff.    54 M bipolar, CKD, a/w uremic encephalopathy, now ESRD on HD.  s/p permacath, AVF revision.
Patient is a 54yo M with ESRD s/p AV fistula 4/2019 not yet on HD admitted for uremic encephalopathy s/p first session of HD yesterday via [facility]. Patient still with signs of delirium.   - f/u renal for HD   - f/u vascular surgery. Plan for AV fistulogram today  - renally adjust meds when indicated  - f/u SW to set up outpatient HD
Pt seen and examined with HS on above date.  Agree with above HS note.  Plan discussed with House staff.    54 M bipolar, CKD, a/w uremic encephalopathy, now ESRD on HD.  s/p permacath, AVF revision.  Now on regular HD.  o/p HD center setup, may need rehab placement as well. Anemia improved s/p PRBCs

## 2019-07-19 NOTE — MEDICAL STUDENT PROGRESS NOTE(EDUCATION) - NS MD HP STUD ASPLAN ASSES FT
The patient is a 54 year old man with a PMH of ESRD with no prior dialysis before admission s/p AV fistula revision and right IJ permacath placement, HTN, schizophrenia who presented with weakness and lethargy secondary to uremic encephalopathy. He has received dialysis 4 times and his mental status has improved since receiving dialysis.

## 2019-07-19 NOTE — MEDICAL STUDENT PROGRESS NOTE(EDUCATION) - NS MD HP STUD ASPLAN PLAN FT
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 Pt's mental status is likely at baseline according to PCP and private nephrologist (Dr. Aidan Diaz [phone]).  -Nephrology following, recommendations appreciated   -Dialysis, plan for outpatient dialysis, f/u with social work for outpatient plan  -Trend CMP    2) Essential hypertension  /68 this morning  -Low sodium diet  -Continue amlodipine 10mg oral daily, carvedilol 6.25 mg oral every 12 hours, hydralazine 50 mg every 8 hours, lisinopril 2.5 mg oral daily    3) Anemia  Acute on chronic macrocytic anemia likely from ESRD, on Epo. B12 and folate are normal. Iron studies with anemia of chronic disease. S/p 2 units pRBC (7/16)  - Epo injections 4000U (TTS)  - Transfuse for Hgb <7.0    4) Hyperphosphatemia  -Low phos diet  -Joseph phos  -Continue sevelamer    5) Bipolar disorder and schizophrenia  -Continue Olanzapine 20 mg oral at bedtime, divalproex 500 mg oral 3 times per day

## 2019-07-19 NOTE — PROVIDER CONTACT NOTE (OTHER) - ASSESSMENT
Pt resting comfortably
/78. BP medications held due to pt going for dialysis.
/85.
HR-59 Asymptomatic
No acute distress noted
Pt resting comfortably
patient resting. no complaints of pain or acute distress noted.
pt asymptomatic with no signs of distress noted

## 2019-07-19 NOTE — DISCHARGE NOTE NURSING/CASE MANAGEMENT/SOCIAL WORK - NSDCDPATPORTLINK_GEN_ALL_CORE
You can access the [facility] Patient Portal, offered by [facility], by registering with the following website: [email or web address]

## 2019-07-19 NOTE — PROVIDER CONTACT NOTE (OTHER) - BACKGROUND
Hx of DM
53 y/o male admitted with renal failure, history of HTN
As per Dr. Garcia that is pts baseline
Hx of DM
Hx of DM
patient admitted with renal failure
pt admitted for ESRD
pt admitted with Renal failure. PMH of anemia, HTN, DM.
pt admitted with renal failure. PMH CKD, DM, high cholesterol, anemia.

## 2019-07-19 NOTE — PROGRESS NOTE ADULT - PROBLEM SELECTOR PLAN 1
Pt with uremic encephalopathy secondary to ESRD, s/p HD x4 (7/13, 7/15, 7/16, 7/18) and AV fistula revision and RIJ permacath placement (7/14). Pt's mental status is likely at baseline, as per PCP pt previously required multiple repetitions of questions. Private nephrologist is Dr[facility] Aidan Diaz ([phone][facility]   - nephrology following, recs appreciated  - dialysis today   - s/p AV fistula revision (7/14) and RIJ permacath placement (7/14)   - s/p HD x4 (7/13, 7/15, 7/16, 7/18) Patient had AV fistula place in 4/2019, AV fistula revision 7/14 and RIJ permacath placement 7/14. s/p HDx4  - nephrology following, recs appreciated    - f/u with social work regarding outpatient HD  - s/p HD x4 (7/13, 7/15, 7/16, 7/18).

## 2019-07-19 NOTE — PROVIDER CONTACT NOTE (OTHER) - REASON
/78
/85
BP outside of parameter
Fs 82 at 9:03
Heart rate
Unable to initiate 1st HD today, unable to cannulate new AVF
hr outside of parameters
low heart rate
patient /106
FS @ 8:42 was 61 repeated at 08:44 61

## 2019-07-19 NOTE — DISCHARGE NOTE NURSING/CASE MANAGEMENT/SOCIAL WORK - NSDCFUADDAPPT_GEN_ALL_CORE_FT
Vascular appointment:   Dr. Agrawal - August 2nd (Friday) 2:30pm   [address], [address], [place], [place] [address] [phone]    Primary Care:  Dr. Brar - July 23rd(Tuesday) 12:40pm  [address], [place], [place] [address] [phone]    Renal appointment:   Dr. Aidan Diaz - August 5th (Monday) at 3:30pm  [address], [place], [place] [id number] [phone]

## 2019-07-19 NOTE — PROGRESS NOTE ADULT - PROBLEM SELECTOR PLAN 2
SBP elevated to 170s overnight. Will monitor BP today  - Low sodium diet  - started lisinopril 2.5mg  - c/w hydralazine 50 TID   - Continue home amlodipine, carvedilol - Low sodium diet  - c/w lisinopril 2.5mg  - c/w hydralazine 50 TID   - Continue home amlodipine, carvedilol

## 2019-07-19 NOTE — PROVIDER CONTACT NOTE (OTHER) - ACTION/TREATMENT ORDERED:
None at this time  Pt may return to floor
Will continue to update
pt provided with apple juice 15gm
Coreg and Hydralazine held as per parameters, continue to monitor
MD made aware. no interventions ordered at this time.
MD made aware. no other interventions ordered at this time.
MD notified. BP medication given. Continue to monitor.
Patient to be going to dialysis this AM. Send AM BP medications with patient to dialysis and have them adjust BP medication administration based on fluid removal and BP after dialysis
apple juice 15gm
continue to monitor

## 2019-07-22 ENCOUNTER — MOBILE ON CALL (OUTPATIENT)
Age: 54
End: 2019-07-22

## 2019-07-23 ENCOUNTER — APPOINTMENT (OUTPATIENT)
Dept: PULMONOLOGY | Facility: CLINIC | Age: 54
End: 2019-07-23

## 2019-08-02 ENCOUNTER — APPOINTMENT (OUTPATIENT)
Dept: VASCULAR SURGERY | Facility: CLINIC | Age: 54
End: 2019-08-02

## 2019-09-03 ENCOUNTER — APPOINTMENT (OUTPATIENT)
Dept: VASCULAR SURGERY | Facility: CLINIC | Age: 54
End: 2019-09-03
Payer: MEDICARE

## 2019-09-03 VITALS
HEIGHT: 71 IN | WEIGHT: 160 LBS | TEMPERATURE: 98.8 F | DIASTOLIC BLOOD PRESSURE: 69 MMHG | BODY MASS INDEX: 22.4 KG/M2 | HEART RATE: 70 BPM | SYSTOLIC BLOOD PRESSURE: 143 MMHG

## 2019-09-03 DIAGNOSIS — T82.898A OTHER SPECIFIED COMPLICATION OF VASCULAR PROSTHETIC DEVICES, IMPLANTS AND GRAFTS, INITIAL ENCOUNTER: ICD-10-CM

## 2019-09-03 PROCEDURE — 99406 BEHAV CHNG SMOKING 3-10 MIN: CPT

## 2019-09-03 PROCEDURE — 93990 DOPPLER FLOW TESTING: CPT | Mod: LT

## 2019-09-03 PROCEDURE — 99024 POSTOP FOLLOW-UP VISIT: CPT

## 2019-09-03 RX ORDER — CHROMIUM 200 MCG
TABLET ORAL
Refills: 0 | Status: ACTIVE | COMMUNITY

## 2019-09-03 RX ORDER — HYDRALAZINE HYDROCHLORIDE 50 MG/1
TABLET ORAL
Refills: 0 | Status: ACTIVE | COMMUNITY

## 2019-09-03 NOTE — PHYSICAL EXAM
[Normal Breath Sounds] : Normal breath sounds [Left Carotid Bruit] : left carotid bruit heard [Right Carotid Bruit] : right carotid bruit heard [2+] : right 2+ [1+] : left 1+ [No HSM] : no hepatosplenomegaly [No Rash or Lesion] : No rash or lesion [Alert] : alert [Oriented to Person] : oriented to person [Oriented to Place] : oriented to place [Oriented to Time] : oriented to time [Calm] : calm [JVD] : no jugular venous distention  [Ankle Swelling (On Exam)] : not present [Varicose Veins Of Lower Extremities] : not present [] : not present [Abdomen Masses] : No abdominal masses [Tender] : was nontender [de-identified] : nad [Stool Sample Taken] : No stool obtained  on rectal exam [de-identified] : wnl [FreeTextEntry1] : mod art insuff w mod trophic skin changes \par no ulcers \par rle warm well perfused \par lion le grossly normal motor and sensory fx \par lue avf w good bruit and thrill  [de-identified] : wnl [de-identified] : wnl no rle deficits noted  [de-identified] : cn 2-12 lion grossly intact [de-identified] : cooperative

## 2019-09-03 NOTE — DATA REVIEWED
[FreeTextEntry1] : offered pt rt fem pop byp duplex today pt wants to kaia \par \par 11/7/2017 RLE Arterial Duplex patent rt fem pop byp \par \par 6/22/2018 RLE Arterial Duplex patent rt cfa to ak pop a  ptfe byp no flow restrictive lesions\par \par 10/17/2018 Carotid Duplex  Rt ICA  less 50% stenosis \par                          Lt  ICA  less 50% stenosis  \par                          Lion Ant Vertebral Arterial Flow \par \par 12/11/2018 TEMITOPE/PVR RLE no sig art occ dz LLE mild ifragenic art occ dz rt temitope .86 lt temitope .70\par \par 12/18/2018  RLE Arterial Duplex patent fem pop no hematoma no flow restrictions\par \par 12/18/2018  venous Duplex lion le no acute dvt svt\par \par  3/12/2019 Vein Mapping  RUE adequate  cephalic vein from wrist , adequate basilic vein from elbow\par                                           LUE  limited vein resource  cephalic at wrist level,, mod basilic vein resource at elbow\par \par 9/3/2019  LUE AVF flow  558 ml/min  w mid avf narrowing and sig for tributaries\par \par

## 2019-09-03 NOTE — ASSESSMENT
[Arterial/Venous Disease] : arterial/venous disease [Medication Management] : medication management [Foot care/Footwear] : foot care/footwear [Smoking Cessation] : smoking cessation [FreeTextEntry1] : impression arterial insuff s/p bypass  doing well despite pt ongoing smoking\par lue avf w slow maturation \par \par [facility] managemnt exercise program, protective measures\par continue pletal 50 bid\par bedside d/w pt and mother  to cut back and quit smoking pt is aware of the dec  byp patency due to smoking \par 10 min at bedside\par ov w temitope/pvr s/o art insuff 12mo  12/2019\par ov w rle fem pop byp  s/o stenosis 12mo 12//2019\par ov w carotid duplex s/o stenosis 3 years  10/2021\par Indications risks and benefits of arm dialysis access fistulogram and possible intervention were explained to pt and mother who understand pt wants to proceed \par \par \par letter faxed to Dr TED Diaz MD, Dr LIA Brar MD

## 2019-09-03 NOTE — HISTORY OF PRESENT ILLNESS
[FreeTextEntry1] : pt still smokes 7-8 cigs per day and sev  pot joints per day \par pt is on pletal 50 bid \par pt is s/p lue avf presents for eval\par pt is receiving  rt sided permacath hd

## 2019-09-05 NOTE — PROGRESS NOTE ADULT - PROBLEM/PLAN-6
DISPLAY PLAN FREE TEXT Consent (Lip)/Introductory Paragraph: The rationale for Mohs was explained to the patient and consent was obtained. The risks, benefits and alternatives to therapy were discussed in detail. Specifically, the risks of lip deformity, changes in the oral aperture, infection, scarring, bleeding, prolonged wound healing, incomplete removal, allergy to anesthesia, nerve injury and recurrence were addressed. Prior to the procedure, the treatment site was clearly identified and confirmed by the patient. All components of Universal Protocol/PAUSE Rule completed.

## 2019-09-12 ENCOUNTER — APPOINTMENT (OUTPATIENT)
Dept: PULMONOLOGY | Facility: CLINIC | Age: 54
End: 2019-09-12

## 2019-09-13 ENCOUNTER — OUTPATIENT (OUTPATIENT)
Dept: OUTPATIENT SERVICES | Facility: HOSPITAL | Age: 54
LOS: 1 days | End: 2019-09-13

## 2019-09-13 ENCOUNTER — RX RENEWAL (OUTPATIENT)
Age: 54
End: 2019-09-13

## 2019-09-13 VITALS
DIASTOLIC BLOOD PRESSURE: 70 MMHG | OXYGEN SATURATION: 96 % | TEMPERATURE: 97 F | HEART RATE: 78 BPM | WEIGHT: 177.91 LBS | SYSTOLIC BLOOD PRESSURE: 146 MMHG | RESPIRATION RATE: 14 BRPM | HEIGHT: 70 IN

## 2019-09-13 DIAGNOSIS — Z89.411 ACQUIRED ABSENCE OF RIGHT GREAT TOE: Chronic | ICD-10-CM

## 2019-09-13 DIAGNOSIS — E11.9 TYPE 2 DIABETES MELLITUS WITHOUT COMPLICATIONS: ICD-10-CM

## 2019-09-13 DIAGNOSIS — N18.9 CHRONIC KIDNEY DISEASE, UNSPECIFIED: ICD-10-CM

## 2019-09-13 DIAGNOSIS — Z98.890 OTHER SPECIFIED POSTPROCEDURAL STATES: Chronic | ICD-10-CM

## 2019-09-13 DIAGNOSIS — N18.6 END STAGE RENAL DISEASE: ICD-10-CM

## 2019-09-13 LAB
ALBUMIN SERPL ELPH-MCNC: 3.8 G/DL — SIGNIFICANT CHANGE UP (ref 3.3–5)
ALP SERPL-CCNC: 68 U/L — SIGNIFICANT CHANGE UP (ref 40–120)
ALT FLD-CCNC: 16 U/L — SIGNIFICANT CHANGE UP (ref 4–41)
ANION GAP SERPL CALC-SCNC: 17 MMO/L — HIGH (ref 7–14)
ANISOCYTOSIS BLD QL: SLIGHT — SIGNIFICANT CHANGE UP
AST SERPL-CCNC: 17 U/L — SIGNIFICANT CHANGE UP (ref 4–40)
BASOPHILS # BLD AUTO: 0.02 K/UL — SIGNIFICANT CHANGE UP (ref 0–0.2)
BASOPHILS NFR BLD AUTO: 0.2 % — SIGNIFICANT CHANGE UP (ref 0–2)
BASOPHILS NFR SPEC: 0.9 % — SIGNIFICANT CHANGE UP (ref 0–2)
BILIRUB DIRECT SERPL-MCNC: < 0.1 MG/DL — LOW (ref 0.1–0.2)
BILIRUB SERPL-MCNC: < 0.2 MG/DL — LOW (ref 0.2–1.2)
BLASTS # FLD: 0 % — SIGNIFICANT CHANGE UP (ref 0–0)
BUN SERPL-MCNC: 75 MG/DL — HIGH (ref 7–23)
CALCIUM SERPL-MCNC: 8.6 MG/DL — SIGNIFICANT CHANGE UP (ref 8.4–10.5)
CHLORIDE SERPL-SCNC: 97 MMOL/L — LOW (ref 98–107)
CO2 SERPL-SCNC: 23 MMOL/L — SIGNIFICANT CHANGE UP (ref 22–31)
CREAT SERPL-MCNC: 6.65 MG/DL — HIGH (ref 0.5–1.3)
EOSINOPHIL # BLD AUTO: 0.09 K/UL — SIGNIFICANT CHANGE UP (ref 0–0.5)
EOSINOPHIL NFR BLD AUTO: 0.8 % — SIGNIFICANT CHANGE UP (ref 0–6)
EOSINOPHIL NFR FLD: 1.7 % — SIGNIFICANT CHANGE UP (ref 0–6)
GLUCOSE SERPL-MCNC: 197 MG/DL — HIGH (ref 70–99)
HBA1C BLD-MCNC: 4.7 % — SIGNIFICANT CHANGE UP (ref 4–5.6)
HCT VFR BLD CALC: 26 % — LOW (ref 39–50)
HGB BLD-MCNC: 7.8 G/DL — LOW (ref 13–17)
IMM GRANULOCYTES NFR BLD AUTO: 2 % — HIGH (ref 0–1.5)
LYMPHOCYTES # BLD AUTO: 1.77 K/UL — SIGNIFICANT CHANGE UP (ref 1–3.3)
LYMPHOCYTES # BLD AUTO: 15.3 % — SIGNIFICANT CHANGE UP (ref 13–44)
LYMPHOCYTES NFR SPEC AUTO: 16.4 % — SIGNIFICANT CHANGE UP (ref 13–44)
MACROCYTES BLD QL: SLIGHT — SIGNIFICANT CHANGE UP
MCHC RBC-ENTMCNC: 30 % — LOW (ref 32–36)
MCHC RBC-ENTMCNC: 32.2 PG — SIGNIFICANT CHANGE UP (ref 27–34)
MCV RBC AUTO: 107.4 FL — HIGH (ref 80–100)
METAMYELOCYTES # FLD: 0.9 % — SIGNIFICANT CHANGE UP (ref 0–1)
MONOCYTES # BLD AUTO: 1.39 K/UL — HIGH (ref 0–0.9)
MONOCYTES NFR BLD AUTO: 12 % — SIGNIFICANT CHANGE UP (ref 2–14)
MONOCYTES NFR BLD: 6 % — SIGNIFICANT CHANGE UP (ref 2–9)
MYELOCYTES NFR BLD: 0 % — SIGNIFICANT CHANGE UP (ref 0–0)
NEUTROPHIL AB SER-ACNC: 71.5 % — SIGNIFICANT CHANGE UP (ref 43–77)
NEUTROPHILS # BLD AUTO: 8.1 K/UL — HIGH (ref 1.8–7.4)
NEUTROPHILS NFR BLD AUTO: 69.7 % — SIGNIFICANT CHANGE UP (ref 43–77)
NEUTS BAND # BLD: 0.9 % — SIGNIFICANT CHANGE UP (ref 0–6)
NRBC # FLD: 0 K/UL — SIGNIFICANT CHANGE UP (ref 0–0)
OTHER - HEMATOLOGY %: 0 — SIGNIFICANT CHANGE UP
PLATELET # BLD AUTO: 323 K/UL — SIGNIFICANT CHANGE UP (ref 150–400)
PLATELET COUNT - ESTIMATE: NORMAL — SIGNIFICANT CHANGE UP
PMV BLD: 9.7 FL — SIGNIFICANT CHANGE UP (ref 7–13)
POIKILOCYTOSIS BLD QL AUTO: SLIGHT — SIGNIFICANT CHANGE UP
POLYCHROMASIA BLD QL SMEAR: SLIGHT — SIGNIFICANT CHANGE UP
POTASSIUM SERPL-MCNC: 4.2 MMOL/L — SIGNIFICANT CHANGE UP (ref 3.5–5.3)
POTASSIUM SERPL-SCNC: 4.2 MMOL/L — SIGNIFICANT CHANGE UP (ref 3.5–5.3)
PROMYELOCYTES # FLD: 0 % — SIGNIFICANT CHANGE UP (ref 0–0)
PROT SERPL-MCNC: 7 G/DL — SIGNIFICANT CHANGE UP (ref 6–8.3)
RBC # BLD: 2.42 M/UL — LOW (ref 4.2–5.8)
RBC # FLD: 17.6 % — HIGH (ref 10.3–14.5)
SODIUM SERPL-SCNC: 137 MMOL/L — SIGNIFICANT CHANGE UP (ref 135–145)
VARIANT LYMPHS # BLD: 1.7 % — SIGNIFICANT CHANGE UP
WBC # BLD: 11.6 K/UL — HIGH (ref 3.8–10.5)
WBC # FLD AUTO: 11.6 K/UL — HIGH (ref 3.8–10.5)

## 2019-09-13 RX ORDER — CARVEDILOL PHOSPHATE 80 MG/1
1 CAPSULE, EXTENDED RELEASE ORAL
Qty: 0 | Refills: 0 | DISCHARGE

## 2019-09-13 RX ORDER — FERROUS SULFATE 325(65) MG
0 TABLET ORAL
Qty: 0 | Refills: 0 | DISCHARGE

## 2019-09-13 RX ORDER — SODIUM BICARBONATE 1 MEQ/ML
1 SYRINGE (ML) INTRAVENOUS
Qty: 0 | Refills: 0 | DISCHARGE

## 2019-09-13 RX ORDER — SODIUM CHLORIDE 9 MG/ML
1000 INJECTION INTRAMUSCULAR; INTRAVENOUS; SUBCUTANEOUS
Refills: 0 | Status: DISCONTINUED | OUTPATIENT
Start: 2019-09-16 | End: 2019-10-04

## 2019-09-13 NOTE — ASU PATIENT PROFILE, ADULT - PSH
Amputated great toe of right foot    ORIF right lower leg- 1995    S/P arteriovenous (AV) fistula creation  7/2019

## 2019-09-13 NOTE — H&P PST ADULT - NSICDXPASTSURGICALHX_GEN_ALL_CORE_FT
Hospitalist Daily Progress Note    Piedad Ann  81 year old female  [id number]'  Code Status:Full Resuscitation    Hospital Day: 5      Date: 4/16/2017     Reason for admission: L Groin wound    Subjective : S/P Surgical debridement. Patient had a necrotic wound, left groin, skin, soft tissue, and fascia. Continue on PRN Blood/ Platelets transfusion.          Vitals:    04/16/17 0731   BP: 145/65   Pulse: 88   Resp: 18   Temp: 97.6 °F (36.4 °C)     Weight    04/13/17 0015 04/14/17 0632 04/15/17 0600 04/16/17 0500   Weight: 106.8 kg 108.7 kg 108.9 kg 115.1 kg     I/O last 3 completed shifts:  In: 2797.4 [P.O.:1400; I.V.:1097.4; Blood:300]  Out: 550 [Urine:550]  Last stool occurrence: 1 (04/15/17 2137)    Physical Examination :    General : Awake, Alert, Oriented x 3. No acute distress .  HEENT : Head is normocephalic, atraumatic. PEDRO. Oral mucosa moist.    Neck : Supple, No JVD, No LAD.  Lungs : Clear to auscultation bilaterally. No wheezes, crackles, or rhonchi.  Heart : Regular rate and rhythm. No murmurs, gallops, or rubs.  Abdomen : Soft, positive bowel sounds, Non tender, Non distended.  Musculoskeletal : No clubbing, cyanosis, or edema in bilateral lower extremities.  Skin : Multiple bruises , Looks mild pale.Transverse surgical incision of the left groin area is clean without purulence or drainage. There is no crepitance, cellulitis or necrosis.   Neurological : Grossly non focal.          Medications :     • cloNIDine  0.2 mg Oral BID   • sertraline  200 mg Oral Daily   • pantoprazole  40 mg Oral QAM AC   • levothyroxine  50 mcg Oral QAM AC   • clonazePAM  1 mg Oral Nightly   • DULoxetine  90 mg Oral Daily   • polyethylene glycol  17 g Oral Daily   • docusate sodium-sennosides  1 tablet Oral BID   • ergocalciferol  50,000 Units Oral Q14 Days   • sodium chloride (PF)  2 mL Injection 2 times per day   • ceFEPime (MAXIPIME) IVPB for Most Indications  1,000 mg Intravenous Q24H   • metroNIDAZOLE (FLAGYL)  IVPB  500 mg Intravenous 3 times per day     • sodium chloride 0.9% infusion     • sodium chloride 0.9% infusion     • sodium chloride 0.9% infusion     • sodium chloride 0.9% infusion     • sodium chloride 0.9% infusion         Laboratory data :      Recent Labs  Lab 04/16/17  0550 04/15/17  1901 04/15/17  0945 04/15/17  0600 04/14/17  1815  04/14/17  0540  04/13/17  0530 04/12/17  2145   WBC 1.4*  --   --   --  1.1*  --  1.0*  --  0.9* 1.0*   HCT 25.1*  --   --  21.4* 22.9*  < > 19.5*  < > 15.9* 16.5*   HGB 8.2* 8.4*  --  7.0* 7.5*  < > 6.5*  < > 5.2* 5.6*   PLT 32*  --  40*  --  48*  --  13*  < > 5* 3*   INR  --   --   --   --   --   --   --   --  1.1 1.1   PTT  --   --   --   --   --   --   --   --   --  26   SODIUM 140  --   --   --   --   --  137  --  132* 132*   POTASSIUM 5.1  --   --   --   --   --  4.9  --  5.0 5.2*   CHLORIDE 108*  --   --   --   --   --  106  --  100 98   CO2 25  --   --   --   --   --  26  --  24 27   CALCIUM 8.5  --   --   --   --   --  7.8*  --  7.8* 7.7*   GLUCOSE 110*  --   --   --   --   --  102*  --  112* 140*   BUN 61*  --   --   --   --   --  73*  --  83* 83*   CREATININE 1.72*  --   --   --   --   --  2.21*  --  2.57* 2.49*   AST  --   --   --   --   --   --   --   --  27 31   GPT  --   --   --   --   --   --   --   --  46 49   ALKPT  --   --   --   --   --   --   --   --  118* 142*   BILIRUBIN  --   --   --   --   --   --   --   --  0.5 0.4   ALBUMIN  --   --   --   --   --   --   --   --  2.6* 2.6*   GFRNA 27  --   --   --   --   --  20  --  17 17   < > = values in this interval not displayed.    Imaging Studies : Reviewed       Assessment / Plan:    · Necrotic wound, left groin: S/P debridement and irrigation. Wound Care and ID are on board. Continue on Cefepime, Vancomycin and Metronidazole.   · Gram negative bacteremia ( Proteus Mirabilis, seems pan sensitive ),most likely caused by above   · Hx of MDS: Transfusion dependent, poor prognosis. Palliative Care ,  and  Hem/Onc were consulted. I had a discussion with her in regards goals of care including code status. Patient wants to continue full code, even though she knows that the condition will not improve.Daughter/Son were contacted . Daughter understands patient's prognosis and she will like to talk to her later today or tomorrow.    · Acute over chronic kidney disease ( baseline Cr is around 1.8): Gentle IV fluids , for now , avoid nephrotoxic agents . Hold lasix . NO signs of fluid overload   · Type 2 DM: Controlled , A1c is 6.5 ( 3/20/17)  · Depression: On clonazepam, duloxetine and sertraline         Patient wants to be Full Code for now at least until next month , which will be her [birth year].       Discharge planning :    Barriers to discharge : See above   Expected date of discharge : Undetermined   Disposition : Undetermined     More than 25 minutes spent on this encounter with greater than 50% spent in coordinating/counseling the care of this patient and in discussing the plan of care with the patient, family, and other healthcare providers.       Arik Montague MD  Pager number [phone]                        PAST SURGICAL HISTORY:  Amputated great toe of right foot     ORIF right lower leg- 1995     S/P arteriovenous (AV) fistula creation 7/2019

## 2019-09-13 NOTE — H&P PST ADULT - HISTORY OF PRESENT ILLNESS
Pt. is a 3 yo male that has hemodialysis M, W, F. Pt. is a 53 yo male that has hemodialysis M, W, F.

## 2019-09-13 NOTE — H&P PST ADULT - NSICDXPROBLEM_GEN_ALL_CORE_FT
PROBLEM DIAGNOSES  Problem: ESRD on hemodialysis  Assessment and Plan: Pt. is scheduled for left arm fistulogram possible revision 9/16/19.  Pt. verbalized understanding of instructions as outlined and discussed on the instruction sheet.  Discussed with Dr. Krishna.    Problem: Type II diabetes mellitus  Assessment and Plan: Pt. was instructed no Tradjenta morning of surgery. PROBLEM DIAGNOSES  Problem: ESRD on hemodialysis  Assessment and Plan: Pt. is scheduled for left arm fistulogram possible revision 9/16/19.  Pt. verbalized understanding of instructions as outlined and discussed on the instruction sheet.  Discussed with Dr. Krishna.  Pt. to have dialysis Saturday due to surgery on Monday.     Problem: Type II diabetes mellitus  Assessment and Plan: Pt. was instructed no Tradjenta morning of surgery.

## 2019-09-15 ENCOUNTER — TRANSCRIPTION ENCOUNTER (OUTPATIENT)
Age: 54
End: 2019-09-15

## 2019-09-16 ENCOUNTER — OUTPATIENT (OUTPATIENT)
Dept: OUTPATIENT SERVICES | Facility: HOSPITAL | Age: 54
LOS: 1 days | Discharge: HOME | End: 2019-09-16
Payer: MEDICARE

## 2019-09-16 ENCOUNTER — APPOINTMENT (OUTPATIENT)
Dept: VASCULAR SURGERY | Facility: HOSPITAL | Age: 54
End: 2019-09-16

## 2019-09-16 VITALS
SYSTOLIC BLOOD PRESSURE: 153 MMHG | TEMPERATURE: 99 F | HEART RATE: 65 BPM | RESPIRATION RATE: 15 BRPM | HEIGHT: 70 IN | OXYGEN SATURATION: 92 % | WEIGHT: 177.91 LBS | DIASTOLIC BLOOD PRESSURE: 62 MMHG

## 2019-09-16 VITALS
SYSTOLIC BLOOD PRESSURE: 147 MMHG | HEART RATE: 55 BPM | DIASTOLIC BLOOD PRESSURE: 57 MMHG | OXYGEN SATURATION: 93 % | RESPIRATION RATE: 14 BRPM

## 2019-09-16 DIAGNOSIS — N18.9 CHRONIC KIDNEY DISEASE, UNSPECIFIED: ICD-10-CM

## 2019-09-16 DIAGNOSIS — Z89.411 ACQUIRED ABSENCE OF RIGHT GREAT TOE: Chronic | ICD-10-CM

## 2019-09-16 DIAGNOSIS — Z98.890 OTHER SPECIFIED POSTPROCEDURAL STATES: Chronic | ICD-10-CM

## 2019-09-16 LAB
GLUCOSE BLDV-MCNC: 86 MG/DL — SIGNIFICANT CHANGE UP (ref 70–99)
POTASSIUM BLDV-SCNC: 5.1 MMOL/L — HIGH (ref 3.4–4.5)

## 2019-09-16 PROCEDURE — 36902 INTRO CATH DIALYSIS CIRCUIT: CPT | Mod: 58

## 2019-09-16 PROCEDURE — 99222 1ST HOSP IP/OBS MODERATE 55: CPT | Mod: 25

## 2019-09-16 RX ORDER — SODIUM CHLORIDE 9 MG/ML
1000 INJECTION, SOLUTION INTRAVENOUS
Refills: 0 | Status: DISCONTINUED | OUTPATIENT
Start: 2019-09-16 | End: 2019-10-04

## 2019-09-16 NOTE — ASU DISCHARGE PLAN (ADULT/PEDIATRIC) - CALL YOUR DOCTOR IF YOU HAVE ANY OF THE FOLLOWING:
Bleeding that does not stop/Swelling that gets worse Pain not relieved by Medications/Fever greater than (need to indicate Fahrenheit or Celsius)/Swelling that gets worse/Nausea and vomiting that does not stop/Bleeding that does not stop

## 2019-09-16 NOTE — ASU DISCHARGE PLAN (ADULT/PEDIATRIC) - CARE PROVIDER_API CALL
Benjamin Agrawal)  Vascular Surgery  [address] [facility], [address]  [place] [address]  Phone: [phone]  Fax: [phone]  Follow Up Time: 1 week

## 2019-09-16 NOTE — BRIEF OPERATIVE NOTE - OPERATION/FINDINGS
proximal stenosis, 6mm deployed followed by a 7mm balloon with good result on completion fistulagram

## 2019-09-16 NOTE — ASU DISCHARGE PLAN (ADULT/PEDIATRIC) - NURSING INSTRUCTIONS
you revieved a block to left arm you must wear sling and protect arm until all the feelings and sensation returns

## 2019-10-01 ENCOUNTER — APPOINTMENT (OUTPATIENT)
Dept: VASCULAR SURGERY | Facility: CLINIC | Age: 54
End: 2019-10-01
Payer: MEDICARE

## 2019-10-01 VITALS
TEMPERATURE: 98.9 F | DIASTOLIC BLOOD PRESSURE: 75 MMHG | HEART RATE: 76 BPM | SYSTOLIC BLOOD PRESSURE: 198 MMHG | HEIGHT: 70 IN

## 2019-10-01 PROCEDURE — 99024 POSTOP FOLLOW-UP VISIT: CPT

## 2019-10-01 RX ORDER — CILOSTAZOL 50 MG/1
50 TABLET ORAL
Qty: 60 | Refills: 6 | Status: ACTIVE | COMMUNITY
Start: 2019-10-01 | End: 1900-01-01

## 2019-10-01 NOTE — ASSESSMENT
[Arterial/Venous Disease] : arterial/venous disease [Medication Management] : medication management [Foot care/Footwear] : foot care/footwear [Smoking Cessation] : smoking cessation [FreeTextEntry1] : impression arterial insuff s/p bypass  doing well despite pt ongoing smoking\par lue avf  maturation \par \par [employer] managemnt exercise program, protective measures\par continue pletal 50 bid\par bedside d/w pt and mother  to cut back and quit smoking pt is aware of the dec  byp patency due to smoking \par 10 min at bedside\par ov w temitope/pvr s/o art insuff 12mo  12/2019\par ov w rle fem pop byp  s/o stenosis 12mo 12//2019\par ov w carotid duplex s/o stenosis 3 years  10/2021\par ov w lue avf duplex s/o stenosis to re eval for hd start 2-3 weeks \par \par \par letter faxed to Dr TED Diaz MD

## 2019-10-01 NOTE — PHYSICAL EXAM
[Normal Breath Sounds] : Normal breath sounds [Right Carotid Bruit] : right carotid bruit heard [Left Carotid Bruit] : left carotid bruit heard [2+] : right 2+ [1+] : left 1+ [No HSM] : no hepatosplenomegaly [No Rash or Lesion] : No rash or lesion [Alert] : alert [Oriented to Person] : oriented to person [Oriented to Place] : oriented to place [Oriented to Time] : oriented to time [Calm] : calm [JVD] : no jugular venous distention  [Ankle Swelling (On Exam)] : not present [Varicose Veins Of Lower Extremities] : not present [] : not present [Abdomen Masses] : No abdominal masses [Tender] : was nontender [Stool Sample Taken] : No stool obtained  on rectal exam [de-identified] : nad [de-identified] : wnl [FreeTextEntry1] : mod art insuff w mod trophic skin changes \par no ulcers \par rle warm well perfused \par lion le grossly normal motor and sensory fx \par lue avf w good to excellent bruit and thrill  [de-identified] : wnl [de-identified] : wnl no rle deficits noted  [de-identified] : cn 2-12 lion grossly intact [de-identified] : cooperative

## 2019-10-01 NOTE — HISTORY OF PRESENT ILLNESS
[FreeTextEntry1] : pt still smokes cigs and pot\par pt is on pletal 50 bid \par pt is s/p lue avf BA maturation \par pt is receiving  rt sided permacath hd

## 2019-10-13 ENCOUNTER — MOBILE ON CALL (OUTPATIENT)
Age: 54
End: 2019-10-13

## 2019-10-16 ENCOUNTER — APPOINTMENT (OUTPATIENT)
Dept: VASCULAR SURGERY | Facility: CLINIC | Age: 54
End: 2019-10-16

## 2019-10-16 ENCOUNTER — RX RENEWAL (OUTPATIENT)
Age: 54
End: 2019-10-16

## 2019-10-17 ENCOUNTER — RX RENEWAL (OUTPATIENT)
Age: 54
End: 2019-10-17

## 2019-10-18 ENCOUNTER — RX RENEWAL (OUTPATIENT)
Age: 54
End: 2019-10-18

## 2019-10-28 ENCOUNTER — APPOINTMENT (OUTPATIENT)
Dept: PULMONOLOGY | Facility: CLINIC | Age: 54
End: 2019-10-28
Payer: MEDICARE

## 2019-10-28 VITALS
HEART RATE: 67 BPM | OXYGEN SATURATION: 97 % | RESPIRATION RATE: 15 BRPM | DIASTOLIC BLOOD PRESSURE: 74 MMHG | SYSTOLIC BLOOD PRESSURE: 138 MMHG

## 2019-10-28 DIAGNOSIS — T30.0 BURN OF UNSPECIFIED BODY REGION, UNSPECIFIED DEGREE: ICD-10-CM

## 2019-10-28 DIAGNOSIS — Z23 ENCOUNTER FOR IMMUNIZATION: ICD-10-CM

## 2019-10-28 LAB — GLUCOSE BLDC GLUCOMTR-MCNC: 102

## 2019-10-28 PROCEDURE — 82962 GLUCOSE BLOOD TEST: CPT

## 2019-10-28 PROCEDURE — 90674 CCIIV4 VAC NO PRSV 0.5 ML IM: CPT

## 2019-10-28 PROCEDURE — G0008: CPT

## 2019-10-28 PROCEDURE — 99213 OFFICE O/P EST LOW 20 MIN: CPT | Mod: 25

## 2019-10-28 PROCEDURE — 83036 HEMOGLOBIN GLYCOSYLATED A1C: CPT | Mod: QW

## 2019-10-28 RX ORDER — LINAGLIPTIN 5 MG/1
5 TABLET, FILM COATED ORAL
Qty: 30 | Refills: 0 | Status: DISCONTINUED | COMMUNITY
Start: 2017-08-29 | End: 2019-10-28

## 2019-10-28 RX ORDER — OMEPRAZOLE 20 MG/1
20 TABLET, DELAYED RELEASE ORAL
Refills: 0 | Status: DISCONTINUED | COMMUNITY
End: 2019-10-28

## 2019-10-28 RX ORDER — REPAGLINIDE 0.5 MG/1
0.5 TABLET ORAL
Refills: 0 | Status: DISCONTINUED | COMMUNITY
End: 2019-10-28

## 2019-10-28 RX ORDER — RENAGEL 800 MG/1
TABLET ORAL
Refills: 0 | Status: DISCONTINUED | COMMUNITY
End: 2019-10-28

## 2019-10-28 RX ORDER — SODIUM BICARBONATE 650 MG/1
650 TABLET ORAL
Refills: 0 | Status: DISCONTINUED | COMMUNITY
End: 2019-10-28

## 2019-10-28 RX ORDER — DIVALPROEX SODIUM 500 MG/1
500 TABLET, DELAYED RELEASE ORAL
Qty: 90 | Refills: 0 | Status: DISCONTINUED | COMMUNITY
Start: 2017-05-01 | End: 2019-10-28

## 2019-10-28 RX ORDER — CILOSTAZOL 50 MG/1
50 TABLET ORAL
Qty: 60 | Refills: 6 | Status: DISCONTINUED | COMMUNITY
Start: 2018-12-11 | End: 2019-10-28

## 2019-10-28 RX ORDER — AMLODIPINE BESYLATE 10 MG/1
10 TABLET ORAL
Refills: 0 | Status: DISCONTINUED | COMMUNITY
End: 2019-10-28

## 2019-10-28 NOTE — REVIEW OF SYSTEMS
[Fatigue] : no fatigue [Poor Appetite] : normal appetite  [As Noted in HPI] : as noted in HPI [Numbness] : numbness [Negative] : Hematologic [FreeTextEntry6] : pain in R thigh

## 2019-10-28 NOTE — ASSESSMENT
[FreeTextEntry1] : stop trajenta\par to follow up with vascular tomorrow, may need wound care assessment

## 2019-10-28 NOTE — HISTORY OF PRESENT ILLNESS
[FreeTextEntry1] : now on HD\par Still has permcath in place while access matures\par getting labs in HD\par \par has burn on R foot, healing. \par

## 2019-10-28 NOTE — PHYSICAL EXAM
[Normal Conjunctiva] : the conjunctiva exhibited no abnormalities [Eyelids - No Xanthelasma] : the eyelids demonstrated no xanthelasmas [Neck Appearance] : the appearance of the neck was normal [Neck Cervical Mass (___cm)] : no neck mass was observed [Jugular Venous Distention Increased] : there was no jugular-venous distention [Thyroid Diffuse Enlargement] : the thyroid was not enlarged [Thyroid Nodule] : there were no palpable thyroid nodules [Heart Rate And Rhythm] : heart rate and rhythm were normal [Heart Sounds] : normal S1 and S2 [Murmurs] : no murmurs present [Respiration, Rhythm And Depth] : normal respiratory rhythm and effort [Exaggerated Use Of Accessory Muscles For Inspiration] : no accessory muscle use [Auscultation Breath Sounds / Voice Sounds] : lungs were clear to auscultation bilaterally [Abdomen Soft] : soft [Abdomen Tenderness] : non-tender [] : no hepato-splenomegaly [Abdomen Mass (___ Cm)] : no abdominal mass palpated [FreeTextEntry1] : burn to foot, second degree, healing [Deep Tendon Reflexes (DTR)] : deep tendon reflexes were 2+ and symmetric [Sensation] : the sensory exam was normal to light touch and pinprick [No Focal Deficits] : no focal deficits

## 2019-10-29 ENCOUNTER — APPOINTMENT (OUTPATIENT)
Dept: VASCULAR SURGERY | Facility: CLINIC | Age: 54
End: 2019-10-29
Payer: MEDICARE

## 2019-10-29 VITALS
DIASTOLIC BLOOD PRESSURE: 74 MMHG | TEMPERATURE: 98 F | BODY MASS INDEX: 22.9 KG/M2 | WEIGHT: 160 LBS | HEART RATE: 67 BPM | SYSTOLIC BLOOD PRESSURE: 154 MMHG | HEIGHT: 70 IN

## 2019-10-29 LAB — HBA1C MFR BLD HPLC: 4.4

## 2019-10-29 PROCEDURE — 93990 DOPPLER FLOW TESTING: CPT

## 2019-10-29 PROCEDURE — 99406 BEHAV CHNG SMOKING 3-10 MIN: CPT

## 2019-10-29 PROCEDURE — 99213 OFFICE O/P EST LOW 20 MIN: CPT

## 2019-10-29 NOTE — HISTORY OF PRESENT ILLNESS
[FreeTextEntry1] : pt still smokes cigs and pot\par pt is on pletal 50 bid \par pt is s/p lue avf BA maturation \par pt is receiving  rt sided permacath hd   [de-identified] : pt states about 1 week ago spilled hot coffee on his right foot developed a wound/blister\par which [facility] silvadene and po abx Keflex \par pt states  wound has been dec in size\par pt states now mild discomfort\par pt is s/p lue avf BAM\par pt continues to smoke 5-6 cig per day

## 2019-10-29 NOTE — PHYSICAL EXAM
[Normal Breath Sounds] : Normal breath sounds [Right Carotid Bruit] : right carotid bruit heard [Left Carotid Bruit] : left carotid bruit heard [2+] : right 2+ [1+] : left 1+ [No HSM] : no hepatosplenomegaly [No Rash or Lesion] : No rash or lesion [Alert] : alert [Oriented to Person] : oriented to person [Oriented to Place] : oriented to place [Oriented to Time] : oriented to time [Calm] : calm [JVD] : no jugular venous distention  [Ankle Swelling (On Exam)] : not present [Varicose Veins Of Lower Extremities] : not present [] : not present [Abdomen Masses] : No abdominal masses [Tender] : was nontender [Stool Sample Taken] : No stool obtained  on rectal exam [de-identified] : nad [de-identified] : wnl [FreeTextEntry1] : mod art insuff w mod trophic skin changes \par no ulcers \par rle warm well perfused \par lue avf w good bruit and thrill \par right foot wound w mod granulation tissue 5cm diam \par no drainage \par lion le grossly normal motor and sensory fx \par lue avf w good to excellent bruit and thrill  [de-identified] : wnl [de-identified] : wnl no rle deficits noted  [de-identified] : cn 2-12 lion grossly intact [de-identified] : cooperative

## 2019-10-29 NOTE — ASSESSMENT
[Arterial/Venous Disease] : arterial/venous disease [Medication Management] : medication management [Foot care/Footwear] : foot care/footwear [Smoking Cessation] : smoking cessation [FreeTextEntry1] : impression arterial insuff s/p bypass  doing well despite pt ongoing smoking\par lue avf\par \par [employer] managemnt exercise program, protective measures, continue rt foot woundcare \par continue pletal 50 bid\par bedside d/w pt and mother  to cut back and quit smoking pt is aware of the dec  byp patency due to smoking \par 10 min at bedside\par ov w temitope/pvr s/o art insuff 12mo  12/2019\par ov w rle fem pop byp  s/o stenosis 12mo 12//2019\par ov w carotid duplex s/o stenosis 3 years  10/2021\par lue avf marked\par may start lue avf hd access\par rto after 3 hd sessions for rt permacath d/c\par \par letter faxed to Dr TED Diaz MD

## 2019-10-29 NOTE — DATA REVIEWED
[FreeTextEntry1] : offered pt rt fem pop byp duplex today pt wants to kaia \par \par 11/7/2017 RLE Arterial Duplex patent rt fem pop byp \par \par 6/22/2018 RLE Arterial Duplex patent rt cfa to ak pop a  ptfe byp no flow restrictive lesions\par \par 10/17/2018 Carotid Duplex  Rt ICA  less 50% stenosis \par                          Lt  ICA  less 50% stenosis  \par                          Lion Ant Vertebral Arterial Flow \par \par 12/11/2018 TEMITOPE/PVR RLE no sig art occ dz LLE mild ifragenic art occ dz rt temitope .86 lt temitope .70\par \par 12/18/2018  RLE Arterial Duplex patent fem pop no hematoma no flow restrictions\par \par 12/18/2018  venous Duplex lion le no acute dvt svt\par \par  3/12/2019 Vein Mapping  RUE adequate  cephalic vein from wrist , adequate basilic vein from elbow\par                                           LUE  limited vein resource  cephalic at wrist level,, mod basilic vein resource at elbow\par \par 9/3/2019  LUE AVF flow  558 ml/min  w mid avf narrowing and sig for tributaries\par \par 10/29/2019 LUE AVF flow  522 ml/min

## 2019-11-05 ENCOUNTER — APPOINTMENT (OUTPATIENT)
Dept: VASCULAR SURGERY | Facility: CLINIC | Age: 54
End: 2019-11-05

## 2019-11-06 ENCOUNTER — APPOINTMENT (OUTPATIENT)
Dept: VASCULAR SURGERY | Facility: CLINIC | Age: 54
End: 2019-11-06
Payer: MEDICARE

## 2019-11-06 VITALS
HEIGHT: 70 IN | WEIGHT: 164 LBS | BODY MASS INDEX: 23.48 KG/M2 | DIASTOLIC BLOOD PRESSURE: 72 MMHG | SYSTOLIC BLOOD PRESSURE: 164 MMHG | HEART RATE: 61 BPM

## 2019-11-06 PROCEDURE — 99214 OFFICE O/P EST MOD 30 MIN: CPT

## 2019-11-06 PROCEDURE — 99406 BEHAV CHNG SMOKING 3-10 MIN: CPT

## 2019-11-06 NOTE — ASSESSMENT
[Arterial/Venous Disease] : arterial/venous disease [Medication Management] : medication management [Foot care/Footwear] : foot care/footwear [Smoking Cessation] : smoking cessation [FreeTextEntry1] : impression arterial insuff s/p bypass  doing well despite pt ongoing smoking w rt foot wound healing \par lue avf functioning \par \par [employer] manag[employer]nt exercise program, protective measures, continue rt foot woundcare \par continue pletal 50 bid\par bedside d/w pt and mother  to cut back and quit smoking pt is aware of the dec  byp patency due to smoking \par 10 min at bedside\par ov w temitope/pvr s/o art insuff 12mo  12/2019\par ov w rle fem pop byp  s/o stenosis 12mo 12//2019\par ov w carotid duplex s/o stenosis 3 years  10/2021\par continue lue avf hd access\par rto after 3 hd sessions for rt permacath d/c\par \par letter faxed to Dr TED Diaz MD

## 2019-11-06 NOTE — PHYSICAL EXAM
[Normal Breath Sounds] : Normal breath sounds [Right Carotid Bruit] : right carotid bruit heard [Left Carotid Bruit] : left carotid bruit heard [2+] : right 2+ [1+] : left 1+ [No HSM] : no hepatosplenomegaly [No Rash or Lesion] : No rash or lesion [Alert] : alert [Oriented to Person] : oriented to person [Oriented to Place] : oriented to place [Oriented to Time] : oriented to time [Calm] : calm [JVD] : no jugular venous distention  [Ankle Swelling (On Exam)] : not present [Varicose Veins Of Lower Extremities] : not present [] : not present [Abdomen Masses] : No abdominal masses [Tender] : was nontender [Stool Sample Taken] : No stool obtained  on rectal exam [de-identified] : nad [de-identified] : wnl [FreeTextEntry1] : mod art insuff w mod trophic skin changes \par rle warm well perfused \par lue avf w good bruit and thrill \par right foot wound w eschar 3cm diam \par no drainage no cellulitis\par lion le grossly normal motor and sensory fx \par lue avf w good to excellent bruit and thrill  [de-identified] : wnl [de-identified] : wnl no rle deficits noted  [de-identified] : cn 2-12 lion grossly intact [de-identified] : cooperative

## 2019-11-06 NOTE — HISTORY OF PRESENT ILLNESS
[FreeTextEntry1] : pt still smokes cigs and pot\par pt is on pletal 50 bid \par pt is s/p lue avf BA maturation \par pt is receiving  rt sided permacath hd   [de-identified] : pt underwent only 1 session of hd via lue avf w 2 needles\par pt c/o right foot dorsum mild discomfort over the wound since last ov

## 2019-11-19 ENCOUNTER — APPOINTMENT (OUTPATIENT)
Dept: VASCULAR SURGERY | Facility: CLINIC | Age: 54
End: 2019-11-19
Payer: MEDICARE

## 2019-11-19 VITALS — DIASTOLIC BLOOD PRESSURE: 91 MMHG | SYSTOLIC BLOOD PRESSURE: 220 MMHG | HEART RATE: 67 BPM

## 2019-11-19 VITALS
HEART RATE: 68 BPM | HEIGHT: 70 IN | DIASTOLIC BLOOD PRESSURE: 82 MMHG | SYSTOLIC BLOOD PRESSURE: 207 MMHG | BODY MASS INDEX: 22.9 KG/M2 | TEMPERATURE: 97.8 F | WEIGHT: 160 LBS

## 2019-11-19 PROCEDURE — 36589 REMOVAL TUNNELED CV CATH: CPT | Mod: RT

## 2019-11-19 PROCEDURE — 99213 OFFICE O/P EST LOW 20 MIN: CPT | Mod: 25

## 2019-11-19 PROCEDURE — 99406 BEHAV CHNG SMOKING 3-10 MIN: CPT

## 2019-11-19 NOTE — PROCEDURE
[FreeTextEntry1] :  Right anterior chest wall appropriately prepped and draped in a sterile fashion\par 10cc 1% Lidocaine was administered  subcutaneously  over the permacath tract w good local anesthesia\par Using  hemostat  the tunnel was undermined and the catheter was removed in its entirety \par Permacath was removed in its entirety  pressure dressing was applied w good hemostasis\par Pt tolerated procedure well\par

## 2019-11-19 NOTE — ASSESSMENT
[Arterial/Venous Disease] : arterial/venous disease [Medication Management] : medication management [Foot care/Footwear] : foot care/footwear [Smoking Cessation] : smoking cessation [FreeTextEntry1] : impression arterial insuff s/p bypass  doing well despite pt ongoing smoking w rt foot wound healing \par lue avf functioning \par \par [employer] managemnt exercise program, protective measures, continue rt foot woundcare \par continue pletal 50 bid\par bedside d/w pt and mother  to cut back and quit smoking pt is aware of the dec  byp patency due to smoking \par 10 min at bedside\par ov w temitope/pvr s/o art insuff 12mo  12/2019\par ov w rle fem pop byp  s/o stenosis 12mo 12//2019\par ov w carotid duplex s/o stenosis 3 years  10/2021\par continue lue avf hd access\par ov w lue avf duplex s/o stenosis 6mo march 2020 \par \par letter faxed to Dr TED Diaz MD

## 2019-11-19 NOTE — PHYSICAL EXAM
[Normal Breath Sounds] : Normal breath sounds [Right Carotid Bruit] : right carotid bruit heard [Left Carotid Bruit] : left carotid bruit heard [2+] : right 2+ [1+] : left 1+ [No HSM] : no hepatosplenomegaly [No Rash or Lesion] : No rash or lesion [Oriented to Person] : oriented to person [Alert] : alert [Oriented to Place] : oriented to place [Calm] : calm [Oriented to Time] : oriented to time [JVD] : no jugular venous distention  [Ankle Swelling (On Exam)] : not present [Varicose Veins Of Lower Extremities] : not present [] : not present [Abdomen Masses] : No abdominal masses [Tender] : was nontender [Stool Sample Taken] : No stool obtained  on rectal exam [de-identified] : nad [de-identified] : wnl [FreeTextEntry1] : mod art insuff w mod trophic skin changes \par rle warm well perfused \par right foot wound w eschar 1.5cm diam \par no drainage no cellulitis\par lue avf w good to excellent bruit and thrill  [de-identified] : wnl [de-identified] : wnl no rle deficits noted  [de-identified] : cn 2-12 lion grossly intact [de-identified] : cooperative

## 2019-11-19 NOTE — HISTORY OF PRESENT ILLNESS
[FreeTextEntry1] : pt still smokes cigs and pot\par pt is on pletal 50 bid \par pt is s/p lue avf BA maturation \par pt is receiving  rt sided permacath hd   [de-identified] : pt underwent  3 hd session via lue w/o c/o \par pt  states right foot wound is feeling better

## 2019-12-10 ENCOUNTER — APPOINTMENT (OUTPATIENT)
Dept: PULMONOLOGY | Facility: CLINIC | Age: 54
End: 2019-12-10
Payer: MEDICARE

## 2019-12-10 VITALS
HEART RATE: 65 BPM | OXYGEN SATURATION: 94 % | TEMPERATURE: 97.4 F | SYSTOLIC BLOOD PRESSURE: 190 MMHG | DIASTOLIC BLOOD PRESSURE: 82 MMHG

## 2019-12-10 PROCEDURE — 99213 OFFICE O/P EST LOW 20 MIN: CPT

## 2019-12-10 RX ORDER — SILVER SULFADIAZINE 10 MG/G
1 CREAM TOPICAL
Refills: 0 | Status: DISCONTINUED | COMMUNITY
End: 2019-12-10

## 2019-12-10 RX ORDER — CEPHALEXIN 500 MG/1
500 CAPSULE ORAL
Refills: 0 | Status: DISCONTINUED | COMMUNITY
End: 2019-12-10

## 2019-12-10 NOTE — HISTORY OF PRESENT ILLNESS
[FreeTextEntry1] : Overall feeling good. \par Burn on foot healed\par Off all diabetes medications\par \par

## 2019-12-10 NOTE — PHYSICAL EXAM
[Normal Conjunctiva] : the conjunctiva exhibited no abnormalities [Eyelids - No Xanthelasma] : the eyelids demonstrated no xanthelasmas [Neck Appearance] : the appearance of the neck was normal [Neck Cervical Mass (___cm)] : no neck mass was observed [Jugular Venous Distention Increased] : there was no jugular-venous distention [Thyroid Nodule] : there were no palpable thyroid nodules [Thyroid Diffuse Enlargement] : the thyroid was not enlarged [Heart Rate And Rhythm] : heart rate and rhythm were normal [Heart Sounds] : normal S1 and S2 [Murmurs] : no murmurs present [Respiration, Rhythm And Depth] : normal respiratory rhythm and effort [Auscultation Breath Sounds / Voice Sounds] : lungs were clear to auscultation bilaterally [Exaggerated Use Of Accessory Muscles For Inspiration] : no accessory muscle use [Abdomen Soft] : soft [] : no hepato-splenomegaly [Abdomen Mass (___ Cm)] : no abdominal mass palpated [Abdomen Tenderness] : non-tender [FreeTextEntry1] : burn to foot, second degree, healing [Deep Tendon Reflexes (DTR)] : deep tendon reflexes were 2+ and symmetric [Sensation] : the sensory exam was normal to light touch and pinprick [No Focal Deficits] : no focal deficits

## 2019-12-10 NOTE — ASSESSMENT
[FreeTextEntry1] : Followup blood pressure check at dialysis tomorrow\par Patient requesting prostate test\par Ordered PSA to be drawn at dialysis

## 2019-12-10 NOTE — REVIEW OF SYSTEMS
[Fatigue] : no fatigue [As Noted in HPI] : as noted in HPI [Poor Appetite] : normal appetite  [Numbness] : numbness [FreeTextEntry6] : pain in R thigh [Negative] : Hematologic

## 2019-12-30 ENCOUNTER — RX RENEWAL (OUTPATIENT)
Age: 54
End: 2019-12-30

## 2020-01-07 NOTE — BRIEF OPERATIVE NOTE - ELECTIVE PROCEDURE
Yes currently no response to light touch and deep pressure on all extremities; spontaneously moves extremities but not clear if it is a response to tactile stimuli

## 2020-01-10 NOTE — PATIENT PROFILE ADULT. - PRO ANTICIPATED DISCH DISP
-Take medications as prescribed  -OTC medications as directed/discussed  -Ensure adequate fluid, nutrition and rest.  -Consider a COOL MIST HUMIDIFIER in bedroom and sleep with the door closed.   -Avoid exposure to cigarette smoke  -Sleep with head elevated to help sinuses drain.  -Ensure proper hand washing and covering when coughing.  -Breathe deeply when showering to help promote drainage.  -Warm compresses to sinuses to help promote drainage.    Follow-up with primary care provider in the next 3-5 days for re-evaluation. If symptoms worsen or new symptoms develop, please follow-up with the [facility] or seek further evaluation at the nearest Emergency Room.    Patient Education     Viral Upper Respiratory Illness (Child)  Your child has a viral upper respiratory illness (URI), which is another term for the common cold. The virus is contagious during the first few days. It is spread through the air by coughing, sneezing, or by direct contact (touching your sick child then touching your own eyes, nose, or mouth). Frequent handwashing will decrease risk of spread. Most viral illnesses resolve within 7 to 14 days with rest and simple home remedies. However, they may sometimes last up to 4 weeks. Antibiotics will not kill a virus and are generally not prescribed for this condition.    Home care  · Fluids. Fever increases water loss from the body. Encourage your child to drink lots of fluids to loosen lung secretions and make it easier to breathe. For infants under 1 year old, continue regular formula or breast feedings. Between feedings, give oral rehydration solution. This is available from drugstores and grocery stores without a prescription. For children over 1 year old, give plenty of fluids, such as water, juice, gelatin water, soda without caffeine, ginger ale, lemonade, or ice pops.  · Eating. If your child doesn't want to eat solid foods, it's OK for a few days, as long as he or she drinks lots of fluid.  · Rest:  Keep children with fever at home resting or playing quietly until the fever is gone. Encourage frequent naps. Your child may return to day care or school when the fever is gone and he or she is eating well and feeling better.  · Sleep. Periods of sleeplessness and irritability are common. A congested child will sleep best with the head and upper body propped up on pillows or with the head of the bed frame raised on a 6-inch block.   · Cough. Coughing is a normal part of this illness. A cool mist humidifier at the bedside may be helpful. Be sure to clean the humidifier every day to prevent mold. Over-the-counter cough and cold medicines have not proved to be any more helpful than a placebo (syrup with no medicine in it). In addition, these medicines can produce serious side effects, especially in infants under 2 years of age. Do not give over-the-counter cough and cold medicines to children under 6 years unless your healthcare provider has specifically advised you to do so. Also, don’t expose your child to cigarette smoke. It can make the cough worse.  · Nasal congestion. Suction the nose of infants with a bulb syringe. You may put 2 to 3 drops of saltwater (saline) nose drops in each nostril before suctioning. This helps thin and remove secretions. Saline nose drops are available without a prescription. You can also use 1/4 teaspoon of table salt dissolved in 1 cup of water.  · Fever. Use children’s acetaminophen for fever, fussiness, or discomfort, unless another medicine was prescribed. In infants over 6 months of age, you may use children’s ibuprofen or acetaminophen. If your child has chronic liver or kidney disease or has ever had a stomach ulcer or gastrointestinal bleeding, talk with your healthcare provider before using these medicines. Aspirin should never be given to anyone younger than 18 years of age who is ill with a viral infection or fever. It may cause severe liver or brain damage.  · Preventing  spread. Washing your hands before and after touching your sick child will help prevent a new infection. It will also help prevent the spread of this viral illness to yourself and other children.  Follow-up care  Follow up with your healthcare provider, or as advised.  When to seek medical advice  For a usually healthy child, call your child's healthcare provider right away if any of these occur:  · A fever, as follows:  ? Your child is 3 months old or younger and has a fever of 100.4°F (38°C) or higher. Get medical care right away. Fever in a young baby can be a sign of a dangerous infection.  ? Your child is of any age and has repeated fevers above 104°F (40°C).  ? Your child is younger than 2 years of age and a fever of 100.4°F (38°C) continues for more than 1 day.  ? Your child is 2 years old or older and a fever of 100.4°F (38°C) continues for more than 3 days.  · Earache, sinus pain, stiff or painful neck, headache, repeated diarrhea, or vomiting.  · Unusual fussiness.  · A new rash appears.  · Your child is dehydrated, with one or more of these symptoms:  ? No tears when crying.  ? “Sunken” eyes or a dry mouth.  ? No wet diapers for 8 hours in infants.  ? Reduced urine output in older children.  Call 911  Call 911 if any of these occur:  · Increased wheezing or difficulty breathing  · Unusual drowsiness or confusion  · Fast breathing:  ? Birth to 6 weeks: over 60 breaths per minute  ? 6 weeks to 2 years: over 45 breaths per minute  ? 3 to 6 years: over 35 breaths per minute  ? 7 to 10 years: over 30 breaths per minute  ? Older than 10 years: over 25 breaths per minute  Date Last Reviewed: 9/13/2015  © 4744-7258 [employer]. [address] [place] [address], [place] [address]. All rights reserved. This information is not intended as a substitute for professional medical care. Always follow your healthcare professional's instructions.           Patient Education     Kid Care: Colds  Colds are a common  childhood illness. The following suggestions should help your child get back up to speed soon. If your child hasn’t had a fever for the past 24 hours and feels okay, he or she can return to regular activities at school and at play. You can help prevent future colds by following the tips at the end of this sheet.    There is no cure for the common cold. An older child usually does not need to see a doctor unless the cold becomes serious. If your child is 3 months or younger, call your health care provider at the first sign of illness. A young baby's cold can become more serious very quickly. It can develop into a serious problem such as pneumonia.  Ease congestion  · Use a cool-mist vaporizer to help loosen mucus. Don’t use a hot-steam vaporizer with a young child, who could get burned. Make sure to clean the vaporizer often to help prevent mold growth.  · Try over-the-counter saline nasal sprays. They’re safe for children. These are not the same as nasal decongestant sprays, which may make symptoms worse.  · Use a bulb syringe to clear the nose of a child too young to blow his or her nose. Wash the bulb syringe often in hot, soapy water. Be sure to rinse out all of the soap and drain all of the water before using it again.  Soothe a sore throat  · Offer plenty of liquids to keep the throat moist and reduce pain. Good choices include ice chips, water, or frozen fruit bars.  · Give children age 4 or older throat drops or lozenges to keep the throat moist and soothe pain.  · Give ibuprofen or acetaminophen as advised by your child's healthcare provider to relieve pain. Never give aspirin to a child under age 18 who has a cold or flu. It could cause a rare but serious condition called Reye’s syndrome.  Before you give your child medicine  Cold and cough medications should not be used for children under the age of 6, according to the American Academy of Pediatrics. These medications do not work on young children and may  cause harmful side effects. If your child is age 6 or older, use care when giving cold and cough medications. Always follow your doctor’s advice.   Quiet a cough  · Serve warm fluids such as soup to help loosen mucus.  · Use a cool-mist vaporizer to ease croup. Croup causes dry, barking coughs.  · Use cough medicine for children age 6 or older only if advised by your child’s doctor.  Preventing colds  To help children stay healthy:  · Teach children to wash their hands often. This includes before eating and after using the bathroom, playing with animals, or coughing or sneezing. Carry an alcohol-based hand gel containing at least 60% alcohol. This is for times when soap and water aren’t available.  · Remind children not to touch their eyes, nose, and mouth.  Tips for proper handwashing  Use warm water and plenty of soap. Work up a good lather.  · Clean the whole hand, under the nails, between the fingers, and up the wrists.  · Wash for at least 10-15 seconds. This is about as long as it takes to say the alphabet or sing “Happy Birthday.” Don’t just wash--scrub well.  · Rinse well. Let the water run down the fingers, not up the wrists.  · In a public restroom, use a paper towel to turn off the faucet and open the door.  When to call the doctor  Call your child's healthcare provider right away if your child has any of these fever symptoms:  · In an infant under 3 months old, a temperature of 100.4°F (38.0°C) or higher  · In a child of any age who has a temperature that rises more than once to 104°F (40°C) or higher  · A fever that lasts more than 24-hours in a child under 2 years old, or for 3 days in a child 2 years or older  · A seizure caused by the fever  Also call the provider right away if your child has any of these other symptoms:  · Your child looks very ill or is unusually fussy or drowsy  · Severe ear pain or sore throat  · Unexplained rash  · Repeated vomiting and diarrhea  · Rapid breathing or shortness  of breath  · A stiff neck or severe headache  · Difficulty swallowing  · Persistent brown, green, or bloody mucus  · Signs of dehydration, which include severe thirst, dark yellow urine, infrequent urination, dull or sunken eyes, dry skin, and dry or cracked lips  · Your child's symptoms seem to be getting worse  · Your child doesn’t look or act right to you   Date Last Reviewed: 11/1/2016  © 8320-1259 [employer] [address] [place] [address]. All rights reserved. This information is not intended as a substitute for professional medical care. Always follow your healthcare professional's instructions.            home

## 2020-01-14 ENCOUNTER — APPOINTMENT (OUTPATIENT)
Dept: VASCULAR SURGERY | Facility: CLINIC | Age: 55
End: 2020-01-14
Payer: MEDICARE

## 2020-01-14 VITALS — DIASTOLIC BLOOD PRESSURE: 57 MMHG | SYSTOLIC BLOOD PRESSURE: 170 MMHG | HEIGHT: 70 IN | HEART RATE: 58 BPM

## 2020-01-14 PROCEDURE — 99406 BEHAV CHNG SMOKING 3-10 MIN: CPT

## 2020-01-14 PROCEDURE — 93923 UPR/LXTR ART STDY 3+ LVLS: CPT

## 2020-01-14 PROCEDURE — 93926 LOWER EXTREMITY STUDY: CPT | Mod: RT

## 2020-01-14 PROCEDURE — 99214 OFFICE O/P EST MOD 30 MIN: CPT

## 2020-01-14 NOTE — DATA REVIEWED
[FreeTextEntry1] : offered pt rt fem pop byp duplex today pt wants to kaia \par \par 11/7/2017 RLE Arterial Duplex patent rt fem pop byp \par \par 6/22/2018 RLE Arterial Duplex patent rt cfa to ak pop a  ptfe byp no flow restrictive lesions\par \par 10/17/2018 Carotid Duplex  Rt ICA  less 50% stenosis \par                          Lt  ICA  less 50% stenosis  \par                          Lion Ant Vertebral Arterial Flow \par \par 12/11/2018 TEMITOPE/PVR RLE no sig art occ dz LLE mild ifragenic art occ dz rt temitope .86 lt temitope .70\par \par 12/18/2018  RLE Arterial Duplex patent fem pop no hematoma no flow restrictions\par \par 12/18/2018  venous Duplex lion le no acute dvt svt\par \par  3/12/2019 Vein Mapping  RUE adequate  cephalic vein from wrist , adequate basilic vein from elbow\par                                           LUE  limited vein resource  cephalic at wrist level,, mod basilic vein resource at elbow\par \par 9/3/2019  LUE AVF flow  558 ml/min  w mid avf narrowing and sig for tributaries\par \par 10/29/2019 LUE AVF flow  522 ml/min  \par \par 1/14/2020 RLE Arterial Duplex patent fem pop bypass  no flow restrictions\par \par 1/14/2020 TEMITOPE/PVR RLE no sig art occ dz LLE mild infragenic art occ dz rt temitope .93 lt temitope .76\par

## 2020-01-14 NOTE — HISTORY OF PRESENT ILLNESS
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

## 2020-01-14 NOTE — PHYSICAL EXAM
[Right Carotid Bruit] : right carotid bruit heard [Normal Breath Sounds] : Normal breath sounds [Left Carotid Bruit] : left carotid bruit heard [1+] : right 1+ [2+] : right 2+ [No HSM] : no hepatosplenomegaly [No Rash or Lesion] : No rash or lesion [Alert] : alert [Oriented to Person] : oriented to person [Oriented to Place] : oriented to place [Oriented to Time] : oriented to time [Calm] : calm [JVD] : no jugular venous distention  [Ankle Swelling (On Exam)] : not present [Varicose Veins Of Lower Extremities] : not present [] : not present [Abdomen Masses] : No abdominal masses [Tender] : was nontender [Stool Sample Taken] : No stool obtained  on rectal exam [de-identified] : nad [de-identified] : wnl [FreeTextEntry1] : mod art insuff w mod trophic skin changes \par rle warm well perfused \par no wounds \par lue avf w good to excellent bruit and thrill  [de-identified] : wnl [de-identified] : wnl  [de-identified] : cn 2-12 lion grossly intact [de-identified] : cooperative

## 2020-02-14 ENCOUNTER — APPOINTMENT (OUTPATIENT)
Dept: PULMONOLOGY | Facility: CLINIC | Age: 55
End: 2020-02-14
Payer: MEDICARE

## 2020-02-14 VITALS
BODY MASS INDEX: 22.9 KG/M2 | HEART RATE: 59 BPM | WEIGHT: 160 LBS | HEIGHT: 70 IN | OXYGEN SATURATION: 94 % | DIASTOLIC BLOOD PRESSURE: 83 MMHG | SYSTOLIC BLOOD PRESSURE: 157 MMHG | TEMPERATURE: 97.9 F | RESPIRATION RATE: 16 BRPM

## 2020-02-14 LAB
GLUCOSE BLDC GLUCOMTR-MCNC: 147
HBA1C MFR BLD HPLC: 4.9

## 2020-02-14 PROCEDURE — 83036 HEMOGLOBIN GLYCOSYLATED A1C: CPT | Mod: QW

## 2020-02-14 PROCEDURE — 82962 GLUCOSE BLOOD TEST: CPT

## 2020-02-14 PROCEDURE — 99213 OFFICE O/P EST LOW 20 MIN: CPT | Mod: 25

## 2020-02-16 NOTE — HISTORY OF PRESENT ILLNESS
[Former] : former [Never] : never [TextBox_4] : Diabetes end-stage renal disease on hemodialysis\par Questioning need for medication

## 2020-02-16 NOTE — PHYSICAL EXAM
[Normal Oropharynx] : normal oropharynx [No Acute Distress] : no acute distress [Normal Appearance] : normal appearance [No Neck Mass] : no neck mass [Normal S1, S2] : normal s1, s2 [Normal Rate/Rhythm] : normal rate/rhythm [No Murmurs] : no murmurs [No Resp Distress] : no resp distress [Clear to Auscultation Bilaterally] : clear to auscultation bilaterally [No Abnormalities] : no abnormalities [Benign] : benign [Normal Gait] : normal gait [No Clubbing] : no clubbing [No Cyanosis] : no cyanosis [No Edema] : no edema [FROM] : FROM [Normal Color/ Pigmentation] : normal color/ pigmentation [No Focal Deficits] : no focal deficits [Oriented x3] : oriented x3 [Normal Affect] : normal affect

## 2020-02-16 NOTE — ASSESSMENT
[FreeTextEntry1] : Based on A1c patient does not appear to be requiring ongoing diabetic medication.

## 2020-04-02 RX ORDER — GABAPENTIN 100 MG/1
100 CAPSULE ORAL
Qty: 90 | Refills: 3 | Status: ACTIVE | COMMUNITY
Start: 2018-10-31 | End: 1900-01-01

## 2020-04-29 NOTE — ASU PATIENT PROFILE, ADULT - NS TRANSFER HEARING AID
Pt requesting zofran in anticipation of starting chemo next week. Dusty'd up med and sending to Dr. Tineo.   none

## 2020-06-16 ENCOUNTER — APPOINTMENT (OUTPATIENT)
Dept: PULMONOLOGY | Facility: CLINIC | Age: 55
End: 2020-06-16
Payer: MEDICARE

## 2020-06-16 VITALS
OXYGEN SATURATION: 96 % | HEART RATE: 58 BPM | SYSTOLIC BLOOD PRESSURE: 130 MMHG | DIASTOLIC BLOOD PRESSURE: 74 MMHG | TEMPERATURE: 97.5 F

## 2020-06-16 DIAGNOSIS — M25.562 PAIN IN LEFT KNEE: ICD-10-CM

## 2020-06-16 LAB — PSA SERPL-MCNC: 1.03 NG/ML

## 2020-06-16 PROCEDURE — 99213 OFFICE O/P EST LOW 20 MIN: CPT | Mod: 25

## 2020-06-16 PROCEDURE — 36415 COLL VENOUS BLD VENIPUNCTURE: CPT

## 2020-06-17 PROBLEM — M25.562 KNEE PAIN, LEFT: Status: ACTIVE | Noted: 2020-06-16

## 2020-06-17 LAB
SARS-COV-2 IGG SERPL IA-ACNC: 0.01 INDEX
SARS-COV-2 IGG SERPL QL IA: NEGATIVE

## 2020-06-17 NOTE — ASSESSMENT
[FreeTextEntry1] : Prostate exam normal will defer work-up-check PSA\par Orthopedic evaluation for left knee swelling

## 2020-06-17 NOTE — HISTORY OF PRESENT ILLNESS
[Never] : never [Current] : current [TextBox_4] : recent injury to knee.\par says its "broken"\par able to bear weight but swollen\par Getting labs in dialysis\par Otherwise feeling okay.\par Does complain of urinary frequency and urgency

## 2020-06-17 NOTE — PHYSICAL EXAM
[Normal Appearance] : normal appearance [No Acute Distress] : no acute distress [Normal Oropharynx] : normal oropharynx [Normal Rate/Rhythm] : normal rate/rhythm [No Neck Mass] : no neck mass [No Resp Distress] : no resp distress [Normal S1, S2] : normal s1, s2 [No Murmurs] : no murmurs [No Abnormalities] : no abnormalities [Clear to Auscultation Bilaterally] : clear to auscultation bilaterally [Benign] : benign [Normal Gait] : normal gait [Normal Color/ Pigmentation] : normal color/ pigmentation [Oriented x3] : oriented x3 [No Focal Deficits] : no focal deficits [Normal Affect] : normal affect [TextBox_89] : Rectal normal prostate [TextBox_105] : left knee swollen, limited ROM

## 2020-07-17 ENCOUNTER — APPOINTMENT (OUTPATIENT)
Dept: PULMONOLOGY | Facility: CLINIC | Age: 55
End: 2020-07-17
Payer: MEDICARE

## 2020-07-17 VITALS — OXYGEN SATURATION: 96 % | HEART RATE: 71 BPM | RESPIRATION RATE: 15 BRPM | TEMPERATURE: 97.9 F

## 2020-07-17 VITALS — HEART RATE: 80 BPM | SYSTOLIC BLOOD PRESSURE: 200 MMHG | DIASTOLIC BLOOD PRESSURE: 90 MMHG

## 2020-07-17 PROCEDURE — 99213 OFFICE O/P EST LOW 20 MIN: CPT

## 2020-07-17 RX ORDER — CARVEDILOL 12.5 MG/1
12.5 TABLET, FILM COATED ORAL
Refills: 0 | Status: DISCONTINUED | COMMUNITY
End: 2020-07-17

## 2020-07-17 NOTE — PHYSICAL EXAM
[Normal Oropharynx] : normal oropharynx [No Acute Distress] : no acute distress [Normal Rate/Rhythm] : normal rate/rhythm [No Neck Mass] : no neck mass [Normal Appearance] : normal appearance [No Resp Distress] : no resp distress [No Murmurs] : no murmurs [Normal S1, S2] : normal s1, s2 [No Abnormalities] : no abnormalities [Clear to Auscultation Bilaterally] : clear to auscultation bilaterally [Normal Color/ Pigmentation] : normal color/ pigmentation [Normal Gait] : normal gait [Benign] : benign [Oriented x3] : oriented x3 [Normal Affect] : normal affect [No Focal Deficits] : no focal deficits [TextBox_89] : Rectal normal prostate [TextBox_105] : left knee swollen, limited ROM

## 2020-07-17 NOTE — HISTORY OF PRESENT ILLNESS
[Never] : never [Current] : current [TextBox_4] : Follow-up end-stage renal disease on hemodialysis.  Concerned about elevated blood pressure.  Also notes that he sweats when he eats spicy food.  I reassured him about the sweating issue that is probably normal his blood pressure was quite high apparently his Coreg was not renewed.

## 2020-08-04 ENCOUNTER — APPOINTMENT (OUTPATIENT)
Dept: PULMONOLOGY | Facility: CLINIC | Age: 55
End: 2020-08-04
Payer: MEDICARE

## 2020-08-04 VITALS
RESPIRATION RATE: 16 BRPM | HEART RATE: 64 BPM | TEMPERATURE: 98 F | DIASTOLIC BLOOD PRESSURE: 79 MMHG | OXYGEN SATURATION: 97 % | SYSTOLIC BLOOD PRESSURE: 185 MMHG

## 2020-08-04 PROCEDURE — 99214 OFFICE O/P EST MOD 30 MIN: CPT

## 2020-08-06 NOTE — PHYSICAL EXAM
[No Acute Distress] : no acute distress [Normal Oropharynx] : normal oropharynx [Normal Appearance] : normal appearance [No Neck Mass] : no neck mass [Normal Rate/Rhythm] : normal rate/rhythm [Normal S1, S2] : normal s1, s2 [No Murmurs] : no murmurs [No Resp Distress] : no resp distress [Clear to Auscultation Bilaterally] : clear to auscultation bilaterally [No Abnormalities] : no abnormalities [Benign] : benign [No Clubbing] : no clubbing [No Cyanosis] : no cyanosis [No Edema] : no edema [FROM] : FROM [Normal Color/ Pigmentation] : normal color/ pigmentation [No Focal Deficits] : no focal deficits [Oriented x3] : oriented x3 [Normal Affect] : normal affect [TextBox_99] : low back tenderness

## 2020-08-06 NOTE — HISTORY OF PRESENT ILLNESS
[TextBox_4] : Fell at [employer] and fall\par Hurt back\par No pain radiating down legs\par No weakness\par Nuys syncope

## 2020-08-06 NOTE — ASSESSMENT
[FreeTextEntry1] : Discussed with renal\par Pain management challenging given history of prior substance abuse and end-stage renal disease\par Recommend low-dose NSAID for pain control.\par Also increase gabapentin

## 2020-08-10 ENCOUNTER — APPOINTMENT (OUTPATIENT)
Dept: VASCULAR SURGERY | Facility: CLINIC | Age: 55
End: 2020-08-10
Payer: MEDICARE

## 2020-08-10 ENCOUNTER — RX RENEWAL (OUTPATIENT)
Age: 55
End: 2020-08-10

## 2020-08-10 PROCEDURE — 93990 DOPPLER FLOW TESTING: CPT

## 2020-08-10 RX ORDER — CILOSTAZOL 50 MG/1
50 TABLET ORAL
Qty: 180 | Refills: 3 | Status: ACTIVE | COMMUNITY
Start: 2020-01-14 | End: 1900-01-01

## 2020-08-12 ENCOUNTER — APPOINTMENT (OUTPATIENT)
Dept: VASCULAR SURGERY | Facility: CLINIC | Age: 55
End: 2020-08-12
Payer: MEDICARE

## 2020-08-12 DIAGNOSIS — R25.2 CRAMP AND SPASM: ICD-10-CM

## 2020-08-12 DIAGNOSIS — G47.62 SLEEP RELATED LEG CRAMPS: ICD-10-CM

## 2020-08-12 PROCEDURE — 99406 BEHAV CHNG SMOKING 3-10 MIN: CPT

## 2020-08-12 PROCEDURE — 99213 OFFICE O/P EST LOW 20 MIN: CPT | Mod: 25

## 2020-08-12 NOTE — DATA REVIEWED
[FreeTextEntry1] : offered pt rt fem pop byp duplex today pt wants to kaia \par \par 11/7/2017 RLE Arterial Duplex patent rt fem pop byp \par \par 6/22/2018 RLE Arterial Duplex patent rt cfa to ak pop a  ptfe byp no flow restrictive lesions\par \par 10/17/2018 Carotid Duplex  Rt ICA  less 50% stenosis \par                          Lt  ICA  less 50% stenosis  \par                          Lion Ant Vertebral Arterial Flow \par \par 12/11/2018 TEMITOPE/PVR RLE no sig art occ dz LLE mild ifragenic art occ dz rt temitope .86 lt temitope .70\par \par 12/18/2018  RLE Arterial Duplex patent fem pop no hematoma no flow restrictions\par \par 12/18/2018  venous Duplex lion le no acute dvt svt\par \par  3/12/2019 Vein Mapping  RUE adequate  cephalic vein from wrist , adequate basilic vein from elbow\par                                           LUE  limited vein resource  cephalic at wrist level,, mod basilic vein resource at elbow\par \par 9/3/2019  LUE AVF flow  558 ml/min  w mid avf narrowing and sig for tributaries\par \par 10/29/2019 LUE AVF flow  522 ml/min  \par \par 1/14/2020 RLE Arterial Duplex patent fem pop bypass  no flow restrictions\par \par 1/14/2020 TEMITOPE/PVR RLE no sig art occ dz LLE mild infragenic art occ dz rt temitope .93 lt temitope .76\par \par 8/10/2020  LUE AVF Duplex AVF flow 720 ml/min , no flow restrictive lesions \par

## 2020-08-12 NOTE — HISTORY OF PRESENT ILLNESS
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 ortho

## 2020-08-12 NOTE — ASSESSMENT
[Arterial/Venous Disease] : arterial/venous disease [Medication Management] : medication management [Foot care/Footwear] : foot care/footwear [Smoking Cessation] : smoking cessation [FreeTextEntry1] : impression arterial insuff s/p bypass  doing well despite pt ongoing smoking and functioning lue avf \par \par [facility] managemnt exercise program, protective measures, continue rt foot woundcare \par continue pletal 50 bid\par bedside d/w pt and mother  to cut back and quit smoking pt is aware of the dec  byp patency due to smoking \par 10 min at bedside\par ov or tlephonic  w temitope/pvr s/o art insuff 12mo  jan /2021\par ov or telephonic w rle fem pop byp  s/o stenosis 12mo jan //2021\par ov w carotid duplex s/o stenosis 3 years  10/2021\par ov w lue avf duplex s/o stenosis 6-7mo  feb/march 2021\par rto if any issues\par Telephonic visit  time duration 11 min\par \par \par letter faxed to Dr TED Diaz MD

## 2020-08-12 NOTE — PHYSICAL EXAM
[Oriented to Person] : oriented to person [Alert] : alert [Oriented to Place] : oriented to place [Oriented to Time] : oriented to time [Calm] : calm [JVD] : no jugular venous distention  [Ankle Swelling (On Exam)] : not present [Varicose Veins Of Lower Extremities] : not present [] : not present [de-identified] : no resp distress [de-identified] : cooperative

## 2020-08-12 NOTE — REASON FOR VISIT
[Follow-Up: _____] : a [unfilled] follow-up visit [Home] : at home, [unfilled] , at the time of the visit. [Medical Office: ([facility])___] : at the medical office located in  [Other:____] : [unfilled] [Verbal consent obtained from patient] : the patient, [unfilled] [FreeTextEntry1] : i broke my left knee

## 2020-08-18 ENCOUNTER — APPOINTMENT (OUTPATIENT)
Dept: PULMONOLOGY | Facility: CLINIC | Age: 55
End: 2020-08-18
Payer: MEDICARE

## 2020-08-18 VITALS
OXYGEN SATURATION: 93 % | DIASTOLIC BLOOD PRESSURE: 74 MMHG | SYSTOLIC BLOOD PRESSURE: 174 MMHG | HEART RATE: 59 BPM | TEMPERATURE: 97.9 F

## 2020-08-18 PROCEDURE — 99213 OFFICE O/P EST LOW 20 MIN: CPT

## 2020-08-18 RX ORDER — NAPROXEN 500 MG/1
500 TABLET ORAL
Qty: 14 | Refills: 0 | Status: DISCONTINUED | COMMUNITY
Start: 2020-08-04 | End: 2020-08-18

## 2020-08-18 NOTE — HISTORY OF PRESENT ILLNESS
[Current] : current [TextBox_4] : PRIOR: Fell at [employer] and fall\par Hurt back\par No pain radiating down legs\par No weakness\par Nuys syncope\par \par CURRENT: pain better\par still with some pain bending

## 2020-09-14 ENCOUNTER — APPOINTMENT (OUTPATIENT)
Dept: RADIOLOGY | Facility: IMAGING CENTER | Age: 55
End: 2020-09-14
Payer: MEDICARE

## 2020-09-14 ENCOUNTER — OUTPATIENT (OUTPATIENT)
Dept: OUTPATIENT SERVICES | Facility: HOSPITAL | Age: 55
LOS: 1 days | End: 2020-09-14
Payer: MEDICARE

## 2020-09-14 DIAGNOSIS — Z89.411 ACQUIRED ABSENCE OF RIGHT GREAT TOE: Chronic | ICD-10-CM

## 2020-09-14 DIAGNOSIS — Z98.890 OTHER SPECIFIED POSTPROCEDURAL STATES: Chronic | ICD-10-CM

## 2020-09-14 DIAGNOSIS — M54.9 DORSALGIA, UNSPECIFIED: ICD-10-CM

## 2020-09-14 PROCEDURE — 72100 X-RAY EXAM L-S SPINE 2/3 VWS: CPT

## 2020-09-14 PROCEDURE — 72100 X-RAY EXAM L-S SPINE 2/3 VWS: CPT | Mod: 26

## 2020-09-18 ENCOUNTER — RX RENEWAL (OUTPATIENT)
Age: 55
End: 2020-09-18

## 2020-09-21 ENCOUNTER — APPOINTMENT (OUTPATIENT)
Dept: PULMONOLOGY | Facility: CLINIC | Age: 55
End: 2020-09-21
Payer: MEDICARE

## 2020-09-21 VITALS
TEMPERATURE: 97.6 F | RESPIRATION RATE: 16 BRPM | OXYGEN SATURATION: 96 % | SYSTOLIC BLOOD PRESSURE: 221 MMHG | HEART RATE: 55 BPM | DIASTOLIC BLOOD PRESSURE: 86 MMHG

## 2020-09-21 DIAGNOSIS — I10 ESSENTIAL (PRIMARY) HYPERTENSION: ICD-10-CM

## 2020-09-21 PROCEDURE — 99213 OFFICE O/P EST LOW 20 MIN: CPT

## 2020-09-21 RX ORDER — NIFEDIPINE 60 MG/1
60 TABLET, FILM COATED, EXTENDED RELEASE ORAL DAILY
Qty: 30 | Refills: 1 | Status: ACTIVE | COMMUNITY
Start: 2020-09-21 | End: 1900-01-01

## 2020-09-21 RX ORDER — AMLODIPINE BESYLATE 5 MG/1
5 TABLET ORAL
Qty: 30 | Refills: 0 | Status: COMPLETED | COMMUNITY
Start: 2018-08-03 | End: 2020-09-21

## 2020-09-21 NOTE — HISTORY OF PRESENT ILLNESS
[Current] : current [Never] : never [Former] : former [TextBox_4] : CAL 55 year male  came to the office today offered complaints of High blood pressure \par the [facility] and is again high in the office today. \par He said he has been adherent to his medications to lower BP. \par Cal continues to smoke tobacco against medical advised with no plans on quitting or cutting back. \par He is otherwise in his usual state of health\par

## 2020-09-22 ENCOUNTER — LABORATORY RESULT (OUTPATIENT)
Age: 55
End: 2020-09-22

## 2020-09-23 LAB
25(OH)D3 SERPL-MCNC: 59.8 NG/ML
ALBUMIN SERPL ELPH-MCNC: 4 G/DL
ALP BLD-CCNC: 83 U/L
ALT SERPL-CCNC: 11 U/L
ANION GAP SERPL CALC-SCNC: 13 MMOL/L
AST SERPL-CCNC: 14 U/L
BASOPHILS # BLD AUTO: 0.02 K/UL
BASOPHILS NFR BLD AUTO: 0.3 %
BILIRUB SERPL-MCNC: 0.3 MG/DL
BUN SERPL-MCNC: 44 MG/DL
CALCIUM SERPL-MCNC: 9.1 MG/DL
CHLORIDE SERPL-SCNC: 100 MMOL/L
CHOLEST SERPL-MCNC: 189 MG/DL
CHOLEST/HDLC SERPL: 3.9 RATIO
CO2 SERPL-SCNC: 23 MMOL/L
CREAT SERPL-MCNC: 7.03 MG/DL
EOSINOPHIL # BLD AUTO: 0.09 K/UL
EOSINOPHIL NFR BLD AUTO: 1.4 %
GLUCOSE SERPL-MCNC: 65 MG/DL
HCT VFR BLD CALC: 44.2 %
HDLC SERPL-MCNC: 48 MG/DL
HGB BLD-MCNC: 13.3 G/DL
IMM GRANULOCYTES NFR BLD AUTO: 0.5 %
LDLC SERPL CALC-MCNC: 114 MG/DL
LYMPHOCYTES # BLD AUTO: 2.01 K/UL
LYMPHOCYTES NFR BLD AUTO: 31 %
MAN DIFF?: NORMAL
MCHC RBC-ENTMCNC: 30.1 GM/DL
MCHC RBC-ENTMCNC: 32.3 PG
MCV RBC AUTO: 107.3 FL
MONOCYTES # BLD AUTO: 0.8 K/UL
MONOCYTES NFR BLD AUTO: 12.3 %
NEUTROPHILS # BLD AUTO: 3.54 K/UL
NEUTROPHILS NFR BLD AUTO: 54.5 %
PLATELET # BLD AUTO: 219 K/UL
POTASSIUM SERPL-SCNC: 5.1 MMOL/L
PROT SERPL-MCNC: 6.7 G/DL
RBC # BLD: 4.12 M/UL
RBC # FLD: 15.8 %
SODIUM SERPL-SCNC: 136 MMOL/L
T4 FREE SERPL-MCNC: 0.9 NG/DL
TRIGL SERPL-MCNC: 135 MG/DL
TSH SERPL-ACNC: 1.23 UIU/ML
WBC # FLD AUTO: 6.49 K/UL

## 2020-10-03 NOTE — H&P ADULT - NEGATIVE RESPIRATORY AND THORAX SYMPTOMS
Umbilical hernia noticed one month ago when she delivered a baby.  Seen here one week ago for manual reduction, returns for increased pain to site and n/v.
no cough/no dyspnea/no wheezing

## 2020-10-16 ENCOUNTER — APPOINTMENT (OUTPATIENT)
Dept: PULMONOLOGY | Facility: CLINIC | Age: 55
End: 2020-10-16

## 2020-10-20 ENCOUNTER — APPOINTMENT (OUTPATIENT)
Dept: PULMONOLOGY | Facility: CLINIC | Age: 55
End: 2020-10-20
Payer: MEDICARE

## 2020-10-20 VITALS
SYSTOLIC BLOOD PRESSURE: 162 MMHG | TEMPERATURE: 97.8 F | HEART RATE: 52 BPM | OXYGEN SATURATION: 95 % | DIASTOLIC BLOOD PRESSURE: 70 MMHG

## 2020-10-20 PROCEDURE — 99213 OFFICE O/P EST LOW 20 MIN: CPT | Mod: 25

## 2020-10-20 PROCEDURE — 90686 IIV4 VACC NO PRSV 0.5 ML IM: CPT

## 2020-10-20 PROCEDURE — G0008: CPT

## 2020-10-20 RX ORDER — NIFEDIPINE 90 MG/1
90 TABLET, EXTENDED RELEASE ORAL
Qty: 90 | Refills: 1 | Status: ACTIVE | COMMUNITY
Start: 2020-10-20 | End: 1900-01-01

## 2020-10-22 NOTE — HISTORY OF PRESENT ILLNESS
[TextBox_4] : Follow-up for hypertension\par Change to nifedipine at last visit\par For blood pressure recheck

## 2020-11-07 ENCOUNTER — RX RENEWAL (OUTPATIENT)
Age: 55
End: 2020-11-07

## 2020-11-19 NOTE — REASON FOR VISIT
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 The surgical margins are positive for tumor cells. [Follow-Up: _____] : a [unfilled] follow-up visit [FreeTextEntry1] : pt presents for permacath d/c

## 2020-12-28 ENCOUNTER — APPOINTMENT (OUTPATIENT)
Dept: PULMONOLOGY | Facility: CLINIC | Age: 55
End: 2020-12-28
Payer: MEDICARE

## 2020-12-28 VITALS
HEIGHT: 70 IN | TEMPERATURE: 98.2 F | BODY MASS INDEX: 22.19 KG/M2 | WEIGHT: 155 LBS | DIASTOLIC BLOOD PRESSURE: 90 MMHG | SYSTOLIC BLOOD PRESSURE: 214 MMHG

## 2020-12-28 DIAGNOSIS — M54.5 LOW BACK PAIN: ICD-10-CM

## 2020-12-28 DIAGNOSIS — R20.0 ANESTHESIA OF SKIN: ICD-10-CM

## 2020-12-28 PROCEDURE — 99214 OFFICE O/P EST MOD 30 MIN: CPT

## 2020-12-28 RX ORDER — CYCLOBENZAPRINE HYDROCHLORIDE 5 MG/1
5 TABLET, FILM COATED ORAL 3 TIMES DAILY
Qty: 60 | Refills: 3 | Status: ACTIVE | COMMUNITY
Start: 2020-12-28 | End: 1900-01-01

## 2020-12-29 ENCOUNTER — APPOINTMENT (OUTPATIENT)
Dept: PULMONOLOGY | Facility: CLINIC | Age: 55
End: 2020-12-29

## 2020-12-29 PROBLEM — R20.0 RIGHT LEG NUMBNESS: Status: ACTIVE | Noted: 2018-10-17

## 2020-12-29 NOTE — PHYSICAL EXAM
[No Acute Distress] : no acute distress [Well Nourished] : well nourished [Well Developed] : well developed [Well-Appearing] : well-appearing [Normal Sclera/Conjunctiva] : normal sclera/conjunctiva [PERRL] : pupils equal round and reactive to light [EOMI] : extraocular movements intact [Normal Outer Ear/Nose] : the outer ears and nose were normal in appearance [Normal Oropharynx] : the oropharynx was normal [No JVD] : no jugular venous distention [No Lymphadenopathy] : no lymphadenopathy [Supple] : supple [Thyroid Normal, No Nodules] : the thyroid was normal and there were no nodules present [No Respiratory Distress] : no respiratory distress  [No Accessory Muscle Use] : no accessory muscle use [Clear to Auscultation] : lungs were clear to auscultation bilaterally [Normal Rate] : normal rate  [Regular Rhythm] : with a regular rhythm [Normal S1, S2] : normal S1 and S2 [No Murmur] : no murmur heard [No Carotid Bruits] : no carotid bruits [No Abdominal Bruit] : a ~M bruit was not heard ~T in the abdomen [No Varicosities] : no varicosities [Pedal Pulses Present] : the pedal pulses are present [No Edema] : there was no peripheral edema [No Palpable Aorta] : no palpable aorta [No Extremity Clubbing/Cyanosis] : no extremity clubbing/cyanosis [Soft] : abdomen soft [Non Tender] : non-tender [Non-distended] : non-distended [No Masses] : no abdominal mass palpated [No HSM] : no HSM [Normal Bowel Sounds] : normal bowel sounds [Normal Posterior Cervical Nodes] : no posterior cervical lymphadenopathy [Normal Anterior Cervical Nodes] : no anterior cervical lymphadenopathy [No CVA Tenderness] : no CVA  tenderness [No Spinal Tenderness] : no spinal tenderness [Grossly Normal Strength/Tone] : grossly normal strength/tone [No Rash] : no rash [Coordination Grossly Intact] : coordination grossly intact [No Focal Deficits] : no focal deficits [Normal Gait] : normal gait [Normal Affect] : the affect was normal [Normal Insight/Judgement] : insight and judgment were intact [de-identified] : Lower back pain/tenderness

## 2020-12-29 NOTE — PLAN
[FreeTextEntry1] : Tylenol/Flexeril as needed for lower back pain\par Get lumbar spine x-ray to rule out fracture

## 2020-12-29 NOTE — ASSESSMENT
[FreeTextEntry1] : Lower back pain positive secondary to back spasm, rule out lumbar spine fracture from fall

## 2021-01-05 ENCOUNTER — APPOINTMENT (OUTPATIENT)
Dept: RADIOLOGY | Facility: IMAGING CENTER | Age: 56
End: 2021-01-05
Payer: MEDICARE

## 2021-01-05 ENCOUNTER — OUTPATIENT (OUTPATIENT)
Dept: OUTPATIENT SERVICES | Facility: HOSPITAL | Age: 56
LOS: 1 days | End: 2021-01-05
Payer: MEDICARE

## 2021-01-05 DIAGNOSIS — M54.5 LOW BACK PAIN: ICD-10-CM

## 2021-01-05 DIAGNOSIS — Z98.890 OTHER SPECIFIED POSTPROCEDURAL STATES: Chronic | ICD-10-CM

## 2021-01-05 DIAGNOSIS — Z89.411 ACQUIRED ABSENCE OF RIGHT GREAT TOE: Chronic | ICD-10-CM

## 2021-01-05 PROCEDURE — 72110 X-RAY EXAM L-2 SPINE 4/>VWS: CPT | Mod: 26

## 2021-01-05 PROCEDURE — 72110 X-RAY EXAM L-2 SPINE 4/>VWS: CPT

## 2021-01-11 ENCOUNTER — APPOINTMENT (OUTPATIENT)
Dept: VASCULAR SURGERY | Facility: CLINIC | Age: 56
End: 2021-01-11

## 2021-01-13 ENCOUNTER — APPOINTMENT (OUTPATIENT)
Dept: VASCULAR SURGERY | Facility: CLINIC | Age: 56
End: 2021-01-13

## 2021-01-15 ENCOUNTER — APPOINTMENT (OUTPATIENT)
Dept: PULMONOLOGY | Facility: CLINIC | Age: 56
End: 2021-01-15

## 2021-01-19 ENCOUNTER — APPOINTMENT (OUTPATIENT)
Dept: PULMONOLOGY | Facility: CLINIC | Age: 56
End: 2021-01-19
Payer: MEDICARE

## 2021-01-19 VITALS — TEMPERATURE: 97.5 F

## 2021-01-19 VITALS
SYSTOLIC BLOOD PRESSURE: 201 MMHG | OXYGEN SATURATION: 95 % | RESPIRATION RATE: 16 BRPM | TEMPERATURE: 98.2 F | DIASTOLIC BLOOD PRESSURE: 74 MMHG | HEART RATE: 54 BPM

## 2021-01-19 DIAGNOSIS — M54.9 DORSALGIA, UNSPECIFIED: ICD-10-CM

## 2021-01-19 PROCEDURE — 99214 OFFICE O/P EST MOD 30 MIN: CPT

## 2021-01-19 NOTE — PROCEDURE
[FreeTextEntry1] : \par  Xray Lumbosacral Spine + Obliques             Final\par \par No Documents Attached\par \par \par \par \par   EXAM:  LUMBO-SACRAL SPINE W OBLIQUES\par \par \par PROCEDURE DATE:  01/05/2021\par \par \par \par INTERPRETATION:  CLINICAL INDICATION: back pain\par \par EXAM:\par AP lateral and bilateral oblique lumbosacral spine from 1/5/2021 at 1309. Compared to prior study from 9/14/2020.\par \par IMPRESSION:\par Transitional morphology lumbosacral junction vertebra has appearance of an incompletely lumbarized S1 segment.\par \par Broad slight lumbar levocurvature.\par \par No compression fractures, spondylolistheses, or spondylolysis defects.\par \par Minimal degenerative disc margin bony spurring otherwise preserved intervertebral disc spaces.\par \par Unremarkable SI joints and partially visualized hips.\par \par No lytic or blastic lesions.\par \par Small peripherally calcified nodular densities again noted in upper left epigastric region.\par \par Atherosclerotic abdominal aortic calcifications.\par \par \par \par \par \par \par JAYSON GRIMM M.D., ATTENDING RADIOLOGIST\par This document has been electronically signed. Jan 5 2021  3:29PM\par \par  \par \par  Ordered by: ERLIN JACK       Collected/Examined: 05Jan2021 01:09PM       \par Verified by: ERLIN JACK 06Jan2021 01:41PM       \par  Result Communication: No patient communication needed at this time;\par Stage: Final       \par  Performed at: [facility] the [facility]       Resulted: 05Jan2021 03:33PM       Last Updated: 06Jan2021 01:41PM       Accession: [id number]

## 2021-01-19 NOTE — PROGRESS NOTE ADULT - PROBLEM/PLAN-5
DISPLAY PLAN FREE TEXT
Colchicine Counseling:  Patient counseled regarding adverse effects including but not limited to stomach upset (nausea, vomiting, stomach pain, or diarrhea).  Patient instructed to limit alcohol consumption while taking this medication.  Colchicine may reduce blood counts especially with prolonged use.  The patient understands that monitoring of kidney function and blood counts may be required, especially at baseline. The patient verbalized understanding of the proper use and possible adverse effects of colchicine.  All of the patient's questions and concerns were addressed.

## 2021-01-19 NOTE — ASSESSMENT
[FreeTextEntry1] : Advised patient to take ibuprofen and Flexeril as needed for back pain.\par Will get ultrasound of abdominal aorta to rule out abdominal aortic aneurysm.

## 2021-01-19 NOTE — DISCUSSION/SUMMARY
[FreeTextEntry1] : Improving back spasm from recent fall.  Abdominal aortic calcification, rule out abdominal aortic aneurysm

## 2021-01-29 NOTE — PROVIDER CONTACT NOTE (MEDICATION) - ASSESSMENT
[Normal Development] : development
[None] : No known medical problems
[No Elimination Concerns] : elimination
[No Skin Concerns] : skin
[Normal Sleep Pattern] : sleep
asymptomatic
Pt. is a&oX4, VSS. Pt. due for carvedilol and sodium bicarb- pt. does not want to take meds before EGD/colonoscopy.
Pt. is a&oX4. /68, other VSS. Pt. denies headache or pain, no SOB assessed.
Pt. is a&oX4. /86, other VSS. Pt. denies pain or H/A. No SOB assessed.
[School] : school
[Development and Mental Health] : development and mental health
[Nutrition and Physical Activity] : nutrition and physical activity
[Oral Health] : oral health
[Safety] : safety
[No Medications] : ~He/She~ is not on any medications
[Patient] : patient
[de-identified] : reconnected with GI as pt has poor weight gain, hx of EoE. 
[FreeTextEntry1] : 7y M seen for [facility].\par Vaccines UTD.\par Anticipatory guidance as discussed above.\par GI f/u as previously arranged.\par MOC to arrange routine dental visit.\par 5-2-1-0 reviewed.\par RTO 1 year for [facility].\par

## 2021-02-10 ENCOUNTER — RX RENEWAL (OUTPATIENT)
Age: 56
End: 2021-02-10

## 2021-02-20 ENCOUNTER — EMERGENCY (EMERGENCY)
Facility: HOSPITAL | Age: 56
LOS: 1 days | Discharge: HOME | End: 2021-02-20
Attending: EMERGENCY MEDICINE | Admitting: EMERGENCY MEDICINE
Payer: MEDICARE

## 2021-02-20 VITALS
OXYGEN SATURATION: 96 % | TEMPERATURE: 98 F | DIASTOLIC BLOOD PRESSURE: 77 MMHG | RESPIRATION RATE: 18 BRPM | HEART RATE: 70 BPM | SYSTOLIC BLOOD PRESSURE: 165 MMHG | HEIGHT: 70 IN

## 2021-02-20 DIAGNOSIS — Z89.411 ACQUIRED ABSENCE OF RIGHT GREAT TOE: Chronic | ICD-10-CM

## 2021-02-20 DIAGNOSIS — Z98.890 OTHER SPECIFIED POSTPROCEDURAL STATES: Chronic | ICD-10-CM

## 2021-02-20 PROCEDURE — 99283 EMERGENCY DEPT VISIT LOW MDM: CPT | Mod: GC

## 2021-02-20 RX ORDER — ACETAMINOPHEN 500 MG
975 TABLET ORAL ONCE
Refills: 0 | Status: COMPLETED | OUTPATIENT
Start: 2021-02-20 | End: 2021-02-20

## 2021-02-20 RX ORDER — LIDOCAINE 4 G/100G
1 CREAM TOPICAL ONCE
Refills: 0 | Status: COMPLETED | OUTPATIENT
Start: 2021-02-20 | End: 2021-02-20

## 2021-02-20 RX ADMIN — LIDOCAINE 1 PATCH: 4 CREAM TOPICAL at 21:50

## 2021-02-20 RX ADMIN — Medication 975 MILLIGRAM(S): at 21:50

## 2021-02-20 NOTE — ED PROVIDER NOTE - ATTENDING CONTRIBUTION TO CARE
55M c/o low back pain, 24hr s ago slipped in snow and landed on his bottom.  Did slightly hit his head and no LOC or HA.  no saddle anesthesia or focal deficits.  Hasn't taken any  meds yet.  Pt finished dialysis then came here.  No systemic complaints.  No midline tenderness of spine or deformity or bruising.  No abd tenderness.  no extremity pain or deformity.  Plan rx pain med, can follow up with PMD as outpt.    VS:  unremarkable except HTN    GEN - mild distress LBP;   A+O x3   HEAD - NC/AT     ENT - PEERL, EOMI, mucous membranes    moist , no discharge      NECK: Neck supple, non-tender without lymphadenopathy, no masses, no JVD  PULM - CTA b/l,  symmetric breath sounds  COR -  normal heart sounds    ABD - , ND, NT, soft,  BACK - no CVA tenderness, nontender spine   (+)Mild PVM spasm lumbar  EXTREMS - no edema, no deformity, warm and well perfused  (+)LUE fistula (+)thrill, distal hand well perfused.   SKIN - no rash    or bruising      NEUROLOGIC - alert, face symmetric, speech fluent, sensation nl, motor no focal deficit.

## 2021-02-20 NOTE — ED ADULT NURSE NOTE - OBJECTIVE STATEMENT
Pt arrives to Ed c/o lower back pain.  Pt assessed by MD.  Pt requesting food.  Pt provided meal.  Pt medicated as per EMAR.

## 2021-02-20 NOTE — ED PROVIDER NOTE - PATIENT PORTAL LINK FT
You can access the FollowMyHealth Patient Portal offered by [facility] by registering at the following website: [email or web address]. By joining [facility]’s FollowMyHealth portal, you will also be able to view your health information using other applications (apps) compatible with our system.

## 2021-02-20 NOTE — ED PROVIDER NOTE - NSFOLLOWUPINSTRUCTIONS_ED_ALL_ED_FT
You were seen in the Emergency Department for back pain.  1) Advance activity as tolerated.    2) Continue all previously prescribed medications as directed.    3) Follow up with your primary care physician in 24-48 hours - take copies of your results.    4) Return to the Emergency Department for worsening or persistent symptoms, and/or ANY NEW OR CONCERNING SYMPTOMS as described below.    Back Pain    Back pain is very common in adults. The cause of back pain is rarely dangerous and the pain often gets better over time. The cause of your back pain may not be known and may include strain of muscles or ligaments, degeneration of the spinal disks, or arthritis. Occasionally the pain may radiate down your leg(s). Over-the-counter medicines to reduce pain and inflammation are often the most helpful. Stretching and remaining active frequently helps the healing process.     Low Back Strain  A strain is a stretch or tear in a muscle or the strong cords of tissue that attach muscle to bone (tendons). Strains of the lower back (lumbar spine) are a common cause of low back pain. A strain occurs when muscles or tendons are torn or are stretched beyond their limits. The muscles may become inflamed, resulting in pain and sudden muscle tightening (spasms). A strain can happen suddenly due to an injury (trauma), or it can develop gradually due to overuse.    What increases the risk?  The following factors may increase your risk of getting this condition:  Playing contact sports.  Participating in sports or activities that put excessive stress on the back and require a lot of bending and twisting, including:  Lifting weights or heavy objects, Gymnastics, Soccer, Figure skating, Snowboarding, Being overweight or obese, Having poor strength and flexibility.    What are the signs or symptoms?  Symptoms of this condition may include:  Sharp or dull pain in the lower back that does not go away. Pain may extend to the buttocks.  Stiffness.  Limited range of motion.  Inability to stand up straight due to stiffness or pain.  Muscle spasms.    How is this diagnosed?  This condition may be diagnosed based on:  Your symptoms, Your medical history, A physical exam, Your health care provider may push on certain areas of your back to determine the source of your pain. You may be asked to bend forward, backward, and side to side to assess the severity of your pain and your range of motion.  Imaging tests, such as:  X-rays, MRI.    How is this treated?  Treatment for this condition may include:  Applying heat and cold to the affected area.  Medicines to help relieve pain and to relax your muscles (muscle relaxants).  NSAIDs to help reduce swelling and discomfort.  Physical therapy.  When your symptoms improve, it is important to gradually return to your normal routine as soon as possible to reduce pain, avoid stiffness, and avoid loss of muscle strength. Generally, symptoms should improve within 6 weeks of treatment. However, recovery time varies.    Follow these instructions at home:  Managing pain, stiffness, and swelling     If directed, apply ice to the injured area during the first 24 hours after your injury.  Put ice in a plastic bag.  Place a towel between your skin and the bag.  Leave the ice on for 20 minutes, 2–3 times a day.  If directed, apply heat to the affected area as often as told by your health care provider. Use the heat source that your health care provider recommends, such as a moist heat pack or a heating pad.  Place a towel between your skin and the heat source.  Leave the heat on for 20–30 minutes.  Remove the heat if your skin turns bright red. This is especially important if you are unable to feel pain, heat, or cold. You may have a greater risk of getting burned.  Activity     Rest and return to your normal activities as told by your health care provider. Ask your health care provider what activities are safe for you.  Avoid activities that take a lot of effort (are strenuous) for as long as told by your health care provider.  Do exercises as told by your health care provider.  General instructions     Take over-the-counter and prescription medicines only as told by your health care provider.  If you have questions or concerns about safety while taking pain medicine, talk with your health care provider.  Do not drive or operate heavy machinery until you know how your pain medicine affects you.  Do not use any tobacco products, such as cigarettes, chewing tobacco, and e-cigarettes. Tobacco can delay bone healing. If you need help quitting, ask your health care provider.  Keep all follow-up visits as told by your health care provider. This is important.  How is this prevented?  Image Image Image Image Image   Warm up and stretch before being active.  Cool down and stretch after being active.  Give your body time to rest between periods of activity.  Avoid:  Being physically inactive for long periods at a time.  Exercising or playing sports when you are tired or in pain.  Use correct form when playing sports and lifting heavy objects.  Use good posture when sitting and standing.  Maintain a healthy weight.  Sleep on a mattress with medium firmness to support your back.  Make sure to use equipment that fits you, including shoes that fit well.  Be safe and responsible while being active to avoid falls.  Do at least 150 minutes of moderate-intensity exercise each week, such as brisk walking or water aerobics. Try a form of exercise that takes stress off your back, such as swimming or stationary cycling.  Maintain physical fitness, including:  Strength.  Flexibility.  Cardiovascular fitness.  Endurance.  Contact a health care provider if:  Your back pain does not improve after 6 weeks of treatment.  Your symptoms get worse.  Get help right away if:  Your back pain is severe.  You are unable to stand or walk.  You develop pain in your legs.  You develop weakness in your buttocks or legs.  You have difficulty controlling when you urinate or when you have a bowel movement.  This information is not intended to replace advice given to you by your health care provider. Make sure you discuss any questions you have with your health care provider.      SEEK IMMEDIATE MEDICAL CARE IF YOU HAVE ANY OF THE FOLLOWING SYMPTOMS: bowel or bladder control problems, unusual weakness or numbness in your arms or legs, nausea or vomiting, abdominal pain, fever, dizziness/lightheadedness.]

## 2021-02-20 NOTE — PROVIDER CONTACT NOTE (OTHER) - ASSESSMENT
Per provider, Dr. Law, pt is cleared and in need of ambulette home. SW met pt at bedside. Pt confirmed address as [address] [place] [id number]. Pt confirmed having keys to enter home. Transportation coordinated via [facility] ([phone]), p/u by 1AM, trip #[id number].

## 2021-02-20 NOTE — ED PROVIDER NOTE - OBJECTIVE STATEMENT
54 year old man with a PMH of ESRD (T Th S) s/p L arm AV fistula in 4/2019, HTN, DM2 finished his dialysis today, then came by EMS due to lower back pain. States he fell about 26 hours ago into a field of snow, fell on his bottom, no head trauma, no loc, recalls all events. States since this evening he has had slight difficulty walking but can still ambulate. Did not take any tylenol/ibuprofne today, no urinary retention (makes little urine 2/2 kidney issues), no saddle aneshtesia, no changes in strength/sensation.

## 2021-02-20 NOTE — ED ADULT TRIAGE NOTE - CHIEF COMPLAINT QUOTE
pt states lower back pain since yesterday after walking at [employer].   pt had dialysis today and com-pleted it

## 2021-02-20 NOTE — ED PROVIDER NOTE - PHYSICAL EXAMINATION
[Const] well-appearing, resting comfortably, no acute distress  [HEENT] PERRL, EOMI, moist mucus membranes  [Neck] Supple, trachea midline  [CV] +S1/S2, no m/r/g appreciated  [Lungs] Clear to auscultations bilaterally, no adventitious lung sounds  [Abd] soft, non-tender, nondistended in all 4 quadrants  [MSK] mild sacral paraspinal tenderness on right, no midline c/t/l TTP no stepoffs ecchymosis deformity/signs of trauma  [Skin] warm, dry, well-perfused  [Neuro] A&Ox3, Cranial Nerves II-XII intact, ambulatory with some minimal assistance

## 2021-02-20 NOTE — ED ADULT NURSE NOTE - ED STAT RN HANDOFF DETAILS
Patient discharged by provider with instructions.  Patient leaving ED by wheelchair with [facility].

## 2021-02-20 NOTE — ED PROVIDER NOTE - CLINICAL SUMMARY MEDICAL DECISION MAKING FREE TEXT BOX
54 y/o M esrd htn dm2 presents with lower back pain s/p fall 26 hours ago, ambulatory, no FND, no midline spinal tenderness. Improved with supportive care, feels well for discharge, social work to assist with transport.

## 2021-02-24 ENCOUNTER — APPOINTMENT (OUTPATIENT)
Dept: VASCULAR SURGERY | Facility: CLINIC | Age: 56
End: 2021-02-24

## 2021-03-23 ENCOUNTER — APPOINTMENT (OUTPATIENT)
Dept: VASCULAR SURGERY | Facility: CLINIC | Age: 56
End: 2021-03-23

## 2021-03-30 ENCOUNTER — APPOINTMENT (OUTPATIENT)
Dept: VASCULAR SURGERY | Facility: CLINIC | Age: 56
End: 2021-03-30
Payer: MEDICARE

## 2021-03-30 VITALS
DIASTOLIC BLOOD PRESSURE: 75 MMHG | WEIGHT: 160 LBS | HEART RATE: 52 BPM | BODY MASS INDEX: 22.4 KG/M2 | HEIGHT: 71 IN | SYSTOLIC BLOOD PRESSURE: 229 MMHG | TEMPERATURE: 96.2 F

## 2021-03-30 DIAGNOSIS — T82.898D OTHER SPECIFIED COMPLICATION OF VASCULAR PROSTHETIC DEVICES, IMPLANTS AND GRAFTS, SUBSEQUENT ENCOUNTER: ICD-10-CM

## 2021-03-30 PROCEDURE — 93990 DOPPLER FLOW TESTING: CPT | Mod: LT

## 2021-03-30 PROCEDURE — 99214 OFFICE O/P EST MOD 30 MIN: CPT

## 2021-03-30 RX ORDER — CILOSTAZOL 50 MG/1
50 TABLET ORAL
Qty: 60 | Refills: 6 | Status: ACTIVE | COMMUNITY
Start: 2021-03-30 | End: 1900-01-01

## 2021-03-30 RX ORDER — SEVELAMER CARBONATE 800 MG/1
800 TABLET, FILM COATED ORAL
Qty: 90 | Refills: 0 | Status: ACTIVE | COMMUNITY
Start: 2021-03-23

## 2021-03-30 NOTE — DATA REVIEWED
[FreeTextEntry1] : offered pt rt fem pop byp duplex today pt wants to kaia \par \par 11/7/2017 RLE Arterial Duplex patent rt fem pop byp \par \par 6/22/2018 RLE Arterial Duplex patent rt cfa to ak pop a  ptfe byp no flow restrictive lesions\par \par 10/17/2018 Carotid Duplex  Rt ICA  less 50% stenosis \par                          Lt  ICA  less 50% stenosis  \par                          Lion Ant Vertebral Arterial Flow \par \par 12/11/2018 TEMITOPE/PVR RLE no sig art occ dz LLE mild ifragenic art occ dz rt temitope .86 lt temitope .70\par \par 12/18/2018  RLE Arterial Duplex patent fem pop no hematoma no flow restrictions\par \par 12/18/2018  venous Duplex lion le no acute dvt svt\par \par  3/12/2019 Vein Mapping  RUE adequate  cephalic vein from wrist , adequate basilic vein from elbow\par                                           LUE  limited vein resource  cephalic at wrist level,, mod basilic vein resource at elbow\par \par 9/3/2019  LUE AVF flow  558 ml/min  w mid avf narrowing and sig for tributaries\par \par 10/29/2019 LUE AVF flow  522 ml/min  \par \par 1/14/2020 RLE Arterial Duplex patent fem pop bypass  no flow restrictions\par \par 1/14/2020 TEMITOPE/PVR RLE no sig art occ dz LLE mild infragenic art occ dz rt temitope .93 lt temitope .76\par \par 8/10/2020  LUE AVF Duplex AVF flow 720 ml/min , no flow restrictive lesions \par  \par 3/30/2021 LUE AVF Duplex AVF flow 816 ml/min , sig for  elevated  velocities at prox level  \par                                       w diam dec at anastomosis \par \par

## 2021-03-30 NOTE — HISTORY OF PRESENT ILLNESS
[FreeTextEntry1] : pt still smokes cigs and pot\par pt is on pletal 50 bid \par pt is s/p lue avf BA maturation \par pt is receiving  rt sided permacath hd   [de-identified] : pt  states no lue avf hd issues  via lue avf \par pt has no other c/o \par pt is on pletal 50 bid

## 2021-03-30 NOTE — REASON FOR VISIT
[Follow-Up: _____] : a [unfilled] follow-up visit [Home] : at home, [unfilled] , at the time of the visit. [Medical Office: ([facility])___] : at the medical office located in  [Other:____] : [unfilled] [Verbal consent obtained from patient] : the patient, [unfilled] [FreeTextEntry1] : i have leg poor circulation

## 2021-03-30 NOTE — PHYSICAL EXAM
[JVD] : no jugular venous distention  [Normal Breath Sounds] : Normal breath sounds [Abdomen Masses] : No abdominal masses [No HSM] : no hepatosplenomegaly [Tender] : was nontender [Stool Sample Taken] : No stool obtained  on rectal exam [Alert] : alert [Oriented to Person] : oriented to person [Oriented to Place] : oriented to place [Oriented to Time] : oriented to time [Calm] : calm [de-identified] : nad [de-identified] : wnl [FreeTextEntry1] : lue avf w mod bruit and thrill \par Mild arterial insufficiency w moderate trophic skin changes \par no wounds/ulcers\par  [de-identified] : wml [de-identified] : wml [de-identified] : cooperative [de-identified] : Rajesh Cranial nerves 2-12 rajesh grossly intact

## 2021-03-30 NOTE — ASSESSMENT
[Arterial/Venous Disease] : arterial/venous disease [Medication Management] : medication management [Foot care/Footwear] : foot care/footwear [Smoking Cessation] : smoking cessation [FreeTextEntry1] : impression arterial insuff s/p bypass , lue avf w  US findings of stenosis \par \par [employer] managemnt exercise program, protective measures\par continue pletal 50 bid\par bedside d/w pt and mother  to cut back and quit smoking pt is aware of the dec  byp patency due to smoking \par 10 min at bedside\par d/w pt lue avf fistulogram and possible BA maturation\par indications risks and benefits d/w pt\par pt consents and wants to proceed \par ov w carotid duplex s/o stenosis 3 years  10/2021\par temitope/pvr s/o art insuff  and rle fem pop byp  s/o stenosis next avail after intervention \par \par \par letter faxed to Dr TED Diaz MD

## 2021-04-09 ENCOUNTER — OUTPATIENT (OUTPATIENT)
Dept: OUTPATIENT SERVICES | Facility: HOSPITAL | Age: 56
LOS: 1 days | End: 2021-04-09
Payer: MEDICARE

## 2021-04-09 VITALS
HEIGHT: 70 IN | WEIGHT: 156.97 LBS | DIASTOLIC BLOOD PRESSURE: 50 MMHG | TEMPERATURE: 97 F | HEART RATE: 58 BPM | RESPIRATION RATE: 16 BRPM | OXYGEN SATURATION: 98 % | SYSTOLIC BLOOD PRESSURE: 160 MMHG

## 2021-04-09 DIAGNOSIS — E11.9 TYPE 2 DIABETES MELLITUS WITHOUT COMPLICATIONS: ICD-10-CM

## 2021-04-09 DIAGNOSIS — Z01.812 ENCOUNTER FOR PREPROCEDURAL LABORATORY EXAMINATION: ICD-10-CM

## 2021-04-09 DIAGNOSIS — Z98.890 OTHER SPECIFIED POSTPROCEDURAL STATES: Chronic | ICD-10-CM

## 2021-04-09 DIAGNOSIS — Z89.411 ACQUIRED ABSENCE OF RIGHT GREAT TOE: Chronic | ICD-10-CM

## 2021-04-09 DIAGNOSIS — N18.6 END STAGE RENAL DISEASE: ICD-10-CM

## 2021-04-09 DIAGNOSIS — I10 ESSENTIAL (PRIMARY) HYPERTENSION: ICD-10-CM

## 2021-04-09 DIAGNOSIS — F31.9 BIPOLAR DISORDER, UNSPECIFIED: ICD-10-CM

## 2021-04-09 LAB
A1C WITH ESTIMATED AVERAGE GLUCOSE RESULT: 5.2 % — SIGNIFICANT CHANGE UP (ref 4–5.6)
ANION GAP SERPL CALC-SCNC: 19 MMOL/L — HIGH (ref 7–14)
BLD GP AB SCN SERPL QL: NEGATIVE — SIGNIFICANT CHANGE UP
BUN SERPL-MCNC: 71 MG/DL — HIGH (ref 7–23)
CALCIUM SERPL-MCNC: 9.4 MG/DL — SIGNIFICANT CHANGE UP (ref 8.4–10.5)
CHLORIDE SERPL-SCNC: 95 MMOL/L — LOW (ref 98–107)
CO2 SERPL-SCNC: 21 MMOL/L — LOW (ref 22–31)
CREAT SERPL-MCNC: 7.69 MG/DL — HIGH (ref 0.5–1.3)
ESTIMATED AVERAGE GLUCOSE: 103 MG/DL — SIGNIFICANT CHANGE UP (ref 68–114)
GLUCOSE SERPL-MCNC: 81 MG/DL — SIGNIFICANT CHANGE UP (ref 70–99)
HCT VFR BLD CALC: 37.7 % — LOW (ref 39–50)
HGB BLD-MCNC: 11.7 G/DL — LOW (ref 13–17)
MCHC RBC-ENTMCNC: 31 GM/DL — LOW (ref 32–36)
MCHC RBC-ENTMCNC: 31.4 PG — SIGNIFICANT CHANGE UP (ref 27–34)
MCV RBC AUTO: 101.1 FL — HIGH (ref 80–100)
NRBC # BLD: 0 /100 WBCS — SIGNIFICANT CHANGE UP
NRBC # FLD: 0 K/UL — SIGNIFICANT CHANGE UP
PLATELET # BLD AUTO: 330 K/UL — SIGNIFICANT CHANGE UP (ref 150–400)
POTASSIUM SERPL-MCNC: 6.1 MMOL/L — HIGH (ref 3.5–5.3)
POTASSIUM SERPL-SCNC: 6.1 MMOL/L — HIGH (ref 3.5–5.3)
RBC # BLD: 3.73 M/UL — LOW (ref 4.2–5.8)
RBC # FLD: 15.9 % — HIGH (ref 10.3–14.5)
RH IG SCN BLD-IMP: POSITIVE — SIGNIFICANT CHANGE UP
SODIUM SERPL-SCNC: 135 MMOL/L — SIGNIFICANT CHANGE UP (ref 135–145)
WBC # BLD: 8.64 K/UL — SIGNIFICANT CHANGE UP (ref 3.8–10.5)
WBC # FLD AUTO: 8.64 K/UL — SIGNIFICANT CHANGE UP (ref 3.8–10.5)

## 2021-04-09 PROCEDURE — 93010 ELECTROCARDIOGRAM REPORT: CPT

## 2021-04-09 RX ORDER — FOLIC ACID 0.8 MG
1 TABLET ORAL
Qty: 0 | Refills: 0 | DISCHARGE

## 2021-04-09 RX ORDER — DIVALPROEX SODIUM 500 MG/1
1 TABLET, DELAYED RELEASE ORAL
Qty: 0 | Refills: 0 | DISCHARGE

## 2021-04-09 RX ORDER — HYDRALAZINE HCL 50 MG
1 TABLET ORAL
Qty: 0 | Refills: 0 | DISCHARGE

## 2021-04-09 RX ORDER — GABAPENTIN 400 MG/1
1 CAPSULE ORAL
Qty: 0 | Refills: 0 | DISCHARGE

## 2021-04-09 RX ORDER — AMLODIPINE BESYLATE 2.5 MG/1
1 TABLET ORAL
Qty: 0 | Refills: 0 | DISCHARGE

## 2021-04-09 RX ORDER — LINAGLIPTIN 5 MG/1
1 TABLET, FILM COATED ORAL
Qty: 0 | Refills: 0 | DISCHARGE

## 2021-04-09 NOTE — H&P PST ADULT - ADDITIONAL PE
Very poor historian Very poor historian, medication list obtained from pt pharmacy, several attempts  via telephone made with pt to confirm medications  by having him reading his medication bottles at home, left on hold and other attempts unanswered.  Medical eval requested.

## 2021-04-09 NOTE — H&P PST ADULT - NEGATIVE OPHTHALMOLOGIC SYMPTOMS
glass for reading/no diplopia/no photophobia/no lacrimation L/no lacrimation R/no blurred vision L/no blurred vision R

## 2021-04-09 NOTE — H&P PST ADULT - NSICDXPROBLEM_GEN_ALL_CORE_FT
PROBLEM DIAGNOSES  Problem: Encounter for preprocedure screening laboratory testing for COVID-19  Assessment and Plan: per pt scheduled within 72 hrs of this surgery     Problem: Hypertension  Assessment and Plan: Pt instructed to take   hydralazine, carvedilol on morning of surgery.  Elevated b/p during PST visit denies symptomatology, Ekg changes & pt is a poor historian  Referred to PCP Dr Brar  for further eval  Pending copy of eval report & most recent cardiology studies.    Problem: Type 2 diabetes mellitus  Assessment and Plan: Fs on admit  Pt  instructed last dose on and to take on morning of surgery at bed   Pt instructed to take gabapentin  on morning of surgery.      Problem: End stage renal disease  Assessment and Plan: Written & verbal preop instructions, gi prophylaxis & surgical soap given  Pt verbalized good understanding.  Teach back done on surgical soap instructions.      Problem: Bipolar disorder  Assessment and Plan: Pt instructed to take  divalp on morning of surgery.         PROBLEM DIAGNOSES  Problem: Encounter for preprocedure screening laboratory testing for COVID-19  Assessment and Plan: per pt scheduled within 72 hrs of this surgery     Problem: Hypertension  Assessment and Plan: Pt instructed to take   hydralazine, carvedilol on morning of surgery.  Elevated b/p during PST visit denies symptomatology, Ekg changes & pt is a poor historian  Referred to PCP Dr Brar  for further eval  Pending copy of eval report & most recent cardiology studies.    Problem: Type 2 diabetes mellitus  Assessment and Plan: Fs on admit  Pt states no longer on diabetic medications  Pt instructed to take gabapentin  on morning of surgery.      Problem: End stage renal disease  Assessment and Plan: Scheduled for Left upper exremity fistula fistologram, angioplasty 4/16/2021..  Written & verbal preop instructions, gi prophylaxis & surgical soap given  Pt verbalized good understanding.  Teach back done on surgical soap instructions.      Problem: Bipolar disorder  Assessment and Plan: Pt instructed to take  divalp on morning of surgery.         PROBLEM DIAGNOSES  Problem: Encounter for preprocedure screening laboratory testing for COVID-19  Assessment and Plan: per pt scheduled within 72 hrs of this surgery     Problem: Hypertension  Assessment and Plan: Pt instructed to take   hydralazine, carvedilol on morning of surgery.  Elevated b/p during PST visit denies symptomatology, Ekg changes & pt is a poor historian  Referred to PCP Dr Brar  for further eval  Pending copy of eval report & most recent cardiology studies.    Problem: Type 2 diabetes mellitus  Assessment and Plan: Fs on admit  Pt states no longer on diabetic medications  Pt instructed to take gabapentin  on morning of surgery.      Problem: End stage renal disease  Assessment and Plan: Scheduled for Left upper exremity fistula fistologram, angioplasty 4/16/2021..  Written & verbal preop instructions, gi prophylaxis & surgical soap given  Pt verbalized good understanding.  Teach back done on surgical soap instructions.      Problem: Bipolar disorder  Assessment and Plan: Pt instructed to take  divalproex  on morning of surgery.

## 2021-04-09 NOTE — H&P PST ADULT - HISTORY OF PRESENT ILLNESS
Pt. is a 53 yo male that has hemodialysis M, W, F. Pt. is a 56 yo male that has on hemodialysis Tue, Thur & Sat, pt states left AVF "not functioning well".  Pt is a very poor historian.

## 2021-04-09 NOTE — H&P PST ADULT - RS GEN PE MLT RESP DETAILS PC
Before Your Child s Surgery or Sedated Procedure      Please call the doctor if there s any change in your child s health, including signs of a cold or flu (sore throat, runny nose, cough, rash or fever). If your child is having surgery, call the surgeon s office. If your child is having another procedure, call your family doctor.    Do not give over-the-counter medicine within 24 hours of the surgery or procedure (unless the doctor tells you to).    If your child takes prescribed drugs: Ask the doctor which medicines are safe to take before the surgery or procedure.    bactroban 3x/day x 5 days for nose lesion     Follow the care team s instructions for eating and drinking before surgery or procedure.     Have your child take a shower or bath the night before surgery, cleaning their skin gently. Use the soap the surgeon gave you. If you were not given special soap, use your regular soap. Do not shave or scrub the surgery site.    Have your child wear clean pajamas and use clean sheets on their bed.  
breath sounds equal/respirations non-labored/clear to auscultation bilaterally

## 2021-04-09 NOTE — H&P PST ADULT - BLOOD TRANSFUSION, PREVIOUS, PROFILE
7/2015/yes 7/2015 - pt do not recall/yes 7/2015 - from previous medical record,  pt states  do not recall/yes

## 2021-04-09 NOTE — H&P PST ADULT - NSICDXPASTMEDICALHX_GEN_ALL_CORE_FT
PAST MEDICAL HISTORY:  Anemia     Arterial insufficiency     Bipolar affective disorder in remission     Chronic kidney disease, unspecified     Depression     Diabetes mellitus Patient does not routinely check FS.    High cholesterol     Hypertension PAST MEDICAL HISTORY:  Anemia     Arterial insufficiency     Bipolar affective disorder in remission     Chronic kidney disease, unspecified     Depression     Diabetes mellitus Patient does not routinely check FS.    High cholesterol     Hypertension

## 2021-04-09 NOTE — H&P PST ADULT - NSICDXPASTSURGICALHX_GEN_ALL_CORE_FT
PAST SURGICAL HISTORY:  Amputated great toe of right foot     ORIF right lower leg- 1995     S/P arteriovenous (AV) fistula creation 7/2019 PAST SURGICAL HISTORY:  Amputated great toe of right foot     ORIF right lower leg- 1995     S/P arteriovenous (AV) fistula creation 7/2019

## 2021-04-09 NOTE — H&P PST ADULT - NSICDXFAMILYHX_GEN_ALL_CORE_FT
No pertinent family history in first degree relatives FAMILY HISTORY:  Father  Still living? Yes, Estimated age: Age Unknown  FHx: heart disease, Age at diagnosis: Age Unknown    Mother  Still living? Yes, Estimated age: Age Unknown  FH: type 2 diabetes mellitus, Age at diagnosis: Age Unknown

## 2021-04-09 NOTE — H&P PST ADULT - NSANTHSNORERD_ENT_A_CORE
OFFICE VISIT      Patient: Ezio Guillen DOS: 10/07/2019  : 1950 MRN: [id number]    SUBJECTIVE:  Chief Complaint   Patient presents with   • Follow-up     test results   • Medication Refill       A 69 year old male is here for 6 months follow-up.    HISTORY OF PRESENT ILLNESS:    Hypertensive heart disease with heart failure:  Takes Lisinopril-Hydrochlorothiazide OD before bedtime; with benefits. Was advised to take Carvedilol 25 mg BID. Admits takes Carvedilol 25 mg OD for 1 year. Used to feel drowsy and tired due to medication. Blood pressure measured today was normal. Heart rate measured today was at 80 beats/minute. Monitors blood pressure at home twice a day; systolic blood pressure stays around 125 mmHg.  Notices increased heart rate at night. Takes Carvedilol 25 mg BID when heart rate is around 90 beats/minute. Stays active. Has lost weight. Occasionally feels palpations. Denies chest pain or shortness of breath episodes.     Atrial fibrillation:  Was on Xarelto. Has stopped medication as he feels he don't need medication. Nuclear stress test was done. Feels more comfortable in monitoring his condition.     Chronic gout without tophus, Rheumatoid arthritis:  On Indomethacin. Was prescribed Allopurinol 100 mg for prevention. Gets cramp due to Allopurinol, stopped this.  Had one flare-up episode in 2019 over last 6 months. Gets pain in ankles during flare-up. Suspects flare-up episode was more likely due arthritis. Had taken Indomethacin; had no relief. Had taken Prednisone; with benefits..    Hyperlipidemia:  On medication. Tolerates medication well. Blood work was done in .    Nasal congestion:  C/o occasional nasal congestion. Uses Zicam menthol eucalyptus nasal spray as needed. Had tried Flonase nasal spray; had mild benefits.    PAST MEDICAL HISTORY:  No past medical history on file.    MEDICATIONS:  Current Outpatient Medications   Medication Sig   • carvedilol (COREG) 12.5 MG tablet  Take 1 tablet by mouth 2 times daily (with meals).   • ipratropium (ATROVENT) 0.03 % nasal spray Spray 2 sprays in each nostril 2 times daily as needed for Rhinitis.   • predniSONE (DELTASONE) 20 MG tablet Take 1 tablet twice daily as needed for gout flare up.   • atorvastatin (LIPITOR) 40 MG tablet Take 1 tablet by mouth daily.   • indomethacin (INDOCIN) 25 MG capsule Take 1 capsule by mouth 2 times daily (with meals). As needed for acute gout flare up   • lisinopril-hydroCHLOROthiazide (PRINZIDE,ZESTORETIC) 10-12.5 MG per tablet Take 1 tablet by mouth daily.   • rivaroxaban (XARELTO) 20 MG Tab Take 1 tablet by mouth daily.     No current facility-administered medications for this visit.        ALLERGIES:  ALLERGIES:   Allergen Reactions   • Penicillins ANAPHYLAXIS       PAST SURGICAL HISTORY:  No past surgical history on file.    FAMILY HISTORY:  No family history on file.    SOCIAL HISTORY:  Social History     Tobacco Use   Smoking Status Never Smoker   Smokeless Tobacco Never Used     Social History     Substance and Sexual Activity   Alcohol Use Yes   • Alcohol/week: 1.0 standard drinks   • Types: 1 Cans of beer per week   • Frequency: Never       Review of Systems    HENT: Positive for occasional nasal congestion.  Cardiovascular: Positive for occasional palpitations. Negative for chest pain or shortness of breath episodes.     All other systems reviewed and are negative.     OBJECTIVE:    Vitals:    10/07/19 1331   BP: 122/86   Pulse: 80   Resp: 16   Temp: 98.3 °F (36.8 °C)   TempSrc: Oral   SpO2: 100%   Weight: 101.1 kg (222 lb 15.9 oz)   Height: 5' 6\" (1.676 m)         Physical Exam    Constitutional: alert, in no acute distress and current vital signs reviewed.   HEENT: atraumatic and normocephalic. no discharge, no eyelid swelling and the sclerae were normal. normal appearing outer ear, normal appearing nose and normal lips.   Neck: normal appearing neck and supple neck.   Respiratory: breath sounds clear  to auscultation bilaterally, but no respiratory distress and normal respiratory rate and effort.   Cardiovascular: Atrial fibrillation rhythm. Normal rate,  normal S1, normal S2 and edema was not present in the lower extremities.   Abdomen: soft and nontender.   Psychiatric: alert and awake, interactive and mood/affect were appropriate.   Skin, Hair, Nails: normal skin color and pigmentation.   Nursing note and vitals reviewed.     DIAGNOSTIC STUDIES:  LAB RESULTS:    No visits with results within 1 Month(s) from this visit.   Latest known visit with results is:   Lab Services on 04/03/2019   Component Date Value Ref Range Status   • URIC ACID 04/03/2019 9.3* 3.5 - 7.2 mg/dL Final   • TSH 04/03/2019 1.117  0.350 - 5.000 mcUnits/mL Final   • PSA, Total 04/03/2019 5.66* <4.01 ng/mL Final   • RHEUMATOID FACTOR 04/03/2019 <10  <15 Units/mL Final   • Cyclic Citrul Pep AB  04/03/2019 73* <20 Units Final   • FASTING STATUS 04/03/2019 UNKNOWN  hrs Final   • CHOLESTEROL 04/03/2019 210* <200 mg/dL Final   • CALCULATED LDL 04/03/2019 130* <130 mg/dL Final   • HDL 04/03/2019 51  >39 mg/dL Final   • TRIGLYCERIDE 04/03/2019 145  <150 mg/dL Final   • CALCULATED NON HDL 04/03/2019 159  mg/dL Final   • CHOL/HDL 04/03/2019 4.1  <4.5 Final   • Hemoglobin A1C 04/03/2019 5.9* 4.5 - 5.6 % Final   • Fasting Status 04/03/2019 UNKNOWN  hrs Final   • Sodium 04/03/2019 136  135 - 145 mmol/L Final   • Potassium 04/03/2019 4.8  3.4 - 5.1 mmol/L Final   • Chloride 04/03/2019 103  98 - 107 mmol/L Final   • Carbon Dioxide 04/03/2019 28  21 - 32 mmol/L Final   • Anion Gap 04/03/2019 10  10 - 20 mmol/L Final   • Glucose 04/03/2019 93  65 - 99 mg/dL Final   • BUN 04/03/2019 21* 6 - 20 mg/dL Final   • Creatinine 04/03/2019 1.65* 0.67 - 1.17 mg/dL Final   • GFR Estimate,  04/03/2019 48   Final   • GFR Estimate, Non  04/03/2019 42   Final   • BUN/Creatinine Ratio 04/03/2019 13  7 - 25 Final   • CALCIUM 04/03/2019 9.5  8.4  - 10.2 mg/dL Final   • TOTAL BILIRUBIN 04/03/2019 1.1* 0.2 - 1.0 mg/dL Final   • AST/SGOT 04/03/2019 17  <38 Units/L Final   • ALT/SGPT 04/03/2019 20  <79 Units/L Final   • ALK PHOSPHATASE 04/03/2019 96  45 - 117 Units/L Final   • TOTAL PROTEIN 04/03/2019 7.7  6.4 - 8.2 g/dL Final   • Albumin 04/03/2019 4.0  3.6 - 5.1 g/dL Final   • GLOBULIN 04/03/2019 3.7  2.0 - 4.0 g/dL Final   • A/G Ratio, Serum 04/03/2019 1.1  1.0 - 2.4 Final   • WBC 04/03/2019 7.7  4.2 - 11.0 K/mcL Final   • RBC 04/03/2019 4.90  4.50 - 5.90 mil/mcL Final   • HGB 04/03/2019 15.7  13.0 - 17.0 g/dL Final   • HCT 04/03/2019 48.1  39.0 - 51.0 % Final   • MCV 04/03/2019 98.2  78.0 - 100.0 fl Final   • MCH 04/03/2019 32.0  26.0 - 34.0 pg Final   • MCHC 04/03/2019 32.6  32.0 - 36.5 g/dL Final   • RDW-CV 04/03/2019 13.3  11.0 - 15.0 % Final   • PLT 04/03/2019 311  140 - 450 K/mcL Final   • NRBC 04/03/2019 0  0 /100 WBC Final   • DIFF TYPE 04/03/2019 AUTOMATED DIFFERENTIAL   Final   • Neutrophil 04/03/2019 52  % Final   • LYMPH 04/03/2019 28  % Final   • MONO 04/03/2019 16  % Final   • EOSIN 04/03/2019 1  % Final   • BASO 04/03/2019 1  % Final   • Percent Immature Granuloctyes 04/03/2019 2  % Final   • Absolute Neutrophil 04/03/2019 4.1  1.8 - 7.7 K/mcL Final   • Absolute Lymph 04/03/2019 2.2  1.0 - 4.0 K/mcL Final   • Absolute Mono 04/03/2019 1.2* 0.3 - 0.9 K/mcL Final   • Absolute Eos 04/03/2019 0.1  0.1 - 0.5 K/mcL Final   • Absolute Baso 04/03/2019 0.0  0.0 - 0.3 K/mcL Final   • Absolute Immature Granulocytes 04/03/2019 0.1  0 - 0.2 K/mcl Final         ASSESSMENT AND PLAN:  This is a 69 year old male who presents with :    1. Need for vaccination    2. Hypertensive heart disease with heart failure (CMS/HCC)    3. Atrial fibrillation, unspecified type (CMS/HCC)    4. Chronic gout without tophus, unspecified cause, unspecified site    5. Hyperlipidemia, unspecified hyperlipidemia type    6. Seropositive rheumatoid arthritis (CMS/HCC)          Orders  Placed This Encounter   • INFLUENZA ENHANCED HIGH DOSE SPLIT PRES FREE VACC, IM (FLUZONE-HIGH DOSE)   • PNEUMOCOCCAL CONJUGATE 13 VALENT VACC(PREVNAR-13)   • carvedilol (COREG) 12.5 MG tablet   • ipratropium (ATROVENT) 0.03 % nasal spray   • predniSONE (DELTASONE) 20 MG tablet       Plan:    Need for vaccination:   - Administered influenza vaccine and pneumonia vaccine today.  - Counseled on vaccination.    Hypertensive heart disease with heart failure (CMS/[facility]):   - Controlled.  - Continue Lisinopril-Hydrochlorothiazide OD at bedtime.  - Decreased dose of Carvedilol 25 mg OD to Carvedilol 12.5 mg BID. Refill provided.  - Educated on mechanism of action of Carvedilol and causes of increased heart rate at night. Needs to use BID and not OD    Atrial fibrillation, unspecified type (CMS/[facility]):   - Recommended restarting Xarelto.  - Recommended Cardio follow-up once a year.  - Educated on risk of developing blood clots due to atrial fibrillation and strokes. Pt states he'll restart Xarelto  - Pt adamantly does not want to see Cardio and not wanting annual Cardio exams, as he feels well. Risks discussed.    Chronic gout without tophus, unspecified cause, unspecified site, Seropositive rheumatoid arthritis (CMS/[facility]):   - Prescribed Prednisone. Take one tablet as needed for gout flare-up, avoid frequent use.  - Continue Indomethacin as needed for flare-up.    - Discontinue Allopurinol 100 mg.  - Educated on side effect of long term use of Prednisone.    Hyperlipidemia, unspecified hyperlipidemia type:   - Continue Atorvastatin 40 mg OD.    Nasal congestion:    - Prescribed Ipratropium Bromide nasal spray.    - Recommended repeat blood work in 2020.  - Follow-up in 6 months for an annual physical examination.    Refer to orders.  Medical compliance with plan discussed and risks of non-compliance reviewed.  Patient education completed on disease process, etiology & prognosis.  Proper usage and side effects of medications  reviewed & discussed.  Patient understands and agrees with the plan.  Return to clinic as clinically indicated as discussed with patient who verbalized understanding of the plan and is in agreement with the plan.    Entered by Dr. Priscila Salcido acting as scribe for Dr. Anders Newby, DO   No

## 2021-04-13 ENCOUNTER — APPOINTMENT (OUTPATIENT)
Dept: PULMONOLOGY | Facility: CLINIC | Age: 56
End: 2021-04-13
Payer: MEDICARE

## 2021-04-13 PROCEDURE — 99213 OFFICE O/P EST LOW 20 MIN: CPT

## 2021-04-15 NOTE — HISTORY OF PRESENT ILLNESS
[Current] : current [Never] : never [TextBox_4] : Preop for fistula revision for dialysis.  No specific new complaints does note chronic back pain.

## 2021-04-15 NOTE — ASSESSMENT
[FreeTextEntry1] : No medical contraindication to intended surgery.  The elevated potassium is likely an artifact.  Suggest that potassium level be repeated on morning of procedure

## 2021-04-15 NOTE — REASON FOR VISIT
[Other: _____] : [unfilled] [Pre-op Risk Stratification] : pre-op risk stratification [TextBox_44] : Medical clearance

## 2021-04-16 ENCOUNTER — APPOINTMENT (OUTPATIENT)
Dept: VASCULAR SURGERY | Facility: HOSPITAL | Age: 56
End: 2021-04-16

## 2021-04-22 DIAGNOSIS — Z01.818 ENCOUNTER FOR OTHER PREPROCEDURAL EXAMINATION: ICD-10-CM

## 2021-04-23 ENCOUNTER — APPOINTMENT (OUTPATIENT)
Dept: DISASTER EMERGENCY | Facility: CLINIC | Age: 56
End: 2021-04-23

## 2021-04-24 LAB — SARS-COV-2 N GENE NPH QL NAA+PROBE: NOT DETECTED

## 2021-04-24 NOTE — ASU PATIENT PROFILE, ADULT - NSCAGESTDRUGCUTDN_GEN_A_CORE_SD
Chief Complaint   Patient presents with   • Weight Problem     weight gain........rm2       HISTORY OF PRESENT ILLNESS:  Patient is accompanied by her mother today.  She is here for concerns about weight gain.      Mom and patient are both very concerned about her weight gain over the past year.  She received her first Depo shot in July of 2017.  Since that time she has consistently received the Depo every 12 weeks.  Her weight in October 2017 was 139 pounds and her weight today is 179 lbs.      She feels here activity level has not changed at all.  She has participated in volleyball in the fall every year.  She also participated in cheer. Other than that, she does not participate in any other activities.      She does not feel she has a large appetite.  She typically eats the same thing every day.  She eats oatmeal for breakfast, hot lunch is provided at school, granola bar for afternoon snack, and trail mix on most nights for dinner.  She typically does not eat a full dinner.  She also drinks water all day, does not typically drink any other fluids.  She denies drinking soda or juice.    No headaches, no abdominal pain, no change in appetite, no decrease in sleep.  The rest of the review of systems is negative.      Allergies as of 12/14/2018   • (No Known Allergies)       Current Outpatient Medications on File Prior to Visit:  ibuprofen (MIDOL) 200 MG capsule   albuterol (PROAIR HFA) 108 (90 Base) MCG/ACT inhaler   omeprazole 20 MG tablet     No current facility-administered medications on file prior to visit.   Past Medical History:   Diagnosis Date   • Osgood-Schlatter's disease of both knees    • RAD (reactive airway disease)      History reviewed. No pertinent family history.      PHYSICAL EXAM:  Visit Vitals  /70   Pulse 88   Temp 97.5 °F (36.4 °C) (Temporal)   Resp 16   Ht 5' 2.25\" (1.581 m)   Wt 81.3 kg   LMP 12/12/2018 (Approximate)   BMI 32.50 kg/m²     GENERAL:  Alert, interactive and in no  apparent distress.  Nontoxic and well nourished.  HEAD:  Normocephalic, atraumatic.  EYES:  No conjunctival injection.  Pupils equal and reactive to light.  EARS:  External auditory canals patent, tympanic membranes clear, no fluid or erythema.  NOSE:  Nares patent and clear.  THROAT:  Moist mucous membranes, no erythema or exudates.  NECK:  Supple.  No lymphadenopathy.  HEART:  Regular rate and rhythm.  No murmurs.  No gallop or heave.  LUNGS:  No wheezing, rhonchi, retractions.  No increased work of breathing.  ABDOMEN:  Soft, non-distended, no hepatosplenomegaly, no masses.  EXTREMITIES:  Normal.  SKIN:  No rashes or nodules.    ASSESSMENT/PLAN:  1. Abnormal Weight Gain:  Up 40 pounds since we saw her in October of last year, she had just started her Depo shots prior to that visit.  She feels her activity level could be better, however it has not changed at all in the past few years.  She participates in volleyball in the fall, does not do much else for activity.  Mom and patient both do not feel she eats a lot.  Mom and patient both feel weight gain is related to her Depo.  We will get labs today including TSH, FT4, CBC, Vitamin D, CMP, Iron Panel, Ferritin, HgbA1c, Fasting Insulin.  Stop Depo, discussed other options for birth control including OCP's- they will discuss this weekend and let me know.    2. Sadness:  She exhibits some symptoms of depression, PHQ is 12 today.  The patient is not showing signs of suicidal or homicidal risk at this time.  She did make a verbal contract with me today, agreeing to seek emergency medical care immediately if that were to change.   I spoke with mom and patient separately.  They both feel her weight gain has caused the depressive symptoms, not present prior to the weight gain.  Mom was provided with a list of counselors/therapists- she will call today to schedule an appointment.   F/u 2 weeks for recheck, sooner if needed.      no

## 2021-04-25 PROCEDURE — 93010 ELECTROCARDIOGRAM REPORT: CPT

## 2021-04-26 ENCOUNTER — NON-APPOINTMENT (OUTPATIENT)
Age: 56
End: 2021-04-26

## 2021-04-26 ENCOUNTER — OUTPATIENT (OUTPATIENT)
Dept: OUTPATIENT SERVICES | Facility: HOSPITAL | Age: 56
LOS: 1 days | Discharge: HOME | End: 2021-04-26

## 2021-04-26 ENCOUNTER — APPOINTMENT (OUTPATIENT)
Dept: VASCULAR SURGERY | Facility: HOSPITAL | Age: 56
End: 2021-04-26

## 2021-04-26 VITALS
DIASTOLIC BLOOD PRESSURE: 62 MMHG | TEMPERATURE: 98 F | OXYGEN SATURATION: 97 % | RESPIRATION RATE: 18 BRPM | WEIGHT: 156.97 LBS | HEIGHT: 70 IN | HEART RATE: 55 BPM | SYSTOLIC BLOOD PRESSURE: 194 MMHG

## 2021-04-26 VITALS — DIASTOLIC BLOOD PRESSURE: 75 MMHG | SYSTOLIC BLOOD PRESSURE: 186 MMHG

## 2021-04-26 DIAGNOSIS — N18.6 END STAGE RENAL DISEASE: ICD-10-CM

## 2021-04-26 DIAGNOSIS — Z89.411 ACQUIRED ABSENCE OF RIGHT GREAT TOE: Chronic | ICD-10-CM

## 2021-04-26 DIAGNOSIS — Z98.890 OTHER SPECIFIED POSTPROCEDURAL STATES: Chronic | ICD-10-CM

## 2021-04-26 LAB
GLUCOSE BLDV-MCNC: 87 MG/DL — SIGNIFICANT CHANGE UP (ref 70–99)
POTASSIUM BLDV-SCNC: 5.2 MMOL/L — HIGH (ref 3.4–4.5)

## 2021-04-28 ENCOUNTER — OUTPATIENT (OUTPATIENT)
Dept: OUTPATIENT SERVICES | Facility: HOSPITAL | Age: 56
LOS: 1 days | End: 2021-04-28

## 2021-04-28 VITALS
RESPIRATION RATE: 14 BRPM | TEMPERATURE: 99 F | SYSTOLIC BLOOD PRESSURE: 140 MMHG | HEART RATE: 71 BPM | WEIGHT: 162.04 LBS | HEIGHT: 70 IN | OXYGEN SATURATION: 98 % | DIASTOLIC BLOOD PRESSURE: 60 MMHG

## 2021-04-28 DIAGNOSIS — N18.6 END STAGE RENAL DISEASE: ICD-10-CM

## 2021-04-28 DIAGNOSIS — Z98.890 OTHER SPECIFIED POSTPROCEDURAL STATES: Chronic | ICD-10-CM

## 2021-04-28 DIAGNOSIS — Z89.411 ACQUIRED ABSENCE OF RIGHT GREAT TOE: Chronic | ICD-10-CM

## 2021-04-28 LAB
ANION GAP SERPL CALC-SCNC: 16 MMOL/L — HIGH (ref 7–14)
BUN SERPL-MCNC: 52 MG/DL — HIGH (ref 7–23)
CALCIUM SERPL-MCNC: 8.9 MG/DL — SIGNIFICANT CHANGE UP (ref 8.4–10.5)
CHLORIDE SERPL-SCNC: 94 MMOL/L — LOW (ref 98–107)
CO2 SERPL-SCNC: 27 MMOL/L — SIGNIFICANT CHANGE UP (ref 22–31)
CREAT SERPL-MCNC: 4.86 MG/DL — HIGH (ref 0.5–1.3)
GLUCOSE SERPL-MCNC: 149 MG/DL — HIGH (ref 70–99)
HCT VFR BLD CALC: 30.3 % — LOW (ref 39–50)
HGB BLD-MCNC: 9.2 G/DL — LOW (ref 13–17)
MCHC RBC-ENTMCNC: 30.4 GM/DL — LOW (ref 32–36)
MCHC RBC-ENTMCNC: 30.9 PG — SIGNIFICANT CHANGE UP (ref 27–34)
MCV RBC AUTO: 101.7 FL — HIGH (ref 80–100)
NRBC # BLD: 0 /100 WBCS — SIGNIFICANT CHANGE UP
NRBC # FLD: 0 K/UL — SIGNIFICANT CHANGE UP
PLATELET # BLD AUTO: 294 K/UL — SIGNIFICANT CHANGE UP (ref 150–400)
POTASSIUM SERPL-MCNC: 4.6 MMOL/L — SIGNIFICANT CHANGE UP (ref 3.5–5.3)
POTASSIUM SERPL-SCNC: 4.6 MMOL/L — SIGNIFICANT CHANGE UP (ref 3.5–5.3)
RBC # BLD: 2.98 M/UL — LOW (ref 4.2–5.8)
RBC # FLD: 16.2 % — HIGH (ref 10.3–14.5)
SODIUM SERPL-SCNC: 137 MMOL/L — SIGNIFICANT CHANGE UP (ref 135–145)
WBC # BLD: 10.85 K/UL — HIGH (ref 3.8–10.5)
WBC # FLD AUTO: 10.85 K/UL — HIGH (ref 3.8–10.5)

## 2021-04-28 NOTE — H&P PST ADULT - ATTENDING COMMENTS
ESTRADA Agrawal MD performed a history and physical exam of the patient and discussed  the findings and plan with the house officer. I reviewed the resident note and agree with the findings and plan   I Benjamin Agrawal MD have personally seen and examined the patient at bedside today at  445pm    ESTRADA Agrawal MD explained the indications risks and benefits of   left  arm  AV fistula fistumogram, BAM possible maturation   to patient who understands and consents

## 2021-04-28 NOTE — H&P PST ADULT - NSICDXPROBLEM_GEN_ALL_CORE_FT
PROBLEM DIAGNOSES  Problem: End stage renal disease  Assessment and Plan: Pt scheduled for left arm fistulogram, possible angioplasty, possible stent on 5/14/2021.  labs done results pending, ekg done.  Pt instructed to contact surgeons office for covid appt.  Preop teaching done, pt able to verbalize understanding.   Pt went for medical eval prior to canceled appt.   OR booking notified of RAMANA precautions  meds day of surgery-  hydralazine, gabapentin, carvediolol, depakote, pt instructed to take a shower day of procedure.

## 2021-04-28 NOTE — H&P PST ADULT - GASTROINTESTINAL DETAILS
soft/nontender/no distention/no masses palpable/bowel sounds normal soft/nontender/no distention/no masses palpable/bowel sounds normal/no bruit/no rebound tenderness/no guarding/no rigidity/no organomegaly

## 2021-04-28 NOTE — H&P PST ADULT - NSICDXPASTMEDICALHX_GEN_ALL_CORE_FT
PAST MEDICAL HISTORY:  Anemia     Arterial insufficiency     Bipolar affective disorder in remission     Chronic kidney disease, unspecified     Depression     Diabetes mellitus Patient does not routinely check FS.    High cholesterol     Hypertension      PAST MEDICAL HISTORY:  Anemia     Arterial insufficiency     Bipolar affective disorder in remission     Chronic kidney disease, unspecified     Depression     Diabetes mellitus Patient does not routinely check FS.    Hemodialysis patient     High cholesterol     Hypertension

## 2021-04-28 NOTE — H&P PST ADULT - OTHER CARE PROVIDERS
Dr. Medina - Nephrologist [phone]  [facility] [address] [place] [address] [phone]

## 2021-04-28 NOTE — H&P PST ADULT - NSICDXPASTSURGICALHX_GEN_ALL_CORE_FT
PAST SURGICAL HISTORY:  Amputated great toe of right foot     ORIF right lower leg- 1995     S/P arteriovenous (AV) fistula creation 7/2019

## 2021-04-28 NOTE — H&P PST ADULT - HISTORY OF PRESENT ILLNESS
56y/o male scheduled for left arm fistulogram, possible angioplasty, possible stent on 5/14/2021.  Pt states, "hx htn, dm, renal failure on  hemodialysis tue thurs, sat, reports left av fistula not functioning well.   Procedure was previously scheduled for earlier 4/2021 canceled due to drank coffee the day of procedure."         54y/o male scheduled for left arm fistulogram, possible angioplasty, possible stent on 5/14/2021.  Pt states, "hx htn, dm, renal failure on  hemodialysis tue thurs, sat, reports left av fistula not functioning well.   Procedure was previously scheduled for 4/2021 canceled due to drank coffee the day of procedure."

## 2021-04-28 NOTE — H&P PST ADULT - RS GEN PE MLT RESP DETAILS PC
breath sounds equal/respirations non-labored/clear to auscultation bilaterally breath sounds equal/respirations non-labored/clear to auscultation bilaterally/no chest wall tenderness/no intercostal retractions/no rales/no rhonchi/no wheezes

## 2021-04-28 NOTE — H&P PST ADULT - NSICDXFAMILYHX_GEN_ALL_CORE_FT
FAMILY HISTORY:  Father  Still living? Yes, Estimated age: Age Unknown  FHx: heart disease, Age at diagnosis: Age Unknown    Mother  Still living? Yes, Estimated age: Age Unknown  FH: type 2 diabetes mellitus, Age at diagnosis: Age Unknown

## 2021-04-30 PROBLEM — Z99.2 DEPENDENCE ON RENAL DIALYSIS: Chronic | Status: ACTIVE | Noted: 2021-04-28

## 2021-05-13 NOTE — ASU PATIENT PROFILE, ADULT - PMH
Anemia    Arterial insufficiency    Bipolar affective disorder in remission    Chronic kidney disease, unspecified    Depression    Diabetes mellitus  Patient does not routinely check FS.  Hemodialysis patient    High cholesterol    Hypertension

## 2021-05-14 ENCOUNTER — APPOINTMENT (OUTPATIENT)
Dept: VASCULAR SURGERY | Facility: HOSPITAL | Age: 56
End: 2021-05-14

## 2021-05-14 RX ORDER — DIVALPROEX SODIUM 500 MG/1
2 TABLET, DELAYED RELEASE ORAL
Qty: 0 | Refills: 0 | DISCHARGE

## 2021-05-16 ENCOUNTER — RX RENEWAL (OUTPATIENT)
Age: 56
End: 2021-05-16

## 2021-05-16 RX ORDER — GABAPENTIN 100 MG/1
100 CAPSULE ORAL
Qty: 180 | Refills: 3 | Status: ACTIVE | COMMUNITY
Start: 2020-08-04 | End: 1900-01-01

## 2021-06-09 NOTE — CONSULT NOTE ADULT - SUBJECTIVE AND OBJECTIVE BOX
[facility] DIVISION OF KIDNEY DISEASES AND HYPERTENSION -- [phone]  -- INITIAL CONSULT NOTE  --------------------------------------------------------------------------------  HPI: 53 M with HTN, bipolar disorder, DM2, ESRD not yet on HD, anemia, was brought in by EMS for generalized weakness. Nephrology was consulted for uremic encephalopathy. Patient reported having increasing weakness and lethargy at home. Follows with a Nephrologist - Dr. Washington and had an AV fistula placed 2 months ago by Dr. Agrawal in anticipation for HD. Discussed with mother, patient has become increasingly tired, sleeping for almost 16 hours daily. Denies any drug use.         PAST HISTORY  --------------------------------------------------------------------------------  PAST MEDICAL & SURGICAL HISTORY:  Anemia  Arterial insufficiency  Hypertension  Chronic kidney disease, unspecified  Bipolar affective disorder in remission  High cholesterol  Depression  Diabetes mellitus: Patient does not routinely check FS.  Amputated great toe of right foot  ORIF right lower leg- 1995    FAMILY HISTORY:  No pertinent family history in first degree relatives    PAST SOCIAL HISTORY:    ALLERGIES & MEDICATIONS  --------------------------------------------------------------------------------  Allergies    No Known Allergies    Intolerances      Standing Inpatient Medications  amLODIPine   Tablet 10 milliGRAM(s) Oral daily  carvedilol 12.5 milliGRAM(s) Oral every 12 hours  cilostazol 50 milliGRAM(s) Oral two times a day  dextrose 5%. 1000 milliLiter(s) IV Continuous <Continuous>  dextrose 50% Injectable 12.5 Gram(s) IV Push once  dextrose 50% Injectable 25 Gram(s) IV Push once  dextrose 50% Injectable 25 Gram(s) IV Push once  diVALproex  milliGRAM(s) Oral three times a day  ferrous    sulfate 325 milliGRAM(s) Oral daily  gabapentin 100 milliGRAM(s) Oral three times a day  heparin  Injectable 5000 Unit(s) SubCutaneous every 12 hours  hydrALAZINE 50 milliGRAM(s) Oral two times a day  insulin lispro (HumaLOG) corrective regimen sliding scale   SubCutaneous three times a day before meals  insulin lispro (HumaLOG) corrective regimen sliding scale   SubCutaneous at bedtime  OLANZapine 20 milliGRAM(s) Oral at bedtime  sodium bicarbonate 650 milliGRAM(s) Oral two times a day  traZODone 100 milliGRAM(s) Oral daily    PRN Inpatient Medications  dextrose 40% Gel 15 Gram(s) Oral once PRN  glucagon  Injectable 1 milliGRAM(s) IntraMuscular once PRN  ondansetron    Tablet 4 milliGRAM(s) Oral every 6 hours PRN      REVIEW OF SYSTEMS  --------------------------------------------------------------------------------  Gen: No fevers/chills  Skin: No rashes  Head/Eyes/Ears: Normal hearing,   Respiratory: No dyspnea, cough  CV: No chest pain  GI: No abdominal pain, diarrhea  : No dysuria, hematuria  MSK: No  edema  Heme: No easy bruising or bleeding  Psych: No significant depression    All other systems were reviewed and are negative, except as noted.    VITALS/PHYSICAL EXAM  --------------------------------------------------------------------------------  T(C): 36.7 (07-13-19 @ 10:23), Max: 37.1 (07-12-19 @ 16:28)  HR: 65 (07-13-19 @ 10:23) (62 - 88)  BP: 106/62 (07-13-19 @ 10:23) (106/62 - 177/63)  RR: 18 (07-13-19 @ 10:23) (16 - 19)  SpO2: 99% (07-13-19 @ 10:23) (95% - 100%)  Wt(kg): --  Height (cm): 177.8 (07-13-19 @ 06:17)  Weight (kg): 75.6 (07-13-19 @ 06:17)  BMI (kg/m2): 23.9 (07-13-19 @ 06:17)  BSA (m2): 1.93 (07-13-19 @ 06:17)      Physical Exam:  	Gen: NAD  	HEENT: MMM  	Pulm: CTA B/L  	CV: S1S2  	Abd: Soft, +BS   	Ext: No LE edema B/L  	Neuro: Awake  	Skin: Warm and dry  	Vascular access:    LABS/STUDIES  --------------------------------------------------------------------------------              7.4    13.67 >-----------<  288      [07-13-19 @ 07:09]              25.1     136  |  104  |  126  ----------------------------<  79      [07-13-19 @ 07:09]  4.5   |  14  |  8.47        Ca     8.2     [07-13-19 @ 07:09]      Mg     2.6     [07-13-19 @ 07:09]      Phos  6.6     [07-13-19 @ 07:09]    TPro  6.5  /  Alb  3.0  /  TBili  < 0.2  /  DBili  x   /  AST  33  /  ALT  25  /  AlkPhos  57  [07-12-19 @ 20:05]          Creatinine Trend:  SCr 8.47 [07-13 @ 07:09]  SCr 8.78 [07-12 @ 20:05]    Urinalysis - [07-12-19 @ 21:21]      Color LIGHT YELLOW / Appearance CLEAR / SG 1.011 / pH 6.5      Gluc 50 / Ketone NEGATIVE  / Bili NEGATIVE / Urobili NORMAL       Blood TRACE / Protein 300 / Leuk Est NEGATIVE / Nitrite NEGATIVE      RBC 3-5 / WBC 3-5 / Hyaline NEGATIVE / Gran  / Sq Epi OCC / Non Sq Epi  / Bacteria NEGATIVE      HbA1c 4.6      [03-26-19 @ 10:55]  TSH 1.54      [07-13-19 @ 07:09]    HBsAb Nonreactive      [03-15-17 @ 05:45]  HBsAg NEGATIVE      [07-13-19 @ 07:09]  HCV 0.09, Nonreactive Hepatitis C AB  S/CO Ratio                        Interpretation  < 1.0                                     Non-Reactive  1.0 - 4.9                           Weakly-Reactive  > 5.0                                 Reactive  Non-Reactive: [place] with a non-reactive HCV antibody  result is considered uninfected.  No further action is  needed unless recent infection is suspected.  In these  cases, consider repeat testing later to detect  seroconversion..  Weakly-Reactive: HCV antibody test is abnormal, HCV RNA  Qualitative test will follow.  Reactive: HCV antibody test is abnormal, HCV RNA  Qualitative test will follow.  Note: HCV antibody testing is performed on the [employer]   system.      [03-15-17 @ 05:45]    PAVEL: titer Negative, pattern --      [03-15-17 @ 05:45]  dsDNA <12      [03-15-17 @ 06:30]  C3 Complement 104.6      [03-15-17 @ 05:45]  C4 Complement 37.9      [03-15-17 @ 05:45] [facility] DIVISION OF KIDNEY DISEASES AND HYPERTENSION -- [phone]  -- INITIAL CONSULT NOTE  --------------------------------------------------------------------------------  HPI: 53 M with HTN, bipolar disorder, DM2, ESRD not yet on HD, anemia, was brought in by EMS for generalized weakness. Nephrology was consulted for uremic encephalopathy. Patient reported having increasing weakness and lethargy at home. Follows with a Nephrologist - Dr. Washington and had an AV fistula placed 2 months ago by Dr. Agrawal in anticipation for HD. Patient states he developed kidney disease from long-standing DM and HTN. Discussed with mother, patient has become increasingly tired, sleeping for almost 16 hours daily. Denies any drug use.  [facility] reviewed, last sCr prior to admission was 7.25 and BUN of 94 on 5/3/2019. On admission, sCr is 8.78 and BUN is 124. During evaluation, patient very lethargic, sleeping in the middle of evaluation.      PAST HISTORY  --------------------------------------------------------------------------------  PAST MEDICAL & SURGICAL HISTORY:  Anemia  Arterial insufficiency  Hypertension  Chronic kidney disease, unspecified  Bipolar affective disorder in remission  High cholesterol  Depression  Diabetes mellitus: Patient does not routinely check FS.  Amputated great toe of right foot  ORIF right lower leg- 1995    FAMILY HISTORY:  No pertinent family history in first degree relatives    PAST SOCIAL HISTORY: lives at home with mother    ALLERGIES & MEDICATIONS  --------------------------------------------------------------------------------  Allergies    No Known Allergies    Intolerances    Standing Inpatient Medications  amLODIPine   Tablet 10 milliGRAM(s) Oral daily  carvedilol 12.5 milliGRAM(s) Oral every 12 hours  cilostazol 50 milliGRAM(s) Oral two times a day  dextrose 5%. 1000 milliLiter(s) IV Continuous <Continuous>  dextrose 50% Injectable 12.5 Gram(s) IV Push once  dextrose 50% Injectable 25 Gram(s) IV Push once  dextrose 50% Injectable 25 Gram(s) IV Push once  diVALproex  milliGRAM(s) Oral three times a day  ferrous    sulfate 325 milliGRAM(s) Oral daily  gabapentin 100 milliGRAM(s) Oral three times a day  heparin  Injectable 5000 Unit(s) SubCutaneous every 12 hours  hydrALAZINE 50 milliGRAM(s) Oral two times a day  insulin lispro (HumaLOG) corrective regimen sliding scale   SubCutaneous three times a day before meals  insulin lispro (HumaLOG) corrective regimen sliding scale   SubCutaneous at bedtime  OLANZapine 20 milliGRAM(s) Oral at bedtime  sodium bicarbonate 650 milliGRAM(s) Oral two times a day  traZODone 100 milliGRAM(s) Oral daily    REVIEW OF SYSTEMS  --------------------------------------------------------------------------------  Gen: (+) fatigue  Skin: No rashes  Head/Eyes/Ears: Normal hearing,   Respiratory: No dyspnea, cough  CV: No chest pain  GI: No abdominal pain, diarrhea  : No dysuria, hematuria  MSK: No  edema  Heme: No easy bruising or bleeding  Psych: No significant depression    All other systems were reviewed and are negative, except as noted.    VITALS/PHYSICAL EXAM  --------------------------------------------------------------------------------  T(C): 36.7 (07-13-19 @ 10:23), Max: 37.1 (07-12-19 @ 16:28)  HR: 65 (07-13-19 @ 10:23) (62 - 88)  BP: 106/62 (07-13-19 @ 10:23) (106/62 - 177/63)  RR: 18 (07-13-19 @ 10:23) (16 - 19)  SpO2: 99% (07-13-19 @ 10:23) (95% - 100%)  Wt(kg): --  Height (cm): 177.8 (07-13-19 @ 06:17)  Weight (kg): 75.6 (07-13-19 @ 06:17)  BMI (kg/m2): 23.9 (07-13-19 @ 06:17)  BSA (m2): 1.93 (07-13-19 @ 06:17)      Physical Exam:  	Gen: drowsy  	HEENT: no JVD  	Pulm: CTA B/L  	CV: S1S2  	Abd: Soft, +BS   	Ext: No LE edema B/L  	Neuro: drowsy, asterixes present with hands stretched  	Skin: Warm and dry  	Vascular access: LUE AV fistula with bruit and thrill, overlying skin intact, no signs of bleeding.    LABS/STUDIES  --------------------------------------------------------------------------------              7.4    13.67 >-----------<  288      [07-13-19 @ 07:09]              25.1     136  |  104  |  126  ----------------------------<  79      [07-13-19 @ 07:09]  4.5   |  14  |  8.47        Ca     8.2     [07-13-19 @ 07:09]      Mg     2.6     [07-13-19 @ 07:09]      Phos  6.6     [07-13-19 @ 07:09]    TPro  6.5  /  Alb  3.0  /  TBili  < 0.2  /  DBili  x   /  AST  33  /  ALT  25  /  AlkPhos  57  [07-12-19 @ 20:05]      Creatinine Trend:  SCr 8.47 [07-13 @ 07:09]  SCr 8.78 [07-12 @ 20:05]    Urinalysis - [07-12-19 @ 21:21]      Color LIGHT YELLOW / Appearance CLEAR / SG 1.011 / pH 6.5      Gluc 50 / Ketone NEGATIVE  / Bili NEGATIVE / Urobili NORMAL       Blood TRACE / Protein 300 / Leuk Est NEGATIVE / Nitrite NEGATIVE      RBC 3-5 / WBC 3-5 / Hyaline NEGATIVE / Gran  / Sq Epi OCC / Non Sq Epi  / Bacteria NEGATIVE [facility] DIVISION OF KIDNEY DISEASES AND HYPERTENSION -- [phone]  -- INITIAL CONSULT NOTE  --------------------------------------------------------------------------------  HPI: 53 M with HTN, bipolar disorder, DM2, ESRD not yet on HD, anemia, was brought in by EMS for generalized weakness. Nephrology was consulted for uremic encephalopathy. Patient reported having increasing weakness and lethargy at home. Follows with a Nephrologist - Dr. Diaz and had an AV fistula placed 2 months ago by Dr. Agrawal in anticipation for HD. Patient states he developed kidney disease from long-standing DM and HTN. Discussed with mother, patient has become increasingly tired, sleeping for almost 16 hours daily. Denies any drug use.  [facility] reviewed, last sCr prior to admission was 7.25 and BUN of 94 on 5/3/2019. On admission, sCr is 8.78 and BUN is 124. During evaluation, patient very lethargic, sleeping in the middle of evaluation.      PAST HISTORY  --------------------------------------------------------------------------------  PAST MEDICAL & SURGICAL HISTORY:  Anemia  Arterial insufficiency  Hypertension  Chronic kidney disease, unspecified  Bipolar affective disorder in remission  High cholesterol  Depression  Diabetes mellitus: Patient does not routinely check FS.  Amputated great toe of right foot  ORIF right lower leg- 1995    FAMILY HISTORY:  No pertinent family history in first degree relatives    PAST SOCIAL HISTORY: lives at home with mother    ALLERGIES & MEDICATIONS  --------------------------------------------------------------------------------  Allergies    No Known Allergies    Intolerances    Standing Inpatient Medications  amLODIPine   Tablet 10 milliGRAM(s) Oral daily  carvedilol 12.5 milliGRAM(s) Oral every 12 hours  cilostazol 50 milliGRAM(s) Oral two times a day  dextrose 5%. 1000 milliLiter(s) IV Continuous <Continuous>  dextrose 50% Injectable 12.5 Gram(s) IV Push once  dextrose 50% Injectable 25 Gram(s) IV Push once  dextrose 50% Injectable 25 Gram(s) IV Push once  diVALproex  milliGRAM(s) Oral three times a day  ferrous    sulfate 325 milliGRAM(s) Oral daily  gabapentin 100 milliGRAM(s) Oral three times a day  heparin  Injectable 5000 Unit(s) SubCutaneous every 12 hours  hydrALAZINE 50 milliGRAM(s) Oral two times a day  insulin lispro (HumaLOG) corrective regimen sliding scale   SubCutaneous three times a day before meals  insulin lispro (HumaLOG) corrective regimen sliding scale   SubCutaneous at bedtime  OLANZapine 20 milliGRAM(s) Oral at bedtime  sodium bicarbonate 650 milliGRAM(s) Oral two times a day  traZODone 100 milliGRAM(s) Oral daily    REVIEW OF SYSTEMS  --------------------------------------------------------------------------------  Gen: (+) fatigue  Skin: No rashes  Head/Eyes/Ears: Normal hearing,   Respiratory: No dyspnea, cough  CV: No chest pain  GI: No abdominal pain, diarrhea  : No dysuria, hematuria  MSK: No  edema  Heme: No easy bruising or bleeding  Psych: No significant depression    All other systems were reviewed and are negative, except as noted.    VITALS/PHYSICAL EXAM  --------------------------------------------------------------------------------  T(C): 36.7 (07-13-19 @ 10:23), Max: 37.1 (07-12-19 @ 16:28)  HR: 65 (07-13-19 @ 10:23) (62 - 88)  BP: 106/62 (07-13-19 @ 10:23) (106/62 - 177/63)  RR: 18 (07-13-19 @ 10:23) (16 - 19)  SpO2: 99% (07-13-19 @ 10:23) (95% - 100%)  Wt(kg): --  Height (cm): 177.8 (07-13-19 @ 06:17)  Weight (kg): 75.6 (07-13-19 @ 06:17)  BMI (kg/m2): 23.9 (07-13-19 @ 06:17)  BSA (m2): 1.93 (07-13-19 @ 06:17)      Physical Exam:  	Gen: drowsy  	HEENT: no JVD  	Pulm: CTA B/L  	CV: S1S2 no rub  	Abd: Soft, +BS   	Ext: No LE edema B/L  	Neuro: drowsy, asterixis present with hands stretched  	Skin: Warm and dry  	Vascular access: LUE AV fistula with bruit and thrill, overlying skin intact, no signs of bleeding.    LABS/STUDIES  --------------------------------------------------------------------------------              7.4    13.67 >-----------<  288      [07-13-19 @ 07:09]              25.1     136  |  104  |  126  ----------------------------<  79      [07-13-19 @ 07:09]  4.5   |  14  |  8.47        Ca     8.2     [07-13-19 @ 07:09]      Mg     2.6     [07-13-19 @ 07:09]      Phos  6.6     [07-13-19 @ 07:09]    TPro  6.5  /  Alb  3.0  /  TBili  < 0.2  /  DBili  x   /  AST  33  /  ALT  25  /  AlkPhos  57  [07-12-19 @ 20:05]      Creatinine Trend:  SCr 8.47 [07-13 @ 07:09]  SCr 8.78 [07-12 @ 20:05]    Urinalysis - [07-12-19 @ 21:21]      Color LIGHT YELLOW / Appearance CLEAR / SG 1.011 / pH 6.5      Gluc 50 / Ketone NEGATIVE  / Bili NEGATIVE / Urobili NORMAL       Blood TRACE / Protein 300 / Leuk Est NEGATIVE / Nitrite NEGATIVE      RBC 3-5 / WBC 3-5 / Hyaline NEGATIVE / Gran  / Sq Epi OCC / Non Sq Epi  / Bacteria NEGATIVE sudden onset

## 2021-06-11 ENCOUNTER — LABORATORY RESULT (OUTPATIENT)
Age: 56
End: 2021-06-11

## 2021-06-11 ENCOUNTER — APPOINTMENT (OUTPATIENT)
Dept: PULMONOLOGY | Facility: CLINIC | Age: 56
End: 2021-06-11
Payer: MEDICARE

## 2021-06-11 VITALS
TEMPERATURE: 97.8 F | HEART RATE: 62 BPM | DIASTOLIC BLOOD PRESSURE: 63 MMHG | OXYGEN SATURATION: 97 % | SYSTOLIC BLOOD PRESSURE: 176 MMHG

## 2021-06-11 DIAGNOSIS — N28.9 DISORDER OF KIDNEY AND URETER, UNSPECIFIED: ICD-10-CM

## 2021-06-11 PROCEDURE — 99214 OFFICE O/P EST MOD 30 MIN: CPT

## 2021-06-12 PROBLEM — N28.9 RENAL INSUFFICIENCY: Status: ACTIVE | Noted: 2019-03-12

## 2021-06-12 LAB
ANION GAP SERPL CALC-SCNC: 17 MMOL/L
BASOPHILS # BLD AUTO: 0 K/UL
BASOPHILS NFR BLD AUTO: 0 %
BUN SERPL-MCNC: 63 MG/DL
CALCIUM SERPL-MCNC: 9.5 MG/DL
CHLORIDE SERPL-SCNC: 96 MMOL/L
CHOLEST SERPL-MCNC: 181 MG/DL
CO2 SERPL-SCNC: 23 MMOL/L
CREAT SERPL-MCNC: 5.01 MG/DL
EOSINOPHIL # BLD AUTO: 0.29 K/UL
EOSINOPHIL NFR BLD AUTO: 2.6 %
GLUCOSE SERPL-MCNC: 81 MG/DL
HCT VFR BLD CALC: 36 %
HDLC SERPL-MCNC: 57 MG/DL
HGB BLD-MCNC: 10.7 G/DL
LDLC SERPL CALC-MCNC: 93 MG/DL
LYMPHOCYTES # BLD AUTO: 1.47 K/UL
LYMPHOCYTES NFR BLD AUTO: 13.2 %
MAN DIFF?: NORMAL
MCHC RBC-ENTMCNC: 29.7 GM/DL
MCHC RBC-ENTMCNC: 31.7 PG
MCV RBC AUTO: 106.5 FL
MONOCYTES # BLD AUTO: 1.07 K/UL
MONOCYTES NFR BLD AUTO: 9.6 %
NEUTROPHILS # BLD AUTO: 8.33 K/UL
NEUTROPHILS NFR BLD AUTO: 74.6 %
NONHDLC SERPL-MCNC: 125 MG/DL
PLATELET # BLD AUTO: 327 K/UL
POTASSIUM SERPL-SCNC: 6.2 MMOL/L
RBC # BLD: 3.38 M/UL
RBC # FLD: 16.3 %
SODIUM SERPL-SCNC: 135 MMOL/L
TRIGL SERPL-MCNC: 162 MG/DL
TSH SERPL-ACNC: 1.98 UIU/ML
WBC # FLD AUTO: 11.16 K/UL

## 2021-06-12 NOTE — HISTORY OF PRESENT ILLNESS
[TextBox_4] : Renal failure\par Peripheral vascular disease\par Hyperlipidemia\par Diabetes still waiting for\par \par Surgery for graft revision. No clinical change

## 2021-06-12 NOTE — ASSESSMENT
[FreeTextEntry1] : Clinically unchanged check labs most labs done in hemodialysis but will check lipid profile

## 2021-06-23 ENCOUNTER — OUTPATIENT (OUTPATIENT)
Dept: OUTPATIENT SERVICES | Facility: HOSPITAL | Age: 56
LOS: 1 days | End: 2021-06-23

## 2021-06-23 VITALS
HEART RATE: 60 BPM | OXYGEN SATURATION: 98 % | TEMPERATURE: 97 F | RESPIRATION RATE: 16 BRPM | SYSTOLIC BLOOD PRESSURE: 140 MMHG | DIASTOLIC BLOOD PRESSURE: 70 MMHG | WEIGHT: 162.04 LBS | HEIGHT: 71 IN

## 2021-06-23 DIAGNOSIS — N18.6 END STAGE RENAL DISEASE: ICD-10-CM

## 2021-06-23 DIAGNOSIS — Z89.411 ACQUIRED ABSENCE OF RIGHT GREAT TOE: Chronic | ICD-10-CM

## 2021-06-23 DIAGNOSIS — Z98.890 OTHER SPECIFIED POSTPROCEDURAL STATES: Chronic | ICD-10-CM

## 2021-06-23 DIAGNOSIS — F31.9 BIPOLAR DISORDER, UNSPECIFIED: ICD-10-CM

## 2021-06-23 DIAGNOSIS — I10 ESSENTIAL (PRIMARY) HYPERTENSION: ICD-10-CM

## 2021-06-23 LAB
ANION GAP SERPL CALC-SCNC: 13 MMOL/L — SIGNIFICANT CHANGE UP (ref 7–14)
BUN SERPL-MCNC: 54 MG/DL — HIGH (ref 7–23)
CALCIUM SERPL-MCNC: 8.9 MG/DL — SIGNIFICANT CHANGE UP (ref 8.4–10.5)
CHLORIDE SERPL-SCNC: 95 MMOL/L — LOW (ref 98–107)
CO2 SERPL-SCNC: 25 MMOL/L — SIGNIFICANT CHANGE UP (ref 22–31)
CREAT SERPL-MCNC: 5.2 MG/DL — HIGH (ref 0.5–1.3)
GLUCOSE SERPL-MCNC: 69 MG/DL — LOW (ref 70–99)
HCT VFR BLD CALC: 34.3 % — LOW (ref 39–50)
HGB BLD-MCNC: 10.7 G/DL — LOW (ref 13–17)
MCHC RBC-ENTMCNC: 30.8 PG — SIGNIFICANT CHANGE UP (ref 27–34)
MCHC RBC-ENTMCNC: 31.2 GM/DL — LOW (ref 32–36)
MCV RBC AUTO: 98.8 FL — SIGNIFICANT CHANGE UP (ref 80–100)
NRBC # BLD: 0 /100 WBCS — SIGNIFICANT CHANGE UP
NRBC # FLD: 0 K/UL — SIGNIFICANT CHANGE UP
PLATELET # BLD AUTO: 307 K/UL — SIGNIFICANT CHANGE UP (ref 150–400)
POTASSIUM SERPL-MCNC: 5.4 MMOL/L — HIGH (ref 3.5–5.3)
POTASSIUM SERPL-SCNC: 5.4 MMOL/L — HIGH (ref 3.5–5.3)
RBC # BLD: 3.47 M/UL — LOW (ref 4.2–5.8)
RBC # FLD: 16 % — HIGH (ref 10.3–14.5)
SODIUM SERPL-SCNC: 133 MMOL/L — LOW (ref 135–145)
WBC # BLD: 7 K/UL — SIGNIFICANT CHANGE UP (ref 3.8–10.5)
WBC # FLD AUTO: 7 K/UL — SIGNIFICANT CHANGE UP (ref 3.8–10.5)

## 2021-06-23 NOTE — H&P PST ADULT - NSICDXPASTMEDICALHX_GEN_ALL_CORE_FT
PAST MEDICAL HISTORY:  Anemia     Arterial insufficiency     Bipolar affective disorder in remission     Chronic kidney disease, unspecified     Depression     Diabetes mellitus Patient does not routinely check FS.    ESRD on dialysis     Hemodialysis patient     High cholesterol     Hypertension

## 2021-06-23 NOTE — H&P PST ADULT - RS GEN PE MLT RESP DETAILS PC
breath sounds equal/respirations non-labored/clear to auscultation bilaterally/no chest wall tenderness/no intercostal retractions/no rales/no rhonchi/no wheezes

## 2021-06-23 NOTE — H&P PST ADULT - HISTORY OF PRESENT ILLNESS
54 y/o male with HTN, HLD, Type 2 DM (not on meds) and ESRD on HD Tues-Thur-Sat presents to PST preop for left arm fistulogram, possible angioplasty and stent. pt reports his AV fistula is not functioning properly. pt was scheduled for surgery in [place] but case was cancelled because he drank milk on the morning of surgery             54 y/o male with HTN, HLD, Type 2 DM (not on meds) and ESRD on HD Tues-Thur-Sat presents to PST preop for left arm fistulogram, possible angioplasty and stent. pt reports his AV fistula is not functioning properly. pt was scheduled for surgery in April but case was cancelled because he drank milk on the morning of surgery.

## 2021-06-23 NOTE — H&P PST ADULT - SKIN COMMENTS
bilateral fore arms with abrasions no signs of infection bilateral fore arms with abrasions. no signs of infection

## 2021-06-23 NOTE — H&P PST ADULT - NSICDXPROBLEM_GEN_ALL_CORE_FT
PROBLEM DIAGNOSES  Problem: End-stage renal disease (ESRD)  Assessment and Plan: preop for left arm fistulogram, possible angioplasty and stent on 7/2/21  preop instructions given, pt verbalized understanding  chlorhexidine wash provided      Problem: HTN (hypertension)  Assessment and Plan: pt will take blood pressure meds carvediolol and hydrlazine AM of surgery as prescribed      Problem: Bipolar disorder  Assessment and Plan: pt will take divaloproex AM of surgery as prescribed        PROBLEM DIAGNOSES  Problem: End-stage renal disease (ESRD)  Assessment and Plan: preop for left arm fistulogram, possible angioplasty and stent on 7/2/21  preop instructions given, pt verbalized understanding  chlorhexidine wash provided  medical clearance note on chart       Problem: HTN (hypertension)  Assessment and Plan: pt will take blood pressure meds carvediolol and hydralazine AM of surgery as prescribed      Problem: Bipolar disorder  Assessment and Plan: pt will take divaloproex AM of surgery as prescribed

## 2021-07-09 ENCOUNTER — INPATIENT (INPATIENT)
Facility: HOSPITAL | Age: 56
LOS: 0 days | Discharge: LEFT BEFORE DISCHARGE | End: 2021-07-09
Attending: STUDENT IN AN ORGANIZED HEALTH CARE EDUCATION/TRAINING PROGRAM | Admitting: STUDENT IN AN ORGANIZED HEALTH CARE EDUCATION/TRAINING PROGRAM
Payer: MEDICARE

## 2021-07-09 VITALS
SYSTOLIC BLOOD PRESSURE: 178 MMHG | RESPIRATION RATE: 18 BRPM | DIASTOLIC BLOOD PRESSURE: 82 MMHG | TEMPERATURE: 98 F | HEART RATE: 62 BPM | OXYGEN SATURATION: 100 %

## 2021-07-09 VITALS
HEART RATE: 54 BPM | HEIGHT: 71 IN | SYSTOLIC BLOOD PRESSURE: 181 MMHG | RESPIRATION RATE: 18 BRPM | DIASTOLIC BLOOD PRESSURE: 76 MMHG | TEMPERATURE: 98 F | OXYGEN SATURATION: 100 %

## 2021-07-09 DIAGNOSIS — E11.9 TYPE 2 DIABETES MELLITUS WITHOUT COMPLICATIONS: ICD-10-CM

## 2021-07-09 DIAGNOSIS — Z89.411 ACQUIRED ABSENCE OF RIGHT GREAT TOE: Chronic | ICD-10-CM

## 2021-07-09 DIAGNOSIS — R40.0 SOMNOLENCE: ICD-10-CM

## 2021-07-09 DIAGNOSIS — I10 ESSENTIAL (PRIMARY) HYPERTENSION: ICD-10-CM

## 2021-07-09 DIAGNOSIS — R41.82 ALTERED MENTAL STATUS, UNSPECIFIED: ICD-10-CM

## 2021-07-09 DIAGNOSIS — F31.70 BIPOLAR DISORDER, CURRENTLY IN REMISSION, MOST RECENT EPISODE UNSPECIFIED: ICD-10-CM

## 2021-07-09 DIAGNOSIS — Z29.9 ENCOUNTER FOR PROPHYLACTIC MEASURES, UNSPECIFIED: ICD-10-CM

## 2021-07-09 DIAGNOSIS — N18.6 END STAGE RENAL DISEASE: ICD-10-CM

## 2021-07-09 DIAGNOSIS — Z98.890 OTHER SPECIFIED POSTPROCEDURAL STATES: Chronic | ICD-10-CM

## 2021-07-09 DIAGNOSIS — E87.5 HYPERKALEMIA: ICD-10-CM

## 2021-07-09 LAB
A1C WITH ESTIMATED AVERAGE GLUCOSE RESULT: 5 % — SIGNIFICANT CHANGE UP (ref 4–5.6)
ALBUMIN SERPL ELPH-MCNC: 3.7 G/DL — SIGNIFICANT CHANGE UP (ref 3.3–5)
ALP SERPL-CCNC: 119 U/L — SIGNIFICANT CHANGE UP (ref 40–120)
ALT FLD-CCNC: 17 U/L — SIGNIFICANT CHANGE UP (ref 4–41)
ANION GAP SERPL CALC-SCNC: 19 MMOL/L — HIGH (ref 7–14)
AST SERPL-CCNC: 22 U/L — SIGNIFICANT CHANGE UP (ref 4–40)
B PERT DNA SPEC QL NAA+PROBE: SIGNIFICANT CHANGE UP
BASOPHILS # BLD AUTO: 0.06 K/UL — SIGNIFICANT CHANGE UP (ref 0–0.2)
BASOPHILS NFR BLD AUTO: 0.7 % — SIGNIFICANT CHANGE UP (ref 0–2)
BILIRUB SERPL-MCNC: 0.2 MG/DL — SIGNIFICANT CHANGE UP (ref 0.2–1.2)
BLOOD GAS VENOUS COMPREHENSIVE RESULT: SIGNIFICANT CHANGE UP
BUN SERPL-MCNC: 74 MG/DL — HIGH (ref 7–23)
C PNEUM DNA SPEC QL NAA+PROBE: SIGNIFICANT CHANGE UP
CALCIUM SERPL-MCNC: 9.1 MG/DL — SIGNIFICANT CHANGE UP (ref 8.4–10.5)
CHLORIDE SERPL-SCNC: 99 MMOL/L — SIGNIFICANT CHANGE UP (ref 98–107)
CO2 SERPL-SCNC: 18 MMOL/L — LOW (ref 22–31)
CREAT SERPL-MCNC: 5.97 MG/DL — HIGH (ref 0.5–1.3)
DIALYSIS INSTRUMENT RESULT - HEPATITIS B SURFACE ANTIGEN: NEGATIVE — SIGNIFICANT CHANGE UP
EOSINOPHIL # BLD AUTO: 0.11 K/UL — SIGNIFICANT CHANGE UP (ref 0–0.5)
EOSINOPHIL NFR BLD AUTO: 1.2 % — SIGNIFICANT CHANGE UP (ref 0–6)
ESTIMATED AVERAGE GLUCOSE: 97 — SIGNIFICANT CHANGE UP
FLUAV SUBTYP SPEC NAA+PROBE: SIGNIFICANT CHANGE UP
FLUBV RNA SPEC QL NAA+PROBE: SIGNIFICANT CHANGE UP
GLUCOSE SERPL-MCNC: 71 MG/DL — SIGNIFICANT CHANGE UP (ref 70–99)
HADV DNA SPEC QL NAA+PROBE: SIGNIFICANT CHANGE UP
HCOV 229E RNA SPEC QL NAA+PROBE: SIGNIFICANT CHANGE UP
HCOV HKU1 RNA SPEC QL NAA+PROBE: SIGNIFICANT CHANGE UP
HCOV NL63 RNA SPEC QL NAA+PROBE: SIGNIFICANT CHANGE UP
HCOV OC43 RNA SPEC QL NAA+PROBE: SIGNIFICANT CHANGE UP
HCT VFR BLD CALC: 39.8 % — SIGNIFICANT CHANGE UP (ref 39–50)
HGB BLD-MCNC: 12.2 G/DL — LOW (ref 13–17)
HMPV RNA SPEC QL NAA+PROBE: SIGNIFICANT CHANGE UP
HPIV1 RNA SPEC QL NAA+PROBE: SIGNIFICANT CHANGE UP
HPIV2 RNA SPEC QL NAA+PROBE: SIGNIFICANT CHANGE UP
HPIV3 RNA SPEC QL NAA+PROBE: SIGNIFICANT CHANGE UP
HPIV4 RNA SPEC QL NAA+PROBE: SIGNIFICANT CHANGE UP
IANC: 5.44 K/UL — SIGNIFICANT CHANGE UP (ref 1.5–8.5)
IMM GRANULOCYTES NFR BLD AUTO: 1.5 % — SIGNIFICANT CHANGE UP (ref 0–1.5)
LYMPHOCYTES # BLD AUTO: 2.01 K/UL — SIGNIFICANT CHANGE UP (ref 1–3.3)
LYMPHOCYTES # BLD AUTO: 22.8 % — SIGNIFICANT CHANGE UP (ref 13–44)
MCHC RBC-ENTMCNC: 30.2 PG — SIGNIFICANT CHANGE UP (ref 27–34)
MCHC RBC-ENTMCNC: 30.7 GM/DL — LOW (ref 32–36)
MCV RBC AUTO: 98.5 FL — SIGNIFICANT CHANGE UP (ref 80–100)
MONOCYTES # BLD AUTO: 1.07 K/UL — HIGH (ref 0–0.9)
MONOCYTES NFR BLD AUTO: 12.1 % — SIGNIFICANT CHANGE UP (ref 2–14)
NEUTROPHILS # BLD AUTO: 5.44 K/UL — SIGNIFICANT CHANGE UP (ref 1.8–7.4)
NEUTROPHILS NFR BLD AUTO: 61.7 % — SIGNIFICANT CHANGE UP (ref 43–77)
NRBC # BLD: 0 /100 WBCS — SIGNIFICANT CHANGE UP
NRBC # FLD: 0 K/UL — SIGNIFICANT CHANGE UP
PLATELET # BLD AUTO: 246 K/UL — SIGNIFICANT CHANGE UP (ref 150–400)
POTASSIUM SERPL-MCNC: 6.1 MMOL/L — HIGH (ref 3.5–5.3)
POTASSIUM SERPL-SCNC: 6.1 MMOL/L — HIGH (ref 3.5–5.3)
PROT SERPL-MCNC: 7.3 G/DL — SIGNIFICANT CHANGE UP (ref 6–8.3)
RAPID RVP RESULT: SIGNIFICANT CHANGE UP
RBC # BLD: 4.04 M/UL — LOW (ref 4.2–5.8)
RBC # FLD: 15.8 % — HIGH (ref 10.3–14.5)
RSV RNA SPEC QL NAA+PROBE: SIGNIFICANT CHANGE UP
RV+EV RNA SPEC QL NAA+PROBE: SIGNIFICANT CHANGE UP
SARS-COV-2 RNA SPEC QL NAA+PROBE: SIGNIFICANT CHANGE UP
SODIUM SERPL-SCNC: 136 MMOL/L — SIGNIFICANT CHANGE UP (ref 135–145)
TOXICOLOGY SCREEN, DRUGS OF ABUSE, SERUM RESULT: SIGNIFICANT CHANGE UP
WBC # BLD: 8.82 K/UL — SIGNIFICANT CHANGE UP (ref 3.8–10.5)
WBC # FLD AUTO: 8.82 K/UL — SIGNIFICANT CHANGE UP (ref 3.8–10.5)

## 2021-07-09 PROCEDURE — 99222 1ST HOSP IP/OBS MODERATE 55: CPT | Mod: GC

## 2021-07-09 PROCEDURE — 70450 CT HEAD/BRAIN W/O DYE: CPT | Mod: 26

## 2021-07-09 PROCEDURE — 99223 1ST HOSP IP/OBS HIGH 75: CPT

## 2021-07-09 PROCEDURE — 99291 CRITICAL CARE FIRST HOUR: CPT | Mod: 25,GC

## 2021-07-09 PROCEDURE — 93010 ELECTROCARDIOGRAM REPORT: CPT | Mod: GC

## 2021-07-09 PROCEDURE — 71045 X-RAY EXAM CHEST 1 VIEW: CPT | Mod: 26

## 2021-07-09 RX ORDER — DEXTROSE 50 % IN WATER 50 %
50 SYRINGE (ML) INTRAVENOUS ONCE
Refills: 0 | Status: COMPLETED | OUTPATIENT
Start: 2021-07-09 | End: 2021-07-09

## 2021-07-09 RX ORDER — GLUCAGON INJECTION, SOLUTION 0.5 MG/.1ML
1 INJECTION, SOLUTION SUBCUTANEOUS ONCE
Refills: 0 | Status: DISCONTINUED | OUTPATIENT
Start: 2021-07-09 | End: 2021-07-09

## 2021-07-09 RX ORDER — SEVELAMER CARBONATE 2400 MG/1
800 POWDER, FOR SUSPENSION ORAL
Refills: 0 | Status: DISCONTINUED | OUTPATIENT
Start: 2021-07-09 | End: 2021-07-09

## 2021-07-09 RX ORDER — INSULIN HUMAN 100 [IU]/ML
3 INJECTION, SOLUTION SUBCUTANEOUS ONCE
Refills: 0 | Status: COMPLETED | OUTPATIENT
Start: 2021-07-09 | End: 2021-07-09

## 2021-07-09 RX ORDER — DEXTROSE 50 % IN WATER 50 %
25 SYRINGE (ML) INTRAVENOUS ONCE
Refills: 0 | Status: DISCONTINUED | OUTPATIENT
Start: 2021-07-09 | End: 2021-07-09

## 2021-07-09 RX ORDER — INSULIN LISPRO 100/ML
VIAL (ML) SUBCUTANEOUS AT BEDTIME
Refills: 0 | Status: DISCONTINUED | OUTPATIENT
Start: 2021-07-09 | End: 2021-07-09

## 2021-07-09 RX ORDER — OLANZAPINE 15 MG/1
20 TABLET, FILM COATED ORAL AT BEDTIME
Refills: 0 | Status: DISCONTINUED | OUTPATIENT
Start: 2021-07-09 | End: 2021-07-09

## 2021-07-09 RX ORDER — CYCLOBENZAPRINE HYDROCHLORIDE 10 MG/1
1 TABLET, FILM COATED ORAL
Qty: 0 | Refills: 0 | DISCHARGE

## 2021-07-09 RX ORDER — INSULIN HUMAN 100 [IU]/ML
5 INJECTION, SOLUTION SUBCUTANEOUS ONCE
Refills: 0 | Status: COMPLETED | OUTPATIENT
Start: 2021-07-09 | End: 2021-07-09

## 2021-07-09 RX ORDER — DEXTROSE 50 % IN WATER 50 %
12.5 SYRINGE (ML) INTRAVENOUS ONCE
Refills: 0 | Status: DISCONTINUED | OUTPATIENT
Start: 2021-07-09 | End: 2021-07-09

## 2021-07-09 RX ORDER — HYDRALAZINE HCL 50 MG
1 TABLET ORAL
Qty: 0 | Refills: 0 | DISCHARGE

## 2021-07-09 RX ORDER — DEXTROSE 50 % IN WATER 50 %
15 SYRINGE (ML) INTRAVENOUS ONCE
Refills: 0 | Status: DISCONTINUED | OUTPATIENT
Start: 2021-07-09 | End: 2021-07-09

## 2021-07-09 RX ORDER — INSULIN LISPRO 100/ML
VIAL (ML) SUBCUTANEOUS
Refills: 0 | Status: DISCONTINUED | OUTPATIENT
Start: 2021-07-09 | End: 2021-07-09

## 2021-07-09 RX ORDER — DIVALPROEX SODIUM 500 MG/1
500 TABLET, DELAYED RELEASE ORAL DAILY
Refills: 0 | Status: DISCONTINUED | OUTPATIENT
Start: 2021-07-09 | End: 2021-07-09

## 2021-07-09 RX ORDER — CILOSTAZOL 100 MG/1
50 TABLET ORAL
Refills: 0 | Status: DISCONTINUED | OUTPATIENT
Start: 2021-07-09 | End: 2021-07-09

## 2021-07-09 RX ORDER — DIVALPROEX SODIUM 500 MG/1
1000 TABLET, DELAYED RELEASE ORAL AT BEDTIME
Refills: 0 | Status: DISCONTINUED | OUTPATIENT
Start: 2021-07-09 | End: 2021-07-09

## 2021-07-09 RX ORDER — CARVEDILOL PHOSPHATE 80 MG/1
12.5 CAPSULE, EXTENDED RELEASE ORAL EVERY 12 HOURS
Refills: 0 | Status: DISCONTINUED | OUTPATIENT
Start: 2021-07-09 | End: 2021-07-09

## 2021-07-09 RX ORDER — GABAPENTIN 400 MG/1
200 CAPSULE ORAL THREE TIMES A DAY
Refills: 0 | Status: DISCONTINUED | OUTPATIENT
Start: 2021-07-09 | End: 2021-07-09

## 2021-07-09 RX ORDER — SODIUM CHLORIDE 9 MG/ML
1000 INJECTION, SOLUTION INTRAVENOUS
Refills: 0 | Status: DISCONTINUED | OUTPATIENT
Start: 2021-07-09 | End: 2021-07-09

## 2021-07-09 RX ORDER — SODIUM BICARBONATE 1 MEQ/ML
50 SYRINGE (ML) INTRAVENOUS ONCE
Refills: 0 | Status: COMPLETED | OUTPATIENT
Start: 2021-07-09 | End: 2021-07-09

## 2021-07-09 RX ORDER — CALCIUM GLUCONATE 100 MG/ML
2 VIAL (ML) INTRAVENOUS ONCE
Refills: 0 | Status: COMPLETED | OUTPATIENT
Start: 2021-07-09 | End: 2021-07-09

## 2021-07-09 RX ORDER — SODIUM ZIRCONIUM CYCLOSILICATE 10 G/10G
5 POWDER, FOR SUSPENSION ORAL ONCE
Refills: 0 | Status: COMPLETED | OUTPATIENT
Start: 2021-07-09 | End: 2021-07-09

## 2021-07-09 RX ORDER — INSULIN HUMAN 100 [IU]/ML
5 INJECTION, SOLUTION SUBCUTANEOUS ONCE
Refills: 0 | Status: DISCONTINUED | OUTPATIENT
Start: 2021-07-09 | End: 2021-07-09

## 2021-07-09 RX ORDER — CHLORHEXIDINE GLUCONATE 213 G/1000ML
1 SOLUTION TOPICAL DAILY
Refills: 0 | Status: DISCONTINUED | OUTPATIENT
Start: 2021-07-10 | End: 2021-07-09

## 2021-07-09 RX ORDER — HEPARIN SODIUM 5000 [USP'U]/ML
5000 INJECTION INTRAVENOUS; SUBCUTANEOUS EVERY 8 HOURS
Refills: 0 | Status: DISCONTINUED | OUTPATIENT
Start: 2021-07-09 | End: 2021-07-09

## 2021-07-09 RX ADMIN — Medication 50 MILLILITER(S): at 15:27

## 2021-07-09 RX ADMIN — Medication 50 MILLIEQUIVALENT(S): at 13:38

## 2021-07-09 RX ADMIN — Medication 50 MILLILITER(S): at 13:38

## 2021-07-09 RX ADMIN — SODIUM CHLORIDE 100 MILLILITER(S): 9 INJECTION, SOLUTION INTRAVENOUS at 18:22

## 2021-07-09 RX ADMIN — INSULIN HUMAN 3 UNIT(S): 100 INJECTION, SOLUTION SUBCUTANEOUS at 15:28

## 2021-07-09 RX ADMIN — Medication 200 GRAM(S): at 14:26

## 2021-07-09 RX ADMIN — SODIUM ZIRCONIUM CYCLOSILICATE 5 GRAM(S): 10 POWDER, FOR SUSPENSION ORAL at 14:27

## 2021-07-09 RX ADMIN — INSULIN HUMAN 5 UNIT(S): 100 INJECTION, SOLUTION SUBCUTANEOUS at 13:38

## 2021-07-09 NOTE — CONSULT NOTE ADULT - SUBJECTIVE AND OBJECTIVE BOX
History of Present Illness:  Reason for Admission: Extreme lethargy, hyperkalemia possibly secondary to ESRD  History of Present Illness:   Attending: NANCY Arteaga, 55y, Male      HPI:  Patient is a 55y old male with a PMH significant for ESRD treated with HD 3x a week, DM, HTN, HLD, and anemia who was due to receive an AV fistulogram today. Pt is known to Dr. Agrawal. In pre-operative holding, patient began demonstrating severe lethargy and showed a relatively obtunded state, with a K level of 6.0, disqualifying him from his scheduled procedure. Patient last received dialysis yesterday, 7/8/2021, confirmed from the dialysis center. Pt denies any current pain or discomfort and states he just feels "tired". Due to his obtunded state and concerning hyperkalemia, the patient will be admitted for observation and potentially urgent dialysis treatment.       PAST MEDICAL & SURGICAL HISTORY:  Diabetes mellitus  Patient does not routinely check FS.    Depression    High cholesterol    Bipolar affective disorder in remission    Chronic kidney disease, unspecified    Hypertension    Arterial insufficiency    Anemia    Hemodialysis patient    ESRD on dialysis    ORIF right lower leg- 1995    Amputated great toe of right foot    S/P arteriovenous (AV) fistula creation  7/2019        Home Medications:  carvedilol 12.5 mg oral tablet: 1 tab(s) orally 2 times a day (14 May 2021 12:52)  cilostazol 50 mg oral tablet: 1 tab(s) orally 2 times a day (14 May 2021 12:52)  cyclobenzaprine 5 mg oral tablet: 1 tab(s) orally 3 times a day, As Needed (14 May 2021 12:52)  divalproex sodium 500 mg oral delayed release tablet: 1 tab(s) orally  in Am  (14 May 2021 12:52)  gabapentin 100 mg oral capsule: 2 cap(s) orally 3 times a day (14 May 2021 12:52)  hydrALAZINE 50 mg oral tablet: 1 tab(s) orally 2 times a day (14 May 2021 12:52)  OLANZapine 20 mg oral tablet: 1 tab(s) orally once a day in pm (14 May 2021 12:52)  sevelamer carbonate 800 mg oral tablet: 1 tab(s) orally 3 times a day (with meals) (14 May 2021 12:52)  traZODone 100 mg oral tablet: 1 tab(s) orally once a day in ;pm (14 May 2021 12:52)          Vitals (Last 12 Hours)  T(F): 98 (07-09-21 @ 09:16), Max: 98 (07-09-21 @ 09:16)  HR:  (57 - 57)  BP:  (184/58 - 184/58)  RR:  (16 - 16)  SpO2:  (95% - 95%)        PHYSICAL EXAM:  General: NAD, resting comfortably  Pulmonary: Nonlabored breathing, no respiratory distress  Cardiovascular: Normal RR  Abdominal: soft, nontender  Extremities: well-perfused            Review of Systems:  Other Review of Systems: All other review of systems negative, except as noted in HPI

## 2021-07-09 NOTE — ED PROVIDER NOTE - ATTENDING CONTRIBUTION TO CARE
Dr. Gautam: 54 yo male with ESRD on HD TTS via left UE AV fistula (HD received yesterday), DM, HTN, sent to ED from preoperative area after pt noted to have AMS while awaiting left UE fistulogram.  Was noted to have hyperK (6.0) on labs sent from preop today and pt was treated with calcium before being brought to ED.  EKG performed before transport to ED showed peaked T waves.  Pt extremely tired when arriving in ED, denies taking any substances that would make him tired, including illicit substances.  Pt asked me to call his mother and I spoke to her to update her.  On exam pt ill appearing, very somnolent but protecting airway and responding to my voice, appearing intoxicated but denying any substance use, heart RRR, lungs CTAB, abd NTND, extremities without swelling, strength decreased but equal in all extremities and skin without rash.  EKG in ED again showing peaked T waves consistent with hyperK and K 6.1 in ED.  Pt's mother is making pt's healthcare decisions due to pt AMS and she gave consent for pt to have HD.

## 2021-07-09 NOTE — CHART NOTE - NSCHARTNOTEFT_GEN_A_CORE
patient ESRD s/p L radiocephalic AVF on TTS HD with outflow issues though HD is successful, last had HD yesterday.  presented to preop for elective LUE fistulogram however VBG K 6.0, ECG peaked T waves, STAT labs pending  patient now lethargic  calcium gluconate 500 given in preop  C team surgery transported patient to ED for further care as hyperkalemia treatment cannot be administered in preop    Recommendations:  -f/u stat labs  -utox (daily marijuana use, unknown other drug hx)  -hyperkalemia treatment  -CT head  -nephrology and neurology consultations  -medical admission  -ok to use LUE AVF for HD  -will follow    full consult note to follow    Discussed with Dr. Tanja SOLIS TEAM SURGERY  [id number] patient ESRD s/p L radiocephalic AVF on TTS HD with outflow issues though HD is successful, last had HD yesterday.  presented to preop for elective LUE fistulogram however VBG K 6.0, ECG peaked T waves, STAT labs pending  patient now lethargic  calcium gluconate 500 given in preop  C team surgery transported patient to ED for further care as hyperkalemia treatment cannot be administered in preop    Recommendations:  -f/u stat labs  -utox (daily marijuana use, unknown other drug hx)  -hyperkalemia treatment  -CT head  -nephrology and neurology consultations  -medical admission  -ok to use LUE AVF for HD  -will follow    full consult note to follow    Discussed with Dr. Tanja Corona PGY3  General Surgery    C TEAM SURGERY  [id number]

## 2021-07-09 NOTE — ED PROVIDER NOTE - PROGRESS NOTE DETAILS
Juancarlos Eli MD. nephrology to dialyze pt at 3:30PM. got consent from mother over phone (spoke w/ nephro fellow and my attending). pt admitted for dialysis.  per mother, ?baseline mentation. pt is protecting his airway at this time. Dr. Gautam: pt re-treated for hyperK after K returned 6.1

## 2021-07-09 NOTE — CONSULT NOTE ADULT - PROBLEM SELECTOR RECOMMENDATION 2
Pt. with moderate to severe hyperkalemia in the setting of ESRD. Serum potassium elevated at 6.1. Pt. noted to have EKG changes and bradycardia. Pt. received medical management in ER (Calcium gluconate, Insulin, Lokelma). Plans for urgent HD as outlined above. Monitor serum potassium.    If you have any questions, please feel free to contact me  Martin Salcido  Nephrology Fellow  [phone]  (After 5pm or on weekends please page the on-call fellow)
ESTRADA Agrawal MD performed a history and physical exam of the patient and discussed  the findings and plan with the house officer. I reviewed the resident note and agree with the findings and plan   I Benjamin Agrawal MD have personally seen and examined the patient at bedside today at  11am

## 2021-07-09 NOTE — ED PROVIDER NOTE - PHYSICAL EXAMINATION
PHYSICAL EXAM:  GENERAL: non-toxic appearing; in no respiratory distress  HEAD Atraumatic, Normocephalic  NECK: No JVD; FROM  EYES: PERRL, EOMs intact b/l w/out deficits; normal conjunctiva  CHEST/LUNG: CTAB no wheezes/rhonchi/rales  HEART: RRR no murmur/gallops/rubs  ABDOMEN: +BS, soft, NT, ND  EXTREMITIES: No LE edema, LUE fistula w/ palpable thrill   MUSCULOSKELETAL: FROM of all 4 extremities  NERVOUS SYSTEM:  lethargic, however Arousable. wakes up to voice.   SKIN:  No new rashes

## 2021-07-09 NOTE — ED ADULT NURSE NOTE - OBJECTIVE STATEMENT
PT brought in from PST for AMS. pt lethargic unable to answer questions, poor historian at this time. pt brought in by vascular team stating pt labs noted to be hyperK 6.0. pt was scheduled to receive LUE fistulogram today. as per vascular team this is not pt baseline and received calcium gluconate prior to arrival of ED. pt arrives with a 20g to right hand. pt on cardiac monitor in the 50's with peaked t-wave. pt breathing equal; unlabored on room air. Safety measures in place. will continue to monitor.

## 2021-07-09 NOTE — CONSULT NOTE ADULT - SUBJECTIVE AND OBJECTIVE BOX
HPI:  55M w/ ESRD treated with HD TTS w/ L radiocephalic AVF, DM, HTN, HLD, and anemia p/w lethargy, sent in from outpatient vascular clinic. Pt was planned for an elective AV fistulogram today. However, in pre-operative holding, patient began demonstrating severe lethargy and showed a relatively obtunded state, with a K level of 6.0 w/ EKG changes, disqualifying him from his scheduled procedure. Patient last received dialysis yesterday, 7/8/2021, confirmed from the dialysis center. Pt denies any current pain or discomfort and states he just feels "tired". Pt not cooperative with interview and exam.     REVIEW OF SYSTEMS  Unable to assess due to pt uncooperative with interview/exam    PAST MEDICAL & SURGICAL HISTORY:  Diabetes mellitus  Patient does not routinely check FS.    Depression    High cholesterol    Bipolar affective disorder in remission    Chronic kidney disease, unspecified    Hypertension    Arterial insufficiency    Anemia    Hemodialysis patient    ESRD on dialysis    ORIF right lower leg- 1995    Amputated great toe of right foot    S/P arteriovenous (AV) fistula creation  7/2019      FAMILY HISTORY:  FH: type 2 diabetes mellitus (Mother)    FHx: heart disease (Father)      SOCIAL HISTORY:   T/E/D:   Occupation:   Lives with:     MEDICATIONS (HOME):  Home Medications:  carvedilol 12.5 mg oral tablet: 1 tab(s) orally 2 times a day (14 May 2021 12:52)  cilostazol 50 mg oral tablet: 1 tab(s) orally 2 times a day (14 May 2021 12:52)  cyclobenzaprine 5 mg oral tablet: 1 tab(s) orally 3 times a day, As Needed (14 May 2021 12:52)  divalproex sodium 500 mg oral delayed release tablet: 1 tab(s) orally  in Am  (14 May 2021 12:52)  gabapentin 100 mg oral capsule: 2 cap(s) orally 3 times a day (14 May 2021 12:52)  hydrALAZINE 50 mg oral tablet: 1 tab(s) orally 2 times a day (14 May 2021 12:52)  OLANZapine 20 mg oral tablet: 1 tab(s) orally once a day in pm (14 May 2021 12:52)  sevelamer carbonate 800 mg oral tablet: 1 tab(s) orally 3 times a day (with meals) (14 May 2021 12:52)  traZODone 100 mg oral tablet: 1 tab(s) orally once a day in ;pm (14 May 2021 12:52)    MEDICATIONS  (STANDING):  calcium gluconate IVPB 2 Gram(s) IV Intermittent Once  sodium zirconium cyclosilicate 5 Gram(s) Oral Once    MEDICATIONS  (PRN):    ALLERGIES/INTOLERANCES:  Allergies  No Known Allergies    Intolerances    VITALS & EXAMINATION:  Vital Signs Last 24 Hrs  T(C): 36.4 (09 Jul 2021 13:02), Max: 36.7 (09 Jul 2021 09:16)  T(F): 97.6 (09 Jul 2021 13:02), Max: 98 (09 Jul 2021 09:16)  HR: 54 (09 Jul 2021 13:02) (54 - 57)  BP: 181/76 (09 Jul 2021 13:02) (181/76 - 184/58)  BP(mean): --  RR: 18 (09 Jul 2021 13:02) (16 - 18)  SpO2: 100% (09 Jul 2021 13:02) (95% - 100%)    General:  Constitutional: Obese Male, appears stated age, in no apparent distress including pain  Head: Normocephalic & atraumatic.  ENT: Patent ear canals, intact TM, mucus membranes moist & pink, neck supple, no lymphadenopathy.   Respiratory: Patent airway. All lung fields are clear to auscultation bilaterally.  Extremities: No cyanosis, clubbing, or edema.  Skin: No rashes, bruising, or discoloration.    Cardiovascular (>2): RRR no murmurs. Carotid pulsations symmetric, no bruits. Normal capillary beds refill, 1-2 seconds or less.     Neurological (>12):  MS: Awake, alert, oriented to person, place, situation, time. Normal affect. Follows all commands.    Language: Speech is clear, fluent with good repetition & comprehension (able to name objects___)    CNs: PERRLA (R = 3mm, L = 3mm). VFF. EOMI no nystagmus, no diplopia. V1-3 intact to LT/pinprick, well developed masseter muscles b/l. No facial asymmetry b/l, full eye closure strength b/l. Hearing grossly normal (rubbing fingers) b/l. Symmetric palate elevation in midline. Gag reflex deferred. Head turning & shoulder shrug intact b/l. Tongue midline, normal movements, no atrophy.    Fundoscopic: pale w/ sharp discs margins No vascular changes.      Motor: Normal muscle bulk & tone. No noticeable tremor or seizure. No pronator drift.              Deltoid	Biceps	Triceps	Wrist	Finger ABd	   R	5	5	5	5	5		5 	  L	5	5	5	5	5		5    	H-Flex	H-Ext	H-ABd	H-ADd	K-Flex	K-Ext	D-Flex	P-Flex  R	5	5	5	5	5	5	5	5 	   L	5	5	5	5	5	5	5	5	     Sensation: Intact to LT/PP/Temp/Vibration/Position b/l throughout.     Cortical: Extinction on DSS (neglect): none    Reflexes:              Biceps(C5)       BR(C6)     Triceps(C7)               Patellar(L4)    Achilles(S1)    Plantar Resp  R	2	          2	             2		        2		    2		Down   L	2	          2	             2		        2		    2		Down     Coordination: intact rapid-alt movements. No dysmetria to FTN/HTS    Gait: Normal Romberg. No postural instability. Normal stance and tandem gait.     LABORATORY:  CBC                       12.6   9.59  )-----------( 261      ( 09 Jul 2021 12:44 )             40.9     Chem 07-09    134<L>  |  96<L>  |  69<H>  ----------------------------<  79  6.0<H>   |  19<L>  |  5.98<H>    Ca    9.4      09 Jul 2021 12:44  Phos  7.8     07-09  Mg     3.00     07-09    TPro  8.0  /  Alb  4.3  /  TBili  0.2  /  DBili  x   /  AST  19  /  ALT  17  /  AlkPhos  129<H>  07-09    LFTs LIVER FUNCTIONS - ( 09 Jul 2021 12:44 )  Alb: 4.3 g/dL / Pro: 8.0 g/dL / ALK PHOS: 129 U/L / ALT: 17 U/L / AST: 19 U/L / GGT: x           Coagulopathy PT/INR - ( 09 Jul 2021 12:44 )   PT: 10.8 sec;   INR: 0.94 ratio         PTT - ( 09 Jul 2021 12:44 )  PTT:23.9 sec  Lipid Panel   A1c   Cardiac enzymes     U/A   CSF  Immunological  Other    STUDIES & IMAGING:  Studies (EKG, EEG, EMG, etc):     Radiology (XR, CT, MR, U/S, TTE/NEVA): HPI:  55M w/ ESRD treated with HD TTS w/ L radiocephalic AVF, DM, HTN, HLD, and anemia p/w lethargy, sent in from outpatient vascular clinic. Pt was planned for an elective AV fistulogram today. However, in pre-operative holding, patient began demonstrating severe lethargy, with a K level of 6.0 w/ EKG changes, disqualifying him from his scheduled procedure. Patient last received dialysis yesterday, 7/8/2021, confirmed from the dialysis center. Pt denies any current pain or discomfort and states he just feels "tired and sleepy". Pt not cooperative with interview and exam, unclear if pt is oriented. Pt reports that his mother gave him a medication yesterday, possibly adderall?. Pt .     REVIEW OF SYSTEMS  Unable to assess due to pt uncooperative with interview/exam    PAST MEDICAL & SURGICAL HISTORY:  Diabetes mellitus  Patient does not routinely check FS.    Depression    High cholesterol    Bipolar affective disorder in remission    Chronic kidney disease, unspecified    Hypertension    Arterial insufficiency    Anemia    Hemodialysis patient    ESRD on dialysis    ORIF right lower leg- 1995    Amputated great toe of right foot    S/P arteriovenous (AV) fistula creation  7/2019      FAMILY HISTORY:  FH: type 2 diabetes mellitus (Mother)    FHx: heart disease (Father)      SOCIAL HISTORY:   Lives with mother.    MEDICATIONS (HOME):  Home Medications:  carvedilol 12.5 mg oral tablet: 1 tab(s) orally 2 times a day (14 May 2021 12:52)  cilostazol 50 mg oral tablet: 1 tab(s) orally 2 times a day (14 May 2021 12:52)  cyclobenzaprine 5 mg oral tablet: 1 tab(s) orally 3 times a day, As Needed (14 May 2021 12:52)  divalproex sodium 500 mg oral delayed release tablet: 1 tab(s) orally  in Am  (14 May 2021 12:52)  gabapentin 100 mg oral capsule: 2 cap(s) orally 3 times a day (14 May 2021 12:52)  hydrALAZINE 50 mg oral tablet: 1 tab(s) orally 2 times a day (14 May 2021 12:52)  OLANZapine 20 mg oral tablet: 1 tab(s) orally once a day in pm (14 May 2021 12:52)  sevelamer carbonate 800 mg oral tablet: 1 tab(s) orally 3 times a day (with meals) (14 May 2021 12:52)  traZODone 100 mg oral tablet: 1 tab(s) orally once a day in ;pm (14 May 2021 12:52)    MEDICATIONS  (STANDING):  calcium gluconate IVPB 2 Gram(s) IV Intermittent Once  sodium zirconium cyclosilicate 5 Gram(s) Oral Once    MEDICATIONS  (PRN):    ALLERGIES/INTOLERANCES:  Allergies  No Known Allergies    Intolerances    VITALS & EXAMINATION:  Vital Signs Last 24 Hrs  T(C): 36.4 (09 Jul 2021 13:02), Max: 36.7 (09 Jul 2021 09:16)  T(F): 97.6 (09 Jul 2021 13:02), Max: 98 (09 Jul 2021 09:16)  HR: 54 (09 Jul 2021 13:02) (54 - 57)  BP: 181/76 (09 Jul 2021 13:02) (181/76 - 184/58)  BP(mean): --  RR: 18 (09 Jul 2021 13:02) (16 - 18)  SpO2: 100% (09 Jul 2021 13:02) (95% - 100%)    General:  Constitutional: appears stated age, in no apparent distress including pain, drowsy  Head: Normocephalic & atraumatic.  Respiratory: No increased work of breathing  Extremities: LUE fistula with bruit  Skin: No rashes, bruising, or discoloration.    Neurological (>12):  MS: Drowsy, easily arousable to voice. Oriented to person. Unclear if oriented to place, situation, time. Pt refusing to follow commands    Language: Speech is clear, fluent with good comprehension    CNs: EOMI to horizontal gaze, no nystagmus. No facial asymmetry b/l.     Motor: Normal muscle bulk. No noticeable resting tremor. Spontaneous movement of b/l UE.     Sensation: Unable to assess, pt refusing to cooperative with exam    Reflexes: Unable to assess, pt refusing to cooperative with exam    Coordination: Unable to assess, pt refusing to cooperative with exam    Gait: Unable to assess, pt refusing to cooperative with exam    LABORATORY:  CBC                       12.6   9.59  )-----------( 261      ( 09 Jul 2021 12:44 )             40.9     Chem 07-09    134<L>  |  96<L>  |  69<H>  ----------------------------<  79  6.0<H>   |  19<L>  |  5.98<H>    Ca    9.4      09 Jul 2021 12:44  Phos  7.8     07-09  Mg     3.00     07-09    TPro  8.0  /  Alb  4.3  /  TBili  0.2  /  DBili  x   /  AST  19  /  ALT  17  /  AlkPhos  129<H>  07-09    LFTs LIVER FUNCTIONS - ( 09 Jul 2021 12:44 )  Alb: 4.3 g/dL / Pro: 8.0 g/dL / ALK PHOS: 129 U/L / ALT: 17 U/L / AST: 19 U/L / GGT: x           Coagulopathy PT/INR - ( 09 Jul 2021 12:44 )   PT: 10.8 sec;   INR: 0.94 ratio         PTT - ( 09 Jul 2021 12:44 )  PTT:23.9 sec    STUDIES & IMAGING:    Radiology (XR, CT, MR, U/S, TTE/NEVA):    < from: CT Head No Cont (07.09.21 @ 14:08) >  FINDINGS:  The ventricles and sulci are prominent, compatible with age-related generalized cerebral volume loss. Chronic lacunar infarct in the right basal ganglia are identifiedwithout change. There is no CT evidence for acute cerebral cortical infarct. There is no evidence of hemorrhage. There is periventricular and subcortical white matter hypoattenuation,  most compatible with chronic microvascular ischemic changes.   Nomass effect is found in the brain.  There is no midline shift or herniation pattern.      The visualized portions of the paranasal sinuses and mastoid air cells are clear.    IMPRESSION:    No evidence of acute intracranial abnormality.  No evidence of hemorrhage.    Chronic changes as above.    < end of copied text >   HPI:  55M w/ ESRD treated with HD TTS w/ L radiocephalic AVF, DM, HTN, HLD, and anemia p/w lethargy, sent in from outpatient vascular clinic. Pt was planned for an elective AV fistulogram today. However, in pre-operative holding, patient began demonstrating severe lethargy, with a K level of 6.0 w/ EKG changes, disqualifying him from his scheduled procedure. Patient last received dialysis yesterday, 7/8/2021, confirmed from the dialysis center. Pt denies any current pain or discomfort and states he just feels "tired and sleepy". Pt not cooperative with interview and exam, unclear if pt is oriented. Pt reports that his mother gave him a medication yesterday, possibly adderall?.     REVIEW OF SYSTEMS  Unable to assess due to pt uncooperative with interview/exam    PAST MEDICAL & SURGICAL HISTORY:  Diabetes mellitus  Patient does not routinely check FS.    Depression    High cholesterol    Bipolar affective disorder in remission    Chronic kidney disease, unspecified    Hypertension    Arterial insufficiency    Anemia    Hemodialysis patient    ESRD on dialysis    ORIF right lower leg- 1995    Amputated great toe of right foot    S/P arteriovenous (AV) fistula creation  7/2019      FAMILY HISTORY:  FH: type 2 diabetes mellitus (Mother)    FHx: heart disease (Father)      SOCIAL HISTORY:   Lives with mother.    MEDICATIONS (HOME):  Home Medications:  carvedilol 12.5 mg oral tablet: 1 tab(s) orally 2 times a day (14 May 2021 12:52)  cilostazol 50 mg oral tablet: 1 tab(s) orally 2 times a day (14 May 2021 12:52)  cyclobenzaprine 5 mg oral tablet: 1 tab(s) orally 3 times a day, As Needed (14 May 2021 12:52)  divalproex sodium 500 mg oral delayed release tablet: 1 tab(s) orally  in Am  (14 May 2021 12:52)  gabapentin 100 mg oral capsule: 2 cap(s) orally 3 times a day (14 May 2021 12:52)  hydrALAZINE 50 mg oral tablet: 1 tab(s) orally 2 times a day (14 May 2021 12:52)  OLANZapine 20 mg oral tablet: 1 tab(s) orally once a day in pm (14 May 2021 12:52)  sevelamer carbonate 800 mg oral tablet: 1 tab(s) orally 3 times a day (with meals) (14 May 2021 12:52)  traZODone 100 mg oral tablet: 1 tab(s) orally once a day in ;pm (14 May 2021 12:52)    MEDICATIONS  (STANDING):  calcium gluconate IVPB 2 Gram(s) IV Intermittent Once  sodium zirconium cyclosilicate 5 Gram(s) Oral Once    MEDICATIONS  (PRN):    ALLERGIES/INTOLERANCES:  Allergies  No Known Allergies    Intolerances    VITALS & EXAMINATION:  Vital Signs Last 24 Hrs  T(C): 36.4 (09 Jul 2021 13:02), Max: 36.7 (09 Jul 2021 09:16)  T(F): 97.6 (09 Jul 2021 13:02), Max: 98 (09 Jul 2021 09:16)  HR: 54 (09 Jul 2021 13:02) (54 - 57)  BP: 181/76 (09 Jul 2021 13:02) (181/76 - 184/58)  BP(mean): --  RR: 18 (09 Jul 2021 13:02) (16 - 18)  SpO2: 100% (09 Jul 2021 13:02) (95% - 100%)    General:  Constitutional: appears stated age, in no apparent distress including pain, drowsy  Head: Normocephalic & atraumatic.  Respiratory: No increased work of breathing  Extremities: LUE fistula with bruit  Skin: No rashes, bruising, or discoloration.    Neurological (>12):  MS: Drowsy, easily arousable to voice. Oriented to person. Unclear if oriented to place, situation, time. Pt refusing to follow commands    Language: Speech is clear, fluent with good comprehension    CNs: EOMI to horizontal gaze, no nystagmus. No facial asymmetry b/l.     Motor: Normal muscle bulk. No noticeable resting tremor. Spontaneous movement of b/l UE.     Sensation: Unable to assess, pt refusing to cooperative with exam    Reflexes: Unable to assess, pt refusing to cooperative with exam    Coordination: Unable to assess, pt refusing to cooperative with exam    Gait: Unable to assess, pt refusing to cooperative with exam    LABORATORY:  CBC                       12.6   9.59  )-----------( 261      ( 09 Jul 2021 12:44 )             40.9     Chem 07-09    134<L>  |  96<L>  |  69<H>  ----------------------------<  79  6.0<H>   |  19<L>  |  5.98<H>    Ca    9.4      09 Jul 2021 12:44  Phos  7.8     07-09  Mg     3.00     07-09    TPro  8.0  /  Alb  4.3  /  TBili  0.2  /  DBili  x   /  AST  19  /  ALT  17  /  AlkPhos  129<H>  07-09    LFTs LIVER FUNCTIONS - ( 09 Jul 2021 12:44 )  Alb: 4.3 g/dL / Pro: 8.0 g/dL / ALK PHOS: 129 U/L / ALT: 17 U/L / AST: 19 U/L / GGT: x           Coagulopathy PT/INR - ( 09 Jul 2021 12:44 )   PT: 10.8 sec;   INR: 0.94 ratio         PTT - ( 09 Jul 2021 12:44 )  PTT:23.9 sec    STUDIES & IMAGING:    Radiology (XR, CT, MR, U/S, TTE/NEVA):    < from: CT Head No Cont (07.09.21 @ 14:08) >  FINDINGS:  The ventricles and sulci are prominent, compatible with age-related generalized cerebral volume loss. Chronic lacunar infarct in the right basal ganglia are identifiedwithout change. There is no CT evidence for acute cerebral cortical infarct. There is no evidence of hemorrhage. There is periventricular and subcortical white matter hypoattenuation,  most compatible with chronic microvascular ischemic changes.   Nomass effect is found in the brain.  There is no midline shift or herniation pattern.      The visualized portions of the paranasal sinuses and mastoid air cells are clear.    IMPRESSION:    No evidence of acute intracranial abnormality.  No evidence of hemorrhage.    Chronic changes as above.    < end of copied text >

## 2021-07-09 NOTE — CONSULT NOTE ADULT - PROBLEM SELECTOR RECOMMENDATION 3
ESTRADA Agrawal MD performed a history and physical exam of the patient and discussed  the findings and plan with the house officer. I reviewed the resident note and agree with the findings and plan   I Benjamin Agrawal MD have personally seen and examined the patient at bedside today at  11am

## 2021-07-09 NOTE — H&P ADULT - HISTORY OF PRESENT ILLNESS
55M ESRD (HD TThSa) DM2 (diet control) HTN bipolar disorder presented for scheduled left arm fistulogram and possible intervention today, while waiting was increasingly lethargic and found to have K 6.0 with EKG changes per vascular surgery, Ca gluconate was given and patient was transferred from preop to ED. Insulin/D50, lokelma and bicarb were given in the ED.  The patient was seen by neurology and nephrology and is currently being brought to HD. Patient is minimally arousable and unable to provide history.  Med list confirmed with [facility].  Writer is unable to reach patient's mother by phone at present.

## 2021-07-09 NOTE — CONSULT NOTE ADULT - CONSULT REASON
Patient had presented for AV fistulogram procedure to be done by the vascular surgery service
ESRD management
AMS

## 2021-07-09 NOTE — CONSULT NOTE ADULT - ATTENDING COMMENTS
Pt. with ESRD on HD TIW found to have hyperkalemia on ER labs. Plan for urgent HD treatment. Assessment and plan as outlined above. Monitor BP and labs.
ESTRADA Agrawal MD performed a history and physical exam of the patient and discussed  the findings and plan with the house officer. I reviewed the resident note and agree with the findings and plan   I Benjamin Agrawal MD have personally seen and examined the patient at bedside today at  11am

## 2021-07-09 NOTE — H&P ADULT - NSHPPHYSICALEXAM_GEN_ALL_CORE
Vital Signs Last 24 Hrs  T(C): 36.6 (09 Jul 2021 16:34), Max: 36.7 (09 Jul 2021 09:16)  T(F): 97.9 (09 Jul 2021 16:34), Max: 98 (09 Jul 2021 09:16)  HR: 55 (09 Jul 2021 16:34) (54 - 58)  BP: 179/65 (09 Jul 2021 16:34) (179/65 - 195/70)  BP(mean): --  RR: 12 (09 Jul 2021 16:34) (12 - 18)  SpO2: 100% (09 Jul 2021 16:34) (95% - 100%)  GENERAL: NAD, well-developed  HEAD:  Atraumatic, Normocephalic  EYES: conjunctiva and sclera clear  NECK: Supple, No JVD  CHEST/LUNG: Clear to auscultation bilaterally; No wheeze  HEART: Regular rate and rhythm; No murmurs, rubs, or gallops  ABDOMEN: Soft, Nontender, Nondistended; Bowel sounds present  EXTREMITIES:  2+ Peripheral Pulses, No clubbing, cyanosis, or edema  NEUROLOGY: non-focal, minimally arousable  SKIN: excoriations on BUE

## 2021-07-09 NOTE — H&P ADULT - PROBLEM SELECTOR PLAN 2
Appreciate nurology recs, metabolic or toxic encephalopathy  Monitor for improvement with HD  Unable to obtain history of other medication administration by mother  Hold trazodone this evening

## 2021-07-09 NOTE — CONSULT NOTE ADULT - SUBJECTIVE AND OBJECTIVE BOX
[facility] DIVISION OF KIDNEY DISEASES AND HYPERTENSION -- [phone]  -- INITIAL CONSULT NOTE  --------------------------------------------------------------------------------  HPI: 55 year old male with ESRD on HD TIW, DM and HTN admitted to [facility] for AMS and hyperkalemia noted on pre-operative labs done as an outpatient. Pt. follows with Dr. Diaz as his outpatient Nephrologist and last had HD yesterday. Of note, pt. was scheduled for fistulogram with Vascular surgery today, however noted to be lethargic/somnolent with serum potassium of 6.0 on preoperative labs. Pt. subsequently sent to [facility]. Pt. noted to have EKG changes and received medical management for hyperkalemia. Nephrology consultation requested for urgent HD / ESRD management.     Pt. was seen and examined in ER. Pt. unable to provide history or ROS due to mental status. History obtained from chart review and provider hand-off.    PAST HISTORY  --------------------------------------------------------------------------------  PAST MEDICAL & SURGICAL HISTORY:  Diabetes mellitus  Patient does not routinely check FS.  Depression  High cholesterol  Bipolar affective disorder in remission  Chronic kidney disease, unspecified  Hypertension  Arterial insufficiency  Anemia  Hemodialysis patient  ESRD on dialysis  ORIF right lower leg- 1995  Amputated great toe of right foot  S/P arteriovenous (AV) fistula creation    FAMILY HISTORY:  FH: type 2 diabetes mellitus (Mother)  FHx: heart disease (Father)    PAST SOCIAL HISTORY: Unable to obtain    ALLERGIES & MEDICATIONS  --------------------------------------------------------------------------------  Allergies    No Known Allergies    Intolerances    Standing Inpatient Medications  dextrose 50% Injectable 50 milliLiter(s) IV Push once  dextrose 50% Injectable 50 milliLiter(s) IV Push Once  insulin regular  human recombinant 3 Unit(s) IV Push Once    REVIEW OF SYSTEMS  --------------------------------------------------------------------------------  Unable to obtain ROS due to medical condition    VITALS/PHYSICAL EXAM  --------------------------------------------------------------------------------  T(C): 36.4 (07-09-21 @ 13:02), Max: 36.7 (07-09-21 @ 09:16)  HR: 55 (07-09-21 @ 14:04) (54 - 57)  BP: 195/70 (07-09-21 @ 14:04) (181/76 - 195/70)  RR: 12 (07-09-21 @ 14:04) (12 - 18)  SpO2: 98% (07-09-21 @ 14:04) (95% - 100%)  Wt(kg): --  Height (cm): 180.3 (07-09-21 @ 13:02)  Weight (kg): 73.5 (07-09-21 @ 09:16)  BMI (kg/m2): 22.6 (07-09-21 @ 13:02)  BSA (m2): 1.93 (07-09-21 @ 13:02)    Physical Exam:  	Gen: Somnolent  	HEENT: Anicteric  	Pulm: CTA B/L  	CV: S1S2  	Abd: Soft, +BS   	Ext: No LE edema B/L  	Neuro: Somnolent  	Skin: Warm and dry  	Vascular access: NADINE PIZANO with palpable thrill    LABS/STUDIES  --------------------------------------------------------------------------------              12.2   8.82  >-----------<  246      [07-09-21 @ 14:21]              39.8     136  |  99  |  74  ----------------------------<  71      [07-09-21 @ 14:21]  6.1   |  18  |  5.97        Ca     9.1     [07-09-21 @ 14:21]      Mg     3.00     [07-09-21 @ 12:44]      Phos  7.8     [07-09-21 @ 12:44]    TPro  7.3  /  Alb  3.7  /  TBili  0.2  /  DBili  x   /  AST  22  /  ALT  17  /  AlkPhos  119  [07-09-21 @ 14:21]    Creatinine Trend:  SCr 5.97 [07-09 @ 14:21]  SCr 5.98 [07-09 @ 12:44]  SCr 5.20 [06-23 @ 17:04]

## 2021-07-09 NOTE — H&P ADULT - ASSESSMENT
55M ESRD (HD TThSa) DM2 (diet control) HTN bipolar disorder presented for scheduled left arm fistulogram and became lethargic with hyperkalemia

## 2021-07-09 NOTE — ED ADULT NURSE NOTE - NSIMPLEMENTINTERV_GEN_ALL_ED
Implemented All Fall Risk Interventions:  Laura to call system. Call bell, personal items and telephone within reach. Instruct patient to call for assistance. Room bathroom lighting operational. Non-slip footwear when patient is off stretcher. Physically safe environment: no spills, clutter or unnecessary equipment. Stretcher in lowest position, wheels locked, appropriate side rails in place. Provide visual cue, wrist band, yellow gown, etc. Monitor gait and stability. Monitor for mental status changes and reorient to person, place, and time. Review medications for side effects contributing to fall risk. Reinforce activity limits and safety measures with patient and family.

## 2021-07-09 NOTE — CONSULT NOTE ADULT - ASSESSMENT
Assessment:	  Patient is a 55y old male with a PMH significant for ESRD treated with HD 3x a week, DM, HTN, HLD, and anemia who was due to receive an AV fistulogram today. In pre-operative holding, patient began demonstrating severe lethargy and showed a relatively obtunded state, with a K level of 6.0. Patient will be admitted for observation and potentially urgent dialysis treatment.     Plan:  -Trend/treat potassium, obtain CMP, CBC, glucose, EKG, head CT  -Consult nephrology for recommendations  -Consult Cardiology for EKG changes  -Consult neurology for obtunded state   Assessment:	  Patient is a 55y old male with a PMH significant for ESRD treated with HD 3x a week, DM, HTN, HLD, and anemia who was due to receive an AV fistulogram today. In pre-operative holding, patient began demonstrating severe lethargy and showed a relatively obtunded state, with a K level of 6.0. Patient will be admitted for observation and potentially urgent dialysis treatment.     Plan:  d/w pt that OR case is cancelled and will admit for emergent HD and altered MS  -Trend/treat potassium, obtain CMP, CBC, glucose, EKG, head CT  -Consult nephrology for HD eval   -Consult Cardiology for EKG changes  -Consult neurology for obtunded state    will d/w neurology  ct head  above d/w pt and pt's mother  will follow Assessment:	  Patient is a 55y old male with a PMH significant for ESRD treated with HD 3x a week, DM, HTN, HLD, and anemia who was due to receive an AV fistulogram today. In pre-operative holding, patient began demonstrating severe lethargy and showed a relatively obtunded state, with a K level of 6.0. Patient will be admitted for observation and potentially urgent dialysis treatment.     Plan:  d/w pt that OR case is cancelled and will admit for emergent HD and altered MS  -Trend/treat potassium, obtain CMP, CBC, glucose, EKG, head CT  -Consult nephrology for HD eval   -Consult Cardiology for EKG changes  -Consult neurology for obtunded state    will d/w neurology  ct head  above d/w pt and pt's mother  ESTRADA Agrawal MD will be away  Please call LIA Das J Chang to follow  and if any questions

## 2021-07-09 NOTE — H&P ADULT - NSHPOUTPATIENTPROVIDERS_GEN_ALL_CORE
Internist/ Pulmonologist Dr. Dawit Brar  [phone]  Dr. Medina - Nephrologist [phone]  [facility] [phone]

## 2021-07-09 NOTE — ED PROVIDER NOTE - OBJECTIVE STATEMENT
54 yo M PMHx ESRD s/p L radiocephalic AVF (TTS), last dialylzed yesterday, DM, HTN, who presented to preoperative testing for elective LUE fistulogram however, was brought to ED for AMS. Labs notable for HyperK to 6.0 with hyperdynamic EKG changes (peaked T waves). Pt received calcium gluconate prior to arrival to ED. Pt very lethargic and history is limited.   Vascular surgery team brought the pt to the ED.

## 2021-07-09 NOTE — ED PROVIDER NOTE - CLINICAL SUMMARY MEDICAL DECISION MAKING FREE TEXT BOX
Juancarlos Eli MD. unclear etiology of AMS, ?polypharmacy as there was report that pt may have taken marijuana or opioids. concern for uremia, emtabolic encephalotphy, vs acute brain bleed. will obtain labs, correct hyperK, admit

## 2021-07-09 NOTE — ED CLERICAL - NS ED CLERK NOTE PRE-ARRIVAL INFORMATION; ADDITIONAL PRE-ARRIVAL INFORMATION
In ambi surg today for fistulagram- K6.0, EKG with peaked T waves, bradycardia.  Calcium given.  Pt lethargic, FS WNL.  Hx ESRD, DM HD yesterday.

## 2021-07-09 NOTE — H&P ADULT - NSICDXPASTMEDICALHX_GEN_ALL_CORE_FT
PAST MEDICAL HISTORY:  Anemia     Arterial insufficiency     Bipolar affective disorder in remission     Depression     Diabetes mellitus Patient does not routinely check FS--diet controlled    ESRD on dialysis     Hemodialysis patient     High cholesterol     Hypertension

## 2021-07-09 NOTE — CONSULT NOTE ADULT - ASSESSMENT
Pt. with ESRD on HD TIW admitted with hyperkalemia and poor mental status Pt. with ESRD on HD TIW admitted with hyperkalemia.

## 2021-07-09 NOTE — ED PROVIDER NOTE - PMH
Anemia    Arterial insufficiency    Bipolar affective disorder in remission    Chronic kidney disease, unspecified    Depression    Diabetes mellitus  Patient does not routinely check FS.  ESRD on dialysis    Hemodialysis patient    High cholesterol    Hypertension

## 2021-07-09 NOTE — CHART NOTE - NSCHARTNOTEFT_GEN_A_CORE
[facility] MEDICINE NIGHT COVERAGE    Called by nurse to evaluate patient who wishes to leave the hospital against medical advice. Patient seen and examined at bedside. Patient is A&O x3 and has full capacity to make his own medical decisions. VSS. Patient was informed of their medical conditions, benefits, and alternatives to treatment. The risks of refusing treatment and the seriousness of leaving against medical advice such as the risks of heart attack, stroke, seizure, and even death were fully explained to the patient.  Instructed if having worsening symptoms, to seek medical help as soon as possible. After verbalizing full understanding, patient signed out against medical advice witnessed by the nurse. Hospitalist made aware. Instructed RN to remove IVL. Paperwork signed and put in chart.    Gianna Sims PA-C  Medicine [id number]

## 2021-07-09 NOTE — CONSULT NOTE ADULT - ASSESSMENT
55M w/ ESRD treated with HD TTS w/ L radiocephalic AVF, DM, HTN, HLD, and anemia p/w lethargy, sent in from outpatient vascular clinic. Pt was planned for an elective AV fistulogram today. However, in pre-operative holding, patient began demonstrating lethargy. Last HD session 7/8/21, unclear if pt was fully dialyzed. Pt not cooperating with exam, mild drowsiness, easily arousable to voice, fluent speech with good comprehension. CTH neg for acute pathology, chronic R lacunar infarct. /70, BUN 74, Ca 9.1, pCO2 56    Impression: Lethargy likely 2/2 toxic/metabolic vs. HTN encephalopathy    Plan:  [] BP management with HD and medications per primary team  [] correction of metabolic derangements (uremia) with HD per primary team  [] check TSH, B12, folate  [] no indication for further neuroimaging at this time    ***Case to be discussed with Dr. Hinkle*** 55M w/ ESRD treated with HD TTS w/ L radiocephalic AVF, DM, HTN, HLD, and anemia p/w lethargy, sent in from outpatient vascular clinic. Pt was planned for an elective AV fistulogram today. However, in pre-operative holding, patient began demonstrating lethargy. Last HD session 7/8/21, unclear if pt was fully dialyzed.  Pt reports that his mother gave him a medication yesterday, possibly adderall?. Pt not cooperating with exam, mild drowsiness, easily arousable to voice, fluent speech with good comprehension. CTH neg for acute pathology, chronic R lacunar infarct. /70, BUN 74, Ca 9.1, pCO2 56    Impression: Lethargy likely 2/2 toxic/metabolic (uremia, unknown medication) vs. HTN encephalopathy    Plan:  [] BP management with HD and medications per primary team  [] correction of metabolic derangements (uremia) with HD per primary team  [] check TSH, B12, folate  [] no indication for further neuroimaging at this time    ***Case to be discussed with Dr. Hinkle***

## 2021-07-09 NOTE — H&P ADULT - PROBLEM SELECTOR PLAN 5
Continue coreg  Per pharmacy hydralazine has not been refilled  Unable to reach family for clarification of regimen at this time, follow up

## 2021-07-09 NOTE — CONSULT NOTE ADULT - PROBLEM SELECTOR RECOMMENDATION 9
Pt. with ESRD on HD three times a week (MWF). Last HD was on 7/8/21 via LUE AVF. Pt. scheduled for fistulogram today, however noted to be lethargic with hyperkalemia on pre-operative labs. Labs reviewed. Will arrange for urgent HD today for hyperkalemia. Consent for HD obtained from pt's mother Dawna Lloyd over the phone and witnessed by ER physician Dr. Gautam. Monitor labs
ESTRADA Agrawal MD performed a history and physical exam of the patient and discussed  the findings and plan with the house officer. I reviewed the resident note and agree with the findings and plan   I Benjamin Agrawal MD have personally seen and examined the patient at bedside today at  11am

## 2021-07-09 NOTE — ED ADULT TRIAGE NOTE - CHIEF COMPLAINT QUOTE
Arrives from PST, on arrival was found to by hyperkalemic with peaked T waves. Was scheduled for fistulogram, but sent down here for admission. Given hyperkalemia cocktail in PST. Noted to be lethargic in triage.

## 2021-07-09 NOTE — ED PROVIDER NOTE - CARE PLAN
Principal Discharge DX:	Altered mental status  Secondary Diagnosis:	ESRD on dialysis  Secondary Diagnosis:	Hyperkalemia

## 2021-07-10 LAB
HBV CORE AB SER-ACNC: SIGNIFICANT CHANGE UP
HBV SURFACE AB SER-ACNC: 51.7 MIU/ML — SIGNIFICANT CHANGE UP
HBV SURFACE AG SER-ACNC: SIGNIFICANT CHANGE UP
HCV AB S/CO SERPL IA: 0.14 S/CO — SIGNIFICANT CHANGE UP (ref 0–0.99)
HCV AB SERPL-IMP: SIGNIFICANT CHANGE UP
MRSA PCR RESULT.: SIGNIFICANT CHANGE UP
S AUREUS DNA NOSE QL NAA+PROBE: SIGNIFICANT CHANGE UP

## 2021-07-20 ENCOUNTER — APPOINTMENT (OUTPATIENT)
Dept: DISASTER EMERGENCY | Facility: CLINIC | Age: 56
End: 2021-07-20

## 2021-07-23 ENCOUNTER — APPOINTMENT (OUTPATIENT)
Dept: VASCULAR SURGERY | Facility: HOSPITAL | Age: 56
End: 2021-07-23

## 2021-08-20 NOTE — DISCHARGE NOTE PROVIDER - NSDCCPCAREPLAN_GEN_ALL_CORE_FT
PRINCIPAL DISCHARGE DIAGNOSIS  Diagnosis: Altered mental status  Assessment and Plan of Treatment:       SECONDARY DISCHARGE DIAGNOSES  Diagnosis: ESRD on dialysis  Assessment and Plan of Treatment:     Diagnosis: Hyperkalemia  Assessment and Plan of Treatment: Hyperkalemia

## 2021-08-20 NOTE — DISCHARGE NOTE PROVIDER - HOSPITAL COURSE
Admission History:  55M ESRD (HD TThSa) DM2 (diet control) HTN bipolar disorder presented for scheduled left arm fistulogram and possible intervention today, while waiting was increasingly lethargic and found to have K 6.0 with EKG changes per vascular surgery, Ca gluconate was given and patient was transferred from preop to ED. Insulin/D50, lokelma and bicarb were given in the ED.  The patient was seen by neurology and nephrology and is currently being brought to HD. Patient is minimally arousable and unable to provide history.  Med list confirmed with [facility].  Writer is unable to reach patient's mother by phone at present.      Hospital Course:  The patient received hemodialysis.    NICOLAS JOINER Note:  Called by nurse to evaluate patient who wishes to leave the hospital against medical advice. Patient seen and examined at bedside. Patient is A&O x3 and has full capacity to make his own medical decisions. VSS. Patient was informed of their medical conditions, benefits, and alternatives to treatment. The risks of refusing treatment and the seriousness of leaving against medical advice such as the risks of heart attack, stroke, seizure, and even death were fully explained to the patient.  Instructed if having worsening symptoms, to seek medical help as soon as possible. After verbalizing full understanding, patient signed out against medical advice witnessed by the nurse. Hospitalist made aware. Instructed RN to remove IVL. Paperwork signed and put in chart.    Medicine reconciliation was not completed due to AMA discharge

## 2021-09-17 NOTE — PROVIDER CONTACT NOTE (OTHER) - ASSESSMENT
"Oncology Rooming Note    September 17, 2021 9:57 AM   Galileo Mayberry is a 77 year old male who presents for:    Chief Complaint   Patient presents with     Blood Draw     Labs drawn via PIV start by RN. VS taken.     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    Weight as of this encounter: 85 kg (187 lb 8 oz). Body surface area is 2.06 meters squared.  No Pain (0) Comment: Data Unavailable   No LMP for male patient.  Allergies reviewed: Yes  Medications reviewed: Yes    Medications: Medication refills not needed today.  Pharmacy name entered into EPIC:    [facility] - [place], [place] - [id number] - [facility] [address] [address]    Clinical concerns: None       Shu Manning LPN              "
Chief Complaint   Patient presents with     Blood Draw     Labs drawn via PIV start by RN. VS taken.     Labs drawn with PIV start by rn.  Pt tolerated well.  VS taken and pt checked in for next appt.    Gonzales Medrano RN  
Patient denies any shakiness, lightheadedness and  fatigue

## 2021-09-20 ENCOUNTER — APPOINTMENT (OUTPATIENT)
Dept: PULMONOLOGY | Facility: CLINIC | Age: 56
End: 2021-09-20
Payer: MEDICARE

## 2021-09-20 VITALS
HEART RATE: 59 BPM | SYSTOLIC BLOOD PRESSURE: 213 MMHG | DIASTOLIC BLOOD PRESSURE: 77 MMHG | TEMPERATURE: 97.9 F | OXYGEN SATURATION: 97 %

## 2021-09-20 DIAGNOSIS — R06.00 DYSPNEA, UNSPECIFIED: ICD-10-CM

## 2021-09-20 PROCEDURE — 99213 OFFICE O/P EST LOW 20 MIN: CPT | Mod: 25

## 2021-09-20 PROCEDURE — G0008: CPT

## 2021-09-20 PROCEDURE — 90686 IIV4 VACC NO PRSV 0.5 ML IM: CPT

## 2021-09-20 NOTE — ASSESSMENT
[FreeTextEntry1] : Overall medical status unchanged.  However psychiatric and social situation deteriorating.  He has been primarily under the care of his mother whose health is now failing.  Mother is incapable of caring for herself let alone others.  I have tried multiple times to have social work intervention however minor abnormalities like the fact that the patient does not stay home regularly and that the patient's mother does not have a working cell phone have gotten in the way of any attempt I have made to improve on the situation.  The patient's recent hospitalization for simply failure to show up with dialysis is an example of this.  Expectation is that as soon as the patient's mother dies which unfortunately is likely going to be soon the patient will follow shortly thereafter as he is not capable of caring for himself.  Hopefully recent social work intervention will be successful.  It may be necessary to involve Adult Protective Services at some point in time

## 2021-09-20 NOTE — HISTORY OF PRESENT ILLNESS
[TextBox_4] : s/p hosp for fluid overload-missed some dialysis sessions.  Needs a Depakote level.  Blood pressure has been high needs to follow-up with nephrologist.\par

## 2021-09-22 LAB
ALBUMIN SERPL ELPH-MCNC: 4.2 G/DL
ALP BLD-CCNC: 100 U/L
ALT SERPL-CCNC: 10 U/L
ANION GAP SERPL CALC-SCNC: 16 MMOL/L
AST SERPL-CCNC: 14 U/L
BASOPHILS # BLD AUTO: 0.03 K/UL
BASOPHILS NFR BLD AUTO: 0.4 %
BILIRUB SERPL-MCNC: 0.3 MG/DL
BUN SERPL-MCNC: 57 MG/DL
CALCIUM SERPL-MCNC: 8.8 MG/DL
CHLORIDE SERPL-SCNC: 98 MMOL/L
CHOLEST SERPL-MCNC: 183 MG/DL
CO2 SERPL-SCNC: 24 MMOL/L
CREAT SERPL-MCNC: 7.4 MG/DL
EOSINOPHIL # BLD AUTO: 0.05 K/UL
EOSINOPHIL NFR BLD AUTO: 0.7 %
GLUCOSE SERPL-MCNC: 92 MG/DL
HCT VFR BLD CALC: 34.5 %
HDLC SERPL-MCNC: 65 MG/DL
HGB BLD-MCNC: 10.7 G/DL
IMM GRANULOCYTES NFR BLD AUTO: 1.5 %
LDLC SERPL CALC-MCNC: 105 MG/DL
LYMPHOCYTES # BLD AUTO: 1.41 K/UL
LYMPHOCYTES NFR BLD AUTO: 20.8 %
MAN DIFF?: NORMAL
MCHC RBC-ENTMCNC: 31 GM/DL
MCHC RBC-ENTMCNC: 32.5 PG
MCV RBC AUTO: 104.9 FL
MONOCYTES # BLD AUTO: 0.85 K/UL
MONOCYTES NFR BLD AUTO: 12.5 %
NEUTROPHILS # BLD AUTO: 4.34 K/UL
NEUTROPHILS NFR BLD AUTO: 64.1 %
NONHDLC SERPL-MCNC: 118 MG/DL
PLATELET # BLD AUTO: 237 K/UL
POTASSIUM SERPL-SCNC: 5.1 MMOL/L
PROT SERPL-MCNC: 6.7 G/DL
RBC # BLD: 3.29 M/UL
RBC # FLD: 18.5 %
SODIUM SERPL-SCNC: 138 MMOL/L
TRIGL SERPL-MCNC: 61 MG/DL
TSH SERPL-ACNC: 1.41 UIU/ML
VALPROATE SERPL-MCNC: 85 UG/ML
WBC # FLD AUTO: 6.78 K/UL

## 2021-09-28 NOTE — PATIENT PROFILE ADULT - NSASFUNCLEVELADLTRANSFER_GEN_A_NUR
[facility]   HOSPITALIST DISCHARGE SUMMARY NOTE    ADMISSION DATE:  9/25/2021  DISCHARGE DATE:  No discharge date for patient encounter.  DISCHARGING PHYSICIAN:  Edwin Bellamy MD  ATTENDING PHYSICIAN:  Edwin Bellamy 
2 = assistive person

## 2021-10-05 ENCOUNTER — RX RENEWAL (OUTPATIENT)
Age: 56
End: 2021-10-05

## 2021-10-06 ENCOUNTER — RX RENEWAL (OUTPATIENT)
Age: 56
End: 2021-10-06

## 2021-10-28 NOTE — H&P PST ADULT - CORNEAL ABRASION RISK
Mom noticed patient had fever of 103.7 today - tried to give tylenol but patient vomited. Still is drinking and urinating   denies

## 2022-01-01 ENCOUNTER — RX RENEWAL (OUTPATIENT)
Age: 57
End: 2022-01-01

## 2022-01-01 ENCOUNTER — OUTPATIENT (OUTPATIENT)
Dept: OUTPATIENT SERVICES | Facility: HOSPITAL | Age: 57
LOS: 1 days | End: 2022-01-01

## 2022-01-01 ENCOUNTER — APPOINTMENT (OUTPATIENT)
Dept: CT IMAGING | Facility: HOSPITAL | Age: 57
End: 2022-01-01

## 2022-01-01 DIAGNOSIS — K75.0 ABSCESS OF LIVER: ICD-10-CM

## 2022-01-01 DIAGNOSIS — Z89.411 ACQUIRED ABSENCE OF RIGHT GREAT TOE: Chronic | ICD-10-CM

## 2022-01-01 DIAGNOSIS — Z98.890 OTHER SPECIFIED POSTPROCEDURAL STATES: Chronic | ICD-10-CM

## 2022-01-01 PROCEDURE — 74177 CT ABD & PELVIS W/CONTRAST: CPT | Mod: 26,MH

## 2022-01-01 RX ORDER — ROSUVASTATIN CALCIUM 40 MG/1
40 TABLET, FILM COATED ORAL AT BEDTIME
Qty: 30 | Refills: 4 | Status: ACTIVE | COMMUNITY
Start: 2021-10-05 | End: 1900-01-01

## 2022-01-11 ENCOUNTER — RX RENEWAL (OUTPATIENT)
Age: 57
End: 2022-01-11

## 2022-01-12 ENCOUNTER — RX RENEWAL (OUTPATIENT)
Age: 57
End: 2022-01-12

## 2022-03-11 RX ORDER — CILOSTAZOL 50 MG/1
50 TABLET ORAL
Qty: 180 | Refills: 3 | Status: ACTIVE | COMMUNITY
Start: 2022-03-11 | End: 1900-01-01

## 2022-06-02 NOTE — DISCHARGE NOTE ADULT - MODE OF TRANSPORTATION
Albendazole Counseling:  I discussed with the patient the risks of albendazole including but not limited to cytopenia, kidney damage, nausea/vomiting and severe allergy.  The patient understands that this medication is being used in an off-label manner. Wheelchair/Stroller

## 2022-09-19 NOTE — ED ADULT NURSE NOTE - PRO INTERPRETER NEED 2
Quality 111:Pneumonia Vaccination Status For Older Adults: Pneumococcal vaccine (PPSV23) administered on or after patientâs 60th birthday and before the end of the measurement period Detail Level: Zone English

## 2022-10-20 NOTE — HISTORY OF PRESENT ILLNESS
Stable intermittent leukopenia    [FreeTextEntry1] : Lower Back pain for 1 week, after falling a week ago

## 2023-01-01 ENCOUNTER — TRANSCRIPTION ENCOUNTER (OUTPATIENT)
Age: 58
End: 2023-01-01

## 2023-01-01 ENCOUNTER — NON-APPOINTMENT (OUTPATIENT)
Age: 58
End: 2023-01-01

## 2023-01-01 ENCOUNTER — INPATIENT (INPATIENT)
Facility: HOSPITAL | Age: 58
LOS: 7 days | Discharge: NURSING, REHAB OR LONG-TERM CARE | End: 2023-02-15
Attending: INTERNAL MEDICINE | Admitting: INTERNAL MEDICINE
Payer: MEDICARE

## 2023-01-01 ENCOUNTER — APPOINTMENT (OUTPATIENT)
Dept: CT IMAGING | Facility: HOSPITAL | Age: 58
End: 2023-01-01

## 2023-01-01 ENCOUNTER — INPATIENT (INPATIENT)
Facility: HOSPITAL | Age: 58
LOS: 7 days | Discharge: NURSING, REHAB OR LONG-TERM CARE | End: 2023-03-17
Attending: STUDENT IN AN ORGANIZED HEALTH CARE EDUCATION/TRAINING PROGRAM | Admitting: STUDENT IN AN ORGANIZED HEALTH CARE EDUCATION/TRAINING PROGRAM
Payer: MEDICARE

## 2023-01-01 ENCOUNTER — APPOINTMENT (OUTPATIENT)
Dept: GASTROENTEROLOGY | Facility: CLINIC | Age: 58
End: 2023-01-01
Payer: MEDICARE

## 2023-01-01 ENCOUNTER — INPATIENT (INPATIENT)
Facility: HOSPITAL | Age: 58
LOS: 16 days | Discharge: EXPIRED | End: 2023-04-04
Attending: STUDENT IN AN ORGANIZED HEALTH CARE EDUCATION/TRAINING PROGRAM | Admitting: STUDENT IN AN ORGANIZED HEALTH CARE EDUCATION/TRAINING PROGRAM
Payer: MEDICARE

## 2023-01-01 VITALS
OXYGEN SATURATION: 94 % | SYSTOLIC BLOOD PRESSURE: 141 MMHG | HEART RATE: 87 BPM | TEMPERATURE: 98 F | RESPIRATION RATE: 18 BRPM | DIASTOLIC BLOOD PRESSURE: 69 MMHG

## 2023-01-01 VITALS
DIASTOLIC BLOOD PRESSURE: 48 MMHG | TEMPERATURE: 98 F | RESPIRATION RATE: 16 BRPM | SYSTOLIC BLOOD PRESSURE: 130 MMHG | HEIGHT: 69 IN | HEART RATE: 73 BPM | OXYGEN SATURATION: 97 %

## 2023-01-01 VITALS
DIASTOLIC BLOOD PRESSURE: 75 MMHG | OXYGEN SATURATION: 95 % | RESPIRATION RATE: 18 BRPM | SYSTOLIC BLOOD PRESSURE: 189 MMHG | HEART RATE: 62 BPM

## 2023-01-01 VITALS — OXYGEN SATURATION: 100 % | HEART RATE: 103 BPM

## 2023-01-01 VITALS
DIASTOLIC BLOOD PRESSURE: 43 MMHG | RESPIRATION RATE: 16 BRPM | OXYGEN SATURATION: 95 % | HEART RATE: 53 BPM | SYSTOLIC BLOOD PRESSURE: 89 MMHG

## 2023-01-01 VITALS
SYSTOLIC BLOOD PRESSURE: 110 MMHG | DIASTOLIC BLOOD PRESSURE: 72 MMHG | WEIGHT: 131 LBS | HEART RATE: 74 BPM | HEIGHT: 73 IN | OXYGEN SATURATION: 84 % | TEMPERATURE: 98.7 F | BODY MASS INDEX: 17.36 KG/M2

## 2023-01-01 DIAGNOSIS — I10 ESSENTIAL (PRIMARY) HYPERTENSION: ICD-10-CM

## 2023-01-01 DIAGNOSIS — F31.9 BIPOLAR DISORDER, UNSPECIFIED: ICD-10-CM

## 2023-01-01 DIAGNOSIS — N18.6 END STAGE RENAL DISEASE: ICD-10-CM

## 2023-01-01 DIAGNOSIS — Z78.9 OTHER SPECIFIED HEALTH STATUS: ICD-10-CM

## 2023-01-01 DIAGNOSIS — N18.9 CHRONIC KIDNEY DISEASE, UNSPECIFIED: ICD-10-CM

## 2023-01-01 DIAGNOSIS — I48.20 CHRONIC ATRIAL FIBRILLATION, UNSPECIFIED: ICD-10-CM

## 2023-01-01 DIAGNOSIS — R06.00 DYSPNEA, UNSPECIFIED: ICD-10-CM

## 2023-01-01 DIAGNOSIS — J69.0 PNEUMONITIS DUE TO INHALATION OF FOOD AND VOMIT: ICD-10-CM

## 2023-01-01 DIAGNOSIS — R41.82 ALTERED MENTAL STATUS, UNSPECIFIED: ICD-10-CM

## 2023-01-01 DIAGNOSIS — Z99.2 END STAGE RENAL DISEASE: ICD-10-CM

## 2023-01-01 DIAGNOSIS — A41.9 SEPSIS, UNSPECIFIED ORGANISM: ICD-10-CM

## 2023-01-01 DIAGNOSIS — R77.8 OTHER SPECIFIED ABNORMALITIES OF PLASMA PROTEINS: ICD-10-CM

## 2023-01-01 DIAGNOSIS — R74.8 ABNORMAL LEVELS OF OTHER SERUM ENZYMES: ICD-10-CM

## 2023-01-01 DIAGNOSIS — Z51.5 ENCOUNTER FOR PALLIATIVE CARE: ICD-10-CM

## 2023-01-01 DIAGNOSIS — Z71.89 OTHER SPECIFIED COUNSELING: ICD-10-CM

## 2023-01-01 DIAGNOSIS — L98.499 STRICTURE OF ARTERY: ICD-10-CM

## 2023-01-01 DIAGNOSIS — D64.9 ANEMIA, UNSPECIFIED: ICD-10-CM

## 2023-01-01 DIAGNOSIS — I77.1 STRICTURE OF ARTERY: ICD-10-CM

## 2023-01-01 DIAGNOSIS — R94.31 ABNORMAL ELECTROCARDIOGRAM [ECG] [EKG]: ICD-10-CM

## 2023-01-01 DIAGNOSIS — Z89.411 ACQUIRED ABSENCE OF RIGHT GREAT TOE: Chronic | ICD-10-CM

## 2023-01-01 DIAGNOSIS — E83.39 OTHER DISORDERS OF PHOSPHORUS METABOLISM: ICD-10-CM

## 2023-01-01 DIAGNOSIS — Z82.49 FAMILY HISTORY OF ISCHEMIC HEART DISEASE AND OTHER DISEASES OF THE CIRCULATORY SYSTEM: ICD-10-CM

## 2023-01-01 DIAGNOSIS — I65.23 OCCLUSION AND STENOSIS OF BILATERAL CAROTID ARTERIES: ICD-10-CM

## 2023-01-01 DIAGNOSIS — Z86.39 PERSONAL HISTORY OF OTHER ENDOCRINE, NUTRITIONAL AND METABOLIC DISEASE: ICD-10-CM

## 2023-01-01 DIAGNOSIS — R13.10 DYSPHAGIA, UNSPECIFIED: ICD-10-CM

## 2023-01-01 DIAGNOSIS — Z98.890 OTHER SPECIFIED POSTPROCEDURAL STATES: Chronic | ICD-10-CM

## 2023-01-01 DIAGNOSIS — J96.01 ACUTE RESPIRATORY FAILURE WITH HYPOXIA: ICD-10-CM

## 2023-01-01 DIAGNOSIS — Z29.9 ENCOUNTER FOR PROPHYLACTIC MEASURES, UNSPECIFIED: ICD-10-CM

## 2023-01-01 DIAGNOSIS — E11.8 TYPE 2 DIABETES MELLITUS WITH UNSPECIFIED COMPLICATIONS: ICD-10-CM

## 2023-01-01 DIAGNOSIS — E11.9 TYPE 2 DIABETES MELLITUS WITHOUT COMPLICATIONS: ICD-10-CM

## 2023-01-01 DIAGNOSIS — N17.9 ACUTE KIDNEY FAILURE, UNSPECIFIED: ICD-10-CM

## 2023-01-01 DIAGNOSIS — I46.9 CARDIAC ARREST, CAUSE UNSPECIFIED: ICD-10-CM

## 2023-01-01 DIAGNOSIS — I48.91 UNSPECIFIED ATRIAL FIBRILLATION: ICD-10-CM

## 2023-01-01 DIAGNOSIS — E87.5 HYPERKALEMIA: ICD-10-CM

## 2023-01-01 DIAGNOSIS — G93.1 ANOXIC BRAIN DAMAGE, NOT ELSEWHERE CLASSIFIED: ICD-10-CM

## 2023-01-01 DIAGNOSIS — D53.9 NUTRITIONAL ANEMIA, UNSPECIFIED: ICD-10-CM

## 2023-01-01 DIAGNOSIS — J90 PLEURAL EFFUSION, NOT ELSEWHERE CLASSIFIED: ICD-10-CM

## 2023-01-01 DIAGNOSIS — Z87.448 PERSONAL HISTORY OF OTHER DISEASES OF URINARY SYSTEM: ICD-10-CM

## 2023-01-01 DIAGNOSIS — R53.1 WEAKNESS: ICD-10-CM

## 2023-01-01 DIAGNOSIS — I70.0 ATHEROSCLEROSIS OF AORTA: ICD-10-CM

## 2023-01-01 DIAGNOSIS — F17.200 NICOTINE DEPENDENCE, UNSPECIFIED, UNCOMPLICATED: ICD-10-CM

## 2023-01-01 DIAGNOSIS — Z82.5 FAMILY HISTORY OF ASTHMA AND OTHER CHRONIC LOWER RESPIRATORY DISEASES: ICD-10-CM

## 2023-01-01 DIAGNOSIS — E11.42 TYPE 2 DIABETES MELLITUS WITH DIABETIC POLYNEUROPATHY: ICD-10-CM

## 2023-01-01 DIAGNOSIS — Z83.3 FAMILY HISTORY OF DIABETES MELLITUS: ICD-10-CM

## 2023-01-01 DIAGNOSIS — Z80.6 FAMILY HISTORY OF LEUKEMIA: ICD-10-CM

## 2023-01-01 DIAGNOSIS — U07.1 COVID-19: ICD-10-CM

## 2023-01-01 LAB
24R-OH-CALCIDIOL SERPL-MCNC: 10.5 NG/ML — LOW (ref 30–80)
A1C WITH ESTIMATED AVERAGE GLUCOSE RESULT: 4.8 % — SIGNIFICANT CHANGE UP (ref 4–5.6)
ALBUMIN FLD-MCNC: 1.6 G/DL — SIGNIFICANT CHANGE UP
ALBUMIN SERPL ELPH-MCNC: 1.9 G/DL — LOW (ref 3.3–5)
ALBUMIN SERPL ELPH-MCNC: 2.1 G/DL — LOW (ref 3.3–5)
ALBUMIN SERPL ELPH-MCNC: 2.2 G/DL — LOW (ref 3.3–5)
ALBUMIN SERPL ELPH-MCNC: 2.3 G/DL — LOW (ref 3.3–5)
ALBUMIN SERPL ELPH-MCNC: 2.4 G/DL — LOW (ref 3.3–5)
ALBUMIN SERPL ELPH-MCNC: 2.5 G/DL — LOW (ref 3.3–5)
ALBUMIN SERPL ELPH-MCNC: 2.6 G/DL — LOW (ref 3.3–5)
ALBUMIN SERPL ELPH-MCNC: 2.7 G/DL — LOW (ref 3.3–5)
ALBUMIN SERPL ELPH-MCNC: 2.8 G/DL — LOW (ref 3.3–5)
ALBUMIN SERPL ELPH-MCNC: 2.9 G/DL — LOW (ref 3.3–5)
ALBUMIN SERPL ELPH-MCNC: 3 G/DL — LOW (ref 3.3–5)
ALBUMIN SERPL ELPH-MCNC: 3.1 G/DL — LOW (ref 3.3–5)
ALBUMIN SERPL ELPH-MCNC: 3.6 G/DL — SIGNIFICANT CHANGE UP (ref 3.3–5)
ALBUMIN SERPL ELPH-MCNC: 3.6 G/DL — SIGNIFICANT CHANGE UP (ref 3.3–5)
ALP BONE SERPL-MCNC: 35 % — SIGNIFICANT CHANGE UP (ref 12–68)
ALP FLD-CCNC: 894 IU/L — HIGH (ref 44–121)
ALP INTEST CFR SERPL: 0 % — SIGNIFICANT CHANGE UP (ref 0–18)
ALP LIVER SERPL-CCNC: 65 % — SIGNIFICANT CHANGE UP (ref 13–88)
ALP SERPL-CCNC: 1040 U/L — HIGH (ref 40–120)
ALP SERPL-CCNC: 1065 U/L — HIGH (ref 40–120)
ALP SERPL-CCNC: 1184 U/L — HIGH (ref 40–120)
ALP SERPL-CCNC: 1193 U/L — HIGH (ref 40–120)
ALP SERPL-CCNC: 1224 U/L — HIGH (ref 40–120)
ALP SERPL-CCNC: 1286 U/L — HIGH (ref 40–120)
ALP SERPL-CCNC: 1289 U/L — HIGH (ref 40–120)
ALP SERPL-CCNC: 1330 U/L — HIGH (ref 40–120)
ALP SERPL-CCNC: 1342 U/L — HIGH (ref 40–120)
ALP SERPL-CCNC: 1346 U/L — HIGH (ref 40–120)
ALP SERPL-CCNC: 1346 U/L — HIGH (ref 40–120)
ALP SERPL-CCNC: 1437 U/L — HIGH (ref 40–120)
ALP SERPL-CCNC: 1447 U/L — HIGH (ref 40–120)
ALP SERPL-CCNC: 1521 U/L — HIGH (ref 40–120)
ALP SERPL-CCNC: 1675 U/L — HIGH (ref 40–120)
ALP SERPL-CCNC: 586 U/L — HIGH (ref 40–120)
ALP SERPL-CCNC: 593 U/L — HIGH (ref 40–120)
ALP SERPL-CCNC: 599 U/L — HIGH (ref 40–120)
ALP SERPL-CCNC: 614 U/L — HIGH (ref 40–120)
ALP SERPL-CCNC: 637 U/L — HIGH (ref 40–120)
ALP SERPL-CCNC: 647 U/L — HIGH (ref 40–120)
ALP SERPL-CCNC: 649 U/L — HIGH (ref 40–120)
ALP SERPL-CCNC: 658 U/L — HIGH (ref 40–120)
ALP SERPL-CCNC: 663 U/L — HIGH (ref 40–120)
ALP SERPL-CCNC: 670 U/L — HIGH (ref 40–120)
ALP SERPL-CCNC: 679 U/L — HIGH (ref 40–120)
ALP SERPL-CCNC: 679 U/L — HIGH (ref 40–120)
ALP SERPL-CCNC: 684 U/L — HIGH (ref 40–120)
ALP SERPL-CCNC: 686 U/L — HIGH (ref 40–120)
ALP SERPL-CCNC: 703 U/L — HIGH (ref 40–120)
ALP SERPL-CCNC: 782 U/L — HIGH (ref 40–120)
ALP SERPL-CCNC: 789 U/L — HIGH (ref 40–120)
ALP SERPL-CCNC: 830 U/L — HIGH (ref 40–120)
ALP SERPL-CCNC: 834 U/L — HIGH (ref 40–120)
ALP SERPL-CCNC: 837 U/L — HIGH (ref 40–120)
ALP SERPL-CCNC: 921 U/L — HIGH (ref 40–120)
ALP SERPL-CCNC: 923 U/L — HIGH (ref 40–120)
ALP SERPL-CCNC: 928 U/L — HIGH (ref 40–120)
ALP SERPL-CCNC: 953 U/L — HIGH (ref 40–120)
ALP SERPL-CCNC: 964 U/L — HIGH (ref 40–120)
ALT FLD-CCNC: 10 U/L — SIGNIFICANT CHANGE UP (ref 4–41)
ALT FLD-CCNC: 11 U/L — SIGNIFICANT CHANGE UP (ref 4–41)
ALT FLD-CCNC: 11 U/L — SIGNIFICANT CHANGE UP (ref 4–41)
ALT FLD-CCNC: 12 U/L — SIGNIFICANT CHANGE UP (ref 4–41)
ALT FLD-CCNC: 123 U/L — HIGH (ref 4–41)
ALT FLD-CCNC: 131 U/L — HIGH (ref 4–41)
ALT FLD-CCNC: 134 U/L — HIGH (ref 4–41)
ALT FLD-CCNC: 137 U/L — HIGH (ref 4–41)
ALT FLD-CCNC: 139 U/L — HIGH (ref 4–41)
ALT FLD-CCNC: 14 U/L — SIGNIFICANT CHANGE UP (ref 4–41)
ALT FLD-CCNC: 143 U/L — HIGH (ref 4–41)
ALT FLD-CCNC: 156 U/L — HIGH (ref 4–41)
ALT FLD-CCNC: 17 U/L — SIGNIFICANT CHANGE UP (ref 4–41)
ALT FLD-CCNC: 18 U/L — SIGNIFICANT CHANGE UP (ref 4–41)
ALT FLD-CCNC: 18 U/L — SIGNIFICANT CHANGE UP (ref 4–41)
ALT FLD-CCNC: 20 U/L — SIGNIFICANT CHANGE UP (ref 4–41)
ALT FLD-CCNC: 208 U/L — HIGH (ref 4–41)
ALT FLD-CCNC: 21 U/L — SIGNIFICANT CHANGE UP (ref 4–41)
ALT FLD-CCNC: 23 U/L — SIGNIFICANT CHANGE UP (ref 4–41)
ALT FLD-CCNC: 24 U/L — SIGNIFICANT CHANGE UP (ref 4–41)
ALT FLD-CCNC: 25 U/L — SIGNIFICANT CHANGE UP (ref 4–41)
ALT FLD-CCNC: 26 U/L — SIGNIFICANT CHANGE UP (ref 4–41)
ALT FLD-CCNC: 27 U/L — SIGNIFICANT CHANGE UP (ref 4–41)
ALT FLD-CCNC: 28 U/L — SIGNIFICANT CHANGE UP (ref 4–41)
ALT FLD-CCNC: 29 U/L — SIGNIFICANT CHANGE UP (ref 4–41)
ALT FLD-CCNC: 30 U/L — SIGNIFICANT CHANGE UP (ref 4–41)
ALT FLD-CCNC: 31 U/L — SIGNIFICANT CHANGE UP (ref 4–41)
ALT FLD-CCNC: 33 U/L — SIGNIFICANT CHANGE UP (ref 4–41)
ALT FLD-CCNC: 359 U/L — HIGH (ref 4–41)
ALT FLD-CCNC: 39 U/L — SIGNIFICANT CHANGE UP (ref 4–41)
ALT FLD-CCNC: 51 U/L — HIGH (ref 4–41)
ALT FLD-CCNC: 62 U/L — HIGH (ref 4–41)
ALT FLD-CCNC: 9 U/L — SIGNIFICANT CHANGE UP (ref 4–41)
ALT FLD-CCNC: 9 U/L — SIGNIFICANT CHANGE UP (ref 4–41)
ALT FLD-CCNC: 97 U/L — HIGH (ref 4–41)
AMYLASE P1 CFR SERPL: 314 U/L — HIGH (ref 25–125)
ANA PAT FLD IF-IMP: SIGNIFICANT CHANGE UP
ANA TITR SER: ABNORMAL
ANION GAP SERPL CALC-SCNC: 11 MMOL/L — SIGNIFICANT CHANGE UP (ref 7–14)
ANION GAP SERPL CALC-SCNC: 11 MMOL/L — SIGNIFICANT CHANGE UP (ref 7–14)
ANION GAP SERPL CALC-SCNC: 12 MMOL/L — SIGNIFICANT CHANGE UP (ref 7–14)
ANION GAP SERPL CALC-SCNC: 13 MMOL/L — SIGNIFICANT CHANGE UP (ref 7–14)
ANION GAP SERPL CALC-SCNC: 14 MMOL/L — SIGNIFICANT CHANGE UP (ref 7–14)
ANION GAP SERPL CALC-SCNC: 15 MMOL/L — HIGH (ref 7–14)
ANION GAP SERPL CALC-SCNC: 16 MMOL/L — HIGH (ref 7–14)
ANION GAP SERPL CALC-SCNC: 17 MMOL/L — HIGH (ref 7–14)
ANION GAP SERPL CALC-SCNC: 18 MMOL/L — HIGH (ref 7–14)
ANION GAP SERPL CALC-SCNC: 18 MMOL/L — HIGH (ref 7–14)
ANION GAP SERPL CALC-SCNC: 19 MMOL/L — HIGH (ref 7–14)
ANION GAP SERPL CALC-SCNC: 21 MMOL/L — HIGH (ref 7–14)
ANION GAP SERPL CALC-SCNC: 26 MMOL/L — HIGH (ref 7–14)
ANISOCYTOSIS BLD QL: SIGNIFICANT CHANGE UP
ANISOCYTOSIS BLD QL: SLIGHT — SIGNIFICANT CHANGE UP
ANISOCYTOSIS BLD QL: SLIGHT — SIGNIFICANT CHANGE UP
APAP SERPL-MCNC: <10 UG/ML — LOW (ref 15–25)
APTT BLD: 102.3 SEC — HIGH (ref 27–36.3)
APTT BLD: 111.1 SEC — HIGH (ref 27–36.3)
APTT BLD: 28.4 SEC — SIGNIFICANT CHANGE UP (ref 27–36.3)
APTT BLD: 29.1 SEC — SIGNIFICANT CHANGE UP (ref 27–36.3)
APTT BLD: 29.3 SEC — SIGNIFICANT CHANGE UP (ref 27–36.3)
APTT BLD: 29.7 SEC — SIGNIFICANT CHANGE UP (ref 27–36.3)
APTT BLD: 29.9 SEC — SIGNIFICANT CHANGE UP (ref 27–36.3)
APTT BLD: 30.1 SEC — SIGNIFICANT CHANGE UP (ref 27–36.3)
APTT BLD: 31 SEC — SIGNIFICANT CHANGE UP (ref 27–36.3)
APTT BLD: 31 SEC — SIGNIFICANT CHANGE UP (ref 27–36.3)
APTT BLD: 31.3 SEC — SIGNIFICANT CHANGE UP (ref 27–36.3)
APTT BLD: 31.4 SEC — SIGNIFICANT CHANGE UP (ref 27–36.3)
APTT BLD: 33.3 SEC — SIGNIFICANT CHANGE UP (ref 27–36.3)
APTT BLD: 33.3 SEC — SIGNIFICANT CHANGE UP (ref 27–36.3)
APTT BLD: 33.7 SEC — SIGNIFICANT CHANGE UP (ref 27–36.3)
APTT BLD: 34.9 SEC — SIGNIFICANT CHANGE UP (ref 27–36.3)
APTT BLD: 35.1 SEC — SIGNIFICANT CHANGE UP (ref 27–36.3)
APTT BLD: 40.7 SEC — HIGH (ref 27–36.3)
APTT BLD: 42.7 SEC — HIGH (ref 27–36.3)
APTT BLD: 48.3 SEC — HIGH (ref 27–36.3)
APTT BLD: 50.8 SEC — HIGH (ref 27–36.3)
APTT BLD: 54.4 SEC — HIGH (ref 27–36.3)
APTT BLD: 55.7 SEC — HIGH (ref 27–36.3)
APTT BLD: 59.3 SEC — HIGH (ref 27–36.3)
APTT BLD: 62.3 SEC — HIGH (ref 27–36.3)
APTT BLD: 74.7 SEC — HIGH (ref 27–36.3)
APTT BLD: 78.4 SEC — HIGH (ref 27–36.3)
APTT BLD: 83.4 SEC — HIGH (ref 27–36.3)
APTT BLD: 91.7 SEC — HIGH (ref 27–36.3)
APTT BLD: 92.5 SEC — HIGH (ref 27–36.3)
APTT BLD: >200 SEC — CRITICAL HIGH (ref 27–36.3)
AST SERPL-CCNC: 108 U/L — HIGH (ref 4–40)
AST SERPL-CCNC: 140 U/L — HIGH (ref 4–40)
AST SERPL-CCNC: 17 U/L — SIGNIFICANT CHANGE UP (ref 4–40)
AST SERPL-CCNC: 18 U/L — SIGNIFICANT CHANGE UP (ref 4–40)
AST SERPL-CCNC: 18 U/L — SIGNIFICANT CHANGE UP (ref 4–40)
AST SERPL-CCNC: 20 U/L — SIGNIFICANT CHANGE UP (ref 4–40)
AST SERPL-CCNC: 20 U/L — SIGNIFICANT CHANGE UP (ref 4–40)
AST SERPL-CCNC: 21 U/L — SIGNIFICANT CHANGE UP (ref 4–40)
AST SERPL-CCNC: 215 U/L — HIGH (ref 4–40)
AST SERPL-CCNC: 23 U/L — SIGNIFICANT CHANGE UP (ref 4–40)
AST SERPL-CCNC: 235 U/L — HIGH (ref 4–40)
AST SERPL-CCNC: 239 U/L — HIGH (ref 4–40)
AST SERPL-CCNC: 24 U/L — SIGNIFICANT CHANGE UP (ref 4–40)
AST SERPL-CCNC: 260 U/L — HIGH (ref 4–40)
AST SERPL-CCNC: 266 U/L — HIGH (ref 4–40)
AST SERPL-CCNC: 29 U/L — SIGNIFICANT CHANGE UP (ref 4–40)
AST SERPL-CCNC: 29 U/L — SIGNIFICANT CHANGE UP (ref 4–40)
AST SERPL-CCNC: 32 U/L — SIGNIFICANT CHANGE UP (ref 4–40)
AST SERPL-CCNC: 326 U/L — HIGH (ref 4–40)
AST SERPL-CCNC: 34 U/L — SIGNIFICANT CHANGE UP (ref 4–40)
AST SERPL-CCNC: 35 U/L — SIGNIFICANT CHANGE UP (ref 4–40)
AST SERPL-CCNC: 36 U/L — SIGNIFICANT CHANGE UP (ref 4–40)
AST SERPL-CCNC: 36 U/L — SIGNIFICANT CHANGE UP (ref 4–40)
AST SERPL-CCNC: 393 U/L — HIGH (ref 4–40)
AST SERPL-CCNC: 41 U/L — HIGH (ref 4–40)
AST SERPL-CCNC: 59 U/L — HIGH (ref 4–40)
AST SERPL-CCNC: 63 U/L — HIGH (ref 4–40)
AST SERPL-CCNC: 64 U/L — HIGH (ref 4–40)
AST SERPL-CCNC: 648 U/L — HIGH (ref 4–40)
AST SERPL-CCNC: 679 U/L — HIGH (ref 4–40)
AST SERPL-CCNC: 68 U/L — HIGH (ref 4–40)
AST SERPL-CCNC: 71 U/L — HIGH (ref 4–40)
AST SERPL-CCNC: 71 U/L — HIGH (ref 4–40)
AST SERPL-CCNC: 72 U/L — HIGH (ref 4–40)
AST SERPL-CCNC: 79 U/L — HIGH (ref 4–40)
AST SERPL-CCNC: 84 U/L — HIGH (ref 4–40)
AST SERPL-CCNC: 92 U/L — HIGH (ref 4–40)
AST SERPL-CCNC: 93 U/L — HIGH (ref 4–40)
B PERT DNA SPEC QL NAA+PROBE: SIGNIFICANT CHANGE UP
B PERT DNA SPEC QL NAA+PROBE: SIGNIFICANT CHANGE UP
B PERT IGG+IGM PNL SER: ABNORMAL
B PERT+PARAPERT DNA PNL SPEC NAA+PROBE: SIGNIFICANT CHANGE UP
B PERT+PARAPERT DNA PNL SPEC NAA+PROBE: SIGNIFICANT CHANGE UP
B-OH-BUTYR SERPL-SCNC: <0 MMOL/L — SIGNIFICANT CHANGE UP (ref 0–0.4)
BASE EXCESS BLDA CALC-SCNC: -0.2 MMOL/L — SIGNIFICANT CHANGE UP (ref -2–3)
BASE EXCESS BLDA CALC-SCNC: -0.4 MMOL/L — SIGNIFICANT CHANGE UP (ref -2–3)
BASE EXCESS BLDA CALC-SCNC: -1.1 MMOL/L — SIGNIFICANT CHANGE UP (ref -2–3)
BASE EXCESS BLDA CALC-SCNC: -1.7 MMOL/L — SIGNIFICANT CHANGE UP (ref -2–3)
BASE EXCESS BLDA CALC-SCNC: 0.2 MMOL/L — SIGNIFICANT CHANGE UP (ref -2–3)
BASE EXCESS BLDA CALC-SCNC: 0.5 MMOL/L — SIGNIFICANT CHANGE UP (ref -2–3)
BASE EXCESS BLDA CALC-SCNC: 2.3 MMOL/L — SIGNIFICANT CHANGE UP (ref -2–3)
BASE EXCESS BLDA CALC-SCNC: 2.8 MMOL/L — SIGNIFICANT CHANGE UP (ref -2–3)
BASE EXCESS BLDA CALC-SCNC: 3.1 MMOL/L — HIGH (ref -2–3)
BASE EXCESS BLDA CALC-SCNC: 3.2 MMOL/L — HIGH (ref -2–3)
BASE EXCESS BLDA CALC-SCNC: 3.8 MMOL/L — HIGH (ref -2–3)
BASE EXCESS BLDV CALC-SCNC: -0.7 MMOL/L — SIGNIFICANT CHANGE UP (ref -2–3)
BASE EXCESS BLDV CALC-SCNC: -0.8 MMOL/L — SIGNIFICANT CHANGE UP (ref -2–3)
BASE EXCESS BLDV CALC-SCNC: -2.2 MMOL/L — LOW (ref -2–3)
BASE EXCESS BLDV CALC-SCNC: -4.2 MMOL/L — LOW (ref -2–3)
BASE EXCESS BLDV CALC-SCNC: -4.2 MMOL/L — LOW (ref -2–3)
BASE EXCESS BLDV CALC-SCNC: 3.4 MMOL/L — HIGH (ref -2–3)
BASE EXCESS BLDV CALC-SCNC: 6.3 MMOL/L — HIGH (ref -2–3)
BASE EXCESS BLDV CALC-SCNC: 7.1 MMOL/L — HIGH (ref -2–3)
BASOPHILS # BLD AUTO: 0 K/UL — SIGNIFICANT CHANGE UP (ref 0–0.2)
BASOPHILS # BLD AUTO: 0.01 K/UL — SIGNIFICANT CHANGE UP (ref 0–0.2)
BASOPHILS # BLD AUTO: 0.02 K/UL — SIGNIFICANT CHANGE UP (ref 0–0.2)
BASOPHILS # BLD AUTO: 0.02 K/UL — SIGNIFICANT CHANGE UP (ref 0–0.2)
BASOPHILS # BLD AUTO: 0.03 K/UL — SIGNIFICANT CHANGE UP (ref 0–0.2)
BASOPHILS # BLD AUTO: 0.03 K/UL — SIGNIFICANT CHANGE UP (ref 0–0.2)
BASOPHILS # BLD AUTO: 0.04 K/UL — SIGNIFICANT CHANGE UP (ref 0–0.2)
BASOPHILS # BLD AUTO: 0.05 K/UL — SIGNIFICANT CHANGE UP (ref 0–0.2)
BASOPHILS # BLD AUTO: 0.06 K/UL — SIGNIFICANT CHANGE UP (ref 0–0.2)
BASOPHILS # BLD AUTO: 0.07 K/UL — SIGNIFICANT CHANGE UP (ref 0–0.2)
BASOPHILS # BLD AUTO: 0.07 K/UL — SIGNIFICANT CHANGE UP (ref 0–0.2)
BASOPHILS # BLD AUTO: 0.08 K/UL — SIGNIFICANT CHANGE UP (ref 0–0.2)
BASOPHILS # BLD AUTO: 0.08 K/UL — SIGNIFICANT CHANGE UP (ref 0–0.2)
BASOPHILS # BLD AUTO: 0.09 K/UL — SIGNIFICANT CHANGE UP (ref 0–0.2)
BASOPHILS # BLD AUTO: 0.1 K/UL — SIGNIFICANT CHANGE UP (ref 0–0.2)
BASOPHILS # BLD AUTO: 0.11 K/UL — SIGNIFICANT CHANGE UP (ref 0–0.2)
BASOPHILS # BLD AUTO: 0.36 K/UL — HIGH (ref 0–0.2)
BASOPHILS NFR BLD AUTO: 0 % — SIGNIFICANT CHANGE UP (ref 0–2)
BASOPHILS NFR BLD AUTO: 0.1 % — SIGNIFICANT CHANGE UP (ref 0–2)
BASOPHILS NFR BLD AUTO: 0.2 % — SIGNIFICANT CHANGE UP (ref 0–2)
BASOPHILS NFR BLD AUTO: 0.3 % — SIGNIFICANT CHANGE UP (ref 0–2)
BASOPHILS NFR BLD AUTO: 0.4 % — SIGNIFICANT CHANGE UP (ref 0–2)
BASOPHILS NFR BLD AUTO: 0.5 % — SIGNIFICANT CHANGE UP (ref 0–2)
BASOPHILS NFR BLD AUTO: 0.6 % — SIGNIFICANT CHANGE UP (ref 0–2)
BASOPHILS NFR BLD AUTO: 0.7 % — SIGNIFICANT CHANGE UP (ref 0–2)
BASOPHILS NFR BLD AUTO: 0.8 % — SIGNIFICANT CHANGE UP (ref 0–2)
BASOPHILS NFR BLD AUTO: 2.6 % — HIGH (ref 0–2)
BILIRUB SERPL-MCNC: 0.8 MG/DL — SIGNIFICANT CHANGE UP (ref 0.2–1.2)
BILIRUB SERPL-MCNC: 0.8 MG/DL — SIGNIFICANT CHANGE UP (ref 0.2–1.2)
BILIRUB SERPL-MCNC: 0.9 MG/DL — SIGNIFICANT CHANGE UP (ref 0.2–1.2)
BILIRUB SERPL-MCNC: 0.9 MG/DL — SIGNIFICANT CHANGE UP (ref 0.2–1.2)
BILIRUB SERPL-MCNC: 1 MG/DL — SIGNIFICANT CHANGE UP (ref 0.2–1.2)
BILIRUB SERPL-MCNC: 1.1 MG/DL — SIGNIFICANT CHANGE UP (ref 0.2–1.2)
BILIRUB SERPL-MCNC: 1.2 MG/DL — SIGNIFICANT CHANGE UP (ref 0.2–1.2)
BILIRUB SERPL-MCNC: 1.3 MG/DL — HIGH (ref 0.2–1.2)
BILIRUB SERPL-MCNC: 1.4 MG/DL — HIGH (ref 0.2–1.2)
BILIRUB SERPL-MCNC: 1.5 MG/DL — HIGH (ref 0.2–1.2)
BLD GP AB SCN SERPL QL: NEGATIVE — SIGNIFICANT CHANGE UP
BLOOD GAS ARTERIAL - LYTES,HGB,ICA,LACT RESULT: SIGNIFICANT CHANGE UP
BLOOD GAS ARTERIAL COMPREHENSIVE RESULT: SIGNIFICANT CHANGE UP
BLOOD GAS VENOUS COMPREHENSIVE RESULT: SIGNIFICANT CHANGE UP
BORDETELLA PARAPERTUSSIS (RAPRVP): SIGNIFICANT CHANGE UP
BORDETELLA PARAPERTUSSIS (RAPRVP): SIGNIFICANT CHANGE UP
BUN SERPL-MCNC: 17 MG/DL — SIGNIFICANT CHANGE UP (ref 7–23)
BUN SERPL-MCNC: 20 MG/DL — SIGNIFICANT CHANGE UP (ref 7–23)
BUN SERPL-MCNC: 20 MG/DL — SIGNIFICANT CHANGE UP (ref 7–23)
BUN SERPL-MCNC: 21 MG/DL — SIGNIFICANT CHANGE UP (ref 7–23)
BUN SERPL-MCNC: 22 MG/DL — SIGNIFICANT CHANGE UP (ref 7–23)
BUN SERPL-MCNC: 24 MG/DL — HIGH (ref 7–23)
BUN SERPL-MCNC: 25 MG/DL — HIGH (ref 7–23)
BUN SERPL-MCNC: 26 MG/DL — HIGH (ref 7–23)
BUN SERPL-MCNC: 27 MG/DL — HIGH (ref 7–23)
BUN SERPL-MCNC: 30 MG/DL — HIGH (ref 7–23)
BUN SERPL-MCNC: 30 MG/DL — HIGH (ref 7–23)
BUN SERPL-MCNC: 31 MG/DL — HIGH (ref 7–23)
BUN SERPL-MCNC: 31 MG/DL — HIGH (ref 7–23)
BUN SERPL-MCNC: 33 MG/DL — HIGH (ref 7–23)
BUN SERPL-MCNC: 34 MG/DL — HIGH (ref 7–23)
BUN SERPL-MCNC: 35 MG/DL — HIGH (ref 7–23)
BUN SERPL-MCNC: 37 MG/DL — HIGH (ref 7–23)
BUN SERPL-MCNC: 38 MG/DL — HIGH (ref 7–23)
BUN SERPL-MCNC: 38 MG/DL — HIGH (ref 7–23)
BUN SERPL-MCNC: 39 MG/DL — HIGH (ref 7–23)
BUN SERPL-MCNC: 39 MG/DL — HIGH (ref 7–23)
BUN SERPL-MCNC: 40 MG/DL — HIGH (ref 7–23)
BUN SERPL-MCNC: 40 MG/DL — HIGH (ref 7–23)
BUN SERPL-MCNC: 41 MG/DL — HIGH (ref 7–23)
BUN SERPL-MCNC: 42 MG/DL — HIGH (ref 7–23)
BUN SERPL-MCNC: 44 MG/DL — HIGH (ref 7–23)
BUN SERPL-MCNC: 45 MG/DL — HIGH (ref 7–23)
BUN SERPL-MCNC: 46 MG/DL — HIGH (ref 7–23)
BUN SERPL-MCNC: 46 MG/DL — HIGH (ref 7–23)
BUN SERPL-MCNC: 51 MG/DL — HIGH (ref 7–23)
BUN SERPL-MCNC: 52 MG/DL — HIGH (ref 7–23)
BUN SERPL-MCNC: 53 MG/DL — HIGH (ref 7–23)
BUN SERPL-MCNC: 55 MG/DL — HIGH (ref 7–23)
BUN SERPL-MCNC: 64 MG/DL — HIGH (ref 7–23)
BUN SERPL-MCNC: 67 MG/DL — HIGH (ref 7–23)
C PNEUM DNA SPEC QL NAA+PROBE: SIGNIFICANT CHANGE UP
C PNEUM DNA SPEC QL NAA+PROBE: SIGNIFICANT CHANGE UP
CALCIUM SERPL-MCNC: 7.7 MG/DL — LOW (ref 8.4–10.5)
CALCIUM SERPL-MCNC: 7.8 MG/DL — LOW (ref 8.4–10.5)
CALCIUM SERPL-MCNC: 7.8 MG/DL — LOW (ref 8.4–10.5)
CALCIUM SERPL-MCNC: 7.9 MG/DL — LOW (ref 8.4–10.5)
CALCIUM SERPL-MCNC: 7.9 MG/DL — LOW (ref 8.4–10.5)
CALCIUM SERPL-MCNC: 8 MG/DL — LOW (ref 8.4–10.5)
CALCIUM SERPL-MCNC: 8.1 MG/DL — LOW (ref 8.4–10.5)
CALCIUM SERPL-MCNC: 8.1 MG/DL — LOW (ref 8.4–10.5)
CALCIUM SERPL-MCNC: 8.2 MG/DL — LOW (ref 8.4–10.5)
CALCIUM SERPL-MCNC: 8.3 MG/DL — LOW (ref 8.4–10.5)
CALCIUM SERPL-MCNC: 8.4 MG/DL — SIGNIFICANT CHANGE UP (ref 8.4–10.5)
CALCIUM SERPL-MCNC: 8.5 MG/DL — SIGNIFICANT CHANGE UP (ref 8.4–10.5)
CALCIUM SERPL-MCNC: 8.6 MG/DL — SIGNIFICANT CHANGE UP (ref 8.4–10.5)
CALCIUM SERPL-MCNC: 8.7 MG/DL — SIGNIFICANT CHANGE UP (ref 8.4–10.5)
CALCIUM SERPL-MCNC: 8.8 MG/DL — SIGNIFICANT CHANGE UP (ref 8.4–10.5)
CALCIUM SERPL-MCNC: 9 MG/DL — SIGNIFICANT CHANGE UP (ref 8.4–10.5)
CALCIUM SERPL-MCNC: 9.1 MG/DL — SIGNIFICANT CHANGE UP (ref 8.4–10.5)
CALCIUM SERPL-MCNC: 9.2 MG/DL — SIGNIFICANT CHANGE UP (ref 8.4–10.5)
CHLORIDE BLDV-SCNC: 100 MMOL/L — SIGNIFICANT CHANGE UP (ref 96–108)
CHLORIDE BLDV-SCNC: 100 MMOL/L — SIGNIFICANT CHANGE UP (ref 96–108)
CHLORIDE BLDV-SCNC: 101 MMOL/L — SIGNIFICANT CHANGE UP (ref 96–108)
CHLORIDE BLDV-SCNC: 101 MMOL/L — SIGNIFICANT CHANGE UP (ref 96–108)
CHLORIDE BLDV-SCNC: 102 MMOL/L — SIGNIFICANT CHANGE UP (ref 96–108)
CHLORIDE BLDV-SCNC: 96 MMOL/L — SIGNIFICANT CHANGE UP (ref 96–108)
CHLORIDE BLDV-SCNC: 97 MMOL/L — SIGNIFICANT CHANGE UP (ref 96–108)
CHLORIDE BLDV-SCNC: 98 MMOL/L — SIGNIFICANT CHANGE UP (ref 96–108)
CHLORIDE SERPL-SCNC: 100 MMOL/L — SIGNIFICANT CHANGE UP (ref 98–107)
CHLORIDE SERPL-SCNC: 101 MMOL/L — SIGNIFICANT CHANGE UP (ref 98–107)
CHLORIDE SERPL-SCNC: 90 MMOL/L — LOW (ref 98–107)
CHLORIDE SERPL-SCNC: 91 MMOL/L — LOW (ref 98–107)
CHLORIDE SERPL-SCNC: 92 MMOL/L — LOW (ref 98–107)
CHLORIDE SERPL-SCNC: 93 MMOL/L — LOW (ref 98–107)
CHLORIDE SERPL-SCNC: 94 MMOL/L — LOW (ref 98–107)
CHLORIDE SERPL-SCNC: 95 MMOL/L — LOW (ref 98–107)
CHLORIDE SERPL-SCNC: 96 MMOL/L — LOW (ref 98–107)
CHLORIDE SERPL-SCNC: 97 MMOL/L — LOW (ref 98–107)
CHLORIDE SERPL-SCNC: 98 MMOL/L — SIGNIFICANT CHANGE UP (ref 98–107)
CHLORIDE SERPL-SCNC: 99 MMOL/L — SIGNIFICANT CHANGE UP (ref 98–107)
CK MB BLD-MCNC: 9.5 % — HIGH (ref 0–2.5)
CK MB CFR SERPL CALC: 5.1 NG/ML — SIGNIFICANT CHANGE UP
CK MB CFR SERPL CALC: 6.6 NG/ML — SIGNIFICANT CHANGE UP
CK MB CFR SERPL CALC: 7 NG/ML — HIGH
CK SERPL-CCNC: 15 U/L — LOW (ref 30–200)
CK SERPL-CCNC: 17 U/L — LOW (ref 30–200)
CK SERPL-CCNC: 21 U/L — LOW (ref 30–200)
CK SERPL-CCNC: 33 U/L — SIGNIFICANT CHANGE UP (ref 30–200)
CK SERPL-CCNC: 39 U/L — SIGNIFICANT CHANGE UP (ref 30–200)
CK SERPL-CCNC: 53 U/L — SIGNIFICANT CHANGE UP (ref 30–200)
CK SERPL-CCNC: 74 U/L — SIGNIFICANT CHANGE UP (ref 30–200)
CO2 BLDA-SCNC: 24 MMOL/L — SIGNIFICANT CHANGE UP (ref 19–24)
CO2 BLDA-SCNC: 26 MMOL/L — HIGH (ref 19–24)
CO2 BLDA-SCNC: 27 MMOL/L — HIGH (ref 19–24)
CO2 BLDA-SCNC: 28 MMOL/L — HIGH (ref 19–24)
CO2 BLDA-SCNC: 29 MMOL/L — HIGH (ref 19–24)
CO2 BLDA-SCNC: 31 MMOL/L — HIGH (ref 19–24)
CO2 BLDV-SCNC: 23.5 MMOL/L — SIGNIFICANT CHANGE UP (ref 22–26)
CO2 BLDV-SCNC: 25.7 MMOL/L — SIGNIFICANT CHANGE UP (ref 22–26)
CO2 BLDV-SCNC: 29.3 MMOL/L — HIGH (ref 22–26)
CO2 BLDV-SCNC: 29.9 MMOL/L — HIGH (ref 22–26)
CO2 BLDV-SCNC: 30.7 MMOL/L — HIGH (ref 22–26)
CO2 BLDV-SCNC: 31.9 MMOL/L — HIGH (ref 22–26)
CO2 BLDV-SCNC: 32.2 MMOL/L — HIGH (ref 22–26)
CO2 BLDV-SCNC: 32.6 MMOL/L — HIGH (ref 22–26)
CO2 SERPL-SCNC: 20 MMOL/L — LOW (ref 22–31)
CO2 SERPL-SCNC: 21 MMOL/L — LOW (ref 22–31)
CO2 SERPL-SCNC: 22 MMOL/L — SIGNIFICANT CHANGE UP (ref 22–31)
CO2 SERPL-SCNC: 23 MMOL/L — SIGNIFICANT CHANGE UP (ref 22–31)
CO2 SERPL-SCNC: 24 MMOL/L — SIGNIFICANT CHANGE UP (ref 22–31)
CO2 SERPL-SCNC: 25 MMOL/L — SIGNIFICANT CHANGE UP (ref 22–31)
CO2 SERPL-SCNC: 26 MMOL/L — SIGNIFICANT CHANGE UP (ref 22–31)
CO2 SERPL-SCNC: 27 MMOL/L — SIGNIFICANT CHANGE UP (ref 22–31)
CO2 SERPL-SCNC: 28 MMOL/L — SIGNIFICANT CHANGE UP (ref 22–31)
CO2 SERPL-SCNC: 28 MMOL/L — SIGNIFICANT CHANGE UP (ref 22–31)
CO2 SERPL-SCNC: 29 MMOL/L — SIGNIFICANT CHANGE UP (ref 22–31)
COLOR FLD: SIGNIFICANT CHANGE UP
COLOR FLD: SIGNIFICANT CHANGE UP
COLOR FLD: YELLOW
COMMENT - FLUIDS: SIGNIFICANT CHANGE UP
CORTIS AM PEAK SERPL-MCNC: 2.1 UG/DL — LOW (ref 6–18.4)
CREAT SERPL-MCNC: 2.12 MG/DL — HIGH (ref 0.5–1.3)
CREAT SERPL-MCNC: 2.28 MG/DL — HIGH (ref 0.5–1.3)
CREAT SERPL-MCNC: 2.31 MG/DL — HIGH (ref 0.5–1.3)
CREAT SERPL-MCNC: 2.31 MG/DL — HIGH (ref 0.5–1.3)
CREAT SERPL-MCNC: 2.32 MG/DL — HIGH (ref 0.5–1.3)
CREAT SERPL-MCNC: 2.37 MG/DL — HIGH (ref 0.5–1.3)
CREAT SERPL-MCNC: 2.38 MG/DL — HIGH (ref 0.5–1.3)
CREAT SERPL-MCNC: 2.52 MG/DL — HIGH (ref 0.5–1.3)
CREAT SERPL-MCNC: 2.54 MG/DL — HIGH (ref 0.5–1.3)
CREAT SERPL-MCNC: 2.55 MG/DL — HIGH (ref 0.5–1.3)
CREAT SERPL-MCNC: 2.57 MG/DL — HIGH (ref 0.5–1.3)
CREAT SERPL-MCNC: 2.59 MG/DL — HIGH (ref 0.5–1.3)
CREAT SERPL-MCNC: 2.64 MG/DL — HIGH (ref 0.5–1.3)
CREAT SERPL-MCNC: 2.67 MG/DL — HIGH (ref 0.5–1.3)
CREAT SERPL-MCNC: 2.7 MG/DL — HIGH (ref 0.5–1.3)
CREAT SERPL-MCNC: 2.72 MG/DL — HIGH (ref 0.5–1.3)
CREAT SERPL-MCNC: 2.79 MG/DL — HIGH (ref 0.5–1.3)
CREAT SERPL-MCNC: 2.88 MG/DL — HIGH (ref 0.5–1.3)
CREAT SERPL-MCNC: 2.88 MG/DL — HIGH (ref 0.5–1.3)
CREAT SERPL-MCNC: 2.92 MG/DL — HIGH (ref 0.5–1.3)
CREAT SERPL-MCNC: 2.94 MG/DL — HIGH (ref 0.5–1.3)
CREAT SERPL-MCNC: 2.94 MG/DL — HIGH (ref 0.5–1.3)
CREAT SERPL-MCNC: 2.97 MG/DL — HIGH (ref 0.5–1.3)
CREAT SERPL-MCNC: 3 MG/DL — HIGH (ref 0.5–1.3)
CREAT SERPL-MCNC: 3.04 MG/DL — HIGH (ref 0.5–1.3)
CREAT SERPL-MCNC: 3.06 MG/DL — HIGH (ref 0.5–1.3)
CREAT SERPL-MCNC: 3.07 MG/DL — HIGH (ref 0.5–1.3)
CREAT SERPL-MCNC: 3.22 MG/DL — HIGH (ref 0.5–1.3)
CREAT SERPL-MCNC: 3.27 MG/DL — HIGH (ref 0.5–1.3)
CREAT SERPL-MCNC: 3.29 MG/DL — HIGH (ref 0.5–1.3)
CREAT SERPL-MCNC: 3.37 MG/DL — HIGH (ref 0.5–1.3)
CREAT SERPL-MCNC: 3.39 MG/DL — HIGH (ref 0.5–1.3)
CREAT SERPL-MCNC: 3.54 MG/DL — HIGH (ref 0.5–1.3)
CREAT SERPL-MCNC: 3.67 MG/DL — HIGH (ref 0.5–1.3)
CREAT SERPL-MCNC: 3.77 MG/DL — HIGH (ref 0.5–1.3)
CREAT SERPL-MCNC: 3.83 MG/DL — HIGH (ref 0.5–1.3)
CREAT SERPL-MCNC: 3.88 MG/DL — HIGH (ref 0.5–1.3)
CREAT SERPL-MCNC: 3.96 MG/DL — HIGH (ref 0.5–1.3)
CREAT SERPL-MCNC: 4.47 MG/DL — HIGH (ref 0.5–1.3)
CREAT SERPL-MCNC: 4.48 MG/DL — HIGH (ref 0.5–1.3)
CULTURE RESULTS: SIGNIFICANT CHANGE UP
DACRYOCYTES BLD QL SMEAR: SLIGHT — SIGNIFICANT CHANGE UP
DIALYSIS INSTRUMENT RESULT - HEPATITIS B SURFACE ANTIGEN: NEGATIVE — SIGNIFICANT CHANGE UP
DIALYSIS INSTRUMENT RESULT - HEPATITIS B SURFACE ANTIGEN: NEGATIVE — SIGNIFICANT CHANGE UP
EGFR: 14 ML/MIN/1.73M2 — LOW
EGFR: 14 ML/MIN/1.73M2 — LOW
EGFR: 17 ML/MIN/1.73M2 — LOW
EGFR: 18 ML/MIN/1.73M2 — LOW
EGFR: 18 ML/MIN/1.73M2 — LOW
EGFR: 19 ML/MIN/1.73M2 — LOW
EGFR: 20 ML/MIN/1.73M2 — LOW
EGFR: 20 ML/MIN/1.73M2 — LOW
EGFR: 21 ML/MIN/1.73M2 — LOW
EGFR: 23 ML/MIN/1.73M2 — LOW
EGFR: 24 ML/MIN/1.73M2 — LOW
EGFR: 25 ML/MIN/1.73M2 — LOW
EGFR: 26 ML/MIN/1.73M2 — LOW
EGFR: 26 ML/MIN/1.73M2 — LOW
EGFR: 27 ML/MIN/1.73M2 — LOW
EGFR: 28 ML/MIN/1.73M2 — LOW
EGFR: 29 ML/MIN/1.73M2 — LOW
EGFR: 31 ML/MIN/1.73M2 — LOW
EGFR: 31 ML/MIN/1.73M2 — LOW
EGFR: 32 ML/MIN/1.73M2 — LOW
EGFR: 35 ML/MIN/1.73M2 — LOW
EOSINOPHIL # BLD AUTO: 0 K/UL — SIGNIFICANT CHANGE UP (ref 0–0.5)
EOSINOPHIL # BLD AUTO: 0.01 K/UL — SIGNIFICANT CHANGE UP (ref 0–0.5)
EOSINOPHIL # BLD AUTO: 0.02 K/UL — SIGNIFICANT CHANGE UP (ref 0–0.5)
EOSINOPHIL # BLD AUTO: 0.03 K/UL — SIGNIFICANT CHANGE UP (ref 0–0.5)
EOSINOPHIL # BLD AUTO: 0.08 K/UL — SIGNIFICANT CHANGE UP (ref 0–0.5)
EOSINOPHIL # BLD AUTO: 0.08 K/UL — SIGNIFICANT CHANGE UP (ref 0–0.5)
EOSINOPHIL # BLD AUTO: 0.1 K/UL — SIGNIFICANT CHANGE UP (ref 0–0.5)
EOSINOPHIL # BLD AUTO: 0.11 K/UL — SIGNIFICANT CHANGE UP (ref 0–0.5)
EOSINOPHIL # BLD AUTO: 0.14 K/UL — SIGNIFICANT CHANGE UP (ref 0–0.5)
EOSINOPHIL # BLD AUTO: 0.16 K/UL — SIGNIFICANT CHANGE UP (ref 0–0.5)
EOSINOPHIL # BLD AUTO: 0.22 K/UL — SIGNIFICANT CHANGE UP (ref 0–0.5)
EOSINOPHIL # BLD AUTO: 0.23 K/UL — SIGNIFICANT CHANGE UP (ref 0–0.5)
EOSINOPHIL # BLD AUTO: 0.3 K/UL — SIGNIFICANT CHANGE UP (ref 0–0.5)
EOSINOPHIL # BLD AUTO: 0.31 K/UL — SIGNIFICANT CHANGE UP (ref 0–0.5)
EOSINOPHIL # BLD AUTO: 0.31 K/UL — SIGNIFICANT CHANGE UP (ref 0–0.5)
EOSINOPHIL # BLD AUTO: 0.44 K/UL — SIGNIFICANT CHANGE UP (ref 0–0.5)
EOSINOPHIL # BLD AUTO: 0.53 K/UL — HIGH (ref 0–0.5)
EOSINOPHIL # BLD AUTO: 0.54 K/UL — HIGH (ref 0–0.5)
EOSINOPHIL # FLD: 0 % — SIGNIFICANT CHANGE UP
EOSINOPHIL # FLD: 0 % — SIGNIFICANT CHANGE UP
EOSINOPHIL NFR BLD AUTO: 0 % — SIGNIFICANT CHANGE UP (ref 0–6)
EOSINOPHIL NFR BLD AUTO: 0.1 % — SIGNIFICANT CHANGE UP (ref 0–6)
EOSINOPHIL NFR BLD AUTO: 0.1 % — SIGNIFICANT CHANGE UP (ref 0–6)
EOSINOPHIL NFR BLD AUTO: 0.2 % — SIGNIFICANT CHANGE UP (ref 0–6)
EOSINOPHIL NFR BLD AUTO: 0.5 % — SIGNIFICANT CHANGE UP (ref 0–6)
EOSINOPHIL NFR BLD AUTO: 0.8 % — SIGNIFICANT CHANGE UP (ref 0–6)
EOSINOPHIL NFR BLD AUTO: 0.9 % — SIGNIFICANT CHANGE UP (ref 0–6)
EOSINOPHIL NFR BLD AUTO: 0.9 % — SIGNIFICANT CHANGE UP (ref 0–6)
EOSINOPHIL NFR BLD AUTO: 1.1 % — SIGNIFICANT CHANGE UP (ref 0–6)
EOSINOPHIL NFR BLD AUTO: 1.1 % — SIGNIFICANT CHANGE UP (ref 0–6)
EOSINOPHIL NFR BLD AUTO: 1.2 % — SIGNIFICANT CHANGE UP (ref 0–6)
EOSINOPHIL NFR BLD AUTO: 1.7 % — SIGNIFICANT CHANGE UP (ref 0–6)
EOSINOPHIL NFR BLD AUTO: 1.8 % — SIGNIFICANT CHANGE UP (ref 0–6)
EOSINOPHIL NFR BLD AUTO: 1.8 % — SIGNIFICANT CHANGE UP (ref 0–6)
EOSINOPHIL NFR BLD AUTO: 1.9 % — SIGNIFICANT CHANGE UP (ref 0–6)
EOSINOPHIL NFR BLD AUTO: 1.9 % — SIGNIFICANT CHANGE UP (ref 0–6)
EOSINOPHIL NFR BLD AUTO: 3.1 % — SIGNIFICANT CHANGE UP (ref 0–6)
EOSINOPHIL NFR BLD AUTO: 3.5 % — SIGNIFICANT CHANGE UP (ref 0–6)
EOSINOPHIL NFR BLD AUTO: 3.9 % — SIGNIFICANT CHANGE UP (ref 0–6)
EOSINOPHIL NFR BLD AUTO: 4.1 % — SIGNIFICANT CHANGE UP (ref 0–6)
ESTIMATED AVERAGE GLUCOSE: 91 — SIGNIFICANT CHANGE UP
ETHANOL SERPL-MCNC: <10 MG/DL — SIGNIFICANT CHANGE UP
FIBRINOGEN PPP-MCNC: 280 MG/DL — SIGNIFICANT CHANGE UP (ref 200–465)
FLUAV AG NPH QL: SIGNIFICANT CHANGE UP
FLUAV AG NPH QL: SIGNIFICANT CHANGE UP
FLUAV SUBTYP SPEC NAA+PROBE: SIGNIFICANT CHANGE UP
FLUAV SUBTYP SPEC NAA+PROBE: SIGNIFICANT CHANGE UP
FLUBV AG NPH QL: SIGNIFICANT CHANGE UP
FLUBV AG NPH QL: SIGNIFICANT CHANGE UP
FLUBV RNA SPEC QL NAA+PROBE: SIGNIFICANT CHANGE UP
FLUBV RNA SPEC QL NAA+PROBE: SIGNIFICANT CHANGE UP
FLUID INTAKE SUBSTANCE CLASS: SIGNIFICANT CHANGE UP
FOLATE SERPL-MCNC: 7.8 NG/ML — SIGNIFICANT CHANGE UP (ref 3.1–17.5)
FOLATE+VIT B12 SERBLD-IMP: 0 % — SIGNIFICANT CHANGE UP
FOLATE+VIT B12 SERBLD-IMP: 0 % — SIGNIFICANT CHANGE UP
GAS PNL BLDA: SIGNIFICANT CHANGE UP
GAS PNL BLDV: 132 MMOL/L — LOW (ref 136–145)
GAS PNL BLDV: 135 MMOL/L — LOW (ref 136–145)
GAS PNL BLDV: 135 MMOL/L — LOW (ref 136–145)
GAS PNL BLDV: 136 MMOL/L — SIGNIFICANT CHANGE UP (ref 136–145)
GAS PNL BLDV: 137 MMOL/L — SIGNIFICANT CHANGE UP (ref 136–145)
GAS PNL BLDV: 138 MMOL/L — SIGNIFICANT CHANGE UP (ref 136–145)
GAS PNL BLDV: SIGNIFICANT CHANGE UP
GGT SERPL-CCNC: 1458 U/L — HIGH (ref 8–61)
GIANT PLATELETS BLD QL SMEAR: PRESENT — SIGNIFICANT CHANGE UP
GLUCOSE BLDC GLUCOMTR-MCNC: 100 MG/DL — HIGH (ref 70–99)
GLUCOSE BLDC GLUCOMTR-MCNC: 101 MG/DL — HIGH (ref 70–99)
GLUCOSE BLDC GLUCOMTR-MCNC: 103 MG/DL — HIGH (ref 70–99)
GLUCOSE BLDC GLUCOMTR-MCNC: 108 MG/DL — HIGH (ref 70–99)
GLUCOSE BLDC GLUCOMTR-MCNC: 112 MG/DL — HIGH (ref 70–99)
GLUCOSE BLDC GLUCOMTR-MCNC: 112 MG/DL — HIGH (ref 70–99)
GLUCOSE BLDC GLUCOMTR-MCNC: 113 MG/DL — HIGH (ref 70–99)
GLUCOSE BLDC GLUCOMTR-MCNC: 116 MG/DL — HIGH (ref 70–99)
GLUCOSE BLDC GLUCOMTR-MCNC: 117 MG/DL — HIGH (ref 70–99)
GLUCOSE BLDC GLUCOMTR-MCNC: 118 MG/DL — HIGH (ref 70–99)
GLUCOSE BLDC GLUCOMTR-MCNC: 119 MG/DL — HIGH (ref 70–99)
GLUCOSE BLDC GLUCOMTR-MCNC: 121 MG/DL — HIGH (ref 70–99)
GLUCOSE BLDC GLUCOMTR-MCNC: 121 MG/DL — HIGH (ref 70–99)
GLUCOSE BLDC GLUCOMTR-MCNC: 126 MG/DL — HIGH (ref 70–99)
GLUCOSE BLDC GLUCOMTR-MCNC: 127 MG/DL — HIGH (ref 70–99)
GLUCOSE BLDC GLUCOMTR-MCNC: 127 MG/DL — HIGH (ref 70–99)
GLUCOSE BLDC GLUCOMTR-MCNC: 129 MG/DL — HIGH (ref 70–99)
GLUCOSE BLDC GLUCOMTR-MCNC: 135 MG/DL — HIGH (ref 70–99)
GLUCOSE BLDC GLUCOMTR-MCNC: 139 MG/DL — HIGH (ref 70–99)
GLUCOSE BLDC GLUCOMTR-MCNC: 143 MG/DL — HIGH (ref 70–99)
GLUCOSE BLDC GLUCOMTR-MCNC: 145 MG/DL — HIGH (ref 70–99)
GLUCOSE BLDC GLUCOMTR-MCNC: 149 MG/DL — HIGH (ref 70–99)
GLUCOSE BLDC GLUCOMTR-MCNC: 151 MG/DL — HIGH (ref 70–99)
GLUCOSE BLDC GLUCOMTR-MCNC: 153 MG/DL — HIGH (ref 70–99)
GLUCOSE BLDC GLUCOMTR-MCNC: 154 MG/DL — HIGH (ref 70–99)
GLUCOSE BLDC GLUCOMTR-MCNC: 159 MG/DL — HIGH (ref 70–99)
GLUCOSE BLDC GLUCOMTR-MCNC: 160 MG/DL — HIGH (ref 70–99)
GLUCOSE BLDC GLUCOMTR-MCNC: 180 MG/DL — HIGH (ref 70–99)
GLUCOSE BLDC GLUCOMTR-MCNC: 184 MG/DL — HIGH (ref 70–99)
GLUCOSE BLDC GLUCOMTR-MCNC: 185 MG/DL — HIGH (ref 70–99)
GLUCOSE BLDC GLUCOMTR-MCNC: 201 MG/DL — HIGH (ref 70–99)
GLUCOSE BLDC GLUCOMTR-MCNC: 222 MG/DL — HIGH (ref 70–99)
GLUCOSE BLDC GLUCOMTR-MCNC: 245 MG/DL — HIGH (ref 70–99)
GLUCOSE BLDC GLUCOMTR-MCNC: 295 MG/DL — HIGH (ref 70–99)
GLUCOSE BLDC GLUCOMTR-MCNC: 43 MG/DL — CRITICAL LOW (ref 70–99)
GLUCOSE BLDC GLUCOMTR-MCNC: 64 MG/DL — LOW (ref 70–99)
GLUCOSE BLDC GLUCOMTR-MCNC: 66 MG/DL — LOW (ref 70–99)
GLUCOSE BLDC GLUCOMTR-MCNC: 68 MG/DL — LOW (ref 70–99)
GLUCOSE BLDC GLUCOMTR-MCNC: 70 MG/DL — SIGNIFICANT CHANGE UP (ref 70–99)
GLUCOSE BLDC GLUCOMTR-MCNC: 73 MG/DL — SIGNIFICANT CHANGE UP (ref 70–99)
GLUCOSE BLDC GLUCOMTR-MCNC: 75 MG/DL — SIGNIFICANT CHANGE UP (ref 70–99)
GLUCOSE BLDC GLUCOMTR-MCNC: 76 MG/DL — SIGNIFICANT CHANGE UP (ref 70–99)
GLUCOSE BLDC GLUCOMTR-MCNC: 76 MG/DL — SIGNIFICANT CHANGE UP (ref 70–99)
GLUCOSE BLDC GLUCOMTR-MCNC: 78 MG/DL — SIGNIFICANT CHANGE UP (ref 70–99)
GLUCOSE BLDC GLUCOMTR-MCNC: 79 MG/DL — SIGNIFICANT CHANGE UP (ref 70–99)
GLUCOSE BLDC GLUCOMTR-MCNC: 79 MG/DL — SIGNIFICANT CHANGE UP (ref 70–99)
GLUCOSE BLDC GLUCOMTR-MCNC: 80 MG/DL — SIGNIFICANT CHANGE UP (ref 70–99)
GLUCOSE BLDC GLUCOMTR-MCNC: 81 MG/DL — SIGNIFICANT CHANGE UP (ref 70–99)
GLUCOSE BLDC GLUCOMTR-MCNC: 81 MG/DL — SIGNIFICANT CHANGE UP (ref 70–99)
GLUCOSE BLDC GLUCOMTR-MCNC: 82 MG/DL — SIGNIFICANT CHANGE UP (ref 70–99)
GLUCOSE BLDC GLUCOMTR-MCNC: 83 MG/DL — SIGNIFICANT CHANGE UP (ref 70–99)
GLUCOSE BLDC GLUCOMTR-MCNC: 84 MG/DL — SIGNIFICANT CHANGE UP (ref 70–99)
GLUCOSE BLDC GLUCOMTR-MCNC: 85 MG/DL — SIGNIFICANT CHANGE UP (ref 70–99)
GLUCOSE BLDC GLUCOMTR-MCNC: 85 MG/DL — SIGNIFICANT CHANGE UP (ref 70–99)
GLUCOSE BLDC GLUCOMTR-MCNC: 86 MG/DL — SIGNIFICANT CHANGE UP (ref 70–99)
GLUCOSE BLDC GLUCOMTR-MCNC: 86 MG/DL — SIGNIFICANT CHANGE UP (ref 70–99)
GLUCOSE BLDC GLUCOMTR-MCNC: 88 MG/DL — SIGNIFICANT CHANGE UP (ref 70–99)
GLUCOSE BLDC GLUCOMTR-MCNC: 90 MG/DL — SIGNIFICANT CHANGE UP (ref 70–99)
GLUCOSE BLDC GLUCOMTR-MCNC: 91 MG/DL — SIGNIFICANT CHANGE UP (ref 70–99)
GLUCOSE BLDC GLUCOMTR-MCNC: 92 MG/DL — SIGNIFICANT CHANGE UP (ref 70–99)
GLUCOSE BLDC GLUCOMTR-MCNC: 93 MG/DL — SIGNIFICANT CHANGE UP (ref 70–99)
GLUCOSE BLDC GLUCOMTR-MCNC: 93 MG/DL — SIGNIFICANT CHANGE UP (ref 70–99)
GLUCOSE BLDC GLUCOMTR-MCNC: 94 MG/DL — SIGNIFICANT CHANGE UP (ref 70–99)
GLUCOSE BLDC GLUCOMTR-MCNC: 95 MG/DL — SIGNIFICANT CHANGE UP (ref 70–99)
GLUCOSE BLDC GLUCOMTR-MCNC: 96 MG/DL — SIGNIFICANT CHANGE UP (ref 70–99)
GLUCOSE BLDC GLUCOMTR-MCNC: 97 MG/DL — SIGNIFICANT CHANGE UP (ref 70–99)
GLUCOSE BLDC GLUCOMTR-MCNC: 98 MG/DL — SIGNIFICANT CHANGE UP (ref 70–99)
GLUCOSE BLDC GLUCOMTR-MCNC: 99 MG/DL — SIGNIFICANT CHANGE UP (ref 70–99)
GLUCOSE BLDV-MCNC: 100 MG/DL — HIGH (ref 70–99)
GLUCOSE BLDV-MCNC: 118 MG/DL — HIGH (ref 70–99)
GLUCOSE BLDV-MCNC: 131 MG/DL — HIGH (ref 70–99)
GLUCOSE BLDV-MCNC: 174 MG/DL — HIGH (ref 70–99)
GLUCOSE BLDV-MCNC: 253 MG/DL — HIGH (ref 70–99)
GLUCOSE BLDV-MCNC: 74 MG/DL — SIGNIFICANT CHANGE UP (ref 70–99)
GLUCOSE BLDV-MCNC: 77 MG/DL — SIGNIFICANT CHANGE UP (ref 70–99)
GLUCOSE BLDV-MCNC: 98 MG/DL — SIGNIFICANT CHANGE UP (ref 70–99)
GLUCOSE FLD-MCNC: 71 MG/DL — SIGNIFICANT CHANGE UP
GLUCOSE SERPL-MCNC: 100 MG/DL — HIGH (ref 70–99)
GLUCOSE SERPL-MCNC: 102 MG/DL — HIGH (ref 70–99)
GLUCOSE SERPL-MCNC: 102 MG/DL — HIGH (ref 70–99)
GLUCOSE SERPL-MCNC: 104 MG/DL — HIGH (ref 70–99)
GLUCOSE SERPL-MCNC: 105 MG/DL — HIGH (ref 70–99)
GLUCOSE SERPL-MCNC: 106 MG/DL — HIGH (ref 70–99)
GLUCOSE SERPL-MCNC: 106 MG/DL — HIGH (ref 70–99)
GLUCOSE SERPL-MCNC: 110 MG/DL — HIGH (ref 70–99)
GLUCOSE SERPL-MCNC: 114 MG/DL — HIGH (ref 70–99)
GLUCOSE SERPL-MCNC: 115 MG/DL — HIGH (ref 70–99)
GLUCOSE SERPL-MCNC: 120 MG/DL — HIGH (ref 70–99)
GLUCOSE SERPL-MCNC: 122 MG/DL — HIGH (ref 70–99)
GLUCOSE SERPL-MCNC: 124 MG/DL — HIGH (ref 70–99)
GLUCOSE SERPL-MCNC: 124 MG/DL — HIGH (ref 70–99)
GLUCOSE SERPL-MCNC: 128 MG/DL — HIGH (ref 70–99)
GLUCOSE SERPL-MCNC: 130 MG/DL — HIGH (ref 70–99)
GLUCOSE SERPL-MCNC: 133 MG/DL — HIGH (ref 70–99)
GLUCOSE SERPL-MCNC: 151 MG/DL — HIGH (ref 70–99)
GLUCOSE SERPL-MCNC: 152 MG/DL — HIGH (ref 70–99)
GLUCOSE SERPL-MCNC: 187 MG/DL — HIGH (ref 70–99)
GLUCOSE SERPL-MCNC: 197 MG/DL — HIGH (ref 70–99)
GLUCOSE SERPL-MCNC: 252 MG/DL — HIGH (ref 70–99)
GLUCOSE SERPL-MCNC: 290 MG/DL — HIGH (ref 70–99)
GLUCOSE SERPL-MCNC: 66 MG/DL — LOW (ref 70–99)
GLUCOSE SERPL-MCNC: 67 MG/DL — LOW (ref 70–99)
GLUCOSE SERPL-MCNC: 67 MG/DL — LOW (ref 70–99)
GLUCOSE SERPL-MCNC: 69 MG/DL — LOW (ref 70–99)
GLUCOSE SERPL-MCNC: 74 MG/DL — SIGNIFICANT CHANGE UP (ref 70–99)
GLUCOSE SERPL-MCNC: 75 MG/DL — SIGNIFICANT CHANGE UP (ref 70–99)
GLUCOSE SERPL-MCNC: 76 MG/DL — SIGNIFICANT CHANGE UP (ref 70–99)
GLUCOSE SERPL-MCNC: 76 MG/DL — SIGNIFICANT CHANGE UP (ref 70–99)
GLUCOSE SERPL-MCNC: 80 MG/DL — SIGNIFICANT CHANGE UP (ref 70–99)
GLUCOSE SERPL-MCNC: 80 MG/DL — SIGNIFICANT CHANGE UP (ref 70–99)
GLUCOSE SERPL-MCNC: 81 MG/DL — SIGNIFICANT CHANGE UP (ref 70–99)
GLUCOSE SERPL-MCNC: 85 MG/DL — SIGNIFICANT CHANGE UP (ref 70–99)
GLUCOSE SERPL-MCNC: 91 MG/DL — SIGNIFICANT CHANGE UP (ref 70–99)
GLUCOSE SERPL-MCNC: 92 MG/DL — SIGNIFICANT CHANGE UP (ref 70–99)
GLUCOSE SERPL-MCNC: 92 MG/DL — SIGNIFICANT CHANGE UP (ref 70–99)
GLUCOSE SERPL-MCNC: 93 MG/DL — SIGNIFICANT CHANGE UP (ref 70–99)
GLUCOSE SERPL-MCNC: 97 MG/DL — SIGNIFICANT CHANGE UP (ref 70–99)
GRAM STN FLD: SIGNIFICANT CHANGE UP
HADV DNA SPEC QL NAA+PROBE: SIGNIFICANT CHANGE UP
HADV DNA SPEC QL NAA+PROBE: SIGNIFICANT CHANGE UP
HAV IGM SER-ACNC: SIGNIFICANT CHANGE UP
HBV CORE AB SER-ACNC: SIGNIFICANT CHANGE UP
HBV CORE IGM SER-ACNC: REACTIVE
HBV SURFACE AB SER-ACNC: 45.4 MIU/ML — SIGNIFICANT CHANGE UP
HBV SURFACE AG SER-ACNC: SIGNIFICANT CHANGE UP
HBV SURFACE AG SER-ACNC: SIGNIFICANT CHANGE UP
HCO3 BLDA-SCNC: 23 MMOL/L — SIGNIFICANT CHANGE UP (ref 21–28)
HCO3 BLDA-SCNC: 25 MMOL/L — SIGNIFICANT CHANGE UP (ref 21–28)
HCO3 BLDA-SCNC: 26 MMOL/L — SIGNIFICANT CHANGE UP (ref 21–28)
HCO3 BLDA-SCNC: 27 MMOL/L — SIGNIFICANT CHANGE UP (ref 21–28)
HCO3 BLDA-SCNC: 27 MMOL/L — SIGNIFICANT CHANGE UP (ref 21–28)
HCO3 BLDA-SCNC: 28 MMOL/L — SIGNIFICANT CHANGE UP (ref 21–28)
HCO3 BLDA-SCNC: 29 MMOL/L — HIGH (ref 21–28)
HCO3 BLDV-SCNC: 22 MMOL/L — SIGNIFICANT CHANGE UP (ref 22–29)
HCO3 BLDV-SCNC: 24 MMOL/L — SIGNIFICANT CHANGE UP (ref 22–29)
HCO3 BLDV-SCNC: 27 MMOL/L — SIGNIFICANT CHANGE UP (ref 22–29)
HCO3 BLDV-SCNC: 28 MMOL/L — SIGNIFICANT CHANGE UP (ref 22–29)
HCO3 BLDV-SCNC: 28 MMOL/L — SIGNIFICANT CHANGE UP (ref 22–29)
HCO3 BLDV-SCNC: 30 MMOL/L — HIGH (ref 22–29)
HCO3 BLDV-SCNC: 31 MMOL/L — HIGH (ref 22–29)
HCO3 BLDV-SCNC: 31 MMOL/L — HIGH (ref 22–29)
HCOV 229E RNA SPEC QL NAA+PROBE: SIGNIFICANT CHANGE UP
HCOV 229E RNA SPEC QL NAA+PROBE: SIGNIFICANT CHANGE UP
HCOV HKU1 RNA SPEC QL NAA+PROBE: SIGNIFICANT CHANGE UP
HCOV HKU1 RNA SPEC QL NAA+PROBE: SIGNIFICANT CHANGE UP
HCOV NL63 RNA SPEC QL NAA+PROBE: SIGNIFICANT CHANGE UP
HCOV NL63 RNA SPEC QL NAA+PROBE: SIGNIFICANT CHANGE UP
HCOV OC43 RNA SPEC QL NAA+PROBE: SIGNIFICANT CHANGE UP
HCOV OC43 RNA SPEC QL NAA+PROBE: SIGNIFICANT CHANGE UP
HCT VFR BLD CALC: 24.3 % — LOW (ref 39–50)
HCT VFR BLD CALC: 24.3 % — LOW (ref 39–50)
HCT VFR BLD CALC: 25.2 % — LOW (ref 39–50)
HCT VFR BLD CALC: 25.5 % — LOW (ref 39–50)
HCT VFR BLD CALC: 26.6 % — LOW (ref 39–50)
HCT VFR BLD CALC: 27.1 % — LOW (ref 39–50)
HCT VFR BLD CALC: 27.5 % — LOW (ref 39–50)
HCT VFR BLD CALC: 27.8 % — LOW (ref 39–50)
HCT VFR BLD CALC: 28 % — LOW (ref 39–50)
HCT VFR BLD CALC: 28.2 % — LOW (ref 39–50)
HCT VFR BLD CALC: 28.3 % — LOW (ref 39–50)
HCT VFR BLD CALC: 28.4 % — LOW (ref 39–50)
HCT VFR BLD CALC: 28.6 % — LOW (ref 39–50)
HCT VFR BLD CALC: 28.8 % — LOW (ref 39–50)
HCT VFR BLD CALC: 28.8 % — LOW (ref 39–50)
HCT VFR BLD CALC: 28.9 % — LOW (ref 39–50)
HCT VFR BLD CALC: 29 % — LOW (ref 39–50)
HCT VFR BLD CALC: 29 % — LOW (ref 39–50)
HCT VFR BLD CALC: 29.1 % — LOW (ref 39–50)
HCT VFR BLD CALC: 29.3 % — LOW (ref 39–50)
HCT VFR BLD CALC: 29.6 % — LOW (ref 39–50)
HCT VFR BLD CALC: 29.8 % — LOW (ref 39–50)
HCT VFR BLD CALC: 29.8 % — LOW (ref 39–50)
HCT VFR BLD CALC: 30.1 % — LOW (ref 39–50)
HCT VFR BLD CALC: 30.1 % — LOW (ref 39–50)
HCT VFR BLD CALC: 30.2 % — LOW (ref 39–50)
HCT VFR BLD CALC: 30.3 % — LOW (ref 39–50)
HCT VFR BLD CALC: 30.4 % — LOW (ref 39–50)
HCT VFR BLD CALC: 30.7 % — LOW (ref 39–50)
HCT VFR BLD CALC: 30.8 % — LOW (ref 39–50)
HCT VFR BLD CALC: 30.9 % — LOW (ref 39–50)
HCT VFR BLD CALC: 30.9 % — LOW (ref 39–50)
HCT VFR BLD CALC: 31.1 % — LOW (ref 39–50)
HCT VFR BLD CALC: 31.3 % — LOW (ref 39–50)
HCT VFR BLD CALC: 32 % — LOW (ref 39–50)
HCT VFR BLD CALC: 32.5 % — LOW (ref 39–50)
HCT VFR BLD CALC: 32.7 % — LOW (ref 39–50)
HCT VFR BLD CALC: 33.2 % — LOW (ref 39–50)
HCT VFR BLD CALC: 35.8 % — LOW (ref 39–50)
HCT VFR BLD CALC: 36 % — LOW (ref 39–50)
HCT VFR BLD CALC: 36.7 % — LOW (ref 39–50)
HCT VFR BLD CALC: 37.7 % — LOW (ref 39–50)
HCT VFR BLDA CALC: 27 % — LOW (ref 39–51)
HCT VFR BLDA CALC: 27 % — LOW (ref 39–51)
HCT VFR BLDA CALC: 28 % — LOW (ref 39–51)
HCT VFR BLDA CALC: 29 % — LOW (ref 39–51)
HCT VFR BLDA CALC: 29 % — LOW (ref 39–51)
HCT VFR BLDA CALC: 30 % — LOW (ref 39–51)
HCT VFR BLDA CALC: 31 % — LOW (ref 39–51)
HCT VFR BLDA CALC: 32 % — LOW (ref 39–51)
HCV AB S/CO SERPL IA: 0.16 S/CO — SIGNIFICANT CHANGE UP (ref 0–0.99)
HCV AB S/CO SERPL IA: 0.21 S/CO — SIGNIFICANT CHANGE UP (ref 0–0.99)
HCV AB SERPL-IMP: SIGNIFICANT CHANGE UP
HCV AB SERPL-IMP: SIGNIFICANT CHANGE UP
HGB BLD CALC-MCNC: 10.2 G/DL — LOW (ref 13–17)
HGB BLD CALC-MCNC: 10.5 G/DL — LOW (ref 13–17)
HGB BLD CALC-MCNC: 9.1 G/DL — LOW (ref 12.6–17.4)
HGB BLD CALC-MCNC: 9.1 G/DL — LOW (ref 12.6–17.4)
HGB BLD CALC-MCNC: 9.2 G/DL — LOW (ref 12.6–17.4)
HGB BLD CALC-MCNC: 9.6 G/DL — LOW (ref 13–17)
HGB BLD CALC-MCNC: 9.7 G/DL — LOW (ref 12.6–17.4)
HGB BLD CALC-MCNC: 9.9 G/DL — LOW (ref 12.6–17.4)
HGB BLD-MCNC: 10.2 G/DL — LOW (ref 13–17)
HGB BLD-MCNC: 10.5 G/DL — LOW (ref 13–17)
HGB BLD-MCNC: 10.9 G/DL — LOW (ref 13–17)
HGB BLD-MCNC: 10.9 G/DL — LOW (ref 13–17)
HGB BLD-MCNC: 11.4 G/DL — LOW (ref 13–17)
HGB BLD-MCNC: 7.6 G/DL — LOW (ref 13–17)
HGB BLD-MCNC: 7.8 G/DL — LOW (ref 13–17)
HGB BLD-MCNC: 8 G/DL — LOW (ref 13–17)
HGB BLD-MCNC: 8 G/DL — LOW (ref 13–17)
HGB BLD-MCNC: 8.2 G/DL — LOW (ref 13–17)
HGB BLD-MCNC: 8.3 G/DL — LOW (ref 13–17)
HGB BLD-MCNC: 8.6 G/DL — LOW (ref 13–17)
HGB BLD-MCNC: 8.7 G/DL — LOW (ref 13–17)
HGB BLD-MCNC: 8.8 G/DL — LOW (ref 13–17)
HGB BLD-MCNC: 8.9 G/DL — LOW (ref 13–17)
HGB BLD-MCNC: 8.9 G/DL — LOW (ref 13–17)
HGB BLD-MCNC: 9 G/DL — LOW (ref 13–17)
HGB BLD-MCNC: 9.1 G/DL — LOW (ref 13–17)
HGB BLD-MCNC: 9.2 G/DL — LOW (ref 13–17)
HGB BLD-MCNC: 9.2 G/DL — LOW (ref 13–17)
HGB BLD-MCNC: 9.3 G/DL — LOW (ref 13–17)
HGB BLD-MCNC: 9.4 G/DL — LOW (ref 13–17)
HGB BLD-MCNC: 9.5 G/DL — LOW (ref 13–17)
HGB BLD-MCNC: 9.6 G/DL — LOW (ref 13–17)
HGB BLD-MCNC: 9.7 G/DL — LOW (ref 13–17)
HGB BLD-MCNC: 9.9 G/DL — LOW (ref 13–17)
HGB BLD-MCNC: 9.9 G/DL — LOW (ref 13–17)
HIV 1+2 AB+HIV1 P24 AG SERPL QL IA: SIGNIFICANT CHANGE UP
HMPV RNA SPEC QL NAA+PROBE: SIGNIFICANT CHANGE UP
HMPV RNA SPEC QL NAA+PROBE: SIGNIFICANT CHANGE UP
HOROWITZ INDEX BLDA+IHG-RTO: 40 — SIGNIFICANT CHANGE UP
HPIV1 RNA SPEC QL NAA+PROBE: SIGNIFICANT CHANGE UP
HPIV1 RNA SPEC QL NAA+PROBE: SIGNIFICANT CHANGE UP
HPIV2 RNA SPEC QL NAA+PROBE: SIGNIFICANT CHANGE UP
HPIV2 RNA SPEC QL NAA+PROBE: SIGNIFICANT CHANGE UP
HPIV3 RNA SPEC QL NAA+PROBE: SIGNIFICANT CHANGE UP
HPIV3 RNA SPEC QL NAA+PROBE: SIGNIFICANT CHANGE UP
HPIV4 RNA SPEC QL NAA+PROBE: SIGNIFICANT CHANGE UP
HPIV4 RNA SPEC QL NAA+PROBE: SIGNIFICANT CHANGE UP
HYPOCHROMIA BLD QL: SIGNIFICANT CHANGE UP
IANC: 10.05 K/UL — HIGH (ref 1.8–7.4)
IANC: 10.06 K/UL — HIGH (ref 1.8–7.4)
IANC: 10.23 K/UL — HIGH (ref 1.8–7.4)
IANC: 10.32 K/UL — HIGH (ref 1.8–7.4)
IANC: 10.54 K/UL — HIGH (ref 1.8–7.4)
IANC: 10.82 K/UL — HIGH (ref 1.8–7.4)
IANC: 11.05 K/UL — HIGH (ref 1.8–7.4)
IANC: 11.77 K/UL — HIGH (ref 1.8–7.4)
IANC: 13.58 K/UL — HIGH (ref 1.8–7.4)
IANC: 13.71 K/UL — HIGH (ref 1.8–7.4)
IANC: 17.39 K/UL — HIGH (ref 1.8–7.4)
IANC: 20.07 K/UL — HIGH (ref 1.8–7.4)
IANC: 23 K/UL — HIGH (ref 1.8–7.4)
IANC: 24.54 K/UL — HIGH (ref 1.8–7.4)
IANC: 25.7 K/UL — HIGH (ref 1.8–7.4)
IANC: 25.94 K/UL — HIGH (ref 1.8–7.4)
IANC: 28.32 K/UL — HIGH (ref 1.8–7.4)
IANC: 4.97 K/UL — SIGNIFICANT CHANGE UP (ref 1.8–7.4)
IANC: 5.18 K/UL — SIGNIFICANT CHANGE UP (ref 1.8–7.4)
IANC: 5.84 K/UL — SIGNIFICANT CHANGE UP (ref 1.8–7.4)
IANC: 5.97 K/UL — SIGNIFICANT CHANGE UP (ref 1.8–7.4)
IANC: 6.19 K/UL — SIGNIFICANT CHANGE UP (ref 1.8–7.4)
IANC: 6.76 K/UL — SIGNIFICANT CHANGE UP (ref 1.8–7.4)
IANC: 7.65 K/UL — HIGH (ref 1.8–7.4)
IANC: 9.04 K/UL — HIGH (ref 1.8–7.4)
IANC: 9.2 K/UL — HIGH (ref 1.8–7.4)
IANC: 9.67 K/UL — HIGH (ref 1.8–7.4)
IANC: 9.87 K/UL — HIGH (ref 1.8–7.4)
IGA FLD-MCNC: 670 MG/DL — HIGH (ref 70–400)
IGG FLD-MCNC: 1361 MG/DL — SIGNIFICANT CHANGE UP (ref 700–1600)
IGM SERPL-MCNC: 183 MG/DL — SIGNIFICANT CHANGE UP (ref 40–230)
IMM GRANULOCYTES NFR BLD AUTO: 0.4 % — SIGNIFICANT CHANGE UP (ref 0–0.9)
IMM GRANULOCYTES NFR BLD AUTO: 0.5 % — SIGNIFICANT CHANGE UP (ref 0–0.9)
IMM GRANULOCYTES NFR BLD AUTO: 0.6 % — SIGNIFICANT CHANGE UP (ref 0–0.9)
IMM GRANULOCYTES NFR BLD AUTO: 0.7 % — SIGNIFICANT CHANGE UP (ref 0–0.9)
IMM GRANULOCYTES NFR BLD AUTO: 0.8 % — SIGNIFICANT CHANGE UP (ref 0–0.9)
IMM GRANULOCYTES NFR BLD AUTO: 0.9 % — SIGNIFICANT CHANGE UP (ref 0–0.9)
IMM GRANULOCYTES NFR BLD AUTO: 0.9 % — SIGNIFICANT CHANGE UP (ref 0–0.9)
IMM GRANULOCYTES NFR BLD AUTO: 1 % — HIGH (ref 0–0.9)
IMM GRANULOCYTES NFR BLD AUTO: 1 % — HIGH (ref 0–0.9)
IMM GRANULOCYTES NFR BLD AUTO: 1.3 % — HIGH (ref 0–0.9)
IMM GRANULOCYTES NFR BLD AUTO: 1.3 % — HIGH (ref 0–0.9)
IMM GRANULOCYTES NFR BLD AUTO: 1.5 % — HIGH (ref 0–0.9)
IMM GRANULOCYTES NFR BLD AUTO: 1.5 % — HIGH (ref 0–0.9)
IMM GRANULOCYTES NFR BLD AUTO: 1.6 % — HIGH (ref 0–0.9)
IMM GRANULOCYTES NFR BLD AUTO: 1.6 % — HIGH (ref 0–0.9)
IMM GRANULOCYTES NFR BLD AUTO: 2.3 % — HIGH (ref 0–0.9)
IMM GRANULOCYTES NFR BLD AUTO: 3.4 % — HIGH (ref 0–0.9)
INR BLD: 0.98 RATIO — SIGNIFICANT CHANGE UP (ref 0.88–1.16)
INR BLD: 1.01 RATIO — SIGNIFICANT CHANGE UP (ref 0.88–1.16)
INR BLD: 1.06 RATIO — SIGNIFICANT CHANGE UP (ref 0.88–1.16)
INR BLD: 1.09 RATIO — SIGNIFICANT CHANGE UP (ref 0.88–1.16)
INR BLD: 1.1 RATIO — SIGNIFICANT CHANGE UP (ref 0.88–1.16)
INR BLD: 1.1 RATIO — SIGNIFICANT CHANGE UP (ref 0.88–1.16)
INR BLD: 1.15 RATIO — SIGNIFICANT CHANGE UP (ref 0.88–1.16)
INR BLD: 1.18 RATIO — HIGH (ref 0.88–1.16)
INR BLD: 1.18 RATIO — HIGH (ref 0.88–1.16)
INR BLD: 1.21 RATIO — HIGH (ref 0.88–1.16)
INR BLD: 1.24 RATIO — HIGH (ref 0.88–1.16)
INR BLD: 1.25 RATIO — HIGH (ref 0.88–1.16)
INR BLD: 1.36 RATIO — HIGH (ref 0.88–1.16)
INR BLD: 1.36 RATIO — HIGH (ref 0.88–1.16)
INR BLD: 1.37 RATIO — HIGH (ref 0.88–1.16)
INR BLD: 1.43 RATIO — HIGH (ref 0.88–1.16)
INR BLD: 1.55 RATIO — HIGH (ref 0.88–1.16)
INR BLD: 1.59 RATIO — HIGH (ref 0.88–1.16)
INR BLD: 1.6 RATIO — HIGH (ref 0.88–1.16)
INR BLD: 1.66 RATIO — HIGH (ref 0.88–1.16)
INR BLD: 1.7 RATIO — HIGH (ref 0.88–1.16)
INR BLD: 1.7 RATIO — HIGH (ref 0.88–1.16)
INR BLD: 1.72 RATIO — HIGH (ref 0.88–1.16)
LACTATE BLDV-MCNC: 0.5 MMOL/L — SIGNIFICANT CHANGE UP (ref 0.5–2)
LACTATE BLDV-MCNC: 0.5 MMOL/L — SIGNIFICANT CHANGE UP (ref 0.5–2)
LACTATE BLDV-MCNC: 0.7 MMOL/L — SIGNIFICANT CHANGE UP (ref 0.5–2)
LACTATE BLDV-MCNC: 0.8 MMOL/L — SIGNIFICANT CHANGE UP (ref 0.5–2)
LACTATE BLDV-MCNC: 0.8 MMOL/L — SIGNIFICANT CHANGE UP (ref 0.5–2)
LACTATE BLDV-MCNC: 1.8 MMOL/L — SIGNIFICANT CHANGE UP (ref 0.5–2)
LACTATE BLDV-MCNC: 2.7 MMOL/L — HIGH (ref 0.5–2)
LACTATE BLDV-MCNC: 4 MMOL/L — CRITICAL HIGH (ref 0.5–2)
LACTATE SERPL-SCNC: 0.6 MMOL/L — SIGNIFICANT CHANGE UP (ref 0.5–2)
LACTATE SERPL-SCNC: 0.7 MMOL/L — SIGNIFICANT CHANGE UP (ref 0.5–2)
LACTATE SERPL-SCNC: 0.9 MMOL/L — SIGNIFICANT CHANGE UP (ref 0.5–2)
LACTATE SERPL-SCNC: 1 MMOL/L — SIGNIFICANT CHANGE UP (ref 0.5–2)
LACTATE SERPL-SCNC: 1.2 MMOL/L — SIGNIFICANT CHANGE UP (ref 0.5–2)
LACTATE SERPL-SCNC: 1.7 MMOL/L — SIGNIFICANT CHANGE UP (ref 0.5–2)
LACTATE SERPL-SCNC: 2.1 MMOL/L — HIGH (ref 0.5–2)
LACTATE SERPL-SCNC: 2.8 MMOL/L — HIGH (ref 0.5–2)
LACTATE SERPL-SCNC: 3.2 MMOL/L — HIGH (ref 0.5–2)
LDH SERPL L TO P-CCNC: 96 U/L — SIGNIFICANT CHANGE UP
LIDOCAIN IGE QN: 24 U/L — SIGNIFICANT CHANGE UP (ref 7–60)
LIDOCAIN IGE QN: 39 U/L — SIGNIFICANT CHANGE UP (ref 7–60)
LKM AB SER-ACNC: <20.1 UNITS — SIGNIFICANT CHANGE UP (ref 0–20)
LYMPHOCYTES # BLD AUTO: 0.12 K/UL — LOW (ref 1–3.3)
LYMPHOCYTES # BLD AUTO: 0.24 K/UL — LOW (ref 1–3.3)
LYMPHOCYTES # BLD AUTO: 0.35 K/UL — LOW (ref 1–3.3)
LYMPHOCYTES # BLD AUTO: 0.53 K/UL — LOW (ref 1–3.3)
LYMPHOCYTES # BLD AUTO: 0.6 K/UL — LOW (ref 1–3.3)
LYMPHOCYTES # BLD AUTO: 0.72 K/UL — LOW (ref 1–3.3)
LYMPHOCYTES # BLD AUTO: 0.76 K/UL — LOW (ref 1–3.3)
LYMPHOCYTES # BLD AUTO: 0.84 K/UL — LOW (ref 1–3.3)
LYMPHOCYTES # BLD AUTO: 0.9 % — LOW (ref 13–44)
LYMPHOCYTES # BLD AUTO: 0.91 K/UL — LOW (ref 1–3.3)
LYMPHOCYTES # BLD AUTO: 0.97 K/UL — LOW (ref 1–3.3)
LYMPHOCYTES # BLD AUTO: 0.97 K/UL — LOW (ref 1–3.3)
LYMPHOCYTES # BLD AUTO: 0.98 K/UL — LOW (ref 1–3.3)
LYMPHOCYTES # BLD AUTO: 1.05 K/UL — SIGNIFICANT CHANGE UP (ref 1–3.3)
LYMPHOCYTES # BLD AUTO: 1.1 K/UL — SIGNIFICANT CHANGE UP (ref 1–3.3)
LYMPHOCYTES # BLD AUTO: 1.16 K/UL — SIGNIFICANT CHANGE UP (ref 1–3.3)
LYMPHOCYTES # BLD AUTO: 1.2 K/UL — SIGNIFICANT CHANGE UP (ref 1–3.3)
LYMPHOCYTES # BLD AUTO: 1.32 K/UL — SIGNIFICANT CHANGE UP (ref 1–3.3)
LYMPHOCYTES # BLD AUTO: 1.37 K/UL — SIGNIFICANT CHANGE UP (ref 1–3.3)
LYMPHOCYTES # BLD AUTO: 1.43 K/UL — SIGNIFICANT CHANGE UP (ref 1–3.3)
LYMPHOCYTES # BLD AUTO: 1.44 K/UL — SIGNIFICANT CHANGE UP (ref 1–3.3)
LYMPHOCYTES # BLD AUTO: 1.48 K/UL — SIGNIFICANT CHANGE UP (ref 1–3.3)
LYMPHOCYTES # BLD AUTO: 1.52 K/UL — SIGNIFICANT CHANGE UP (ref 1–3.3)
LYMPHOCYTES # BLD AUTO: 1.55 K/UL — SIGNIFICANT CHANGE UP (ref 1–3.3)
LYMPHOCYTES # BLD AUTO: 1.56 K/UL — SIGNIFICANT CHANGE UP (ref 1–3.3)
LYMPHOCYTES # BLD AUTO: 1.62 K/UL — SIGNIFICANT CHANGE UP (ref 1–3.3)
LYMPHOCYTES # BLD AUTO: 1.67 K/UL — SIGNIFICANT CHANGE UP (ref 1–3.3)
LYMPHOCYTES # BLD AUTO: 1.7 % — LOW (ref 13–44)
LYMPHOCYTES # BLD AUTO: 1.8 % — LOW (ref 13–44)
LYMPHOCYTES # BLD AUTO: 10.5 % — LOW (ref 13–44)
LYMPHOCYTES # BLD AUTO: 10.5 % — LOW (ref 13–44)
LYMPHOCYTES # BLD AUTO: 11 % — LOW (ref 13–44)
LYMPHOCYTES # BLD AUTO: 11.4 % — LOW (ref 13–44)
LYMPHOCYTES # BLD AUTO: 11.5 % — LOW (ref 13–44)
LYMPHOCYTES # BLD AUTO: 11.8 % — LOW (ref 13–44)
LYMPHOCYTES # BLD AUTO: 13.3 % — SIGNIFICANT CHANGE UP (ref 13–44)
LYMPHOCYTES # BLD AUTO: 14.4 % — SIGNIFICANT CHANGE UP (ref 13–44)
LYMPHOCYTES # BLD AUTO: 14.6 % — SIGNIFICANT CHANGE UP (ref 13–44)
LYMPHOCYTES # BLD AUTO: 15.8 % — SIGNIFICANT CHANGE UP (ref 13–44)
LYMPHOCYTES # BLD AUTO: 16.4 % — SIGNIFICANT CHANGE UP (ref 13–44)
LYMPHOCYTES # BLD AUTO: 2.4 % — LOW (ref 13–44)
LYMPHOCYTES # BLD AUTO: 3 % — LOW (ref 13–44)
LYMPHOCYTES # BLD AUTO: 3.1 % — LOW (ref 13–44)
LYMPHOCYTES # BLD AUTO: 3.9 % — LOW (ref 13–44)
LYMPHOCYTES # BLD AUTO: 4.3 % — LOW (ref 13–44)
LYMPHOCYTES # BLD AUTO: 5.3 % — LOW (ref 13–44)
LYMPHOCYTES # BLD AUTO: 5.8 % — LOW (ref 13–44)
LYMPHOCYTES # BLD AUTO: 8.2 % — LOW (ref 13–44)
LYMPHOCYTES # BLD AUTO: 8.5 % — LOW (ref 13–44)
LYMPHOCYTES # BLD AUTO: 8.5 % — LOW (ref 13–44)
LYMPHOCYTES # BLD AUTO: 9 % — LOW (ref 13–44)
LYMPHOCYTES # BLD AUTO: 9.2 % — LOW (ref 13–44)
LYMPHOCYTES # BLD AUTO: 9.4 % — LOW (ref 13–44)
LYMPHOCYTES # BLD AUTO: 9.7 % — LOW (ref 13–44)
LYMPHOCYTES # FLD: 0 % — SIGNIFICANT CHANGE UP
LYMPHOCYTES # FLD: 1 % — SIGNIFICANT CHANGE UP
LYMPHOCYTES # FLD: 16 % — SIGNIFICANT CHANGE UP
M PNEUMO DNA SPEC QL NAA+PROBE: SIGNIFICANT CHANGE UP
M PNEUMO DNA SPEC QL NAA+PROBE: SIGNIFICANT CHANGE UP
MACROCYTES BLD QL: SLIGHT — SIGNIFICANT CHANGE UP
MAGNESIUM SERPL-MCNC: 1.8 MG/DL — SIGNIFICANT CHANGE UP (ref 1.6–2.6)
MAGNESIUM SERPL-MCNC: 1.8 MG/DL — SIGNIFICANT CHANGE UP (ref 1.6–2.6)
MAGNESIUM SERPL-MCNC: 1.9 MG/DL — SIGNIFICANT CHANGE UP (ref 1.6–2.6)
MAGNESIUM SERPL-MCNC: 2 MG/DL — SIGNIFICANT CHANGE UP (ref 1.6–2.6)
MAGNESIUM SERPL-MCNC: 2.1 MG/DL — SIGNIFICANT CHANGE UP (ref 1.6–2.6)
MAGNESIUM SERPL-MCNC: 2.2 MG/DL — SIGNIFICANT CHANGE UP (ref 1.6–2.6)
MAGNESIUM SERPL-MCNC: 2.3 MG/DL — SIGNIFICANT CHANGE UP (ref 1.6–2.6)
MAGNESIUM SERPL-MCNC: 2.4 MG/DL — SIGNIFICANT CHANGE UP (ref 1.6–2.6)
MAGNESIUM SERPL-MCNC: 2.5 MG/DL — SIGNIFICANT CHANGE UP (ref 1.6–2.6)
MAGNESIUM SERPL-MCNC: 2.7 MG/DL — HIGH (ref 1.6–2.6)
MANUAL SMEAR VERIFICATION: SIGNIFICANT CHANGE UP
MANUAL SMEAR VERIFICATION: SIGNIFICANT CHANGE UP
MCHC RBC-ENTMCNC: 27.8 GM/DL — LOW (ref 32–36)
MCHC RBC-ENTMCNC: 28.2 PG — SIGNIFICANT CHANGE UP (ref 27–34)
MCHC RBC-ENTMCNC: 28.3 PG — SIGNIFICANT CHANGE UP (ref 27–34)
MCHC RBC-ENTMCNC: 28.5 GM/DL — LOW (ref 32–36)
MCHC RBC-ENTMCNC: 28.5 PG — SIGNIFICANT CHANGE UP (ref 27–34)
MCHC RBC-ENTMCNC: 28.6 PG — SIGNIFICANT CHANGE UP (ref 27–34)
MCHC RBC-ENTMCNC: 28.8 PG — SIGNIFICANT CHANGE UP (ref 27–34)
MCHC RBC-ENTMCNC: 28.8 PG — SIGNIFICANT CHANGE UP (ref 27–34)
MCHC RBC-ENTMCNC: 28.9 PG — SIGNIFICANT CHANGE UP (ref 27–34)
MCHC RBC-ENTMCNC: 28.9 PG — SIGNIFICANT CHANGE UP (ref 27–34)
MCHC RBC-ENTMCNC: 29 PG — SIGNIFICANT CHANGE UP (ref 27–34)
MCHC RBC-ENTMCNC: 29 PG — SIGNIFICANT CHANGE UP (ref 27–34)
MCHC RBC-ENTMCNC: 29.1 PG — SIGNIFICANT CHANGE UP (ref 27–34)
MCHC RBC-ENTMCNC: 29.2 GM/DL — LOW (ref 32–36)
MCHC RBC-ENTMCNC: 29.2 GM/DL — LOW (ref 32–36)
MCHC RBC-ENTMCNC: 29.2 PG — SIGNIFICANT CHANGE UP (ref 27–34)
MCHC RBC-ENTMCNC: 29.3 PG — SIGNIFICANT CHANGE UP (ref 27–34)
MCHC RBC-ENTMCNC: 29.3 PG — SIGNIFICANT CHANGE UP (ref 27–34)
MCHC RBC-ENTMCNC: 29.4 GM/DL — LOW (ref 32–36)
MCHC RBC-ENTMCNC: 29.4 PG — SIGNIFICANT CHANGE UP (ref 27–34)
MCHC RBC-ENTMCNC: 29.5 PG — SIGNIFICANT CHANGE UP (ref 27–34)
MCHC RBC-ENTMCNC: 29.5 PG — SIGNIFICANT CHANGE UP (ref 27–34)
MCHC RBC-ENTMCNC: 29.6 GM/DL — LOW (ref 32–36)
MCHC RBC-ENTMCNC: 29.6 PG — SIGNIFICANT CHANGE UP (ref 27–34)
MCHC RBC-ENTMCNC: 29.7 GM/DL — LOW (ref 32–36)
MCHC RBC-ENTMCNC: 29.7 PG — SIGNIFICANT CHANGE UP (ref 27–34)
MCHC RBC-ENTMCNC: 29.8 PG — SIGNIFICANT CHANGE UP (ref 27–34)
MCHC RBC-ENTMCNC: 29.9 PG — SIGNIFICANT CHANGE UP (ref 27–34)
MCHC RBC-ENTMCNC: 30 GM/DL — LOW (ref 32–36)
MCHC RBC-ENTMCNC: 30 PG — SIGNIFICANT CHANGE UP (ref 27–34)
MCHC RBC-ENTMCNC: 30.1 GM/DL — LOW (ref 32–36)
MCHC RBC-ENTMCNC: 30.1 PG — SIGNIFICANT CHANGE UP (ref 27–34)
MCHC RBC-ENTMCNC: 30.1 PG — SIGNIFICANT CHANGE UP (ref 27–34)
MCHC RBC-ENTMCNC: 30.2 GM/DL — LOW (ref 32–36)
MCHC RBC-ENTMCNC: 30.2 PG — SIGNIFICANT CHANGE UP (ref 27–34)
MCHC RBC-ENTMCNC: 30.3 GM/DL — LOW (ref 32–36)
MCHC RBC-ENTMCNC: 30.3 PG — SIGNIFICANT CHANGE UP (ref 27–34)
MCHC RBC-ENTMCNC: 30.4 GM/DL — LOW (ref 32–36)
MCHC RBC-ENTMCNC: 30.5 GM/DL — LOW (ref 32–36)
MCHC RBC-ENTMCNC: 30.5 PG — SIGNIFICANT CHANGE UP (ref 27–34)
MCHC RBC-ENTMCNC: 30.6 GM/DL — LOW (ref 32–36)
MCHC RBC-ENTMCNC: 30.6 GM/DL — LOW (ref 32–36)
MCHC RBC-ENTMCNC: 30.6 PG — SIGNIFICANT CHANGE UP (ref 27–34)
MCHC RBC-ENTMCNC: 30.7 GM/DL — LOW (ref 32–36)
MCHC RBC-ENTMCNC: 30.8 GM/DL — LOW (ref 32–36)
MCHC RBC-ENTMCNC: 30.8 PG — SIGNIFICANT CHANGE UP (ref 27–34)
MCHC RBC-ENTMCNC: 30.8 PG — SIGNIFICANT CHANGE UP (ref 27–34)
MCHC RBC-ENTMCNC: 30.9 GM/DL — LOW (ref 32–36)
MCHC RBC-ENTMCNC: 31 GM/DL — LOW (ref 32–36)
MCHC RBC-ENTMCNC: 31.1 GM/DL — LOW (ref 32–36)
MCHC RBC-ENTMCNC: 31.1 GM/DL — LOW (ref 32–36)
MCHC RBC-ENTMCNC: 31.2 GM/DL — LOW (ref 32–36)
MCHC RBC-ENTMCNC: 31.3 GM/DL — LOW (ref 32–36)
MCHC RBC-ENTMCNC: 31.4 GM/DL — LOW (ref 32–36)
MCHC RBC-ENTMCNC: 31.4 GM/DL — LOW (ref 32–36)
MCHC RBC-ENTMCNC: 31.6 GM/DL — LOW (ref 32–36)
MCHC RBC-ENTMCNC: 31.7 GM/DL — LOW (ref 32–36)
MCHC RBC-ENTMCNC: 32.1 GM/DL — SIGNIFICANT CHANGE UP (ref 32–36)
MCHC RBC-ENTMCNC: 32.2 GM/DL — SIGNIFICANT CHANGE UP (ref 32–36)
MCV RBC AUTO: 100.3 FL — HIGH (ref 80–100)
MCV RBC AUTO: 100.7 FL — HIGH (ref 80–100)
MCV RBC AUTO: 101.3 FL — HIGH (ref 80–100)
MCV RBC AUTO: 101.6 FL — HIGH (ref 80–100)
MCV RBC AUTO: 101.6 FL — HIGH (ref 80–100)
MCV RBC AUTO: 102.7 FL — HIGH (ref 80–100)
MCV RBC AUTO: 109.8 FL — HIGH (ref 80–100)
MCV RBC AUTO: 91 FL — SIGNIFICANT CHANGE UP (ref 80–100)
MCV RBC AUTO: 91.9 FL — SIGNIFICANT CHANGE UP (ref 80–100)
MCV RBC AUTO: 91.9 FL — SIGNIFICANT CHANGE UP (ref 80–100)
MCV RBC AUTO: 92.1 FL — SIGNIFICANT CHANGE UP (ref 80–100)
MCV RBC AUTO: 92.3 FL — SIGNIFICANT CHANGE UP (ref 80–100)
MCV RBC AUTO: 92.5 FL — SIGNIFICANT CHANGE UP (ref 80–100)
MCV RBC AUTO: 93 FL — SIGNIFICANT CHANGE UP (ref 80–100)
MCV RBC AUTO: 93.5 FL — SIGNIFICANT CHANGE UP (ref 80–100)
MCV RBC AUTO: 93.7 FL — SIGNIFICANT CHANGE UP (ref 80–100)
MCV RBC AUTO: 93.8 FL — SIGNIFICANT CHANGE UP (ref 80–100)
MCV RBC AUTO: 93.8 FL — SIGNIFICANT CHANGE UP (ref 80–100)
MCV RBC AUTO: 94 FL — SIGNIFICANT CHANGE UP (ref 80–100)
MCV RBC AUTO: 94.7 FL — SIGNIFICANT CHANGE UP (ref 80–100)
MCV RBC AUTO: 94.8 FL — SIGNIFICANT CHANGE UP (ref 80–100)
MCV RBC AUTO: 94.9 FL — SIGNIFICANT CHANGE UP (ref 80–100)
MCV RBC AUTO: 94.9 FL — SIGNIFICANT CHANGE UP (ref 80–100)
MCV RBC AUTO: 95.3 FL — SIGNIFICANT CHANGE UP (ref 80–100)
MCV RBC AUTO: 95.8 FL — SIGNIFICANT CHANGE UP (ref 80–100)
MCV RBC AUTO: 96 FL — SIGNIFICANT CHANGE UP (ref 80–100)
MCV RBC AUTO: 96.5 FL — SIGNIFICANT CHANGE UP (ref 80–100)
MCV RBC AUTO: 96.6 FL — SIGNIFICANT CHANGE UP (ref 80–100)
MCV RBC AUTO: 96.7 FL — SIGNIFICANT CHANGE UP (ref 80–100)
MCV RBC AUTO: 96.8 FL — SIGNIFICANT CHANGE UP (ref 80–100)
MCV RBC AUTO: 97.1 FL — SIGNIFICANT CHANGE UP (ref 80–100)
MCV RBC AUTO: 97.2 FL — SIGNIFICANT CHANGE UP (ref 80–100)
MCV RBC AUTO: 97.3 FL — SIGNIFICANT CHANGE UP (ref 80–100)
MCV RBC AUTO: 97.3 FL — SIGNIFICANT CHANGE UP (ref 80–100)
MCV RBC AUTO: 97.6 FL — SIGNIFICANT CHANGE UP (ref 80–100)
MCV RBC AUTO: 97.6 FL — SIGNIFICANT CHANGE UP (ref 80–100)
MCV RBC AUTO: 97.8 FL — SIGNIFICANT CHANGE UP (ref 80–100)
MCV RBC AUTO: 98 FL — SIGNIFICANT CHANGE UP (ref 80–100)
MCV RBC AUTO: 98 FL — SIGNIFICANT CHANGE UP (ref 80–100)
MCV RBC AUTO: 98.5 FL — SIGNIFICANT CHANGE UP (ref 80–100)
MCV RBC AUTO: 98.7 FL — SIGNIFICANT CHANGE UP (ref 80–100)
MCV RBC AUTO: 99 FL — SIGNIFICANT CHANGE UP (ref 80–100)
MCV RBC AUTO: 99 FL — SIGNIFICANT CHANGE UP (ref 80–100)
MCV RBC AUTO: 99.2 FL — SIGNIFICANT CHANGE UP (ref 80–100)
MESOTHL CELL # FLD: 0 % — SIGNIFICANT CHANGE UP
MESOTHL CELL # FLD: 1 % — SIGNIFICANT CHANGE UP
MICROCYTES BLD QL: SLIGHT — SIGNIFICANT CHANGE UP
MITOCHONDRIA AB SER-ACNC: SIGNIFICANT CHANGE UP
MONOCYTES # BLD AUTO: 0.4 K/UL — SIGNIFICANT CHANGE UP (ref 0–0.9)
MONOCYTES # BLD AUTO: 0.54 K/UL — SIGNIFICANT CHANGE UP (ref 0–0.9)
MONOCYTES # BLD AUTO: 0.55 K/UL — SIGNIFICANT CHANGE UP (ref 0–0.9)
MONOCYTES # BLD AUTO: 0.57 K/UL — SIGNIFICANT CHANGE UP (ref 0–0.9)
MONOCYTES # BLD AUTO: 0.58 K/UL — SIGNIFICANT CHANGE UP (ref 0–0.9)
MONOCYTES # BLD AUTO: 0.65 K/UL — SIGNIFICANT CHANGE UP (ref 0–0.9)
MONOCYTES # BLD AUTO: 0.67 K/UL — SIGNIFICANT CHANGE UP (ref 0–0.9)
MONOCYTES # BLD AUTO: 0.72 K/UL — SIGNIFICANT CHANGE UP (ref 0–0.9)
MONOCYTES # BLD AUTO: 0.74 K/UL — SIGNIFICANT CHANGE UP (ref 0–0.9)
MONOCYTES # BLD AUTO: 0.76 K/UL — SIGNIFICANT CHANGE UP (ref 0–0.9)
MONOCYTES # BLD AUTO: 0.76 K/UL — SIGNIFICANT CHANGE UP (ref 0–0.9)
MONOCYTES # BLD AUTO: 0.79 K/UL — SIGNIFICANT CHANGE UP (ref 0–0.9)
MONOCYTES # BLD AUTO: 0.8 K/UL — SIGNIFICANT CHANGE UP (ref 0–0.9)
MONOCYTES # BLD AUTO: 0.83 K/UL — SIGNIFICANT CHANGE UP (ref 0–0.9)
MONOCYTES # BLD AUTO: 0.86 K/UL — SIGNIFICANT CHANGE UP (ref 0–0.9)
MONOCYTES # BLD AUTO: 0.89 K/UL — SIGNIFICANT CHANGE UP (ref 0–0.9)
MONOCYTES # BLD AUTO: 0.9 K/UL — SIGNIFICANT CHANGE UP (ref 0–0.9)
MONOCYTES # BLD AUTO: 0.93 K/UL — HIGH (ref 0–0.9)
MONOCYTES # BLD AUTO: 0.96 K/UL — HIGH (ref 0–0.9)
MONOCYTES # BLD AUTO: 0.99 K/UL — HIGH (ref 0–0.9)
MONOCYTES # BLD AUTO: 1.03 K/UL — HIGH (ref 0–0.9)
MONOCYTES # BLD AUTO: 1.04 K/UL — HIGH (ref 0–0.9)
MONOCYTES # BLD AUTO: 1.05 K/UL — HIGH (ref 0–0.9)
MONOCYTES # BLD AUTO: 1.1 K/UL — HIGH (ref 0–0.9)
MONOCYTES # BLD AUTO: 1.14 K/UL — HIGH (ref 0–0.9)
MONOCYTES # BLD AUTO: 1.17 K/UL — HIGH (ref 0–0.9)
MONOCYTES NFR BLD AUTO: 11.1 % — SIGNIFICANT CHANGE UP (ref 2–14)
MONOCYTES NFR BLD AUTO: 11.7 % — SIGNIFICANT CHANGE UP (ref 2–14)
MONOCYTES NFR BLD AUTO: 11.9 % — SIGNIFICANT CHANGE UP (ref 2–14)
MONOCYTES NFR BLD AUTO: 2.6 % — SIGNIFICANT CHANGE UP (ref 2–14)
MONOCYTES NFR BLD AUTO: 3.6 % — SIGNIFICANT CHANGE UP (ref 2–14)
MONOCYTES NFR BLD AUTO: 3.8 % — SIGNIFICANT CHANGE UP (ref 2–14)
MONOCYTES NFR BLD AUTO: 3.8 % — SIGNIFICANT CHANGE UP (ref 2–14)
MONOCYTES NFR BLD AUTO: 4.2 % — SIGNIFICANT CHANGE UP (ref 2–14)
MONOCYTES NFR BLD AUTO: 4.2 % — SIGNIFICANT CHANGE UP (ref 2–14)
MONOCYTES NFR BLD AUTO: 4.4 % — SIGNIFICANT CHANGE UP (ref 2–14)
MONOCYTES NFR BLD AUTO: 4.6 % — SIGNIFICANT CHANGE UP (ref 2–14)
MONOCYTES NFR BLD AUTO: 5.1 % — SIGNIFICANT CHANGE UP (ref 2–14)
MONOCYTES NFR BLD AUTO: 5.1 % — SIGNIFICANT CHANGE UP (ref 2–14)
MONOCYTES NFR BLD AUTO: 5.2 % — SIGNIFICANT CHANGE UP (ref 2–14)
MONOCYTES NFR BLD AUTO: 5.3 % — SIGNIFICANT CHANGE UP (ref 2–14)
MONOCYTES NFR BLD AUTO: 5.7 % — SIGNIFICANT CHANGE UP (ref 2–14)
MONOCYTES NFR BLD AUTO: 5.9 % — SIGNIFICANT CHANGE UP (ref 2–14)
MONOCYTES NFR BLD AUTO: 5.9 % — SIGNIFICANT CHANGE UP (ref 2–14)
MONOCYTES NFR BLD AUTO: 6 % — SIGNIFICANT CHANGE UP (ref 2–14)
MONOCYTES NFR BLD AUTO: 6.1 % — SIGNIFICANT CHANGE UP (ref 2–14)
MONOCYTES NFR BLD AUTO: 6.3 % — SIGNIFICANT CHANGE UP (ref 2–14)
MONOCYTES NFR BLD AUTO: 6.7 % — SIGNIFICANT CHANGE UP (ref 2–14)
MONOCYTES NFR BLD AUTO: 6.9 % — SIGNIFICANT CHANGE UP (ref 2–14)
MONOCYTES NFR BLD AUTO: 7.1 % — SIGNIFICANT CHANGE UP (ref 2–14)
MONOCYTES NFR BLD AUTO: 7.8 % — SIGNIFICANT CHANGE UP (ref 2–14)
MONOCYTES NFR BLD AUTO: 9.4 % — SIGNIFICANT CHANGE UP (ref 2–14)
MONOS+MACROS # FLD: 3 % — SIGNIFICANT CHANGE UP
MONOS+MACROS # FLD: 33 % — SIGNIFICANT CHANGE UP
MONOS+MACROS # FLD: 7 % — SIGNIFICANT CHANGE UP
MRSA PCR RESULT.: SIGNIFICANT CHANGE UP
MYELOCYTES NFR BLD: 0.9 % — HIGH (ref 0–0)
NEUTROPHILS # BLD AUTO: 10.05 K/UL — HIGH (ref 1.8–7.4)
NEUTROPHILS # BLD AUTO: 10.06 K/UL — HIGH (ref 1.8–7.4)
NEUTROPHILS # BLD AUTO: 10.23 K/UL — HIGH (ref 1.8–7.4)
NEUTROPHILS # BLD AUTO: 10.32 K/UL — HIGH (ref 1.8–7.4)
NEUTROPHILS # BLD AUTO: 10.82 K/UL — HIGH (ref 1.8–7.4)
NEUTROPHILS # BLD AUTO: 11.68 K/UL — HIGH (ref 1.8–7.4)
NEUTROPHILS # BLD AUTO: 11.94 K/UL — HIGH (ref 1.8–7.4)
NEUTROPHILS # BLD AUTO: 12.36 K/UL — HIGH (ref 1.8–7.4)
NEUTROPHILS # BLD AUTO: 13.58 K/UL — HIGH (ref 1.8–7.4)
NEUTROPHILS # BLD AUTO: 13.71 K/UL — HIGH (ref 1.8–7.4)
NEUTROPHILS # BLD AUTO: 17.39 K/UL — HIGH (ref 1.8–7.4)
NEUTROPHILS # BLD AUTO: 20.07 K/UL — HIGH (ref 1.8–7.4)
NEUTROPHILS # BLD AUTO: 23 K/UL — HIGH (ref 1.8–7.4)
NEUTROPHILS # BLD AUTO: 24.54 K/UL — HIGH (ref 1.8–7.4)
NEUTROPHILS # BLD AUTO: 25.94 K/UL — HIGH (ref 1.8–7.4)
NEUTROPHILS # BLD AUTO: 27.51 K/UL — HIGH (ref 1.8–7.4)
NEUTROPHILS # BLD AUTO: 28.32 K/UL — HIGH (ref 1.8–7.4)
NEUTROPHILS # BLD AUTO: 4.97 K/UL — SIGNIFICANT CHANGE UP (ref 1.8–7.4)
NEUTROPHILS # BLD AUTO: 5.18 K/UL — SIGNIFICANT CHANGE UP (ref 1.8–7.4)
NEUTROPHILS # BLD AUTO: 5.84 K/UL — SIGNIFICANT CHANGE UP (ref 1.8–7.4)
NEUTROPHILS # BLD AUTO: 5.97 K/UL — SIGNIFICANT CHANGE UP (ref 1.8–7.4)
NEUTROPHILS # BLD AUTO: 6.19 K/UL — SIGNIFICANT CHANGE UP (ref 1.8–7.4)
NEUTROPHILS # BLD AUTO: 6.76 K/UL — SIGNIFICANT CHANGE UP (ref 1.8–7.4)
NEUTROPHILS # BLD AUTO: 7.65 K/UL — HIGH (ref 1.8–7.4)
NEUTROPHILS # BLD AUTO: 9.04 K/UL — HIGH (ref 1.8–7.4)
NEUTROPHILS # BLD AUTO: 9.2 K/UL — HIGH (ref 1.8–7.4)
NEUTROPHILS # BLD AUTO: 9.67 K/UL — HIGH (ref 1.8–7.4)
NEUTROPHILS # BLD AUTO: 9.87 K/UL — HIGH (ref 1.8–7.4)
NEUTROPHILS NFR BLD AUTO: 70.1 % — SIGNIFICANT CHANGE UP (ref 43–77)
NEUTROPHILS NFR BLD AUTO: 71.8 % — SIGNIFICANT CHANGE UP (ref 43–77)
NEUTROPHILS NFR BLD AUTO: 74.4 % — SIGNIFICANT CHANGE UP (ref 43–77)
NEUTROPHILS NFR BLD AUTO: 75.4 % — SIGNIFICANT CHANGE UP (ref 43–77)
NEUTROPHILS NFR BLD AUTO: 76.4 % — SIGNIFICANT CHANGE UP (ref 43–77)
NEUTROPHILS NFR BLD AUTO: 77.2 % — HIGH (ref 43–77)
NEUTROPHILS NFR BLD AUTO: 77.3 % — HIGH (ref 43–77)
NEUTROPHILS NFR BLD AUTO: 77.9 % — HIGH (ref 43–77)
NEUTROPHILS NFR BLD AUTO: 78.1 % — HIGH (ref 43–77)
NEUTROPHILS NFR BLD AUTO: 78.4 % — HIGH (ref 43–77)
NEUTROPHILS NFR BLD AUTO: 80.5 % — HIGH (ref 43–77)
NEUTROPHILS NFR BLD AUTO: 80.5 % — HIGH (ref 43–77)
NEUTROPHILS NFR BLD AUTO: 80.8 % — HIGH (ref 43–77)
NEUTROPHILS NFR BLD AUTO: 82 % — HIGH (ref 43–77)
NEUTROPHILS NFR BLD AUTO: 82.5 % — HIGH (ref 43–77)
NEUTROPHILS NFR BLD AUTO: 82.8 % — HIGH (ref 43–77)
NEUTROPHILS NFR BLD AUTO: 82.9 % — HIGH (ref 43–77)
NEUTROPHILS NFR BLD AUTO: 83.9 % — HIGH (ref 43–77)
NEUTROPHILS NFR BLD AUTO: 85.4 % — HIGH (ref 43–77)
NEUTROPHILS NFR BLD AUTO: 86.8 % — HIGH (ref 43–77)
NEUTROPHILS NFR BLD AUTO: 88.3 % — HIGH (ref 43–77)
NEUTROPHILS NFR BLD AUTO: 88.5 % — HIGH (ref 43–77)
NEUTROPHILS NFR BLD AUTO: 89.5 % — HIGH (ref 43–77)
NEUTROPHILS NFR BLD AUTO: 89.7 % — HIGH (ref 43–77)
NEUTROPHILS NFR BLD AUTO: 90.9 % — HIGH (ref 43–77)
NEUTROPHILS NFR BLD AUTO: 91.1 % — HIGH (ref 43–77)
NEUTROPHILS NFR BLD AUTO: 91.7 % — HIGH (ref 43–77)
NEUTROPHILS NFR BLD AUTO: 94.7 % — HIGH (ref 43–77)
NEUTROPHILS-BODY FLUID: 50 % — SIGNIFICANT CHANGE UP
NEUTROPHILS-BODY FLUID: 92 % — SIGNIFICANT CHANGE UP
NEUTROPHILS-BODY FLUID: 93 % — SIGNIFICANT CHANGE UP
NEUTS BAND # BLD: 0.9 % — SIGNIFICANT CHANGE UP (ref 0–6)
NEUTS BAND # BLD: 5.3 % — SIGNIFICANT CHANGE UP (ref 0–6)
NIGHT BLUE STAIN TISS: SIGNIFICANT CHANGE UP
NON-GYNECOLOGICAL CYTOLOGY STUDY: SIGNIFICANT CHANGE UP
NRBC # BLD: 0 /100 WBCS — SIGNIFICANT CHANGE UP (ref 0–0)
NRBC # FLD: 0 K/UL — SIGNIFICANT CHANGE UP (ref 0–0)
NRBC # FLD: 0.02 K/UL — HIGH (ref 0–0)
NRBC # FLD: 0.03 K/UL — HIGH (ref 0–0)
NRBC # FLD: 0.04 K/UL — HIGH (ref 0–0)
NRBC # FLD: 0.06 K/UL — HIGH (ref 0–0)
NT-PROBNP SERPL-SCNC: HIGH PG/ML
OTHER CELLS FLD MANUAL: 0 % — SIGNIFICANT CHANGE UP
OTHER CELLS FLD MANUAL: 4 % — SIGNIFICANT CHANGE UP
PCO2 BLDA: 34 MMHG — LOW (ref 35–48)
PCO2 BLDA: 35 MMHG — SIGNIFICANT CHANGE UP (ref 35–48)
PCO2 BLDA: 37 MMHG — SIGNIFICANT CHANGE UP (ref 35–48)
PCO2 BLDA: 38 MMHG — SIGNIFICANT CHANGE UP (ref 35–48)
PCO2 BLDA: 39 MMHG — SIGNIFICANT CHANGE UP (ref 35–48)
PCO2 BLDA: 42 MMHG — SIGNIFICANT CHANGE UP (ref 35–48)
PCO2 BLDA: 45 MMHG — SIGNIFICANT CHANGE UP (ref 35–48)
PCO2 BLDA: 46 MMHG — SIGNIFICANT CHANGE UP (ref 35–48)
PCO2 BLDA: 47 MMHG — SIGNIFICANT CHANGE UP (ref 35–48)
PCO2 BLDA: 50 MMHG — HIGH (ref 35–48)
PCO2 BLDA: 51 MMHG — HIGH (ref 35–48)
PCO2 BLDV: 42 MMHG — SIGNIFICANT CHANGE UP (ref 42–55)
PCO2 BLDV: 42 MMHG — SIGNIFICANT CHANGE UP (ref 42–55)
PCO2 BLDV: 45 MMHG — SIGNIFICANT CHANGE UP (ref 42–55)
PCO2 BLDV: 48 MMHG — SIGNIFICANT CHANGE UP (ref 42–55)
PCO2 BLDV: 59 MMHG — HIGH (ref 42–55)
PCO2 BLDV: 68 MMHG — HIGH (ref 42–55)
PCO2 BLDV: 76 MMHG — HIGH (ref 42–55)
PCO2 BLDV: 86 MMHG — HIGH (ref 42–55)
PH BLDA: 7.31 — LOW (ref 7.35–7.45)
PH BLDA: 7.33 — LOW (ref 7.35–7.45)
PH BLDA: 7.35 — SIGNIFICANT CHANGE UP (ref 7.35–7.45)
PH BLDA: 7.36 — SIGNIFICANT CHANGE UP (ref 7.35–7.45)
PH BLDA: 7.41 — SIGNIFICANT CHANGE UP (ref 7.35–7.45)
PH BLDA: 7.43 — SIGNIFICANT CHANGE UP (ref 7.35–7.45)
PH BLDA: 7.44 — SIGNIFICANT CHANGE UP (ref 7.35–7.45)
PH BLDA: 7.46 — HIGH (ref 7.35–7.45)
PH BLDA: 7.48 — HIGH (ref 7.35–7.45)
PH BLDV: 7.1 — LOW (ref 7.32–7.43)
PH BLDV: 7.18 — LOW (ref 7.32–7.43)
PH BLDV: 7.22 — LOW (ref 7.32–7.43)
PH BLDV: 7.3 — LOW (ref 7.32–7.43)
PH BLDV: 7.31 — LOW (ref 7.32–7.43)
PH BLDV: 7.32 — SIGNIFICANT CHANGE UP (ref 7.32–7.43)
PH BLDV: 7.47 — HIGH (ref 7.32–7.43)
PH BLDV: 7.48 — HIGH (ref 7.32–7.43)
PH FLD: 7.7 — SIGNIFICANT CHANGE UP
PHOSPHATE SERPL-MCNC: 3 MG/DL — SIGNIFICANT CHANGE UP (ref 2.5–4.5)
PHOSPHATE SERPL-MCNC: 3 MG/DL — SIGNIFICANT CHANGE UP (ref 2.5–4.5)
PHOSPHATE SERPL-MCNC: 3.2 MG/DL — SIGNIFICANT CHANGE UP (ref 2.5–4.5)
PHOSPHATE SERPL-MCNC: 3.4 MG/DL — SIGNIFICANT CHANGE UP (ref 2.5–4.5)
PHOSPHATE SERPL-MCNC: 3.4 MG/DL — SIGNIFICANT CHANGE UP (ref 2.5–4.5)
PHOSPHATE SERPL-MCNC: 3.5 MG/DL — SIGNIFICANT CHANGE UP (ref 2.5–4.5)
PHOSPHATE SERPL-MCNC: 3.5 MG/DL — SIGNIFICANT CHANGE UP (ref 2.5–4.5)
PHOSPHATE SERPL-MCNC: 3.6 MG/DL — SIGNIFICANT CHANGE UP (ref 2.5–4.5)
PHOSPHATE SERPL-MCNC: 3.6 MG/DL — SIGNIFICANT CHANGE UP (ref 2.5–4.5)
PHOSPHATE SERPL-MCNC: 3.8 MG/DL — SIGNIFICANT CHANGE UP (ref 2.5–4.5)
PHOSPHATE SERPL-MCNC: 3.8 MG/DL — SIGNIFICANT CHANGE UP (ref 2.5–4.5)
PHOSPHATE SERPL-MCNC: 4 MG/DL — SIGNIFICANT CHANGE UP (ref 2.5–4.5)
PHOSPHATE SERPL-MCNC: 4 MG/DL — SIGNIFICANT CHANGE UP (ref 2.5–4.5)
PHOSPHATE SERPL-MCNC: 4.1 MG/DL — SIGNIFICANT CHANGE UP (ref 2.5–4.5)
PHOSPHATE SERPL-MCNC: 4.2 MG/DL — SIGNIFICANT CHANGE UP (ref 2.5–4.5)
PHOSPHATE SERPL-MCNC: 4.3 MG/DL — SIGNIFICANT CHANGE UP (ref 2.5–4.5)
PHOSPHATE SERPL-MCNC: 4.4 MG/DL — SIGNIFICANT CHANGE UP (ref 2.5–4.5)
PHOSPHATE SERPL-MCNC: 4.4 MG/DL — SIGNIFICANT CHANGE UP (ref 2.5–4.5)
PHOSPHATE SERPL-MCNC: 4.8 MG/DL — HIGH (ref 2.5–4.5)
PHOSPHATE SERPL-MCNC: 4.8 MG/DL — HIGH (ref 2.5–4.5)
PHOSPHATE SERPL-MCNC: 4.9 MG/DL — HIGH (ref 2.5–4.5)
PHOSPHATE SERPL-MCNC: 4.9 MG/DL — HIGH (ref 2.5–4.5)
PHOSPHATE SERPL-MCNC: 5 MG/DL — HIGH (ref 2.5–4.5)
PHOSPHATE SERPL-MCNC: 5.1 MG/DL — HIGH (ref 2.5–4.5)
PHOSPHATE SERPL-MCNC: 5.2 MG/DL — HIGH (ref 2.5–4.5)
PHOSPHATE SERPL-MCNC: 5.3 MG/DL — HIGH (ref 2.5–4.5)
PHOSPHATE SERPL-MCNC: 5.3 MG/DL — HIGH (ref 2.5–4.5)
PHOSPHATE SERPL-MCNC: 5.7 MG/DL — HIGH (ref 2.5–4.5)
PHOSPHATE SERPL-MCNC: 6.5 MG/DL — HIGH (ref 2.5–4.5)
PHOSPHATE SERPL-MCNC: 6.5 MG/DL — HIGH (ref 2.5–4.5)
PHOSPHATE SERPL-MCNC: 6.7 MG/DL — HIGH (ref 2.5–4.5)
PLAT MORPH BLD: NORMAL — SIGNIFICANT CHANGE UP
PLATELET # BLD AUTO: 163 K/UL — SIGNIFICANT CHANGE UP (ref 150–400)
PLATELET # BLD AUTO: 174 K/UL — SIGNIFICANT CHANGE UP (ref 150–400)
PLATELET # BLD AUTO: 177 K/UL — SIGNIFICANT CHANGE UP (ref 150–400)
PLATELET # BLD AUTO: 180 K/UL — SIGNIFICANT CHANGE UP (ref 150–400)
PLATELET # BLD AUTO: 183 K/UL — SIGNIFICANT CHANGE UP (ref 150–400)
PLATELET # BLD AUTO: 188 K/UL — SIGNIFICANT CHANGE UP (ref 150–400)
PLATELET # BLD AUTO: 203 K/UL — SIGNIFICANT CHANGE UP (ref 150–400)
PLATELET # BLD AUTO: 203 K/UL — SIGNIFICANT CHANGE UP (ref 150–400)
PLATELET # BLD AUTO: 205 K/UL — SIGNIFICANT CHANGE UP (ref 150–400)
PLATELET # BLD AUTO: 207 K/UL — SIGNIFICANT CHANGE UP (ref 150–400)
PLATELET # BLD AUTO: 214 K/UL — SIGNIFICANT CHANGE UP (ref 150–400)
PLATELET # BLD AUTO: 218 K/UL — SIGNIFICANT CHANGE UP (ref 150–400)
PLATELET # BLD AUTO: 225 K/UL — SIGNIFICANT CHANGE UP (ref 150–400)
PLATELET # BLD AUTO: 237 K/UL — SIGNIFICANT CHANGE UP (ref 150–400)
PLATELET # BLD AUTO: 239 K/UL — SIGNIFICANT CHANGE UP (ref 150–400)
PLATELET # BLD AUTO: 242 K/UL — SIGNIFICANT CHANGE UP (ref 150–400)
PLATELET # BLD AUTO: 245 K/UL — SIGNIFICANT CHANGE UP (ref 150–400)
PLATELET # BLD AUTO: 246 K/UL — SIGNIFICANT CHANGE UP (ref 150–400)
PLATELET # BLD AUTO: 251 K/UL — SIGNIFICANT CHANGE UP (ref 150–400)
PLATELET # BLD AUTO: 263 K/UL — SIGNIFICANT CHANGE UP (ref 150–400)
PLATELET # BLD AUTO: 264 K/UL — SIGNIFICANT CHANGE UP (ref 150–400)
PLATELET # BLD AUTO: 274 K/UL — SIGNIFICANT CHANGE UP (ref 150–400)
PLATELET # BLD AUTO: 275 K/UL — SIGNIFICANT CHANGE UP (ref 150–400)
PLATELET # BLD AUTO: 285 K/UL — SIGNIFICANT CHANGE UP (ref 150–400)
PLATELET # BLD AUTO: 302 K/UL — SIGNIFICANT CHANGE UP (ref 150–400)
PLATELET # BLD AUTO: 313 K/UL — SIGNIFICANT CHANGE UP (ref 150–400)
PLATELET # BLD AUTO: 337 K/UL — SIGNIFICANT CHANGE UP (ref 150–400)
PLATELET # BLD AUTO: 339 K/UL — SIGNIFICANT CHANGE UP (ref 150–400)
PLATELET # BLD AUTO: 344 K/UL — SIGNIFICANT CHANGE UP (ref 150–400)
PLATELET # BLD AUTO: 350 K/UL — SIGNIFICANT CHANGE UP (ref 150–400)
PLATELET # BLD AUTO: 351 K/UL — SIGNIFICANT CHANGE UP (ref 150–400)
PLATELET # BLD AUTO: 366 K/UL — SIGNIFICANT CHANGE UP (ref 150–400)
PLATELET # BLD AUTO: 380 K/UL — SIGNIFICANT CHANGE UP (ref 150–400)
PLATELET # BLD AUTO: 394 K/UL — SIGNIFICANT CHANGE UP (ref 150–400)
PLATELET # BLD AUTO: 395 K/UL — SIGNIFICANT CHANGE UP (ref 150–400)
PLATELET # BLD AUTO: 401 K/UL — HIGH (ref 150–400)
PLATELET # BLD AUTO: 421 K/UL — HIGH (ref 150–400)
PLATELET # BLD AUTO: 426 K/UL — HIGH (ref 150–400)
PLATELET # BLD AUTO: 432 K/UL — HIGH (ref 150–400)
PLATELET # BLD AUTO: 450 K/UL — HIGH (ref 150–400)
PLATELET # BLD AUTO: 466 K/UL — HIGH (ref 150–400)
PLATELET # BLD AUTO: 472 K/UL — HIGH (ref 150–400)
PLATELET # BLD AUTO: 474 K/UL — HIGH (ref 150–400)
PLATELET # BLD AUTO: 522 K/UL — HIGH (ref 150–400)
PLATELET COUNT - ESTIMATE: NORMAL — SIGNIFICANT CHANGE UP
PO2 BLDA: 113 MMHG — HIGH (ref 83–108)
PO2 BLDA: 129 MMHG — HIGH (ref 83–108)
PO2 BLDA: 142 MMHG — HIGH (ref 83–108)
PO2 BLDA: 155 MMHG — HIGH (ref 83–108)
PO2 BLDA: 169 MMHG — HIGH (ref 83–108)
PO2 BLDA: 267 MMHG — HIGH (ref 83–108)
PO2 BLDA: 78 MMHG — LOW (ref 83–108)
PO2 BLDA: 83 MMHG — SIGNIFICANT CHANGE UP (ref 83–108)
PO2 BLDA: 87 MMHG — SIGNIFICANT CHANGE UP (ref 83–108)
PO2 BLDA: 90 MMHG — SIGNIFICANT CHANGE UP (ref 83–108)
PO2 BLDA: 97 MMHG — SIGNIFICANT CHANGE UP (ref 83–108)
PO2 BLDV: 43 MMHG — SIGNIFICANT CHANGE UP
PO2 BLDV: 47 MMHG — HIGH (ref 25–45)
PO2 BLDV: 49 MMHG — SIGNIFICANT CHANGE UP
PO2 BLDV: 57 MMHG — SIGNIFICANT CHANGE UP
PO2 BLDV: 58 MMHG — HIGH (ref 25–45)
PO2 BLDV: 71 MMHG — HIGH (ref 25–45)
PO2 BLDV: 77 MMHG — HIGH (ref 25–45)
PO2 BLDV: 93 MMHG — HIGH (ref 25–45)
POIKILOCYTOSIS BLD QL AUTO: SIGNIFICANT CHANGE UP
POIKILOCYTOSIS BLD QL AUTO: SLIGHT — SIGNIFICANT CHANGE UP
POIKILOCYTOSIS BLD QL AUTO: SLIGHT — SIGNIFICANT CHANGE UP
POLYCHROMASIA BLD QL SMEAR: SLIGHT — SIGNIFICANT CHANGE UP
POTASSIUM BLDV-SCNC: 3.2 MMOL/L — LOW (ref 3.5–5.1)
POTASSIUM BLDV-SCNC: 3.6 MMOL/L — SIGNIFICANT CHANGE UP (ref 3.5–5.1)
POTASSIUM BLDV-SCNC: 3.9 MMOL/L — SIGNIFICANT CHANGE UP (ref 3.5–5.1)
POTASSIUM BLDV-SCNC: 4.2 MMOL/L — SIGNIFICANT CHANGE UP (ref 3.5–5.1)
POTASSIUM BLDV-SCNC: 4.4 MMOL/L — SIGNIFICANT CHANGE UP (ref 3.5–5.1)
POTASSIUM BLDV-SCNC: 4.5 MMOL/L — SIGNIFICANT CHANGE UP (ref 3.5–5.1)
POTASSIUM BLDV-SCNC: 4.7 MMOL/L — SIGNIFICANT CHANGE UP (ref 3.5–5.1)
POTASSIUM BLDV-SCNC: 5.4 MMOL/L — HIGH (ref 3.5–5.1)
POTASSIUM SERPL-MCNC: 3.3 MMOL/L — LOW (ref 3.5–5.3)
POTASSIUM SERPL-MCNC: 3.4 MMOL/L — LOW (ref 3.5–5.3)
POTASSIUM SERPL-MCNC: 3.4 MMOL/L — LOW (ref 3.5–5.3)
POTASSIUM SERPL-MCNC: 3.5 MMOL/L — SIGNIFICANT CHANGE UP (ref 3.5–5.3)
POTASSIUM SERPL-MCNC: 3.6 MMOL/L — SIGNIFICANT CHANGE UP (ref 3.5–5.3)
POTASSIUM SERPL-MCNC: 3.7 MMOL/L — SIGNIFICANT CHANGE UP (ref 3.5–5.3)
POTASSIUM SERPL-MCNC: 3.7 MMOL/L — SIGNIFICANT CHANGE UP (ref 3.5–5.3)
POTASSIUM SERPL-MCNC: 3.8 MMOL/L — SIGNIFICANT CHANGE UP (ref 3.5–5.3)
POTASSIUM SERPL-MCNC: 3.9 MMOL/L — SIGNIFICANT CHANGE UP (ref 3.5–5.3)
POTASSIUM SERPL-MCNC: 3.9 MMOL/L — SIGNIFICANT CHANGE UP (ref 3.5–5.3)
POTASSIUM SERPL-MCNC: 4 MMOL/L — SIGNIFICANT CHANGE UP (ref 3.5–5.3)
POTASSIUM SERPL-MCNC: 4.1 MMOL/L — SIGNIFICANT CHANGE UP (ref 3.5–5.3)
POTASSIUM SERPL-MCNC: 4.1 MMOL/L — SIGNIFICANT CHANGE UP (ref 3.5–5.3)
POTASSIUM SERPL-MCNC: 4.2 MMOL/L — SIGNIFICANT CHANGE UP (ref 3.5–5.3)
POTASSIUM SERPL-MCNC: 4.2 MMOL/L — SIGNIFICANT CHANGE UP (ref 3.5–5.3)
POTASSIUM SERPL-MCNC: 4.3 MMOL/L — SIGNIFICANT CHANGE UP (ref 3.5–5.3)
POTASSIUM SERPL-MCNC: 4.4 MMOL/L — SIGNIFICANT CHANGE UP (ref 3.5–5.3)
POTASSIUM SERPL-MCNC: 4.4 MMOL/L — SIGNIFICANT CHANGE UP (ref 3.5–5.3)
POTASSIUM SERPL-MCNC: 4.5 MMOL/L — SIGNIFICANT CHANGE UP (ref 3.5–5.3)
POTASSIUM SERPL-MCNC: 4.6 MMOL/L — SIGNIFICANT CHANGE UP (ref 3.5–5.3)
POTASSIUM SERPL-MCNC: 4.8 MMOL/L — SIGNIFICANT CHANGE UP (ref 3.5–5.3)
POTASSIUM SERPL-MCNC: 5.3 MMOL/L — SIGNIFICANT CHANGE UP (ref 3.5–5.3)
POTASSIUM SERPL-MCNC: 5.4 MMOL/L — HIGH (ref 3.5–5.3)
POTASSIUM SERPL-MCNC: 5.6 MMOL/L — HIGH (ref 3.5–5.3)
POTASSIUM SERPL-SCNC: 3.3 MMOL/L — LOW (ref 3.5–5.3)
POTASSIUM SERPL-SCNC: 3.4 MMOL/L — LOW (ref 3.5–5.3)
POTASSIUM SERPL-SCNC: 3.4 MMOL/L — LOW (ref 3.5–5.3)
POTASSIUM SERPL-SCNC: 3.5 MMOL/L — SIGNIFICANT CHANGE UP (ref 3.5–5.3)
POTASSIUM SERPL-SCNC: 3.6 MMOL/L — SIGNIFICANT CHANGE UP (ref 3.5–5.3)
POTASSIUM SERPL-SCNC: 3.7 MMOL/L — SIGNIFICANT CHANGE UP (ref 3.5–5.3)
POTASSIUM SERPL-SCNC: 3.7 MMOL/L — SIGNIFICANT CHANGE UP (ref 3.5–5.3)
POTASSIUM SERPL-SCNC: 3.8 MMOL/L — SIGNIFICANT CHANGE UP (ref 3.5–5.3)
POTASSIUM SERPL-SCNC: 3.9 MMOL/L — SIGNIFICANT CHANGE UP (ref 3.5–5.3)
POTASSIUM SERPL-SCNC: 3.9 MMOL/L — SIGNIFICANT CHANGE UP (ref 3.5–5.3)
POTASSIUM SERPL-SCNC: 4 MMOL/L — SIGNIFICANT CHANGE UP (ref 3.5–5.3)
POTASSIUM SERPL-SCNC: 4.1 MMOL/L — SIGNIFICANT CHANGE UP (ref 3.5–5.3)
POTASSIUM SERPL-SCNC: 4.1 MMOL/L — SIGNIFICANT CHANGE UP (ref 3.5–5.3)
POTASSIUM SERPL-SCNC: 4.2 MMOL/L — SIGNIFICANT CHANGE UP (ref 3.5–5.3)
POTASSIUM SERPL-SCNC: 4.2 MMOL/L — SIGNIFICANT CHANGE UP (ref 3.5–5.3)
POTASSIUM SERPL-SCNC: 4.3 MMOL/L — SIGNIFICANT CHANGE UP (ref 3.5–5.3)
POTASSIUM SERPL-SCNC: 4.4 MMOL/L — SIGNIFICANT CHANGE UP (ref 3.5–5.3)
POTASSIUM SERPL-SCNC: 4.4 MMOL/L — SIGNIFICANT CHANGE UP (ref 3.5–5.3)
POTASSIUM SERPL-SCNC: 4.5 MMOL/L — SIGNIFICANT CHANGE UP (ref 3.5–5.3)
POTASSIUM SERPL-SCNC: 4.6 MMOL/L — SIGNIFICANT CHANGE UP (ref 3.5–5.3)
POTASSIUM SERPL-SCNC: 4.8 MMOL/L — SIGNIFICANT CHANGE UP (ref 3.5–5.3)
POTASSIUM SERPL-SCNC: 5.3 MMOL/L — SIGNIFICANT CHANGE UP (ref 3.5–5.3)
POTASSIUM SERPL-SCNC: 5.4 MMOL/L — HIGH (ref 3.5–5.3)
POTASSIUM SERPL-SCNC: 5.6 MMOL/L — HIGH (ref 3.5–5.3)
PROT FLD-MCNC: 3.1 G/DL — SIGNIFICANT CHANGE UP
PROT SERPL-MCNC: 4.8 G/DL — LOW (ref 6–8.3)
PROT SERPL-MCNC: 4.8 G/DL — LOW (ref 6–8.3)
PROT SERPL-MCNC: 5 G/DL — LOW (ref 6–8.3)
PROT SERPL-MCNC: 5 G/DL — LOW (ref 6–8.3)
PROT SERPL-MCNC: 5.1 G/DL — LOW (ref 6–8.3)
PROT SERPL-MCNC: 5.2 G/DL — LOW (ref 6–8.3)
PROT SERPL-MCNC: 5.3 G/DL — LOW (ref 6–8.3)
PROT SERPL-MCNC: 5.4 G/DL — LOW (ref 6–8.3)
PROT SERPL-MCNC: 5.5 G/DL — LOW (ref 6–8.3)
PROT SERPL-MCNC: 5.5 G/DL — LOW (ref 6–8.3)
PROT SERPL-MCNC: 5.6 G/DL — LOW (ref 6–8.3)
PROT SERPL-MCNC: 5.7 G/DL — LOW (ref 6–8.3)
PROT SERPL-MCNC: 5.8 G/DL — LOW (ref 6–8.3)
PROT SERPL-MCNC: 5.9 G/DL — LOW (ref 6–8.3)
PROT SERPL-MCNC: 5.9 G/DL — LOW (ref 6–8.3)
PROT SERPL-MCNC: 6 G/DL — SIGNIFICANT CHANGE UP (ref 6–8.3)
PROT SERPL-MCNC: 6.1 G/DL — SIGNIFICANT CHANGE UP (ref 6–8.3)
PROT SERPL-MCNC: 6.2 G/DL — SIGNIFICANT CHANGE UP (ref 6–8.3)
PROT SERPL-MCNC: 6.3 G/DL — SIGNIFICANT CHANGE UP (ref 6–8.3)
PROT SERPL-MCNC: 6.3 G/DL — SIGNIFICANT CHANGE UP (ref 6–8.3)
PROT SERPL-MCNC: 6.4 G/DL — SIGNIFICANT CHANGE UP (ref 6–8.3)
PROT SERPL-MCNC: 6.5 G/DL — SIGNIFICANT CHANGE UP (ref 6–8.3)
PROT SERPL-MCNC: 7 G/DL — SIGNIFICANT CHANGE UP (ref 6–8.3)
PROT SERPL-MCNC: 7.6 G/DL — SIGNIFICANT CHANGE UP (ref 6–8.3)
PROTHROM AB SERPL-ACNC: 11.4 SEC — SIGNIFICANT CHANGE UP (ref 10.5–13.4)
PROTHROM AB SERPL-ACNC: 11.7 SEC — SIGNIFICANT CHANGE UP (ref 10.5–13.4)
PROTHROM AB SERPL-ACNC: 12.3 SEC — SIGNIFICANT CHANGE UP (ref 10.5–13.4)
PROTHROM AB SERPL-ACNC: 12.7 SEC — SIGNIFICANT CHANGE UP (ref 10.5–13.4)
PROTHROM AB SERPL-ACNC: 12.8 SEC — SIGNIFICANT CHANGE UP (ref 10.5–13.4)
PROTHROM AB SERPL-ACNC: 12.8 SEC — SIGNIFICANT CHANGE UP (ref 10.5–13.4)
PROTHROM AB SERPL-ACNC: 13.4 SEC — SIGNIFICANT CHANGE UP (ref 10.5–13.4)
PROTHROM AB SERPL-ACNC: 13.7 SEC — HIGH (ref 10.5–13.4)
PROTHROM AB SERPL-ACNC: 13.7 SEC — HIGH (ref 10.5–13.4)
PROTHROM AB SERPL-ACNC: 14.1 SEC — HIGH (ref 10.5–13.4)
PROTHROM AB SERPL-ACNC: 14.4 SEC — HIGH (ref 10.5–13.4)
PROTHROM AB SERPL-ACNC: 14.5 SEC — HIGH (ref 10.5–13.4)
PROTHROM AB SERPL-ACNC: 15.8 SEC — HIGH (ref 10.5–13.4)
PROTHROM AB SERPL-ACNC: 15.8 SEC — HIGH (ref 10.5–13.4)
PROTHROM AB SERPL-ACNC: 15.9 SEC — HIGH (ref 10.5–13.4)
PROTHROM AB SERPL-ACNC: 16.7 SEC — HIGH (ref 10.5–13.4)
PROTHROM AB SERPL-ACNC: 18.1 SEC — HIGH (ref 10.5–13.4)
PROTHROM AB SERPL-ACNC: 18.5 SEC — HIGH (ref 10.5–13.4)
PROTHROM AB SERPL-ACNC: 18.7 SEC — HIGH (ref 10.5–13.4)
PROTHROM AB SERPL-ACNC: 19.3 SEC — HIGH (ref 10.5–13.4)
PROTHROM AB SERPL-ACNC: 19.8 SEC — HIGH (ref 10.5–13.4)
PROTHROM AB SERPL-ACNC: 19.8 SEC — HIGH (ref 10.5–13.4)
PROTHROM AB SERPL-ACNC: 20.1 SEC — HIGH (ref 10.5–13.4)
RAPID RVP RESULT: DETECTED
RAPID RVP RESULT: SIGNIFICANT CHANGE UP
RBC # BLD: 2.56 M/UL — LOW (ref 4.2–5.8)
RBC # BLD: 2.59 M/UL — LOW (ref 4.2–5.8)
RBC # BLD: 2.69 M/UL — LOW (ref 4.2–5.8)
RBC # BLD: 2.73 M/UL — LOW (ref 4.2–5.8)
RBC # BLD: 2.79 M/UL — LOW (ref 4.2–5.8)
RBC # BLD: 2.83 M/UL — LOW (ref 4.2–5.8)
RBC # BLD: 2.85 M/UL — LOW (ref 4.2–5.8)
RBC # BLD: 2.86 M/UL — LOW (ref 4.2–5.8)
RBC # BLD: 2.87 M/UL — LOW (ref 4.2–5.8)
RBC # BLD: 2.88 M/UL — LOW (ref 4.2–5.8)
RBC # BLD: 2.94 M/UL — LOW (ref 4.2–5.8)
RBC # BLD: 2.96 M/UL — LOW (ref 4.2–5.8)
RBC # BLD: 2.97 M/UL — LOW (ref 4.2–5.8)
RBC # BLD: 3 M/UL — LOW (ref 4.2–5.8)
RBC # BLD: 3 M/UL — LOW (ref 4.2–5.8)
RBC # BLD: 3.01 M/UL — LOW (ref 4.2–5.8)
RBC # BLD: 3.04 M/UL — LOW (ref 4.2–5.8)
RBC # BLD: 3.04 M/UL — LOW (ref 4.2–5.8)
RBC # BLD: 3.05 M/UL — LOW (ref 4.2–5.8)
RBC # BLD: 3.05 M/UL — LOW (ref 4.2–5.8)
RBC # BLD: 3.07 M/UL — LOW (ref 4.2–5.8)
RBC # BLD: 3.12 M/UL — LOW (ref 4.2–5.8)
RBC # BLD: 3.15 M/UL — LOW (ref 4.2–5.8)
RBC # BLD: 3.18 M/UL — LOW (ref 4.2–5.8)
RBC # BLD: 3.2 M/UL — LOW (ref 4.2–5.8)
RBC # BLD: 3.22 M/UL — LOW (ref 4.2–5.8)
RBC # BLD: 3.22 M/UL — LOW (ref 4.2–5.8)
RBC # BLD: 3.3 M/UL — LOW (ref 4.2–5.8)
RBC # BLD: 3.3 M/UL — LOW (ref 4.2–5.8)
RBC # BLD: 3.36 M/UL — LOW (ref 4.2–5.8)
RBC # BLD: 3.44 M/UL — LOW (ref 4.2–5.8)
RBC # BLD: 3.68 M/UL — LOW (ref 4.2–5.8)
RBC # BLD: 3.68 M/UL — LOW (ref 4.2–5.8)
RBC # BLD: 3.7 M/UL — LOW (ref 4.2–5.8)
RBC # BLD: 3.9 M/UL — LOW (ref 4.2–5.8)
RBC # FLD: 16.7 % — HIGH (ref 10.3–14.5)
RBC # FLD: 17.1 % — HIGH (ref 10.3–14.5)
RBC # FLD: 17.5 % — HIGH (ref 10.3–14.5)
RBC # FLD: 17.7 % — HIGH (ref 10.3–14.5)
RBC # FLD: 17.8 % — HIGH (ref 10.3–14.5)
RBC # FLD: 18 % — HIGH (ref 10.3–14.5)
RBC # FLD: 18 % — HIGH (ref 10.3–14.5)
RBC # FLD: 18.2 % — HIGH (ref 10.3–14.5)
RBC # FLD: 18.2 % — HIGH (ref 10.3–14.5)
RBC # FLD: 18.3 % — HIGH (ref 10.3–14.5)
RBC # FLD: 18.3 % — HIGH (ref 10.3–14.5)
RBC # FLD: 18.4 % — HIGH (ref 10.3–14.5)
RBC # FLD: 18.6 % — HIGH (ref 10.3–14.5)
RBC # FLD: 18.6 % — HIGH (ref 10.3–14.5)
RBC # FLD: 19.1 % — HIGH (ref 10.3–14.5)
RBC # FLD: 19.3 % — HIGH (ref 10.3–14.5)
RBC # FLD: 19.3 % — HIGH (ref 10.3–14.5)
RBC # FLD: 19.4 % — HIGH (ref 10.3–14.5)
RBC # FLD: 19.5 % — HIGH (ref 10.3–14.5)
RBC # FLD: 19.5 % — HIGH (ref 10.3–14.5)
RBC # FLD: 19.6 % — HIGH (ref 10.3–14.5)
RBC # FLD: 19.7 % — HIGH (ref 10.3–14.5)
RBC # FLD: 19.7 % — HIGH (ref 10.3–14.5)
RBC # FLD: 19.8 % — HIGH (ref 10.3–14.5)
RBC # FLD: 19.9 % — HIGH (ref 10.3–14.5)
RBC # FLD: 19.9 % — HIGH (ref 10.3–14.5)
RBC # FLD: 20 % — HIGH (ref 10.3–14.5)
RBC # FLD: 20.1 % — HIGH (ref 10.3–14.5)
RBC # FLD: 20.2 % — HIGH (ref 10.3–14.5)
RBC # FLD: 20.3 % — HIGH (ref 10.3–14.5)
RBC # FLD: 20.3 % — HIGH (ref 10.3–14.5)
RBC # FLD: 20.4 % — HIGH (ref 10.3–14.5)
RBC # FLD: 20.4 % — HIGH (ref 10.3–14.5)
RBC # FLD: 20.5 % — HIGH (ref 10.3–14.5)
RBC # FLD: 20.6 % — HIGH (ref 10.3–14.5)
RBC BLD AUTO: ABNORMAL
RCV VOL RI: SIGNIFICANT CHANGE UP CELLS/UL (ref 0–5)
RH IG SCN BLD-IMP: POSITIVE — SIGNIFICANT CHANGE UP
RSV RNA NPH QL NAA+NON-PROBE: SIGNIFICANT CHANGE UP
RSV RNA NPH QL NAA+NON-PROBE: SIGNIFICANT CHANGE UP
RSV RNA SPEC QL NAA+PROBE: SIGNIFICANT CHANGE UP
RSV RNA SPEC QL NAA+PROBE: SIGNIFICANT CHANGE UP
RV+EV RNA SPEC QL NAA+PROBE: SIGNIFICANT CHANGE UP
RV+EV RNA SPEC QL NAA+PROBE: SIGNIFICANT CHANGE UP
S AUREUS DNA NOSE QL NAA+PROBE: SIGNIFICANT CHANGE UP
SALICYLATES SERPL-MCNC: <0.3 MG/DL — LOW (ref 15–30)
SAO2 % BLDA: 95.8 % — SIGNIFICANT CHANGE UP (ref 94–98)
SAO2 % BLDA: 96.3 % — SIGNIFICANT CHANGE UP (ref 94–98)
SAO2 % BLDA: 96.5 % — SIGNIFICANT CHANGE UP (ref 94–98)
SAO2 % BLDA: 97.2 % — SIGNIFICANT CHANGE UP (ref 94–98)
SAO2 % BLDA: 97.7 % — SIGNIFICANT CHANGE UP (ref 94–98)
SAO2 % BLDA: 97.7 % — SIGNIFICANT CHANGE UP (ref 94–98)
SAO2 % BLDA: 97.9 % — SIGNIFICANT CHANGE UP (ref 94–98)
SAO2 % BLDA: 98.6 % — HIGH (ref 94–98)
SAO2 % BLDA: 98.9 % — HIGH (ref 94–98)
SAO2 % BLDA: 99.1 % — HIGH (ref 94–98)
SAO2 % BLDA: 99.4 % — HIGH (ref 94–98)
SAO2 % BLDV: 52.9 % — SIGNIFICANT CHANGE UP
SAO2 % BLDV: 75.6 % — SIGNIFICANT CHANGE UP (ref 67–88)
SAO2 % BLDV: 78.3 % — SIGNIFICANT CHANGE UP
SAO2 % BLDV: 84.3 % — SIGNIFICANT CHANGE UP
SAO2 % BLDV: 89.9 % — HIGH (ref 67–88)
SAO2 % BLDV: 93 % — HIGH (ref 67–88)
SAO2 % BLDV: 93.6 % — HIGH (ref 67–88)
SAO2 % BLDV: 96.9 % — HIGH (ref 67–88)
SARS-COV-2 RNA SPEC QL NAA+PROBE: DETECTED
SARS-COV-2 RNA SPEC QL NAA+PROBE: SIGNIFICANT CHANGE UP
SCHISTOCYTES BLD QL AUTO: SLIGHT — SIGNIFICANT CHANGE UP
SCHISTOCYTES BLD QL AUTO: SLIGHT — SIGNIFICANT CHANGE UP
SMOOTH MUSCLE AB SER-ACNC: SIGNIFICANT CHANGE UP
SODIUM SERPL-SCNC: 129 MMOL/L — LOW (ref 135–145)
SODIUM SERPL-SCNC: 129 MMOL/L — LOW (ref 135–145)
SODIUM SERPL-SCNC: 132 MMOL/L — LOW (ref 135–145)
SODIUM SERPL-SCNC: 133 MMOL/L — LOW (ref 135–145)
SODIUM SERPL-SCNC: 134 MMOL/L — LOW (ref 135–145)
SODIUM SERPL-SCNC: 135 MMOL/L — SIGNIFICANT CHANGE UP (ref 135–145)
SODIUM SERPL-SCNC: 136 MMOL/L — SIGNIFICANT CHANGE UP (ref 135–145)
SODIUM SERPL-SCNC: 137 MMOL/L — SIGNIFICANT CHANGE UP (ref 135–145)
SODIUM SERPL-SCNC: 138 MMOL/L — SIGNIFICANT CHANGE UP (ref 135–145)
SODIUM SERPL-SCNC: 138 MMOL/L — SIGNIFICANT CHANGE UP (ref 135–145)
SODIUM SERPL-SCNC: 139 MMOL/L — SIGNIFICANT CHANGE UP (ref 135–145)
SODIUM SERPL-SCNC: 139 MMOL/L — SIGNIFICANT CHANGE UP (ref 135–145)
SODIUM SERPL-SCNC: 140 MMOL/L — SIGNIFICANT CHANGE UP (ref 135–145)
SODIUM SERPL-SCNC: 142 MMOL/L — SIGNIFICANT CHANGE UP (ref 135–145)
SPECIMEN SOURCE FLD: SIGNIFICANT CHANGE UP
SPECIMEN SOURCE: SIGNIFICANT CHANGE UP
T3 SERPL-MCNC: 38 NG/DL — LOW (ref 80–200)
T4 FREE SERPL-MCNC: 1 NG/DL — SIGNIFICANT CHANGE UP (ref 0.9–1.8)
TARGETS BLD QL SMEAR: SLIGHT — SIGNIFICANT CHANGE UP
TARGETS BLD QL SMEAR: SLIGHT — SIGNIFICANT CHANGE UP
TOTAL CELLS COUNTED, BODY FLUID: 100 CELLS — SIGNIFICANT CHANGE UP
TOTAL NUCLEATED CELL COUNT, BODY FLUID: SIGNIFICANT CHANGE UP CELLS/UL (ref 0–5)
TOXICOLOGY SCREEN, DRUGS OF ABUSE, SERUM RESULT: SIGNIFICANT CHANGE UP
TROPONIN T, HIGH SENSITIVITY RESULT: 108 NG/L — CRITICAL HIGH
TROPONIN T, HIGH SENSITIVITY RESULT: 113 NG/L — CRITICAL HIGH
TROPONIN T, HIGH SENSITIVITY RESULT: 118 NG/L — CRITICAL HIGH
TROPONIN T, HIGH SENSITIVITY RESULT: 118 NG/L — CRITICAL HIGH
TROPONIN T, HIGH SENSITIVITY RESULT: 124 NG/L — CRITICAL HIGH
TROPONIN T, HIGH SENSITIVITY RESULT: 130 NG/L — CRITICAL HIGH
TROPONIN T, HIGH SENSITIVITY RESULT: 133 NG/L — CRITICAL HIGH
TROPONIN T, HIGH SENSITIVITY RESULT: 134 NG/L — CRITICAL HIGH
TROPONIN T, HIGH SENSITIVITY RESULT: 147 NG/L — CRITICAL HIGH
TROPONIN T, HIGH SENSITIVITY RESULT: 158 NG/L — CRITICAL HIGH
TROPONIN T, HIGH SENSITIVITY RESULT: 186 NG/L — CRITICAL HIGH
TROPONIN T, HIGH SENSITIVITY RESULT: 198 NG/L — CRITICAL HIGH
TROPONIN T, HIGH SENSITIVITY RESULT: 224 NG/L — CRITICAL HIGH
TROPONIN T, HIGH SENSITIVITY RESULT: 350 NG/L — CRITICAL HIGH
TSH SERPL-MCNC: 1.31 UIU/ML — SIGNIFICANT CHANGE UP (ref 0.27–4.2)
TSH SERPL-MCNC: 5.79 UIU/ML — HIGH (ref 0.27–4.2)
TUBE TYPE: SIGNIFICANT CHANGE UP
VANCOMYCIN FLD-MCNC: <4 UG/ML — SIGNIFICANT CHANGE UP
VANCOMYCIN TROUGH SERPL-MCNC: 9.4 UG/ML — LOW (ref 10–20)
VARIANT LYMPHS # BLD: 0.9 % — SIGNIFICANT CHANGE UP (ref 0–6)
VIT B12 SERPL-MCNC: 1452 PG/ML — HIGH (ref 200–900)
VIT B12 SERPL-MCNC: 1674 PG/ML — HIGH (ref 200–900)
WBC # BLD: 10.54 K/UL — HIGH (ref 3.8–10.5)
WBC # BLD: 10.69 K/UL — HIGH (ref 3.8–10.5)
WBC # BLD: 11.42 K/UL — HIGH (ref 3.8–10.5)
WBC # BLD: 11.62 K/UL — HIGH (ref 3.8–10.5)
WBC # BLD: 11.68 K/UL — HIGH (ref 3.8–10.5)
WBC # BLD: 11.7 K/UL — HIGH (ref 3.8–10.5)
WBC # BLD: 11.79 K/UL — HIGH (ref 3.8–10.5)
WBC # BLD: 11.82 K/UL — HIGH (ref 3.8–10.5)
WBC # BLD: 12.46 K/UL — HIGH (ref 3.8–10.5)
WBC # BLD: 12.5 K/UL — HIGH (ref 3.8–10.5)
WBC # BLD: 12.59 K/UL — HIGH (ref 3.8–10.5)
WBC # BLD: 13.05 K/UL — HIGH (ref 3.8–10.5)
WBC # BLD: 13.1 K/UL — HIGH (ref 3.8–10.5)
WBC # BLD: 13.19 K/UL — HIGH (ref 3.8–10.5)
WBC # BLD: 13.51 K/UL — HIGH (ref 3.8–10.5)
WBC # BLD: 13.62 K/UL — HIGH (ref 3.8–10.5)
WBC # BLD: 13.92 K/UL — HIGH (ref 3.8–10.5)
WBC # BLD: 16.41 K/UL — HIGH (ref 3.8–10.5)
WBC # BLD: 16.55 K/UL — HIGH (ref 3.8–10.5)
WBC # BLD: 16.72 K/UL — HIGH (ref 3.8–10.5)
WBC # BLD: 16.85 K/UL — HIGH (ref 3.8–10.5)
WBC # BLD: 20.02 K/UL — HIGH (ref 3.8–10.5)
WBC # BLD: 20.08 K/UL — HIGH (ref 3.8–10.5)
WBC # BLD: 20.37 K/UL — HIGH (ref 3.8–10.5)
WBC # BLD: 22.16 K/UL — HIGH (ref 3.8–10.5)
WBC # BLD: 22.38 K/UL — HIGH (ref 3.8–10.5)
WBC # BLD: 26.08 K/UL — HIGH (ref 3.8–10.5)
WBC # BLD: 27.03 K/UL — HIGH (ref 3.8–10.5)
WBC # BLD: 29 K/UL — HIGH (ref 3.8–10.5)
WBC # BLD: 29.33 K/UL — HIGH (ref 3.8–10.5)
WBC # BLD: 31.08 K/UL — HIGH (ref 3.8–10.5)
WBC # BLD: 6.71 K/UL — SIGNIFICANT CHANGE UP (ref 3.8–10.5)
WBC # BLD: 7.08 K/UL — SIGNIFICANT CHANGE UP (ref 3.8–10.5)
WBC # BLD: 7.09 K/UL — SIGNIFICANT CHANGE UP (ref 3.8–10.5)
WBC # BLD: 7.21 K/UL — SIGNIFICANT CHANGE UP (ref 3.8–10.5)
WBC # BLD: 7.75 K/UL — SIGNIFICANT CHANGE UP (ref 3.8–10.5)
WBC # BLD: 8.21 K/UL — SIGNIFICANT CHANGE UP (ref 3.8–10.5)
WBC # BLD: 8.32 K/UL — SIGNIFICANT CHANGE UP (ref 3.8–10.5)
WBC # BLD: 8.81 K/UL — SIGNIFICANT CHANGE UP (ref 3.8–10.5)
WBC # BLD: 9.1 K/UL — SIGNIFICANT CHANGE UP (ref 3.8–10.5)
WBC # BLD: 9.39 K/UL — SIGNIFICANT CHANGE UP (ref 3.8–10.5)
WBC # BLD: 9.39 K/UL — SIGNIFICANT CHANGE UP (ref 3.8–10.5)
WBC # BLD: 9.52 K/UL — SIGNIFICANT CHANGE UP (ref 3.8–10.5)
WBC # BLD: 9.9 K/UL — SIGNIFICANT CHANGE UP (ref 3.8–10.5)
WBC # FLD AUTO: 10.54 K/UL — HIGH (ref 3.8–10.5)
WBC # FLD AUTO: 10.69 K/UL — HIGH (ref 3.8–10.5)
WBC # FLD AUTO: 11.42 K/UL — HIGH (ref 3.8–10.5)
WBC # FLD AUTO: 11.62 K/UL — HIGH (ref 3.8–10.5)
WBC # FLD AUTO: 11.68 K/UL — HIGH (ref 3.8–10.5)
WBC # FLD AUTO: 11.7 K/UL — HIGH (ref 3.8–10.5)
WBC # FLD AUTO: 11.79 K/UL — HIGH (ref 3.8–10.5)
WBC # FLD AUTO: 11.82 K/UL — HIGH (ref 3.8–10.5)
WBC # FLD AUTO: 12.46 K/UL — HIGH (ref 3.8–10.5)
WBC # FLD AUTO: 12.5 K/UL — HIGH (ref 3.8–10.5)
WBC # FLD AUTO: 12.59 K/UL — HIGH (ref 3.8–10.5)
WBC # FLD AUTO: 13.05 K/UL — HIGH (ref 3.8–10.5)
WBC # FLD AUTO: 13.1 K/UL — HIGH (ref 3.8–10.5)
WBC # FLD AUTO: 13.19 K/UL — HIGH (ref 3.8–10.5)
WBC # FLD AUTO: 13.51 K/UL — HIGH (ref 3.8–10.5)
WBC # FLD AUTO: 13.62 K/UL — HIGH (ref 3.8–10.5)
WBC # FLD AUTO: 13.92 K/UL — HIGH (ref 3.8–10.5)
WBC # FLD AUTO: 16.41 K/UL — HIGH (ref 3.8–10.5)
WBC # FLD AUTO: 16.55 K/UL — HIGH (ref 3.8–10.5)
WBC # FLD AUTO: 16.72 K/UL — HIGH (ref 3.8–10.5)
WBC # FLD AUTO: 16.85 K/UL — HIGH (ref 3.8–10.5)
WBC # FLD AUTO: 20.02 K/UL — HIGH (ref 3.8–10.5)
WBC # FLD AUTO: 20.08 K/UL — HIGH (ref 3.8–10.5)
WBC # FLD AUTO: 20.37 K/UL — HIGH (ref 3.8–10.5)
WBC # FLD AUTO: 22.16 K/UL — HIGH (ref 3.8–10.5)
WBC # FLD AUTO: 22.38 K/UL — HIGH (ref 3.8–10.5)
WBC # FLD AUTO: 26.08 K/UL — HIGH (ref 3.8–10.5)
WBC # FLD AUTO: 27.03 K/UL — HIGH (ref 3.8–10.5)
WBC # FLD AUTO: 29 K/UL — HIGH (ref 3.8–10.5)
WBC # FLD AUTO: 29.33 K/UL — HIGH (ref 3.8–10.5)
WBC # FLD AUTO: 31.08 K/UL — HIGH (ref 3.8–10.5)
WBC # FLD AUTO: 6.71 K/UL — SIGNIFICANT CHANGE UP (ref 3.8–10.5)
WBC # FLD AUTO: 7.08 K/UL — SIGNIFICANT CHANGE UP (ref 3.8–10.5)
WBC # FLD AUTO: 7.09 K/UL — SIGNIFICANT CHANGE UP (ref 3.8–10.5)
WBC # FLD AUTO: 7.21 K/UL — SIGNIFICANT CHANGE UP (ref 3.8–10.5)
WBC # FLD AUTO: 7.75 K/UL — SIGNIFICANT CHANGE UP (ref 3.8–10.5)
WBC # FLD AUTO: 8.21 K/UL — SIGNIFICANT CHANGE UP (ref 3.8–10.5)
WBC # FLD AUTO: 8.32 K/UL — SIGNIFICANT CHANGE UP (ref 3.8–10.5)
WBC # FLD AUTO: 8.81 K/UL — SIGNIFICANT CHANGE UP (ref 3.8–10.5)
WBC # FLD AUTO: 9.1 K/UL — SIGNIFICANT CHANGE UP (ref 3.8–10.5)
WBC # FLD AUTO: 9.39 K/UL — SIGNIFICANT CHANGE UP (ref 3.8–10.5)
WBC # FLD AUTO: 9.39 K/UL — SIGNIFICANT CHANGE UP (ref 3.8–10.5)
WBC # FLD AUTO: 9.52 K/UL — SIGNIFICANT CHANGE UP (ref 3.8–10.5)
WBC # FLD AUTO: 9.9 K/UL — SIGNIFICANT CHANGE UP (ref 3.8–10.5)

## 2023-01-01 PROCEDURE — 99233 SBSQ HOSP IP/OBS HIGH 50: CPT | Mod: GC

## 2023-01-01 PROCEDURE — 88108 CYTOPATH CONCENTRATE TECH: CPT | Mod: 26

## 2023-01-01 PROCEDURE — 43752 NASAL/OROGASTRIC W/TUBE PLMT: CPT | Mod: 59

## 2023-01-01 PROCEDURE — 32555 ASPIRATE PLEURA W/ IMAGING: CPT | Mod: GC

## 2023-01-01 PROCEDURE — 36620 INSERTION CATHETER ARTERY: CPT

## 2023-01-01 PROCEDURE — 99232 SBSQ HOSP IP/OBS MODERATE 35: CPT | Mod: GC

## 2023-01-01 PROCEDURE — 71045 X-RAY EXAM CHEST 1 VIEW: CPT | Mod: 26,77

## 2023-01-01 PROCEDURE — 99254 IP/OBS CNSLTJ NEW/EST MOD 60: CPT | Mod: GC

## 2023-01-01 PROCEDURE — 99291 CRITICAL CARE FIRST HOUR: CPT

## 2023-01-01 PROCEDURE — 99497 ADVNCD CARE PLAN 30 MIN: CPT

## 2023-01-01 PROCEDURE — 99232 SBSQ HOSP IP/OBS MODERATE 35: CPT

## 2023-01-01 PROCEDURE — 88305 TISSUE EXAM BY PATHOLOGIST: CPT | Mod: 26

## 2023-01-01 PROCEDURE — 99223 1ST HOSP IP/OBS HIGH 75: CPT

## 2023-01-01 PROCEDURE — 99233 SBSQ HOSP IP/OBS HIGH 50: CPT

## 2023-01-01 PROCEDURE — 71045 X-RAY EXAM CHEST 1 VIEW: CPT | Mod: 26

## 2023-01-01 PROCEDURE — 99204 OFFICE O/P NEW MOD 45 MIN: CPT

## 2023-01-01 PROCEDURE — 93010 ELECTROCARDIOGRAM REPORT: CPT

## 2023-01-01 PROCEDURE — 93306 TTE W/DOPPLER COMPLETE: CPT | Mod: 26

## 2023-01-01 PROCEDURE — 99239 HOSP IP/OBS DSCHRG MGMT >30: CPT

## 2023-01-01 PROCEDURE — 99291 CRITICAL CARE FIRST HOUR: CPT | Mod: 25

## 2023-01-01 PROCEDURE — 71250 CT THORAX DX C-: CPT | Mod: 26,MA

## 2023-01-01 PROCEDURE — 70450 CT HEAD/BRAIN W/O DYE: CPT | Mod: 26

## 2023-01-01 PROCEDURE — 90935 HEMODIALYSIS ONE EVALUATION: CPT

## 2023-01-01 PROCEDURE — 31624 DX BRONCHOSCOPE/LAVAGE: CPT | Mod: GC

## 2023-01-01 PROCEDURE — 99497 ADVNCD CARE PLAN 30 MIN: CPT | Mod: 25

## 2023-01-01 PROCEDURE — 74177 CT ABD & PELVIS W/CONTRAST: CPT | Mod: 26

## 2023-01-01 PROCEDURE — 71260 CT THORAX DX C+: CPT | Mod: 26

## 2023-01-01 PROCEDURE — 99498 ADVNCD CARE PLAN ADDL 30 MIN: CPT

## 2023-01-01 PROCEDURE — 76705 ECHO EXAM OF ABDOMEN: CPT | Mod: 26

## 2023-01-01 PROCEDURE — 99291 CRITICAL CARE FIRST HOUR: CPT | Mod: GC,25

## 2023-01-01 PROCEDURE — 99253 IP/OBS CNSLTJ NEW/EST LOW 45: CPT

## 2023-01-01 PROCEDURE — 99291 CRITICAL CARE FIRST HOUR: CPT | Mod: 25,CS

## 2023-01-01 PROCEDURE — 99223 1ST HOSP IP/OBS HIGH 75: CPT | Mod: GC

## 2023-01-01 PROCEDURE — 36556 INSERT NON-TUNNEL CV CATH: CPT

## 2023-01-01 PROCEDURE — 99292 CRITICAL CARE ADDL 30 MIN: CPT | Mod: 25

## 2023-01-01 PROCEDURE — 70450 CT HEAD/BRAIN W/O DYE: CPT | Mod: 26,MA

## 2023-01-01 PROCEDURE — 99231 SBSQ HOSP IP/OBS SF/LOW 25: CPT | Mod: GC

## 2023-01-01 PROCEDURE — 74230 X-RAY XM SWLNG FUNCJ C+: CPT | Mod: 26

## 2023-01-01 PROCEDURE — 93308 TTE F-UP OR LMTD: CPT | Mod: 26,GC

## 2023-01-01 PROCEDURE — 99291 CRITICAL CARE FIRST HOUR: CPT | Mod: GC

## 2023-01-01 PROCEDURE — 76604 US EXAM CHEST: CPT | Mod: 26,GC

## 2023-01-01 PROCEDURE — 99498 ADVNCD CARE PLAN ADDL 30 MIN: CPT | Mod: 25

## 2023-01-01 PROCEDURE — 92950 HEART/LUNG RESUSCITATION CPR: CPT

## 2023-01-01 PROCEDURE — 99291 CRITICAL CARE FIRST HOUR: CPT | Mod: FS

## 2023-01-01 PROCEDURE — 31500 INSERT EMERGENCY AIRWAY: CPT

## 2023-01-01 PROCEDURE — 88112 CYTOPATH CELL ENHANCE TECH: CPT | Mod: 26

## 2023-01-01 PROCEDURE — 99221 1ST HOSP IP/OBS SF/LOW 40: CPT

## 2023-01-01 PROCEDURE — 99285 EMERGENCY DEPT VISIT HI MDM: CPT | Mod: CS,25

## 2023-01-01 PROCEDURE — 88312 SPECIAL STAINS GROUP 1: CPT | Mod: 26

## 2023-01-01 PROCEDURE — 99292 CRITICAL CARE ADDL 30 MIN: CPT

## 2023-01-01 RX ORDER — PIPERACILLIN AND TAZOBACTAM 4; .5 G/20ML; G/20ML
3.38 INJECTION, POWDER, LYOPHILIZED, FOR SOLUTION INTRAVENOUS ONCE
Refills: 0 | Status: COMPLETED | OUTPATIENT
Start: 2023-01-01 | End: 2023-01-01

## 2023-01-01 RX ORDER — NIFEDIPINE 30 MG
90 TABLET, EXTENDED RELEASE 24 HR ORAL DAILY
Refills: 0 | Status: DISCONTINUED | OUTPATIENT
Start: 2023-01-01 | End: 2023-01-01

## 2023-01-01 RX ORDER — ASPIRIN/CALCIUM CARB/MAGNESIUM 324 MG
81 TABLET ORAL DAILY
Refills: 0 | Status: DISCONTINUED | OUTPATIENT
Start: 2023-01-01 | End: 2023-01-01

## 2023-01-01 RX ORDER — DEXTROSE 50 % IN WATER 50 %
25 SYRINGE (ML) INTRAVENOUS ONCE
Refills: 0 | Status: DISCONTINUED | OUTPATIENT
Start: 2023-01-01 | End: 2023-01-01

## 2023-01-01 RX ORDER — HEPARIN SODIUM 5000 [USP'U]/ML
750 INJECTION INTRAVENOUS; SUBCUTANEOUS
Qty: 25000 | Refills: 0 | Status: DISCONTINUED | OUTPATIENT
Start: 2023-01-01 | End: 2023-01-01

## 2023-01-01 RX ORDER — LOSARTAN POTASSIUM 100 MG/1
100 TABLET, FILM COATED ORAL DAILY
Refills: 0 | Status: DISCONTINUED | OUTPATIENT
Start: 2023-01-01 | End: 2023-01-01

## 2023-01-01 RX ORDER — OLANZAPINE 15 MG/1
30 TABLET, FILM COATED ORAL
Qty: 0 | Refills: 0 | DISCHARGE

## 2023-01-01 RX ORDER — INSULIN LISPRO 100/ML
VIAL (ML) SUBCUTANEOUS
Refills: 0 | Status: DISCONTINUED | OUTPATIENT
Start: 2023-01-01 | End: 2023-01-01

## 2023-01-01 RX ORDER — NITROGLYCERIN 6.5 MG
20 CAPSULE, EXTENDED RELEASE ORAL
Qty: 50 | Refills: 0 | Status: DISCONTINUED | OUTPATIENT
Start: 2023-01-01 | End: 2023-01-01

## 2023-01-01 RX ORDER — DEXTROSE 50 % IN WATER 50 %
50 SYRINGE (ML) INTRAVENOUS ONCE
Refills: 0 | Status: COMPLETED | OUTPATIENT
Start: 2023-01-01 | End: 2023-01-01

## 2023-01-01 RX ORDER — INSULIN LISPRO 100/ML
VIAL (ML) SUBCUTANEOUS EVERY 6 HOURS
Refills: 0 | Status: DISCONTINUED | OUTPATIENT
Start: 2023-01-01 | End: 2023-01-01

## 2023-01-01 RX ORDER — SODIUM CHLORIDE 9 MG/ML
1000 INJECTION, SOLUTION INTRAVENOUS
Refills: 0 | Status: DISCONTINUED | OUTPATIENT
Start: 2023-01-01 | End: 2023-01-01

## 2023-01-01 RX ORDER — GLUCAGON INJECTION, SOLUTION 0.5 MG/.1ML
1 INJECTION, SOLUTION SUBCUTANEOUS ONCE
Refills: 0 | Status: DISCONTINUED | OUTPATIENT
Start: 2023-01-01 | End: 2023-01-01

## 2023-01-01 RX ORDER — HEPARIN SODIUM 5000 [USP'U]/ML
INJECTION INTRAVENOUS; SUBCUTANEOUS
Qty: 25000 | Refills: 0 | Status: DISCONTINUED | OUTPATIENT
Start: 2023-01-01 | End: 2023-01-01

## 2023-01-01 RX ORDER — PROPOFOL 10 MG/ML
35 INJECTION, EMULSION INTRAVENOUS
Qty: 1000 | Refills: 0 | Status: DISCONTINUED | OUTPATIENT
Start: 2023-01-01 | End: 2023-01-01

## 2023-01-01 RX ORDER — ROSUVASTATIN CALCIUM 5 MG/1
1 TABLET ORAL
Qty: 0 | Refills: 0 | DISCHARGE

## 2023-01-01 RX ORDER — HEPARIN SODIUM 5000 [USP'U]/ML
1500 INJECTION INTRAVENOUS; SUBCUTANEOUS
Qty: 25000 | Refills: 0 | Status: DISCONTINUED | OUTPATIENT
Start: 2023-01-01 | End: 2023-01-01

## 2023-01-01 RX ORDER — ALBUTEROL 90 UG/1
1 AEROSOL, METERED ORAL
Refills: 0 | Status: DISCONTINUED | OUTPATIENT
Start: 2023-01-01 | End: 2023-01-01

## 2023-01-01 RX ORDER — ATORVASTATIN CALCIUM 80 MG/1
20 TABLET, FILM COATED ORAL AT BEDTIME
Refills: 0 | Status: DISCONTINUED | OUTPATIENT
Start: 2023-01-01 | End: 2023-01-01

## 2023-01-01 RX ORDER — HEPARIN SODIUM 5000 [USP'U]/ML
5500 INJECTION INTRAVENOUS; SUBCUTANEOUS EVERY 6 HOURS
Refills: 0 | Status: DISCONTINUED | OUTPATIENT
Start: 2023-01-01 | End: 2023-01-01

## 2023-01-01 RX ORDER — HYDROMORPHONE HYDROCHLORIDE 2 MG/ML
0.5 INJECTION INTRAMUSCULAR; INTRAVENOUS; SUBCUTANEOUS
Refills: 0 | Status: DISCONTINUED | OUTPATIENT
Start: 2023-01-01 | End: 2023-01-01

## 2023-01-01 RX ORDER — APIXABAN 2.5 MG/1
1 TABLET, FILM COATED ORAL
Qty: 0 | Refills: 0 | DISCHARGE

## 2023-01-01 RX ORDER — IPRATROPIUM BROMIDE 0.2 MG/ML
2 SOLUTION, NON-ORAL INHALATION EVERY 6 HOURS
Refills: 0 | Status: DISCONTINUED | OUTPATIENT
Start: 2023-01-01 | End: 2023-01-01

## 2023-01-01 RX ORDER — OLANZAPINE 15 MG/1
30 TABLET, FILM COATED ORAL AT BEDTIME
Refills: 0 | Status: DISCONTINUED | OUTPATIENT
Start: 2023-01-01 | End: 2023-01-01

## 2023-01-01 RX ORDER — ISOSORBIDE DINITRATE 5 MG/1
20 TABLET ORAL THREE TIMES A DAY
Refills: 0 | Status: DISCONTINUED | OUTPATIENT
Start: 2023-01-01 | End: 2023-01-01

## 2023-01-01 RX ORDER — HYDRALAZINE HCL 50 MG
10 TABLET ORAL ONCE
Refills: 0 | Status: COMPLETED | OUTPATIENT
Start: 2023-01-01 | End: 2023-01-01

## 2023-01-01 RX ORDER — INSULIN LISPRO 100/ML
2 VIAL (ML) SUBCUTANEOUS ONCE
Refills: 0 | Status: COMPLETED | OUTPATIENT
Start: 2023-01-01 | End: 2023-01-01

## 2023-01-01 RX ORDER — DEXAMETHASONE 0.5 MG/5ML
6 ELIXIR ORAL DAILY
Refills: 0 | Status: DISCONTINUED | OUTPATIENT
Start: 2023-01-01 | End: 2023-01-01

## 2023-01-01 RX ORDER — POTASSIUM CHLORIDE 20 MEQ
20 PACKET (EA) ORAL ONCE
Refills: 0 | Status: COMPLETED | OUTPATIENT
Start: 2023-01-01 | End: 2023-01-01

## 2023-01-01 RX ORDER — FENTANYL CITRATE 50 UG/ML
4 INJECTION INTRAVENOUS
Qty: 2500 | Refills: 0 | Status: DISCONTINUED | OUTPATIENT
Start: 2023-01-01 | End: 2023-01-01

## 2023-01-01 RX ORDER — ISOSORBIDE DINITRATE 5 MG/1
10 TABLET ORAL THREE TIMES A DAY
Refills: 0 | Status: DISCONTINUED | OUTPATIENT
Start: 2023-01-01 | End: 2023-01-01

## 2023-01-01 RX ORDER — DARBEPOETIN ALFA IN POLYSORBAT 200MCG/0.4
80 PEN INJECTOR (ML) SUBCUTANEOUS
Refills: 0 | Status: DISCONTINUED | OUTPATIENT
Start: 2023-01-01 | End: 2023-01-01

## 2023-01-01 RX ORDER — HYDRALAZINE HCL 50 MG
5 TABLET ORAL ONCE
Refills: 0 | Status: COMPLETED | OUTPATIENT
Start: 2023-01-01 | End: 2023-01-01

## 2023-01-01 RX ORDER — SPIRONOLACTONE 25 MG/1
25 TABLET, FILM COATED ORAL DAILY
Refills: 0 | Status: DISCONTINUED | OUTPATIENT
Start: 2023-01-01 | End: 2023-01-01

## 2023-01-01 RX ORDER — DEXTROSE 50 % IN WATER 50 %
12.5 SYRINGE (ML) INTRAVENOUS ONCE
Refills: 0 | Status: DISCONTINUED | OUTPATIENT
Start: 2023-01-01 | End: 2023-01-01

## 2023-01-01 RX ORDER — FENTANYL CITRATE 50 UG/ML
0.5 INJECTION INTRAVENOUS
Qty: 2500 | Refills: 0 | Status: DISCONTINUED | OUTPATIENT
Start: 2023-01-01 | End: 2023-01-01

## 2023-01-01 RX ORDER — PHENYLEPHRINE HYDROCHLORIDE 10 MG/ML
2000 INJECTION INTRAVENOUS ONCE
Refills: 0 | Status: COMPLETED | OUTPATIENT
Start: 2023-01-01 | End: 2023-01-01

## 2023-01-01 RX ORDER — NOREPINEPHRINE BITARTRATE/D5W 8 MG/250ML
0.05 PLASTIC BAG, INJECTION (ML) INTRAVENOUS
Qty: 8 | Refills: 0 | Status: DISCONTINUED | OUTPATIENT
Start: 2023-01-01 | End: 2023-01-01

## 2023-01-01 RX ORDER — VASOPRESSIN 20 [USP'U]/ML
0.04 INJECTION INTRAVENOUS
Qty: 40 | Refills: 0 | Status: DISCONTINUED | OUTPATIENT
Start: 2023-01-01 | End: 2023-01-01

## 2023-01-01 RX ORDER — MAGNESIUM SULFATE 500 MG/ML
1 VIAL (ML) INJECTION ONCE
Refills: 0 | Status: COMPLETED | OUTPATIENT
Start: 2023-01-01 | End: 2023-01-01

## 2023-01-01 RX ORDER — ONDANSETRON 8 MG/1
4 TABLET, FILM COATED ORAL EVERY 8 HOURS
Refills: 0 | Status: DISCONTINUED | OUTPATIENT
Start: 2023-01-01 | End: 2023-01-01

## 2023-01-01 RX ORDER — FENTANYL CITRATE 50 UG/ML
100 INJECTION INTRAVENOUS ONCE
Refills: 0 | Status: DISCONTINUED | OUTPATIENT
Start: 2023-01-01 | End: 2023-01-01

## 2023-01-01 RX ORDER — NICARDIPINE HYDROCHLORIDE 30 MG/1
5 CAPSULE, EXTENDED RELEASE ORAL
Qty: 40 | Refills: 0 | Status: DISCONTINUED | OUTPATIENT
Start: 2023-01-01 | End: 2023-01-01

## 2023-01-01 RX ORDER — HYDRALAZINE HCL 50 MG
100 TABLET ORAL EVERY 8 HOURS
Refills: 0 | Status: DISCONTINUED | OUTPATIENT
Start: 2023-01-01 | End: 2023-01-01

## 2023-01-01 RX ORDER — TRAZODONE HCL 50 MG
1 TABLET ORAL
Qty: 0 | Refills: 0 | DISCHARGE

## 2023-01-01 RX ORDER — LABETALOL HCL 100 MG
5 TABLET ORAL ONCE
Refills: 0 | Status: COMPLETED | OUTPATIENT
Start: 2023-01-01 | End: 2023-01-01

## 2023-01-01 RX ORDER — AMLODIPINE BESYLATE 2.5 MG/1
10 TABLET ORAL DAILY
Refills: 0 | Status: DISCONTINUED | OUTPATIENT
Start: 2023-01-01 | End: 2023-01-01

## 2023-01-01 RX ORDER — ALBUTEROL 90 UG/1
4 AEROSOL, METERED ORAL EVERY 6 HOURS
Refills: 0 | Status: DISCONTINUED | OUTPATIENT
Start: 2023-01-01 | End: 2023-01-01

## 2023-01-01 RX ORDER — TRAZODONE HCL 50 MG
50 TABLET ORAL
Refills: 0 | Status: DISCONTINUED | OUTPATIENT
Start: 2023-01-01 | End: 2023-01-01

## 2023-01-01 RX ORDER — PROPOFOL 10 MG/ML
5.55 INJECTION, EMULSION INTRAVENOUS
Qty: 1000 | Refills: 0 | Status: DISCONTINUED | OUTPATIENT
Start: 2023-01-01 | End: 2023-01-01

## 2023-01-01 RX ORDER — SODIUM CHLORIDE 9 MG/ML
100 INJECTION INTRAMUSCULAR; INTRAVENOUS; SUBCUTANEOUS ONCE
Refills: 0 | Status: DISCONTINUED | OUTPATIENT
Start: 2023-01-01 | End: 2023-01-01

## 2023-01-01 RX ORDER — FENTANYL CITRATE 50 UG/ML
25 INJECTION INTRAVENOUS ONCE
Refills: 0 | Status: DISCONTINUED | OUTPATIENT
Start: 2023-01-01 | End: 2023-01-01

## 2023-01-01 RX ORDER — ASPIRIN/CALCIUM CARB/MAGNESIUM 324 MG
300 TABLET ORAL ONCE
Refills: 0 | Status: COMPLETED | OUTPATIENT
Start: 2023-01-01 | End: 2023-01-01

## 2023-01-01 RX ORDER — HEPARIN SODIUM 5000 [USP'U]/ML
4500 INJECTION INTRAVENOUS; SUBCUTANEOUS EVERY 6 HOURS
Refills: 0 | Status: DISCONTINUED | OUTPATIENT
Start: 2023-01-01 | End: 2023-01-01

## 2023-01-01 RX ORDER — VANCOMYCIN HCL 1 G
1000 VIAL (EA) INTRAVENOUS ONCE
Refills: 0 | Status: COMPLETED | OUTPATIENT
Start: 2023-01-01 | End: 2023-01-01

## 2023-01-01 RX ORDER — OLANZAPINE 15 MG/1
10 TABLET, FILM COATED ORAL AT BEDTIME
Refills: 0 | Status: DISCONTINUED | OUTPATIENT
Start: 2023-01-01 | End: 2023-01-01

## 2023-01-01 RX ORDER — CLOPIDOGREL BISULFATE 75 MG/1
75 TABLET, FILM COATED ORAL DAILY
Refills: 0 | Status: DISCONTINUED | OUTPATIENT
Start: 2023-01-01 | End: 2023-01-01

## 2023-01-01 RX ORDER — NIFEDIPINE 30 MG
1 TABLET, EXTENDED RELEASE 24 HR ORAL
Qty: 0 | Refills: 0 | DISCHARGE

## 2023-01-01 RX ORDER — CARVEDILOL PHOSPHATE 80 MG/1
3.12 CAPSULE, EXTENDED RELEASE ORAL EVERY 12 HOURS
Refills: 0 | Status: DISCONTINUED | OUTPATIENT
Start: 2023-01-01 | End: 2023-01-01

## 2023-01-01 RX ORDER — PROPOFOL 10 MG/ML
5 INJECTION, EMULSION INTRAVENOUS
Qty: 1000 | Refills: 0 | Status: DISCONTINUED | OUTPATIENT
Start: 2023-01-01 | End: 2023-01-01

## 2023-01-01 RX ORDER — CHLORHEXIDINE GLUCONATE 213 G/1000ML
15 SOLUTION TOPICAL
Refills: 0 | Status: DISCONTINUED | OUTPATIENT
Start: 2023-01-01 | End: 2023-01-01

## 2023-01-01 RX ORDER — IPRATROPIUM/ALBUTEROL SULFATE 18-103MCG
3 AEROSOL WITH ADAPTER (GRAM) INHALATION EVERY 12 HOURS
Refills: 0 | Status: DISCONTINUED | OUTPATIENT
Start: 2023-01-01 | End: 2023-01-01

## 2023-01-01 RX ORDER — DEXTROSE 50 % IN WATER 50 %
15 SYRINGE (ML) INTRAVENOUS ONCE
Refills: 0 | Status: DISCONTINUED | OUTPATIENT
Start: 2023-01-01 | End: 2023-01-01

## 2023-01-01 RX ORDER — PIPERACILLIN AND TAZOBACTAM 4; .5 G/20ML; G/20ML
3.38 INJECTION, POWDER, LYOPHILIZED, FOR SOLUTION INTRAVENOUS EVERY 12 HOURS
Refills: 0 | Status: COMPLETED | OUTPATIENT
Start: 2023-01-01 | End: 2023-01-01

## 2023-01-01 RX ORDER — PIPERACILLIN AND TAZOBACTAM 4; .5 G/20ML; G/20ML
2.25 INJECTION, POWDER, LYOPHILIZED, FOR SOLUTION INTRAVENOUS ONCE
Refills: 0 | Status: DISCONTINUED | OUTPATIENT
Start: 2023-01-01 | End: 2023-01-01

## 2023-01-01 RX ORDER — ALBUTEROL 90 UG/1
4 AEROSOL, METERED ORAL
Qty: 0 | Refills: 0 | DISCHARGE
Start: 2023-01-01

## 2023-01-01 RX ORDER — APIXABAN 2.5 MG/1
5 TABLET, FILM COATED ORAL
Refills: 0 | Status: DISCONTINUED | OUTPATIENT
Start: 2023-01-01 | End: 2023-01-01

## 2023-01-01 RX ORDER — SENNA PLUS 8.6 MG/1
2 TABLET ORAL AT BEDTIME
Refills: 0 | Status: DISCONTINUED | OUTPATIENT
Start: 2023-01-01 | End: 2023-01-01

## 2023-01-01 RX ORDER — IPRATROPIUM/ALBUTEROL SULFATE 18-103MCG
3 AEROSOL WITH ADAPTER (GRAM) INHALATION EVERY 6 HOURS
Refills: 0 | Status: DISCONTINUED | OUTPATIENT
Start: 2023-01-01 | End: 2023-01-01

## 2023-01-01 RX ORDER — PANTOPRAZOLE SODIUM 20 MG/1
40 TABLET, DELAYED RELEASE ORAL DAILY
Refills: 0 | Status: DISCONTINUED | OUTPATIENT
Start: 2023-01-01 | End: 2023-01-01

## 2023-01-01 RX ORDER — PANTOPRAZOLE SODIUM 20 MG/1
40 TABLET, DELAYED RELEASE ORAL
Refills: 0 | Status: DISCONTINUED | OUTPATIENT
Start: 2023-01-01 | End: 2023-01-01

## 2023-01-01 RX ORDER — SEVELAMER CARBONATE 2400 MG/1
800 POWDER, FOR SUSPENSION ORAL EVERY 8 HOURS
Refills: 0 | Status: DISCONTINUED | OUTPATIENT
Start: 2023-01-01 | End: 2023-01-01

## 2023-01-01 RX ORDER — ACETAMINOPHEN 500 MG
650 TABLET ORAL EVERY 6 HOURS
Refills: 0 | Status: DISCONTINUED | OUTPATIENT
Start: 2023-01-01 | End: 2023-01-01

## 2023-01-01 RX ORDER — INSULIN LISPRO 100/ML
VIAL (ML) SUBCUTANEOUS AT BEDTIME
Refills: 0 | Status: DISCONTINUED | OUTPATIENT
Start: 2023-01-01 | End: 2023-01-01

## 2023-01-01 RX ORDER — SODIUM ZIRCONIUM CYCLOSILICATE 10 G/10G
10 POWDER, FOR SUSPENSION ORAL ONCE
Refills: 0 | Status: DISCONTINUED | OUTPATIENT
Start: 2023-01-01 | End: 2023-01-01

## 2023-01-01 RX ORDER — METOPROLOL TARTRATE 50 MG
5 TABLET ORAL ONCE
Refills: 0 | Status: COMPLETED | OUTPATIENT
Start: 2023-01-01 | End: 2023-01-01

## 2023-01-01 RX ORDER — PROPOFOL 10 MG/ML
10 INJECTION, EMULSION INTRAVENOUS
Qty: 1000 | Refills: 0 | Status: DISCONTINUED | OUTPATIENT
Start: 2023-01-01 | End: 2023-01-01

## 2023-01-01 RX ORDER — APIXABAN 2.5 MG/1
1 TABLET, FILM COATED ORAL
Qty: 0 | Refills: 0 | DISCHARGE
Start: 2023-01-01

## 2023-01-01 RX ORDER — OLANZAPINE 15 MG/1
1 TABLET, FILM COATED ORAL
Qty: 0 | Refills: 0 | DISCHARGE

## 2023-01-01 RX ORDER — SPIRONOLACTONE 25 MG/1
1 TABLET, FILM COATED ORAL
Qty: 0 | Refills: 0 | DISCHARGE

## 2023-01-01 RX ORDER — POTASSIUM CHLORIDE 20 MEQ
20 PACKET (EA) ORAL
Refills: 0 | Status: COMPLETED | OUTPATIENT
Start: 2023-01-01 | End: 2023-01-01

## 2023-01-01 RX ORDER — ASCORBIC ACID 60 MG
500 TABLET,CHEWABLE ORAL DAILY
Refills: 0 | Status: DISCONTINUED | OUTPATIENT
Start: 2023-01-01 | End: 2023-01-01

## 2023-01-01 RX ORDER — CHLORHEXIDINE GLUCONATE 213 G/1000ML
15 SOLUTION TOPICAL EVERY 12 HOURS
Refills: 0 | Status: DISCONTINUED | OUTPATIENT
Start: 2023-01-01 | End: 2023-01-01

## 2023-01-01 RX ORDER — POLYETHYLENE GLYCOL 3350 17 G/17G
17 POWDER, FOR SOLUTION ORAL
Refills: 0 | Status: DISCONTINUED | OUTPATIENT
Start: 2023-01-01 | End: 2023-01-01

## 2023-01-01 RX ORDER — HYDRALAZINE HCL 50 MG
75 TABLET ORAL EVERY 8 HOURS
Refills: 0 | Status: DISCONTINUED | OUTPATIENT
Start: 2023-01-01 | End: 2023-01-01

## 2023-01-01 RX ORDER — FENTANYL CITRATE 50 UG/ML
50 INJECTION INTRAVENOUS ONCE
Refills: 0 | Status: DISCONTINUED | OUTPATIENT
Start: 2023-01-01 | End: 2023-01-01

## 2023-01-01 RX ORDER — HEPARIN SODIUM 5000 [USP'U]/ML
850 INJECTION INTRAVENOUS; SUBCUTANEOUS
Qty: 25000 | Refills: 0 | Status: DISCONTINUED | OUTPATIENT
Start: 2023-01-01 | End: 2023-01-01

## 2023-01-01 RX ORDER — POLYETHYLENE GLYCOL 3350 17 G/17G
17 POWDER, FOR SOLUTION ORAL
Qty: 0 | Refills: 0 | DISCHARGE

## 2023-01-01 RX ORDER — APIXABAN 2.5 MG/1
2.5 TABLET, FILM COATED ORAL EVERY 12 HOURS
Refills: 0 | Status: DISCONTINUED | OUTPATIENT
Start: 2023-01-01 | End: 2023-01-01

## 2023-01-01 RX ORDER — INSULIN HUMAN 100 [IU]/ML
4 INJECTION, SOLUTION SUBCUTANEOUS ONCE
Refills: 0 | Status: DISCONTINUED | OUTPATIENT
Start: 2023-01-01 | End: 2023-01-01

## 2023-01-01 RX ORDER — NICARDIPINE HYDROCHLORIDE 30 MG/1
20 CAPSULE, EXTENDED RELEASE ORAL EVERY 8 HOURS
Refills: 0 | Status: DISCONTINUED | OUTPATIENT
Start: 2023-01-01 | End: 2023-01-01

## 2023-01-01 RX ORDER — DEXTROSE 50 % IN WATER 50 %
25 SYRINGE (ML) INTRAVENOUS ONCE
Refills: 0 | Status: COMPLETED | OUTPATIENT
Start: 2023-01-01 | End: 2023-01-01

## 2023-01-01 RX ORDER — CALCIUM ACETATE 667 MG
667 TABLET ORAL EVERY 8 HOURS
Refills: 0 | Status: COMPLETED | OUTPATIENT
Start: 2023-01-01 | End: 2023-01-01

## 2023-01-01 RX ORDER — SODIUM CHLORIDE 9 MG/ML
100 INJECTION INTRAMUSCULAR; INTRAVENOUS; SUBCUTANEOUS
Refills: 0 | Status: DISCONTINUED | OUTPATIENT
Start: 2023-01-01 | End: 2023-01-01

## 2023-01-01 RX ORDER — SEVELAMER CARBONATE 2400 MG/1
800 POWDER, FOR SUSPENSION ORAL
Refills: 0 | Status: DISCONTINUED | OUTPATIENT
Start: 2023-01-01 | End: 2023-01-01

## 2023-01-01 RX ORDER — HYDRALAZINE HCL 50 MG
10 TABLET ORAL EVERY 6 HOURS
Refills: 0 | Status: DISCONTINUED | OUTPATIENT
Start: 2023-01-01 | End: 2023-01-01

## 2023-01-01 RX ORDER — HYDRALAZINE HCL 50 MG
5 TABLET ORAL EVERY 6 HOURS
Refills: 0 | Status: DISCONTINUED | OUTPATIENT
Start: 2023-01-01 | End: 2023-01-01

## 2023-01-01 RX ORDER — ISOSORBIDE MONONITRATE 60 MG/1
30 TABLET, EXTENDED RELEASE ORAL DAILY
Refills: 0 | Status: DISCONTINUED | OUTPATIENT
Start: 2023-01-01 | End: 2023-01-01

## 2023-01-01 RX ORDER — POTASSIUM CHLORIDE 20 MEQ
20 PACKET (EA) ORAL ONCE
Refills: 0 | Status: DISCONTINUED | OUTPATIENT
Start: 2023-01-01 | End: 2023-01-01

## 2023-01-01 RX ORDER — POTASSIUM CHLORIDE 20 MEQ
10 PACKET (EA) ORAL
Refills: 0 | Status: DISCONTINUED | OUTPATIENT
Start: 2023-01-01 | End: 2023-01-01

## 2023-01-01 RX ORDER — HYDRALAZINE HCL 50 MG
50 TABLET ORAL EVERY 8 HOURS
Refills: 0 | Status: DISCONTINUED | OUTPATIENT
Start: 2023-01-01 | End: 2023-01-01

## 2023-01-01 RX ORDER — SEVELAMER CARBONATE 2400 MG/1
800 POWDER, FOR SUSPENSION ORAL THREE TIMES A DAY
Refills: 0 | Status: DISCONTINUED | OUTPATIENT
Start: 2023-01-01 | End: 2023-01-01

## 2023-01-01 RX ORDER — ISOSORBIDE DINITRATE 5 MG/1
1 TABLET ORAL
Qty: 0 | Refills: 0 | DISCHARGE

## 2023-01-01 RX ORDER — LANOLIN ALCOHOL/MO/W.PET/CERES
3 CREAM (GRAM) TOPICAL ONCE
Refills: 0 | Status: COMPLETED | OUTPATIENT
Start: 2023-01-01 | End: 2023-01-01

## 2023-01-01 RX ORDER — ACETAMINOPHEN 500 MG
1000 TABLET ORAL ONCE
Refills: 0 | Status: COMPLETED | OUTPATIENT
Start: 2023-01-01 | End: 2023-01-01

## 2023-01-01 RX ORDER — MUPIROCIN 20 MG/G
1 OINTMENT TOPICAL
Refills: 0 | Status: DISCONTINUED | OUTPATIENT
Start: 2023-01-01 | End: 2023-01-01

## 2023-01-01 RX ORDER — CHLORHEXIDINE GLUCONATE 213 G/1000ML
1 SOLUTION TOPICAL DAILY
Refills: 0 | Status: DISCONTINUED | OUTPATIENT
Start: 2023-01-01 | End: 2023-01-01

## 2023-01-01 RX ORDER — HEPARIN SODIUM 5000 [USP'U]/ML
3800 INJECTION INTRAVENOUS; SUBCUTANEOUS ONCE
Refills: 0 | Status: DISCONTINUED | OUTPATIENT
Start: 2023-01-01 | End: 2023-01-01

## 2023-01-01 RX ORDER — NOREPINEPHRINE BITARTRATE/D5W 8 MG/250ML
0.05 PLASTIC BAG, INJECTION (ML) INTRAVENOUS
Qty: 16 | Refills: 0 | Status: DISCONTINUED | OUTPATIENT
Start: 2023-01-01 | End: 2023-01-01

## 2023-01-01 RX ORDER — IPRATROPIUM BROMIDE 0.2 MG/ML
2 SOLUTION, NON-ORAL INHALATION
Qty: 0 | Refills: 0 | DISCHARGE
Start: 2023-01-01

## 2023-01-01 RX ORDER — LANOLIN ALCOHOL/MO/W.PET/CERES
3 CREAM (GRAM) TOPICAL AT BEDTIME
Refills: 0 | Status: DISCONTINUED | OUTPATIENT
Start: 2023-01-01 | End: 2023-01-01

## 2023-01-01 RX ORDER — PROPOFOL 10 MG/ML
10 INJECTION, EMULSION INTRAVENOUS
Qty: 500 | Refills: 0 | Status: DISCONTINUED | OUTPATIENT
Start: 2023-01-01 | End: 2023-01-01

## 2023-01-01 RX ORDER — HEPARIN SODIUM 5000 [USP'U]/ML
4000 INJECTION INTRAVENOUS; SUBCUTANEOUS ONCE
Refills: 0 | Status: DISCONTINUED | OUTPATIENT
Start: 2023-01-01 | End: 2023-01-01

## 2023-01-01 RX ORDER — CEFEPIME 1 G/1
2000 INJECTION, POWDER, FOR SOLUTION INTRAMUSCULAR; INTRAVENOUS ONCE
Refills: 0 | Status: COMPLETED | OUTPATIENT
Start: 2023-01-01 | End: 2023-01-01

## 2023-01-01 RX ORDER — HEPARIN SODIUM 5000 [USP'U]/ML
4500 INJECTION INTRAVENOUS; SUBCUTANEOUS ONCE
Refills: 0 | Status: DISCONTINUED | OUTPATIENT
Start: 2023-01-01 | End: 2023-01-01

## 2023-01-01 RX ORDER — HEPARIN SODIUM 5000 [USP'U]/ML
5000 INJECTION INTRAVENOUS; SUBCUTANEOUS EVERY 8 HOURS
Refills: 0 | Status: DISCONTINUED | OUTPATIENT
Start: 2023-01-01 | End: 2023-01-01

## 2023-01-01 RX ORDER — ALBUTEROL 90 UG/1
2 AEROSOL, METERED ORAL EVERY 6 HOURS
Refills: 0 | Status: DISCONTINUED | OUTPATIENT
Start: 2023-01-01 | End: 2023-01-01

## 2023-01-01 RX ORDER — HYDRALAZINE HCL 50 MG
1 TABLET ORAL
Qty: 0 | Refills: 0 | DISCHARGE
Start: 2023-01-01

## 2023-01-01 RX ORDER — LOSARTAN POTASSIUM 100 MG/1
1 TABLET, FILM COATED ORAL
Qty: 0 | Refills: 0 | DISCHARGE

## 2023-01-01 RX ORDER — TRAZODONE HCL 50 MG
100 TABLET ORAL AT BEDTIME
Refills: 0 | Status: DISCONTINUED | OUTPATIENT
Start: 2023-01-01 | End: 2023-01-01

## 2023-01-01 RX ORDER — ISOSORBIDE DINITRATE 5 MG/1
10 TABLET ORAL
Refills: 0 | Status: DISCONTINUED | OUTPATIENT
Start: 2023-01-01 | End: 2023-01-01

## 2023-01-01 RX ORDER — POLYETHYLENE GLYCOL 3350 17 G/17G
17 POWDER, FOR SOLUTION ORAL DAILY
Refills: 0 | Status: DISCONTINUED | OUTPATIENT
Start: 2023-01-01 | End: 2023-01-01

## 2023-01-01 RX ORDER — DIVALPROEX SODIUM 500 MG/1
1 TABLET, DELAYED RELEASE ORAL
Qty: 0 | Refills: 0 | DISCHARGE

## 2023-01-01 RX ORDER — CHLORHEXIDINE GLUCONATE 213 G/1000ML
1 SOLUTION TOPICAL
Refills: 0 | Status: DISCONTINUED | OUTPATIENT
Start: 2023-01-01 | End: 2023-01-01

## 2023-01-01 RX ORDER — FENTANYL CITRATE 50 UG/ML
3 INJECTION INTRAVENOUS
Qty: 2500 | Refills: 0 | Status: DISCONTINUED | OUTPATIENT
Start: 2023-01-01 | End: 2023-01-01

## 2023-01-01 RX ORDER — SODIUM CHLORIDE 9 MG/ML
1000 INJECTION INTRAMUSCULAR; INTRAVENOUS; SUBCUTANEOUS ONCE
Refills: 0 | Status: COMPLETED | OUTPATIENT
Start: 2023-01-01 | End: 2023-01-01

## 2023-01-01 RX ORDER — PHENYLEPHRINE HYDROCHLORIDE 10 MG/ML
600 INJECTION INTRAVENOUS ONCE
Refills: 0 | Status: COMPLETED | OUTPATIENT
Start: 2023-01-01 | End: 2023-01-01

## 2023-01-01 RX ORDER — CARVEDILOL PHOSPHATE 80 MG/1
1 CAPSULE, EXTENDED RELEASE ORAL
Qty: 0 | Refills: 0 | DISCHARGE

## 2023-01-01 RX ORDER — DEXAMETHASONE 0.5 MG/5ML
1 ELIXIR ORAL
Qty: 2 | Refills: 0
Start: 2023-01-01 | End: 2023-01-01

## 2023-01-01 RX ORDER — POTASSIUM CHLORIDE 20 MEQ
40 PACKET (EA) ORAL ONCE
Refills: 0 | Status: COMPLETED | OUTPATIENT
Start: 2023-01-01 | End: 2023-01-01

## 2023-01-01 RX ORDER — HEPARIN SODIUM 5000 [USP'U]/ML
3800 INJECTION INTRAVENOUS; SUBCUTANEOUS EVERY 6 HOURS
Refills: 0 | Status: DISCONTINUED | OUTPATIENT
Start: 2023-01-01 | End: 2023-01-01

## 2023-01-01 RX ORDER — CILOSTAZOL 100 MG/1
1 TABLET ORAL
Qty: 0 | Refills: 0 | DISCHARGE

## 2023-01-01 RX ORDER — VANCOMYCIN HCL 1 G
1000 VIAL (EA) INTRAVENOUS EVERY 24 HOURS
Refills: 0 | Status: DISCONTINUED | OUTPATIENT
Start: 2023-01-01 | End: 2023-01-01

## 2023-01-01 RX ORDER — CLOPIDOGREL BISULFATE 75 MG/1
600 TABLET, FILM COATED ORAL ONCE
Refills: 0 | Status: COMPLETED | OUTPATIENT
Start: 2023-01-01 | End: 2023-01-01

## 2023-01-01 RX ORDER — GABAPENTIN 400 MG/1
2 CAPSULE ORAL
Qty: 0 | Refills: 0 | DISCHARGE

## 2023-01-01 RX ORDER — MIDAZOLAM HYDROCHLORIDE 1 MG/ML
0.02 INJECTION, SOLUTION INTRAMUSCULAR; INTRAVENOUS
Qty: 100 | Refills: 0 | Status: DISCONTINUED | OUTPATIENT
Start: 2023-01-01 | End: 2023-01-01

## 2023-01-01 RX ORDER — PIPERACILLIN AND TAZOBACTAM 4; .5 G/20ML; G/20ML
3.38 INJECTION, POWDER, LYOPHILIZED, FOR SOLUTION INTRAVENOUS EVERY 12 HOURS
Refills: 0 | Status: DISCONTINUED | OUTPATIENT
Start: 2023-01-01 | End: 2023-01-01

## 2023-01-01 RX ORDER — HEPARIN SODIUM 5000 [USP'U]/ML
2000 INJECTION INTRAVENOUS; SUBCUTANEOUS EVERY 6 HOURS
Refills: 0 | Status: DISCONTINUED | OUTPATIENT
Start: 2023-01-01 | End: 2023-01-01

## 2023-01-01 RX ORDER — METOPROLOL TARTRATE 50 MG
5 TABLET ORAL EVERY 6 HOURS
Refills: 0 | Status: DISCONTINUED | OUTPATIENT
Start: 2023-01-01 | End: 2023-01-01

## 2023-01-01 RX ORDER — OLANZAPINE 15 MG/1
5 TABLET, FILM COATED ORAL EVERY 8 HOURS
Refills: 0 | Status: DISCONTINUED | OUTPATIENT
Start: 2023-01-01 | End: 2023-01-01

## 2023-01-01 RX ORDER — TRAZODONE HCL 50 MG
2 TABLET ORAL
Qty: 0 | Refills: 0 | DISCHARGE

## 2023-01-01 RX ORDER — DIVALPROEX SODIUM 500 MG/1
2 TABLET, DELAYED RELEASE ORAL
Qty: 0 | Refills: 0 | DISCHARGE

## 2023-01-01 RX ORDER — ASPIRIN/CALCIUM CARB/MAGNESIUM 324 MG
1 TABLET ORAL
Qty: 0 | Refills: 0 | DISCHARGE

## 2023-01-01 RX ORDER — LANOLIN ALCOHOL/MO/W.PET/CERES
2 CREAM (GRAM) TOPICAL
Qty: 0 | Refills: 0 | DISCHARGE

## 2023-01-01 RX ORDER — INSULIN LISPRO 100/ML
4 VIAL (ML) SUBCUTANEOUS ONCE
Refills: 0 | Status: DISCONTINUED | OUTPATIENT
Start: 2023-01-01 | End: 2023-01-01

## 2023-01-01 RX ORDER — HEPARIN SODIUM 5000 [USP'U]/ML
2500 INJECTION INTRAVENOUS; SUBCUTANEOUS EVERY 6 HOURS
Refills: 0 | Status: DISCONTINUED | OUTPATIENT
Start: 2023-01-01 | End: 2023-01-01

## 2023-01-01 RX ORDER — TRAZODONE HCL 50 MG
100 TABLET ORAL
Refills: 0 | Status: DISCONTINUED | OUTPATIENT
Start: 2023-01-01 | End: 2023-01-01

## 2023-01-01 RX ORDER — HEPARIN SODIUM 5000 [USP'U]/ML
5000 INJECTION INTRAVENOUS; SUBCUTANEOUS EVERY 12 HOURS
Refills: 0 | Status: DISCONTINUED | OUTPATIENT
Start: 2023-01-01 | End: 2023-01-01

## 2023-01-01 RX ORDER — HYDRALAZINE HCL 50 MG
1 TABLET ORAL
Qty: 0 | Refills: 0 | DISCHARGE

## 2023-01-01 RX ORDER — HYDRALAZINE HCL 50 MG
5 TABLET ORAL ONCE
Refills: 0 | Status: DISCONTINUED | OUTPATIENT
Start: 2023-01-01 | End: 2023-01-01

## 2023-01-01 RX ORDER — HEPARIN SODIUM 5000 [USP'U]/ML
950 INJECTION INTRAVENOUS; SUBCUTANEOUS
Qty: 25000 | Refills: 0 | Status: DISCONTINUED | OUTPATIENT
Start: 2023-01-01 | End: 2023-01-01

## 2023-01-01 RX ORDER — PROPOFOL 10 MG/ML
9.16 INJECTION, EMULSION INTRAVENOUS
Qty: 1000 | Refills: 0 | Status: DISCONTINUED | OUTPATIENT
Start: 2023-01-01 | End: 2023-01-01

## 2023-01-01 RX ORDER — OMEPRAZOLE 10 MG/1
1 CAPSULE, DELAYED RELEASE ORAL
Qty: 0 | Refills: 0 | DISCHARGE

## 2023-01-01 RX ADMIN — CHLORHEXIDINE GLUCONATE 15 MILLILITER(S): 213 SOLUTION TOPICAL at 18:08

## 2023-01-01 RX ADMIN — Medication 6 MILLIGRAM(S): at 05:43

## 2023-01-01 RX ADMIN — AMLODIPINE BESYLATE 10 MILLIGRAM(S): 2.5 TABLET ORAL at 05:07

## 2023-01-01 RX ADMIN — FENTANYL CITRATE 100 MICROGRAM(S): 50 INJECTION INTRAVENOUS at 11:15

## 2023-01-01 RX ADMIN — ALBUTEROL 2 PUFF(S): 90 AEROSOL, METERED ORAL at 15:41

## 2023-01-01 RX ADMIN — Medication 3 MILLIGRAM(S): at 00:35

## 2023-01-01 RX ADMIN — HEPARIN SODIUM 5000 UNIT(S): 5000 INJECTION INTRAVENOUS; SUBCUTANEOUS at 21:16

## 2023-01-01 RX ADMIN — HEPARIN SODIUM 5000 UNIT(S): 5000 INJECTION INTRAVENOUS; SUBCUTANEOUS at 21:05

## 2023-01-01 RX ADMIN — ALBUTEROL 4 PUFF(S): 90 AEROSOL, METERED ORAL at 04:29

## 2023-01-01 RX ADMIN — CLOPIDOGREL BISULFATE 600 MILLIGRAM(S): 75 TABLET, FILM COATED ORAL at 10:56

## 2023-01-01 RX ADMIN — HEPARIN SODIUM 5000 UNIT(S): 5000 INJECTION INTRAVENOUS; SUBCUTANEOUS at 21:12

## 2023-01-01 RX ADMIN — Medication 1 APPLICATION(S): at 17:13

## 2023-01-01 RX ADMIN — PANTOPRAZOLE SODIUM 40 MILLIGRAM(S): 20 TABLET, DELAYED RELEASE ORAL at 12:12

## 2023-01-01 RX ADMIN — Medication 250 MILLIGRAM(S): at 04:12

## 2023-01-01 RX ADMIN — Medication 5 MILLIGRAM(S): at 23:20

## 2023-01-01 RX ADMIN — Medication 1 DROP(S): at 03:33

## 2023-01-01 RX ADMIN — SEVELAMER CARBONATE 800 MILLIGRAM(S): 2400 POWDER, FOR SUSPENSION ORAL at 14:10

## 2023-01-01 RX ADMIN — Medication 100 MILLIGRAM(S): at 19:42

## 2023-01-01 RX ADMIN — NICARDIPINE HYDROCHLORIDE 20 MILLIGRAM(S): 30 CAPSULE, EXTENDED RELEASE ORAL at 09:34

## 2023-01-01 RX ADMIN — Medication 2 PUFF(S): at 21:14

## 2023-01-01 RX ADMIN — Medication 2 PUFF(S): at 21:47

## 2023-01-01 RX ADMIN — SEVELAMER CARBONATE 800 MILLIGRAM(S): 2400 POWDER, FOR SUSPENSION ORAL at 13:13

## 2023-01-01 RX ADMIN — AMLODIPINE BESYLATE 10 MILLIGRAM(S): 2.5 TABLET ORAL at 05:09

## 2023-01-01 RX ADMIN — Medication 1 DROP(S): at 09:56

## 2023-01-01 RX ADMIN — LOSARTAN POTASSIUM 100 MILLIGRAM(S): 100 TABLET, FILM COATED ORAL at 05:44

## 2023-01-01 RX ADMIN — NICARDIPINE HYDROCHLORIDE 20 MILLIGRAM(S): 30 CAPSULE, EXTENDED RELEASE ORAL at 09:37

## 2023-01-01 RX ADMIN — Medication 5 MILLIGRAM(S): at 12:59

## 2023-01-01 RX ADMIN — PANTOPRAZOLE SODIUM 40 MILLIGRAM(S): 20 TABLET, DELAYED RELEASE ORAL at 12:00

## 2023-01-01 RX ADMIN — Medication 1 DROP(S): at 13:33

## 2023-01-01 RX ADMIN — ISOSORBIDE DINITRATE 20 MILLIGRAM(S): 5 TABLET ORAL at 12:44

## 2023-01-01 RX ADMIN — Medication 1 DROP(S): at 05:27

## 2023-01-01 RX ADMIN — ISOSORBIDE DINITRATE 10 MILLIGRAM(S): 5 TABLET ORAL at 16:17

## 2023-01-01 RX ADMIN — VASOPRESSIN 6 UNIT(S)/MIN: 20 INJECTION INTRAVENOUS at 07:24

## 2023-01-01 RX ADMIN — CHLORHEXIDINE GLUCONATE 15 MILLILITER(S): 213 SOLUTION TOPICAL at 05:04

## 2023-01-01 RX ADMIN — Medication 81 MILLIGRAM(S): at 12:12

## 2023-01-01 RX ADMIN — Medication 1 APPLICATION(S): at 18:25

## 2023-01-01 RX ADMIN — HEPARIN SODIUM 5000 UNIT(S): 5000 INJECTION INTRAVENOUS; SUBCUTANEOUS at 05:40

## 2023-01-01 RX ADMIN — OLANZAPINE 30 MILLIGRAM(S): 15 TABLET, FILM COATED ORAL at 22:41

## 2023-01-01 RX ADMIN — ISOSORBIDE DINITRATE 10 MILLIGRAM(S): 5 TABLET ORAL at 00:34

## 2023-01-01 RX ADMIN — Medication 1 APPLICATION(S): at 05:39

## 2023-01-01 RX ADMIN — Medication 0.2 MILLIGRAM(S): at 05:11

## 2023-01-01 RX ADMIN — Medication 1: at 12:18

## 2023-01-01 RX ADMIN — Medication 1 DROP(S): at 14:11

## 2023-01-01 RX ADMIN — ISOSORBIDE DINITRATE 10 MILLIGRAM(S): 5 TABLET ORAL at 13:02

## 2023-01-01 RX ADMIN — FENTANYL CITRATE 3.01 MICROGRAM(S)/KG/HR: 50 INJECTION INTRAVENOUS at 20:32

## 2023-01-01 RX ADMIN — OLANZAPINE 5 MILLIGRAM(S): 15 TABLET, FILM COATED ORAL at 13:12

## 2023-01-01 RX ADMIN — Medication 1 DROP(S): at 08:13

## 2023-01-01 RX ADMIN — Medication 81 MILLIGRAM(S): at 12:15

## 2023-01-01 RX ADMIN — AMLODIPINE BESYLATE 10 MILLIGRAM(S): 2.5 TABLET ORAL at 05:38

## 2023-01-01 RX ADMIN — Medication 6 MILLIGRAM(S): at 05:40

## 2023-01-01 RX ADMIN — CARVEDILOL PHOSPHATE 3.12 MILLIGRAM(S): 80 CAPSULE, EXTENDED RELEASE ORAL at 17:11

## 2023-01-01 RX ADMIN — POLYETHYLENE GLYCOL 3350 17 GRAM(S): 17 POWDER, FOR SOLUTION ORAL at 12:12

## 2023-01-01 RX ADMIN — CHLORHEXIDINE GLUCONATE 1 APPLICATION(S): 213 SOLUTION TOPICAL at 05:10

## 2023-01-01 RX ADMIN — Medication 6 MILLIGRAM(S): at 05:47

## 2023-01-01 RX ADMIN — NICARDIPINE HYDROCHLORIDE 20 MILLIGRAM(S): 30 CAPSULE, EXTENDED RELEASE ORAL at 16:14

## 2023-01-01 RX ADMIN — PANTOPRAZOLE SODIUM 40 MILLIGRAM(S): 20 TABLET, DELAYED RELEASE ORAL at 11:39

## 2023-01-01 RX ADMIN — CHLORHEXIDINE GLUCONATE 15 MILLILITER(S): 213 SOLUTION TOPICAL at 18:09

## 2023-01-01 RX ADMIN — Medication 1 DROP(S): at 13:00

## 2023-01-01 RX ADMIN — Medication 0.2 MILLIGRAM(S): at 14:35

## 2023-01-01 RX ADMIN — HEPARIN SODIUM 5000 UNIT(S): 5000 INJECTION INTRAVENOUS; SUBCUTANEOUS at 05:27

## 2023-01-01 RX ADMIN — CHLORHEXIDINE GLUCONATE 1 APPLICATION(S): 213 SOLUTION TOPICAL at 01:00

## 2023-01-01 RX ADMIN — Medication 50 MILLIEQUIVALENT(S): at 00:52

## 2023-01-01 RX ADMIN — CHLORHEXIDINE GLUCONATE 15 MILLILITER(S): 213 SOLUTION TOPICAL at 18:19

## 2023-01-01 RX ADMIN — CHLORHEXIDINE GLUCONATE 1 APPLICATION(S): 213 SOLUTION TOPICAL at 05:45

## 2023-01-01 RX ADMIN — PROPOFOL 2.1 MICROGRAM(S)/KG/MIN: 10 INJECTION, EMULSION INTRAVENOUS at 19:39

## 2023-01-01 RX ADMIN — ALBUTEROL 4 PUFF(S): 90 AEROSOL, METERED ORAL at 10:39

## 2023-01-01 RX ADMIN — HEPARIN SODIUM 1500 UNIT(S)/HR: 5000 INJECTION INTRAVENOUS; SUBCUTANEOUS at 20:11

## 2023-01-01 RX ADMIN — ALBUTEROL 2 PUFF(S): 90 AEROSOL, METERED ORAL at 03:16

## 2023-01-01 RX ADMIN — Medication 100 MILLIGRAM(S): at 20:21

## 2023-01-01 RX ADMIN — Medication 5 MILLIGRAM(S): at 02:31

## 2023-01-01 RX ADMIN — ALBUTEROL 4 PUFF(S): 90 AEROSOL, METERED ORAL at 05:45

## 2023-01-01 RX ADMIN — Medication 6 MILLIGRAM(S): at 10:18

## 2023-01-01 RX ADMIN — Medication 1 DROP(S): at 01:37

## 2023-01-01 RX ADMIN — Medication 5 MILLIGRAM(S): at 16:28

## 2023-01-01 RX ADMIN — Medication 667 MILLIGRAM(S): at 06:50

## 2023-01-01 RX ADMIN — OLANZAPINE 5 MILLIGRAM(S): 15 TABLET, FILM COATED ORAL at 22:08

## 2023-01-01 RX ADMIN — HEPARIN SODIUM 5000 UNIT(S): 5000 INJECTION INTRAVENOUS; SUBCUTANEOUS at 05:39

## 2023-01-01 RX ADMIN — HEPARIN SODIUM 1200 UNIT(S)/HR: 5000 INJECTION INTRAVENOUS; SUBCUTANEOUS at 00:50

## 2023-01-01 RX ADMIN — Medication 0.1 MILLIGRAM(S): at 00:07

## 2023-01-01 RX ADMIN — Medication 81 MILLIGRAM(S): at 13:01

## 2023-01-01 RX ADMIN — ISOSORBIDE DINITRATE 10 MILLIGRAM(S): 5 TABLET ORAL at 18:19

## 2023-01-01 RX ADMIN — ALBUTEROL 4 PUFF(S): 90 AEROSOL, METERED ORAL at 15:24

## 2023-01-01 RX ADMIN — Medication 100 MILLIGRAM(S): at 12:59

## 2023-01-01 RX ADMIN — Medication 2 PUFF(S): at 11:00

## 2023-01-01 RX ADMIN — Medication 5 MILLIGRAM(S): at 07:49

## 2023-01-01 RX ADMIN — HEPARIN SODIUM 1400 UNIT(S)/HR: 5000 INJECTION INTRAVENOUS; SUBCUTANEOUS at 10:38

## 2023-01-01 RX ADMIN — Medication 500 MILLIGRAM(S): at 11:50

## 2023-01-01 RX ADMIN — PANTOPRAZOLE SODIUM 40 MILLIGRAM(S): 20 TABLET, DELAYED RELEASE ORAL at 11:42

## 2023-01-01 RX ADMIN — Medication 10 MILLIGRAM(S): at 00:36

## 2023-01-01 RX ADMIN — SPIRONOLACTONE 25 MILLIGRAM(S): 25 TABLET, FILM COATED ORAL at 05:09

## 2023-01-01 RX ADMIN — Medication 100 MILLIGRAM(S): at 05:39

## 2023-01-01 RX ADMIN — Medication 1 APPLICATION(S): at 05:27

## 2023-01-01 RX ADMIN — Medication 81 MILLIGRAM(S): at 12:09

## 2023-01-01 RX ADMIN — Medication 1 DROP(S): at 12:26

## 2023-01-01 RX ADMIN — Medication 100 MILLIGRAM(S): at 09:49

## 2023-01-01 RX ADMIN — Medication 1 APPLICATION(S): at 22:00

## 2023-01-01 RX ADMIN — CHLORHEXIDINE GLUCONATE 15 MILLILITER(S): 213 SOLUTION TOPICAL at 18:06

## 2023-01-01 RX ADMIN — HEPARIN SODIUM 5000 UNIT(S): 5000 INJECTION INTRAVENOUS; SUBCUTANEOUS at 13:11

## 2023-01-01 RX ADMIN — Medication 100 MILLIGRAM(S): at 05:11

## 2023-01-01 RX ADMIN — ALBUTEROL 4 PUFF(S): 90 AEROSOL, METERED ORAL at 16:22

## 2023-01-01 RX ADMIN — Medication 81 MILLIGRAM(S): at 12:40

## 2023-01-01 RX ADMIN — CHLORHEXIDINE GLUCONATE 15 MILLILITER(S): 213 SOLUTION TOPICAL at 05:38

## 2023-01-01 RX ADMIN — Medication 100 MILLIGRAM(S): at 05:43

## 2023-01-01 RX ADMIN — ISOSORBIDE DINITRATE 10 MILLIGRAM(S): 5 TABLET ORAL at 05:30

## 2023-01-01 RX ADMIN — Medication 650 MILLIGRAM(S): at 18:58

## 2023-01-01 RX ADMIN — Medication 2 PUFF(S): at 12:14

## 2023-01-01 RX ADMIN — Medication 0.2 MILLIGRAM(S): at 05:34

## 2023-01-01 RX ADMIN — MIDAZOLAM HYDROCHLORIDE 1.2 MG/KG/HR: 1 INJECTION, SOLUTION INTRAMUSCULAR; INTRAVENOUS at 07:42

## 2023-01-01 RX ADMIN — FENTANYL CITRATE 100 MICROGRAM(S): 50 INJECTION INTRAVENOUS at 11:30

## 2023-01-01 RX ADMIN — Medication 400 MILLIGRAM(S): at 20:05

## 2023-01-01 RX ADMIN — PIPERACILLIN AND TAZOBACTAM 25 GRAM(S): 4; .5 INJECTION, POWDER, LYOPHILIZED, FOR SOLUTION INTRAVENOUS at 16:06

## 2023-01-01 RX ADMIN — ALBUTEROL 4 PUFF(S): 90 AEROSOL, METERED ORAL at 11:45

## 2023-01-01 RX ADMIN — CHLORHEXIDINE GLUCONATE 1 APPLICATION(S): 213 SOLUTION TOPICAL at 05:23

## 2023-01-01 RX ADMIN — Medication 1 DROP(S): at 07:48

## 2023-01-01 RX ADMIN — Medication 1 DROP(S): at 08:15

## 2023-01-01 RX ADMIN — ALBUTEROL 4 PUFF(S): 90 AEROSOL, METERED ORAL at 07:32

## 2023-01-01 RX ADMIN — Medication 10 MILLIGRAM(S): at 14:02

## 2023-01-01 RX ADMIN — ALBUTEROL 2 PUFF(S): 90 AEROSOL, METERED ORAL at 10:17

## 2023-01-01 RX ADMIN — Medication 81 MILLIGRAM(S): at 11:22

## 2023-01-01 RX ADMIN — PIPERACILLIN AND TAZOBACTAM 25 GRAM(S): 4; .5 INJECTION, POWDER, LYOPHILIZED, FOR SOLUTION INTRAVENOUS at 21:59

## 2023-01-01 RX ADMIN — APIXABAN 5 MILLIGRAM(S): 2.5 TABLET, FILM COATED ORAL at 22:04

## 2023-01-01 RX ADMIN — Medication 100 MILLIGRAM(S): at 22:23

## 2023-01-01 RX ADMIN — Medication 1 APPLICATION(S): at 10:30

## 2023-01-01 RX ADMIN — Medication 5 MILLIGRAM(S): at 06:15

## 2023-01-01 RX ADMIN — SEVELAMER CARBONATE 800 MILLIGRAM(S): 2400 POWDER, FOR SUSPENSION ORAL at 05:10

## 2023-01-01 RX ADMIN — Medication 1 DROP(S): at 07:12

## 2023-01-01 RX ADMIN — Medication 1 DROP(S): at 17:37

## 2023-01-01 RX ADMIN — HEPARIN SODIUM 5000 UNIT(S): 5000 INJECTION INTRAVENOUS; SUBCUTANEOUS at 15:31

## 2023-01-01 RX ADMIN — Medication 6.56 MICROGRAM(S)/KG/MIN: at 06:49

## 2023-01-01 RX ADMIN — PIPERACILLIN AND TAZOBACTAM 25 GRAM(S): 4; .5 INJECTION, POWDER, LYOPHILIZED, FOR SOLUTION INTRAVENOUS at 15:27

## 2023-01-01 RX ADMIN — Medication 2 PUFF(S): at 18:31

## 2023-01-01 RX ADMIN — Medication 2 UNIT(S): at 02:17

## 2023-01-01 RX ADMIN — CHLORHEXIDINE GLUCONATE 15 MILLILITER(S): 213 SOLUTION TOPICAL at 04:37

## 2023-01-01 RX ADMIN — Medication 81 MILLIGRAM(S): at 11:31

## 2023-01-01 RX ADMIN — CHLORHEXIDINE GLUCONATE 15 MILLILITER(S): 213 SOLUTION TOPICAL at 05:32

## 2023-01-01 RX ADMIN — HEPARIN SODIUM 5000 UNIT(S): 5000 INJECTION INTRAVENOUS; SUBCUTANEOUS at 13:05

## 2023-01-01 RX ADMIN — FENTANYL CITRATE 25 MICROGRAM(S): 50 INJECTION INTRAVENOUS at 19:10

## 2023-01-01 RX ADMIN — CARVEDILOL PHOSPHATE 3.12 MILLIGRAM(S): 80 CAPSULE, EXTENDED RELEASE ORAL at 16:28

## 2023-01-01 RX ADMIN — Medication 0.2 MILLIGRAM(S): at 05:30

## 2023-01-01 RX ADMIN — PANTOPRAZOLE SODIUM 40 MILLIGRAM(S): 20 TABLET, DELAYED RELEASE ORAL at 11:44

## 2023-01-01 RX ADMIN — Medication 10 MILLIGRAM(S): at 12:53

## 2023-01-01 RX ADMIN — Medication 3 MILLIGRAM(S): at 21:32

## 2023-01-01 RX ADMIN — PIPERACILLIN AND TAZOBACTAM 25 GRAM(S): 4; .5 INJECTION, POWDER, LYOPHILIZED, FOR SOLUTION INTRAVENOUS at 05:10

## 2023-01-01 RX ADMIN — PIPERACILLIN AND TAZOBACTAM 25 GRAM(S): 4; .5 INJECTION, POWDER, LYOPHILIZED, FOR SOLUTION INTRAVENOUS at 00:53

## 2023-01-01 RX ADMIN — HEPARIN SODIUM 5000 UNIT(S): 5000 INJECTION INTRAVENOUS; SUBCUTANEOUS at 14:35

## 2023-01-01 RX ADMIN — CHLORHEXIDINE GLUCONATE 15 MILLILITER(S): 213 SOLUTION TOPICAL at 17:26

## 2023-01-01 RX ADMIN — FENTANYL CITRATE 3.5 MICROGRAM(S)/KG/HR: 50 INJECTION INTRAVENOUS at 05:56

## 2023-01-01 RX ADMIN — Medication 81 MILLIGRAM(S): at 12:20

## 2023-01-01 RX ADMIN — Medication 1 DROP(S): at 17:50

## 2023-01-01 RX ADMIN — ALBUTEROL 4 PUFF(S): 90 AEROSOL, METERED ORAL at 22:55

## 2023-01-01 RX ADMIN — CHLORHEXIDINE GLUCONATE 15 MILLILITER(S): 213 SOLUTION TOPICAL at 05:48

## 2023-01-01 RX ADMIN — CHLORHEXIDINE GLUCONATE 15 MILLILITER(S): 213 SOLUTION TOPICAL at 05:37

## 2023-01-01 RX ADMIN — Medication 1 APPLICATION(S): at 05:05

## 2023-01-01 RX ADMIN — NICARDIPINE HYDROCHLORIDE 20 MILLIGRAM(S): 30 CAPSULE, EXTENDED RELEASE ORAL at 09:50

## 2023-01-01 RX ADMIN — PROPOFOL 2.1 MICROGRAM(S)/KG/MIN: 10 INJECTION, EMULSION INTRAVENOUS at 18:09

## 2023-01-01 RX ADMIN — AMLODIPINE BESYLATE 10 MILLIGRAM(S): 2.5 TABLET ORAL at 05:27

## 2023-01-01 RX ADMIN — Medication 2 PUFF(S): at 03:16

## 2023-01-01 RX ADMIN — Medication 650 MILLIGRAM(S): at 19:30

## 2023-01-01 RX ADMIN — ATORVASTATIN CALCIUM 20 MILLIGRAM(S): 80 TABLET, FILM COATED ORAL at 20:22

## 2023-01-01 RX ADMIN — Medication 10 MILLIGRAM(S): at 07:55

## 2023-01-01 RX ADMIN — Medication 100 MILLIGRAM(S): at 05:08

## 2023-01-01 RX ADMIN — PIPERACILLIN AND TAZOBACTAM 25 GRAM(S): 4; .5 INJECTION, POWDER, LYOPHILIZED, FOR SOLUTION INTRAVENOUS at 23:36

## 2023-01-01 RX ADMIN — HEPARIN SODIUM 5500 UNIT(S): 5000 INJECTION INTRAVENOUS; SUBCUTANEOUS at 07:22

## 2023-01-01 RX ADMIN — ALBUTEROL 4 PUFF(S): 90 AEROSOL, METERED ORAL at 07:03

## 2023-01-01 RX ADMIN — ISOSORBIDE DINITRATE 10 MILLIGRAM(S): 5 TABLET ORAL at 04:44

## 2023-01-01 RX ADMIN — ISOSORBIDE DINITRATE 10 MILLIGRAM(S): 5 TABLET ORAL at 05:34

## 2023-01-01 RX ADMIN — HEPARIN SODIUM 1500 UNIT(S)/HR: 5000 INJECTION INTRAVENOUS; SUBCUTANEOUS at 01:01

## 2023-01-01 RX ADMIN — PIPERACILLIN AND TAZOBACTAM 25 GRAM(S): 4; .5 INJECTION, POWDER, LYOPHILIZED, FOR SOLUTION INTRAVENOUS at 18:06

## 2023-01-01 RX ADMIN — CHLORHEXIDINE GLUCONATE 1 APPLICATION(S): 213 SOLUTION TOPICAL at 05:38

## 2023-01-01 RX ADMIN — Medication 75 MILLIGRAM(S): at 03:11

## 2023-01-01 RX ADMIN — ISOSORBIDE DINITRATE 20 MILLIGRAM(S): 5 TABLET ORAL at 18:09

## 2023-01-01 RX ADMIN — HEPARIN SODIUM 5000 UNIT(S): 5000 INJECTION INTRAVENOUS; SUBCUTANEOUS at 14:31

## 2023-01-01 RX ADMIN — HEPARIN SODIUM 1400 UNIT(S)/HR: 5000 INJECTION INTRAVENOUS; SUBCUTANEOUS at 12:31

## 2023-01-01 RX ADMIN — PIPERACILLIN AND TAZOBACTAM 25 GRAM(S): 4; .5 INJECTION, POWDER, LYOPHILIZED, FOR SOLUTION INTRAVENOUS at 05:39

## 2023-01-01 RX ADMIN — Medication 5 MILLIGRAM(S): at 18:46

## 2023-01-01 RX ADMIN — ALBUTEROL 2 PUFF(S): 90 AEROSOL, METERED ORAL at 22:46

## 2023-01-01 RX ADMIN — Medication 3 MILLIGRAM(S): at 22:00

## 2023-01-01 RX ADMIN — CEFEPIME 100 MILLIGRAM(S): 1 INJECTION, POWDER, FOR SOLUTION INTRAMUSCULAR; INTRAVENOUS at 16:59

## 2023-01-01 RX ADMIN — PIPERACILLIN AND TAZOBACTAM 25 GRAM(S): 4; .5 INJECTION, POWDER, LYOPHILIZED, FOR SOLUTION INTRAVENOUS at 16:14

## 2023-01-01 RX ADMIN — Medication 20 MILLIEQUIVALENT(S): at 05:38

## 2023-01-01 RX ADMIN — Medication 80 MICROGRAM(S): at 18:33

## 2023-01-01 RX ADMIN — Medication 1: at 17:50

## 2023-01-01 RX ADMIN — HEPARIN SODIUM 7.5 UNIT(S)/HR: 5000 INJECTION INTRAVENOUS; SUBCUTANEOUS at 00:29

## 2023-01-01 RX ADMIN — Medication 1 APPLICATION(S): at 06:10

## 2023-01-01 RX ADMIN — PIPERACILLIN AND TAZOBACTAM 25 GRAM(S): 4; .5 INJECTION, POWDER, LYOPHILIZED, FOR SOLUTION INTRAVENOUS at 23:41

## 2023-01-01 RX ADMIN — PANTOPRAZOLE SODIUM 40 MILLIGRAM(S): 20 TABLET, DELAYED RELEASE ORAL at 07:32

## 2023-01-01 RX ADMIN — SEVELAMER CARBONATE 800 MILLIGRAM(S): 2400 POWDER, FOR SUSPENSION ORAL at 04:43

## 2023-01-01 RX ADMIN — Medication 0.2 MILLIGRAM(S): at 15:31

## 2023-01-01 RX ADMIN — Medication 2 PUFF(S): at 15:42

## 2023-01-01 RX ADMIN — Medication 50 MILLILITER(S): at 19:02

## 2023-01-01 RX ADMIN — PIPERACILLIN AND TAZOBACTAM 25 GRAM(S): 4; .5 INJECTION, POWDER, LYOPHILIZED, FOR SOLUTION INTRAVENOUS at 03:40

## 2023-01-01 RX ADMIN — Medication 1 DROP(S): at 02:08

## 2023-01-01 RX ADMIN — HEPARIN SODIUM 1500 UNIT(S)/HR: 5000 INJECTION INTRAVENOUS; SUBCUTANEOUS at 07:02

## 2023-01-01 RX ADMIN — PIPERACILLIN AND TAZOBACTAM 25 GRAM(S): 4; .5 INJECTION, POWDER, LYOPHILIZED, FOR SOLUTION INTRAVENOUS at 19:42

## 2023-01-01 RX ADMIN — Medication 1 DROP(S): at 04:07

## 2023-01-01 RX ADMIN — Medication 1 DROP(S): at 23:11

## 2023-01-01 RX ADMIN — Medication 1 DROP(S): at 22:00

## 2023-01-01 RX ADMIN — Medication 1: at 06:29

## 2023-01-01 RX ADMIN — Medication 1 DROP(S): at 11:43

## 2023-01-01 RX ADMIN — Medication 1 DROP(S): at 05:48

## 2023-01-01 RX ADMIN — Medication 6 MILLIGRAM(S): at 05:11

## 2023-01-01 RX ADMIN — LOSARTAN POTASSIUM 100 MILLIGRAM(S): 100 TABLET, FILM COATED ORAL at 04:45

## 2023-01-01 RX ADMIN — Medication 0.2 MILLIGRAM(S): at 05:39

## 2023-01-01 RX ADMIN — ALBUTEROL 4 PUFF(S): 90 AEROSOL, METERED ORAL at 18:30

## 2023-01-01 RX ADMIN — Medication 1 DROP(S): at 16:35

## 2023-01-01 RX ADMIN — SENNA PLUS 2 TABLET(S): 8.6 TABLET ORAL at 22:04

## 2023-01-01 RX ADMIN — ISOSORBIDE DINITRATE 10 MILLIGRAM(S): 5 TABLET ORAL at 21:59

## 2023-01-01 RX ADMIN — PANTOPRAZOLE SODIUM 40 MILLIGRAM(S): 20 TABLET, DELAYED RELEASE ORAL at 11:29

## 2023-01-01 RX ADMIN — Medication 100 MILLIGRAM(S): at 07:51

## 2023-01-01 RX ADMIN — Medication 667 MILLIGRAM(S): at 02:38

## 2023-01-01 RX ADMIN — PIPERACILLIN AND TAZOBACTAM 25 GRAM(S): 4; .5 INJECTION, POWDER, LYOPHILIZED, FOR SOLUTION INTRAVENOUS at 18:07

## 2023-01-01 RX ADMIN — Medication 0.1 MILLIGRAM(S): at 14:40

## 2023-01-01 RX ADMIN — CHLORHEXIDINE GLUCONATE 1 APPLICATION(S): 213 SOLUTION TOPICAL at 12:56

## 2023-01-01 RX ADMIN — APIXABAN 5 MILLIGRAM(S): 2.5 TABLET, FILM COATED ORAL at 18:10

## 2023-01-01 RX ADMIN — PHENYLEPHRINE HYDROCHLORIDE 600 MICROGRAM(S): 10 INJECTION INTRAVENOUS at 09:46

## 2023-01-01 RX ADMIN — ISOSORBIDE DINITRATE 10 MILLIGRAM(S): 5 TABLET ORAL at 11:38

## 2023-01-01 RX ADMIN — SEVELAMER CARBONATE 800 MILLIGRAM(S): 2400 POWDER, FOR SUSPENSION ORAL at 22:03

## 2023-01-01 RX ADMIN — CHLORHEXIDINE GLUCONATE 15 MILLILITER(S): 213 SOLUTION TOPICAL at 18:25

## 2023-01-01 RX ADMIN — Medication 50 MILLIGRAM(S): at 14:21

## 2023-01-01 RX ADMIN — Medication 100 MILLIGRAM(S): at 06:33

## 2023-01-01 RX ADMIN — Medication 1 DROP(S): at 21:13

## 2023-01-01 RX ADMIN — SODIUM CHLORIDE 75 MILLILITER(S): 9 INJECTION, SOLUTION INTRAVENOUS at 03:40

## 2023-01-01 RX ADMIN — Medication 1 DROP(S): at 04:31

## 2023-01-01 RX ADMIN — HEPARIN SODIUM 5000 UNIT(S): 5000 INJECTION INTRAVENOUS; SUBCUTANEOUS at 13:02

## 2023-01-01 RX ADMIN — POLYETHYLENE GLYCOL 3350 17 GRAM(S): 17 POWDER, FOR SOLUTION ORAL at 18:30

## 2023-01-01 RX ADMIN — Medication 25 MILLILITER(S): at 09:50

## 2023-01-01 RX ADMIN — Medication 100 MILLIGRAM(S): at 04:45

## 2023-01-01 RX ADMIN — PANTOPRAZOLE SODIUM 40 MILLIGRAM(S): 20 TABLET, DELAYED RELEASE ORAL at 11:45

## 2023-01-01 RX ADMIN — Medication 300 MILLIGRAM(S): at 06:16

## 2023-01-01 RX ADMIN — PROPOFOL 2.1 MICROGRAM(S)/KG/MIN: 10 INJECTION, EMULSION INTRAVENOUS at 10:48

## 2023-01-01 RX ADMIN — Medication 1 DROP(S): at 11:23

## 2023-01-01 RX ADMIN — Medication 250 MILLIGRAM(S): at 04:25

## 2023-01-01 RX ADMIN — PIPERACILLIN AND TAZOBACTAM 25 GRAM(S): 4; .5 INJECTION, POWDER, LYOPHILIZED, FOR SOLUTION INTRAVENOUS at 05:28

## 2023-01-01 RX ADMIN — CHLORHEXIDINE GLUCONATE 15 MILLILITER(S): 213 SOLUTION TOPICAL at 18:27

## 2023-01-01 RX ADMIN — Medication 2 PUFF(S): at 22:42

## 2023-01-01 RX ADMIN — POLYETHYLENE GLYCOL 3350 17 GRAM(S): 17 POWDER, FOR SOLUTION ORAL at 12:44

## 2023-01-01 RX ADMIN — CHLORHEXIDINE GLUCONATE 15 MILLILITER(S): 213 SOLUTION TOPICAL at 17:10

## 2023-01-01 RX ADMIN — CHLORHEXIDINE GLUCONATE 1 APPLICATION(S): 213 SOLUTION TOPICAL at 06:10

## 2023-01-01 RX ADMIN — Medication 2 PUFF(S): at 22:46

## 2023-01-01 RX ADMIN — HEPARIN SODIUM 5000 UNIT(S): 5000 INJECTION INTRAVENOUS; SUBCUTANEOUS at 15:27

## 2023-01-01 RX ADMIN — HEPARIN SODIUM 5000 UNIT(S): 5000 INJECTION INTRAVENOUS; SUBCUTANEOUS at 13:53

## 2023-01-01 RX ADMIN — Medication 1 DROP(S): at 16:28

## 2023-01-01 RX ADMIN — Medication 1 DROP(S): at 17:26

## 2023-01-01 RX ADMIN — ISOSORBIDE DINITRATE 10 MILLIGRAM(S): 5 TABLET ORAL at 22:23

## 2023-01-01 RX ADMIN — CHLORHEXIDINE GLUCONATE 15 MILLILITER(S): 213 SOLUTION TOPICAL at 05:08

## 2023-01-01 RX ADMIN — HEPARIN SODIUM 1600 UNIT(S)/HR: 5000 INJECTION INTRAVENOUS; SUBCUTANEOUS at 07:12

## 2023-01-01 RX ADMIN — PIPERACILLIN AND TAZOBACTAM 25 GRAM(S): 4; .5 INJECTION, POWDER, LYOPHILIZED, FOR SOLUTION INTRAVENOUS at 17:42

## 2023-01-01 RX ADMIN — POLYETHYLENE GLYCOL 3350 17 GRAM(S): 17 POWDER, FOR SOLUTION ORAL at 18:07

## 2023-01-01 RX ADMIN — Medication 1 DROP(S): at 17:39

## 2023-01-01 RX ADMIN — Medication 3 MILLIGRAM(S): at 00:54

## 2023-01-01 RX ADMIN — CHLORHEXIDINE GLUCONATE 15 MILLILITER(S): 213 SOLUTION TOPICAL at 19:31

## 2023-01-01 RX ADMIN — Medication 1 APPLICATION(S): at 10:45

## 2023-01-01 RX ADMIN — LOSARTAN POTASSIUM 100 MILLIGRAM(S): 100 TABLET, FILM COATED ORAL at 05:40

## 2023-01-01 RX ADMIN — HEPARIN SODIUM 5000 UNIT(S): 5000 INJECTION INTRAVENOUS; SUBCUTANEOUS at 22:55

## 2023-01-01 RX ADMIN — CHLORHEXIDINE GLUCONATE 15 MILLILITER(S): 213 SOLUTION TOPICAL at 16:28

## 2023-01-01 RX ADMIN — HEPARIN SODIUM 5000 UNIT(S): 5000 INJECTION INTRAVENOUS; SUBCUTANEOUS at 14:07

## 2023-01-01 RX ADMIN — Medication 2 PUFF(S): at 03:36

## 2023-01-01 RX ADMIN — ATORVASTATIN CALCIUM 20 MILLIGRAM(S): 80 TABLET, FILM COATED ORAL at 21:31

## 2023-01-01 RX ADMIN — Medication 2 PUFF(S): at 19:59

## 2023-01-01 RX ADMIN — Medication 1 DROP(S): at 21:12

## 2023-01-01 RX ADMIN — Medication 1 DROP(S): at 03:30

## 2023-01-01 RX ADMIN — ISOSORBIDE DINITRATE 10 MILLIGRAM(S): 5 TABLET ORAL at 09:17

## 2023-01-01 RX ADMIN — CHLORHEXIDINE GLUCONATE 15 MILLILITER(S): 213 SOLUTION TOPICAL at 05:20

## 2023-01-01 RX ADMIN — Medication 5 MILLIGRAM(S): at 00:35

## 2023-01-01 RX ADMIN — PANTOPRAZOLE SODIUM 40 MILLIGRAM(S): 20 TABLET, DELAYED RELEASE ORAL at 11:31

## 2023-01-01 RX ADMIN — PIPERACILLIN AND TAZOBACTAM 25 GRAM(S): 4; .5 INJECTION, POWDER, LYOPHILIZED, FOR SOLUTION INTRAVENOUS at 17:13

## 2023-01-01 RX ADMIN — CHLORHEXIDINE GLUCONATE 15 MILLILITER(S): 213 SOLUTION TOPICAL at 05:10

## 2023-01-01 RX ADMIN — Medication 6 MICROGRAM(S)/MIN: at 16:30

## 2023-01-01 RX ADMIN — Medication 1 DROP(S): at 20:01

## 2023-01-01 RX ADMIN — CHLORHEXIDINE GLUCONATE 15 MILLILITER(S): 213 SOLUTION TOPICAL at 17:51

## 2023-01-01 RX ADMIN — CHLORHEXIDINE GLUCONATE 15 MILLILITER(S): 213 SOLUTION TOPICAL at 05:27

## 2023-01-01 RX ADMIN — CHLORHEXIDINE GLUCONATE 15 MILLILITER(S): 213 SOLUTION TOPICAL at 18:12

## 2023-01-01 RX ADMIN — NICARDIPINE HYDROCHLORIDE 20 MILLIGRAM(S): 30 CAPSULE, EXTENDED RELEASE ORAL at 18:18

## 2023-01-01 RX ADMIN — PANTOPRAZOLE SODIUM 40 MILLIGRAM(S): 20 TABLET, DELAYED RELEASE ORAL at 12:04

## 2023-01-01 RX ADMIN — Medication 500 MILLIGRAM(S): at 11:23

## 2023-01-01 RX ADMIN — CHLORHEXIDINE GLUCONATE 15 MILLILITER(S): 213 SOLUTION TOPICAL at 06:09

## 2023-01-01 RX ADMIN — HEPARIN SODIUM 5000 UNIT(S): 5000 INJECTION INTRAVENOUS; SUBCUTANEOUS at 05:44

## 2023-01-01 RX ADMIN — Medication 81 MILLIGRAM(S): at 12:48

## 2023-01-01 RX ADMIN — CHLORHEXIDINE GLUCONATE 15 MILLILITER(S): 213 SOLUTION TOPICAL at 16:15

## 2023-01-01 RX ADMIN — PIPERACILLIN AND TAZOBACTAM 25 GRAM(S): 4; .5 INJECTION, POWDER, LYOPHILIZED, FOR SOLUTION INTRAVENOUS at 14:36

## 2023-01-01 RX ADMIN — FENTANYL CITRATE 3 MICROGRAM(S)/KG/HR: 50 INJECTION INTRAVENOUS at 07:41

## 2023-01-01 RX ADMIN — HEPARIN SODIUM 5000 UNIT(S): 5000 INJECTION INTRAVENOUS; SUBCUTANEOUS at 05:55

## 2023-01-01 RX ADMIN — CHLORHEXIDINE GLUCONATE 15 MILLILITER(S): 213 SOLUTION TOPICAL at 05:53

## 2023-01-01 RX ADMIN — PIPERACILLIN AND TAZOBACTAM 25 GRAM(S): 4; .5 INJECTION, POWDER, LYOPHILIZED, FOR SOLUTION INTRAVENOUS at 05:34

## 2023-01-01 RX ADMIN — Medication 2 PUFF(S): at 16:17

## 2023-01-01 RX ADMIN — LOSARTAN POTASSIUM 100 MILLIGRAM(S): 100 TABLET, FILM COATED ORAL at 06:30

## 2023-01-01 RX ADMIN — Medication 0.2 MILLIGRAM(S): at 14:48

## 2023-01-01 RX ADMIN — Medication 2 PUFF(S): at 04:57

## 2023-01-01 RX ADMIN — ISOSORBIDE DINITRATE 10 MILLIGRAM(S): 5 TABLET ORAL at 21:49

## 2023-01-01 RX ADMIN — Medication 100 MILLIGRAM(S): at 00:11

## 2023-01-01 RX ADMIN — CHLORHEXIDINE GLUCONATE 15 MILLILITER(S): 213 SOLUTION TOPICAL at 06:10

## 2023-01-01 RX ADMIN — Medication 90 MILLIGRAM(S): at 05:09

## 2023-01-01 RX ADMIN — Medication 5 MILLIGRAM(S): at 01:11

## 2023-01-01 RX ADMIN — Medication 1 APPLICATION(S): at 10:19

## 2023-01-01 RX ADMIN — ISOSORBIDE DINITRATE 10 MILLIGRAM(S): 5 TABLET ORAL at 05:43

## 2023-01-01 RX ADMIN — Medication 1 DROP(S): at 04:18

## 2023-01-01 RX ADMIN — PIPERACILLIN AND TAZOBACTAM 25 GRAM(S): 4; .5 INJECTION, POWDER, LYOPHILIZED, FOR SOLUTION INTRAVENOUS at 18:35

## 2023-01-01 RX ADMIN — PIPERACILLIN AND TAZOBACTAM 25 GRAM(S): 4; .5 INJECTION, POWDER, LYOPHILIZED, FOR SOLUTION INTRAVENOUS at 00:15

## 2023-01-01 RX ADMIN — HEPARIN SODIUM 1500 UNIT(S)/HR: 5000 INJECTION INTRAVENOUS; SUBCUTANEOUS at 09:13

## 2023-01-01 RX ADMIN — Medication 10 MILLIGRAM(S): at 11:59

## 2023-01-01 RX ADMIN — Medication 0.2 MILLIGRAM(S): at 14:23

## 2023-01-01 RX ADMIN — Medication 0.2 MILLIGRAM(S): at 20:22

## 2023-01-01 RX ADMIN — Medication 1 DROP(S): at 12:11

## 2023-01-01 RX ADMIN — Medication 1 DROP(S): at 08:43

## 2023-01-01 RX ADMIN — HEPARIN SODIUM 5000 UNIT(S): 5000 INJECTION INTRAVENOUS; SUBCUTANEOUS at 13:26

## 2023-01-01 RX ADMIN — HEPARIN SODIUM 5000 UNIT(S): 5000 INJECTION INTRAVENOUS; SUBCUTANEOUS at 05:21

## 2023-01-01 RX ADMIN — Medication 5 MILLIGRAM(S): at 01:03

## 2023-01-01 RX ADMIN — ALBUTEROL 2 PUFF(S): 90 AEROSOL, METERED ORAL at 11:00

## 2023-01-01 RX ADMIN — Medication 100 MILLIGRAM(S): at 13:15

## 2023-01-01 RX ADMIN — Medication 0.2 MILLIGRAM(S): at 06:31

## 2023-01-01 RX ADMIN — Medication 2 PUFF(S): at 15:24

## 2023-01-01 RX ADMIN — Medication 3 MILLIGRAM(S): at 22:04

## 2023-01-01 RX ADMIN — NICARDIPINE HYDROCHLORIDE 20 MILLIGRAM(S): 30 CAPSULE, EXTENDED RELEASE ORAL at 01:28

## 2023-01-01 RX ADMIN — SEVELAMER CARBONATE 800 MILLIGRAM(S): 2400 POWDER, FOR SUSPENSION ORAL at 21:32

## 2023-01-01 RX ADMIN — Medication 25 MILLILITER(S): at 17:42

## 2023-01-01 RX ADMIN — Medication 6 MILLIGRAM(S): at 06:31

## 2023-01-01 RX ADMIN — CHLORHEXIDINE GLUCONATE 1 APPLICATION(S): 213 SOLUTION TOPICAL at 07:07

## 2023-01-01 RX ADMIN — Medication 100 GRAM(S): at 06:30

## 2023-01-01 RX ADMIN — HEPARIN SODIUM 5000 UNIT(S): 5000 INJECTION INTRAVENOUS; SUBCUTANEOUS at 13:18

## 2023-01-01 RX ADMIN — Medication 2 PUFF(S): at 07:03

## 2023-01-01 RX ADMIN — Medication 50 MILLILITER(S): at 23:35

## 2023-01-01 RX ADMIN — SEVELAMER CARBONATE 800 MILLIGRAM(S): 2400 POWDER, FOR SUSPENSION ORAL at 18:32

## 2023-01-01 RX ADMIN — PIPERACILLIN AND TAZOBACTAM 25 GRAM(S): 4; .5 INJECTION, POWDER, LYOPHILIZED, FOR SOLUTION INTRAVENOUS at 11:00

## 2023-01-01 RX ADMIN — Medication 0.2 MILLIGRAM(S): at 14:49

## 2023-01-01 RX ADMIN — HEPARIN SODIUM 1500 UNIT(S)/HR: 5000 INJECTION INTRAVENOUS; SUBCUTANEOUS at 02:58

## 2023-01-01 RX ADMIN — Medication 100 MILLIGRAM(S): at 14:35

## 2023-01-01 RX ADMIN — HEPARIN SODIUM 1600 UNIT(S)/HR: 5000 INJECTION INTRAVENOUS; SUBCUTANEOUS at 16:07

## 2023-01-01 RX ADMIN — SENNA PLUS 2 TABLET(S): 8.6 TABLET ORAL at 22:45

## 2023-01-01 RX ADMIN — CHLORHEXIDINE GLUCONATE 1 APPLICATION(S): 213 SOLUTION TOPICAL at 05:37

## 2023-01-01 RX ADMIN — HEPARIN SODIUM 1500 UNIT(S)/HR: 5000 INJECTION INTRAVENOUS; SUBCUTANEOUS at 01:39

## 2023-01-01 RX ADMIN — CARVEDILOL PHOSPHATE 3.12 MILLIGRAM(S): 80 CAPSULE, EXTENDED RELEASE ORAL at 05:07

## 2023-01-01 RX ADMIN — HEPARIN SODIUM 5000 UNIT(S): 5000 INJECTION INTRAVENOUS; SUBCUTANEOUS at 21:15

## 2023-01-01 RX ADMIN — Medication 1 DROP(S): at 21:50

## 2023-01-01 RX ADMIN — Medication 10 MILLIGRAM(S): at 12:52

## 2023-01-01 RX ADMIN — Medication 1 DROP(S): at 20:02

## 2023-01-01 RX ADMIN — Medication 0.2 MILLIGRAM(S): at 22:45

## 2023-01-01 RX ADMIN — SEVELAMER CARBONATE 800 MILLIGRAM(S): 2400 POWDER, FOR SUSPENSION ORAL at 21:48

## 2023-01-01 RX ADMIN — PROPOFOL 1.81 MICROGRAM(S)/KG/MIN: 10 INJECTION, EMULSION INTRAVENOUS at 18:58

## 2023-01-01 RX ADMIN — ISOSORBIDE DINITRATE 10 MILLIGRAM(S): 5 TABLET ORAL at 16:06

## 2023-01-01 RX ADMIN — Medication 5 MILLIGRAM(S): at 04:15

## 2023-01-01 RX ADMIN — Medication 1 DROP(S): at 11:30

## 2023-01-01 RX ADMIN — Medication 2 PUFF(S): at 21:33

## 2023-01-01 RX ADMIN — PANTOPRAZOLE SODIUM 40 MILLIGRAM(S): 20 TABLET, DELAYED RELEASE ORAL at 11:00

## 2023-01-01 RX ADMIN — FENTANYL CITRATE 3.01 MICROGRAM(S)/KG/HR: 50 INJECTION INTRAVENOUS at 18:57

## 2023-01-01 RX ADMIN — AMLODIPINE BESYLATE 10 MILLIGRAM(S): 2.5 TABLET ORAL at 05:37

## 2023-01-01 RX ADMIN — NICARDIPINE HYDROCHLORIDE 20 MILLIGRAM(S): 30 CAPSULE, EXTENDED RELEASE ORAL at 01:06

## 2023-01-01 RX ADMIN — FENTANYL CITRATE 50 MICROGRAM(S): 50 INJECTION INTRAVENOUS at 23:48

## 2023-01-01 RX ADMIN — Medication 10 MILLIGRAM(S): at 22:52

## 2023-01-01 RX ADMIN — Medication 5 MILLIGRAM(S): at 10:54

## 2023-01-01 RX ADMIN — Medication 1 DROP(S): at 19:55

## 2023-01-01 RX ADMIN — NICARDIPINE HYDROCHLORIDE 20 MILLIGRAM(S): 30 CAPSULE, EXTENDED RELEASE ORAL at 09:16

## 2023-01-01 RX ADMIN — HEPARIN SODIUM 1500 UNIT(S)/HR: 5000 INJECTION INTRAVENOUS; SUBCUTANEOUS at 07:21

## 2023-01-01 RX ADMIN — HEPARIN SODIUM 1600 UNIT(S)/HR: 5000 INJECTION INTRAVENOUS; SUBCUTANEOUS at 00:54

## 2023-01-01 RX ADMIN — CARVEDILOL PHOSPHATE 3.12 MILLIGRAM(S): 80 CAPSULE, EXTENDED RELEASE ORAL at 05:27

## 2023-01-01 RX ADMIN — ISOSORBIDE DINITRATE 10 MILLIGRAM(S): 5 TABLET ORAL at 06:31

## 2023-01-01 RX ADMIN — Medication 81 MILLIGRAM(S): at 12:30

## 2023-01-01 RX ADMIN — HEPARIN SODIUM 5000 UNIT(S): 5000 INJECTION INTRAVENOUS; SUBCUTANEOUS at 21:48

## 2023-01-01 RX ADMIN — Medication 0.2 MILLIGRAM(S): at 05:09

## 2023-01-01 RX ADMIN — SENNA PLUS 2 TABLET(S): 8.6 TABLET ORAL at 21:18

## 2023-01-01 RX ADMIN — Medication 100 MILLIGRAM(S): at 15:31

## 2023-01-01 RX ADMIN — FENTANYL CITRATE 100 MICROGRAM(S): 50 INJECTION INTRAVENOUS at 13:30

## 2023-01-01 RX ADMIN — Medication 75 MILLIGRAM(S): at 21:01

## 2023-01-01 RX ADMIN — CHLORHEXIDINE GLUCONATE 1 APPLICATION(S): 213 SOLUTION TOPICAL at 06:00

## 2023-01-01 RX ADMIN — CHLORHEXIDINE GLUCONATE 1 APPLICATION(S): 213 SOLUTION TOPICAL at 05:02

## 2023-01-01 RX ADMIN — FENTANYL CITRATE 3.01 MICROGRAM(S)/KG/HR: 50 INJECTION INTRAVENOUS at 07:49

## 2023-01-01 RX ADMIN — Medication 1 DROP(S): at 07:50

## 2023-01-01 RX ADMIN — CHLORHEXIDINE GLUCONATE 1 APPLICATION(S): 213 SOLUTION TOPICAL at 06:09

## 2023-01-01 RX ADMIN — Medication 1 DROP(S): at 03:04

## 2023-01-01 RX ADMIN — HEPARIN SODIUM 1600 UNIT(S)/HR: 5000 INJECTION INTRAVENOUS; SUBCUTANEOUS at 09:48

## 2023-01-01 RX ADMIN — SODIUM CHLORIDE 1000 MILLILITER(S): 9 INJECTION INTRAMUSCULAR; INTRAVENOUS; SUBCUTANEOUS at 04:12

## 2023-01-01 RX ADMIN — SENNA PLUS 2 TABLET(S): 8.6 TABLET ORAL at 22:00

## 2023-01-01 RX ADMIN — ALBUTEROL 4 PUFF(S): 90 AEROSOL, METERED ORAL at 21:33

## 2023-01-01 RX ADMIN — CHLORHEXIDINE GLUCONATE 15 MILLILITER(S): 213 SOLUTION TOPICAL at 05:31

## 2023-01-01 RX ADMIN — PIPERACILLIN AND TAZOBACTAM 25 GRAM(S): 4; .5 INJECTION, POWDER, LYOPHILIZED, FOR SOLUTION INTRAVENOUS at 04:38

## 2023-01-01 RX ADMIN — HEPARIN SODIUM 5000 UNIT(S): 5000 INJECTION INTRAVENOUS; SUBCUTANEOUS at 22:43

## 2023-01-01 RX ADMIN — SEVELAMER CARBONATE 800 MILLIGRAM(S): 2400 POWDER, FOR SUSPENSION ORAL at 13:53

## 2023-01-01 RX ADMIN — Medication 100 MILLIGRAM(S): at 22:00

## 2023-01-01 RX ADMIN — PIPERACILLIN AND TAZOBACTAM 25 GRAM(S): 4; .5 INJECTION, POWDER, LYOPHILIZED, FOR SOLUTION INTRAVENOUS at 15:24

## 2023-01-01 RX ADMIN — PANTOPRAZOLE SODIUM 40 MILLIGRAM(S): 20 TABLET, DELAYED RELEASE ORAL at 13:00

## 2023-01-01 RX ADMIN — PIPERACILLIN AND TAZOBACTAM 25 GRAM(S): 4; .5 INJECTION, POWDER, LYOPHILIZED, FOR SOLUTION INTRAVENOUS at 05:29

## 2023-01-01 RX ADMIN — OLANZAPINE 5 MILLIGRAM(S): 15 TABLET, FILM COATED ORAL at 05:38

## 2023-01-01 RX ADMIN — PIPERACILLIN AND TAZOBACTAM 25 GRAM(S): 4; .5 INJECTION, POWDER, LYOPHILIZED, FOR SOLUTION INTRAVENOUS at 04:32

## 2023-01-01 RX ADMIN — Medication 1 DROP(S): at 20:35

## 2023-01-01 RX ADMIN — Medication 5 MILLIGRAM(S): at 23:49

## 2023-01-01 RX ADMIN — Medication 5 MILLIGRAM(S): at 21:14

## 2023-01-01 RX ADMIN — Medication 10 MILLIGRAM(S): at 06:38

## 2023-01-01 RX ADMIN — AMLODIPINE BESYLATE 10 MILLIGRAM(S): 2.5 TABLET ORAL at 05:11

## 2023-01-01 RX ADMIN — Medication 5 MILLIGRAM(S): at 09:07

## 2023-01-01 RX ADMIN — Medication 1 DROP(S): at 05:08

## 2023-01-01 RX ADMIN — Medication 81 MILLIGRAM(S): at 11:38

## 2023-01-01 RX ADMIN — HEPARIN SODIUM 5000 UNIT(S): 5000 INJECTION INTRAVENOUS; SUBCUTANEOUS at 22:00

## 2023-01-01 RX ADMIN — LOSARTAN POTASSIUM 100 MILLIGRAM(S): 100 TABLET, FILM COATED ORAL at 10:48

## 2023-01-01 RX ADMIN — Medication 1 DROP(S): at 10:13

## 2023-01-01 RX ADMIN — ISOSORBIDE DINITRATE 10 MILLIGRAM(S): 5 TABLET ORAL at 15:32

## 2023-01-01 RX ADMIN — NICARDIPINE HYDROCHLORIDE 25 MG/HR: 30 CAPSULE, EXTENDED RELEASE ORAL at 19:39

## 2023-01-01 RX ADMIN — OLANZAPINE 10 MILLIGRAM(S): 15 TABLET, FILM COATED ORAL at 01:24

## 2023-01-01 RX ADMIN — PROPOFOL 2.1 MICROGRAM(S)/KG/MIN: 10 INJECTION, EMULSION INTRAVENOUS at 19:41

## 2023-01-01 RX ADMIN — Medication 100 MILLIGRAM(S): at 22:04

## 2023-01-01 RX ADMIN — Medication 2.81 MICROGRAM(S)/KG/MIN: at 07:42

## 2023-01-01 RX ADMIN — CHLORHEXIDINE GLUCONATE 15 MILLILITER(S): 213 SOLUTION TOPICAL at 17:37

## 2023-01-01 RX ADMIN — Medication 100 MILLIGRAM(S): at 14:48

## 2023-01-01 RX ADMIN — SEVELAMER CARBONATE 800 MILLIGRAM(S): 2400 POWDER, FOR SUSPENSION ORAL at 12:05

## 2023-01-01 RX ADMIN — SEVELAMER CARBONATE 800 MILLIGRAM(S): 2400 POWDER, FOR SUSPENSION ORAL at 15:32

## 2023-01-01 RX ADMIN — OLANZAPINE 5 MILLIGRAM(S): 15 TABLET, FILM COATED ORAL at 14:18

## 2023-01-01 RX ADMIN — CARVEDILOL PHOSPHATE 3.12 MILLIGRAM(S): 80 CAPSULE, EXTENDED RELEASE ORAL at 05:09

## 2023-01-01 RX ADMIN — CHLORHEXIDINE GLUCONATE 15 MILLILITER(S): 213 SOLUTION TOPICAL at 17:27

## 2023-01-01 RX ADMIN — Medication 0.2 MILLIGRAM(S): at 04:44

## 2023-01-01 RX ADMIN — ALBUTEROL 4 PUFF(S): 90 AEROSOL, METERED ORAL at 16:16

## 2023-01-01 RX ADMIN — Medication 100 GRAM(S): at 02:58

## 2023-01-01 RX ADMIN — PANTOPRAZOLE SODIUM 40 MILLIGRAM(S): 20 TABLET, DELAYED RELEASE ORAL at 11:30

## 2023-01-01 RX ADMIN — Medication 40 MILLIEQUIVALENT(S): at 04:43

## 2023-01-01 RX ADMIN — HEPARIN SODIUM 1400 UNIT(S)/HR: 5000 INJECTION INTRAVENOUS; SUBCUTANEOUS at 08:17

## 2023-01-01 RX ADMIN — PANTOPRAZOLE SODIUM 40 MILLIGRAM(S): 20 TABLET, DELAYED RELEASE ORAL at 12:30

## 2023-01-01 RX ADMIN — NICARDIPINE HYDROCHLORIDE 20 MILLIGRAM(S): 30 CAPSULE, EXTENDED RELEASE ORAL at 00:41

## 2023-01-01 RX ADMIN — PROPOFOL 2.1 MICROGRAM(S)/KG/MIN: 10 INJECTION, EMULSION INTRAVENOUS at 10:19

## 2023-01-01 RX ADMIN — Medication 50 MILLIGRAM(S): at 18:18

## 2023-01-01 RX ADMIN — PANTOPRAZOLE SODIUM 40 MILLIGRAM(S): 20 TABLET, DELAYED RELEASE ORAL at 11:36

## 2023-01-01 RX ADMIN — Medication 50 MILLIGRAM(S): at 06:31

## 2023-01-01 RX ADMIN — SENNA PLUS 2 TABLET(S): 8.6 TABLET ORAL at 21:32

## 2023-01-01 RX ADMIN — HEPARIN SODIUM 5000 UNIT(S): 5000 INJECTION INTRAVENOUS; SUBCUTANEOUS at 05:09

## 2023-01-01 RX ADMIN — HYDROMORPHONE HYDROCHLORIDE 0.5 MILLIGRAM(S): 2 INJECTION INTRAMUSCULAR; INTRAVENOUS; SUBCUTANEOUS at 01:30

## 2023-01-01 RX ADMIN — Medication 1 DROP(S): at 11:38

## 2023-01-01 RX ADMIN — PIPERACILLIN AND TAZOBACTAM 25 GRAM(S): 4; .5 INJECTION, POWDER, LYOPHILIZED, FOR SOLUTION INTRAVENOUS at 16:30

## 2023-01-01 RX ADMIN — Medication 100 MILLIGRAM(S): at 05:35

## 2023-01-01 RX ADMIN — AMLODIPINE BESYLATE 10 MILLIGRAM(S): 2.5 TABLET ORAL at 05:55

## 2023-01-01 RX ADMIN — ALBUTEROL 4 PUFF(S): 90 AEROSOL, METERED ORAL at 10:14

## 2023-01-01 RX ADMIN — CARVEDILOL PHOSPHATE 3.12 MILLIGRAM(S): 80 CAPSULE, EXTENDED RELEASE ORAL at 17:07

## 2023-01-01 RX ADMIN — PIPERACILLIN AND TAZOBACTAM 25 GRAM(S): 4; .5 INJECTION, POWDER, LYOPHILIZED, FOR SOLUTION INTRAVENOUS at 12:24

## 2023-01-01 RX ADMIN — CHLORHEXIDINE GLUCONATE 15 MILLILITER(S): 213 SOLUTION TOPICAL at 05:59

## 2023-01-01 RX ADMIN — Medication 100 MILLIGRAM(S): at 07:05

## 2023-01-01 RX ADMIN — NICARDIPINE HYDROCHLORIDE 20 MILLIGRAM(S): 30 CAPSULE, EXTENDED RELEASE ORAL at 18:07

## 2023-01-01 RX ADMIN — CARVEDILOL PHOSPHATE 3.12 MILLIGRAM(S): 80 CAPSULE, EXTENDED RELEASE ORAL at 17:49

## 2023-01-01 RX ADMIN — CHLORHEXIDINE GLUCONATE 15 MILLILITER(S): 213 SOLUTION TOPICAL at 18:57

## 2023-01-01 RX ADMIN — Medication 1 DROP(S): at 11:50

## 2023-01-01 RX ADMIN — ATORVASTATIN CALCIUM 20 MILLIGRAM(S): 80 TABLET, FILM COATED ORAL at 22:02

## 2023-01-01 RX ADMIN — Medication 10 MILLIGRAM(S): at 04:27

## 2023-01-01 RX ADMIN — Medication 6 MICROGRAM(S)/MIN: at 06:10

## 2023-01-01 RX ADMIN — Medication 2 PUFF(S): at 09:43

## 2023-01-01 RX ADMIN — Medication 81 MILLIGRAM(S): at 12:06

## 2023-01-01 RX ADMIN — HEPARIN SODIUM 5000 UNIT(S): 5000 INJECTION INTRAVENOUS; SUBCUTANEOUS at 14:47

## 2023-01-01 RX ADMIN — Medication 2.81 MICROGRAM(S)/KG/MIN: at 07:23

## 2023-01-01 RX ADMIN — PANTOPRAZOLE SODIUM 40 MILLIGRAM(S): 20 TABLET, DELAYED RELEASE ORAL at 12:47

## 2023-01-01 RX ADMIN — CHLORHEXIDINE GLUCONATE 1 APPLICATION(S): 213 SOLUTION TOPICAL at 07:55

## 2023-01-01 RX ADMIN — SEVELAMER CARBONATE 800 MILLIGRAM(S): 2400 POWDER, FOR SUSPENSION ORAL at 21:15

## 2023-01-01 RX ADMIN — Medication 1 DROP(S): at 01:16

## 2023-01-01 RX ADMIN — PANTOPRAZOLE SODIUM 40 MILLIGRAM(S): 20 TABLET, DELAYED RELEASE ORAL at 13:12

## 2023-01-01 RX ADMIN — PANTOPRAZOLE SODIUM 40 MILLIGRAM(S): 20 TABLET, DELAYED RELEASE ORAL at 11:53

## 2023-01-01 RX ADMIN — SEVELAMER CARBONATE 800 MILLIGRAM(S): 2400 POWDER, FOR SUSPENSION ORAL at 09:08

## 2023-01-01 RX ADMIN — Medication 50 MILLIEQUIVALENT(S): at 23:25

## 2023-01-01 RX ADMIN — ISOSORBIDE DINITRATE 10 MILLIGRAM(S): 5 TABLET ORAL at 05:39

## 2023-01-01 RX ADMIN — Medication 3 MILLIGRAM(S): at 22:41

## 2023-01-01 RX ADMIN — CHLORHEXIDINE GLUCONATE 1 APPLICATION(S): 213 SOLUTION TOPICAL at 06:34

## 2023-01-01 RX ADMIN — SEVELAMER CARBONATE 800 MILLIGRAM(S): 2400 POWDER, FOR SUSPENSION ORAL at 20:44

## 2023-01-01 RX ADMIN — CHLORHEXIDINE GLUCONATE 1 APPLICATION(S): 213 SOLUTION TOPICAL at 10:18

## 2023-01-01 RX ADMIN — ALBUTEROL 4 PUFF(S): 90 AEROSOL, METERED ORAL at 12:10

## 2023-01-01 RX ADMIN — CHLORHEXIDINE GLUCONATE 15 MILLILITER(S): 213 SOLUTION TOPICAL at 05:45

## 2023-01-01 RX ADMIN — PANTOPRAZOLE SODIUM 40 MILLIGRAM(S): 20 TABLET, DELAYED RELEASE ORAL at 12:29

## 2023-01-01 RX ADMIN — Medication 0.2 MILLIGRAM(S): at 13:19

## 2023-01-01 RX ADMIN — HEPARIN SODIUM 5000 UNIT(S): 5000 INJECTION INTRAVENOUS; SUBCUTANEOUS at 13:16

## 2023-01-01 RX ADMIN — Medication 1 DROP(S): at 16:00

## 2023-01-01 RX ADMIN — VASOPRESSIN 6 UNIT(S)/MIN: 20 INJECTION INTRAVENOUS at 10:53

## 2023-01-01 RX ADMIN — Medication 5.63 MICROGRAM(S)/KG/MIN: at 09:42

## 2023-01-01 RX ADMIN — Medication 81 MILLIGRAM(S): at 12:44

## 2023-01-01 RX ADMIN — OLANZAPINE 5 MILLIGRAM(S): 15 TABLET, FILM COATED ORAL at 15:10

## 2023-01-01 RX ADMIN — CHLORHEXIDINE GLUCONATE 15 MILLILITER(S): 213 SOLUTION TOPICAL at 17:39

## 2023-01-01 RX ADMIN — Medication 1 DROP(S): at 00:59

## 2023-01-01 RX ADMIN — HEPARIN SODIUM 1500 UNIT(S)/HR: 5000 INJECTION INTRAVENOUS; SUBCUTANEOUS at 17:45

## 2023-01-01 RX ADMIN — Medication 2 PUFF(S): at 16:05

## 2023-01-01 RX ADMIN — NICARDIPINE HYDROCHLORIDE 20 MILLIGRAM(S): 30 CAPSULE, EXTENDED RELEASE ORAL at 18:41

## 2023-01-01 RX ADMIN — Medication 6 MILLIGRAM(S): at 04:44

## 2023-01-01 RX ADMIN — PROPOFOL 3.6 MICROGRAM(S)/KG/MIN: 10 INJECTION, EMULSION INTRAVENOUS at 07:44

## 2023-01-01 RX ADMIN — APIXABAN 5 MILLIGRAM(S): 2.5 TABLET, FILM COATED ORAL at 05:09

## 2023-01-01 RX ADMIN — Medication 1 DROP(S): at 12:47

## 2023-01-01 RX ADMIN — Medication 1 DROP(S): at 21:30

## 2023-01-01 RX ADMIN — Medication 0.2 MILLIGRAM(S): at 22:00

## 2023-01-01 RX ADMIN — Medication 100 MILLIGRAM(S): at 21:47

## 2023-01-01 RX ADMIN — Medication 0.2 MILLIGRAM(S): at 21:16

## 2023-01-01 RX ADMIN — Medication 3 MILLILITER(S): at 22:57

## 2023-01-01 RX ADMIN — Medication 81 MILLIGRAM(S): at 11:29

## 2023-01-01 RX ADMIN — Medication 0.2 MILLIGRAM(S): at 05:44

## 2023-01-01 RX ADMIN — ALBUTEROL 4 PUFF(S): 90 AEROSOL, METERED ORAL at 04:58

## 2023-01-01 RX ADMIN — Medication 2 PUFF(S): at 10:14

## 2023-01-01 RX ADMIN — PIPERACILLIN AND TAZOBACTAM 25 GRAM(S): 4; .5 INJECTION, POWDER, LYOPHILIZED, FOR SOLUTION INTRAVENOUS at 11:31

## 2023-01-01 RX ADMIN — CHLORHEXIDINE GLUCONATE 15 MILLILITER(S): 213 SOLUTION TOPICAL at 17:43

## 2023-01-01 RX ADMIN — Medication 100 MILLIGRAM(S): at 15:16

## 2023-01-01 RX ADMIN — Medication 1 APPLICATION(S): at 04:28

## 2023-01-01 RX ADMIN — CARVEDILOL PHOSPHATE 3.12 MILLIGRAM(S): 80 CAPSULE, EXTENDED RELEASE ORAL at 17:51

## 2023-01-01 RX ADMIN — CHLORHEXIDINE GLUCONATE 15 MILLILITER(S): 213 SOLUTION TOPICAL at 17:14

## 2023-01-01 RX ADMIN — Medication 2 PUFF(S): at 10:16

## 2023-01-01 RX ADMIN — PIPERACILLIN AND TAZOBACTAM 25 GRAM(S): 4; .5 INJECTION, POWDER, LYOPHILIZED, FOR SOLUTION INTRAVENOUS at 06:00

## 2023-01-01 RX ADMIN — Medication 1 DROP(S): at 11:32

## 2023-01-01 RX ADMIN — Medication 0.2 MILLIGRAM(S): at 20:45

## 2023-01-01 RX ADMIN — Medication 0.2 MILLIGRAM(S): at 22:04

## 2023-01-01 RX ADMIN — Medication 10 MILLIGRAM(S): at 08:20

## 2023-01-01 RX ADMIN — NICARDIPINE HYDROCHLORIDE 25 MG/HR: 30 CAPSULE, EXTENDED RELEASE ORAL at 18:10

## 2023-01-01 RX ADMIN — ALBUTEROL 4 PUFF(S): 90 AEROSOL, METERED ORAL at 09:39

## 2023-01-01 RX ADMIN — Medication 1 DROP(S): at 11:29

## 2023-01-01 RX ADMIN — Medication 1 DROP(S): at 00:16

## 2023-01-01 RX ADMIN — CHLORHEXIDINE GLUCONATE 1 APPLICATION(S): 213 SOLUTION TOPICAL at 05:43

## 2023-01-01 RX ADMIN — Medication 100 MILLIGRAM(S): at 20:45

## 2023-01-01 RX ADMIN — PANTOPRAZOLE SODIUM 40 MILLIGRAM(S): 20 TABLET, DELAYED RELEASE ORAL at 12:10

## 2023-01-01 RX ADMIN — Medication 1 DROP(S): at 10:30

## 2023-01-01 RX ADMIN — Medication 1 DROP(S): at 17:07

## 2023-01-01 RX ADMIN — HYDROMORPHONE HYDROCHLORIDE 0.5 MILLIGRAM(S): 2 INJECTION INTRAMUSCULAR; INTRAVENOUS; SUBCUTANEOUS at 00:52

## 2023-01-01 RX ADMIN — Medication 5 MILLIGRAM(S): at 13:20

## 2023-01-01 RX ADMIN — CHLORHEXIDINE GLUCONATE 1 APPLICATION(S): 213 SOLUTION TOPICAL at 05:33

## 2023-01-01 RX ADMIN — CARVEDILOL PHOSPHATE 3.12 MILLIGRAM(S): 80 CAPSULE, EXTENDED RELEASE ORAL at 05:37

## 2023-01-01 RX ADMIN — Medication 100 MILLIGRAM(S): at 22:42

## 2023-01-01 RX ADMIN — POLYETHYLENE GLYCOL 3350 17 GRAM(S): 17 POWDER, FOR SOLUTION ORAL at 06:30

## 2023-01-01 RX ADMIN — PANTOPRAZOLE SODIUM 40 MILLIGRAM(S): 20 TABLET, DELAYED RELEASE ORAL at 09:06

## 2023-01-01 RX ADMIN — CHLORHEXIDINE GLUCONATE 1 APPLICATION(S): 213 SOLUTION TOPICAL at 05:27

## 2023-01-01 RX ADMIN — Medication 1 APPLICATION(S): at 19:33

## 2023-01-01 RX ADMIN — HEPARIN SODIUM 5000 UNIT(S): 5000 INJECTION INTRAVENOUS; SUBCUTANEOUS at 06:32

## 2023-01-01 RX ADMIN — Medication 1 DROP(S): at 23:19

## 2023-01-01 RX ADMIN — Medication 81 MILLIGRAM(S): at 11:33

## 2023-01-01 RX ADMIN — PANTOPRAZOLE SODIUM 40 MILLIGRAM(S): 20 TABLET, DELAYED RELEASE ORAL at 12:06

## 2023-01-01 RX ADMIN — ISOSORBIDE DINITRATE 20 MILLIGRAM(S): 5 TABLET ORAL at 10:50

## 2023-01-01 RX ADMIN — Medication 667 MILLIGRAM(S): at 15:12

## 2023-01-01 RX ADMIN — CHLORHEXIDINE GLUCONATE 15 MILLILITER(S): 213 SOLUTION TOPICAL at 05:02

## 2023-01-01 RX ADMIN — Medication 1 DROP(S): at 21:57

## 2023-01-01 RX ADMIN — HEPARIN SODIUM 5000 UNIT(S): 5000 INJECTION INTRAVENOUS; SUBCUTANEOUS at 23:00

## 2023-01-01 RX ADMIN — HEPARIN SODIUM 5000 UNIT(S): 5000 INJECTION INTRAVENOUS; SUBCUTANEOUS at 21:57

## 2023-01-01 RX ADMIN — PANTOPRAZOLE SODIUM 40 MILLIGRAM(S): 20 TABLET, DELAYED RELEASE ORAL at 12:20

## 2023-01-01 RX ADMIN — Medication 1 APPLICATION(S): at 06:00

## 2023-01-01 RX ADMIN — ATORVASTATIN CALCIUM 20 MILLIGRAM(S): 80 TABLET, FILM COATED ORAL at 22:44

## 2023-01-01 RX ADMIN — PIPERACILLIN AND TAZOBACTAM 25 GRAM(S): 4; .5 INJECTION, POWDER, LYOPHILIZED, FOR SOLUTION INTRAVENOUS at 03:47

## 2023-01-01 RX ADMIN — Medication 0.2 MILLIGRAM(S): at 13:53

## 2023-01-01 RX ADMIN — ALBUTEROL 4 PUFF(S): 90 AEROSOL, METERED ORAL at 09:43

## 2023-01-01 RX ADMIN — ALBUTEROL 4 PUFF(S): 90 AEROSOL, METERED ORAL at 21:47

## 2023-01-01 RX ADMIN — Medication 1 APPLICATION(S): at 21:33

## 2023-01-01 RX ADMIN — Medication 6 MILLIGRAM(S): at 07:04

## 2023-01-01 RX ADMIN — CHLORHEXIDINE GLUCONATE 1 APPLICATION(S): 213 SOLUTION TOPICAL at 05:53

## 2023-01-01 RX ADMIN — Medication 100 MILLIGRAM(S): at 13:18

## 2023-01-01 RX ADMIN — Medication 1 APPLICATION(S): at 02:28

## 2023-01-01 RX ADMIN — CHLORHEXIDINE GLUCONATE 1 APPLICATION(S): 213 SOLUTION TOPICAL at 01:15

## 2023-01-01 RX ADMIN — PANTOPRAZOLE SODIUM 40 MILLIGRAM(S): 20 TABLET, DELAYED RELEASE ORAL at 12:23

## 2023-01-01 RX ADMIN — Medication 1 DROP(S): at 23:49

## 2023-01-01 RX ADMIN — PIPERACILLIN AND TAZOBACTAM 25 GRAM(S): 4; .5 INJECTION, POWDER, LYOPHILIZED, FOR SOLUTION INTRAVENOUS at 12:19

## 2023-01-01 RX ADMIN — PIPERACILLIN AND TAZOBACTAM 25 GRAM(S): 4; .5 INJECTION, POWDER, LYOPHILIZED, FOR SOLUTION INTRAVENOUS at 04:00

## 2023-01-01 RX ADMIN — Medication 1 DROP(S): at 00:14

## 2023-01-01 RX ADMIN — Medication 100 MILLIGRAM(S): at 14:49

## 2023-01-01 RX ADMIN — HEPARIN SODIUM 1500 UNIT(S)/HR: 5000 INJECTION INTRAVENOUS; SUBCUTANEOUS at 19:30

## 2023-01-01 RX ADMIN — Medication 250 MILLIGRAM(S): at 16:30

## 2023-01-01 RX ADMIN — CHLORHEXIDINE GLUCONATE 1 APPLICATION(S): 213 SOLUTION TOPICAL at 05:39

## 2023-01-01 RX ADMIN — PANTOPRAZOLE SODIUM 40 MILLIGRAM(S): 20 TABLET, DELAYED RELEASE ORAL at 11:28

## 2023-01-01 RX ADMIN — APIXABAN 5 MILLIGRAM(S): 2.5 TABLET, FILM COATED ORAL at 12:09

## 2023-01-01 RX ADMIN — PIPERACILLIN AND TAZOBACTAM 200 GRAM(S): 4; .5 INJECTION, POWDER, LYOPHILIZED, FOR SOLUTION INTRAVENOUS at 22:31

## 2023-01-01 RX ADMIN — Medication 10 MILLIGRAM(S): at 19:56

## 2023-01-01 RX ADMIN — PROPOFOL 3.6 MICROGRAM(S)/KG/MIN: 10 INJECTION, EMULSION INTRAVENOUS at 07:23

## 2023-01-01 RX ADMIN — HEPARIN SODIUM 1500 UNIT(S)/HR: 5000 INJECTION INTRAVENOUS; SUBCUTANEOUS at 15:12

## 2023-01-01 RX ADMIN — SENNA PLUS 2 TABLET(S): 8.6 TABLET ORAL at 20:45

## 2023-01-01 RX ADMIN — Medication 1 DROP(S): at 01:00

## 2023-01-01 RX ADMIN — PIPERACILLIN AND TAZOBACTAM 200 GRAM(S): 4; .5 INJECTION, POWDER, LYOPHILIZED, FOR SOLUTION INTRAVENOUS at 04:12

## 2023-01-01 RX ADMIN — PROPOFOL 13.8 MICROGRAM(S)/KG/MIN: 10 INJECTION, EMULSION INTRAVENOUS at 11:29

## 2023-01-01 RX ADMIN — Medication 6 MICROGRAM(S)/MIN: at 20:32

## 2023-01-01 RX ADMIN — PIPERACILLIN AND TAZOBACTAM 25 GRAM(S): 4; .5 INJECTION, POWDER, LYOPHILIZED, FOR SOLUTION INTRAVENOUS at 18:08

## 2023-01-01 RX ADMIN — ALBUTEROL 4 PUFF(S): 90 AEROSOL, METERED ORAL at 03:36

## 2023-01-01 RX ADMIN — Medication 5 MILLIGRAM(S): at 04:54

## 2023-01-01 RX ADMIN — Medication 5 MILLIGRAM(S): at 06:00

## 2023-01-01 RX ADMIN — Medication 1 APPLICATION(S): at 02:14

## 2023-01-01 RX ADMIN — PIPERACILLIN AND TAZOBACTAM 25 GRAM(S): 4; .5 INJECTION, POWDER, LYOPHILIZED, FOR SOLUTION INTRAVENOUS at 00:34

## 2023-01-01 RX ADMIN — PROPOFOL 1.81 MICROGRAM(S)/KG/MIN: 10 INJECTION, EMULSION INTRAVENOUS at 07:49

## 2023-01-01 RX ADMIN — ISOSORBIDE DINITRATE 10 MILLIGRAM(S): 5 TABLET ORAL at 20:45

## 2023-01-01 RX ADMIN — Medication 2 PUFF(S): at 10:40

## 2023-01-01 RX ADMIN — Medication 1 DROP(S): at 04:00

## 2023-01-01 RX ADMIN — HEPARIN SODIUM 5000 UNIT(S): 5000 INJECTION INTRAVENOUS; SUBCUTANEOUS at 20:22

## 2023-01-01 RX ADMIN — CHLORHEXIDINE GLUCONATE 1 APPLICATION(S): 213 SOLUTION TOPICAL at 05:17

## 2023-01-01 RX ADMIN — ISOSORBIDE DINITRATE 10 MILLIGRAM(S): 5 TABLET ORAL at 05:11

## 2023-01-01 RX ADMIN — Medication 2: at 23:38

## 2023-01-01 RX ADMIN — PANTOPRAZOLE SODIUM 40 MILLIGRAM(S): 20 TABLET, DELAYED RELEASE ORAL at 11:22

## 2023-01-01 RX ADMIN — POLYETHYLENE GLYCOL 3350 17 GRAM(S): 17 POWDER, FOR SOLUTION ORAL at 18:18

## 2023-01-01 RX ADMIN — Medication 81 MILLIGRAM(S): at 14:34

## 2023-01-01 RX ADMIN — HEPARIN SODIUM 5000 UNIT(S): 5000 INJECTION INTRAVENOUS; SUBCUTANEOUS at 04:43

## 2023-01-01 RX ADMIN — CARVEDILOL PHOSPHATE 3.12 MILLIGRAM(S): 80 CAPSULE, EXTENDED RELEASE ORAL at 05:38

## 2023-01-01 RX ADMIN — ATORVASTATIN CALCIUM 20 MILLIGRAM(S): 80 TABLET, FILM COATED ORAL at 22:04

## 2023-01-01 RX ADMIN — CHLORHEXIDINE GLUCONATE 15 MILLILITER(S): 213 SOLUTION TOPICAL at 17:07

## 2023-01-01 RX ADMIN — Medication 1 DROP(S): at 14:03

## 2023-01-01 RX ADMIN — PHENYLEPHRINE HYDROCHLORIDE 2000 MICROGRAM(S): 10 INJECTION INTRAVENOUS at 23:54

## 2023-01-01 RX ADMIN — FENTANYL CITRATE 100 MICROGRAM(S): 50 INJECTION INTRAVENOUS at 16:40

## 2023-01-01 RX ADMIN — ATORVASTATIN CALCIUM 20 MILLIGRAM(S): 80 TABLET, FILM COATED ORAL at 20:45

## 2023-01-01 RX ADMIN — FENTANYL CITRATE 100 MICROGRAM(S): 50 INJECTION INTRAVENOUS at 14:00

## 2023-01-01 RX ADMIN — PANTOPRAZOLE SODIUM 40 MILLIGRAM(S): 20 TABLET, DELAYED RELEASE ORAL at 12:40

## 2023-01-01 RX ADMIN — SEVELAMER CARBONATE 800 MILLIGRAM(S): 2400 POWDER, FOR SUSPENSION ORAL at 09:05

## 2023-01-01 RX ADMIN — Medication 5 MILLIGRAM(S): at 14:12

## 2023-01-01 RX ADMIN — Medication 0.2 MILLIGRAM(S): at 22:23

## 2023-01-01 RX ADMIN — PIPERACILLIN AND TAZOBACTAM 25 GRAM(S): 4; .5 INJECTION, POWDER, LYOPHILIZED, FOR SOLUTION INTRAVENOUS at 13:52

## 2023-01-01 RX ADMIN — Medication 2 PUFF(S): at 05:45

## 2023-01-01 RX ADMIN — HEPARIN SODIUM 5000 UNIT(S): 5000 INJECTION INTRAVENOUS; SUBCUTANEOUS at 07:04

## 2023-01-01 RX ADMIN — Medication 1 APPLICATION(S): at 14:33

## 2023-01-01 RX ADMIN — Medication 2 PUFF(S): at 22:54

## 2023-01-01 RX ADMIN — Medication 80 MICROGRAM(S): at 11:56

## 2023-01-01 RX ADMIN — Medication 100 MILLIGRAM(S): at 05:21

## 2023-01-01 RX ADMIN — POLYETHYLENE GLYCOL 3350 17 GRAM(S): 17 POWDER, FOR SOLUTION ORAL at 13:05

## 2023-01-01 RX ADMIN — CHLORHEXIDINE GLUCONATE 1 APPLICATION(S): 213 SOLUTION TOPICAL at 12:30

## 2023-01-01 RX ADMIN — PROPOFOL 2.1 MICROGRAM(S)/KG/MIN: 10 INJECTION, EMULSION INTRAVENOUS at 19:46

## 2023-01-01 RX ADMIN — ATORVASTATIN CALCIUM 20 MILLIGRAM(S): 80 TABLET, FILM COATED ORAL at 21:18

## 2023-01-01 RX ADMIN — ALBUTEROL 4 PUFF(S): 90 AEROSOL, METERED ORAL at 23:40

## 2023-01-01 RX ADMIN — ALBUTEROL 4 PUFF(S): 90 AEROSOL, METERED ORAL at 04:32

## 2023-01-01 RX ADMIN — Medication 1 APPLICATION(S): at 18:09

## 2023-01-01 RX ADMIN — Medication 10 MILLIGRAM(S): at 14:36

## 2023-01-01 RX ADMIN — ALBUTEROL 4 PUFF(S): 90 AEROSOL, METERED ORAL at 20:00

## 2023-01-01 RX ADMIN — Medication 100 MILLIGRAM(S): at 13:53

## 2023-01-01 RX ADMIN — HEPARIN SODIUM 1100 UNIT(S)/HR: 5000 INJECTION INTRAVENOUS; SUBCUTANEOUS at 03:21

## 2023-01-01 RX ADMIN — Medication 2 PUFF(S): at 04:32

## 2023-01-01 RX ADMIN — ALBUTEROL 4 PUFF(S): 90 AEROSOL, METERED ORAL at 22:42

## 2023-01-01 RX ADMIN — Medication 3 MILLIGRAM(S): at 21:48

## 2023-01-01 RX ADMIN — AMLODIPINE BESYLATE 10 MILLIGRAM(S): 2.5 TABLET ORAL at 06:20

## 2023-01-01 RX ADMIN — HEPARIN SODIUM 5000 UNIT(S): 5000 INJECTION INTRAVENOUS; SUBCUTANEOUS at 05:49

## 2023-01-01 RX ADMIN — Medication 1 APPLICATION(S): at 14:51

## 2023-01-01 RX ADMIN — Medication 10 MILLIGRAM(S): at 19:51

## 2023-01-01 RX ADMIN — Medication 10 MILLIGRAM(S): at 12:06

## 2023-01-01 RX ADMIN — ALBUTEROL 4 PUFF(S): 90 AEROSOL, METERED ORAL at 09:06

## 2023-01-01 RX ADMIN — Medication 1 APPLICATION(S): at 17:44

## 2023-01-01 RX ADMIN — HYDROMORPHONE HYDROCHLORIDE 0.5 MILLIGRAM(S): 2 INJECTION INTRAMUSCULAR; INTRAVENOUS; SUBCUTANEOUS at 15:52

## 2023-01-01 RX ADMIN — Medication 2 PUFF(S): at 09:38

## 2023-01-01 RX ADMIN — Medication 10 MILLIGRAM(S): at 06:43

## 2023-01-01 RX ADMIN — Medication 81 MILLIGRAM(S): at 11:00

## 2023-01-01 RX ADMIN — Medication 1 DROP(S): at 17:09

## 2023-01-01 RX ADMIN — Medication 3: at 18:05

## 2023-01-01 RX ADMIN — Medication 1 APPLICATION(S): at 18:57

## 2023-01-01 RX ADMIN — POLYETHYLENE GLYCOL 3350 17 GRAM(S): 17 POWDER, FOR SOLUTION ORAL at 12:03

## 2023-01-01 RX ADMIN — HEPARIN SODIUM 5000 UNIT(S): 5000 INJECTION INTRAVENOUS; SUBCUTANEOUS at 05:37

## 2023-01-01 RX ADMIN — CHLORHEXIDINE GLUCONATE 15 MILLILITER(S): 213 SOLUTION TOPICAL at 17:36

## 2023-01-01 RX ADMIN — Medication 1 DROP(S): at 17:49

## 2023-01-01 RX ADMIN — Medication 100 MILLIGRAM(S): at 18:32

## 2023-01-01 RX ADMIN — HEPARIN SODIUM 5000 UNIT(S): 5000 INJECTION INTRAVENOUS; SUBCUTANEOUS at 16:20

## 2023-01-01 RX ADMIN — Medication 3 MILLILITER(S): at 07:42

## 2023-01-01 RX ADMIN — CHLORHEXIDINE GLUCONATE 1 APPLICATION(S): 213 SOLUTION TOPICAL at 12:05

## 2023-01-01 RX ADMIN — ATORVASTATIN CALCIUM 20 MILLIGRAM(S): 80 TABLET, FILM COATED ORAL at 21:47

## 2023-01-01 RX ADMIN — Medication 1 APPLICATION(S): at 14:16

## 2023-01-01 RX ADMIN — PIPERACILLIN AND TAZOBACTAM 25 GRAM(S): 4; .5 INJECTION, POWDER, LYOPHILIZED, FOR SOLUTION INTRAVENOUS at 05:42

## 2023-01-01 RX ADMIN — Medication 500 MILLIGRAM(S): at 11:43

## 2023-01-01 RX ADMIN — ISOSORBIDE DINITRATE 20 MILLIGRAM(S): 5 TABLET ORAL at 05:09

## 2023-01-01 RX ADMIN — CHLORHEXIDINE GLUCONATE 1 APPLICATION(S): 213 SOLUTION TOPICAL at 23:26

## 2023-01-01 RX ADMIN — PIPERACILLIN AND TAZOBACTAM 25 GRAM(S): 4; .5 INJECTION, POWDER, LYOPHILIZED, FOR SOLUTION INTRAVENOUS at 05:05

## 2023-01-01 RX ADMIN — HYDROMORPHONE HYDROCHLORIDE 0.5 MILLIGRAM(S): 2 INJECTION INTRAMUSCULAR; INTRAVENOUS; SUBCUTANEOUS at 16:25

## 2023-01-01 RX ADMIN — CHLORHEXIDINE GLUCONATE 15 MILLILITER(S): 213 SOLUTION TOPICAL at 17:49

## 2023-01-01 RX ADMIN — HEPARIN SODIUM 5000 UNIT(S): 5000 INJECTION INTRAVENOUS; SUBCUTANEOUS at 21:50

## 2023-01-01 RX ADMIN — PANTOPRAZOLE SODIUM 40 MILLIGRAM(S): 20 TABLET, DELAYED RELEASE ORAL at 11:49

## 2023-01-01 RX ADMIN — CHLORHEXIDINE GLUCONATE 1 APPLICATION(S): 213 SOLUTION TOPICAL at 14:49

## 2023-01-01 RX ADMIN — CHLORHEXIDINE GLUCONATE 1 APPLICATION(S): 213 SOLUTION TOPICAL at 20:58

## 2023-01-01 RX ADMIN — HEPARIN SODIUM 5000 UNIT(S): 5000 INJECTION INTRAVENOUS; SUBCUTANEOUS at 22:47

## 2023-01-01 RX ADMIN — ALBUTEROL 4 PUFF(S): 90 AEROSOL, METERED ORAL at 16:05

## 2023-01-01 RX ADMIN — Medication 100 MILLIGRAM(S): at 18:07

## 2023-01-01 RX ADMIN — CHLORHEXIDINE GLUCONATE 1 APPLICATION(S): 213 SOLUTION TOPICAL at 06:29

## 2023-01-01 RX ADMIN — Medication 1 DROP(S): at 05:26

## 2023-01-01 RX ADMIN — ISOSORBIDE DINITRATE 10 MILLIGRAM(S): 5 TABLET ORAL at 13:19

## 2023-01-01 RX ADMIN — LOSARTAN POTASSIUM 100 MILLIGRAM(S): 100 TABLET, FILM COATED ORAL at 07:05

## 2023-01-01 RX ADMIN — FENTANYL CITRATE 5.24 MICROGRAM(S)/KG/HR: 50 INJECTION INTRAVENOUS at 10:40

## 2023-01-01 RX ADMIN — CHLORHEXIDINE GLUCONATE 15 MILLILITER(S): 213 SOLUTION TOPICAL at 06:28

## 2023-01-01 RX ADMIN — Medication 81 MILLIGRAM(S): at 11:43

## 2023-01-01 RX ADMIN — ALBUTEROL 4 PUFF(S): 90 AEROSOL, METERED ORAL at 21:13

## 2023-01-01 RX ADMIN — Medication 1 DROP(S): at 10:10

## 2023-01-01 RX ADMIN — SEVELAMER CARBONATE 800 MILLIGRAM(S): 2400 POWDER, FOR SUSPENSION ORAL at 18:07

## 2023-01-01 RX ADMIN — PROPOFOL 2.1 MICROGRAM(S)/KG/MIN: 10 INJECTION, EMULSION INTRAVENOUS at 06:55

## 2023-01-01 RX ADMIN — CARVEDILOL PHOSPHATE 3.12 MILLIGRAM(S): 80 CAPSULE, EXTENDED RELEASE ORAL at 18:27

## 2023-01-01 RX ADMIN — Medication 1 DROP(S): at 21:05

## 2023-01-01 RX ADMIN — SEVELAMER CARBONATE 800 MILLIGRAM(S): 2400 POWDER, FOR SUSPENSION ORAL at 06:30

## 2023-01-01 RX ADMIN — Medication 0.2 MILLIGRAM(S): at 21:31

## 2023-01-01 RX ADMIN — PANTOPRAZOLE SODIUM 40 MILLIGRAM(S): 20 TABLET, DELAYED RELEASE ORAL at 17:40

## 2023-01-01 RX ADMIN — CHLORHEXIDINE GLUCONATE 15 MILLILITER(S): 213 SOLUTION TOPICAL at 05:09

## 2023-01-01 RX ADMIN — NICARDIPINE HYDROCHLORIDE 20 MILLIGRAM(S): 30 CAPSULE, EXTENDED RELEASE ORAL at 16:52

## 2023-01-01 RX ADMIN — Medication 1000 MILLIGRAM(S): at 21:00

## 2023-01-01 RX ADMIN — CARVEDILOL PHOSPHATE 3.12 MILLIGRAM(S): 80 CAPSULE, EXTENDED RELEASE ORAL at 05:55

## 2023-01-01 RX ADMIN — NICARDIPINE HYDROCHLORIDE 20 MILLIGRAM(S): 30 CAPSULE, EXTENDED RELEASE ORAL at 02:12

## 2023-01-01 RX ADMIN — PANTOPRAZOLE SODIUM 40 MILLIGRAM(S): 20 TABLET, DELAYED RELEASE ORAL at 13:18

## 2023-01-01 RX ADMIN — Medication 1 DROP(S): at 05:02

## 2023-01-01 RX ADMIN — Medication 1 DROP(S): at 18:13

## 2023-01-01 RX ADMIN — NICARDIPINE HYDROCHLORIDE 20 MILLIGRAM(S): 30 CAPSULE, EXTENDED RELEASE ORAL at 01:37

## 2023-01-01 RX ADMIN — Medication 1 APPLICATION(S): at 23:30

## 2023-01-01 RX ADMIN — HEPARIN SODIUM 5000 UNIT(S): 5000 INJECTION INTRAVENOUS; SUBCUTANEOUS at 05:12

## 2023-01-01 RX ADMIN — PIPERACILLIN AND TAZOBACTAM 25 GRAM(S): 4; .5 INJECTION, POWDER, LYOPHILIZED, FOR SOLUTION INTRAVENOUS at 12:10

## 2023-01-01 RX ADMIN — Medication 0.2 MILLIGRAM(S): at 07:07

## 2023-01-01 RX ADMIN — Medication 100 MILLIGRAM(S): at 21:36

## 2023-01-01 RX ADMIN — Medication 50 MILLIEQUIVALENT(S): at 02:24

## 2023-01-01 RX ADMIN — Medication 0.2 MILLIGRAM(S): at 21:47

## 2023-01-01 RX ADMIN — ISOSORBIDE DINITRATE 10 MILLIGRAM(S): 5 TABLET ORAL at 14:23

## 2023-01-01 RX ADMIN — POLYETHYLENE GLYCOL 3350 17 GRAM(S): 17 POWDER, FOR SOLUTION ORAL at 07:05

## 2023-01-01 RX ADMIN — Medication 2 PUFF(S): at 04:30

## 2023-01-01 RX ADMIN — Medication 2 PUFF(S): at 09:06

## 2023-01-01 RX ADMIN — PANTOPRAZOLE SODIUM 40 MILLIGRAM(S): 20 TABLET, DELAYED RELEASE ORAL at 12:44

## 2023-01-01 RX ADMIN — Medication 2 PUFF(S): at 07:32

## 2023-01-01 RX ADMIN — Medication 2 PUFF(S): at 16:21

## 2023-01-01 RX ADMIN — Medication 2 PUFF(S): at 23:40

## 2023-01-01 RX ADMIN — HEPARIN SODIUM 5000 UNIT(S): 5000 INJECTION INTRAVENOUS; SUBCUTANEOUS at 20:45

## 2023-01-01 RX ADMIN — PIPERACILLIN AND TAZOBACTAM 25 GRAM(S): 4; .5 INJECTION, POWDER, LYOPHILIZED, FOR SOLUTION INTRAVENOUS at 12:39

## 2023-01-01 RX ADMIN — Medication 1 DROP(S): at 05:10

## 2023-01-01 RX ADMIN — Medication 6 MILLIGRAM(S): at 05:22

## 2023-01-01 RX ADMIN — Medication 0.1 MILLIGRAM(S): at 09:49

## 2023-01-01 RX ADMIN — HEPARIN SODIUM 5000 UNIT(S): 5000 INJECTION INTRAVENOUS; SUBCUTANEOUS at 05:07

## 2023-01-01 RX ADMIN — FENTANYL CITRATE 25 MICROGRAM(S): 50 INJECTION INTRAVENOUS at 18:55

## 2023-01-01 RX ADMIN — Medication 20 MILLIEQUIVALENT(S): at 06:05

## 2023-01-01 RX ADMIN — NICARDIPINE HYDROCHLORIDE 20 MILLIGRAM(S): 30 CAPSULE, EXTENDED RELEASE ORAL at 10:13

## 2023-01-01 RX ADMIN — Medication 1 APPLICATION(S): at 06:30

## 2023-01-01 RX ADMIN — Medication 81 MILLIGRAM(S): at 11:49

## 2023-01-01 RX ADMIN — CHLORHEXIDINE GLUCONATE 15 MILLILITER(S): 213 SOLUTION TOPICAL at 05:28

## 2023-01-01 RX ADMIN — Medication 5 MILLIGRAM(S): at 06:32

## 2023-01-01 RX ADMIN — PIPERACILLIN AND TAZOBACTAM 200 GRAM(S): 4; .5 INJECTION, POWDER, LYOPHILIZED, FOR SOLUTION INTRAVENOUS at 18:06

## 2023-01-22 NOTE — PROGRESS NOTE ADULT - SUBJECTIVE AND OBJECTIVE BOX
Vascular Surgery Progress Note    S: Pt seen this AM. High BP in the evening hours (SBP ~190s) 2/2 patient refusing BP meds. Was given 10mg of hydralazine which helped. BP stable this AM even though pt refusing BP meds. States he will take them after EGD procedure.      O: Physical Exam:  T(C): 37.1 (09-15-17 @ 06:08), Max: 37.1 (09-14-17 @ 14:22)  HR: 64 (09-15-17 @ 06:08) (60 - 66)  BP: 141/68 (09-15-17 @ 06:08) (134/55 - 195/73)  RR: 17 (09-15-17 @ 06:08) (16 - 20)  SpO2: 98% (09-15-17 @ 06:08) (97% - 99%)    09-14-17 @ 07:01  -  09-15-17 @ 07:00  --------------------------------------------------------  IN: 250 mL / OUT: 2425 mL / NET: -2175 mL        Gen: NAD   Resp: Non labored breathing  Cards: RRR -rgm  Abd: NT/ND   Ext: R foot amputation site/5th digit infected but covered. Currently CDI limited healing and granulation tissue remains       Labs: Vascular Surgery Progress Note    S: Pt seen this AM. High BP in the evening hours (SBP ~190s) 2/2 patient refusing BP meds. Was given 10mg of hydralazine which helped. BP stable this AM even though pt refusing BP meds. States he will take them after EGD procedure.      O: Physical Exam:  T(C): 37.1 (09-15-17 @ 06:08), Max: 37.1 (09-14-17 @ 14:22)  HR: 64 (09-15-17 @ 06:08) (60 - 66)  BP: 141/68 (09-15-17 @ 06:08) (134/55 - 195/73)  RR: 17 (09-15-17 @ 06:08) (16 - 20)  SpO2: 98% (09-15-17 @ 06:08) (97% - 99%)    09-14-17 @ 07:01  -  09-15-17 @ 07:00  --------------------------------------------------------  IN: 250 mL / OUT: 2425 mL / NET: -2175 mL        Gen: NAD   Resp: Non labored breathing  Cards: RRR -rgm  Abd: NT/ND   Ext: R foot amputation site/5th digit infected but covered. Currently CDI limited healing and granulation tissue remains       Labs:                        7.3    10.26 )-----------( 263      ( 15 Sep 2017 06:55 )             23.4     9-15    137  |  106  |  55<H>  ----------------------------<  175<H>  5.1   |  20<L>  |  3.00<H>    Ca    8.7      15 Sep 2017 06:55  Phos  4.3     09-15  Mg     2.1     09-15 FAMILY HISTORY:  FH: diabetes mellitus  FH: heart disease

## 2023-02-03 PROBLEM — N18.6 END STAGE KIDNEY DISEASE: Status: ACTIVE | Noted: 2019-05-04

## 2023-02-03 PROBLEM — N17.9 AKI (ACUTE KIDNEY INJURY): Status: ACTIVE | Noted: 2017-03-20

## 2023-02-03 PROBLEM — E11.42 DIABETIC PERIPHERAL NEUROPATHY: Status: ACTIVE | Noted: 2018-10-24

## 2023-02-03 PROBLEM — I65.23 BILATERAL CAROTID ARTERY STENOSIS: Status: ACTIVE | Noted: 2018-09-05

## 2023-02-03 PROBLEM — I10 ESSENTIAL HYPERTENSION: Status: ACTIVE | Noted: 2020-10-22

## 2023-02-03 PROBLEM — Z80.6 FAMILY HISTORY OF LEUKEMIA: Status: ACTIVE | Noted: 2017-08-29

## 2023-02-03 PROBLEM — I77.1 ARTERIAL INSUFFICIENCY: Status: RESOLVED | Noted: 2017-10-23 | Resolved: 2023-01-01

## 2023-02-03 PROBLEM — Z87.448 HISTORY OF RENAL INSUFFICIENCY SYNDROME: Status: RESOLVED | Noted: 2017-09-01 | Resolved: 2023-01-01

## 2023-02-03 PROBLEM — I77.1 ARTERIAL INSUFFICIENCY: Status: ACTIVE | Noted: 2017-11-08

## 2023-02-03 PROBLEM — N18.6 ESRD ON DIALYSIS: Status: ACTIVE | Noted: 2019-09-03

## 2023-02-03 PROBLEM — I70.0 CALCIFICATION OF ABDOMINAL AORTA: Status: ACTIVE | Noted: 2021-01-19

## 2023-02-03 PROBLEM — I77.1 ARTERIAL INSUFFICIENCY WITH ISCHEMIC ULCER: Status: ACTIVE | Noted: 2017-11-08

## 2023-02-03 PROBLEM — E11.8 DIABETES MELLITUS WITH COMPLICATION: Status: ACTIVE | Noted: 2017-08-29

## 2023-02-03 NOTE — PHYSICAL EXAM
[Alert] : alert [No Respiratory Distress] : no respiratory distress [Heart Rate And Rhythm] : heart rate was normal and rhythm regular [Bowel Sounds] : normal bowel sounds [Abdomen Tenderness] : non-tender [Abdomen Soft] : soft [No Rectal Mass] : no rectal mass [Occult Blood] : positive occult blood [de-identified] : Patient is disheveled and pale. [de-identified] : There are bilateral basilar potation's [de-identified] : There is a grade 2/6 systolic murmur. [de-identified] : There is a large volume of soft stool in the rectal vault which is guaiac positive.

## 2023-02-03 NOTE — ASSESSMENT
[FreeTextEntry1] : The patient is a 57-year-old male with multiple significant comorbid conditions.  From the gastrointestinal perspective the patient does have chronic constipation and currently has a soft fecal impaction.  Decreased bowel movements seem to go back to at least 2017 when the patient had a colonoscopy.  The etiology is likely bowel hypomotility complicated by the fact that the patient is relatively immobile and is on fluid restriction.  Patient is also on several medications that could contribute to constipation.  Today's rectal exam does not reveal any colonic obstruction.  The patient is also nondistended.  I feel  it is safe to start the patient on a medication such as Linzess at a low dosage in the hopes of promoting bowel motility and preventing constipation.  This will be a difficult problem to resolve.  The patient is guaiac positive and apparently had a remote history of colon polyps.  He currently is not a candidate for any invasive evaluation.  The patient had a relatively recent MI and is a poor candidate for sedation.  It would be impossible for him to appropriately prepare for the exam in any event.  We will be conservative at the present time and the hope of providing  some improvement in his  quality of life.  The plan was discussed with the patient's aide and the patient requested that I call his father to give him an update

## 2023-02-03 NOTE — REVIEW OF SYSTEMS
[Fever] : no fever [Chills] : no chills [Feeling Poorly] : not feeling poorly [Feeling Tired] : not feeling tired [As Noted in HPI] : as noted in HPI [Chest Pain] : no chest pain [Palpitations] : no palpitations [Wheezing] : no wheezing [SOB on Exertion] : shortness of breath during exertion [Abdominal Pain] : no abdominal pain [Constipation] : constipation [Diarrhea] : no diarrhea [Heartburn] : no heartburn [Melena (black stool)] : no melena

## 2023-02-07 NOTE — PROGRESS NOTE ADULT - SUBJECTIVE AND OBJECTIVE BOX
Ludwin Echevarria MD  Internal Medicine PGY-2      INTERVAL HPI/OVERNIGHT EVENTS:    SUBJECTIVE: Patient seen and examined at bedside.     CONSTITUTIONAL: No weakness, fevers or chills  EYES/ENT: No visual changes;  No vertigo or throat pain   NECK: No pain or stiffness  RESPIRATORY: No cough, wheezing, hemoptysis; No shortness of breath  CARDIOVASCULAR: No chest pain or palpitations  GASTROINTESTINAL: No abdominal or epigastric pain. No nausea, vomiting, or hematemesis; No diarrhea or constipation. No melena or hematochezia.  GENITOURINARY: No dysuria, frequency or hematuria  NEUROLOGICAL: No numbness or weakness  SKIN: No itching, rashes    OBJECTIVE:    VITAL SIGNS:  ICU Vital Signs Last 24 Hrs  T(C): 32.7 (07 Feb 2023 06:20), Max: 32.7 (07 Feb 2023 06:20)  T(F): 90.8 (07 Feb 2023 06:20), Max: 90.8 (07 Feb 2023 06:20)  HR: 40 (07 Feb 2023 06:45) (40 - 90)  BP: 190/70 (07 Feb 2023 07:11) (89/43 - 190/70)  BP(mean): 76 (07 Feb 2023 06:39) (72 - 82)  ABP: --  ABP(mean): --  RR: 18 (07 Feb 2023 06:45) (16 - 19)  SpO2: 98% (07 Feb 2023 06:45) (90% - 98%)    O2 Parameters below as of 07 Feb 2023 06:45  Patient On (Oxygen Delivery Method): ventilator          Mode: AC/ CMV (Assist Control/ Continuous Mandatory Ventilation), RR (machine): 18, TV (machine): 450, FiO2: 100, PEEP: 5, ITime: 0.9, PIP: 37    CAPILLARY BLOOD GLUCOSE      POCT Blood Glucose.: 135 mg/dL (07 Feb 2023 03:05)      PHYSICAL EXAM:    General: NAD  HEENT: NC/AT; PERRL, clear conjunctiva  Neck: supple  Respiratory: CTA b/l  Cardiovascular: +S1/S2; RRR  Abdomen: soft, NT/ND; +BS x4  Extremities: WWP, 2+ peripheral pulses b/l; no LE edema  Skin: normal color and turgor; no rash  Neurological:    MEDICATIONS:  MEDICATIONS  (STANDING):  albuterol/ipratropium for Nebulization 3 milliLiter(s) Nebulizer every 6 hours  apixaban 2.5 milliGRAM(s) Oral every 12 hours  atorvastatin 20 milliGRAM(s) Oral at bedtime  chlorhexidine 0.12% Liquid 15 milliLiter(s) Oral Mucosa every 12 hours  fentaNYL   Infusion 0.5 MICROgram(s)/kG/Hr (3.5 mL/Hr) IV Continuous <Continuous>  norepinephrine Infusion 0.05 MICROgram(s)/kG/Min (6.56 mL/Hr) IV Continuous <Continuous>  piperacillin/tazobactam IVPB.. 3.375 Gram(s) IV Intermittent every 12 hours  propofol Infusion 5 MICROgram(s)/kG/Min (2.1 mL/Hr) IV Continuous <Continuous>  sevelamer carbonate 800 milliGRAM(s) Oral every 8 hours    MEDICATIONS  (PRN):      ALLERGIES:  Allergies    No Known Allergies    Intolerances        LABS:                        10.5   13.05 )-----------( 274      ( 07 Feb 2023 03:59 )             36.0     02-07    135  |  96<L>  |  67<H>  ----------------------------<  130<H>  5.6<H>   |  21<L>  |  3.88<H>    Ca    8.7      07 Feb 2023 03:59  Mg     2.40     02-07    TPro  7.6  /  Alb  3.6  /  TBili  1.1  /  DBili  x   /  AST  34  /  ALT  20  /  AlkPhos  830<H>  02-07    PT/INR - ( 07 Feb 2023 03:59 )   PT: 12.7 sec;   INR: 1.09 ratio         PTT - ( 07 Feb 2023 03:59 )  PTT:33.7 sec      RADIOLOGY & ADDITIONAL TESTS: Reviewed. Ludwin Echevarria MD  Internal Medicine PGY-2      INTERVAL HPI/OVERNIGHT EVENTS:    SUBJECTIVE: Patient seen and examined at bedside.     ROS unable to be obtained due to patient's intubation and sedation.    OBJECTIVE:    VITAL SIGNS:  ICU Vital Signs Last 24 Hrs  T(C): 32.7 (07 Feb 2023 06:20), Max: 32.7 (07 Feb 2023 06:20)  T(F): 90.8 (07 Feb 2023 06:20), Max: 90.8 (07 Feb 2023 06:20)  HR: 40 (07 Feb 2023 06:45) (40 - 90)  BP: 190/70 (07 Feb 2023 07:11) (89/43 - 190/70)  BP(mean): 76 (07 Feb 2023 06:39) (72 - 82)  ABP: --  ABP(mean): --  RR: 18 (07 Feb 2023 06:45) (16 - 19)  SpO2: 98% (07 Feb 2023 06:45) (90% - 98%)    O2 Parameters below as of 07 Feb 2023 06:45  Patient On (Oxygen Delivery Method): ventilator          Mode: AC/ CMV (Assist Control/ Continuous Mandatory Ventilation), RR (machine): 18, TV (machine): 450, FiO2: 100, PEEP: 5, ITime: 0.9, PIP: 37    CAPILLARY BLOOD GLUCOSE      POCT Blood Glucose.: 135 mg/dL (07 Feb 2023 03:05)      PHYSICAL EXAM:    General: Intubated and sedated, eyes open, unable to follow commands  HEENT: NC/AT; PERRL, clear conjunctiva  Neck: supple  Respiratory: mechanical ventilation  Cardiovascular: +S1/S2; irregular rate and rhythm  Abdomen: soft, NT/ND; +BS x4  Extremities: WWP, 2+ peripheral pulses b/l; no LE edema. L arm fistula thrills  Skin: normal color and turgor; no rash  Neurological: A&OX0    MEDICATIONS:  MEDICATIONS  (STANDING):  albuterol/ipratropium for Nebulization 3 milliLiter(s) Nebulizer every 6 hours  apixaban 2.5 milliGRAM(s) Oral every 12 hours  atorvastatin 20 milliGRAM(s) Oral at bedtime  chlorhexidine 0.12% Liquid 15 milliLiter(s) Oral Mucosa every 12 hours  fentaNYL   Infusion 0.5 MICROgram(s)/kG/Hr (3.5 mL/Hr) IV Continuous <Continuous>  norepinephrine Infusion 0.05 MICROgram(s)/kG/Min (6.56 mL/Hr) IV Continuous <Continuous>  piperacillin/tazobactam IVPB.. 3.375 Gram(s) IV Intermittent every 12 hours  propofol Infusion 5 MICROgram(s)/kG/Min (2.1 mL/Hr) IV Continuous <Continuous>  sevelamer carbonate 800 milliGRAM(s) Oral every 8 hours    MEDICATIONS  (PRN):      ALLERGIES:  Allergies    No Known Allergies    Intolerances        LABS:                        10.5   13.05 )-----------( 274      ( 07 Feb 2023 03:59 )             36.0     02-07    135  |  96<L>  |  67<H>  ----------------------------<  130<H>  5.6<H>   |  21<L>  |  3.88<H>    Ca    8.7      07 Feb 2023 03:59  Mg     2.40     02-07    TPro  7.6  /  Alb  3.6  /  TBili  1.1  /  DBili  x   /  AST  34  /  ALT  20  /  AlkPhos  830<H>  02-07    PT/INR - ( 07 Feb 2023 03:59 )   PT: 12.7 sec;   INR: 1.09 ratio         PTT - ( 07 Feb 2023 03:59 )  PTT:33.7 sec      RADIOLOGY & ADDITIONAL TESTS: Reviewed.

## 2023-02-07 NOTE — PATIENT PROFILE ADULT - FALL HARM RISK - RISK INTERVENTIONS
Monitor for mental status changes/Reorient to person, place and time as needed/Bed in lowest position, wheels locked, appropriate side rails in place/Physically safe environment - no spills, clutter or unnecessary equipment/Purposeful Proactive Rounding/Unable to comprehend

## 2023-02-07 NOTE — ED PROVIDER NOTE - OBJECTIVE STATEMENT
57-year-old male pmhx of hypertension, diabetes, bipolar disorder comes to ED w/ altered mental status.  Patient was found in nursing facility altered and was sent to the emergency department.. Their pain/symptom is moderate to severe, constant, non mediating with rest. Started randomly.

## 2023-02-07 NOTE — ED ADULT TRIAGE NOTE - CHIEF COMPLAINT QUOTE
awake responsive from [facility] as per staff is altered usually speaks well but was mumbling  Sat was 80% on R/A  95% on 6LPM  Blood Pressure 89/43 awake responsive lethargic from [facility] as per staff is altered usually speaks well but was mumbling  Sat was 80% on R/A  95% on 6LPM  Blood Pressure 89/43

## 2023-02-07 NOTE — CONSULT NOTE ADULT - SUBJECTIVE AND OBJECTIVE BOX
[facility] DIVISION OF KIDNEY DISEASES AND HYPERTENSION -- [phone]  -- INITIAL CONSULT NOTE  --------------------------------------------------------------------------------  HPI: 58 yo M with a PMH of ESRD on HD TIW (TTS), HTN, DM, Anemia who presented to [facility] from [facility] for AMS and hypotension. While in the ED, he had cardiac arrest and was coded with ROSC achieved He will be admitted to the MICU. Nephrology consulted for ESRD/HD management.     Pt. follows with Dr. Diaz as his outpatient Nephrologist. It is unclear as to when he last received HD as the patient cannot provide any history at this time.     Pt. was seen and examined in the ER. Pt. unable to provide history or ROS due to mental status. History obtained from chart review and provider hand-off.    PAST HISTORY  --------------------------------------------------------------------------------  PAST MEDICAL & SURGICAL HISTORY:  Diabetes mellitus  Patient does not routinely check FS--diet controlled  Depression  High cholesterol  Bipolar affective disorder in remission  Hypertension  Arterial insufficiency  Anemia  ESRD on dialysis  ORIF right lower leg- 1995  Amputated great toe of right foot  S/P arteriovenous (AV) fistula creation 7/2019    FAMILY HISTORY:  FH: type 2 diabetes mellitus (Mother)  FHx: heart disease (Father)    PAST SOCIAL HISTORY: Unable to obtain at this time    ALLERGIES & MEDICATIONS  --------------------------------------------------------------------------------  Allergies  No Known Allergies    Intolerances    Standing Inpatient Medications  albuterol/ipratropium for Nebulization 3 milliLiter(s) Nebulizer every 6 hours  apixaban 2.5 milliGRAM(s) Oral every 12 hours  atorvastatin 20 milliGRAM(s) Oral at bedtime  chlorhexidine 0.12% Liquid 15 milliLiter(s) Oral Mucosa every 12 hours  fentaNYL   Infusion 0.5 MICROgram(s)/kG/Hr IV Continuous <Continuous>  norepinephrine Infusion 0.05 MICROgram(s)/kG/Min IV Continuous <Continuous>  piperacillin/tazobactam IVPB.. 3.375 Gram(s) IV Intermittent every 12 hours  propofol Infusion 5 MICROgram(s)/kG/Min IV Continuous <Continuous>  sevelamer carbonate 800 milliGRAM(s) Oral every 8 hours    PRN Inpatient Medications    REVIEW OF SYSTEMS  --------------------------------------------------------------------------------  Unable to obtain ROS due to current medical condition.     VITALS/PHYSICAL EXAM  --------------------------------------------------------------------------------  T(C): 32.7 (02-07-23 @ 06:20), Max: 32.7 (02-07-23 @ 06:20)  HR: 40 (02-07-23 @ 06:45) (40 - 90)  BP: 190/70 (02-07-23 @ 07:11) (89/43 - 190/70)  RR: 18 (02-07-23 @ 06:45) (16 - 19)  SpO2: 98% (02-07-23 @ 06:45) (90% - 98%)  Wt(kg): --    Weight (kg): 85 (02-07-23 @ 06:20)    Physical Exam:  	Gen: Intubated, sedated   	HEENT: Anicteric  	Pulm: Mechanical breath sounds bilaterally   	CV: S1S2+  	Abd: Soft, Diminished bowel sounds    	Ext: No LE edema B/L. R toe amputation noted  	Neuro: Sedated, not following commands               : Loza in place with no urine noted  	Skin: Warm and dry  	Dialysis access: LUE AVF with thrill and bruit    LABS/STUDIES  --------------------------------------------------------------------------------              10.5   13.05 >-----------<  274      [02-07-23 @ 03:59]              36.0     135  |  96  |  67  ----------------------------<  130      [02-07-23 @ 03:59]  5.6   |  21  |  3.88        Ca     8.7     [02-07-23 @ 03:59]      Mg     2.40     [02-07-23 @ 03:59]    TPro  7.6  /  Alb  3.6  /  TBili  1.1  /  DBili  x   /  AST  34  /  ALT  20  /  AlkPhos  830  [02-07-23 @ 03:59]    PT/INR: PT 12.7 , INR 1.09       [02-07-23 @ 03:59]  PTT: 33.7       [02-07-23 @ 03:59]    Creatinine Trend:  SCr 3.88 [02-07 @ 03:59] [facility] DIVISION OF KIDNEY DISEASES AND HYPERTENSION -- [phone]  -- INITIAL CONSULT NOTE  --------------------------------------------------------------------------------  HPI: 58 yo M with a PMH of ESRD on HD TIW (TTS), HTN, DM, Anemia who presented to [facility] from [facility] for AMS and hypotension. While in the ED, he had cardiac arrest and was coded with ROSC achieved and was admitted to the MICU. Nephrology consulted for ESRD/HD management.     Pt. follows with Dr. Diaz as his outpatient Nephrologist. He was receiving HD at [facility] with last treatment on 2/4/23 via LUE AVF. Primary team is evaluating cause of his symptoms leading to hospitalization.     Pt. was seen and examined in the ER. Pt. unable to provide history or ROS due to mental status. History obtained from chart review and provider hand-off.    PAST HISTORY  --------------------------------------------------------------------------------  PAST MEDICAL & SURGICAL HISTORY:  Diabetes mellitus  Patient does not routinely check FS--diet controlled  Depression  High cholesterol  Bipolar affective disorder in remission  Hypertension  Arterial insufficiency  Anemia  ESRD on dialysis  ORIF right lower leg- 1995  Amputated great toe of right foot  S/P arteriovenous (AV) fistula creation 7/2019    FAMILY HISTORY:  FH: type 2 diabetes mellitus (Mother)  FHx: heart disease (Father)    PAST SOCIAL HISTORY: Unable to obtain at this time    ALLERGIES & MEDICATIONS  --------------------------------------------------------------------------------  Allergies  No Known Allergies    Intolerances    Standing Inpatient Medications  albuterol/ipratropium for Nebulization 3 milliLiter(s) Nebulizer every 6 hours  apixaban 2.5 milliGRAM(s) Oral every 12 hours  atorvastatin 20 milliGRAM(s) Oral at bedtime  chlorhexidine 0.12% Liquid 15 milliLiter(s) Oral Mucosa every 12 hours  fentaNYL   Infusion 0.5 MICROgram(s)/kG/Hr IV Continuous <Continuous>  norepinephrine Infusion 0.05 MICROgram(s)/kG/Min IV Continuous <Continuous>  piperacillin/tazobactam IVPB.. 3.375 Gram(s) IV Intermittent every 12 hours  propofol Infusion 5 MICROgram(s)/kG/Min IV Continuous <Continuous>  sevelamer carbonate 800 milliGRAM(s) Oral every 8 hours    PRN Inpatient Medications    REVIEW OF SYSTEMS  --------------------------------------------------------------------------------  Unable to obtain ROS due to current medical condition.     VITALS/PHYSICAL EXAM  --------------------------------------------------------------------------------  T(C): 32.7 (02-07-23 @ 06:20), Max: 32.7 (02-07-23 @ 06:20)  HR: 40 (02-07-23 @ 06:45) (40 - 90)  BP: 190/70 (02-07-23 @ 07:11) (89/43 - 190/70)  RR: 18 (02-07-23 @ 06:45) (16 - 19)  SpO2: 98% (02-07-23 @ 06:45) (90% - 98%)  Wt(kg): --    Weight (kg): 85 (02-07-23 @ 06:20)    Physical Exam:  	Gen: Intubated, sedated   	HEENT: Anicteric  	Pulm: Mechanical breath sounds bilaterally   	CV: S1S2+  	Abd: Soft, Diminished bowel sounds    	Ext: No LE edema B/L. R toe amputation noted  	Neuro: Sedated, not following commands               : Loza in place with no urine noted  	Skin: Warm and dry  	Dialysis access: LUE AVF with palpable pulse and bruit heard    LABS/STUDIES  --------------------------------------------------------------------------------              10.5   13.05 >-----------<  274      [02-07-23 @ 03:59]              36.0     135  |  96  |  67  ----------------------------<  130      [02-07-23 @ 03:59]  5.6   |  21  |  3.88        Ca     8.7     [02-07-23 @ 03:59]      Mg     2.40     [02-07-23 @ 03:59]    TPro  7.6  /  Alb  3.6  /  TBili  1.1  /  DBili  x   /  AST  34  /  ALT  20  /  AlkPhos  830  [02-07-23 @ 03:59]    PT/INR: PT 12.7 , INR 1.09       [02-07-23 @ 03:59]  PTT: 33.7       [02-07-23 @ 03:59]    Creatinine Trend:  SCr 3.88 [02-07 @ 03:59] [facility] DIVISION OF KIDNEY DISEASES AND HYPERTENSION -- [phone]  -- INITIAL CONSULT NOTE  --------------------------------------------------------------------------------  HPI: 58 yo M with a PMH of ESRD on HD TIW (TTS), HTN, DM, Anemia who presented to [facility] from [facility] for AMS and hypotension. While in the ED, he had cardiac arrest and was coded with ROSC achieved and was admitted to the MICU. Nephrology consulted for ESRD/HD management.     Pt. follows with Dr. Diaz as his outpatient Nephrologist. He was receiving HD at [facility] with last treatment on 2/4/23 via LUE AVF. Primary team is evaluating cause of his symptoms leading to hospitalization.     Pt. was seen and examined in the ER. Pt. unable to provide history or ROS due to mental status. History obtained from chart review and provider hand-off.    PAST HISTORY  --------------------------------------------------------------------------------  PAST MEDICAL & SURGICAL HISTORY:  Diabetes mellitus  Patient does not routinely check FS--diet controlled  Depression  High cholesterol  Bipolar affective disorder in remission  Hypertension  Arterial insufficiency  Anemia  ESRD on dialysis  ORIF right lower leg- 1995  Amputated great toe of right foot  S/P arteriovenous (AV) fistula creation 7/2019    FAMILY HISTORY:  FH: type 2 diabetes mellitus (Mother)  FHx: heart disease (Father)    PAST SOCIAL HISTORY: Unable to obtain at this time    ALLERGIES & MEDICATIONS  --------------------------------------------------------------------------------  Allergies  No Known Allergies    Intolerances    Standing Inpatient Medications  albuterol/ipratropium for Nebulization 3 milliLiter(s) Nebulizer every 6 hours  apixaban 2.5 milliGRAM(s) Oral every 12 hours  atorvastatin 20 milliGRAM(s) Oral at bedtime  chlorhexidine 0.12% Liquid 15 milliLiter(s) Oral Mucosa every 12 hours  fentaNYL   Infusion 0.5 MICROgram(s)/kG/Hr IV Continuous <Continuous>  norepinephrine Infusion 0.05 MICROgram(s)/kG/Min IV Continuous <Continuous>  piperacillin/tazobactam IVPB.. 3.375 Gram(s) IV Intermittent every 12 hours  propofol Infusion 5 MICROgram(s)/kG/Min IV Continuous <Continuous>  sevelamer carbonate 800 milliGRAM(s) Oral every 8 hours    PRN Inpatient Medications    REVIEW OF SYSTEMS  --------------------------------------------------------------------------------  Unable to obtain ROS due to current medical condition.     VITALS/PHYSICAL EXAM  --------------------------------------------------------------------------------  T(C): 32.7 (02-07-23 @ 06:20), Max: 32.7 (02-07-23 @ 06:20)  HR: 40 (02-07-23 @ 06:45) (40 - 90)  BP: 190/70 (02-07-23 @ 07:11) (89/43 - 190/70)  RR: 18 (02-07-23 @ 06:45) (16 - 19)  SpO2: 98% (02-07-23 @ 06:45) (90% - 98%)  Wt(kg): --    Weight (kg): 85 (02-07-23 @ 06:20)    Physical Exam:  	Gen: Intubated, sedated   	HEENT: Anicteric  	Pulm: Mechanical breath sounds bilaterally   	CV: S1S2+  	Abd: Soft, Diminished bowel sounds    	Ext: No LE edema B/L, R toe amputation seen  	Neuro: Sedated, not following commands               : Loza in place with no urine noted  	Skin: Warm and dry  	Dialysis access: LUE AVF with palpable pulse and bruit heard    LABS/STUDIES  --------------------------------------------------------------------------------              10.5   13.05 >-----------<  274      [02-07-23 @ 03:59]              36.0     135  |  96  |  67  ----------------------------<  130      [02-07-23 @ 03:59]  5.6   |  21  |  3.88        Ca     8.7     [02-07-23 @ 03:59]      Mg     2.40     [02-07-23 @ 03:59]    TPro  7.6  /  Alb  3.6  /  TBili  1.1  /  DBili  x   /  AST  34  /  ALT  20  /  AlkPhos  830  [02-07-23 @ 03:59]    Creatinine Trend:  SCr 3.88 [02-07 @ 03:59]

## 2023-02-07 NOTE — H&P ADULT - ATTENDING COMMENTS
pt is a 58 yo male with hx htn, hld, CAd, bipolar disorder,   esrd on hemodialysis, baseline a&o 1-2 presents with  lethargy and hypotension. Pt noted to be hypoxic in the   er and intubated for acute hypoxemic respiratory failure  pt had cpr < 5 minutes.  Placed on epinephrine,   PE bp 180/76 rr 18 heent neck supple, lungs dec breath  sounds on the left, heart s1s2 irregular,   abdomen tense ext no edema, neuro sedated    l;abs  k 5.6  bicarb 21 cr 3.88  wbc 13  hgb 10 hct 36   cxr left lung opacified    A/P  58 yo male with hx acute hypoxemic respiratory failure,  left lung atelectasis, poor mental status , r/o sepsis, shock  aspiration,  -panculture, blood, urine  -start vanco zosyn, check sputum c& s.  -keep sedated on fentanyl  -hemodialysis as tolerated  -check cortisol, tsh  -pulmonary toilet, bronchodilator, chest pt,  may need bronch  -check echo to evaluate lv function  -dvt prophylaxis  -cobos catheter, check urine output  -ct scan head, chest, abd, pelvis,  critically ill with cardiac arrest, likely septic shock

## 2023-02-07 NOTE — ED ADULT NURSE REASSESSMENT NOTE - NS ED NURSE REASSESS COMMENT FT1
Bao RN: called to bedside post ROSC. pt intubated with ETT size 7.5 , 23cm at lip. Respiratory, ED team at bedside. pt with right femoral central line and 20G rt forearm x2 + blood return no difficulty flushing. Infusions started as ordered. Pt pending CT and Micu bed assignment. Bao RN: called to bedside post ROSC. pt intubated with ETT size 7.5 , 23cm at lip. Respiratory, ED team at bedside. pt with right femoral central line and 20G rt forearm x2 + blood return no difficulty flushing. Infusions started as ordered. 14Fr cobos catheter in place with no urine output noted. Pt pending CT and Micu bed assignment.

## 2023-02-07 NOTE — H&P ADULT - NSHPPHYSICALEXAM_GEN_ALL_CORE
VITALS:   T(C): --  HR: 90 (02-07-23 @ 04:33) (53 - 90)  BP: 125/87 (02-07-23 @ 04:33) (89/43 - 125/87)  RR: 19 (02-07-23 @ 04:33) (16 - 19)  SpO2: 98% (02-07-23 @ 04:48) (90% - 98%)    GENERAL: sedated and intubated, eyes open, not following commands  HEAD:  Atraumatic, normocephalic  EYES: EOMI, PERRLA, conjunctiva and sclera clear  ENT: Moist mucous membranes  NECK: Supple, no JVD  HEART: irregularly irregular  LUNGS: on MV  ABDOMEN: distended, non-tender to palpation  EXTREMITIES: L arm fistula thrills  NERVOUS SYSTEM:  A&Ox0  SKIN: No rashes or lesions

## 2023-02-07 NOTE — ED PROCEDURE NOTE - CPROC ED INDICATIONS1
Intubated patient - gastric decompression
airway protection/cardiac arrest/critical patient/respiratory failure
emergency venous access

## 2023-02-07 NOTE — ED PROVIDER NOTE - CLINICAL SUMMARY MEDICAL DECISION MAKING FREE TEXT BOX
Impression: 57-year-old male pmhx of hypertension, diabetes, bipolar disorder comes to ED w/ altered mental status.  Their symptoms and exam findings of the cardiac rate, weak peripheral pulses, tenderness to palpation of the right lower quadrant are concerning for sepsis, intra-abdominal pathology.   Plan to resuscitate with fluids, pressors, will have MICU evaluate.    Ordered labs, imaging, medications for diagnosis, management, and treatment.

## 2023-02-07 NOTE — CONSULT NOTE ADULT - PROBLEM SELECTOR RECOMMENDATION 2
Pt. with mild hyperkalemia in setting of ESRD. Serum potassium upon admission was 5.6. He will undergo HD as above. Low potassium diet advised (when appropriate). Monitor serum potassium Pt. with mild hyperkalemia in setting of ESRD. Serum potassium upon admission was elevated at 5.6. Plan for HD as above. Monitor serum potassium.

## 2023-02-07 NOTE — H&P ADULT - NSHPLABSRESULTS_GEN_ALL_CORE
02-07    135  |  96<L>  |  67<H>  ----------------------------<  130<H>  5.6<H>   |  21<L>  |  3.88<H>    Ca    8.7      07 Feb 2023 03:59  Mg     2.40     02-07    TPro  7.6  /  Alb  3.6  /  TBili  1.1  /  DBili  x   /  AST  34  /  ALT  20  /  AlkPhos  830<H>  02-07      PT/INR - ( 07 Feb 2023 03:59 )   PT: 12.7 sec;   INR: 1.09 ratio         PTT - ( 07 Feb 2023 03:59 )  PTT:33.7 sec                                        10.5   13.05 )-----------( 274      ( 07 Feb 2023 03:59 )             36.0     CAPILLARY BLOOD GLUCOSE      POCT Blood Glucose.: 135 mg/dL (07 Feb 2023 03:05)    Blood Gas Source Venous: Venous (02-07-23 @ 03:59)

## 2023-02-07 NOTE — CONSULT NOTE ADULT - ATTENDING COMMENTS
Pt. with ESRD on HD TIW. Assessment and plan for ESRD/HD and hyperkalemia as outlined above. Pt. seen and examined in MICU earlier. Pt. intubated. Labs reviewed. Plan for maintenance HD today. Monitor BP and labs.

## 2023-02-07 NOTE — CHART NOTE - NSCHARTNOTEFT_GEN_A_CORE
:  Case Soto Fellow  Mitul Parker Attending    INDICATION:    [x] Shock    [x] Acute Hypoxic Respiratory failure    [ ] Other:       PROCEDURE:    [x] LIMITED ECHO    [x] LIMITED CHEST    [ ] LIMITED ABDOMINAL    [ ] OTHER      FINDINGS:  Cardiac:   LV: Segmental Wall motion abnormality - Appears as if inferoseptal region is not moving well.   RV: Large RV size. RV size = LV Size, squeezing well.   Pericardium: No pericardial effusion.    IVC is large    Pulmonary:   Right: A lines throughout. Moderate pleural Effusion without septations.   Left: Diaphragm is at the 3rd intercostal space with entirely atelectatic lung.    INTERPRETATION:  Segmental wall motion abnormality of the LV  Large RV   Moderate right pleural effusion   Left lung is entirely atelectatic.    Images stored on [employer] :  Case Soto Fellow  Mitul Parker Attending    INDICATION:    [x] Shock    [x] Acute Hypoxic Respiratory failure    [ ] Other:       PROCEDURE:    [x] LIMITED ECHO    [x] LIMITED CHEST    [ ] LIMITED ABDOMINAL    [ ] OTHER      FINDINGS:  Cardiac:   LV: Segmental Wall motion abnormality - Appears as if inferoseptal region is not moving well.   RV: Large RV size. RV size = LV Size, squeezing well.   Pericardium: No pericardial effusion.    IVC is large    Pulmonary:   Right: A lines throughout. Moderate pleural Effusion without septations.   Left: Diaphragm is at the 3rd intercostal space with entirely atelectatic lung.    INTERPRETATION:  Segmental wall motion abnormality of the LV  Large RV   Moderate right pleural effusion   Left lung is entirely atelectatic.    Images stored on [facility]    Attending Note: Agree with above. I was present through out the scan.

## 2023-02-07 NOTE — H&P ADULT - HISTORY OF PRESENT ILLNESS
57M PMH ESRD on HD 2/4/6, HTN, DM, bipolar, presented from [place] due to hypotension to 60/40s, desat to 80s, and lethargy, and then had cardiac arrest and was admitted to MICU after ROSC was achieved.     Hx is limited due to pt mental status and unable to reach family. Essentially, pt was more lethargic and had low BP when he was transferred to [facility]. When pt was at [facility] he was categorized as lethargic and was placed on aspiration risk precautions. While he was being transferred he also vomited.     As he was in the ED he had desats and therefore required oxygen NC. Received vanc and zosyn. VBG before code blue showed respiratory acidosis (pCO2 86, pO2 43, pH 7.1). CXR showed complete L lung atelectasis. EKG at admission showed Mobitz type 1 block. His HR then went to 30s and then pt had code blue. ROSC was achieved after CPR and epi.  57M PMH ESRD on HD 2/4/6, HTN, DM, bipolar, presented from [place] (after being discharged due to AHRF and septic shock from [place] on 1/14 and is on 2L while at [facility]) due to hypotension to 60/40s, desat to 80s, and lethargy, and then had cardiac arrest and was admitted to MICU after ROSC was achieved.     Per interaction w/ RN who took care of him, he is A&OX3 at his baseline and usually feeds himself and has no difficulty eating. However this AM he is more lethargic and was hypotensive. At his baseline he was using 2L O2 but his O2 sat dropped. Has had little cough but nothing overt. Then Dhruv decided to transfer him to [facility] and while he was being transferred he also vomited.    As he was in the ED he had desats and therefore required oxygen NC. Pt was found COVID +ve. Received vanc and zosyn. VBG before code blue showed respiratory acidosis (pCO2 86, pO2 43, pH 7.1). CXR showed complete L lung atelectasis. EKG at admission showed Mobitz type 1 block. His HR then went to 30s and then pt had code blue. ROSC was achieved after CPR and epi.

## 2023-02-07 NOTE — ED PROVIDER NOTE - CHIEF COMPLAINT
This is a chronic condition.  Patient followed by private neurologist.  Patient currently taking amitriptyline and Maxalt.  As needed.  Currently the patient denies headache change in vision motor weakness or paresthesia.   The patient is a 57y Male complaining of

## 2023-02-07 NOTE — ED ADULT NURSE NOTE - CHIEF COMPLAINT QUOTE
awake responsive lethargic from [facility] as per staff is altered usually speaks well but was mumbling  Sat was 80% on R/A  95% on 6LPM  Blood Pressure 89/43

## 2023-02-07 NOTE — ED PROVIDER NOTE - PROGRESS NOTE DETAILS
Zvi Dubin, MD note: pt yelena'd down after getting abx & amidst 1st L IVF. coded for <10mins w/ ROSC. crash L fem TLC, ETT, OG placed. /94, HR 89, Sp)2 100% w/RR 18 on vent. etCO2 52. Levo at bedside in case BP drops. fent drip for analgesia/sedation. MICU consulted. Zvi Dubin, MD note:   patient noted to be bradycardic to 40s.  VBG repeated, atropine administered, bicarb administered, low-dose levo infusion initiated.  Awaiting transport to medical ICU

## 2023-02-07 NOTE — CONSULT NOTE ADULT - PROBLEM SELECTOR RECOMMENDATION 9
Pt. with ESRD on HD TIW (TTS). Last HD unknown but he does get dialyzed via a LUE AVF. Labs reviewed. Pt. clinically stable. Will arrange for maintenance HD today. Monitor labs. Dose meds as per HD. Pt. with ESRD on HD TIW (TTS) who presented for AMS and hypotension, suffered cardiac arrest in the ED and was admitted to the MICU. Last HD on 2/4/23 via LUE AVF. Labs reviewed. Will arrange for HD today. Discussed with the patient's father (Nestor Lloyd [phone]) that he will require RRT/HD, risks and benefits associated with RRT/HD explained at length. HD consent obtained and kept in patients chart.  Monitor labs. Dose meds as per HD. Pt. with ESRD on HD TIW (TTS) who presented for AMS and hypotension, had cardiac arrest in the ED and was admitted to the MICU. Last HD on 2/4/23 via LUE AVF. Labs reviewed. Will arrange for HD today. Discussed with the patient's father (Nestor Lloyd [phone]) that he will require RRT/HD, risks and benefits associated with RRT/HD explained at length. HD consent obtained and kept in patients chart.  Monitor labs. Dose meds as per HD.

## 2023-02-07 NOTE — ED ADULT NURSE NOTE - OBJECTIVE STATEMENT
Float RN- arrives from [facility] for altered mental status and hypotension. SPo2 80% on RA. Respirations even and labored with equal chest rise bilaterally. Pt placed on 5L NC. NSR on cardiac monitor. Pt BP 80s/40s. Pt A&Ox1. 20G IV placed to right forearm and right wrist. MD at bedside. will continue to monitor.

## 2023-02-07 NOTE — ED PROCEDURE NOTE - ATTENDING CONTRIBUTION TO CARE
I was present for the critical portions of the procedure.  I was also immediately available for any and all other parts of the procedure
I was present for the critical portions of the procedure.  I was also immediately available for any and all other parts of the procedure
Tube passed by me on my first attempt after resident failed once. compressions were never held for intubation.

## 2023-02-07 NOTE — PROCEDURE NOTE - NSBRONCHFINDINGS_GEN_A_CORE_FT
FINDINGS:  Trachea:  Main uma:   Sharp  Left Mainstem Bronchus: Obstructed by large white/tan mucus clot  IRMA: Small amount of mucus  LLL: Small amount of mucus  Right Mainstem Bronchus: Small amount of mucus going from left mainstem into right mainstem  RUL: Clear  Rt bronchus intermedius: Clear  RML: Clear  RLL: Small amount of white mucus    Bronchial Wash of Left mainstem bronchus.

## 2023-02-07 NOTE — ED PROVIDER NOTE - PHYSICAL EXAMINATION
Statement Selected General: Altered, toxic appearing  HEENT: NCAT, PERRL  Cardiac: Bradycardic rate RR, no murmurs, weak peripheral pulses.  Low blood pressures.  Chest: Crackles on the lower base of the right side.  Dull breath sounds on left side.  Abdomen: Tenderness palpation of the right lower quadrant, no rebound or guarding.  Soft, non-distended, bowel sounds present.  Extremities: no peripheral edema, calf tenderness, or leg size discrepancies  Skin: no rashes  Neuro: Altered, not following instructions, sensory grossly intact  Psych: Altered mood

## 2023-02-07 NOTE — PROGRESS NOTE ADULT - ASSESSMENT
57M PMH ESRD on HD 2/4/6, HTN, DM, bipolar, presented from [place] due to hypotension to 60/40s, desat to 80s, and lethargy, and then had cardiac arrest and was admitted to MICU after ROSC was achieved.     # NEUROLOGY    - sedation: fentanyl and prop  - Hx of bipolar: takes zyprexa at home but will hold for now    # CARDIOLOGY    - pressor: levophed  - Hx of HTN, will hold all home meds for now  - Hx of A-Fib on eliquis  - f/u ECHO/POCUS. Pro-BNP elevated likely iso septic shock rather than HF    # PULMONARY    - ventilation status: on MV  - CXR showed L lung atelectasis, likely iso aspiration causing cardiac arrest  - asp PNA treatment: vanc and zosyn  - chest PT, neb  - COVID +, consider treating w/ remdesivir and dex    # GASTROINTESTINAL     - pt had vomiting w/ distended abdomen, unsure if has gastroenteritis  - f/u CT abdomen scan  - IV PPI    # RENAL/UROLOGY    - cobos: inserted in the ED  - Hx of ESRD T/Th/Sat w/ fistula  - can follow dialysis schedule  - s/p renal c/s    # INFECTIOUS DISEASE    - in septic shock likely 2/2 aspiration pneumonia vs mucus plug that causes L lung atelectasis and eventually cardiac arrest  - WBC: 13k  - on abx: vanc zosyn, will dose vanc by level  - on pressors, s/p 1L  - COVID: needs remdesivir and dexmethasone    # ENDOCRINOLOGY    - f/u cortisol AM level    # HEMATOLOGY    - DVT PPX: will be on lovenox due to COVID    # ETHICS    - FULL CODE       57M PMH ESRD on HD 2/4/6, HTN, DM, bipolar, presented from [place] due to hypotension to 60/40s, desat to 80s, and lethargy, and then had cardiac arrest and was admitted to MICU after ROSC was achieved.     # NEUROLOGY    - sedation: fentanyl and prop  - Hx of bipolar: takes zyprexa at home but will hold for now  - will attempt to wean fentanyl to assess mental status.    # CARDIOLOGY    - pressor: levophed  - Hx of HTN, will hold all home meds for now  - Hx of A-Fib on eliquis  - f/u ECHO/POCUS. Pro-BNP elevated likely iso septic shock rather than HF  - holding PO BP meds at this time    # PULMONARY    - ventilation status: on MV  - CXR showed L lung atelectasis, likely iso aspiration causing cardiac arrest  - asp PNA treatment: vanc and zosyn  - chest PT, neb  - COVID +, treat with IV dexamethasone, will hold remdesevir until liver function tests result  - plan for bronch today    # GASTROINTESTINAL     - pt had vomiting w/ distended abdomen, unsure if has gastroenteritis  - f/u CT abdomen scan  - IV PPI    # RENAL/UROLOGY    - cobos: inserted in the ED, removed as patient not making urine.  - Hx of ESRD T/Th/Sat w/ fistula  - can follow dialysis schedule  - s/p renal c/s    # INFECTIOUS DISEASE    - in septic shock likely 2/2 aspiration pneumonia vs mucus plug that causes L lung atelectasis and eventually cardiac arrest  - WBC: 13k  - on abx: vanc zosyn, will dose vanc by level  - on pressors, s/p 1L  - COVID: dexmethasone, will assess if remdesevir can be given.    # ENDOCRINOLOGY    - f/u cortisol AM level    # HEMATOLOGY    - DVT PPX: will be on lovenox due to COVID    # ETHICS    - FULL CODE

## 2023-02-07 NOTE — ED PROCEDURE NOTE - CPROC ED GASTRIC INTUB DETAIL1
The orogastric tube (see size above) was inserted via the anatomic location./Placement was confirmed by aspiration of gastric secretions.

## 2023-02-07 NOTE — ED PROVIDER NOTE - UNABLE TO OBTAIN
Patient does not consistently answer questions ROS difficult to determine. Urgent need for Intervention

## 2023-02-07 NOTE — PATIENT PROFILE ADULT - FALL HARM RISK - DEVICES
How Severe Is Your Skin Lesion?: moderate Has Your Skin Lesion Been Treated?: not been treated Is This A New Presentation, Or A Follow-Up?: Mole Which Family Member (Optional)?: Mother None

## 2023-02-07 NOTE — H&P ADULT - ASSESSMENT
57M PMH ESRD on HD 2/4/6, HTN, DM, bipolar, presented from [place] due to hypotension to 60/40s, desat to 80s, and lethargy, and then had cardiac arrest and was admitted to MICU after ROSC was achieved.     # NEUROLOGY    - sedation: fentanyl  - Hx of bipolar: takes zyprexa at home but will hold for now    # CARDIOLOGY    - pressor: levophed  - Hx of HTN, will hold all home meds for now  - Hx of A-Fib on eliquis    # PULMONARY    - ventilation status: on MV  - CXR showed L lung atelectasis, likely iso aspiration causing cardiac arrest  - asp PNA treatment: vanc and zosyn    # GASTROINTESTINAL     - pt had vomiting w/ distended abdomen, unsure if has gastroenteritis  - f/u CT abdomen scan  - IV PPI    # RENAL/UROLOGY    - cobos: inserted in the ED  - Hx of ESRD 2/4/6 w/ fistula  - can follow dialysis schedule    # INFECTIOUS DISEASE    - in septic shock likely 2/2 aspiration pneumonia vs mucus plug that causes L lung atelectasis and eventually cardiac arrest  - WBC: 13k  - on abx: vanc zosyn  - on pressors, s/p 1L    # ENDOCRINOLOGY    - no active issues    # HEMATOLOGY    - DVT PPX: eliquis 2.5 BID    # ETHICS    - FULL CODE       57M PMH ESRD on HD 2/4/6, HTN, DM, bipolar, presented from [place] due to hypotension to 60/40s, desat to 80s, and lethargy, and then had cardiac arrest and was admitted to MICU after ROSC was achieved.     # NEUROLOGY    - sedation: fentanyl and prop  - Hx of bipolar: takes zyprexa at home but will hold for now    # CARDIOLOGY    - pressor: levophed  - Hx of HTN, will hold all home meds for now  - Hx of A-Fib on eliquis  - f/u ECHO/POCUS. Pro-BNP elevated likely iso septic shock rather than HF    # PULMONARY    - ventilation status: on MV  - CXR showed L lung atelectasis, likely iso aspiration causing cardiac arrest  - asp PNA treatment: vanc and zosyn  - chest PT, neb  - COVID +, consider treating w/ remdesivir and dex    # GASTROINTESTINAL     - pt had vomiting w/ distended abdomen, unsure if has gastroenteritis  - f/u CT abdomen scan  - IV PPI    # RENAL/UROLOGY    - cobos: inserted in the ED  - Hx of ESRD 2/4/6 w/ fistula  - can follow dialysis schedule  - s/p renal c/s    # INFECTIOUS DISEASE    - in septic shock likely 2/2 aspiration pneumonia vs mucus plug that causes L lung atelectasis and eventually cardiac arrest  - WBC: 13k  - on abx: vanc zosyn  - on pressors, s/p 1L  - COVID: needs remdesivir and dexmethasone    # ENDOCRINOLOGY    - f/u cortisol AM level    # HEMATOLOGY    - DVT PPX: will be on lovenox due to COVID    # ETHICS    - FULL CODE

## 2023-02-07 NOTE — ED PROVIDER NOTE - CRITICAL CARE ATTENDING CONTRIBUTION TO CARE
critical pt     57-year-old male history of ESRD on dialysis, diabetes, hypertension, bipolar, now brought in by EMS from his doctor after being found lethargic which is unlike his usual verbal self also found to be hypoxic on room air which improved to 95 after oxygen and hypotensive to 89/50.  Patient brought directly to room.    VS: afebrile, yelena to 55, 95%  Gen: ill appearing  Head: NC/AT  ENT:   Neck: trachea midline  Resp:  +distress, decr L>R breath sounds  Ext: no deformities  Neuro:  A&O x1 (name)  Skin:  Warm and dry as visualized    MDM:   critical pt - doesn't appear to be fluid overoad but large pleural effusions vs infection vs electrolytes  plan for labs, O2 & bipap if needed, IVF then pressors, CT imaging, possible thora, possible MICU

## 2023-02-07 NOTE — PROCEDURE NOTE - NSBRONCHPROCDETAILS_GEN_A_CORE_FT
Bronchoscope inserted through the ETT     Large mucus plug in left mainstem bronchus aspirated    Therapeutic aspiration of secretions in bilateral airways.

## 2023-02-08 NOTE — PROGRESS NOTE ADULT - PROBLEM SELECTOR PLAN 1
Pt. with ESRD on HD TIW (TTS) who presented for AMS and hypotension, had cardiac arrest in the ED and was admitted to the MICU. Last HD on 2/7/23 via LUE AVF. Pt. remains intubated and sedated but appears clinically stable at this time. Labs reviewed. Monitor labs and BP. Dose meds as per HD. Pt. with ESRD on HD TIW (TTS) who presented for AMS and hypotension, had cardiac arrest in the ED and was admitted to the MICU. Pt. received HD yesterday (2/7/23) in MICU via LUE AVF. Pt. remains intubated and sedated but appears clinically stable at this time. Labs reviewed. Plan for HD tomorrow. Monitor labs and BP. Dose meds as per HD.

## 2023-02-08 NOTE — PROGRESS NOTE ADULT - ATTENDING COMMENTS
Pt. with ESRD on HD TIW. Assessment and plan for ESRD/HD and hyperkalemia as outlined above. Pt. seen and examined in MICU. Pt. intubated. Pt. received HD in MICU yesterday. Labs reviewed. Plan for maintenance HD tomorrow. Monitor BP and labs.

## 2023-02-08 NOTE — PROGRESS NOTE ADULT - PROBLEM SELECTOR PLAN 2
Pt. with mild hyperkalemia in setting of ESRD. Serum potassium within target range of 3.7 today. Monitor serum potassium. Hyperkalemia resolved. Serum potassium WNL at 3.7 today. Monitor serum potassium.

## 2023-02-08 NOTE — PROGRESS NOTE ADULT - SUBJECTIVE AND OBJECTIVE BOX
[facility] DIVISION OF KIDNEY DISEASES AND HYPERTENSION   FOLLOW UP NOTE  --------------------------------------------------------------------------------  HPI: 56 yo M with a PMH of ESRD on HD TIW (TTS), HTN, DM, Anemia who presented to [facility] from [facility] for AMS and hypotension. While in the ED, he had cardiac arrest and was coded with ROSC achieved and was admitted to the MICU. Nephrology following for ESRD/HD management.     24 hour events/subjective: Pt. was seen and examined today. His last HD was on 2/7/23 via LUE AVF. He remains intubated and sedated during rounds today. Per nursing, he was more responsive during the evening and plan is to wean off of sedation.     PAST HISTORY  --------------------------------------------------------------------------------  No significant changes to PMH, PSH, FHx, SHx, unless otherwise noted    ALLERGIES & MEDICATIONS  --------------------------------------------------------------------------------  Allergies  No Known Allergies    Standing Inpatient Medications  albuterol    90 MICROgram(s) HFA Inhaler 2 Puff(s) Inhalation every 6 hours  atorvastatin 20 milliGRAM(s) Oral at bedtime  chlorhexidine 0.12% Liquid 15 milliLiter(s) Oral Mucosa every 12 hours  chlorhexidine 2% Cloths 1 Application(s) Topical <User Schedule>  dexAMETHasone  Injectable 6 milliGRAM(s) IV Push daily  dextrose 5%. 1000 milliLiter(s) IV Continuous <Continuous>  dextrose 5%. 1000 milliLiter(s) IV Continuous <Continuous>  dextrose 50% Injectable 25 Gram(s) IV Push once  dextrose 50% Injectable 12.5 Gram(s) IV Push once  dextrose 50% Injectable 25 Gram(s) IV Push once  glucagon  Injectable 1 milliGRAM(s) IntraMuscular once  heparin   Injectable 5000 Unit(s) SubCutaneous every 8 hours  hydrALAZINE 100 milliGRAM(s) Oral every 8 hours  insulin lispro (ADMELOG) corrective regimen sliding scale   SubCutaneous every 6 hours  ipratropium 17 MICROgram(s) HFA Inhaler 2 Puff(s) Inhalation every 6 hours  norepinephrine Infusion 0.05 MICROgram(s)/kG/Min IV Continuous <Continuous>  pantoprazole  Injectable 40 milliGRAM(s) IV Push daily  piperacillin/tazobactam IVPB.. 3.375 Gram(s) IV Intermittent every 12 hours  propofol Infusion 5.547 MICROgram(s)/kG/Min IV Continuous <Continuous>  sevelamer carbonate Powder 800 milliGRAM(s) Enteral Tube three times a day with meals    PRN Inpatient Medications  dextrose Oral Gel 15 Gram(s) Oral once PRN  sodium chloride 0.9% Bolus 100 milliLiter(s) IV Bolus once PRN    REVIEW OF SYSTEMS  --------------------------------------------------------------------------------  Unable to obtain ROS due to current medical condition.     VITALS/PHYSICAL EXAM  --------------------------------------------------------------------------------  T(C): 37.6 (02-08-23 @ 04:00), Max: 37.8 (02-07-23 @ 20:00)  HR: 54 (02-08-23 @ 07:00) (51 - 68)  BP: 186/107 (02-08-23 @ 07:00) (89/75 - 189/77)  RR: 17 (02-08-23 @ 07:00) (15 - 22)  SpO2: 97% (02-08-23 @ 07:00) (89% - 100%)  Wt(kg): --  Height (cm): 175.3 (02-07-23 @ 08:05)  Weight (kg): 63.1 (02-07-23 @ 08:05)  BMI (kg/m2): 20.5 (02-07-23 @ 08:05)  BSA (m2): 1.77 (02-07-23 @ 08:05)    02-07-23 @ 07:01  -  02-08-23 @ 07:00  --------------------------------------------------------  IN: 847.4 mL / OUT: 1900 mL / NET: -1052.6 mL    Physical Exam:  	Gen: Intubated, sedated   	HEENT: Anicteric  	Pulm: Mechanical breath sounds bilaterally   	CV: S1S2+  	Abd: Soft, Diminished bowel sounds    	Ext: No LE edema B/L, R toe amputation seen  	Neuro: Sedated, not following commands               : Loza in place with no urine noted  	Skin: Warm and dry  	Dialysis access: LUE AVF with palpable pulse and bruit heard      LABS/STUDIES  --------------------------------------------------------------------------------              9.3    8.21  >-----------<  275      [02-08-23 @ 00:31]              29.6     134  |  95  |  38  ----------------------------<  74      [02-08-23 @ 00:31]  3.7   |  24  |  2.72        Ca     8.3     [02-08-23 @ 00:31]      Mg     1.90     [02-08-23 @ 00:31]      Phos  4.2     [02-08-23 @ 00:31]    TPro  6.4  /  Alb  2.9  /  TBili  0.9  /  DBili  x   /  AST  36  /  ALT  20  /  AlkPhos  686  [02-08-23 @ 00:31]    PT/INR: PT 14.1 , INR 1.21       [02-08-23 @ 00:31]  PTT: 31.0       [02-08-23 @ 00:31]    Creatinine Trend:  SCr 2.72 [02-08 @ 00:31]  SCr 3.83 [02-07 @ 10:32]  SCr 3.88 [02-07 @ 03:59] [facility] DIVISION OF KIDNEY DISEASES AND HYPERTENSION   FOLLOW UP NOTE  --------------------------------------------------------------------------------  HPI: 58 yo M with a PMH of ESRD on HD TIW (TTS), HTN, DM, Anemia who presented to [facility] from [facility] for AMS and hypotension. While in the ED, he had cardiac arrest and was coded with ROSC achieved and was admitted to the MICU. Nephrology following for ESRD/HD management.     24 hour events/subjective: Pt. was seen and examined today. His last HD was on 2/7/23 via LUE AVF. He remains intubated and sedated during rounds today. Per nursing, he was more responsive during the evening and plan is to wean off of sedation.     PAST HISTORY  --------------------------------------------------------------------------------  No significant changes to PMH, PSH, FHx, SHx, unless otherwise noted    ALLERGIES & MEDICATIONS  --------------------------------------------------------------------------------  Allergies  No Known Allergies    Standing Inpatient Medications  albuterol    90 MICROgram(s) HFA Inhaler 2 Puff(s) Inhalation every 6 hours  atorvastatin 20 milliGRAM(s) Oral at bedtime  chlorhexidine 0.12% Liquid 15 milliLiter(s) Oral Mucosa every 12 hours  chlorhexidine 2% Cloths 1 Application(s) Topical <User Schedule>  dexAMETHasone  Injectable 6 milliGRAM(s) IV Push daily  dextrose 5%. 1000 milliLiter(s) IV Continuous <Continuous>  dextrose 5%. 1000 milliLiter(s) IV Continuous <Continuous>  dextrose 50% Injectable 25 Gram(s) IV Push once  dextrose 50% Injectable 12.5 Gram(s) IV Push once  dextrose 50% Injectable 25 Gram(s) IV Push once  glucagon  Injectable 1 milliGRAM(s) IntraMuscular once  heparin   Injectable 5000 Unit(s) SubCutaneous every 8 hours  hydrALAZINE 100 milliGRAM(s) Oral every 8 hours  insulin lispro (ADMELOG) corrective regimen sliding scale   SubCutaneous every 6 hours  ipratropium 17 MICROgram(s) HFA Inhaler 2 Puff(s) Inhalation every 6 hours  norepinephrine Infusion 0.05 MICROgram(s)/kG/Min IV Continuous <Continuous>  pantoprazole  Injectable 40 milliGRAM(s) IV Push daily  piperacillin/tazobactam IVPB.. 3.375 Gram(s) IV Intermittent every 12 hours  propofol Infusion 5.547 MICROgram(s)/kG/Min IV Continuous <Continuous>  sevelamer carbonate Powder 800 milliGRAM(s) Enteral Tube three times a day with meals    PRN Inpatient Medications  dextrose Oral Gel 15 Gram(s) Oral once PRN  sodium chloride 0.9% Bolus 100 milliLiter(s) IV Bolus once PRN    REVIEW OF SYSTEMS  --------------------------------------------------------------------------------  Unable to obtain ROS due to current medical condition.     VITALS/PHYSICAL EXAM  --------------------------------------------------------------------------------  T(C): 37.6 (02-08-23 @ 04:00), Max: 37.8 (02-07-23 @ 20:00)  HR: 54 (02-08-23 @ 07:00) (51 - 68)  BP: 186/107 (02-08-23 @ 07:00) (89/75 - 189/77)  RR: 17 (02-08-23 @ 07:00) (15 - 22)  SpO2: 97% (02-08-23 @ 07:00) (89% - 100%)  Wt(kg): --  Height (cm): 175.3 (02-07-23 @ 08:05)  Weight (kg): 63.1 (02-07-23 @ 08:05)  BMI (kg/m2): 20.5 (02-07-23 @ 08:05)  BSA (m2): 1.77 (02-07-23 @ 08:05)    02-07-23 @ 07:01  -  02-08-23 @ 07:00  --------------------------------------------------------  IN: 847.4 mL / OUT: 1900 mL / NET: -1052.6 mL    Physical Exam:  	Gen: Intubated, sedated   	HEENT: Anicteric  	Pulm: Mechanical breath sounds bilaterally   	CV: S1S2+  	Abd: Soft, Diminished bowel sounds    	Ext: No LE edema B/L, R toe amputation seen  	Neuro: Sedated, not following commands               : Loza in place with no urine noted  	Skin: Warm and dry  	Dialysis access: LUE AVF with palpable pulse and bruit heard    LABS/STUDIES  --------------------------------------------------------------------------------              9.3    8.21  >-----------<  275      [02-08-23 @ 00:31]              29.6     134  |  95  |  38  ----------------------------<  74      [02-08-23 @ 00:31]  3.7   |  24  |  2.72        Ca     8.3     [02-08-23 @ 00:31]      Mg     1.90     [02-08-23 @ 00:31]      Phos  4.2     [02-08-23 @ 00:31]    TPro  6.4  /  Alb  2.9  /  TBili  0.9  /  DBili  x   /  AST  36  /  ALT  20  /  AlkPhos  686  [02-08-23 @ 00:31]    Creatinine Trend:  SCr 2.72 [02-08 @ 00:31]  SCr 3.83 [02-07 @ 10:32]  SCr 3.88 [02-07 @ 03:59]

## 2023-02-08 NOTE — PROGRESS NOTE ADULT - SUBJECTIVE AND OBJECTIVE BOX
Ludwin Echevarria MD  Internal Medicine PGY-2      INTERVAL HPI/OVERNIGHT EVENTS:    SUBJECTIVE: Patient seen and examined at bedside.     ROS unable to be obtained due to patient's intubation and sedation.    OBJECTIVE:    VITAL SIGNS:  ICU Vital Signs Last 24 Hrs  T(C): 32.7 (07 Feb 2023 06:20), Max: 32.7 (07 Feb 2023 06:20)  T(F): 90.8 (07 Feb 2023 06:20), Max: 90.8 (07 Feb 2023 06:20)  HR: 40 (07 Feb 2023 06:45) (40 - 90)  BP: 190/70 (07 Feb 2023 07:11) (89/43 - 190/70)  BP(mean): 76 (07 Feb 2023 06:39) (72 - 82)  ABP: --  ABP(mean): --  RR: 18 (07 Feb 2023 06:45) (16 - 19)  SpO2: 98% (07 Feb 2023 06:45) (90% - 98%)    O2 Parameters below as of 07 Feb 2023 06:45  Patient On (Oxygen Delivery Method): ventilator          Mode: AC/ CMV (Assist Control/ Continuous Mandatory Ventilation), RR (machine): 18, TV (machine): 450, FiO2: 100, PEEP: 5, ITime: 0.9, PIP: 37    CAPILLARY BLOOD GLUCOSE      POCT Blood Glucose.: 135 mg/dL (07 Feb 2023 03:05)      PHYSICAL EXAM:    General: Intubated and sedated, eyes open, unable to follow commands  HEENT: NC/AT; PERRL, clear conjunctiva  Neck: supple  Respiratory: mechanical ventilation  Cardiovascular: +S1/S2; irregular rate and rhythm  Abdomen: soft, NT/ND; +BS x4  Extremities: WWP, 2+ peripheral pulses b/l; no LE edema. L arm fistula thrills  Skin: normal color and turgor; no rash  Neurological: A&OX0    MEDICATIONS:  MEDICATIONS  (STANDING):  albuterol/ipratropium for Nebulization 3 milliLiter(s) Nebulizer every 6 hours  apixaban 2.5 milliGRAM(s) Oral every 12 hours  atorvastatin 20 milliGRAM(s) Oral at bedtime  chlorhexidine 0.12% Liquid 15 milliLiter(s) Oral Mucosa every 12 hours  fentaNYL   Infusion 0.5 MICROgram(s)/kG/Hr (3.5 mL/Hr) IV Continuous <Continuous>  norepinephrine Infusion 0.05 MICROgram(s)/kG/Min (6.56 mL/Hr) IV Continuous <Continuous>  piperacillin/tazobactam IVPB.. 3.375 Gram(s) IV Intermittent every 12 hours  propofol Infusion 5 MICROgram(s)/kG/Min (2.1 mL/Hr) IV Continuous <Continuous>  sevelamer carbonate 800 milliGRAM(s) Oral every 8 hours    MEDICATIONS  (PRN):      ALLERGIES:  Allergies    No Known Allergies    Intolerances        LABS:                        10.5   13.05 )-----------( 274      ( 07 Feb 2023 03:59 )             36.0     02-07    135  |  96<L>  |  67<H>  ----------------------------<  130<H>  5.6<H>   |  21<L>  |  3.88<H>    Ca    8.7      07 Feb 2023 03:59  Mg     2.40     02-07    TPro  7.6  /  Alb  3.6  /  TBili  1.1  /  DBili  x   /  AST  34  /  ALT  20  /  AlkPhos  830<H>  02-07    PT/INR - ( 07 Feb 2023 03:59 )   PT: 12.7 sec;   INR: 1.09 ratio         PTT - ( 07 Feb 2023 03:59 )  PTT:33.7 sec      RADIOLOGY & ADDITIONAL TESTS: Reviewed. Ludwin Echevarria MD  Internal Medicine PGY-2      INTERVAL HPI/OVERNIGHT EVENTS:    SUBJECTIVE: Patient seen and examined at bedside, more alert today, responsive to voice and able to follow simple commands.    ROS unable to be obtained due to patient's intubation and sedation.    OBJECTIVE:    VITAL SIGNS:  ICU Vital Signs Last 24 Hrs  T(C): 32.7 (07 Feb 2023 06:20), Max: 32.7 (07 Feb 2023 06:20)  T(F): 90.8 (07 Feb 2023 06:20), Max: 90.8 (07 Feb 2023 06:20)  HR: 40 (07 Feb 2023 06:45) (40 - 90)  BP: 190/70 (07 Feb 2023 07:11) (89/43 - 190/70)  BP(mean): 76 (07 Feb 2023 06:39) (72 - 82)  ABP: --  ABP(mean): --  RR: 18 (07 Feb 2023 06:45) (16 - 19)  SpO2: 98% (07 Feb 2023 06:45) (90% - 98%)    O2 Parameters below as of 07 Feb 2023 06:45  Patient On (Oxygen Delivery Method): ventilator          Mode: AC/ CMV (Assist Control/ Continuous Mandatory Ventilation), RR (machine): 18, TV (machine): 450, FiO2: 100, PEEP: 5, ITime: 0.9, PIP: 37    CAPILLARY BLOOD GLUCOSE      POCT Blood Glucose.: 135 mg/dL (07 Feb 2023 03:05)      PHYSICAL EXAM:    General: Intubated and sedated but awake, able to respond to light touch and react to questions.  HEENT: NC/AT; PERRL, clear conjunctiva  Neck: supple  Respiratory: mechanical ventilation  Cardiovascular: +S1/S2; irregular rate and rhythm  Abdomen: soft, NT/ND; +BS x4  Extremities: WWP, 2+ peripheral pulses b/l; no LE edema. L arm fistula thrills  Skin: normal color and turgor; no rash  Neurological: A&OX0 but awake and responsive    MEDICATIONS:  MEDICATIONS  (STANDING):  albuterol/ipratropium for Nebulization 3 milliLiter(s) Nebulizer every 6 hours  apixaban 2.5 milliGRAM(s) Oral every 12 hours  atorvastatin 20 milliGRAM(s) Oral at bedtime  chlorhexidine 0.12% Liquid 15 milliLiter(s) Oral Mucosa every 12 hours  fentaNYL   Infusion 0.5 MICROgram(s)/kG/Hr (3.5 mL/Hr) IV Continuous <Continuous>  norepinephrine Infusion 0.05 MICROgram(s)/kG/Min (6.56 mL/Hr) IV Continuous <Continuous>  piperacillin/tazobactam IVPB.. 3.375 Gram(s) IV Intermittent every 12 hours  propofol Infusion 5 MICROgram(s)/kG/Min (2.1 mL/Hr) IV Continuous <Continuous>  sevelamer carbonate 800 milliGRAM(s) Oral every 8 hours    MEDICATIONS  (PRN):      ALLERGIES:  Allergies    No Known Allergies    Intolerances        LABS:                        10.5   13.05 )-----------( 274      ( 07 Feb 2023 03:59 )             36.0     02-07    135  |  96<L>  |  67<H>  ----------------------------<  130<H>  5.6<H>   |  21<L>  |  3.88<H>    Ca    8.7      07 Feb 2023 03:59  Mg     2.40     02-07    TPro  7.6  /  Alb  3.6  /  TBili  1.1  /  DBili  x   /  AST  34  /  ALT  20  /  AlkPhos  830<H>  02-07    PT/INR - ( 07 Feb 2023 03:59 )   PT: 12.7 sec;   INR: 1.09 ratio         PTT - ( 07 Feb 2023 03:59 )  PTT:33.7 sec      RADIOLOGY & ADDITIONAL TESTS: Reviewed.

## 2023-02-08 NOTE — PROGRESS NOTE ADULT - ASSESSMENT
57M PMH ESRD on HD 2/4/6, HTN, DM, bipolar, presented from [place] due to hypotension to 60/40s, desat to 80s, and lethargy, and then had cardiac arrest and was admitted to MICU after ROSC was achieved.     # NEUROLOGY    - sedation: fentanyl and prop  - Hx of bipolar: takes zyprexa at home but will hold for now  - will attempt to wean fentanyl to assess mental status.    # CARDIOLOGY    - pressor: levophed  - Hx of HTN, will hold all home meds for now  - Hx of A-Fib on eliquis  - f/u ECHO/POCUS. Pro-BNP elevated likely iso septic shock rather than HF  - holding PO BP meds at this time    # PULMONARY    - ventilation status: on MV  - CXR showed L lung atelectasis, likely iso aspiration causing cardiac arrest  - asp PNA treatment: vanc and zosyn  - chest PT, neb  - COVID +, treat with IV dexamethasone, will hold remdesevir until liver function tests result  - bronchoscopy completed 2/7, large white/tan mucus plug in left mainstem bronchus aspirated, secretions cleared    # GASTROINTESTINAL     - pt had vomiting w/ distended abdomen, unsure if has gastroenteritis  - f/u CT abdomen scan  - IV PPI    # RENAL/UROLOGY    - cobos: inserted in the ED, removed as patient not making urine.  - Hx of ESRD T/Th/Sat w/ fistula  - can follow dialysis schedule  - renal consulted, received HD 2/7    # INFECTIOUS DISEASE    - in septic shock likely 2/2 aspiration pneumonia vs mucus plug that causes L lung atelectasis and eventually cardiac arrest  - WBC: 13k  - on abx: vanc zosyn, will dose vanc by level  - on pressors, s/p 1L  - COVID: dexmethasone, LFT's appropriate to start remdesevir    # ENDOCRINOLOGY    - f/u cortisol AM level    # HEMATOLOGY    - DVT PPX: will be on lovenox due to COVID    # ETHICS    - FULL CODE       57M PMH ESRD on HD 2/4/6, HTN, DM, bipolar, presented from [place] due to hypotension to 60/40s, desat to 80s, and lethargy, and then had cardiac arrest and was admitted to MICU after ROSC was achieved.     # NEUROLOGY    - sedation: propofol  - Hx of bipolar: takes zyprexa at home but will hold for now  - off fentanyl, will try to wean prop to assess mental status.    # CARDIOLOGY    - pressor: levophed  - Hx of HTN, given patient's improving mental status can start home medications - hydral 100mg TID.  - will start clonidine 0.1 if HR improves, hydralazine IV pushes if resistant  - Hx of A-Fib on eliquis  - f/u ECHO/POCUS. Pro-BNP elevated likely iso septic shock rather than HF  - holding PO BP meds at this time    # PULMONARY    - ventilation status: on MV  - CXR showed L lung atelectasis, likely iso aspiration causing cardiac arrest  - asp PNA treatment: vanc and zosyn  - chest PT, albuterol increased to 4 puffs q6hr  - COVID +, treat with IV dexamethasone, no remdesevir at thist mick  - bronchoscopy completed 2/7, large white/tan mucus plug in left mainstem bronchus aspirated, secretions cleared  - f/u CXR improved    # GASTROINTESTINAL     - pt had vomiting w/ distended abdomen, unsure if has gastroenteritis  - f/u CT abdomen scan  - IV PPI    # RENAL/UROLOGY    - cobos: inserted in the ED, removed as patient not making urine.  - Hx of ESRD T/Th/Sat w/ fistula  - can follow dialysis schedule  - renal consulted, received HD 2/7    # INFECTIOUS DISEASE    - in septic shock likely 2/2 aspiration pneumonia vs mucus plug that causes L lung atelectasis and eventually cardiac arrest  - WBC: 13k  - on abx: vanc zosyn, will dose vanc by level  - on pressors, s/p 1L  - COVID: dexmethasone, LFT's appropriate to start remdesevir    # ENDOCRINOLOGY    - f/u cortisol AM level    # HEMATOLOGY    - DVT PPX: will be on lovenox due to COVID    # ETHICS    - FULL CODE

## 2023-02-09 NOTE — PROGRESS NOTE ADULT - ASSESSMENT
57M PMH ESRD on HD 2/4/6, HTN, DM, bipolar, presented from [place] due to hypotension to 60/40s, desat to 80s, and lethargy, and then had cardiac arrest and was admitted to MICU after ROSC was achieved.     # NEUROLOGY    - sedation: propofol  - Hx of bipolar: takes zyprexa at home but will hold for now  - off fentanyl, will try to wean prop to assess mental status    # CARDIOLOGY    - pressor: levophed  - Hx of HTN, given patient's improving mental status can start home medications - hydral 100mg TID.  - started clonidine 0.2 mg q8 and isordil 10mg TID  - Hx of A-Fib on eliquis  - f/u ECHO/POCUS. Pro-BNP elevated likely iso septic shock rather than HF    # PULMONARY    - ventilation status: on MV  - CXR showed L lung atelectasis, likely iso aspiration causing cardiac arrest  - asp PNA treatment: vanc and zosyn  - chest PT, albuterol increased to 4 puffs q6hr  - COVID +, treat with IV dexamethasone,  - bronchoscopy completed 2/7, large white/tan mucus plug in left mainstem bronchus aspirated, secretions cleared  - f/u CXR improved    # GASTROINTESTINAL     - pt had vomiting w/ distended abdomen, unsure if has gastroenteritis  - f/u CT abdomen scan  - IV PPI    # RENAL/UROLOGY    - cobos: inserted in the ED, removed as patient not making urine.  - Hx of ESRD T/Th/Sat w/ fistula  - can follow dialysis schedule  - renal consulted, received HD 2/7, HD stopped 2/8 as patient HR in 40's but not grossly overloaded, will reattempt today.    # INFECTIOUS DISEASE    - in septic shock likely 2/2 aspiration pneumonia vs mucus plug that causes L lung atelectasis and eventually cardiac arrest  - WBC: 13k  - on abx: Zosyn  - on pressors, s/p 1L  - COVID: dexmethasone, no remdesevir    # ENDOCRINOLOGY    -pending cortisol AM level    # HEMATOLOGY    - DVT PPX: will be on lovenox due to COVID    # ETHICS    - FULL CODE       57M PMH ESRD on HD 2/4/6, HTN, DM, bipolar, presented from [place] due to hypotension to 60/40s, desat to 80s, and lethargy, and then had cardiac arrest and was admitted to MICU after ROSC was achieved.     # NEUROLOGY  - sedation: propofol  - Hx of bipolar: takes zyprexa at home but will hold for now  - off fentanyl, will try to wean prop to assess mental status    # CARDIOLOGY    - off presors  - Hx of HTN, given patient's improving mental status can start home medications - hydral 100mg TID.  - started clonidine 0.2 mg q8 and isordil 10mg TID  - BP remained high over course of day, started cardine drip as PO nicardipine unable to be placed through OG tube  - Hx of A-Fib on eliquis  - f/u ECHO/POCUS. Pro-BNP elevated likely iso septic shock rather than HF    # PULMONARY    - ventilation status: on MV  - CXR showed L lung atelectasis, likely iso aspiration causing cardiac arrest  - asp PNA treatment: vanc and zosyn  - chest PT, albuterol increased to 4 puffs q6hr  - COVID +, treat with IV dexamethasone,  - bronchoscopy completed 2/7, large white/tan mucus plug in left mainstem bronchus aspirated, secretions cleared  - f/u CXR improved    # GASTROINTESTINAL     - pt had vomiting w/ distended abdomen, unsure if has gastroenteritis  - f/u CT abdomen scan  - IV PPI    # RENAL/UROLOGY    - cobos: inserted in the ED, removed as patient not making urine.  - Hx of ESRD T/Th/Sat w/ fistula  - can follow dialysis schedule  - renal consulted, received HD 2/7, HD stopped 2/8 as patient HR in 40's but not grossly overloaded, reattempted today in AM    # INFECTIOUS DISEASE    - in septic shock likely 2/2 aspiration pneumonia vs mucus plug that causes L lung atelectasis and eventually cardiac arrest  - WBC: 13k  - on abx: Zosyn  - on pressors, s/p 1L  - COVID: dexmethasone, no remdesevir    # ENDOCRINOLOGY    -pending cortisol AM level    # HEMATOLOGY    - DVT PPX: will be on lovenox due to COVID    # ETHICS    - FULL CODE

## 2023-02-09 NOTE — PROGRESS NOTE ADULT - SUBJECTIVE AND OBJECTIVE BOX
[facility] DIVISION OF KIDNEY DISEASES AND HYPERTENSION   FOLLOW UP NOTE  --------------------------------------------------------------------------------  HPI: 58 yo M with a PMH of ESRD on HD TIW (TTS), HTN, DM, Anemia who presented to [facility] from [facility] for AMS and hypotension. While in the ED, he had cardiac arrest and was coded with ROSC achieved and was admitted to the MICU. Nephrology following for ESRD/HD management.     24 hour events/subjective: Pt. was seen and examined in the MICU. Pt. was receiving HD treatment but noted to be persistently bradycardic to 40s, thus HD was held at this time. He remains intubated and sedated during rounds today.     PAST HISTORY  --------------------------------------------------------------------------------  No significant changes to PMH, PSH, FHx, SHx, unless otherwise noted    ALLERGIES & MEDICATIONS  --------------------------------------------------------------------------------  Allergies  No Known Allergies    Standing Inpatient Medications  albuterol    90 MICROgram(s) HFA Inhaler 4 Puff(s) Inhalation every 6 hours  atorvastatin 20 milliGRAM(s) Oral at bedtime  chlorhexidine 0.12% Liquid 15 milliLiter(s) Oral Mucosa every 12 hours  chlorhexidine 2% Cloths 1 Application(s) Topical <User Schedule>  cloNIDine 0.2 milliGRAM(s) Oral every 8 hours  dexAMETHasone  Injectable 6 milliGRAM(s) IV Push daily  dextrose 5%. 1000 milliLiter(s) IV Continuous <Continuous>  dextrose 5%. 1000 milliLiter(s) IV Continuous <Continuous>  dextrose 50% Injectable 25 Gram(s) IV Push once  dextrose 50% Injectable 12.5 Gram(s) IV Push once  dextrose 50% Injectable 25 Gram(s) IV Push once  glucagon  Injectable 1 milliGRAM(s) IntraMuscular once  heparin   Injectable 5000 Unit(s) SubCutaneous every 8 hours  hydrALAZINE 100 milliGRAM(s) Oral every 8 hours  insulin lispro (ADMELOG) corrective regimen sliding scale   SubCutaneous every 6 hours  ipratropium 17 MICROgram(s) HFA Inhaler 2 Puff(s) Inhalation every 6 hours  isosorbide   dinitrate Tablet (ISORDIL) 10 milliGRAM(s) Oral three times a day  pantoprazole  Injectable 40 milliGRAM(s) IV Push daily  piperacillin/tazobactam IVPB.. 3.375 Gram(s) IV Intermittent every 12 hours  propofol Infusion 5.547 MICROgram(s)/kG/Min IV Continuous <Continuous>  sevelamer carbonate Powder 800 milliGRAM(s) Enteral Tube three times a day with meals    PRN Inpatient Medications  dextrose Oral Gel 15 Gram(s) Oral once PRN  sodium chloride 0.9% Bolus 100 milliLiter(s) IV Bolus once PRN    REVIEW OF SYSTEMS  --------------------------------------------------------------------------------  Unable to obtain ROS due to current medical condition    All other systems were reviewed and are negative, except as noted.    VITALS/PHYSICAL EXAM  --------------------------------------------------------------------------------  T(C): 36.6 (02-09-23 @ 06:51), Max: 37.7 (02-08-23 @ 12:00)  HR: 45 (02-09-23 @ 07:00) (42 - 54)  BP: 191/69 (02-09-23 @ 07:00) (172/65 - 206/93)  RR: 17 (02-09-23 @ 07:00) (17 - 17)  SpO2: 100% (02-09-23 @ 07:00) (97% - 100%)  Wt(kg): --  Height (cm): 175.3 (02-07-23 @ 08:05)  Weight (kg): 63.1 (02-07-23 @ 08:05)  BMI (kg/m2): 20.5 (02-07-23 @ 08:05)  BSA (m2): 1.77 (02-07-23 @ 08:05)    02-08-23 @ 07:01  -  02-09-23 @ 07:00  --------------------------------------------------------  IN: 713.6 mL / OUT: 0 mL / NET: 713.6 mL    Physical Exam:  	Gen: Intubated, sedated   	HEENT: Anicteric  	Pulm: Mechanical breath sounds bilaterally but clear bilaterally   	CV: Bradycardic   	Abd: Soft, Diminished bowel sounds    	Ext: No LE edema B/L, R toe amputation seen  	Neuro: Sedated, not following commands   	Skin: Warm and dry  	Dialysis access: LUE AVF with palpable pulse and bruit heard    LABS/STUDIES  --------------------------------------------------------------------------------              9.0    8.32  >-----------<  245      [02-09-23 @ 00:26]              28.4     133  |  93  |  51  ----------------------------<  69      [02-09-23 @ 00:26]  3.6   |  22  |  3.37        Ca     8.4     [02-09-23 @ 00:26]      Mg     2.00     [02-09-23 @ 00:26]      Phos  5.3     [02-09-23 @ 00:26]    TPro  6.3  /  Alb  2.8  /  TBili  0.9  /  DBili  x   /  AST  32  /  ALT  18  /  AlkPhos  789  [02-09-23 @ 00:26]    PT/INR: PT 12.8 , INR 1.10       [02-09-23 @ 00:26]  PTT: 29.3       [02-09-23 @ 00:26]    Creatinine Trend:  SCr 3.37 [02-09 @ 00:26]  SCr 2.72 [02-08 @ 00:31]  SCr 3.83 [02-07 @ 10:32]  SCr 3.88 [02-07 @ 03:59] [facility] DIVISION OF KIDNEY DISEASES AND HYPERTENSION   FOLLOW UP NOTE  --------------------------------------------------------------------------------  HPI: 56 yo M with a PMH of ESRD on HD TIW (TTS), HTN, DM, Anemia who presented to [facility] from [facility] for AMS and hypotension. While in the ED, he had cardiac arrest and was coded with ROSC achieved and was admitted to the MICU. Nephrology following for ESRD/HD management.     24 hour events/subjective: Pt. was seen and examined in the MICU. Pt. was receiving HD treatment but noted to be persistently bradycardic to 40s, thus HD was held at this time. He remains intubated and sedated during rounds today.     PAST HISTORY  --------------------------------------------------------------------------------  No significant changes to PMH, PSH, FHx, SHx, unless otherwise noted    ALLERGIES & MEDICATIONS  --------------------------------------------------------------------------------  Allergies  No Known Allergies    Standing Inpatient Medications  albuterol    90 MICROgram(s) HFA Inhaler 4 Puff(s) Inhalation every 6 hours  atorvastatin 20 milliGRAM(s) Oral at bedtime  chlorhexidine 0.12% Liquid 15 milliLiter(s) Oral Mucosa every 12 hours  chlorhexidine 2% Cloths 1 Application(s) Topical <User Schedule>  cloNIDine 0.2 milliGRAM(s) Oral every 8 hours  dexAMETHasone  Injectable 6 milliGRAM(s) IV Push daily  dextrose 5%. 1000 milliLiter(s) IV Continuous <Continuous>  dextrose 5%. 1000 milliLiter(s) IV Continuous <Continuous>  dextrose 50% Injectable 25 Gram(s) IV Push once  dextrose 50% Injectable 12.5 Gram(s) IV Push once  dextrose 50% Injectable 25 Gram(s) IV Push once  glucagon  Injectable 1 milliGRAM(s) IntraMuscular once  heparin   Injectable 5000 Unit(s) SubCutaneous every 8 hours  hydrALAZINE 100 milliGRAM(s) Oral every 8 hours  insulin lispro (ADMELOG) corrective regimen sliding scale   SubCutaneous every 6 hours  ipratropium 17 MICROgram(s) HFA Inhaler 2 Puff(s) Inhalation every 6 hours  isosorbide   dinitrate Tablet (ISORDIL) 10 milliGRAM(s) Oral three times a day  pantoprazole  Injectable 40 milliGRAM(s) IV Push daily  piperacillin/tazobactam IVPB.. 3.375 Gram(s) IV Intermittent every 12 hours  propofol Infusion 5.547 MICROgram(s)/kG/Min IV Continuous <Continuous>  sevelamer carbonate Powder 800 milliGRAM(s) Enteral Tube three times a day with meals    PRN Inpatient Medications  dextrose Oral Gel 15 Gram(s) Oral once PRN  sodium chloride 0.9% Bolus 100 milliLiter(s) IV Bolus once PRN    REVIEW OF SYSTEMS  --------------------------------------------------------------------------------  Unable to obtain ROS due to current medical condition    VITALS/PHYSICAL EXAM  --------------------------------------------------------------------------------  T(C): 36.6 (02-09-23 @ 06:51), Max: 37.7 (02-08-23 @ 12:00)  HR: 45 (02-09-23 @ 07:00) (42 - 54)  BP: 191/69 (02-09-23 @ 07:00) (172/65 - 206/93)  RR: 17 (02-09-23 @ 07:00) (17 - 17)  SpO2: 100% (02-09-23 @ 07:00) (97% - 100%)  Wt(kg): --  Height (cm): 175.3 (02-07-23 @ 08:05)  Weight (kg): 63.1 (02-07-23 @ 08:05)  BMI (kg/m2): 20.5 (02-07-23 @ 08:05)  BSA (m2): 1.77 (02-07-23 @ 08:05)    02-08-23 @ 07:01  -  02-09-23 @ 07:00  --------------------------------------------------------  IN: 713.6 mL / OUT: 0 mL / NET: 713.6 mL    Physical Exam:  	Gen: Intubated, sedated   	HEENT: Anicteric  	Pulm: Mechanical breath sounds bilaterally but clear bilaterally   	CV: Bradycardic   	Abd: Soft, Diminished bowel sounds    	Ext: No LE edema B/L, R toe amputation seen  	Neuro: Sedated  	Skin: Warm and dry  	Dialysis access: LUE AVF with palpable pulse and bruit heard    LABS/STUDIES  --------------------------------------------------------------------------------              9.0    8.32  >-----------<  245      [02-09-23 @ 00:26]              28.4     133  |  93  |  51  ----------------------------<  69      [02-09-23 @ 00:26]  3.6   |  22  |  3.37        Ca     8.4     [02-09-23 @ 00:26]      Mg     2.00     [02-09-23 @ 00:26]      Phos  5.3     [02-09-23 @ 00:26]    TPro  6.3  /  Alb  2.8  /  TBili  0.9  /  DBili  x   /  AST  32  /  ALT  18  /  AlkPhos  789  [02-09-23 @ 00:26]    Creatinine Trend:  SCr 3.37 [02-09 @ 00:26]  SCr 2.72 [02-08 @ 00:31]  SCr 3.83 [02-07 @ 10:32]  SCr 3.88 [02-07 @ 03:59]

## 2023-02-09 NOTE — PROGRESS NOTE ADULT - PROBLEM SELECTOR PLAN 1
Pt. with ESRD on HD TIW (TTS) who presented for AMS and hypotension, had cardiac arrest in the ED and was admitted to the MICU. Pt. remains intubated and sedated. Pt. was receiving HD in the MICU via LUE AVF this morning. He was noted to be persistently bradycardic in the 40s thus HD was held. Will monitor closely for HD requirements. Pt. remains critically ill. Labs reviewed. Monitor labs and BP. Dose meds as per HD.

## 2023-02-09 NOTE — PROGRESS NOTE ADULT - SUBJECTIVE AND OBJECTIVE BOX
Ludwin Echevarria MD  Internal Medicine PGY-2      INTERVAL HPI/OVERNIGHT EVENTS:    SUBJECTIVE: Patient seen and examined at bedside, more alert today, responsive to voice and able to follow simple commands.    ROS unable to be obtained due to patient's intubation and sedation.    OBJECTIVE:    VITAL SIGNS:  ICU Vital Signs Last 24 Hrs  T(C): 32.7 (07 Feb 2023 06:20), Max: 32.7 (07 Feb 2023 06:20)  T(F): 90.8 (07 Feb 2023 06:20), Max: 90.8 (07 Feb 2023 06:20)  HR: 40 (07 Feb 2023 06:45) (40 - 90)  BP: 190/70 (07 Feb 2023 07:11) (89/43 - 190/70)  BP(mean): 76 (07 Feb 2023 06:39) (72 - 82)  ABP: --  ABP(mean): --  RR: 18 (07 Feb 2023 06:45) (16 - 19)  SpO2: 98% (07 Feb 2023 06:45) (90% - 98%)    O2 Parameters below as of 07 Feb 2023 06:45  Patient On (Oxygen Delivery Method): ventilator          Mode: AC/ CMV (Assist Control/ Continuous Mandatory Ventilation), RR (machine): 18, TV (machine): 450, FiO2: 100, PEEP: 5, ITime: 0.9, PIP: 37    CAPILLARY BLOOD GLUCOSE      POCT Blood Glucose.: 135 mg/dL (07 Feb 2023 03:05)      PHYSICAL EXAM:    General: Intubated and sedated but awake, able to respond to light touch and react to questions.  HEENT: NC/AT; PERRL, clear conjunctiva  Neck: supple  Respiratory: mechanical ventilation  Cardiovascular: +S1/S2; irregular rate and rhythm  Abdomen: soft, NT/ND; +BS x4  Extremities: WWP, 2+ peripheral pulses b/l; no LE edema. L arm fistula thrills  Skin: normal color and turgor; no rash  Neurological: A&OX0 but awake and responsive    MEDICATIONS:  MEDICATIONS  (STANDING):  albuterol/ipratropium for Nebulization 3 milliLiter(s) Nebulizer every 6 hours  apixaban 2.5 milliGRAM(s) Oral every 12 hours  atorvastatin 20 milliGRAM(s) Oral at bedtime  chlorhexidine 0.12% Liquid 15 milliLiter(s) Oral Mucosa every 12 hours  fentaNYL   Infusion 0.5 MICROgram(s)/kG/Hr (3.5 mL/Hr) IV Continuous <Continuous>  norepinephrine Infusion 0.05 MICROgram(s)/kG/Min (6.56 mL/Hr) IV Continuous <Continuous>  piperacillin/tazobactam IVPB.. 3.375 Gram(s) IV Intermittent every 12 hours  propofol Infusion 5 MICROgram(s)/kG/Min (2.1 mL/Hr) IV Continuous <Continuous>  sevelamer carbonate 800 milliGRAM(s) Oral every 8 hours    MEDICATIONS  (PRN):      ALLERGIES:  Allergies    No Known Allergies    Intolerances        LABS:                        10.5   13.05 )-----------( 274      ( 07 Feb 2023 03:59 )             36.0     02-07    135  |  96<L>  |  67<H>  ----------------------------<  130<H>  5.6<H>   |  21<L>  |  3.88<H>    Ca    8.7      07 Feb 2023 03:59  Mg     2.40     02-07    TPro  7.6  /  Alb  3.6  /  TBili  1.1  /  DBili  x   /  AST  34  /  ALT  20  /  AlkPhos  830<H>  02-07    PT/INR - ( 07 Feb 2023 03:59 )   PT: 12.7 sec;   INR: 1.09 ratio         PTT - ( 07 Feb 2023 03:59 )  PTT:33.7 sec      RADIOLOGY & ADDITIONAL TESTS: Reviewed. Ludwin Echevarria MD  Internal Medicine PGY-2      INTERVAL HPI/OVERNIGHT EVENTS:    SUBJECTIVE: Patient seen and examined at bedside, sedated and unresponsive to commands    ROS unable to be obtained due to patient's intubation and sedation.    OBJECTIVE:    VITAL SIGNS:  ICU Vital Signs Last 24 Hrs  T(C): 32.7 (07 Feb 2023 06:20), Max: 32.7 (07 Feb 2023 06:20)  T(F): 90.8 (07 Feb 2023 06:20), Max: 90.8 (07 Feb 2023 06:20)  HR: 40 (07 Feb 2023 06:45) (40 - 90)  BP: 190/70 (07 Feb 2023 07:11) (89/43 - 190/70)  BP(mean): 76 (07 Feb 2023 06:39) (72 - 82)  ABP: --  ABP(mean): --  RR: 18 (07 Feb 2023 06:45) (16 - 19)  SpO2: 98% (07 Feb 2023 06:45) (90% - 98%)    O2 Parameters below as of 07 Feb 2023 06:45  Patient On (Oxygen Delivery Method): ventilator          Mode: AC/ CMV (Assist Control/ Continuous Mandatory Ventilation), RR (machine): 18, TV (machine): 450, FiO2: 100, PEEP: 5, ITime: 0.9, PIP: 37    CAPILLARY BLOOD GLUCOSE      POCT Blood Glucose.: 135 mg/dL (07 Feb 2023 03:05)      PHYSICAL EXAM:    General: Intubated and sedated  HEENT: NC/AT; PERRL, clear conjunctiva  Neck: supple  Respiratory: mechanical ventilation  Cardiovascular: +S1/S2; irregular rate and rhythm  Abdomen: soft, NT/ND; +BS x4  Extremities: WWP, 2+ peripheral pulses b/l; no LE edema. L arm fistula thrills  Skin: normal color and turgor; no rash  Neurological: A&OX0, unresponsive to verbal, mildly responsive to pain  MEDICATIONS:  MEDICATIONS  (STANDING):  albuterol/ipratropium for Nebulization 3 milliLiter(s) Nebulizer every 6 hours  apixaban 2.5 milliGRAM(s) Oral every 12 hours  atorvastatin 20 milliGRAM(s) Oral at bedtime  chlorhexidine 0.12% Liquid 15 milliLiter(s) Oral Mucosa every 12 hours  fentaNYL   Infusion 0.5 MICROgram(s)/kG/Hr (3.5 mL/Hr) IV Continuous <Continuous>  norepinephrine Infusion 0.05 MICROgram(s)/kG/Min (6.56 mL/Hr) IV Continuous <Continuous>  piperacillin/tazobactam IVPB.. 3.375 Gram(s) IV Intermittent every 12 hours  propofol Infusion 5 MICROgram(s)/kG/Min (2.1 mL/Hr) IV Continuous <Continuous>  sevelamer carbonate 800 milliGRAM(s) Oral every 8 hours    MEDICATIONS  (PRN):      ALLERGIES:  Allergies    No Known Allergies    Intolerances        LABS:                        10.5   13.05 )-----------( 274      ( 07 Feb 2023 03:59 )             36.0     02-07    135  |  96<L>  |  67<H>  ----------------------------<  130<H>  5.6<H>   |  21<L>  |  3.88<H>    Ca    8.7      07 Feb 2023 03:59  Mg     2.40     02-07    TPro  7.6  /  Alb  3.6  /  TBili  1.1  /  DBili  x   /  AST  34  /  ALT  20  /  AlkPhos  830<H>  02-07    PT/INR - ( 07 Feb 2023 03:59 )   PT: 12.7 sec;   INR: 1.09 ratio         PTT - ( 07 Feb 2023 03:59 )  PTT:33.7 sec      RADIOLOGY & ADDITIONAL TESTS: Reviewed.

## 2023-02-10 NOTE — PROGRESS NOTE ADULT - SUBJECTIVE AND OBJECTIVE BOX
[facility] DIVISION OF KIDNEY DISEASES AND HYPERTENSION   FOLLOW UP NOTE  --------------------------------------------------------------------------------  HPI: 56 yo M with a PMH of ESRD on HD TIW (TTS), HTN, DM, Anemia who presented to [facility] from [facility] for AMS and hypotension. While in the ED, he had cardiac arrest and was coded with ROSC achieved and was admitted to the MICU. Nephrology following for ESRD/HD management.     24 hour events/subjective: Pt. was seen and examined in the MICU. He remains intubated but he is off of sedation. He appears to be awake and alert.  Last HD on 2/9/23 via LUE AVF.     PAST HISTORY  --------------------------------------------------------------------------------  No significant changes to PMH, PSH, FHx, SHx, unless otherwise noted    ALLERGIES & MEDICATIONS  --------------------------------------------------------------------------------  Allergies    No Known Allergies    Intolerances      Standing Inpatient Medications  albuterol    90 MICROgram(s) HFA Inhaler 4 Puff(s) Inhalation every 6 hours  atorvastatin 20 milliGRAM(s) Oral at bedtime  chlorhexidine 0.12% Liquid 15 milliLiter(s) Oral Mucosa every 12 hours  chlorhexidine 2% Cloths 1 Application(s) Topical <User Schedule>  cloNIDine 0.2 milliGRAM(s) Oral every 8 hours  dexAMETHasone  Injectable 6 milliGRAM(s) IV Push daily  dextrose 5% + lactated ringers. 1000 milliLiter(s) IV Continuous <Continuous>  dextrose 5%. 1000 milliLiter(s) IV Continuous <Continuous>  dextrose 5%. 1000 milliLiter(s) IV Continuous <Continuous>  dextrose 50% Injectable 25 Gram(s) IV Push once  dextrose 50% Injectable 12.5 Gram(s) IV Push once  dextrose 50% Injectable 25 Gram(s) IV Push once  glucagon  Injectable 1 milliGRAM(s) IntraMuscular once  heparin   Injectable 5000 Unit(s) SubCutaneous every 8 hours  hydrALAZINE 100 milliGRAM(s) Oral every 8 hours  insulin lispro (ADMELOG) corrective regimen sliding scale   SubCutaneous every 6 hours  ipratropium 17 MICROgram(s) HFA Inhaler 2 Puff(s) Inhalation every 6 hours  isosorbide   dinitrate Tablet (ISORDIL) 10 milliGRAM(s) Oral <User Schedule>  losartan 100 milliGRAM(s) Oral daily  niCARdipine 20 milliGRAM(s) Oral every 8 hours  niCARdipine Infusion 5 mG/Hr IV Continuous <Continuous>  pantoprazole  Injectable 40 milliGRAM(s) IV Push daily  piperacillin/tazobactam IVPB.. 3.375 Gram(s) IV Intermittent every 12 hours  polyethylene glycol 3350 17 Gram(s) Oral two times a day  propofol Infusion 5.547 MICROgram(s)/kG/Min IV Continuous <Continuous>  senna 2 Tablet(s) Oral at bedtime  sevelamer carbonate Powder 800 milliGRAM(s) Oral three times a day    PRN Inpatient Medications  dextrose Oral Gel 15 Gram(s) Oral once PRN  sodium chloride 0.9% Bolus 100 milliLiter(s) IV Bolus once PRN    REVIEW OF SYSTEMS  --------------------------------------------------------------------------------  Limited ROS as he remains intubated. See HPI.     VITALS/PHYSICAL EXAM  --------------------------------------------------------------------------------  T(C): 36.7 (02-10-23 @ 04:00), Max: 36.8 (02-09-23 @ 12:00)  HR: 45 (02-10-23 @ 06:00) (37 - 115)  BP: 175/69 (02-10-23 @ 06:00) (119/54 - 215/75)  RR: 14 (02-10-23 @ 06:00) (12 - 19)  SpO2: 98% (02-10-23 @ 06:00) (97% - 100%)  Wt(kg): --    02-08-23 @ 07:01  -  02-09-23 @ 07:00  --------------------------------------------------------  IN: 713.6 mL / OUT: 0 mL / NET: 713.6 mL    02-09-23 @ 07:01  -  02-10-23 @ 06:47  --------------------------------------------------------  IN: 1838.3 mL / OUT: 1450 mL / NET: 388.3 mL    Physical Exam:  	Gen: Intubated but awake and alert   	HEENT: Anicteric  	Pulm: Mechanical breath sounds bilaterally   	CV: S1S2+  	Abd: Soft, Diminished bowel sounds    	Ext: No LE edema B/L  	Neuro: Awake, following simple commands   	Skin: Warm and dry  	Dialysis access: LUE AVF with palpable pulse and bruit heard    LABS/STUDIES  --------------------------------------------------------------------------------              9.5    7.09  >-----------<  207      [02-10-23 @ 00:05]              30.4     133  |  92  |  34  ----------------------------<  76      [02-10-23 @ 00:20]  3.4   |  25  |  2.55        Ca     8.2     [02-10-23 @ 00:20]      Mg     1.90     [02-10-23 @ 00:20]      Phos  4.1     [02-10-23 @ 00:20]    TPro  6.3  /  Alb  2.9  /  TBili  1.0  /  DBili  x   /  AST  41  /  ALT  20  /  AlkPhos  964  [02-10-23 @ 00:20]    Creatinine Trend:  SCr 2.55 [02-10 @ 00:20]  SCr 3.37 [02-09 @ 00:26]  SCr 2.72 [02-08 @ 00:31]  SCr 3.83 [02-07 @ 10:32]  SCr 3.88 [02-07 @ 03:59] [facility] DIVISION OF KIDNEY DISEASES AND HYPERTENSION   FOLLOW UP NOTE  --------------------------------------------------------------------------------  HPI: 56 yo M with a PMH of ESRD on HD TIW (TTS), HTN, DM, anemia who presented to [facility] from [facility] for AMS and hypotension. While in the ED, he had cardiac arrest and was coded with ROSC achieved and was admitted to the MICU. Pt. being seen for ESRD/HD management. Pt. received HD yesterday (2/9/23).    24 hour events/subjective: Pt. was seen and examined in the MICU. Pt. awake but remains intubated at time of evaluation earlier today.      PAST HISTORY  --------------------------------------------------------------------------------  No significant changes to PMH, PSH, FHx, SHx, unless otherwise noted    ALLERGIES & MEDICATIONS  --------------------------------------------------------------------------------  Allergies    No Known Allergies    Intolerances    Standing Inpatient Medications  albuterol    90 MICROgram(s) HFA Inhaler 4 Puff(s) Inhalation every 6 hours  atorvastatin 20 milliGRAM(s) Oral at bedtime  chlorhexidine 0.12% Liquid 15 milliLiter(s) Oral Mucosa every 12 hours  chlorhexidine 2% Cloths 1 Application(s) Topical <User Schedule>  cloNIDine 0.2 milliGRAM(s) Oral every 8 hours  dexAMETHasone  Injectable 6 milliGRAM(s) IV Push daily  dextrose 5% + lactated ringers. 1000 milliLiter(s) IV Continuous <Continuous>  dextrose 5%. 1000 milliLiter(s) IV Continuous <Continuous>  dextrose 5%. 1000 milliLiter(s) IV Continuous <Continuous>  dextrose 50% Injectable 25 Gram(s) IV Push once  dextrose 50% Injectable 12.5 Gram(s) IV Push once  dextrose 50% Injectable 25 Gram(s) IV Push once  glucagon  Injectable 1 milliGRAM(s) IntraMuscular once  heparin   Injectable 5000 Unit(s) SubCutaneous every 8 hours  hydrALAZINE 100 milliGRAM(s) Oral every 8 hours  insulin lispro (ADMELOG) corrective regimen sliding scale   SubCutaneous every 6 hours  ipratropium 17 MICROgram(s) HFA Inhaler 2 Puff(s) Inhalation every 6 hours  isosorbide   dinitrate Tablet (ISORDIL) 10 milliGRAM(s) Oral <User Schedule>  losartan 100 milliGRAM(s) Oral daily  niCARdipine 20 milliGRAM(s) Oral every 8 hours  niCARdipine Infusion 5 mG/Hr IV Continuous <Continuous>  pantoprazole  Injectable 40 milliGRAM(s) IV Push daily  piperacillin/tazobactam IVPB.. 3.375 Gram(s) IV Intermittent every 12 hours  polyethylene glycol 3350 17 Gram(s) Oral two times a day  propofol Infusion 5.547 MICROgram(s)/kG/Min IV Continuous <Continuous>  senna 2 Tablet(s) Oral at bedtime  sevelamer carbonate Powder 800 milliGRAM(s) Oral three times a day    PRN Inpatient Medications  dextrose Oral Gel 15 Gram(s) Oral once PRN  sodium chloride 0.9% Bolus 100 milliLiter(s) IV Bolus once PRN    REVIEW OF SYSTEMS  --------------------------------------------------------------------------------  Unable to obtain ROS (as pt. intubated).    VITALS/PHYSICAL EXAM  --------------------------------------------------------------------------------  T(C): 36.7 (02-10-23 @ 04:00), Max: 36.8 (02-09-23 @ 12:00)  HR: 45 (02-10-23 @ 06:00) (37 - 115)  BP: 175/69 (02-10-23 @ 06:00) (119/54 - 215/75)  RR: 14 (02-10-23 @ 06:00) (12 - 19)  SpO2: 98% (02-10-23 @ 06:00) (97% - 100%)  Wt(kg): --    02-08-23 @ 07:01  -  02-09-23 @ 07:00  --------------------------------------------------------  IN: 713.6 mL / OUT: 0 mL / NET: 713.6 mL    02-09-23 @ 07:01  -  02-10-23 @ 06:47  --------------------------------------------------------  IN: 1838.3 mL / OUT: 1450 mL / NET: 388.3 mL    Physical Exam:  	Gen: Intubated but awake and alert   	HEENT: Anicteric  	Pulm: Mechanical breath sounds bilaterally   	CV: S1S2+  	Abd: Soft, Diminished bowel sounds    	Ext: No LE edema B/L  	Neuro: Awake, following simple commands   	Skin: Warm and dry  	Dialysis access: LUE AVF with palpable pulse and bruit heard    LABS/STUDIES  --------------------------------------------------------------------------------              9.5    7.09  >-----------<  207      [02-10-23 @ 00:05]              30.4     133  |  92  |  34  ----------------------------<  76      [02-10-23 @ 00:20]  3.4   |  25  |  2.55        Ca     8.2     [02-10-23 @ 00:20]      Mg     1.90     [02-10-23 @ 00:20]      Phos  4.1     [02-10-23 @ 00:20]    TPro  6.3  /  Alb  2.9  /  TBili  1.0  /  DBili  x   /  AST  41  /  ALT  20  /  AlkPhos  964  [02-10-23 @ 00:20]    Creatinine Trend:  SCr 2.55 [02-10 @ 00:20]  SCr 3.37 [02-09 @ 00:26]  SCr 2.72 [02-08 @ 00:31]  SCr 3.83 [02-07 @ 10:32]  SCr 3.88 [02-07 @ 03:59]

## 2023-02-10 NOTE — DIETITIAN INITIAL EVALUATION ADULT - ENERGY INTAKE
~328 non-nutritive lipid calories provided by Propofol over past 24hrs.  Was receiving Nepro at rate no higher than 30mL/hr.  TF no on hold for consideration for extubation. Hence, inadequate energy intake.

## 2023-02-10 NOTE — DIETITIAN INITIAL EVALUATION ADULT - ADD RECOMMEND
-- NPO pending possible extubation.     -- If TF remains warranted suggest Nepro @ 40mL/hr + 1 packet NoCarb Prosource per day     960mL total volume    1728 kcals    78g protein (+15 g additional protein via NoCarb Prosource)=  93g total protein

## 2023-02-10 NOTE — DIETITIAN INITIAL EVALUATION ADULT - PERTINENT LABORATORY DATA
02-10    133<L>  |  92<L>  |  34<H>  ----------------------------<  76  3.4<L>   |  25  |  2.55<H>    Ca    8.2<L>      10 Feb 2023 00:20  Phos  4.1     02-10  Mg     1.90     02-10    TPro  6.3  /  Alb  2.9<L>  /  TBili  1.0  /  DBili  x   /  AST  41<H>  /  ALT  20  /  AlkPhos  964<H>  02-10  POCT Blood Glucose.: 78 mg/dL (02-10-23 @ 04:58)  A1C with Estimated Average Glucose Result: 4.8 % (02-10-23 @ 00:05)   02-10    133<L>  |  92<L>  |  34<H>  ----------------------------<  76  3.4<L>   |  25  |  2.55<H>    Ca    8.2<L>      10 Feb 2023 00:20  Phos  4.1     02-10  Mg     1.90     02-10    TPro  6.3  /  Alb  2.9<L>  /  TBili  1.0  /  DBili  x   /  AST  41<H>  /  ALT  20  /  AlkPhos  964<H>  02-10    CAPILLARY BLOOD GLUCOSE      POCT Blood Glucose.: 78 mg/dL (10 Feb 2023 04:58)  POCT Blood Glucose.: 43 mg/dL (10 Feb 2023 04:56)  POCT Blood Glucose.: 82 mg/dL (09 Feb 2023 23:56)  POCT Blood Glucose.: 85 mg/dL (09 Feb 2023 17:50)  POCT Blood Glucose.: 113 mg/dL (09 Feb 2023 13:11)    A1C with Estimated Average Glucose Result: 4.8 % (02-10-23 @ 00:05)

## 2023-02-10 NOTE — DIETITIAN INITIAL EVALUATION ADULT - ORAL INTAKE PTA/DIET HISTORY
Per [facility] transfer paperwork: Renal diet (regular consistency food/liquid), 1500mL fluid restriction/day + LPS 60mL PO 2x daily + Nepro 8oz PO 2x daily   Per H&P, usually feeds self without difficulty eating.

## 2023-02-10 NOTE — PROGRESS NOTE ADULT - ATTENDING COMMENTS
Pt. with ESRD on HD TIW. Assessment and plan for ESRD/HD as outlined above. Pt. received HD yesterday. Labs reviewed. Plan for maintenance HD treatment tomorrow. Monitor BP and labs.

## 2023-02-10 NOTE — PROGRESS NOTE ADULT - PROBLEM SELECTOR PLAN 1
Pt. with ESRD on HD TIW (TTS) who presented for AMS and hypotension, had cardiac arrest in the ED and was admitted to the MICU. Pt. received HD on 2/9/23 via LUE AVF. Pt. remains intubated but is off of sedation. Labs reviewed. Will plan for HD tomorrow. Monitor labs and BP. Dose meds as per HD. Pt. with ESRD on HD TIW (TTS) who presented for AMS and hypotension, had cardiac arrest in the ED and was admitted to the MICU. Pt. received HD yesterday (2/9/23) via LUE AVF. Labs reviewed. Will plan for maintenance HD tomorrow. Monitor labs and BP. Dose meds as per HD.

## 2023-02-10 NOTE — DIETITIAN INITIAL EVALUATION ADULT - NS FNS DIET ORDER
Diet, NPO with Tube Feed:   Tube Feeding Modality: Orogastric  Nepro with Carb Steady (NEPRORTH)  Continuous  Starting Tube Feed Rate {mL per Hour}: 10  Increase Tube Feed Rate by (mL): 10     Every 4 hours  Until Goal Tube Feed Rate (mL per Hour): 40  Tube Feed Duration (in Hours): 24  Tube Feed Start Time: 10:27 (02-09-23 @ 10:28) [Active]    960mL total volume  1728 kcals  78g protein

## 2023-02-10 NOTE — PROGRESS NOTE ADULT - ASSESSMENT
57M PMH ESRD on HD 2/4/6, HTN, DM, bipolar, presented from [place] due to hypotension to 60/40s, desat to 80s, and lethargy, and then had cardiac arrest and was admitted to MICU after ROSC was achieved.     # NEUROLOGY  - sedation: propofol weaned overnight  - Hx of bipolar: takes zyprexa at home but will hold for now  - will assess mental status and assess for possible intubation    # CARDIOLOGY    - off presors  - Hx of HTN, given patient's improving mental status can start home medications - hydral 100mg TID.  - started clonidine 0.2 mg q8 and isordil 10mg TID  - Cardine drip stopped overnight as patient's hypertension improved  - Hx of A-Fib on eliquis  - f/u ECHO/POCUS. Pro-BNP elevated likely iso septic shock rather than HF    # PULMONARY    - ventilation status: on MV  - CXR showed L lung atelectasis, likely iso aspiration causing cardiac arrest  - asp PNA treatment: vanc and zosyn  - chest PT, albuterol increased to 4 puffs q6hr  - COVID +, treat with IV dexamethasone,  - bronchoscopy completed 2/7, large white/tan mucus plug in left mainstem bronchus aspirated, secretions cleared  - f/u CXR improved    # GASTROINTESTINAL     - no further episodes of vomiting  - CT abdomen scan showing large stool burden  - IV PPI    # RENAL/UROLOGY    - cobos: inserted in the ED, removed as patient not making urine.  - Hx of ESRD T/Th/Sat w/ fistula  - can follow dialysis schedule  - renal consulted, received HD 2/7, HD stopped 2/9 AM as patient HR in 40's but not grossly overloaded, completed 2/9    # INFECTIOUS DISEASE    - in septic shock likely 2/2 aspiration pneumonia vs mucus plug that causes L lung atelectasis and eventually cardiac arrest  - WBC: 13k  - on abx: Zosyn  - on pressors, s/p 1L  - COVID: dexmethasone, no remdesevir    # ENDOCRINOLOGY    - cortisol AM level low 2.1 but on dexa 6    # HEMATOLOGY    - DVT PPX: will be on lovenox due to COVID    # ETHICS    - FULL CODE       57M PMH ESRD on HD 2/4/6, HTN, DM, bipolar, presented from [place] due to hypotension to 60/40s, desat to 80s, and lethargy, and then had cardiac arrest and was admitted to MICU after ROSC was achieved.     # NEUROLOGY  - sedation: propofol weaned overnight  - Hx of bipolar: takes zyprexa at home but will hold for now  - ready for extubation    # CARDIOLOGY    - off pressors  - Hx of HTN, given patient's improving mental status can start home medications - hydral 100mg TID.  - c/w clonidine 0.2 mg q8 and isordil 10mg TID  - Cardine drip stopped overnight as patient's hypertension improved  - Hx of A-Fib on eliquis  - f/u ECHO/POCUS. Pro-BNP elevated likely iso septic shock rather than HF    # PULMONARY    - ventilation status: on MV  - CXR showed L lung atelectasis, likely iso aspiration causing cardiac arrest  - asp PNA treatment: zosyn  - chest PT, albuterol increased to 4 puffs q6hr  - COVID +, treat with IV dexamethasone,  - bronchoscopy completed 2/7, large white/tan mucus plug in left mainstem bronchus aspirated, secretions cleared  - f/u CXR improved  - RSBI <105 on pressure support, plan for extubation    # GASTROINTESTINAL     - no further episodes of vomiting  - CT abdomen scan showing large stool burden  - IV PPI    # RENAL/UROLOGY    - cobos: inserted in the ED, removed as patient not making urine.  - Hx of ESRD T/Th/Sat w/ fistula  - can follow dialysis schedule  - renal consulted, received HD 2/7, HD stopped 2/9 AM as patient HR in 40's but not grossly overloaded, completed 2/9    # INFECTIOUS DISEASE    - in septic shock likely 2/2 aspiration pneumonia vs mucus plug that causes L lung atelectasis and eventually cardiac arrest  - WBC: 13k  - on abx: Zosyn  - on pressors, s/p 1L  - COVID: dexmethasone, no remdesevir    # ENDOCRINOLOGY    - cortisol AM level low 2.1 but on dexa 6    # HEMATOLOGY    - DVT PPX: will be on lovenox due to COVID    # ETHICS    - FULL CODE

## 2023-02-10 NOTE — PROGRESS NOTE ADULT - SUBJECTIVE AND OBJECTIVE BOX
Ludwin Echevarria MD  Internal Medicine PGY-2      INTERVAL HPI/OVERNIGHT EVENTS:    SUBJECTIVE: Patient seen and examined at bedside, prop was weaned and BP improved, patient is off cardine gtt. D5 started for several hours overnight.    ROS unable to be obtained due to patient's intubation and sedation.    OBJECTIVE:    VITAL SIGNS:  ICU Vital Signs Last 24 Hrs  T(C): 32.7 (07 Feb 2023 06:20), Max: 32.7 (07 Feb 2023 06:20)  T(F): 90.8 (07 Feb 2023 06:20), Max: 90.8 (07 Feb 2023 06:20)  HR: 40 (07 Feb 2023 06:45) (40 - 90)  BP: 190/70 (07 Feb 2023 07:11) (89/43 - 190/70)  BP(mean): 76 (07 Feb 2023 06:39) (72 - 82)  ABP: --  ABP(mean): --  RR: 18 (07 Feb 2023 06:45) (16 - 19)  SpO2: 98% (07 Feb 2023 06:45) (90% - 98%)    O2 Parameters below as of 07 Feb 2023 06:45  Patient On (Oxygen Delivery Method): ventilator          Mode: AC/ CMV (Assist Control/ Continuous Mandatory Ventilation), RR (machine): 18, TV (machine): 450, FiO2: 100, PEEP: 5, ITime: 0.9, PIP: 37    CAPILLARY BLOOD GLUCOSE      POCT Blood Glucose.: 135 mg/dL (07 Feb 2023 03:05)      PHYSICAL EXAM:    General: Intubated and sedated  HEENT: NC/AT; PERRL, clear conjunctiva  Neck: supple  Respiratory: mechanical ventilation  Cardiovascular: +S1/S2; irregular rate and rhythm  Abdomen: soft, NT/ND; +BS x4  Extremities: WWP, 2+ peripheral pulses b/l; no LE edema. L arm fistula thrills  Skin: normal color and turgor; no rash  Neurological: A&OX0, unresponsive to verbal, mildly responsive to pain      MEDICATIONS  (STANDING):  albuterol/ipratropium for Nebulization 3 milliLiter(s) Nebulizer every 6 hours  apixaban 2.5 milliGRAM(s) Oral every 12 hours  atorvastatin 20 milliGRAM(s) Oral at bedtime  chlorhexidine 0.12% Liquid 15 milliLiter(s) Oral Mucosa every 12 hours  fentaNYL   Infusion 0.5 MICROgram(s)/kG/Hr (3.5 mL/Hr) IV Continuous <Continuous>  norepinephrine Infusion 0.05 MICROgram(s)/kG/Min (6.56 mL/Hr) IV Continuous <Continuous>  piperacillin/tazobactam IVPB.. 3.375 Gram(s) IV Intermittent every 12 hours  propofol Infusion 5 MICROgram(s)/kG/Min (2.1 mL/Hr) IV Continuous <Continuous>  sevelamer carbonate 800 milliGRAM(s) Oral every 8 hours    MEDICATIONS  (PRN):      ALLERGIES:  Allergies    No Known Allergies    Intolerances        LABS:                        10.5   13.05 )-----------( 274      ( 07 Feb 2023 03:59 )             36.0     02-07    135  |  96<L>  |  67<H>  ----------------------------<  130<H>  5.6<H>   |  21<L>  |  3.88<H>    Ca    8.7      07 Feb 2023 03:59  Mg     2.40     02-07    TPro  7.6  /  Alb  3.6  /  TBili  1.1  /  DBili  x   /  AST  34  /  ALT  20  /  AlkPhos  830<H>  02-07    PT/INR - ( 07 Feb 2023 03:59 )   PT: 12.7 sec;   INR: 1.09 ratio         PTT - ( 07 Feb 2023 03:59 )  PTT:33.7 sec      RADIOLOGY & ADDITIONAL TESTS: Reviewed. Ludwin Echevarria MD  Internal Medicine PGY-2      INTERVAL HPI/OVERNIGHT EVENTS:    SUBJECTIVE: Patient seen and examined at bedside, prop was weaned and BP improved, patient is off cardine gtt. D5 started for several hours overnight.    ROS limited due to patient's intubation, however patient is alert and awake. Denies pain at this time    OBJECTIVE:    VITAL SIGNS:  ICU Vital Signs Last 24 Hrs  T(C): 32.7 (07 Feb 2023 06:20), Max: 32.7 (07 Feb 2023 06:20)  T(F): 90.8 (07 Feb 2023 06:20), Max: 90.8 (07 Feb 2023 06:20)  HR: 40 (07 Feb 2023 06:45) (40 - 90)  BP: 190/70 (07 Feb 2023 07:11) (89/43 - 190/70)  BP(mean): 76 (07 Feb 2023 06:39) (72 - 82)  ABP: --  ABP(mean): --  RR: 18 (07 Feb 2023 06:45) (16 - 19)  SpO2: 98% (07 Feb 2023 06:45) (90% - 98%)    O2 Parameters below as of 07 Feb 2023 06:45  Patient On (Oxygen Delivery Method): ventilator          Mode: AC/ CMV (Assist Control/ Continuous Mandatory Ventilation), RR (machine): 18, TV (machine): 450, FiO2: 100, PEEP: 5, ITime: 0.9, PIP: 37    CAPILLARY BLOOD GLUCOSE      POCT Blood Glucose.: 135 mg/dL (07 Feb 2023 03:05)      PHYSICAL EXAM:    General: Intubated but awake and in no acute distress.  HEENT: NC/AT; PERRL, clear conjunctiva  Neck: supple  Respiratory: mechanical ventilation  Cardiovascular: +S1/S2; irregular rate and rhythm  Abdomen: soft, NT/ND; +BS x4  Extremities: WWP, 2+ peripheral pulses b/l; no LE edema. L arm fistula thrills  Skin: normal color and turgor; no rash  Neurological: A&Ox0 but awake and responsive to verbal, mildly responsive to pain      MEDICATIONS  (STANDING):  albuterol/ipratropium for Nebulization 3 milliLiter(s) Nebulizer every 6 hours  apixaban 2.5 milliGRAM(s) Oral every 12 hours  atorvastatin 20 milliGRAM(s) Oral at bedtime  chlorhexidine 0.12% Liquid 15 milliLiter(s) Oral Mucosa every 12 hours  fentaNYL   Infusion 0.5 MICROgram(s)/kG/Hr (3.5 mL/Hr) IV Continuous <Continuous>  norepinephrine Infusion 0.05 MICROgram(s)/kG/Min (6.56 mL/Hr) IV Continuous <Continuous>  piperacillin/tazobactam IVPB.. 3.375 Gram(s) IV Intermittent every 12 hours  propofol Infusion 5 MICROgram(s)/kG/Min (2.1 mL/Hr) IV Continuous <Continuous>  sevelamer carbonate 800 milliGRAM(s) Oral every 8 hours    MEDICATIONS  (PRN):      ALLERGIES:  Allergies    No Known Allergies    Intolerances        LABS:                        10.5   13.05 )-----------( 274      ( 07 Feb 2023 03:59 )             36.0     02-07    135  |  96<L>  |  67<H>  ----------------------------<  130<H>  5.6<H>   |  21<L>  |  3.88<H>    Ca    8.7      07 Feb 2023 03:59  Mg     2.40     02-07    TPro  7.6  /  Alb  3.6  /  TBili  1.1  /  DBili  x   /  AST  34  /  ALT  20  /  AlkPhos  830<H>  02-07    PT/INR - ( 07 Feb 2023 03:59 )   PT: 12.7 sec;   INR: 1.09 ratio         PTT - ( 07 Feb 2023 03:59 )  PTT:33.7 sec      RADIOLOGY & ADDITIONAL TESTS: Reviewed.

## 2023-02-10 NOTE — DIETITIAN INITIAL EVALUATION ADULT - PERTINENT MEDS FT
MEDICATIONS  (STANDING):  albuterol    90 MICROgram(s) HFA Inhaler 4 Puff(s) Inhalation every 6 hours  atorvastatin 20 milliGRAM(s) Oral at bedtime  chlorhexidine 0.12% Liquid 15 milliLiter(s) Oral Mucosa every 12 hours  chlorhexidine 2% Cloths 1 Application(s) Topical <User Schedule>  cloNIDine 0.2 milliGRAM(s) Oral every 8 hours  dexAMETHasone  Injectable 6 milliGRAM(s) IV Push daily  dextrose 5% + lactated ringers. 1000 milliLiter(s) (75 mL/Hr) IV Continuous <Continuous>  dextrose 5%. 1000 milliLiter(s) (50 mL/Hr) IV Continuous <Continuous>  dextrose 5%. 1000 milliLiter(s) (100 mL/Hr) IV Continuous <Continuous>  dextrose 50% Injectable 25 Gram(s) IV Push once  dextrose 50% Injectable 12.5 Gram(s) IV Push once  dextrose 50% Injectable 25 Gram(s) IV Push once  glucagon  Injectable 1 milliGRAM(s) IntraMuscular once  heparin   Injectable 5000 Unit(s) SubCutaneous every 8 hours  hydrALAZINE 100 milliGRAM(s) Oral every 8 hours  insulin lispro (ADMELOG) corrective regimen sliding scale   SubCutaneous every 6 hours  ipratropium 17 MICROgram(s) HFA Inhaler 2 Puff(s) Inhalation every 6 hours  isosorbide   dinitrate Tablet (ISORDIL) 10 milliGRAM(s) Oral <User Schedule>  losartan 100 milliGRAM(s) Oral daily  niCARdipine 20 milliGRAM(s) Oral every 8 hours  niCARdipine Infusion 5 mG/Hr (25 mL/Hr) IV Continuous <Continuous>  pantoprazole  Injectable 40 milliGRAM(s) IV Push daily  piperacillin/tazobactam IVPB.. 3.375 Gram(s) IV Intermittent every 12 hours  polyethylene glycol 3350 17 Gram(s) Oral two times a day  propofol Infusion 5.547 MICROgram(s)/kG/Min (2.1 mL/Hr) IV Continuous <Continuous>  senna 2 Tablet(s) Oral at bedtime  sevelamer carbonate Powder 800 milliGRAM(s) Oral three times a day    MEDICATIONS  (PRN):  dextrose Oral Gel 15 Gram(s) Oral once PRN Blood Glucose LESS THAN 70 milliGRAM(s)/deciliter  sodium chloride 0.9% Bolus 100 milliLiter(s) IV Bolus once PRN For SBP <90

## 2023-02-10 NOTE — DIETITIAN INITIAL EVALUATION ADULT - NSFNSGIIOFT_GEN_A_CORE
02-09-23 @ 07:01  -  02-10-23 @ 07:00  --------------------------------------------------------  OUT:  Total OUT: 0 mL    Total NET: 210 mL         02-09-23 @ 07:01  -  02-10-23 @ 07:00  --------------------------------------------------------  OUT:  Total OUT: 0 mL    Total NET: 210 mL      BM (2/10). On bowel regimen  No vomiting or abdominal distention @ this time.

## 2023-02-10 NOTE — DIETITIAN INITIAL EVALUATION ADULT - REASON FOR ADMISSION
56yo M w recent prior hospitalization at [place] (Jan 2023) for acute hypoxic respiratory failure and septic shock. Transferred to [facility] from [place] due hypotension and desaturation to 80s, s/p cardiac arrest. CXR showed L lung atelectasis likely 2/2 aspiration causing cardiac arret.  Intubated.       56yo M w Hx of bipolar d/o & recent prior hospitalization at [place] (Jan 2023) for acute hypoxic respiratory failure and septic shock. Transferred to [facility] from [place] due hypotension and desaturation to 80s, s/p cardiac arrest. CXR showed L lung atelectasis likely 2/2 aspiration causing cardiac arrest.  Intubated.  COVID+.

## 2023-02-11 NOTE — PROGRESS NOTE ADULT - SUBJECTIVE AND OBJECTIVE BOX
Ludwin Echevarria MD  Internal Medicine PGY-2      INTERVAL HPI/OVERNIGHT EVENTS: Extubated yesterday, tolerating diet    SUBJECTIVE:     REVIEW OF SYSTEMS:    CONSTITUTIONAL: No weakness, fevers or chills  EYES/ENT: No visual changes;  No vertigo or throat pain   NECK: No pain or stiffness  RESPIRATORY: No cough, wheezing, hemoptysis; No shortness of breath  CARDIOVASCULAR: No chest pain or palpitations  GASTROINTESTINAL: No abdominal or epigastric pain. No nausea, vomiting, or hematemesis; No diarrhea or constipation. No melena or hematochezia.  GENITOURINARY: No dysuria, frequency or hematuria  NEUROLOGICAL: No numbness or weakness  SKIN: No itching, rashes      OBJECTIVE:    VITAL SIGNS:  ICU Vital Signs Last 24 Hrs  T(C): 32.7 (07 Feb 2023 06:20), Max: 32.7 (07 Feb 2023 06:20)  T(F): 90.8 (07 Feb 2023 06:20), Max: 90.8 (07 Feb 2023 06:20)  HR: 40 (07 Feb 2023 06:45) (40 - 90)  BP: 190/70 (07 Feb 2023 07:11) (89/43 - 190/70)  BP(mean): 76 (07 Feb 2023 06:39) (72 - 82)  ABP: --  ABP(mean): --  RR: 18 (07 Feb 2023 06:45) (16 - 19)  SpO2: 98% (07 Feb 2023 06:45) (90% - 98%)    O2 Parameters below as of 07 Feb 2023 06:45  Patient On (Oxygen Delivery Method): ventilator          Mode: AC/ CMV (Assist Control/ Continuous Mandatory Ventilation), RR (machine): 18, TV (machine): 450, FiO2: 100, PEEP: 5, ITime: 0.9, PIP: 37    CAPILLARY BLOOD GLUCOSE      POCT Blood Glucose.: 135 mg/dL (07 Feb 2023 03:05)      PHYSICAL EXAM:    General: Intubated but awake and in no acute distress.  HEENT: NC/AT; PERRL, clear conjunctiva  Neck: supple  Respiratory: mechanical ventilation  Cardiovascular: +S1/S2; irregular rate and rhythm  Abdomen: soft, NT/ND; +BS x4  Extremities: WWP, 2+ peripheral pulses b/l; no LE edema. L arm fistula thrills  Skin: normal color and turgor; no rash  Neurological: A&Ox0 but awake and responsive to verbal, mildly responsive to pain      MEDICATIONS  (STANDING):  albuterol/ipratropium for Nebulization 3 milliLiter(s) Nebulizer every 6 hours  apixaban 2.5 milliGRAM(s) Oral every 12 hours  atorvastatin 20 milliGRAM(s) Oral at bedtime  chlorhexidine 0.12% Liquid 15 milliLiter(s) Oral Mucosa every 12 hours  fentaNYL   Infusion 0.5 MICROgram(s)/kG/Hr (3.5 mL/Hr) IV Continuous <Continuous>  norepinephrine Infusion 0.05 MICROgram(s)/kG/Min (6.56 mL/Hr) IV Continuous <Continuous>  piperacillin/tazobactam IVPB.. 3.375 Gram(s) IV Intermittent every 12 hours  propofol Infusion 5 MICROgram(s)/kG/Min (2.1 mL/Hr) IV Continuous <Continuous>  sevelamer carbonate 800 milliGRAM(s) Oral every 8 hours    MEDICATIONS  (PRN):      ALLERGIES:  Allergies    No Known Allergies    Intolerances        LABS:                        10.5   13.05 )-----------( 274      ( 07 Feb 2023 03:59 )             36.0     02-07    135  |  96<L>  |  67<H>  ----------------------------<  130<H>  5.6<H>   |  21<L>  |  3.88<H>    Ca    8.7      07 Feb 2023 03:59  Mg     2.40     02-07    TPro  7.6  /  Alb  3.6  /  TBili  1.1  /  DBili  x   /  AST  34  /  ALT  20  /  AlkPhos  830<H>  02-07    PT/INR - ( 07 Feb 2023 03:59 )   PT: 12.7 sec;   INR: 1.09 ratio         PTT - ( 07 Feb 2023 03:59 )  PTT:33.7 sec      RADIOLOGY & ADDITIONAL TESTS: Reviewed. Ludwin Echevarria MD  Internal Medicine PGY-2      INTERVAL HPI/OVERNIGHT EVENTS: Extubated yesterday, tolerating diet    REVIEW OF SYSTEMS:  CONSTITUTIONAL: No fever, chills  HEENT:  No blurry vision No sinus or throat pain  NECK: No pain or stiffness  RESPIRATORY: No cough, wheezing, chills or hemoptysis; No shortness of breath  CARDIOVASCULAR: No chest pain, palpitations  GASTROINTESTINAL: No abdominal pain. No nausea, vomiting, or diarrhea  GENITOURINARY: No dysuria  NEUROLOGICAL: No HA, No focal weakness  SKIN: No itching, burning, rashes, or lesions   MUSCULOSKELETAL: No joint pain or swelling; No muscle, back, or extremity pain    MEDICATIONS:  albuterol    90 MICROgram(s) HFA Inhaler 4 Puff(s) Inhalation every 6 hours  atorvastatin 20 milliGRAM(s) Oral at bedtime  chlorhexidine 2% Cloths 1 Application(s) Topical <User Schedule>  cloNIDine 0.2 milliGRAM(s) Oral every 8 hours  dexAMETHasone  Injectable 6 milliGRAM(s) IV Push daily  dextrose 5%. 1000 milliLiter(s) IV Continuous <Continuous>  dextrose 5%. 1000 milliLiter(s) IV Continuous <Continuous>  dextrose 50% Injectable 25 Gram(s) IV Push once  dextrose 50% Injectable 12.5 Gram(s) IV Push once  dextrose 50% Injectable 25 Gram(s) IV Push once  dextrose Oral Gel 15 Gram(s) Oral once PRN  glucagon  Injectable 1 milliGRAM(s) IntraMuscular once  heparin   Injectable 5000 Unit(s) SubCutaneous every 8 hours  hydrALAZINE 100 milliGRAM(s) Oral every 8 hours  insulin lispro (ADMELOG) corrective regimen sliding scale   SubCutaneous every 6 hours  ipratropium 17 MICROgram(s) HFA Inhaler 2 Puff(s) Inhalation every 6 hours  isosorbide   dinitrate Tablet (ISORDIL) 10 milliGRAM(s) Oral <User Schedule>  losartan 100 milliGRAM(s) Oral daily  melatonin 3 milliGRAM(s) Oral at bedtime PRN  niCARdipine 20 milliGRAM(s) Oral every 8 hours  pantoprazole  Injectable 40 milliGRAM(s) IV Push daily  piperacillin/tazobactam IVPB.. 3.375 Gram(s) IV Intermittent every 12 hours  polyethylene glycol 3350 17 Gram(s) Oral two times a day  senna 2 Tablet(s) Oral at bedtime  sevelamer carbonate Powder 800 milliGRAM(s) Oral three times a day  sodium chloride 0.9% Bolus 100 milliLiter(s) IV Bolus once PRN      T(C): 36.1 (02-11-23 @ 08:00), Max: 37.1 (02-11-23 @ 00:00)  HR: 54 (02-11-23 @ 10:00) (44 - 84)  BP: 173/64 (02-11-23 @ 10:00) (141/65 - 192/73)  RR: 16 (02-11-23 @ 10:00) (14 - 20)  SpO2: 98% (02-11-23 @ 10:00) (90% - 100%)  Wt(kg): --Vital Signs Last 24 Hrs  T(C): 36.1 (11 Feb 2023 08:00), Max: 37.1 (11 Feb 2023 00:00)  T(F): 97 (11 Feb 2023 08:00), Max: 98.7 (11 Feb 2023 00:00)  HR: 54 (11 Feb 2023 10:00) (44 - 84)  BP: 173/64 (11 Feb 2023 10:00) (141/65 - 192/73)  BP(mean): 96 (11 Feb 2023 10:00) (85 - 109)  RR: 16 (11 Feb 2023 10:00) (14 - 20)  SpO2: 98% (11 Feb 2023 10:00) (90% - 100%)    Parameters below as of 11 Feb 2023 10:00  Patient On (Oxygen Delivery Method): nasal cannula w/ humidification  O2 Flow (L/min): 4      PHYSICAL EXAM:  GENERAL: NAD, disheveled appearance, well-developed  HEAD:  Atraumatic, Normocephalic  EYES: EOMI, PERRLA, conjunctiva and sclera clear  ENMT:  Moist mucous membranes  NECK: Supple, No JVD,  CHEST/LUNG: RLL coarse lung sounds, otherwise lungs clear No rales, rhonchi, wheezing, or rubs  HEART: Regular rate and rhythm; No murmurs, rubs, or gallops  ABDOMEN: Soft, Nontender, Nondistended; Bowel sounds present  NEURO: Alert & Oriented X3  EXTREMITIES: No LE edema, no calf tenderness, L arm fistula noted  LYMPH: No lymphadenopathy noted  SKIN: No rashes or lesions    Consultant(s) Notes Reviewed:  [x ] YES  [ ] NO  Care Discussed with Consultants/Other Providers [ x] YES  [ ] NO    LABS:                        8.6    6.71  )-----------( 180      ( 11 Feb 2023 06:45 )             28.2     02-11    135  |  94<L>  |  46<H>  ----------------------------<  85  4.6   |  24  |  3.39<H>    Ca    8.1<L>      11 Feb 2023 06:45  Phos  6.5     02-11  Mg     2.20     02-11    TPro  6.1  /  Alb  2.8<L>  /  TBili  1.1  /  DBili  x   /  AST  36  /  ALT  18  /  AlkPhos  837<H>  02-11    PT/INR - ( 11 Feb 2023 00:00 )   PT: 11.4 sec;   INR: 0.98 ratio         PTT - ( 10 Feb 2023 00:05 )  PTT:29.1 sec    CAPILLARY BLOOD GLUCOSE      POCT Blood Glucose.: 78 mg/dL (11 Feb 2023 06:02)  POCT Blood Glucose.: 86 mg/dL (10 Feb 2023 23:53)  POCT Blood Glucose.: 99 mg/dL (10 Feb 2023 17:29)  POCT Blood Glucose.: 127 mg/dL (10 Feb 2023 13:15)      ABG - ( 10 Feb 2023 17:38 )  pH, Arterial: 7.38  pH, Blood: x     /  pCO2: 46    /  pO2: 165   / HCO3: 27    / Base Excess: 1.8   /  SaO2: 98.1        RADIOLOGY & ADDITIONAL TESTS:    Imaging Personally Reviewed:  [x ] YES  [ ] NO

## 2023-02-11 NOTE — PROGRESS NOTE ADULT - ASSESSMENT
57M PMH ESRD on HD 2/4/6, HTN, DM, bipolar, presented from [place] due to hypotension to 60/40s, desat to 80s, and lethargy, and then had cardiac arrest and was admitted to MICU after ROSC was achieved.     # NEUROLOGY  - A&Ox2-3, s/p intubation  - Hx of bipolar: takes zyprexa at home but will hold for now    # CARDIOLOGY    - off pressors  - Hx of HTN, given patient's improving mental status can start home medications - hydral 100mg TID.  - c/w clonidine 0.2 mg q8 and isordil 10mg TID  - Hx of A-Fib on eliquis  - will give PO cardine if hypertension still uncontrolled    # PULMONARY    - CXR showed L lung atelectasis, likely iso aspiration causing cardiac arrest  - asp PNA treatment: zosyn  - chest PT, albuterol increased to 4 puffs q6hr  - COVID +, treat with IV dexamethasone,  - bronchoscopy completed 2/7, large white/tan mucus plug in left mainstem bronchus aspirated, secretions cleared  - f/u CXR improved  - extubated successfully 2/10    # GASTROINTESTINAL   -can restart pureed diet  -bowel regimen PRN for constipation    # RENAL/UROLOGY    - Hx of ESRD T/Th/Sat w/ fistula  - can follow dialysis schedule  - renal consulted, received HD 2/7, HD stopped 2/9 AM as patient HR in 40's but not grossly overloaded, completed 2/9    # INFECTIOUS DISEASE    - in septic shock likely 2/2 aspiration pneumonia vs mucus plug that causes L lung atelectasis and eventually cardiac arrest  - WBC: 13k  - on abx: Zosyn  - on pressors, s/p 1L  - COVID: dexmethasone, no remdesevir    # ENDOCRINOLOGY    - cortisol AM level low 2.1 but on dexa 6    # HEMATOLOGY    - DVT PPX: will be on lovenox due to COVID    # ETHICS    - FULL CODE       57M PMH ESRD on HD 2/4/6, HTN, DM, bipolar, presented from [place] due to hypotension to 60/40s, desat to 80s, and lethargy, and then had cardiac arrest and was admitted to MICU after ROSC was achieved.     # NEUROLOGY  - A&Ox2-3, s/p intubation  - Hx of bipolar: takes zyprexa at home but will hold for now    # CARDIOLOGY    - off pressors  - Hx of HTN, given patient's improving mental status can start home medications - hydral 100mg TID.  - c/w clonidine 0.2 mg q8 and isordil 10mg TID, losartan 100qd  - Hx of A-Fib on eliquis  - on PO cardine 20 q8    # PULMONARY    - CXR showed L lung atelectasis, likely iso aspiration causing cardiac arrest  - asp PNA treatment: zosyn  - chest PT, albuterol increased to 4 puffs q6hr  - COVID +, treat with IV dexamethasone,  - bronchoscopy completed 2/7, large white/tan mucus plug in left mainstem bronchus aspirated, secretions cleared  - f/u CXR improved  - extubated successfully 2/10, gases improved    # GASTROINTESTINAL   -can restart pureed diet  -bowel regimen PRN for constipation    # RENAL/UROLOGY    - Hx of ESRD T/Th/Sat w/ fistula  - can follow dialysis schedule  - renal consulted, received HD 2/7, HD stopped 2/9 AM as patient HR in 40's but not grossly overloaded, completed 2/9, plan for HD 2/11    # INFECTIOUS DISEASE    - in septic shock likely 2/2 aspiration pneumonia vs mucus plug that causes L lung atelectasis and eventually cardiac arrest  - off pressors   - WBC: 13k  - on abx: Zosyn  - COVID: dexmethasone, no remdesevir    # ENDOCRINOLOGY    - cortisol AM level low 2.1 but on dexa 6    # HEMATOLOGY    - DVT PPX: will be on lovenox due to COVID    # ETHICS    - FULL CODE

## 2023-02-11 NOTE — PROGRESS NOTE ADULT - CRITICAL CARE SERVICES
Refill per VO of Dr. Turner Me  Last appt: Visit date not found  Future Appointments   Date Time Provider Shannon Hancock   1/26/2022  4:00 PM Gui Dunbar MD [facility]       Requested Prescriptions     Pending Prescriptions Disp Refills    carvediloL (COREG) 25 mg tablet [Pharmacy Med Name: CARVEDILOL 25 MG TABLET] 60 Tablet 1     Sig: TAKE 1 TABLET BY MOUTH TWO TIMES A DAY. 35

## 2023-02-11 NOTE — PROGRESS NOTE ADULT - PROBLEM SELECTOR PLAN 1
Pt. with ESRD on HD TIW (TTS) who presented for AMS and hypotension, had cardiac arrest in the ED and was admitted to the MICU. Pt. last received HD on 2/9/23 via LUE AVF. Labs reviewed. Will plan for maintenance HD today. Monitor labs and BP. Dose meds as per HD.

## 2023-02-11 NOTE — PROGRESS NOTE ADULT - SUBJECTIVE AND OBJECTIVE BOX
[facility] DIVISION OF KIDNEY DISEASES AND HYPERTENSION   FOLLOW UP NOTE  --------------------------------------------------------------------------------  HPI: 58 yo M with a PMH of ESRD on HD TIW (TTS), HTN, DM, anemia who presented to [facility] from [facility] for AMS and hypotension. While in the ED, he had cardiac arrest and was coded with ROSC achieved and was admitted to the MICU. Pt. being seen for ESRD/HD management. Pt. last  received HD on 2/9/23.     24 hour events/subjective: Pt. was seen and examined in the MICU. He was extubated on 2/10/23. He denied shortness of breath, fevers, chills, nausea, vomiting.     PAST HISTORY  --------------------------------------------------------------------------------  No significant changes to PMH, PSH, FHx, SHx, unless otherwise noted    ALLERGIES & MEDICATIONS  --------------------------------------------------------------------------------  Allergies  No Known Allergies    Standing Inpatient Medications  albuterol    90 MICROgram(s) HFA Inhaler 4 Puff(s) Inhalation every 6 hours  atorvastatin 20 milliGRAM(s) Oral at bedtime  chlorhexidine 2% Cloths 1 Application(s) Topical <User Schedule>  cloNIDine 0.2 milliGRAM(s) Oral every 8 hours  dexAMETHasone  Injectable 6 milliGRAM(s) IV Push daily  dextrose 5%. 1000 milliLiter(s) IV Continuous <Continuous>  dextrose 5%. 1000 milliLiter(s) IV Continuous <Continuous>  dextrose 50% Injectable 25 Gram(s) IV Push once  dextrose 50% Injectable 12.5 Gram(s) IV Push once  dextrose 50% Injectable 25 Gram(s) IV Push once  glucagon  Injectable 1 milliGRAM(s) IntraMuscular once  heparin   Injectable 5000 Unit(s) SubCutaneous every 8 hours  hydrALAZINE 100 milliGRAM(s) Oral every 8 hours  insulin lispro (ADMELOG) corrective regimen sliding scale   SubCutaneous every 6 hours  ipratropium 17 MICROgram(s) HFA Inhaler 2 Puff(s) Inhalation every 6 hours  isosorbide   dinitrate Tablet (ISORDIL) 10 milliGRAM(s) Oral <User Schedule>  losartan 100 milliGRAM(s) Oral daily  niCARdipine 20 milliGRAM(s) Oral every 8 hours  pantoprazole  Injectable 40 milliGRAM(s) IV Push daily  piperacillin/tazobactam IVPB.. 3.375 Gram(s) IV Intermittent every 12 hours  polyethylene glycol 3350 17 Gram(s) Oral two times a day  senna 2 Tablet(s) Oral at bedtime  sevelamer carbonate Powder 800 milliGRAM(s) Oral three times a day    PRN Inpatient Medications  dextrose Oral Gel 15 Gram(s) Oral once PRN  melatonin 3 milliGRAM(s) Oral at bedtime PRN  sodium chloride 0.9% Bolus 100 milliLiter(s) IV Bolus once PRN    REVIEW OF SYSTEMS  --------------------------------------------------------------------------------  Gen: No fevers/chills  Head/Eyes/Ears: No HA   Respiratory: No dyspnea, cough  CV: No chest pain  GI: No abdominal pain, diarrhea  : No dysuria, hematuria  MSK: No edema  Skin: No rash  Heme: No easy bruising or bleeding    All other systems were reviewed and are negative, except as noted.    VITALS/PHYSICAL EXAM  --------------------------------------------------------------------------------  T(C): 36.1 (02-11-23 @ 08:00), Max: 37.1 (02-11-23 @ 00:00)  HR: 84 (02-11-23 @ 09:47) (44 - 84)  BP: 184/64 (02-11-23 @ 09:00) (141/65 - 192/73)  RR: 15 (02-11-23 @ 09:00) (14 - 20)  SpO2: 98% (02-11-23 @ 09:47) (90% - 100%)  Wt(kg): --    02-10-23 @ 07:01  -  02-11-23 @ 07:00  --------------------------------------------------------  IN: 190 mL / OUT: 0 mL / NET: 190 mL    Physical Exam:  	Gen: Laying in bed and in NAD  	HEENT: Anicteric  	Pulm: CTA B/L  	CV: S1S2+, bradycardic at ~50bpm   	Abd: Soft, Diminished bowel sounds    	Ext: No LE edema B/L  	Neuro: Awake and alert   	Skin: Warm and dry  	Dialysis access: LUE AVF with palpable pulse and bruit heard    LABS/STUDIES  --------------------------------------------------------------------------------              8.6    6.71  >-----------<  180      [02-11-23 @ 06:45]              28.2     135  |  94  |  46  ----------------------------<  85      [02-11-23 @ 06:45]  4.6   |  24  |  3.39        Ca     8.1     [02-11-23 @ 06:45]      Mg     2.20     [02-11-23 @ 00:00]      Phos  6.5     [02-11-23 @ 00:00]    TPro  6.1  /  Alb  2.8  /  TBili  1.1  /  DBili  x   /  AST  36  /  ALT  18  /  AlkPhos  837  [02-11-23 @ 06:45]    PT/INR: PT 11.4 , INR 0.98       [02-11-23 @ 00:00]  PTT: 29.1       [02-10-23 @ 00:05]    Creatinine Trend:  SCr 3.39 [02-11 @ 06:45]  SCr 2.55 [02-10 @ 00:20]  SCr 3.37 [02-09 @ 00:26]  SCr 2.72 [02-08 @ 00:31]  SCr 3.83 [02-07 @ 10:32]

## 2023-02-11 NOTE — CHART NOTE - NSCHARTNOTEFT_GEN_A_CORE
MICU DOWN GRADE NOTE    ----------------------------------------      Transfer from: MICU   Transfer to: ( x ) Medicine         (   ) Telemetry         (   )  RCU     (   ) Palliative          (    ) Stroke Unit    Accepting Physician:  ----------------------------------------      Patient is a 57y old  Male who presents with a chief complaint of Hypotension, lethargy (11 Feb 2023 10:11)      HPI:  57M PMH ESRD on HD 2/4/6, HTN, DM, bipolar, presented from [place] (after being discharged due to AHRF and septic shock from [place] on 1/14 and is on 2L while at [place]) due to hypotension to 60/40s, desat to 80s, and lethargy, and then had cardiac arrest and was admitted to MICU after ROSC was achieved.     Per interaction w/ RN who took care of him, he is A&OX3 at his baseline and usually feeds himself and has no difficulty eating. However this AM he is more lethargic and was hypotensive. At his baseline he was using 2L O2 but his O2 sat dropped. Has had little cough but nothing overt. Then Dhruv decided to transfer him to [facility] and while he was being transferred he also vomited.    As he was in the ED he had desats and therefore required oxygen NC. Pt was found COVID +ve. Received vanc and zosyn. VBG before code blue showed respiratory acidosis (pCO2 86, pO2 43, pH 7.1). CXR showed complete L lung atelectasis. EKG at admission showed Mobitz type 1 block. His HR then went to 30s and then pt had code blue. ROSC was achieved after CPR and epi and he was admitted to the MICU for post-arrest care and evaluation after intubation. He was started on zosyn for    INTERVAL HPI/OVERNIGHT EVENTS:        REVIEW OF SYSTEMS:  CONSTITUTIONAL: No fever, chills  HEENT:  No blurry vision No sinus or throat pain  NECK: No pain or stiffness  RESPIRATORY: No cough, wheezing, chills or hemoptysis; No shortness of breath  CARDIOVASCULAR: No chest pain, palpitations  GASTROINTESTINAL: No abdominal pain. No nausea, vomiting, or diarrhea  GENITOURINARY: No dysuria  NEUROLOGICAL: No HA, No focal weakness  SKIN: No itching, burning, rashes, or lesions   MUSCULOSKELETAL: No joint pain or swelling; No muscle, back, or extremity pain    MEDICATIONS:  albuterol    90 MICROgram(s) HFA Inhaler 4 Puff(s) Inhalation every 6 hours  atorvastatin 20 milliGRAM(s) Oral at bedtime  chlorhexidine 2% Cloths 1 Application(s) Topical <User Schedule>  cloNIDine 0.2 milliGRAM(s) Oral every 8 hours  dexAMETHasone  Injectable 6 milliGRAM(s) IV Push daily  dextrose 5%. 1000 milliLiter(s) IV Continuous <Continuous>  dextrose 5%. 1000 milliLiter(s) IV Continuous <Continuous>  dextrose 50% Injectable 25 Gram(s) IV Push once  dextrose 50% Injectable 12.5 Gram(s) IV Push once  dextrose 50% Injectable 25 Gram(s) IV Push once  dextrose Oral Gel 15 Gram(s) Oral once PRN  glucagon  Injectable 1 milliGRAM(s) IntraMuscular once  heparin   Injectable 5000 Unit(s) SubCutaneous every 8 hours  hydrALAZINE 100 milliGRAM(s) Oral every 8 hours  insulin lispro (ADMELOG) corrective regimen sliding scale   SubCutaneous every 6 hours  ipratropium 17 MICROgram(s) HFA Inhaler 2 Puff(s) Inhalation every 6 hours  isosorbide   dinitrate Tablet (ISORDIL) 10 milliGRAM(s) Oral <User Schedule>  losartan 100 milliGRAM(s) Oral daily  melatonin 3 milliGRAM(s) Oral at bedtime PRN  niCARdipine 20 milliGRAM(s) Oral every 8 hours  pantoprazole  Injectable 40 milliGRAM(s) IV Push daily  piperacillin/tazobactam IVPB.. 3.375 Gram(s) IV Intermittent every 12 hours  polyethylene glycol 3350 17 Gram(s) Oral two times a day  senna 2 Tablet(s) Oral at bedtime  sevelamer carbonate Powder 800 milliGRAM(s) Oral three times a day  sodium chloride 0.9% Bolus 100 milliLiter(s) IV Bolus once PRN      T(C): 36.1 (02-11-23 @ 08:00), Max: 37.1 (02-11-23 @ 00:00)  HR: 54 (02-11-23 @ 10:00) (44 - 84)  BP: 173/64 (02-11-23 @ 10:00) (141/65 - 192/73)  RR: 16 (02-11-23 @ 10:00) (14 - 20)  SpO2: 98% (02-11-23 @ 10:00) (90% - 100%)  Wt(kg): --Vital Signs Last 24 Hrs  T(C): 36.1 (11 Feb 2023 08:00), Max: 37.1 (11 Feb 2023 00:00)  T(F): 97 (11 Feb 2023 08:00), Max: 98.7 (11 Feb 2023 00:00)  HR: 54 (11 Feb 2023 10:00) (44 - 84)  BP: 173/64 (11 Feb 2023 10:00) (141/65 - 192/73)  BP(mean): 96 (11 Feb 2023 10:00) (85 - 109)  RR: 16 (11 Feb 2023 10:00) (14 - 20)  SpO2: 98% (11 Feb 2023 10:00) (90% - 100%)    Parameters below as of 11 Feb 2023 10:00  Patient On (Oxygen Delivery Method): nasal cannula w/ humidification  O2 Flow (L/min): 4      PHYSICAL EXAM:  GENERAL: NAD, well-groomed, well-developed  HEAD:  Atraumatic, Normocephalic  EYES: EOMI, PERRLA, conjunctiva and sclera clear  ENMT:  Moist mucous membranes  NECK: Supple, No JVD,  CHEST/LUNG: Clear to auscultation bilaterally; No rales, rhonchi, wheezing, or rubs  HEART: Regular rate and rhythm; No murmurs, rubs, or gallops  ABDOMEN: Soft, Nontender, Nondistended; Bowel sounds present  NEURO: Alert & Oriented X3  EXTREMITIES: No LE edema, no calf tenderness  LYMPH: No lymphadenopathy noted  SKIN: No rashes or lesions    Consultant(s) Notes Reviewed:  [x ] YES  [ ] NO  Care Discussed with Consultants/Other Providers [ x] YES  [ ] NO    LABS:                        8.6    6.71  )-----------( 180      ( 11 Feb 2023 06:45 )             28.2     02-11    135  |  94<L>  |  46<H>  ----------------------------<  85  4.6   |  24  |  3.39<H>    Ca    8.1<L>      11 Feb 2023 06:45  Phos  6.5     02-11  Mg     2.20     02-11    TPro  6.1  /  Alb  2.8<L>  /  TBili  1.1  /  DBili  x   /  AST  36  /  ALT  18  /  AlkPhos  837<H>  02-11    PT/INR - ( 11 Feb 2023 00:00 )   PT: 11.4 sec;   INR: 0.98 ratio         PTT - ( 10 Feb 2023 00:05 )  PTT:29.1 sec    CAPILLARY BLOOD GLUCOSE      POCT Blood Glucose.: 78 mg/dL (11 Feb 2023 06:02)  POCT Blood Glucose.: 86 mg/dL (10 Feb 2023 23:53)  POCT Blood Glucose.: 99 mg/dL (10 Feb 2023 17:29)  POCT Blood Glucose.: 127 mg/dL (10 Feb 2023 13:15)      ABG - ( 10 Feb 2023 17:38 )  pH, Arterial: 7.38  pH, Blood: x     /  pCO2: 46    /  pO2: 165   / HCO3: 27    / Base Excess: 1.8   /  SaO2: 98.1                  RADIOLOGY & ADDITIONAL TESTS:    Imaging Personally Reviewed:  [x ] YES  [ ] NO MICU DOWN GRADE NOTE    ----------------------------------------      Transfer from: MICU   Transfer to: ( x ) Medicine         (   ) Telemetry         (   )  RCU     (   ) Palliative          (    ) Stroke Unit    Accepting Physician:  ----------------------------------------      Patient is a 57y old  Male who presents with a chief complaint of Hypotension, lethargy (11 Feb 2023 10:11)      HPI:  57M PMH ESRD on HD 2/4/6, HTN, DM, bipolar, presented from [place] (after being discharged due to AHRF and septic shock from [place] on 1/14 and is on 2L while at [place]) due to hypotension to 60/40s, desat to 80s, and lethargy, and then had cardiac arrest and was admitted to MICU after ROSC was achieved.     Per interaction w/ RN who took care of him, he is A&OX3 at his baseline and usually feeds himself and has no difficulty eating. However this AM he is more lethargic and was hypotensive. At his baseline he was using 2L O2 but his O2 sat dropped. Has had little cough but nothing overt. Then Dhruv decided to transfer him to [facility] and while he was being transferred he also vomited.    As he was in the ED he had desats and therefore required oxygen NC. Pt was found COVID +ve. Received vanc and zosyn. VBG before code blue showed respiratory acidosis (pCO2 86, pO2 43, pH 7.1). CXR showed complete L lung atelectasis. EKG at admission showed Mobitz type 1 block. His HR then went to 30s and then pt had code blue. ROSC was achieved after CPR and epi and he was admitted to the MICU for post-arrest care and evaluation after intubation. He was started on zosyn for possible aspiration pneumonia and was found to be COVID+ and was started on dexamethasone. Course complicated by persistent hypertension to 190's-200's that was controlled with cardine drip - pressures improved and patient was able to be weaned off sedation and extubated, tolerating PO diet well with appropriate oxygenation on 2LNC.    REVIEW OF SYSTEMS:  CONSTITUTIONAL: No fever, chills  HEENT:  No blurry vision No sinus or throat pain  NECK: No pain or stiffness  RESPIRATORY: No cough, wheezing, chills or hemoptysis; No shortness of breath  CARDIOVASCULAR: No chest pain, palpitations  GASTROINTESTINAL: No abdominal pain. No nausea, vomiting, or diarrhea  GENITOURINARY: No dysuria  NEUROLOGICAL: No HA, No focal weakness  SKIN: No itching, burning, rashes, or lesions   MUSCULOSKELETAL: No joint pain or swelling; No muscle, back, or extremity pain    MEDICATIONS:  albuterol    90 MICROgram(s) HFA Inhaler 4 Puff(s) Inhalation every 6 hours  atorvastatin 20 milliGRAM(s) Oral at bedtime  chlorhexidine 2% Cloths 1 Application(s) Topical <User Schedule>  cloNIDine 0.2 milliGRAM(s) Oral every 8 hours  dexAMETHasone  Injectable 6 milliGRAM(s) IV Push daily  dextrose 5%. 1000 milliLiter(s) IV Continuous <Continuous>  dextrose 5%. 1000 milliLiter(s) IV Continuous <Continuous>  dextrose 50% Injectable 25 Gram(s) IV Push once  dextrose 50% Injectable 12.5 Gram(s) IV Push once  dextrose 50% Injectable 25 Gram(s) IV Push once  dextrose Oral Gel 15 Gram(s) Oral once PRN  glucagon  Injectable 1 milliGRAM(s) IntraMuscular once  heparin   Injectable 5000 Unit(s) SubCutaneous every 8 hours  hydrALAZINE 100 milliGRAM(s) Oral every 8 hours  insulin lispro (ADMELOG) corrective regimen sliding scale   SubCutaneous every 6 hours  ipratropium 17 MICROgram(s) HFA Inhaler 2 Puff(s) Inhalation every 6 hours  isosorbide   dinitrate Tablet (ISORDIL) 10 milliGRAM(s) Oral <User Schedule>  losartan 100 milliGRAM(s) Oral daily  melatonin 3 milliGRAM(s) Oral at bedtime PRN  niCARdipine 20 milliGRAM(s) Oral every 8 hours  pantoprazole  Injectable 40 milliGRAM(s) IV Push daily  piperacillin/tazobactam IVPB.. 3.375 Gram(s) IV Intermittent every 12 hours  polyethylene glycol 3350 17 Gram(s) Oral two times a day  senna 2 Tablet(s) Oral at bedtime  sevelamer carbonate Powder 800 milliGRAM(s) Oral three times a day  sodium chloride 0.9% Bolus 100 milliLiter(s) IV Bolus once PRN      T(C): 36.1 (02-11-23 @ 08:00), Max: 37.1 (02-11-23 @ 00:00)  HR: 54 (02-11-23 @ 10:00) (44 - 84)  BP: 173/64 (02-11-23 @ 10:00) (141/65 - 192/73)  RR: 16 (02-11-23 @ 10:00) (14 - 20)  SpO2: 98% (02-11-23 @ 10:00) (90% - 100%)  Wt(kg): --Vital Signs Last 24 Hrs  T(C): 36.1 (11 Feb 2023 08:00), Max: 37.1 (11 Feb 2023 00:00)  T(F): 97 (11 Feb 2023 08:00), Max: 98.7 (11 Feb 2023 00:00)  HR: 54 (11 Feb 2023 10:00) (44 - 84)  BP: 173/64 (11 Feb 2023 10:00) (141/65 - 192/73)  BP(mean): 96 (11 Feb 2023 10:00) (85 - 109)  RR: 16 (11 Feb 2023 10:00) (14 - 20)  SpO2: 98% (11 Feb 2023 10:00) (90% - 100%)    Parameters below as of 11 Feb 2023 10:00  Patient On (Oxygen Delivery Method): nasal cannula w/ humidification  O2 Flow (L/min): 4      PHYSICAL EXAM:  GENERAL: NAD, disheveled appearance, well-developed  HEAD:  Atraumatic, Normocephalic  EYES: EOMI, PERRLA, conjunctiva and sclera clear  ENMT:  Moist mucous membranes  NECK: Supple, No JVD,  CHEST/LUNG: RLL coarse lung sounds, otherwise lungs clear No rales, rhonchi, wheezing, or rubs  HEART: Regular rate and rhythm; No murmurs, rubs, or gallops  ABDOMEN: Soft, Nontender, Nondistended; Bowel sounds present  NEURO: Alert & Oriented X3  EXTREMITIES: No LE edema, no calf tenderness, L arm fistula noted  LYMPH: No lymphadenopathy noted  SKIN: No rashes or lesions    Consultant(s) Notes Reviewed:  [x ] YES  [ ] NO  Care Discussed with Consultants/Other Providers [ x] YES  [ ] NO    LABS:                        8.6    6.71  )-----------( 180      ( 11 Feb 2023 06:45 )             28.2     02-11    135  |  94<L>  |  46<H>  ----------------------------<  85  4.6   |  24  |  3.39<H>    Ca    8.1<L>      11 Feb 2023 06:45  Phos  6.5     02-11  Mg     2.20     02-11    TPro  6.1  /  Alb  2.8<L>  /  TBili  1.1  /  DBili  x   /  AST  36  /  ALT  18  /  AlkPhos  837<H>  02-11    PT/INR - ( 11 Feb 2023 00:00 )   PT: 11.4 sec;   INR: 0.98 ratio         PTT - ( 10 Feb 2023 00:05 )  PTT:29.1 sec    CAPILLARY BLOOD GLUCOSE      POCT Blood Glucose.: 78 mg/dL (11 Feb 2023 06:02)  POCT Blood Glucose.: 86 mg/dL (10 Feb 2023 23:53)  POCT Blood Glucose.: 99 mg/dL (10 Feb 2023 17:29)  POCT Blood Glucose.: 127 mg/dL (10 Feb 2023 13:15)      ABG - ( 10 Feb 2023 17:38 )  pH, Arterial: 7.38  pH, Blood: x     /  pCO2: 46    /  pO2: 165   / HCO3: 27    / Base Excess: 1.8   /  SaO2: 98.1        RADIOLOGY & ADDITIONAL TESTS:    Imaging Personally Reviewed:  [x ] YES  [ ] NO      ASSESSMENT AND PLAN:    57M PMH ESRD on HD 2/4/6, HTN, DM, bipolar, presented from [place] due to hypotension to 60/40s, desat to 80s, and lethargy, and then had cardiac arrest and was admitted to MICU after ROSC was achieved.     # NEUROLOGY  - A&Ox2-3, s/p intubation  - Hx of bipolar: takes zyprexa at home but will hold for now    # CARDIOLOGY    - off pressors  - Hx of HTN, given patient's improving mental status can start home medications - hydral 100mg TID.  - c/w clonidine 0.2 mg q8 and isordil 10mg TID, losartan 100qd  - Hx of A-Fib on eliquis  - on PO cardine 20 q8    # PULMONARY    - CXR showed L lung atelectasis, likely iso aspiration causing cardiac arrest  - asp PNA treatment: zosyn  - chest PT, albuterol increased to 4 puffs q6hr  - COVID +, treat with IV dexamethasone,  - bronchoscopy completed 2/7, large white/tan mucus plug in left mainstem bronchus aspirated, secretions cleared  - f/u CXR improved  - extubated successfully 2/10, gases improved    # GASTROINTESTINAL   -can restart pureed diet  -bowel regimen PRN for constipation    # RENAL/UROLOGY    - Hx of ESRD T/Th/Sat w/ fistula  - can follow dialysis schedule  - renal consulted, received HD 2/7, HD stopped 2/9 AM as patient HR in 40's but not grossly overloaded, completed 2/9, plan for HD 2/11    # INFECTIOUS DISEASE    - in septic shock likely 2/2 aspiration pneumonia vs mucus plug that causes L lung atelectasis and eventually cardiac arrest  - off pressors   - WBC: 13k  - on abx: Zosyn  - COVID: dexmethasone, no remdesevir    # ENDOCRINOLOGY    - cortisol AM level low 2.1 but on dexa 6    # HEMATOLOGY    - DVT PPX: will be on lovenox due to COVID    # ETHICS    - FULL CODE    To Do's:  [ ] consider restarting zyprexa home med for bipolar  [ ] HD as per renal  [ ] PT recs (otherwise consulted)  [ ] monitor BP - on PO cardine, consider switching isosordil nitrate to ER formulation  [ ] c/w dexamethasone for COVID  [ ] zosyn for aspiration pneumonia MICU DOWN GRADE NOTE    ----------------------------------------      Transfer from: MICU   Transfer to: ( x ) Medicine         (   ) Telemetry         (   )  RCU     (   ) Palliative          (    ) Stroke Unit    Accepting Physician: Jana  ----------------------------------------      Patient is a 57y old  Male who presents with a chief complaint of Hypotension, lethargy (11 Feb 2023 10:11)      HPI:  57M PMH ESRD on HD 2/4/6, HTN, DM, bipolar, presented from [place] (after being discharged due to AHRF and septic shock from [place] on 1/14 and is on 2L while at [place]) due to hypotension to 60/40s, desat to 80s, and lethargy, and then had cardiac arrest and was admitted to MICU after ROSC was achieved.     Per interaction w/ RN who took care of him, he is A&OX3 at his baseline and usually feeds himself and has no difficulty eating. However this AM he is more lethargic and was hypotensive. At his baseline he was using 2L O2 but his O2 sat dropped. Has had little cough but nothing overt. Then Dhruv decided to transfer him to [facility] and while he was being transferred he also vomited.    As he was in the ED he had desats and therefore required oxygen NC. Pt was found COVID +ve. Received vanc and zosyn. VBG before code blue showed respiratory acidosis (pCO2 86, pO2 43, pH 7.1). CXR showed complete L lung atelectasis. EKG at admission showed Mobitz type 1 block. His HR then went to 30s and then pt had code blue. ROSC was achieved after CPR and epi and he was admitted to the MICU for post-arrest care and evaluation after intubation. He was started on zosyn for possible aspiration pneumonia and was found to be COVID+ and was started on dexamethasone. Course complicated by persistent hypertension to 190's-200's that was controlled with cardine drip - pressures improved and patient was able to be weaned off sedation and extubated, tolerating PO diet well with appropriate oxygenation on 2LNC.    REVIEW OF SYSTEMS:  CONSTITUTIONAL: No fever, chills  HEENT:  No blurry vision No sinus or throat pain  NECK: No pain or stiffness  RESPIRATORY: No cough, wheezing, chills or hemoptysis; No shortness of breath  CARDIOVASCULAR: No chest pain, palpitations  GASTROINTESTINAL: No abdominal pain. No nausea, vomiting, or diarrhea  GENITOURINARY: No dysuria  NEUROLOGICAL: No HA, No focal weakness  SKIN: No itching, burning, rashes, or lesions   MUSCULOSKELETAL: No joint pain or swelling; No muscle, back, or extremity pain    MEDICATIONS:  albuterol    90 MICROgram(s) HFA Inhaler 4 Puff(s) Inhalation every 6 hours  atorvastatin 20 milliGRAM(s) Oral at bedtime  chlorhexidine 2% Cloths 1 Application(s) Topical <User Schedule>  cloNIDine 0.2 milliGRAM(s) Oral every 8 hours  dexAMETHasone  Injectable 6 milliGRAM(s) IV Push daily  dextrose 5%. 1000 milliLiter(s) IV Continuous <Continuous>  dextrose 5%. 1000 milliLiter(s) IV Continuous <Continuous>  dextrose 50% Injectable 25 Gram(s) IV Push once  dextrose 50% Injectable 12.5 Gram(s) IV Push once  dextrose 50% Injectable 25 Gram(s) IV Push once  dextrose Oral Gel 15 Gram(s) Oral once PRN  glucagon  Injectable 1 milliGRAM(s) IntraMuscular once  heparin   Injectable 5000 Unit(s) SubCutaneous every 8 hours  hydrALAZINE 100 milliGRAM(s) Oral every 8 hours  insulin lispro (ADMELOG) corrective regimen sliding scale   SubCutaneous every 6 hours  ipratropium 17 MICROgram(s) HFA Inhaler 2 Puff(s) Inhalation every 6 hours  isosorbide   dinitrate Tablet (ISORDIL) 10 milliGRAM(s) Oral <User Schedule>  losartan 100 milliGRAM(s) Oral daily  melatonin 3 milliGRAM(s) Oral at bedtime PRN  niCARdipine 20 milliGRAM(s) Oral every 8 hours  pantoprazole  Injectable 40 milliGRAM(s) IV Push daily  piperacillin/tazobactam IVPB.. 3.375 Gram(s) IV Intermittent every 12 hours  polyethylene glycol 3350 17 Gram(s) Oral two times a day  senna 2 Tablet(s) Oral at bedtime  sevelamer carbonate Powder 800 milliGRAM(s) Oral three times a day  sodium chloride 0.9% Bolus 100 milliLiter(s) IV Bolus once PRN      T(C): 36.1 (02-11-23 @ 08:00), Max: 37.1 (02-11-23 @ 00:00)  HR: 54 (02-11-23 @ 10:00) (44 - 84)  BP: 173/64 (02-11-23 @ 10:00) (141/65 - 192/73)  RR: 16 (02-11-23 @ 10:00) (14 - 20)  SpO2: 98% (02-11-23 @ 10:00) (90% - 100%)  Wt(kg): --Vital Signs Last 24 Hrs  T(C): 36.1 (11 Feb 2023 08:00), Max: 37.1 (11 Feb 2023 00:00)  T(F): 97 (11 Feb 2023 08:00), Max: 98.7 (11 Feb 2023 00:00)  HR: 54 (11 Feb 2023 10:00) (44 - 84)  BP: 173/64 (11 Feb 2023 10:00) (141/65 - 192/73)  BP(mean): 96 (11 Feb 2023 10:00) (85 - 109)  RR: 16 (11 Feb 2023 10:00) (14 - 20)  SpO2: 98% (11 Feb 2023 10:00) (90% - 100%)    Parameters below as of 11 Feb 2023 10:00  Patient On (Oxygen Delivery Method): nasal cannula w/ humidification  O2 Flow (L/min): 4      PHYSICAL EXAM:  GENERAL: NAD, disheveled appearance, well-developed  HEAD:  Atraumatic, Normocephalic  EYES: EOMI, PERRLA, conjunctiva and sclera clear  ENMT:  Moist mucous membranes  NECK: Supple, No JVD,  CHEST/LUNG: RLL coarse lung sounds, otherwise lungs clear No rales, rhonchi, wheezing, or rubs  HEART: Regular rate and rhythm; No murmurs, rubs, or gallops  ABDOMEN: Soft, Nontender, Nondistended; Bowel sounds present  NEURO: Alert & Oriented X3  EXTREMITIES: No LE edema, no calf tenderness, L arm fistula noted  LYMPH: No lymphadenopathy noted  SKIN: No rashes or lesions    Consultant(s) Notes Reviewed:  [x ] YES  [ ] NO  Care Discussed with Consultants/Other Providers [ x] YES  [ ] NO    LABS:                        8.6    6.71  )-----------( 180      ( 11 Feb 2023 06:45 )             28.2     02-11    135  |  94<L>  |  46<H>  ----------------------------<  85  4.6   |  24  |  3.39<H>    Ca    8.1<L>      11 Feb 2023 06:45  Phos  6.5     02-11  Mg     2.20     02-11    TPro  6.1  /  Alb  2.8<L>  /  TBili  1.1  /  DBili  x   /  AST  36  /  ALT  18  /  AlkPhos  837<H>  02-11    PT/INR - ( 11 Feb 2023 00:00 )   PT: 11.4 sec;   INR: 0.98 ratio         PTT - ( 10 Feb 2023 00:05 )  PTT:29.1 sec    CAPILLARY BLOOD GLUCOSE      POCT Blood Glucose.: 78 mg/dL (11 Feb 2023 06:02)  POCT Blood Glucose.: 86 mg/dL (10 Feb 2023 23:53)  POCT Blood Glucose.: 99 mg/dL (10 Feb 2023 17:29)  POCT Blood Glucose.: 127 mg/dL (10 Feb 2023 13:15)      ABG - ( 10 Feb 2023 17:38 )  pH, Arterial: 7.38  pH, Blood: x     /  pCO2: 46    /  pO2: 165   / HCO3: 27    / Base Excess: 1.8   /  SaO2: 98.1        RADIOLOGY & ADDITIONAL TESTS:    Imaging Personally Reviewed:  [x ] YES  [ ] NO      ASSESSMENT AND PLAN:    57M PMH ESRD on HD 2/4/6, HTN, DM, bipolar, presented from [place] due to hypotension to 60/40s, desat to 80s, and lethargy, and then had cardiac arrest and was admitted to MICU after ROSC was achieved.     # NEUROLOGY  - A&Ox2-3, s/p intubation  - Hx of bipolar: takes zyprexa at home but will hold for now    # CARDIOLOGY    - off pressors  - Hx of HTN, given patient's improving mental status can start home medications - hydral 100mg TID.  - c/w clonidine 0.2 mg q8 and isordil 10mg TID, losartan 100qd  - Hx of A-Fib on eliquis  - on PO cardine 20 q8    # PULMONARY    - CXR showed L lung atelectasis, likely iso aspiration causing cardiac arrest  - asp PNA treatment: zosyn  - chest PT, albuterol increased to 4 puffs q6hr  - COVID +, treat with IV dexamethasone,  - bronchoscopy completed 2/7, large white/tan mucus plug in left mainstem bronchus aspirated, secretions cleared  - f/u CXR improved  - extubated successfully 2/10, gases improved    # GASTROINTESTINAL   -can restart pureed diet  -bowel regimen PRN for constipation    # RENAL/UROLOGY    - Hx of ESRD T/Th/Sat w/ fistula  - can follow dialysis schedule  - renal consulted, received HD 2/7, HD stopped 2/9 AM as patient HR in 40's but not grossly overloaded, completed 2/9, plan for HD 2/11    # INFECTIOUS DISEASE    - in septic shock likely 2/2 aspiration pneumonia vs mucus plug that causes L lung atelectasis and eventually cardiac arrest  - off pressors   - WBC: 13k  - on abx: Zosyn  - COVID: dexmethasone, no remdesevir    # ENDOCRINOLOGY    - cortisol AM level low 2.1 but on dexa 6    # HEMATOLOGY    - DVT PPX: will be on lovenox due to COVID    # ETHICS    - FULL CODE    To Do's:  [ ] consider restarting zyprexa home med for bipolar  [ ] HD as per renal  [ ] PT recs (otherwise consulted)  [ ] monitor BP - on PO cardine, consider switching isosordil nitrate to ER formulation (currently on TID)  [ ] c/w dexamethasone for COVID  [ ] zosyn for aspiration pneumonia

## 2023-02-11 NOTE — PROGRESS NOTE ADULT - ATTENDING COMMENTS
seen and evaluated in MICU reviewed vital signs / hemodynamics labs meds and chart.  Plan will be for HD today with UF as tolerated.  will monitor hgb and determine need for ALCIDES.

## 2023-02-11 NOTE — PROGRESS NOTE ADULT - ATTENDING COMMENTS
57-year-old male with significant past medical history of end-stage renal disease on hemodialysis and bipolar disorder who comes in from [facility] due to hypotension was found to have cardiac arrest and admitted to MICU for acute hypoxic respiratory failure in the setting of cardiac arrest.  ROSC was achieved and the patient's neurological function had improved.  Patient was successfully extubated on February 10.  Patient is undergoing hemodialysis without any significant issue.    Rest of plan as per above.  The patient tolerates hemodialysis can go to the medical floor.

## 2023-02-12 NOTE — PROGRESS NOTE ADULT - PROBLEM SELECTOR PLAN 1
Found in septic shock on admission, likely 2/2 aspiration pneumonia vs mucus plug that causes L lung atelectasis and eventually cardiac arrest. CXR showed L lung atelectasis, likely iso aspiration causing cardiac arrest. Covid+ (2/7), BCx (2/7) NGTD, MRSA- (2/7). Bronchoscopy (2/7) large white/tan mucus plug in left mainstem bronchus aspirated, secretions cleared. Repeat CXR improved.  s/p MICU course 2/7-12 extubated 2/10, off pressors, stable on 4L NC.    - c/w zosyn for 7 day course total (2/7 - )  - c/w dexamethasone for Covid PNA 10 day course (2/7 - )  - c/w chest PT, albuterol 4 puffs q6hr

## 2023-02-12 NOTE — PROGRESS NOTE ADULT - PROBLEM SELECTOR PLAN 3
c/f aspiration vs mucus plugging.   - c/w pureed diet  - aspiration precautions  - S/S eval? c/f aspiration vs mucus plugging.   - c/w abx as above  - c/w pureed diet  - aspiration precautions  - S/S eval? c/f aspiration vs mucus plugging.   - c/w abx as above  - c/w pureed diet  - aspiration precautions  - S/S eval

## 2023-02-12 NOTE — PROGRESS NOTE ADULT - PROBLEM SELECTOR PLAN 7
- zyprexa held; consider restarting Home zyprexa 30qhs and [id number] held while in MICU.   - resume zyprexa 10qhs and titrate up  - resume trazodone 50bid and titrate up

## 2023-02-12 NOTE — PROGRESS NOTE ADULT - ASSESSMENT
57M from [facility] on 2L NC w/ ESRD on HD TTS, HTN, DM, bipolar, recent [place] admission (1/14) for AHRF and septic shock, p/w hypotension to 60/40s, desat to 80s, lethargy, and then had cardiac arrest and was admitted to MICU after ROSC was achieved for post-arrest care. Pt started on Zosyn for asp PNA, found Covid+, started on dexamethasone, course c/b persistent HTN to -200s on cardine gtt. Pt extubated on 2/10/23, passed S/S now tolerating PO diet, sat well on 2L NC. Pt downgraded to floor 2/12/23 for further management.

## 2023-02-12 NOTE — PHYSICAL THERAPY INITIAL EVALUATION ADULT - ACTIVE RANGE OF MOTION EXAMINATION, REHAB EVAL
active assistive ROM of both LE: WFL/bilateral upper extremity Active ROM was WFL (within functional limits)

## 2023-02-12 NOTE — PROGRESS NOTE ADULT - PROBLEM SELECTOR PLAN 8
Detail Level: Detailed Hospital Bundle    Fluids: po hydration  Electrolytes: Replete K > 4, Mg > 2, Phos > 3  Nutrition: Diet pureed  PPX  ---VTE: LSQ (covid protocol)  ---GI: IV ppi  ---Resp: n/a  Access: PIV  Code Status: FULL  Dispo: PT eval

## 2023-02-12 NOTE — PHYSICAL THERAPY INITIAL EVALUATION ADULT - PERTINENT HX OF CURRENT PROBLEM, REHAB EVAL
Patient is 57 year old M from [facility] on 2L NC with ESRD on HD TTS, HTN, DM, bipolar, recent [place] admission (1/14) for AHRF and septic shock, presents with hypotension to 60/40s, desat to 80s, lethargy, and then had cardiac arrest and was admitted to MICU after ROSC was achieved for post-arrest care.

## 2023-02-12 NOTE — PROGRESS NOTE ADULT - ATTENDING COMMENTS
57M h/o w/ ESRD on HD TTS, HTN, DM, bipolar, recent [place] admission (1/14) for AHRF and septic shock, p/w hypotension, hypoxia and cardiac arrest w/ ROSC then transfer to MICU. Likely septic shock secondary to aspiration PNA/COVID. Continue Zosyn for asp PNA; puree diet as per SLP. Cont decadron for COVID19. Pt remains hypertensive. Will titrate meds for BP control.

## 2023-02-12 NOTE — PROGRESS NOTE ADULT - PROBLEM SELECTOR PLAN 4
Covid+ 2/7  - c/w isolation precautions  - c/w decadron as above  - monitor O2 needs, wean as tolerated Covid+ 2/7    - c/w isolation precautions  - c/w decadron as above  - monitor O2 needs, wean as tolerated

## 2023-02-12 NOTE — PROGRESS NOTE ADULT - SUBJECTIVE AND OBJECTIVE BOX
***************************************************************  Elton Garcia, PGY1  Internal Medicine   pager:  [facility]: [phone] [facility]: [phone]  ***************************************************************    NANCY SEPULVEDA  57y  MRN: [id number]    Patient is a 57y old  Male who presents with a chief complaint of Hypotension, lethargy (11 Feb 2023 10:11)      Interval/Overnight Events: no events ON.     Subjective: Pt seen and examined at bedside. Denies fever, CP, SOB, abn pain, N/V, dysuria. Tolerating diet.      MEDICATIONS  (STANDING):  albuterol    90 MICROgram(s) HFA Inhaler 4 Puff(s) Inhalation every 6 hours  atorvastatin 20 milliGRAM(s) Oral at bedtime  chlorhexidine 2% Cloths 1 Application(s) Topical <User Schedule>  cloNIDine 0.2 milliGRAM(s) Oral every 8 hours  dexAMETHasone  Injectable 6 milliGRAM(s) IV Push daily  dextrose 5%. 1000 milliLiter(s) (50 mL/Hr) IV Continuous <Continuous>  dextrose 5%. 1000 milliLiter(s) (100 mL/Hr) IV Continuous <Continuous>  dextrose 50% Injectable 25 Gram(s) IV Push once  dextrose 50% Injectable 12.5 Gram(s) IV Push once  dextrose 50% Injectable 25 Gram(s) IV Push once  glucagon  Injectable 1 milliGRAM(s) IntraMuscular once  heparin   Injectable 5000 Unit(s) SubCutaneous every 8 hours  hydrALAZINE 100 milliGRAM(s) Oral every 8 hours  insulin lispro (ADMELOG) corrective regimen sliding scale   SubCutaneous every 6 hours  ipratropium 17 MICROgram(s) HFA Inhaler 2 Puff(s) Inhalation every 6 hours  isosorbide   dinitrate Tablet (ISORDIL) 10 milliGRAM(s) Oral <User Schedule>  losartan 100 milliGRAM(s) Oral daily  niCARdipine 20 milliGRAM(s) Oral every 8 hours  pantoprazole  Injectable 40 milliGRAM(s) IV Push daily  piperacillin/tazobactam IVPB.. 3.375 Gram(s) IV Intermittent every 12 hours  polyethylene glycol 3350 17 Gram(s) Oral two times a day  senna 2 Tablet(s) Oral at bedtime  sevelamer carbonate Powder 800 milliGRAM(s) Oral three times a day    MEDICATIONS  (PRN):  dextrose Oral Gel 15 Gram(s) Oral once PRN Blood Glucose LESS THAN 70 milliGRAM(s)/deciliter  melatonin 3 milliGRAM(s) Oral at bedtime PRN Insomnia  sodium chloride 0.9% Bolus 100 milliLiter(s) IV Bolus once PRN For SBP <90      Objective:    Vitals: Vital Signs Last 24 Hrs  T(C): 36 (02-11-23 @ 15:45), Max: 36.7 (02-11-23 @ 12:00)  T(F): 96.8 (02-11-23 @ 15:45), Max: 98 (02-11-23 @ 12:00)  HR: 54 (02-11-23 @ 21:00) (49 - 84)  BP: 186/94 (02-11-23 @ 21:00) (147/68 - 199/79)  BP(mean): 118 (02-11-23 @ 21:00) (92 - 118)  RR: 20 (02-11-23 @ 21:00) (10 - 26)  SpO2: 97% (02-11-23 @ 21:00) (91% - 99%)                I&O's Summary    11 Feb 2023 07:01  -  12 Feb 2023 07:00  --------------------------------------------------------  IN: 700 mL / OUT: 3400 mL / NET: -2700 mL        PHYSICAL EXAM:  GENERAL: NAD  HEAD:  Atraumatic, Normocephalic  EYES: EOMI, conjunctiva and sclera clear  CHEST/LUNG: Clear to auscultation bilaterally; No rales, rhonchi, wheezing, or rubs  HEART: Regular rate and rhythm; No murmurs, rubs, or gallops  ABDOMEN: Soft, Nontender, Nondistended;   SKIN: No rashes or lesions  NERVOUS SYSTEM:  Alert & Oriented X3, no focal deficits    LABS:  02-11    135  |  94<L>  |  46<H>  ----------------------------<  85  4.6   |  24  |  3.39<H>  02-10    133<L>  |  92<L>  |  34<H>  ----------------------------<  76  3.4<L>   |  25  |  2.55<H>    Ca    8.1<L>      11 Feb 2023 06:45  Ca    8.2<L>      10 Feb 2023 00:20  Phos  6.5     02-11  Mg     2.20     02-11    TPro  6.1  /  Alb  2.8<L>  /  TBili  1.1  /  DBili  x   /  AST  36  /  ALT  18  /  AlkPhos  837<H>  02-11  TPro  6.3  /  Alb  2.9<L>  /  TBili  1.0  /  DBili  x   /  AST  41<H>  /  ALT  20  /  AlkPhos  964<H>  02-10      PT/INR - ( 11 Feb 2023 00:00 )   PT: 11.4 sec;   INR: 0.98 ratio                         ABG - ( 10 Feb 2023 17:38 )  pH, Arterial: 7.38  pH, Blood: x     /  pCO2: 46    /  pO2: 165   / HCO3: 27    / Base Excess: 1.8   /  SaO2: 98.1                          8.6    6.71  )-----------( 180      ( 11 Feb 2023 06:45 )             28.2                         9.5    7.09  )-----------( 207      ( 10 Feb 2023 00:05 )             30.4     CAPILLARY BLOOD GLUCOSE    POCT Blood Glucose.: 82 mg/dL (12 Feb 2023 06:32)  POCT Blood Glucose.: 151 mg/dL (11 Feb 2023 22:11)  POCT Blood Glucose.: 153 mg/dL (11 Feb 2023 17:38)  POCT Blood Glucose.: 184 mg/dL (11 Feb 2023 12:15)      RADIOLOGY & ADDITIONAL TESTS:    Imaging Personally Reviewed:  [x ] YES  [ ] NO    Consultants involved in case:   Consultant(s) Notes Reviewed:  [ x] YES  [ ] NO:   Care Discussed with Consultants/Other Providers [x ] YES  [ ] NO ***************************************************************  Elton Garcia, PGY1  Internal Medicine   pager:  [facility]: [phone] [facility]: [phone]  ***************************************************************    NANCY SEPULVEDA  57y  MRN: [id number]    Patient is a 57y old  Male who presents with a chief complaint of Hypotension, lethargy (11 Feb 2023 10:11)    Interval/Overnight Events: no events ON.     Subjective: Pt seen and examined at bedside. Denies fever, CP, SOB, abn pain, N/V, dysuria. Tolerating diet.      MEDICATIONS  (STANDING):  albuterol    90 MICROgram(s) HFA Inhaler 4 Puff(s) Inhalation every 6 hours  atorvastatin 20 milliGRAM(s) Oral at bedtime  chlorhexidine 2% Cloths 1 Application(s) Topical <User Schedule>  cloNIDine 0.2 milliGRAM(s) Oral every 8 hours  dexAMETHasone  Injectable 6 milliGRAM(s) IV Push daily  dextrose 5%. 1000 milliLiter(s) (50 mL/Hr) IV Continuous <Continuous>  dextrose 5%. 1000 milliLiter(s) (100 mL/Hr) IV Continuous <Continuous>  dextrose 50% Injectable 25 Gram(s) IV Push once  dextrose 50% Injectable 12.5 Gram(s) IV Push once  dextrose 50% Injectable 25 Gram(s) IV Push once  glucagon  Injectable 1 milliGRAM(s) IntraMuscular once  heparin   Injectable 5000 Unit(s) SubCutaneous every 8 hours  hydrALAZINE 100 milliGRAM(s) Oral every 8 hours  insulin lispro (ADMELOG) corrective regimen sliding scale   SubCutaneous every 6 hours  ipratropium 17 MICROgram(s) HFA Inhaler 2 Puff(s) Inhalation every 6 hours  isosorbide   dinitrate Tablet (ISORDIL) 10 milliGRAM(s) Oral <User Schedule>  losartan 100 milliGRAM(s) Oral daily  niCARdipine 20 milliGRAM(s) Oral every 8 hours  pantoprazole  Injectable 40 milliGRAM(s) IV Push daily  piperacillin/tazobactam IVPB.. 3.375 Gram(s) IV Intermittent every 12 hours  polyethylene glycol 3350 17 Gram(s) Oral two times a day  senna 2 Tablet(s) Oral at bedtime  sevelamer carbonate Powder 800 milliGRAM(s) Oral three times a day    MEDICATIONS  (PRN):  dextrose Oral Gel 15 Gram(s) Oral once PRN Blood Glucose LESS THAN 70 milliGRAM(s)/deciliter  melatonin 3 milliGRAM(s) Oral at bedtime PRN Insomnia  sodium chloride 0.9% Bolus 100 milliLiter(s) IV Bolus once PRN For SBP <90      Objective:    Vitals: Vital Signs Last 24 Hrs  T(C): 36 (02-11-23 @ 15:45), Max: 36.7 (02-11-23 @ 12:00)  T(F): 96.8 (02-11-23 @ 15:45), Max: 98 (02-11-23 @ 12:00)  HR: 54 (02-11-23 @ 21:00) (49 - 84)  BP: 186/94 (02-11-23 @ 21:00) (147/68 - 199/79)  BP(mean): 118 (02-11-23 @ 21:00) (92 - 118)  RR: 20 (02-11-23 @ 21:00) (10 - 26)  SpO2: 97% (02-11-23 @ 21:00) (91% - 99%)                I&O's Summary    11 Feb 2023 07:01  -  12 Feb 2023 07:00  --------------------------------------------------------  IN: 700 mL / OUT: 3400 mL / NET: -2700 mL        PHYSICAL EXAM:  GENERAL: NAD  HEAD:  Atraumatic, Normocephalic  EYES: EOMI, conjunctiva and sclera clear  CHEST/LUNG: Clear to auscultation bilaterally; No rales, rhonchi, wheezing, or rubs  HEART: Regular rate and rhythm; No murmurs, rubs, or gallops  ABDOMEN: Soft, Nontender, Nondistended;   SKIN: No rashes or lesions  NERVOUS SYSTEM:  Alert & Oriented X3, no focal deficits    LABS:  02-11    135  |  94<L>  |  46<H>  ----------------------------<  85  4.6   |  24  |  3.39<H>  02-10    133<L>  |  92<L>  |  34<H>  ----------------------------<  76  3.4<L>   |  25  |  2.55<H>    Ca    8.1<L>      11 Feb 2023 06:45  Ca    8.2<L>      10 Feb 2023 00:20  Phos  6.5     02-11  Mg     2.20     02-11    TPro  6.1  /  Alb  2.8<L>  /  TBili  1.1  /  DBili  x   /  AST  36  /  ALT  18  /  AlkPhos  837<H>  02-11  TPro  6.3  /  Alb  2.9<L>  /  TBili  1.0  /  DBili  x   /  AST  41<H>  /  ALT  20  /  AlkPhos  964<H>  02-10      PT/INR - ( 11 Feb 2023 00:00 )   PT: 11.4 sec;   INR: 0.98 ratio                         ABG - ( 10 Feb 2023 17:38 )  pH, Arterial: 7.38  pH, Blood: x     /  pCO2: 46    /  pO2: 165   / HCO3: 27    / Base Excess: 1.8   /  SaO2: 98.1                          8.6    6.71  )-----------( 180      ( 11 Feb 2023 06:45 )             28.2                         9.5    7.09  )-----------( 207      ( 10 Feb 2023 00:05 )             30.4     CAPILLARY BLOOD GLUCOSE    POCT Blood Glucose.: 82 mg/dL (12 Feb 2023 06:32)  POCT Blood Glucose.: 151 mg/dL (11 Feb 2023 22:11)  POCT Blood Glucose.: 153 mg/dL (11 Feb 2023 17:38)  POCT Blood Glucose.: 184 mg/dL (11 Feb 2023 12:15)      RADIOLOGY & ADDITIONAL TESTS:    Imaging Personally Reviewed:  [x ] YES  [ ] NO    Consultants involved in case:   Consultant(s) Notes Reviewed:  [ x] YES  [ ] NO:   Care Discussed with Consultants/Other Providers [x ] YES  [ ] NO ***************************************************************  Elton Garcia, PGY1  Internal Medicine   pager:  [facility]: [phone] [facility]: [phone]  ***************************************************************    NANCY SEPULVEDA  57y  MRN: [id number]    Patient is a 57y old  Male who presents with a chief complaint of Hypotension, lethargy (11 Feb 2023 10:11)    Interval/Overnight Events: no events ON.   - restarted home zyprexa and trazodone at lower doses    Subjective: Pt seen and examined at bedside. Denies fever, CP, SOB, abn pain, N/V, dysuria. Tolerating diet. Argumentative, spoke in stilted tone, appears to have trouble enunciating? possibly baseline.    MEDICATIONS  (STANDING):  albuterol    90 MICROgram(s) HFA Inhaler 4 Puff(s) Inhalation every 6 hours  atorvastatin 20 milliGRAM(s) Oral at bedtime  chlorhexidine 2% Cloths 1 Application(s) Topical <User Schedule>  cloNIDine 0.2 milliGRAM(s) Oral every 8 hours  dexAMETHasone  Injectable 6 milliGRAM(s) IV Push daily  dextrose 5%. 1000 milliLiter(s) (50 mL/Hr) IV Continuous <Continuous>  dextrose 5%. 1000 milliLiter(s) (100 mL/Hr) IV Continuous <Continuous>  dextrose 50% Injectable 25 Gram(s) IV Push once  dextrose 50% Injectable 12.5 Gram(s) IV Push once  dextrose 50% Injectable 25 Gram(s) IV Push once  glucagon  Injectable 1 milliGRAM(s) IntraMuscular once  heparin   Injectable 5000 Unit(s) SubCutaneous every 8 hours  hydrALAZINE 100 milliGRAM(s) Oral every 8 hours  insulin lispro (ADMELOG) corrective regimen sliding scale   SubCutaneous every 6 hours  ipratropium 17 MICROgram(s) HFA Inhaler 2 Puff(s) Inhalation every 6 hours  isosorbide   dinitrate Tablet (ISORDIL) 10 milliGRAM(s) Oral <User Schedule>  losartan 100 milliGRAM(s) Oral daily  niCARdipine 20 milliGRAM(s) Oral every 8 hours  pantoprazole  Injectable 40 milliGRAM(s) IV Push daily  piperacillin/tazobactam IVPB.. 3.375 Gram(s) IV Intermittent every 12 hours  polyethylene glycol 3350 17 Gram(s) Oral two times a day  senna 2 Tablet(s) Oral at bedtime  sevelamer carbonate Powder 800 milliGRAM(s) Oral three times a day    MEDICATIONS  (PRN):  dextrose Oral Gel 15 Gram(s) Oral once PRN Blood Glucose LESS THAN 70 milliGRAM(s)/deciliter  melatonin 3 milliGRAM(s) Oral at bedtime PRN Insomnia  sodium chloride 0.9% Bolus 100 milliLiter(s) IV Bolus once PRN For SBP <90      Objective:    Vitals: Vital Signs Last 24 Hrs  T(C): 36 (02-11-23 @ 15:45), Max: 36.7 (02-11-23 @ 12:00)  T(F): 96.8 (02-11-23 @ 15:45), Max: 98 (02-11-23 @ 12:00)  HR: 54 (02-11-23 @ 21:00) (49 - 84)  BP: 186/94 (02-11-23 @ 21:00) (147/68 - 199/79)  BP(mean): 118 (02-11-23 @ 21:00) (92 - 118)  RR: 20 (02-11-23 @ 21:00) (10 - 26)  SpO2: 97% (02-11-23 @ 21:00) (91% - 99%)                I&O's Summary    11 Feb 2023 07:01  -  12 Feb 2023 07:00  --------------------------------------------------------  IN: 700 mL / OUT: 3400 mL / NET: -2700 mL        PHYSICAL EXAM:  GENERAL: NAD  HEAD:  Atraumatic, Normocephalic  EYES: EOMI, conjunctiva and sclera clear  CHEST/LUNG: Clear to auscultation bilaterally; No rales, rhonchi, wheezing, or rubs  HEART: Regular rate and rhythm; No murmurs, rubs, or gallops  ABDOMEN: Soft, Nontender, Nondistended;   SKIN: No rashes or lesions  NERVOUS SYSTEM:  Alert & Oriented X3, no focal deficits    LABS:  02-11    135  |  94<L>  |  46<H>  ----------------------------<  85  4.6   |  24  |  3.39<H>  02-10    133<L>  |  92<L>  |  34<H>  ----------------------------<  76  3.4<L>   |  25  |  2.55<H>    Ca    8.1<L>      11 Feb 2023 06:45  Ca    8.2<L>      10 Feb 2023 00:20  Phos  6.5     02-11  Mg     2.20     02-11    TPro  6.1  /  Alb  2.8<L>  /  TBili  1.1  /  DBili  x   /  AST  36  /  ALT  18  /  AlkPhos  837<H>  02-11  TPro  6.3  /  Alb  2.9<L>  /  TBili  1.0  /  DBili  x   /  AST  41<H>  /  ALT  20  /  AlkPhos  964<H>  02-10      PT/INR - ( 11 Feb 2023 00:00 )   PT: 11.4 sec;   INR: 0.98 ratio                         ABG - ( 10 Feb 2023 17:38 )  pH, Arterial: 7.38  pH, Blood: x     /  pCO2: 46    /  pO2: 165   / HCO3: 27    / Base Excess: 1.8   /  SaO2: 98.1                          8.6    6.71  )-----------( 180      ( 11 Feb 2023 06:45 )             28.2                         9.5    7.09  )-----------( 207      ( 10 Feb 2023 00:05 )             30.4     CAPILLARY BLOOD GLUCOSE    POCT Blood Glucose.: 82 mg/dL (12 Feb 2023 06:32)  POCT Blood Glucose.: 151 mg/dL (11 Feb 2023 22:11)  POCT Blood Glucose.: 153 mg/dL (11 Feb 2023 17:38)  POCT Blood Glucose.: 184 mg/dL (11 Feb 2023 12:15)      RADIOLOGY & ADDITIONAL TESTS:    Imaging Personally Reviewed:  [x ] YES  [ ] NO    Consultants involved in case:   Consultant(s) Notes Reviewed:  [ x] YES  [ ] NO:   Care Discussed with Consultants/Other Providers [x ] YES  [ ] NO

## 2023-02-12 NOTE — PROGRESS NOTE ADULT - PROBLEM SELECTOR PLAN 2
Initially hypotensive in septic shock, was found hypertensive to -200s in MICU requiring cardine gtt.  - c/w clonidine 0.2 mg q8  - c/w isordil 10mg TID  - c/w losartan 100mg qd  - c/w cardine 20mg q8

## 2023-02-13 NOTE — PROGRESS NOTE ADULT - PROBLEM SELECTOR PLAN 8
Hospital Bundle    Fluids: po hydration  Electrolytes: Replete K > 4, Mg > 2, Phos > 3  Nutrition: Diet pureed  PPX  ---VTE: LSQ (covid protocol)  ---GI: IV ppi  ---Resp: n/a  Access: PIV  Code Status: FULL  Dispo: PT eval

## 2023-02-13 NOTE — PROGRESS NOTE ADULT - ASSESSMENT
57M from [facility] on 2L NC w/ ESRD on HD TTS, HTN, DM, bipolar, recent [place] admission (1/14) for AHRF and septic shock, p/w hypotension to 60/40s, desat to 80s, lethargy, and then had cardiac arrest and was admitted to MICU after ROSC was achieved for post-arrest care. Pt started on Zosyn for asp PNA, found Covid+, started on dexamethasone, course c/b persistent HTN to -200s on cardine gtt. Pt extubated on 2/10/23, passed S/S now tolerating PO diet, sat well on 2L NC. Pt downgraded to floor 2/12/23 for further management.

## 2023-02-13 NOTE — PROGRESS NOTE ADULT - ATTENDING COMMENTS
57M h/o w/ ESRD on HD TTS, HTN, DM, bipolar, recent [place] admission (1/14) for AHRF and septic shock, p/w hypotension, hypoxia and cardiac arrest w/ ROSC then transfer to MICU. Likely septic shock secondary to aspiration PNA/COVID. Continuing Zosyn for asp PNA. Cont decadron for COVID19. Titrating BP meds. 57M h/o w/ ESRD on HD TTS, HTN, DM, bipolar, recent [place] admission (1/14) for AHRF and septic shock, p/w hypotension, hypoxia and cardiac arrest w/ ROSC then transfer to MICU. Likely septic shock secondary to aspiration PNA/COVID. Continuing Zosyn for asp PNA. Cont decadron for COVID19. Titrating BP meds.    I examined this patient and discussed their management with house staff on 02-.

## 2023-02-13 NOTE — PROGRESS NOTE ADULT - PROBLEM SELECTOR PLAN 1
Found in septic shock on admission, likely 2/2 aspiration pneumonia vs mucus plug that causes L lung atelectasis and eventually cardiac arrest. CXR showed L lung atelectasis, likely iso aspiration causing cardiac arrest. Covid+ (2/7), BCx (2/7) NGTD, MRSA- (2/7). Bronchoscopy (2/7) large white/tan mucus plug in left mainstem bronchus aspirated, secretions cleared. Repeat CXR improved.  s/p MICU course 2/7-12 extubated 2/10, off pressors, stable on 4L NC.    - c/w zosyn for 7 day course total (2/7 - )  - c/w dexamethasone for Covid PNA 10 day course (2/7 - )  - c/w chest PT, albuterol 4 puffs q6hr Found in septic shock on admission, likely 2/2 aspiration pneumonia vs mucus plug that causes L lung atelectasis and eventually cardiac arrest. CXR showed L lung atelectasis, likely iso aspiration causing cardiac arrest. Covid+ (2/7), BCx (2/7) NGTD, MRSA- (2/7). Bronchoscopy (2/7) large white/tan mucus plug in left mainstem bronchus aspirated, secretions cleared. Repeat CXR improved.  s/p MICU course 2/7-12 extubated 2/10, off pressors, stable on 4L NC.    - c/w zosyn for 7 day course total (2/7 - 14)  - c/w dexamethasone for Covid PNA 10 day course (2/7 - 16)  - c/w chest PT, albuterol 4 puffs q6hr

## 2023-02-13 NOTE — PROGRESS NOTE ADULT - PROBLEM SELECTOR PLAN 4
Covid+ 2/7    - c/w isolation precautions  - c/w decadron as above  - monitor O2 needs, wean as tolerated

## 2023-02-13 NOTE — PROGRESS NOTE ADULT - SUBJECTIVE AND OBJECTIVE BOX
***************************************************************  Elton Garcia, PGY1  Internal Medicine   pager:  [facility]: [phone] [facility]: [phone]  ***************************************************************    NANCY SEPULVEDA  57y  MRN: [id number]    Patient is a 57y old  Male who presents with a chief complaint of Hypotension, lethargy (12 Feb 2023 08:37)    Interval/Overnight Events: no events ON.   - persistently bradycardic, likely baseline; will obtain ECG  - Hypoglycemic 66 on bmp, pending recheck  - restarted zyprexa / trazodone home dosages yday.    Subjective: Pt seen and examined at bedside. Denies fever, CP, SOB, abn pain, N/V, dysuria. Tolerating diet.      MEDICATIONS  (STANDING):  albuterol    90 MICROgram(s) HFA Inhaler 4 Puff(s) Inhalation every 6 hours  atorvastatin 20 milliGRAM(s) Oral at bedtime  chlorhexidine 2% Cloths 1 Application(s) Topical <User Schedule>  cloNIDine 0.2 milliGRAM(s) Oral every 8 hours  dexAMETHasone  Injectable 6 milliGRAM(s) IV Push daily  heparin   Injectable 5000 Unit(s) SubCutaneous every 8 hours  hydrALAZINE 100 milliGRAM(s) Oral every 8 hours  ipratropium 17 MICROgram(s) HFA Inhaler 2 Puff(s) Inhalation every 6 hours  isosorbide   dinitrate Tablet (ISORDIL) 10 milliGRAM(s) Oral <User Schedule>  losartan 100 milliGRAM(s) Oral daily  niCARdipine 20 milliGRAM(s) Oral every 8 hours  OLANZapine 10 milliGRAM(s) Oral at bedtime  pantoprazole  Injectable 40 milliGRAM(s) IV Push daily  piperacillin/tazobactam IVPB.. 3.375 Gram(s) IV Intermittent every 12 hours  polyethylene glycol 3350 17 Gram(s) Oral two times a day  senna 2 Tablet(s) Oral at bedtime  sevelamer carbonate Powder 800 milliGRAM(s) Oral three times a day  traZODone 50 milliGRAM(s) Oral two times a day    MEDICATIONS  (PRN):  melatonin 3 milliGRAM(s) Oral at bedtime PRN Insomnia  sodium chloride 0.9% Bolus 100 milliLiter(s) IV Bolus once PRN For SBP <90      Objective:    Vitals: Vital Signs Last 24 Hrs  T(C): 36.7 (02-13-23 @ 06:58), Max: 36.7 (02-12-23 @ 13:49)  T(F): 98 (02-13-23 @ 06:58), Max: 98.1 (02-12-23 @ 22:10)  HR: 51 (02-13-23 @ 06:58) (45 - 55)  BP: 166/74 (02-13-23 @ 06:58) (163/72 - 174/72)  BP(mean): --  RR: 17 (02-13-23 @ 06:58) (17 - 18)  SpO2: 100% (02-13-23 @ 06:58) (97% - 100%)                I&O's Summary    12 Feb 2023 07:01  -  13 Feb 2023 07:00  --------------------------------------------------------  IN: 640 mL / OUT: 0 mL / NET: 640 mL    PHYSICAL EXAM:  GENERAL: NAD, disheveled appearance  HEAD:  Atraumatic, Normocephalic  EYES: EOMI, conjunctiva and sclera clear  CHEST/LUNG: RLL coarse lung sounds, bilateral breath sounds heard.   HEART: Regular rate and rhythm; No murmurs, rubs, or gallops  ABDOMEN: Soft, Nontender, Nondistended;   SKIN: No rashes or lesions. +L arm fistula w/ good bruit  NERVOUS SYSTEM:  Alert & Oriented X3, no focal deficits, speaking in choppy voice? random pauses    LABS:  02-13    132<L>  |  91<L>  |  39<H>  ----------------------------<  66<L>  4.8   |  27  |  3.29<H>  02-11    135  |  94<L>  |  46<H>  ----------------------------<  85  4.6   |  24  |  3.39<H>    Ca    8.3<L>      13 Feb 2023 05:50  Ca    8.1<L>      11 Feb 2023 06:45  Phos  5.7     02-13  Mg     2.40     02-13    TPro  6.1  /  Alb  2.8<L>  /  TBili  1.1  /  DBili  x   /  AST  36  /  ALT  18  /  AlkPhos  837<H>  02-11                        8.6    7.21  )-----------( 174      ( 13 Feb 2023 05:50 )             28.0                         8.6    6.71  )-----------( 180      ( 11 Feb 2023 06:45 )             28.2     CAPILLARY BLOOD GLUCOSE      POCT Blood Glucose.: 91 mg/dL (12 Feb 2023 09:07)      RADIOLOGY & ADDITIONAL TESTS:    Imaging Personally Reviewed:  [x ] YES  [ ] NO    Consultants involved in case:   Consultant(s) Notes Reviewed:  [ x] YES  [ ] NO:   Care Discussed with Consultants/Other Providers [x ] YES  [ ] NO         ***************************************************************  Elton Garcia, PGY1  Internal Medicine   pager:  [facility]: [phone] [facility]: [phone]  ***************************************************************    NANCY SEPULVEDA  56y/o  MRN: [id number]    Patient is a 57y old  Male who presents with a chief complaint of Hypotension, lethargy (12 Feb 2023 08:37)    Interval/Overnight Events: no events ON.   - persistently bradycardic, likely baseline; will obtain ECG  - Hypoglycemic 66 on bmp, pending recheck  - restarted zyprexa / trazodone home dosages yday.    Subjective: Pt seen and examined at bedside. Denies fever, CP, SOB, abn pain, N/V, dysuria. Tolerating diet.      MEDICATIONS  (STANDING):  albuterol    90 MICROgram(s) HFA Inhaler 4 Puff(s) Inhalation every 6 hours  atorvastatin 20 milliGRAM(s) Oral at bedtime  chlorhexidine 2% Cloths 1 Application(s) Topical <User Schedule>  cloNIDine 0.2 milliGRAM(s) Oral every 8 hours  dexAMETHasone  Injectable 6 milliGRAM(s) IV Push daily  heparin   Injectable 5000 Unit(s) SubCutaneous every 8 hours  hydrALAZINE 100 milliGRAM(s) Oral every 8 hours  ipratropium 17 MICROgram(s) HFA Inhaler 2 Puff(s) Inhalation every 6 hours  isosorbide   dinitrate Tablet (ISORDIL) 10 milliGRAM(s) Oral <User Schedule>  losartan 100 milliGRAM(s) Oral daily  niCARdipine 20 milliGRAM(s) Oral every 8 hours  OLANZapine 10 milliGRAM(s) Oral at bedtime  pantoprazole  Injectable 40 milliGRAM(s) IV Push daily  piperacillin/tazobactam IVPB.. 3.375 Gram(s) IV Intermittent every 12 hours  polyethylene glycol 3350 17 Gram(s) Oral two times a day  senna 2 Tablet(s) Oral at bedtime  sevelamer carbonate Powder 800 milliGRAM(s) Oral three times a day  traZODone 50 milliGRAM(s) Oral two times a day    MEDICATIONS  (PRN):  melatonin 3 milliGRAM(s) Oral at bedtime PRN Insomnia  sodium chloride 0.9% Bolus 100 milliLiter(s) IV Bolus once PRN For SBP <90      Objective:    Vitals: Vital Signs Last 24 Hrs  T(C): 36.7 (02-13-23 @ 06:58), Max: 36.7 (02-12-23 @ 13:49)  T(F): 98 (02-13-23 @ 06:58), Max: 98.1 (02-12-23 @ 22:10)  HR: 51 (02-13-23 @ 06:58) (45 - 55)  BP: 166/74 (02-13-23 @ 06:58) (163/72 - 174/72)  BP(mean): --  RR: 17 (02-13-23 @ 06:58) (17 - 18)  SpO2: 100% (02-13-23 @ 06:58) (97% - 100%)                I&O's Summary    12 Feb 2023 07:01  -  13 Feb 2023 07:00  --------------------------------------------------------  IN: 640 mL / OUT: 0 mL / NET: 640 mL    PHYSICAL EXAM:  GENERAL: NAD, disheveled appearance  HEAD:  Atraumatic, Normocephalic  EYES: EOMI, conjunctiva and sclera clear  CHEST/LUNG: RLL coarse lung sounds, bilateral breath sounds heard.   HEART: Regular rate and rhythm; No murmurs, rubs, or gallops  ABDOMEN: Soft, Nontender, Nondistended;   SKIN: No rashes or lesions. +L arm fistula w/ good bruit  NERVOUS SYSTEM:  Alert & Oriented X3, no focal deficits, speaking in choppy voice? random pauses    LABS:  02-13    132<L>  |  91<L>  |  39<H>  ----------------------------<  66<L>  4.8   |  27  |  3.29<H>  02-11    135  |  94<L>  |  46<H>  ----------------------------<  85  4.6   |  24  |  3.39<H>    Ca    8.3<L>      13 Feb 2023 05:50  Ca    8.1<L>      11 Feb 2023 06:45  Phos  5.7     02-13  Mg     2.40     02-13    TPro  6.1  /  Alb  2.8<L>  /  TBili  1.1  /  DBili  x   /  AST  36  /  ALT  18  /  AlkPhos  837<H>  02-11                        8.6    7.21  )-----------( 174      ( 13 Feb 2023 05:50 )             28.0                         8.6    6.71  )-----------( 180      ( 11 Feb 2023 06:45 )             28.2     CAPILLARY BLOOD GLUCOSE      POCT Blood Glucose.: 91 mg/dL (12 Feb 2023 09:07)      RADIOLOGY & ADDITIONAL TESTS:    Imaging Personally Reviewed:  [x ] YES  [ ] NO    Consultants involved in case:   Consultant(s) Notes Reviewed:  [ x] YES  [ ] NO:   Care Discussed with Consultants/Other Providers [x ] YES  [ ] NO         ***************************************************************  Elton Garcia, PGY1  Internal Medicine   pager:  [facility]: [phone] [facility]: [phone]  ***************************************************************    NANCY SEPULVEDA  56y/o  MRN: [id number]    Patient is a 57y old  Male who presents with a chief complaint of Hypotension, lethargy (12 Feb 2023 08:37)    Interval/Overnight Events: no events ON.   - persistently bradycardic, likely baseline; will obtain ECG -- sinus yelena, 1deg AV block  - Hypoglycemic 66 on bmp, pending recheck  - restarted zyprexa / trazodone home dosages yday.    Subjective: Pt seen and examined at bedside. Denies fever, CP, SOB, abn pain, N/V, dysuria. Tolerating diet.      MEDICATIONS  (STANDING):  albuterol    90 MICROgram(s) HFA Inhaler 4 Puff(s) Inhalation every 6 hours  atorvastatin 20 milliGRAM(s) Oral at bedtime  chlorhexidine 2% Cloths 1 Application(s) Topical <User Schedule>  cloNIDine 0.2 milliGRAM(s) Oral every 8 hours  dexAMETHasone  Injectable 6 milliGRAM(s) IV Push daily  heparin   Injectable 5000 Unit(s) SubCutaneous every 8 hours  hydrALAZINE 100 milliGRAM(s) Oral every 8 hours  ipratropium 17 MICROgram(s) HFA Inhaler 2 Puff(s) Inhalation every 6 hours  isosorbide   dinitrate Tablet (ISORDIL) 10 milliGRAM(s) Oral <User Schedule>  losartan 100 milliGRAM(s) Oral daily  niCARdipine 20 milliGRAM(s) Oral every 8 hours  OLANZapine 10 milliGRAM(s) Oral at bedtime  pantoprazole  Injectable 40 milliGRAM(s) IV Push daily  piperacillin/tazobactam IVPB.. 3.375 Gram(s) IV Intermittent every 12 hours  polyethylene glycol 3350 17 Gram(s) Oral two times a day  senna 2 Tablet(s) Oral at bedtime  sevelamer carbonate Powder 800 milliGRAM(s) Oral three times a day  traZODone 50 milliGRAM(s) Oral two times a day    MEDICATIONS  (PRN):  melatonin 3 milliGRAM(s) Oral at bedtime PRN Insomnia  sodium chloride 0.9% Bolus 100 milliLiter(s) IV Bolus once PRN For SBP <90      Objective:    Vitals: Vital Signs Last 24 Hrs  T(C): 36.7 (02-13-23 @ 06:58), Max: 36.7 (02-12-23 @ 13:49)  T(F): 98 (02-13-23 @ 06:58), Max: 98.1 (02-12-23 @ 22:10)  HR: 51 (02-13-23 @ 06:58) (45 - 55)  BP: 166/74 (02-13-23 @ 06:58) (163/72 - 174/72)  BP(mean): --  RR: 17 (02-13-23 @ 06:58) (17 - 18)  SpO2: 100% (02-13-23 @ 06:58) (97% - 100%)                I&O's Summary    12 Feb 2023 07:01  -  13 Feb 2023 07:00  --------------------------------------------------------  IN: 640 mL / OUT: 0 mL / NET: 640 mL    PHYSICAL EXAM:  GENERAL: NAD, disheveled appearance  HEAD:  Atraumatic, Normocephalic  EYES: EOMI, conjunctiva and sclera clear  CHEST/LUNG: RLL coarse lung sounds, bilateral breath sounds heard.   HEART: Regular rate and rhythm; No murmurs, rubs, or gallops  ABDOMEN: Soft, Nontender, Nondistended;   SKIN: No rashes or lesions. +L arm fistula w/ good bruit  NERVOUS SYSTEM:  Alert & Oriented X3, no focal deficits, speech improved compared to yesterday.    LABS:  02-13    132<L>  |  91<L>  |  39<H>  ----------------------------<  66<L>  4.8   |  27  |  3.29<H>  02-11    135  |  94<L>  |  46<H>  ----------------------------<  85  4.6   |  24  |  3.39<H>    Ca    8.3<L>      13 Feb 2023 05:50  Ca    8.1<L>      11 Feb 2023 06:45  Phos  5.7     02-13  Mg     2.40     02-13    TPro  6.1  /  Alb  2.8<L>  /  TBili  1.1  /  DBili  x   /  AST  36  /  ALT  18  /  AlkPhos  837<H>  02-11                        8.6    7.21  )-----------( 174      ( 13 Feb 2023 05:50 )             28.0                         8.6    6.71  )-----------( 180      ( 11 Feb 2023 06:45 )             28.2     CAPILLARY BLOOD GLUCOSE      POCT Blood Glucose.: 91 mg/dL (12 Feb 2023 09:07)      RADIOLOGY & ADDITIONAL TESTS:    Imaging Personally Reviewed:  [x ] YES  [ ] NO    Consultants involved in case:   Consultant(s) Notes Reviewed:  [ x] YES  [ ] NO:   Care Discussed with Consultants/Other Providers [x ] YES  [ ] NO

## 2023-02-13 NOTE — PROGRESS NOTE ADULT - PROBLEM SELECTOR PLAN 3
c/f aspiration vs mucus plugging.   - c/w abx as above  - c/w pureed diet  - aspiration precautions  - S/S eval

## 2023-02-13 NOTE — PROGRESS NOTE ADULT - PROBLEM SELECTOR PLAN 7
Home zyprexa 30qhs and [id number] held while in MICU.   - resume zyprexa 10qhs and titrate up  - resume trazodone 50bid and titrate up Home zyprexa 30qhs and [id number] held while in MICU.   - resume zyprexa 30qhs  - resume trazodone 100bid  - repeat ECG in AM; QTc 490s

## 2023-02-14 NOTE — PROGRESS NOTE ADULT - PROBLEM SELECTOR PLAN 1
Found in septic shock on admission, likely 2/2 aspiration pneumonia vs mucus plug that causes L lung atelectasis and eventually cardiac arrest. CXR showed L lung atelectasis, likely iso aspiration causing cardiac arrest. Covid+ (2/7), BCx (2/7) NGTD, MRSA- (2/7). Bronchoscopy (2/7) large white/tan mucus plug in left mainstem bronchus aspirated, secretions cleared. Repeat CXR improved.  s/p MICU course 2/7-12 extubated 2/10, off pressors, stable on 4L NC.    - c/w zosyn for 7 day course total (2/7 - 14)  - c/w dexamethasone for Covid PNA 10 day course (2/7 - 16)  - c/w chest PT, albuterol 4 puffs q6hr

## 2023-02-14 NOTE — PROVIDER CONTACT NOTE (OTHER) - RECOMMENDATIONS
Reassess Patient and set parameters for Heart rate while intra dialysis. HD TX will depend on HR paramters.
notify provider
IV hydralazine and reassess
Md was notified and made aware
will order Hydralazine IV.

## 2023-02-14 NOTE — DISCHARGE NOTE PROVIDER - NSDCCPCAREPLAN_GEN_ALL_CORE_FT
PRINCIPAL DISCHARGE DIAGNOSIS  Diagnosis: AMS (altered mental status)  Assessment and Plan of Treatment: You were found confused at subacute rehab. You were brought to the ED and was found with an infection from  You were found positive for COVID-19 as well. Due to the severity of your infection, your heart stopped beating and required CPR and intubation to bring it back. You were in the intensive care unit and found with high blood pressure above 220 mmHg. You were stabilized with medications and brought to the general medicine wards. Because of the concern for aspiration, we conducted a swallow study.  Please follow up with your primary care physician for further care.  Contact a health care provider if you:  •Have a fever.  •Are coughing or choking while eating or drinking.  •Continue to have signs or symptoms of aspiration pneumonia.  Get help right away if you have:  •Worsening shortness of breath, wheezing, or difficulty breathing.  •Chest pain.      SECONDARY DISCHARGE DIAGNOSES  Diagnosis: Cardiac arrest  Assessment and Plan of Treatment:

## 2023-02-14 NOTE — SWALLOW BEDSIDE ASSESSMENT ADULT - ADDITIONAL RECOMMENDATIONS
1. this service to follow up as schedule permits. 2. Medical team advised to reconsult this service if change in medical status and/or patient's tolerance to recommended.

## 2023-02-14 NOTE — PROGRESS NOTE ADULT - PROBLEM SELECTOR PLAN 7
Home zyprexa 30qhs and [id number] held while in MICU.   - resume zyprexa 30qhs  - resume trazodone 100bid  - repeat ECG in AM; QTc 490s Noted w/ elevated AlkPhos since admission ranging between 600-900. CTAP w/o biliary abnormalities  - f/u GGT  - f/u isozymes

## 2023-02-14 NOTE — DISCHARGE NOTE PROVIDER - HOSPITAL COURSE
Discharge Summary     Admit Date: 02-07-23  Discharge Date: 02-    Admission diagnoses:   Altered mental status  Aspiration Pneumonia  Severe septic shock    Hospital Course:   For full details, please see H&P, progress notes, consult notes and ancillary notes. Briefly, NANCY SEPULVEDA is a 57y Male from [facility] on 2L NC with PMH of ESRD on HD TTS, HTN, DM, bipolar, recent [place] admission (1/14) for AHRF and septic shock, presenting with hypotension to 60/40s, desat to 80s, lethargy. Per interaction w/ RN who took care of him, he is A&OX3 at his baseline and usually feeds himself and has no difficulty eating. However this AM he is more lethargic and was hypotensive. At his baseline he was using 2L O2 but his O2 sat dropped. Has had little cough but nothing overt. Then [place] decided to transfer him to [facility] and while he was being transferred he also vomited. In the ED, he had desats requiring oxygen NC.  Pt started on Zosyn for asp PNA, found Covid+, started on dexamethasone. VBG before code blue showed respiratory acidosis (pCO2 86, pO2 43, pH 7.1). CXR showed complete L lung atelectasis. EKG at admission showed Mobitz type 1 block. His HR then went to 30s and then pt had code blue. ROSC was achieved after CPR and epi, intubation. Patient was admitted to MICU after ROSC for post-arrest care. Course was complicated by persistent HTN to -200s requiring cardine gtt. Patient was successfully extubated on 2/10/23 and weaned down to 2L NC. Patient downgraded to floor 2/12/23 for further management. Patient was made NPO after concerns of aspiration. Modified barium swallow cineesophagogram was performed on ___ and showed ____. Physical therapy evaluated patient and recommended return to [facility].    On day of discharge, patient is clinically stable with no new exam findings or acute symptoms compared to prior. The patient was seen by the attending physician on the date of discharge and deemed stable and acceptable for discharge. The patient's chronic medical conditions were treated accordingly per the patient's home medication regimen. The patient's medication reconciliation (with changes made to chronic medications), follow up appointments, discharge orders, instructions, and significant lab and diagnostic studies are as noted.     Discharge follow up action items:     1. Follow up with PCP in 1-2 weeks.   2. Follow up labs, path, & imaging ***  3. Medication changes ***  4. On hold medications ***    Patient's ordered code status: FULL    Patient disposition: [facility]     Discharge Summary     Admit Date: 02-07-23  Discharge Date: 02-    Admission diagnoses:   Altered mental status  Aspiration Pneumonia  Severe septic shock    Hospital Course:   For full details, please see H&P, progress notes, consult notes and ancillary notes. Briefly, NANCY SEPULVEDA is a 57y Male from [facility] on 2L NC with PMH of ESRD on HD TTS, HTN, DM, bipolar, recent [place] admission (1/14) for AHRF and septic shock, presenting with hypotension to 60/40s, desat to 80s, lethargy. Per interaction w/ RN who took care of him, he is A&OX3 at his baseline and usually feeds himself and has no difficulty eating. However this AM he is more lethargic and was hypotensive. At his baseline he was using 2L O2 but his O2 sat dropped. Has had little cough but nothing overt. Then [place] decided to transfer him to [facility] and while he was being transferred he also vomited. In the ED, he had desats requiring oxygen NC.  Pt started on Zosyn for asp PNA, found Covid+, started on dexamethasone. VBG before code blue showed respiratory acidosis (pCO2 86, pO2 43, pH 7.1). CXR showed complete L lung atelectasis. EKG at admission showed Mobitz type 1 block. His HR then went to 30s and then pt had code blue. ROSC was achieved after CPR and epi, intubation. Patient was admitted to MICU after ROSC for post-arrest care. Course was complicated by persistent HTN to -200s requiring cardine gtt. Patient was successfully extubated on 2/10/23 and weaned down to 2L NC. Patient downgraded to floor 2/12/23 for further management. Course was notable for persistently elevated alkaline phosphatase levels 600-900s, with GGT elevation indicating hepatic source. RUQ US was performed and showed no abnormalities. Patient was made NPO after concerns of aspiration. Modified barium swallow cineesophagogram was performed on 2/15/23 and cleared for regular solids with moderately thick liquids. Physical therapy evaluated patient and recommended return to [facility].    On day of discharge, patient is clinically stable with no new exam findings or acute symptoms compared to prior. The patient was seen by the attending physician on the date of discharge and deemed stable and acceptable for discharge. The patient's chronic medical conditions were treated accordingly per the patient's home medication regimen. The patient's medication reconciliation (with changes made to chronic medications), follow up appointments, discharge orders, instructions, and significant lab and diagnostic studies are as noted.     Discharge follow up action items:     1. Follow up with PCP in 1-2 weeks.   2. Follow up outpatient for autoimmune hepatitis workup.  3. Continue to wean off O2.  4. Continue with decadron 6mg for 2 days until 2/17.    Patient's ordered code status: FULL    Patient disposition: [facility]

## 2023-02-14 NOTE — SWALLOW BEDSIDE ASSESSMENT ADULT - COMMENTS
Internal medicine note 2/14 "57M from [facility] on 2L NC w/ ESRD on HD TTS, HTN, DM, bipolar, recent [place] admission (1/14) for AHRF and septic shock, p/w hypotension to 60/40s, desat to 80s, lethargy, and then had cardiac arrest and was admitted to MICU after ROSC was achieved for post-arrest care. Pt started on Zosyn for asp PNA, found Covid+, started on dexamethasone, course c/b persistent HTN to -200s on cardine gtt. Pt extubated on 2/10/23, passed S/S now tolerating PO diet, sat well on 2L NC. Pt downgraded to floor 2/12/23 for further management."      Patient seen at bedside this afternoon for an initial assessment of the swallow function, at which time patient was alert. Patient receiving supplemental O2 via nasal cannula. Patient with breakfast tray present upon arrival. Patient noted to have a wet baseline cough.

## 2023-02-14 NOTE — PROGRESS NOTE ADULT - PROBLEM SELECTOR PLAN 8
Hospital Bundle    Fluids: po hydration  Electrolytes: Replete K > 4, Mg > 2, Phos > 3  Nutrition: Diet pureed  PPX  ---VTE: LSQ (covid protocol)  ---GI: IV ppi  ---Resp: n/a  Access: PIV  Code Status: FULL  Dispo: PT eval Home zyprexa 30qhs and [id number] held while in MICU.   - resume zyprexa 30qhs  - resume trazodone 100bid  - repeat ECG in AM; QTc 490s

## 2023-02-14 NOTE — SWALLOW BEDSIDE ASSESSMENT ADULT - SWALLOW EVAL: DIAGNOSIS
1. Functional oral stage for puree and moderately thick liquids marked by adequate oral acceptance, collection and transfer. 2. Moderate pharyngeal dysphagia for puree and moderately thick liquids marked by a suspected delayed pharyngeal swallow trigger with hyolaryngeal elevation noted upon digital palpation with coughing suggestive of airway penetration/aspiration.

## 2023-02-14 NOTE — PROGRESS NOTE ADULT - ATTENDING COMMENTS
57M h/o w/ ESRD on HD TTS, HTN, DM, bipolar, recent [place] admission (1/14) for AHRF and septic shock, p/w hypotension, hypoxia and cardiac arrest w/ ROSC then transfer to MICU. Likely septic shock secondary to aspiration PNA/COVID. Completed course of Zosyn for asp PNA. Cont decadron for COVID19. Titrating BP meds.    S&S eval advising MEÑO, NPO status. Will await results of MEÑO. 57M h/o w/ ESRD on HD TTS, HTN, DM, bipolar, recent [place] admission (1/14) for AHRF and septic shock, p/w hypotension, hypoxia and cardiac arrest w/ ROSC then transfer to MICU. Likely septic shock secondary to aspiration PNA/COVID. Completed course of Zosyn for asp PNA. Cont decadron for COVID19. Titrating BP meds.    S&S eval advising MEÑO, NPO status. Will await results of MEÑO.    I examined this patient and discussed their management with house staff on 2-.

## 2023-02-14 NOTE — PROGRESS NOTE ADULT - ATTENDING COMMENTS
Agree with above.  Patient seen and evaluated this morning.  Plan for maintenance HD today.  Potassium slightly elevated.  Trend K if continues to be elevated will need to consider d/c losartan.

## 2023-02-14 NOTE — PROVIDER CONTACT NOTE (OTHER) - SITUATION
Patient on 4L Nasal Canula.  Desatting to 85-86%
patient pulse 43
heart rate below 40bpm. Sinus Bradycardia  Patient in MICU with cardiac monitoring.
Patient is being monitored by Continuous pulse Oximetry and his heart rate is currently at 45
Pt BP elevated with highest SBP during HD at 208/84. Pt missed afternoon antihypertensive medication.
Pt is hypertensive

## 2023-02-14 NOTE — DISCHARGE NOTE PROVIDER - NSDCMRMEDTOKEN_GEN_ALL_CORE_FT
aspirin 81 mg oral tablet, chewable: 1 tab(s) orally once a day  cloNIDine 0.1 mg oral tablet: 1 tab(s) orally 2 times a day  Coreg 25 mg oral tablet: 1 tab(s) orally 2 times a day  Crestor 5 mg oral tablet: 1 tab(s) orally once a day  Eliquis 2.5 mg oral tablet: 1 tab(s) orally 2 times a day  hydrALAZINE 100 mg oral tablet: 1 tab(s) orally 3 times a day  isosorbide dinitrate 30 mg oral tablet: 1 tab(s) orally every 8 hours  losartan 100 mg oral tablet: 1 tab(s) orally once a day  Melatonin 3 mg oral tablet: 2 tab(s) orally once a day (at bedtime)  MiraLax oral powder for reconstitution: 17 gram(s) orally once a day  NIFEdipine 90 mg oral tablet, extended release: 1 tab(s) orally once a day  OLANZapine: 30 milligram(s) orally once a day (at bedtime)  omeprazole 40 mg oral delayed release capsule: 1 cap(s) orally once a day  sevelamer carbonate 800 mg oral tablet: 1 tab(s) orally 3 times a day (with meals)  spironolactone 25 mg oral tablet: 1 tab(s) orally once a day  traZODone 100 mg oral tablet: 2 tab(s) orally once a day   albuterol 90 mcg/inh inhalation aerosol: 4 puff(s) inhaled every 6 hours, As Needed  aspirin 81 mg oral tablet, chewable: 1 tab(s) orally once a day  cloNIDine 0.1 mg oral tablet: 1 tab(s) orally 2 times a day  Crestor 5 mg oral tablet: 1 tab(s) orally once a day  dexamethasone 6 mg oral tablet: 1 tab(s) orally once a day  Eliquis 2.5 mg oral tablet: 1 tab(s) orally 2 times a day  hydrALAZINE 100 mg oral tablet: 1 tab(s) orally every 8 hours  ipratropium CFC free 17 mcg/inh inhalation aerosol: 2 puff(s) inhaled every 6 hours, As Needed  isosorbide dinitrate 30 mg oral tablet: 1 tab(s) orally every 8 hours  losartan 100 mg oral tablet: 1 tab(s) orally once a day  Melatonin 3 mg oral tablet: 2 tab(s) orally once a day (at bedtime)  MiraLax oral powder for reconstitution: 17 gram(s) orally once a day  NIFEdipine 90 mg oral tablet, extended release: 1 tab(s) orally once a day  OLANZapine: 30 milligram(s) orally once a day (at bedtime)  omeprazole 40 mg oral delayed release capsule: 1 cap(s) orally once a day  sevelamer carbonate 800 mg oral tablet: 1 tab(s) orally 3 times a day (with meals)  spironolactone 25 mg oral tablet: 1 tab(s) orally once a day  traZODone 100 mg oral tablet: 2 tab(s) orally once a day

## 2023-02-14 NOTE — PROGRESS NOTE ADULT - PROBLEM SELECTOR PLAN 1
Pt. with ESRD on HD TIW (TTS) who presented for AMS and hypotension, had cardiac arrest in the ED and was admitted to the MICU. PT. was transferred to the medical floor on 2/11/23. Pt. last received HD treatment on 2/11/23 via LUE AVF. Labs reviewed. Pt. appears clinically stable. Vitals reviewed, HR increased to 50s. Will plan for maintenance HD today. Monitor labs and BP. Dose meds as per HD. Pt. with ESRD on HD TIW (TTS) who presented for AMS and hypotension, had cardiac arrest in the ED and was admitted to the MICU. PT. was transferred to the medical floor on 2/11/23. Pt. last received HD treatment on 2/11/23 via LUE AVF. Labs reviewed. Pt. appears clinically stable. Vitals reviewed, HR noted. Will plan for maintenance HD today. Monitor labs and BP. Dose meds as per HD.

## 2023-02-14 NOTE — PROVIDER CONTACT NOTE (OTHER) - BACKGROUND
ESRD
Pt was admitted for AMS,
57M PMH ESRD on HD 2/4/6, HTN, DM, bipolar, [facility]
Patient recently downgraded to medical floor after MICU admission with lethargy, desat to 80's, and hypotention
Pt on ESRD, currently Covid positive isolation in micu 7
admitted with altered mental status, s/p cardiac arrest

## 2023-02-14 NOTE — PROGRESS NOTE ADULT - ASSESSMENT
57M from [facility] on 2L NC w/ ESRD on HD TTS, HTN, DM, bipolar, recent [place] admission (1/14) for AHRF and septic shock, p/w hypotension to 60/40s, desat to 80s, lethargy, and then had cardiac arrest and was admitted to MICU after ROSC was achieved for post-arrest care. Pt started on Zosyn for asp PNA, found Covid+, started on dexamethasone, course c/b persistent HTN to -200s on cardine gtt. Pt extubated on 2/10/23, passed S/S now tolerating PO diet, sat well on 2L NC. Pt downgraded to floor 2/12/23 for further management.

## 2023-02-14 NOTE — PROVIDER CONTACT NOTE (OTHER) - ACTION/TREATMENT ORDERED:
IV hydralazine 10mg ordered
No changes at this time, continue to monitor and report further changes
Ok to continue dialysis. HR parameters 38 bpm and above. read back verified and confirmed.
Oxygen placed on 6L NC as per Dr's order
Increased UF goal to 3 liter as tolerated. Hydralazine 10 mg IV given as ordered. closely monitored.
provider notified and aware.

## 2023-02-14 NOTE — PROVIDER CONTACT NOTE (OTHER) - ASSESSMENT
Pt is alert responding at baseline, no signs of distress noted
Pt remains asymptomatic. Denies chest pain, no headache, no dizziness.
Pt is currently asymptomatic /72, HR 45, R 17, O2 sats 99
heart rate below 40bpm. Sinus Bradycardia  Patient in MICU with cardiac monitoring. BP - 149/55 mmhg. HR - 40-41 bpm.
Vital signs stable, safety maintained. patient alert and oriented x4. no complaints of sob/pain/discomfort.
patient asymptomatic, however cursing at RN

## 2023-02-14 NOTE — PROVIDER CONTACT NOTE (OTHER) - DATE AND TIME:
13-Feb-2023 18:50
13-Feb-2023 03:35
14-Feb-2023 20:47
14-Feb-2023 19:48
14-Feb-2023 06:03
09-Feb-2023 14:15

## 2023-02-14 NOTE — PROGRESS NOTE ADULT - SUBJECTIVE AND OBJECTIVE BOX
***************************************************************  Elton Garcia, PGY1  Internal Medicine   pager:  [facility]: [phone] [facility]: [phone]  ***************************************************************    NANCY SEPULVEDA  57y  MRN: [id number]    Patient is a 57y old  Male who presents with a chief complaint of Hypotension, lethargy (13 Feb 2023 08:25)    Interval/Overnight Events: no events ON.   - O2 need increased to 6L o/n, otherwise clinically unchanged, asymptomatic  - increased zyprexa and trazodone to home dosages yday PM, pending repeat ECG      Subjective: Pt seen and examined at bedside. Denies fever, CP, SOB, abn pain, N/V, dysuria. Tolerating diet.      MEDICATIONS  (STANDING):  albuterol    90 MICROgram(s) HFA Inhaler 4 Puff(s) Inhalation every 6 hours  atorvastatin 20 milliGRAM(s) Oral at bedtime  bisacodyl Suppository 10 milliGRAM(s) Rectal daily  chlorhexidine 2% Cloths 1 Application(s) Topical <User Schedule>  cloNIDine 0.2 milliGRAM(s) Oral every 8 hours  heparin   Injectable 5000 Unit(s) SubCutaneous every 8 hours  hydrALAZINE 100 milliGRAM(s) Oral every 8 hours  ipratropium 17 MICROgram(s) HFA Inhaler 2 Puff(s) Inhalation every 6 hours  isosorbide   dinitrate Tablet (ISORDIL) 10 milliGRAM(s) Oral <User Schedule>  losartan 100 milliGRAM(s) Oral daily  niCARdipine 20 milliGRAM(s) Oral every 8 hours  OLANZapine 30 milliGRAM(s) Oral at bedtime  pantoprazole    Tablet 40 milliGRAM(s) Oral before breakfast  polyethylene glycol 3350 17 Gram(s) Oral two times a day  senna 2 Tablet(s) Oral at bedtime  sevelamer carbonate Powder 800 milliGRAM(s) Oral three times a day with meals  traZODone 100 milliGRAM(s) Oral two times a day    MEDICATIONS  (PRN):  melatonin 3 milliGRAM(s) Oral at bedtime PRN Insomnia  sodium chloride 0.9% Bolus 100 milliLiter(s) IV Bolus once PRN For SBP <90      Objective:    Vitals: Vital Signs Last 24 Hrs  T(C): 36.4 (02-14-23 @ 00:34), Max: 36.5 (02-13-23 @ 15:15)  T(F): 97.5 (02-14-23 @ 00:34), Max: 97.7 (02-13-23 @ 15:15)  HR: 52 (02-14-23 @ 00:34) (50 - 55)  BP: 148/64 (02-13-23 @ 22:07) (148/64 - 179/73)  BP(mean): --  RR: 18 (02-13-23 @ 22:07) (17 - 18)  SpO2: 96% (02-13-23 @ 22:07) (96% - 100%)                I&O's Summary    13 Feb 2023 07:01  -  14 Feb 2023 07:00  --------------------------------------------------------  IN: 700 mL / OUT: 0 mL / NET: 700 mL        PHYSICAL EXAM:  GENERAL: NAD  HEAD:  Atraumatic, Normocephalic  EYES: EOMI, conjunctiva and sclera clear  CHEST/LUNG: Clear to auscultation bilaterally; No rales, rhonchi, wheezing, or rubs  HEART: Regular rate and rhythm; No murmurs, rubs, or gallops  ABDOMEN: Soft, Nontender, Nondistended;   SKIN: No rashes or lesions  NERVOUS SYSTEM:  Alert & Oriented X3, no focal deficits    LABS:  02-13    132<L>  |  91<L>  |  39<H>  ----------------------------<  66<L>  4.8   |  27  |  3.29<H>    Ca    8.3<L>      13 Feb 2023 05:50  Phos  5.7     02-13  Mg     2.40     02-13                        8.6    7.21  )-----------( 174      ( 13 Feb 2023 05:50 )             28.0     CAPILLARY BLOOD GLUCOSE      POCT Blood Glucose.: 101 mg/dL (13 Feb 2023 09:11)      RADIOLOGY & ADDITIONAL TESTS:    Imaging Personally Reviewed:  [x ] YES  [ ] NO    Consultants involved in case:   Consultant(s) Notes Reviewed:  [ x] YES  [ ] NO:   Care Discussed with Consultants/Other Providers [x ] YES  [ ] NO

## 2023-02-14 NOTE — PROVIDER CONTACT NOTE (OTHER) - REASON
Patient's HR at 45
PAtient hypoxic
elevated BP during HD
heart rate below 40bpm. Sinus Bradycardia  Patient in MICU with cardiac monitoring.
Pt is hypertensive
Pulse 43

## 2023-02-14 NOTE — CHART NOTE - NSCHARTNOTEFT_GEN_A_CORE
Pt seen for nutrition follow up.    Medical Course:  - Per chart, pt is 57 year old male PMH ESRD on HD, HTN, type 2 DM, bipolar disorder, recent [employer] admission (1/14) for AHRF and septic shock presenting from [facility] for hypotension, desaturation, lethargy with cardiac arrest s/p MICU stay (2/7-2/12). S/p Zosyn for aspiration PNA. Continues on dexamethasone for COVID. Course complicated by persistent HTN. Extubated (2/10) and transferred to floor (2/12).     Nutrition Course:  - Pt was previously ordered for EN (2/9-2/11), advanced to puree PO diet (2/11-2/14) per MD. S/p bedside swallow assessment (2/14) recommending NPO status, reflective in current diet order.   - No noted GI distress, last BM 2/11 per flowsheets; ordered for bisacodyl Suppository 10 mg qD, senna 2 tablets qHS and Miralax 17 gm BID  - Pt continues on PhosLo TID. Nephrology following, plan for HD today.    Diet Prescription:   - NPO    Pertinent Medications:   - atorvastatin, bisacodyl Suppository, pantoprazole Tablet, polyethylene glycol, senna, sevelamer carbonate Powder    Pertinent Labs:   - (2/14) Na 129 mmol/L<L> Glu 76 mg/dL K+ 5.4 mmol/L<H> Cr 3.96 mg/dL<H> BUN 52 mg/dL<H> Phos 6.7 mg/dL<H> Alb 2.9 g/dL<L>  - (2/10) HbA1c 4.8%    Weight: (2/12) 139 lbs / 63.1 kg, (2/9) 144.1 lbs / 65.4 kg, (2/7 dosing) 139.1 lbs / 63.1 kg  Height: 69 in / 175.26 cm  IBW: 160 lbs / 72.7 kg +/-10%  BMI: 20.5 kg/m^2 (at most recent weight)    Physical Assessment, per flowsheets:  Edema: none noted  Pressure Injury: right fifth toe suspected DTI    Estimated Needs:   [X] Recalculated, with consideration for extubation/ICU downgrade, based on dosing weight 139 lbs / 63.1 kg  6119-5395 kcal daily @28-32 kcal/kg, 75.72-88.34 gm protein daily @1.2-1.4 gm/kg     Previous Nutrition Diagnosis: [X] Inadequate protein-energy intake, ongoing  New Nutrition Diagnosis: [X] not applicable     Interventions:   1) Consider alternate means of nutrition if within GOC. RDN remains available for consult to provide enteral feeding recommendations.   2) Obtain pre/post-HD weights.    Monitor & Evaluate:  Diet progression, nutrition related lab values, weight trends, BMs/GI distress, hydration status, skin integrity.  Yandy Chong, GILBERT, CDN #[id number]  Also available on [employer] Teams Pt seen for nutrition follow up.    Medical Course:  - Per chart, pt is 57 year old male PMH ESRD on HD, HTN, type 2 DM, bipolar disorder, recent [place] admission (1/14) for AHRF and septic shock presenting from [facility] for hypotension, desaturation, lethargy with cardiac arrest s/p MICU stay (2/7-2/12). S/p Zosyn for aspiration PNA. Continues on dexamethasone for COVID. Course complicated by persistent HTN. Extubated (2/10) and transferred to floor (2/12).     Nutrition Course:  - Pt was previously ordered for EN (2/9-2/11), advanced to puree PO diet (2/11-2/14) per MD. Pt was made NPO today following bedside swallow assessment (2/14) which recommended NPO status.  - No noted GI distress, last BM 2/11 per flowsheets; ordered for bisacodyl Suppository 10 mg qD, senna 2 tablets qHS and Miralax 17 gm BID  - Pt continues on PhosLo TID. Nephrology following, plan for HD today.    Diet Prescription:   - NPO    Pertinent Medications:   - atorvastatin, bisacodyl Suppository, pantoprazole Tablet, polyethylene glycol, senna, sevelamer carbonate Powder    Pertinent Labs:   - (2/14) Na 129 mmol/L<L> Glu 76 mg/dL K+ 5.4 mmol/L<H> Cr 3.96 mg/dL<H> BUN 52 mg/dL<H> Phos 6.7 mg/dL<H> Alb 2.9 g/dL<L>  - (2/10) HbA1c 4.8%    Weight: (2/12) 139 lbs / 63.1 kg, (2/9) 144.1 lbs / 65.4 kg, (2/7 dosing) 139.1 lbs / 63.1 kg  Height: 69 in / 175.26 cm  IBW: 160 lbs / 72.7 kg +/-10%  BMI: 20.5 kg/m^2 (at most recent weight)    Physical Assessment, per flowsheets:  Edema: none noted  Pressure Injury: right fifth toe suspected DTI    Estimated Needs:   [X] Recalculated, with consideration for extubation/ICU downgrade, based on dosing weight 139 lbs / 63.1 kg  6234-0563 kcal daily @28-32 kcal/kg, 75.72-88.34 gm protein daily @1.2-1.4 gm/kg     Previous Nutrition Diagnosis: [X] Inadequate protein-energy intake, ongoing  New Nutrition Diagnosis: [X] not applicable     Interventions:   1) Consider alternate means of nutrition if within GOC. RDN remains available for consult to provide enteral feeding recommendations.   2) Obtain pre/post-HD weights.    Monitor & Evaluate:  Diet progression, nutrition related lab values, weight trends, BMs/GI distress, hydration status, skin integrity.  Yandy Chong RDN, CDN #[id number]  Also available on [employer] Teams

## 2023-02-14 NOTE — PROGRESS NOTE ADULT - PROBLEM SELECTOR PLAN 3
Pt. with hyperphosphatemia in the setting of ESRD. Serum phosphorus is elevated at 6.7 today. Pt. is receiving sevelamer 800 mg tid w/ meals. Recommend to continue with phosphate binder. Low phos diet. Monitor serum phosphorus level.    If you have any questions, please feel free to contact me  Rohan Tarango  Nephrology Fellow  [phone] / [employer] Teams(Preferred)  (After 5pm or on weekends please page the on-call fellow)

## 2023-02-14 NOTE — PROGRESS NOTE ADULT - SUBJECTIVE AND OBJECTIVE BOX
[facility] Division of Kidney Diseases & Hypertension  FOLLOW UP NOTE  [phone]--------------------------------------------------------------------------------    HPI: 58 yo M with a PMH of ESRD on HD TIW (TTS), HTN, DM, anemia who presented to [facility] from [facility] for AMS and hypotension. While in the ED, he had cardiac arrest and was coded with ROSC achieved and was admitted to the MICU. Pt. being seen for ESRD/HD management. Last HD treat on 2/9/23.     24 hour events/subjective: Pt. was seen and evaluated bedside this morning. He reports feeling well. Pt. denies shortness of breath, fevers, chills, nausea, vomiting.       PAST HISTORY  --------------------------------------------------------------------------------  No significant changes to PMH, PSH, FHx, SHx, unless otherwise noted    ALLERGIES & MEDICATIONS  --------------------------------------------------------------------------------  Allergies    No Known Allergies    Intolerances      Standing Inpatient Medications  albuterol    90 MICROgram(s) HFA Inhaler 4 Puff(s) Inhalation every 6 hours  atorvastatin 20 milliGRAM(s) Oral at bedtime  bisacodyl Suppository 10 milliGRAM(s) Rectal daily  chlorhexidine 2% Cloths 1 Application(s) Topical <User Schedule>  cloNIDine 0.2 milliGRAM(s) Oral every 8 hours  heparin   Injectable 5000 Unit(s) SubCutaneous every 8 hours  hydrALAZINE 100 milliGRAM(s) Oral every 8 hours  ipratropium 17 MICROgram(s) HFA Inhaler 2 Puff(s) Inhalation every 6 hours  isosorbide   dinitrate Tablet (ISORDIL) 10 milliGRAM(s) Oral <User Schedule>  losartan 100 milliGRAM(s) Oral daily  niCARdipine 20 milliGRAM(s) Oral every 8 hours  OLANZapine 30 milliGRAM(s) Oral at bedtime  pantoprazole    Tablet 40 milliGRAM(s) Oral before breakfast  polyethylene glycol 3350 17 Gram(s) Oral two times a day  senna 2 Tablet(s) Oral at bedtime  sevelamer carbonate Powder 800 milliGRAM(s) Oral three times a day with meals  traZODone 100 milliGRAM(s) Oral two times a day    PRN Inpatient Medications  melatonin 3 milliGRAM(s) Oral at bedtime PRN  sodium chloride 0.9% Bolus 100 milliLiter(s) IV Bolus once PRN      REVIEW OF SYSTEMS  --------------------------------------------------------------------------------  Gen: No fevers/chills  Head/Eyes/Ears: No HA   Respiratory: No dyspnea, cough  CV: No chest pain  GI: No abdominal pain, diarrhea  : No dysuria, hematuria  MSK: No edema  Skin: No rash  Heme: No easy bruising or bleeding    All other systems were reviewed and are negative, except as noted.      VITALS/PHYSICAL EXAM  --------------------------------------------------------------------------------  T(C): 36.4 (02-14-23 @ 07:07), Max: 36.5 (02-13-23 @ 15:15)  HR: 51 (02-14-23 @ 09:00) (51 - 55)  BP: 153/68 (02-14-23 @ 09:00) (146/66 - 179/73)  RR: 18 (02-14-23 @ 07:07) (18 - 18)  SpO2: 97% (02-14-23 @ 07:07) (96% - 100%)  Wt(kg): --      02-13-23 @ 07:01  -  02-14-23 @ 07:00  --------------------------------------------------------  IN: 700 mL / OUT: 0 mL / NET: 700 mL    Physical Exam:  Gen: Laying in bed and in NAD  HEENT: Anicteric  Pulm: CTA B/L  CV: S1S2+  Abd: Soft, Diminished bowel sounds    Ext: No LE edema B/L  Neuro: Awake and alert   Skin: Warm and dry  Dialysis access: LUE AVF with palpable pulse and bruit heard    LABS/STUDIES  --------------------------------------------------------------------------------              8.7    7.75  >-----------<  163      [02-14-23 @ 07:33]              28.2     129  |  90  |  52  ----------------------------<  76      [02-14-23 @ 07:33]  5.4   |  26  |  3.96        Ca     8.4     [02-14-23 @ 07:33]      Mg     2.30     [02-14-23 @ 07:33]      Phos  6.7     [02-14-23 @ 07:33]    TPro  6.5  /  Alb  2.9  /  TBili  1.5  /  DBili  x   /  AST  59  /  ALT  30  /  AlkPhos  923  [02-14-23 @ 07:33]    Creatinine Trend:  SCr 3.96 [02-14 @ 07:33]  SCr 3.29 [02-13 @ 05:50]  SCr 3.39 [02-11 @ 06:45]  SCr 2.55 [02-10 @ 00:20]  SCr 3.37 [02-09 @ 00:26]    HBsAb 45.4      [02-07-23 @ 12:40]  HBsAg Nonreact      [02-07-23 @ 12:40]  HBcAb Nonreact      [02-07-23 @ 12:40]  HCV 0.16, Nonreact      [02-07-23 @ 12:40] [facility] Division of Kidney Diseases & Hypertension  FOLLOW UP NOTE  [phone]--------------------------------------------------------------------------------    HPI: 56 yo M with a PMH of ESRD on HD TIW (TTS), HTN, DM, anemia who presented to [facility] from [facility] for AMS and hypotension. While in the ED, he had cardiac arrest and was coded with ROSC achieved and was admitted to the MICU. Pt. being seen for ESRD/HD management. Last HD treat on 2/9/23.     24 hour events/subjective: Pt. was seen and evaluated bedside this morning. He reports feeling well. Pt. denies shortness of breath, fevers, chills, nausea, vomiting.       PAST HISTORY  --------------------------------------------------------------------------------  No significant changes to PMH, PSH, FHx, SHx, unless otherwise noted    ALLERGIES & MEDICATIONS  --------------------------------------------------------------------------------  Allergies    No Known Allergies    Intolerances      Standing Inpatient Medications  albuterol    90 MICROgram(s) HFA Inhaler 4 Puff(s) Inhalation every 6 hours  atorvastatin 20 milliGRAM(s) Oral at bedtime  bisacodyl Suppository 10 milliGRAM(s) Rectal daily  chlorhexidine 2% Cloths 1 Application(s) Topical <User Schedule>  cloNIDine 0.2 milliGRAM(s) Oral every 8 hours  heparin   Injectable 5000 Unit(s) SubCutaneous every 8 hours  hydrALAZINE 100 milliGRAM(s) Oral every 8 hours  ipratropium 17 MICROgram(s) HFA Inhaler 2 Puff(s) Inhalation every 6 hours  isosorbide   dinitrate Tablet (ISORDIL) 10 milliGRAM(s) Oral <User Schedule>  losartan 100 milliGRAM(s) Oral daily  niCARdipine 20 milliGRAM(s) Oral every 8 hours  OLANZapine 30 milliGRAM(s) Oral at bedtime  pantoprazole    Tablet 40 milliGRAM(s) Oral before breakfast  polyethylene glycol 3350 17 Gram(s) Oral two times a day  senna 2 Tablet(s) Oral at bedtime  sevelamer carbonate Powder 800 milliGRAM(s) Oral three times a day with meals  traZODone 100 milliGRAM(s) Oral two times a day    PRN Inpatient Medications  melatonin 3 milliGRAM(s) Oral at bedtime PRN  sodium chloride 0.9% Bolus 100 milliLiter(s) IV Bolus once PRN      REVIEW OF SYSTEMS  --------------------------------------------------------------------------------  Gen: No fevers/chills  Head/Eyes/Ears: No HA   Respiratory: No dyspnea, cough  CV: No chest pain  GI: No abdominal pain, diarrhea  : No dysuria, hematuria  MSK: No edema  Skin: No rash  Heme: No easy bruising or bleeding    All other systems were reviewed and are negative, except as noted.      VITALS/PHYSICAL EXAM  --------------------------------------------------------------------------------  T(C): 36.4 (02-14-23 @ 07:07), Max: 36.5 (02-13-23 @ 15:15)  HR: 51 (02-14-23 @ 09:00) (51 - 55)  BP: 153/68 (02-14-23 @ 09:00) (146/66 - 179/73)  RR: 18 (02-14-23 @ 07:07) (18 - 18)  SpO2: 97% (02-14-23 @ 07:07) (96% - 100%)  Wt(kg): --      02-13-23 @ 07:01  -  02-14-23 @ 07:00  --------------------------------------------------------  IN: 700 mL / OUT: 0 mL / NET: 700 mL    Physical Exam:  Gen: Laying in bed and in NAD  HEENT: Anicteric  Pulm: CTA B/L  CV: S1S2+  Abd: Soft  Ext: No LE edema B/L  Neuro: Awake and alert   Skin: Warm and dry  Dialysis access: LUE AVF with palpable pulse and bruit heard    LABS/STUDIES  --------------------------------------------------------------------------------              8.7    7.75  >-----------<  163      [02-14-23 @ 07:33]              28.2     129  |  90  |  52  ----------------------------<  76      [02-14-23 @ 07:33]  5.4   |  26  |  3.96        Ca     8.4     [02-14-23 @ 07:33]      Mg     2.30     [02-14-23 @ 07:33]      Phos  6.7     [02-14-23 @ 07:33]    TPro  6.5  /  Alb  2.9  /  TBili  1.5  /  DBili  x   /  AST  59  /  ALT  30  /  AlkPhos  923  [02-14-23 @ 07:33]    Creatinine Trend:  SCr 3.96 [02-14 @ 07:33]  SCr 3.29 [02-13 @ 05:50]  SCr 3.39 [02-11 @ 06:45]  SCr 2.55 [02-10 @ 00:20]  SCr 3.37 [02-09 @ 00:26]    HBsAb 45.4      [02-07-23 @ 12:40]  HBsAg Nonreact      [02-07-23 @ 12:40]  HBcAb Nonreact      [02-07-23 @ 12:40]  HCV 0.16, Nonreact      [02-07-23 @ 12:40]

## 2023-02-14 NOTE — SWALLOW BEDSIDE ASSESSMENT ADULT - ASR SWALLOW RECOMMEND DIAG
Cinesophagram to objectively assess the swallow function given concern for Aspiration and to determine if cough is dysphagia related.

## 2023-02-15 NOTE — PROGRESS NOTE ADULT - ATTENDING COMMENTS
57M h/o w/ ESRD on HD TTS, HTN, DM, bipolar, recent [place] admission (1/14) for AHRF and septic shock, p/w hypotension, hypoxia and cardiac arrest w/ ROSC then transfer to MICU. Likely septic shock secondary to aspiration PNA/COVID. Completed course of Zosyn for asp PNA. Cont decadron for COVID19. Titrating BP meds.    S&S eval appreciated. Underwent MEÑO today. Recommending regular solids w/moderately thick liquids.    Proceeding w/dc planning.

## 2023-02-15 NOTE — SWALLOW VFSS/MBS ASSESSMENT ADULT - DIAGNOSTIC IMPRESSIONS
1) Functional Oral Stage for Puree, Regular Solids, Moderately Thick Liquids, Mildly Thick Liquids, and Thin Liquids characterized by adequate oral containment, adequate mastication and adequate bolus manipulation, adequate anterior to posterior transfer with adequate oral clearance.   2) Functional Pharyngeal Stage for Puree, Regular Solids, and Moderately Thick Liquids characterized by adequate initiation of the pharyngeal swallow, adequate hyolaryngeal excursion, adequate base of tongue retraction, adequate laryngeal vestibule closure, adequate epiglottic deflection, adequate pharyngeal contractility. There is adequate pharyngeal clearance. There is No Aspiration before, during, or after the swallow.   3) Moderate to Severe Pharyngeal Dysphagia for Mildly Thick Liquids and Thin Liquids characterized by adequate initiation of pharyngeal swallow, reduced hyolaryngeal excursion, reduced base of tongue retraction, reduced laryngeal vestibule closure, reduced epiglottic deflection, adequate pharyngeal contractility. There is Trace Laryngeal Penetration during the swallow for Mildly Thick Liquids leaving trace residue in the laryngeal vestibule with migration to the vocal folds. There is Trace Laryngeal Penetration before the swallow for Thin Liquids. Patient given verbal cue to cough following Thin Liquid however patient is unable to produce volitional cough to clear contrast from upper airway. There No Aspiration before, during, or after the swallow for Mildly Thick Liquid. There is Trace Aspiration during the swallow for Thin Liquids. 1) Functional Oral Stage for Puree, Regular Solids, Moderately Thick Liquids, Mildly Thick Liquids, and Thin Liquids characterized by adequate oral containment, adequate mastication and adequate bolus manipulation, adequate anterior to posterior transfer with adequate oral clearance.   2) Functional Pharyngeal Stage for Puree, Regular Solids, and Moderately Thick Liquids characterized by adequate initiation of the pharyngeal swallow, adequate hyolaryngeal excursion, adequate base of tongue retraction, adequate laryngeal vestibule closure, adequate epiglottic deflection, adequate pharyngeal contractility. There is adequate pharyngeal clearance. There is No Aspiration before, during, or after the swallow.   3) Moderate to Severe Pharyngeal Dysphagia for Mildly Thick Liquids and Thin Liquids characterized by adequate initiation of pharyngeal swallow, reduced hyolaryngeal excursion, adequate base of tongue retraction, reduced laryngeal vestibule closure, reduced epiglottic deflection, adequate pharyngeal contractility. There is Laryngeal Penetration during the swallow for Mildly Thick Liquids leaving trace residue in the laryngeal vestibule with migration to the vocal folds. There is Laryngeal Penetration before the swallow for Thin Liquids resulting in trace Aspiration during the swallow. Patient given verbal cue to cough following Thin Liquid however patient is unable to produce volitional cough to clear contrast from upper airway. There is Trace Aspiration during the swallow for Thin Liquids. 1) Functional Oral Stage for Puree, Regular Solids, Moderately Thick Liquids, Mildly Thick Liquids, and Thin Liquids characterized by adequate oral containment, adequate mastication and adequate bolus manipulation, adequate anterior to posterior transfer with adequate oral clearance.   2) Functional Pharyngeal Stage for Puree, Regular Solids, and Moderately Thick Liquids characterized by adequate initiation of the pharyngeal swallow, adequate hyolaryngeal excursion, adequate base of tongue retraction, adequate laryngeal vestibule closure, adequate epiglottic deflection, adequate pharyngeal contractility. There is adequate pharyngeal clearance. There is No Aspiration before, during, or after the swallow.   3) Moderate to Severe Pharyngeal Dysphagia for Mildly Thick Liquids and Thin Liquids characterized by adequate initiation of pharyngeal swallow, reduced hyolaryngeal excursion, adequate base of tongue retraction, reduced laryngeal vestibule closure, reduced epiglottic deflection, adequate pharyngeal contractility. There is Laryngeal Penetration during the swallow for Mildly Thick Liquids leaving trace residue in the laryngeal vestibule with migration to the vocal folds. There is Laryngeal Penetration before the swallow for Thin Liquids resulting in trace Aspiration during the swallow. Patient given verbal cue to cough following Thin Liquid however patient is unable to produce volitional cough to clear contrast from upper airway. Chin Tuck strategy was not successful in eliminating penetration for Thin Liquids.

## 2023-02-15 NOTE — PROGRESS NOTE ADULT - PROBLEM SELECTOR PLAN 7
Noted w/ elevated AlkPhos since admission ranging between 600-900. CTAP w/o biliary abnormalities  - f/u GGT  - f/u isozymes

## 2023-02-15 NOTE — PROGRESS NOTE ADULT - PROBLEM SELECTOR PLAN 9
Hospital Bundle    Fluids: po hydration  Electrolytes: Replete K > 4, Mg > 2, Phos > 3  Nutrition: Diet pureed  PPX  ---VTE: LSQ (covid protocol)  ---GI: IV ppi  ---Resp: n/a  Access: PIV  Code Status: FULL  Dispo: PT eval
Hospital Bundle    Fluids: po hydration  Electrolytes: Replete K > 4, Mg > 2, Phos > 3  Nutrition: Diet pureed  PPX  ---VTE: LSQ (covid protocol)  ---GI: IV ppi  ---Resp: n/a  Access: PIV  Code Status: FULL  Dispo: PT eval

## 2023-02-15 NOTE — DISCHARGE NOTE NURSING/CASE MANAGEMENT/SOCIAL WORK - PATIENT PORTAL LINK FT
You can access the FollowMyHealth Patient Portal offered by [facility] by registering at the following website: [email or web address]. By joining [employer]’s FollowMyHealth portal, you will also be able to view your health information using other applications (apps) compatible with our system.

## 2023-02-15 NOTE — SWALLOW VFSS/MBS ASSESSMENT ADULT - RECOMMENDED CONSISTENCY
1) Regular Solids with Moderately Thick Liquids  2) Swallowing Guidelines: Seated upright (90 degrees) during mealtimes and 30 min post, Utilize Single Cup Sips, Maintain Oral Hygiene  3) Aspiration and Reflux Precautions

## 2023-02-15 NOTE — PROGRESS NOTE ADULT - PROBLEM/PLAN-1
DISPLAY PLAN FREE TEXT
Yes
DISPLAY PLAN FREE TEXT
DISPLAY PLAN FREE TEXT

## 2023-02-15 NOTE — SWALLOW VFSS/MBS ASSESSMENT ADULT - ADDITIONAL RECOMMENDATIONS
This department to follow up as schedule permits to assess for diet tolerance. Medical team further advised to reconsult if there is a change in medical status and/or observed change in patient's tolerance of recommended PO diet. This department to follow up as schedule permits to assess for diet tolerance/swallow therapy. Medical team further advised to reconsult if there is a change in medical status and/or observed change in patient's tolerance of recommended PO diet. Patient will benefit from swallow therapy pending discharge plans (e.g. Rehab center vs. Homecare vs. Outpatient at [facility] [phone])

## 2023-02-15 NOTE — PROGRESS NOTE ADULT - PROVIDER SPECIALTY LIST ADULT
Internal Medicine
MICU
Nephrology
MICU
MICU
Nephrology
Internal Medicine
Nephrology
Internal Medicine
Internal Medicine

## 2023-02-15 NOTE — PROGRESS NOTE ADULT - ASSESSMENT
57M from [facility] on 2L NC w/ ESRD on HD TTS, HTN, DM, bipolar, recent [place] admission (1/14) for AHRF and septic shock, p/w hypotension to 60/40s, desat to 80s, lethargy, and then had cardiac arrest and was admitted to MICU after ROSC was achieved for post-arrest care. Pt started on Zosyn for asp PNA, found Covid+, started on dexamethasone, course c/b persistent HTN to -200s on cardine gtt. Pt extubated on 2/10/23, passed S/S now tolerating PO diet, sat well on 2L NC. Pt downgraded to floor 2/12/23 for further management.

## 2023-02-15 NOTE — PROGRESS NOTE ADULT - PROBLEM SELECTOR PLAN 5
- c/w pureed diet  - S/S eval?  - aspiration precautions

## 2023-02-15 NOTE — PROGRESS NOTE ADULT - PROBLEM SELECTOR PLAN 8
Home zyprexa 30qhs and [id number] held while in MICU.   - resume zyprexa 30qhs  - resume trazodone 100bid  - repeat ECG in AM; QTc 490s

## 2023-02-15 NOTE — DISCHARGE NOTE NURSING/CASE MANAGEMENT/SOCIAL WORK - NSDCPNINST_GEN_ALL_CORE
Patient A&Ox4. no c/o pain, Patient discharged to [facility] accompanied by ambulance, no distress noted at time of discharged.

## 2023-02-15 NOTE — PROGRESS NOTE ADULT - REASON FOR ADMISSION
Hypotension, lethargy

## 2023-02-15 NOTE — PROGRESS NOTE ADULT - PROBLEM SELECTOR PLAN 6
Received HD 2/7, HD stopped 2/9 AM as patient HR in 40's but not grossly overloaded, completed 2/9, plan for HD 2/11    - nephro following; recs appreciated  - c/w HD TTS  - c/w phoslo 800mg tid

## 2023-02-15 NOTE — DISCHARGE NOTE NURSING/CASE MANAGEMENT/SOCIAL WORK - NSDCVIVACCINE_GEN_ALL_CORE_FT
Tdap; 11-Jun-2016 18:32; Dora Lentz (Administration); [employer]; [id number]; IntraMuscular; Deltoid Right.; 0.5 milliLiter(s); VIS (VIS Published: 09-May-2013, VIS Presented: 11-Jun-2016);

## 2023-02-15 NOTE — PROGRESS NOTE ADULT - PROBLEM SELECTOR PROBLEM 3
Aspiration pneumonia
Aspiration pneumonia
Hyperphosphatemia
Aspiration pneumonia
Aspiration pneumonia

## 2023-02-15 NOTE — PROGRESS NOTE ADULT - SUBJECTIVE AND OBJECTIVE BOX
***************************************************************  Elton Garcia, PGY1  Internal Medicine   pager:  [facility]: [phone] [facility]: [phone]  ***************************************************************    NANCY SEPULVEDA  57y  MRN: [id number]    Patient is a 57y old  Male who presents with a chief complaint of Hypotension, lethargy (14 Feb 2023 12:53)      Interval/Overnight Events: no events ON.   - c/f dysphagia aspiration, made NPO and pending MBS per SLP rec  - HD yday    Subjective: Pt seen and examined at bedside. Denies fever, CP, SOB, abn pain, N/V, dysuria. Tolerating diet.      MEDICATIONS  (STANDING):  albuterol    90 MICROgram(s) HFA Inhaler 4 Puff(s) Inhalation every 6 hours  atorvastatin 20 milliGRAM(s) Oral at bedtime  bisacodyl Suppository 10 milliGRAM(s) Rectal daily  chlorhexidine 2% Cloths 1 Application(s) Topical <User Schedule>  cloNIDine 0.2 milliGRAM(s) Oral every 8 hours  dexAMETHasone  Injectable 6 milliGRAM(s) IV Push daily  heparin   Injectable 5000 Unit(s) SubCutaneous every 8 hours  hydrALAZINE 100 milliGRAM(s) Oral every 8 hours  ipratropium 17 MICROgram(s) HFA Inhaler 2 Puff(s) Inhalation every 6 hours  isosorbide   dinitrate Tablet (ISORDIL) 20 milliGRAM(s) Oral three times a day  losartan 100 milliGRAM(s) Oral daily  niCARdipine 20 milliGRAM(s) Oral every 8 hours  OLANZapine 30 milliGRAM(s) Oral at bedtime  pantoprazole  Injectable 40 milliGRAM(s) IV Push daily  polyethylene glycol 3350 17 Gram(s) Oral two times a day  senna 2 Tablet(s) Oral at bedtime  sevelamer carbonate Powder 800 milliGRAM(s) Oral three times a day with meals  traZODone 100 milliGRAM(s) Oral two times a day    MEDICATIONS  (PRN):  melatonin 3 milliGRAM(s) Oral at bedtime PRN Insomnia  sodium chloride 0.9% Bolus 100 milliLiter(s) IV Bolus once PRN For SBP <90      Objective:    Vitals: Vital Signs Last 24 Hrs  T(C): 36.2 (02-15-23 @ 05:40), Max: 36.7 (02-14-23 @ 20:30)  T(F): 97.1 (02-15-23 @ 05:40), Max: 98 (02-14-23 @ 20:30)  HR: 61 (02-15-23 @ 06:15) (51 - 78)  BP: 121/91 (02-15-23 @ 06:15) (121/91 - 199/82)  BP(mean): --  RR: 19 (02-15-23 @ 05:40) (18 - 20)  SpO2: 97% (02-15-23 @ 05:40) (96% - 97%)                I&O's Summary    14 Feb 2023 07:01  -  15 Feb 2023 07:00  --------------------------------------------------------  IN: 400 mL / OUT: 3300 mL / NET: -2900 mL        PHYSICAL EXAM:  GENERAL: NAD, disheveled appearance  HEAD:  Atraumatic, Normocephalic  EYES: EOMI, conjunctiva and sclera clear  CHEST/LUNG: RLL coarse lung sounds, bilateral breath sounds heard.   HEART: Regular rate and rhythm; No murmurs, rubs, or gallops  ABDOMEN: Soft, Nontender, Nondistended;   SKIN: No rashes or lesions. +L arm fistula w/ good bruit  NERVOUS SYSTEM:  Alert & Oriented X3, no focal deficits, speech improved compared to yesterday.    LABS:  02-14    129<L>  |  90<L>  |  52<H>  ----------------------------<  76  5.4<H>   |  26  |  3.96<H>  02-13    132<L>  |  91<L>  |  39<H>  ----------------------------<  66<L>  4.8   |  27  |  3.29<H>    Ca    8.4      14 Feb 2023 07:33  Ca    8.3<L>      13 Feb 2023 05:50  Phos  6.7     02-14  Mg     2.30     02-14    TPro  6.5  /  Alb  2.9<L>  /  TBili  1.5<H>  /  DBili  x   /  AST  59<H>  /  ALT  30  /  AlkPhos  923<H>  02-14                                              9.3    10.54 )-----------( 188      ( 15 Feb 2023 07:35 )             30.3                         8.7    7.75  )-----------( 163      ( 14 Feb 2023 07:33 )             28.2                         8.6    7.21  )-----------( 174      ( 13 Feb 2023 05:50 )             28.0     CAPILLARY BLOOD GLUCOSE      POCT Blood Glucose.: 78 mg/dL (15 Feb 2023 07:16)  POCT Blood Glucose.: 121 mg/dL (15 Feb 2023 00:38)      RADIOLOGY & ADDITIONAL TESTS:    Imaging Personally Reviewed:  [x ] YES  [ ] NO    Consultants involved in case:   Consultant(s) Notes Reviewed:  [ x] YES  [ ] NO:   Care Discussed with Consultants/Other Providers [x ] YES  [ ] NO         ***************************************************************  Elton Garcia, PGY1  Internal Medicine   pager:  [facility]: [phone] [facility]: [phone]  ***************************************************************    NANCY SEPULVEDA  57y  MRN: [id number]    Patient is a 57y old  Male who presents with a chief complaint of Hypotension, lethargy (14 Feb 2023 12:53)      Interval/Overnight Events: no events ON.   - c/f dysphagia aspiration, made NPO and pending MBS per SLP rec  - HD yday -2.9L  - WBC bump 10.54 likely hemoconcentrated after HD    Subjective: Pt seen and examined at bedside. Denies fever, CP, SOB, abn pain, N/V, dysuria. Tolerating diet.      MEDICATIONS  (STANDING):  albuterol    90 MICROgram(s) HFA Inhaler 4 Puff(s) Inhalation every 6 hours  atorvastatin 20 milliGRAM(s) Oral at bedtime  bisacodyl Suppository 10 milliGRAM(s) Rectal daily  chlorhexidine 2% Cloths 1 Application(s) Topical <User Schedule>  cloNIDine 0.2 milliGRAM(s) Oral every 8 hours  dexAMETHasone  Injectable 6 milliGRAM(s) IV Push daily  heparin   Injectable 5000 Unit(s) SubCutaneous every 8 hours  hydrALAZINE 100 milliGRAM(s) Oral every 8 hours  ipratropium 17 MICROgram(s) HFA Inhaler 2 Puff(s) Inhalation every 6 hours  isosorbide   dinitrate Tablet (ISORDIL) 20 milliGRAM(s) Oral three times a day  losartan 100 milliGRAM(s) Oral daily  niCARdipine 20 milliGRAM(s) Oral every 8 hours  OLANZapine 30 milliGRAM(s) Oral at bedtime  pantoprazole  Injectable 40 milliGRAM(s) IV Push daily  polyethylene glycol 3350 17 Gram(s) Oral two times a day  senna 2 Tablet(s) Oral at bedtime  sevelamer carbonate Powder 800 milliGRAM(s) Oral three times a day with meals  traZODone 100 milliGRAM(s) Oral two times a day    MEDICATIONS  (PRN):  melatonin 3 milliGRAM(s) Oral at bedtime PRN Insomnia  sodium chloride 0.9% Bolus 100 milliLiter(s) IV Bolus once PRN For SBP <90      Objective:    Vitals: Vital Signs Last 24 Hrs  T(C): 36.2 (02-15-23 @ 05:40), Max: 36.7 (02-14-23 @ 20:30)  T(F): 97.1 (02-15-23 @ 05:40), Max: 98 (02-14-23 @ 20:30)  HR: 61 (02-15-23 @ 06:15) (51 - 78)  BP: 121/91 (02-15-23 @ 06:15) (121/91 - 199/82)  BP(mean): --  RR: 19 (02-15-23 @ 05:40) (18 - 20)  SpO2: 97% (02-15-23 @ 05:40) (96% - 97%)                I&O's Summary    14 Feb 2023 07:01  -  15 Feb 2023 07:00  --------------------------------------------------------  IN: 400 mL / OUT: 3300 mL / NET: -2900 mL        PHYSICAL EXAM:  GENERAL: NAD, disheveled appearance  HEAD:  Atraumatic, Normocephalic  EYES: EOMI, conjunctiva and sclera clear  CHEST/LUNG: RLL coarse lung sounds, bilateral breath sounds heard.   HEART: Regular rate and rhythm; No murmurs, rubs, or gallops  ABDOMEN: Soft, Nontender, Nondistended;   SKIN: No rashes or lesions. +L arm fistula w/ good bruit  NERVOUS SYSTEM:  Alert & Oriented X3, no focal deficits, speech improved compared to yesterday.    LABS:  02-14    129<L>  |  90<L>  |  52<H>  ----------------------------<  76  5.4<H>   |  26  |  3.96<H>  02-13    132<L>  |  91<L>  |  39<H>  ----------------------------<  66<L>  4.8   |  27  |  3.29<H>    Ca    8.4      14 Feb 2023 07:33  Ca    8.3<L>      13 Feb 2023 05:50  Phos  6.7     02-14  Mg     2.30     02-14    TPro  6.5  /  Alb  2.9<L>  /  TBili  1.5<H>  /  DBili  x   /  AST  59<H>  /  ALT  30  /  AlkPhos  923<H>  02-14                                              9.3    10.54 )-----------( 188      ( 15 Feb 2023 07:35 )             30.3                         8.7    7.75  )-----------( 163      ( 14 Feb 2023 07:33 )             28.2                         8.6    7.21  )-----------( 174      ( 13 Feb 2023 05:50 )             28.0     CAPILLARY BLOOD GLUCOSE      POCT Blood Glucose.: 78 mg/dL (15 Feb 2023 07:16)  POCT Blood Glucose.: 121 mg/dL (15 Feb 2023 00:38)      RADIOLOGY & ADDITIONAL TESTS:    Imaging Personally Reviewed:  [x ] YES  [ ] NO    Consultants involved in case:   Consultant(s) Notes Reviewed:  [ x] YES  [ ] NO:   Care Discussed with Consultants/Other Providers [x ] YES  [ ] NO         ***************************************************************  Elton Garcia, PGY1  Internal Medicine   pager:  [facility]: [phone] [facility]: [phone]  ***************************************************************    NANCY SEPULVEDA  58y/o  MRN: [id number]    Patient is a 57y old  Male who presents with a chief complaint of Hypotension, lethargy (14 Feb 2023 12:53)      Interval/Overnight Events: no events ON.   - c/f dysphagia aspiration, made NPO and pending MBS per SLP rec  - HD yday -2.9L  - WBC bump 10.54 likely hemoconcentrated after HD    Subjective: Pt seen and examined at bedside. Denies fever, CP, SOB, abn pain, N/V, dysuria. Tolerating diet.      MEDICATIONS  (STANDING):  albuterol    90 MICROgram(s) HFA Inhaler 4 Puff(s) Inhalation every 6 hours  atorvastatin 20 milliGRAM(s) Oral at bedtime  bisacodyl Suppository 10 milliGRAM(s) Rectal daily  chlorhexidine 2% Cloths 1 Application(s) Topical <User Schedule>  cloNIDine 0.2 milliGRAM(s) Oral every 8 hours  dexAMETHasone  Injectable 6 milliGRAM(s) IV Push daily  heparin   Injectable 5000 Unit(s) SubCutaneous every 8 hours  hydrALAZINE 100 milliGRAM(s) Oral every 8 hours  ipratropium 17 MICROgram(s) HFA Inhaler 2 Puff(s) Inhalation every 6 hours  isosorbide   dinitrate Tablet (ISORDIL) 20 milliGRAM(s) Oral three times a day  losartan 100 milliGRAM(s) Oral daily  niCARdipine 20 milliGRAM(s) Oral every 8 hours  OLANZapine 30 milliGRAM(s) Oral at bedtime  pantoprazole  Injectable 40 milliGRAM(s) IV Push daily  polyethylene glycol 3350 17 Gram(s) Oral two times a day  senna 2 Tablet(s) Oral at bedtime  sevelamer carbonate Powder 800 milliGRAM(s) Oral three times a day with meals  traZODone 100 milliGRAM(s) Oral two times a day    MEDICATIONS  (PRN):  melatonin 3 milliGRAM(s) Oral at bedtime PRN Insomnia  sodium chloride 0.9% Bolus 100 milliLiter(s) IV Bolus once PRN For SBP <90      Objective:    Vitals: Vital Signs Last 24 Hrs  T(C): 36.2 (02-15-23 @ 05:40), Max: 36.7 (02-14-23 @ 20:30)  T(F): 97.1 (02-15-23 @ 05:40), Max: 98 (02-14-23 @ 20:30)  HR: 61 (02-15-23 @ 06:15) (51 - 78)  BP: 121/91 (02-15-23 @ 06:15) (121/91 - 199/82)  BP(mean): --  RR: 19 (02-15-23 @ 05:40) (18 - 20)  SpO2: 97% (02-15-23 @ 05:40) (96% - 97%)                I&O's Summary    14 Feb 2023 07:01  -  15 Feb 2023 07:00  --------------------------------------------------------  IN: 400 mL / OUT: 3300 mL / NET: -2900 mL        PHYSICAL EXAM:  GENERAL: NAD, disheveled appearance  HEAD:  Atraumatic, Normocephalic  EYES: EOMI, conjunctiva and sclera clear  CHEST/LUNG: RLL coarse lung sounds, bilateral breath sounds heard.   HEART: Regular rate and rhythm; No murmurs, rubs, or gallops  ABDOMEN: Soft, Nontender, Nondistended;   SKIN: No rashes or lesions. +L arm fistula w/ good bruit  NERVOUS SYSTEM:  Alert & Oriented X3, no focal deficits, speech improved compared to yesterday.    LABS:  02-14    129<L>  |  90<L>  |  52<H>  ----------------------------<  76  5.4<H>   |  26  |  3.96<H>  02-13    132<L>  |  91<L>  |  39<H>  ----------------------------<  66<L>  4.8   |  27  |  3.29<H>    Ca    8.4      14 Feb 2023 07:33  Ca    8.3<L>      13 Feb 2023 05:50  Phos  6.7     02-14  Mg     2.30     02-14    TPro  6.5  /  Alb  2.9<L>  /  TBili  1.5<H>  /  DBili  x   /  AST  59<H>  /  ALT  30  /  AlkPhos  923<H>  02-14                                              9.3    10.54 )-----------( 188      ( 15 Feb 2023 07:35 )             30.3                         8.7    7.75  )-----------( 163      ( 14 Feb 2023 07:33 )             28.2                         8.6    7.21  )-----------( 174      ( 13 Feb 2023 05:50 )             28.0     CAPILLARY BLOOD GLUCOSE      POCT Blood Glucose.: 78 mg/dL (15 Feb 2023 07:16)  POCT Blood Glucose.: 121 mg/dL (15 Feb 2023 00:38)      RADIOLOGY & ADDITIONAL TESTS:    Imaging Personally Reviewed:  [x ] YES  [ ] NO    Consultants involved in case:   Consultant(s) Notes Reviewed:  [ x] YES  [ ] NO:   Care Discussed with Consultants/Other Providers [x ] YES  [ ] NO

## 2023-02-15 NOTE — SWALLOW VFSS/MBS ASSESSMENT ADULT - MODE OF PRESENTATION
spoon/fed by clinician Self Administered Cup Sip cup/spoon/fed by clinician Consecutive Cup Sips Self Administered

## 2023-02-15 NOTE — SWALLOW VFSS/MBS ASSESSMENT ADULT - COMMENTS
Progress Note- Internal Medicine 2/14: "57M from [facility] on 2L NC w/ ESRD on HD TTS, HTN, DM, bipolar, recent [place] admission (1/14) for AHRF and septic shock, p/w hypotension to 60/40s, desat to 80s, lethargy, and then had cardiac arrest and was admitted to MICU after ROSC was achieved for post-arrest care. Pt started on Zosyn for asp PNA, found Covid+, started on dexamethasone, course c/b persistent HTN to -200s on cardine gtt. Pt extubated on 2/10/23, passed S/S now tolerating PO diet, sat well on 2L NC. Pt downgraded to floor 2/12/23 for further management."    CR Chest 2/8: "IMPRESSION: * Interval clearing of near complete opacification of the left hemithorax status post removal of left-sided mucous plug. * Persistent perihilar opacities likely edema. * Enteric tube with side port in the region of the GE junction on most recent exam. * Recommend slight advancement of tube not functioning."    Patient is known to this department, seen for a clinical swallow evaluation on 2/14/23 with recommendations to consider NPO with consideration for short term nonoral means of nutrition. (see full report for details)     Patient received in Radiology Suite for a cinesophragram this AM. Patient is able to follow simple commands and make basic needs/wants known.

## 2023-02-15 NOTE — PROGRESS NOTE ADULT - PROBLEM SELECTOR PROBLEM 1
ESRD on dialysis
Severe sepsis with septic shock
ESRD on dialysis
ESRD on dialysis
Severe sepsis with septic shock

## 2023-03-02 NOTE — PHYSICAL THERAPY INITIAL EVALUATION ADULT - LEVEL OF INDEPENDENCE: SIT/STAND, REHAB EVAL
Problem: OCCUPATIONAL THERAPY ADULT  Goal: Performs self-care activities at highest level of function for planned discharge setting  See evaluation for individualized goals  Description: Treatment Interventions: ADL retraining, Functional transfer training, UE strengthening/ROM, Endurance training, Patient/family training, Compensatory technique education, Continued evaluation, Energy conservation, Activityengagement          See flowsheet documentation for full assessment, interventions and recommendations  Outcome: Adequate for Discharge  Note: Limitation: Decreased ADL status, Decreased UE strength, Decreased Safe judgement during ADL, Decreased endurance, Decreased self-care trans, Decreased high-level ADLs  Prognosis: Good  Assessment: Pt greeted bedside for OT treatment on 3/2/2023 focusing on maximizing independence with ADLs  Pt mod A with LB dressing and educated on compensatory techniques  Pt agreeable to have spouse to assist upon DC and Pt states he will regain more LE flexibility when fluid in LE decrease  Pt S with functional transfers and S with functional mobility with RW  Pt provided on home safety education and fall prevention upon DC home  Pt educated on use of SC upon DC  Pt encouraged to participate in ADLs/functional mobility with nursing/restorative staff during hospitalization  Pt with no further acute OT concerns  Pt would benefit from returning home with increased social support upon DC to maximize safety and independence with ADLs and functional tasks of choice  DC skilled OT services       OT Discharge Recommendation: No rehabilitation needs contact guard

## 2023-03-09 NOTE — H&P ADULT - NSHPLABSRESULTS_GEN_ALL_CORE
.  LABS:                         9.0    7.08  )-----------( 264      ( 09 Mar 2023 20:47 )             30.8     03-09    136  |  97<L>  |  53<H>  ----------------------------<  106<H>  4.5   |  26  |  4.47<H>    Ca    8.7      09 Mar 2023 20:47    TPro  7.0  /  Alb  3.6  /  TBili  1.0  /  DBili  x   /  AST  23  /  ALT  17  /  AlkPhos  953<H>  03-09    PT/INR - ( 09 Mar 2023 20:47 )   PT: 13.7 sec;   INR: 1.18 ratio         PTT - ( 09 Mar 2023 20:47 )  PTT:34.9 sec          RADIOLOGY, EKG & ADDITIONAL TESTS: Reviewed.

## 2023-03-09 NOTE — H&P ADULT - NSHPPHYSICALEXAM_GEN_ALL_CORE
PHYSICAL EXAM:  GENERAL: NAD, sedated  HEAD:  Atraumatic, Normocephalic  EYES: EOMI, PERRLA, conjunctiva and sclera clear, pinpoint pupils after sedation/fentanyl  ENT: Moist mucous membranes  NECK: Supple, No JVD  CHEST/LUNG: COARSE BREATH SOUNDS, RHONCHI DIFFUSELY  HEART: Regular rate and rhythm; No murmurs, rubs, or gallops  ABDOMEN: Bowel sounds present; Soft, Nontender, Nondistended.   EXTREMITIES:  2+ Peripheral Pulses, brisk capillary refill. No clubbing, cyanosis, or edema  NERVOUS SYSTEM:  Sedated  ACCESS: Right triple-lumen fem, cobos  MSK: Sedated  SKIN: No rashes or lesions PHYSICAL EXAM:  GENERAL: NAD, sedated  HEAD:  Atraumatic, Normocephalic  EYES: EOMI, PERRLA, conjunctiva and sclera clear, pinpoint pupils after sedation/fentanyl  ENT: Moist mucous membranes  NECK: Supple, No JVD  CHEST/LUNG: COARSE BREATH SOUNDS, RHONCHI DIFFUSELY  HEART: Regular rate and rhythm; No murmurs, rubs, or gallops  ABDOMEN: Bowel sounds present; Soft, Nontender, Nondistended.   EXTREMITIES:  2+ Peripheral Pulses, brisk capillary refill. No clubbing, cyanosis, or edema  NERVOUS SYSTEM:  Sedated  ACCESS: Right triple-lumen fem, cobos, robert  MSK: Sedated  SKIN: No rashes or lesions

## 2023-03-09 NOTE — ED ADULT NURSE NOTE - CHIEF COMPLAINT QUOTE
Pt ESRD, receives dialysis Tues, Thurs, Sat, last full session was Tues, presents from [facility] for hypoxia, pt was in the 70's on room air, on non-rebreather maintaining spo2 >96%, breathing even and mildly labored.

## 2023-03-09 NOTE — H&P ADULT - ASSESSMENT
57M history of HTN, HLD, DM2, Bipolar disorder, ESRD on HD (TTS), recent admission on 1/14 for AHRF and septic shock at [place], and recent admission (2-7 - 2/14) at [facility] for septic shock and AHRF likely 2/2 aspiration pneumonia and Covid, hospital course c/b cardiac arrest. Patient presenting from [facility] with acute hypoxic hypercapnic respiratory failure, continued hypoxia on BIPAP requiring intubation.    #Neuro  - initially A&O3 on admission  - now sedated     #Respiratory  #AHRF  - recent admission for AHRF 2/2 aspiration pneumonia +/- covid on Feb 2023 discharged on room air  - presenting with worsening hypoxia and hypercapnia not improved with BIPAP  - Intubated (3/10 - )    #PLEF  - large loculated R PLEF, intervally increased from small in Feb 2023  - suspect i/s/o prior aspiration pneumonia +/- covid  - consider thoracentesis in AM    #Cardiovascular  #Vasoplegic shock  - Normotensive prior to intubation  - requiring levo and elenita pushes demetri-intubation    #Renal  #GI  #Endo  #Heme  #GOC 57M history of HTN, HLD, DM2, Bipolar disorder, ESRD on HD (TTS), recent admission on 1/14 for AHRF and septic shock at [place], and recent admission (2-7 - 2/14) at [facility] for septic shock and AHRF likely 2/2 aspiration pneumonia and Covid, hospital course c/b cardiac arrest. Patient presenting from [facility] with acute hypoxic hypercapnic respiratory failure, continued hypoxia on BIPAP requiring intubation.    #Neuro  - initially A&O3 on admission  - now sedated     #Respiratory  #AHRF  - recent admission for AHRF 2/2 aspiration pneumonia +/- covid on Feb 2023 discharged on room air  - presenting with worsening hypoxia and hypercapnia not improved with BIPAP  - suspect i/s/o large R PLEF and possible aspiration pneumonia  - Intubated (3/10 - )  - antibiotics as below    #PLEF  - large loculated R PLEF, intervally increased from small in Feb 2023  - suspect i/s/o prior aspiration pneumonia +/- covid  - consider thoracentesis in AM    #Cardiovascular  #Vasoplegic shock  - Normotensive prior to intubation  - requiring levo and elenita pushes demetri-intubation  - titrate to MAP > 65    #Afib  - reported history of Afib on eliquis 2.5 BID  - start heparin gtt    #Troponinemia  - 300s on admission, baseline 100 on prior admission  - no evidence of ischemic change on EKG  - likely NSTEMI +/- ESRD    #Renal  #ESRD  - TTS, last dialysis on 3/7/23 prior to admission  - Nephrology consult in AM for dialysis   - no need for urgent dialysis on admission    #GI  #Elevated ALPs  - persistently elevated  - 900s and GGT since Feb 2023  - patient asymptomatic at time, RUQ negative in Feb 2023  - monitor    #ID  #Aspiration pneumonia  - previous admission for aspiration pneumonia, negative BCx  - suspect contributory to current presentation  - c/w vanc/zosyn  - f/u MRSA swab to d/c vancomycin  - f/u BCx    #Endo  #DM2   - history of diabetic foot ulcers in the past  - recent A1C 4.8 and episodes of hypoglycemia last admission  - monitor off insulin    #Heme  #macrocytic anemia  - f/u B12, folate  - no evidence of active bleed  - stable from Feb 2023    #Prophylaxis/GOC  DVT: anticoagulation as above  Diet: NPO   GI ppx: none  Dispo: presenting from [facility] will likely return pending medical stabilization 57M history of HTN, HLD, DM2, Bipolar disorder, ESRD on HD (TTS), recent admission on 1/14 for AHRF and septic shock at [place], and recent admission (2-7 - 2/14) at [facility] for septic shock and AHRF likely 2/2 aspiration pneumonia and Covid, hospital course c/b cardiac arrest. Patient presenting from [facility] with acute hypoxic hypercapnic respiratory failure, continued hypoxia on BIPAP requiring intubation.    #Neuro  -CTH wnl  - initially A&O3 on admission  - now sedated on prop, fent, versed while intubated    #Respiratory  #AHRF  - recent admission for AHRF 2/2 aspiration pneumonia +/- covid on Feb 2023 discharged on room air  - presenting with worsening hypoxia and hypercapnia not improved with BIPAP  - suspect i/s/o large R loculated PLEF and possible aspiration pneumonia  - Intubated (3/10 - )  - antibiotics as below  -monitor ABGs    #Pleural effusion  - large loculated R PLEF, intervally increased from small in Feb 2023  - suspect i/s/o prior aspiration pneumonia +/- covid  -CT chest with IV contrast to further characterize effusions  - consider thoracentesis in AM    #Cardiovascular  #Vasoplegic shock  - Normotensive prior to intubation  - requiring levo and elenita pushes demetri-intubation  - wean pressors, titrate to MAP > 65  -hold home antihypertensives    #Afib  - reported history of Afib on eliquis 2.5 BID  - start heparin gtt while npo    #Troponinemia  - likely 2/2 demand ischemia +/- ESRD  - trops downtrending, 330-->224, baseline 100 on prior admission  - no evidence of ischemic change on EKG      #Renal  #ESRD  - TTS, last dialysis on 3/7/23 prior to admission  - Nephrology consult in AM for dialysis, follows with dr castellanos ([facility])  - no need for urgent dialysis on admission    #GI  #Elevated ALP  - persistently elevated  - 900s and GGT since Feb 2023  - patient asymptomatic at time, RUQ negative in Feb 2023  - monitor LFTs  -NPO for now, start diet when OGT in    #ID  #Aspiration pneumonia  - previous admission for aspiration pneumonia, negative BCx  - suspect contributory to current presentation  - c/w vanc/zosyn  - f/u MRSA swab to d/c vancomycin  - f/u BCx    #Endo  #DM2   - history of diabetic foot ulcers in the past  - recent A1C 4.8 and episodes of hypoglycemia last admission  - monitor off insulin    #Heme  #macrocytic anemia  - f/u B12, folate  - no evidence of active bleed  - stable from Feb 2023    #Prophylaxis/GOC  DVT: anticoagulation as above  Diet: NPO, start diet when OGT placed  GI ppx: none  GOC: unable to reach HCP, pt previously full code per cart review  Dispo: presenting from [facility] will likely return pending medical stabilization 57M history of HTN, HLD, DM2, Bipolar disorder, ESRD on HD (TTS), recent admission on 1/14 for AHRF and septic shock at [place], and recent admission (2-7 - 2/14) at [facility] for septic shock and AHRF likely 2/2 aspiration pneumonia and Covid, hospital course c/b cardiac arrest. Patient presenting from [facility] with acute hypoxic hypercapnic respiratory failure, continued hypoxia on BIPAP requiring intubation.    #Neuro  -CTH wnl  - initially A&O3 on admission  - now sedated on prop, fent, versed while intubated    #Respiratory  #AHRF  - recent admission for AHRF 2/2 aspiration pneumonia +/- covid on Feb 2023 discharged on room air  - presenting with worsening hypoxia and hypercapnia not improved with BIPAP  - suspect i/s/o large R loculated PLEF and possible aspiration pneumonia  - Intubated (3/10 - )  - antibiotics as below  -monitor ABGs, wean vent as tolerated    #Pleural effusion  - large loculated R PLEF, intervally increased from small in Feb 2023  - suspect i/s/o prior aspiration pneumonia +/- covid  -CT chest with IV contrast to further characterize effusions  - consider thoracentesis in AM  -check TTE, pro-BNP to r/o CHF contributing to pleffs     #Cardiovascular  #Vasoplegic shock  - Normotensive prior to intubation  - requiring levo and elenita pushes demetri-intubation  - wean pressors, titrate to MAP > 65  -hold home antihypertensives      #Afib  - reported history of Afib on eliquis 2.5 BID  - start heparin gtt while npo    #Troponinemia  - likely 2/2 demand ischemia +/- ESRD  - trops downtrending, 330-->224, baseline 100 on prior admission  - no evidence of ischemic change on EKG      #Renal  #ESRD  - TTS, last dialysis on 3/7/23 prior to admission  - Nephrology consult in AM for dialysis, follows with dr castellanos ([facility])  - no need for urgent dialysis on admission    #GI  #Elevated ALP  - persistently elevated  - 900s and GGT since Feb 2023  - patient asymptomatic at time, RUQ negative in Feb 2023  - monitor LFTs  -NPO for now, start diet when OGT in    #ID  #Aspiration pneumonia  - previous admission for aspiration pneumonia, negative BCx  - suspect contributory to current presentation  - c/w vanc/zosyn  - f/u MRSA swab to d/c vancomycin  - f/u BCx    #Endo  #DM2   - history of diabetic foot ulcers in the past  - recent A1C 4.8 and episodes of hypoglycemia last admission  - monitor off insulin    #Heme  #macrocytic anemia  - f/u B12, folate  - no evidence of active bleed  - stable from Feb 2023    #Prophylaxis/GOC  DVT: anticoagulation as above  Diet: NPO, start diet when OGT placed  GI ppx: none  GOC: unable to reach HCP, pt previously full code per cart review  Dispo: presenting from [facility] will likely return pending medical stabilization

## 2023-03-09 NOTE — ED PROCEDURE NOTE - ATTENDING CONTRIBUTION TO CARE
I (Gwendolyn) agree with above, I performed a history and physical. Counseled david medical staff, physician assistant, and/or medical student on medical decision making as documented. Medical decisions and treatment interventions were made in real time during the patient encounter. Additionally and/or with the following exceptions:
I (Gwendolyn) agree with above, I performed a history and physical. Counseled david medical staff, physician assistant, and/or medical student on medical decision making as documented. Medical decisions and treatment interventions were made in real time during the patient encounter. Additionally and/or with the following exceptions:

## 2023-03-09 NOTE — ED ADULT NURSE NOTE - OBJECTIVE STATEMENT
Pt received to room 5. A&Ox3, with bouts of confusion. Pmh of ESRD T/TH/SAT dialysis with L AV fistula present, HTN, HLD. Pt brought from [facility] for hypoxia. Last dialysis session was Tues. Per collateral, family was visiting pt when they noticed his oxygen was 70's on room air. Pt was brought here for hypoxia and placed on NRB. Satting 96-99% on nonrebreather, otherwise vitally stable. Reps even unlabored, abd soft nontender, pedal pulses 2+ bilaterally, skin dry and intact. Pt placed on BIPAP and appears more alert satting 95%. Denies chest pain, SOB, nausea, vomiting, headache, dizziness, numbness/tingling to hands/feet. 18G to R forearm placed, labs collected and sent, awaiting orders.

## 2023-03-09 NOTE — ED PROVIDER NOTE - ATTENDING CONTRIBUTION TO CARE
I (Gwendolyn) agree with above, I performed a history and physical. Counseled david medical staff, physician assistant, and/or medical student on medical decision making as documented. Medical decisions and treatment interventions were made in real time during the patient encounter. Additionally and/or with the following exceptions: Patient is a 57-year-old male past medical history hypertension, end-stage renal disease on hemodialysis, cardiac arrest in the past [facility] resident who is presenting to the emergency department with acute onset respiratory failure.  Little history is obtainable from the patient given respiratory failure.  Patient was arousable and able to state he did not have pain.  He also initially tolerated BiPAP.  Was hypoxic to 70s on room air, which improved with BiPAP.  However after a period of stay in the emergency department patient became hypoxic on BiPAP, requiring intubation.  Chest x-ray positive for pneumonia.  Antibiotics were given empirically.  Hemoglobin was 10.9, INR slightly elevated to 1.37, CMP significant for intrinsic renal disease.  Troponin elevated to 224, pro brain natruretic peptide greater than 56,000.  The patient's condition was not amenable to outpatient treatment due either the lack of feasibility of outpatient care coordination, possibility for further decompensation with adverse outcome if discharge, or treatments and diagnostic  modalities only available during an inpatient hospitalization.  Additional time as above spent by myself, separate from billable procedures, included coordination of patient care with consultants/admitting team, performing reassessments on the patient, documentation, and counseling patient/family members on the care provided.  I reviewed monitor data at least 2 times 10 minutes or more apart during the patients stay.

## 2023-03-09 NOTE — ED PROVIDER NOTE - NS_BEDUNITTYPES_ED_ALL_ED
I called patient informed an approval for MRI brain is not required she can call central scheduling to schedule an appointment.         MICU

## 2023-03-09 NOTE — ED PROVIDER NOTE - CLINICAL SUMMARY MEDICAL DECISION MAKING FREE TEXT BOX
56yo M pmh HTN/HLD, T2DM, Bipolar, ESRD on HD (TRS) - Brought to emergency department from [facility] for worsening respiratory distress, found hypoxic to the 70s on room air.  Last dialysis on Tuesday 2 days ago.  Patient is a poor historian, cannot describe why he is in the emergency department.  He is ANO x3.  Denies any shortness of breath however is tachypneic and speaking in one-word sentences.    Patient is severely lethargic and unresponsive however awoke after vigorous sternal rub, thereafter was ANO x3.  Initially hypoxic to 70s on room air, improved to 97% with 15 L nonrebreather, 98% on BiPAP 12/6 and 50%.  Diminished breath sounds bilaterally greater in the right side with crackles throughout, patient tachypneic speaking in one-word sentences.  Abdomen soft nontender, well-perfused extremities.    Patient likely fluid overloaded, has crackles throughout, will likely need dialysis.  Mild respiratory distress, initially mentation was depressed, considered intubation however given improved mentation after stimulation we will try BiPAP.  Patient has been on BiPAP for over an hour and is improved.  X-ray demonstrates bilateral pleural effusions and a loculated right-sided infiltrate, covered with Vanco and Zosyn.  MICU consulted for first time BiPAP use and will see patient shortly 56yo M pmh HTN/HLD, T2DM, Bipolar, ESRD on HD (TRS) - Brought to emergency department from [facility] for worsening respiratory distress, found hypoxic to the 70s on room air.  Last dialysis on Tuesday 2 days ago.  Patient is a poor historian, cannot describe why he is in the emergency department.  He is ANO x3.  Denies any shortness of breath however is tachypneic and speaking in one-word sentences.    Patient is severely lethargic and unresponsive however awoke after vigorous sternal rub, thereafter was ANO x3.  Initially hypoxic to 70s on room air, improved to 97% with 15 L nonrebreather, 98% on BiPAP 12/6 and 50%.  Diminished breath sounds bilaterally greater in the right side with crackles throughout, patient tachypneic speaking in one-word sentences.  Abdomen soft nontender, well-perfused extremities.    Patient likely fluid overloaded, has crackles throughout, will likely need dialysis.  Mild respiratory distress, initially mentation was depressed, considered intubation however given improved mentation after stimulation we will try BiPAP.  Patient has been on BiPAP for over an hour and is improved.  X-ray demonstrates bilateral pleural effusions and a loculated right-sided infiltrate, covered with Vanco and Zosyn.  MICU consulted for first time BiPAP use and will see patient shortly    11:26 PM: Patient was doing well on BiPAP, ripped off his mask because he was hungry and was demanding food after having the mask off for a minute and a half began to become lethargic and hypoxic.  Mass was placed back on however sats continues to drop to the 70s, decision was made to intubate at that point.  Intubation with glide scope 7.5 ET tube.  Patient hypotensive to MAP of 30s, received 2 phenylephrine sticks, then placed on epinephrine.  Improvement of sats to 100% after intubation and pressure to 90/45.  Now on a fentanyl drip.  Patient accepted to MICU. Attempting to reach mother, phone number is inactive.

## 2023-03-09 NOTE — ED PROVIDER NOTE - OBJECTIVE STATEMENT
58yo M pmh HTN/HLD, T2DM, Bipolar, ESRD on HD (TRS) - Brought to emergency department from [facility] for worsening respiratory distress, found hypoxic to the 70s on room air.  Last dialysis on Tuesday 2 days ago.  Patient is a poor historian, cannot describe why he is in the emergency department.  He is ANO x3.  Denies any shortness of breath however is tachypneic and speaking in one-word sentences.

## 2023-03-09 NOTE — H&P ADULT - ATTENDING COMMENTS
56 yo man with hx signif for Afib on eliquis, DM2, ESRD on HD TThS, last HD 3/7, recent [facility] admission for acute hypoxic resp failure with intubation  2/2 aspiration and COVID during which he had CA, now presents from [place] with acute resp distress and hypoxic hypercapnic resp failure requiring NIV with acute decompensation when pt removed BiPAP mask and refused replacement stating he was hungry.  He was intubated and required vasopressor support, now on sedation and weaning vasopressor and FiO2.  POCUS showed signif LVH with normal systolic function, RVH with RV<LV. fluid around heart appears to be pleural fluid, regardless theres no tamponade physiology.  Lungs have scattered B lines with bilat pleff R>>L with atalectatic lung associated. Pleffs are not new.  He was treated with broad spectrum abx in ED, WBC 7, K 4.5, BUN/Cr 53/4.5, trop 350 -> 225, bicarb 26    This is a pt with likely signif diastolic dysfunction, missed one HD session, with low tolerance of increased iv volume, developed hypoxic and hypercapnic resp failure, pulm reserve limited by pleffs and atalectactic lung.  Hemodynamics and oxygenation have improved.  Will seek HD tomorrow (no need for urgent HD) and evaluate for extubation.  Would benefit from TTE to evaluate LVF.  Eliquis changed to heparin gtt for now.  Will continue zosyn for now,  vanco, given h/o aspiration and mucous plugging in past; can d/c vanco if MRSA swab negative  DVT ppx: heparin gtt  Shock state likely cardiogenic/vasoplegic, no evidence of obstructive or hypovolemic shock by POCUS  [facility] full code

## 2023-03-09 NOTE — H&P ADULT - HISTORY OF PRESENT ILLNESS
57M history of HTN, HLD, DM2, Bipolar disorder, ESRD on HD ( *** ) presenting from [facility] with worsening respiratory distress, reportedly with hypoxia to 70s on room air. Last dialysis 2 days prior to admission.     In ED patient reportedly A&O3 but poor historian, denied dyspnea but clinically dyspneic per ED physician.  57M history of HTN, HLD, DM2, Bipolar disorder, ESRD on HD (TTS), recent admission on 1/14 for AHRF and septic shock at [place], and recent admission (2-7 - 2/14) at [facility] for septic shock and AHRF likely 2/2 aspiration pneumonia and Covid, hospital course c/b cardiac arrest, MICU course uncomplicated transferred to floors on 2L NC. Hospital course with transient LFTs without etiology and hypertensive urgency . Discharged to [facility] on room air. Patient is now presenting from [facility] with worsening respiratory distress, reportedly with hypoxia to 70s on room air. Last dialysis 2 days prior to admission.     In ED patient reportedly A&O3 but poor historian, denied dyspnea but clinically dyspneic per ED physician. Initial vitals Afebrile, HR 70s,  - 130/40 - 80, 97% on NRB. NRB escalated to BIPAP after initial VBG with pH 7.18, CO2 76, HCO3 28, O2 93. pH and CO2 improved on BIPAP to 7.22 and pCO2 68. However, patient accidentally disconnected self from BIPAP. BIPAP resumed after ~ 1 minute, however AHRF with hypoxia in 60s without resolution. Intubated in ED, admitted to MICU started on vanc/zosyn. CXR with new large loculated R PLEF.  57M history of HTN, HLD, DM2, Bipolar disorder, ESRD on HD (TTS), recent admission on 1/14 for AHRF and septic shock at [place], and recent admission (2-7 - 2/14) at [facility] for septic shock and AHRF likely 2/2 aspiration pneumonia and Covid, hospital course c/b cardiac arrest, MICU course uncomplicated transferred to floors on 2L NC. Hospital course with transient LFTs without etiology and hypertensive urgency . Discharged to [facility] on room air. Patient is now presenting from [facility] with worsening respiratory distress, reportedly with hypoxia to 70s on room air. Last dialysis 2 days prior to admission.     In ED patient reportedly A&O3 but poor historian, denied dyspnea but clinically dyspneic per ED physician. Initial vitals Afebrile, HR 70s,  - 130/40 - 80, 97% on NRB. NRB escalated to BIPAP after initial VBG with pH 7.18, CO2 76, HCO3 28, O2 93. pH and CO2 improved on BIPAP to 7.22 and pCO2 68. However, patient accidentally disconnected self from BIPAP. BIPAP resumed after ~ 1 minute, however AHRF with hypoxia in 60s without resolution. Intubated in ED, admitted to MICU started on vanc/zosyn. CXR with large loculated R PLEF.  57M history of HTN, HLD, DM2, Bipolar disorder, ESRD on HD (TTS), afib on eliquis, recent admission on 1/14 for AHRF and septic shock at [place], and recent admission (2-7 - 2/14) at [facility] for septic shock and AHRF likely 2/2 aspiration pneumonia and Covid, hospital course c/b cardiac arrest iso compete lung atelectasis and mucus plugging, MICU course uncomplicated transferred to floors on 2L NC. Hospital course with transient LFTs without etiology and hypertensive urgency . Discharged to [facility] on room air. Patient is now presenting from [facility] with worsening respiratory distress, reportedly with hypoxia to 70s on room air. Last dialysis 2 days prior to admission.     In ED patient reportedly A&O3 but poor historian, denied dyspnea but clinically dyspneic per ED physician. Initial vitals Afebrile, HR 70s,  - 130/40 - 80, 97% on NRB. NRB escalated to BIPAP after initial VBG with pH 7.18, CO2 76, HCO3 28, O2 93. pH and CO2 improved on BIPAP to 7.22 and pCO2 68. However, patient accidentally disconnected self from BIPAP. BIPAP resumed after ~ 1 minute, however AHRF with hypoxia in 60s without resolution. Intubated in ED, admitted to MICU started on vanc/zosyn. CXR with large loculated R PLEF.

## 2023-03-09 NOTE — ED ADULT NURSE REASSESSMENT NOTE - NS ED NURSE REASSESS COMMENT FT1
at 2250pm, pt pulled bipap mask off- wanting to eat, suddenly became hypoxic, cyanotic and unresponsive. B Valve mask applied, pulses were present- etomidate 100mg given at 2256; succ 20mg at 2257 intubated at 2258,- with 7.5Fr, 26 1/2 at the lip. levophed initiated.  1mg phenylepherine given x2.

## 2023-03-09 NOTE — ED ADULT TRIAGE NOTE - CHIEF COMPLAINT QUOTE
Pt ESRD, receives dialysis Tues, Thurs, Sat, last full session was Tues, presents from [facility] for hypoxia, pt was in the 70's on room air, on non-rebreather maintaining spo2 >96%, breathing even and mildly labored.

## 2023-03-09 NOTE — ED PROVIDER NOTE - PHYSICAL EXAMINATION
GENERAL: non-toxic appearing, alert, in NAD  HEENT: atraumatic, normocephalic, Vision grossly intact, no conjunctivitis or discharge, hearing grossly intact,  no nasal discharge, epistaxis   CARDIAC: RRR, normal S1S2,  no appreciable murmurs, no cyanosis, cap refill < 2 seconds  PULM: Tachypenic, shallow breathing, Diminished in R base and crackles throughout R > L, 78% on RA, 97% on 15L  GI: abdomen nondistended, soft, nontender, no guarding or rebound tenderness, no palpable masses  NEURO: awake and alert, follows commands, normal speech, PERRLA, EOMI, no focal motor or sensory deficits  MSK: spine appears normal, no joint swelling or erythema, ranging all extremities with no appreciable loss of ROM  EXT: no peripheral edema, calf tenderness, redness or swelling  SKIN: warm, dry, and intact, no rashes  PSYCH: flat affect

## 2023-03-09 NOTE — ED PROCEDURE NOTE - PROCEDURE ADDITIONAL DETAILS
2 attempt US guided R fem CVC placement, 1st attempt with angiocath which backwalled the vessel, 2nd attempt with needle easy access wire passed without resistance. minimal bleeding. all sharps and wires accounted for.  flushed without resistance.

## 2023-03-10 NOTE — CHART NOTE - NSCHARTNOTEFT_GEN_A_CORE
Patient needs HD initiated this admission, especially while in MICU, as he has missed his prior HD session. Family was attempted to be reached for consent, however unable to get in contact with patient's mother. Will plan for dialysis in afternoon.

## 2023-03-10 NOTE — CONSULT NOTE ADULT - REASON FOR ADMISSION
Acute Hypoxic Respiratory Failure requiring intubation and mechanical ventilation Statement Selected

## 2023-03-10 NOTE — CHART NOTE - NSCHARTNOTEFT_GEN_A_CORE
Patient is intubated and sedated, needing hemodialysis today as last session was on 3/7/23. Discussed with the patient's mother (Dawna Lloyd [phone]) that he will require RRT/HD, risks and benefits associated with RRT/HD explained at length. HD consent (verbal) obtained with documented consent kept in patients chart.    If you have any questions, please feel free to contact me  Quang Yeager (Nick)  Nephrology Fellow  Pager # [phone]/[employer] Teams  (After 5pm or on weekends please page the on-call fellow)

## 2023-03-10 NOTE — CONSULT NOTE ADULT - ATTENDING COMMENTS
58 yo M with a PMH of ESRD on HD TIW (TTS), HTN, DM, Anemia, Afib, Bipolar who presented to [facility] from [facility] for worsening shortness of breath.  hypoxic, requiring intubation and mechanical ventilation.   plan for HD today. UF as BP allows  Mom consented over the phone   Further detailed plan of management and recommendation as outlined above. Billing Type: Third-Party Bill

## 2023-03-10 NOTE — PROGRESS NOTE ADULT - SUBJECTIVE AND OBJECTIVE BOX
CHIEF COMPLAINT: Patient is a 57y old  Male who presents with a chief complaint of Acute Hypoxic Respiratory Failure requiring intubation and mechanical ventilation (10 Mar 2023 10:47)    Interval Events: Wean sedation as tolerated. Dialysis today (missed dialysis on TRSa). Added vaso, w/ goal to reduce levo. C/w vanc/Zosyn for suspected asp PNA. R/s home olanzapine.     OBJECTIVE:  ICU Vital Signs Last 24 Hrs  T(C): 34.8 (10 Mar 2023 12:00), Max: 36.7 (09 Mar 2023 20:49)  T(F): 94.7 (10 Mar 2023 12:00), Max: 98.1 (09 Mar 2023 20:49)  HR: 70 (10 Mar 2023 12:00) (68 - 96)  BP: 114/51 (10 Mar 2023 01:00) (50/39 - 139/60)  BP(mean): 65 (10 Mar 2023 01:00) (46 - 98)  ABP: 150/53 (10 Mar 2023 12:00) (102/42 - 157/54)  ABP(mean): 85 (10 Mar 2023 12:00) (58 - 87)  RR: 18 (10 Mar 2023 12:00) (16 - 30)  SpO2: 97% (10 Mar 2023 12:00) (77% - 100%)    O2 Parameters below as of 10 Mar 2023 12:00  Patient On (Oxygen Delivery Method): ventilator  O2 Flow (L/min): 40        Mode: AC/ CMV (Assist Control/ Continuous Mandatory Ventilation), RR (machine): 18, TV (machine): 400, FiO2: 40, PEEP: 5, ITime: 1, MAP: 9, PIP: 24  ABG - ( 10 Mar 2023 02:00 )  pH, Arterial: 7.36  pH, Blood: x     /  pCO2: 45    /  pO2: 169   / HCO3: 25    / Base Excess: -0.4  /  SaO2: 97.9                03-09 @ 07:01  -  03-10 @ 07:00  --------------------------------------------------------  IN: 670.7 mL / OUT: 0 mL / NET: 670.7 mL    03-10 @ 07:01  -  03-10 @ 13:10  --------------------------------------------------------  IN: 372.9 mL / OUT: 0 mL / NET: 372.9 mL      CAPILLARY BLOOD GLUCOSE      POCT Blood Glucose.: 117 mg/dL (10 Mar 2023 12:14)      PHYSICAL EXAM:  General: NAD, doing well.  HEENT: Intubated. OG in place. Brainstem reflexes in place  Lymph Nodes: No ELMIRA palpated  Neck: trachea midline. no trach.   Respiratory: CTA b/l. No rales, rhonchi, wheezing appreciated.  Cardiovascular: RRR. +s1/s2, -s3/s4. No murmur, rubs, gallops. No edema  Abdomen: NT/ND. +BS, No HSM.   Extremities: 2+ pulses  Skin: edematous. No rashes, ecchymoses, or petechiae noted  Neurological: AAOx3. DTR 2+. strength 5/5 UE/LE bilaterally.   Psychiatry: Appropriate mood and affect.    HOSPITAL MEDICATIONS:  MEDICATIONS  (STANDING):  chlorhexidine 0.12% Liquid 15 milliLiter(s) Oral Mucosa every 12 hours  chlorhexidine 4% Liquid 1 Application(s) Topical <User Schedule>  fentaNYL   Infusion..... 4 MICROgram(s)/kG/Hr (5.24 mL/Hr) IV Continuous <Continuous>  heparin  Infusion.  Unit(s)/Hr (11 mL/Hr) IV Continuous <Continuous>  midazolam Infusion 0.02 mG/kG/Hr (1.2 mL/Hr) IV Continuous <Continuous>  norepinephrine Infusion 0.05 MICROgram(s)/kG/Min (2.81 mL/Hr) IV Continuous <Continuous>  OLANZapine Injectable 5 milliGRAM(s) IntraMuscular every 8 hours  petrolatum Ophthalmic Ointment 1 Application(s) Both EYES every 12 hours  piperacillin/tazobactam IVPB.. 3.375 Gram(s) IV Intermittent every 12 hours  propofol Infusion 10 MICROgram(s)/kG/Min (3.6 mL/Hr) IV Continuous <Continuous>  vasopressin Infusion 0.04 Unit(s)/Min (6 mL/Hr) IV Continuous <Continuous>    MEDICATIONS  (PRN):  sodium chloride 0.9% Bolus. 100 milliLiter(s) IV Bolus every 5 minutes PRN SBP LESS THAN or EQUAL to 100 mmHg      LABS:  (03-10 @ 09:30)                        11.4  20.02 )-----------( 421                 37.7    Neutrophils = -- (--%)  Lymphocytes = -- (--%)  Eosinophils = -- (--%)  Basophils = -- (--%)  Monocytes = -- (--%)  Bands = --%    WBC Trend: 20.02<--, 9.39<--, 7.08<--  Hb Trend: 11.4<--, 10.9<--, 9.0<--  Plt Trend: 421<--, 395<--, 264<--  03-10    136  |  98  |  55<H>  ----------------------------<  133<H>  3.8   |  23  |  4.48<H>    Ca    8.4      10 Mar 2023 02:00  Phos  5.0     03-10  Mg     2.20     03-10    TPro  6.1  /  Alb  3.0<L>  /  TBili  1.4<H>  /  DBili  x   /  AST  24  /  ALT  14  /  AlkPhos  834<H>  03-10    Creatinine Trend: 4.48<--, 4.47<--, 2.94<--, 3.96<--, 3.29<--, 3.39<--  PT/INR - ( 10 Mar 2023 02:00 )   PT: 15.9 sec;   INR: 1.37 ratio         PTT - ( 10 Mar 2023 09:30 )  PTT:33.3 sec    Arterial Blood Gas:  03-10 @ 02:00  7.36/45/169/25/97.9/-0.4  ABG lactate: --    Venous Blood Gas:  03-09 @ 22:34  7.22/68/71/28/93.0  VBG Lactate: 0.5  Venous Blood Gas:  03-09 @ 20:47  7.18/76/77/28/93.6  VBG Lactate: 0.5              RADIOLOGY:  X Ray:  CT:  MRI:  Ultrasound:  [ ] Reviewed and interpreted by me    EKG:

## 2023-03-10 NOTE — PROGRESS NOTE ADULT - ASSESSMENT
57M history of HTN, HLD, DM2, Bipolar disorder, ESRD on HD (TTS), recent admission on 1/14 for AHRF and septic shock at [place], and recent admission (2-7 - 2/14) at [facility] for septic shock and AHRF likely 2/2 aspiration pneumonia and Covid, hospital course c/b cardiac arrest. Patient presenting from [facility] with acute hypoxic hypercapnic respiratory failure, continued hypoxia on BIPAP requiring intubation.    #Neuro  -CTH wnl  - initially A&O3 on admission  - now sedated on prop, fent, versed while intubated  - Olanzapine continued.     #Respiratory  #AHRF  - recent admission for AHRF 2/2 aspiration pneumonia +/- covid on Feb 2023 discharged on room air  - presenting with worsening hypoxia and hypercapnia not improved with BIPAP  - suspect i/s/o large R loculated PLEF and possible aspiration pneumonia  - Intubated (3/10 - )  - antibiotics as below  -monitor ABGs, wean vent as tolerated    #Pleural effusion  - large loculated R PLEF, intervally increased from small in Feb 2023  - suspect i/s/o prior aspiration pneumonia +/- covid  -CT chest with IV contrast to further characterize effusions  - consider thoracentesis in AM  -check TTE, pro-BNP to r/o CHF contributing to pleffs     #Cardiovascular  #Vasoplegic shock  - Normotensive prior to intubation  - requiring levo and elenita pushes demetri-intubation  - wean pressors, titrate to MAP > 65  -hold home antihypertensives    #Afib  - reported history of Afib on eliquis 2.5 BID  - start heparin gtt while npo    #Troponinemia  - likely 2/2 demand ischemia +/- ESRD  - trops downtrending, 330-->224, baseline 100 on prior admission  - no evidence of ischemic change on EKG      #Renal  #ESRD  - TTS, last dialysis on 3/7/23 prior to admission  - Nephrology consult in AM for dialysis, follows with dr castellanos ([facility])  - no need for urgent dialysis on admission    #GI  #Elevated ALP  - persistently elevated  - 900s and GGT since Feb 2023  - patient asymptomatic at time, RUQ negative in Feb 2023  - monitor LFTs  - NPO for now, start diet when OGT in    #ID  #Aspiration pneumonia  - previous admission for aspiration pneumonia, negative BCx  - suspect contributory to current presentation  - c/w vanc/zosyn  - f/u MRSA swab to d/c vancomycin  - f/u BCx    #Endo  #DM2   - history of diabetic foot ulcers in the past  - recent A1C 4.8 and episodes of hypoglycemia last admission  - monitor off insulin    #Heme  #macrocytic anemia  - f/u B12, folate  - no evidence of active bleed  - stable from Feb 2023    #Prophylaxis/GOC  DVT: anticoagulation as above  Diet: NPO, start diet when OGT placed  GI ppx: none  GOC: unable to reach HCP, pt previously full code per cart review  Dispo: presenting from [facility] will likely return pending medical stabilization

## 2023-03-10 NOTE — ED ADULT NURSE REASSESSMENT NOTE - NS ED NURSE REASSESS COMMENT FT1
Mobile Critical Care RN: pt received in rm 5 in ED, s/p intubation. Pt hypotensive, started on levophed gtt, titrated to 0.8 mcg/kg/min for MAP > 65. Pt became restless, started on fentanyl gtt, increase to 3 mcg/kg/hr, pt continued restless. Pt started on propofol gtt, titrated to 30 mcg/kg/min to maintain sedation. Report given to MICU by ED RN. Pt transported to CT scan then to MICU.

## 2023-03-10 NOTE — ED ADULT NURSE REASSESSMENT NOTE - NS ED NURSE REASSESS COMMENT FT1
Belongings secured with security. Report given to HOMERO Brooks. Pt going to CT with Respiratory, ED tech, Critical Care RN

## 2023-03-10 NOTE — CONSULT NOTE ADULT - SUBJECTIVE AND OBJECTIVE BOX
[facility] DIVISION OF KIDNEY DISEASES AND HYPERTENSION -- [phone]  -- INITIAL CONSULT NOTE  --------------------------------------------------------------------------------  HPI: 56 yo M with a PMH of ESRD on HD TIW (TTS), HTN, DM, Anemia, Afib, Bipolar who presented to [facility] from [facility] for worsening shortness of breath. In the ED, transitioned to Bipap but remained hypoxic, requiring intubation and mechanical ventilation. Admitted to the MICU. Nephrology consulted for ESRD/HD management.     Pt. follows with Dr. Diaz as his outpatient Nephrologist. He was receiving HD at [facility] with last treatment HD on 3/7/23 at [facility] via LUE AVF. Of note, he was admitted to [facility] from 2/7/23 - 2/14/23 due to acute hypoxic respiratory failure due to septic shock with aspiration pneumonia and COVID, was s/p cardiac arrest in the ED, intubated and mechanically ventilated, eventually extubated and discharged back to rehab.     Pt. was seen and examined in the ER. Pt. unable to provide history or ROS due to mental status. History obtained from chart review and provider hand-off.    PAST HISTORY  --------------------------------------------------------------------------------  PAST MEDICAL & SURGICAL HISTORY:  Diabetes mellitus  Patient does not routinely check FS--diet controlled  Depression  High cholesterol  Bipolar affective disorder in remission  Hypertension  Arterial insufficiency  Anemia  ESRD on dialysis  ORIF right lower leg- 1995  Amputated great toe of right foot  S/P arteriovenous (AV) fistula creation, 7/2019    FAMILY HISTORY:  FH: type 2 diabetes mellitus (Mother)  FHx: heart disease (Father)    PAST SOCIAL HISTORY: Unable to obtain at this time    ALLERGIES & MEDICATIONS  --------------------------------------------------------------------------------  Allergies  No Known Allergies    Standing Inpatient Medications  chlorhexidine 0.12% Liquid 15 milliLiter(s) Oral Mucosa every 12 hours  chlorhexidine 4% Liquid 1 Application(s) Topical <User Schedule>  fentaNYL   Infusion..... 4 MICROgram(s)/kG/Hr IV Continuous <Continuous>  heparin  Infusion.  Unit(s)/Hr IV Continuous <Continuous>  midazolam Infusion 0.02 mG/kG/Hr IV Continuous <Continuous>  norepinephrine Infusion 0.05 MICROgram(s)/kG/Min IV Continuous <Continuous>  OLANZapine Injectable 5 milliGRAM(s) IntraMuscular every 8 hours  petrolatum Ophthalmic Ointment 1 Application(s) Both EYES every 12 hours  piperacillin/tazobactam IVPB.. 3.375 Gram(s) IV Intermittent every 12 hours  propofol Infusion 10 MICROgram(s)/kG/Min IV Continuous <Continuous>  vasopressin Infusion 0.04 Unit(s)/Min IV Continuous <Continuous>    PRN Inpatient Medications  sodium chloride 0.9% Bolus. 100 milliLiter(s) IV Bolus every 5 minutes PRN    REVIEW OF SYSTEMS  --------------------------------------------------------------------------------  Unable to obtain ROS due to current medical condition.     VITALS/PHYSICAL EXAM  --------------------------------------------------------------------------------  T(C): 35.6 (03-10-23 @ 08:00), Max: 36.7 (03-09-23 @ 20:49)  HR: 70 (03-10-23 @ 10:00) (68 - 96)  BP: 114/51 (03-10-23 @ 01:00) (50/39 - 139/60)  RR: 18 (03-10-23 @ 10:00) (16 - 30)  SpO2: 94% (03-10-23 @ 10:00) (77% - 100%)  Wt(kg): --  Height (cm): 175.3 (03-09-23 @ 19:31)  Weight (kg): 65.5 (03-10-23 @ 01:00)  BMI (kg/m2): 21.3 (03-10-23 @ 01:00)  BSA (m2): 1.8 (03-10-23 @ 01:00)    03-09-23 @ 07:01  -  03-10-23 @ 07:00  --------------------------------------------------------  IN: 670.7 mL / OUT: 0 mL / NET: 670.7 mL  Physical Exam:  	Gen: Intubated, sedated   	HEENT: Anicteric  	Pulm: Mechanical breath sounds bilaterally   	CV: S1S2+  	Abd: Soft, Diminished bowel sounds    	Ext: No LE edema B/L, R toe amputation seen  	Neuro: Sedated, not following commands               : Loza in place with no urine noted  	Skin: Warm and dry  	Dialysis access: LUE AVF with palpable pulse and bruit heard    LABS/STUDIES  --------------------------------------------------------------------------------              11.4   20.02 >-----------<  421      [03-10-23 @ 09:30]              37.7     136  |  98  |  55  ----------------------------<  133      [03-10-23 @ 02:00]  3.8   |  23  |  4.48        Ca     8.4     [03-10-23 @ 02:00]      Mg     2.20     [03-10-23 @ 02:00]      Phos  5.0     [03-10-23 @ 02:00]    TPro  6.1  /  Alb  3.0  /  TBili  1.4  /  DBili  x   /  AST  24  /  ALT  14  /  AlkPhos  834  [03-10-23 @ 02:00]    PT/INR: PT 15.9 , INR 1.37       [03-10-23 @ 02:00]  PTT: 33.3       [03-10-23 @ 09:30]    Creatinine Trend:  SCr 4.48 [03-10 @ 02:00]  SCr 4.47 [03-09 @ 20:47]  SCr 2.94 [02-15 @ 07:35]  SCr 3.96 [02-14 @ 07:33]  SCr 3.29 [02-13 @ 05:50]

## 2023-03-10 NOTE — PATIENT PROFILE ADULT - FALL HARM RISK - RISK INTERVENTIONS

## 2023-03-10 NOTE — PATIENT PROFILE ADULT - DO YOU FEEL LIKE HURTING YOURSELF OR OTHERS?
-- DO NOT REPLY / DO NOT REPLY ALL --  -- Message is from the [facility]--    COVID-19 Universal Screening: N/A - Not about scheduling    General Patient Message      Reason for Call:  Patient called Requesting a call back Would Like the test results she Came In The office and had test Done on 09/15/2020 and Would like to Know the results contact The patient Further Assist         Alternative phone number: [phone]    Turnaround time given to caller:   \"This message will be sent to [state Provider's name]. The clinical team will fulfill your request as soon as they review your message.\"     no

## 2023-03-11 NOTE — PROGRESS NOTE ADULT - ASSESSMENT
57M history of HTN, HLD, DM2, Bipolar disorder, ESRD on HD (TTS), recent admission on 1/14 for AHRF and septic shock at [place], and recent admission (2-7 - 2/14) at [facility] for septic shock and AHRF likely 2/2 aspiration pneumonia and Covid, hospital course c/b cardiac arrest. Patient presenting from [facility] with acute hypoxic hypercapnic respiratory failure, continued hypoxia on BIPAP requiring intubation likely iso mucus plugging    #Neuro  -CTH wnl  - initially A&O3 on admission  - now sedated on prop, fent, versed while intubated  - Olanzapine continued.     #Respiratory  #AHRF  - recent admission for AHRF 2/2 aspiration pneumonia +/- covid on Feb 2023 discharged on room air  - presenting with worsening hypoxia and hypercapnia not improved with BIPAP  - suspect i/s/o large R loculated PLEF and possible aspiration pneumonia  - Intubated (3/10 - )  - antibiotics as below  -monitor ABGs, wean vent as tolerated  - Consider bronchoscopy 3/11      #Pleural effusion  - large loculated R PLEF, intervally increased from small in Feb 2023  - suspect i/s/o prior aspiration pneumonia +/- covid  -CT chest with IV contrast to further characterize effusions  - consider thoracentesis in AM  -check TTE, pro-BNP to r/o CHF contributing to pleffs     #Cardiovascular  #Vasoplegic shock  - Normotensive prior to intubation  - requiring levo and elenita pushes demetri-intubation  - wean pressors, titrate to MAP > 65  -hold home antihypertensives    #Afib  - reported history of Afib on eliquis 2.5 BID  - start heparin gtt while npo    #Troponinemia  - likely 2/2 demand ischemia +/- ESRD  - trops downtrending, 330-->224, baseline 100 on prior admission  - no evidence of ischemic change on EKG      #Renal  #ESRD  - TTS, last dialysis on 3/7/23 prior to admission  - Nephrology consult in AM for dialysis, follows with dr castellanos ([place] [facility])  - no need for urgent dialysis on admission    #GI  #Elevated ALP  - persistently elevated  - 900s and GGT since Feb 2023  - patient asymptomatic at time, RUQ negative in Feb 2023  - monitor LFTs  - NPO for now, start diet when OGT in    #ID  #Aspiration pneumonia  - previous admission for aspiration pneumonia, negative BCx  - suspect contributory to current presentation  - c/w vanc/zosyn  - f/u MRSA swab to d/c vancomycin  - f/u BCx    #Endo  #DM2   - history of diabetic foot ulcers in the past  - recent A1C 4.8 and episodes of hypoglycemia last admission  - monitor off insulin    #Heme  #macrocytic anemia  - f/u B12, folate  - no evidence of active bleed  - stable from Feb 2023    #Prophylaxis/GOC  DVT: anticoagulation as above  Diet: NPO, start diet when OGT placed  GI ppx: none  GOC: unable to reach HCP, pt previously full code per cart review  Dispo: presenting from [facility] will likely return pending medical stabilization

## 2023-03-11 NOTE — PROGRESS NOTE ADULT - PROBLEM SELECTOR PLAN 1
Pt. with ESRD on HD TIW (TTS, Dr. Diaz) who presented for shortness of breath, found to be in acute hypoxic respiratory failure requiring intubation and mechanical ventilation, loculated R pleural effusion on imaging. Last HD on 3/10/23 via LUE AVF. Labs reviewed. Will arrange for HD today with target UF of up to 1L. Monitor labs. Dose meds as per HD.

## 2023-03-11 NOTE — PROGRESS NOTE ADULT - SUBJECTIVE AND OBJECTIVE BOX
CHIEF COMPLAINT: Patient is a 57y old  Male who presents with a chief complaint of Acute Hypoxic Respiratory Failure requiring intubation and mechanical ventilation (10 Mar 2023 10:47)    Interval Events: passive on ventilator, plan for extube later,      OBJECTIVE:  ICU Vital Signs Last 24 Hrs  T(C): 37.1 (11 Mar 2023 08:00), Max: 37.8 (10 Mar 2023 16:00)  T(F): 98.8 (11 Mar 2023 08:00), Max: 100.1 (10 Mar 2023 16:00)  HR: 53 (11 Mar 2023 11:00) (49 - 85)  BP: --  BP(mean): --  ABP: 130/40 (11 Mar 2023 11:00) (100/48 - 161/61)  ABP(mean): 70 (11 Mar 2023 11:00) (58 - 97)  RR: 18 (11 Mar 2023 11:00) (18 - 18)  SpO2: 95% (11 Mar 2023 11:00) (94% - 100%)    O2 Parameters below as of 11 Mar 2023 11:00  Patient On (Oxygen Delivery Method): ventilator    O2 Concentration (%): 21      Mode: AC/ CMV (Assist Control/ Continuous Mandatory Ventilation), RR (machine): 18, TV (machine): 400, FiO2: 21, PEEP: 5, ITime: 0.61, MAP: 8, PIP: 20  ABG - ( 11 Mar 2023 00:30 )  pH, Arterial: 7.31  pH, Blood: x     /  pCO2: 50    /  pO2: 87    / HCO3: 25    / Base Excess: -1.7  /  SaO2: 96.3                03-10 @ 07:01 - 03-11 @ 07:00  --------------------------------------------------------  IN: 2182.8 mL / OUT: 1800 mL / NET: 382.8 mL    03-11 @ 06:01  - 03-11 @ 11:47  --------------------------------------------------------  IN: 150.2 mL / OUT: 0 mL / NET: 150.2 mL      CAPILLARY BLOOD GLUCOSE      POCT Blood Glucose.: 108 mg/dL (11 Mar 2023 05:36)      PHYSICAL EXAM:  General: NAD, doing well.  HEENT: Intubated. OG in place. Brainstem reflexes in place  Lymph Nodes: No ELMIRA palpated  Neck: trachea midline. no trach.   Respiratory: CTA b/l. No rales, rhonchi, wheezing appreciated.  Cardiovascular: RRR. +s1/s2, -s3/s4. No murmur, rubs, gallops. No edema  Abdomen: NT/ND. +BS, No HSM.   Extremities: 2+ pulses  Skin: edematous. No rashes, ecchymoses, or petechiae noted  Neurological: AAOx3. DTR 2+. strength 5/5 UE/LE bilaterally.   Psychiatry: Appropriate mood and affect.    HOSPITAL MEDICATIONS:  MEDICATIONS  (STANDING):  chlorhexidine 0.12% Liquid 15 milliLiter(s) Oral Mucosa every 12 hours  heparin  Infusion.  Unit(s)/Hr (11 mL/Hr) IV Continuous <Continuous>  norepinephrine Infusion 0.05 MICROgram(s)/kG/Min (2.81 mL/Hr) IV Continuous <Continuous>  OLANZapine Injectable 5 milliGRAM(s) IntraMuscular every 8 hours  pantoprazole  Injectable 40 milliGRAM(s) IV Push daily  petrolatum Ophthalmic Ointment 1 Application(s) Both EYES every 12 hours  piperacillin/tazobactam IVPB.. 3.375 Gram(s) IV Intermittent every 12 hours  propofol Infusion 9.16 MICROgram(s)/kG/Min (3.6 mL/Hr) IV Continuous <Continuous>  vasopressin Infusion 0.04 Unit(s)/Min (6 mL/Hr) IV Continuous <Continuous>    MEDICATIONS  (PRN):  sodium chloride 0.9% Bolus. 100 milliLiter(s) IV Bolus every 5 minutes PRN SBP LESS THAN or EQUAL to 100 mmHg      LABS:  (03-11 @ 06:30)                        9.1  13.19 )-----------( 302                 30.2    Neutrophils = -- (--%)  Lymphocytes = -- (--%)  Eosinophils = -- (--%)  Basophils = -- (--%)  Monocytes = -- (--%)  Bands = --%    WBC Trend: 13.19<--, 16.55<--, 16.85<--  Hb Trend: 9.1<--, 10.2<--, 10.9<--, 11.4<--, 10.9<--  Plt Trend: 302<--, 344<--, 366<--, 421<--, 395<--  03-11    136  |  98  |  41<H>  ----------------------------<  110<H>  4.3   |  22  |  3.77<H>    Ca    7.9<L>      11 Mar 2023 00:30  Phos  4.9     03-11  Mg     2.00     03-11    TPro  5.5<L>  /  Alb  2.5<L>  /  TBili  1.0  /  DBili  x   /  AST  21  /  ALT  11  /  AlkPhos  658<H>  03-11    Creatinine Trend: 3.77<--, 3.54<--, 4.48<--, 4.47<--, 2.94<--, 3.96<--  PT/INR - ( 11 Mar 2023 06:30 )   PT: 15.8 sec;   INR: 1.36 ratio         PTT - ( 11 Mar 2023 06:30 )  PTT:92.5 sec    Arterial Blood Gas:  03-11 @ 00:30  7.31/50/87/25/96.3/-1.7  ABG lactate: --  Arterial Blood Gas:  03-10 @ 20:00  7.33/51/97/27/97.7/0.2  ABG lactate: --  Arterial Blood Gas:  03-10 @ 02:00  7.36/45/169/25/97.9/-0.4  ABG lactate: --    Venous Blood Gas:  03-09 @ 22:34  7.22/68/71/28/93.0  VBG Lactate: 0.5  Venous Blood Gas:  03-09 @ 20:47  7.18/76/77/28/93.6  VBG Lactate: 0.5            Culture - Blood (collected 09 Mar 2023 20:49)  Source: .Blood Blood-Peripheral  Preliminary Report (11 Mar 2023 04:02):    No growth to date.    Culture - Blood (collected 09 Mar 2023 20:39)  Source: .Blood Blood-Peripheral  Preliminary Report (11 Mar 2023 04:02):    No growth to date.        RADIOLOGY:  X Ray:  CT:  MRI:  Ultrasound:  [ ] Reviewed and interpreted by me    EKG:

## 2023-03-11 NOTE — PROCEDURE NOTE - NSBRONCHPROCDETAILS_GEN_A_CORE_FT
Findings:  Bronchoscope inserted through ETT. ETT noted to be in good position. Airway evaluation revealed mild erythematous airway. All segments evaluated with some mucous which was suctioned. BAL with 60cc's done in right upper lobe performed. Patient saturated 100% throughout the procedure.

## 2023-03-11 NOTE — PROGRESS NOTE ADULT - SUBJECTIVE AND OBJECTIVE BOX
[facility] DIVISION OF KIDNEY DISEASES AND HYPERTENSION -- FOLLOW UP NOTE  --------------------------------------------------------------------------------  HPI: 56 yo M with a PMH of ESRD on HD TIW (TTS), HTN, DM, Anemia, Afib, Bipolar who presented to [facility] from [facility] for worsening shortness of breath. In the ED, transitioned to Bipap but remained hypoxic, requiring intubation and mechanical ventilation. Admitted to the MICU. Nephrology consulted for ESRD/HD management.     24 hour events/subjective:  Pt seen and evaluated in MICU. Pt remains intubated and on mech vent. Currently on vasopressor support. Unable to obtain ROS. Last HD done on 3/10/23, tolerated well.     PAST HISTORY  --------------------------------------------------------------------------------  No significant changes to PMH, PSH, FHx, SHx, unless otherwise noted    ALLERGIES & MEDICATIONS  --------------------------------------------------------------------------------  Allergies    No Known Allergies    Intolerances    Standing Inpatient Medications  chlorhexidine 0.12% Liquid 15 milliLiter(s) Oral Mucosa every 12 hours  heparin  Infusion.  Unit(s)/Hr IV Continuous <Continuous>  norepinephrine Infusion 0.05 MICROgram(s)/kG/Min IV Continuous <Continuous>  OLANZapine Injectable 5 milliGRAM(s) IntraMuscular every 8 hours  pantoprazole  Injectable 40 milliGRAM(s) IV Push daily  petrolatum Ophthalmic Ointment 1 Application(s) Both EYES every 12 hours  piperacillin/tazobactam IVPB.. 3.375 Gram(s) IV Intermittent every 12 hours  propofol Infusion 9.16 MICROgram(s)/kG/Min IV Continuous <Continuous>  vasopressin Infusion 0.04 Unit(s)/Min IV Continuous <Continuous>    PRN Inpatient Medications  sodium chloride 0.9% Bolus. 100 milliLiter(s) IV Bolus every 5 minutes PRN    REVIEW OF SYSTEMS  --------------------------------------------------------------------------------  Unable to obtain ROS     VITALS/PHYSICAL EXAM  --------------------------------------------------------------------------------  T(C): 37.1 (03-11-23 @ 08:00), Max: 37.8 (03-10-23 @ 16:00)  HR: 49 (03-11-23 @ 09:00) (49 - 85)  BP: --  RR: 18 (03-11-23 @ 09:00) (18 - 18)  SpO2: 100% (03-11-23 @ 09:00) (94% - 100%)  Wt(kg): --  Height (cm): 175.3 (03-09-23 @ 19:31)  Weight (kg): 65.5 (03-10-23 @ 01:00)  BMI (kg/m2): 21.3 (03-10-23 @ 01:00)  BSA (m2): 1.8 (03-10-23 @ 01:00)    03-10-23 @ 07:01  -  03-11-23 @ 07:00  --------------------------------------------------------  IN: 2182.8 mL / OUT: 1800 mL / NET: 382.8 mL    03-11-23 @ 06:01  -  03-11-23 @ 09:46  --------------------------------------------------------  IN: 110.6 mL / OUT: 0 mL / NET: 110.6 mL    Physical Exam:  	Gen: critically ill  	HEENT: ETT+  	Pulm: CTA B/L  	CV: S1S2  	Abd: Soft, +BS   	Ext: No LE edema B/L, R toe amputation seen  	Neuro: Limited as pt is intubated  	Skin: Warm and dry  	Vascular access: LUE AVF with palpable pulse and bruit heard    LABS/STUDIES  --------------------------------------------------------------------------------              9.1    13.19 >-----------<  302      [03-11-23 @ 06:30]              30.2     136  |  98  |  41  ----------------------------<  110      [03-11-23 @ 00:30]  4.3   |  22  |  3.77        Ca     7.9     [03-11-23 @ 00:30]      Mg     2.00     [03-11-23 @ 00:30]      Phos  4.9     [03-11-23 @ 00:30]    TPro  5.5  /  Alb  2.5  /  TBili  1.0  /  DBili  x   /  AST  21  /  ALT  11  /  AlkPhos  658  [03-11-23 @ 00:30]    PT/INR: PT 15.8 , INR 1.36       [03-11-23 @ 06:30]  PTT: 92.5       [03-11-23 @ 06:30]    Creatinine Trend:  SCr 3.77 [03-11 @ 00:30]  SCr 3.54 [03-10 @ 20:00]  SCr 4.48 [03-10 @ 02:00]  SCr 4.47 [03-09 @ 20:47]  SCr 2.94 [02-15 @ 07:35]    HbA1c 4.7      [09-13-19 @ 12:50]  TSH 5.79      [02-07-23 @ 03:59]    HBsAg Nonreact      [02-15-23 @ 07:35]  HCV 0.21, Nonreact      [02-15-23 @ 07:35]  HIV Nonreact      [02-15-23 @ 07:35]    PAVEL: titer 1:320, pattern [id number]      [02-15-23 @ 07:35]

## 2023-03-11 NOTE — CHART NOTE - NSCHARTNOTEFT_GEN_A_CORE
: Dr. Iraida Hyde     INDICATION:    PROCEDURE:  [ x] LIMITED ECHO  [ x] LIMITED CHEST  [ ] LIMITED RETROPERITONEAL  [ ] LIMITED ABDOMINAL  [ ] LIMITED DVT  [ ] NEEDLE GUIDANCE VASCULAR  [ ] NEEDLE GUIDANCE THORACENTESIS  [ ] NEEDLE GUIDANCE PARACENTESIS  [ ] NEEDLE GUIDANCE PERICARDIOCENTESIS  [ ] OTHER    FINDINGS/INTERPRETATION:  Simple appearing large right sided pleural effusion with consolidation.   Echo: Grossly normal LV function, LVH, IVC 2.5cm    Images uploaded to [employer] : Dr. Iraida Hyde     INDICATION:    PROCEDURE:  [ x] LIMITED ECHO  [ x] LIMITED CHEST  [ ] LIMITED RETROPERITONEAL  [ ] LIMITED ABDOMINAL  [ ] LIMITED DVT  [ ] NEEDLE GUIDANCE VASCULAR  [ ] NEEDLE GUIDANCE THORACENTESIS  [ ] NEEDLE GUIDANCE PARACENTESIS  [ ] NEEDLE GUIDANCE PERICARDIOCENTESIS  [ ] OTHER    FINDINGS/INTERPRETATION:  Simple appearing large right sided pleural effusion with consolidation.   Echo: Grossly normal LV function, LVH, IVC 2.5cm    Images uploaded to [employer]path    Attending Attestation:    I was present during the key portions of the procedure and immediately available during the entire procedure.    Delores Khoury MD  Attending  Pulmonary & Critical Care Medicine

## 2023-03-11 NOTE — PROGRESS NOTE ADULT - PROBLEM SELECTOR PLAN 3
Pt. with hyperphosphatemia in the setting of ESRD. Serum phosphorus within target range of 4.9 today (goal 4.5-5.5). Low phos diet. Monitor serum phosphorus level.     If you have any questions, please feel free to contact me  Ming Holloway  Nephrology Fellow  [phone]/ [employer] Teams(Preferred)  (After 5pm or on weekends please page the on-call fellow).

## 2023-03-12 NOTE — PROGRESS NOTE ADULT - PROBLEM SELECTOR PLAN 2
Pt. with anemia in the setting of ESRD. Hgb below target range at 8.3 today. Discussed with [facility], he is on Aranesp 80mcg weekly (last dose on 3/7/23). Will continue weekly Aranesp. Target range of 10-11. Monitor Hgb.

## 2023-03-12 NOTE — PROGRESS NOTE ADULT - SUBJECTIVE AND OBJECTIVE BOX
[facility] DIVISION OF KIDNEY DISEASES AND HYPERTENSION -- FOLLOW UP NOTE  --------------------------------------------------------------------------------  HPI: 58 yo M with a PMH of ESRD on HD TIW (TTS), HTN, DM, Anemia, Afib, Bipolar who presented to [facility] from [facility] for worsening shortness of breath. In the ED, transitioned to Bipap but remained hypoxic, requiring intubation and mechanical ventilation. Admitted to the MICU. Nephrology consulted for ESRD/HD management.     24 hour events/subjective:  Pt seen and evaluated in MICU. Pt remains intubated and on mech vent. Currently on vasopressor support. Unable to obtain ROS. Last HD done on 3/11/23, tolerated well.     PAST HISTORY  --------------------------------------------------------------------------------  No significant changes to PMH, PSH, FHx, SHx, unless otherwise noted    ALLERGIES & MEDICATIONS  --------------------------------------------------------------------------------  Allergies    No Known Allergies    Intolerances    Standing Inpatient Medications  chlorhexidine 0.12% Liquid 15 milliLiter(s) Oral Mucosa every 12 hours  fentaNYL    Injectable 100 MICROGram(s) IV Push once  heparin  Infusion.  Unit(s)/Hr IV Continuous <Continuous>  OLANZapine Injectable 5 milliGRAM(s) IntraMuscular every 8 hours  pantoprazole  Injectable 40 milliGRAM(s) IV Push daily  petrolatum Ophthalmic Ointment 1 Application(s) Both EYES every 12 hours  piperacillin/tazobactam IVPB.. 3.375 Gram(s) IV Intermittent every 12 hours  propofol Infusion 35 MICROgram(s)/kG/Min IV Continuous <Continuous>    PRN Inpatient Medications  sodium chloride 0.9% Bolus. 100 milliLiter(s) IV Bolus every 5 minutes PRN    REVIEW OF SYSTEMS  --------------------------------------------------------------------------------  Unable to obtain ROS     VITALS/PHYSICAL EXAM  --------------------------------------------------------------------------------  T(C): 36.6 (03-12-23 @ 08:00), Max: 37.6 (03-12-23 @ 04:00)  HR: 52 (03-12-23 @ 11:19) (48 - 82)  BP: 168/58 (03-12-23 @ 11:00) (168/58 - 179/64)  RR: 18 (03-12-23 @ 11:07) (16 - 20)  SpO2: 99% (03-12-23 @ 11:19) (94% - 100%)  Wt(kg): --    03-11-23 @ 06:01  -  03-12-23 @ 07:00  --------------------------------------------------------  IN: 1579.8 mL / OUT: 1800 mL / NET: -220.2 mL    Physical Exam:  	Gen: critically ill  	HEENT: ETT+  	Pulm: CTA B/L  	CV: S1S2  	Abd: Soft, +BS   	Ext: No LE edema B/L, R toe amputation seen  	Neuro: Limited as pt is intubated  	Skin: Warm and dry  	Vascular access: LUE AVF with palpable pulse and bruit heard    LABS/STUDIES  --------------------------------------------------------------------------------              8.3    8.81  >-----------<  214      [03-12-23 @ 00:05]              27.1     137  |  96  |  26  ----------------------------<  80      [03-12-23 @ 00:05]  3.4   |  25  |  2.59        Ca     8.0     [03-12-23 @ 00:05]      Mg     1.90     [03-12-23 @ 00:05]      Phos  3.8     [03-12-23 @ 00:05]    TPro  5.4  /  Alb  2.5  /  TBili  1.1  /  DBili  x   /  AST  18  /  ALT  12  /  AlkPhos  614  [03-12-23 @ 00:05]    PT/INR: PT 14.5 , INR 1.25       [03-12-23 @ 00:05]  PTT: 91.7       [03-12-23 @ 00:05]    Creatinine Trend:  SCr 2.59 [03-12 @ 00:05]  SCr 2.31 [03-11 @ 18:42]  SCr 3.77 [03-11 @ 00:30]  SCr 3.54 [03-10 @ 20:00]  SCr 4.48 [03-10 @ 02:00]    HbA1c 4.7      [09-13-19 @ 12:50]  TSH 5.79      [02-07-23 @ 03:59]    HBsAg Nonreact      [02-15-23 @ 07:35]  HCV 0.21, Nonreact      [02-15-23 @ 07:35]  HIV Nonreact      [02-15-23 @ 07:35]    PAVEL: titer 1:320, pattern [id number]      [02-15-23 @ 07:35]

## 2023-03-12 NOTE — DIETITIAN INITIAL EVALUATION ADULT - OTHER INFO
56 y/o male with hx HTN, HLD, DM2, Bipolar disorder, ESRD on HD admitted with acute hypoxic hypercapnic respiratory failure with sepsis. Pt intubated and sedated on propofol. Propofol now being held but contributed approx 393 kcals over the past 24 hrs. Pt presently NPO but OGT has been placed with possible plan to initiate TF once pt is hemodynamically stable. Once this is achieved, would recommend providing Nepro with Carb Steady @ goal rate of 37 mL/hr x 24 hrs which will offer pt 1598 kcals, 72 gms protein, 645 mL free H2O in 888 mL total volume. To meet pt's estimated protein need, suggest adding No Carb Prosource x 2/day (30 gms protein) which will provide a daily protein amount (along with TF) of 102 gms. Enteral recommendation will give pt 30 kcals/kg (TF+Propofol) and 1.5 gms protein/kg (TF+Prosource) of dosing wt. Pt being followed by Nephrology as he had hyperphosphatemia on admission - now resolved s/p HD. FS have been running low: 64 - 139 mg/dl - insulin being held in view of this. Pt exhibits moderate temporal muscle mass wasting and moderate orbital subcutaneous fat store depletion which present him with moderate risk for malnutrition in the setting of his chronic illness. No GI distress noted at present; pt with fecal incontinence and last BM 3/12. RDN services to remain available as needed.

## 2023-03-12 NOTE — PROCEDURE NOTE - ADDITIONAL PROCEDURE DETAILS
Right sided pleural effusion draining 950 cc of serous, cloudy fluid. patient tolerated procedure well.

## 2023-03-12 NOTE — DIETITIAN INITIAL EVALUATION ADULT - ENTERAL
Nepro with Carb Steady @ goal rate of 37 mL/hr x 24 hrs with No Carb Prosource x 2/day  Please order a Nephro-Linda multivitamin for full micronutrient coverage.

## 2023-03-12 NOTE — PROGRESS NOTE ADULT - SUBJECTIVE AND OBJECTIVE BOX
INTERVAL HPI/OVERNIGHT EVENTS:    -OGT placed   -Heparin stopped at 3AM for potential thoracentesis  -Plan for thoracentesis today     SUBJECTIVE: Patient seen and examined at bedside. Sedated and intubated      VITAL SIGNS:  ICU Vital Signs Last 24 Hrs  T(C): 36.6 (12 Mar 2023 08:00), Max: 37.6 (12 Mar 2023 04:00)  T(F): 97.9 (12 Mar 2023 08:00), Max: 99.6 (12 Mar 2023 04:00)  HR: 50 (12 Mar 2023 12:00) (44 - 82)  BP: 169/49 (12 Mar 2023 12:00) (162/55 - 179/64)  BP(mean): 79 (12 Mar 2023 12:00) (79 - 91)  ABP: 169/49 (12 Mar 2023 12:00) (113/40 - 191/69)  ABP(mean): 92 (12 Mar 2023 12:00) (65 - 111)  RR: 18 (12 Mar 2023 12:00) (16 - 20)  SpO2: 100% (12 Mar 2023 12:00) (94% - 100%)    O2 Parameters below as of 12 Mar 2023 08:00  Patient On (Oxygen Delivery Method): ventilator,a/c 18 tv 400 peep 5 25%          Mode: AC/ CMV (Assist Control/ Continuous Mandatory Ventilation), RR (machine): 18, TV (machine): 400, FiO2: 25, PEEP: 5, ITime: 0.64, MAP: 8, PIP: 20  Plateau pressure:   P/F ratio:     03-11 @ 06:01  -  03-12 @ 07:00  --------------------------------------------------------  IN: 1579.8 mL / OUT: 1800 mL / NET: -220.2 mL      CAPILLARY BLOOD GLUCOSE      POCT Blood Glucose.: 94 mg/dL (11 Mar 2023 23:48)      PHYSICAL EXAM:     General: NAD, doing well.  HEENT: Intubated. OG in place. Brainstem reflexes in place  Lymph Nodes: No ELMIRA palpated  Neck: trachea midline. no trach.   Respiratory: CTA b/l. No rales, rhonchi, wheezing appreciated.  Cardiovascular: RRR. +s1/s2, -s3/s4. No murmur, rubs, gallops. No edema  Abdomen: NT/ND. +BS, No HSM.   Extremities: 2+ pulses  Skin: edematous. No rashes, ecchymoses, or petechiae noted  Neurological: AAOx3. DTR 2+. strength 5/5 UE/LE bilaterally.   Psychiatry: Appropriate mood and affect.      MEDICATIONS:  MEDICATIONS  (STANDING):  chlorhexidine 0.12% Liquid 15 milliLiter(s) Oral Mucosa every 12 hours  heparin  Infusion.  Unit(s)/Hr (11 mL/Hr) IV Continuous <Continuous>  OLANZapine Injectable 5 milliGRAM(s) IntraMuscular every 8 hours  pantoprazole  Injectable 40 milliGRAM(s) IV Push daily  petrolatum Ophthalmic Ointment 1 Application(s) Both EYES every 12 hours  piperacillin/tazobactam IVPB.. 3.375 Gram(s) IV Intermittent every 12 hours  propofol Infusion 35 MICROgram(s)/kG/Min (13.8 mL/Hr) IV Continuous <Continuous>    MEDICATIONS  (PRN):  sodium chloride 0.9% Bolus. 100 milliLiter(s) IV Bolus every 5 minutes PRN SBP LESS THAN or EQUAL to 100 mmHg      ALLERGIES:  Allergies    No Known Allergies    Intolerances        LABS:                        8.3    8.81  )-----------( 214      ( 12 Mar 2023 00:05 )             27.1     03-12    137  |  96<L>  |  26<H>  ----------------------------<  80  3.4<L>   |  25  |  2.59<H>    Ca    8.0<L>      12 Mar 2023 00:05  Phos  3.8     03-12  Mg     1.90     03-12    TPro  5.4<L>  /  Alb  2.5<L>  /  TBili  1.1  /  DBili  x   /  AST  18  /  ALT  12  /  AlkPhos  614<H>  03-12    PT/INR - ( 12 Mar 2023 00:05 )   PT: 14.5 sec;   INR: 1.25 ratio         PTT - ( 12 Mar 2023 00:05 )  PTT:91.7 sec      RADIOLOGY & ADDITIONAL TESTS: Reviewed.

## 2023-03-12 NOTE — PROVIDER CONTACT NOTE (HYPOGLYCEMIA EVENT) - NS PROVIDER CONTACT BACKGROUND-HYPO
Age: 57y    Gender: Male    POCT Blood Glucose:  76 mg/dL (03-12-23 @ 13:17)  64 mg/dL (03-12-23 @ 13:15)  94 mg/dL (03-11-23 @ 23:48)  139 mg/dL (03-11-23 @ 18:07)      eMAR:fs 66@ 1725  repeat done 68 Dr       notified give 1/2 amp 50% DEXTROSE AND REPEAT EIN 15 MIN   Age: 57y    Gender: Male    POCT Blood Glucose:  76 mg/dL (03-12-23 @ 13:17)  64 mg/dL (03-12-23 @ 13:15)  94 mg/dL (03-11-23 @ 23:48)  139 mg/dL (03-11-23 @ 18:07)      eMAR:fs 66@ 1725  repeat done 68 Dr       notified give 1/2 amp 50% DEXTROSE AND REPEAT EIN 15 MIN   !5 min post 121   30 min post

## 2023-03-12 NOTE — DIETITIAN INITIAL EVALUATION ADULT - PERTINENT MEDS FT
MEDICATIONS  (STANDING):  chlorhexidine 0.12% Liquid 15 milliLiter(s) Oral Mucosa every 12 hours  heparin  Infusion.  Unit(s)/Hr (11 mL/Hr) IV Continuous <Continuous>  hydrALAZINE 50 milliGRAM(s) Oral every 8 hours  OLANZapine Injectable 5 milliGRAM(s) IntraMuscular every 8 hours  pantoprazole  Injectable 40 milliGRAM(s) IV Push daily  petrolatum Ophthalmic Ointment 1 Application(s) Both EYES every 12 hours  piperacillin/tazobactam IVPB.. 3.375 Gram(s) IV Intermittent every 12 hours  propofol Infusion 35 MICROgram(s)/kG/Min (13.8 mL/Hr) IV Continuous <Continuous>    MEDICATIONS  (PRN):  sodium chloride 0.9% Bolus. 100 milliLiter(s) IV Bolus every 5 minutes PRN SBP LESS THAN or EQUAL to 100 mmHg

## 2023-03-12 NOTE — DIETITIAN INITIAL EVALUATION ADULT - PERTINENT LABORATORY DATA
03-12    137  |  96<L>  |  26<H>  ----------------------------<  80  3.4<L>   |  25  |  2.59<H>    Ca    8.0<L>      12 Mar 2023 00:05  Phos  3.8     03-12  Mg     1.90     03-12    TPro  5.4<L>  /  Alb  2.5<L>  /  TBili  1.1  /  DBili  x   /  AST  18  /  ALT  12  /  AlkPhos  614<H>  03-12  POCT Blood Glucose.: 76 mg/dL (03-12-23 @ 13:17)  A1C with Estimated Average Glucose Result: 4.8 % (02-10-23 @ 00:05)

## 2023-03-12 NOTE — DIETITIAN INITIAL EVALUATION ADULT - WEIGHT FOR BMI (KG)
[facility]  Gastroenterology Consultation    2019  9:30 AM    Patient:    Ese Mooney  : 7/15/1934   79 y.o. MRN: [id number]  Admitted: 3/28/2019  1:13 PM ATT: Argenis Braga MD   [id number]  AdmitDx: Cough [R05]  General weakness [R53.1]  Serum creatinine raised [R79.89]  NSTEMI (non-ST elevated myocardial infarction) ([facility] [I21.4]  Troponin level elevated [R74.8]  Elevated brain natriuretic peptide (BNP) level [R79.89]  Acute electrocardiogram changes [R94.31]  Fatigue, unspecified type [R53.83]  Congestive heart failure, unspecified HF chronicity, unspecified heart failure type ([facility] [I50.9]  PCP: No primary care provider on file. Reason for Consult:  coffee ground and patrick blood from NG    Requesting Physician:  Argenis Braga MD      History Obtained From:  Patient and review of all records    HISTORY OF PRESENT ILLNESS:                The patient is a 79 y.o. male with significant past medical history as below who presents with above mentioned causes in reason for consult. Patient intubtaed and sedated after NSTEMI. Bright red blood noted per ET and NG tube. Per RN no coffee grounds. Lavaged this am with 500 cc NS, scant tinge blood color noted. Old 200 cc blood noted with bile in canister. No BM. Was on Heparin drip for 2 days. No family at bedside. Per RN hope to extubate today. No melena or brbpr. Past Medical History:        Diagnosis Date    Diabetes mellitus ([facility]     Hypertension        Past Surgical History:    History reviewed. No pertinent surgical history.       Current Medications:    Medications    Scheduled Medications:    pantoprazole  40 mg Intravenous BID    chlorhexidine  15 mL Mouth/Throat BID    piperacillin-tazobactam  3.375 g Intravenous Q8H    mupirocin   Nasal BID    [Held by provider] clopidogrel  75 mg Oral Daily    enoxaparin  40 mg Subcutaneous Daily    cefepime  2 g Intravenous Q12H    vancomycin  1,250 mg 67.0 04/02/2019     CBC:   Recent Labs     03/31/19  0357 04/01/19  0605 04/02/19  0120   WBC 8.2 7.3  --    HGB 11.0* 8.7* 8.1*    236  --      BMP:    Recent Labs     03/31/19  0357 03/31/19  0850 04/01/19  0605   * 147* 141   K 6.0  K CALLED TO SCOT POWELL RN AT [address] [place] [address] MLS  RESULTS READ BACK  * 4.2 3.7    104 101   CO2 41* 36* 34*   BUN 23 24* 18   CREATININE 1.3 1.2 1.3   GLUCOSE 177* 153* 153*     Magnesium:   Lab Results   Component Value Date    MG 2.3 03/31/2019     Hepatic:   Recent Labs     03/31/19 0357   AST 18  18   ALT 7*  7*   BILITOT 0.4  0.4   ALKPHOS 81  81     No results for input(s): LIPASE, AMYLASE in the last 72 hours. Recent Labs     03/31/19 0357   PROTIME 12.1   INR 1.04     No results for input(s): PTT in the last 72 hours. Lipids: No results for input(s): CHOL, HDL in the last 72 hours. Invalid input(s): LDLCALCU  INR:   Recent Labs     03/31/19 0357   INR 1.04     TSH: No results found for: TSH    Intake/Output Summary (Last 24 hours) at 4/2/2019 0931  Last data filed at 4/2/2019 0527  Gross per 24 hour   Intake 1884.9 ml   Output 2160 ml   Net -275.1 ml      dexmedetomidine (PRECEDEX) IV infusion 0.2 mcg/kg/hr (04/02/19 0519)    phenylephrine (BRIAN-SYNEPHRINE) 50mg/250mL infusion 50 mcg/min (04/02/19 0733)    EPINEPHrine Stopped (03/31/19 0505)    propofol 20 mcg/kg/min (04/02/19 0730)    dextrose 50 mL/hr at 04/01/19 1428    dextrose           IMPRESSION:      Patient Active Problem List   Diagnosis Code    Community acquired pneumonia due to influenza A virus J09. X1    Pneumonia J18.9    Essential hypertension I10    Type 2 diabetes mellitus with diabetic polyneuropathy, without long-term current use of insulin ([facility]) E11.42    KERA (acute kidney injury) ([facility] N17.9    Oropharyngeal dysphagia R13.12    NSTEMI (non-ST elevated myocardial infarction) ([facility]) I21.4    Fatigue R53.83     Assessment:  coffee ground and patrick blood from NG and ET 65.5

## 2023-03-12 NOTE — DIETITIAN INITIAL EVALUATION ADULT - SIGNS/SYMPTOMS
moderate muscle mass wasting with moderate subcutaneous fat store depletion due to ESRD requiring HD; sepsis management in the critical care setting

## 2023-03-12 NOTE — PROGRESS NOTE ADULT - ASSESSMENT
57 history of HTN, HLD, DM2, Bipolar disorder, ESRD on HD (TTS), recent admission on 1/14 for AHRF and septic shock at [facility], and recent admission (2-7 - 2/14) at [facility] for septic shock and AHRF likely 2/2 aspiration pneumonia and Covid, hospital course c/b cardiac arrest. Patient presenting from [facility] with acute hypoxic hypercapnic respiratory failure, continued hypoxia on BIPAP requiring intubation likely iso mucus plugging    #Neuro  -CTH wnl  - initially A&O3 on admission  - now sedated on prop, fent, versed while intubated  - Olanzapine continued.     #Respiratory  #AHRF  - recent admission for AHRF 2/2 aspiration pneumonia +/- covid on Feb 2023 discharged on room air  - presenting with worsening hypoxia and hypercapnia not improved with BIPAP  - suspect i/s/o large R loculated PLEF and possible aspiration pneumonia  - Intubated (3/10 - )  - antibiotics as below  -monitor ABGs, wean vent as tolerated  -S/p bronchoscopy 3/11      #Pleural effusion  - large loculated R PLEF, intervally increased from small in Feb 2023  - suspect i/s/o prior aspiration pneumonia +/- covid  -CT chest with IV contrast to further characterize effusions  -check TTE, pro-BNP to r/o CHF contributing to pleffs   -3/12: S/p thoracentesis 3/12, f/u cultures     #Cardiovascular  #Vasoplegic shock  - Normotensive prior to intubation  - requiring levo and elenita pushes demetri-intubation  - off pressors, maintain MAP > 65  -hold home antihypertensives    #Afib  - reported history of Afib on eliquis 2.5 BID  - start heparin gtt while npo    #Troponinemia  - likely 2/2 demand ischemia +/- ESRD  - trops downtrending, 330-->224, baseline 100 on prior admission  - no evidence of ischemic change on EKG      #Renal  #ESRD  - TTS, last dialysis on 3/7/23 prior to admission  - Nephrology consult in AM for dialysis, follows with dr castellanos ([facility])  - no need for urgent dialysis on admission    #GI  #Elevated ALP  - persistently elevated  - 900s and GGT since Feb 2023  - patient asymptomatic at time, RUQ negative in Feb 2023  - monitor LFTs  - NPO for now, OGT placed    #ID  #Aspiration pneumonia  - previous admission for aspiration pneumonia, negative BCx  - suspect contributory to current presentation  - c/w vanc/zosyn  - f/u MRSA swab to d/c vancomycin  - f/u BCx    #Endo  #DM2   - history of diabetic foot ulcers in the past  - recent A1C 4.8 and episodes of hypoglycemia last admission  - monitor off insulin    #Heme  #macrocytic anemia  - f/u B12, folate  - no evidence of active bleed  - stable from Feb 2023    #Prophylaxis/GOC  DVT: anticoagulation as above  Diet: NPO, start diet when OGT placed  GI ppx: none  GOC: unable to reach HCP, pt previously full code per cart review  Dispo: presenting from [facility] will likely return pending medical stabilization

## 2023-03-12 NOTE — PROGRESS NOTE ADULT - PROBLEM SELECTOR PLAN 1
Pt. with ESRD on HD TIW (TTS, Dr. Diaz) who presented for shortness of breath, found to be in acute hypoxic respiratory failure requiring intubation and mechanical ventilation, loculated R pleural effusion on imaging. Last HD on 3/11/23 via [facility]. Labs reviewed. Will assess need for HD daily. No urgent indication for HD today. Monitor labs. Dose meds as per HD.

## 2023-03-12 NOTE — PROGRESS NOTE ADULT - PROBLEM SELECTOR PLAN 3
Pt. with hyperphosphatemia in the setting of ESRD. Serum phosphorus within target range of 3.8 today. Low phos diet. Monitor serum phosphorus level.     If you have any questions, please feel free to contact me  Ming Holloway  Nephrology Fellow  [phone]/ [employer] Teams(Preferred)  (After 5pm or on weekends please page the on-call fellow).

## 2023-03-13 NOTE — PROGRESS NOTE ADULT - ASSESSMENT
57 history of HTN, HLD, DM2, Bipolar disorder, ESRD on HD (TTS), recent admission on 1/14 for AHRF and septic shock at [place], and recent admission (2-7 - 2/14) at [facility] for septic shock and AHRF likely 2/2 aspiration pneumonia and Covid, hospital course c/b cardiac arrest. Patient presenting from [facility] with acute hypoxic hypercapnic respiratory failure, continued hypoxia on BIPAP requiring intubation likely iso mucus plugging    #Neuro  -CTH wnl  - initially A&O3 on admission  - now sedated on prop, fent, versed while intubated  - Olanzapine continued.     #Respiratory  #AHRF  - recent admission for AHRF 2/2 aspiration pneumonia +/- covid on Feb 2023 discharged on room air  - presenting with worsening hypoxia and hypercapnia not improved with BIPAP  - suspect i/s/o large R loculated PLEF and possible aspiration pneumonia  - Intubated (3/10 - )  - antibiotics as below  -monitor ABGs, wean vent as tolerated  -S/p bronchoscopy 3/11      #Pleural effusion  - large loculated R PLEF, intervally increased from small in Feb 2023  - suspect i/s/o prior aspiration pneumonia +/- covid  -CT chest with IV contrast to further characterize effusions  -check TTE, pro-BNP to r/o CHF contributing to pleffs   -3/12: S/p thoracentesis 3/12, f/u cultures     #Cardiovascular  #Vasoplegic shock  - Normotensive prior to intubation  - requiring levo and elenita pushes demetri-intubation  - off pressors, maintain MAP > 65  -hold home antihypertensives    #Afib  - reported history of Afib on eliquis 2.5 BID  - start heparin gtt while npo    #Troponinemia  - likely 2/2 demand ischemia +/- ESRD  - trops downtrending, 330-->224, baseline 100 on prior admission  - no evidence of ischemic change on EKG      #Renal  #ESRD  - TTS, last dialysis on 3/7/23 prior to admission  - Nephrology consult in AM for dialysis, follows with dr castellanos ([place] [facility])  - no need for urgent dialysis on admission    #GI  #Elevated ALP  - persistently elevated  - 900s and GGT since Feb 2023  - patient asymptomatic at time, RUQ negative in Feb 2023  - monitor LFTs  - NPO for now, OGT placed    #ID  #Aspiration pneumonia  - previous admission for aspiration pneumonia, negative BCx  - suspect contributory to current presentation  - c/w vanc/zosyn  - f/u MRSA swab to d/c vancomycin  - f/u BCx    #Endo  #DM2   - history of diabetic foot ulcers in the past  - recent A1C 4.8 and episodes of hypoglycemia last admission  - monitor off insulin    #Heme  #macrocytic anemia  - f/u B12, folate  - no evidence of active bleed  - stable from Feb 2023    #Prophylaxis/GOC  DVT: anticoagulation as above  Diet: NPO, start diet when OGT placed  GI ppx: none  GOC: unable to reach HCP, pt previously full code per cart review  Dispo: presenting from [facility] will likely return pending medical stabilization 57 history of HTN, HLD, DM2, Bipolar disorder, ESRD on HD (TTS), recent admission on 1/14 for AHRF and septic shock at [place], and recent admission (2-7 - 2/14) at [facility] for septic shock and AHRF likely 2/2 aspiration pneumonia and Covid, hospital course c/b cardiac arrest. Patient presenting from [facility] with acute hypoxic hypercapnic respiratory failure, continued hypoxia on BIPAP requiring intubation likely iso mucus plugging    #Neuro  -CTH wnl  - initially A&O3 on admission  - now sedated on prop, fent, versed while intubated  - Olanzapine held iso prolonged QTc    #Respiratory  #AHRF  - recent admission for AHRF 2/2 aspiration pneumonia +/- covid on Feb 2023 discharged on room air  - presenting with worsening hypoxia and hypercapnia not improved with BIPAP  - suspect i/s/o large R loculated PLEF and possible aspiration pneumonia  - Intubated (3/10 - )  - antibiotics as below  -monitor ABGs, wean vent as tolerated  -S/p bronchoscopy 3/11      #Pleural effusion  - large loculated R PLEF, intervally increased from small in Feb 2023  - suspect i/s/o prior aspiration pneumonia +/- covid  -CT chest with IV contrast to further characterize effusions  -check TTE, pro-BNP to r/o CHF contributing to pleffs   -3/12: S/p thoracentesis 3/12, f/u cultures     #Cardiovascular  #Vasoplegic shock  - Normotensive prior to intubation  - requiring levo and elenita pushes demetri-intubation  - off pressors, maintain MAP > 65  -hold home antihypertensives    #Afib  - reported history of Afib on eliquis 2.5 BID  - start heparin gtt while npo    #Troponinemia  - likely 2/2 demand ischemia +/- ESRD  - trops downtrending, 330-->224, baseline 100 on prior admission  - no evidence of ischemic change on EKG      #Renal  #ESRD  - TTS, last dialysis on 3/7/23 prior to admission  - Nephrology consult in AM for dialysis, follows with dr castellanos ([place] [facility])  - no need for urgent dialysis on admission    #GI  #Elevated ALP  - persistently elevated  - 900s and GGT since Feb 2023  - patient asymptomatic at time, RUQ negative in Feb 2023  - monitor LFTs  - NPO for now, OGT placed    #ID  #Aspiration pneumonia  - previous admission for aspiration pneumonia, negative BCx  - suspect contributory to current presentation  - c/w vanc/zosyn  - f/u MRSA swab to d/c vancomycin  - f/u BCx    #Endo  #DM2   - history of diabetic foot ulcers in the past  - recent A1C 4.8 and episodes of hypoglycemia last admission  - monitor off insulin    #Heme  #macrocytic anemia  - f/u B12, folate  - no evidence of active bleed  - stable from Feb 2023    #Prophylaxis/GOC  DVT: anticoagulation as above  Diet: NPO, start diet when OGT placed  GI ppx: none  GOC: unable to reach HCP, pt previously full code per cart review  Dispo: presenting from [facility] will likely return pending medical stabilization 57 history of HTN, HLD, DM2, Bipolar disorder, ESRD on HD (TTS), recent admission on 1/14 for AHRF and septic shock at [place], and recent admission (2-7 - 2/14) at [facility] for septic shock and AHRF likely 2/2 aspiration pneumonia and Covid, hospital course c/b cardiac arrest. Patient presenting from [facility] with acute hypoxic hypercapnic respiratory failure, continued hypoxia on BIPAP requiring intubation likely iso mucus plugging    #Neuro  -CTH wnl  - initially A&O3 on admission  - now off sedation but remains intubated and is able to follow commands  - Olanzapine held iso prolonged QTc    #Respiratory  #AHRF  - recent admission for AHRF 2/2 aspiration pneumonia +/- covid on Feb 2023 discharged on room air  - presenting with worsening hypoxia and hypercapnia not improved with BIPAP  - suspect i/s/o large R loculated PLEF and possible aspiration pneumonia  - Intubated (3/10 - )  - antibiotics as below  - monitor ABGs, wean vent as tolerated  - S/p bronchoscopy 3/11      #Pleural effusion  - large loculated R PLEF, intervally increased from small in Feb 2023  - suspect i/s/o prior aspiration pneumonia +/- covid  - now resolved on most recent CXR  - 3/12: S/p thoracentesis 3/12, f/u cultures     #Cardiovascular  #Vasoplegic shock  - Normotensive prior to intubation  - required levo and elenita pushes demetri-intubation  - off pressors, maintain MAP > 65  - hold home antihypertensives    #Afib  - reported history of Afib on eliquis 2.5 BID  - start heparin gtt while npo    #Troponinemia  - likely 2/2 demand ischemia +/- ESRD  - trops downtrending, 330-->224, baseline 100 on prior admission  - no evidence of ischemic change on EKG      #Renal  #ESRD  - TTS, last dialysis on 3/7/23 prior to admission  - Nephrology consult for dialysis, follows with dr castellanos ([place] [facility])  - no need for urgent dialysis on admission    #GI  #Elevated ALP  - persistently elevated  - 900s and GGT since Feb 2023  - patient asymptomatic at time, RUQ negative in Feb 2023  - monitor LFTs  - NPO for now, OGT placed    #ID  #Aspiration pneumonia  - previous admission for aspiration pneumonia, negative BCx  - suspect contributory to current presentation  - c/w zosyn  - neg MRSA swab --> d/c vancomycin  - f/u BCx    #Endo  #DM2   - history of diabetic foot ulcers in the past  - recent A1C 4.8 and episodes of hypoglycemia last admission  - monitor off insulin    #Heme  #macrocytic anemia  - f/u B12, folate  - no evidence of active bleed  - stable from Feb 2023    #Prophylaxis/GOC  DVT: anticoagulation as above  Diet: NPO, OGT placed  GI ppx: none  GOC: unable to reach HCP, pt previously full code per cart review  Dispo: presenting from [facility] will likely return pending medical stabilization 57 history of HTN, HLD, DM2, Bipolar disorder, ESRD on HD (TTS), recent admission on 1/14 for AHRF and septic shock at [place], and recent admission (2-7 - 2/14) at [facility] for septic shock and AHRF likely 2/2 aspiration pneumonia and Covid, hospital course c/b cardiac arrest. Patient presenting from [facility] with acute hypoxic hypercapnic respiratory failure, continued hypoxia on BIPAP requiring intubation likely iso mucus plugging    #Neuro  -CTH wnl  - initially A&O3 on admission  - now off sedation but remains intubated and is able to follow commands  - Olanzapine held iso prolonged QTc    #Respiratory  #AHRF  - recent admission for AHRF 2/2 aspiration pneumonia +/- covid on Feb 2023 discharged on room air  - presenting with worsening hypoxia and hypercapnia not improved with BIPAP  - suspect i/s/o large R loculated PLEF and possible aspiration pneumonia  - Intubated (3/10 - )  - antibiotics as below  - monitor ABGs, wean vent as tolerated  - S/p bronchoscopy 3/11  - plan for extubation on 3/13 after HD today      #Pleural effusion  - large loculated R PLEF, intervally increased from small in Feb 2023  - suspect i/s/o prior aspiration pneumonia +/- covid  - now resolved on most recent CXR  - 3/12: S/p thoracentesis 3/12 exudative, f/u cultures     #Cardiovascular  #Vasoplegic shock  - Normotensive prior to intubation  - required levo and elenita pushes demetri-intubation  - off pressors, maintain MAP > 65  - hold home antihypertensives    #Afib  - reported history of Afib on eliquis 2.5 BID  - start heparin gtt while npo    #Troponinemia  - likely 2/2 demand ischemia +/- ESRD  - trops downtrending, 330-->224, baseline 100 on prior admission  - no evidence of ischemic change on EKG      #Renal  #ESRD  - TTS, last dialysis on 3/7/23 prior to admission  - Nephrology consult for dialysis, follows with dr castellanos ([place] [facility])  - plan for HD today    #GI  #Elevated ALP  - persistently elevated  - 900s and GGT since Feb 2023  - patient asymptomatic at time, RUQ negative in Feb 2023  - monitor LFTs  - NPO for now, OGT placed    #ID  #Aspiration pneumonia  - previous admission for aspiration pneumonia, negative BCx  - suspect contributory to current presentation  - c/w zosyn  - neg MRSA swab --> d/c vancomycin  - f/u BCx    #Endo  #DM2   - history of diabetic foot ulcers in the past  - recent A1C 4.8 and episodes of hypoglycemia last admission  - monitor off insulin    #Heme  #macrocytic anemia  - f/u B12, folate  - no evidence of active bleed  - stable from Feb 2023    #Prophylaxis/GOC  DVT: anticoagulation as above  Diet: NPO, OGT placed  GI ppx: none  GOC: unable to reach HCP, pt previously full code per cart review  Dispo: presenting from [facility] will likely return pending medical stabilization 57 history of HTN, HLD, DM2, Bipolar disorder, ESRD on HD (TTS), recent admission on 1/14 for AHRF and septic shock at [place], and recent admission (2-7 - 2/14) at [facility] for septic shock and AHRF likely 2/2 aspiration pneumonia and Covid, hospital course c/b cardiac arrest. Patient presenting from [facility] with acute hypoxic hypercapnic respiratory failure, continued hypoxia on BIPAP requiring intubation likely iso mucus plugging    #Neuro  -CTH wnl  - initially A&O3 on admission  - now off sedation but remains intubated and is able to follow commands  - Olanzapine held iso prolonged QTc    #Respiratory  #AHRF  - recent admission for AHRF 2/2 aspiration pneumonia +/- covid on Feb 2023 discharged on room air  - presenting with worsening hypoxia and hypercapnia not improved with BIPAP  - suspect i/s/o large R loculated PLEF and possible aspiration pneumonia  - Intubated (3/10 - )  - antibiotics as below  - monitor ABGs, wean vent as tolerated  - S/p bronchoscopy 3/11  - plan for extubation on 3/13 after HD today      #Pleural effusion  - large loculated R PLEF, intervally increased from small in Feb 2023  - suspect i/s/o prior aspiration pneumonia +/- covid  - now resolved on most recent CXR  - 3/12: S/p thoracentesis 3/12 exudative, f/u cultures     #Cardiovascular  #Vasoplegic shock  - Normotensive prior to intubation  - required levo and elenita pushes demetri-intubation  - off pressors, maintain MAP > 65  - on clonidine, hydralazine, and isordil currently  - hydralazine IVP PRN    #Afib  - reported history of Afib on eliquis 2.5 BID  - start heparin gtt while npo    #Troponinemia  - likely 2/2 demand ischemia +/- ESRD  - trops downtrending, 330-->224, baseline 100 on prior admission  - no evidence of ischemic change on EKG      #Renal  #ESRD  - TTS, last dialysis on 3/7/23 prior to admission  - Nephrology consult for dialysis, follows with dr castellanos ([place] [facility])  - plan for HD today    #GI  #Elevated ALP  - persistently elevated  - 900s and GGT since Feb 2023  - patient asymptomatic at time, RUQ negative in Feb 2023  - monitor LFTs  - NPO for now, OGT placed    #ID  #Aspiration pneumonia  - previous admission for aspiration pneumonia, negative BCx  - suspect contributory to current presentation  - c/w zosyn  - neg MRSA swab --> d/c vancomycin  - f/u BCx    #Endo  #DM2   - history of diabetic foot ulcers in the past  - recent A1C 4.8 and episodes of hypoglycemia last admission  - monitor off insulin    #Heme  #macrocytic anemia  - f/u B12, folate  - no evidence of active bleed  - stable from Feb 2023    #Prophylaxis/GOC  DVT: anticoagulation as above  Diet: NPO, OGT placed  GI ppx: none  GOC: unable to reach HCP, pt previously full code per cart review  Dispo: presenting from [facility] will likely return pending medical stabilization 57 history of HTN, HLD, DM2, Bipolar disorder, ESRD on HD (TTS), recent admission on 1/14 for AHRF and septic shock at [place], and recent admission (2-7 - 2/14) at [facility] for septic shock and AHRF likely 2/2 aspiration pneumonia and Covid, hospital course c/b cardiac arrest. Patient presenting from [facility] with acute hypoxic hypercapnic respiratory failure, continued hypoxia on BIPAP requiring intubation likely iso mucus plugging    #Neuro  -CTH wnl  - initially A&O3 on admission  - now off sedation but remains intubated and is able to follow commands  - Olanzapine held iso prolonged QTc    #Respiratory  #AHRF  - recent admission for AHRF 2/2 aspiration pneumonia +/- covid on Feb 2023 discharged on room air  - presenting with worsening hypoxia and hypercapnia not improved with BIPAP  - suspect i/s/o large R loculated PLEF and possible aspiration pneumonia  - Intubated (3/10 - )  - antibiotics as below  - monitor ABGs, wean vent as tolerated  - S/p bronchoscopy 3/11  - plan for extubation on 3/13 after HD today      #Pleural effusion  - large loculated R PLEF, intervally increased from small in Feb 2023  - suspect i/s/o prior aspiration pneumonia +/- covid  - now resolved on most recent CXR  - 3/12: S/p thoracentesis 3/12 exudative, f/u cultures     #Cardiovascular  #Vasoplegic shock  - Normotensive prior to intubation  - required levo and elenita pushes demetri-intubation  - off pressors, maintain MAP > 65  - on clonidine, hydralazine, and isordil currently  - hydralazine IVP PRN    #Afib  - reported history of Afib on eliquis 2.5 BID  - start heparin gtt while npo    #Troponinemia  - likely 2/2 demand ischemia +/- ESRD  - trops downtrending, 330-->224, baseline 100 on prior admission  - no evidence of ischemic change on EKG      #Renal  #ESRD  - TTS, last dialysis on 3/7/23 prior to admission  - Nephrology consult for dialysis, follows with dr castellanos ([employer] [facility])  - plan for HD today    #GI  #Elevated ALP  - persistently elevated  - 900s and GGT since Feb 2023  - patient asymptomatic at time, RUQ negative in Feb 2023  - monitor LFTs  - NPO for now, OGT placed    #ID  #Aspiration pneumonia  - previous admission for aspiration pneumonia, negative BCx  - suspect contributory to current presentation  - c/w zosyn  - neg MRSA swab --> d/c vancomycin  - f/u BCx    #Endo  #DM2   - history of diabetic foot ulcers in the past  - recent A1C 4.8 and episodes of hypoglycemia last admission  - monitor off insulin    #Heme  #anemia  - f/u B12, folate  - no evidence of active bleed  - getting EPO weekly  - stable from Feb 2023    #Prophylaxis/GOC  DVT: anticoagulation as above  Diet: NPO, OGT placed  GI ppx: none  GOC: unable to reach HCP, pt previously full code per cart review  Dispo: presenting from [facility] will likely return pending medical stabilization 57 history of HTN, HLD, DM2, Bipolar disorder, ESRD on HD (TTS), recent admission on 1/14 for AHRF and septic shock at [place], and recent admission (2-7 - 2/14) at [facility] for septic shock and AHRF likely 2/2 aspiration pneumonia and Covid, hospital course c/b cardiac arrest. Patient presenting from [facility] with acute hypoxic hypercapnic respiratory failure, continued hypoxia on BIPAP requiring intubation likely iso mucus plugging    #Neuro  -CTH wnl  - initially A&O3 on admission  - now off sedation but remains intubated and is able to follow commands  - Olanzapine held iso prolonged QTc  - f/u repeat EKG and restart if QTc < 500    #Respiratory  #AHRF  - recent admission for AHRF 2/2 aspiration pneumonia +/- covid on Feb 2023 discharged on room air  - presenting with worsening hypoxia and hypercapnia not improved with BIPAP  - suspect i/s/o large R loculated PLEF and possible aspiration pneumonia  - Intubated (3/10 - 3/13 )  - antibiotics as below  - monitor ABGs, wean vent as tolerated  - S/p bronchoscopy 3/11  - plan for extubation on 3/13       #Pleural effusion  - large loculated R PLEF, intervally increased from small in Feb 2023  - suspect i/s/o prior aspiration pneumonia +/- covid  - now resolved on most recent CXR  - 3/12: S/p thoracentesis, fluid studies c/w exudative, f/u cultures     #Cardiovascular  #Vasoplegic shock  - Normotensive prior to intubation  - required levo and elenita pushes demetri-intubation  - off pressors, maintain MAP > 65  - currently resolved and pt is hypertensive    #HTN:  - on clonidine, hydralazine, and isordil currently  - hydralazine IVP PRN    #Afib  - reported history of Afib on eliquis 2.5 BID  - start heparin gtt while npo    #Troponinemia  - likely 2/2 demand ischemia +/- ESRD  - trops downtrending, 330-->224, baseline 100 on prior admission  - no evidence of ischemic change on EKG      #Renal  #ESRD  - TTS, last dialysis on 3/7/23 prior to admission  - Nephrology consult for dialysis, follows with dr castellanos ([place] [facility])  - plan for HD today per nephro    #GI  #Elevated ALP  - persistently elevated  - 900s and GGT since Feb 2023  - patient asymptomatic at time, RUQ negative in Feb 2023  - monitor LFTs  - NPO for now, OGT placed    #ID  #Aspiration pneumonia  - previous admission for aspiration pneumonia, negative BCx  - suspect contributory to current presentation  - c/w zosyn  - neg MRSA swab --> d/c vancomycin  - f/u BCx    #Endo  #DM2   - history of diabetic foot ulcers in the past  - recent A1C 4.8 and episodes of hypoglycemia last admission  - monitor off insulin    #Heme  #anemia  - f/u B12, folate  - no evidence of active bleed  - EPO weekly  - stable from Feb 2023    #Prophylaxis/GOC  DVT: anticoagulation as above  Diet: NPO, OGT placed  GI ppx: none  GOC: unable to reach HCP, pt previously full code per cart review  Dispo: presenting from [facility] will likely return pending medical stabilization 57 history of HTN, HLD, DM2, Bipolar disorder, ESRD on HD (TTS), recent admission on 1/14 for AHRF and septic shock at [place], and recent admission (2-7 - 2/14) at [facility] for septic shock and AHRF likely 2/2 aspiration pneumonia and Covid, hospital course c/b cardiac arrest. Patient presenting from [facility] with acute hypoxic hypercapnic respiratory failure, continued hypoxia on BIPAP requiring intubation likely iso mucus plugging    #Neuro  -CTH wnl  - initially A&O3 on admission  - now off sedation but remains intubated and is able to follow commands  - Olanzapine held iso prolonged QTc  - f/u repeat EKG and restart if QTc < 500    #Respiratory  #AHRF  - recent admission for AHRF 2/2 aspiration pneumonia +/- covid on Feb 2023 discharged on room air  - presenting with worsening hypoxia and hypercapnia not improved with BIPAP  - suspect i/s/o large R loculated PLEF and possible aspiration pneumonia  - Intubated (3/10 - 3/13)  - antibiotics as below  - monitor ABGs, wean vent as tolerated  - S/p bronchoscopy 3/11 and extubation on 3/13       #Pleural effusion  - large loculated R PLEF, intervally increased from small in Feb 2023  - suspect i/s/o prior aspiration pneumonia +/- covid  - now resolved on most recent CXR  - 3/12: S/p thoracentesis, fluid studies c/w exudative, f/u cultures     #Cardiovascular  #Vasoplegic shock  - Normotensive prior to intubation  - required levo and elenita pushes demetri-intubation  - off pressors, maintain MAP > 65  - currently resolved and pt is hypertensive    #HTN:  - on clonidine, hydralazine, and isordil currently  - hydralazine IVP 5mg PRN    #Afib  - reported history of Afib on eliquis 2.5 BID  - start heparin gtt while npo    #Troponinemia  - likely 2/2 demand ischemia +/- ESRD  - trops downtrending, 330-->224, baseline 100 on prior admission  - no evidence of ischemic change on EKG      #Renal  #ESRD  - TTS, last dialysis on 3/7/23 prior to admission  - Nephrology consult for dialysis, follows with dr castellanos ([place] [facility])  - plan for HD today per nephro    #GI  #Elevated ALP  - persistently elevated  - 900s and GGT since Feb 2023  - patient asymptomatic at time, RUQ negative in Feb 2023  - monitor LFTs  - NPO for now, OGT placed    #ID  #Aspiration pneumonia  - previous admission for aspiration pneumonia, negative BCx  - suspect contributory to current presentation  - c/w zosyn  - neg MRSA swab --> d/c vancomycin  - f/u BCx    #Endo  #DM2   - history of diabetic foot ulcers in the past  - recent A1C 4.8 and episodes of hypoglycemia last admission  - monitor off insulin    #Heme  #anemia  - no evidence of active bleed  - EPO weekly  - stable from Feb 2023    #Prophylaxis/GOC  DVT: anticoagulation as above  Diet: NPO, OGT placed  GI ppx: none  GOC: unable to reach HCP, pt previously full code per cart review  Dispo: presenting from [facility] will likely return pending medical stabilization 57 history of HTN, HLD, DM2, Bipolar disorder, ESRD on HD (TTS), recent admission on 1/14 for AHRF and septic shock at [place], and recent admission (2-7 - 2/14) at [facility] for septic shock and AHRF likely 2/2 aspiration pneumonia and Covid, hospital course c/b cardiac arrest. Patient presenting from [facility] with acute hypoxic hypercapnic respiratory failure, continued hypoxia on BIPAP requiring intubation likely iso mucus plugging    #Neuro  -CTH wnl  - initially A&O3 on admission  - now off sedation but remains intubated and is able to follow commands  - Olanzapine held iso prolonged QTc  - f/u repeat EKG and restart if QTc < 500    #Respiratory  #AHRF  - recent admission for AHRF 2/2 aspiration pneumonia +/- covid on Feb 2023 discharged on room air  - presenting with worsening hypoxia and hypercapnia not improved with BIPAP  - suspect i/s/o large R loculated PLEF and possible aspiration pneumonia  - Intubated (3/10 - 3/13)  - antibiotics as below  - monitor ABGs, wean vent as tolerated  - S/p bronchoscopy 3/11 and extubation on 3/13       #Pleural effusion  - large loculated R PLEF, intervally increased from small in Feb 2023  - suspect i/s/o prior aspiration pneumonia +/- covid  - now resolved on most recent CXR  - 3/12: S/p thoracentesis, fluid studies c/w exudative, f/u cultures     #Cardiovascular  #Vasoplegic shock  - Normotensive prior to intubation  - required levo and elenita pushes demetri-intubation  - off pressors, maintain MAP > 65  - currently resolved and pt is hypertensive    #HTN:  - on clonidine, hydralazine, and isordil currently  - hydralazine IVP 5mg PRN    #Afib  - reported history of Afib on eliquis 2.5 BID  - start heparin gtt while npo    #Troponinemia  - likely 2/2 demand ischemia +/- ESRD  - trops downtrending, 330-->224, baseline 100 on prior admission  - no evidence of ischemic change on EKG      #Renal  #ESRD  - TTS, last dialysis on 3/7/23 prior to admission  - Nephrology consult for dialysis, follows with dr castellanos ([facility])  - plan for HD today per nephro    #GI  #Elevated ALP  - persistently elevated  - 900s and GGT since Feb 2023  - patient asymptomatic at time, RUQ negative in Feb 2023  - monitor LFTs  - NPO for now, OGT placed    #ID  #Aspiration pneumonia  - previous admission for aspiration pneumonia, negative BCx  - suspect contributory to current presentation  - c/w zosyn  - neg MRSA swab --> d/c vancomycin  - f/u BCx    #Endo  #DM2   - history of diabetic foot ulcers in the past  - recent A1C 4.8 and episodes of hypoglycemia last admission  - monitor off insulin    #Heme  #anemia  - no evidence of active bleed  - EPO weekly  - stable from Feb 2023    #Prophylaxis/GOC  DVT: anticoagulation as above  Diet: NPO, OGT placed  GI ppx: none  GOC: per pt's mother pt is full code  Dispo: presenting from [facility] will likely return pending medical stabilization 57 history of HTN, HLD, DM2, Bipolar disorder, ESRD on HD (TTS), recent admission on 1/14 for AHRF and septic shock at [place], and recent admission (2-7 - 2/14) at [facility] for septic shock and AHRF likely 2/2 aspiration pneumonia and Covid, hospital course c/b cardiac arrest. Patient presenting from [facility] with acute hypoxic hypercapnic respiratory failure, continued hypoxia on BIPAP requiring intubation likely iso mucus plugging    #Neuro  -CTH wnl  - initially A&O3 on admission  - now off sedation but remains intubated and is able to follow commands  - Olanzapine held iso prolonged QTc  - f/u repeat EKG and restart if QTc < 500    #Respiratory  #AHRF  - recent admission for AHRF 2/2 aspiration pneumonia +/- covid on Feb 2023 discharged on room air  - presenting with worsening hypoxia and hypercapnia not improved with BIPAP  - suspect i/s/o large R loculated PLEF and possible aspiration pneumonia  - Intubated (3/10 - 3/13)  - antibiotics as below  - monitor ABGs, wean vent as tolerated  - S/p bronchoscopy 3/11 and extubation on 3/13       #Pleural effusion  - large loculated R PLEF, intervally increased from small in Feb 2023  - suspect i/s/o prior aspiration pneumonia +/- covid  - now resolved on most recent CXR  - 3/12: S/p thoracentesis, fluid studies c/w exudative, f/u cultures     #Cardiovascular  #Vasoplegic shock  - Normotensive prior to intubation  - required levo and elenita pushes demetri-intubation  - off pressors, maintain MAP > 65  - currently resolved and pt is hypertensive    #HTN:  - on clonidine, hydralazine, and isordil currently  - hydralazine IVP 5mg PRN    #Afib  - reported history of Afib on eliquis 2.5 BID  - start heparin gtt while npo    #Troponinemia  - likely 2/2 demand ischemia +/- ESRD  - trops downtrending, 330-->224, baseline 100 on prior admission  - no evidence of ischemic change on EKG      #Renal  #ESRD  - TTS, last dialysis on 3/7/23 prior to admission  - Nephrology consult for dialysis, follows with dr castellanos ([facility])  - plan for HD today per nephro    #GI  #Elevated ALP  - persistently elevated  - 900s and GGT since Feb 2023  - patient asymptomatic at time, RUQ negative in Feb 2023  - monitor LFTs    #ID  #Aspiration pneumonia  - previous admission for aspiration pneumonia, negative BCx  - suspect contributory to current presentation  - c/w zosyn  - neg MRSA swab --> d/c vancomycin  - f/u BCx    #Endo  #DM2   - history of diabetic foot ulcers in the past  - recent A1C 4.8 and episodes of hypoglycemia last admission  - monitor off insulin    #Heme  #anemia  - no evidence of active bleed  - EPO weekly  - stable from Feb 2023    #Prophylaxis/GOC  DVT: anticoagulation as above  Diet: NPO, OGT placed  GI ppx: none  GOC: per pt's mother pt is full code  Dispo: presenting from [facility] will likely return pending medical stabilization

## 2023-03-13 NOTE — PROGRESS NOTE ADULT - SUBJECTIVE AND OBJECTIVE BOX
INTERVAL HPI/OVERNIGHT EVENTS:    SUBJECTIVE: Patient seen and examined at bedside. Intubated, sedated, ROS cannot be obtained.       VITAL SIGNS:  ICU Vital Signs Last 24 Hrs  T(C): 36.7 (13 Mar 2023 04:00), Max: 36.7 (13 Mar 2023 04:00)  T(F): 98 (13 Mar 2023 04:00), Max: 98 (13 Mar 2023 04:00)  HR: 67 (13 Mar 2023 06:38) (44 - 67)  BP: 177/52 (12 Mar 2023 18:00) (162/55 - 183/68)  BP(mean): 84 (12 Mar 2023 18:00) (79 - 95)  ABP: 185/55 (13 Mar 2023 06:00) (163/49 - 192/61)  ABP(mean): 100 (13 Mar 2023 06:00) (80 - 111)  RR: 18 (13 Mar 2023 06:00) (18 - 19)  SpO2: 99% (13 Mar 2023 06:38) (94% - 100%)    O2 Parameters below as of 13 Mar 2023 05:00  Patient On (Oxygen Delivery Method): ventilator    O2 Concentration (%): 30      Mode: AC/ CMV (Assist Control/ Continuous Mandatory Ventilation), RR (machine): 18, TV (machine): 400, FiO2: 30, PEEP: 5, ITime: 0.64, MAP: 8, PIP: 20  Plateau pressure:   P/F ratio:     03-11 @ 06:01  -  03-12 @ 07:00  --------------------------------------------------------  IN: 1579.8 mL / OUT: 1800 mL / NET: -220.2 mL    03-12 @ 07:01  -  03-13 @ 06:59  --------------------------------------------------------  IN: 272.3 mL / OUT: 0 mL / NET: 272.3 mL      CAPILLARY BLOOD GLUCOSE      POCT Blood Glucose.: 180 mg/dL (12 Mar 2023 19:22)    ECG:    PHYSICAL EXAM:     General: NAD, doing well.  HEENT: Intubated. OG in place. Brainstem reflexes in place  Lymph Nodes: No ELMIRA palpated  Neck: trachea midline. no trach.   Respiratory: CTA b/l. No rales, rhonchi, wheezing appreciated.  Cardiovascular: RRR. +s1/s2, -s3/s4. No murmur, rubs, gallops. No edema  Abdomen: NT/ND. +BS, No HSM.   Extremities: 2+ pulses  Skin: edematous. No rashes, ecchymoses, or petechiae noted  Neurological: AAOx3. DTR 2+. strength 5/5 UE/LE bilaterally.   Psychiatry: Appropriate mood and affect.    MEDICATIONS:  MEDICATIONS  (STANDING):  chlorhexidine 0.12% Liquid 15 milliLiter(s) Oral Mucosa every 12 hours  cloNIDine 0.1 milliGRAM(s) Oral two times a day  heparin  Infusion. 1500 Unit(s)/Hr (15 mL/Hr) IV Continuous <Continuous>  hydrALAZINE 100 milliGRAM(s) Oral every 8 hours  isosorbide   dinitrate Tablet (ISORDIL) 10 milliGRAM(s) Oral three times a day  pantoprazole  Injectable 40 milliGRAM(s) IV Push daily  petrolatum Ophthalmic Ointment 1 Application(s) Both EYES every 12 hours  piperacillin/tazobactam IVPB.. 3.375 Gram(s) IV Intermittent every 12 hours    MEDICATIONS  (PRN):  sodium chloride 0.9% Bolus. 100 milliLiter(s) IV Bolus every 5 minutes PRN SBP LESS THAN or EQUAL to 100 mmHg      ALLERGIES:  Allergies    No Known Allergies    Intolerances        LABS:                        8.2    9.39  )-----------( 205      ( 13 Mar 2023 00:20 )             26.6     03-13    132<L>  |  93<L>  |  31<H>  ----------------------------<  67<L>  3.5   |  24  |  3.04<H>    Ca    8.3<L>      13 Mar 2023 00:20  Phos  4.2     03-13  Mg     2.00     03-13    TPro  5.6<L>  /  Alb  2.6<L>  /  TBili  1.5<H>  /  DBili  x   /  AST  18  /  ALT  11  /  AlkPhos  670<H>  03-13    PT/INR - ( 13 Mar 2023 00:20 )   PT: 12.8 sec;   INR: 1.10 ratio         PTT - ( 13 Mar 2023 00:20 )  PTT:54.4 sec      RADIOLOGY & ADDITIONAL TESTS: Reviewed.   INTERVAL HPI/OVERNIGHT EVENTS: Pt's QTC >500 and zyprexa was held. Pt was hypertensive and received hydralazine IVP x2. Nifedipine?     SUBJECTIVE: Patient seen and examined at bedside. Intubated, sedated, ROS cannot be obtained.       VITAL SIGNS:  ICU Vital Signs Last 24 Hrs  T(C): 36.7 (13 Mar 2023 04:00), Max: 36.7 (13 Mar 2023 04:00)  T(F): 98 (13 Mar 2023 04:00), Max: 98 (13 Mar 2023 04:00)  HR: 67 (13 Mar 2023 06:38) (44 - 67)  BP: 177/52 (12 Mar 2023 18:00) (162/55 - 183/68)  BP(mean): 84 (12 Mar 2023 18:00) (79 - 95)  ABP: 185/55 (13 Mar 2023 06:00) (163/49 - 192/61)  ABP(mean): 100 (13 Mar 2023 06:00) (80 - 111)  RR: 18 (13 Mar 2023 06:00) (18 - 19)  SpO2: 99% (13 Mar 2023 06:38) (94% - 100%)    O2 Parameters below as of 13 Mar 2023 05:00  Patient On (Oxygen Delivery Method): ventilator    O2 Concentration (%): 30      Mode: AC/ CMV (Assist Control/ Continuous Mandatory Ventilation), RR (machine): 18, TV (machine): 400, FiO2: 30, PEEP: 5, ITime: 0.64, MAP: 8, PIP: 20  Plateau pressure:   P/F ratio:     03-11 @ 06:01  -  03-12 @ 07:00  --------------------------------------------------------  IN: 1579.8 mL / OUT: 1800 mL / NET: -220.2 mL    03-12 @ 07:01  -  03-13 @ 06:59  --------------------------------------------------------  IN: 272.3 mL / OUT: 0 mL / NET: 272.3 mL      CAPILLARY BLOOD GLUCOSE      POCT Blood Glucose.: 180 mg/dL (12 Mar 2023 19:22)    ECG:    PHYSICAL EXAM:     General: NAD, doing well.  HEENT: Intubated. OG in place. Brainstem reflexes in place  Lymph Nodes: No ELMIRA palpated  Neck: trachea midline. no trach.   Respiratory: CTA b/l. No rales, rhonchi, wheezing appreciated.  Cardiovascular: RRR. +s1/s2, -s3/s4. No murmur, rubs, gallops. No edema  Abdomen: NT/ND. +BS, No HSM.   Extremities: 2+ pulses  Skin: edematous. No rashes, ecchymoses, or petechiae noted  Neurological: AAOx3. DTR 2+. strength 5/5 UE/LE bilaterally.   Psychiatry: Appropriate mood and affect.    MEDICATIONS:  MEDICATIONS  (STANDING):  chlorhexidine 0.12% Liquid 15 milliLiter(s) Oral Mucosa every 12 hours  cloNIDine 0.1 milliGRAM(s) Oral two times a day  heparin  Infusion. 1500 Unit(s)/Hr (15 mL/Hr) IV Continuous <Continuous>  hydrALAZINE 100 milliGRAM(s) Oral every 8 hours  isosorbide   dinitrate Tablet (ISORDIL) 10 milliGRAM(s) Oral three times a day  pantoprazole  Injectable 40 milliGRAM(s) IV Push daily  petrolatum Ophthalmic Ointment 1 Application(s) Both EYES every 12 hours  piperacillin/tazobactam IVPB.. 3.375 Gram(s) IV Intermittent every 12 hours    MEDICATIONS  (PRN):  sodium chloride 0.9% Bolus. 100 milliLiter(s) IV Bolus every 5 minutes PRN SBP LESS THAN or EQUAL to 100 mmHg      ALLERGIES:  Allergies    No Known Allergies    Intolerances        LABS:                        8.2    9.39  )-----------( 205      ( 13 Mar 2023 00:20 )             26.6     03-13    132<L>  |  93<L>  |  31<H>  ----------------------------<  67<L>  3.5   |  24  |  3.04<H>    Ca    8.3<L>      13 Mar 2023 00:20  Phos  4.2     03-13  Mg     2.00     03-13    TPro  5.6<L>  /  Alb  2.6<L>  /  TBili  1.5<H>  /  DBili  x   /  AST  18  /  ALT  11  /  AlkPhos  670<H>  03-13    PT/INR - ( 13 Mar 2023 00:20 )   PT: 12.8 sec;   INR: 1.10 ratio         PTT - ( 13 Mar 2023 00:20 )  PTT:54.4 sec      RADIOLOGY & ADDITIONAL TESTS: Reviewed.   INTERVAL HPI/OVERNIGHT EVENTS: Pt's QTC >500 and zyprexa was held. Pt was hypertensive and received hydralazine IVP x2. Nifedipine?     SUBJECTIVE: Patient seen and examined at bedside. Intubated, sedated, ROS cannot be obtained.       VITAL SIGNS:  ICU Vital Signs Last 24 Hrs  T(C): 36.7 (13 Mar 2023 04:00), Max: 36.7 (13 Mar 2023 04:00)  T(F): 98 (13 Mar 2023 04:00), Max: 98 (13 Mar 2023 04:00)  HR: 67 (13 Mar 2023 06:38) (44 - 67)  BP: 177/52 (12 Mar 2023 18:00) (162/55 - 183/68)  BP(mean): 84 (12 Mar 2023 18:00) (79 - 95)  ABP: 185/55 (13 Mar 2023 06:00) (163/49 - 192/61)  ABP(mean): 100 (13 Mar 2023 06:00) (80 - 111)  RR: 18 (13 Mar 2023 06:00) (18 - 19)  SpO2: 99% (13 Mar 2023 06:38) (94% - 100%)    O2 Parameters below as of 13 Mar 2023 05:00  Patient On (Oxygen Delivery Method): ventilator    O2 Concentration (%): 30      Mode: AC/ CMV (Assist Control/ Continuous Mandatory Ventilation), RR (machine): 18, TV (machine): 400, FiO2: 30, PEEP: 5, ITime: 0.64, MAP: 8, PIP: 20  Plateau pressure:   P/F ratio:     03-11 @ 06:01  -  03-12 @ 07:00  --------------------------------------------------------  IN: 1579.8 mL / OUT: 1800 mL / NET: -220.2 mL    03-12 @ 07:01  -  03-13 @ 06:59  --------------------------------------------------------  IN: 272.3 mL / OUT: 0 mL / NET: 272.3 mL      CAPILLARY BLOOD GLUCOSE      POCT Blood Glucose.: 180 mg/dL (12 Mar 2023 19:22)    ECG:    PHYSICAL EXAM:     General: NAD, doing well.  HEENT: Intubated. OG in place. Pupils reactive b/l.  Lymph Nodes: No ELMIRA palpated  Neck: trachea midline. no trach.   Respiratory: decreased breath sounds on the R   Cardiovascular: RRR. +s1/s2, -s3/s4. No murmur, rubs, gallops. No edema  Abdomen: NT/ND. +BS, No HSM.   Extremities: 2+ pulses  Skin: edematous in UEs. No rashes, ecchymoses, or petechiae noted  Neurological: AAOx0    MEDICATIONS:  MEDICATIONS  (STANDING):  chlorhexidine 0.12% Liquid 15 milliLiter(s) Oral Mucosa every 12 hours  cloNIDine 0.1 milliGRAM(s) Oral two times a day  heparin  Infusion. 1500 Unit(s)/Hr (15 mL/Hr) IV Continuous <Continuous>  hydrALAZINE 100 milliGRAM(s) Oral every 8 hours  isosorbide   dinitrate Tablet (ISORDIL) 10 milliGRAM(s) Oral three times a day  pantoprazole  Injectable 40 milliGRAM(s) IV Push daily  petrolatum Ophthalmic Ointment 1 Application(s) Both EYES every 12 hours  piperacillin/tazobactam IVPB.. 3.375 Gram(s) IV Intermittent every 12 hours    MEDICATIONS  (PRN):  sodium chloride 0.9% Bolus. 100 milliLiter(s) IV Bolus every 5 minutes PRN SBP LESS THAN or EQUAL to 100 mmHg      ALLERGIES:  Allergies    No Known Allergies    Intolerances        LABS:                        8.2    9.39  )-----------( 205      ( 13 Mar 2023 00:20 )             26.6     03-13    132<L>  |  93<L>  |  31<H>  ----------------------------<  67<L>  3.5   |  24  |  3.04<H>    Ca    8.3<L>      13 Mar 2023 00:20  Phos  4.2     03-13  Mg     2.00     03-13    TPro  5.6<L>  /  Alb  2.6<L>  /  TBili  1.5<H>  /  DBili  x   /  AST  18  /  ALT  11  /  AlkPhos  670<H>  03-13    PT/INR - ( 13 Mar 2023 00:20 )   PT: 12.8 sec;   INR: 1.10 ratio         PTT - ( 13 Mar 2023 00:20 )  PTT:54.4 sec      RADIOLOGY & ADDITIONAL TESTS: Reviewed.   INTERVAL HPI/OVERNIGHT EVENTS: Pt's QTC >500 and zyprexa was held. Pt was hypertensive and received hydralazine IVP x2. Nifedipine?     SUBJECTIVE: Patient seen and examined at bedside. Intubated, off sedation. Pt able to follow commands. Pt denies any discomfort at this time.        VITAL SIGNS:  ICU Vital Signs Last 24 Hrs  T(C): 36.7 (13 Mar 2023 04:00), Max: 36.7 (13 Mar 2023 04:00)  T(F): 98 (13 Mar 2023 04:00), Max: 98 (13 Mar 2023 04:00)  HR: 67 (13 Mar 2023 06:38) (44 - 67)  BP: 177/52 (12 Mar 2023 18:00) (162/55 - 183/68)  BP(mean): 84 (12 Mar 2023 18:00) (79 - 95)  ABP: 185/55 (13 Mar 2023 06:00) (163/49 - 192/61)  ABP(mean): 100 (13 Mar 2023 06:00) (80 - 111)  RR: 18 (13 Mar 2023 06:00) (18 - 19)  SpO2: 99% (13 Mar 2023 06:38) (94% - 100%)    O2 Parameters below as of 13 Mar 2023 05:00  Patient On (Oxygen Delivery Method): ventilator    O2 Concentration (%): 30      Mode: AC/ CMV (Assist Control/ Continuous Mandatory Ventilation), RR (machine): 18, TV (machine): 400, FiO2: 30, PEEP: 5, ITime: 0.64, MAP: 8, PIP: 20  Plateau pressure:   P/F ratio:     03-11 @ 06:01  -  03-12 @ 07:00  --------------------------------------------------------  IN: 1579.8 mL / OUT: 1800 mL / NET: -220.2 mL    03-12 @ 07:01  -  03-13 @ 06:59  --------------------------------------------------------  IN: 272.3 mL / OUT: 0 mL / NET: 272.3 mL      CAPILLARY BLOOD GLUCOSE      POCT Blood Glucose.: 180 mg/dL (12 Mar 2023 19:22)    ECG:    PHYSICAL EXAM:     General: NAD, doing well.  HEENT: Intubated. OG in place. Pupils reactive b/l.  Lymph Nodes: No ELMIRA palpated  Neck: trachea midline. no trach.   Respiratory: decreased breath sounds on the R   Cardiovascular: RRR. +s1/s2, -s3/s4. No murmur, rubs, gallops. No edema  Abdomen: NT/ND. +BS, No HSM.   Extremities: 2+ pulses  Skin: edematous in UEs. No rashes, ecchymoses, or petechiae noted  Neurological: AAOx0    MEDICATIONS:  MEDICATIONS  (STANDING):  chlorhexidine 0.12% Liquid 15 milliLiter(s) Oral Mucosa every 12 hours  cloNIDine 0.1 milliGRAM(s) Oral two times a day  heparin  Infusion. 1500 Unit(s)/Hr (15 mL/Hr) IV Continuous <Continuous>  hydrALAZINE 100 milliGRAM(s) Oral every 8 hours  isosorbide   dinitrate Tablet (ISORDIL) 10 milliGRAM(s) Oral three times a day  pantoprazole  Injectable 40 milliGRAM(s) IV Push daily  petrolatum Ophthalmic Ointment 1 Application(s) Both EYES every 12 hours  piperacillin/tazobactam IVPB.. 3.375 Gram(s) IV Intermittent every 12 hours    MEDICATIONS  (PRN):  sodium chloride 0.9% Bolus. 100 milliLiter(s) IV Bolus every 5 minutes PRN SBP LESS THAN or EQUAL to 100 mmHg      ALLERGIES:  Allergies    No Known Allergies    Intolerances        LABS:                        8.2    9.39  )-----------( 205      ( 13 Mar 2023 00:20 )             26.6     03-13    132<L>  |  93<L>  |  31<H>  ----------------------------<  67<L>  3.5   |  24  |  3.04<H>    Ca    8.3<L>      13 Mar 2023 00:20  Phos  4.2     03-13  Mg     2.00     03-13    TPro  5.6<L>  /  Alb  2.6<L>  /  TBili  1.5<H>  /  DBili  x   /  AST  18  /  ALT  11  /  AlkPhos  670<H>  03-13    PT/INR - ( 13 Mar 2023 00:20 )   PT: 12.8 sec;   INR: 1.10 ratio         PTT - ( 13 Mar 2023 00:20 )  PTT:54.4 sec      RADIOLOGY & ADDITIONAL TESTS: Reviewed.   INTERVAL HPI/OVERNIGHT EVENTS: Pt's QTC >500 and zyprexa was held. Pt was hypertensive and received hydralazine IVP x2. Nifedipine?     SUBJECTIVE: Patient seen and examined at bedside. Intubated, off sedation. Pt able to follow commands. Pt denies any discomfort at this time.        VITAL SIGNS:  ICU Vital Signs Last 24 Hrs  T(C): 36.7 (13 Mar 2023 04:00), Max: 36.7 (13 Mar 2023 04:00)  T(F): 98 (13 Mar 2023 04:00), Max: 98 (13 Mar 2023 04:00)  HR: 67 (13 Mar 2023 06:38) (44 - 67)  BP: 177/52 (12 Mar 2023 18:00) (162/55 - 183/68)  BP(mean): 84 (12 Mar 2023 18:00) (79 - 95)  ABP: 185/55 (13 Mar 2023 06:00) (163/49 - 192/61)  ABP(mean): 100 (13 Mar 2023 06:00) (80 - 111)  RR: 18 (13 Mar 2023 06:00) (18 - 19)  SpO2: 99% (13 Mar 2023 06:38) (94% - 100%)    O2 Parameters below as of 13 Mar 2023 05:00  Patient On (Oxygen Delivery Method): ventilator    O2 Concentration (%): 30      Mode: AC/ CMV (Assist Control/ Continuous Mandatory Ventilation), RR (machine): 18, TV (machine): 400, FiO2: 30, PEEP: 5, ITime: 0.64, MAP: 8, PIP: 20  Plateau pressure:   P/F ratio:     03-11 @ 06:01  -  03-12 @ 07:00  --------------------------------------------------------  IN: 1579.8 mL / OUT: 1800 mL / NET: -220.2 mL    03-12 @ 07:01  -  03-13 @ 06:59  --------------------------------------------------------  IN: 272.3 mL / OUT: 0 mL / NET: 272.3 mL      CAPILLARY BLOOD GLUCOSE      POCT Blood Glucose.: 180 mg/dL (12 Mar 2023 19:22)    ECG:    PHYSICAL EXAM:     General: NAD, doing well.  HEENT: Intubated. OG in place. Pupils reactive b/l.  Lymph Nodes: No ELMIRA palpated  Neck: trachea midline. no trach.   Respiratory: CTABL  Cardiovascular: RRR. +s1/s2, -s3/s4. No murmur, rubs, gallops. No edema  Abdomen: NT/ND. +BS, No HSM.   Extremities: 2+ pulses  Skin: edematous in UEs. No rashes, ecchymoses, or petechiae noted  Neurological: AAOx0    MEDICATIONS:  MEDICATIONS  (STANDING):  chlorhexidine 0.12% Liquid 15 milliLiter(s) Oral Mucosa every 12 hours  cloNIDine 0.1 milliGRAM(s) Oral two times a day  heparin  Infusion. 1500 Unit(s)/Hr (15 mL/Hr) IV Continuous <Continuous>  hydrALAZINE 100 milliGRAM(s) Oral every 8 hours  isosorbide   dinitrate Tablet (ISORDIL) 10 milliGRAM(s) Oral three times a day  pantoprazole  Injectable 40 milliGRAM(s) IV Push daily  petrolatum Ophthalmic Ointment 1 Application(s) Both EYES every 12 hours  piperacillin/tazobactam IVPB.. 3.375 Gram(s) IV Intermittent every 12 hours    MEDICATIONS  (PRN):  sodium chloride 0.9% Bolus. 100 milliLiter(s) IV Bolus every 5 minutes PRN SBP LESS THAN or EQUAL to 100 mmHg      ALLERGIES:  Allergies    No Known Allergies    Intolerances        LABS:                        8.2    9.39  )-----------( 205      ( 13 Mar 2023 00:20 )             26.6     03-13    132<L>  |  93<L>  |  31<H>  ----------------------------<  67<L>  3.5   |  24  |  3.04<H>    Ca    8.3<L>      13 Mar 2023 00:20  Phos  4.2     03-13  Mg     2.00     03-13    TPro  5.6<L>  /  Alb  2.6<L>  /  TBili  1.5<H>  /  DBili  x   /  AST  18  /  ALT  11  /  AlkPhos  670<H>  03-13    PT/INR - ( 13 Mar 2023 00:20 )   PT: 12.8 sec;   INR: 1.10 ratio         PTT - ( 13 Mar 2023 00:20 )  PTT:54.4 sec      RADIOLOGY & ADDITIONAL TESTS: Reviewed.   INTERVAL HPI/OVERNIGHT EVENTS: Pt's QTC >500 and zyprexa was held. Pt was hypertensive and received hydralazine IVP x2.     SUBJECTIVE: Patient seen and examined at bedside. Intubated, off sedation. Pt able to follow commands. Pt denies any discomfort at this time.        VITAL SIGNS:  ICU Vital Signs Last 24 Hrs  T(C): 36.7 (13 Mar 2023 04:00), Max: 36.7 (13 Mar 2023 04:00)  T(F): 98 (13 Mar 2023 04:00), Max: 98 (13 Mar 2023 04:00)  HR: 67 (13 Mar 2023 06:38) (44 - 67)  BP: 177/52 (12 Mar 2023 18:00) (162/55 - 183/68)  BP(mean): 84 (12 Mar 2023 18:00) (79 - 95)  ABP: 185/55 (13 Mar 2023 06:00) (163/49 - 192/61)  ABP(mean): 100 (13 Mar 2023 06:00) (80 - 111)  RR: 18 (13 Mar 2023 06:00) (18 - 19)  SpO2: 99% (13 Mar 2023 06:38) (94% - 100%)    O2 Parameters below as of 13 Mar 2023 05:00  Patient On (Oxygen Delivery Method): ventilator    O2 Concentration (%): 30      Mode: AC/ CMV (Assist Control/ Continuous Mandatory Ventilation), RR (machine): 18, TV (machine): 400, FiO2: 30, PEEP: 5, ITime: 0.64, MAP: 8, PIP: 20  Plateau pressure:   P/F ratio:     03-11 @ 06:01  -  03-12 @ 07:00  --------------------------------------------------------  IN: 1579.8 mL / OUT: 1800 mL / NET: -220.2 mL    03-12 @ 07:01  -  03-13 @ 06:59  --------------------------------------------------------  IN: 272.3 mL / OUT: 0 mL / NET: 272.3 mL      CAPILLARY BLOOD GLUCOSE      POCT Blood Glucose.: 180 mg/dL (12 Mar 2023 19:22)    ECG:    PHYSICAL EXAM:     General: NAD, doing well.  HEENT: Intubated. OG in place. Pupils reactive b/l.  Lymph Nodes: No ELMIRA palpated  Neck: trachea midline. no trach.   Respiratory: CTABL  Cardiovascular: RRR. +s1/s2, -s3/s4. No murmur, rubs, gallops. No edema  Abdomen: NT/ND. +BS, No HSM.   Extremities: 2+ pulses  Skin: edematous in UEs. No rashes, ecchymoses, or petechiae noted  Neurological: AAOx0    MEDICATIONS:  MEDICATIONS  (STANDING):  chlorhexidine 0.12% Liquid 15 milliLiter(s) Oral Mucosa every 12 hours  cloNIDine 0.1 milliGRAM(s) Oral two times a day  heparin  Infusion. 1500 Unit(s)/Hr (15 mL/Hr) IV Continuous <Continuous>  hydrALAZINE 100 milliGRAM(s) Oral every 8 hours  isosorbide   dinitrate Tablet (ISORDIL) 10 milliGRAM(s) Oral three times a day  pantoprazole  Injectable 40 milliGRAM(s) IV Push daily  petrolatum Ophthalmic Ointment 1 Application(s) Both EYES every 12 hours  piperacillin/tazobactam IVPB.. 3.375 Gram(s) IV Intermittent every 12 hours    MEDICATIONS  (PRN):  sodium chloride 0.9% Bolus. 100 milliLiter(s) IV Bolus every 5 minutes PRN SBP LESS THAN or EQUAL to 100 mmHg      ALLERGIES:  Allergies    No Known Allergies    Intolerances        LABS:                        8.2    9.39  )-----------( 205      ( 13 Mar 2023 00:20 )             26.6     03-13    132<L>  |  93<L>  |  31<H>  ----------------------------<  67<L>  3.5   |  24  |  3.04<H>    Ca    8.3<L>      13 Mar 2023 00:20  Phos  4.2     03-13  Mg     2.00     03-13    TPro  5.6<L>  /  Alb  2.6<L>  /  TBili  1.5<H>  /  DBili  x   /  AST  18  /  ALT  11  /  AlkPhos  670<H>  03-13    PT/INR - ( 13 Mar 2023 00:20 )   PT: 12.8 sec;   INR: 1.10 ratio         PTT - ( 13 Mar 2023 00:20 )  PTT:54.4 sec      RADIOLOGY & ADDITIONAL TESTS: Reviewed.   INTERVAL HPI/OVERNIGHT EVENTS: Pt's QTC >500 and zyprexa was held. Pt was hypertensive and received hydralazine IVP x2. 1/2 amp d50 for hypoglycemia.     SUBJECTIVE: Patient seen and examined at bedside. Intubated, off sedation. Pt able to follow commands. Pt denies any discomfort at this time.        VITAL SIGNS:  ICU Vital Signs Last 24 Hrs  T(C): 36.7 (13 Mar 2023 04:00), Max: 36.7 (13 Mar 2023 04:00)  T(F): 98 (13 Mar 2023 04:00), Max: 98 (13 Mar 2023 04:00)  HR: 67 (13 Mar 2023 06:38) (44 - 67)  BP: 177/52 (12 Mar 2023 18:00) (162/55 - 183/68)  BP(mean): 84 (12 Mar 2023 18:00) (79 - 95)  ABP: 185/55 (13 Mar 2023 06:00) (163/49 - 192/61)  ABP(mean): 100 (13 Mar 2023 06:00) (80 - 111)  RR: 18 (13 Mar 2023 06:00) (18 - 19)  SpO2: 99% (13 Mar 2023 06:38) (94% - 100%)    O2 Parameters below as of 13 Mar 2023 05:00  Patient On (Oxygen Delivery Method): ventilator    O2 Concentration (%): 30      Mode: AC/ CMV (Assist Control/ Continuous Mandatory Ventilation), RR (machine): 18, TV (machine): 400, FiO2: 30, PEEP: 5, ITime: 0.64, MAP: 8, PIP: 20  Plateau pressure:   P/F ratio:     03-11 @ 06:01  -  03-12 @ 07:00  --------------------------------------------------------  IN: 1579.8 mL / OUT: 1800 mL / NET: -220.2 mL    03-12 @ 07:01  -  03-13 @ 06:59  --------------------------------------------------------  IN: 272.3 mL / OUT: 0 mL / NET: 272.3 mL      CAPILLARY BLOOD GLUCOSE      POCT Blood Glucose.: 180 mg/dL (12 Mar 2023 19:22)    ECG:    PHYSICAL EXAM:     General: NAD, doing well.  HEENT: Intubated. OG in place. Pupils reactive b/l.  Lymph Nodes: No ELMIRA palpated  Neck: trachea midline. no trach.   Respiratory: CTABL  Cardiovascular: RRR. +s1/s2, -s3/s4. No murmur, rubs, gallops. No edema  Abdomen: NT/ND. +BS, No HSM.   Extremities: 2+ pulses  Skin: edematous in UEs. No rashes, ecchymoses, or petechiae noted  Neurological: AAOx0    MEDICATIONS:  MEDICATIONS  (STANDING):  chlorhexidine 0.12% Liquid 15 milliLiter(s) Oral Mucosa every 12 hours  cloNIDine 0.1 milliGRAM(s) Oral two times a day  heparin  Infusion. 1500 Unit(s)/Hr (15 mL/Hr) IV Continuous <Continuous>  hydrALAZINE 100 milliGRAM(s) Oral every 8 hours  isosorbide   dinitrate Tablet (ISORDIL) 10 milliGRAM(s) Oral three times a day  pantoprazole  Injectable 40 milliGRAM(s) IV Push daily  petrolatum Ophthalmic Ointment 1 Application(s) Both EYES every 12 hours  piperacillin/tazobactam IVPB.. 3.375 Gram(s) IV Intermittent every 12 hours    MEDICATIONS  (PRN):  sodium chloride 0.9% Bolus. 100 milliLiter(s) IV Bolus every 5 minutes PRN SBP LESS THAN or EQUAL to 100 mmHg      ALLERGIES:  Allergies    No Known Allergies    Intolerances        LABS:                        8.2    9.39  )-----------( 205      ( 13 Mar 2023 00:20 )             26.6     03-13    132<L>  |  93<L>  |  31<H>  ----------------------------<  67<L>  3.5   |  24  |  3.04<H>    Ca    8.3<L>      13 Mar 2023 00:20  Phos  4.2     03-13  Mg     2.00     03-13    TPro  5.6<L>  /  Alb  2.6<L>  /  TBili  1.5<H>  /  DBili  x   /  AST  18  /  ALT  11  /  AlkPhos  670<H>  03-13    PT/INR - ( 13 Mar 2023 00:20 )   PT: 12.8 sec;   INR: 1.10 ratio         PTT - ( 13 Mar 2023 00:20 )  PTT:54.4 sec      RADIOLOGY & ADDITIONAL TESTS: Reviewed.

## 2023-03-13 NOTE — PROGRESS NOTE ADULT - PROBLEM SELECTOR PLAN 2
Pt. with anemia in the setting of ESRD. Hgb below target range at 8.6 today. Discussed with [facility], he is on Aranesp 80mcg weekly (last dose on 3/7/23). Will continue weekly Aranesp. Target range of 10-11. Monitor Hgb.

## 2023-03-13 NOTE — PROGRESS NOTE ADULT - SUBJECTIVE AND OBJECTIVE BOX
[facility] DIVISION OF KIDNEY DISEASES AND HYPERTENSION   FOLLOW UP NOTE  --------------------------------------------------------------------------------  HPI: 58 yo M with a PMH of ESRD on HD TIW (TTS), HTN, DM, Anemia, Afib, Bipolar who presented to [facility] from [facility] for worsening shortness of breath. In the ED, transitioned to Bipap but remained hypoxic, requiring intubation and mechanical ventilation. Admitted to the MICU. Last HD on 3/11/23 via LUE AVF. Pt being seen for ESRD/HD management.     24 hour events/subjective:  Pt seen and evaluated in MICU. Pt remains intubated but no longer requiring vasopressors support. BP elevated this am but improved to 120s systolic. He is awake and following commands. Unable to obtain further ROS as he is intubated.     PAST HISTORY  --------------------------------------------------------------------------------  No significant changes to PMH, PSH, FHx, SHx, unless otherwise noted    ALLERGIES & MEDICATIONS  --------------------------------------------------------------------------------  Allergies  No Known Allergies    Standing Inpatient Medications  chlorhexidine 0.12% Liquid 15 milliLiter(s) Oral Mucosa every 12 hours  cloNIDine 0.1 milliGRAM(s) Oral two times a day  dextrose 50% Injectable 25 milliLiter(s) IV Push once  heparin  Infusion. 1500 Unit(s)/Hr IV Continuous <Continuous>  hydrALAZINE 100 milliGRAM(s) Oral every 8 hours  isosorbide   dinitrate Tablet (ISORDIL) 10 milliGRAM(s) Oral three times a day  pantoprazole  Injectable 40 milliGRAM(s) IV Push daily  petrolatum Ophthalmic Ointment 1 Application(s) Both EYES every 12 hours  piperacillin/tazobactam IVPB.. 3.375 Gram(s) IV Intermittent every 12 hours    PRN Inpatient Medications  sodium chloride 0.9% Bolus. 100 milliLiter(s) IV Bolus every 5 minutes PRN    REVIEW OF SYSTEMS  --------------------------------------------------------------------------------  Unable to obtain ROS    VITALS/PHYSICAL EXAM  --------------------------------------------------------------------------------  T(C): 36.2 (03-13-23 @ 08:00), Max: 36.7 (03-13-23 @ 04:00)  HR: 52 (03-13-23 @ 08:35) (44 - 70)  BP: 177/52 (03-12-23 @ 18:00) (162/55 - 183/68)  RR: 15 (03-13-23 @ 08:35) (12 - 19)  SpO2: 94% (03-13-23 @ 08:35) (94% - 100%)  Wt(kg): --    03-12-23 @ 07:01  -  03-13-23 @ 07:00  --------------------------------------------------------  IN: 272.3 mL / OUT: 0 mL / NET: 272.3 mL    Physical Exam:  	Gen: Ill appearing but more awake, following commands  	HEENT: ETT+  	Pulm: CTA B/L  	CV: S1S2  	Abd: Soft, +BS   	Ext: No LE edema B/L, R toe amputation seen  	Neuro: Limited as pt is intubated but appears more awake/alert  	Skin: Warm and dry  	Vascular access: LUE AVF with palpable pulse and bruit heard    LABS/STUDIES  --------------------------------------------------------------------------------              8.6    10.69 >-----------<  203      [03-13-23 @ 06:45]              28.2     132  |  93  |  31  ----------------------------<  67      [03-13-23 @ 00:20]  3.5   |  24  |  3.04        Ca     8.3     [03-13-23 @ 00:20]      Mg     2.00     [03-13-23 @ 00:20]      Phos  4.2     [03-13-23 @ 00:20]    TPro  5.6  /  Alb  2.6  /  TBili  1.5  /  DBili  x   /  AST  18  /  ALT  11  /  AlkPhos  670  [03-13-23 @ 00:20]    PT/INR: PT 12.8 , INR 1.10       [03-13-23 @ 00:20]  PTT: 59.3       [03-13-23 @ 06:45]    Creatinine Trend:  SCr 3.04 [03-13 @ 00:20]  SCr 2.59 [03-12 @ 00:05]  SCr 2.31 [03-11 @ 18:42]  SCr 3.77 [03-11 @ 00:30]  SCr 3.54 [03-10 @ 20:00]

## 2023-03-13 NOTE — PROGRESS NOTE ADULT - PROBLEM SELECTOR PLAN 1
Pt. with ESRD on HD TIW (TTS, Dr. Diaz) who presented for shortness of breath, found to be in acute hypoxic respiratory failure requiring intubation and mechanical ventilation, loculated R pleural effusion on imaging. Last HD on 3/11/23 via LUE AVF. Labs reviewed. Pt remains intubated but awake/alert. BP elevated in the 190s but improved to 120s this am. FiO2 of 30%. Will plan for HD on 3/14/23. Monitor labs. Dose meds as per HD. Pt. with ESRD on HD TIW (TTS, Dr. Diaz) who presented for shortness of breath, found to be in acute hypoxic respiratory failure requiring intubation and mechanical ventilation, loculated R pleural effusion on imaging. Last HD on 3/11/23 via LUE AVF. Labs reviewed. Pt remains intubated but awake/alert. BP elevated in the 190s but improved to 120s this am. FiO2 of 30%. Will plan for HD today on 3/13/23. Monitor labs. Dose meds as per HD.

## 2023-03-13 NOTE — PROGRESS NOTE ADULT - PROBLEM SELECTOR PLAN 3
Pt. with hyperphosphatemia in the setting of ESRD. Serum phosphorus within target range of 4.2 today. Low phos diet. Monitor serum phosphorus level.

## 2023-03-14 NOTE — CONSULT NOTE ADULT - SUBJECTIVE AND OBJECTIVE BOX
**Full note to follow- recommendations at bottom of note** Wounds without s/s of cellulitis/acute infection  Wound SURGERY CONSULT NOTE    HPI:  57M history of HTN, HLD, DM2, Bipolar disorder, ESRD on HD (TTS), afib on eliquis, recent admission on 1/14 for AHRF and septic shock at [facility], and recent admission (2-7 - 2/14) at [facility] for septic shock and AHRF likely 2/2 aspiration pneumonia and Covid, hospital course c/b cardiac arrest iso compete lung atelectasis and mucus plugging, MICU course uncomplicated transferred to floors on 2L NC. Hospital course with transient LFTs without etiology and hypertensive urgency . Discharged to [facility] on room air. Patient is now presenting from [facility] with worsening respiratory distress, reportedly with hypoxia to 70s on room air. Last dialysis 2 days prior to admission.     In ED patient reportedly A&O3 but poor historian, denied dyspnea but clinically dyspneic per ED physician. Initial vitals Afebrile, HR 70s,  - 130/40 - 80, 97% on NRB. NRB escalated to BIPAP after initial VBG with pH 7.18, CO2 76, HCO3 28, O2 93. pH and CO2 improved on BIPAP to 7.22 and pCO2 68. However, patient accidentally disconnected self from BIPAP. BIPAP resumed after ~ 1 minute, however AHRF with hypoxia in 60s without resolution. Intubated in ED, admitted to MICU started on vanc/zosyn. CXR with large loculated R PLEF.  (09 Mar 2023 23:48)    Wound consult requested to assist w/ management of      wound/ pressure injury.    c/o pain, drainage, odor, color change,  worsening swelling . Increasingly sedentary.  Incontinent of urine & stool.  H/o falls, trauma. Aquacell/ Allevyn/ medihoney/ dakins/ Adaptic/ DSD used at home/ while awaiting consult.  Appetite good/ decreased.  weight loss.   All questions asked and answered to pt's and family's satisfaction.    Current Diet: Diet, Pureed:   DASH/TLC Sodium & Cholesterol Restricted (DASH)  Moderately Thick Liquids (MODTHICKLIQS) (03-14-23 @ 14:40)      PAST MEDICAL & SURGICAL HISTORY:  Diabetes mellitus  Patient does not routinely check FS--diet controlled      Depression      High cholesterol      Bipolar affective disorder in remission      Hypertension      Arterial insufficiency      Anemia      Hemodialysis patient      ESRD on dialysis      ORIF right lower leg- 1995      Amputated great toe of right foot      S/P arteriovenous (AV) fistula creation  7/2019          REVIEW OF SYSTEMS: Pt unable to offer  General/ Breast/ Skin/ Neuro/ MSK: see HPI  All other systems negative    MEDICATIONS  (STANDING):  chlorhexidine 2% Cloths 1 Application(s) Topical daily  cloNIDine 0.2 milliGRAM(s) Oral three times a day  heparin  Infusion. 1500 Unit(s)/Hr (15 mL/Hr) IV Continuous <Continuous>  hydrALAZINE 100 milliGRAM(s) Oral every 8 hours  isosorbide   dinitrate Tablet (ISORDIL) 10 milliGRAM(s) Oral three times a day  pantoprazole  Injectable 40 milliGRAM(s) IV Push daily  piperacillin/tazobactam IVPB.. 3.375 Gram(s) IV Intermittent every 12 hours    MEDICATIONS  (PRN):  sodium chloride 0.9% Bolus. 100 milliLiter(s) IV Bolus every 5 minutes PRN SBP LESS THAN or EQUAL to 100 mmHg      Allergies    No Known Allergies    Intolerances        SOCIAL HISTORY:  / /single/ ; (+)HHA/ lives in SNF; Former smoker, Denies smoking, ETOH, drugs    FAMILY HISTORY:  FH: type 2 diabetes mellitus (Mother)    FHx: heart disease (Father)        PHYSICAL EXAM:  Vital Signs Last 24 Hrs  T(C): 36.8 (14 Mar 2023 15:00), Max: 37.2 (14 Mar 2023 12:12)  T(F): 98.2 (14 Mar 2023 15:00), Max: 98.9 (14 Mar 2023 12:12)  HR: 73 (14 Mar 2023 15:00) (54 - 85)  BP: 151/66 (14 Mar 2023 15:00) (134/58 - 164/69)  BP(mean): 77 (14 Mar 2023 07:00) (70 - 137)  RR: 18 (14 Mar 2023 15:00) (12 - 18)  SpO2: 95% (14 Mar 2023 14:20) (92% - 100%)    Parameters below as of 14 Mar 2023 15:00  Patient On (Oxygen Delivery Method): nasal cannula  O2 Flow (L/min): 95      Constitutional: NAD / gaurded but stable,  A&Ox3/ Alert/ Confused  cachectic/ MO/ Obese/ frail  WD/ WN/ WG/ Disheveled  (+) low airloss support surface, (+) fluidized postioining devices, (+) complete cair boots     HEENT:  NC/AT, PERRL, EOMI, mucosa moist, throat clear, trachea midline, neck supple    Cardiovascular: RRR   Respiratory: CTA    Gastrointestinal soft NT/ND (+)BS  (+)PEG (+)ostomy    Neurology  weakened strength & sensation grossly intact/ paraesthesia  nonverbal, no follow commands/ paraplegic    Musculoskeletal:  limited/ FROM, no deformities/ contractures    Vascular: BLE equally warm/ cool,  no cyanosis, clubbing, edema  >LE //BLE edema equal  DP/PT pulses palpable  no acute ischemia noted  hemosiderin staining    Skin:  moist w/ good turgor  frail,  ecchymosis w/o hematoma  serosanguinous drainage  No odor, erythema, increased warmth, tenderness, induration, fluctuance    LABS/ CULTURES/ RADIOLOGY:                        8.7    9.52  )-----------( 183      ( 14 Mar 2023 00:55 )             29.0       134  |  95  |  20  ----------------------------<  120      [03-14-23 @ 00:55]  3.7   |  25  |  2.37        Ca     8.2     [03-14-23 @ 00:55]      Mg     1.80     [03-14-23 @ 00:55]      Phos  4.0     [03-14-23 @ 00:55]    TPro  5.7  /  Alb  2.6  /  TBili  1.4  /  DBili  x   /  AST  17  /  ALT  9   /  AlkPhos  663  [03-14-23 @ 00:55]    PT/INR: PT 12.3 , INR 1.06       [03-14-23 @ 00:55]  PTT: 55.7       [03-14-23 @ 14:22]          Culture - Blood (collected 03-09-23 @ 20:49)  Source: .Blood Blood-Peripheral  Preliminary Report (03-11-23 @ 04:02):    No growth to date.    Culture - Blood (collected 03-09-23 @ 20:39)  Source: .Blood Blood-Peripheral  Preliminary Report (03-11-23 @ 04:02):    No growth to date.           Wound SURGERY CONSULT NOTE    HPI:  57M history of HTN, HLD, DM2, Bipolar disorder, ESRD on HD (TTS), afib on eliquis, recent admission on 1/14 for AHRF and septic shock at [place], and recent admission (2-7 - 2/14) at [facility] for septic shock and AHRF likely 2/2 aspiration pneumonia and Covid, hospital course c/b cardiac arrest iso compete lung atelectasis and mucus plugging, MICU course uncomplicated transferred to floors on 2L NC. Hospital course with transient LFTs without etiology and hypertensive urgency . Discharged to [facility] on room air. Patient is now presenting from [facility] with worsening respiratory distress, reportedly with hypoxia to 70s on room air. Last dialysis 2 days prior to admission.     In ED patient reportedly A&O3 but poor historian, denied dyspnea but clinically dyspneic per ED physician. Initial vitals Afebrile, HR 70s,  - 130/40 - 80, 97% on NRB. NRB escalated to BIPAP after initial VBG with pH 7.18, CO2 76, HCO3 28, O2 93. pH and CO2 improved on BIPAP to 7.22 and pCO2 68. However, patient accidentally disconnected self from BIPAP. BIPAP resumed after ~ 1 minute, however AHRF with hypoxia in 60s without resolution. Intubated in ED, admitted to MICU started on vanc/zosyn. CXR with large loculated R PLEF.  (09 Mar 2023 23:48)    Wound consult requested to assist w/ management of sacral deep tissue pressure injury and right lateral 5th metatarsal head purple-maroon discoloration. Patient minimally verbal during exam, lethargic. Denies pain. Denies SOB, CP, fever, chills, n/v, diarrhea.    Current Diet: Diet, Pureed:   DASH/TLC Sodium & Cholesterol Restricted (DASH)  Moderately Thick Liquids (MODTHICKLIQS) (03-14-23 @ 14:40)      PAST MEDICAL & SURGICAL HISTORY:  Diabetes mellitus  Patient does not routinely check FS--diet controlled    Depression    High cholesterol    Bipolar affective disorder in remission    Hypertension    Arterial insufficiency    Anemia    Hemodialysis patient    ESRD on dialysis    ORIF right lower leg- 1995    Amputated great toe of right foot    S/P arteriovenous (AV) fistula creation  7/2019      REVIEW OF SYSTEMS: General, skin see above.  All other systems negative.      MEDICATIONS  (STANDING):  chlorhexidine 2% Cloths 1 Application(s) Topical daily  cloNIDine 0.2 milliGRAM(s) Oral three times a day  heparin  Infusion. 1500 Unit(s)/Hr (15 mL/Hr) IV Continuous <Continuous>  hydrALAZINE 100 milliGRAM(s) Oral every 8 hours  isosorbide   dinitrate Tablet (ISORDIL) 10 milliGRAM(s) Oral three times a day  pantoprazole  Injectable 40 milliGRAM(s) IV Push daily  piperacillin/tazobactam IVPB.. 3.375 Gram(s) IV Intermittent every 12 hours    MEDICATIONS  (PRN):  sodium chloride 0.9% Bolus. 100 milliLiter(s) IV Bolus every 5 minutes PRN SBP LESS THAN or EQUAL to 100 mmHg      Allergies    No Known Allergies    Intolerances        SOCIAL HISTORY: Per records former smoker and used cannabis. Unable to obtain social history, limited data obtained.     FAMILY HISTORY:  FH: type 2 diabetes mellitus (Mother)    FHx: heart disease (Father)        PHYSICAL EXAM:  Vital Signs Last 24 Hrs  T(C): 36.8 (14 Mar 2023 15:00), Max: 37.2 (14 Mar 2023 12:12)  T(F): 98.2 (14 Mar 2023 15:00), Max: 98.9 (14 Mar 2023 12:12)  HR: 73 (14 Mar 2023 15:00) (54 - 85)  BP: 151/66 (14 Mar 2023 15:00) (134/58 - 164/69)  BP(mean): 77 (14 Mar 2023 07:00) (70 - 137)  RR: 18 (14 Mar 2023 15:00) (12 - 18)  SpO2: 95% (14 Mar 2023 14:20) (92% - 100%)    Parameters below as of 14 Mar 2023 15:00  Patient On (Oxygen Delivery Method): nasal cannula  O2 Flow (L/min): 95    Wt: 65.5 kg (14-Mar-2023)  BMI: 21.3 kg/m2    Constitutional: NAD, A&Ox3, lethargic, frail.   (+) low airloss support surface, (+) fluidized positioning devices, (+) complete cair boots  HEENT:  NC/AT, Left nare NGT, mucosa dry  Cardiovascular: rate regular   Respiratory: supplemental oxygen via nasal cannula, nonlabored  Gastrointestinal: soft NT/ND, incontinent formed stool, perineal care provided  : incontinent urine   Neurology:  weakened strength. Sensation grossly intact, able to follow simple commands  Vascular: BLE equally warm, +1 dp pulses, right toe first ray amputation. Capillary < 3 seconds. Trace edema bilaterally.  Right 5th metatarsal head- 1cmx0.5cmx0, no palpable skin changes, no drainage. No associated cellulitis,  Skin: moist w/ good turgor  Bilateral upper extremities ecchymosis without hematoma.  Sacrum- deep tissue pressure injury- [id number], 100% purple-maroon discoloration, no palpable skin changes. Periwound skin intact. No induration, no fluctuance, no crepitus.  Psych: calm, cooperative.        LABS/ CULTURES/ RADIOLOGY:                        8.7    9.52  )-----------( 183      ( 14 Mar 2023 00:55 )             29.0       134  |  95  |  20  ----------------------------<  120      [03-14-23 @ 00:55]  3.7   |  25  |  2.37        Ca     8.2     [03-14-23 @ 00:55]      Mg     1.80     [03-14-23 @ 00:55]      Phos  4.0     [03-14-23 @ 00:55]    TPro  5.7  /  Alb  2.6  /  TBili  1.4  /  DBili  x   /  AST  17  /  ALT  9   /  AlkPhos  663  [03-14-23 @ 00:55]    PT/INR: PT 12.3 , INR 1.06       [03-14-23 @ 00:55]  PTT: 55.7       [03-14-23 @ 14:22]      A1C with Estimated Average Glucose 4.8 (02.10.23 @ 00:05)       Culture - Blood (collected 03-09-23 @ 20:49)  Source: .Blood Blood-Peripheral  Preliminary Report (03-11-23 @ 04:02):    No growth to date.    Culture - Blood (collected 03-09-23 @ 20:39)  Source: .Blood Blood-Peripheral  Preliminary Report (03-11-23 @ 04:02):    No growth to date.

## 2023-03-14 NOTE — SWALLOW BEDSIDE ASSESSMENT ADULT - COMMENTS
Per MICU Note 3/14/23: "57 history of HTN, HLD, DM2, Bipolar disorder, ESRD on HD (TTS), recent admission on 1/14 for AHRF and septic shock at [place], and recent admission (2-7 - 2/14) at [facility] for septic shock and AHRF likely 2/2 aspiration pneumonia and Covid, hospital course c/b cardiac arrest. Patient presenting from [facility] with acute hypoxic hypercapnic respiratory failure, continued hypoxia on BIPAP requiring intubation likely iso mucus plugging"     Patient received upright in bed, awake and alert with NGT in place. Patient able to follow commands and answer yes/no questions.

## 2023-03-14 NOTE — PROGRESS NOTE ADULT - ASSESSMENT
57 history of HTN, HLD, DM2, Bipolar disorder, ESRD on HD (TTS), recent admission on 1/14 for AHRF and septic shock at [place], and recent admission (2-7 - 2/14) at [facility] for septic shock and AHRF likely 2/2 aspiration pneumonia and Covid, hospital course c/b cardiac arrest. Patient presenting from [facility] with acute hypoxic hypercapnic respiratory failure, continued hypoxia on BIPAP requiring intubation likely iso mucus plugging    #Neuro  -CTH wnl  - initially A&O3 on admission  - now off sedation but remains intubated and is able to follow commands  - Olanzapine held iso prolonged QTc  - f/u repeat EKG and restart if QTc < 500    #Respiratory  #AHRF  - recent admission for AHRF 2/2 aspiration pneumonia +/- covid on Feb 2023 discharged on room air  - presenting with worsening hypoxia and hypercapnia not improved with BIPAP  - suspect i/s/o large R loculated PLEF and possible aspiration pneumonia  - Intubated (3/10 - 3/13)  - antibiotics as below  - monitor ABGs, wean vent as tolerated  - S/p bronchoscopy 3/11 and extubation on 3/13       #Pleural effusion  - large loculated R PLEF, intervally increased from small in Feb 2023  - suspect i/s/o prior aspiration pneumonia +/- covid  - now resolved on most recent CXR  - 3/12: S/p thoracentesis, fluid studies c/w exudative, f/u cultures     #Cardiovascular  #Vasoplegic shock  - Normotensive prior to intubation  - required levo and elenita pushes demetri-intubation  - off pressors, maintain MAP > 65  - currently resolved and pt is hypertensive    #HTN:  - on clonidine, hydralazine, and isordil currently  - hydralazine IVP 5mg PRN    #Afib  - reported history of Afib on eliquis 2.5 BID  - start heparin gtt while npo    #Troponinemia  - likely 2/2 demand ischemia +/- ESRD  - trops downtrending, 330-->224, baseline 100 on prior admission  - no evidence of ischemic change on EKG      #Renal  #ESRD  - TTS, last dialysis on 3/7/23 prior to admission  - Nephrology consult for dialysis, follows with dr castellanos ([place] [place] [facility])  - plan for HD today per nephro    #GI  #Elevated ALP  - persistently elevated  - 900s and GGT since Feb 2023  - patient asymptomatic at time, RUQ negative in Feb 2023  - monitor LFTs    #ID  #Aspiration pneumonia  - previous admission for aspiration pneumonia, negative BCx  - suspect contributory to current presentation  - c/w zosyn  - neg MRSA swab --> d/c vancomycin  - f/u BCx    #Endo  #DM2   - history of diabetic foot ulcers in the past  - recent A1C 4.8 and episodes of hypoglycemia last admission  - monitor off insulin    #Heme  #anemia  - no evidence of active bleed  - EPO weekly  - stable from Feb 2023    #Prophylaxis/GOC  DVT: anticoagulation as above  Diet: NPO, OGT placed  GI ppx: none  GOC: per pt's mother pt is full code  Dispo: presenting from [facility] will likely return pending medical stabilization

## 2023-03-14 NOTE — PROGRESS NOTE ADULT - SUBJECTIVE AND OBJECTIVE BOX
[facility] DIVISION OF KIDNEY DISEASES AND HYPERTENSION   FOLLOW UP NOTE  --------------------------------------------------------------------------------  HPI: 56 yo M with a PMH of ESRD on HD TIW (TTS), HTN, DM, Anemia, Afib, Bipolar who presented to [facility] from [facility] for worsening shortness of breath. In the ED, transitioned to Bipap but remained hypoxic, requiring intubation and mechanical ventilation. Admitted to the MICU. Last HD on 3/13/23 via LUE AVF. Pt being seen for ESRD/HD management.     24 hour events/subjective: Pt seen and evaluated in MICU. He was extubated on 3/13/23. Underwent HD on 3/13/23 with 1L UF. He is somnolent this am but following simple commands. When asked about feeling short of breath, he nodded "no".     PAST HISTORY  --------------------------------------------------------------------------------  No significant changes to PMH, PSH, FHx, SHx, unless otherwise noted    ALLERGIES & MEDICATIONS  --------------------------------------------------------------------------------  Allergies  No Known Allergies    Standing Inpatient Medications  chlorhexidine 2% Cloths 1 Application(s) Topical daily  cloNIDine 0.2 milliGRAM(s) Oral two times a day  darbepoetin Injectable ViaL 80 MICROGram(s) IV Push every week  heparin  Infusion. 1500 Unit(s)/Hr IV Continuous <Continuous>  hydrALAZINE 100 milliGRAM(s) Oral every 8 hours  isosorbide   dinitrate Tablet (ISORDIL) 10 milliGRAM(s) Oral three times a day  pantoprazole  Injectable 40 milliGRAM(s) IV Push daily  piperacillin/tazobactam IVPB.. 3.375 Gram(s) IV Intermittent every 12 hours    PRN Inpatient Medications  sodium chloride 0.9% Bolus. 100 milliLiter(s) IV Bolus every 5 minutes PRN    REVIEW OF SYSTEMS  --------------------------------------------------------------------------------  Limited ROS, see HPI    VITALS/PHYSICAL EXAM  --------------------------------------------------------------------------------  T(C): 36.1 (03-14-23 @ 04:00), Max: 36.1 (03-14-23 @ 04:00)  HR: 57 (03-14-23 @ 06:00) (49 - 76)  BP: 164/69 (03-14-23 @ 06:00) (137/49 - 164/69)  RR: 17 (03-14-23 @ 06:00) (13 - 18)  SpO2: 100% (03-14-23 @ 06:00) (94% - 100%)  Wt(kg): --    03-13-23 @ 07:01  -  03-14-23 @ 07:00  --------------------------------------------------------  IN: 1204 mL / OUT: 1400 mL / NET: -196 mL    Physical Exam:  	Gen: Chronically ill appearing but in NAD  	HEENT: On non-rebreather  	Pulm: Diminished breath sounds  	CV: S1S2  	Abd: Soft, +BS   	Ext: No LE edema B/L, R toe amputation seen  	Neuro: Somnolent but following simple commands  	Skin: Warm and dry  	Vascular access: LUE AVF with palpable pulse and bruit heard    LABS/STUDIES  --------------------------------------------------------------------------------              8.7    9.52  >-----------<  183      [03-14-23 @ 00:55]              29.0     134  |  95  |  20  ----------------------------<  120      [03-14-23 @ 00:55]  3.7   |  25  |  2.37        Ca     8.2     [03-14-23 @ 00:55]      Mg     1.80     [03-14-23 @ 00:55]      Phos  4.0     [03-14-23 @ 00:55]    TPro  5.7  /  Alb  2.6  /  TBili  1.4  /  DBili  x   /  AST  17  /  ALT  9   /  AlkPhos  663  [03-14-23 @ 00:55]    PT/INR: PT 12.3 , INR 1.06       [03-14-23 @ 00:55]  PTT: 102.3      [03-14-23 @ 00:55]    Creatinine Trend:  SCr 2.37 [03-14 @ 00:55]  SCr 3.04 [03-13 @ 00:20]  SCr 2.59 [03-12 @ 00:05]  SCr 2.31 [03-11 @ 18:42]  SCr 3.77 [03-11 @ 00:30]

## 2023-03-14 NOTE — SWALLOW BEDSIDE ASSESSMENT ADULT - ADDITIONAL RECOMMENDATIONS
1. This department to follow up as schedule permits for diet tolerance  2. Medical team advised to reconsult this department if there is a change in medical status or ability to tolerate recommended PO diet.

## 2023-03-14 NOTE — PROGRESS NOTE ADULT - PROBLEM SELECTOR PLAN 3
Pt. with hyperphosphatemia in the setting of ESRD. Serum phosphorus within target range of 4.0 today. Low phos diet. Monitor serum phosphorus level.

## 2023-03-14 NOTE — CHART NOTE - [id number]_GEN_A_CORE
MICU Transfer Note  ---------------------------    Transfer from: MICU  Transfer to:  ( X ) Medicine    (  ) Telemetry    (  ) RCU    (  ) Palliative    (  ) Stroke Unit    (  ) _______________  Accepting Physician:      MICU COURSE    Admitted to MICU for AHRF and started on vanc/zosyn and pressors. Pt failed bipap and was intubated. CXR with large loculated R PLEF. MRSA swab was negative and vancomycin was d/c'ed. Pt was weaned off pressors. Pt had a bronchoscopy and thoracentesis for drainage of pleural effusion, with fluid analysis consistent with an exudative effusion. Pt's course was complicated by htn and was given pushes of hydralazine 5mg IV and restarted on oral antihypertensive medications. Pt was initially restarted on home olanzapine but then it was held iso prolonged QTc. Pt was extubated on 3/13 and is currently satting well on venturi mask.       OBJECTIVE --  Vital Signs Last 24 Hrs  T(C): 35.4 (14 Mar 2023 08:00), Max: 36.1 (14 Mar 2023 04:00)  T(F): 95.7 (14 Mar 2023 08:00), Max: 97 (14 Mar 2023 04:00)  HR: 63 (14 Mar 2023 09:30) (49 - 76)  BP: 134/58 (14 Mar 2023 07:00) (134/58 - 164/69)  BP(mean): 77 (14 Mar 2023 07:00) (70 - 137)  RR: 13 (14 Mar 2023 09:30) (13 - 18)  SpO2: 99% (14 Mar 2023 09:30) (92% - 100%)    Parameters below as of 14 Mar 2023 09:30  Patient On (Oxygen Delivery Method): nasal cannula        I&O's Summary    13 Mar 2023 07:01  -  14 Mar 2023 07:00  --------------------------------------------------------  IN: 1204 mL / OUT: 1400 mL / NET: -196 mL    14 Mar 2023 07:01  -  14 Mar 2023 09:53  --------------------------------------------------------  IN: 42 mL / OUT: 0 mL / NET: 42 mL        MEDICATIONS  (STANDING):  chlorhexidine 2% Cloths 1 Application(s) Topical daily  cloNIDine 0.2 milliGRAM(s) Oral two times a day  heparin  Infusion. 1500 Unit(s)/Hr (15 mL/Hr) IV Continuous <Continuous>  hydrALAZINE 100 milliGRAM(s) Oral every 8 hours  isosorbide   dinitrate Tablet (ISORDIL) 10 milliGRAM(s) Oral three times a day  pantoprazole  Injectable 40 milliGRAM(s) IV Push daily  piperacillin/tazobactam IVPB.. 3.375 Gram(s) IV Intermittent every 12 hours    MEDICATIONS  (PRN):  sodium chloride 0.9% Bolus. 100 milliLiter(s) IV Bolus every 5 minutes PRN SBP LESS THAN or EQUAL to 100 mmHg        LABS                                            8.7                   Neurophils% (auto):   x      (03-14 @ 00:55):    9.52 )-----------(183          Lymphocytes% (auto):  x                                             29.0                   Eosinphils% (auto):   x        Manual%: Neutrophils x    ; Lymphocytes x    ; Eosinophils x    ; Bands%: x    ; Blasts x                                    134    |  95     |  20                  Calcium: 8.2   / iCa: x      (03-14 @ 00:55)    ----------------------------<  120       Magnesium: 1.80                             3.7     |  25     |  2.37             Phosphorous: 4.0      TPro  5.7    /  Alb  2.6    /  TBili  1.4    /  DBili  x      /  AST  17     /  ALT  9      /  AlkPhos  663    14 Mar 2023 00:55    ( 03-14 @ 07:20 )   PT: x    ;   INR: x      aPTT: 111.1 sec          ASSESSMENT & PLAN:     57 history of HTN, HLD, DM2, Bipolar disorder, ESRD on HD (TTS), recent admission on 1/14 for AHRF and septic shock at [place], and recent admission (2-7 - 2/14) at [facility] for septic shock and AHRF likely 2/2 aspiration pneumonia and Covid, hospital course c/b cardiac arrest. Patient presenting from [facility] with acute hypoxic hypercapnic respiratory failure, continued hypoxia on BIPAP requiring intubation likely iso mucus plugging s/p extubation on 3/13.     #Neuro  -CTH wnl  - initially A&O3 on admission  - now off sedation but remains intubated and is able to follow commands  - Olanzapine held iso prolonged QTc  - f/u repeat EKG and restart if QTc < 500    #Respiratory  #AHRF  - recent admission for AHRF 2/2 aspiration pneumonia +/- covid on Feb 2023 discharged on room air  - presenting with worsening hypoxia and hypercapnia not improved with BIPAP  - suspect i/s/o large R loculated PLEF and possible aspiration pneumonia  - Intubated (3/10 - 3/13)  - antibiotics as below  - S/p bronchoscopy 3/11 and extubation on 3/13       #Pleural effusion  - large loculated R PLEF, intervally increased from small in Feb 2023  - suspect i/s/o prior aspiration pneumonia +/- covid  - now resolved on most recent CXR  - 3/12: S/p thoracentesis, fluid studies c/w exudative, f/u cultures     #Cardiovascular  #Vasoplegic shock  - Normotensive prior to intubation  - required levo and elenita pushes demetri-intubation  - off pressors, maintain MAP > 65  - currently resolved and pt is hypertensive    #HTN:  - on clonidine, hydralazine, and isordil currently  - hydralazine IVP 5mg PRN    #Afib  - reported history of Afib on eliquis 2.5 BID  - start heparin gtt while npo    #Troponinemia  - likely 2/2 demand ischemia +/- ESRD  - trops downtrending, 330-->224, baseline 100 on prior admission  - no evidence of ischemic change on EKG    #Renal  #ESRD  - TTS, last dialysis on 3/7/23 prior to admission  - Nephrology consult for dialysis, follows with dr castellanos ([facility])  - s/p HD on 3/13 in MICU    #GI  #Elevated ALP  - persistently elevated  - 900s and GGT since Feb 2023  - patient asymptomatic at time, RUQ negative in Feb 2023  - monitor LFTs  - f/u S&S    #ID  #Aspiration pneumonia  - previous admission for aspiration pneumonia, negative BCx  - suspect contributory to current presentation  - c/w zosyn  - neg MRSA swab --> d/c vancomycin  - f/u BCx    #Endo  #DM2   - history of diabetic foot ulcers in the past  - recent A1C 4.8 and episodes of hypoglycemia last admission  - monitor off insulin    #Heme  #anemia  - no evidence of active bleed  - EPO weekly  - stable from Feb 2023    #Prophylaxis/GOC  DVT: anticoagulation as above  Diet: NPO, pending S&S  GI ppx: pantoprazole  GOC: per pt's mother pt is full code  Dispo: presenting from [facility] will likely return pending medical stabilization        For Follow-Up:  [ ] EKG and restart home olanzapine if QTc <500  [ ] f/u pleural fluid cx  [ ] f/u final blood cx  [ ] f/u S&S and start on diet if pt passes (failed bedside dysphagia screen) MICU Transfer Note  ---------------------------    Transfer from: MICU  Transfer to:  ( X ) Medicine    (  ) Telemetry    (  ) RCU    (  ) Palliative    (  ) Stroke Unit    (  ) _______________  Accepting Physician:      MICU COURSE    57 history of HTN, HLD, DM2, Bipolar disorder, ESRD on HD (TTS), recent admission on 1/14 for AHRF and septic shock at [place], and recent admission (2-7 - 2/14) at [facility] for septic shock and AHRF likely 2/2 aspiration pneumonia and Covid, hospital course c/b cardiac arrest. Admitted to MICU for AHRF and started on vanc/zosyn and pressors. Pt failed bipap and was intubated. CXR with large loculated R PLEF. MRSA swab was negative and vancomycin was d/c'ed. Pt was weaned off pressors. Pt had a bronchoscopy and thoracentesis for drainage of pleural effusion, with fluid analysis consistent with an exudative effusion. Pt's course was complicated by htn and was given pushes of hydralazine 5mg IV and restarted on oral antihypertensive medications. Pt was initially restarted on home olanzapine but then it was held iso prolonged QTc. Pt was extubated on 3/13 and is currently satting well on venturi mask.       OBJECTIVE --  Vital Signs Last 24 Hrs  T(C): 35.4 (14 Mar 2023 08:00), Max: 36.1 (14 Mar 2023 04:00)  T(F): 95.7 (14 Mar 2023 08:00), Max: 97 (14 Mar 2023 04:00)  HR: 63 (14 Mar 2023 09:30) (49 - 76)  BP: 134/58 (14 Mar 2023 07:00) (134/58 - 164/69)  BP(mean): 77 (14 Mar 2023 07:00) (70 - 137)  RR: 13 (14 Mar 2023 09:30) (13 - 18)  SpO2: 99% (14 Mar 2023 09:30) (92% - 100%)    Parameters below as of 14 Mar 2023 09:30  Patient On (Oxygen Delivery Method): nasal cannula        I&O's Summary    13 Mar 2023 07:01  -  14 Mar 2023 07:00  --------------------------------------------------------  IN: 1204 mL / OUT: 1400 mL / NET: -196 mL    14 Mar 2023 07:01  -  14 Mar 2023 09:53  --------------------------------------------------------  IN: 42 mL / OUT: 0 mL / NET: 42 mL        MEDICATIONS  (STANDING):  chlorhexidine 2% Cloths 1 Application(s) Topical daily  cloNIDine 0.2 milliGRAM(s) Oral two times a day  heparin  Infusion. 1500 Unit(s)/Hr (15 mL/Hr) IV Continuous <Continuous>  hydrALAZINE 100 milliGRAM(s) Oral every 8 hours  isosorbide   dinitrate Tablet (ISORDIL) 10 milliGRAM(s) Oral three times a day  pantoprazole  Injectable 40 milliGRAM(s) IV Push daily  piperacillin/tazobactam IVPB.. 3.375 Gram(s) IV Intermittent every 12 hours    MEDICATIONS  (PRN):  sodium chloride 0.9% Bolus. 100 milliLiter(s) IV Bolus every 5 minutes PRN SBP LESS THAN or EQUAL to 100 mmHg        LABS                                            8.7                   Neurophils% (auto):   x      (03-14 @ 00:55):    9.52 )-----------(183          Lymphocytes% (auto):  x                                             29.0                   Eosinphils% (auto):   x        Manual%: Neutrophils x    ; Lymphocytes x    ; Eosinophils x    ; Bands%: x    ; Blasts x                                    134    |  95     |  20                  Calcium: 8.2   / iCa: x      (03-14 @ 00:55)    ----------------------------<  120       Magnesium: 1.80                             3.7     |  25     |  2.37             Phosphorous: 4.0      TPro  5.7    /  Alb  2.6    /  TBili  1.4    /  DBili  x      /  AST  17     /  ALT  9      /  AlkPhos  663    14 Mar 2023 00:55    ( 03-14 @ 07:20 )   PT: x    ;   INR: x      aPTT: 111.1 sec          ASSESSMENT & PLAN:     57 history of HTN, HLD, DM2, Bipolar disorder, ESRD on HD (TTS), recent admission on 1/14 for AHRF and septic shock at [place], and recent admission (2-7 - 2/14) at [facility] for septic shock and AHRF likely 2/2 aspiration pneumonia and Covid, hospital course c/b cardiac arrest. Patient presenting from [facility] with acute hypoxic hypercapnic respiratory failure, continued hypoxia on BIPAP requiring intubation likely iso mucus plugging s/p extubation on 3/13.     #Neuro  -CTH wnl  - initially A&O3 on admission  - now off sedation but remains intubated and is able to follow commands  - Olanzapine held iso prolonged QTc  - f/u repeat EKG and restart if QTc < 500    #Respiratory  #AHRF  - recent admission for AHRF 2/2 aspiration pneumonia +/- covid on Feb 2023 discharged on room air  - presenting with worsening hypoxia and hypercapnia not improved with BIPAP  - suspect i/s/o large R loculated PLEF and possible aspiration pneumonia  - Intubated (3/10 - 3/13)  - antibiotics as below  - S/p bronchoscopy 3/11 and extubation on 3/13       #Pleural effusion  - large loculated R PLEF, intervally increased from small in Feb 2023  - suspect i/s/o prior aspiration pneumonia +/- covid  - now resolved on most recent CXR  - 3/12: S/p thoracentesis, fluid studies c/w exudative, f/u cultures     #Cardiovascular  #Vasoplegic shock  - Normotensive prior to intubation  - required levo and elenita pushes demetri-intubation  - off pressors, maintain MAP > 65  - currently resolved and pt is hypertensive    #HTN:  - on clonidine, hydralazine, and isordil currently  - hydralazine IVP 5mg PRN    #Afib  - reported history of Afib on eliquis 2.5 BID  - start heparin gtt while npo    #Troponinemia  - likely 2/2 demand ischemia +/- ESRD  - trops downtrending, 330-->224, baseline 100 on prior admission  - no evidence of ischemic change on EKG    #Renal  #ESRD  - TTS, last dialysis on 3/7/23 prior to admission  - Nephrology consult for dialysis, follows with dr castellanos ([place] [facility])  - s/p HD on 3/13 in MICU    #GI  #Elevated ALP  - persistently elevated  - 900s and GGT since Feb 2023  - patient asymptomatic at time, RUQ negative in Feb 2023  - monitor LFTs  - f/u S&S    #ID  #Aspiration pneumonia  - previous admission for aspiration pneumonia, negative BCx  - suspect contributory to current presentation  - c/w zosyn  - neg MRSA swab --> d/c vancomycin  - f/u BCx    #Endo  #DM2   - history of diabetic foot ulcers in the past  - recent A1C 4.8 and episodes of hypoglycemia last admission  - monitor off insulin    #Heme  #anemia  - no evidence of active bleed  - EPO weekly  - stable from Feb 2023    #Prophylaxis/GOC  DVT: anticoagulation as above  Diet: NPO, pending S&S  GI ppx: pantoprazole  GOC: per pt's mother pt is full code  Dispo: presenting from [facility] will likely return pending medical stabilization        For Follow-Up:  [ ] EKG and restart home olanzapine if QTc <500  [ ] f/u pleural fluid cx  [ ] f/u final blood cx  [ ] f/u S&S and start on diet if pt passes (failed bedside dysphagia screen) MICU Transfer Note  ---------------------------    Transfer from: MICU  Transfer to:  ( X ) Medicine    (  ) Telemetry    (  ) RCU    (  ) Palliative    (  ) Stroke Unit    (  ) _______________  Accepting Physician:      MICU COURSE    57 history of HTN, HLD, DM2, Bipolar disorder, ESRD on HD (TTS), recent admission on 1/14 for AHRF and septic shock at [place], and recent admission (2-7 - 2/14) at [facility] for septic shock and AHRF likely 2/2 aspiration pneumonia and Covid, hospital course c/b cardiac arrest. Admitted to MICU for AHRF and started on vanc/zosyn and pressors. Pt failed bipap and was intubated. CXR with large loculated R PLEF. MRSA swab was negative and vancomycin was d/c'ed. Pt was weaned off pressors. Pt had a bronchoscopy and thoracentesis for drainage of pleural effusion, with fluid analysis consistent with an exudative effusion. Pt's course was complicated by htn and was given pushes of hydralazine 5mg IV and restarted on oral antihypertensive medications. Pt was initially restarted on home olanzapine but then it was held iso prolonged QTc. Pt was extubated on 3/13 and is currently satting well on venturi mask.       OBJECTIVE --  Vital Signs Last 24 Hrs  T(C): 35.4 (14 Mar 2023 08:00), Max: 36.1 (14 Mar 2023 04:00)  T(F): 95.7 (14 Mar 2023 08:00), Max: 97 (14 Mar 2023 04:00)  HR: 63 (14 Mar 2023 09:30) (49 - 76)  BP: 134/58 (14 Mar 2023 07:00) (134/58 - 164/69)  BP(mean): 77 (14 Mar 2023 07:00) (70 - 137)  RR: 13 (14 Mar 2023 09:30) (13 - 18)  SpO2: 99% (14 Mar 2023 09:30) (92% - 100%)    Parameters below as of 14 Mar 2023 09:30  Patient On (Oxygen Delivery Method): nasal cannula        I&O's Summary    13 Mar 2023 07:01  -  14 Mar 2023 07:00  --------------------------------------------------------  IN: 1204 mL / OUT: 1400 mL / NET: -196 mL    14 Mar 2023 07:01  -  14 Mar 2023 09:53  --------------------------------------------------------  IN: 42 mL / OUT: 0 mL / NET: 42 mL        MEDICATIONS  (STANDING):  chlorhexidine 2% Cloths 1 Application(s) Topical daily  cloNIDine 0.2 milliGRAM(s) Oral two times a day  heparin  Infusion. 1500 Unit(s)/Hr (15 mL/Hr) IV Continuous <Continuous>  hydrALAZINE 100 milliGRAM(s) Oral every 8 hours  isosorbide   dinitrate Tablet (ISORDIL) 10 milliGRAM(s) Oral three times a day  pantoprazole  Injectable 40 milliGRAM(s) IV Push daily  piperacillin/tazobactam IVPB.. 3.375 Gram(s) IV Intermittent every 12 hours    MEDICATIONS  (PRN):  sodium chloride 0.9% Bolus. 100 milliLiter(s) IV Bolus every 5 minutes PRN SBP LESS THAN or EQUAL to 100 mmHg        LABS                                            8.7                   Neurophils% (auto):   x      (03-14 @ 00:55):    9.52 )-----------(183          Lymphocytes% (auto):  x                                             29.0                   Eosinphils% (auto):   x        Manual%: Neutrophils x    ; Lymphocytes x    ; Eosinophils x    ; Bands%: x    ; Blasts x                                    134    |  95     |  20                  Calcium: 8.2   / iCa: x      (03-14 @ 00:55)    ----------------------------<  120       Magnesium: 1.80                             3.7     |  25     |  2.37             Phosphorous: 4.0      TPro  5.7    /  Alb  2.6    /  TBili  1.4    /  DBili  x      /  AST  17     /  ALT  9      /  AlkPhos  663    14 Mar 2023 00:55    ( 03-14 @ 07:20 )   PT: x    ;   INR: x      aPTT: 111.1 sec          ASSESSMENT & PLAN:     57 history of HTN, HLD, DM2, Bipolar disorder, ESRD on HD (TTS), recent admission on 1/14 for AHRF and septic shock at [place], and recent admission (2-7 - 2/14) at [facility] for septic shock and AHRF likely 2/2 aspiration pneumonia and Covid, hospital course c/b cardiac arrest. Patient presenting from [facility] with acute hypoxic hypercapnic respiratory failure, continued hypoxia on BIPAP requiring intubation likely iso mucus plugging s/p extubation on 3/13.     #Neuro  -CTH wnl  - initially A&O3 on admission  - now off sedation and A&Ox3  - Olanzapine held iso prolonged QTc  - f/u repeat EKG and restart if QTc < 500    #Respiratory  #AHRF  - recent admission for AHRF 2/2 aspiration pneumonia +/- covid on Feb 2023 discharged on room air  - presenting with worsening hypoxia and hypercapnia not improved with BIPAP  - suspect i/s/o large R loculated PLEF and possible aspiration pneumonia  - Intubated (3/10 - 3/13)  - antibiotics as below  - S/p bronchoscopy 3/11 and extubation on 3/13       #Pleural effusion  - large loculated R PLEF, intervally increased from small in Feb 2023  - suspect i/s/o prior aspiration pneumonia +/- covid  - now resolved on most recent CXR  - 3/12: S/p thoracentesis, fluid studies c/w exudative, f/u cultures     #Cardiovascular  #Vasoplegic shock  - Normotensive prior to intubation  - required levo and elenita pushes demetri-intubation  - off pressors, maintain MAP > 65  - currently resolved and pt is hypertensive    #HTN:  - on clonidine, hydralazine, and isordil currently  - hydralazine IVP 5mg PRN    #Afib  - reported history of Afib on eliquis 2.5 BID  - start heparin gtt while npo    #Troponinemia  - likely 2/2 demand ischemia +/- ESRD  - trops downtrending, 330-->224, baseline 100 on prior admission  - no evidence of ischemic change on EKG    #Renal  #ESRD  - TTS, last dialysis on 3/7/23 prior to admission  - Nephrology consult for dialysis, follows with dr castellanos ([place] [facility])  - s/p HD on 3/13 in MICU    #GI  #Elevated ALP  - persistently elevated  - 900s and GGT since Feb 2023  - patient asymptomatic at time, RUQ negative in Feb 2023  - monitor LFTs  - f/u S&S    #ID  #Aspiration pneumonia  - previous admission for aspiration pneumonia, negative BCx  - suspect contributory to current presentation  - c/w zosyn  - neg MRSA swab --> d/c vancomycin  - f/u BCx    #Endo  #DM2   - history of diabetic foot ulcers in the past  - recent A1C 4.8 and episodes of hypoglycemia last admission  - monitor off insulin    #Heme  #anemia  - no evidence of active bleed  - EPO weekly  - stable from Feb 2023    #Prophylaxis/GOC  DVT: anticoagulation as above  Diet: NPO, pending S&S  GI ppx: pantoprazole  GOC: per pt's mother pt is full code  Dispo: presenting from [facility] will likely return pending medical stabilization        For Follow-Up:  [ ] EKG and restart home olanzapine if QTc <500  [ ] f/u pleural fluid cx  [ ] f/u final blood cx  [ ] f/u S&S and start on diet if pt passes (failed bedside dysphagia screen) MICU Transfer Note  ---------------------------    Transfer from: MICU  Transfer to:  ( X ) Medicine    (  ) Telemetry    (  ) RCU    (  ) Palliative    (  ) Stroke Unit    (  ) _______________  Accepting Physician:      MICU COURSE    57 history of HTN, HLD, DM2, Bipolar disorder, ESRD on HD (TTS), recent admission on 1/14 for AHRF and septic shock at [place], and recent admission (2-7 - 2/14) at [facility] for septic shock and AHRF likely 2/2 aspiration pneumonia and Covid, hospital course c/b cardiac arrest. Admitted to MICU for AHRF and started on vanc/zosyn and pressors. Pt failed bipap and was intubated. CXR with large loculated R PLEF. MRSA swab was negative and vancomycin was d/c'ed. Pt was weaned off pressors. Pt had a bronchoscopy and thoracentesis for drainage of pleural effusion, with fluid analysis consistent with an exudative effusion. Pt's course was complicated by htn and was given pushes of hydralazine 5mg IV and restarted on oral antihypertensive medications. Pt was initially restarted on home olanzapine but then it was held iso prolonged QTc. Pt was extubated on 3/13 and is currently satting well on venturi mask.       OBJECTIVE --  Vital Signs Last 24 Hrs  T(C): 35.4 (14 Mar 2023 08:00), Max: 36.1 (14 Mar 2023 04:00)  T(F): 95.7 (14 Mar 2023 08:00), Max: 97 (14 Mar 2023 04:00)  HR: 63 (14 Mar 2023 09:30) (49 - 76)  BP: 134/58 (14 Mar 2023 07:00) (134/58 - 164/69)  BP(mean): 77 (14 Mar 2023 07:00) (70 - 137)  RR: 13 (14 Mar 2023 09:30) (13 - 18)  SpO2: 99% (14 Mar 2023 09:30) (92% - 100%)    Parameters below as of 14 Mar 2023 09:30  Patient On (Oxygen Delivery Method): nasal cannula        I&O's Summary    13 Mar 2023 07:01  -  14 Mar 2023 07:00  --------------------------------------------------------  IN: 1204 mL / OUT: 1400 mL / NET: -196 mL    14 Mar 2023 07:01  -  14 Mar 2023 09:53  --------------------------------------------------------  IN: 42 mL / OUT: 0 mL / NET: 42 mL        MEDICATIONS  (STANDING):  chlorhexidine 2% Cloths 1 Application(s) Topical daily  cloNIDine 0.2 milliGRAM(s) Oral two times a day  heparin  Infusion. 1500 Unit(s)/Hr (15 mL/Hr) IV Continuous <Continuous>  hydrALAZINE 100 milliGRAM(s) Oral every 8 hours  isosorbide   dinitrate Tablet (ISORDIL) 10 milliGRAM(s) Oral three times a day  pantoprazole  Injectable 40 milliGRAM(s) IV Push daily  piperacillin/tazobactam IVPB.. 3.375 Gram(s) IV Intermittent every 12 hours    MEDICATIONS  (PRN):  sodium chloride 0.9% Bolus. 100 milliLiter(s) IV Bolus every 5 minutes PRN SBP LESS THAN or EQUAL to 100 mmHg        LABS                                            8.7                   Neurophils% (auto):   x      (03-14 @ 00:55):    9.52 )-----------(183          Lymphocytes% (auto):  x                                             29.0                   Eosinphils% (auto):   x        Manual%: Neutrophils x    ; Lymphocytes x    ; Eosinophils x    ; Bands%: x    ; Blasts x                                    134    |  95     |  20                  Calcium: 8.2   / iCa: x      (03-14 @ 00:55)    ----------------------------<  120       Magnesium: 1.80                             3.7     |  25     |  2.37             Phosphorous: 4.0      TPro  5.7    /  Alb  2.6    /  TBili  1.4    /  DBili  x      /  AST  17     /  ALT  9      /  AlkPhos  663    14 Mar 2023 00:55    ( 03-14 @ 07:20 )   PT: x    ;   INR: x      aPTT: 111.1 sec          ASSESSMENT & PLAN:     57 history of HTN, HLD, DM2, Bipolar disorder, ESRD on HD (TTS), recent admission on 1/14 for AHRF and septic shock at [place], and recent admission (2-7 - 2/14) at [facility] for septic shock and AHRF likely 2/2 aspiration pneumonia and Covid, hospital course c/b cardiac arrest. Patient presenting from [facility] with acute hypoxic hypercapnic respiratory failure, continued hypoxia on BIPAP requiring intubation likely iso mucus plugging s/p extubation on 3/13.     #Neuro  -CTH wnl  - initially A&O3 on admission  - now off sedation and A&Ox3  - Olanzapine held iso prolonged QTc  - f/u repeat EKG and restart if QTc < 500    #Respiratory  #AHRF  - recent admission for AHRF 2/2 aspiration pneumonia +/- covid on Feb 2023 discharged on room air  - presenting with worsening hypoxia and hypercapnia not improved with BIPAP  - suspect i/s/o large R loculated PLEF and possible aspiration pneumonia  - Intubated (3/10 - 3/13)  - antibiotics as below  - S/p bronchoscopy 3/11 and extubation on 3/13       #Pleural effusion  - large loculated R PLEF, intervally increased from small in Feb 2023  - suspect i/s/o prior aspiration pneumonia +/- covid  - now resolved on most recent CXR  - 3/12: S/p thoracentesis, fluid studies c/w exudative, f/u cultures     #Cardiovascular  #Vasoplegic shock  - Normotensive prior to intubation  - required levo and elenita pushes demetri-intubation  - off pressors, maintain MAP > 65  - currently resolved and pt is hypertensive    #HTN:  - on clonidine, hydralazine, and isordil currently  - hydralazine IVP 5mg PRN    #Afib  - reported history of Afib on eliquis 2.5 BID  - start heparin gtt while npo    #Troponinemia  - likely 2/2 demand ischemia +/- ESRD  - trops downtrending, 330-->224, baseline 100 on prior admission  - no evidence of ischemic change on EKG    #Renal  #ESRD  - TTS, last dialysis on 3/7/23 prior to admission  - Nephrology consult for dialysis, follows with dr castellanos ([facility])  - s/p HD on 3/13 in MICU    #GI  #Elevated ALP  - persistently elevated  - 900s and GGT since Feb 2023  - patient asymptomatic at time, RUQ negative in Feb 2023  - monitor LFTs  - f/u S&S    #ID  #Aspiration pneumonia  - previous admission for aspiration pneumonia, negative BCx  - suspect contributory to current presentation  - c/w zosyn  - neg MRSA swab --> d/c vancomycin  - f/u BCx    #Endo  #DM2   - history of diabetic foot ulcers in the past  - recent A1C 4.8 and episodes of hypoglycemia last admission  - monitor off insulin    #Heme  #anemia  - no evidence of active bleed  - EPO weekly  - stable from Feb 2023    #Prophylaxis/GOC  DVT: anticoagulation as above  Diet: NPO, pending S&S  GI ppx: pantoprazole  GOC: per pt's mother pt is full code  Dispo: presenting from [facility] will likely return pending medical stabilization        For Follow-Up:  [ ] EKG and restart home olanzapine if QTc <500  [ ] f/u pleural fluid cx  [ ] f/u final blood cx  [ ] f/u S&S and start on diet if pt passes (failed bedside dysphagia screen)  [ ] f/u PT recs  [ ] f/u wound care recs MICU Transfer Note  ---------------------------    Transfer from: MICU  Transfer to:  ( X ) Medicine    (  ) Telemetry    (  ) RCU    (  ) Palliative    (  ) Stroke Unit    (  ) _______________  Accepting Physician:      MICU COURSE    57 history of HTN, HLD, DM2, Afib, Bipolar disorder, ESRD on HD (TTS), recent admission on 1/14 for AHRF and septic shock at [place], and recent admission (2-7 - 2/14) at [facility] for septic shock and AHRF likely 2/2 aspiration pneumonia and Covid, hospital course c/b cardiac arrest. Admitted to MICU for AHRF and started on vanc/zosyn and pressors. Pt failed bipap and was intubated. CXR with large loculated R PLEF. MRSA swab was negative and vancomycin was d/c'ed. Pt was weaned off pressors. Pt had a bronchoscopy and thoracentesis for drainage of pleural effusion, with fluid analysis consistent with an exudative effusion. Pt's course was complicated by htn and was given pushes of hydralazine 5mg IV and restarted on oral antihypertensive medications. Pt was initially restarted on home olanzapine but then it was held iso prolonged QTc. Pt was extubated on 3/13 and is currently satting well on venturi mask.       OBJECTIVE --  Vital Signs Last 24 Hrs  T(C): 35.4 (14 Mar 2023 08:00), Max: 36.1 (14 Mar 2023 04:00)  T(F): 95.7 (14 Mar 2023 08:00), Max: 97 (14 Mar 2023 04:00)  HR: 63 (14 Mar 2023 09:30) (49 - 76)  BP: 134/58 (14 Mar 2023 07:00) (134/58 - 164/69)  BP(mean): 77 (14 Mar 2023 07:00) (70 - 137)  RR: 13 (14 Mar 2023 09:30) (13 - 18)  SpO2: 99% (14 Mar 2023 09:30) (92% - 100%)    Parameters below as of 14 Mar 2023 09:30  Patient On (Oxygen Delivery Method): nasal cannula        I&O's Summary    13 Mar 2023 07:01  -  14 Mar 2023 07:00  --------------------------------------------------------  IN: 1204 mL / OUT: 1400 mL / NET: -196 mL    14 Mar 2023 07:01  -  14 Mar 2023 09:53  --------------------------------------------------------  IN: 42 mL / OUT: 0 mL / NET: 42 mL        MEDICATIONS  (STANDING):  chlorhexidine 2% Cloths 1 Application(s) Topical daily  cloNIDine 0.2 milliGRAM(s) Oral two times a day  heparin  Infusion. 1500 Unit(s)/Hr (15 mL/Hr) IV Continuous <Continuous>  hydrALAZINE 100 milliGRAM(s) Oral every 8 hours  isosorbide   dinitrate Tablet (ISORDIL) 10 milliGRAM(s) Oral three times a day  pantoprazole  Injectable 40 milliGRAM(s) IV Push daily  piperacillin/tazobactam IVPB.. 3.375 Gram(s) IV Intermittent every 12 hours    MEDICATIONS  (PRN):  sodium chloride 0.9% Bolus. 100 milliLiter(s) IV Bolus every 5 minutes PRN SBP LESS THAN or EQUAL to 100 mmHg        LABS                                            8.7                   Neurophils% (auto):   x      (03-14 @ 00:55):    9.52 )-----------(183          Lymphocytes% (auto):  x                                             29.0                   Eosinphils% (auto):   x        Manual%: Neutrophils x    ; Lymphocytes x    ; Eosinophils x    ; Bands%: x    ; Blasts x                                    134    |  95     |  20                  Calcium: 8.2   / iCa: x      (03-14 @ 00:55)    ----------------------------<  120       Magnesium: 1.80                             3.7     |  25     |  2.37             Phosphorous: 4.0      TPro  5.7    /  Alb  2.6    /  TBili  1.4    /  DBili  x      /  AST  17     /  ALT  9      /  AlkPhos  663    14 Mar 2023 00:55    ( 03-14 @ 07:20 )   PT: x    ;   INR: x      aPTT: 111.1 sec          ASSESSMENT & PLAN:     57 history of HTN, HLD, DM2, Bipolar disorder, ESRD on HD (TTS), recent admission on 1/14 for AHRF and septic shock at [place], and recent admission (2-7 - 2/14) at [facility] for septic shock and AHRF likely 2/2 aspiration pneumonia and Covid, hospital course c/b cardiac arrest. Patient presenting from [facility] with acute hypoxic hypercapnic respiratory failure, continued hypoxia on BIPAP requiring intubation likely iso mucus plugging s/p extubation on 3/13.     #Neuro  -CTH wnl  - initially A&O3 on admission  - now off sedation and A&Ox3  - Olanzapine held iso prolonged QTc  - f/u repeat EKG and restart if QTc < 500    #Respiratory  #AHRF  - recent admission for AHRF 2/2 aspiration pneumonia +/- covid on Feb 2023 discharged on room air  - presenting with worsening hypoxia and hypercapnia not improved with BIPAP  - suspect i/s/o large R loculated PLEF and possible aspiration pneumonia  - Intubated (3/10 - 3/13)  - antibiotics as below  - S/p bronchoscopy 3/11 and extubation on 3/13       #Pleural effusion  - large loculated R PLEF, intervally increased from small in Feb 2023  - suspect i/s/o prior aspiration pneumonia +/- covid  - now resolved on most recent CXR  - 3/12: S/p thoracentesis, fluid studies c/w exudative, f/u cultures     #Cardiovascular  #Vasoplegic shock  - Normotensive prior to intubation  - required levo and elenita pushes demetri-intubation  - off pressors, maintain MAP > 65  - currently resolved and pt is hypertensive    #HTN:  - on clonidine, hydralazine, and isordil currently  - hydralazine IVP 5mg PRN    #Afib  - reported history of Afib on eliquis 2.5 BID  - start heparin gtt while npo    #Troponinemia  - likely 2/2 demand ischemia +/- ESRD  - trops downtrending, 330-->224, baseline 100 on prior admission  - no evidence of ischemic change on EKG    #Renal  #ESRD  - TTS, last dialysis on 3/7/23 prior to admission  - Nephrology consult for dialysis, follows with dr castellanos ([facility])  - s/p HD on 3/13 in MICU    #GI  #Elevated ALP  - persistently elevated  - 900s and GGT since Feb 2023  - patient asymptomatic at time, RUQ negative in Feb 2023  - monitor LFTs  - f/u S&S    #ID  #Aspiration pneumonia  - previous admission for aspiration pneumonia, negative BCx  - suspect contributory to current presentation  - c/w zosyn  - neg MRSA swab --> d/c vancomycin  - f/u BCx    #Endo  #DM2   - history of diabetic foot ulcers in the past  - recent A1C 4.8 and episodes of hypoglycemia last admission  - monitor off insulin    #Heme  #anemia  - no evidence of active bleed  - EPO weekly  - stable from Feb 2023    #Prophylaxis/GOC  DVT: anticoagulation as above  Diet: NPO, pending S&S  GI ppx: pantoprazole  GOC: per pt's mother pt is full code  Dispo: presenting from [facility] will likely return pending medical stabilization        For Follow-Up:  [ ] EKG and restart home olanzapine if QTc <500  [ ] f/u pleural fluid cx  [ ] f/u final blood cx  [ ] f/u S&S and start on diet if pt passes (failed bedside dysphagia screen)  [ ] f/u PT recs  [ ] f/u wound care recs MICU Transfer Note  ---------------------------    Transfer from: MICU  Transfer to:  ( X ) Medicine    (  ) Telemetry    (  ) RCU    (  ) Palliative    (  ) Stroke Unit    (  ) _______________  Accepting Physician:      MICU COURSE    57 history of HTN, HLD, DM2, Afib, Bipolar disorder, ESRD on HD (TTS), recent admission on 1/14 for AHRF and septic shock at [place], and recent admission (2-7 - 2/14) at [facility] for septic shock and AHRF likely 2/2 aspiration pneumonia and Covid, hospital course c/b cardiac arrest. Admitted to MICU for AHRF and started on vanc/zosyn and pressors. Pt failed bipap and was intubated. CXR with large loculated R PLEF. MRSA swab was negative and vancomycin was d/c'ed. Pt was weaned off pressors. Pt had a bronchoscopy and thoracentesis for drainage of pleural effusion, with fluid analysis consistent with an exudative effusion. Pt's course was complicated by htn and was given pushes of hydralazine 5mg IV and restarted on oral antihypertensive medications. Pt was initially restarted on home olanzapine but then it was held iso prolonged QTc. Pt was extubated on 3/13 and is currently satting well on NC.       OBJECTIVE --  Vital Signs Last 24 Hrs  T(C): 35.4 (14 Mar 2023 08:00), Max: 36.1 (14 Mar 2023 04:00)  T(F): 95.7 (14 Mar 2023 08:00), Max: 97 (14 Mar 2023 04:00)  HR: 63 (14 Mar 2023 09:30) (49 - 76)  BP: 134/58 (14 Mar 2023 07:00) (134/58 - 164/69)  BP(mean): 77 (14 Mar 2023 07:00) (70 - 137)  RR: 13 (14 Mar 2023 09:30) (13 - 18)  SpO2: 99% (14 Mar 2023 09:30) (92% - 100%)    Parameters below as of 14 Mar 2023 09:30  Patient On (Oxygen Delivery Method): nasal cannula        I&O's Summary    13 Mar 2023 07:01  -  14 Mar 2023 07:00  --------------------------------------------------------  IN: 1204 mL / OUT: 1400 mL / NET: -196 mL    14 Mar 2023 07:01  -  14 Mar 2023 09:53  --------------------------------------------------------  IN: 42 mL / OUT: 0 mL / NET: 42 mL        MEDICATIONS  (STANDING):  chlorhexidine 2% Cloths 1 Application(s) Topical daily  cloNIDine 0.2 milliGRAM(s) Oral two times a day  heparin  Infusion. 1500 Unit(s)/Hr (15 mL/Hr) IV Continuous <Continuous>  hydrALAZINE 100 milliGRAM(s) Oral every 8 hours  isosorbide   dinitrate Tablet (ISORDIL) 10 milliGRAM(s) Oral three times a day  pantoprazole  Injectable 40 milliGRAM(s) IV Push daily  piperacillin/tazobactam IVPB.. 3.375 Gram(s) IV Intermittent every 12 hours    MEDICATIONS  (PRN):  sodium chloride 0.9% Bolus. 100 milliLiter(s) IV Bolus every 5 minutes PRN SBP LESS THAN or EQUAL to 100 mmHg        LABS                                            8.7                   Neurophils% (auto):   x      (03-14 @ 00:55):    9.52 )-----------(183          Lymphocytes% (auto):  x                                             29.0                   Eosinphils% (auto):   x        Manual%: Neutrophils x    ; Lymphocytes x    ; Eosinophils x    ; Bands%: x    ; Blasts x                                    134    |  95     |  20                  Calcium: 8.2   / iCa: x      (03-14 @ 00:55)    ----------------------------<  120       Magnesium: 1.80                             3.7     |  25     |  2.37             Phosphorous: 4.0      TPro  5.7    /  Alb  2.6    /  TBili  1.4    /  DBili  x      /  AST  17     /  ALT  9      /  AlkPhos  663    14 Mar 2023 00:55    ( 03-14 @ 07:20 )   PT: x    ;   INR: x      aPTT: 111.1 sec          ASSESSMENT & PLAN:     57 history of HTN, HLD, DM2, Bipolar disorder, ESRD on HD (TTS), recent admission on 1/14 for AHRF and septic shock at [place], and recent admission (2-7 - 2/14) at [facility] for septic shock and AHRF likely 2/2 aspiration pneumonia and Covid, hospital course c/b cardiac arrest. Patient presenting from [facility] with acute hypoxic hypercapnic respiratory failure, continued hypoxia on BIPAP requiring intubation likely iso mucus plugging s/p extubation on 3/13.     #Neuro  -CTH wnl  - initially A&O3 on admission  - now off sedation and A&Ox3  - Olanzapine held iso prolonged QTc  - f/u repeat EKG and restart if QTc < 500    #Respiratory  #AHRF  - recent admission for AHRF 2/2 aspiration pneumonia +/- covid on Feb 2023 discharged on room air  - presenting with worsening hypoxia and hypercapnia not improved with BIPAP  - suspect i/s/o large R loculated PLEF and possible aspiration pneumonia  - Intubated (3/10 - 3/13)  - antibiotics as below  - S/p bronchoscopy 3/11 and extubation on 3/13     #Pleural effusion  - large loculated R PLEF, intervally increased from small in Feb 2023  - suspect i/s/o prior aspiration pneumonia +/- covid  - now resolved on most recent CXR  - 3/12: S/p thoracentesis, fluid studies c/w exudative, f/u cultures     #Cardiovascular  #Vasoplegic shock  - Normotensive prior to intubation  - required levo and elenita pushes demetri-intubation  - off pressors, maintain MAP > 65  - currently resolved and pt is hypertensive    #HTN:  - on clonidine, hydralazine, and isordil currently  - hydralazine IVP 5mg PRN    #Afib  - reported history of Afib on eliquis 2.5 BID  - was started heparin gtt while npo  - transition back to eliquis    #Troponinemia  - likely 2/2 demand ischemia +/- ESRD  - trops downtrending, 330-->224, baseline 100 on prior admission  - no evidence of ischemic change on EKG    #Renal  #ESRD  - TTS, last dialysis on 3/7/23 prior to admission  - Nephrology consult for dialysis, follows with dr castellanos ([facility])  - s/p HD on 3/13 in MICU    #GI  #Elevated ALP  - persistently elevated  - 900s and GGT since Feb 2023  - patient asymptomatic at time, RUQ negative in Feb 2023  - monitor LFTs  - f/u S&S    #ID  #Aspiration pneumonia  - previous admission for aspiration pneumonia, negative BCx  - suspect contributory to current presentation  - c/w zosyn  - neg MRSA swab --> d/c vancomycin  - f/u BCx    #Endo  #DM2   - history of diabetic foot ulcers in the past  - recent A1C 4.8 and episodes of hypoglycemia last admission  - monitor off insulin    #Heme  #anemia  - no evidence of active bleed  - EPO weekly  - stable from Feb 2023    #Prophylaxis/GOC  DVT: anticoagulation as above  Diet: NPO, pending S&S  GI ppx: pantoprazole  GOC: per pt's mother pt is full code  Dispo: presenting from [facility] will likely return pending medical stabilization        For Follow-Up:  [ ] EKG and restart home olanzapine if QTc <500  [ ] f/u pleural fluid cx  [ ] f/u final blood cx  [ ] f/u S&S and start on diet if pt passes (failed bedside dysphagia screen)  [ ] f/u PT recs  [ ] f/u wound care recs  [ ] transition heparin to DOAC  [ ] f/u nephro recs for HD

## 2023-03-14 NOTE — CONSULT NOTE ADULT - ASSESSMENT
Full note to follow  Assessment/Plan:    Wound Consult requested to assist w/ management of Sacrum deep tissue pressure injury, right lateral 5th metatarsal head purple-maroon discoloration.    Recommendations:  - Peritubular management of Nasogastric Tube- Cleanse nare with NS. Pat dry. Apply Liquid barrier film to periwound skin. Apply Stat-lock NGT singh over center of nare, not touch any skin circumferentially. Change every three days or prn if soiled or compromised.   - Sacrum- cleanse with NS, Pat dry. Cover with silicone foam with border, change daily. Continue to offload pressure, monitor for changes in tissue type. Perineal care per protocol.  - right lateral 5th metatarsal head purple-maroon discoloration- paint with betadine daily, may leave open to air.    Additional recommendations:  Moisturize intact skin w/ SWEEN cream BID avoid between toes.  Nutrition Consult for optimization as tolerated   Continue w/ low air loss fluidized bed surface   Continue turning and positioning w/ offloading assistive devices as per protocol  Continue w/ Pericare as per protocol  Waffle Cushion to chair when oob to chair    Findings and plan discussed with patient, primary RN and primary team. All questions and concerns addressed to meet patient's satisfaction.    Upon discharge f/u as outpatient at [facility] [address] [place], [place] [phone]  Will continue to follow periodically throughout hospitalization, please reconsult as needed.    Thank you for this consult  QUE Corral CWN (pager #[phone])    If after 4PM or before 7:30AM on Mon-Friday or weekend/holiday please contact general surgery for urgent matters.   Team A- [id number]   Team B- [id number]  For non-urgent matters e-mail [email or web address]    I spent 55 minutes face to face with this patient of which more than 50% of the time was spent counseling & coordinating care of this pt       Assessment/Plan: 57 history of HTN, HLD, DM2, Bipolar disorder, ESRD on HD (TTS), recent admission on 1/14 for AHRF and septic shock at [place], and recent admission (2-7 - 2/14) at [facility] for septic shock and AHRF likely 2/2 aspiration pneumonia and Covid, hospital course c/b cardiac arrest. Patient presenting from [facility] with acute hypoxic hypercapnic respiratory failure, continued hypoxia on BIPAP requiring intubation likely iso mucus plugging.    Wound Consult requested to assist w/ management of Sacrum deep tissue pressure injury, right lateral 5th metatarsal head purple-maroon discoloration.    Exam: Patient frail. Left nare NGT.   Sacrum deep tissue pressure injury.   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   Right 5 lateral 5th metatarsal head purple-maroon discoloration; mixed etiology deep tissue pressure injury and arterial insufficiency.    Recommendations:  - Peritubular management of Nasogastric Tube- Cleanse nare with NS. Pat dry. Apply Liquid barrier film to periwound skin. Apply Stat-lock NGT singh over center of nare, not touch any skin circumferentially. Change every three days or prn if soiled or compromised.   - Sacrum- cleanse with NS, Pat dry. Cover with silicone foam with border, change daily. Continue to offload pressure, monitor for changes in tissue type. Perineal care per protocol.  - right lateral 5th metatarsal head purple-maroon discoloration- paint with betadine daily, may leave open to air.    Additional recommendations:  Moisturize intact skin w/ SWEEN cream BID avoid between toes.  Nutrition Consult for optimization as tolerated   Continue w/ low air loss fluidized bed surface   Continue turning and positioning w/ offloading assistive devices as per protocol  Continue w/ Pericare as per protocol  Waffle Cushion to chair when oob to chair    Findings and plan discussed with patient, primary RN and primary team. All questions and concerns addressed to meet patient's satisfaction.    Upon discharge f/u as outpatient at [facility] [address] [place] [address] [phone]  Will continue to follow periodically throughout hospitalization, please reconsult as needed.    Thank you for this consult  QUE Corral, MEGHAN (pager #[phone])    If after 4PM or before 7:30AM on Mon-Friday or weekend/holiday please contact general surgery for urgent matters.   Team A- [id number]   Team B- [id number]  For non-urgent matters e-mail [email or web address]    I spent 55 minutes face to face with this patient of which more than 50% of the time was spent counseling & coordinating care of this pt

## 2023-03-14 NOTE — SWALLOW BEDSIDE ASSESSMENT ADULT - SWALLOW EVAL: DIAGNOSIS
Patient presents with oropharyngeal dysphagia given thin liquids, mildly thick liquids, moderately thick liquids and puree. Oral stage marked by adequate bolus containment, slowed bolus manipulation and anterior-posterior transport with adequate oral clearance. Pharyngeal stage marked by observed initiation of the pharyngeal swallow trigger judged via laryngeal palpation. Immediate cough response noted following thin liquids/mildly thick liquids, likely indicative of impaired airway protection. No overt s/sx of impaired airway noted following puree and moderately thick liquids.

## 2023-03-14 NOTE — PROGRESS NOTE ADULT - PROBLEM SELECTOR PLAN 1
Pt. with ESRD on HD TIW (TTS, Dr. Diaz) who presented for shortness of breath, found to be in acute hypoxic respiratory failure requiring intubation and mechanical ventilation, loculated R pleural effusion on imaging. Extubated on 3/13/23. Last HD on 3/13/23 via [facility]. Labs reviewed. Pt appears clinically stable. Will plan for next HD today on 3/15/23. Monitor labs. Dose meds as per HD.

## 2023-03-14 NOTE — PHYSICAL THERAPY INITIAL EVALUATION ADULT - GENERAL OBSERVATIONS, REHAB EVAL
patient received semifowler in bed in NAD. NGT noted to be out just laying on bed, HOMERO Parks made aware. Patient reports "it just fell out" patient received semifowler in bed in NAD. NGT noted to be out just laying on bed, RN Kasey Rueda brought to bedside and made aware Spo2 96% on 2L O2 via NC. Patient reports "it just fell out"

## 2023-03-14 NOTE — PROGRESS NOTE ADULT - PROBLEM SELECTOR PLAN 2
Pt. with anemia in the setting of ESRD. Hgb below target range at 8.7 today. Discussed with [facility] on admission and noted that he is on Aranesp 80mcg weekly (last dose on 3/7/23). Will continue weekly Aranesp (next dose 3/15/23 with HD). Target range of 10-11. Monitor Hgb.

## 2023-03-14 NOTE — PHYSICAL THERAPY INITIAL EVALUATION ADULT - PERTINENT HX OF CURRENT PROBLEM, REHAB EVAL
patient is a patient is a 57 year old male admitted from [facility] for septic shock and AHRF likely 2/2 aspiration pneumonia and Covid, hospital course c/b cardiac arrest.

## 2023-03-14 NOTE — PHYSICAL THERAPY INITIAL EVALUATION ADULT - ADDITIONAL COMMENTS
patient reports he was at [facility] for a few days, but prior to rehab was independent without device

## 2023-03-14 NOTE — PROGRESS NOTE ADULT - SUBJECTIVE AND OBJECTIVE BOX
INTERVAL HPI/OVERNIGHT EVENTS: d/c'ed nicardipine drip overnight. Removed 1L net from HD yesterday.    SUBJECTIVE: Patient seen and examined at bedside.      VITAL SIGNS:  ICU Vital Signs Last 24 Hrs  T(C): 36.1 (14 Mar 2023 04:00), Max: 36.2 (13 Mar 2023 08:00)  T(F): 97 (14 Mar 2023 04:00), Max: 97.2 (13 Mar 2023 08:00)  HR: 57 (14 Mar 2023 06:00) (49 - 76)  BP: 164/69 (14 Mar 2023 06:00) (137/49 - 164/69)  BP(mean): 93 (14 Mar 2023 06:00) (70 - 137)  ABP: 163/49 (14 Mar 2023 06:00) (125/46 - 175/53)  ABP(mean): 92 (14 Mar 2023 06:00) (75 - 98)  RR: 17 (14 Mar 2023 06:00) (12 - 18)  SpO2: 100% (14 Mar 2023 06:00) (94% - 100%)    O2 Parameters below as of 13 Mar 2023 22:00  Patient On (Oxygen Delivery Method): mask, Venturi    O2 Concentration (%): 35      Mode: standby  Plateau pressure:   P/F ratio:     03-13 @ 07:01  -  03-14 @ 07:00  --------------------------------------------------------  IN: 1204 mL / OUT: 1400 mL / NET: -196 mL      CAPILLARY BLOOD GLUCOSE      POCT Blood Glucose.: 84 mg/dL (14 Mar 2023 05:47)    ECG:    PHYSICAL EXAM:    General:   HEENT:   Neck:   Respiratory:   Cardiovascular:   Abdomen:   Extremities:  Neurological:    MEDICATIONS:  MEDICATIONS  (STANDING):  chlorhexidine 2% Cloths 1 Application(s) Topical daily  cloNIDine 0.2 milliGRAM(s) Oral two times a day  darbepoetin Injectable ViaL 80 MICROGram(s) IV Push every week  heparin  Infusion. 1500 Unit(s)/Hr (15 mL/Hr) IV Continuous <Continuous>  hydrALAZINE 100 milliGRAM(s) Oral every 8 hours  isosorbide   dinitrate Tablet (ISORDIL) 10 milliGRAM(s) Oral three times a day  pantoprazole  Injectable 40 milliGRAM(s) IV Push daily  piperacillin/tazobactam IVPB.. 3.375 Gram(s) IV Intermittent every 12 hours    MEDICATIONS  (PRN):  sodium chloride 0.9% Bolus. 100 milliLiter(s) IV Bolus every 5 minutes PRN SBP LESS THAN or EQUAL to 100 mmHg      ALLERGIES:  Allergies    No Known Allergies    Intolerances        LABS:                        8.7    9.52  )-----------( 183      ( 14 Mar 2023 00:55 )             29.0     03-14    134<L>  |  95<L>  |  20  ----------------------------<  120<H>  3.7   |  25  |  2.37<H>    Ca    8.2<L>      14 Mar 2023 00:55  Phos  4.0     03-14  Mg     1.80     03-14    TPro  5.7<L>  /  Alb  2.6<L>  /  TBili  1.4<H>  /  DBili  x   /  AST  17  /  ALT  9   /  AlkPhos  663<H>  03-14    PT/INR - ( 14 Mar 2023 00:55 )   PT: 12.3 sec;   INR: 1.06 ratio         PTT - ( 14 Mar 2023 00:55 )  PTT:102.3 sec      RADIOLOGY & ADDITIONAL TESTS: Reviewed.   INTERVAL HPI/OVERNIGHT EVENTS: d/c'ed nicardipine drip overnight. Removed 1L net from HD yesterday.    SUBJECTIVE: Patient seen and examined at bedside. Pt A&Ox3 today. Pt has no acute complaints.      VITAL SIGNS:  ICU Vital Signs Last 24 Hrs  T(C): 36.1 (14 Mar 2023 04:00), Max: 36.2 (13 Mar 2023 08:00)  T(F): 97 (14 Mar 2023 04:00), Max: 97.2 (13 Mar 2023 08:00)  HR: 57 (14 Mar 2023 06:00) (49 - 76)  BP: 164/69 (14 Mar 2023 06:00) (137/49 - 164/69)  BP(mean): 93 (14 Mar 2023 06:00) (70 - 137)  ABP: 163/49 (14 Mar 2023 06:00) (125/46 - 175/53)  ABP(mean): 92 (14 Mar 2023 06:00) (75 - 98)  RR: 17 (14 Mar 2023 06:00) (12 - 18)  SpO2: 100% (14 Mar 2023 06:00) (94% - 100%)    O2 Parameters below as of 13 Mar 2023 22:00  Patient On (Oxygen Delivery Method): mask, Venturi    O2 Concentration (%): 35      Mode: standby  Plateau pressure:   P/F ratio:     03-13 @ 07:01  -  03-14 @ 07:00  --------------------------------------------------------  IN: 1204 mL / OUT: 1400 mL / NET: -196 mL      CAPILLARY BLOOD GLUCOSE      POCT Blood Glucose.: 84 mg/dL (14 Mar 2023 05:47)    ECG:    PHYSICAL EXAM:    General: NAD, doing well.  HEENT: Venturi face mask, NG in place. Pupils reactive b/l.  Lymph Nodes: No ELMIRA palpated  Neck: trachea midline. no trach.   Respiratory: CTABL  Cardiovascular: RRR. +s1/s2, -s3/s4. No murmur, rubs, gallops. No edema  Abdomen: NT/ND. +BS, No HSM.   Extremities: 2+ pulses  Skin: edematous in UEs. No rashes, ecchymoses, or petechiae noted  Neurological: AAOx3    MEDICATIONS:  MEDICATIONS  (STANDING):  chlorhexidine 2% Cloths 1 Application(s) Topical daily  cloNIDine 0.2 milliGRAM(s) Oral two times a day  darbepoetin Injectable ViaL 80 MICROGram(s) IV Push every week  heparin  Infusion. 1500 Unit(s)/Hr (15 mL/Hr) IV Continuous <Continuous>  hydrALAZINE 100 milliGRAM(s) Oral every 8 hours  isosorbide   dinitrate Tablet (ISORDIL) 10 milliGRAM(s) Oral three times a day  pantoprazole  Injectable 40 milliGRAM(s) IV Push daily  piperacillin/tazobactam IVPB.. 3.375 Gram(s) IV Intermittent every 12 hours    MEDICATIONS  (PRN):  sodium chloride 0.9% Bolus. 100 milliLiter(s) IV Bolus every 5 minutes PRN SBP LESS THAN or EQUAL to 100 mmHg      ALLERGIES:  Allergies    No Known Allergies    Intolerances        LABS:                        8.7    9.52  )-----------( 183      ( 14 Mar 2023 00:55 )             29.0     03-14    134<L>  |  95<L>  |  20  ----------------------------<  120<H>  3.7   |  25  |  2.37<H>    Ca    8.2<L>      14 Mar 2023 00:55  Phos  4.0     03-14  Mg     1.80     03-14    TPro  5.7<L>  /  Alb  2.6<L>  /  TBili  1.4<H>  /  DBili  x   /  AST  17  /  ALT  9   /  AlkPhos  663<H>  03-14    PT/INR - ( 14 Mar 2023 00:55 )   PT: 12.3 sec;   INR: 1.06 ratio         PTT - ( 14 Mar 2023 00:55 )  PTT:102.3 sec      RADIOLOGY & ADDITIONAL TESTS: Reviewed.

## 2023-03-15 NOTE — DISCHARGE NOTE PROVIDER - NSDCMRMEDTOKEN_GEN_ALL_CORE_FT
albuterol 90 mcg/inh inhalation aerosol: 4 puff(s) inhaled every 6 hours, As Needed  aspirin 81 mg oral tablet, chewable: 1 tab(s) orally once a day  cloNIDine 0.1 mg oral tablet: 1 tab(s) orally 2 times a day  Crestor 5 mg oral tablet: 1 tab(s) orally once a day  Eliquis 2.5 mg oral tablet: 1 tab(s) orally 2 times a day  hydrALAZINE 100 mg oral tablet: 1 tab(s) orally every 8 hours  ipratropium CFC free 17 mcg/inh inhalation aerosol: 2 puff(s) inhaled every 6 hours, As Needed  isosorbide dinitrate 30 mg oral tablet: 1 tab(s) orally every 8 hours  losartan 100 mg oral tablet: 1 tab(s) orally once a day  Melatonin 3 mg oral tablet: 2 tab(s) orally once a day (at bedtime)  MiraLax oral powder for reconstitution: 17 gram(s) orally once a day  NIFEdipine 90 mg oral tablet, extended release: 1 tab(s) orally once a day  OLANZapine: 30 milligram(s) orally once a day (at bedtime)  omeprazole 40 mg oral delayed release capsule: 1 cap(s) orally once a day  sevelamer carbonate 800 mg oral tablet: 1 tab(s) orally 3 times a day (with meals)  spironolactone 25 mg oral tablet: 1 tab(s) orally once a day  traZODone 100 mg oral tablet: 2 tab(s) orally once a day   albuterol 90 mcg/inh inhalation aerosol: 4 puff(s) inhaled every 6 hours, As Needed  aspirin 81 mg oral tablet, chewable: 1 tab(s) orally once a day  cloNIDine 0.1 mg oral tablet: 1 tab(s) orally 2 times a day  Crestor 5 mg oral tablet: 1 tab(s) orally once a day  Eliquis 5 mg oral tablet: 1 tab(s) orally 2 times a day  hydrALAZINE 100 mg oral tablet: 1 tab(s) orally every 8 hours  ipratropium CFC free 17 mcg/inh inhalation aerosol: 2 puff(s) inhaled every 6 hours, As Needed  isosorbide dinitrate 30 mg oral tablet: 1 tab(s) orally every 8 hours  losartan 100 mg oral tablet: 1 tab(s) orally once a day  Melatonin 3 mg oral tablet: 2 tab(s) orally once a day (at bedtime)  MiraLax oral powder for reconstitution: 17 gram(s) orally once a day  NIFEdipine 90 mg oral tablet, extended release: 1 tab(s) orally once a day  OLANZapine: 30 milligram(s) orally once a day (at bedtime)  omeprazole 40 mg oral delayed release capsule: 1 cap(s) orally once a day  sevelamer carbonate 800 mg oral tablet: 1 tab(s) orally 3 times a day (with meals)  spironolactone 25 mg oral tablet: 1 tab(s) orally once a day  traZODone 100 mg oral tablet: 2 tab(s) orally once a day   albuterol 90 mcg/inh inhalation aerosol: 4 puff(s) inhaled every 6 hours, As Needed  aspirin 81 mg oral tablet, chewable: 1 tab(s) orally once a day  cloNIDine 0.1 mg oral tablet: 1 tab(s) orally 2 times a day  Crestor 5 mg oral tablet: 1 tab(s) orally once a day  Eliquis 5 mg oral tablet: 1 tab(s) orally 2 times a day  hydrALAZINE 100 mg oral tablet: 1 tab(s) orally every 8 hours  ipratropium CFC free 17 mcg/inh inhalation aerosol: 2 puff(s) inhaled every 6 hours, As Needed  isosorbide dinitrate 30 mg oral tablet: 1 tab(s) orally every 8 hours  losartan 100 mg oral tablet: 1 tab(s) orally once a day  Melatonin 3 mg oral tablet: 2 tab(s) orally once a day (at bedtime)  MiraLax oral powder for reconstitution: 17 gram(s) orally once a day  NIFEdipine 90 mg oral tablet, extended release: 1 tab(s) orally once a day  omeprazole 40 mg oral delayed release capsule: 1 cap(s) orally once a day  sevelamer carbonate 800 mg oral tablet: 1 tab(s) orally 3 times a day (with meals)  spironolactone 25 mg oral tablet: 1 tab(s) orally once a day  traZODone 100 mg oral tablet: 2 tab(s) orally once a day

## 2023-03-15 NOTE — SWALLOW VFSS/MBS ASSESSMENT ADULT - DIAGNOSTIC IMPRESSIONS
1) Functional Oral Stage for Puree, Regular Solids, Moderately Thick Liquids and Mildly Thick Liquids characterized by adequate oral containment, adequate bolus mastication for solids with ability to break down solids, adequate bolus manipulation, adequate anterior to posterior transfer, and adequate oral clearance.   2) Functional Pharyngeal Stage for Puree, Regular Solids, and Moderately Thick Liquids characterized by adequate initiation of the pharyngeal swallow, adequate hyolaryngeal excursion, adequate base of tongue retraction, adequate laryngeal vestibule closure, adequate epiglottic deflection, and adequate pharyngeal contractility. There is adequate pharyngeal clearance. There is No Aspiration noted before, during, or after the swallow.   4) Severe Pharyngeal Dysphagia for Mildly Thick Liquids characterized by delayed initiation of the pharyngeal swallow (Bolus head at the pyriforms), reduced hyolaryngeal excursion, reduced base of tongue retraction, reduced laryngeal vestibule closure, reduced epiglottic deflection, reduced pharyngeal contractility. There is Aspiration during the swallow resulting from spillover from the pyriform sinuses. Patient is sensate given immediate cough response. 1) Functional Oral Stage for Puree, Regular Solids, Moderately Thick Liquids and Mildly Thick Liquids characterized by adequate oral containment, adequate bolus mastication for solids with ability to break down solids, adequate bolus manipulation, adequate anterior to posterior transfer, and adequate oral clearance.   2) Functional Pharyngeal Stage for Puree, Regular Solids, and Moderately Thick Liquids characterized by adequate initiation of the pharyngeal swallow, adequate hyolaryngeal excursion, adequate base of tongue retraction, adequate laryngeal vestibule closure, adequate epiglottic deflection, and adequate pharyngeal contractility. There is adequate pharyngeal clearance. There is No Aspiration noted before, during, or after the swallow.   3) Severe Pharyngeal Dysphagia for Mildly Thick Liquids characterized by delayed initiation of the pharyngeal swallow (Bolus head at the pyriforms), reduced hyolaryngeal excursion, reduced base of tongue retraction, reduced laryngeal vestibule closure, reduced epiglottic deflection, reduced pharyngeal contractility. There is Aspiration during the swallow resulting from spillover from the pyriform sinuses. Patient is sensate given immediate cough response. 1) Functional Oral Stage for Puree, Regular Solids, Moderately Thick Liquids and Mildly Thick Liquids characterized by adequate oral containment, adequate bolus mastication for solids with ability to break down solids, adequate bolus manipulation, adequate anterior to posterior transfer, and adequate oral clearance.   2) Functional Pharyngeal Stage for Puree, Regular Solids, and Moderately Thick Liquids characterized by adequate initiation of the pharyngeal swallow, adequate hyolaryngeal excursion, adequate base of tongue retraction, adequate laryngeal vestibule closure, adequate epiglottic deflection, and adequate pharyngeal contractility. There is adequate pharyngeal clearance. There is No Aspiration noted before, during, or after the swallow.   3) Severe Pharyngeal Dysphagia for Mildly Thick Liquids characterized by delayed initiation of the pharyngeal swallow (Bolus head at the pyriforms), reduced hyolaryngeal excursion, adequate base of tongue retraction, reduced laryngeal vestibule closure, reduced epiglottic deflection, adequate pharyngeal contractility. There is adequate pharyngeal clearance. There is Aspiration during the swallow. Patient is sensate given immediate cough response.

## 2023-03-15 NOTE — SWALLOW VFSS/MBS ASSESSMENT ADULT - ADDITIONAL RECOMMENDATIONS
This department to follow up as schedule permits to assess for diet tolerance/swallow therapy. Medical team further advised to reconsult if there is a change in medical status and/or observed change in patient's tolerance of recommended PO diet. Patient will benefit from swallow therapy pending discharge plans (e.g. Rehab center vs. Homecare vs. Outpatient at [facility] [phone])

## 2023-03-15 NOTE — PROGRESS NOTE ADULT - PROBLEM SELECTOR PLAN 4
Reported history of Afib on Eliquis 2.5 BID  - was started heparin gtt --> switched to Eliquis  - increased Eliquis dose to 5mg bid because 2.5mg bid dosing is not optimal therapeutic dose

## 2023-03-15 NOTE — PROGRESS NOTE ADULT - PROBLEM SELECTOR PLAN 5
- TTS, last dialysis on 3/7/23 prior to admission  - Nephrology consult for dialysis, follows with dr castellanos ([facility])  - s/p HD on 3/13 in MICU  - Plan for HD today - TTS, last dialysis on 3/7/23 prior to admission  - Nephrology consult for dialysis, follows with dr castellanos ([facility] [facility])  - s/p HD on 3/13 in MICU  - Plan for HD today  - Follow up repeat CMP

## 2023-03-15 NOTE — SWALLOW VFSS/MBS ASSESSMENT ADULT - ROSENBEK'S PENETRATION ASPIRATION SCALE
(8) contrast passes glottis, visible subglottic residue remains, absent patient response (aspiration) (1) no aspiration, contrast does not enter airway (7) contrast passes glottis, visible subglottic residue remains despite patient’s response (aspiration)

## 2023-03-15 NOTE — DISCHARGE NOTE PROVIDER - NSDCCPCAREPLAN_GEN_ALL_CORE_FT
PRINCIPAL DISCHARGE DIAGNOSIS  Diagnosis: Sepsis with acute hypoxic respiratory failure  Assessment and Plan of Treatment: You were admitted for an aspiration pneumonia in the setting of a recent Covid respiratory infection, leading to difficulty with breathing. You were briefly intubated and on a ventilator to help you breath. You had a bronchoscopy to evaluate your breathing issues, as well as a chest tube to drain fluid from around your lungs. Your breathing improved and you were extubated and moved to the medicine wards for further management. You finished a 7-day course of antibiotics.   ***Medication Changes:  PLEASE CONTINUIE TAKIN.   PLEASE STOP TAKIN.   PLEASE CHANGE THE WAY YOU TAKE:  1. Eliquis. You should now take 5mg twice a day instead of 2.5mg twice a day.  Please make sure to follow up with all the clinics listed in the "Follow-up" section of your discharge paperwork. If an appointment has been made for you, please make sure to attend the appointment, and call the clinic if you would like to reschedule. If an appointment has not bee made for you, please call the clinic in the number listed to make the appointment.  Please make sure to follow up with your primary care physician within the next 1-2 weeks to follow up on your condition.  Please return to the emergency room if you experience persistent fever, chills, nausea, vomiting, shortness of breath, lightheadedness/syncope.      SECONDARY DISCHARGE DIAGNOSES  Diagnosis: ESRD on dialysis  Assessment and Plan of Treatment: While admitted, you received hemodialysis for your kidney disease.     PRINCIPAL DISCHARGE DIAGNOSIS  Diagnosis: Sepsis with acute hypoxic respiratory failure  Assessment and Plan of Treatment: You were admitted for an aspiration pneumonia in the setting of a recent Covid respiratory infection, leading to difficulty with breathing. You were briefly intubated and on a ventilator to help you breath. You had a bronchoscopy to evaluate your breathing issues, as well as a chest tube to drain fluid from around your lungs. Your breathing improved and you were extubated and moved to the medicine wards for further management. You finished a 7-day course of antibiotics. Your breathing improved and you were medically ready for discharge to subacute rehab.   ***Medication Changes:  PLEASE STOP TAKIN. Olanzapine  PLEASE CHANGE THE WAY YOU TAKE:  1. Eliquis. You should now take Eliquis 5mg twice a day instead of 2.5mg twice a day.  Please make sure to follow up with all the clinics listed in the "Follow-up" section of your discharge paperwork. If an appointment has been made for you, please make sure to attend the appointment, and call the clinic if you would like to reschedule. If an appointment has not bee made for you, please call the clinic in the number listed to make the appointment.  Please make sure to follow up with your primary care physician within the next 1-2 weeks to follow up on your condition.  Please return to the emergency room if you experience persistent fever, chills, nausea, vomiting, shortness of breath, lightheadedness/syncope.      SECONDARY DISCHARGE DIAGNOSES  Diagnosis: ESRD on dialysis  Assessment and Plan of Treatment: While admitted, you received hemodialysis for your kidney disease.

## 2023-03-15 NOTE — PROGRESS NOTE ADULT - PROBLEM SELECTOR PLAN 2
Pt. with anemia in the setting of ESRD. Hgb below target range at 9.2 today. Discussed with [facility] on admission and noted that he is on Aranesp 80mcg weekly (last dose on 3/7/23). Will continue weekly Aranesp (next dose 3/15/23 with HD). Target range of 10-11. Monitor Hgb.

## 2023-03-15 NOTE — PROGRESS NOTE ADULT - PROBLEM SELECTOR PLAN 9
Hgb 9.2 with MCV ~101 concerning for macrocytic anemia  - no evidence of active bleed  - stable from Feb 2023  - May pursue OP workup   - EPO weekly Hgb 9.2 with MCV ~101 concerning for macrocytic anemia  - no evidence of active bleed  - stable from Feb 2023  - May pursue OP iron and TSH studies workup  - folate and B12 wnr  - EPO weekly

## 2023-03-15 NOTE — PROGRESS NOTE ADULT - PROBLEM SELECTOR PLAN 1
Recent admission for AHRF 2/2 aspiration pneumonia +/- covid on Feb 2023 discharged on room air and presented with worsening hypoxia and hypercapnia not improved with BIPAP  - suspect i/s/o large R loculated PLEF and possible aspiration pneumonia  - Intubated (3/10 - 3/13)  - antibiotics as below  - S/p bronchoscopy 3/11 and extubation on 3/13   -Wean off nasal cannula as tolerated Recent admission for AHRF 2/2 aspiration pneumonia +/- covid on Feb 2023 discharged on room air and presented with worsening hypoxia and hypercapnia not improved with BIPAP requiring intubation and MICU admisison  - CXR Large R loculated pleural effusion from suspected aspiration pneumonia  - Intubated (3/10 - 3/13)   - S/p bronchoscopy 3/11 with BAL 3/11 with moderate yeast  - thoracentesis with fluid c/w exudative process  - repeat CXR without pneumothorax; resolution of pleural effusion  - Wean off nasal cannula as tolerated   - Continue Duoneb  - incentive spirometry   - aspiration  precautions in place

## 2023-03-15 NOTE — SWALLOW VFSS/MBS ASSESSMENT ADULT - RECOMMENDED CONSISTENCY
1) Regular Solids with Moderately Thick Liquids  2) Swallowing Guidelines: Chew Solids Well, Sit Upright during Mealtimes and 30 min post, Maintain Oral Hygiene.   3) Aspiration and Reflux Precautions

## 2023-03-15 NOTE — PROGRESS NOTE ADULT - PROBLEM SELECTOR PLAN 12
#Prophylaxis/GOC  DVT: anticoagulation as above  Diet: Pureed diet w moderately thickened fluids  GI ppx: pantoprazole  GOC: per pt's mother pt is full code  Dispo: presenting from [facility] will likely return pending medical stabilization

## 2023-03-15 NOTE — PROGRESS NOTE ADULT - ASSESSMENT
57 history of HTN, HLD, DM2, Bipolar disorder, ESRD on HD (TTS), recent admission on 1/14 for AHRF and septic shock at [place], and recent admission (2-7 - 2/14) at [facility] for septic shock and AHRF likely 2/2 aspiration pneumonia and Covid, hospital course c/b cardiac arrest. Patient presenting from [facility] with acute hypoxic hypercapnic respiratory failure, continued hypoxia on BIPAP requiring intubation likely iso mucus plugging    #Neuro  -CTH wnl  - initially A&O3 on admission  - now off sedation and A&Ox3  - Olanzapine held iso prolonged QTc  - f/u repeat EKG and restart if QTc < 500    #Respiratory  #AHRF  - recent admission for AHRF 2/2 aspiration pneumonia +/- covid on Feb 2023 discharged on room air  - presenting with worsening hypoxia and hypercapnia not improved with BIPAP  - suspect i/s/o large R loculated PLEF and possible aspiration pneumonia  - Intubated (3/10 - 3/13)  - antibiotics as below  - S/p bronchoscopy 3/11 and extubation on 3/13     #Pleural effusion  - large loculated R PLEF, intervally increased from small in Feb 2023  - suspect i/s/o prior aspiration pneumonia +/- covid  - now resolved on most recent CXR  - 3/12: S/p thoracentesis, fluid studies c/w exudative, f/u cultures     #Cardiovascular  #Vasoplegic shock  - Normotensive prior to intubation  - required levo and elenita pushes demetri-intubation  - off pressors, maintain MAP > 65  - currently resolved and pt is hypertensive    #HTN:  - on clonidine, hydralazine, and isordil currently  - hydralazine IVP 5mg PRN    #Afib  - reported history of Afib on eliquis 2.5 BID  - was started heparin gtt while npo  - transition back to eliquis    #Troponinemia  - likely 2/2 demand ischemia +/- ESRD  - trops downtrending, 330-->224, baseline 100 on prior admission  - no evidence of ischemic change on EKG    #Renal  #ESRD  - TTS, last dialysis on 3/7/23 prior to admission  - Nephrology consult for dialysis, follows with dr castellanos ([place] [facility])  - s/p HD on 3/13 in MICU    #GI  #Elevated ALP  - persistently elevated  - 900s and GGT since Feb 2023  - patient asymptomatic at time, RUQ negative in Feb 2023  - monitor LFTs  - f/u S&S    #ID  #Aspiration pneumonia  - previous admission for aspiration pneumonia, negative BCx  - suspect contributory to current presentation  - c/w zosyn  - neg MRSA swab --> d/c vancomycin  - f/u BCx    #Endo  #DM2   - history of diabetic foot ulcers in the past  - recent A1C 4.8 and episodes of hypoglycemia last admission  - monitor off insulin    #Heme  #anemia  - no evidence of active bleed  - EPO weekly  - stable from Feb 2023    #Prophylaxis/GOC  DVT: anticoagulation as above  Diet: NPO, pending S&S  GI ppx: pantoprazole  GOC: per pt's mother pt is full code  Dispo: presenting from [facility] will likely return pending medical stabilization     57M PMH HTN, HLD, DMT2, Bipolar disorder, ESRD on HD (TTS), recent admissions for AHRF and septic shock with AHRF likely 2/2 aspiration pneumonia and COVID, hospital course c/b cardiac arrest. Now presenting with acute hypoxic hypercapnic respiratory failure, continued hypoxia on BIPAP requiring MICU admission with intubation likely iso mucus plugging, Now extubated and clinically improving on medicine floors.      #AHRF  Recent admission for AHRF 2/2 aspiration pneumonia +/- covid on Feb 2023 discharged on room air and presented with worsening hypoxia and hypercapnia not improved with BIPAP  - suspect i/s/o large R loculated PLEF and possible aspiration pneumonia  - Intubated (3/10 - 3/13)  - antibiotics as below  - S/p bronchoscopy 3/11 and extubation on 3/13   -Wean off nasal cannula as tolerated  #Aspiration pneumonia  - previous admission for aspiration pneumonia, negative BCx  - suspect contributory to current presentation  - c/w zosyn  - neg MRSA swab --> d/c vancomycin  - BCx NGTD  - pureed diet and moderately thickened fluids  #Pleural effusion  - large loculated R PLEF, intervally increased from small in Feb 2023  - suspect i/s/o prior aspiration pneumonia +/- covid  - now resolved on most recent CXR  - 3/12: S/p thoracentesis, fluid studies c/w exudative  - BAL with moderate yeast  - Pleural fluid test in progress  - BCx NGTD  #RESOLVED Vasoplegic shock  - Normotensive prior to intubation  - required levo and elenita pushes demetri-intubation  - off pressors, maintain MAP > 65  - currently resolved and pt is hypertensive  #Afib  - reported history of Afib on eliquis 2.5 BID  - was started heparin gtt will transition back to eliquis  #ESRD  - TTS, last dialysis on 3/7/23 prior to admission  - Nephrology consult for dialysis, follows with dr castellanos ([place] [facility])  - s/p HD on 3/13 in MICU  -Plan for HD today  #Troponinemia  - likely 2/2 demand ischemia +/- ESRD  - trops downtrending, 330-->224, baseline 100 on prior admission  - no evidence of ischemic change on EKG  #DMT2   - history of diabetic foot ulcers in the past  - recent A1C 4.8 and episodes of hypoglycemia last admission  - monitor off insulin  #HTN:  - on clonidine, hydralazine, and isordil currently  - hydralazine IVP 5mg PRN  #macrocytic anemia  - no evidence of active bleed  - EPO weekly  - stable from Feb 2023  #Bipolar Disorder  - Olanzapine held iso prolonged QTc  - f/u repeat EKG and restart if QTc < 500  #Elevated ALP  - persistently elevated  - 900s and GGT since Feb 2023  - patient asymptomatic at time, RUQ negative in Feb 2023  - monitor LFTs  #Prophylaxis/GOC  DVT: anticoagulation as above  Diet: Pureed diet w moderately thickened fluids  GI ppx: pantoprazole  GOC: per pt's mother pt is full code  Dispo: presenting from [facility] will likely return pending medical stabilization   57M PMH HTN, HLD, DMT2, Bipolar disorder, ESRD on HD (TTS), recent admissions for AHRF and septic shock with AHRF likely 2/2 aspiration pneumonia and COVID, hospital course c/b cardiac arrest. Now presenting with acute hypoxic hypercapnic respiratory failure, continued hypoxia on BIPAP requiring MICU admission with intubation likely iso mucus plugging, Now extubated and clinically improving on medicine floors.

## 2023-03-15 NOTE — SWALLOW VFSS/MBS ASSESSMENT ADULT - COMMENTS
Progress Note- MICU 3/14: "57 history of HTN, HLD, DM2, Bipolar disorder, ESRD on HD (TTS), recent admission on 1/14 for AHRF and septic shock at [place], and recent admission (2-7 - 2/14) at [facility] for septic shock and AHRF likely 2/2 aspiration pneumonia and Covid, hospital course c/b cardiac arrest. Patient presenting from [facility] with acute hypoxic hypercapnic respiratory failure, continued hypoxia on BIPAP requiring intubation likely iso mucus plugging."     CR Chest 3/15: "FINDINGS: New enteric tube courses down the esophagus entering the stomach with its tip in the body of the stomach. The endotracheal tube has been removed. Hazy lung bases may represent developing small layering effusions. No focal consolidations. Heart size is stable and appears enlarged despite projection."    Patient is known to this department, seen for a clinical swallow evaluation on 3/14/23 with recommendations of Puree with Moderately Thick Liquids (see full report). Patient was also seen during a previous admission for a cinesophragram on 2/15/23 with recommendations of Regular Solids with Moderately Thick Liquids (see full report for details).     Patient received in Radiology Suite this AM for a cinesophragram. Patient transferred to a specialized seating unit with lateral view projection. Patient with nasal cannula.

## 2023-03-15 NOTE — PROGRESS NOTE ADULT - PROBLEM SELECTOR PLAN 3
- large loculated R PLEF, intervally increased from small in Feb 2023  - suspect i/s/o prior aspiration pneumonia +/- covid  - now resolved on most recent CXR  - 3/12: S/p thoracentesis, fluid studies c/w exudative  - BAL with moderate yeast  - Pleural fluid test in progress  - BCx NGTD - large loculated R pleural effusion; intervally increased from small in Feb 2023  - suspect i/s/o prior aspiration pneumonia +/- COVID  - now resolved on most recent CXR  - 3/12: S/p thoracentesis, fluid studies c/w exudative  - BAL with moderate yeast  - Pleural fluid test in progress  - BCx NGTD

## 2023-03-15 NOTE — PROGRESS NOTE ADULT - PROBLEM SELECTOR PLAN 3
Pt. with hyperphosphatemia in the setting of ESRD. Serum phosphorus within target range of 4.8 today. Low phos diet. Monitor serum phosphorus level.

## 2023-03-15 NOTE — CHART NOTE - NSCHARTNOTESELECT_GEN_ALL_CORE
MICU Transfer Note/Transfer Note
POCUS/Event Note
Communications Note
Event Note
Follow Up/Nutrition Services

## 2023-03-15 NOTE — PROGRESS NOTE ADULT - PROBLEM SELECTOR PLAN 8
- on clonidine, hydralazine, and isordil currently  - started spironolactone and nifedipine - on clonidine, hydralazine, and isordil currently  - Resumed spironolactone and nifedipine

## 2023-03-15 NOTE — SWALLOW VFSS/MBS ASSESSMENT ADULT - ORAL PHASE
within functional limits Premature spillage to the hypopharynx (Vallecular and pyriforms) Within functional limits

## 2023-03-15 NOTE — PROGRESS NOTE ADULT - SUBJECTIVE AND OBJECTIVE BOX
Patient is a 57y old  Male who presents with a chief complaint of Sepsis (15 Mar 2023 07:48)     INTERVAL HPI/OVERNIGHT EVENTS:  - No acute events overnight    SUBJECTIVE  - Patient seen and evaluated at bedside  - Patient reports presence of   - Patient reports absence of fevers, chills, HA, lightheadedness, dizziness, nausea, emesis, chest pain, dyspnea, palpitations, abd pain, diarrhea, urinary symptoms, skin color changes or rashes, or LE edema     MEDICATIONS  (STANDING):  chlorhexidine 2% Cloths 1 Application(s) Topical daily  cloNIDine 0.2 milliGRAM(s) Oral three times a day  darbepoetin Injectable ViaL 80 MICROGram(s) IV Push every week  heparin  Infusion.  Unit(s)/Hr (12 mL/Hr) IV Continuous <Continuous>  hydrALAZINE 100 milliGRAM(s) Oral every 8 hours  isosorbide   dinitrate Tablet (ISORDIL) 10 milliGRAM(s) Oral three times a day  pantoprazole  Injectable 40 milliGRAM(s) IV Push daily  piperacillin/tazobactam IVPB.. 3.375 Gram(s) IV Intermittent every 12 hours    MEDICATIONS  (PRN):  heparin   Injectable 5500 Unit(s) IV Push every 6 hours PRN For aPTT less than 40  heparin   Injectable 2500 Unit(s) IV Push every 6 hours PRN For aPTT between 40 - 57  sodium chloride 0.9% Bolus. 100 milliLiter(s) IV Bolus every 5 minutes PRN SBP LESS THAN or EQUAL to 100 mmHg        REVIEW OF SYSTEMS: As indicated above; otherwise, negative    VITAL SIGNS:  T(F): 97.6 (03-15-23 @ 05:32), Max: 98.9 (03-14-23 @ 12:12)  HR: 70 (03-15-23 @ 05:32) (59 - 85)  BP: 166/76 (03-15-23 @ 05:32) (143/61 - 170/70)  RR: 18 (03-15-23 @ 05:32) (12 - 18)  SpO2: 99% (03-15-23 @ 05:32) (95% - 99%)    PHYSICAL EXAM:  General: NAD, well-groomed, well-developed  Eyes: Conjunctiva and sclera clear  ENMT: Moist mucous membranes  Neck: No palpable pre-auricular, post-auricular, occipital, mandibular, submental, supra-clavicular, or infra-clavicular lymph nodes   Chest: Clear to auscultation bilaterally; no rales, rhonchi, or wheezing  Heart: Regular rate and rhythm; normal S1 and S2; no murmurs, rubs, or gallops  Abd: Soft, nontender, nondistended  Nervous System: AAOX3  Psych: Appropriate affect  Ext: no peripheral LE edema bilaterally    LABS:                        9.2    9.90  )-----------( 177      ( 15 Mar 2023 06:20 )             31.1     15 Mar 2023 06:20    129    |  93     |  25     ----------------------------<  102    4.2     |  23     |  3.06     Ca    8.7        15 Mar 2023 06:20  Phos  4.8       15 Mar 2023 06:20  Mg     2.40      15 Mar 2023 06:20    TPro  6.1    /  Alb  2.8    /  TBili  1.4    /  DBili  x      /  AST  29     /  ALT  10     /  AlkPhos  1065   15 Mar 2023 06:20  PTT - ( 15 Mar 2023 06:20 )  PTT:30.1 sec  CAPILLARY BLOOD GLUCOSE      POCT Blood Glucose.: 93 mg/dL (14 Mar 2023 22:01)  POCT Blood Glucose.: 79 mg/dL (14 Mar 2023 17:43)  POCT Blood Glucose.: 78 mg/dL (14 Mar 2023 14:17)  POCT Blood Glucose.: 80 mg/dL (14 Mar 2023 10:49)    BLOOD CULTURE  03-12 @ 16:07   No growth  --  --  03-11 @ 15:14   Normal Respiratory Nalini present  --  --    RADIOLOGY & ADDITIONAL TESTS:    Imaging Personally Reviewed:  [X ] YES     Consultant(s) Notes Reviewed:  Yes    Care Discussed with Consultants/Other Providers: Yes Patient is a 57y old  Male who presents with a chief complaint of Sepsis (15 Mar 2023 07:48)     INTERVAL HPI/OVERNIGHT EVENTS:  - No acute events overnight    SUBJECTIVE  - Patient seen and evaluated at bedside  - Patient reports absence of fevers, nausea, emesis, chest pain, dyspnea, palpitations, abd pain, diarrhea, bloody stools urinary symptoms, or LE edema     MEDICATIONS  (STANDING):  chlorhexidine 2% Cloths 1 Application(s) Topical daily  cloNIDine 0.2 milliGRAM(s) Oral three times a day  darbepoetin Injectable ViaL 80 MICROGram(s) IV Push every week  heparin  Infusion.  Unit(s)/Hr (12 mL/Hr) IV Continuous <Continuous>  hydrALAZINE 100 milliGRAM(s) Oral every 8 hours  isosorbide   dinitrate Tablet (ISORDIL) 10 milliGRAM(s) Oral three times a day  pantoprazole  Injectable 40 milliGRAM(s) IV Push daily  piperacillin/tazobactam IVPB.. 3.375 Gram(s) IV Intermittent every 12 hours    MEDICATIONS  (PRN):  heparin   Injectable 5500 Unit(s) IV Push every 6 hours PRN For aPTT less than 40  heparin   Injectable 2500 Unit(s) IV Push every 6 hours PRN For aPTT between 40 - 57  sodium chloride 0.9% Bolus. 100 milliLiter(s) IV Bolus every 5 minutes PRN SBP LESS THAN or EQUAL to 100 mmHg    REVIEW OF SYSTEMS: As indicated above; otherwise, negative    VITAL SIGNS:  T(F): 97.6 (03-15-23 @ 05:32), Max: 98.9 (03-14-23 @ 12:12)  HR: 70 (03-15-23 @ 05:32) (59 - 85)  BP: 166/76 (03-15-23 @ 05:32) (143/61 - 170/70)  RR: 18 (03-15-23 @ 05:32) (12 - 18)  SpO2: 99% (03-15-23 @ 05:32) (95% - 99%)    PHYSICAL EXAM:  General: NAD; thin and frail appearance  Eyes: Sclera clear  ENMT: Dry mucous membranes  Neck: No palpable pre-auricular, post-auricular, occipital, mandibular, submental, supra-clavicular, or infra-clavicular lymph nodes   Chest: Decreased pulmonary excursion and difficult to appreciate breath sounds on auscultation bilaterally; however patient did not appear to be in respiratory distress; speaking in sentences; on 2L/min nasal cannula  Heart: Regular rate and rhythm; palpable thrill; ?S4 sound with S1 and S2; +murmur present  Abd: Soft, nontender to palpation, nondistended  Nervous System: A&Ox3  Psych: Appropriate affect  Ext: no peripheral LE edema bilaterally    LABS:                        9.2    9.90  )-----------( 177      ( 15 Mar 2023 06:20 )             31.1     15 Mar 2023 06:20    129    |  93     |  25     ----------------------------<  102    4.2     |  23     |  3.06     Ca    8.7        15 Mar 2023 06:20  Phos  4.8       15 Mar 2023 06:20  Mg     2.40      15 Mar 2023 06:20    TPro  6.1    /  Alb  2.8    /  TBili  1.4    /  DBili  x      /  AST  29     /  ALT  10     /  AlkPhos  1065   15 Mar 2023 06:20  PTT - ( 15 Mar 2023 06:20 )  PTT:30.1 sec  CAPILLARY BLOOD GLUCOSE      POCT Blood Glucose.: 93 mg/dL (14 Mar 2023 22:01)  POCT Blood Glucose.: 79 mg/dL (14 Mar 2023 17:43)  POCT Blood Glucose.: 78 mg/dL (14 Mar 2023 14:17)  POCT Blood Glucose.: 80 mg/dL (14 Mar 2023 10:49)    BLOOD CULTURE  03-12 @ 16:07   No growth  --  --  03-11 @ 15:14   Normal Respiratory Nalini present  --  --    RADIOLOGY & ADDITIONAL TESTS:    Imaging Personally Reviewed:  [X ] YES     Consultant(s) Notes Reviewed:  Yes    Care Discussed with Consultants/Other Providers: Yes

## 2023-03-15 NOTE — DISCHARGE NOTE PROVIDER - HOSPITAL COURSE
HPI: 57M history of HTN, HLD, DM2, Bipolar disorder, ESRD on HD (TTS), afib on eliquis, recent admission on 1/14 for AHRF and septic shock at [place], and recent admission (2-7 - 2/14) at [facility] for septic shock and AHRF likely 2/2 aspiration pneumonia and Covid, hospital course c/b cardiac arrest iso compete lung atelectasis and mucus plugging, MICU course uncomplicated transferred to floors on 2L NC. Hospital course with transient LFTs without etiology and hypertensive urgency . Discharged to [facility] on room air. Patient is now presenting from [facility] with worsening respiratory distress, reportedly with hypoxia to 70s on room air. Last dialysis 2 days prior to admission.     ED Course: patient reportedly A&O3 but poor historian, denied dyspnea but clinically dyspneic per ED physician. Initial vitals Afebrile, HR 70s,  - 130/40 - 80, 97% on NRB. NRB escalated to BIPAP after initial VBG with pH 7.18, CO2 76, HCO3 28, O2 93. pH and CO2 improved on BIPAP to 7.22 and pCO2 68. However, patient accidentally disconnected self from BIPAP. BIPAP resumed after ~ 1 minute, however AHRF with hypoxia in 60s without resolution. Intubated in ED, admitted to MICU started on vanc/zosyn. CXR with large loculated R PLEF.    MICU COURSE: 57 history of HTN, HLD, DM2, Afib, Bipolar disorder, ESRD on HD (TTS), recent admission on 1/14 for AHRF and septic shock at [place], and recent admission (2-7 - 2/14) at [facility] for septic shock and AHRF likely 2/2 aspiration pneumonia and Covid, hospital course c/b cardiac arrest. Admitted to MICU for AHRF and started on vanc/zosyn and pressors. Pt failed bipap and was intubated. CXR with large loculated R PLEF. MRSA swab was negative and vancomycin was d/c'ed. Pt was weaned off pressors. Pt had a bronchoscopy and thoracentesis for drainage of pleural effusion, with fluid analysis consistent with an exudative effusion. Pt's course was complicated by htn and was given pushes of hydralazine 5mg IV and restarted on oral antihypertensive medications. Pt was initially restarted on home olanzapine but then it was held iso prolonged QTc. Pt was extubated on 3/13 and is currently satting well on NC. HPI: 57M history of HTN, HLD, DM2, Bipolar disorder, ESRD on HD (TTS), afib on eliquis, recent admission on 1/14 for AHRF and septic shock at [facility], and recent admission (2-7 - 2/14) at [facility] for septic shock and AHRF likely 2/2 aspiration pneumonia and Covid, hospital course c/b cardiac arrest iso compete lung atelectasis and mucus plugging, MICU course uncomplicated transferred to floors on 2L NC. Hospital course with transient LFTs without etiology and hypertensive urgency . Discharged to [facility] on room air. Patient is now presenting from [facility] with worsening respiratory distress, reportedly with hypoxia to 70s on room air. Last dialysis 2 days prior to admission.     ED Course: patient reportedly A&O3 but poor historian, denied dyspnea but clinically dyspneic per ED physician. Initial vitals Afebrile, HR 70s,  - 130/40 - 80, 97% on NRB. NRB escalated to BIPAP after initial VBG with pH 7.18, CO2 76, HCO3 28, O2 93. pH and CO2 improved on BIPAP to 7.22 and pCO2 68. However, patient accidentally disconnected self from BIPAP. BIPAP resumed after ~ 1 minute, however AHRF with hypoxia in 60s without resolution. Intubated in ED, admitted to MICU started on vanc/zosyn. CXR with large loculated R PLEF.    MICU COURSE: 57 history of HTN, HLD, DM2, Afib, Bipolar disorder, ESRD on HD (TTS), recent admission on 1/14 for AHRF and septic shock at [facility], and recent admission (2-7 - 2/14) at [facility] for septic shock and AHRF likely 2/2 aspiration pneumonia and Covid, hospital course c/b cardiac arrest. Admitted to MICU for AHRF and started on vanc/zosyn and pressors. Pt failed bipap and was intubated. CXR with large loculated R PLEF. MRSA swab was negative and vancomycin was d/c'ed. Pt was weaned off pressors. Pt had a bronchoscopy and thoracentesis for drainage of pleural effusion, with fluid analysis consistent with an exudative effusion. Pt's course was complicated by htn and was given pushes of hydralazine 5mg IV and restarted on oral antihypertensive medications. Pt was initially restarted on home olanzapine but then it was held iso prolonged QTc. Pt was extubated on 3/13 and is currently satting well on NC. For full details, please see H&P, progress notes, consult notes and ancillary notes.    57M PMH HTN, HLD, DMT2, Bipolar disorder, ESRD on HD (TTS), recent admissions for AHRF and septic shock with AHRF likely 2/2 aspiration pneumonia and COVID, hospital course c/b cardiac arrest. This admission presented with acute hypoxic hypercapnic respiratory failure.    Patient admitted to Internal Medicine for further management.    Hospital Course:  HPI: 57M history of HTN, HLD, DM2, Bipolar disorder, ESRD on HD (TTS), afib on eliquis, recent admission on 1/14 for AHRF and septic shock at [place], and recent admission (2-7 - 2/14) at [facility] for septic shock and AHRF likely 2/2 aspiration pneumonia and Covid, hospital course c/b cardiac arrest iso compete lung atelectasis and mucus plugging, MICU course uncomplicated transferred to floors on 2L NC. Hospital course with transient LFTs without etiology and hypertensive urgency . Discharged to [facility] on room air. Patient is now presenting from [facility] with worsening respiratory distress, reportedly with hypoxia to 70s on room air. Last dialysis 2 days prior to admission.     ED Course: patient reportedly A&O3 but poor historian, denied dyspnea but clinically dyspneic per ED physician. Initial vitals Afebrile, HR 70s,  - 130/40 - 80, 97% on NRB. NRB escalated to BIPAP after initial VBG with pH 7.18, CO2 76, HCO3 28, O2 93. pH and CO2 improved on BIPAP to 7.22 and pCO2 68. However, patient accidentally disconnected self from BIPAP. BIPAP resumed after ~ 1 minute, however AHRF with hypoxia in 60s without resolution. Intubated in ED, admitted to MICU started on vanc/zosyn. CXR with large loculated R PLEF.    MICU COURSE: 57 history of HTN, HLD, DM2, Afib, Bipolar disorder, ESRD on HD (TTS), recent admission on 1/14 for AHRF and septic shock at [place], and recent admission (2-7 - 2/14) at [facility] for septic shock and AHRF likely 2/2 aspiration pneumonia and Covid, hospital course c/b cardiac arrest. Admitted to MICU for AHRF and started on vanc/zosyn and pressors. Pt failed bipap and was intubated. CXR with large loculated R PLEF. MRSA swab was negative and vancomycin was d/c'ed. Pt was weaned off pressors. Pt had a bronchoscopy and thoracentesis for drainage of pleural effusion, with fluid analysis consistent with an exudative effusion. Pt's course was complicated by htn and was given pushes of hydralazine 5mg IV and restarted on oral antihypertensive medications. Pt was initially restarted on home olanzapine but then it was held iso prolonged QTc. Pt was extubated on 3/13 and satted well on NC. Transitioned to floors.    MEDICINE WARDS: Patient completed course of Zosyn and weaned off nasal cannula to RA then briefly back on 2L NC. Received intermittent HD as needed. Cinesophogram 3/15 showed no evidence of p[enetration or aspiration with puree diet. GOC discussion over phone 3/15 with father, who suspects patient with lack of insight into his complex medical illness with guarded prognosis. Palliative consulted for additional GOC discussion and patient wished to be full code with full understanding. Eliquis was changed to 5mg BId dosing given 2.5mg was subtherapeutic while on dialysis.    On day of discharge, patient is clinically stable with no new exam findings or acute symptoms compared to prior. The patient was seen by the attending physician on the date of discharge and deemed stable and acceptable for discharge. The patient's chronic medical conditions were treated accordingly per the patient's home medication regimen. The patient's medication reconciliation (with changes made to chronic medications), follow up appointments, discharge orders, instructions, and significant lab and diagnostic studies are as noted.     Discharge follow up action items:     1. Follow up with PCP in 1-2 weeks.  2. Follow up labs: none  3. Medication changes: none  4. On hold medications: olanzapine given QTc prolonged  5. Incidental findings: none    Patient's ordered code status: FULL CODE  Patient disposition: return to [facility]    Patient will be discharged to subacute rehab with close follow up.

## 2023-03-15 NOTE — CHART NOTE - NSCHARTNOTEFT_GEN_A_CORE
Pt seen for malnutrition follow up.    Medical Course:  - Per chart, pt is 57 year old male PMH HTN, HLD, type 2 DM, bipolar disorder, ESRD on HD, recent admissions for AHRF and septic shock likely secondary to aspiration PNA and COVID with course complicated by cardiac arrest, now presenting with acute hypoxic hypercapnic respiratory failure requiring MICU admission. Intubated 3/10, s/p bronchoscopy (3/11), s/p thoracentesis (3/12), extubated (3/13) and transferred to medicine floors for further management. Full code.    Nutrition Course:  - Pt was initially NPO (3/9-3/12) then received EN (3/12-3/13) in MICU. Diet was advanced following bedside swallow assessment (3/14) recommending puree/moderately thickened liquids. Now s/p cineesophagram (3/15) recommending regular solids/moderately thickened liquids. Pt noted with good PO intake per flowsheet documentation, requires assistance with feeding at meal times. Labs notable for hyponatremia, hyperphosphatemia. Pt continues on Renvela TID. No noted GI distress, last BM 3/13 per flowsheets. Fecal incontinence noted, ordered for Miralax 17 gm qD and senna 2 tablets qHS.     Diet Prescription:   - Pureed  - DASH/TLC {Sodium & Cholesterol Restricted}  - Moderately Thick Liquids    Pertinent Medications:   - atorvastatin, pantoprazole Tablet, piperacillin/tazobactam IV, polyethylene glycol, senna, sevelamer carbonate, spironolactone    Pertinent Labs:   - (3/15) Na 129 mmol/L<L> Glu 102 mg/dL<H> K+ 4.2 mmol/L Cr 3.06 mg/dL<H> BUN 25 mg/dL<H> Phos 4.8 mg/dL<H> Alb 2.8 g/dL<L>  - POCT: (3/14) 78-93    Weight: (3/14 dosing) 144.4 lbs / 65.5 kg, (3/10 dosing) 144.4 lbs / 65.5 kg  Height: 69 in / 175.3 cm  IBW: 160 lbs / 72.7 kg +/-10%  BMI: 21.3 kg/m^2    Physical Assessment:  Edema, per flowsheets: 1+ bilateral leg/bilateral knee  Pressure Injury, per Wound Care Consult (3/14): sacrum deep tissue pressure injury, right 5 lateral 5th metatarsal head mixed etiology deep tissue pressure injury/arterial insufficiency    Estimated Needs:   [X] Recalculated, with consideration for ICU downgrade/extubation, based on dosing weight 144.4 lbs / 65.5 kg  5534-5444 kcal daily @ kcal/kg, 78.6-91.7 gm protein daily @1.2-1.4 gm/kg     Previous Nutrition Diagnosis: [X] Moderate malnutrition in the context of chronic illness, ongoing  New Nutrition Diagnosis: [X] not applicable     Interventions:   1) Recommend change diet to no concentrated phosphorus consistent carbohydrate without evening snack; texture/consistency per SLP recommendations/medical discretion.  2) Recommend addition of Nepro 1 PO 2x daily (provides 425 kcal, 19 gm protein per 8oz serving); will provide thickeners to prepare to appropriate consistency.   3) Obtain pre/post-HD weights.  4) Recommend addition of Nephro-Linda to provide micronutrient coverage, assist with wound healing.    Monitor & Evaluate:  PO intake, tolerance to diet/supplement, nutrition related lab values, weight trends, BMs/GI distress, hydration status, skin integrity.  Yandy Chong, GILBERT, CDN #[id number]  Also available on [employer] Teams Pt seen for malnutrition follow up.    Medical Course:  - Per chart, pt is 57 year old male PMH HTN, HLD, type 2 DM, bipolar disorder, ESRD on HD, recent admissions for AHRF and septic shock likely secondary to aspiration PNA and COVID with course complicated by cardiac arrest, now presenting with acute hypoxic hypercapnic respiratory failure requiring MICU admission. Intubated (3/10), s/p bronchoscopy (3/11), s/p thoracentesis (3/12), extubated (3/13) and transferred to medicine floors for further management. Full code.    Nutrition Course:  - Pt was initially NPO (3/9-3/12) then received EN (3/12-3/13) in MICU. Diet was advanced following bedside swallow assessment (3/14) recommending puree/moderately thickened liquids. Now s/p cineesophagram (3/15) recommending regular solids/moderately thickened liquids. Pt noted with good PO intake per flowsheet documentation, requires assistance with feeding at meal times. Labs notable for hyponatremia, hyperphosphatemia. Pt continues on Renvela TID. No noted GI distress, last BM 3/13 per flowsheets. Fecal incontinence noted, ordered for Miralax 17 gm qD and senna 2 tablets qHS.     Diet Prescription:   - Pureed  - DASH/TLC {Sodium & Cholesterol Restricted}  - Moderately Thick Liquids    Pertinent Medications:   - atorvastatin, pantoprazole Tablet, piperacillin/tazobactam IV, polyethylene glycol, senna, sevelamer carbonate, spironolactone    Pertinent Labs:   - (3/15) Na 129 mmol/L<L> Glu 102 mg/dL<H> K+ 4.2 mmol/L Cr 3.06 mg/dL<H> BUN 25 mg/dL<H> Phos 4.8 mg/dL<H> Alb 2.8 g/dL<L>  - POCT: (3/14) 78-93    Weight: (3/14 dosing) 144.4 lbs / 65.5 kg, (3/10 dosing) 144.4 lbs / 65.5 kg  Height: 69 in / 175.3 cm  IBW: 160 lbs / 72.7 kg +/-10%  BMI: 21.3 kg/m^2    Physical Assessment:  Edema, per flowsheets: 1+ bilateral leg/bilateral knee  Pressure Injury, per Wound Care Consult (3/14): sacrum deep tissue pressure injury, right 5 lateral 5th metatarsal head mixed etiology deep tissue pressure injury/arterial insufficiency    Estimated Needs:   [X] Recalculated, with consideration for ICU downgrade/extubation, based on dosing weight 144.4 lbs / 65.5 kg  9993-5352 kcal daily @ kcal/kg, 78.6-91.7 gm protein daily @1.2-1.4 gm/kg     Previous Nutrition Diagnosis: [X] Moderate malnutrition in the context of chronic illness, ongoing  New Nutrition Diagnosis: [X] not applicable     Interventions:   1) Recommend change diet to no concentrated phosphorus consistent carbohydrate without evening snack; texture/consistency per SLP recommendations/medical discretion.  2) Recommend addition of Nepro 1 PO 2x daily (provides 425 kcal, 19 gm protein per 8oz serving); will provide thickeners to prepare to appropriate consistency.   3) Obtain pre/post-HD weights.  4) Recommend addition of Nephro-Linda to provide micronutrient coverage, assist with wound healing.    Monitor & Evaluate:  PO intake, tolerance to diet/supplement, nutrition related lab values, weight trends, BMs/GI distress, hydration status, skin integrity.  Yandy Chong RDN, CDN #[id number]  Also available on [employer] Teams

## 2023-03-15 NOTE — PROGRESS NOTE ADULT - PROBLEM SELECTOR PLAN 1
Pt. with ESRD on HD TIW (TTS, Dr. Diaz) who presented for shortness of breath, found to be in acute hypoxic respiratory failure requiring intubation and mechanical ventilation, loculated R pleural effusion on imaging. Extubated on 3/13/23. Last HD on 3/13/23 via LUE AVF. Labs reviewed. Pt appears clinically stable. Will plan for next HD this afternoon 3/15/23. Monitor labs. Dose meds as per HD.

## 2023-03-15 NOTE — PROGRESS NOTE ADULT - PROBLEM SELECTOR PLAN 2
- previous admission for aspiration pneumonia, negative BCx  - suspect contributory to current presentation  - c/w zosyn  - neg MRSA swab --> d/c vancomycin  - BCx NGTD  - pureed diet and moderately thickened fluids - previous admission for aspiration pneumonia and suspected to be contributory to respiratory distress  - Continue pip/tazo 7 day course  - neg MRSA swab --> d/c vancomycin  - BCx NGTD  - cineesophagogram 3/15 indicates no evidence of penetration or aspiration with puree, moderately thick and solids  - as per S&S recs, regular diet with low sodium; no concentrated phosphorus; no concentrated potassium; and modertaely thickened fluids  - GOC discussion with patient and then father over phone on 3/15 -- suspect lack of insight given complex medical course with complications and guarded prognosis   - palliative consulted for recs  - wean O2 as tolerated  -aspiration precautions in place

## 2023-03-15 NOTE — PROGRESS NOTE ADULT - PROBLEM SELECTOR PLAN 10
- Olanzapine held iso prolonged QTc  - f/u repeat EKG and restart if QTc < 500 History of bipolar disorder and depression with home meds including trazodone, Zyprexa, and Olanzapine   - Olanzapine held iso prolonged QTc  - f/u repeat EKG and restart if QTc < 500  - hold home meds with given c/f QTc prolongation  - f/u repeat EKG -- if QTc <500, can reinitiate home meds

## 2023-03-15 NOTE — DISCHARGE NOTE PROVIDER - CARE PROVIDER_API CALL
Dawit Brar  CRITICAL CARE MEDICINE  [address], [address]  [place] [address]  Phone: [phone]  Fax: [phone]  Established Patient  Follow Up Time: 1 week

## 2023-03-15 NOTE — DISCHARGE NOTE PROVIDER - NSDCFUADDAPPT_GEN_ALL_CORE_FT
APPTS ARE READY TO BE MADE: [ ] YES    Best Family or Patient Contact (if needed):    Additional Information about above appointments (if needed):    1: PCP - Dr. Dawit Brar  2:   3:     Other comments or requests:

## 2023-03-15 NOTE — PROGRESS NOTE ADULT - SUBJECTIVE AND OBJECTIVE BOX
[facility] DIVISION OF KIDNEY DISEASES AND HYPERTENSION -- FOLLOW UP NOTE  --------------------------------------------------------------------------------  Chief Complaint: ESRD    24 hour events/subjective:  PT seen this AM, lying flat in bed, no shortness of breath      PAST HISTORY  --------------------------------------------------------------------------------  No significant changes to PMH, PSH, FHx, SHx, unless otherwise noted    ALLERGIES & MEDICATIONS  --------------------------------------------------------------------------------  Allergies    No Known Allergies    Intolerances      Standing Inpatient Medications  albuterol/ipratropium for Nebulization 3 milliLiter(s) Nebulizer every 6 hours  apixaban 5 milliGRAM(s) Oral two times a day  aspirin  chewable 81 milliGRAM(s) Oral daily  atorvastatin 20 milliGRAM(s) Oral at bedtime  chlorhexidine 2% Cloths 1 Application(s) Topical daily  cloNIDine 0.2 milliGRAM(s) Oral three times a day  darbepoetin Injectable ViaL 80 MICROGram(s) IV Push every week  hydrALAZINE 100 milliGRAM(s) Oral every 8 hours  isosorbide   dinitrate Tablet (ISORDIL) 20 milliGRAM(s) Oral three times a day  melatonin 3 milliGRAM(s) Oral at bedtime  NIFEdipine XL 90 milliGRAM(s) Oral daily  pantoprazole    Tablet 40 milliGRAM(s) Oral before breakfast  piperacillin/tazobactam IVPB.. 3.375 Gram(s) IV Intermittent every 12 hours  polyethylene glycol 3350 17 Gram(s) Oral daily  senna 2 Tablet(s) Oral at bedtime  sevelamer carbonate 800 milliGRAM(s) Oral every 8 hours  spironolactone 25 milliGRAM(s) Oral daily    PRN Inpatient Medications  albuterol    90 MICROgram(s) HFA Inhaler 1 Puff(s) Inhalation four times a day PRN  sodium chloride 0.9% Bolus. 100 milliLiter(s) IV Bolus every 5 minutes PRN        VITALS/PHYSICAL EXAM  --------------------------------------------------------------------------------  T(C): 36.5 (03-15-23 @ 12:54), Max: 36.8 (03-14-23 @ 22:25)  HR: 63 (03-15-23 @ 12:54) (63 - 70)  BP: 142/68 (03-15-23 @ 12:54) (142/68 - 170/70)  RR: 17 (03-15-23 @ 12:54) (17 - 18)  SpO2: 95% (03-15-23 @ 12:54) (95% - 99%)  Wt(kg): --  Height (cm): 175.3 (03-14-23 @ 12:12)  Weight (kg): 65.5 (03-14-23 @ 12:12)  BMI (kg/m2): 21.3 (03-14-23 @ 12:12)  BSA (m2): 1.8 (03-14-23 @ 12:12)      03-14-23 @ 07:01  -  03-15-23 @ 07:00  --------------------------------------------------------  IN: 622 mL / OUT: 0 mL / NET: 622 mL      Physical Exam:  	Gen:  NAD, lying in bed flat  	Pulm: Diminished breath sounds B anteriorly  	CV: S1S2 RRR  	Abd: Soft, +BS   	Ext: No LE edema B/L, R toe amputation seen  	Neuro: Somnolent but following simple commands  	Skin: Warm and dry  	Vascular access: LUE AVF with palpable pulse and bruit heard    LABS/STUDIES  --------------------------------------------------------------------------------              9.2    9.90  >-----------<  177      [03-15-23 @ 06:20]              31.1     129  |  93  |  25  ----------------------------<  102      [03-15-23 @ 06:20]  4.2   |  23  |  3.06        Ca     8.7     [03-15-23 @ 06:20]      Mg     2.40     [03-15-23 @ 06:20]      Phos  4.8     [03-15-23 @ 06:20]    TPro  6.1  /  Alb  2.8  /  TBili  1.4  /  DBili  x   /  AST  29  /  ALT  10  /  AlkPhos  1065  [03-15-23 @ 06:20]    PT/INR: PT 12.3 , INR 1.06       [03-14-23 @ 00:55]  PTT: 30.1       [03-15-23 @ 06:20]      Creatinine Trend:  SCr 3.06 [03-15 @ 06:20]  SCr 2.37 [03-14 @ 00:55]  SCr 3.04 [03-13 @ 00:20]  SCr 2.59 [03-12 @ 00:05]  SCr 2.31 [03-11 @ 18:42]        HbA1c 4.7      [09-13-19 @ 12:50]  TSH 5.79      [02-07-23 @ 03:59]    HBsAg Nonreact      [02-15-23 @ 07:35]  HCV 0.21, Nonreact      [02-15-23 @ 07:35]  HIV Nonreact      [02-15-23 @ 07:35]    PAVEL: titer 1:320, pattern [id number]      [02-15-23 @ 07:35]

## 2023-03-16 NOTE — PROVIDER CONTACT NOTE (OTHER) - SITUATION
patient with bp of 170/80"s after dialysis
Patient pulled NG tube.
Pt found with 3.5 x 4 DTI ON SACRUM
Patient aptt 30.1, patient on heparin drip
/58 HR 79, O2 94% with 2L. Pt refused to take off O2 to be weaned off.
Pt's /51 HR 81

## 2023-03-16 NOTE — CONSULT NOTE ADULT - ASSESSMENT
57M history of HTN, HLD, DM2, Bipolar disorder, ESRD on HD (TTS), afib on eliquis, recent admission on 1/14 for AHRF and septic shock at [place], and recent admission (2-7 - 2/14) at [facility] for septic shock and AHRF likely 2/2 aspiration pneumonia and Covid, hospital course c/b cardiac arrest iso compete lung atelectasis and mucus plugging, MICU course uncomplicated transferred to floors on 2L NC. Hospital course with transient LFTs without etiology and hypertensive urgency . Discharged to [facility] on room air. Patient is now presenting from [facility] with worsening respiratory distress, reportedly with hypoxia to 70s on room air. Last dialysis 2 days prior to admission. Palliative Care consulted for complex decision making in the setting of advanced illness.

## 2023-03-16 NOTE — PROGRESS NOTE ADULT - PROBLEM SELECTOR PLAN 2
- previous admission for aspiration pneumonia and suspected to be contributory to respiratory distress  - Continue pip/tazo 7 day course  - neg MRSA swab --> d/c vancomycin  - BCx NGTD  - cineesophagogram 3/15 indicates no evidence of penetration or aspiration with puree, moderately thick and solids  - as per S&S recs, regular diet with low sodium; no concentrated phosphorus; no concentrated potassium; and modertaely thickened fluids  - GOC discussion with patient and then father over phone on 3/15 -- suspect lack of insight given complex medical course with complications and guarded prognosis   - palliative consulted for recs  - wean O2 as tolerated  -aspiration precautions in place - previous admission for aspiration pneumonia and suspected to be contributory to respiratory distress  - Continue pip/tazo 7 day course to be completed today (3/16/23)  - neg MRSA swab  - BCx NGTD  - cineesophagogram 3/15 indicates no evidence of penetration or aspiration with puree, moderately thick and solids  - as per S&S recs, regular diet with low sodium; no concentrated phosphorus; no concentrated potassium; and moderately thickened fluids  - GOC discussion with patient and then father over phone on 3/15 -- suspect lack of insight given complex medical course with complications and guarded prognosis   - palliative consulted and pending recs  - wean O2 as tolerated  - aspiration precautions in place

## 2023-03-16 NOTE — PROGRESS NOTE ADULT - PROBLEM SELECTOR PLAN 10
History of bipolar disorder and depression with home meds including trazodone, Zyprexa, and Olanzapine   - Olanzapine held iso prolonged QTc  - f/u repeat EKG and restart if QTc < 500  - hold home meds with given c/f QTc prolongation  - f/u repeat EKG -- if QTc <500, can reinitiate home meds History of bipolar disorder and depression with home meds including trazodone, Zyprexa, and Olanzapine   - Olanzapine held iso prolonged QTc  - f/u repeat EKG and restart if QTc < 500  - hold home Zyprexa and olanzapine with given c/f QTc prolongation  - repeat EKG -- QTc 513, can reinitiate home meds

## 2023-03-16 NOTE — CONSULT NOTE ADULT - PROBLEM SELECTOR RECOMMENDATION 2
Pt. with anemia in the setting of ESRD. Hgb above target range at 11.2 today. Discussed with [facility], he is on Aranesp 80mcg weekly (last dose on 3/7/23). Will continue weekly Aranesp with hold order for Hgb >11. Target range of 10-11. Monitor Hgb.
PPSV 40%  Needs assistance with some ADLs  Skin care  Frequent repositioning   Given comorbidities high risk of further decline

## 2023-03-16 NOTE — PROGRESS NOTE ADULT - PROBLEM SELECTOR PLAN 12
#Prophylaxis/GOC  DVT: anticoagulation as above  Diet: Pureed diet w moderately thickened fluids  GI ppx: pantoprazole  GOC: per pt's mother pt is full code  Dispo: presenting from [facility] will likely return pending medical stabilization DVT: anticoagulation as above  Diet: Regular diet with low sodium, no concentrated phosphorus, and no concentrated potassium w moderately thickened fluids  GI ppx: pantoprazole  GOC: full code  Dispo: presenting from [facility] will likely return pending medical stabilization

## 2023-03-16 NOTE — CONSULT NOTE ADULT - SUBJECTIVE AND OBJECTIVE BOX
HPI:  57M history of HTN, HLD, DM2, Bipolar disorder, ESRD on HD (TTS), afib on eliquis, recent admission on 1/14 for AHRF and septic shock at [facility], and recent admission (2-7 - 2/14) at [facility] for septic shock and AHRF likely 2/2 aspiration pneumonia and Covid, hospital course c/b cardiac arrest iso compete lung atelectasis and mucus plugging, MICU course uncomplicated transferred to floors on 2L NC. Hospital course with transient LFTs without etiology and hypertensive urgency . Discharged to [facility] on room air. Patient is now presenting from [facility] with worsening respiratory distress, reportedly with hypoxia to 70s on room air. Last dialysis 2 days prior to admission.     In ED patient reportedly A&O3 but poor historian, denied dyspnea but clinically dyspneic per ED physician. Initial vitals Afebrile, HR 70s,  - 130/40 - 80, 97% on NRB. NRB escalated to BIPAP after initial VBG with pH 7.18, CO2 76, HCO3 28, O2 93. pH and CO2 improved on BIPAP to 7.22 and pCO2 68. However, patient accidentally disconnected self from BIPAP. BIPAP resumed after ~ 1 minute, however AHRF with hypoxia in 60s without resolution. Intubated in ED, admitted to MICU started on vanc/zosyn. CXR with large loculated R PLEF.  (09 Mar 2023 23:48)    PERTINENT PM/SXH:   Diabetes mellitus    Diabetes mellitus    Depression    High cholesterol    Bipolar affective disorder in remission    Chronic kidney disease, unspecified    Hypertension    Arterial insufficiency    Anemia    Hemodialysis patient    ESRD on dialysis    ORIF right lower leg- 1995    Amputated great toe of right foot    S/P arteriovenous (AV) fistula creation    FAMILY HISTORY:  FH: type 2 diabetes mellitus (Mother)    FHx: heart disease (Father)    Family Hx substance abuse [ ]yes [ ]no  ITEMS NOT CHECKED ARE NOT PRESENT    SOCIAL HISTORY:   Significant other/partner[ ]  Children[ ]  Restorationism/Spirituality:  Substance hx:  [ ]   Tobacco hx:  [ ]   Alcohol hx: [ ]   Home Opioid hx:  [ ] I-Stop Reference No:  Living Situation: [ ]Home  [ x]Long term care  [ ]Rehab [ ]Other    ADVANCE DIRECTIVES:    DNR/MOLST  [ ]  Living Will  [ ]   DECISION MAKER(s):  [ ] Health Care Proxy(s)  [x ] Surrogate(s)  [ ] Guardian           Name(s): Phone Number(s):  Parents    BASELINE (I)ADL(s) (prior to admission):  [place]: [ ]Total  [ x] Moderate [ ]Dependent    Allergies    No Known Allergies    Intolerances    MEDICATIONS  (STANDING):  albuterol/ipratropium for Nebulization 3 milliLiter(s) Nebulizer every 6 hours  apixaban 5 milliGRAM(s) Oral two times a day  aspirin  chewable 81 milliGRAM(s) Oral daily  atorvastatin 20 milliGRAM(s) Oral at bedtime  chlorhexidine 2% Cloths 1 Application(s) Topical daily  cloNIDine 0.2 milliGRAM(s) Oral three times a day  darbepoetin Injectable ViaL 80 MICROGram(s) IV Push every week  hydrALAZINE 100 milliGRAM(s) Oral every 8 hours  isosorbide   dinitrate Tablet (ISORDIL) 20 milliGRAM(s) Oral three times a day  losartan 100 milliGRAM(s) Oral daily  melatonin 3 milliGRAM(s) Oral at bedtime  NIFEdipine XL 90 milliGRAM(s) Oral daily  pantoprazole    Tablet 40 milliGRAM(s) Oral before breakfast  piperacillin/tazobactam IVPB.. 3.375 Gram(s) IV Intermittent once  polyethylene glycol 3350 17 Gram(s) Oral daily  senna 2 Tablet(s) Oral at bedtime  sevelamer carbonate 800 milliGRAM(s) Oral every 8 hours  spironolactone 25 milliGRAM(s) Oral daily  traZODone 100 milliGRAM(s) Oral at bedtime    MEDICATIONS  (PRN):  albuterol    90 MICROgram(s) HFA Inhaler 1 Puff(s) Inhalation four times a day PRN Shortness of Breath and/or Wheezing  sodium chloride 0.9% Bolus. 100 milliLiter(s) IV Bolus every 5 minutes PRN SBP LESS THAN or EQUAL to 100 mmHg    PRESENT SYMPTOMS: [ ]Unable to self-report  [ ] CPOT [ ] PAINADs [ ] RDOS  Source if other than patient:  [ ]Family   [ ]Team     Pain: [ ]yes [x ]no  QOL impact -   Location -                    Aggravating factors -  Quality -  Radiation -  Timing-  Severity (0-10 scale):  Minimal acceptable level (0-10 scale):     CPOT:    https://www.sccm.org/getattachment/frt26e75-8o5x-1j8m-9o7i-7159p8495f5z/Critical-Care-Pain-Observation-Tool-(CPOT)    PAIN AD Score:   http://geriatrictoolkit.[email or web address]/painad.pdf (press ctrl +  left click to view)    Dyspnea:                           [ ]Mild [ ]Moderate [ ]Severe      RDOS:  0 to 2  minimal or no respiratory distress   3  mild distress  4 to 6 moderate distress  >7 severe distress  https://homecareinformation.net/handouts/hen/Respiratory_Distress_Observation_Scale.pdf (Ctrl +  left click to view)     Anxiety:                             [ ]Mild [ ]Moderate [ ]Severe  Fatigue:                             [ ]Mild [ ]Moderate [ ]Severe  Nausea:                             [ ]Mild [ ]Moderate [ ]Severe  Loss of appetite:              [ ]Mild [ ]Moderate [ ]Severe  Constipation:                    [ ]Mild [ ]Moderate [ ]Severe    PCSSQ[Palliative Care Spiritual Screening Question]   Severity (0-10): deferred  Score of 4 or > indicate consideration of Chaplaincy referral.    Chaplaincy Referral: [ ] yes [ ] refused [ ] following [x] deferred    Other Symptoms:  [ x]All other review of systems negative     Palliative Performance Status Version 2: 40 %    http://npcrc.org/files/news/palliative_performance_scale_ppsv2.pdf    PHYSICAL EXAM:  Vital Signs Last 24 Hrs  T(C): 36.7 (16 Mar 2023 12:22), Max: 36.8 (15 Mar 2023 19:16)  T(F): 98 (16 Mar 2023 12:22), Max: 98.2 (15 Mar 2023 19:16)  HR: 81 (16 Mar 2023 14:00) (79 - 99)  BP: 111/51 (16 Mar 2023 14:00) (111/51 - 177/76)  BP(mean): --  RR: 18 (16 Mar 2023 12:22) (16 - 18)  SpO2: 94% (16 Mar 2023 12:22) (94% - 98%)    Parameters below as of 16 Mar 2023 12:22  Patient On (Oxygen Delivery Method): nasal cannula  O2 Flow (L/min): 2   I&O's Summary    15 Mar 2023 07:01  -  16 Mar 2023 07:00  --------------------------------------------------------  IN: 400 mL / OUT: 1400 mL / NET: -1000 mL    GENERAL: [ ]Cachexia    [x ]Alert  [x ]Oriented x 3  [ ]Lethargic  [ ]Unarousable  [x ]Verbal  [ ]Non-Verbal  Behavioral:   [ ] Anxiety  [ ] Delirium [ ] Agitation [ ] Other  HEENT: poor dentition  [ ]Normal   [x ]Dry mouth   [ ]ET Tube/Trach  [ ]Oral lesions  PULMONARY:   [ ]Clear [ ]Tachypnea  [ ]Audible excessive secretions   [x ]Rhonchi        [ ]Right [ ]Left [ ]Bilateral  [ ]Crackles        [ ]Right [ ]Left [ ]Bilateral  [ ]Wheezing     [ ]Right [ ]Left [ ]Bilateral  [ ]Diminished breath sounds [ ]right [ ]left [ ]bilateral  CARDIOVASCULAR:    [ ]Regular [ x]Irregular [ ]Tachy  [ ]Chace [ ]Murmur [ ]Other  GASTROINTESTINAL:  [x ]Soft  [ ]Distended   [x ]+BS  [x ]Non tender [ ]Tender  [ ]Other [ ]PEG [ ]OGT/ NGT  Last BM: 3/13  GENITOURINARY:  [ ]Normal [ ] Incontinent   [x ]Oliguria/Anuria   [ ]Loza  MUSCULOSKELETAL:   [ ]Normal   [x ]Weakness  [ ]Bed/Wheelchair bound [ ]Edema  NEUROLOGIC:   x[ ]No focal deficits  [ ]Cognitive impairment  [ ]Dysphagia [ ]Dysarthria [ ]Paresis [ ]Other   SKIN:   [x ]Normal  [ ]Rash  [ ]Other  [ ]Pressure ulcer(s)       Present on admission [ ]y [ ]n    CRITICAL CARE:  [ ] Shock Present  [ ]Septic [ ]Cardiogenic [ ]Neurologic [ ]Hypovolemic  [ ]  Vasopressors [ ]  Inotropes   [ ]Respiratory failure present [ ]Mechanical ventilation [ ]Non-invasive ventilatory support [ ]High flow    [ ]Acute  [ ]Chronic [ ]Hypoxic  [ ]Hypercarbic [ ]Other  [ ]Other organ failure     LABS:                        9.2    9.90  )-----------( 177      ( 15 Mar 2023 06:20 )             31.1   03-15    134<L>  |  94<L>  |  26<H>  ----------------------------<  92  4.3   |  27  |  3.27<H>    Ca    8.8      15 Mar 2023 16:45  Phos  5.2     03-15  Mg     2.40     03-15    TPro  5.9<L>  /  Alb  2.9<L>  /  TBili  1.3<H>  /  DBili  x   /  AST  29  /  ALT  12  /  AlkPhos  1040<H>  03-15  PTT - ( 15 Mar 2023 06:20 )  PTT:30.1 sec    RADIOLOGY & ADDITIONAL STUDIES:  < from: CT Head No Cont (03.10.23 @ 01:26) >  IMPRESSION:  No acute intracranial hemorrhage, mass effect, or midline shift.    PROTEIN CALORIE MALNUTRITION PRESENT: [ ]mild [ ]moderate [ ]severe [ ]underweight [ ]morbid obesity  https://www.andeal.org/vault/2440/web/files/ONC/Table_Clinical%20Characteristics%20to%20Document%20Malnutrition-White%20JV%20et%20al%202012.pdf    Height (cm): 175.3 (03-14-23 @ 12:12), 175.3 (02-07-23 @ 08:05)  Weight (kg): 65.5 (03-14-23 @ 12:12), 63.1 (02-07-23 @ 08:05)  BMI (kg/m2): 21.3 (03-14-23 @ 12:12), 20.5 (02-07-23 @ 08:05)    [ ]PPSV2 < or = to 30% [ ]significant weight loss  [ ]poor nutritional intake  [ ]anasarca[ ]Artificial Nutrition      Other REFERRALS:  [ ]Hospice  [ ]Child Life  [ ]Social Work  [ ]Case management [ ]Holistic Therapy     Goals of Care Document:

## 2023-03-16 NOTE — CONSULT NOTE ADULT - PROBLEM SELECTOR RECOMMENDATION 4
Pt is full code, advanced directives addressed, see above  All questions answered  Discussed with primary team  At this time goals are addressed, will sign off, reconsult as needed.

## 2023-03-16 NOTE — CONSULT NOTE ADULT - CONSULT REASON
Sacrum deep tissue pressure injury  Right lateral 5th metatarsal head purple-maroon discoloration
ESRD on HD TIW
Complex decision making in the setting of advanced illness

## 2023-03-16 NOTE — CONSULT NOTE ADULT - PROBLEM SELECTOR RECOMMENDATION 9
Pt. with ESRD on HD TIW (TTS, Dr. Diaz) who presented for shortness of breath, found to be in acute hypoxic respiratory failure requiring intubation and mechanical ventilation, loculated R pleural effusion on imaging. Last HD on 3/7/23 via LUE AVF. Labs reviewed. Will arrange for HD today with target UF of up to 1L. Discussed with the patient's mother (Dawna Lloyd [phone]) that he will require RRT/HD, risks and benefits associated with RRT/HD explained at length. HD consent obtained and kept in patients chart.  Monitor labs. Dose meds as per HD.
LTC placement on HD  Pt indicates that he has been tolerating HD well and wants to continue

## 2023-03-16 NOTE — CONSULT NOTE ADULT - CONVERSATION DETAILS
Met with pt at bedside, addressed advanced directives. Pt shared that he would want to have CPR however hesitant towards intubation and mechanical ventilation. We went over the practice of CPR and that it would end in pt getting intubated and on mechanical intubation. He showed understanding and was able to repeat back to me the process, he understands that wanting intubation would include pt getting intubated, he stated that he would want to remain full code, would want to get CPR even if that meant that he would end up getting intubated and on MV. He states that he has had CPR before and has brought him back and that he wants to live. At this time pt remains full code with no limitations of care.

## 2023-03-16 NOTE — CONSULT NOTE ADULT - PROBLEM SELECTOR RECOMMENDATION 3
Spent 40 min of face to face discussion addressing advanced directives and advanced care planning - see GOC note for full detail.
Pt. with hyperphosphatemia in the setting of ESRD. Serum phosphorus within target range of 5.0 today (goal 4.5-5.5). Continue sevelamer 800 mg tid w/ meals (per NGT). Low phos diet. Monitor serum phosphorus level.

## 2023-03-16 NOTE — PROGRESS NOTE ADULT - PROBLEM SELECTOR PLAN 1
Recent admission for AHRF 2/2 aspiration pneumonia +/- covid on Feb 2023 discharged on room air and presented with worsening hypoxia and hypercapnia not improved with BIPAP requiring intubation and MICU admisison  - CXR Large R loculated pleural effusion from suspected aspiration pneumonia  - Intubated (3/10 - 3/13)   - S/p bronchoscopy 3/11 with BAL 3/11 with moderate yeast  - thoracentesis with fluid c/w exudative process  - repeat CXR without pneumothorax; resolution of pleural effusion  - Wean off nasal cannula as tolerated   - Continue Duoneb  - incentive spirometry   - aspiration  precautions in place Recent admission for AHRF 2/2 aspiration pneumonia +/- covid on Feb 2023 discharged on room air and presented with worsening hypoxia and hypercapnia not improved with BIPAP requiring intubation and MICU admission. Exam notable for no appreciable breath sounds on L lung fields ? if new; however, afebrile and hemodynamically stable without worsening of respiratory status.   - CXR Large R loculated pleural effusion from suspected aspiration pneumonia  - Intubated (3/10 - 3/13)   - S/p bronchoscopy 3/11 with BAL 3/11 with moderate yeast  - Thoracentesis with fluid c/w exudative process  - Repeat CXR without pneumothorax; resolution of pleural effusion  - Wean off nasal cannula as tolerated   - Continue Duoneb  - Incentive spirometry   - Aspiration  precautions in place  - Continue to monitor

## 2023-03-16 NOTE — PROVIDER CONTACT NOTE (OTHER) - RECOMMENDATIONS
Notify MD.
Notify ana maría MI heparin nomogram as ordered
Notify MD.
Notify MD.
T&P every 2 hrs, nutritional consult,  continue specialty bed

## 2023-03-16 NOTE — PROVIDER CONTACT NOTE (OTHER) - ACTION/TREATMENT ORDERED:
hydralazine given  will monitor
MD made aware.
No treatment ordered at this time
MD notified.
MD made aware, MD ordered to follow nomogram as ordered.
MD notified, will hold off on BP medication, will recheck in an hour.

## 2023-03-16 NOTE — PROGRESS NOTE ADULT - PROBLEM SELECTOR PLAN 8
- on clonidine, hydralazine, and isordil currently  - Resumed spironolactone and nifedipine - on clonidine, hydralazine, isordil, spironolactone and nifedipine  - resumed home losartan

## 2023-03-16 NOTE — PROVIDER CONTACT NOTE (OTHER) - REASON
Patient aptt 30.1, patient on heparin drip
/58 HR 79
Pt's /51 HR 81
patient with bp of 170/80"s after dialysis
Development of DTI
Patient pulled NG tube.

## 2023-03-16 NOTE — PROGRESS NOTE ADULT - PROBLEM SELECTOR PLAN 4
Reported history of Afib on Eliquis 2.5 BID  - was started heparin gtt --> switched to Eliquis  - increased Eliquis dose to 5mg bid because 2.5mg bid dosing is not optimal therapeutic dose Reported history of Afib on Eliquis 2.5 BID  - was started heparin gtt --> switched to Eliquis  - Continue with Eliquis dose to 5mg bid because 2.5mg bid dosing is not optimal therapeutic dose

## 2023-03-16 NOTE — PROGRESS NOTE ADULT - PROBLEM SELECTOR PLAN 9
Hgb 9.2 with MCV ~101 concerning for macrocytic anemia  - no evidence of active bleed  - stable from Feb 2023  - May pursue OP iron and TSH studies workup  - folate and B12 wnr  - EPO weekly

## 2023-03-16 NOTE — PROVIDER CONTACT NOTE (OTHER) - DATE AND TIME:
15-Mar-2023 20:35
16-Mar-2023 14:00
12-Mar-2023 19:47
14-Mar-2023 16:05
16-Mar-2023 12:26
15-Mar-2023 07:25

## 2023-03-16 NOTE — CONSULT NOTE ADULT - PROBLEM/RECOMMENDATION-3
Mom of pt states he was last at neurologist 2-3 months ago. No labs done. States it may be over a year since last labs.   
DISPLAY PLAN FREE TEXT
DISPLAY PLAN FREE TEXT

## 2023-03-16 NOTE — PROGRESS NOTE ADULT - SUBJECTIVE AND OBJECTIVE BOX
Patient is a 57y old  Male who presents with a chief complaint of Sepsis (16 Mar 2023 07:16)     INTERVAL HPI/OVERNIGHT EVENTS:  - No acute events overnight    SUBJECTIVE  - Patient seen and evaluated at bedside  - Patient reports presence of poor sleep overnight  - Patient reports absence of fevers, chills, nausea, emesis, chest pain, dyspnea, palpitations, abd pain, diarrhea, bloody stools, dysuria, hematuria, or LE edema     MEDICATIONS  (STANDING):  albuterol/ipratropium for Nebulization 3 milliLiter(s) Nebulizer every 6 hours  apixaban 5 milliGRAM(s) Oral two times a day  aspirin  chewable 81 milliGRAM(s) Oral daily  atorvastatin 20 milliGRAM(s) Oral at bedtime  chlorhexidine 2% Cloths 1 Application(s) Topical daily  cloNIDine 0.2 milliGRAM(s) Oral three times a day  darbepoetin Injectable ViaL 80 MICROGram(s) IV Push every week  hydrALAZINE 100 milliGRAM(s) Oral every 8 hours  isosorbide   dinitrate Tablet (ISORDIL) 20 milliGRAM(s) Oral three times a day  losartan 100 milliGRAM(s) Oral daily  melatonin 3 milliGRAM(s) Oral at bedtime  NIFEdipine XL 90 milliGRAM(s) Oral daily  pantoprazole    Tablet 40 milliGRAM(s) Oral before breakfast  piperacillin/tazobactam IVPB.. 3.375 Gram(s) IV Intermittent once  polyethylene glycol 3350 17 Gram(s) Oral daily  senna 2 Tablet(s) Oral at bedtime  sevelamer carbonate 800 milliGRAM(s) Oral every 8 hours  spironolactone 25 milliGRAM(s) Oral daily  traZODone 100 milliGRAM(s) Oral at bedtime    MEDICATIONS  (PRN):  albuterol    90 MICROgram(s) HFA Inhaler 1 Puff(s) Inhalation four times a day PRN Shortness of Breath and/or Wheezing  sodium chloride 0.9% Bolus. 100 milliLiter(s) IV Bolus every 5 minutes PRN SBP LESS THAN or EQUAL to 100 mmHg    REVIEW OF SYSTEMS: As indicated above; otherwise, negative    VITAL SIGNS:  T(F): 98 (03-16-23 @ 12:22), Max: 98.2 (03-15-23 @ 19:16)  HR: 79 (03-16-23 @ 12:22) (63 - 99)  BP: 114/58 (03-16-23 @ 12:22) (114/58 - 177/76)  RR: 18 (03-16-23 @ 12:22) (16 - 18)  SpO2: 94% (03-16-23 @ 12:22) (94% - 98%)    PHYSICAL EXAM:  General: NAD; thin and frail appearance; found eating pancakes  Eyes: Sclera clear  Neck: No palpable pre-auricular, post-auricular, occipital, mandibular, submental, supra-clavicular, or infra-clavicular lymph nodes   Chest: Grossly clear R lung field breath sounds but could not appreciate left lung field breath sounds; however patient did not appear to be in respiratory distress; speaking in sentences; on room air but was on ~4L/min nasal cannula  Heart: Regular rate and rhythm; palpable thrill; intact S1 and S2; +murmur present  Abd: Soft, mild tenderness to palpation in LUQ, nondistended  Nervous System: A&Ox3  Psych: Appropriate affect  Ext: no peripheral LE edema bilaterally    LABS:    15 Mar 2023 16:45    134    |  94     |  26     ----------------------------<  92     4.3     |  27     |  3.27     Ca    8.8        15 Mar 2023 16:45  Phos  5.2       15 Mar 2023 16:45  Mg     2.40      15 Mar 2023 16:45    TPro  5.9    /  Alb  2.9    /  TBili  1.3    /  DBili  x      /  AST  29     /  ALT  12     /  AlkPhos  1040   15 Mar 2023 16:45    CAPILLARY BLOOD GLUCOSE        BLOOD CULTURE  03-12 @ 16:07   No growth  --  --    RADIOLOGY & ADDITIONAL TESTS:    Imaging Personally Reviewed:  [X ] YES     Consultant(s) Notes Reviewed:  Yes    Care Discussed with Consultants/Other Providers: Yes

## 2023-03-16 NOTE — PROGRESS NOTE ADULT - PROBLEM SELECTOR PLAN 3
- large loculated R pleural effusion; intervally increased from small in Feb 2023  - suspect i/s/o prior aspiration pneumonia +/- COVID  - now resolved on most recent CXR  - 3/12: S/p thoracentesis, fluid studies c/w exudative  - BAL with moderate yeast  - Pleural fluid test in progress  - BCx NGTD - large loculated R pleural effusion; interval increased from small in Feb 2023  - suspect i/s/o prior aspiration pneumonia +/- COVID  - now resolved on most recent CXR  - 3/12: S/p thoracentesis, fluid studies c/w exudative  - BAL with moderate yeast  - Pleural fluid test in progress  - BCx NGTD

## 2023-03-16 NOTE — PROVIDER CONTACT NOTE (OTHER) - ASSESSMENT
Pt was hemodynamically unstable on vasopressors, poor nutrition, generalized edema present
No s/s of acute distress. Patient aptt 30.1, patient on heparin drip
/58 HR 79, O2 94% with 2L. Pt refused to take off O2 to be weaned off.
Patient pulled NG tube.
Pt's /51 HR 81, no acute distress.
asymptomatic

## 2023-03-16 NOTE — CONSULT NOTE ADULT - PROBLEM/RECOMMENDATION-1
Clinical faxed per request. Postponing message for one week to check on approval.  
DISPLAY PLAN FREE TEXT
DISPLAY PLAN FREE TEXT

## 2023-03-16 NOTE — PROVIDER CONTACT NOTE (OTHER) - BACKGROUND
Patient admitted sepsis, PHMx of ESRD, HTN, bipolar disorder.
patient admitted with sepsis
Patient admitted for sepsis
patient admitted with sepsis
patient on bedrest ,low albumin, low total protein, NPO  frictions sheer an issue as patient is on Bedrest
patient admitted with sepsis

## 2023-03-16 NOTE — PROGRESS NOTE ADULT - ASSESSMENT
57M PMH HTN, HLD, DMT2, Bipolar disorder, ESRD on HD (TTS), recent admissions for AHRF and septic shock with AHRF likely 2/2 aspiration pneumonia and COVID, hospital course c/b cardiac arrest. Now presenting with acute hypoxic hypercapnic respiratory failure, continued hypoxia on BIPAP requiring MICU admission with intubation likely iso mucus plugging, Now extubated and clinically improving on medicine floors.

## 2023-03-16 NOTE — PROGRESS NOTE ADULT - PROBLEM SELECTOR PLAN 5
- TTS, last dialysis on 3/7/23 prior to admission  - Nephrology consult for dialysis, follows with dr castellanos ([facility])  - s/p HD on 3/13 in MICU  - Plan for HD today  - Follow up repeat CMP - TTS, last dialysis on 3/7/23 prior to admission  - Nephrology consult for dialysis, follows with dr castellanos ([facility])  - s/p HD on 3/13 in MICU  - Plan for HD on 3/17  - Follow up repeat CMP

## 2023-03-17 NOTE — PROGRESS NOTE ADULT - PROBLEM SELECTOR PLAN 11
- persistently elevated  - 900s and GGT since Feb 2023  - patient asymptomatic at time, RUQ negative in Feb 2023  - monitor LFTs

## 2023-03-17 NOTE — PROGRESS NOTE ADULT - PROBLEM SELECTOR PLAN 7
- history of diabetic foot ulcers in the past  - recent A1C 4.8 and episodes of hypoglycemia last admission  - monitor off insulin

## 2023-03-17 NOTE — PROGRESS NOTE ADULT - PROBLEM SELECTOR PLAN 9
Hgb 9.2 with MCV ~101 concerning for macrocytic anemia  - no evidence of active bleed  - stable from Feb 2023  - May pursue OP iron and TSH studies workup  - folate and B12 wnr  - EPO weekly Hgb 9.2 with MCV ~101 concerning for macrocytic anemia  - no evidence of active bleed  - stable from Feb 2023  - May pursue OP iron and TSH studies workup  - folate and B12 wnl  - EPO weekly

## 2023-03-17 NOTE — PROGRESS NOTE ADULT - PROBLEM SELECTOR PLAN 12
DVT: anticoagulation as above  Diet: Regular diet with low sodium, no concentrated phosphorus, and no concentrated potassium w moderately thickened fluids  GI ppx: pantoprazole  GOC: full code  Dispo: presenting from [facility] will likely return pending medical stabilization

## 2023-03-17 NOTE — PROGRESS NOTE ADULT - PROBLEM SELECTOR PLAN 6
- likely 2/2 demand ischemia +/- ESRD  - trops downtrending, 330-->224, baseline 100 on prior admission  - no evidence of ischemic change on EKG

## 2023-03-17 NOTE — PROGRESS NOTE ADULT - PROBLEM SELECTOR PLAN 3
- large loculated R pleural effusion; interval increased from small in Feb 2023  - suspect i/s/o prior aspiration pneumonia +/- COVID  - now resolved on most recent CXR  - 3/12: S/p thoracentesis, fluid studies c/w exudative  - BAL with moderate yeast  - Pleural fluid test in progress  - BCx NGTD

## 2023-03-17 NOTE — PROGRESS NOTE ADULT - NUTRITIONAL ASSESSMENT
This patient has been assessed with a concern for Malnutrition and has been determined to have a diagnosis/diagnoses of Moderate protein-calorie malnutrition.    This patient is being managed with:   Diet Pureed-  DASH/TLC {Sodium & Cholesterol Restricted} (DASH)  Moderately Thick Liquids (MODTHICKLIQS)  Entered: Mar 14 2023  2:40PM    
This patient has been assessed with a concern for Malnutrition and has been determined to have a diagnosis/diagnoses of Moderate protein-calorie malnutrition.    This patient is being managed with:   Diet NPO with Tube Feed-  Tube Feeding Modality: Orogastric  Nepro with Carb Steady (NEPRORTH)  Total Volume for 24 Hours (mL): 840  Continuous  Starting Tube Feed Rate {mL per Hour}: 20  Increase Tube Feed Rate by (mL): 10     Every 2 hours  Until Goal Tube Feed Rate (mL per Hour): 35  Tube Feed Duration (in Hours): 24  Tube Feed Start Time: 20:00  Entered: Mar 12 2023  7:08PM    
This patient has been assessed with a concern for Malnutrition and has been determined to have a diagnosis/diagnoses of Moderate protein-calorie malnutrition.      
This patient has been assessed with a concern for Malnutrition and has been determined to have a diagnosis/diagnoses of Moderate protein-calorie malnutrition.    This patient is being managed with:   Diet Regular-  Moderately Thick Liquids (MODTHICKLIQS)  No Concentrated Potassium  No Concentrated Phosphorus  Low Sodium  Supplement Feeding Modality:  Oral  Nepro Cans or Servings Per Day:  1       Frequency:  Two Times a day  Entered: Mar 15 2023  3:31PM    
This patient has been assessed with a concern for Malnutrition and has been determined to have a diagnosis/diagnoses of Moderate protein-calorie malnutrition.    This patient is being managed with:   Diet Regular-  Moderately Thick Liquids (MODTHICKLIQS)  No Concentrated Potassium  No Concentrated Phosphorus  Low Sodium  Supplement Feeding Modality:  Oral  Nepro Cans or Servings Per Day:  1       Frequency:  Two Times a day  Entered: Mar 15 2023  3:31PM

## 2023-03-17 NOTE — PROGRESS NOTE ADULT - PROBLEM SELECTOR PLAN 5
- TTS, last dialysis on 3/7/23 prior to admission  - Nephrology consult for dialysis, follows with dr castellanos ([facility])  - s/p HD on 3/13 in MICU  - Plan for HD on 3/17  - Follow up repeat CMP - TTS, last dialysis on 3/7/23 prior to admission  - Nephrology consult for dialysis, follows with dr castellanos ([facility])  - s/p HD on 3/13 in MICU  - HD on 3/17  - Follow up repeat CMP

## 2023-03-17 NOTE — PROGRESS NOTE ADULT - PROBLEM SELECTOR PLAN 10
History of bipolar disorder and depression with home meds including trazodone, Zyprexa, and Olanzapine   - Olanzapine held iso prolonged QTc  - f/u repeat EKG and restart if QTc < 500  - hold home Zyprexa and olanzapine with given c/f QTc prolongation  - repeat EKG -- QTc 513, can reinitiate home meds

## 2023-03-17 NOTE — PROGRESS NOTE ADULT - PROBLEM SELECTOR PLAN 2
- previous admission for aspiration pneumonia and suspected to be contributory to respiratory distress  - Continue pip/tazo 7 day course to be completed today (3/16/23)  - neg MRSA swab  - BCx NGTD  - cineesophagogram 3/15 indicates no evidence of penetration or aspiration with puree, moderately thick and solids  - as per S&S recs, regular diet with low sodium; no concentrated phosphorus; no concentrated potassium; and moderately thickened fluids  - GOC discussion with patient and then father over phone on 3/15 -- suspect lack of insight given complex medical course with complications and guarded prognosis   - palliative consulted and pending recs  - wean O2 as tolerated  - aspiration precautions in place

## 2023-03-17 NOTE — PROGRESS NOTE ADULT - PROBLEM SELECTOR PLAN 1
Pt. with ESRD on HD TIW (TTS, Dr. Diaz) who presented for shortness of breath, found to be in acute hypoxic respiratory failure requiring intubation and mechanical ventilation, loculated R pleural effusion on imaging. Extubated on 3/13/23. Last HD on 3/15/23 via LUE AVF. Labs reviewed. Seen and evaluated at bedside at initiation of treatment today.  Plan next HD today and again on monday.  Monitor labs. Dose meds as per HD.

## 2023-03-17 NOTE — DISCHARGE NOTE NURSING/CASE MANAGEMENT/SOCIAL WORK - NSDCPEFALRISK_GEN_ALL_CORE
For information on Fall & Injury Prevention, visit: [email or web address] OR  [email or web address] OR  https://www.cdc.gov/steadi/patient.html

## 2023-03-17 NOTE — PROGRESS NOTE ADULT - SUBJECTIVE AND OBJECTIVE BOX
[facility] Division of Kidney Diseases & Hypertension  HEMODIALYSIS NOTE  --------------------------------------------------------------------------------  Chief Complaint: ESRD/Ongoing hemodialysis requirement    24 hour events/subjective: Patient was seen and evaluated at bedside this morning in the dialysis unit at the initiation of treatment.  Feeling well overall.  Denying chest pain or shortness of breath.  Feeling well overall.    PAST HISTORY  --------------------------------------------------------------------------------  No significant changes to PMH, PSH, FHx, SHx, unless otherwise noted    ALLERGIES & MEDICATIONS  --------------------------------------------------------------------------------  Allergies    No Known Allergies    Intolerances      Standing Inpatient Medications  albuterol/ipratropium for Nebulization 3 milliLiter(s) Nebulizer every 6 hours  apixaban 5 milliGRAM(s) Oral two times a day  aspirin  chewable 81 milliGRAM(s) Oral daily  atorvastatin 20 milliGRAM(s) Oral at bedtime  chlorhexidine 2% Cloths 1 Application(s) Topical daily  cloNIDine 0.2 milliGRAM(s) Oral three times a day  darbepoetin Injectable ViaL 80 MICROGram(s) IV Push every week  hydrALAZINE 100 milliGRAM(s) Oral every 8 hours  isosorbide   dinitrate Tablet (ISORDIL) 20 milliGRAM(s) Oral three times a day  losartan 100 milliGRAM(s) Oral daily  NIFEdipine XL 90 milliGRAM(s) Oral daily  pantoprazole    Tablet 40 milliGRAM(s) Oral before breakfast  polyethylene glycol 3350 17 Gram(s) Oral daily  senna 2 Tablet(s) Oral at bedtime  sevelamer carbonate 800 milliGRAM(s) Oral every 8 hours  spironolactone 25 milliGRAM(s) Oral daily  traZODone 100 milliGRAM(s) Oral at bedtime    PRN Inpatient Medications  albuterol    90 MICROgram(s) HFA Inhaler 1 Puff(s) Inhalation four times a day PRN  sodium chloride 0.9% Bolus. 100 milliLiter(s) IV Bolus every 5 minutes PRN      REVIEW OF SYSTEMS  --------------------------------------------------------------------------------  Gen: no fever  CV: no cp  Pulm: no dyspnea  GI: no complaints    VITALS/PHYSICAL EXAM  --------------------------------------------------------------------------------  T(C): 36.4 (03-17-23 @ 05:15), Max: 37.1 (03-16-23 @ 23:56)  HR: 73 (03-17-23 @ 05:15) (73 - 93)  BP: 112/59 (03-17-23 @ 05:15) (111/51 - 143/69)  RR: 73 (03-17-23 @ 05:15) (18 - 73)  SpO2: 98% (03-17-23 @ 05:15) (93% - 98%)  Wt(kg): --    Physical Exam:  	Gen:  NAD, lying in bed flat  	Pulm: CTA B/L anteriorly  	CV: S1S2   	Abd: Soft  	Ext: No LE edema B/L, R toe amputation seen  	Neuro: awake and alert  	Skin: Warm and dry  	Vascular access: LUE AVF with palpable pulse and bruit heard    LABS/STUDIES  --------------------------------------------------------------------------------    134  |  94  |  26  ----------------------------<  92      [03-15-23 @ 16:45]  4.3   |  27  |  3.27        Ca     8.8     [03-15-23 @ 16:45]      Mg     2.40     [03-15-23 @ 16:45]      Phos  5.2     [03-15-23 @ 16:45]    TPro  5.9  /  Alb  2.9  /  TBili  1.3  /  DBili  x   /  AST  29  /  ALT  12  /  AlkPhos  1040  [03-15-23 @ 16:45]

## 2023-03-17 NOTE — PROGRESS NOTE ADULT - PROBLEM SELECTOR PROBLEM 11
Elevated serum alkaline phosphatase level

## 2023-03-17 NOTE — PROGRESS NOTE ADULT - PROBLEM SELECTOR PROBLEM 10
Bipolar disorder
Patient/Caregiver provided printed discharge information.

## 2023-03-17 NOTE — PROGRESS NOTE ADULT - PROBLEM SELECTOR PLAN 4
Reported history of Afib on Eliquis 2.5 BID  - was started heparin gtt --> switched to Eliquis  - Continue with Eliquis dose to 5mg bid because 2.5mg bid dosing is not optimal therapeutic dose

## 2023-03-17 NOTE — PROGRESS NOTE ADULT - ATTENDING COMMENTS
57 M with bipolar disorder, ESRD on HD, recent admission for septic shock and acute hypoxemic respiratory failure in setting of aspiration pneumonia and COVID with course c/b cardiac arrest. Admitted from [facility] for acute hypoxic and hypercapnic respiratory failure requiring intubation in setting of mucus plugging. Bronchoscopy over weekend showed distal airway secretions but no major mucus plugging. Underwent therapeutic / diagnostic thoracentesis.  Extubated yesterday, doing well.  Continue antihypertensive regimen. On heparin gtt for AF. No plans for HD today. Continue empiric Zosyn. Patient examined and case reviewed in detail on bedside rounds. Frequent bedside visits with therapy change today.  Prognosis guarded.
57M with bipolar disorder, ESRD on HD, recent admission septic shock and acute hypoxemic respiratory failure in setting of aspiration pneumonia and COVID with course c/b cardiac arrest. Admitted from [facility] for acute hypoxic and hypercapnic respiratory failure requiring intubation in setting of mucus plugging.  - Bronchoscopy yesterday showed distal airway secretions but no major mucus plugging.  - Therapeutic/diagnostic thoracentesis performed today for optimization prior to extubation.  - Attempt SAT/SBT today.   - Off vasopressors.
57M with bipolar disorder, ESRD on HD, recent admission septic shock and acute hypoxemic respiratory failure in setting of aspiration pneumonia and COVID with course c/b cardiac arrest. Admitted from [facility] for acute hypoxic and hypercapnic respiratory failure requiring intubation in setting of mucus plugging.  - POCUS shows large right sided effusion  - Bronchoscopy today to evaluate for mucus plug  - Possible thoracentesis  - Decreasing pressor requirements  - HD today
ESRD  Anemia    Received HD yesterday, volume status stable. Will need to assess labs and Is/Os- will determine RRT needs in AM
58 yo M with a PMH of ESRD on HD TIW (TTS), HTN, DM, Anemia, Afib, Bipolar who presented to [facility] from [facility] for worsening shortness of breath.  hypoxic, requiring intubation and mechanical ventilation.   s/p extubation 3/13. s/p HD 3/13  no plan for HD today.   anemia on ALCIDES  Further detailed plan of management and recommendation as outlined above.
ESRD on HD  Anemia  Hyperphosphatemia    Cont to monitor labs, Is/Os and daily weights  Planned for HD today
56 yo M with a PMH of ESRD on HD TIW (TTS), HTN, DM, Anemia, Afib, Bipolar who presented to [facility] from [facility] for worsening shortness of breath.  hypoxic, requiring intubation and mechanical ventilation.   s/p extubation this am  plan for HD today. UF as BP allows  anemia on ALCIDES  Further detailed plan of management and recommendation as outlined above.
57 M with bipolar disorder, ESRD on HD, recent admission for septic shock and acute hypoxemic respiratory failure in setting of aspiration pneumonia and COVID with course c/b cardiac arrest. Admitted from [facility] for acute hypoxic and hypercapnic respiratory failure requiring intubation in setting of mucus plugging. Bronchoscopy over weekend showed distal airway secretions but no major mucus plugging. Underwent therapeutic / diagnostic thoracentesis yesterday. Extubated today. Continue antihypertensive regimen. On heparin gtt for AF. Plan for HD today. Continue empiric Zosyn. Full Code.
Pt is a 56 yo M with PMH ESRD (T/R/Sat, LUE AVF), HTN, HLD, T2D (A1C 4.8), BP d/o, depression, and a-fib (eliquis) p/f [facility] with hypoxia requiring intubation/sedation in MICU. 2x similar presentations in the last few months requiring MICU admission for suspected aspiration PNA. This admission with CXR showing large loculated R pleural effusion. Also, CTH neg, TTE with S2 diastolic dysfunction, and EKG on arrival with QTc >500. Intubated on admission with bronch/BAL 3/11 (studies pending), thoracentesis 3/12 (950mL output, studies c/w exudative process), extubated 3/13 to venturi mask and now on 2L NC with O2sat >90%; s/d RMF 3/14. Received HD 3/10, 3/11, 3/13, and 3/14. Was on hep gtt while intubated, now back on eliquis home med. Pt seen and examined at bedside in no acute distress, sleeping but easily arousable; AOx3 on 2L NC without respiratory distress, lungs CTA; looks older than stated age. Denies acute complaints. Went for cineesophagogram today and neg for aspiration.     A/P:   #Acute hypoxic respiratory failure  - pt p/w O2sat 70s on RA -- initially on NRB -- BIPAP -- non-compliant with BIPAP with AMS requiring I&S and admission to MICU  - CXR with large loculated R pleural effusion  - bronch/BAL 3/11 -- f/u studies  - thoracentesis with 950mL removed, c/w exudative process  - repeat CXR without pneumothorax; resolution of pleural effusion  - TTE with S2 diastolic dysfunction and moderate pericardial effusion   - initially on vanc/zosyn, vanc dc'd d/t MRSA neg  - c/w zosyn (3/10- ) --- anticipate 7d course (end 3/17)   - incentive spirometry  - S+S eval as before with c/f recurrent aspiration  - aspiration precautions     #Aspiration PNA  - pt with 3 hospitalizations in 3mo for suspected aspiration events leading to PNA / mucus plugging requiring multiple intubations and MICU admissions  - S+S eval as below  - GOC discussion with pt and father today -- appears that they lack insight in severity of disease and guarded prognosis with risk of recurrence   - palliative consulted, f/u recs  - c/w duonebs  - wean O2 as able -- currently on 2L NC    #Prolonged QTc  - admission EKG with QTc >500  - pending repeat EKG today  - avoid QTc prolonging medications     #Macrocytic anemia  - Hb 8-10 with MCV 90s  - no active s/s bleeding  - likely anemia of chronic disease in setting of critical illness, ESRD, and poor nutrition  - folate/B12 WNL  - outpt iron and TSH studies  - keep active T&S    #Dysphagia  - pt with intermittent dysphagia, now improved  - S+S following, cineesophagogram neg, recommended for regular solids and moderately thick liquids     #ESRD  - HD T/R/Sat via LUE AVF  - renal following, appreciate recs  - HD 3/10, 3/11, 3/13, 3/14  - c/w home sevelamer 800mg TID    #HTN  - home meds: spironolactone 25mg daily, losartan 100mg daily, isosorbide dinitrate 30mg TID, nifedipine 90mg daily, clonidine 0.1mg TID  - c/w home meds  - c/w hydralazine 100mg TID  - increased clonidine 0.2mg TID     #HLD  - c/w home med crestor 5mg daily (therapeutic exchange with atorvastatin while inpt)    #T2D  - A1C 4.8, recent hospitalization  - monitor glu on BMP  - defer mISS    #BP d/o  - home meds: trazodone 100mg bedtime, zyprexa 30mg bedtime  - hold home meds with c/f QTc prolongation  - f/u EKG -- if QTc <500, can reinitiate home meds    #Depression  - home meds: trazodone 100mg bedtime, zyprexa 30mg bedtime  - hold home meds with c/f QTc prolongation  - f/u EKG -- if QTc <500, can reinitiate home meds    #A-fib  - home med: eliquis 2.5mg BID  - on heparin gtt while intubated/sedated  - transition to eliquis 5mg BID --- dose adjusted per HD recommendations for non-valvular a-fib     code: full -- palliative consulted for further assistance; despite discussion, pt and family appear to lack insight regarding pt's severity of illness and poor prognosis given 3x hospitalizations with ICU requirements 2/2 suspected aspiration PNA in 3mo   dispo: pending medical optimization; PT recs ADITHYA     Discussed with Dr. Funez. Rest as outlined above.
Pt is a 56 yo M with PMH ESRD (T/R/Sat, LUE AVF), HTN, HLD, T2D (A1C 4.8), BP d/o, depression, and a-fib (eliquis) p/f [facility] with hypoxia requiring intubation/sedation in MICU. 2x similar presentations in the last few months requiring MICU admission for suspected aspiration PNA. This admission with CXR showing large loculated R pleural effusion. Also, CTH neg, TTE with S2 diastolic dysfunction, and EKG on arrival with QTc >500. Intubated on admission with bronch/BAL 3/11 (studies pending), thoracentesis 3/12 (950mL output, studies c/w exudative process), extubated 3/13 to venturi mask and now on 2L NC with O2sat >90%; s/d RMF 3/14. Received HD 3/10, 3/11, 3/13, 3/14, 3/15. Was on hep gtt while intubated, now back on [employer] home med. Pt seen and examined at bedside in no acute distress, says he doesn't want to talk; on 2L NC without respiratory distress, lungs CTA; looks older than stated age. Denies acute complaints but continues to say he "can't go to [facility] until Monday", says  Thomas's day is "too wild". Explained that he is medically ready for discharge today.    A/P:   #Acute hypoxic respiratory failure  - pt p/w O2sat 70s on RA -- initially on NRB -- BIPAP -- non-compliant with BIPAP with AMS requiring I&S and admission to MICU  - CXR with large loculated R pleural effusion  - bronch/BAL 3/11   - thoracentesis with 950mL removed, c/w exudative process  - repeat CXR without pneumothorax; resolution of pleural effusion  - TTE with S2 diastolic dysfunction and moderate pericardial effusion   - initially on vanc/zosyn, vanc dc'd d/t MRSA neg  - s/p zosyn (3/10-16)  - c/w incentive spirometry  - S+S eval as before with c/f recurrent aspiration; XR cineesophagogram neg for aspiration -- tolerating regular solids with moderately thick liquids  - aspiration precautions     #Aspiration PNA  - pt with 3 hospitalizations in 3mo for suspected aspiration events leading to PNA / mucus plugging requiring multiple intubations and MICU admissions  - S+S eval as below  - GOC discussion with pt and father with pt remaining full code  - palliative consulted, f/u recs -- full code  - c/w duonebs BID  - wean O2 as able -- currently on 2L NC and refusing ambulatory sat assessment     #Prolonged QTc  - admission EKG with QTc >500  - 3/15 QTc 513 -- will repeat 3/17  - avoid QTc prolonging medications     #Macrocytic anemia  - Hb 8-10 with MCV 90s  - no active s/s bleeding  - likely anemia of chronic disease in setting of critical illness, ESRD, and poor nutrition  - folate/B12 WNL  - outpt iron and TSH studies  - keep active T&S    #Dysphagia  - pt with intermittent dysphagia, now improved  - S+S following, cineesophagogram neg, recommended for regular solids and moderately thick liquids     #ESRD  - HD T/R/Sat via LUE AVF  - renal following, appreciate recs  - HD 3/10, 3/11, 3/13, 3/14, 3/15, 3/17  - c/w home sevelamer 800mg TID    #HTN  - home meds: spironolactone 25mg daily, losartan 100mg daily, isosorbide dinitrate 30mg TID, nifedipine 90mg daily, clonidine 0.1mg TID  - c/w home meds  - c/w hydralazine 100mg TID  - increased clonidine 0.2mg TID     #HLD  - c/w home med crestor 5mg daily (therapeutic exchange with atorvastatin while inpt)    #T2D  - A1C 4.8, recent hospitalization  - monitor glu on BMP  - defer mISS    #BP d/o  - home meds: trazodone 100mg bedtime, zyprexa 30mg bedtime  - hold zyprexa with c/f QTc prolongation, as above  - start home trazodone     #Depression  - home meds: trazodone 100mg bedtime, zyprexa 30mg bedtime  - as above    #A-fib  - home med: eliquis 2.5mg BID  - on heparin gtt while intubated/sedated  - transition to eliquis 5mg BID --- dose adjusted per HD recommendations for non-valvular a-fib     code: full -- palliative consulted for further assistance; despite discussion, pt and family appear to lack insight regarding pt's severity of illness and poor prognosis given 3x hospitalizations with ICU requirements 2/2 suspected aspiration PNA in 3mo   dispo: medically ready for discharge -- PT recs ADITHYA     Discussed with Dr. Phan. Rest as outlined above.
Pt is a 58 yo M with PMH ESRD (T/R/Sat, LUE AVF), HTN, HLD, T2D (A1C 4.8), BP d/o, depression, and a-fib (eliquis) p/f [facility] with hypoxia requiring intubation/sedation in MICU. 2x similar presentations in the last few months requiring MICU admission for suspected aspiration PNA. This admission with CXR showing large loculated R pleural effusion. Also, CTH neg, TTE with S2 diastolic dysfunction, and EKG on arrival with QTc >500. Intubated on admission with bronch/BAL 3/11 (studies pending), thoracentesis 3/12 (950mL output, studies c/w exudative process), extubated 3/13 to venturi mask and now on 2L NC with O2sat >90%; s/d RMF 3/14. Received HD 3/10, 3/11, 3/13, 3/14, 3/15. Was on hep gtt while intubated, now back on [employer] home med. Pt seen and examined at bedside in no acute distress, sleeping but easily arousable; AOx3 on 2L NC without respiratory distress, lungs CTA; looks older than stated age. Denies acute complaints but says he "can't go to [place] until Monday", unwilling to explain why; explained that he is nearing being medically ready for discharge, pending HD tomorrow morning.      A/P:   #Acute hypoxic respiratory failure  - pt p/w O2sat 70s on RA -- initially on NRB -- BIPAP -- non-compliant with BIPAP with AMS requiring I&S and admission to MICU  - CXR with large loculated R pleural effusion  - bronch/BAL 3/11 -- f/u studies  - thoracentesis with 950mL removed, c/w exudative process  - repeat CXR without pneumothorax; resolution of pleural effusion  - TTE with S2 diastolic dysfunction and moderate pericardial effusion   - initially on vanc/zosyn, vanc dc'd d/t MRSA neg  - c/w zosyn (3/10- ) --- anticipate 7d course (through 3/16)   - incentive spirometry  - S+S eval as before with c/f recurrent aspiration; XR cineesophagogram neg for aspiration -- tolerating regular solids with moderately thick liquids  - aspiration precautions     #Aspiration PNA  - pt with 3 hospitalizations in 3mo for suspected aspiration events leading to PNA / mucus plugging requiring multiple intubations and MICU admissions  - S+S eval as below  - GOC discussion with pt and father today -- appears that they lack insight in severity of disease and guarded prognosis with risk of recurrence   - palliative consulted, f/u recs  - c/w duonebs  - wean O2 as able -- currently on 2L NC and refusing ambulatory sat assessment     #Prolonged QTc  - admission EKG with QTc >500  - 3/15 QTc 513 -- will repeat 3/17  - avoid QTc prolonging medications     #Macrocytic anemia  - Hb 8-10 with MCV 90s  - no active s/s bleeding  - likely anemia of chronic disease in setting of critical illness, ESRD, and poor nutrition  - folate/B12 WNL  - outpt iron and TSH studies  - keep active T&S    #Dysphagia  - pt with intermittent dysphagia, now improved  - S+S following, cineesophagogram neg, recommended for regular solids and moderately thick liquids     #ESRD  - HD T/R/Sat via LUE AVF  - renal following, appreciate recs  - HD 3/10, 3/11, 3/13, 3/14, 3/15, 3/17  - c/w home sevelamer 800mg TID    #HTN  - home meds: spironolactone 25mg daily, losartan 100mg daily, isosorbide dinitrate 30mg TID, nifedipine 90mg daily, clonidine 0.1mg TID  - c/w home meds  - c/w hydralazine 100mg TID  - increased clonidine 0.2mg TID     #HLD  - c/w home med crestor 5mg daily (therapeutic exchange with atorvastatin while inpt)    #T2D  - A1C 4.8, recent hospitalization  - monitor glu on BMP  - defer mISS    #BP d/o  - home meds: trazodone 100mg bedtime, zyprexa 30mg bedtime  - hold zyprexa with c/f QTc prolongation, as above  - start home trazodone     #Depression  - home meds: trazodone 100mg bedtime, zyprexa 30mg bedtime  - as above    #A-fib  - home med: eliquis 2.5mg BID  - on heparin gtt while intubated/sedated  - transition to eliquis 5mg BID --- dose adjusted per HD recommendations for non-valvular a-fib     code: full -- palliative consulted for further assistance; despite discussion, pt and family appear to lack insight regarding pt's severity of illness and poor prognosis given 3x hospitalizations with ICU requirements 2/2 suspected aspiration PNA in 3mo   dispo: pending medical optimization; PT recs ADITHYA     Discussed with Dr. Funez. Rest as outlined above.

## 2023-03-17 NOTE — PROGRESS NOTE ADULT - PROBLEM SELECTOR PLAN 1
Recent admission for AHRF 2/2 aspiration pneumonia +/- covid on Feb 2023 discharged on room air and presented with worsening hypoxia and hypercapnia not improved with BIPAP requiring intubation and MICU admission. Exam notable for no appreciable breath sounds on L lung fields ? if new; however, afebrile and hemodynamically stable without worsening of respiratory status.   - CXR Large R loculated pleural effusion from suspected aspiration pneumonia  - Intubated (3/10 - 3/13)   - S/p bronchoscopy 3/11 with BAL 3/11 with moderate yeast  - Thoracentesis with fluid c/w exudative process  - Repeat CXR without pneumothorax; resolution of pleural effusion  - Wean off nasal cannula as tolerated   - Continue Duoneb  - Incentive spirometry   - Aspiration  precautions in place  - Continue to monitor

## 2023-03-17 NOTE — PROGRESS NOTE ADULT - PROVIDER SPECIALTY LIST ADULT
MICU
MICU
Nephrology
MICU
Internal Medicine
MICU
MICU
Nephrology
Nephrology
Internal Medicine
Nephrology
Internal Medicine

## 2023-03-17 NOTE — PROGRESS NOTE ADULT - PROBLEM SELECTOR PROBLEM 1
ESRD on dialysis
Acute respiratory failure with hypoxia

## 2023-03-17 NOTE — PROGRESS NOTE ADULT - SUBJECTIVE AND OBJECTIVE BOX
************************  Aidan Phan, MD-PhD  Internal Medicine PGY-1  ************************    Patient is a 57y old  Male who presents with a chief complaint of Sepsis (16 Mar 2023 07:16)    No events overnight, no acute complaints this morning. Patient with appropriate PO intake and urinating well with BM(s). Denies CP, SOB, fevers/chills, N/V, or abdominal pain. Patient reminded of ongoing careplan.    MEDICATIONS  (STANDING):  albuterol/ipratropium for Nebulization 3 milliLiter(s) Nebulizer every 6 hours  apixaban 5 milliGRAM(s) Oral two times a day  aspirin  chewable 81 milliGRAM(s) Oral daily  atorvastatin 20 milliGRAM(s) Oral at bedtime  chlorhexidine 2% Cloths 1 Application(s) Topical daily  cloNIDine 0.2 milliGRAM(s) Oral three times a day  darbepoetin Injectable ViaL 80 MICROGram(s) IV Push every week  hydrALAZINE 100 milliGRAM(s) Oral every 8 hours  isosorbide   dinitrate Tablet (ISORDIL) 20 milliGRAM(s) Oral three times a day  losartan 100 milliGRAM(s) Oral daily  NIFEdipine XL 90 milliGRAM(s) Oral daily  pantoprazole    Tablet 40 milliGRAM(s) Oral before breakfast  polyethylene glycol 3350 17 Gram(s) Oral daily  senna 2 Tablet(s) Oral at bedtime  sevelamer carbonate 800 milliGRAM(s) Oral every 8 hours  spironolactone 25 milliGRAM(s) Oral daily  traZODone 100 milliGRAM(s) Oral at bedtime    MEDICATIONS  (PRN):  albuterol    90 MICROgram(s) HFA Inhaler 1 Puff(s) Inhalation four times a day PRN Shortness of Breath and/or Wheezing  sodium chloride 0.9% Bolus. 100 milliLiter(s) IV Bolus every 5 minutes PRN SBP LESS THAN or EQUAL to 100 mmHg      CAPILLARY BLOOD GLUCOSE      POCT Blood Glucose.: 79 mg/dL (17 Mar 2023 05:06)  POCT Blood Glucose.: 112 mg/dL (16 Mar 2023 21:48)  POCT Blood Glucose.: 97 mg/dL (16 Mar 2023 17:44)    I&O's Summary    15 Mar 2023 07:01  -  16 Mar 2023 07:00  --------------------------------------------------------  IN: 400 mL / OUT: 1400 mL / NET: -1000 mL        PHYSICAL EXAM:  Vital Signs Last 24 Hrs  T(C): 36.4 (17 Mar 2023 05:15), Max: 37.1 (16 Mar 2023 23:56)  T(F): 97.5 (17 Mar 2023 05:15), Max: 98.7 (16 Mar 2023 23:56)  HR: 73 (17 Mar 2023 05:15) (73 - 93)  BP: 112/59 (17 Mar 2023 05:15) (111/51 - 143/69)  BP(mean): --  RR: 73 (17 Mar 2023 05:15) (18 - 73)  SpO2: 98% (17 Mar 2023 05:15) (93% - 98%)    Parameters below as of 17 Mar 2023 05:15  Patient On (Oxygen Delivery Method): nasal cannula  O2 Flow (L/min): 2      T(C): 36.4 (03-17-23 @ 05:15), Max: 37.1 (03-16-23 @ 23:56)  HR: 73 (03-17-23 @ 05:15) (73 - 93)  BP: 112/59 (03-17-23 @ 05:15) (111/51 - 143/69)  RR: 73 (03-17-23 @ 05:15) (18 - 73)  SpO2: 98% (03-17-23 @ 05:15) (93% - 98%)    General: NAD; thin and frail appearance; found eating pancakes  Eyes: Sclera clear  Neck: No palpable pre-auricular, post-auricular, occipital, mandibular, submental, supra-clavicular, or infra-clavicular lymph nodes   Chest: Grossly clear R lung field breath sounds but could not appreciate left lung field breath sounds; however patient did not appear to be in respiratory distress; speaking in sentences; on room air but was on ~4L/min nasal cannula  Heart: Regular rate and rhythm; palpable thrill; intact S1 and S2; +murmur present  Abd: Soft, mild tenderness to palpation in LUQ, nondistended  Nervous System: A&Ox3  Psych: Appropriate affect  Ext: no peripheral LE edema bilaterally    LABS:    03-15    134<L>  |  94<L>  |  26<H>  ----------------------------<  92  4.3   |  27  |  3.27<H>    Ca    8.8      15 Mar 2023 16:45  Phos  5.2     03-15  Mg     2.40     03-15    TPro  5.9<L>  /  Alb  2.9<L>  /  TBili  1.3<H>  /  DBili  x   /  AST  29  /  ALT  12  /  AlkPhos  1040<H>  03-15                IMAGING & OTHER TESTS:    NNI. ************************  Aidan Phan, MD-PhD  Internal Medicine PGY-1  ************************    Patient is a 57y old  Male who presents with a chief complaint of Sepsis (16 Mar 2023 07:16)    Overnight placed on 2L NC after satting in low 90's documented. Patient seen during dialysis, with nasal cannula off, coughing with gurgling sounds. Pulse ox reading low 80's (no waveform available), patient did not appear in distress. Placed on 3L NC with O2 sat increase to 90's. Otherwise, no acute complaints this morning. Patient with appropriate PO intake. Denies CP, SOB, fevers/chills, N/V, or abdominal pain. Patient reminded of ongoing careplan.    MEDICATIONS  (STANDING):  albuterol/ipratropium for Nebulization 3 milliLiter(s) Nebulizer every 6 hours  apixaban 5 milliGRAM(s) Oral two times a day  aspirin  chewable 81 milliGRAM(s) Oral daily  atorvastatin 20 milliGRAM(s) Oral at bedtime  chlorhexidine 2% Cloths 1 Application(s) Topical daily  cloNIDine 0.2 milliGRAM(s) Oral three times a day  darbepoetin Injectable ViaL 80 MICROGram(s) IV Push every week  hydrALAZINE 100 milliGRAM(s) Oral every 8 hours  isosorbide   dinitrate Tablet (ISORDIL) 20 milliGRAM(s) Oral three times a day  losartan 100 milliGRAM(s) Oral daily  NIFEdipine XL 90 milliGRAM(s) Oral daily  pantoprazole    Tablet 40 milliGRAM(s) Oral before breakfast  polyethylene glycol 3350 17 Gram(s) Oral daily  senna 2 Tablet(s) Oral at bedtime  sevelamer carbonate 800 milliGRAM(s) Oral every 8 hours  spironolactone 25 milliGRAM(s) Oral daily  traZODone 100 milliGRAM(s) Oral at bedtime    MEDICATIONS  (PRN):  albuterol    90 MICROgram(s) HFA Inhaler 1 Puff(s) Inhalation four times a day PRN Shortness of Breath and/or Wheezing  sodium chloride 0.9% Bolus. 100 milliLiter(s) IV Bolus every 5 minutes PRN SBP LESS THAN or EQUAL to 100 mmHg      CAPILLARY BLOOD GLUCOSE      POCT Blood Glucose.: 79 mg/dL (17 Mar 2023 05:06)  POCT Blood Glucose.: 112 mg/dL (16 Mar 2023 21:48)  POCT Blood Glucose.: 97 mg/dL (16 Mar 2023 17:44)    I&O's Summary    15 Mar 2023 07:01  -  16 Mar 2023 07:00  --------------------------------------------------------  IN: 400 mL / OUT: 1400 mL / NET: -1000 mL        PHYSICAL EXAM:  Vital Signs Last 24 Hrs  T(C): 36.4 (17 Mar 2023 05:15), Max: 37.1 (16 Mar 2023 23:56)  T(F): 97.5 (17 Mar 2023 05:15), Max: 98.7 (16 Mar 2023 23:56)  HR: 73 (17 Mar 2023 05:15) (73 - 93)  BP: 112/59 (17 Mar 2023 05:15) (111/51 - 143/69)  BP(mean): --  RR: 73 (17 Mar 2023 05:15) (18 - 73)  SpO2: 98% (17 Mar 2023 05:15) (93% - 98%)    Parameters below as of 17 Mar 2023 05:15  Patient On (Oxygen Delivery Method): nasal cannula  O2 Flow (L/min): 2      T(C): 36.4 (03-17-23 @ 05:15), Max: 37.1 (03-16-23 @ 23:56)  HR: 73 (03-17-23 @ 05:15) (73 - 93)  BP: 112/59 (03-17-23 @ 05:15) (111/51 - 143/69)  RR: 73 (03-17-23 @ 05:15) (18 - 73)  SpO2: 98% (03-17-23 @ 05:15) (93% - 98%)    General: NAD; thin and frail appearance; found eating pancakes  Eyes: Sclera clear  Neck: No palpable pre-auricular, post-auricular, occipital, mandibular, submental, supra-clavicular, or infra-clavicular lymph nodes   Chest: Grossly clear R lung field breath sounds but could not appreciate left lung field breath sounds; however patient did not appear to be in respiratory distress; speaking in sentences; on 2L NC but was on ~4L/min nasal cannula  Heart: Regular rate and rhythm; palpable thrill; intact S1 and S2; +murmur present  Abd: Soft, mild tenderness to palpation in LUQ, nondistended  Nervous System: A&Ox3  Psych: Appropriate affect  Ext: no peripheral LE edema bilaterally    LABS:    03-15    134<L>  |  94<L>  |  26<H>  ----------------------------<  92  4.3   |  27  |  3.27<H>    Ca    8.8      15 Mar 2023 16:45  Phos  5.2     03-15  Mg     2.40     03-15    TPro  5.9<L>  /  Alb  2.9<L>  /  TBili  1.3<H>  /  DBili  x   /  AST  29  /  ALT  12  /  AlkPhos  1040<H>  03-15                IMAGING & OTHER TESTS:    NNI.

## 2023-03-17 NOTE — PROGRESS NOTE ADULT - PROBLEM SELECTOR PROBLEM 3
Pleural effusion
Hyperphosphatemia
Pleural effusion
Pleural effusion

## 2023-03-17 NOTE — DISCHARGE NOTE NURSING/CASE MANAGEMENT/SOCIAL WORK - PATIENT PORTAL LINK FT
You can access the FollowMyHealth Patient Portal offered by [facility] by registering at the following website: [email or web address]. By joining [employer]’s FollowMyHealth portal, you will also be able to view your health information using other applications (apps) compatible with our system.

## 2023-03-18 NOTE — H&P ADULT - NSHPLABSRESULTS_GEN_ALL_CORE
LABS:                           8.7    13.92 )-----------( 351      ( 18 Mar 2023 11:01 )             31.3       Mg     2.70     03-18          PT/INR - ( 18 Mar 2023 11:01 )   PT: 18.5 sec;   INR: 1.59 ratio         PTT - ( 18 Mar 2023 11:01 )  PTT:35.1 sec                  I&O's Summary         /     CAPILLARY BLOOD GLUCOSE      POCT Blood Glucose.: 122 mg/dL (18 Mar 2023 09:06)            MICRO: LABS:                           9.4    x     )-----------( 426      ( 18 Mar 2023 15:24 )             32.0     03-18    142  |  96<L>  |  22  ----------------------------<  252<H>  4.3   |  20<L>  |  2.54<H>    Ca    9.0      18 Mar 2023 09:47  Mg     2.70     03-18    TPro  6.4  /  Alb  3.1<L>  /  TBili  1.3<H>  /  DBili  x   /  AST  679<H>  /  ALT  208<H>  /  AlkPhos  928<H>  03-18        PT/INR - ( 18 Mar 2023 11:01 )   PT: 18.5 sec;   INR: 1.59 ratio         PTT - ( 18 Mar 2023 11:01 )  PTT:35.1 sec          LIVER FUNCTIONS - ( 18 Mar 2023 09:47 )  Alb: 3.1 g/dL / Pro: 6.4 g/dL / ALK PHOS: 928 U/L / ALT: 208 U/L / AST: 679 U/L / GGT: x                 I&O's Summary         / Troponin T, High Sensitivity Result: 108 ng/L (03-18-23 @ 09:47)      CAPILLARY BLOOD GLUCOSE      POCT Blood Glucose.: 201 mg/dL (18 Mar 2023 13:33)  POCT Blood Glucose.: 122 mg/dL (18 Mar 2023 09:06)            MICRO:

## 2023-03-18 NOTE — ED PROCEDURE NOTE - CPROC ED TRACHE INTUB DETAIL1
exchanged over bougie/Patient was pre-oxygenated. An endotracheal tube (ETT) was placed through the vocal cords into the trachea.  ETT position was confirmed by auscultation of bilateral breath sounds to all lung fields. ETCO2 level was appropriate.

## 2023-03-18 NOTE — ED PROVIDER NOTE - CLINICAL SUMMARY MEDICAL DECISION MAKING FREE TEXT BOX
57M pmh HTN, HLD, T2DM, ESRD on HD who presents with cardiac arrest. The differential diagnosis includes but is not limited to stemi, pneumonia 2/2 mucus plug (has had prior arrest for this) or aspiration PNA.  Will get ekg, ct head and chest, labs, stemi interventionalist discussion with lu valdez for possible cath lab vs ccu. Diamond att: 57M pmh HTN, HLD, T2DM, Bipolar Disorder, ESRD on HD (TTS), Afib on Eliquis who presents with cardiac arrest.  Arrives intubated by EMS with tube at 21cm at lips, patient with ROSC at arrival with BP 120s systolic, HR 90s-100s.  Per EMS report, at 08:00 this morning, patient was eating breakfast in usual state of health at [facility], after recent discharge from hospital involving MICU course for pneumonia.  Then at 08:20 patient found pulseless and unresponsive, CPR initiated by [place] staff then taken over by EMS, CPR for <20 minutes with 2 doses epinephrine given, then sustained ROSC obtained.    Patient then had additional cardiac arrest in ED from 09:06 - 09:15 with epi given at 09:07 and 09:13.  Cardioversion given at 09:15 for possible VTach although rhythm in retrospect may have more likely been significant ST elevations with RBBB and LPFB mimicking VTach, given no change in rhythm.  Initial EKG during rosc shows STEMI.  Cardiology interventionalist Sandra valdez called, recommended CCU consult.    57M pmh HTN, HLD, T2DM, ESRD on HD who presents with cardiac arrest. The differential diagnosis includes but is not limited to stemi, pneumonia 2/2 mucus plug (has had prior arrest for this) or aspiration PNA.  Will get ekg, ct head and chest, labs, stemi interventionalist discussion with sandra valdez for possible cath lab vs ccu.

## 2023-03-18 NOTE — H&P ADULT - ASSESSMENT
Mr. Cal Lloyd is a 58 Y/O M w/PMH HTN, HLD, T2DM, Bipolar Disorder, ESRD on HD (TTS), Afib on Eliquis who presents today to [facility] post-cardiac arrest, found to have EKG findings of STEMI, admitted to CCU for further cardiac stabilization.      NEURO  # Anoxic Brain Injury  As seen on CT this AM. Found with cardiac arrest on floor of nursing home  - will need to reach out to family to decide next steps      CARDIAC  # STEMI  # Cardiac Arrest  S/p cardiac arrest at nursing home. This is second cardiac arrest in as many months. ROSC achieved.  - 25      RESPIRATORY        GI                ID        ENDO        HEME        ETHICS          DVT Mr. Cal Lloyd is a 58 Y/O M w/PMH HTN, HLD, T2DM, Bipolar Disorder, ESRD on HD (TTS), Afib on Eliquis who presents today to [facility] post-cardiac arrest, found to have EKG findings of STEMI, admitted to CCU for further cardiac stabilization.      NEURO  # Anoxic Brain Injury  As seen on CT this AM. Found with cardiac arrest on floor of nursing home  - will need to reach out to family to decide next steps    # Intubated and Sedated  - keep sedated for now  - RASS goal 0 to -1  - daily SBT/ SAT role in s/o anoxic brain injury    CARDIAC  # Cardiac Arrest  Unclear cause, discharged after recent admission of respiratory failure requiring intubation. S/p cardiac arrest at nursing home. This is second cardiac arrest in as many months. ROSC achieved. Previous cardiac arrest in 02/23 in s/o complete lung atelectasis and mucus plugging.  - on heparin drip ACS protocol  - follow-up TTE  - role for cardiac catheterization?    # HFpEF  EF > 70% 3/10, moderate diastolic dysfunction, severe concentric left ventricular hypertrophy  - repeat TTE      RESPIRATORY  # Acute Hypoxic Respiratory Failure   - intubated and sedated currently   - in s/o anoxic brain injury, will need further evaluation before any SBT/SATs      GI  - no active issues   - keep NPO for now      # ESRD on HD (TTS)  - HD per nephro    - indwelling cobos catheter  - monitor I &Os    ID  - no active issues currently      ENDO  # T2DM  - iSS       HEME  - no active issues currently      ETHICS  - full code per recent [facility] discussion    DVT  - currently on ACS Heparin protocol Mr. Cal Lloyd is a 58 Y/O M w/PMH HTN, HLD, T2DM, Bipolar Disorder, ESRD on HD (TTS), Afib on Eliquis who presents today to [facility] post-cardiac arrest, found to have EKG findings of STEMI, admitted to CCU for further cardiac stabilization.      NEURO  # Anoxic Brain Injury  As seen on CT this AM. Found with cardiac arrest on floor of nursing home  - will need to reach out to family to decide next steps    # Intubated and Sedated  - keep sedated for now  - RASS goal 0 to -1  - daily SBT/ SAT role in s/o anoxic brain injury    CARDIAC  # Cardiac Arrest  Unclear cause, discharged after recent admission of respiratory failure requiring intubation. S/p cardiac arrest at nursing home. This is second cardiac arrest in as many months. ROSC achieved. Previous cardiac arrest in 02/23 in s/o complete lung atelectasis and mucus plugging. Initial EKG indicating possible STEMI; latest EKG back to baseline, not as worried about STEMI  - SpO2 goal > 94%; avoid hyperventilation  - On Levo drip; titrate to MAP > 65  - considering therapeutic hypothermia  - follow-up TTE  - latest EKG w/o STEMI criteria; role for coronary angiogram?    # HFpEF  EF > 70% 3/10, moderate diastolic dysfunction, severe concentric left ventricular hypertrophy  - repeat TTE      RESPIRATORY  # Acute Hypoxic Respiratory Failure   - intubated and sedated currently   - in s/o anoxic brain injury, will need further evaluation before any SBT/SATs      GI  - no active issues   - keep NPO for now      # ESRD on HD (TTS)  - HD per nephro    - indwelling cobos catheter  - monitor I &Os    ID  - no active issues currently      ENDO  # T2DM  - iSS       HEME  - no active issues currently      ETHICS  - full code per recent [facility] discussion    DVT  - currently on ACS Heparin protocol Mr. Cal Lloyd is a 58 Y/O M w/PMH HTN, HLD, T2DM, Bipolar Disorder, ESRD on HD (TTS), Afib on Eliquis who presents today to [facility] post-cardiac arrest, found to have EKG findings of STEMI, admitted to CCU for further cardiac stabilization.      NEURO  # Anoxic Brain Injury  As seen on CT this AM. Found with cardiac arrest on floor of nursing home  - will need to reach out to family to decide next steps  - not sedated    CARDIAC  # Cardiac Arrest  Unclear cause, discharged after recent admission of respiratory failure requiring intubation. S/p cardiac arrest at nursing home. This is second cardiac arrest in as many months. ROSC achieved. Previous cardiac arrest in 02/23 in s/o complete lung atelectasis and mucus plugging. Initial EKG indicating possible STEMI; latest EKG back to baseline, not as worried about STEMI  - SpO2 goal > 94%; avoid hyperventilation  - On Levo drip; titrate to MAP > 65  - considering therapeutic hypothermia  - follow-up TTE  - latest EKG w/o STEMI criteria; role for coronary angiogram?    # HFpEF  EF > 70% 3/10, moderate diastolic dysfunction, severe concentric left ventricular hypertrophy  - repeat TTE      RESPIRATORY  # Acute Hypoxic Respiratory Failure   - intubated and sedated currently   - in s/o anoxic brain injury, will need further evaluation before any SBT/SATs      GI  - no active issues   - keep NPO for now      # ESRD on HD (TTS)  - HD per nephro    - indwelling cobos catheter  - monitor I &Os    ID  - no active issues currently      ENDO  # T2DM  - iSS       HEME  - no active issues currently      ETHICS  - full code per recent [facility] discussion    DVT  - currently on ACS Heparin protocol Mr. Cal Lloyd is a 58 Y/O M w/PMH HTN, HLD, T2DM, Bipolar Disorder, ESRD on HD (TTS), Afib on Eliquis who presents today to [facility] post-cardiac arrest, found to have EKG findings of STEMI, admitted to CCU for further cardiac stabilization.      NEURO  # Anoxic Brain Injury  As seen on CT this AM. Found with cardiac arrest on floor of nursing home  - will need to reach out to family to decide next steps  - not sedated    CARDIAC  # Cardiac Arrest  Unclear cause, discharged after recent admission of respiratory failure requiring intubation. S/p cardiac arrest at nursing home. This is second cardiac arrest in as many months. ROSC achieved. Previous cardiac arrest in 02/23 in s/o complete lung atelectasis and mucus plugging. Initial EKG indicating possible STEMI; latest EKG back to baseline, not as worried about STEMI  - SpO2 goal > 94%; avoid hyperventilation  - On Levo drip; titrate to MAP > 65  -  therapeutic hypothermia protocol  - follow-up TTE  - latest EKG w/o STEMI criteria; role for coronary angiogram?    # HFpEF  EF > 70% 3/10, moderate diastolic dysfunction, severe concentric left ventricular hypertrophy  - repeat TTE      RESPIRATORY  # Acute Hypoxic Respiratory Failure   - intubated and sedated currently   - in s/o anoxic brain injury, will need further evaluation before any SBT/SATs      GI  - no active issues   - keep NPO for now      # ESRD on HD (TTS)  - HD per nephro    - indwelling cobos catheter  - monitor I &Os    ID  - no active issues currently      ENDO  # T2DM  - iSS       HEME  - no active issues currently      ETHICS  - full code per recent [facility] discussion    DVT  - currently on ACS Heparin protocol Mr. Cal Lloyd is a 58 Y/O M w/PMH HTN, HLD, T2DM, Bipolar Disorder, ESRD on HD (TTS), Afib on Eliquis who presents today to [facility] post-cardiac arrest, found to have EKG findings of STEMI, admitted to CCU for further cardiac stabilization.      NEURO  # Anoxic Brain Injury  As seen on CT this AM. Found with cardiac arrest on floor of nursing home  - will need to reach out to family to decide next steps  - not sedated    CARDIAC  # Cardiac Arrest  Unclear cause, discharged after recent admission of respiratory failure requiring intubation. S/p cardiac arrest at nursing home. This is second cardiac arrest in as many months. ROSC achieved. Previous cardiac arrest in 02/23 in s/o complete lung atelectasis and mucus plugging. Initial EKG indicating possible STEMI; latest EKG back to baseline, not as worried about STEMI  - SpO2 goal > 94%; avoid hyperventilation  - On Levo drip; titrate to MAP > 65  -  therapeutic hypothermia protocol  - follow-up TTE  - latest EKG w/o STEMI criteria; role for coronary angiogram?    # HFpEF  EF > 70% 3/10, moderate diastolic dysfunction, severe concentric left ventricular hypertrophy  - repeat TTE      RESPIRATORY  # Acute Hypoxic Respiratory Failure   - intubated and sedated currently   - in s/o anoxic brain injury, will need further evaluation before any SBT/SATs      GI  - no active issues   - keep NPO for now      # ESRD on HD (TTS)  - HD per nephro  - per Nephro, as pt not in urgent need of HD, can consult 3/19 AM and will come see him then    - indwelling cobos catheter  - monitor I &Os    ID  No active issues  - Zosyn 2.25g q8h for empiric therapy      ENDO  # T2DM  - iSS       HEME  - no active issues currently      ETHICS  - full code per recent [facility] discussion    DVT  - currently on ACS Heparin protocol Mr. Cal Lloyd is a 58 Y/O M w/PMH HTN, HLD, T2DM, Bipolar Disorder, ESRD on HD (TTS), Afib on Eliquis who presents today to [facility] post-cardiac arrest, found to have EKG findings of STEMI, admitted to CCU for further cardiac stabilization.      NEURO  # Anoxic Brain Injury  As seen on CT this AM. Found with cardiac arrest on floor of nursing home  - will need to reach out to family to decide next steps  - start Propofol and Fentanyl for sedation for hypothermia protocol    CARDIAC  # Cardiac Arrest  Unclear cause, discharged after recent admission of respiratory failure requiring intubation. S/p cardiac arrest at nursing home. This is second cardiac arrest in as many months. ROSC achieved. Previous cardiac arrest in 02/23 in s/o complete lung atelectasis and mucus plugging. Initial EKG indicating possible STEMI; latest EKG back to baseline, not as worried about STEMI  - SpO2 goal > 94%; avoid hyperventilation  - On Levo drip; titrate to MAP > 65  -  therapeutic hypothermia protocol  - follow-up TTE  - latest EKG w/o STEMI criteria; role for coronary angiogram?    # HFpEF  EF > 70% 3/10, moderate diastolic dysfunction, severe concentric left ventricular hypertrophy  - repeat TTE      RESPIRATORY  # Acute Hypoxic Respiratory Failure   - intubated and sedated currently   - in s/o anoxic brain injury, will need further evaluation before any SBT/SATs      GI  - no active issues   - keep NPO for now      # ESRD on HD (TTS)  - HD per nephro  - per Nephro, as pt not in urgent need of HD, can consult 3/19 AM and will come see him then    - indwelling cobos catheter  - monitor I &Os    ID  No active issues  - Zosyn 2.25g q8h for empiric therapy      ENDO  # T2DM  No outpatient meds on records  - iSS     HEME  - no active issues currently    ETHICS  - full code per recent [facility] discussion    DVT  Has bloody NGT output, likely in setting of traumatic intubation x2  - holding heparin for now Mr. Cal Lloyd is a 58 Y/O M w/PMH HTN, HLD, T2DM, Bipolar Disorder, ESRD on HD (TTS), Afib on Eliquis who [facility] requiring CPR and was admitted to CCU for management of possible anterior STEMI and anoxic brain injury.    Patient unresponsive after cardiac arrest requiring CPR - not a candidate for cath lab per Dr. Sandra Galaviz.    NEURO  # Anoxic Brain Injury  As seen on CT this AM. Found with cardiac arrest on floor of nursing home  - rapid cooling with hypothermia protocol  - re-evaluate mental status and continue GOC with family pending initial cooling  - start Propofol and Fentanyl for sedation for hypothermia protocol    CARDIAC  # Cardiac Arrest  Unclear cause, discharged after recent admission of respiratory failure requiring intubation. S/p cardiac arrest at nursing home. This is second cardiac arrest in as many months. ROSC achieved. Previous cardiac arrest in 02/23 in s/o complete lung atelectasis and mucus plugging. Initial EKG (obtained after CPR and recurrent arrest in ER) indicating possible STEMI.  - SpO2 goal > 94%; avoid hyperventilation  - On Levo drip; titrate to MAP > 65  -  therapeutic hypothermia protocol  - follow-up TTE  - medical management for ?ACS given not a candidate for invasive angiography at present    # HFpEF  EF > 70% 3/10, moderate diastolic dysfunction, severe concentric left ventricular hypertrophy  - repeat TTE      RESPIRATORY  # Acute Hypoxic Respiratory Failure   - intubated and sedated currently   - in s/o anoxic brain injury, will need further evaluation before any SBT/SATs      GI  - no active issues   - keep NPO for now      # ESRD on HD (TTS)  - HD per nephro  - per Nephro, as pt not in urgent need of HD, can consult 3/19 AM and will come see him then    - indwelling Loza catheter  - monitor I &Os    ID: recent opportunistic infection, yeast in sputum cultures and treatment in MICU for septic shock.  - Zosyn 2.25g q8h for empiric therapy        ENDO  # T2DM  No outpatient meds on records  - iSS     HEME: bleeding in ET noted, no evidence of coagulopathy or intracranial hemorrhage that precludes cooling.  - monitor CBC and transfuse PRN      GOC: Palliative care involved during recent hospitalization and medical concern for recurrent decompensation. Per chart notes, there is concern for limited insight into patient's illness and the patient remained full code at time of discharge.  - in view of cardiac arrest and anoxic brain injury will need Palliative care involvement and ongoing GOC discussions pending re-evaluation after cooling protocol.  - full code per recent [facility] discussion    DVT  Has bloody NGT output, likely in setting of traumatic intubation x2  - holding heparin for now

## 2023-03-18 NOTE — H&P ADULT - HISTORY OF PRESENT ILLNESS
Mr. Cal Lloyd is a 56 Y/O M w/PMH HTN, HLD, T2DM, Bipolar Disorder, ESRD on HD (TTS), Afib on Eliquis who presents today to [facility] post-cardiac arrest.     Per ED Physician Note:   "Arrives intubated by EMS with tube at 21cm at lips, patient with ROSC at arrival with BP 120s systolic, HR 90s-100s.  Per EMS report, at 08:00 this morning, patient was eating breakfast in usual state of health at [facility], after recent discharge from hospital involving MICU course for pneumonia.  Then at 08:20 patient found pulseless and unresponsive, CPR initiated by [facility] staff then taken over by EMS, CPR for <20 minutes with 2 doses epinephrine given, then sustained ROSC obtained."    "Patient then had additional cardiac arrest in ED from 09:06 - 09:15 with epi given at 09:07 and 09:13.  Cardioversion given at 09:15 for possible VTach although rhythm in retrospect may have more likely been significant ST elevations with RBBB and LPFB mimicking VTach, given no change in rhythm.  Initial EKG during rosc shows STEMI.  Cardiology interventionalist Sandra valdez called, recommended CCU consult"      Of note, patient was recently discharged (3/9-3/17) from [facility] after MICU admission for Acute Hypoxic Respiratory Failure due to Aspiration PNA and COVID, was discharged after being weaned back to room air, in stable condition. He had a recent admission (2-7 - 2/14) at [facility] for septic shock and AHRF likely 2/2 aspiration pneumonia and Covid, hospital course c/b cardiac arrest iso compete lung atelectasis and mucus plugging, discharged to [facility] on room air.    Also with recent admission on 1/14 to [facility] for septic shock.    During previous admission, on 3/16, Palliative Care carried out a GOC discussion, where patient expressed desire to remain full code. Mr. Cal Lloyd is a 58 Y/O M with HTN, HLD, T2DM, Bipolar Disorder, ESRD on HD (TTS), Afib on Eliquis who returned to [facility] from [facility] after cardiac arrest and CPR. He was discharged on 3/17 after long hospital course, requiring MICU care for septic shock.    Per ED Physician Note:   "Arrives intubated by EMS with tube at 21cm at lips, patient with ROSC at arrival with BP 120s systolic, HR 90s-100s.  Per EMS report, at 08:00 this morning, patient was eating breakfast in usual state of health at [facility], after recent discharge from hospital involving MICU course for pneumonia.  Then at 08:20 patient found pulseless and unresponsive, CPR initiated by [facility] staff then taken over by EMS, CPR for <20 minutes with 2 doses epinephrine given, then sustained ROSC obtained."    "Patient then had additional cardiac arrest in ED from 09:06 - 09:15 with epi given at 09:07 and 09:13.  Cardioversion given at 09:15 for possible VTach although rhythm in retrospect may have more likely been significant ST elevations with RBBB and LPFB mimicking VTach, given no change in rhythm.  Initial EKG during rosc shows STEMI.  Cardiology interventionalist Sandra valdez called, recommended CCU consult"      Of note, patient was recently discharged (3/9-3/17) from [facility] after MICU admission for Acute Hypoxic Respiratory Failure due to Aspiration PNA and COVID, was discharged after being weaned back to room air, in stable condition. He had a recent admission (2-7 - 2/14) at [facility] for septic shock and AHRF likely 2/2 aspiration pneumonia and Covid, hospital course c/b cardiac arrest iso compete lung atelectasis and mucus plugging, discharged to [facility] on room air.    Also with recent admission on 1/14 to [facility] for septic shock.    During previous admission, on 3/16, Palliative Care carried out a GOC discussion, where patient expressed desire to remain full code.

## 2023-03-18 NOTE — ED PROVIDER NOTE - SEVERE SEPSIS ALERT DETAILS
RITU Newell MD:  I have seen and evaluated this patient and do not believe the patient is septic at this time.  I will continue to evaluate.

## 2023-03-18 NOTE — PATIENT PROFILE ADULT - FALL HARM RISK - RISK INTERVENTIONS

## 2023-03-18 NOTE — H&P ADULT - NSHPPHYSICALEXAM_GEN_ALL_CORE
CONST:               Ill-appearing. intubated and sedated  EYES:                   conjunctiva and sclera clear; PERRL; no anisicoria; ocular lubricant noted.  RESP/CHEST:      Ventilator sounds symmetric bilaterally. No wheezing. No chest wall crepitus.  CARDIOVASC:     RRR, normal S1/S2, no M/R/G; no appreciable JVD  ABDOMEN:         Soft, NT/ND, no organomegaly noted  EXT:                     Warm/Cool to touch. Anasarca absent. Nishi's negative. Cap refill <2s; pulses are 2+ B/L  NEURO:               Sedated to RASS __. (Paralyzed).  No spont mvmts. no grijalva's  SKIN:                   No diffuse rashes; no pressure injury noted; no mottling    TUBES/LINES/DRAINS:  [] ETT  [] NGT/OGT  [] Peripheral IV  [] Central Venous Line     	[] R	[] L	[] IJ	[] Fem	[] SC	Date Placed:   [] Arterial Line		[] R	[] L	[] Fem	[] Rad	[] Ax	Date Placed:   [] PICC:         	[] Midline		[] Mediport  [] Urinary Catheter		Date Placed: CONST:               Ill-appearing. intubated and sedated  EYES:                   conjunctiva and sclera clear; PERRL; no anisicoria; ocular lubricant noted.  RESP/CHEST:      Ventilator sounds symmetric bilaterally. No wheezing. No chest wall crepitus.  CARDIOVASC:     RRR, normal S1/S2, no M/R/G; no appreciable JVD  ABDOMEN:         Soft, NT/ND, no organomegaly noted  EXT:                     Warm/Cool to touch. Anasarca absent. Nishi's negative. Cap refill <2s; pulses are 2+ B/L  NEURO:               Sedated to RASS __. (Paralyzed).  No spont mvmts. no grijalva's  SKIN:                   No diffuse rashes; no pressure injury noted; no mottling    TUBES/LINES/DRAINS:  [X] ETT  [X] NGT/OGT  [] Central Venous Line     	[] R	[X] L	[X] IJ	[] Fem	[] SC	Date Placed: 3/18/23  [] Urinary Catheter		Date Placed: 3/18/23 CONST:               Ill-appearing. intubated and sedated  EYES:                   conjunctiva and sclera clear; ocular lubricant noted; pupils fixed and mid-dilated  RESP/CHEST:      Ventilator sounds symmetric bilaterally. No wheezing. No chest wall crepitus.  CARDIOVASC:     RRR, normal S1/S2, no M/R/G; no appreciable JVD  ABDOMEN:         Soft, NT/ND, no organomegaly noted  EXT:                     Warm/Cool to touch. Anasarca absent. Nishi's negative. Cap refill <2s; pulses are 2+ B/L  NEURO:               Sedated (Paralyzed).  No spont mvmts. no grijalva's  SKIN:                   No diffuse rashes; no pressure injury noted; no mottling    TUBES/LINES/DRAINS:  [X] ETT  [X] NGT/OGT  [] Central Venous Line     	[] R	[X] L	[X] IJ	[] Fem	[] SC	Date Placed: 3/18/23  [] Urinary Catheter		Date Placed: 3/18/23

## 2023-03-18 NOTE — ED ADULT NURSE NOTE - NS ED NURSE TRANSPORT WITH
Cardiac Monitor/Defib/ACLS/Rescue Kit/O2/BVM Cardiac Monitor/Defib/ACLS/Rescue Kit/O2/BVM/oxygen/IV pump/pulse ox/ventilator/ACLS Rescue Kit

## 2023-03-18 NOTE — ED PROVIDER NOTE - PHYSICAL EXAMINATION
Vital signs reviewed.  CONSTITUTIONAL: unresponsive post-arrest  HEAD: Normocephalic; atraumatic  NECK: Trachea midline  CV: Normal S1, S2; extremities WWP  RESP: mechanical breath sounds bilaterally; no stridor  ABD: soft, mildly distended prior to NGT insertion; non-tender  MSK/EXT: no edema, no deformities. Left arm fistula  SKIN: Warm and dry as visualized  NEURO: unresponsive off sedation

## 2023-03-18 NOTE — ED PROVIDER NOTE - OBJECTIVE STATEMENT
57M pmh HTN, HLD, T2DM, Bipolar Disorder, ESRD on HD (TTS), Afib on Eliquis who presents with cardiac arrest.    Arrest 09:06 - 09:15 with epi given at 09:07 and 09:13.  Cardioversion given at 09:15 for possible VTach although rhythm in retrospect may have more likely been significant ST elevations with RBBB and LPFB mimicking VTach, given no change in rhythm.  Initial EKG during rosc shows STEMI.  Cardiology interventionalist Sandra valdez called, recommended CCU consult. 57M pmh HTN, HLD, T2DM, Bipolar Disorder, ESRD on HD (TTS), Afib on Eliquis who presents with cardiac arrest.  Arrives intubated by EMS with tube at 21cm at lips, patient with ROSC at arrival with BP 120s systolic, HR 90s-100s.  Per EMS report, at 08:00 this morning, patient was eating breakfast in usual state of health at [facility], after recent discharge from hospital involving MICU course for pneumonia.  Then at 08:20 patient found pulseless and unresponsive, CPR initiated by [facility] staff then taken over by EMS, CPR for <20 minutes with 2 doses epinephrine given, then sustained ROSC obtained.    Patient then had additional cardiac arrest in ED from 09:06 - 09:15 with epi given at 09:07 and 09:13.  Cardioversion given at 09:15 for possible VTach although rhythm in retrospect may have more likely been significant ST elevations with RBBB and LPFB mimicking VTach, given no change in rhythm.  Initial EKG during rosc shows STEMI.  Cardiology interventionalist Sandra valdez called, recommended CCU consult.

## 2023-03-18 NOTE — ED ADULT NURSE NOTE - OBJECTIVE STATEMENT
Facilitator RN: Patient brought in by ambulance s/p cardiac arrest from [facility]. EMS states that staff told them patient was his usual self at 8 AM, then at 8:20 AM, was found unresponsive and pulseless, with CPR in progress. EMS states they gave 2 epinephrines and then ROSC achieved. Patient arrived with ROSC, intubated, BVM in progress. Patient arrives unconscious, nonverbal. Respirations even, with BVM respirations. Appears pale. Left AV fistula present. Hx. DM, HTN, HLD, ESRD on dialysis. On cardiac monitor showing tachycardia. 20G IV placed right AC, labs drawn and sent.    Patient went into cardiac arrest from 9:06 AM to 9:15 AM and ACLS was started. ROSC achieved. Refer to Code Flowsheet.

## 2023-03-18 NOTE — H&P ADULT - ATTENDING COMMENTS
Very unfortunate case of 57 year old patient with ESRD, psychiatric illness, HTN, DM and recurring hospitalizations for septic shock which required intensive care in Medical ICU, now re-admitted after cardiac arrest at Rehab facility to which he was discharged one day prior.  STEMI activated by ER for anterior ST-segment elevations noted after cardiac arrest and CPR, but not a candidate for percutaneous intervention and thrombolysis was not recommended. There is evidence of significant anoxic brain injury on imaging.    Admitted to CCU post cardiac arrest for rapid cooling protocol and ongoing management.    Initiated on cooling protocol  Renal consulted for HD  Medical management for possible acute MI, though minimal troponin elevation.  Prognosis guarded.  To update family on prognosis pending completion of cooling protocol and reassessment of neurologic status.

## 2023-03-18 NOTE — ED PROVIDER NOTE - PROGRESS NOTE DETAILS
CCU NP requesting empiric abx coverage, no objection although STEMI more likely than sepsis.  Fluids contraindicated given renal failure on HD. blood in suction cannister after NGT insertion, will hold off on anticoagulation for STEMI.  Cards CCU NP in agreement.

## 2023-03-18 NOTE — PROCEDURE NOTE - NSPROCDETAILS_GEN_ALL_CORE
guidewire recovered/lumen(s) aspirated and flushed/ultrasound guidance with use of sterile gel and probe cove
location identified, draped/prepped, sterile technique used, needle inserted/introduced/positive blood return obtained via catheter/connected to a pressurized flush line/Seldinger technique/all materials/supplies accounted for at end of procedure

## 2023-03-18 NOTE — H&P ADULT - NSICDXPASTMEDICALHX_GEN_ALL_CORE_FT
PAST MEDICAL HISTORY:  Anemia     Arterial insufficiency     Bipolar affective disorder in remission     Cardiac arrest     Depression     Diabetes mellitus Patient does not routinely check FS--diet controlled    ESRD on dialysis     Hemodialysis patient     High cholesterol     Hypertension

## 2023-03-18 NOTE — PROCEDURE NOTE - NSCATHTYPE_GEN_A_CORE
"Chief Complaint   Patient presents with     Lipids     Hypertension     Diabetes       Initial /72 (BP Location: Left arm, Patient Position: Chair, Cuff Size: Adult Regular)  Pulse 69  Temp 97.5  F (36.4  C)  Resp 20  Ht 5' 4\" (1.626 m)  Wt 115 lb (52.2 kg)  LMP  (LMP Unknown)  SpO2 100%  Breastfeeding? No  BMI 19.74 kg/m2 Estimated body mass index is 19.74 kg/(m^2) as calculated from the following:    Height as of this encounter: 5' 4\" (1.626 m).    Weight as of this encounter: 115 lb (52.2 kg).  Medication Reconciliation: complete   Consuelo Malik LPN  " CVC

## 2023-03-19 NOTE — DIETITIAN INITIAL EVALUATION ADULT - PERTINENT MEDS FT
MEDICATIONS  (STANDING):  aspirin  chewable 81 milliGRAM(s) Enteral Tube daily  chlorhexidine 0.12% Liquid 15 milliLiter(s) Oral Mucosa two times a day  chlorhexidine 2% Cloths 1 Application(s) Topical <User Schedule>  darbepoetin Injectable ViaL 80 MICROGram(s) IV Push <User Schedule>  dextrose 50% Injectable 25 Gram(s) IV Push once  dextrose 50% Injectable 12.5 Gram(s) IV Push once  dextrose 50% Injectable 25 Gram(s) IV Push once  dextrose Oral Gel 15 Gram(s) Oral once  fentaNYL   Infusion. 0.5 MICROgram(s)/kG/Hr (3.01 mL/Hr) IV Continuous <Continuous>  glucagon  Injectable 1 milliGRAM(s) IntraMuscular once  heparin  Infusion. 850 Unit(s)/Hr (8.5 mL/Hr) IV Continuous <Continuous>  insulin lispro (ADMELOG) corrective regimen sliding scale   SubCutaneous every 6 hours  pantoprazole  Injectable 40 milliGRAM(s) IV Push daily  petrolatum Ophthalmic Ointment 1 Application(s) Both EYES every 4 hours  piperacillin/tazobactam IVPB.. 3.375 Gram(s) IV Intermittent every 12 hours  propofol Infusion 5 MICROgram(s)/kG/Min (1.81 mL/Hr) IV Continuous <Continuous>    MEDICATIONS  (PRN):  hydrALAZINE Injectable 10 milliGRAM(s) IV Push every 6 hours PRN SBP > 170

## 2023-03-19 NOTE — CONSULT NOTE ADULT - PROBLEM SELECTOR RECOMMENDATION 2
Pt. with anemia in the setting of ESRD. Hgb (8.8) is below goal today. Pt. was receiving Aranesp 80 mcg IV with HD on Wednesdays, will resume. Monitor hgb, goal: 10-11.    If you have any questions, please feel free to contact me  Rohan Tarango  Nephrology Fellow  [phone] / [employer] Teams(Preferred)  (After 5pm or on weekends please page the on-call fellow)

## 2023-03-19 NOTE — DIETITIAN INITIAL EVALUATION ADULT - INCREASED NUTRIENT NEEDS
Problem: Potential for hypothermia related to immature thermoregulation  Goal: [place] will maintain body temperature between 97.6 degrees axillary F and 99.6 degrees axillary F in an open crib  Outcome: PROGRESSING AS EXPECTED  Within parameters      Problem: Potential for impaired gas exchange  Goal: Patient will not exhibit signs/symptoms of respiratory distress  Outcome: PROGRESSING AS EXPECTED  No current s/s of respiratory distress.     Problem: Potential for infection related to maternal infection  Goal: Patient will be free of signs/symptoms of infection  Outcome: PROGRESSING AS EXPECTED  Infant on Q4 VS       Protein-calorie.

## 2023-03-19 NOTE — DIETITIAN INITIAL EVALUATION ADULT - OTHER INFO
As per chart, Mr. Cal Lloyd is a 58 Y/O M w/PMH HTN, HLD, T2DM, Bipolar Disorder, ESRD on HD (TTS), Afib on Eliquis who [facility] requiring CPR and was admitted to CCU for management of possible anterior STEMI and cardiac arrest with anoxic brain injury. As per nephro note (3/19), plan for HD tomorrow.      Pt is unresponsive as per flow sheet. Information obtained from chart review, RN and resident. As per resident, pt continues on NPO and no plan for feeding 2/2 incubated and sedated. Fecal incontinent. Last BM 3/18 as per flow sheet. Noticed propofol in use. Pt is noted with weight loss and signs/symptoms for fat/muscle mass loss. RD to remain available for further nutritional interventions as indicated.

## 2023-03-19 NOTE — PROGRESS NOTE ADULT - ASSESSMENT
Mr. Cal Lloyd is a 56 Y/O M w/PMH HTN, HLD, T2DM, Bipolar Disorder, ESRD on HD (TTS), Afib on Eliquis who presents today to [facility] post-cardiac arrest, found to have EKG findings of STEMI, admitted to CCU for further cardiac stabilization.      NEURO  # Anoxic Brain Injury  As seen on CT this AM. Found with cardiac arrest on floor of nursing home  - will need to reach out to family to decide next steps  - start Propofol and Fentanyl for sedation for hypothermia protocol    CARDIAC  # Cardiac Arrest  Unclear cause, discharged after recent admission of respiratory failure requiring intubation. S/p cardiac arrest at nursing home. This is second cardiac arrest in as many months. ROSC achieved. Previous cardiac arrest in 02/23 in s/o complete lung atelectasis and mucus plugging. Initial EKG indicating possible STEMI; latest EKG back to baseline, not as worried about STEMI  - SpO2 goal > 94%; avoid hyperventilation  - On Levo drip; titrate to MAP > 65  -  therapeutic hypothermia protocol  - follow-up TTE  - latest EKG w/o STEMI criteria; role for coronary angiogram?    # HFpEF  EF > 70% 3/10, moderate diastolic dysfunction, severe concentric left ventricular hypertrophy  - repeat TTE      RESPIRATORY  # Acute Hypoxic Respiratory Failure   - intubated and sedated currently   - in s/o anoxic brain injury, will need further evaluation before any SBT/SATs      GI  - no active issues   - keep NPO for now      # ESRD on HD (TTS)  - HD per nephro  - per Nephro, as pt not in urgent need of HD, can consult 3/19 AM and will come see him then    - indwelling cobos catheter  - monitor I &Os    ID  No active issues  - Zosyn 2.25g q8h for empiric therapy      ENDO  # T2DM  No outpatient meds on records  - iSS     HEME  - no active issues currently    ETHICS  - full code per recent GOC discussion    DVT  Has bloody NGT output, likely in setting of traumatic intubation x2  - holding heparin for now Mr. Cal Lloyd is a 56 Y/O M w/PMH HTN, HLD, T2DM, Bipolar Disorder, ESRD on HD (TTS), Afib on Eliquis who presents today to [facility] post-cardiac arrest, found to have EKG findings of STEMI, admitted to CCU for further cardiac stabilization.      NEURO  # Anoxic Brain Injury  As seen on CT 3/18. Found with cardiac arrest on floor of nursing home  - will need to reach out to family to decide next steps  - c/w Propofol and Fentanyl for sedation for hypothermia protocol - hypothermia started 3/18 1 PM, plan to rewarm 3/19 1 PM.    CARDIAC  # Cardiac Arrest  Unclear cause, discharged after recent admission of respiratory failure requiring intubation. S/p cardiac arrest at nursing home. This is second cardiac arrest in as many months. ROSC achieved. Previous cardiac arrest in 02/23 in s/o complete lung atelectasis and mucus plugging. Initial EKG indicating possible STEMI; latest EKG back to baseline, not as worried about STEMI  - SpO2 goal > 94%; avoid hyperventilation  - Off NTG, MAPing ~ 80  -  therapeutic hypothermia protocol  - follow-up TTE  - latest EKG w/o STEMI criteria; role for coronary angiogram?    # HFpEF  EF > 70% 3/10, moderate diastolic dysfunction, severe concentric left ventricular hypertrophy  - 3/18 TTE RF 59%, moderate diastolic dysfunction, severely dilated LA, moderate pericardial effusion, b/l pleural effusions    #AFib  - Heparin gtt for AFib    RESPIRATORY  # Acute Hypoxic Respiratory Failure   - intubated and sedated currently   - in s/o anoxic brain injury, will need further evaluation before any SBT/SATs    GI  - no active issues   - keep NPO for now      # ESRD on HD (TTS)  - HD per nephro  - per Nephro, as pt not in urgent need of HD, can consult 3/19 AM and will come see him then    - condom catheter  - monitor I &Os    ID  No active issues  - Zosyn 2.25g q8h for empiric therapy      ENDO  # T2DM  No outpatient meds on records  - iSS     HEME  - no active issues currently    ETHICS  - full code per recent [facility] discussion    DVT PPX  - Heparin gtt for AFib Mr. Cal Lloyd is a 56 Y/O M w/PMH HTN, HLD, T2DM, Bipolar Disorder, ESRD on HD (TTS), Afib on Eliquis who presents today to [facility] post-cardiac arrest, found to have EKG findings of STEMI, admitted to CCU for further cardiac stabilization.      NEURO  # Anoxic Brain Injury  As seen on CT 3/18. Found with cardiac arrest on floor of nursing home  - will need to reach out to family to decide next steps  - c/w Propofol and Fentanyl for sedation for hypothermia protocol - hypothermia started 3/18 1 PM, plan to rewarm 3/19 1 PM.    CARDIAC  # Cardiac Arrest  Unclear cause, discharged after recent admission of respiratory failure requiring intubation. S/p cardiac arrest at nursing home. This is second cardiac arrest in as many months. ROSC achieved. Previous cardiac arrest in 02/23 in s/o complete lung atelectasis and mucus plugging. Initial EKG indicating possible STEMI; latest EKG back to baseline, not as worried about STEMI  - SpO2 goal > 94%; avoid hyperventilation  - Off NTG, MAPing ~ 80  -  therapeutic hypothermia protocol  - follow-up TTE  - latest EKG w/o STEMI criteria; role for coronary angiogram?    # HFpEF  EF > 70% 3/10, moderate diastolic dysfunction, severe concentric left ventricular hypertrophy  - 3/18 TTE RF 59%, moderate diastolic dysfunction, severely dilated LA, moderate pericardial effusion, b/l pleural effusions    #AFib  - Heparin gtt for AFib    RESPIRATORY  # Acute Hypoxic Respiratory Failure   - intubated and sedated currently   - in s/o anoxic brain injury, will need further evaluation before any SBT/SATs    GI  - no active issues   - keep NPO for now      # ESRD on HD (TTS)  - HD per nephro  - per Nephro, as pt not in urgent need of HD, can consult 3/19 AM and will come see him then    - condom catheter  - monitor I &Os    ID  #Elevated WBC Count  -3/19 WBC 29K, Lactate 2.3  -c/w Zosyn 2.25g q8h for empiric therapy      ENDO  # T2DM  No outpatient meds on records  - iSS     HEME  - no active issues currently    ETHICS  - full code per recent GOC discussion    DVT PPX  - Heparin gtt for AFib Mr. Cal Lloyd is a 58 Y/O M w/PMH HTN, HLD, T2DM, Bipolar Disorder, ESRD on HD (TTS), Afib on Eliquis who presented from [facility] for cardiac arrest requiring CPR and was admitted to CCU for management of possible anterior STEMI and cardiac arrest with anoxic brain injury.    NEURO  # Anoxic Brain Injury  As seen on CT 3/18. Found with cardiac arrest on floor of nursing home  - c/w Propofol and Fentanyl for sedation for hypothermia protocol - hypothermia started 3/18 1 PM, plan to rewarm 3/19 1 PM.  - repeat neuro examination off sedation after completion of cooling protocol    CARDIAC  # Cardiac Arrest  Unclear cause, discharged after recent admission of respiratory failure requiring intubation. S/p cardiac arrest at nursing home. This is second cardiac arrest in as many months. ROSC achieved. Previous cardiac arrest in 02/23 in s/o complete lung atelectasis and mucus plugging. Initial EKG indicating possible STEMI; latest EKG back to baseline, minimal troponin elevations without significant delta.  - SpO2 goal > 94%; avoid hyperventilation  - Off NTG, MAP~ 80  -  therapeutic hypothermia protocol  - follow-up TTE      # HFpEF  EF > 70% 3/10, moderate diastolic dysfunction, severe concentric left ventricular hypertrophy  - 3/18 TTE RF 59%, moderate diastolic dysfunction, severely dilated LA, moderate pericardial effusion, b/l pleural effusions    #AFib  - Heparin gtt for AFib    RESPIRATORY  # intubated and sedated currently   - in s/o anoxic brain injury, will need further evaluation before any SBT/SATs  - Current vent settings:    GI  - no active issues   - keep NPO for now      # ESRD on HD (TTS)  - Renal for HD  - monitor electrolytes during cooling/rewarming  - condom catheter  - monitor I &Os    ID  #Elevated WBC Count  -3/19 WBC 29K, Lactate 2.3  -c/w Zosyn 2.25g q8h for empiric therapy  - recurrent infections complicated by decompensation requiring ICU care  - adjust antimicrobial coverage pending data  - coverage for nosocomial pathogens      ENDO  # T2DM  No outpatient meds on records  - iSS     HEME  - no active issues currently    DVT PPX  - Heparin gtt for AFib    GOC: Palliative care involved during recent admission given concern for recurrent decompensation. Per chart notes, there were concerns about the family's insight into the severity of the patient's illness. The patient remained full code at time of discharge.  - in view of cardiac arrest and anoxic brain injury will need Palliative care involvement and ongoing GOC discussions pending re-evaluation after cooling protocol.  - prognosis guarded

## 2023-03-19 NOTE — PROGRESS NOTE ADULT - SUBJECTIVE AND OBJECTIVE BOX
PATIENT:  NANCY SEPULVEDA  [id number]    CHIEF COMPLAINT:  Patient is a 57y old  Male who presents with a chief complaint of cardiac arrest (18 Mar 2023 11:25)      INTERVAL HISTORY/OVERNIGHT EVENTS:    MEDICATIONS:  MEDICATIONS  (STANDING):  aspirin  chewable 81 milliGRAM(s) Enteral Tube daily  chlorhexidine 0.12% Liquid 15 milliLiter(s) Oral Mucosa two times a day  chlorhexidine 2% Cloths 1 Application(s) Topical <User Schedule>  dextrose 50% Injectable 25 Gram(s) IV Push once  dextrose 50% Injectable 12.5 Gram(s) IV Push once  dextrose 50% Injectable 25 Gram(s) IV Push once  dextrose Oral Gel 15 Gram(s) Oral once  fentaNYL   Infusion. 0.5 MICROgram(s)/kG/Hr (3.01 mL/Hr) IV Continuous <Continuous>  glucagon  Injectable 1 milliGRAM(s) IntraMuscular once  heparin  Infusion 750 Unit(s)/Hr (7.5 mL/Hr) IV Continuous <Continuous>  insulin lispro (ADMELOG) corrective regimen sliding scale   SubCutaneous every 6 hours  nitroglycerin  Infusion 20 MICROgram(s)/Min (6 mL/Hr) IV Continuous <Continuous>  pantoprazole  Injectable 40 milliGRAM(s) IV Push daily  petrolatum Ophthalmic Ointment 1 Application(s) Both EYES every 4 hours  piperacillin/tazobactam IVPB.. 3.375 Gram(s) IV Intermittent every 12 hours  propofol Infusion 5 MICROgram(s)/kG/Min (1.81 mL/Hr) IV Continuous <Continuous>    MEDICATIONS  (PRN):  hydrALAZINE Injectable 10 milliGRAM(s) IV Push every 6 hours PRN SBP > 170      ALLERGIES:  Allergies    No Known Allergies    Intolerances        OBJECTIVE:  ICU Vital Signs Last 24 Hrs  T(C): 32 (19 Mar 2023 06:00), Max: 36.1 (18 Mar 2023 09:03)  T(F): 89.6 (19 Mar 2023 06:00), Max: 97 (18 Mar 2023 09:03)  HR: 55 (19 Mar 2023 06:45) (49 - 112)  BP: 111/75 (18 Mar 2023 21:15) (51/32 - 211/96)  BP(mean): 87 (18 Mar 2023 21:15) (39 - 128)  ABP: 122/58 (19 Mar 2023 06:45) (93/49 - 164/72)  ABP(mean): 82 (19 Mar 2023 06:45) (66 - 109)  RR: 18 (19 Mar 2023 06:45) (0 - 24)  SpO2: 100% (19 Mar 2023 06:45) (93% - 100%)    O2 Parameters below as of 19 Mar 2023 06:00  Patient On (Oxygen Delivery Method): ventilator    O2 Concentration (%): 40      Mode: AC/ CMV (Assist Control/ Continuous Mandatory Ventilation)  RR (machine): 20  TV (machine): 380  FiO2: 40  PEEP: 5  ITime: 0.82  MAP: 8  PIP: 17    CAPILLARY BLOOD GLUCOSE      POCT Blood Glucose.: 160 mg/dL (19 Mar 2023 06:14)  POCT Blood Glucose.: 185 mg/dL (19 Mar 2023 04:21)  POCT Blood Glucose.: 222 mg/dL (19 Mar 2023 01:49)  POCT Blood Glucose.: 245 mg/dL (18 Mar 2023 23:21)  POCT Blood Glucose.: 295 mg/dL (18 Mar 2023 17:50)  POCT Blood Glucose.: 201 mg/dL (18 Mar 2023 13:33)  POCT Blood Glucose.: 122 mg/dL (18 Mar 2023 09:06)    CAPILLARY BLOOD GLUCOSE      POCT Blood Glucose.: 160 mg/dL (19 Mar 2023 06:14)    I&O's Summary    18 Mar 2023 07:01  -  19 Mar 2023 07:00  --------------------------------------------------------  IN: 1112.9 mL / OUT: 400 mL / NET: 712.9 mL      Daily Height in cm: 175.26 (18 Mar 2023 12:37)    Daily Weight in k.2 (19 Mar 2023 04:00)    PHYSICAL EXAMINATION:  CONST:               Ill-appearing. intubated and sedated  EYES:                   conjunctiva and sclera clear; ocular lubricant noted; pupils fixed and mid-dilated  RESP/CHEST:      Ventilator sounds symmetric bilaterally. No wheezing. No chest wall crepitus.  CARDIOVASC:     RRR, normal S1/S2, no M/R/G; no appreciable JVD  ABDOMEN:         Soft, NT/ND, no organomegaly noted  EXT:                     Warm/Cool to touch. Anasarca absent. Nishi's negative. Cap refill <2s; pulses are 2+ B/L  NEURO:               Sedated (Paralyzed).  No spont mvmts. no grijalva's  SKIN:                   No diffuse rashes; no pressure injury noted; no mottling    TUBES/LINES/DRAINS:  [X] ETT  [X] NGT/OGT  [] Central Venous Line     	[] R	[X] L	[X] IJ	[] Fem	[] SC	Date Placed: 3/18/23  [] Urinary Catheter		Date Placed: 3/18/23    LABS:  ABG - ( 19 Mar 2023 04:22 )  pH, Arterial: 7.39  pH, Blood: x     /  pCO2: 40    /  pO2: 137   / HCO3: 24    / Base Excess: -0.7  /  SaO2: 98.1                                    9.0    29.00 )-----------( 339      ( 19 Mar 2023 04:22 )             29.0         136  |  99  |  35<H>  ----------------------------<  187<H>  4.3   |  21<L>  |  2.70<H>    Ca    8.7      19 Mar 2023 04:22  Phos  3.0       Mg     2.00         TPro  5.2<L>  /  Alb  2.3<L>  /  TBili  1.4<H>  /  DBili  x   /  AST  326<H>  /  ALT  143<H>  /  AlkPhos  703<H>      LIVER FUNCTIONS - ( 19 Mar 2023 04:22 )  Alb: 2.3 g/dL / Pro: 5.2 g/dL / ALK PHOS: 703 U/L / ALT: 143 U/L / AST: 326 U/L / GGT: x           PT/INR - ( 19 Mar 2023 05:55 )   PT: 20.1 sec;   INR: 1.72 ratio         PTT - ( 19 Mar 2023 05:55 )  PTT:40.7 sec  CKMB Units: 7.0 ng/mL ( @ 04:22)  CKMB Units: 6.6 ng/mL (03-18 @ 22:10)    CARDIAC MARKERS ( 19 Mar 2023 04:22 )  x     / x     / x     / x     / 7.0 ng/mL  CARDIAC MARKERS ( 18 Mar 2023 22:10 )  x     / x     / x     / x     / 6.6 ng/mL   PATIENT:  NANCY SEPULVEDA  [id number]    CHIEF COMPLAINT:  Patient is a 57y old  Male who presents with a chief complaint of cardiac arrest (18 Mar 2023 11:25)      INTERVAL HISTORY/OVERNIGHT EVENTS: TTM started yesterday at 1 PM, plan for rewarming today at 1 PM.    MEDICATIONS:  MEDICATIONS  (STANDING):  aspirin  chewable 81 milliGRAM(s) Enteral Tube daily  chlorhexidine 0.12% Liquid 15 milliLiter(s) Oral Mucosa two times a day  chlorhexidine 2% Cloths 1 Application(s) Topical <User Schedule>  dextrose 50% Injectable 25 Gram(s) IV Push once  dextrose 50% Injectable 12.5 Gram(s) IV Push once  dextrose 50% Injectable 25 Gram(s) IV Push once  dextrose Oral Gel 15 Gram(s) Oral once  fentaNYL   Infusion. 0.5 MICROgram(s)/kG/Hr (3.01 mL/Hr) IV Continuous <Continuous>  glucagon  Injectable 1 milliGRAM(s) IntraMuscular once  heparin  Infusion 750 Unit(s)/Hr (7.5 mL/Hr) IV Continuous <Continuous>  insulin lispro (ADMELOG) corrective regimen sliding scale   SubCutaneous every 6 hours  nitroglycerin  Infusion 20 MICROgram(s)/Min (6 mL/Hr) IV Continuous <Continuous>  pantoprazole  Injectable 40 milliGRAM(s) IV Push daily  petrolatum Ophthalmic Ointment 1 Application(s) Both EYES every 4 hours  piperacillin/tazobactam IVPB.. 3.375 Gram(s) IV Intermittent every 12 hours  propofol Infusion 5 MICROgram(s)/kG/Min (1.81 mL/Hr) IV Continuous <Continuous>    MEDICATIONS  (PRN):  hydrALAZINE Injectable 10 milliGRAM(s) IV Push every 6 hours PRN SBP > 170      ALLERGIES:  Allergies    No Known Allergies    Intolerances        OBJECTIVE:  ICU Vital Signs Last 24 Hrs  T(C): 32 (19 Mar 2023 06:00), Max: 36.1 (18 Mar 2023 09:03)  T(F): 89.6 (19 Mar 2023 06:00), Max: 97 (18 Mar 2023 09:03)  HR: 55 (19 Mar 2023 06:45) (49 - 112)  BP: 111/75 (18 Mar 2023 21:15) (51/32 - 211/96)  BP(mean): 87 (18 Mar 2023 21:15) (39 - 128)  ABP: 122/58 (19 Mar 2023 06:45) (93/49 - 164/72)  ABP(mean): 82 (19 Mar 2023 06:45) (66 - 109)  RR: 18 (19 Mar 2023 06:45) (0 - 24)  SpO2: 100% (19 Mar 2023 06:45) (93% - 100%)    O2 Parameters below as of 19 Mar 2023 06:00  Patient On (Oxygen Delivery Method): ventilator    O2 Concentration (%): 40    Mode: AC/ CMV (Assist Control/ Continuous Mandatory Ventilation)  RR (machine): 20  TV (machine): 380  FiO2: 40  PEEP: 5  ITime: 0.82  MAP: 8  PIP: 17    CAPILLARY BLOOD GLUCOSE      POCT Blood Glucose.: 160 mg/dL (19 Mar 2023 06:14)  POCT Blood Glucose.: 185 mg/dL (19 Mar 2023 04:21)  POCT Blood Glucose.: 222 mg/dL (19 Mar 2023 01:49)  POCT Blood Glucose.: 245 mg/dL (18 Mar 2023 23:21)  POCT Blood Glucose.: 295 mg/dL (18 Mar 2023 17:50)  POCT Blood Glucose.: 201 mg/dL (18 Mar 2023 13:33)  POCT Blood Glucose.: 122 mg/dL (18 Mar 2023 09:06)    CAPILLARY BLOOD GLUCOSE      POCT Blood Glucose.: 160 mg/dL (19 Mar 2023 06:14)    I&O's Summary    18 Mar 2023 07:01  -  19 Mar 2023 07:00  --------------------------------------------------------  IN: 1112.9 mL / OUT: 400 mL / NET: 712.9 mL      Daily Height in cm: 175.26 (18 Mar 2023 12:37)    Daily Weight in k.2 (19 Mar 2023 04:00)    PHYSICAL EXAMINATION:  CONST:               Ill-appearing. intubated and sedated  EYES:                 Eyes taped shut  RESP/CHEST:      Ventilator sounds symmetric bilaterally. No wheezing. No chest wall crepitus.  CARDIOVASC:     RRR, normal S1/S2, no M/R/G; no appreciable JVD  ABDOMEN:         Soft, NT/ND, no organomegaly noted  EXT:                    No edema, pulses present  NEURO:               Sedated, no spontaneous movements, no response to sternal rub  SKIN:                   No diffuse rashes; no pressure injury noted; no mottling  Lines/Tubes: Central Line (RIJ), ETT,     LABS:  ABG - ( 19 Mar 2023 04:22 )  pH, Arterial: 7.39  pH, Blood: x     /  pCO2: 40    /  pO2: 137   / HCO3: 24    / Base Excess: -0.7  /  SaO2: 98.1                                    9.0    29.00 )-----------( 339      ( 19 Mar 2023 04:22 )             29.0         136  |  99  |  35<H>  ----------------------------<  187<H>  4.3   |  21<L>  |  2.70<H>    Ca    8.7      19 Mar 2023 04:22  Phos  3.0       Mg     2.00         TPro  5.2<L>  /  Alb  2.3<L>  /  TBili  1.4<H>  /  DBili  x   /  AST  326<H>  /  ALT  143<H>  /  AlkPhos  703<H>      LIVER FUNCTIONS - ( 19 Mar 2023 04:22 )  Alb: 2.3 g/dL / Pro: 5.2 g/dL / ALK PHOS: 703 U/L / ALT: 143 U/L / AST: 326 U/L / GGT: x           PT/INR - ( 19 Mar 2023 05:55 )   PT: 20.1 sec;   INR: 1.72 ratio         PTT - ( 19 Mar 2023 05:55 )  PTT:40.7 sec  CKMB Units: 7.0 ng/mL ( @ 04:22)  CKMB Units: 6.6 ng/mL ( @ 22:10)    CARDIAC MARKERS ( 19 Mar 2023 04:22 )  x     / x     / x     / x     / 7.0 ng/mL  CARDIAC MARKERS ( 18 Mar 2023 22:10 )  x     / x     / x     / x     / 6.6 ng/mL

## 2023-03-19 NOTE — DIETITIAN NUTRITION RISK NOTIFICATION - MUSCLE MASS (LOSS OF MUSCLE)
[FreeTextEntry1] : 48 yo female smoker referred to the clinic for pulmonary nodule seen on CT chest\par \par - 7 mm nodule RLL\par - scarring in RLL LLL and middle lobe\par - high risk patient\par - follow up CT chest in 6 months November\par - PFTs for evaluation of COPD (wheezing)\par - Ventolin PRN Temples.../Shoulders...

## 2023-03-19 NOTE — DIETITIAN INITIAL EVALUATION ADULT - PERTINENT LABORATORY DATA
03-19    137  |  100  |  38<H>  ----------------------------<  104<H>  4.1   |  22  |  2.64<H>    Ca    8.6      19 Mar 2023 10:00  Phos  3.0     03-19  Mg     2.00     03-19    TPro  5.2<L>  /  Alb  2.3<L>  /  TBili  1.3<H>  /  DBili  x   /  AST  266<H>  /  ALT  134<H>  /  AlkPhos  649<H>  03-19  POCT Blood Glucose.: 98 mg/dL (03-19-23 @ 14:11)  A1C with Estimated Average Glucose Result: 4.8 % (02-10-23 @ 00:05)

## 2023-03-19 NOTE — DIETITIAN INITIAL EVALUATION ADULT - ADD RECOMMEND
- If patient PO intake remains persistently suboptimal to support nutritional requirements, may consider alternate means of nutrition/hydration if consistent with patient wishes & goals of care.   - RD to remain available for further nutritional interventions as indicated.

## 2023-03-19 NOTE — DIETITIAN NUTRITION RISK NOTIFICATION - PHYSICAL ASSESSMENT SHOULDERS
medical illnesses. The patient is to follow up with PCP as outpatient. Relevant Investigations  CXR (6/22/20)  1. Congestive heart failure is most likely given the radiographic findings;  pneumonia is also a consideration in areas of consolidation with pleural effusion. 2. Calcific atherosclerosis aorta. 3. Cardiomegaly. Disposition: [facility]    Patient Instructions:    Kostas Macias Medication Instructions TJI:[id number]    Printed on:06/24/20 [id number]   Medication Information                      acetaminophen (TYLENOL) 325 MG tablet  Take 650 mg by mouth every 6 hours as needed for Pain             apixaban (ELIQUIS) 5 MG TABS tablet  Take 2.5 mg by mouth 2 times daily             cefdinir (OMNICEF) 300 MG capsule  Take 1 capsule by mouth 2 times daily for 5 days             donepezil (ARICEPT) 10 MG tablet  Take 10 mg by mouth nightly             doxycycline hyclate (VIBRA-TABS) 100 MG tablet  Take 1 tablet by mouth 2 times daily for 5 days             DULoxetine (CYMBALTA) 20 MG extended release capsule  Take 10 mg by mouth nightly             furosemide (LASIX) 40 MG tablet  Take 40 mg by mouth daily             gabapentin (NEURONTIN) 100 MG capsule  Take 2 capsules by mouth 3 times daily for 90 days.              memantine (NAMENDA) 10 MG tablet  Take 10 mg by mouth nightly             metFORMIN (GLUCOPHAGE) 1000 MG tablet  Take 1,000 mg by mouth daily             Multiple Vitamins-Minerals (THERAPEUTIC MULTIVITAMIN-MINERALS) tablet  Take 1 tablet by mouth daily             potassium chloride (MICRO-K) 10 MEQ extended release capsule  Take 10 mEq by mouth daily             sotalol (BETAPACE) 80 MG tablet  Take 40 mg by mouth 2 times daily             tamsulosin (FLOMAX) 0.4 MG capsule  Take 0.4 mg by mouth nightly                    Activity: activity as tolerated  Diet: cardiac diet    Follow-up with Dr. Bailey Flores in 5 days    Signed: Electronically signed by Virgilio Lopez PA-C on moderate

## 2023-03-19 NOTE — DIETITIAN INITIAL EVALUATION ADULT - ORAL INTAKE PTA/DIET HISTORY
Pt was admitted from [facility]. As per chart from rehab, pt was on renal/[facility] diet, regular/honey w/ 1500mL fluid restriction; received LPS 1x daily, renal-meet, and Mighty shake 120mL. As per H&P, pt was eating breakfast at usual state prior to cardiac arrest. Pt was dx with moderate malnutrition during previous admission as per RDN note (3/15).

## 2023-03-19 NOTE — DIETITIAN INITIAL EVALUATION ADULT - ENTER FROM (CAL/KG)
Onset:  This  evening  Location/Description:  Patient states she has not felt her daughter moving very much. States they had a busy day. States she has been sitting /laying in bed and reports that she has only felt her move twice in 2 hours. Also reports increased pelvic pressure . . States she has a somewhat white milky discharge.Vaginal area has a slight odor. Denies any urinary symptom.  Vaginal Drainage/Bleeding:  Denies any fever  Fetus Active?  Kick count per patient is 2 for the past 2 hours. Did trying drinking  a few glasses of cold water with no change in movement.   Precipitating Factors: none   Pain Scale (1-10), none  Associated Symptoms: See Above  LMP: Patient's last menstrual period was 2019 (approximate).  EDC:  11/3/2019  Gestational Age:  36w0d  Blood Type: A Positive  OB History:    OB History    Para Term  AB Living   2 1 1 0 0 1   SAB TAB Ectopic Molar Multiple Live Births   0 0 0 0 0 1   Fingertip /  / -3     Vaginal/C Section: not known breech at this time   Group B Strep (pos or neg): n/a  History of previous Labor & Delivery:  vag  Recent Care:  19 Dr. Ho OB eval HTN and   Report called to L&D [facility]   PLAN:  Directed to Emergency Department  S/o will drive her to the hospital ETA of 30 minutes.   Patient/Caller agrees to follow recommendations.    Reason for Disposition  • [1] Pregnant > 24 weeks AND [2] baby moving less today by kick count  (e.g., kick count < 5 in 1 hour or < 10 in 2 hours)    Protocols used: PREGNANCY - DECREASED FETAL MOVEMENT-A-  Care advice reviewed  per protocol and patient  verbalizes understanding and agrees with plan of care. Denies any other questions or concerns at this time. Encouraged to call for any changes, questions or concerns.    
Regarding: Baby has not been moving that much today.  ----- Message from Saskia Thompson sent at 10/6/2019 10:24 PM CDT -----  Patient Name: Adi Brown  Specialist or PCP: Dr. Regina Ho  Pregnant (If Yes, how long?):36 weeks  Symptoms:Baby has not been moving that much today.  Call Back #:[phone]   Is the patient’s permanent residence located in [place], [place], or a [facility]? Yes [facility] [address]  Call Center Account #:[id number]        
30

## 2023-03-19 NOTE — CONSULT NOTE ADULT - SUBJECTIVE AND OBJECTIVE BOX
[facility] DIVISION OF KIDNEY DISEASES AND HYPERTENSION -- [phone]  -- INITIAL CONSULT NOTE  --------------------------------------------------------------------------------  HPI: 58 Y/O M with h/o ESRD on HD (TTS), HTN, HLD, T2DM, Bipolar Disorder, and atrial fibrillation was brought to the ER following cardiac arrest at [facility]. Downtime is unknown. ROSC was achieved in ~20 minutes. Pt. was recent MICU admission for septic shock due to pneumonia, was discharged to [facility] on 3/17/23. Nephrology was consulted for ESRD/HD management.    Pt. was seen and evaluated in the CCU today. Pt. is intubated/sedated. Unable to obtain history or ROS.      PAST HISTORY  --------------------------------------------------------------------------------  PAST MEDICAL & SURGICAL HISTORY:  Diabetes mellitus  Patient does not routinely check FS--diet controlled  Depression  High cholesterol  Bipolar affective disorder in remission  Hypertension  Arterial insufficiency  Anemia  Hemodialysis patient  ESRD on dialysis  Cardiac arrest  ORIF right lower leg- 1995  Amputated great toe of right foot  S/P arteriovenous (AV) fistula creation  7/2019      FAMILY HISTORY:  FH: type 2 diabetes mellitus (Mother)    FHx: heart disease (Father)    ALLERGIES & MEDICATIONS  --------------------------------------------------------------------------------  Allergies    No Known Allergies    Intolerances      Standing Inpatient Medications  aspirin  chewable 81 milliGRAM(s) Enteral Tube daily  chlorhexidine 0.12% Liquid 15 milliLiter(s) Oral Mucosa two times a day  chlorhexidine 2% Cloths 1 Application(s) Topical <User Schedule>  dextrose 50% Injectable 25 Gram(s) IV Push once  dextrose 50% Injectable 12.5 Gram(s) IV Push once  dextrose 50% Injectable 25 Gram(s) IV Push once  dextrose Oral Gel 15 Gram(s) Oral once  fentaNYL   Infusion. 0.5 MICROgram(s)/kG/Hr IV Continuous <Continuous>  glucagon  Injectable 1 milliGRAM(s) IntraMuscular once  heparin  Infusion. 850 Unit(s)/Hr IV Continuous <Continuous>  insulin lispro (ADMELOG) corrective regimen sliding scale   SubCutaneous every 6 hours  pantoprazole  Injectable 40 milliGRAM(s) IV Push daily  petrolatum Ophthalmic Ointment 1 Application(s) Both EYES every 4 hours  piperacillin/tazobactam IVPB.. 3.375 Gram(s) IV Intermittent every 12 hours  propofol Infusion 5 MICROgram(s)/kG/Min IV Continuous <Continuous>    PRN Inpatient Medications  hydrALAZINE Injectable 10 milliGRAM(s) IV Push every 6 hours PRN      REVIEW OF SYSTEMS  --------------------------------------------------------------------------------  Unable to obtain ROS    VITALS/PHYSICAL EXAM  --------------------------------------------------------------------------------  T(C): 32.4 (03-19-23 @ 14:00), Max: 34.3 (03-18-23 @ 15:00)  HR: 50 (03-19-23 @ 14:00) (49 - 83)  BP: 111/75 (03-18-23 @ 21:15) (111/75 - 211/96)  RR: 20 (03-19-23 @ 14:00) (0 - 23)  SpO2: 100% (03-19-23 @ 14:00) (93% - 100%)  Wt(kg): --  Height (cm): 175.3 (03-18-23 @ 12:37)  Weight (kg): 60.2 (03-18-23 @ 12:37)  BMI (kg/m2): 19.6 (03-18-23 @ 12:37)  BSA (m2): 1.74 (03-18-23 @ 12:37)      03-18-23 @ 07:01  -  03-19-23 @ 07:00  --------------------------------------------------------  IN: 1126.1 mL / OUT: 400 mL / NET: 726.1 mL    03-19-23 @ 07:01  -  03-19-23 @ 14:43  --------------------------------------------------------  IN: 208.7 mL / OUT: 0 mL / NET: 208.7 mL      Physical Exam:  Gen: Intubated  HEENT: +ET tube  Pulm: Mechanical breath sounds BL  CV: S1S2  Abd: Soft, +BS   Ext: No LE edema B/L  Neuro: Sedated  Skin: Warm and dry  Vascular access: LUE AVF with palpable pulse and bruit heard    LABS/STUDIES  --------------------------------------------------------------------------------              8.8    22.16 >-----------<  285      [03-19-23 @ 10:00]              27.8     137  |  100  |  38  ----------------------------<  104      [03-19-23 @ 10:00]  4.1   |  22  |  2.64        Ca     8.6     [03-19-23 @ 10:00]      Mg     2.00     [03-19-23 @ 04:22]      Phos  3.0     [03-19-23 @ 04:22]    TPro  5.2  /  Alb  2.3  /  TBili  1.3  /  DBili  x   /  AST  266  /  ALT  134  /  AlkPhos  649  [03-19-23 @ 10:00]    PT/INR: PT 20.1 , INR 1.72       [03-19-23 @ 05:55]  PTT: 40.7       [03-19-23 @ 05:55]    CK 74      [03-19-23 @ 04:22]    Creatinine Trend:  SCr 2.64 [03-19 @ 10:00]  SCr 2.70 [03-19 @ 04:22]  SCr 2.67 [03-18 @ 22:10]  SCr 2.52 [03-18 @ 15:47]  SCr 2.54 [03-18 @ 09:47]    Urinalysis - [07-12-19 @ 21:21]      Color LIGHT YELLOW / Appearance CLEAR / SG 1.011 / pH 6.5      Gluc 50 / Ketone NEGATIVE  / Bili NEGATIVE / Urobili NORMAL       Blood TRACE / Protein 300 / Leuk Est NEGATIVE / Nitrite NEGATIVE      RBC 3-5 / WBC 3-5 / Hyaline NEGATIVE / Gran  / Sq Epi OCC / Non Sq Epi  / Bacteria NEGATIVE      HbA1c 4.7      [09-13-19 @ 12:50]  TSH 5.79      [02-07-23 @ 03:59]    HBsAb 45.4      [02-07-23 @ 12:40]  HBsAg Nonreact      [02-15-23 @ 07:35]  HBcAb Nonreact      [02-07-23 @ 12:40]  HCV 0.21, Nonreact      [02-15-23 @ 07:35]  HIV Nonreact      [02-15-23 @ 07:35]    PAVEL: titer 1:320, pattern [id number]      [02-15-23 @ 07:35]

## 2023-03-20 NOTE — PROGRESS NOTE ADULT - ASSESSMENT
Mr. Cal Lloyd is a 56 Y/O M w/PMH HTN, HLD, T2DM, Bipolar Disorder, ESRD on HD (TTS), Afib on Eliquis who presented from [facility] for cardiac arrest requiring CPR and was admitted to CCU for management of possible anterior STEMI and cardiac arrest with anoxic brain injury.    NEURO  # Anoxic Brain Injury  As seen on CT 3/18. Found with cardiac arrest on floor of nursing home  - c/w Propofol and Fentanyl for sedation for hypothermia protocol - hypothermia started 3/18 1 PM, plan to rewarm 3/19 1 PM.  - repeat neuro examination off sedation after completion of cooling protocol    CARDIAC  # Cardiac Arrest  Unclear cause, discharged after recent admission of respiratory failure requiring intubation. S/p cardiac arrest at nursing home. This is second cardiac arrest in as many months. ROSC achieved. Previous cardiac arrest in 02/23 in s/o complete lung atelectasis and mucus plugging. Initial EKG indicating possible STEMI; latest EKG back to baseline, minimal troponin elevations without significant delta.  - SpO2 goal > 94%; avoid hyperventilation  - Off NTG, MAP~ 80  -  therapeutic hypothermia protocol  - follow-up TTE      # HFpEF  EF > 70% 3/10, moderate diastolic dysfunction, severe concentric left ventricular hypertrophy  - 3/18 TTE RF 59%, moderate diastolic dysfunction, severely dilated LA, moderate pericardial effusion, b/l pleural effusions    #AFib  - Heparin gtt for AFib    RESPIRATORY  # intubated and sedated currently   - in s/o anoxic brain injury, will need further evaluation before any SBT/SATs  - Current vent settings:    GI  - no active issues   - keep NPO for now      # ESRD on HD (TTS)  - Renal for HD  - monitor electrolytes during cooling/rewarming  - condom catheter  - monitor I &Os    ID  #Elevated WBC Count  -3/19 WBC 29K, Lactate 2.3  -c/w Zosyn 2.25g q8h for empiric therapy  - recurrent infections complicated by decompensation requiring ICU care  - adjust antimicrobial coverage pending data  - coverage for nosocomial pathogens      ENDO  # T2DM  No outpatient meds on records  - iSS     HEME  - no active issues currently    DVT PPX  - Heparin gtt for AFib    GOC: Palliative care involved during recent admission given concern for recurrent decompensation. Per chart notes, there were concerns about the family's insight into the severity of the patient's illness. The patient remained full code at time of discharge.  - in view of cardiac arrest and anoxic brain injury will need Palliative care involvement and ongoing GOC discussions pending re-evaluation after cooling protocol.  - prognosis guarded Mr. Cal Lloyd is a 56 Y/O M w/PMH HTN, HLD, T2DM, Bipolar Disorder, ESRD on HD (TTS), Afib on Eliquis who presented from [facility] for cardiac arrest requiring CPR and was admitted to CCU for management of possible anterior STEMI and cardiac arrest with anoxic brain injury.    NEURO  # Anoxic Brain Injury  As seen on CT 3/18. Found with cardiac arrest on floor of nursing home  - hypothermia started 3/18 1 PM, plan to rewarm 3/19 1 PM.  - repeat neuro examination off sedation after completion of cooling protocol    CARDIAC  # Cardiac Arrest  Unclear cause, discharged after recent admission of respiratory failure requiring intubation. S/p cardiac arrest at nursing home. This is second cardiac arrest in as many months. ROSC achieved. Previous cardiac arrest in 02/23 in s/o complete lung atelectasis and mucus plugging. Initial EKG indicating possible STEMI; latest EKG back to baseline, minimal troponin elevations without significant delta.  - SpO2 goal > 94%; avoid hyperventilation  - Off NTG, MAP~ 80  -  therapeutic hypothermia protocol  - follow-up TTE      # HFpEF  EF > 70% 3/10, moderate diastolic dysfunction, severe concentric left ventricular hypertrophy  - 3/18 TTE RF 59%, moderate diastolic dysfunction, severely dilated LA, moderate pericardial effusion, b/l pleural effusions    #AFib  - Heparin gtt for AFib    RESPIRATORY  # intubated and sedated currently   - in s/o anoxic brain injury, will need further evaluation before any SBT/SATs  - Current vent settings: 20| 380| 5| 40%    GI  - no active issues   - keep NPO for now      # ESRD on HD (TTS)  - Renal for HD  - monitor electrolytes during cooling/rewarming  - condom catheter  - monitor I &Os  - plan for HD 3/20    ID  #Elevated WBC Count  -3/19 WBC 29K, Lactate 2.3  -c/w Zosyn 2.25g q8h for empiric therapy  - recurrent infections complicated by decompensation requiring ICU care  - adjust antimicrobial coverage pending data  - coverage for nosocomial pathogens      ENDO  # T2DM  No outpatient meds on records  - iSS     HEME  - no active issues currently    DVT PPX  - Heparin gtt for AFib    GOC: Palliative care involved during recent admission given concern for recurrent decompensation. Per chart notes, there were concerns about the family's insight into the severity of the patient's illness. The patient remained full code at time of discharge.  - in view of cardiac arrest and anoxic brain injury will need Palliative care involvement and ongoing GOC discussions pending re-evaluation after cooling protocol.  - prognosis guarded

## 2023-03-20 NOTE — PROGRESS NOTE ADULT - SUBJECTIVE AND OBJECTIVE BOX
Kris, PGY-1  Internal Medicine     PATIENT:  NANCY SEPULVEDA  [id number]    CHIEF COMPLAINT:  Patient is a 57y old  Male who presents with a chief complaint of Cardiac arrest     (19 Mar 2023 16:05)      INTERVAL HISTORY/OVERNIGHT EVENTS:      REVIEW OF SYSTEMS:    Constitutional:     [ ] negative [ ] fevers [ ] chills [ ] weight loss [ ] weight gain  HEENT:                  [ ] negative [ ] dry eyes [ ] eye irritation [ ] postnasal drip [ ] nasal congestion  CV:                         [ ] negative  [ ] chest pain [ ] orthopnea [ ] palpitations [ ] murmur  Resp:                     [ ] negative [ ] cough [ ] shortness of breath [ ] dyspnea [ ] wheezing [ ] sputum [ ] hemoptysis  GI:                          [ ] negative [ ] nausea [ ] vomiting [ ] diarrhea [ ] constipation [ ] abd pain [ ] dysphagia   :                        [ ] negative [ ] dysuria [ ] nocturia [ ] hematuria [ ] increased urinary frequency  Musculoskeletal: [ ] negative [ ] back pain [ ] myalgias [ ] arthralgias [ ] fracture  Skin:                       [ ] negative [ ] rash [ ] itch  Neurological:        [ ] negative [ ] headache [ ] dizziness [ ] syncope [ ] weakness [ ] numbness  Psychiatric:           [ ] negative [ ] anxiety [ ] depression  Endocrine:            [ ] negative [ ] diabetes [ ] thyroid problem  Heme/Lymph:      [ ] negative [ ] anemia [ ] bleeding problem  Allergic/Immune: [ ] negative [ ] itchy eyes [ ] nasal discharge [ ] hives [ ] angioedema    [ ] All other systems negative  [ ] Unable to assess ROS because ________.    MEDICATIONS:  MEDICATIONS  (STANDING):  aspirin  chewable 81 milliGRAM(s) Enteral Tube daily  chlorhexidine 0.12% Liquid 15 milliLiter(s) Oral Mucosa two times a day  chlorhexidine 2% Cloths 1 Application(s) Topical <User Schedule>  clopidogrel Tablet 75 milliGRAM(s) Oral daily  darbepoetin Injectable ViaL 80 MICROGram(s) IV Push <User Schedule>  dextrose 50% Injectable 25 Gram(s) IV Push once  dextrose 50% Injectable 12.5 Gram(s) IV Push once  dextrose 50% Injectable 25 Gram(s) IV Push once  dextrose Oral Gel 15 Gram(s) Oral once  fentaNYL   Infusion. 0.5 MICROgram(s)/kG/Hr (3.01 mL/Hr) IV Continuous <Continuous>  glucagon  Injectable 1 milliGRAM(s) IntraMuscular once  heparin  Infusion 950 Unit(s)/Hr (9.5 mL/Hr) IV Continuous <Continuous>  insulin lispro (ADMELOG) corrective regimen sliding scale   SubCutaneous every 6 hours  nitroglycerin  Infusion 20 MICROgram(s)/Min (6 mL/Hr) IV Continuous <Continuous>  pantoprazole  Injectable 40 milliGRAM(s) IV Push daily  petrolatum Ophthalmic Ointment 1 Application(s) Both EYES every 4 hours  piperacillin/tazobactam IVPB.. 3.375 Gram(s) IV Intermittent every 12 hours  propofol Infusion 5 MICROgram(s)/kG/Min (1.81 mL/Hr) IV Continuous <Continuous>    MEDICATIONS  (PRN):  hydrALAZINE Injectable 10 milliGRAM(s) IV Push every 6 hours PRN SBP > 170      ALLERGIES:  Allergies    No Known Allergies    Intolerances        OBJECTIVE:  ICU Vital Signs Last 24 Hrs  T(C): 35.4 (20 Mar 2023 06:00), Max: 35.4 (20 Mar 2023 06:00)  T(F): 95.7 (20 Mar 2023 06:00), Max: 95.7 (20 Mar 2023 06:00)  HR: 80 (20 Mar 2023 07:17) (50 - 88)  BP: --  BP(mean): --  ABP: 132/47 (20 Mar 2023 06:30) (118/56 - 186/71)  ABP(mean): 77 (20 Mar 2023 06:30) (77 - 360)  RR: 18 (20 Mar 2023 06:30) (17 - 33)  SpO2: 100% (20 Mar 2023 07:17) (98% - 100%)    O2 Parameters below as of 20 Mar 2023 06:00  Patient On (Oxygen Delivery Method): ventilator    O2 Concentration (%): 40      Mode: AC/ CMV (Assist Control/ Continuous Mandatory Ventilation)  RR (machine): 20  TV (machine): 380  FiO2: 40  PEEP: 5  ITime: 0.91  MAP: 11  PIP: 24    Adult Advanced Hemodynamics Last 24 Hrs  CVP(mm Hg): 2 (20 Mar 2023 01:15) (2 - 7)  CVP(cm H2O): --  CO: --  CI: --  PA: --  PA(mean): --  PCWP: --  SVR: --  SVRI: --  PVR: --  PVRI: --  CAPILLARY BLOOD GLUCOSE      POCT Blood Glucose.: 149 mg/dL (20 Mar 2023 06:06)  POCT Blood Glucose.: 145 mg/dL (20 Mar 2023 04:16)  POCT Blood Glucose.: 135 mg/dL (20 Mar 2023 02:59)  POCT Blood Glucose.: 126 mg/dL (20 Mar 2023 00:11)  POCT Blood Glucose.: 116 mg/dL (19 Mar 2023 22:14)  POCT Blood Glucose.: 92 mg/dL (19 Mar 2023 20:27)  POCT Blood Glucose.: 84 mg/dL (19 Mar 2023 18:10)  POCT Blood Glucose.: 101 mg/dL (19 Mar 2023 16:07)  POCT Blood Glucose.: 98 mg/dL (19 Mar 2023 14:11)  POCT Blood Glucose.: 112 mg/dL (19 Mar 2023 12:01)  POCT Blood Glucose.: 100 mg/dL (19 Mar 2023 10:01)  POCT Blood Glucose.: 127 mg/dL (19 Mar 2023 08:02)    CAPILLARY BLOOD GLUCOSE      POCT Blood Glucose.: 149 mg/dL (20 Mar 2023 06:06)    I&O's Summary    19 Mar 2023 07:01  -  20 Mar 2023 07:00  --------------------------------------------------------  IN: 698.7 mL / OUT: 0 mL / NET: 698.7 mL      Daily     Daily Weight in k.9 (20 Mar 2023 04:00)    PHYSICAL EXAMINATION:  General: WN/WD NAD  HEENT: PERRLA, EOMI, moist mucous membranes  Neurology: A&Ox3, nonfocal, MCKEON x 4  Respiratory: CTA B/L, normal respiratory effort, no wheezes, crackles, rales  CV: RRR, S1S2, no murmurs, rubs or gallops  Abdominal: Soft, NT, ND +BS, Last BM  Extremities: No edema, + peripheral pulses  Incisions:   Tubes:    LABS:  ABG - ( 20 Mar 2023 04:12 )  pH, Arterial: 7.38  pH, Blood: x     /  pCO2: 43    /  pO2: 125   / HCO3: 25    / Base Excess: 0.2   /  SaO2: 98.0                                    9.1    27.03 )-----------( 394      ( 20 Mar 2023 04:12 )             28.8     03-20    137  |  97<L>  |  45<H>  ----------------------------<  152<H>  4.0   |  23  |  2.92<H>    Ca    9.0      20 Mar 2023 05:49  Phos  4.8     03-20  Mg     2.00     -20    TPro  5.6<L>  /  Alb  2.4<L>  /  TBili  1.5<H>  /  DBili  x   /  AST  239<H>  /  ALT  137<H>  /  AlkPhos  586<H>  03-20    LIVER FUNCTIONS - ( 20 Mar 2023 05:49 )  Alb: 2.4 g/dL / Pro: 5.6 g/dL / ALK PHOS: 586 U/L / ALT: 137 U/L / AST: 239 U/L / GGT: x           PT/INR - ( 20 Mar 2023 04:12 )   PT: 18.1 sec;   INR: 1.55 ratio         PTT - ( 20 Mar 2023 04:12 )  PTT:50.8 sec  Creatine Kinase, Serum: 33 U/L ( @ 05:49)  Creatine Kinase, Serum: 53 U/L ( @ 04:12)  Creatine Kinase, Serum: 39 U/L ( @ 22:16)    CARDIAC MARKERS ( 20 Mar 2023 05:49 )  x     / x     / 33 U/L / x     / x      CARDIAC MARKERS ( 20 Mar 2023 04:12 )  x     / x     / 53 U/L / x     / x      CARDIAC MARKERS ( 19 Mar 2023 22:16 )  x     / x     / 39 U/L / x     / x      CARDIAC MARKERS ( 19 Mar 2023 04:22 )  x     / x     / 74 U/L / x     / 7.0 ng/mL  CARDIAC MARKERS ( 18 Mar 2023 22:10 )  x     / x     / x     / x     / 6.6 ng/mL          TELEMETRY:     EKG:     IMAGING:       Kris, PGY-1  Internal Medicine     PATIENT:  NANCY SEPULVEDA  [id number]    CHIEF COMPLAINT:  Patient is a 57y old  Male who presents with a chief complaint of Cardiac arrest      INTERVAL HISTORY/OVERNIGHT EVENTS:  - remains on hypothermia protocol  - no change in mental status  - sedation removed    REVIEW OF SYSTEMS:  [X ] Unable to assess ROS because of mental status    MEDICATIONS:  MEDICATIONS  (STANDING):  aspirin  chewable 81 milliGRAM(s) Enteral Tube daily  chlorhexidine 0.12% Liquid 15 milliLiter(s) Oral Mucosa two times a day  chlorhexidine 2% Cloths 1 Application(s) Topical <User Schedule>  clopidogrel Tablet 75 milliGRAM(s) Oral daily  darbepoetin Injectable ViaL 80 MICROGram(s) IV Push <User Schedule>  dextrose 50% Injectable 25 Gram(s) IV Push once  dextrose 50% Injectable 12.5 Gram(s) IV Push once  dextrose 50% Injectable 25 Gram(s) IV Push once  dextrose Oral Gel 15 Gram(s) Oral once  fentaNYL   Infusion. 0.5 MICROgram(s)/kG/Hr (3.01 mL/Hr) IV Continuous <Continuous>  glucagon  Injectable 1 milliGRAM(s) IntraMuscular once  heparin  Infusion 950 Unit(s)/Hr (9.5 mL/Hr) IV Continuous <Continuous>  insulin lispro (ADMELOG) corrective regimen sliding scale   SubCutaneous every 6 hours  nitroglycerin  Infusion 20 MICROgram(s)/Min (6 mL/Hr) IV Continuous <Continuous>  pantoprazole  Injectable 40 milliGRAM(s) IV Push daily  petrolatum Ophthalmic Ointment 1 Application(s) Both EYES every 4 hours  piperacillin/tazobactam IVPB.. 3.375 Gram(s) IV Intermittent every 12 hours  propofol Infusion 5 MICROgram(s)/kG/Min (1.81 mL/Hr) IV Continuous <Continuous>    MEDICATIONS  (PRN):  hydrALAZINE Injectable 10 milliGRAM(s) IV Push every 6 hours PRN SBP > 170      ALLERGIES:  Allergies    No Known Allergies    Intolerances        OBJECTIVE:  ICU Vital Signs Last 24 Hrs  T(C): 35.4 (20 Mar 2023 06:00), Max: 35.4 (20 Mar 2023 06:00)  T(F): 95.7 (20 Mar 2023 06:00), Max: 95.7 (20 Mar 2023 06:00)  HR: 80 (20 Mar 2023 07:17) (50 - 88)  BP: --  BP(mean): --  ABP: 132/47 (20 Mar 2023 06:30) (118/56 - 186/71)  ABP(mean): 77 (20 Mar 2023 06:30) (77 - 360)  RR: 18 (20 Mar 2023 06:30) (17 - 33)  SpO2: 100% (20 Mar 2023 07:17) (98% - 100%)    O2 Parameters below as of 20 Mar 2023 06:00  Patient On (Oxygen Delivery Method): ventilator    O2 Concentration (%): 40      Mode: AC/ CMV (Assist Control/ Continuous Mandatory Ventilation)  RR (machine): 20  TV (machine): 380  FiO2: 40  PEEP: 5  ITime: 0.91  MAP: 11  PIP: 24    Adult Advanced Hemodynamics Last 24 Hrs  CVP(mm Hg): 2 (20 Mar 2023 01:15) (2 - 7)  CVP(cm H2O): --  CO: --  CI: --  PA: --  PA(mean): --  PCWP: --  SVR: --  SVRI: --  PVR: --  PVRI: --  CAPILLARY BLOOD GLUCOSE      POCT Blood Glucose.: 149 mg/dL (20 Mar 2023 06:06)  POCT Blood Glucose.: 145 mg/dL (20 Mar 2023 04:16)  POCT Blood Glucose.: 135 mg/dL (20 Mar 2023 02:59)  POCT Blood Glucose.: 126 mg/dL (20 Mar 2023 00:11)  POCT Blood Glucose.: 116 mg/dL (19 Mar 2023 22:14)  POCT Blood Glucose.: 92 mg/dL (19 Mar 2023 20:27)  POCT Blood Glucose.: 84 mg/dL (19 Mar 2023 18:10)  POCT Blood Glucose.: 101 mg/dL (19 Mar 2023 16:07)  POCT Blood Glucose.: 98 mg/dL (19 Mar 2023 14:11)  POCT Blood Glucose.: 112 mg/dL (19 Mar 2023 12:01)  POCT Blood Glucose.: 100 mg/dL (19 Mar 2023 10:01)  POCT Blood Glucose.: 127 mg/dL (19 Mar 2023 08:02)    CAPILLARY BLOOD GLUCOSE      POCT Blood Glucose.: 149 mg/dL (20 Mar 2023 06:06)    I&O's Summary    19 Mar 2023 07:01  -  20 Mar 2023 07:00  --------------------------------------------------------  IN: 698.7 mL / OUT: 0 mL / NET: 698.7 mL      Daily     Daily Weight in k.9 (20 Mar 2023 04:00)    PHYSICAL EXAMINATION:  General: WN/WD NAD  HEENT: PERRLA, EOMI, moist mucous membranes  Neurology: A&Ox3, nonfocal, MCKEON x 4  Respiratory: CTA B/L, normal respiratory effort, no wheezes, crackles, rales  CV: RRR, S1S2, no murmurs, rubs or gallops  Abdominal: Soft, NT, ND +BS, Last BM  Extremities: No edema, + peripheral pulses  Incisions:   Tubes:    LABS:  ABG - ( 20 Mar 2023 04:12 )  pH, Arterial: 7.38  pH, Blood: x     /  pCO2: 43    /  pO2: 125   / HCO3: 25    / Base Excess: 0.2   /  SaO2: 98.0                                    9.1    27.03 )-----------( 394      ( 20 Mar 2023 04:12 )             28.8     03-20    137  |  97<L>  |  45<H>  ----------------------------<  152<H>  4.0   |  23  |  2.92<H>    Ca    9.0      20 Mar 2023 05:49  Phos  4.8     03-20  Mg     2.00     03-20    TPro  5.6<L>  /  Alb  2.4<L>  /  TBili  1.5<H>  /  DBili  x   /  AST  239<H>  /  ALT  137<H>  /  AlkPhos  586<H>  03-20    LIVER FUNCTIONS - ( 20 Mar 2023 05:49 )  Alb: 2.4 g/dL / Pro: 5.6 g/dL / ALK PHOS: 586 U/L / ALT: 137 U/L / AST: 239 U/L / GGT: x           PT/INR - ( 20 Mar 2023 04:12 )   PT: 18.1 sec;   INR: 1.55 ratio         PTT - ( 20 Mar 2023 04:12 )  PTT:50.8 sec  Creatine Kinase, Serum: 33 U/L ( @ 05:49)  Creatine Kinase, Serum: 53 U/L ( @ 04:12)  Creatine Kinase, Serum: 39 U/L ( @ 22:16)    CARDIAC MARKERS ( 20 Mar 2023 05:49 )  x     / x     / 33 U/L / x     / x      CARDIAC MARKERS ( 20 Mar 2023 04:12 )  x     / x     / 53 U/L / x     / x      CARDIAC MARKERS ( 19 Mar 2023 22:16 )  x     / x     / 39 U/L / x     / x      CARDIAC MARKERS ( 19 Mar 2023 04:22 )  x     / x     / 74 U/L / x     / 7.0 ng/mL  CARDIAC MARKERS ( 18 Mar 2023 22:10 )  x     / x     / x     / x     / 6.6 ng/mL          TELEMETRY:     EKG:     IMAGING:

## 2023-03-20 NOTE — PROGRESS NOTE ADULT - SUBJECTIVE AND OBJECTIVE BOX
[facility] Division of Kidney Diseases & Hypertension  FOLLOW UP NOTE  --------------------------------------------------------------------------------  Chief Complaint: Cardiac arrest    24 hour events/subjective:  Seen and evaluated at bedside this morning in CCU.  intubated.  non responsive.        PAST HISTORY  --------------------------------------------------------------------------------  No significant changes to PMH, PSH, FHx, SHx, unless otherwise noted    ALLERGIES & MEDICATIONS  --------------------------------------------------------------------------------  Allergies    No Known Allergies    Intolerances      Standing Inpatient Medications  aspirin  chewable 81 milliGRAM(s) Enteral Tube daily  chlorhexidine 0.12% Liquid 15 milliLiter(s) Oral Mucosa two times a day  chlorhexidine 2% Cloths 1 Application(s) Topical <User Schedule>  darbepoetin Injectable ViaL 80 MICROGram(s) IV Push <User Schedule>  dextrose 50% Injectable 25 Gram(s) IV Push once  dextrose 50% Injectable 12.5 Gram(s) IV Push once  dextrose 50% Injectable 25 Gram(s) IV Push once  dextrose Oral Gel 15 Gram(s) Oral once  glucagon  Injectable 1 milliGRAM(s) IntraMuscular once  insulin lispro (ADMELOG) corrective regimen sliding scale   SubCutaneous every 6 hours  pantoprazole  Injectable 40 milliGRAM(s) IV Push daily  petrolatum Ophthalmic Ointment 1 Application(s) Both EYES every 4 hours  piperacillin/tazobactam IVPB.. 3.375 Gram(s) IV Intermittent every 12 hours    PRN Inpatient Medications  hydrALAZINE Injectable 10 milliGRAM(s) IV Push every 6 hours PRN      REVIEW OF SYSTEMS  --------------------------------------------------------------------------------  unable to obtain    VITALS/PHYSICAL EXAM  --------------------------------------------------------------------------------  T(C): 35.6 (03-20-23 @ 08:45), Max: 35.6 (03-20-23 @ 08:45)  HR: 88 (03-20-23 @ 10:00) (50 - 88)  BP: --  ABP: 164/63 (03-20-23 @ 10:00) (123/59 - 186/71)  ABP(mean): 99 (03-20-23 @ 10:00) (77 - 360)  RR: 20 (03-20-23 @ 10:00) (17 - 33)  SpO2: 98% (03-20-23 @ 10:00) (98% - 100%)  CVP(mm Hg): 4 (03-20-23 @ 10:00) (2 - 6)  Height (cm): 175.3 (03-18-23 @ 12:37)  Weight (kg): 60.2 (03-18-23 @ 12:37)  BMI (kg/m2): 19.6 (03-18-23 @ 12:37)  BSA (m2): 1.74 (03-18-23 @ 12:37)      03-19-23 @ 07:01  -  03-20-23 @ 07:00  --------------------------------------------------------  IN: 708.2 mL / OUT: 0 mL / NET: 708.2 mL    03-20-23 @ 07:01  -  03-20-23 @ 10:56  --------------------------------------------------------  IN: 28.5 mL / OUT: 0 mL / NET: 28.5 mL    Physical Exam:  	Gen:  NAD intubated  	Pulm: CTA B/L anteriorly on vent  	CV: S1S2   	Abd: Soft  	Ext: No LE edema B/L, R toe amputation seen  	Neuro: non responsive  	Skin: Warm and dry  	Vascular access: LUE AVF with palpable pulse and bruit heard    LABS/STUDIES  --------------------------------------------------------------------------------              9.1    27.03 >-----------<  394      [03-20-23 @ 04:12]              28.8     137  |  97  |  45  ----------------------------<  152      [03-20-23 @ 05:49]  4.0   |  23  |  2.92        Ca     9.0     [03-20-23 @ 05:49]      Mg     2.00     [03-20-23 @ 04:12]      Phos  4.8     [03-20-23 @ 05:49]    TPro  5.6  /  Alb  2.4  /  TBili  1.5  /  DBili  x   /  AST  239  /  ALT  137  /  AlkPhos  586  [03-20-23 @ 05:49]    PT/INR: PT 18.1 , INR 1.55       [03-20-23 @ 04:12]  PTT: 50.8       [03-20-23 @ 04:12]    CK 33      [03-20-23 @ 05:49]    Creatinine Trend:  SCr 2.92 [03-20 @ 05:49]  SCr 2.88 [03-20 @ 04:12]  SCr 2.79 [03-19 @ 22:16]  SCr 2.67 [03-19 @ 16:36]  SCr 2.64 [03-19 @ 10:00]

## 2023-03-20 NOTE — PROGRESS NOTE ADULT - PROBLEM SELECTOR PLAN 1
Pt. with ESRD on HD TIW who presented for cardiac arrest with evidence of anoxic brain injury.  Seen and evaluated yesterday and again today.  No emergent HD indications yesterday.  Will plan for maintenance HD today.  Recommend on going goals of care.

## 2023-03-21 NOTE — CONSULT NOTE ADULT - ASSESSMENT
Patient is a 58 Y/O M with HTN/T2DM/Bipolar Disorder, ESRD on HD (TTS), Afib on Eliquis who returned to [facility] from [facility] after cardiac arrest and CPR. He was discharged on 3/17 after long hospital course, requiring MICU care for septic shock. Per EMS report, at 08:00 in the morning, patient was eating breakfast in usual state of health at [facility], after recent discharge from hospital involving MICU course for pneumonia.  Then at 08:20 patient found pulseless and unresponsive, CPR initiated by [facility] staff then taken over by EMS, CPR for <20 minutes with 2 doses epinephrine given, then sustained ROSC obtained. Patient then had additional cardiac arrest in ED from 09:06 - 09:15 with epi given at 09:07 and 09:13.  Cardioversion given at 09:15 for possible VTach although rhythm in retrospect may have more likely been significant ST elevations with RBBB and LPFB mimicking VTach, given no change in rhythm.  Initial EKG during rosc shows STEMI.  Cardiology interventionalist Sandra valdez called, recommended CCU consult. During previous admission, on 3/16, Palliative Care carried out a GOC discussion, where patient expressed desire to remain full code. Palliative consulted for complex medical decision making in the setting of serious illness.

## 2023-03-21 NOTE — CONSULT NOTE ADULT - TIME BILLING
Reviewing medical chart and results, symptom assessment and management, counseling patient/decision makers/caregivers, coordination of care, documentation

## 2023-03-21 NOTE — CONSULT NOTE ADULT - ASSESSMENT
Assessment/Plan: Mr. Cal Lloyd is a 58 Y/O M w/PMH HTN, HLD, T2DM, Bipolar Disorder, ESRD on HD (TTS), Afib on Eliquis who presented from [facility] for cardiac arrest requiring CPR and was admitted to CCU for management of possible anterior STEMI and cardiac arrest with anoxic brain injury.    Wound Consult requested to assist w/ management of Sacrum pressure injury. Patient known to service line last seen on 3/14. Patient discharge from [facility] on 3/17.     Exam: Patient intubated, obtunded.   Sacrum to coccyx evolving deep tissue pressure injury.   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   Right 5 lateral 5th metatarsal head purple-maroon discoloration; mixed etiology deep tissue pressure injury and arterial insufficiency.    Recommendations:  - Peritubular management of Nasogastric Tube- Cleanse nare with NS. Pat dry. Apply Liquid barrier film to periwound skin. Apply Stat-lock NGT singh over center of nare, not touch any skin circumferentially. Change every three days or prn if soiled or compromised.   - Sacrum/coccyx- cleanse with NS, Pat dry. Cover with silicone foam with border, change daily. Continue to offload pressure, monitor for changes in tissue type. Perineal care per protocol.  - right lateral 5th metatarsal head purple-maroon discoloration- paint with betadine daily, may leave open to air.    Additional recommendations:  Upper and lower extremities, Moisturize intact skin w/ SWEEN cream daily avoid between toes.  Nutrition Consult for optimization as tolerated   Continue w/ low air loss fluidized bed surface   Continue turning and positioning w/ offloading assistive devices as per protocol  Continue w/ Pericare as per protocol  Waffle Cushion to chair when oob to chair    Findings and plan discussed with patient, primary RN and primary team.     Upon discharge f/u as outpatient at [facility] [address] [place] [address] [phone]  Will continue to follow periodically throughout hospitalization, please reconsult as needed.    Thank you for this consult  QUE Menchaca, MEGHAN (pager #[phone] or available in MS Teams)    If after 4PM or before 7:30AM on Mon-Friday or weekend/holiday please contact general surgery for urgent matters.   Team A- [id number]   Team B- [id number]  For non-urgent matters e-mail [email or web address]    We spent 55 minutes face to face with this patient of which more than 50% of the time was spent counseling & coordinating care of this pt       Assessment/Plan: Mr. Cal Lloyd is a 58 Y/O M w/PMH HTN, HLD, T2DM, Bipolar Disorder, ESRD on HD (TTS), Afib on Eliquis who presented from [facility] for cardiac arrest requiring CPR and was admitted to CCU for management of possible anterior STEMI and cardiac arrest with anoxic brain injury.    Wound Consult requested to assist w/ management of Sacrum pressure injury. Patient known to service line last seen on 3/14. Patient discharge from [facility] on 3/17.     Exam: Patient intubated, obtunded.   Sacrum to coccyx evolving deep tissue pressure injury.   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   Right 5 lateral 5th metatarsal head purple-maroon discoloration; mixed etiology deep tissue pressure injury and arterial insufficiency.    Recommendations:.   - Sacrum/coccyx- cleanse with NS, Pat dry. Cover with silicone foam with border, change daily. Continue to offload pressure, monitor for changes in tissue type. Perineal care per protocol.  - right lateral 5th metatarsal head purple-maroon discoloration- paint with betadine daily, may leave open to air.    Additional recommendations:  Upper and lower extremities, Moisturize intact skin w/ SWEEN cream daily avoid between toes.  Appreciate Nutrition Consult for optimization as tolerated in patient with severe protein calorie malnutrition   Continue w/ low air loss fluidized bed surface   Continue turning and positioning w/ offloading assistive devices as per protocol  Continue w/ Pericare as per protocol  Waffle Cushion to chair when oob to chair    Findings and plan discussed with patient, primary RN and primary team.     Upon discharge f/u as outpatient at [facility] [address] [place], [place] [phone]  Will continue to follow periodically throughout hospitalization, please reconsult as needed.    Thank you for this consult  QUE Menchaca, MEGHAN (pager #[phone] or available in MS Teams)    If after 4PM or before 7:30AM on Mon-Friday or weekend/holiday please contact general surgery for urgent matters.   Team A- [id number]   Team B- [id number]  For non-urgent matters e-mail [email or web address]    We spent 55 minutes face to face with this patient of which more than 50% of the time was spent counseling & coordinating care of this pt       Assessment/Plan: Mr. Cal Lloyd is a 56 Y/O M w/PMH HTN, HLD, T2DM, Bipolar Disorder, ESRD on HD (TTS), Afib on Eliquis who presented from [facility] for cardiac arrest requiring CPR and was admitted to CCU for management of possible anterior STEMI and cardiac arrest with anoxic brain injury. Patient seen by Palliative team for GOC discussion, patient remains full code.     Wound Consult requested to assist w/ management of Sacrum pressure injury. Patient known to service line last seen on 3/14. Patient discharge from [facility] on 3/17.     Exam: Patient intubated, obtunded.   Sacrum to coccyx evolving deep tissue pressure injury.   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   Right 5 lateral 5th metatarsal head purple-maroon discoloration; mixed etiology deep tissue pressure injury and arterial insufficiency.    Recommendations:.   - Sacrum/coccyx- cleanse with NS, Pat dry. Cover with silicone foam with border, change daily. Continue to offload pressure, monitor for changes in tissue type. Perineal care per protocol.  - right lateral 5th metatarsal head purple-maroon discoloration- paint with betadine daily, may leave open to air.    Additional recommendations:  Upper and lower extremities, Moisturize intact skin w/ SWEEN cream daily avoid between toes.  Appreciate Nutrition Consult for optimization as tolerated in patient with severe protein calorie malnutrition   Continue w/ low air loss fluidized bed surface   Continue turning and positioning w/ offloading assistive devices as per protocol  Continue w/ Pericare as per protocol  Waffle Cushion to chair when oob to chair    Findings and plan discussed with primary RN and primary team.   Patient seen with wound care MD Chencho REYES     Upon discharge f/u as outpatient at [facility] [address] [place] [address] [phone]  Will continue to follow periodically throughout hospitalization, please reconsult as needed.    Thank you for this consult  QUE Menchaca CWON (pager #[phone] or available in MS Teams)    If after 4PM or before 7:30AM on Mon-Friday or weekend/holiday please contact general surgery for urgent matters.   Team A- [id number]   Team B- [id number]  For non-urgent matters e-mail [email or web address]    We spent 55 minutes face to face with this patient of which more than 50% of the time was spent counseling & coordinating care of this pt

## 2023-03-21 NOTE — CONSULT NOTE ADULT - SUBJECTIVE AND OBJECTIVE BOX
HPI:  Booker Lloyd is a 58 Y/O M with HTN, HLD, T2DM, Bipolar Disorder, ESRD on HD (TTS), Afib on Eliquis who returned to [facility] from [facility] after cardiac arrest and CPR. He was discharged on 3/17 after long hospital course, requiring MICU care for septic shock.    Per ED Physician Note:   "Arrives intubated by EMS with tube at 21cm at lips, patient with ROSC at arrival with BP 120s systolic, HR 90s-100s.  Per EMS report, at 08:00 this morning, patient was eating breakfast in usual state of health at [facility], after recent discharge from hospital involving MICU course for pneumonia.  Then at 08:20 patient found pulseless and unresponsive, CPR initiated by [facility] staff then taken over by EMS, CPR for <20 minutes with 2 doses epinephrine given, then sustained ROSC obtained."    "Patient then had additional cardiac arrest in ED from 09:06 - 09:15 with epi given at 09:07 and 09:13.  Cardioversion given at 09:15 for possible VTach although rhythm in retrospect may have more likely been significant ST elevations with RBBB and LPFB mimicking VTach, given no change in rhythm.  Initial EKG during rosc shows STEMI.  Cardiology interventionalist Sandra valdez called, recommended CCU consult"      Of note, patient was recently discharged (3/9-3/17) from [facility] after MICU admission for Acute Hypoxic Respiratory Failure due to Aspiration PNA and COVID, was discharged after being weaned back to room air, in stable condition. He had a recent admission (2-7 - 2/14) at [facility] for septic shock and AHRF likely 2/2 aspiration pneumonia and Covid, hospital course c/b cardiac arrest iso compete lung atelectasis and mucus plugging, discharged to [facility] on room air.    Also with recent admission on 1/14 to [place] for septic shock.    During previous admission, on 3/16, Palliative Care carried out a GOC discussion, where patient expressed desire to remain full code. (18 Mar 2023 11:25)    PERTINENT PM/SXH:   Diabetes mellitus    Diabetes mellitus    Depression    High cholesterol    Bipolar affective disorder in remission    Chronic kidney disease, unspecified    Hypertension    Arterial insufficiency    Anemia    Hemodialysis patient    ESRD on dialysis    Cardiac arrest      ORIF right lower leg- 1995    Amputated great toe of right foot    S/P arteriovenous (AV) fistula creation      FAMILY HISTORY:  FH: type 2 diabetes mellitus (Mother)    FHx: heart disease (Father)      ------------------------------------------------------------------------------------------------------------  ITEMS NOT CHECKED ARE NOT PRESENT    SOCIAL HISTORY:   Living Situation: [ ]Home  [ ]Long term care  [ ]Rehab [ ]Other  Support:     Substance hx:  [ ]   Tobacco hx:  [ ]   Alcohol hx: [ ]   Family Hx substance abuse [ ]yes [ ]no    Mormon/Spirituality:  PCSSQ[Palliative Care Spiritual Screening Question]   Severity (0-10): 0  Score of 4 or > indicate consideration of Chaplaincy referral.  Chaplaincy Referral: [ ] yes [X ] refused [ ] following    Caregiver [place]? : [ ] yes [X ] no [ ] Deferred [ ] Declined               Social work referral [ ] Patient & Family Centered Care Referral [ ]    Anticipatory Grief present?:  [ ] yes [X ] no  [ ] Deferred                    Social work referral [ ] Patient & Family Centered Care Referral [ ]    ------------------------------------------------------------------------------------------------------------    PRESENT SYMPTOMS:  [ ] No     [ ] Unable to self-report      [ ] CPOT (ICU)     [ ] PAINADs     [ ] RDOS    [ ] Yes     Source if other than patient:  [ ]Family   [ ]Team     PAIN:   If blank, patient unable to specify   [ ]yes [ ]no  QOL impact-   Location -                    Aggravating factors -  Quality -  Radiation -  Timing-  Pain at most severe level (0-10 scale):  Pain at minimal acceptable level/Pain Goal (0-10 scale):     SYMPTOMS:   Dyspnea:                           [ ]Mild [ ]Moderate [ ]Severe  Anxiety:                             [ ]Mild [ ]Moderate [ ]Severe  Fatigue:                             [ ]Mild [ ]Moderate [ ]Severe  Nausea/Vomiting:              [ ]Mild [ ]Moderate [ ]Severe  Loss of appetite:                [ ]Mild [ ]Moderate [ ]Severe  Constipation:                     [ ]Mild [ ]Moderate [ ]Severe    Other Symptoms:  [X ]All other review of systems negative     Home Medications for symptoms if any:  I-Stop Reference No:    ------------------------------------------------------------------------------------------------------------    FUNCTIONAL STATUS:     Baseline ADL (prior to admission):  [ ] Independent  [ ] Moderate Assistance [ ] Dependent  Palliative Performance Score:     [ ]PPSV2 < or = to 30%     NUTRITIONAL STATUS:     Protein Calorie Malnutrition Present:   [ ]mild   [ ]moderate   [ ]severe   [ ]poor nutritional intake   [ ]artificial nutrition      Weight:   [ ]underweight (BMI 18.5 or less)   [ ]morbid obesity (BMI 30 or higher)   [ ]anasarca  [ ]significant weight loss     Height (cm): 175.3 (03-18-23 @ 12:37), 175.3 (03-14-23 @ 12:12), 175.3 (02-07-23 @ 08:05)  Weight (kg): 60.2 (03-18-23 @ 12:37), 65.5 (03-14-23 @ 12:12), 63.1 (02-07-23 @ 08:05)  BMI (kg/m2): 19.6 (03-18-23 @ 12:37), 21.3 (03-14-23 @ 12:12), 20.5 (02-07-23 @ 08:05)    ------------------------------------------------------------------------------------------------------------    PRIOR ADVANCE DIRECTIVES:    [ ] DNR/MOLST    [ ] Living Will    [ ] Health Care Proxy(s)    DECISION MAKER(s):  [ ] Patient    [ ] Surrogate(s)  [ ] HCP   [ ] Guardian             ------------------------------------------------------------------------------------------------------------  PHYSICAL EXAM:  Vital Signs Last 24 Hrs  T(C): 37.2 (21 Mar 2023 08:00), Max: 38.6 (20 Mar 2023 21:00)  T(F): 98.9 (21 Mar 2023 08:00), Max: 101.5 (20 Mar 2023 21:00)  HR: 112 (21 Mar 2023 08:46) (68 - 130)  BP: --  BP(mean): --  RR: 20 (21 Mar 2023 08:30) (19 - 30)  SpO2: 98% (21 Mar 2023 08:46) (97% - 100%)    Parameters below as of 21 Mar 2023 06:00  Patient On (Oxygen Delivery Method): ventilator    O2 Concentration (%): 40 I&O's Summary    20 Mar 2023 07:01  -  21 Mar 2023 07:00  --------------------------------------------------------  IN: 628.5 mL / OUT: 1400 mL / NET: -771.5 mL        GENERAL:  [ ]Cachexia  [ ] Frail  [ ]Awake  [ ]Oriented x   [ ]Lethargic  [ ]Unarousable  [ ]Verbal  [ ]Non-Verbal    BEHAVIORAL:   [ ] Anxiety  [ ] Delirium [ ] Agitation [ ] Other    HEENT:   [ ]Normal   [ ]Dry mouth   [ ]ET Tube/Trach  [ ]Oral lesions    PULMONARY:   [ ]Clear [ ]Tachypnea  [ ]Audible excessive secretions   [ ]Rhonchi        [ ]Right [ ]Left [ ]Bilateral  [ ]Crackles        [ ]Right [ ]Left [ ]Bilateral  [ ]Wheezing     [ ]Right [ ]Left [ ]Bilateral  [ ]Diminished breath sounds [ ]right [ ]left [ ]bilateral    CARDIOVASCULAR:    [ ]Regular [ ]Irregular [ ]Tachy  [ ]Chace [ ]Murmur [ ]Other    GASTROINTESTINAL:  [ ]Soft  [ ]Distended   [ ]+BS  [ ]Non tender [ ]Tender  [ ]Other [ ]PEG [ ]OGT/ NGT      GENITOURINARY:  [ ]Normal [ ] Incontinent   [ ]Oliguria/Anuria   [ ]Loza    MUSCULOSKELETAL:   [ ]Normal   [ ]Weakness  [ ]Bed/Wheelchair bound [ ]Edema    NEUROLOGIC:   [ ]No focal deficits  [ ]Cognitive impairment  [ ]Dysphagia [ ]Dysarthria [ ]Paresis [ ]Other     SKIN:   [ ]Normal  [ ]Rash  [ ]Other  [ ]Pressure ulcer(s)       Present on admission [ ]y [ ]n    ------------------------------------------------------------------------------------------------------------    LABS:                        8.0    22.38 )-----------( 313      ( 21 Mar 2023 02:50 )             25.2   03-21    133<L>  |  95<L>  |  34<H>  ----------------------------<  92  4.4   |  24  |  2.32<H>    Ca    8.5      21 Mar 2023 02:59  Phos  3.6     03-21  Mg     1.90     03-21    TPro  5.0<L>  /  Alb  2.2<L>  /  TBili  1.2  /  DBili  x   /  AST  140<H>  /  ALT  97<H>  /  AlkPhos  679<H>  03-21  PT/INR - ( 21 Mar 2023 02:50 )   PT: 16.7 sec;   INR: 1.43 ratio      PTT - ( 21 Mar 2023 02:50 )  PTT:31.4 sec    ------------------------------------------------------------------------------------------------------------    RADIOLOGY & ADDITIONAL STUDIES:      ------------------------------------------------------------------------------------------------------------    ALLERGIES:  Allergies    No Known Allergies    Intolerances    MEDICATIONS  (STANDING):  aspirin  chewable 81 milliGRAM(s) Enteral Tube daily  chlorhexidine 0.12% Liquid 15 milliLiter(s) Oral Mucosa two times a day  chlorhexidine 2% Cloths 1 Application(s) Topical <User Schedule>  darbepoetin Injectable ViaL 80 MICROGram(s) IV Push <User Schedule>  dextrose 50% Injectable 25 Gram(s) IV Push once  dextrose 50% Injectable 12.5 Gram(s) IV Push once  dextrose 50% Injectable 25 Gram(s) IV Push once  dextrose Oral Gel 15 Gram(s) Oral once  glucagon  Injectable 1 milliGRAM(s) IntraMuscular once  insulin lispro (ADMELOG) corrective regimen sliding scale   SubCutaneous every 6 hours  pantoprazole  Injectable 40 milliGRAM(s) IV Push daily  petrolatum Ophthalmic Ointment 1 Application(s) Both EYES every 4 hours  piperacillin/tazobactam IVPB.. 3.375 Gram(s) IV Intermittent every 12 hours    MEDICATIONS  (PRN):  acetaminophen     Tablet .. 650 milliGRAM(s) Oral every 6 hours PRN Temp greater or equal to 38C (100.4F)  hydrALAZINE Injectable 10 milliGRAM(s) IV Push every 6 hours PRN SBP > 170    ------------------------------------------------------------------------------------------------------------    CRITICAL CARE:  [ ]Shock Present  [ ]Septic [ ]Cardiogenic [ ]Neurologic [ ]Hypovolemic [ ] Undifferentiated/Mixed   [ ]Vasopressors [ ]Inotropes     [ ]Respiratory failure present: [ ]Acute  [ ]Chronic [ ]Hypoxic  [ ]Hypercarbic   [ ]Mechanical ventilation   [ ]Non-invasive ventilatory support   [ ]High flow  Mode: AC/ CMV (Assist Control/ Continuous Mandatory Ventilation), RR (machine): 20, TV (machine): 380, FiO2: 40, PEEP: 5, ITime: 0.93, MAP: 12, PIP: 29  [ ]Non-rebreather/Venti     [ ]Other organ failure     Other REFERRALS:  [ ]Hospice  [ ]Child Life  [ ]Social Work  [ ]Case management [ ]Holistic Therapy    HPI:  Patient is a 58 Y/O M with HTN/T2DM/Bipolar Disorder, ESRD on HD (TTS), Afib on Eliquis who returned to [facility] from [facility] after cardiac arrest and CPR. He was discharged on 3/17 after long hospital course, requiring MICU care for septic shock. Per EMS report, at 08:00 in the morning, patient was eating breakfast in usual state of health at [facility], after recent discharge from hospital involving MICU course for pneumonia.  Then at 08:20 patient found pulseless and unresponsive, CPR initiated by [place] staff then taken over by EMS, CPR for <20 minutes with 2 doses epinephrine given, then sustained ROSC obtained. Patient then had additional cardiac arrest in ED from 09:06 - 09:15 with epi given at 09:07 and 09:13.  Cardioversion given at 09:15 for possible VTach although rhythm in retrospect may have more likely been significant ST elevations with RBBB and LPFB mimicking VTach, given no change in rhythm.  Initial EKG during rosc shows STEMI.  Cardiology interventionalist Sandra valdez called, recommended CCU consult. During previous admission, on 3/16, Palliative Care carried out a GOC discussion, where patient expressed desire to remain full code. Palliative consulted for complex medical decision making in the setting of serious illness.     Patient seen this AM, intubated with no sedation or pressor support, unresponsive, no spontaneous movements. Discussed with CCU team, team notes patient has no reflexes, not breathing over the vent.       PERTINENT PM/SXH:   Diabetes mellitus    Diabetes mellitus    Depression    High cholesterol    Bipolar affective disorder in remission    Chronic kidney disease, unspecified    Hypertension    Arterial insufficiency    Anemia    Hemodialysis patient    ESRD on dialysis    Cardiac arrest      ORIF right lower leg- 1995    Amputated great toe of right foot    S/P arteriovenous (AV) fistula creation      FAMILY HISTORY:  FH: type 2 diabetes mellitus (Mother)    FHx: heart disease (Father)    ------------------------------------------------------------------------------------------------------------  ITEMS NOT CHECKED ARE NOT PRESENT    SOCIAL HISTORY:   Living Situation: [ ]Home  [X ]Long term care  [ ]Rehab [ ]Other  Support: Parents     Substance hx:  [ ]   Tobacco hx:  [ ]   Alcohol hx: [ ]   Family Hx substance abuse [ ]yes [ ]no    Uatsdin/Spirituality:  PCSSQ[Palliative Care Spiritual Screening Question]   Severity (0-10): 0  Score of 4 or > indicate consideration of Chaplaincy referral.  Chaplaincy Referral: [ ] yes [X ] refused [ ] following    Caregiver [place]? : [ ] yes [X ] no [ ] Deferred [ ] Declined               Social work referral [ ] Patient & Family Centered Care Referral [ ]    Anticipatory Grief present?:  [ ] yes [X ] no  [ ] Deferred                    Social work referral [ ] Patient & Family Centered Care Referral [ ]    ------------------------------------------------------------------------------------------------------------    PRESENT SYMPTOMS:  [ ] No     [X ] Unable to self-report      [ ] CPOT (ICU)     [ ] PAINADs     [X ] RDOS 0    [ ] Yes     Source if other than patient:  [ ]Family   [ ]Team     PAIN:   If blank, patient unable to specify   [ ]yes [ ]no  QOL impact-   Location -                    Aggravating factors -  Quality -  Radiation -  Timing-  Pain at most severe level (0-10 scale):  Pain at minimal acceptable level/Pain Goal (0-10 scale):     SYMPTOMS:   Dyspnea:                           [ ]Mild [ ]Moderate [ ]Severe  Anxiety:                             [ ]Mild [ ]Moderate [ ]Severe  Fatigue:                             [ ]Mild [ ]Moderate [ ]Severe  Nausea/Vomiting:              [ ]Mild [ ]Moderate [ ]Severe  Loss of appetite:                [ ]Mild [ ]Moderate [ ]Severe  Constipation:                     [ ]Mild [ ]Moderate [ ]Severe    Other Symptoms:  [X ]All other review of systems negative     Home Medications for symptoms if any:  I-Stop Reference No:    ------------------------------------------------------------------------------------------------------------    FUNCTIONAL STATUS:     Baseline ADL (prior to admission):  [ ] Independent  [X ] Moderate Assistance [ ] Dependent  Palliative Performance Score: 50%    [ ]PPSV2 < or = to 30%     NUTRITIONAL STATUS:     Protein Calorie Malnutrition Present:   [ ]mild   [ ]moderate   [ ]severe   [ ]poor nutritional intake   [ ]artificial nutrition      Weight:   [ ]underweight (BMI 18.5 or less)   [ ]morbid obesity (BMI 30 or higher)   [ ]anasarca  [X ]significant weight loss     Height (cm): 175.3 (03-18-23 @ 12:37), 175.3 (03-14-23 @ 12:12), 175.3 (02-07-23 @ 08:05)  Weight (kg): 60.2 (03-18-23 @ 12:37), 65.5 (03-14-23 @ 12:12), 63.1 (02-07-23 @ 08:05)  BMI (kg/m2): 19.6 (03-18-23 @ 12:37), 21.3 (03-14-23 @ 12:12), 20.5 (02-07-23 @ 08:05)    ------------------------------------------------------------------------------------------------------------    PRIOR ADVANCE DIRECTIVES:    [ ] DNR/MOLST    [ ] Living Will    [ ] Health Care Proxy(s)    DECISION MAKER(s):  [ ] Patient    [X ] Surrogate(s)- parents   [ ] HCP   [ ] Guardian             ------------------------------------------------------------------------------------------------------------  PHYSICAL EXAM:  Vital Signs Last 24 Hrs  T(C): 37.2 (21 Mar 2023 08:00), Max: 38.6 (20 Mar 2023 21:00)  T(F): 98.9 (21 Mar 2023 08:00), Max: 101.5 (20 Mar 2023 21:00)  HR: 112 (21 Mar 2023 08:46) (68 - 130)  BP: --  BP(mean): --  RR: 20 (21 Mar 2023 08:30) (19 - 30)  SpO2: 98% (21 Mar 2023 08:46) (97% - 100%)    Parameters below as of 21 Mar 2023 06:00  Patient On (Oxygen Delivery Method): ventilator    O2 Concentration (%): 40 I&O's Summary    20 Mar 2023 07:01  -  21 Mar 2023 07:00  --------------------------------------------------------  IN: 628.5 mL / OUT: 1400 mL / NET: -771.5 mL    GENERAL:  [ ]Cachexia  [X ] Frail  [ ]Awake  [ ]Oriented x   [ ]Lethargic  [X ]Unarousable  [ ]Verbal  [ ]Non-Verbal  Intubated, off sedation     BEHAVIORAL:   [ ] Anxiety  [ ] Delirium [ ] Agitation [ ] Other    HEENT:   [ ]Normal   [ ]Dry mouth   [X ]ET Tube/Trach  [ ]Oral lesions    PULMONARY:   [X ]Clear [ ]Tachypnea  [ ]Audible excessive secretions   [ ]Rhonchi        [ ]Right [ ]Left [ ]Bilateral  [ ]Crackles        [ ]Right [ ]Left [ ]Bilateral  [ ]Wheezing     [ ]Right [ ]Left [ ]Bilateral  [ ]Diminished breath sounds [ ]right [ ]left [ ]bilateral    CARDIOVASCULAR:    [X ]Regular [ ]Irregular [ ]Tachy  [ ]Chace [ ]Murmur [ ]Other    GASTROINTESTINAL:  [X ]Soft  [ ]Distended   [ ]+BS  [X ]Non tender [ ]Tender  [ ]Other [ ]PEG [ ]OGT/ NGT      GENITOURINARY:  [ ]Normal [ ] Incontinent   [ ]Oliguria/Anuria   [X ]Loza    MUSCULOSKELETAL:   [ ]Normal   [ ]Weakness  [X ]Bed/Wheelchair bound [ ]Edema    NEUROLOGIC:   [X ]No focal deficits  [ ]Cognitive impairment  [ ]Dysphagia [ ]Dysarthria [ ]Paresis [ ]Other     SKIN:   [X ]Normal  [ ]Rash  [ ]Other  [ ]Pressure ulcer(s)       Present on admission [ ]y [ ]n    ------------------------------------------------------------------------------------------------------------    LABS:                        8.0    22.38 )-----------( 313      ( 21 Mar 2023 02:50 )             25.2   03-21    133<L>  |  95<L>  |  34<H>  ----------------------------<  92  4.4   |  24  |  2.32<H>    Ca    8.5      21 Mar 2023 02:59  Phos  3.6     03-21  Mg     1.90     03-21    TPro  5.0<L>  /  Alb  2.2<L>  /  TBili  1.2  /  DBili  x   /  AST  140<H>  /  ALT  97<H>  /  AlkPhos  679<H>  03-21  PT/INR - ( 21 Mar 2023 02:50 )   PT: 16.7 sec;   INR: 1.43 ratio      PTT - ( 21 Mar 2023 02:50 )  PTT:31.4 sec    ------------------------------------------------------------------------------------------------------------    RADIOLOGY & ADDITIONAL STUDIES:    < from: Xray Chest 1 View- PORTABLE-Routine (Xray Chest 1 View- PORTABLE-Routine in AM.) (03.20.23 @ 08:15) >  FINDINGS:  3/19/2023 at 9:06 AM  Endotracheal tube tip in the distal trachea. Enteric tube courses below   the diaphragm and out of the field-of-view. Left IJ central line   terminates in the SVC.  Cardiomegaly. Left basilar pleural effusion/atelectasis. Hazy bilateral   opacities.    < end of copied text >    < from: CT Chest No Cont (03.18.23 @ 10:20) >  IMPRESSION:  Endotracheal tube terminates in the proximal trachea, at approximately   the inferior margin of the thyroid cartilage. Hyperinflated balloon cuff   distends the tracheal lumen. Adjustment is recommended.    Centrilobular groundglass nodules in the right upper lobe, likely   reflecting bronchiolar inflammation and/or infection.    Large right and moderate left pleural effusions with adjacent atelectasis.    Occluded right lower lobe bronchi, likely by mucus.    Cardiomegaly with small pericardial effusion.    < end of copied text >    < from: CT Head No Cont (03.18.23 @ 10:19) >    1. Findings suspicious for global hypoxic-ischemic injury, as above.No   evidence of acute intracranial hemorrhage.  2. Progressive paranasal sinus, bilateral mastoid air cells and middle   ear disease, as above.  3. Additional findings described in detail above.    < end of copied text >    ------------------------------------------------------------------------------------------------------------    ALLERGIES:  Allergies    No Known Allergies    Intolerances    MEDICATIONS  (STANDING):  aspirin  chewable 81 milliGRAM(s) Enteral Tube daily  chlorhexidine 0.12% Liquid 15 milliLiter(s) Oral Mucosa two times a day  chlorhexidine 2% Cloths 1 Application(s) Topical <User Schedule>  darbepoetin Injectable ViaL 80 MICROGram(s) IV Push <User Schedule>  dextrose 50% Injectable 25 Gram(s) IV Push once  dextrose 50% Injectable 12.5 Gram(s) IV Push once  dextrose 50% Injectable 25 Gram(s) IV Push once  dextrose Oral Gel 15 Gram(s) Oral once  glucagon  Injectable 1 milliGRAM(s) IntraMuscular once  insulin lispro (ADMELOG) corrective regimen sliding scale   SubCutaneous every 6 hours  pantoprazole  Injectable 40 milliGRAM(s) IV Push daily  petrolatum Ophthalmic Ointment 1 Application(s) Both EYES every 4 hours  piperacillin/tazobactam IVPB.. 3.375 Gram(s) IV Intermittent every 12 hours    MEDICATIONS  (PRN):  acetaminophen     Tablet .. 650 milliGRAM(s) Oral every 6 hours PRN Temp greater or equal to 38C (100.4F)  hydrALAZINE Injectable 10 milliGRAM(s) IV Push every 6 hours PRN SBP > 170    ------------------------------------------------------------------------------------------------------------    CRITICAL CARE:  [ ]Shock Present  [ ]Septic [ ]Cardiogenic [ ]Neurologic [ ]Hypovolemic [ ] Undifferentiated/Mixed   [ ]Vasopressors [ ]Inotropes     [X ]Respiratory failure present: [ ]Acute  [ ]Chronic [ ]Hypoxic  [ ]Hypercarbic   [X ]Mechanical ventilation   [ ]Non-invasive ventilatory support   [ ]High flow  Mode: AC/ CMV (Assist Control/ Continuous Mandatory Ventilation), RR (machine): 20, TV (machine): 380, FiO2: 40, PEEP: 5, ITime: 0.93, MAP: 12, PIP: 29  [ ]Non-rebreather/Venti     [ ]Other organ failure     Other REFERRALS:  [ ]Hospice  [ ]Child Life  [ ]Social Work  [ ]Case management [ ]Holistic Therapy

## 2023-03-21 NOTE — CONSULT NOTE ADULT - PROBLEM SELECTOR RECOMMENDATION 9
Pt. with ESRD on HD TIW (MWF schedule) via LUE AVF at [facility]. Pt. admitted for management of cardiac arrest. Pt. is euvolemic on exam. Labs reviewed. No acute indication for HD currently. Plan for maintenance HD tomorrow. HD consent obtained over the phone from pt's mother, and placed in pt's chart. Monitor BP and labs.
- 2/2 two cardiac arrests  - unresponsive off sedation  - per CCU team no reflexes and not breathing over vent -> high suspicion of brain death   - Recommend brain death protocol, neurology evaluation to help assess for brain death

## 2023-03-21 NOTE — CONSULT NOTE ADULT - NS ATTEND AMEND GEN_ALL_CORE FT
56 yo unresponsive male , S/P cardiac arrest, with a sacral DTI  Currently intubated and ventilator dependent Recommendations d/w nurse  Prognosis is uncertain  Agree with H&P and recommendations

## 2023-03-21 NOTE — PROGRESS NOTE ADULT - PROBLEM SELECTOR PLAN 1
Pt. with ESRD on HD TIW who presented for cardiac arrest with evidence of anoxic brain injury.  Seen and evaluated yesterday and again today.  No emergent HD indications today.  Will plan for maintenance HD tomorrow.  Recommend on going goals of care.

## 2023-03-21 NOTE — CONSULT NOTE ADULT - SUBJECTIVE AND OBJECTIVE BOX
Wound SURGERY CONSULT NOTE    HPI:Mr. Cal Lloyd is a 56 Y/O M with HTN, HLD, T2DM, Bipolar Disorder, ESRD on HD (TTS), Afib on Eliquis who returned to [facility] from [facility] after cardiac arrest and CPR. He was discharged on 3/17 after long hospital course, requiring MICU care for septic shock. Per ED Physician Note:   "Arrives intubated by EMS with tube at 21cm at lips, patient with ROSC at arrival with BP 120s systolic, HR 90s-100s.  Per EMS report, at 08:00 this morning, patient was eating breakfast in usual state of health at [facility], after recent discharge from hospital involving MICU course for pneumonia.  Then at 08:20 patient found pulseless and unresponsive, CPR initiated by [facility] staff then taken over by EMS, CPR for <20 minutes with 2 doses epinephrine given, then sustained ROSC obtained.""Patient then had additional cardiac arrest in ED from 09:06 - 09:15 with epi given at 09:07 and 09:13.  Cardioversion given at 09:15 for possible VTach although rhythm in retrospect may have more likely been significant ST elevations with RBBB and LPFB mimicking VTach, given no change in rhythm.  Initial EKG during rosc shows STEMI.  Cardiology interventionalist Sandra valdez called, recommended CCU consult" Of note, patient was recently discharged (3/9-3/17) from [facility] after MICU admission for Acute Hypoxic Respiratory Failure due to Aspiration PNA and COVID, was discharged after being weaned back to room air, in stable condition. He had a recent admission (2-7 - 2/14) at [facility] for septic shock and AHRF likely 2/2 aspiration pneumonia and Covid, hospital course c/b cardiac arrest iso compete lung atelectasis and mucus plugging, discharged to [facility] on room air. Also with recent admission on 1/14 to [place] for septic shock.During previous admission, on 3/16, Palliative Care carried out a GOC discussion, where patient expressed desire to remain full code. (18 Mar 2023 11:25)    Wound consult requested to assist w/ management of Sacrum pressure injury.     Current Diet: Diet, NPO with Tube Feed:   Tube Feeding Modality: Nasogastric  Nepro with Carb Steady (NEPRORTH)  Total Volume for 24 Hours (mL): 240  Continuous  Starting Tube Feed Rate mL per Hour: 10  Increase Tube Feed Rate by (mL): 0  Until Goal Tube Feed Rate (mL per Hour): 10  Tube Feed Duration (in Hours): 24  Tube Feed Start Time: 06:00 (03-21-23 @ 07:36)      PAST MEDICAL & SURGICAL HISTORY:  Diabetes mellitus  Patient does not routinely check FS--diet controlled  Depression  High cholesterol  Bipolar affective disorder in remission  Hypertension  Arterial insufficiency  Anemia  Hemodialysis patient  ESRD on dialysis  Cardiac arrest  ORIF right lower leg- 1995  Amputated great toe of right foot  S/P arteriovenous (AV) fistula creation  7/2019    REVIEW OF SYSTEMS: Pt unable to offer  see HPI and PMH      MEDICATIONS  (STANDING):  aspirin  chewable 81 milliGRAM(s) Enteral Tube daily  chlorhexidine 0.12% Liquid 15 milliLiter(s) Oral Mucosa two times a day  chlorhexidine 2% Cloths 1 Application(s) Topical <User Schedule>  darbepoetin Injectable ViaL 80 MICROGram(s) IV Push <User Schedule>  dextrose 50% Injectable 25 Gram(s) IV Push once  dextrose 50% Injectable 12.5 Gram(s) IV Push once  dextrose 50% Injectable 25 Gram(s) IV Push once  dextrose Oral Gel 15 Gram(s) Oral once  glucagon  Injectable 1 milliGRAM(s) IntraMuscular once  insulin lispro (ADMELOG) corrective regimen sliding scale   SubCutaneous every 6 hours  pantoprazole  Injectable 40 milliGRAM(s) IV Push daily  petrolatum Ophthalmic Ointment 1 Application(s) Both EYES every 4 hours  piperacillin/tazobactam IVPB.. 3.375 Gram(s) IV Intermittent every 12 hours    MEDICATIONS  (PRN):  acetaminophen     Tablet .. 650 milliGRAM(s) Oral every 6 hours PRN Temp greater or equal to 38C (100.4F)  hydrALAZINE Injectable 10 milliGRAM(s) IV Push every 6 hours PRN SBP > 170    Allergies    No Known Allergies    Intolerances    SOCIAL HISTORY: From [facility]     FAMILY HISTORY:  FH: type 2 diabetes mellitus (Mother)    FHx: heart disease (Father)    PHYSICAL EXAM:  Vital Signs Last 24 Hrs  T(C): 37.2 (21 Mar 2023 08:00), Max: 38.6 (20 Mar 2023 21:00)  T(F): 98.9 (21 Mar 2023 08:00), Max: 101.5 (20 Mar 2023 21:00)  HR: 99 (21 Mar 2023 12:30) (68 - 130)  BP: --  BP(mean): --  RR: 20 (21 Mar 2023 08:30) (19 - 28)  SpO2: 100% (21 Mar 2023 12:30) (97% - 100%)    Parameters below as of 21 Mar 2023 06:00  Patient On (Oxygen Delivery Method): ventilator    O2 Concentration (%): 40    FULL NOTE TO FOLLOW     LABS/ CULTURES/ RADIOLOGY:                        8.0    22.38 )-----------( 313      ( 21 Mar 2023 02:50 )             25.2       133  |  95  |  34  ----------------------------<  92      [03-21-23 @ 02:59]  4.4   |  24  |  2.32        Ca     8.5     [03-21-23 @ 02:59]      Mg     1.90     [03-21-23 @ 02:59]      Phos  3.6     [03-21-23 @ 02:59]    TPro  5.0  /  Alb  2.2  /  TBili  1.2  /  DBili  x   /  AST  140  /  ALT  97  /  AlkPhos  679  [03-21-23 @ 02:59]    PT/INR: PT 16.7 , INR 1.43       [03-21-23 @ 02:50]  PTT: 31.4       [03-21-23 @ 02:50]    CK 21      [03-21-23 @ 02:59]      Culture - Blood (collected 03-18-23 @ 19:05)  Source: .Blood Blood-Venous  Preliminary Report (03-19-23 @ 22:02):    No growth to date.    Culture - Blood (collected 03-18-23 @ 15:51)  Source: .Blood Blood-Venous  Preliminary Report (03-19-23 @ 19:02):    No growth to date.                         Wound SURGERY CONSULT NOTE    HPI:Mr. Cal Lloyd is a 58 Y/O M with HTN, HLD, T2DM, Bipolar Disorder, ESRD on HD (TTS), Afib on Eliquis who returned to [facility] from [facility] after cardiac arrest and CPR. He was discharged on 3/17 after long hospital course, requiring MICU care for septic shock. Per ED Physician Note:   "Arrives intubated by EMS with tube at 21cm at lips, patient with ROSC at arrival with BP 120s systolic, HR 90s-100s.  Per EMS report, at 08:00 this morning, patient was eating breakfast in usual state of health at [facility], after recent discharge from hospital involving MICU course for pneumonia.  Then at 08:20 patient found pulseless and unresponsive, CPR initiated by [facility] staff then taken over by EMS, CPR for <20 minutes with 2 doses epinephrine given, then sustained ROSC obtained.""Patient then had additional cardiac arrest in ED from 09:06 - 09:15 with epi given at 09:07 and 09:13.  Cardioversion given at 09:15 for possible VTach although rhythm in retrospect may have more likely been significant ST elevations with RBBB and LPFB mimicking VTach, given no change in rhythm.  Initial EKG during rosc shows STEMI.  Cardiology interventionalist Sandra valdez called, recommended CCU consult" Of note, patient was recently discharged (3/9-3/17) from [facility] after MICU admission for Acute Hypoxic Respiratory Failure due to Aspiration PNA and COVID, was discharged after being weaned back to room air, in stable condition. He had a recent admission (2-7 - 2/14) at [facility] for septic shock and AHRF likely 2/2 aspiration pneumonia and Covid, hospital course c/b cardiac arrest iso compete lung atelectasis and mucus plugging, discharged to [facility] on room air. Also with recent admission on 1/14 to [place] for septic shock.During previous admission, on 3/16, Palliative Care carried out a GOC discussion, where patient expressed desire to remain full code. (18 Mar 2023 11:25)    Wound consult requested to assist w/ management of Sacrum pressure injury. Patient unable to provide HPI secondary to mental status.     Current Diet: Diet, NPO with Tube Feed:   Tube Feeding Modality: Nasogastric  Nepro with Carb Steady (NEPRORTH)  Total Volume for 24 Hours (mL): 240  Continuous  Starting Tube Feed Rate mL per Hour: 10  Increase Tube Feed Rate by (mL): 0  Until Goal Tube Feed Rate (mL per Hour): 10  Tube Feed Duration (in Hours): 24  Tube Feed Start Time: 06:00 (03-21-23 @ 07:36)      PAST MEDICAL & SURGICAL HISTORY:  Diabetes mellitus  Patient does not routinely check FS--diet controlled  Depression  High cholesterol  Bipolar affective disorder in remission  Hypertension  Arterial insufficiency  Anemia  Hemodialysis patient  ESRD on dialysis  Cardiac arrest  ORIF right lower leg- 1995  Amputated great toe of right foot  S/P arteriovenous (AV) fistula creation  7/2019    REVIEW OF SYSTEMS: Pt unable to offer  see HPI and PMH      MEDICATIONS  (STANDING):  aspirin  chewable 81 milliGRAM(s) Enteral Tube daily  chlorhexidine 0.12% Liquid 15 milliLiter(s) Oral Mucosa two times a day  chlorhexidine 2% Cloths 1 Application(s) Topical <User Schedule>  darbepoetin Injectable ViaL 80 MICROGram(s) IV Push <User Schedule>  dextrose 50% Injectable 25 Gram(s) IV Push once  dextrose 50% Injectable 12.5 Gram(s) IV Push once  dextrose 50% Injectable 25 Gram(s) IV Push once  dextrose Oral Gel 15 Gram(s) Oral once  glucagon  Injectable 1 milliGRAM(s) IntraMuscular once  insulin lispro (ADMELOG) corrective regimen sliding scale   SubCutaneous every 6 hours  pantoprazole  Injectable 40 milliGRAM(s) IV Push daily  petrolatum Ophthalmic Ointment 1 Application(s) Both EYES every 4 hours  piperacillin/tazobactam IVPB.. 3.375 Gram(s) IV Intermittent every 12 hours    MEDICATIONS  (PRN):  acetaminophen     Tablet .. 650 milliGRAM(s) Oral every 6 hours PRN Temp greater or equal to 38C (100.4F)  hydrALAZINE Injectable 10 milliGRAM(s) IV Push every 6 hours PRN SBP > 170    Allergies    No Known Allergies    Intolerances    SOCIAL HISTORY: From [facility]     FAMILY HISTORY:  FH: type 2 diabetes mellitus (Mother)    FHx: heart disease (Father)    PHYSICAL EXAM:  Vital Signs Last 24 Hrs  T(C): 37.2 (21 Mar 2023 08:00), Max: 38.6 (20 Mar 2023 21:00)  T(F): 98.9 (21 Mar 2023 08:00), Max: 101.5 (20 Mar 2023 21:00)  HR: 99 (21 Mar 2023 12:30) (68 - 130)  BP: --  BP(mean): --  RR: 20 (21 Mar 2023 08:30) (19 - 28)  SpO2: 100% (21 Mar 2023 12:30) (97% - 100%)    Parameters below as of 21 Mar 2023 06:00  Patient On (Oxygen Delivery Method): ventilator    O2 Concentration (%): 40    Wt: 60.2 kg (18-Mar-2023)  BMI: 19.6 kg/m2    Constitutional: NAD, intubated. obtunded. Flaccidity of all extremities, does not respond to stimuli.   (+) low airloss support surface, (+) fluidized positioning devices, (+) complete cair boots  HEENT:  NC/AT. Dry oral mucosa.   Cardiovascular: rate regular   Respiratory: Intubated, FIO2 40%   Gastrointestinal: soft NT/ND, incontinent of formed stool as per nurse. No stool during skin assessment.   : HD   Neurology: Unable to follow commands.   Vascular: BLE equally warm, no pulses, No overt ischemia, right toe first ray amputation. Capillary < 3 seconds. No edema.   Right 5th metatarsal head with callus and purple maroon base- prev 1cmx0.5cmx0, no palpable skin changes, no drainage. No associated cellulitis,  Skin:   Generalized dryness   Bilateral upper extremities ecchymosis without hematoma.  Sacrum to sacrum (Patient with mynor prominence)- evolving deep tissue pressure injury- [id number], prev [id number], 70% purple-maroon discoloration around wound edges, and 30% red dermis. No palpable skin changes. Periwound skin intact. No induration, no fluctuance, no crepitus.   Psych: unable to assess     LABS/ CULTURES/ RADIOLOGY:                        8.0    22.38 )-----------( 313      ( 21 Mar 2023 02:50 )             25.2       133  |  95  |  34  ----------------------------<  92      [03-21-23 @ 02:59]  4.4   |  24  |  2.32        Ca     8.5     [03-21-23 @ 02:59]      Mg     1.90     [03-21-23 @ 02:59]      Phos  3.6     [03-21-23 @ 02:59]    TPro  5.0  /  Alb  2.2  /  TBili  1.2  /  DBili  x   /  AST  140  /  ALT  97  /  AlkPhos  679  [03-21-23 @ 02:59]    PT/INR: PT 16.7 , INR 1.43       [03-21-23 @ 02:50]  PTT: 31.4       [03-21-23 @ 02:50]    CK 21      [03-21-23 @ 02:59]      Culture - Blood (collected 03-18-23 @ 19:05)  Source: .Blood Blood-Venous  Preliminary Report (03-19-23 @ 22:02):    No growth to date.    Culture - Blood (collected 03-18-23 @ 15:51)  Source: .Blood Blood-Venous  Preliminary Report (03-19-23 @ 19:02):    No growth to date.

## 2023-03-21 NOTE — PROGRESS NOTE ADULT - ASSESSMENT
Mr. Cal Lloyd is a 56 Y/O M w/PMH HTN, HLD, T2DM, Bipolar Disorder, ESRD on HD (TTS), Afib on Eliquis who presented from [facility] for cardiac arrest requiring CPR and was admitted to CCU for management of possible anterior STEMI and cardiac arrest with anoxic brain injury.    NEURO  # Anoxic Brain Injury  As seen on CT 3/18. Found with cardiac arrest on floor of nursing home  - hypothermia started 3/18 1 PM, plan to rewarm 3/19 1 PM.  - repeat neuro examination off sedation after completion of cooling protocol    CARDIAC  # Cardiac Arrest  Unclear cause, discharged after recent admission of respiratory failure requiring intubation. S/p cardiac arrest at nursing home. This is second cardiac arrest in as many months. ROSC achieved. Previous cardiac arrest in 02/23 in s/o complete lung atelectasis and mucus plugging. Initial EKG indicating possible STEMI; latest EKG back to baseline, minimal troponin elevations without significant delta.  - SpO2 goal > 94%; avoid hyperventilation  - Off NTG, MAP~ 80  -  therapeutic hypothermia protocol  - follow-up TTE      # HFpEF  EF > 70% 3/10, moderate diastolic dysfunction, severe concentric left ventricular hypertrophy  - 3/18 TTE RF 59%, moderate diastolic dysfunction, severely dilated LA, moderate pericardial effusion, b/l pleural effusions    #AFib  - Heparin gtt for AFib    RESPIRATORY  # intubated and sedated currently   - in s/o anoxic brain injury, will need further evaluation before any SBT/SATs  - Current vent settings: 20| 380| 5| 40%    GI  - no active issues   - keep NPO for now      # ESRD on HD (TTS)  - Renal for HD  - monitor electrolytes during cooling/rewarming  - condom catheter  - monitor I &Os  - plan for HD 3/20    ID  #Elevated WBC Count  -3/19 WBC 29K, Lactate 2.3  -c/w Zosyn 2.25g q8h for empiric therapy  - recurrent infections complicated by decompensation requiring ICU care  - adjust antimicrobial coverage pending data  - coverage for nosocomial pathogens      ENDO  # T2DM  No outpatient meds on records  - iSS     HEME  - no active issues currently    DVT PPX  - Heparin gtt for AFib    GOC: Palliative care involved during recent admission given concern for recurrent decompensation. Per chart notes, there were concerns about the family's insight into the severity of the patient's illness. The patient remained full code at time of discharge.  - in view of cardiac arrest and anoxic brain injury will need Palliative care involvement and ongoing GOC discussions pending re-evaluation after cooling protocol.  - prognosis guarded

## 2023-03-21 NOTE — PROGRESS NOTE ADULT - SUBJECTIVE AND OBJECTIVE BOX
[facility] Division of Kidney Diseases & Hypertension  FOLLOW UP NOTE  --------------------------------------------------------------------------------  Chief Complaint: cardiac arrest    24 hour events/subjective: Patient seen and evaluated at bedside.  s/p cardiac arrest.  d/w nursing staff no acute overnight events.  patient not waking up.      PAST HISTORY  --------------------------------------------------------------------------------  No significant changes to PMH, PSH, FHx, SHx, unless otherwise noted    ALLERGIES & MEDICATIONS  --------------------------------------------------------------------------------  Allergies    No Known Allergies    Intolerances      Standing Inpatient Medications  aspirin  chewable 81 milliGRAM(s) Enteral Tube daily  chlorhexidine 0.12% Liquid 15 milliLiter(s) Oral Mucosa two times a day  chlorhexidine 2% Cloths 1 Application(s) Topical <User Schedule>  darbepoetin Injectable ViaL 80 MICROGram(s) IV Push <User Schedule>  dextrose 50% Injectable 25 Gram(s) IV Push once  dextrose 50% Injectable 12.5 Gram(s) IV Push once  dextrose 50% Injectable 25 Gram(s) IV Push once  dextrose Oral Gel 15 Gram(s) Oral once  glucagon  Injectable 1 milliGRAM(s) IntraMuscular once  insulin lispro (ADMELOG) corrective regimen sliding scale   SubCutaneous every 6 hours  pantoprazole  Injectable 40 milliGRAM(s) IV Push daily  petrolatum Ophthalmic Ointment 1 Application(s) Both EYES every 4 hours  piperacillin/tazobactam IVPB.. 3.375 Gram(s) IV Intermittent every 12 hours    PRN Inpatient Medications  acetaminophen     Tablet .. 650 milliGRAM(s) Oral every 6 hours PRN  hydrALAZINE Injectable 10 milliGRAM(s) IV Push every 6 hours PRN      REVIEW OF SYSTEMS  --------------------------------------------------------------------------------  unable to obtain    VITALS/PHYSICAL EXAM  --------------------------------------------------------------------------------  T(C): 37.2 (03-21-23 @ 08:00), Max: 38.6 (03-20-23 @ 21:00)  HR: 112 (03-21-23 @ 08:46) (68 - 130)  BP: --  ABP: 137/58 (03-21-23 @ 08:30) (122/46 - 176/59)  ABP(mean): 84 (03-21-23 @ 08:30) (69 - 105)  RR: 20 (03-21-23 @ 08:30) (19 - 30)  SpO2: 98% (03-21-23 @ 08:46) (97% - 100%)  CVP(mm Hg): 2 (03-20-23 @ 13:00) (0 - 2)        03-20-23 @ 07:01  -  03-21-23 @ 07:00  --------------------------------------------------------  IN: 628.5 mL / OUT: 1400 mL / NET: -771.5 mL      Physical Exam:  	Gen:  NAD intubated  	Pulm: CTA B/L anteriorly on vent  	CV: S1S2   	Abd: Soft  	Ext: No LE edema B/L, R toe amputation seen  	Neuro: non responsive  	Skin: Warm and dry  	Vascular access: LUE AVF with palpable pulse and bruit heard    LABS/STUDIES  --------------------------------------------------------------------------------              8.0    22.38 >-----------<  313      [03-21-23 @ 02:50]              25.2     133  |  95  |  34  ----------------------------<  92      [03-21-23 @ 02:59]  4.4   |  24  |  2.32        Ca     8.5     [03-21-23 @ 02:59]      Mg     1.90     [03-21-23 @ 02:59]      Phos  3.6     [03-21-23 @ 02:59]    TPro  5.0  /  Alb  2.2  /  TBili  1.2  /  DBili  x   /  AST  140  /  ALT  97  /  AlkPhos  679  [03-21-23 @ 02:59]    PT/INR: PT 16.7 , INR 1.43       [03-21-23 @ 02:50]  PTT: 31.4       [03-21-23 @ 02:50]    CK 21      [03-21-23 @ 02:59]    Creatinine Trend:  SCr 2.32 [03-21 @ 02:59]  SCr 2.92 [03-20 @ 05:49]  SCr 2.88 [03-20 @ 04:12]  SCr 2.79 [03-19 @ 22:16]  SCr 2.67 [03-19 @ 16:36]

## 2023-03-21 NOTE — CONSULT NOTE ADULT - PROBLEM SELECTOR RECOMMENDATION 2
- per prior GOC note from (3/16) - was hesitant about mechanical ventilation but wanted CPR. Wanted to be full code - per prior GOC note from (3/16) - was hesitant about mechanical ventilation but wanted CPR. Ultimately decided to be full code

## 2023-03-21 NOTE — PROGRESS NOTE ADULT - SUBJECTIVE AND OBJECTIVE BOX
Kris, PGY-1  Internal Medicine     PATIENT:  NANCY SEPULVEDA  [id number]    CHIEF COMPLAINT:  Patient is a 57y old  Male who presents with a chief complaint of cardiac arrest (20 Mar 2023 10:56)      INTERVAL HISTORY/OVERNIGHT EVENTS:      REVIEW OF SYSTEMS:    Constitutional:     [ ] negative [ ] fevers [ ] chills [ ] weight loss [ ] weight gain  HEENT:                  [ ] negative [ ] dry eyes [ ] eye irritation [ ] postnasal drip [ ] nasal congestion  CV:                         [ ] negative  [ ] chest pain [ ] orthopnea [ ] palpitations [ ] murmur  Resp:                     [ ] negative [ ] cough [ ] shortness of breath [ ] dyspnea [ ] wheezing [ ] sputum [ ] hemoptysis  GI:                          [ ] negative [ ] nausea [ ] vomiting [ ] diarrhea [ ] constipation [ ] abd pain [ ] dysphagia   :                        [ ] negative [ ] dysuria [ ] nocturia [ ] hematuria [ ] increased urinary frequency  Musculoskeletal: [ ] negative [ ] back pain [ ] myalgias [ ] arthralgias [ ] fracture  Skin:                       [ ] negative [ ] rash [ ] itch  Neurological:        [ ] negative [ ] headache [ ] dizziness [ ] syncope [ ] weakness [ ] numbness  Psychiatric:           [ ] negative [ ] anxiety [ ] depression  Endocrine:            [ ] negative [ ] diabetes [ ] thyroid problem  Heme/Lymph:      [ ] negative [ ] anemia [ ] bleeding problem  Allergic/Immune: [ ] negative [ ] itchy eyes [ ] nasal discharge [ ] hives [ ] angioedema    [ ] All other systems negative  [ ] Unable to assess ROS because ________.    MEDICATIONS:  MEDICATIONS  (STANDING):  aspirin  chewable 81 milliGRAM(s) Enteral Tube daily  chlorhexidine 0.12% Liquid 15 milliLiter(s) Oral Mucosa two times a day  chlorhexidine 2% Cloths 1 Application(s) Topical <User Schedule>  darbepoetin Injectable ViaL 80 MICROGram(s) IV Push <User Schedule>  dextrose 50% Injectable 25 Gram(s) IV Push once  dextrose 50% Injectable 12.5 Gram(s) IV Push once  dextrose 50% Injectable 25 Gram(s) IV Push once  dextrose Oral Gel 15 Gram(s) Oral once  glucagon  Injectable 1 milliGRAM(s) IntraMuscular once  insulin lispro (ADMELOG) corrective regimen sliding scale   SubCutaneous every 6 hours  pantoprazole  Injectable 40 milliGRAM(s) IV Push daily  petrolatum Ophthalmic Ointment 1 Application(s) Both EYES every 4 hours  piperacillin/tazobactam IVPB.. 3.375 Gram(s) IV Intermittent every 12 hours    MEDICATIONS  (PRN):  acetaminophen     Tablet .. 650 milliGRAM(s) Oral every 6 hours PRN Temp greater or equal to 38C (100.4F)  hydrALAZINE Injectable 10 milliGRAM(s) IV Push every 6 hours PRN SBP > 170      ALLERGIES:  Allergies    No Known Allergies    Intolerances        OBJECTIVE:  ICU Vital Signs Last 24 Hrs  T(C): 37.2 (21 Mar 2023 04:00), Max: 38.6 (20 Mar 2023 21:00)  T(F): 98.9 (21 Mar 2023 04:00), Max: 101.5 (20 Mar 2023 21:00)  HR: 73 (21 Mar 2023 06:45) (68 - 130)  BP: --  BP(mean): --  ABP: 176/59 (21 Mar 2023 06:45) (122/46 - 176/59)  ABP(mean): 100 (21 Mar 2023 06:45) (69 - 105)  RR: 20 (21 Mar 2023 06:45) (19 - 30)  SpO2: 99% (21 Mar 2023 06:45) (97% - 100%)    O2 Parameters below as of 21 Mar 2023 06:00  Patient On (Oxygen Delivery Method): ventilator    O2 Concentration (%): 40      Mode: AC/ CMV (Assist Control/ Continuous Mandatory Ventilation)  RR (machine): 20  TV (machine): 380  FiO2: 40  PEEP: 5  ITime: 0.93  MAP: 12  PIP: 29    Adult Advanced Hemodynamics Last 24 Hrs  CVP(mm Hg): 2 (20 Mar 2023 13:00) (0 - 6)  CVP(cm H2O): --  CO: --  CI: --  PA: --  PA(mean): --  PCWP: --  SVR: --  SVRI: --  PVR: --  PVRI: --  CAPILLARY BLOOD GLUCOSE      POCT Blood Glucose.: 81 mg/dL (21 Mar 2023 06:06)  POCT Blood Glucose.: 86 mg/dL (20 Mar 2023 23:33)  POCT Blood Glucose.: 95 mg/dL (20 Mar 2023 17:40)  POCT Blood Glucose.: 91 mg/dL (20 Mar 2023 11:15)    CAPILLARY BLOOD GLUCOSE      POCT Blood Glucose.: 81 mg/dL (21 Mar 2023 06:06)    I&O's Summary    20 Mar 2023 07:01  -  21 Mar 2023 07:00  --------------------------------------------------------  IN: 628.5 mL / OUT: 1400 mL / NET: -771.5 mL      Daily     Daily Weight in k.2 (21 Mar 2023 04:00)    PHYSICAL EXAMINATION:  General: WN/WD NAD  HEENT: PERRLA, EOMI, moist mucous membranes  Neurology: A&Ox3, nonfocal, MCKEON x 4  Respiratory: CTA B/L, normal respiratory effort, no wheezes, crackles, rales  CV: RRR, S1S2, no murmurs, rubs or gallops  Abdominal: Soft, NT, ND +BS, Last BM  Extremities: No edema, + peripheral pulses  Incisions:   Tubes:    LABS:  ABG - ( 21 Mar 2023 02:50 )  pH, Arterial: 7.48  pH, Blood: x     /  pCO2: 37    /  pO2: 91    / HCO3: 28    / Base Excess: 3.9   /  SaO2: 97.9                                    8.0    22.38 )-----------( 313      ( 21 Mar 2023 02:50 )             25.2     03-21    133<L>  |  95<L>  |  34<H>  ----------------------------<  92  4.4   |  24  |  2.32<H>    Ca    8.5      21 Mar 2023 02:59  Phos  3.6     03-21  Mg     1.90     03-21    TPro  5.0<L>  /  Alb  2.2<L>  /  TBili  1.2  /  DBili  x   /  AST  140<H>  /  ALT  97<H>  /  AlkPhos  679<H>  03-21    LIVER FUNCTIONS - ( 21 Mar 2023 02:59 )  Alb: 2.2 g/dL / Pro: 5.0 g/dL / ALK PHOS: 679 U/L / ALT: 97 U/L / AST: 140 U/L / GGT: x           PT/INR - ( 21 Mar 2023 02:50 )   PT: 16.7 sec;   INR: 1.43 ratio         PTT - ( 21 Mar 2023 02:50 )  PTT:31.4 sec  Creatine Kinase, Serum: 21 U/L ( @ 02:59)    CARDIAC MARKERS ( 21 Mar 2023 02:59 )  x     / x     / 21 U/L / x     / x      CARDIAC MARKERS ( 20 Mar 2023 05:49 )  x     / x     / 33 U/L / x     / x      CARDIAC MARKERS ( 20 Mar 2023 04:12 )  x     / x     / 53 U/L / x     / x      CARDIAC MARKERS ( 19 Mar 2023 22:16 )  x     / x     / 39 U/L / x     / x              TELEMETRY:     EKG:     IMAGING:

## 2023-03-22 NOTE — PROGRESS NOTE ADULT - ASSESSMENT
Patient is a 56 Y/O M with HTN/T2DM/Bipolar Disorder, ESRD on HD (TTS), Afib on Eliquis who returned to [facility] from [facility] after cardiac arrest and CPR. He was discharged on 3/17 after long hospital course, requiring MICU care for septic shock. Per EMS report, at 08:00 in the morning, patient was eating breakfast in usual state of health at [facility], after recent discharge from hospital involving MICU course for pneumonia.  Then at 08:20 patient found pulseless and unresponsive, CPR initiated by [place] staff then taken over by EMS, CPR for <20 minutes with 2 doses epinephrine given, then sustained ROSC obtained. Patient then had additional cardiac arrest in ED from 09:06 - 09:15 with epi given at 09:07 and 09:13.  Cardioversion given at 09:15 for possible VTach although rhythm in retrospect may have more likely been significant ST elevations with RBBB and LPFB mimicking VTach, given no change in rhythm.  Initial EKG during rosc shows STEMI.  Cardiology interventionalist Sandra valdez called, recommended CCU consult. During previous admission, on 3/16, Palliative Care carried out a GOC discussion, where patient expressed desire to remain full code. Palliative consulted for complex medical decision making in the setting of serious illness.

## 2023-03-22 NOTE — PROGRESS NOTE ADULT - PROBLEM SELECTOR PLAN 1
- 2/2 two cardiac arrests  - unresponsive off sedation  - assessed for brain death - patient has no brainstem reflexes, but has spontaneous breaths   - poor prognosis

## 2023-03-22 NOTE — PROGRESS NOTE ADULT - SUBJECTIVE AND OBJECTIVE BOX
Goal Outcome Evaluation:  Plan of Care Reviewed With: patient, other (see comments) (PT and RN)    Pt is a 75 y/o F admitted for fall sustaining closed displaced right distal radius and ulna. Pt with R wrist closed reduction and placed in splint and to wear at all times. OT order for R finger flex/ext in order to increase ROM and decreased stiffness in R hand. Pt noted to have swelling and bruising in R hand. Prior to admission, pt lives alone, does not use AD, 2 falls, independent with ADLs and func mob, drives, grocery shops. This date, pt is a&Ox4, agitated with being in the hospital and falling. Pt req min a to stand with HHA and support for R UE in cast, pt req min ax2 with HHA x2 for func mob and one LOB turning to sit in chair. Pt req mod A for donning brief and LB dressing. Demo deficits with strength, balance, endurance, safety, and impaired R hand ROM. OT to address deficits. REC IP rehab as pt not safe for home and does not have family/friends to assist or stay with her. Pt does not want rehab.   If pt declines rehab, will need 24/7 hour care, HH OT    SILVIA Boykin, OTR     Kris, PGY-1  Internal Medicine     PATIENT:  NANCY SEPULVEDA  [id number]    CHIEF COMPLAINT:  Patient is a 57y old  Male who presents with a chief complaint of cardiac arrest (21 Mar 2023 13:22)      INTERVAL HISTORY/OVERNIGHT EVENTS:      REVIEW OF SYSTEMS:    Constitutional:     [ ] negative [ ] fevers [ ] chills [ ] weight loss [ ] weight gain  HEENT:                  [ ] negative [ ] dry eyes [ ] eye irritation [ ] postnasal drip [ ] nasal congestion  CV:                         [ ] negative  [ ] chest pain [ ] orthopnea [ ] palpitations [ ] murmur  Resp:                     [ ] negative [ ] cough [ ] shortness of breath [ ] dyspnea [ ] wheezing [ ] sputum [ ] hemoptysis  GI:                          [ ] negative [ ] nausea [ ] vomiting [ ] diarrhea [ ] constipation [ ] abd pain [ ] dysphagia   :                        [ ] negative [ ] dysuria [ ] nocturia [ ] hematuria [ ] increased urinary frequency  Musculoskeletal: [ ] negative [ ] back pain [ ] myalgias [ ] arthralgias [ ] fracture  Skin:                       [ ] negative [ ] rash [ ] itch  Neurological:        [ ] negative [ ] headache [ ] dizziness [ ] syncope [ ] weakness [ ] numbness  Psychiatric:           [ ] negative [ ] anxiety [ ] depression  Endocrine:            [ ] negative [ ] diabetes [ ] thyroid problem  Heme/Lymph:      [ ] negative [ ] anemia [ ] bleeding problem  Allergic/Immune: [ ] negative [ ] itchy eyes [ ] nasal discharge [ ] hives [ ] angioedema    [ ] All other systems negative  [ ] Unable to assess ROS because ________.    MEDICATIONS:  MEDICATIONS  (STANDING):  artificial tears (preservative free) Ophthalmic Solution 1 Drop(s) Both EYES every 4 hours  aspirin  chewable 81 milliGRAM(s) Enteral Tube daily  chlorhexidine 0.12% Liquid 15 milliLiter(s) Oral Mucosa two times a day  chlorhexidine 2% Cloths 1 Application(s) Topical <User Schedule>  darbepoetin Injectable ViaL 80 MICROGram(s) IV Push <User Schedule>  dextrose 50% Injectable 25 Gram(s) IV Push once  dextrose 50% Injectable 12.5 Gram(s) IV Push once  dextrose 50% Injectable 25 Gram(s) IV Push once  dextrose Oral Gel 15 Gram(s) Oral once  glucagon  Injectable 1 milliGRAM(s) IntraMuscular once  insulin lispro (ADMELOG) corrective regimen sliding scale   SubCutaneous every 6 hours  pantoprazole  Injectable 40 milliGRAM(s) IV Push daily  piperacillin/tazobactam IVPB.. 3.375 Gram(s) IV Intermittent every 12 hours    MEDICATIONS  (PRN):  acetaminophen   Oral Liquid .. 650 milliGRAM(s) Enteral Tube every 6 hours PRN Temp greater or equal to 38C (100.4F)  hydrALAZINE Injectable 10 milliGRAM(s) IV Push every 6 hours PRN SBP > 170      ALLERGIES:  Allergies    No Known Allergies    Intolerances        OBJECTIVE:  ICU Vital Signs Last 24 Hrs  T(C): 37.6 (22 Mar 2023 06:00), Max: 38.6 (21 Mar 2023 18:00)  T(F): 99.7 (22 Mar 2023 06:00), Max: 101.4 (21 Mar 2023 18:00)  HR: 84 (22 Mar 2023 07:00) (64 - 114)  BP: --  BP(mean): --  ABP: 151/56 (22 Mar 2023 07:00) (94/46 - 158/68)  ABP(mean): 91 (22 Mar 2023 07:00) (62 - 100)  RR: 16 (22 Mar 2023 07:00) (16 - 20)  SpO2: 100% (22 Mar 2023 07:00) (95% - 100%)    O2 Parameters below as of 22 Mar 2023 07:00  Patient On (Oxygen Delivery Method): ventilator,AC    O2 Concentration (%): 40      Mode: AC/ CMV (Assist Control/ Continuous Mandatory Ventilation)  RR (machine): 20  TV (machine): 380  FiO2: 40  PEEP: 5  ITime: 0.93  MAP: 10  PIP: 22    Adult Advanced Hemodynamics Last 24 Hrs  CVP(mm Hg): 5 (22 Mar 2023 07:00) (-1 - 213)  CVP(cm H2O): --  CO: --  CI: --  PA: --  PA(mean): --  PCWP: --  SVR: --  SVRI: --  PVR: --  PVRI: --  CAPILLARY BLOOD GLUCOSE      POCT Blood Glucose.: 90 mg/dL (22 Mar 2023 06:34)  POCT Blood Glucose.: 80 mg/dL (22 Mar 2023 00:13)  POCT Blood Glucose.: 82 mg/dL (21 Mar 2023 18:32)  POCT Blood Glucose.: 75 mg/dL (21 Mar 2023 12:47)    CAPILLARY BLOOD GLUCOSE      POCT Blood Glucose.: 90 mg/dL (22 Mar 2023 06:34)    I&O's Summary    21 Mar 2023 07:01  -  22 Mar 2023 07:00  --------------------------------------------------------  IN: 485 mL / OUT: 20 mL / NET: 465 mL      Daily     Daily Weight in k.5 (22 Mar 2023 00:00)    PHYSICAL EXAMINATION:  General: WN/WD NAD  HEENT: PERRLA, EOMI, moist mucous membranes  Neurology: A&Ox3, nonfocal, MCKEON x 4  Respiratory: CTA B/L, normal respiratory effort, no wheezes, crackles, rales  CV: RRR, S1S2, no murmurs, rubs or gallops  Abdominal: Soft, NT, ND +BS, Last BM  Extremities: No edema, + peripheral pulses  Incisions:   Tubes:    LABS:  ABG - ( 22 Mar 2023 06:37 )  pH, Arterial: 7.46  pH, Blood: x     /  pCO2: 38    /  pO2: 133   / HCO3: 27    / Base Excess: 3.0   /  SaO2: 98.1                                    7.8    20.37 )-----------( 242      ( 22 Mar 2023 05:32 )             24.3     03-22    134<L>  |  95<L>  |  46<H>  ----------------------------<  80  4.5   |  23  |  2.97<H>    Ca    8.2<L>      22 Mar 2023 05:32  Phos  3.8     03-22  Mg     2.40     03-22    TPro  5.1<L>  /  Alb  2.2<L>  /  TBili  1.3<H>  /  DBili  x   /  AST  108<H>  /  ALT  62<H>  /  AlkPhos  1346<H>  03-22    LIVER FUNCTIONS - ( 22 Mar 2023 05:32 )  Alb: 2.2 g/dL / Pro: 5.1 g/dL / ALK PHOS: 1346 U/L / ALT: 62 U/L / AST: 108 U/L / GGT: x           PT/INR - ( 22 Mar 2023 05:32 )   PT: 14.4 sec;   INR: 1.24 ratio         PTT - ( 22 Mar 2023 05:32 )  PTT:29.9 sec  Creatine Kinase, Serum: 15 U/L ( @ 05:32)    CARDIAC MARKERS ( 22 Mar 2023 05:32 )  x     / x     / 15 U/L / x     / x      CARDIAC MARKERS ( 21 Mar 2023 02:59 )  x     / x     / 21 U/L / x     / x              TELEMETRY:     EKG:     IMAGING:       Kris, PGY-1  Internal Medicine     PATIENT:  NANCY SEPULVEDA  [id number]    CHIEF COMPLAINT:  Patient is a 57y old  Male who presents with a chief complaint of cardiac arrest (21 Mar 2023 13:22)      INTERVAL HISTORY/OVERNIGHT EVENTS:  - brain death assessment conducted by Neurology; patient was able to breathe on his own  - no other acute events overnight    REVIEW OF SYSTEMS:  Unable to assess due to mental state    MEDICATIONS:  MEDICATIONS  (STANDING):  artificial tears (preservative free) Ophthalmic Solution 1 Drop(s) Both EYES every 4 hours  aspirin  chewable 81 milliGRAM(s) Enteral Tube daily  chlorhexidine 0.12% Liquid 15 milliLiter(s) Oral Mucosa two times a day  chlorhexidine 2% Cloths 1 Application(s) Topical <User Schedule>  darbepoetin Injectable ViaL 80 MICROGram(s) IV Push <User Schedule>  dextrose 50% Injectable 25 Gram(s) IV Push once  dextrose 50% Injectable 12.5 Gram(s) IV Push once  dextrose 50% Injectable 25 Gram(s) IV Push once  dextrose Oral Gel 15 Gram(s) Oral once  glucagon  Injectable 1 milliGRAM(s) IntraMuscular once  insulin lispro (ADMELOG) corrective regimen sliding scale   SubCutaneous every 6 hours  pantoprazole  Injectable 40 milliGRAM(s) IV Push daily  piperacillin/tazobactam IVPB.. 3.375 Gram(s) IV Intermittent every 12 hours    MEDICATIONS  (PRN):  acetaminophen   Oral Liquid .. 650 milliGRAM(s) Enteral Tube every 6 hours PRN Temp greater or equal to 38C (100.4F)  hydrALAZINE Injectable 10 milliGRAM(s) IV Push every 6 hours PRN SBP > 170      ALLERGIES:  Allergies    No Known Allergies    Intolerances        OBJECTIVE:  ICU Vital Signs Last 24 Hrs  T(C): 37.6 (22 Mar 2023 06:00), Max: 38.6 (21 Mar 2023 18:00)  T(F): 99.7 (22 Mar 2023 06:00), Max: 101.4 (21 Mar 2023 18:00)  HR: 84 (22 Mar 2023 07:00) (64 - 114)  BP: --  BP(mean): --  ABP: 151/56 (22 Mar 2023 07:00) (94/46 - 158/68)  ABP(mean): 91 (22 Mar 2023 07:00) (62 - 100)  RR: 16 (22 Mar 2023 07:00) (16 - 20)  SpO2: 100% (22 Mar 2023 07:00) (95% - 100%)    O2 Parameters below as of 22 Mar 2023 07:00  Patient On (Oxygen Delivery Method): ventilator,AC    O2 Concentration (%): 40      Mode: AC/ CMV (Assist Control/ Continuous Mandatory Ventilation)  RR (machine): 20  TV (machine): 380  FiO2: 40  PEEP: 5  ITime: 0.93  MAP: 10  PIP: 22    Adult Advanced Hemodynamics Last 24 Hrs  CVP(mm Hg): 5 (22 Mar 2023 07:00) (-1 - 213)  CVP(cm H2O): --  CO: --  CI: --  PA: --  PA(mean): --  PCWP: --  SVR: --  SVRI: --  PVR: --  PVRI: --  CAPILLARY BLOOD GLUCOSE      POCT Blood Glucose.: 90 mg/dL (22 Mar 2023 06:34)  POCT Blood Glucose.: 80 mg/dL (22 Mar 2023 00:13)  POCT Blood Glucose.: 82 mg/dL (21 Mar 2023 18:32)  POCT Blood Glucose.: 75 mg/dL (21 Mar 2023 12:47)    CAPILLARY BLOOD GLUCOSE      POCT Blood Glucose.: 90 mg/dL (22 Mar 2023 06:34)    I&O's Summary    21 Mar 2023 07:01  -  22 Mar 2023 07:00  --------------------------------------------------------  IN: 485 mL / OUT: 20 mL / NET: 465 mL      Daily     Daily Weight in k.5 (22 Mar 2023 00:00)    PHYSICAL EXAMINATION:  General: WN/WD NAD  HEENT: pupils fixed, mid-dilated  Neurology: A&Oxo, nonfocal, MCKEON x 4  Respiratory: CTA B/L, normal respiratory effort, no wheezes, crackles, rales  CV: RRR, S1S2, no murmurs, rubs or gallops  Abdominal: Soft, NT, ND +BS, Last BM  Extremities: No edema, + peripheral pulses  Tubes: indwelling cobos catheter    LABS:  ABG - ( 22 Mar 2023 06:37 )  pH, Arterial: 7.46  pH, Blood: x     /  pCO2: 38    /  pO2: 133   / HCO3: 27    / Base Excess: 3.0   /  SaO2: 98.1                                    7.8    20.37 )-----------( 242      ( 22 Mar 2023 05:32 )             24.3     03-    134<L>  |  95<L>  |  46<H>  ----------------------------<  80  4.5   |  23  |  2.97<H>    Ca    8.2<L>      22 Mar 2023 05:32  Phos  3.8       Mg     2.40         TPro  5.1<L>  /  Alb  2.2<L>  /  TBili  1.3<H>  /  DBili  x   /  AST  108<H>  /  ALT  62<H>  /  AlkPhos  1346<H>      LIVER FUNCTIONS - ( 22 Mar 2023 05:32 )  Alb: 2.2 g/dL / Pro: 5.1 g/dL / ALK PHOS: 1346 U/L / ALT: 62 U/L / AST: 108 U/L / GGT: x           PT/INR - ( 22 Mar 2023 05:32 )   PT: 14.4 sec;   INR: 1.24 ratio         PTT - ( 22 Mar 2023 05:32 )  PTT:29.9 sec  Creatine Kinase, Serum: 15 U/L ( @ 05:32)    CARDIAC MARKERS ( 22 Mar 2023 05:32 )  x     / x     / 15 U/L / x     / x      CARDIAC MARKERS ( 21 Mar 2023 02:59 )  x     / x     / 21 U/L / x     / x              TELEMETRY:     EKG:     IMAGING:

## 2023-03-22 NOTE — PROGRESS NOTE ADULT - NS ATTEST RISK PROBLEM GEN_ALL_CORE FT
Patient is seriously ill with two cardiac arrests, now with severe anoxic brain injury with no brainstem reflexes   Acute respiratory failure on mechanical ventilator, ESRD, CCU level of care  High risk of morbidity and mortality

## 2023-03-22 NOTE — CHART NOTE - NSCHARTNOTEFT_GEN_A_CORE
I, along with the Cardiology fellow Dr. Beckie Mcmillan, and Palliative Care attending Dr. Karen Holley, spoke to patient's mother Dawna Lloyd and brother Jake Lloyd about current state of patient's condition.    We discussed the findings of patient's CT Head, showing anoxic brain injury. We also discussed the Brain Death Evaluation that was completed by Neurology on 3/21, which found absent brainstem reflexes, and sleep apnea exam showed a few spontaneous respiration.     We explained the meaning of the findings, showing near-total brain death. We explained there is negligible chance of recovery. We explained that the ventilation is currently helping keep the patient alive, and without it, he would not breathe on his own. Explained that to stay alive, he will need the vent indefinitely.    Ms. Lloyd had questions regarding the state of discharge on 3/17 from [facility]. She felt that he was discharged in an unsafe manner. She also was concerned about why his diagnosis and prognosis worsened from a few days ago to the current diagnosis. We answered all the questions to the best of our ability.    Ms. Lloyd expressed that she is not certain about his current diagnosis. She also exclaims disappointment that there is no therapy that can be offered to help the brain recover.    Overall, all questions and concerns were addressed. Patient's brother Jake Lloyd says they will think about this conversation and patient's condition over the next several days and will decide on next steps later.    As of now, patient will continue to receive all appropriate and necessary care.

## 2023-03-22 NOTE — PROGRESS NOTE ADULT - PROBLEM SELECTOR PLAN 2
Pt. with anemia in the setting of ESRD. Hgb below target range will monitor hgb for now. is on aranesp.

## 2023-03-22 NOTE — PROGRESS NOTE ADULT - PROBLEM SELECTOR PLAN 2
- per prior GOC note from (3/16) - patient was hesitant about mechanical ventilation but wanted CPR. Ultimately decided to be full code  - See GOC note. I spent 45 minutes addressing advanced care planning with patient and/or decision maker(s)   - Patient's mother having great difficulty accepting poor prognosis

## 2023-03-22 NOTE — PROGRESS NOTE ADULT - PROBLEM SELECTOR PLAN 1
Pt. with ESRD on HD TIW who presented for cardiac arrest with evidence of anoxic brain injury.  Seen and evaluated yesterday and again today.  Plan for maintenance HD today and again on friday.  Ongoing goals of care.

## 2023-03-22 NOTE — PROGRESS NOTE ADULT - SUBJECTIVE AND OBJECTIVE BOX
[facility] Division of Kidney Diseases & Hypertension  FOLLOW UP NOTE  --------------------------------------------------------------------------------  Chief Complaint: cardiac arrest     24 hour events/subjective: Patient was seen and evaluated at bedside this morning in CCU prior to planned HD procedure later today.  Patient is hemodynamically stable off pressors but has not woken.    PAST HISTORY  --------------------------------------------------------------------------------  No significant changes to PMH, PSH, FHx, SHx, unless otherwise noted    ALLERGIES & MEDICATIONS  --------------------------------------------------------------------------------  Allergies    No Known Allergies    Intolerances      Standing Inpatient Medications  artificial tears (preservative free) Ophthalmic Solution 1 Drop(s) Both EYES every 4 hours  aspirin  chewable 81 milliGRAM(s) Enteral Tube daily  chlorhexidine 0.12% Liquid 15 milliLiter(s) Oral Mucosa two times a day  chlorhexidine 2% Cloths 1 Application(s) Topical <User Schedule>  darbepoetin Injectable ViaL 80 MICROGram(s) IV Push <User Schedule>  dextrose 50% Injectable 25 Gram(s) IV Push once  dextrose 50% Injectable 12.5 Gram(s) IV Push once  dextrose 50% Injectable 25 Gram(s) IV Push once  dextrose Oral Gel 15 Gram(s) Oral once  glucagon  Injectable 1 milliGRAM(s) IntraMuscular once  insulin lispro (ADMELOG) corrective regimen sliding scale   SubCutaneous every 6 hours  pantoprazole  Injectable 40 milliGRAM(s) IV Push daily  piperacillin/tazobactam IVPB.. 3.375 Gram(s) IV Intermittent every 12 hours    PRN Inpatient Medications  acetaminophen   Oral Liquid .. 650 milliGRAM(s) Enteral Tube every 6 hours PRN  hydrALAZINE Injectable 10 milliGRAM(s) IV Push every 6 hours PRN      REVIEW OF SYSTEMS  --------------------------------------------------------------------------------  unable to obtain    VITALS/PHYSICAL EXAM  --------------------------------------------------------------------------------  T(C): 37.6 (03-22-23 @ 07:30), Max: 38.6 (03-21-23 @ 18:00)  HR: 84 (03-22-23 @ 07:30) (64 - 107)  BP: --  ABP: 153/57 (03-22-23 @ 07:30) (94/46 - 158/68)  ABP(mean): 92 (03-22-23 @ 07:30) (62 - 100)  RR: 16 (03-22-23 @ 07:30) (16 - 20)  SpO2: 100% (03-22-23 @ 07:30) (95% - 100%)  CVP(mm Hg): 5 (03-22-23 @ 07:30) (-1 - 213)        03-21-23 @ 07:01  -  03-22-23 @ 07:00  --------------------------------------------------------  IN: 485 mL / OUT: 20 mL / NET: 465 mL    Physical Exam:  	Gen:  NAD intubated  	Pulm: CTA B/L anteriorly on vent  	CV: S1S2   	Abd: Soft  	Ext: No LE edema   	Neuro: non responsive  	Skin: Warm and dry  	Vascular access: LUE AVF with palpable pulse and bruit heard    LABS/STUDIES  --------------------------------------------------------------------------------              7.8    20.37 >-----------<  242      [03-22-23 @ 05:32]              24.3     134  |  95  |  46  ----------------------------<  80      [03-22-23 @ 05:32]  4.5   |  23  |  2.97        Ca     8.2     [03-22-23 @ 05:32]      Mg     2.40     [03-22-23 @ 05:32]      Phos  3.8     [03-22-23 @ 05:32]    TPro  5.1  /  Alb  2.2  /  TBili  1.3  /  DBili  x   /  AST  108  /  ALT  62  /  AlkPhos  1346  [03-22-23 @ 05:32]    PT/INR: PT 14.4 , INR 1.24       [03-22-23 @ 05:32]  PTT: 29.9       [03-22-23 @ 05:32]    CK 15      [03-22-23 @ 05:32]    Creatinine Trend:  SCr 2.97 [03-22 @ 05:32]  SCr 2.32 [03-21 @ 02:59]  SCr 2.92 [03-20 @ 05:49]  SCr 2.88 [03-20 @ 04:12]  SCr 2.79 [03-19 @ 22:16]

## 2023-03-22 NOTE — CHART NOTE - NSCHARTNOTEFT_GEN_A_CORE
Pt seen for malnutrition follow up.    Medical Course:  - Per chart, pt is 57 year old male PMH HTN, HLD, type 2 DM, bipolar Disorder, ESRD on HD, Afib presenting from [facility] admitted to CCU for management of possible anterior STEMI, cardiac arrest with anoxic brain injury. Nephrology following. Neurology and Palliative care on board.    Nutrition Course:  - Pt was previously NPO x2 days, currently ordered for trickle feeds. Intubated, off pressors. Last BM 3/21 per flowsheets.    Diet Prescription:   - NPO with Tube Feed: Nepro with Carb Steady via NGT at a goal rate of 10 mL/hr x24 hrs     Pertinent Medications:   - ADMELOG corrective regimen sliding scale, pantoprazole IV, piperacillin/tazobactam IV    Pertinent Labs:   - (3/22) Na 134 mmol/L<L> Glu 80 mg/dL K+ 4.5 mmol/L Cr 2.97 mg/dL<H> BUN 46 mg/dL<H> Phos 3.8 mg/dL Alb 2.2 g/dL<L>  - POCT: (3/22) 80-90, (3/21) 75-82    Weight: (3/22) 131.1 lbs / 59.5 kg, (3/20 post-HD) 132 lbs / 59.9 kg, (3/18 dosing/admission) 132.7 lbs / 60.2 kg  Height: 69 in / 175.26 cm  IBW: 160 lbs / 72.7 kg +/-10%  BMI: 19.4 kg/m^2 (at most recent weight)    Physical Assessment:  Edema, per flowsheets: 2+ dependent/bilateral hand/bilateral foot  Pressure Injury, per Wound Care consult (3/21) sacrum DTPI    Estimated Needs:   [X] Recalculated, based on current weight 131.1 lbs / 59.5 kg  1757-6762 kcal daily @20-25 kcal/kg, 89. gm protein daily @1.5-2.0 gm/kg     Previous Nutrition Diagnosis: [X] Severe malnutrition in the context of acute on chronic illness  New Nutrition Diagnosis: [X] not applicable     Recommendations:  1) Nutrition care plan to align with GOC, RDN remains available to provide enteral recommendations as appropriate.   2) Obtain pre/post-HD weights.     Monitor & Evaluate:  Tolerance to EN, nutrition related lab values, weight trends, BMs/GI distress, hydration status, skin integrity.  Yandy Chong RDN, CDN #[id number]  Also available on [employer] Teams Pt seen for malnutrition follow up.    Medical Course:  - Per chart, pt is 57 year old male PMH HTN, HLD, type 2 DM, bipolar disorder, ESRD on HD, Afib presenting from [facility] admitted to CCU for management of possible anterior STEMI, cardiac arrest with anoxic brain injury. Nephrology following. Neurology and Palliative care on board. Poor prognosis.    Nutrition Course:  - Pt was previously NPO x2 days, currently ordered for trickle feeds. Intubated, off pressors. Last BM 3/21 per flowsheets.    Diet Prescription:   - NPO with Tube Feed: Nepro with Carb Steady via NGT at a goal rate of 10 mL/hr x24 hrs     Pertinent Medications:   - ADMELOG corrective regimen sliding scale, pantoprazole IV, piperacillin/tazobactam IV    Pertinent Labs:   - (3/22) Na 134 mmol/L<L> Glu 80 mg/dL K+ 4.5 mmol/L Cr 2.97 mg/dL<H> BUN 46 mg/dL<H> Phos 3.8 mg/dL Alb 2.2 g/dL<L>  - POCT: (3/22) 80-90, (3/21) 75-82    Weight: (3/22) 131.1 lbs / 59.5 kg, (3/20 post-HD) 132 lbs / 59.9 kg, (3/18 dosing/admission) 132.7 lbs / 60.2 kg  Height: 69 in / 175.26 cm  IBW: 160 lbs / 72.7 kg +/-10%  BMI: 19.4 kg/m^2 (at most recent weight)    Physical Assessment:  Edema, per flowsheets: 2+ dependent/bilateral hand/bilateral foot  Pressure Injury, per Wound Care consult (3/21): sacrum DTPI    Estimated Needs:   [X] Recalculated, based on current weight 131.1 lbs / 59.5 kg  7551-6914 kcal daily @20-25 kcal/kg, 89. gm protein daily @1.5-2.0 gm/kg     Previous Nutrition Diagnosis: [X] Severe malnutrition in the context of acute on chronic illness  New Nutrition Diagnosis: [X] not applicable     Recommendations:  1) Nutrition care plan to align with GOC, RDN remains available to provide enteral recommendations as appropriate.   2) Obtain pre/post-HD weights.     Monitor & Evaluate:  Tolerance to EN, nutrition related lab values, weight trends, BMs/GI distress, hydration status, skin integrity.  Yandy Chong, MARIAMN, CDN #[id number]  Also available on [employer] Teams

## 2023-03-22 NOTE — PROGRESS NOTE ADULT - SUBJECTIVE AND OBJECTIVE BOX
Indication of Geriatrics and Palliative Medicine Services:  [X  ] Complex Medical Decision Making   [  ] Symptom/Pain management     DNR on chart: No     INTERVAL EVENTS: Patient was assessed for brain death, no brainstem reflexes however have spontaneous breaths. See below for GOC.     -------------------------------------------------------------------------------------------------------    PRESENT SYMPTOMS:     [ ] No     [X ] Unable to self-report      [ ] CPOT (ICU)     [ ] PAINADs     [X ] RDOS 0    [ ] Yes     Source if other than patient:  [ ]Family   [ ]Team     PAIN:   If blank, patient unable to specify   [ ]yes [ ]no  QOL impact-   Location -                    Aggravating factors -  Quality -  Radiation -  Timing-  Pain at most severe level (0-10 scale):  Pain at minimal acceptable level/Pain Goal (0-10 scale):     SYMPTOMS:   Dyspnea:                           [ ]Mild [ ]Moderate [ ]Severe  Anxiety:                             [ ]Mild [ ]Moderate [ ]Severe  Fatigue:                             [ ]Mild [ ]Moderate [ ]Severe  Nausea/Vomiting:              [ ]Mild [ ]Moderate [ ]Severe  Loss of appetite:                [ ]Mild [ ]Moderate [ ]Severe  Constipation:                     [ ]Mild [ ]Moderate [ ]Severe    Other Symptoms:  [X ]All other review of systems negative     Home Medications for symptoms if any:  I-Stop Reference No:      -------------------------------------------------------------------------------------------------------    ITEMS UNCHECKED ARE NOT PRESENT    PHYSICAL:  Vital Signs Last 24 Hrs  T(C): 37 (22 Mar 2023 11:30), Max: 38.6 (21 Mar 2023 18:00)  T(F): 98.6 (22 Mar 2023 11:30), Max: 101.4 (21 Mar 2023 18:00)  HR: 85 (22 Mar 2023 12:08) (64 - 105)  BP: --  BP(mean): --  RR: 16 (22 Mar 2023 11:30) (16 - 20)  SpO2: 100% (22 Mar 2023 12:08) (97% - 100%)    Parameters below as of 22 Mar 2023 07:30  Patient On (Oxygen Delivery Method): ventilator,AC16;:P+5     I&O's Summary    21 Mar 2023 07:01  -  22 Mar 2023 07:00  --------------------------------------------------------  IN: 485 mL / OUT: 20 mL / NET: 465 mL    GENERAL:  [ ]Cachexia  [X ] Frail  [ ]Awake  [ ]Oriented x   [ ]Lethargic  [X ]Unarousable  [ ]Verbal  [ ]Non-Verbal  Intubated, off sedation     BEHAVIORAL:   [ ] Anxiety  [ ] Delirium [ ] Agitation [ ] Other    HEENT:   [ ]Normal   [ ]Dry mouth   [X ]ET Tube/Trach  [ ]Oral lesions    PULMONARY:   [X ]Clear [ ]Tachypnea  [ ]Audible excessive secretions   [ ]Rhonchi        [ ]Right [ ]Left [ ]Bilateral  [ ]Crackles        [ ]Right [ ]Left [ ]Bilateral  [ ]Wheezing     [ ]Right [ ]Left [ ]Bilateral  [ ]Diminished breath sounds [ ]right [ ]left [ ]bilateral    CARDIOVASCULAR:    [X ]Regular [ ]Irregular [ ]Tachy  [ ]Chace [ ]Murmur [ ]Other    GASTROINTESTINAL:  [X ]Soft  [ ]Distended   [ ]+BS  [X ]Non tender [ ]Tender  [ ]Other [ ]PEG [ ]OGT/ NGT      GENITOURINARY:  [ ]Normal [ ] Incontinent   [ ]Oliguria/Anuria   [X ]Loza    MUSCULOSKELETAL:   [ ]Normal   [ ]Weakness  [X ]Bed/Wheelchair bound [ ]Edema    NEUROLOGIC:   [X ]No focal deficits  [ ]Cognitive impairment  [ ]Dysphagia [ ]Dysarthria [ ]Paresis [ ]Other     SKIN:   [X ]Normal  [ ]Rash  [ ]Other  [ ]Pressure ulcer(s)       Present on admission [ ]y [ ]n(from initial)     -------------------------------------------------------------------------------------------------------    LABS:                        7.8    20.37 )-----------( 242      ( 22 Mar 2023 05:32 )             24.3   03-22    134<L>  |  95<L>  |  46<H>  ----------------------------<  80  4.5   |  23  |  2.97<H>    Ca    8.2<L>      22 Mar 2023 05:32  Phos  3.8     03-22  Mg     2.40     03-22    TPro  5.1<L>  /  Alb  2.2<L>  /  TBili  1.3<H>  /  DBili  x   /  AST  108<H>  /  ALT  62<H>  /  AlkPhos  1346<H>  03-22  PT/INR - ( 22 Mar 2023 05:32 )   PT: 14.4 sec;   INR: 1.24 ratio      PTT - ( 22 Mar 2023 05:32 )  PTT:29.9 sec    -------------------------------------------------------------------------------------------------------  RADIOLOGY & ADDITIONAL STUDIES:       < from: Xray Chest 1 View- PORTABLE-Routine (Xray Chest 1 View- PORTABLE-Routine in AM.) (03.20.23 @ 08:15) >  FINDINGS:  3/19/2023 at 9:06 AM  Endotracheal tube tip in the distal trachea. Enteric tube courses below   the diaphragm and out of the field-of-view. Left IJ central line   terminates in the SVC.  Cardiomegaly. Left basilar pleural effusion/atelectasis. Hazy bilateral   opacities.    < end of copied text >    < from: CT Chest No Cont (03.18.23 @ 10:20) >  IMPRESSION:  Endotracheal tube terminates in the proximal trachea, at approximately   the inferior margin of the thyroid cartilage. Hyperinflated balloon cuff   distends the tracheal lumen. Adjustment is recommended.    Centrilobular groundglass nodules in the right upper lobe, likely   reflecting bronchiolar inflammation and/or infection.    Large right and moderate left pleural effusions with adjacent atelectasis.    Occluded right lower lobe bronchi, likely by mucus.    Cardiomegaly with small pericardial effusion.    < end of copied text >    < from: CT Head No Cont (03.18.23 @ 10:19) >    1. Findings suspicious for global hypoxic-ischemic injury, as above.No   evidence of acute intracranial hemorrhage.  2. Progressive paranasal sinus, bilateral mastoid air cells and middle   ear disease, as above.  3. Additional findings described in detail above.    < end of copied text >    -------------------------------------------------------------------------------------------------------  MEDICATIONS:     MEDICATIONS  (STANDING):  artificial tears (preservative free) Ophthalmic Solution 1 Drop(s) Both EYES every 4 hours  aspirin  chewable 81 milliGRAM(s) Enteral Tube daily  chlorhexidine 0.12% Liquid 15 milliLiter(s) Oral Mucosa two times a day  chlorhexidine 2% Cloths 1 Application(s) Topical <User Schedule>  darbepoetin Injectable ViaL 80 MICROGram(s) IV Push <User Schedule>  dextrose 50% Injectable 25 Gram(s) IV Push once  dextrose 50% Injectable 12.5 Gram(s) IV Push once  dextrose 50% Injectable 25 Gram(s) IV Push once  dextrose Oral Gel 15 Gram(s) Oral once  glucagon  Injectable 1 milliGRAM(s) IntraMuscular once  insulin lispro (ADMELOG) corrective regimen sliding scale   SubCutaneous every 6 hours  pantoprazole  Injectable 40 milliGRAM(s) IV Push daily  piperacillin/tazobactam IVPB.. 3.375 Gram(s) IV Intermittent every 12 hours    MEDICATIONS  (PRN):  acetaminophen   Oral Liquid .. 650 milliGRAM(s) Enteral Tube every 6 hours PRN Temp greater or equal to 38C (100.4F)  hydrALAZINE Injectable 10 milliGRAM(s) IV Push every 6 hours PRN SBP > 170    -------------------------------------------------------------------------------------------------------    CRITICAL CARE:  [ ]Shock Present  [ ]Septic [ ]Cardiogenic [ ]Neurologic [ ]Hypovolemic [ ]Undifferentiated    [ ]Vasopressors [ ]Inotropes    [ ]Respiratory failure present   [ ]Acute  [ ]Chronic [ ]Hypoxic  [ ]Hypercarbic [ ]Mixed   [ ]Mechanical Ventilation [ ]Non-invasive ventilatory support [ ]High-Flow Mode: AC/ CMV (Assist Control/ Continuous Mandatory Ventilation), RR (machine): 20, TV (machine): 380, FiO2: 40, PEEP: 5, MAP: 8, PIP: 22    [ ]Other organ failure     -------------------------------------------------------------------------------------------------------  REFERRALS:   [ ]Chaplaincy  [ ]Hospice  [ ]Child Life  [ ]Social Work  [ ]Case management [ ]Holistic Therapy

## 2023-03-22 NOTE — PROGRESS NOTE ADULT - ASSESSMENT
Mr. Cal Lloyd is a 56 Y/O M w/PMH HTN, HLD, T2DM, Bipolar Disorder, ESRD on HD (TTS), Afib on Eliquis who presented from [facility] for cardiac arrest requiring CPR and was admitted to CCU for management of possible anterior STEMI and cardiac arrest with anoxic brain injury.    NEURO  # Anoxic Brain Injury  As seen on CT 3/18. Found with cardiac arrest on floor of nursing home  - hypothermia started 3/18 1 PM, plan to rewarm 3/19 1 PM.  - repeat neuro examination off sedation after completion of cooling protocol    CARDIAC  # Cardiac Arrest  Unclear cause, discharged after recent admission of respiratory failure requiring intubation. S/p cardiac arrest at nursing home. This is second cardiac arrest in as many months. ROSC achieved. Previous cardiac arrest in 02/23 in s/o complete lung atelectasis and mucus plugging. Initial EKG indicating possible STEMI; latest EKG back to baseline, minimal troponin elevations without significant delta.  - SpO2 goal > 94%; avoid hyperventilation  - Off NTG, MAP~ 80  -  therapeutic hypothermia protocol  - follow-up TTE      # HFpEF  EF > 70% 3/10, moderate diastolic dysfunction, severe concentric left ventricular hypertrophy  - 3/18 TTE RF 59%, moderate diastolic dysfunction, severely dilated LA, moderate pericardial effusion, b/l pleural effusions    #AFib  - Heparin gtt for AFib    RESPIRATORY  # intubated and sedated currently   - in s/o anoxic brain injury, will need further evaluation before any SBT/SATs  - Current vent settings: 20| 380| 5| 40%    GI  - no active issues   - keep NPO for now      # ESRD on HD (TTS)  - Renal for HD  - monitor electrolytes during cooling/rewarming  - condom catheter  - monitor I &Os  - plan for HD 3/20    ID  #Elevated WBC Count  -3/19 WBC 29K, Lactate 2.3  -c/w Zosyn 2.25g q8h for empiric therapy  - recurrent infections complicated by decompensation requiring ICU care  - adjust antimicrobial coverage pending data  - coverage for nosocomial pathogens      ENDO  # T2DM  No outpatient meds on records  - iSS     HEME  - no active issues currently    DVT PPX  - Heparin gtt for AFib    GOC: Palliative care involved during recent admission given concern for recurrent decompensation. Per chart notes, there were concerns about the family's insight into the severity of the patient's illness. The patient remained full code at time of discharge.  - in view of cardiac arrest and anoxic brain injury will need Palliative care involvement and ongoing GOC discussions pending re-evaluation after cooling protocol.  - prognosis guarded Mr. Cal Lloyd is a 56 Y/O M w/PMH HTN, HLD, T2DM, Bipolar Disorder, ESRD on HD (TTS), Afib on Eliquis who presented from [facility] for cardiac arrest requiring CPR and was admitted to CCU for management of possible anterior STEMI and cardiac arrest with anoxic brain injury.    NEURO  # Anoxic Brain Injury  As seen on CT 3/18. Found with cardiac arrest on floor of nursing home  - hypothermia started 3/18 1 PM, plan to rewarm 3/19 1 PM.  - repeat neuro examination off sedation after completion of cooling protocol  - brain death criteria not met as pt able to take 4 spontaneous breaths during apnea test; all brainstem reflexes absent however  - palliative to guide discussion    CARDIAC  # Cardiac Arrest  Unclear cause, discharged after recent admission of respiratory failure requiring intubation. S/p cardiac arrest at nursing home. This is second cardiac arrest in as many months. ROSC achieved. Previous cardiac arrest in 02/23 in s/o complete lung atelectasis and mucus plugging. Initial EKG indicating possible STEMI; latest EKG back to baseline, minimal troponin elevations without significant delta.  - SpO2 goal > 94%; avoid hyperventilation  - Off NTG, MAP~ 80  -  therapeutic hypothermia protocol  - follow-up TTE      # HFpEF  EF > 70% 3/10, moderate diastolic dysfunction, severe concentric left ventricular hypertrophy  - 3/18 TTE RF 59%, moderate diastolic dysfunction, severely dilated LA, moderate pericardial effusion, b/l pleural effusions    #AFib  - Heparin gtt for AFib    RESPIRATORY  # intubated and sedated currently   - in s/o anoxic brain injury, will need further evaluation before any SBT/SATs  - Current vent settings: 20| 380| 5| 40%    GI  - no active issues   - keep NPO for now      # ESRD on HD (TTS)  - Renal for HD  - monitor electrolytes during cooling/rewarming  - condom catheter  - monitor I &Os  - currently undergoing maintenance HD    ID  #Elevated WBC Count  -3/19 WBC 29K, Lactate 2.3  -3/22 WBC 20; lactate 0.7  -c/w Zosyn 2.25g q8h (3/19-3/24) for empiric therapy for 5 days   - recurrent infections complicated by decompensation requiring ICU care  - adjust antimicrobial coverage pending data  - coverage for nosocomial pathogens      ENDO  # T2DM  No outpatient meds on records  - iSS     HEME  - no active issues currently    DVT PPX  - Heparin gtt for AFib    GOC: Palliative care involved during recent admission given concern for recurrent decompensation. Per chart notes, there were concerns about the family's insight into the severity of the patient's illness. The patient remained full code at time of discharge.  - in view of cardiac arrest and anoxic brain injury will need Palliative care involvement and ongoing GOC discussions pending re-evaluation after cooling protocol.  - prognosis guarded

## 2023-03-23 NOTE — PROGRESS NOTE ADULT - SUBJECTIVE AND OBJECTIVE BOX
[facility] Division of Kidney Diseases & Hypertension  FOLLOW UP NOTE  --------------------------------------------------------------------------------  Chief Complaint: cardiac arrest    24 hour events/subjective:  Patient was seen and evaluated at bedside this morning no acute overnight events.     PAST HISTORY  --------------------------------------------------------------------------------  No significant changes to PMH, PSH, FHx, SHx, unless otherwise noted    ALLERGIES & MEDICATIONS  --------------------------------------------------------------------------------  Allergies    No Known Allergies    Intolerances      Standing Inpatient Medications  artificial tears (preservative free) Ophthalmic Solution 1 Drop(s) Both EYES every 4 hours  aspirin  chewable 81 milliGRAM(s) Enteral Tube daily  chlorhexidine 0.12% Liquid 15 milliLiter(s) Oral Mucosa two times a day  chlorhexidine 2% Cloths 1 Application(s) Topical <User Schedule>  darbepoetin Injectable ViaL 80 MICROGram(s) IV Push <User Schedule>  pantoprazole  Injectable 40 milliGRAM(s) IV Push daily  piperacillin/tazobactam IVPB.. 3.375 Gram(s) IV Intermittent every 12 hours    PRN Inpatient Medications  acetaminophen   Oral Liquid .. 650 milliGRAM(s) Enteral Tube every 6 hours PRN  hydrALAZINE Injectable 10 milliGRAM(s) IV Push every 6 hours PRN      REVIEW OF SYSTEMS  --------------------------------------------------------------------------------  unable to obtain intubated.    VITALS/PHYSICAL EXAM  --------------------------------------------------------------------------------  T(C): 37.6 (03-23-23 @ 05:00), Max: 37.6 (03-22-23 @ 07:30)  HR: 71 (03-23-23 @ 07:00) (68 - 120)  BP: --  ABP: 135/47 (03-23-23 @ 07:00) (101/40 - 200/75)  ABP(mean): 73 (03-23-23 @ 07:00) (58 - 122)  RR: 16 (03-23-23 @ 07:00) (8 - 18)  SpO2: 100% (03-23-23 @ 07:00) (99% - 100%)  CVP(mm Hg): 3 (03-23-23 @ 04:00) (-1 - 7)        03-22-23 @ 07:01  -  03-23-23 @ 07:00  --------------------------------------------------------  IN: 880 mL / OUT: 2015 mL / NET: -1135 mL    Physical Exam:  	Gen:  NAD intubated  	Pulm: CTA B/L anteriorly on vent  	CV: S1S2   	Abd: Soft  	Ext: No LE edema   	Neuro: non responsive  	Skin: Warm and dry  	Vascular access: LUE AVF with palpable pulse and bruit heard    LABS/STUDIES  --------------------------------------------------------------------------------              7.6    16.41 >-----------<  218      [03-23-23 @ 04:34]              24.3     133  |  94  |  30  ----------------------------<  75      [03-23-23 @ 04:34]  3.6   |  26  |  2.28        Ca     7.9     [03-23-23 @ 04:34]      Mg     2.10     [03-23-23 @ 04:34]      Phos  3.4     [03-23-23 @ 04:34]    TPro  4.8  /  Alb  2.1  /  TBili  1.3  /  DBili  x   /  AST  92  /  ALT  51  /  AlkPhos  1346  [03-23-23 @ 04:34]    PT/INR: PT 13.7 , INR 1.18       [03-23-23 @ 04:34]  PTT: 28.4       [03-23-23 @ 04:34]    CK 17      [03-23-23 @ 04:34]    Creatinine Trend:  SCr 2.28 [03-23 @ 04:34]  SCr 2.97 [03-22 @ 05:32]  SCr 2.32 [03-21 @ 02:59]  SCr 2.92 [03-20 @ 05:49]  SCr 2.88 [03-20 @ 04:12]

## 2023-03-23 NOTE — PROGRESS NOTE ADULT - ASSESSMENT
Mr. Cal Lloyd is a 56 Y/O M w/PMH HTN, HLD, T2DM, Bipolar Disorder, ESRD on HD (TTS), Afib on Eliquis who [facility] cardiac arrest requiring CPR and was admitted to CCU for management of possible anterior STEMI and cardiac arrest with anoxic brain injury.    NEURO  # Anoxic Brain Injury  As seen on CT 3/18. Found with cardiac arrest on floor of nursing home  - hypothermia started 3/18 1 PM, plan to rewarm 3/19 1 PM.  - repeat neuro examination off sedation after completion of cooling protocol  - brain death criteria not met as pt able to take 4 spontaneous breaths during apnea test; all brainstem reflexes absent however  - palliative to guide discussion    CARDIAC  # Cardiac Arrest  Unclear cause, discharged after recent admission of respiratory failure requiring intubation. S/p cardiac arrest at nursing home. This is second cardiac arrest in as many months. ROSC achieved. Previous cardiac arrest in 02/23 in s/o complete lung atelectasis and mucus plugging. Initial EKG indicating possible STEMI; latest EKG back to baseline, minimal troponin elevations without significant delta.  - SpO2 goal > 94%; avoid hyperventilation  - Off NTG, MAP~ 80  -  therapeutic hypothermia protocol  - follow-up TTE      # HFpEF  EF > 70% 3/10, moderate diastolic dysfunction, severe concentric left ventricular hypertrophy  - 3/18 TTE RF 59%, moderate diastolic dysfunction, severely dilated LA, moderate pericardial effusion, b/l pleural effusions    #AFib  - Heparin gtt for AFib    RESPIRATORY  # intubated and sedated currently   - in s/o anoxic brain injury, will need further evaluation before any SBT/SATs  - Current vent settings: 20| 380| 5| 40%    GI  - no active issues   - keep NPO for now      # ESRD on HD (TTS)  - Renal for HD  - monitor electrolytes during cooling/rewarming  - condom catheter  - monitor I &Os  - currently undergoing maintenance HD    ID  #Elevated WBC Count  -3/19 WBC 29K, Lactate 2.3  -3/22 WBC 20; lactate 0.7  -c/w Zosyn 2.25g q8h (3/19-3/24) for empiric therapy for 5 days   - recurrent infections complicated by decompensation requiring ICU care  - adjust antimicrobial coverage pending data  - coverage for nosocomial pathogens      ENDO  # T2DM  No outpatient meds on records  - iSS     HEME  - no active issues currently    DVT PPX  - Heparin gtt for AFib    GOC: Palliative care involved during recent admission given concern for recurrent decompensation. Per chart notes, there were concerns about the family's insight into the severity of the patient's illness. The patient remained full code at time of discharge.  - in view of cardiac arrest and anoxic brain injury will need Palliative care involvement and ongoing GOC discussions pending re-evaluation after cooling protocol.  - prognosis guarded

## 2023-03-23 NOTE — CHART NOTE - NSCHARTNOTEFT_GEN_A_CORE
ABG drawn with vent setting on AC/16/380/100%/5      Respiratory therapist performed apnea test with 6 liters oxygen therapy via ETT    Patient had spontaneous breath during the 10mins duration with RR ranging average 10-16 breath/min    Repeat ABG shows: -ABG drawn with vent setting on AC/16/380/100%/5  7.50/36/341/28/4.7/98.8%    -Respiratory therapist performed apnea test with 6 liters oxygen therapy via ETT  -Patient had spontaneous breath during the 10mins of test. RR ranging average 10-16 breath/min    Repeat ABG shows: -ABG drawn with vent setting on AC/16/380/100%/5  7.50/36/341/28/4.7/98.8%    -Respiratory therapist performed apnea test with 6 liters oxygen therapy via ETT  -Patient had spontaneous breath during the 10mins of test. RR ranging average 10-16 breath/min    Repeat ABG shows:  7.39/47/122/28/3.2/97.8%

## 2023-03-23 NOTE — PROGRESS NOTE ADULT - SUBJECTIVE AND OBJECTIVE BOX
Kris, PGY-1  Internal Medicine     PATIENT:  NANCY SEPULVEDA  [id number]    CHIEF COMPLAINT:  Patient is a 57y old  Male who presents with a chief complaint of cardiac arrest (22 Mar 2023 13:24)      INTERVAL HISTORY/OVERNIGHT EVENTS:      REVIEW OF SYSTEMS:    Constitutional:     [ ] negative [ ] fevers [ ] chills [ ] weight loss [ ] weight gain  HEENT:                  [ ] negative [ ] dry eyes [ ] eye irritation [ ] postnasal drip [ ] nasal congestion  CV:                         [ ] negative  [ ] chest pain [ ] orthopnea [ ] palpitations [ ] murmur  Resp:                     [ ] negative [ ] cough [ ] shortness of breath [ ] dyspnea [ ] wheezing [ ] sputum [ ] hemoptysis  GI:                          [ ] negative [ ] nausea [ ] vomiting [ ] diarrhea [ ] constipation [ ] abd pain [ ] dysphagia   :                        [ ] negative [ ] dysuria [ ] nocturia [ ] hematuria [ ] increased urinary frequency  Musculoskeletal: [ ] negative [ ] back pain [ ] myalgias [ ] arthralgias [ ] fracture  Skin:                       [ ] negative [ ] rash [ ] itch  Neurological:        [ ] negative [ ] headache [ ] dizziness [ ] syncope [ ] weakness [ ] numbness  Psychiatric:           [ ] negative [ ] anxiety [ ] depression  Endocrine:            [ ] negative [ ] diabetes [ ] thyroid problem  Heme/Lymph:      [ ] negative [ ] anemia [ ] bleeding problem  Allergic/Immune: [ ] negative [ ] itchy eyes [ ] nasal discharge [ ] hives [ ] angioedema    [ ] All other systems negative  [ ] Unable to assess ROS because ________.    MEDICATIONS:  MEDICATIONS  (STANDING):  artificial tears (preservative free) Ophthalmic Solution 1 Drop(s) Both EYES every 4 hours  aspirin  chewable 81 milliGRAM(s) Enteral Tube daily  chlorhexidine 0.12% Liquid 15 milliLiter(s) Oral Mucosa two times a day  chlorhexidine 2% Cloths 1 Application(s) Topical <User Schedule>  darbepoetin Injectable ViaL 80 MICROGram(s) IV Push <User Schedule>  pantoprazole  Injectable 40 milliGRAM(s) IV Push daily  piperacillin/tazobactam IVPB.. 3.375 Gram(s) IV Intermittent every 12 hours    MEDICATIONS  (PRN):  acetaminophen   Oral Liquid .. 650 milliGRAM(s) Enteral Tube every 6 hours PRN Temp greater or equal to 38C (100.4F)  hydrALAZINE Injectable 10 milliGRAM(s) IV Push every 6 hours PRN SBP > 170      ALLERGIES:  Allergies    No Known Allergies    Intolerances        OBJECTIVE:  ICU Vital Signs Last 24 Hrs  T(C): 37.6 (23 Mar 2023 05:00), Max: 37.6 (22 Mar 2023 07:30)  T(F): 99.7 (23 Mar 2023 05:00), Max: 99.7 (22 Mar 2023 07:30)  HR: 71 (23 Mar 2023 07:00) (68 - 120)  BP: --  BP(mean): --  ABP: 135/47 (23 Mar 2023 07:00) (101/40 - 200/75)  ABP(mean): 73 (23 Mar 2023 07:00) (58 - 122)  RR: 16 (23 Mar 2023 07:00) (8 - 18)  SpO2: 100% (23 Mar 2023 07:00) (99% - 100%)    O2 Parameters below as of 23 Mar 2023 05:00  Patient On (Oxygen Delivery Method): ventilator,AC    O2 Concentration (%): 40      Mode: AC/ CMV (Assist Control/ Continuous Mandatory Ventilation)  RR (machine): 16  TV (machine): 380  FiO2: 40  PEEP: 5  ITime: 0.89  MAP: 9  PIP: 19    Adult Advanced Hemodynamics Last 24 Hrs  CVP(mm Hg): 3 (23 Mar 2023 04:00) (-1 - 7)  CVP(cm H2O): --  CO: --  CI: --  PA: --  PA(mean): --  PCWP: --  SVR: --  SVRI: --  PVR: --  PVRI: --  CAPILLARY BLOOD GLUCOSE      POCT Blood Glucose.: 81 mg/dL (22 Mar 2023 23:51)  POCT Blood Glucose.: 85 mg/dL (22 Mar 2023 17:23)  POCT Blood Glucose.: 80 mg/dL (22 Mar 2023 12:02)    CAPILLARY BLOOD GLUCOSE      POCT Blood Glucose.: 81 mg/dL (22 Mar 2023 23:51)    I&O's Summary    22 Mar 2023 07:01  -  23 Mar 2023 07:00  --------------------------------------------------------  IN: 880 mL / OUT: 2015 mL / NET: -1135 mL      Daily     Daily Weight in k.5 (23 Mar 2023 05:00)    PHYSICAL EXAMINATION:  General: WN/WD NAD  HEENT: PERRLA, EOMI, moist mucous membranes  Neurology: A&Ox3, nonfocal, MCKEON x 4  Respiratory: CTA B/L, normal respiratory effort, no wheezes, crackles, rales  CV: RRR, S1S2, no murmurs, rubs or gallops  Abdominal: Soft, NT, ND +BS, Last BM  Extremities: No edema, + peripheral pulses  Incisions:   Tubes:    LABS:  ABG - ( 23 Mar 2023 04:34 )  pH, Arterial: 7.50  pH, Blood: x     /  pCO2: 37    /  pO2: 141   / HCO3: 29    / Base Excess: 5.3   /  SaO2: 97.9                                    7.6    16.41 )-----------( 218      ( 23 Mar 2023 04:34 )             24.3     03-    133<L>  |  94<L>  |  30<H>  ----------------------------<  75  3.6   |  26  |  2.28<H>    Ca    7.9<L>      23 Mar 2023 04:34  Phos  3.4       Mg     2.10         TPro  4.8<L>  /  Alb  2.1<L>  /  TBili  1.3<H>  /  DBili  x   /  AST  92<H>  /  ALT  51<H>  /  AlkPhos  1346<H>  03    LIVER FUNCTIONS - ( 23 Mar 2023 04:34 )  Alb: 2.1 g/dL / Pro: 4.8 g/dL / ALK PHOS: 1346 U/L / ALT: 51 U/L / AST: 92 U/L / GGT: x           PT/INR - ( 23 Mar 2023 04:34 )   PT: 13.7 sec;   INR: 1.18 ratio         PTT - ( 23 Mar 2023 04:34 )  PTT:28.4 sec  Creatine Kinase, Serum: 17 U/L ( @ 04:34)    CARDIAC MARKERS ( 23 Mar 2023 04:34 )  x     / x     / 17 U/L / x     / x      CARDIAC MARKERS ( 22 Mar 2023 05:32 )  x     / x     / 15 U/L / x     / x              TELEMETRY:     EKG:     IMAGING:       Kris, PGY-1  Internal Medicine     PATIENT:  NANCY SEPULVEDA  [id number]    CHIEF COMPLAINT:  Patient is a 57y old  Male who presents with a chief complaint of cardiac arrest (22 Mar 2023 13:24)      INTERVAL HISTORY/OVERNIGHT EVENTS:  - no acute events overnight  - GOC discussion with family yesterday    REVIEW OF SYSTEMS:    Constitutional:     [ ] negative [ ] fevers [ ] chills [ ] weight loss [ ] weight gain  HEENT:                  [ ] negative [ ] dry eyes [ ] eye irritation [ ] postnasal drip [ ] nasal congestion  CV:                         [ ] negative  [ ] chest pain [ ] orthopnea [ ] palpitations [ ] murmur  Resp:                     [ ] negative [ ] cough [ ] shortness of breath [ ] dyspnea [ ] wheezing [ ] sputum [ ] hemoptysis  GI:                          [ ] negative [ ] nausea [ ] vomiting [ ] diarrhea [ ] constipation [ ] abd pain [ ] dysphagia   :                        [ ] negative [ ] dysuria [ ] nocturia [ ] hematuria [ ] increased urinary frequency  Musculoskeletal: [ ] negative [ ] back pain [ ] myalgias [ ] arthralgias [ ] fracture  Skin:                       [ ] negative [ ] rash [ ] itch  Neurological:        [ ] negative [ ] headache [ ] dizziness [ ] syncope [ ] weakness [ ] numbness  Psychiatric:           [ ] negative [ ] anxiety [ ] depression  Endocrine:            [ ] negative [ ] diabetes [ ] thyroid problem  Heme/Lymph:      [ ] negative [ ] anemia [ ] bleeding problem  Allergic/Immune: [ ] negative [ ] itchy eyes [ ] nasal discharge [ ] hives [ ] angioedema    [ ] All other systems negative  [ ] Unable to assess ROS because ________.    MEDICATIONS:  MEDICATIONS  (STANDING):  artificial tears (preservative free) Ophthalmic Solution 1 Drop(s) Both EYES every 4 hours  aspirin  chewable 81 milliGRAM(s) Enteral Tube daily  chlorhexidine 0.12% Liquid 15 milliLiter(s) Oral Mucosa two times a day  chlorhexidine 2% Cloths 1 Application(s) Topical <User Schedule>  darbepoetin Injectable ViaL 80 MICROGram(s) IV Push <User Schedule>  pantoprazole  Injectable 40 milliGRAM(s) IV Push daily  piperacillin/tazobactam IVPB.. 3.375 Gram(s) IV Intermittent every 12 hours    MEDICATIONS  (PRN):  acetaminophen   Oral Liquid .. 650 milliGRAM(s) Enteral Tube every 6 hours PRN Temp greater or equal to 38C (100.4F)  hydrALAZINE Injectable 10 milliGRAM(s) IV Push every 6 hours PRN SBP > 170      ALLERGIES:  Allergies    No Known Allergies    Intolerances        OBJECTIVE:  ICU Vital Signs Last 24 Hrs  T(C): 37.6 (23 Mar 2023 05:00), Max: 37.6 (22 Mar 2023 07:30)  T(F): 99.7 (23 Mar 2023 05:00), Max: 99.7 (22 Mar 2023 07:30)  HR: 71 (23 Mar 2023 07:00) (68 - 120)  BP: --  BP(mean): --  ABP: 135/47 (23 Mar 2023 07:00) (101/40 - 200/75)  ABP(mean): 73 (23 Mar 2023 07:00) (58 - 122)  RR: 16 (23 Mar 2023 07:00) (8 - 18)  SpO2: 100% (23 Mar 2023 07:00) (99% - 100%)    O2 Parameters below as of 23 Mar 2023 05:00  Patient On (Oxygen Delivery Method): ventilator,AC    O2 Concentration (%): 40      Mode: AC/ CMV (Assist Control/ Continuous Mandatory Ventilation)  RR (machine): 16  TV (machine): 380  FiO2: 40  PEEP: 5  ITime: 0.89  MAP: 9  PIP: 19    Adult Advanced Hemodynamics Last 24 Hrs  CVP(mm Hg): 3 (23 Mar 2023 04:00) (-1 - 7)  CVP(cm H2O): --  CO: --  CI: --  PA: --  PA(mean): --  PCWP: --  SVR: --  SVRI: --  PVR: --  PVRI: --  CAPILLARY BLOOD GLUCOSE      POCT Blood Glucose.: 81 mg/dL (22 Mar 2023 23:51)  POCT Blood Glucose.: 85 mg/dL (22 Mar 2023 17:23)  POCT Blood Glucose.: 80 mg/dL (22 Mar 2023 12:02)    CAPILLARY BLOOD GLUCOSE      POCT Blood Glucose.: 81 mg/dL (22 Mar 2023 23:51)    I&O's Summary    22 Mar 2023 07:01  -  23 Mar 2023 07:00  --------------------------------------------------------  IN: 880 mL / OUT:  mL / NET: -1135 mL      Daily     Daily Weight in k.5 (23 Mar 2023 05:00)    PHYSICAL EXAMINATION:  General: WN/WD NAD  HEENT: PERRLA, EOMI, moist mucous membranes  Neurology: A&Ox3, nonfocal, MCKEON x 4  Respiratory: CTA B/L, normal respiratory effort, no wheezes, crackles, rales  CV: RRR, S1S2, no murmurs, rubs or gallops  Abdominal: Soft, NT, ND +BS, Last BM  Extremities: No edema, + peripheral pulses  Incisions:   Tubes:    LABS:  ABG - ( 23 Mar 2023 04:34 )  pH, Arterial: 7.50  pH, Blood: x     /  pCO2: 37    /  pO2: 141   / HCO3: 29    / Base Excess: 5.3   /  SaO2: 97.9                                    7.6    16.41 )-----------( 218      ( 23 Mar 2023 04:34 )             24.3     03-23    133<L>  |  94<L>  |  30<H>  ----------------------------<  75  3.6   |  26  |  2.28<H>    Ca    7.9<L>      23 Mar 2023 04:34  Phos  3.4     03-23  Mg     2.10     03-23    TPro  4.8<L>  /  Alb  2.1<L>  /  TBili  1.3<H>  /  DBili  x   /  AST  92<H>  /  ALT  51<H>  /  AlkPhos  1346<H>  03-23    LIVER FUNCTIONS - ( 23 Mar 2023 04:34 )  Alb: 2.1 g/dL / Pro: 4.8 g/dL / ALK PHOS: 1346 U/L / ALT: 51 U/L / AST: 92 U/L / GGT: x           PT/INR - ( 23 Mar 2023 04:34 )   PT: 13.7 sec;   INR: 1.18 ratio         PTT - ( 23 Mar 2023 04:34 )  PTT:28.4 sec  Creatine Kinase, Serum: 17 U/L (03-23 @ 04:34)    CARDIAC MARKERS ( 23 Mar 2023 04:34 )  x     / x     / 17 U/L / x     / x      CARDIAC MARKERS ( 22 Mar 2023 05:32 )  x     / x     / 15 U/L / x     / x              TELEMETRY:     EKG:     IMAGING:

## 2023-03-23 NOTE — PROGRESS NOTE ADULT - PROBLEM SELECTOR PLAN 1
Pt. with ESRD on HD TIW who presented for cardiac arrest with evidence of anoxic brain injury.  Seen and evaluated at bedside today in the CCU.  Tolerated HD yesterday 1.5 kg UF. Plan for maintenance HD tomorrow. Ongoing goals of care.

## 2023-03-24 NOTE — PROGRESS NOTE ADULT - ASSESSMENT
Mr. Cal Lloyd is a 58 Y/O M w/PMH HTN, HLD, T2DM, Bipolar Disorder, ESRD on HD (TTS), Afib on Eliquis who presented from [facility] for cardiac arrest requiring CPR and was admitted to CCU for management of possible anterior STEMI and cardiac arrest with anoxic brain injury.    NEURO  # Anoxic Brain Injury  As seen on CT 3/18. Found with cardiac arrest on floor of nursing home  - hypothermia started 3/18 1 PM, plan to rewarm 3/19 1 PM.  - repeat neuro examination off sedation after completion of cooling protocol  - brain death criteria not met as pt able to take 4 spontaneous breaths during apnea test; all brainstem reflexes absent however  - palliative to guide discussion    CARDIAC  # Cardiac Arrest  Unclear cause, discharged after recent admission of respiratory failure requiring intubation. S/p cardiac arrest at nursing home. This is second cardiac arrest in as many months. ROSC achieved. Previous cardiac arrest in 02/23 in s/o complete lung atelectasis and mucus plugging. Initial EKG indicating possible STEMI; latest EKG back to baseline, minimal troponin elevations without significant delta.  - SpO2 goal > 94%; avoid hyperventilation  - Off NTG, MAP~ 80  -  therapeutic hypothermia protocol  - follow-up TTE      # HFpEF  EF > 70% 3/10, moderate diastolic dysfunction, severe concentric left ventricular hypertrophy  - 3/18 TTE RF 59%, moderate diastolic dysfunction, severely dilated LA, moderate pericardial effusion, b/l pleural effusions    #AFib  - Heparin gtt for AFib    RESPIRATORY  # intubated and sedated currently   - in s/o anoxic brain injury, will need further evaluation before any SBT/SATs  - Current vent settings: 20| 380| 5| 40%    GI  - no active issues   - keep NPO for now      # ESRD on HD (TTS)  - Renal for HD  - monitor electrolytes during cooling/rewarming  - condom catheter  - monitor I &Os  - currently undergoing maintenance HD    ID  #Elevated WBC Count  -3/19 WBC 29K, Lactate 2.3  -3/22 WBC 20; lactate 0.7  -c/w Zosyn 2.25g q8h (3/19-3/24) for empiric therapy for 5 days   - recurrent infections complicated by decompensation requiring ICU care  - adjust antimicrobial coverage pending data  - coverage for nosocomial pathogens      ENDO  # T2DM  No outpatient meds on records  - iSS     HEME  - no active issues currently    DVT PPX  - Heparin gtt for AFib    GOC: Palliative care involved during recent admission given concern for recurrent decompensation. Per chart notes, there were concerns about the family's insight into the severity of the patient's illness. The patient remained full code at time of discharge.  - in view of cardiac arrest and anoxic brain injury will need Palliative care involvement and ongoing GOC discussions pending re-evaluation after cooling protocol.  - prognosis guarded Mr. Cal Lloyd is a 56 Y/O M w/PMH HTN, HLD, T2DM, Bipolar Disorder, ESRD on HD (TTS), Afib on Eliquis who [facility] from [facility] for cardiac arrest requiring CPR and was admitted to CCU for management of possible anterior STEMI and cardiac arrest with anoxic brain injury.    NEURO  # Anoxic Brain Injury  As seen on CT 3/18. Found with cardiac arrest on floor of nursing home  - hypothermia started 3/18 1 PM, plan to rewarm 3/19 1 PM.  - repeat neuro examination off sedation after completion of cooling protocol  - brain death criteria not met as pt able to take spontaneous breaths during apnea test; all brainstem reflexes absent however  - palliative to guide discussion    CARDIAC  # Cardiac Arrest  Unclear cause, discharged after recent admission of respiratory failure requiring intubation. S/p cardiac arrest at nursing home. This is second cardiac arrest in as many months. ROSC achieved. Previous cardiac arrest in 02/23 in s/o complete lung atelectasis and mucus plugging. Initial EKG indicating possible STEMI; latest EKG back to baseline, minimal troponin elevations without significant delta.  - SpO2 goal > 94%; avoid hyperventilation  - Off NTG, MAP~ 80  -  therapeutic hypothermia protocol  - follow-up TTE      # HFpEF  EF > 70% 3/10, moderate diastolic dysfunction, severe concentric left ventricular hypertrophy  - 3/18 TTE RF 59%, moderate diastolic dysfunction, severely dilated LA, moderate pericardial effusion, b/l pleural effusions    #AFib  - Heparin gtt for AFib    RESPIRATORY  # intubated and sedated currently   - in s/o anoxic brain injury, will need further evaluation before any SBT/SATs  - Current vent settings: 20| 380| 5| 40%    GI  - no active issues   - keep NPO for now      # ESRD on HD (TTS)  - Renal for HD  - monitor electrolytes during cooling/rewarming  - condom catheter  - monitor I &Os  - currently undergoing maintenance HD    ID  #Elevated WBC Count  -3/19 WBC 29K, Lactate 2.3  -3/22 WBC 20; lactate 0.7  -c/w Zosyn 2.25g q8h (3/19-3/24) for empiric therapy for 5 days   - recurrent infections complicated by decompensation requiring ICU care  - adjust antimicrobial coverage pending data  - coverage for nosocomial pathogens      ENDO  # T2DM  No outpatient meds on records  - iSS     HEME  - no active issues currently    DVT PPX  - Heparin gtt for AFib    GOC: Palliative care involved during recent admission given concern for recurrent decompensation. Per chart notes, there were concerns about the family's insight into the severity of the patient's illness. The patient remained full code at time of discharge.  - in view of cardiac arrest and anoxic brain injury will need Palliative care involvement and ongoing GOC discussions pending re-evaluation after cooling protocol.  - prognosis guarded

## 2023-03-24 NOTE — PROGRESS NOTE ADULT - SUBJECTIVE AND OBJECTIVE BOX
Kris, PGY-1  Internal Medicine     PATIENT:  NANCY SEPULVEDA  [id number]    CHIEF COMPLAINT:  Patient is a 57y old  Male who presents with a chief complaint of cardiac arrest (23 Mar 2023 07:16)      INTERVAL HISTORY/OVERNIGHT EVENTS:      REVIEW OF SYSTEMS:    Constitutional:     [ ] negative [ ] fevers [ ] chills [ ] weight loss [ ] weight gain  HEENT:                  [ ] negative [ ] dry eyes [ ] eye irritation [ ] postnasal drip [ ] nasal congestion  CV:                         [ ] negative  [ ] chest pain [ ] orthopnea [ ] palpitations [ ] murmur  Resp:                     [ ] negative [ ] cough [ ] shortness of breath [ ] dyspnea [ ] wheezing [ ] sputum [ ] hemoptysis  GI:                          [ ] negative [ ] nausea [ ] vomiting [ ] diarrhea [ ] constipation [ ] abd pain [ ] dysphagia   :                        [ ] negative [ ] dysuria [ ] nocturia [ ] hematuria [ ] increased urinary frequency  Musculoskeletal: [ ] negative [ ] back pain [ ] myalgias [ ] arthralgias [ ] fracture  Skin:                       [ ] negative [ ] rash [ ] itch  Neurological:        [ ] negative [ ] headache [ ] dizziness [ ] syncope [ ] weakness [ ] numbness  Psychiatric:           [ ] negative [ ] anxiety [ ] depression  Endocrine:            [ ] negative [ ] diabetes [ ] thyroid problem  Heme/Lymph:      [ ] negative [ ] anemia [ ] bleeding problem  Allergic/Immune: [ ] negative [ ] itchy eyes [ ] nasal discharge [ ] hives [ ] angioedema    [ ] All other systems negative  [ ] Unable to assess ROS because ________.    MEDICATIONS:  MEDICATIONS  (STANDING):  artificial tears (preservative free) Ophthalmic Solution 1 Drop(s) Both EYES every 4 hours  aspirin  chewable 81 milliGRAM(s) Enteral Tube daily  chlorhexidine 0.12% Liquid 15 milliLiter(s) Oral Mucosa two times a day  chlorhexidine 2% Cloths 1 Application(s) Topical <User Schedule>  darbepoetin Injectable ViaL 80 MICROGram(s) IV Push <User Schedule>  pantoprazole  Injectable 40 milliGRAM(s) IV Push daily  piperacillin/tazobactam IVPB.. 3.375 Gram(s) IV Intermittent every 12 hours    MEDICATIONS  (PRN):  acetaminophen   Oral Liquid .. 650 milliGRAM(s) Enteral Tube every 6 hours PRN Temp greater or equal to 38C (100.4F)  hydrALAZINE Injectable 10 milliGRAM(s) IV Push every 6 hours PRN SBP > 170      ALLERGIES:  Allergies    No Known Allergies    Intolerances        OBJECTIVE:  ICU Vital Signs Last 24 Hrs  T(C): 36.3 (24 Mar 2023 04:00), Max: 37.6 (23 Mar 2023 08:00)  T(F): 97.3 (24 Mar 2023 04:00), Max: 99.7 (23 Mar 2023 08:00)  HR: 70 (24 Mar 2023 06:30) (68 - 85)  BP: --  BP(mean): --  ABP: 175/60 (24 Mar 2023 06:30) (106/44 - 184/63)  ABP(mean): 104 (24 Mar 2023 06:30) (64 - 107)  RR: 18 (24 Mar 2023 06:30) (14 - 18)  SpO2: 100% (24 Mar 2023 06:30) (99% - 100%)    O2 Parameters below as of 24 Mar 2023 06:00  Patient On (Oxygen Delivery Method): ventilator    O2 Concentration (%): 40      Mode: AC/ CMV (Assist Control/ Continuous Mandatory Ventilation)  RR (machine): 14  TV (machine): 380  FiO2: 40  PEEP: 5  ITime: 0.88  MAP: 9  PIP: 21    Adult Advanced Hemodynamics Last 24 Hrs  CVP(mm Hg): 6 (24 Mar 2023 06:30) (3 - 10)  CVP(cm H2O): --  CO: --  CI: --  PA: --  PA(mean): --  PCWP: --  SVR: --  SVRI: --  PVR: --  PVRI: --  CAPILLARY BLOOD GLUCOSE      POCT Blood Glucose.: 84 mg/dL (23 Mar 2023 17:37)    CAPILLARY BLOOD GLUCOSE      POCT Blood Glucose.: 84 mg/dL (23 Mar 2023 17:37)    I&O's Summary    23 Mar 2023 07:01  -  24 Mar 2023 07:00  --------------------------------------------------------  IN: 460 mL / OUT: 0 mL / NET: 460 mL      Daily     Daily Weight in k.3 (24 Mar 2023 05:00)    PHYSICAL EXAMINATION:  General: WN/WD NAD  HEENT: PERRLA, EOMI, moist mucous membranes  Neurology: A&Ox3, nonfocal, MCKEON x 4  Respiratory: CTA B/L, normal respiratory effort, no wheezes, crackles, rales  CV: RRR, S1S2, no murmurs, rubs or gallops  Abdominal: Soft, NT, ND +BS, Last BM  Extremities: No edema, + peripheral pulses  Incisions:   Tubes:    LABS:  ABG - ( 24 Mar 2023 02:25 )  pH, Arterial: 7.54  pH, Blood: x     /  pCO2: 33    /  pO2: 149   / HCO3: 28    / Base Excess: 5.4   /  SaO2: 98.4                                    8.0    13.51 )-----------( 203      ( 24 Mar 2023 02:25 )             25.5     03-24    132<L>  |  94<L>  |  37<H>  ----------------------------<  91  3.8   |  24  |  2.94<H>    Ca    7.8<L>      24 Mar 2023 02:25  Phos  3.4     03-23  Mg     2.10     03-23    TPro  4.8<L>  /  Alb  1.9<L>  /  TBili  1.3<H>  /  DBili  x   /  AST  84<H>  /  ALT  39  /  AlkPhos  1286<H>  03-24    LIVER FUNCTIONS - ( 24 Mar 2023 02:25 )  Alb: 1.9 g/dL / Pro: 4.8 g/dL / ALK PHOS: 1286 U/L / ALT: 39 U/L / AST: 84 U/L / GGT: x           PT/INR - ( 24 Mar 2023 02:25 )   PT: 13.4 sec;   INR: 1.15 ratio         PTT - ( 24 Mar 2023 02:25 )  PTT:29.7 sec    CARDIAC MARKERS ( 23 Mar 2023 04:34 )  x     / x     / 17 U/L / x     / x              TELEMETRY:     EKG:     IMAGING:       Kris, PGY-1  Internal Medicine     PATIENT:  NANCY SEPULVEDA  [id number]    CHIEF COMPLAINT:  Patient is a 57y old  Male who presents with a chief complaint of cardiac arrest (23 Mar 2023 07:16)      INTERVAL HISTORY/OVERNIGHT EVENTS:  - apnea test done    REVIEW OF SYSTEMS:  Unable to answer     MEDICATIONS:  MEDICATIONS  (STANDING):  artificial tears (preservative free) Ophthalmic Solution 1 Drop(s) Both EYES every 4 hours  aspirin  chewable 81 milliGRAM(s) Enteral Tube daily  chlorhexidine 0.12% Liquid 15 milliLiter(s) Oral Mucosa two times a day  chlorhexidine 2% Cloths 1 Application(s) Topical <User Schedule>  darbepoetin Injectable ViaL 80 MICROGram(s) IV Push <User Schedule>  pantoprazole  Injectable 40 milliGRAM(s) IV Push daily  piperacillin/tazobactam IVPB.. 3.375 Gram(s) IV Intermittent every 12 hours    MEDICATIONS  (PRN):  acetaminophen   Oral Liquid .. 650 milliGRAM(s) Enteral Tube every 6 hours PRN Temp greater or equal to 38C (100.4F)  hydrALAZINE Injectable 10 milliGRAM(s) IV Push every 6 hours PRN SBP > 170      ALLERGIES:  Allergies    No Known Allergies    Intolerances        OBJECTIVE:  ICU Vital Signs Last 24 Hrs  T(C): 36.3 (24 Mar 2023 04:00), Max: 37.6 (23 Mar 2023 08:00)  T(F): 97.3 (24 Mar 2023 04:00), Max: 99.7 (23 Mar 2023 08:00)  HR: 70 (24 Mar 2023 06:30) (68 - 85)  BP: --  BP(mean): --  ABP: 175/60 (24 Mar 2023 06:30) (106/44 - 184/63)  ABP(mean): 104 (24 Mar 2023 06:30) (64 - 107)  RR: 18 (24 Mar 2023 06:30) (14 - 18)  SpO2: 100% (24 Mar 2023 06:30) (99% - 100%)    O2 Parameters below as of 24 Mar 2023 06:00  Patient On (Oxygen Delivery Method): ventilator    O2 Concentration (%): 40      Mode: AC/ CMV (Assist Control/ Continuous Mandatory Ventilation)  RR (machine): 14  TV (machine): 380  FiO2: 40  PEEP: 5  ITime: 0.88  MAP: 9  PIP: 21    Adult Advanced Hemodynamics Last 24 Hrs  CVP(mm Hg): 6 (24 Mar 2023 06:30) (3 - 10)  CVP(cm H2O): --  CO: --  CI: --  PA: --  PA(mean): --  PCWP: --  SVR: --  SVRI: --  PVR: --  PVRI: --  CAPILLARY BLOOD GLUCOSE      POCT Blood Glucose.: 84 mg/dL (23 Mar 2023 17:37)    CAPILLARY BLOOD GLUCOSE      POCT Blood Glucose.: 84 mg/dL (23 Mar 2023 17:37)    I&O's Summary    23 Mar 2023 07:01  -  24 Mar 2023 07:00  --------------------------------------------------------  IN: 460 mL / OUT: 0 mL / NET: 460 mL      Daily     Daily Weight in k.3 (24 Mar 2023 05:00)    PHYSICAL EXAMINATION:  General: WN/WD NAD  HEENT: PERRLA, EOMI, moist mucous membranes  Neurology: A&Ox0, nonfocal, MCKEON x 4  Respiratory: L lung inspiratory crackles; mild expiratory wheezing in R lung  CV: RRR, S1S2, no murmurs, rubs or gallops  Abdominal: Soft, NT, ND +BS  Extremities: No edema, + peripheral pulses    LABS:  ABG - ( 24 Mar 2023 02:25 )  pH, Arterial: 7.54  pH, Blood: x     /  pCO2: 33    /  pO2: 149   / HCO3: 28    / Base Excess: 5.4   /  SaO2: 98.4                                    8.0    13.51 )-----------( 203      ( 24 Mar 2023 02:25 )             25.5     03-24    132<L>  |  94<L>  |  37<H>  ----------------------------<  91  3.8   |  24  |  2.94<H>    Ca    7.8<L>      24 Mar 2023 02:25  Phos  3.4     03-23  Mg     2.10     03-23    TPro  4.8<L>  /  Alb  1.9<L>  /  TBili  1.3<H>  /  DBili  x   /  AST  84<H>  /  ALT  39  /  AlkPhos  1286<H>      LIVER FUNCTIONS - ( 24 Mar 2023 02:25 )  Alb: 1.9 g/dL / Pro: 4.8 g/dL / ALK PHOS: 1286 U/L / ALT: 39 U/L / AST: 84 U/L / GGT: x           PT/INR - ( 24 Mar 2023 02:25 )   PT: 13.4 sec;   INR: 1.15 ratio         PTT - ( 24 Mar 2023 02:25 )  PTT:29.7 sec    CARDIAC MARKERS ( 23 Mar 2023 04:34 )  x     / x     / 17 U/L / x     / x              TELEMETRY:     EKG:     IMAGING:

## 2023-03-24 NOTE — PROGRESS NOTE ADULT - PROBLEM SELECTOR PLAN 1
Pt. with ESRD on HD TIW who presented for cardiac arrest with evidence of anoxic brain injury.  Seen and evaluated at bedside today in the CCU.  Tolerated HD wednesday 1.5 kg UF. Plan for maintenance HD today. Ongoing goals of care. ca/mg/p and BP trend reviewed.  Alkalosis noted primarily respiratory.

## 2023-03-24 NOTE — PROGRESS NOTE ADULT - SUBJECTIVE AND OBJECTIVE BOX
[facility] Division of Kidney Diseases & Hypertension  FOLLOW UP NOTE  --------------------------------------------------------------------------------  Chief Complaint: cardiac arrest    24 hour events/subjective: Patient was seen and evaluated on dialysis no chest pain no shortness of breath.      PAST HISTORY  --------------------------------------------------------------------------------  No significant changes to PMH, PSH, FHx, SHx, unless otherwise noted    ALLERGIES & MEDICATIONS  --------------------------------------------------------------------------------  Allergies    No Known Allergies    Intolerances      Standing Inpatient Medications  artificial tears (preservative free) Ophthalmic Solution 1 Drop(s) Both EYES every 4 hours  aspirin  chewable 81 milliGRAM(s) Enteral Tube daily  chlorhexidine 0.12% Liquid 15 milliLiter(s) Oral Mucosa two times a day  chlorhexidine 2% Cloths 1 Application(s) Topical <User Schedule>  darbepoetin Injectable ViaL 80 MICROGram(s) IV Push <User Schedule>  pantoprazole  Injectable 40 milliGRAM(s) IV Push daily  piperacillin/tazobactam IVPB.. 3.375 Gram(s) IV Intermittent every 12 hours    PRN Inpatient Medications  acetaminophen   Oral Liquid .. 650 milliGRAM(s) Enteral Tube every 6 hours PRN  hydrALAZINE Injectable 10 milliGRAM(s) IV Push every 6 hours PRN      REVIEW OF SYSTEMS  --------------------------------------------------------------------------------  unable to obtain    VITALS/PHYSICAL EXAM  --------------------------------------------------------------------------------  T(C): 36.3 (03-24-23 @ 04:00), Max: 37.6 (03-23-23 @ 12:00)  HR: 70 (03-24-23 @ 06:30) (68 - 85)  BP: --  ABP: 175/60 (03-24-23 @ 06:30) (106/44 - 184/63)  ABP(mean): 104 (03-24-23 @ 06:30) (64 - 107)  RR: 18 (03-24-23 @ 06:30) (14 - 18)  SpO2: 100% (03-24-23 @ 06:30) (99% - 100%)  CVP(mm Hg): 6 (03-24-23 @ 06:30) (3 - 10)        03-23-23 @ 07:01  -  03-24-23 @ 07:00  --------------------------------------------------------  IN: 460 mL / OUT: 0 mL / NET: 460 mL    Physical Exam:  	Gen:  NAD intubated  	Pulm: CTA B/L anteriorly on vent  	CV: S1S2   	Abd: Soft  	Ext: No LE edema   	Neuro: non responsive  	Skin: Warm and dry  	Vascular access: LUE AVF with palpable pulse and bruit heard    LABS/STUDIES  --------------------------------------------------------------------------------              8.0    13.51 >-----------<  203      [03-24-23 @ 02:25]              25.5     132  |  94  |  37  ----------------------------<  91      [03-24-23 @ 02:25]  3.8   |  24  |  2.94        Ca     7.8     [03-24-23 @ 02:25]      Mg     2.20     [03-24-23 @ 02:25]      Phos  4.2     [03-24-23 @ 02:25]    TPro  4.8  /  Alb  1.9  /  TBili  1.3  /  DBili  x   /  AST  84  /  ALT  39  /  AlkPhos  1286  [03-24-23 @ 02:25]    PT/INR: PT 13.4 , INR 1.15       [03-24-23 @ 02:25]  PTT: 29.7       [03-24-23 @ 02:25]    CK 17      [03-23-23 @ 04:34]    Creatinine Trend:  SCr 2.94 [03-24 @ 02:25]  SCr 2.28 [03-23 @ 04:34]  SCr 2.97 [03-22 @ 05:32]  SCr 2.32 [03-21 @ 02:59]  SCr 2.92 [03-20 @ 05:49]

## 2023-03-25 NOTE — PROGRESS NOTE ADULT - ASSESSMENT
Mr. Cal Lloyd is a 56 Y/O M w/PMH HTN, HLD, T2DM, Bipolar Disorder, ESRD on HD (TTS), Afib on Eliquis [facility] and was admitted to CCU for management of possible anterior STEMI and cardiac arrest with anoxic brain injury.    NEURO  # Anoxic Brain Injury  As seen on CT 3/18. Found with cardiac arrest on floor of nursing home  - hypothermia started 3/18 1 PM, plan to rewarm 3/19 1 PM.  - repeat neuro examination off sedation after completion of cooling protocol  - brain death criteria not met as pt able to take spontaneous breaths during apnea test; all brainstem reflexes absent however  - palliative to guide discussion    CARDIAC  # Cardiac Arrest  Unclear cause, discharged after recent admission of respiratory failure requiring intubation. S/p cardiac arrest at nursing home. This is second cardiac arrest in as many months. ROSC achieved. Previous cardiac arrest in 02/23 in s/o complete lung atelectasis and mucus plugging. Initial EKG indicating possible STEMI; latest EKG back to baseline, minimal troponin elevations without significant delta.  - SpO2 goal > 94%; avoid hyperventilation  - Off NTG, MAP~ 80  -  therapeutic hypothermia protocol  - follow-up TTE      # HFpEF  EF > 70% 3/10, moderate diastolic dysfunction, severe concentric left ventricular hypertrophy  - 3/18 TTE RF 59%, moderate diastolic dysfunction, severely dilated LA, moderate pericardial effusion, b/l pleural effusions    #AFib  - Heparin gtt for AFib    RESPIRATORY  # intubated and sedated currently   - in s/o anoxic brain injury, will need further evaluation before any SBT/SATs  - Current vent settings: 20| 380| 5| 40%    GI  - no active issues   - keep NPO for now      # ESRD on HD (TTS)  - Renal for HD  - monitor electrolytes during cooling/rewarming  - condom catheter  - monitor I &Os  - currently undergoing maintenance HD    ID  #Elevated WBC Count  -3/19 WBC 29K, Lactate 2.3  -3/22 WBC 20; lactate 0.7  -c/w Zosyn 2.25g q8h (3/19-3/24) for empiric therapy for 5 days   - recurrent infections complicated by decompensation requiring ICU care  - adjust antimicrobial coverage pending data  - coverage for nosocomial pathogens      ENDO  # T2DM  No outpatient meds on records  - iSS     HEME  - no active issues currently    DVT PPX  - Heparin gtt for AFib    GOC: Palliative care involved during recent admission given concern for recurrent decompensation. Per chart notes, there were concerns about the family's insight into the severity of the patient's illness. The patient remained full code at time of discharge.  - in view of cardiac arrest and anoxic brain injury will need Palliative care involvement and ongoing GOC discussions pending re-evaluation after cooling protocol.  - prognosis guarded Mr. Cal Lloyd is a 56 Y/O M w/PMH HTN, HLD, T2DM, Bipolar Disorder, ESRD on HD (TTS), Afib on Eliquis [facility] and was admitted to CCU for management of possible anterior STEMI and cardiac arrest with anoxic brain injury.    NEURO  # Anoxic Brain Injury  As seen on CT 3/18. Found with cardiac arrest on floor of nursing home  - hypothermia started 3/18 1 PM, plan to rewarm 3/19 1 PM.  - repeat neuro examination off sedation after completion of cooling protocol  - brain death criteria not met as pt able to take spontaneous breaths during apnea test; all brainstem reflexes absent however  - palliative to guide discussion    CARDIAC  # Cardiac Arrest  Unclear cause, discharged after recent admission of respiratory failure requiring intubation. S/p cardiac arrest at nursing home. This is second cardiac arrest in as many months. ROSC achieved. Previous cardiac arrest in 02/23 in s/o complete lung atelectasis and mucus plugging. Initial EKG indicating possible STEMI; latest EKG back to baseline, minimal troponin elevations without significant delta.  - SpO2 goal > 94%; avoid hyperventilation  - Off NTG, MAP~ 80  -  therapeutic hypothermia protocol  - follow-up TTE      # HFpEF  EF > 70% 3/10, moderate diastolic dysfunction, severe concentric left ventricular hypertrophy  - 3/18 TTE RF 59%, moderate diastolic dysfunction, severely dilated LA, moderate pericardial effusion, b/l pleural effusions    #HTN  Likely a result of autonomic dysfunction in s/o anoxic brain injury  - Lopressor 5mg IV q6h PRN for SBP > 170  - Hydralazine 5mg IV q6hPRN SP > 180    #AFib  - Heparin gtt for AFib    RESPIRATORY  # intubated and sedated currently   - in s/o anoxic brain injury, will need further evaluation before any SBT/SATs  - Current vent settings: 20| 380| 5| 40%    GI  - no active issues   - keep NPO for now      # ESRD on HD (TTS)  - Renal for HD  - monitor electrolytes during cooling/rewarming  - condom catheter  - monitor I &Os  - currently undergoing maintenance HD    ID  #Elevated WBC Count  -3/19 WBC 29K, Lactate 2.3  -3/22 WBC 20; lactate 0.7  -c/w Zosyn 2.25g q8h (3/19-3/24) for empiric therapy for 5 days   - recurrent infections complicated by decompensation requiring ICU care  - adjust antimicrobial coverage pending data  - coverage for nosocomial pathogens      ENDO  # T2DM  No outpatient meds on records  - iSS     HEME  - no active issues currently    DVT PPX  - Heparin gtt for AFib    GOC: Palliative care involved during recent admission given concern for recurrent decompensation. Per chart notes, there were concerns about the family's insight into the severity of the patient's illness. The patient remained full code at time of discharge.  - in view of cardiac arrest and anoxic brain injury will need Palliative care involvement and ongoing GOC discussions pending re-evaluation after cooling protocol.  - prognosis guarded

## 2023-03-25 NOTE — PROGRESS NOTE ADULT - SUBJECTIVE AND OBJECTIVE BOX
Kris Piña, PGY-1  Internal Medicine   p [phone]    PATIENT:  NANCY SEPULVEDA  [id number]    CHIEF COMPLAINT:  Patient is a 57y old  Male who presents with a chief complaint of cardiac arrest (24 Mar 2023 08:02)      INTERVAL HISTORY/OVERNIGHT EVENTS:      REVIEW OF SYSTEMS:    Constitutional:     [ ] negative [ ] fevers [ ] chills [ ] weight loss [ ] weight gain  HEENT:                  [ ] negative [ ] dry eyes [ ] eye irritation [ ] postnasal drip [ ] nasal congestion  CV:                         [ ] negative  [ ] chest pain [ ] orthopnea [ ] palpitations [ ] murmur  Resp:                     [ ] negative [ ] cough [ ] shortness of breath [ ] dyspnea [ ] wheezing [ ] sputum [ ] hemoptysis  GI:                          [ ] negative [ ] nausea [ ] vomiting [ ] diarrhea [ ] constipation [ ] abd pain [ ] dysphagia   :                        [ ] negative [ ] dysuria [ ] nocturia [ ] hematuria [ ] increased urinary frequency  Musculoskeletal: [ ] negative [ ] back pain [ ] myalgias [ ] arthralgias [ ] fracture  Skin:                       [ ] negative [ ] rash [ ] itch  Neurological:        [ ] negative [ ] headache [ ] dizziness [ ] syncope [ ] weakness [ ] numbness  Psychiatric:           [ ] negative [ ] anxiety [ ] depression  Endocrine:            [ ] negative [ ] diabetes [ ] thyroid problem  Heme/Lymph:      [ ] negative [ ] anemia [ ] bleeding problem  Allergic/Immune: [ ] negative [ ] itchy eyes [ ] nasal discharge [ ] hives [ ] angioedema    [ ] All other systems negative  [ ] Unable to assess ROS because ________.    MEDICATIONS:  MEDICATIONS  (STANDING):  artificial tears (preservative free) Ophthalmic Solution 1 Drop(s) Both EYES every 4 hours  aspirin  chewable 81 milliGRAM(s) Enteral Tube daily  chlorhexidine 0.12% Liquid 15 milliLiter(s) Oral Mucosa two times a day  chlorhexidine 2% Cloths 1 Application(s) Topical <User Schedule>  darbepoetin Injectable ViaL 80 MICROGram(s) IV Push <User Schedule>  metoprolol tartrate Injectable 5 milliGRAM(s) IV Push once  pantoprazole  Injectable 40 milliGRAM(s) IV Push daily    MEDICATIONS  (PRN):  acetaminophen   Oral Liquid .. 650 milliGRAM(s) Enteral Tube every 6 hours PRN Temp greater or equal to 38C (100.4F)  hydrALAZINE Injectable 5 milliGRAM(s) IV Push every 6 hours PRN SBP > 170      ALLERGIES:  Allergies    No Known Allergies    Intolerances        OBJECTIVE:  ICU Vital Signs Last 24 Hrs  T(C): 36.6 (25 Mar 2023 12:00), Max: 36.6 (24 Mar 2023 20:00)  T(F): 97.8 (25 Mar 2023 12:00), Max: 97.9 (24 Mar 2023 20:00)  HR: 73 (25 Mar 2023 15:28) (68 - 87)  BP: --  BP(mean): --  ABP: 145/55 (25 Mar 2023 15:00) (115/34 - 183/62)  ABP(mean): 86 (25 Mar 2023 15:00) (62 - 108)  RR: 14 (25 Mar 2023 15:00) (12 - 15)  SpO2: 100% (25 Mar 2023 15:28) (97% - 100%)    O2 Parameters below as of 25 Mar 2023 15:00  Patient On (Oxygen Delivery Method): ventilator    O2 Concentration (%): 40      Mode: AC/ CMV (Assist Control/ Continuous Mandatory Ventilation)  RR (machine): 14  TV (machine): 380  FiO2: 40  PEEP: 5  ITime: 0.92  MAP: 9  PIP: 23    Adult Advanced Hemodynamics Last 24 Hrs  CVP(mm Hg): --  CVP(cm H2O): --  CO: --  CI: --  PA: --  PA(mean): --  PCWP: --  SVR: --  SVRI: --  PVR: --  PVRI: --  CAPILLARY BLOOD GLUCOSE      POCT Blood Glucose.: 101 mg/dL (25 Mar 2023 11:54)    CAPILLARY BLOOD GLUCOSE      POCT Blood Glucose.: 101 mg/dL (25 Mar 2023 11:54)    I&O's Summary    24 Mar 2023 07:01  -  25 Mar 2023 07:00  --------------------------------------------------------  IN: 710 mL / OUT: 1400 mL / NET: -690 mL    25 Mar 2023 07:01  -  25 Mar 2023 17:39  --------------------------------------------------------  IN: 250 mL / OUT: 0 mL / NET: 250 mL      Daily     Daily Weight in k.3 (24 Mar 2023 19:04)    PHYSICAL EXAMINATION:  General: WN/WD NAD  HEENT: PERRLA, EOMI, moist mucous membranes  Neurology: A&Ox3, nonfocal, MCKEON x 4  Respiratory: CTA B/L, normal respiratory effort, no wheezes, crackles, rales  CV: RRR, S1S2, no murmurs, rubs or gallops  Abdominal: Soft, NT, ND +BS, Last BM  Extremities: No edema, + peripheral pulses  Incisions:   Tubes:    LABS:  ABG - ( 25 Mar 2023 02:45 )  pH, Arterial: 7.36  pH, Blood: x     /  pCO2: 48    /  pO2: 122   / HCO3: 27    / Base Excess: 1.3   /  SaO2: 98.1                                    8.8    13.10 )-----------( 225      ( 25 Mar 2023 02:45 )             29.3     03-25    140  |  101  |  21  ----------------------------<  67<L>  4.0   |  24  |  2.12<H>    Ca    8.5      25 Mar 2023 02:45  Phos  3.5     03-25  Mg     2.20     03-25    TPro  5.2<L>  /  Alb  2.2<L>  /  TBili  1.2  /  DBili  x   /  AST  84<H>  /  ALT  33  /  AlkPhos  1193<H>  03-25    LIVER FUNCTIONS - ( 25 Mar 2023 02:45 )  Alb: 2.2 g/dL / Pro: 5.2 g/dL / ALK PHOS: 1193 U/L / ALT: 33 U/L / AST: 84 U/L / GGT: x           PT/INR - ( 24 Mar 2023 02:25 )   PT: 13.4 sec;   INR: 1.15 ratio         PTT - ( 24 Mar 2023 02:25 )  PTT:29.7 sec            TELEMETRY:     EKG:     IMAGING:       Kris Piña, PGY-1  Internal Medicine   p [phone]    PATIENT:  NANCY SEPULVEDA  [id number]    CHIEF COMPLAINT:  Patient is a 57y old  Male who presents with a chief complaint of cardiac arrest (24 Mar 2023 08:02)      INTERVAL HISTORY/OVERNIGHT EVENTS:  - no acute events overnight    REVIEW OF SYSTEMS:  Unable to assess ROS    MEDICATIONS:  MEDICATIONS  (STANDING):  artificial tears (preservative free) Ophthalmic Solution 1 Drop(s) Both EYES every 4 hours  aspirin  chewable 81 milliGRAM(s) Enteral Tube daily  chlorhexidine 0.12% Liquid 15 milliLiter(s) Oral Mucosa two times a day  chlorhexidine 2% Cloths 1 Application(s) Topical <User Schedule>  darbepoetin Injectable ViaL 80 MICROGram(s) IV Push <User Schedule>  metoprolol tartrate Injectable 5 milliGRAM(s) IV Push once  pantoprazole  Injectable 40 milliGRAM(s) IV Push daily    MEDICATIONS  (PRN):  acetaminophen   Oral Liquid .. 650 milliGRAM(s) Enteral Tube every 6 hours PRN Temp greater or equal to 38C (100.4F)  hydrALAZINE Injectable 5 milliGRAM(s) IV Push every 6 hours PRN SBP > 170      ALLERGIES:  Allergies    No Known Allergies    Intolerances        OBJECTIVE:  ICU Vital Signs Last 24 Hrs  T(C): 36.6 (25 Mar 2023 12:00), Max: 36.6 (24 Mar 2023 20:00)  T(F): 97.8 (25 Mar 2023 12:00), Max: 97.9 (24 Mar 2023 20:00)  HR: 73 (25 Mar 2023 15:28) (68 - 87)  BP: --  BP(mean): --  ABP: 145/55 (25 Mar 2023 15:00) (115/34 - 183/62)  ABP(mean): 86 (25 Mar 2023 15:00) (62 - 108)  RR: 14 (25 Mar 2023 15:00) (12 - 15)  SpO2: 100% (25 Mar 2023 15:28) (97% - 100%)    O2 Parameters below as of 25 Mar 2023 15:00  Patient On (Oxygen Delivery Method): ventilator    O2 Concentration (%): 40      Mode: AC/ CMV (Assist Control/ Continuous Mandatory Ventilation)  RR (machine): 14  TV (machine): 380  FiO2: 40  PEEP: 5  ITime: 0.92  MAP: 9  PIP: 23    Adult Advanced Hemodynamics Last 24 Hrs  CVP(mm Hg): --  CVP(cm H2O): --  CO: --  CI: --  PA: --  PA(mean): --  PCWP: --  SVR: --  SVRI: --  PVR: --  PVRI: --  CAPILLARY BLOOD GLUCOSE      POCT Blood Glucose.: 101 mg/dL (25 Mar 2023 11:54)    CAPILLARY BLOOD GLUCOSE      POCT Blood Glucose.: 101 mg/dL (25 Mar 2023 11:54)    I&O's Summary    24 Mar 2023 07:01  -  25 Mar 2023 07:00  --------------------------------------------------------  IN: 710 mL / OUT: 1400 mL / NET: -690 mL    25 Mar 2023 07:01  -  25 Mar 2023 17:39  --------------------------------------------------------  IN: 250 mL / OUT: 0 mL / NET: 250 mL      Daily     Daily Weight in k.3 (24 Mar 2023 19:04)    PHYSICAL EXAMINATION:  General: WN/WD NAD  HEENT: PERRLA, EOMI, moist mucous membranes  Neurology: lack of brainstem fuctions  Respiratory: CTA B/L, normal respiratory effort, no wheezes, crackles, rales  CV: RRR, S1S2, no murmurs, rubs or gallops  Abdominal: Soft, NT, ND +BS, Last BM  Extremities: No edema, + peripheral pulses  Incisions:   Tubes:    LABS:  ABG - ( 25 Mar 2023 02:45 )  pH, Arterial: 7.36  pH, Blood: x     /  pCO2: 48    /  pO2: 122   / HCO3: 27    / Base Excess: 1.3   /  SaO2: 98.1                                    8.8    13.10 )-----------( 225      ( 25 Mar 2023 02:45 )             29.3     03-25    140  |  101  |  21  ----------------------------<  67<L>  4.0   |  24  |  2.12<H>    Ca    8.5      25 Mar 2023 02:45  Phos  3.5       Mg     2.20         TPro  5.2<L>  /  Alb  2.2<L>  /  TBili  1.2  /  DBili  x   /  AST  84<H>  /  ALT  33  /  AlkPhos  1193<H>      LIVER FUNCTIONS - ( 25 Mar 2023 02:45 )  Alb: 2.2 g/dL / Pro: 5.2 g/dL / ALK PHOS: 1193 U/L / ALT: 33 U/L / AST: 84 U/L / GGT: x           PT/INR - ( 24 Mar 2023 02:25 )   PT: 13.4 sec;   INR: 1.15 ratio         PTT - ( 24 Mar 2023 02:25 )  PTT:29.7 sec            TELEMETRY:     EKG:     IMAGING:

## 2023-03-25 NOTE — DOWNTIME INTERRUPTION NOTE - WHICH MANUAL FORMS INITIATED?
See paper chart for clinical documentation recorded during [facility] downtime.
Due to the [facility] bundled upgrade / Downtime 03/24-03/25/2023 all the notes and flow sheets related to Respiratory Care Services provided to this patient related to mechanical ventilation, Noninvasive Ventilation (NIV) and High Flow Nasal Cannula (HFNC) during this [facility] downtime are filed in patient’s paper chart.

## 2023-03-26 NOTE — PROGRESS NOTE ADULT - SUBJECTIVE AND OBJECTIVE BOX
Patient is a 57y old  Male who presents with a chief complaint of cardiac arrest (25 Mar 2023 10:32)    HPI:  Mr. Cal Lloyd is a 58 Y/O M with HTN, HLD, T2DM, Bipolar Disorder, ESRD on HD (TTS), Afib on Eliquis who returned to [facility] from [facility] after cardiac arrest and CPR. He was discharged on 3/17 after long hospital course, requiring MICU care for septic shock.    Per ED Physician Note:   "Arrives intubated by EMS with tube at 21cm at lips, patient with ROSC at arrival with BP 120s systolic, HR 90s-100s.  Per EMS report, at 08:00 this morning, patient was eating breakfast in usual state of health at [facility], after recent discharge from hospital involving MICU course for pneumonia.  Then at 08:20 patient found pulseless and unresponsive, CPR initiated by [facility] staff then taken over by EMS, CPR for <20 minutes with 2 doses epinephrine given, then sustained ROSC obtained."    "Patient then had additional cardiac arrest in ED from 09:06 - 09:15 with epi given at 09:07 and 09:13.  Cardioversion given at 09:15 for possible VTach although rhythm in retrospect may have more likely been significant ST elevations with RBBB and LPFB mimicking VTach, given no change in rhythm.  Initial EKG during rosc shows STEMI.  Cardiology interventionalist Sandra valdez called, recommended CCU consult"      Of note, patient was recently discharged (3/9-3/17) from [facility] after MICU admission for Acute Hypoxic Respiratory Failure due to Aspiration PNA and COVID, was discharged after being weaned back to room air, in stable condition. He had a recent admission ( - ) at [facility] for septic shock and AHRF likely 2/2 aspiration pneumonia and Covid, hospital course c/b cardiac arrest iso compete lung atelectasis and mucus plugging, discharged to [facility] on room air.    Also with recent admission on  to [facility] for septic shock.    During previous admission, on 3/16, Palliative Care carried out a GOC discussion, where patient expressed desire to remain full code. (18 Mar 2023 11:25)       INTERVAL HPI/OVERNIGHT EVENTS:   No overnight events   Afebrile, hemodynamically stable     Subjective:    ICU Vital Signs Last 24 Hrs  T(C): 36.4 (26 Mar 2023 08:00), Max: 36.6 (25 Mar 2023 12:00)  T(F): 97.6 (26 Mar 2023 08:00), Max: 97.8 (25 Mar 2023 12:00)  HR: 68 (26 Mar 2023 07:45) (68 - 75)  BP: --  BP(mean): --  ABP: 150/58 (26 Mar 2023 06:00) (117/47 - 190/71)  ABP(mean): 93 (26 Mar 2023 06:00) (71 - 118)  RR: 14 (26 Mar 2023 06:00) (10 - 15)  SpO2: 100% (26 Mar 2023 07:45) (95% - 100%)    O2 Parameters below as of 26 Mar 2023 06:00  Patient On (Oxygen Delivery Method): ventilator    O2 Concentration (%): 40      I&O's Summary    25 Mar 2023 07:01  -  26 Mar 2023 07:00  --------------------------------------------------------  IN: 470 mL / OUT: 0 mL / NET: 470 mL      Mode: AC/ CMV (Assist Control/ Continuous Mandatory Ventilation)  RR (machine): 14  TV (machine): 380  FiO2: 40  PEEP: 5  ITime: 0.76  MAP: 9  PIP: 24      Daily     Daily Weight in k.5 (26 Mar 2023 04:00)    Adult Advanced Hemodynamics Last 24 Hrs  CVP(mm Hg): --  CVP(cm H2O): --  CO: --  CI: --  PA: --  PA(mean): --  PCWP: --  SVR: --  SVRI: --  PVR: --  PVRI: --    EKG/Telemetry Events:    MEDICATIONS  (STANDING):  amLODIPine   Tablet 10 milliGRAM(s) Oral daily  artificial tears (preservative free) Ophthalmic Solution 1 Drop(s) Both EYES every 4 hours  aspirin  chewable 81 milliGRAM(s) Enteral Tube daily  chlorhexidine 0.12% Liquid 15 milliLiter(s) Oral Mucosa two times a day  chlorhexidine 2% Cloths 1 Application(s) Topical <User Schedule>  darbepoetin Injectable ViaL 80 MICROGram(s) IV Push <User Schedule>  pantoprazole  Injectable 40 milliGRAM(s) IV Push daily    MEDICATIONS  (PRN):  acetaminophen   Oral Liquid .. 650 milliGRAM(s) Enteral Tube every 6 hours PRN Temp greater or equal to 38C (100.4F)  hydrALAZINE Injectable 5 milliGRAM(s) IV Push every 6 hours PRN SBP > 180  metoprolol tartrate Injectable 5 milliGRAM(s) IV Push every 6 hours PRN Hypertension      PHYSICAL EXAM:  GENERAL: NAD, AO0  HEAD:  Atraumatic, Normocephalic, Dry, Open > taped closed  EYES: EOMI, PERRLA, conjunctiva and sclera clear  NECK: Supple, No JVD, Normal thyroid, no enlarged nodes  NERVOUS SYSTEM:  Alert & Awake.   CHEST/LUNG: B/L good air entry; No rales, rhonchi, or wheezing  HEART: S1S2 normal, no S3, Regular rate and rhythm; No murmurs  ABDOMEN: Soft, Nontender, Nondistended; Bowel sounds present  EXTREMITIES:  2+ Peripheral Pulses, No clubbing, cyanosis, or edema  LYMPH: No lymphadenopathy noted  SKIN: No rashes or lesions    LABS:                        9.6    12.59 )-----------( 263      ( 26 Mar 2023 01:45 )             30.9         137  |  101  |  26<H>  ----------------------------<  114<H>  4.1   |  22  |  2.67<H>    Ca    8.2<L>      26 Mar 2023 01:45  Phos  4.2       Mg     2.20         TPro  5.5<L>  /  Alb  2.2<L>  /  TBili  1.2  /  DBili  x   /  AST  68<H>  /  ALT  31  /  AlkPhos  1184<H>      LIVER FUNCTIONS - ( 26 Mar 2023 01:45 )  Alb: 2.2 g/dL / Pro: 5.5 g/dL / ALK PHOS: 1184 U/L / ALT: 31 U/L / AST: 68 U/L / GGT: x             CAPILLARY BLOOD GLUCOSE      POCT Blood Glucose.: 101 mg/dL (25 Mar 2023 11:54)    ABG - ( 26 Mar 2023 01:45 )  pH, Arterial: 7.44  pH, Blood: x     /  pCO2: 38    /  pO2: 90    / HCO3: 26    / Base Excess: 1.6   /  SaO2: 99.0                          RADIOLOGY & ADDITIONAL TESTS:  CXR:        Care Discussed with Consultants/Other Providers [ x] YES  [ ] NO

## 2023-03-26 NOTE — PROGRESS NOTE ADULT - ASSESSMENT
Mr. Cal Lloyd is a 58 Y/O M w/PMH HTN, HLD, T2DM, Bipolar Disorder, ESRD on HD (TTS), Afib on Eliquis who presented from [facility] for cardiac arrest requiring CPR and was admitted to CCU for management of possible anterior STEMI and cardiac arrest with anoxic brain injury.    NEURO  # Anoxic Brain Injury  As seen on CT 3/18. Found with cardiac arrest on floor of nursing home  - hypothermia started 3/18 1 PM, plan to rewarm 3/19 1 PM.  - repeat neuro examination off sedation after completion of cooling protocol  - brain death criteria not met as pt able to take spontaneous breaths during apnea test; all brainstem reflexes absent however  - palliative to guide discussion    CARDIAC  # Cardiac Arrest  Unclear cause, discharged after recent admission of respiratory failure requiring intubation. S/p cardiac arrest at nursing home. This is second cardiac arrest in as many months. ROSC achieved. Previous cardiac arrest in 02/23 in s/o complete lung atelectasis and mucus plugging. Initial EKG indicating possible STEMI; latest EKG back to baseline, minimal troponin elevations without significant delta.  - SpO2 goal > 94%; avoid hyperventilation  - Off NTG, MAP~ 80  - S/p therapeutic hypothermia protocol      # HFpEF  EF > 70% 3/10, moderate diastolic dysfunction, severe concentric left ventricular hypertrophy  - 3/18 TTE RF 59%, moderate diastolic dysfunction, severely dilated LA, moderate pericardial effusion, b/l pleural effusions    #HTN  Likely a result of autonomic dysfunction in s/o anoxic brain injury  - Lopressor 5mg IV q6h PRN for SBP > 170  - Hydralazine 5mg IV q6hPRN SP > 180  - Started amlodipine 10 standing     #AFib  - Heparin gtt for AFib    RESPIRATORY  # intubated and sedated currently   - in s/o anoxic brain injury, will need further evaluation before any SBT/SATs  - Current vent settings: 20| 380| 5| 40%    GI  - no active issues   - keep NPO for now      # ESRD on HD (TTS)  - Renal for HD  - monitor electrolytes during cooling/rewarming  - condom catheter  - monitor I &Os  - currently undergoing maintenance HD    ID  #Elevated WBC Count  -3/19 WBC 29K, Lactate 2.3  -3/22 WBC 20; lactate 0.7  -c/w Zosyn 2.25g q8h (3/19-3/24) for empiric therapy for 5 days   - recurrent infections complicated by decompensation requiring ICU care  - adjust antimicrobial coverage pending data  - coverage for nosocomial pathogens      ENDO  # T2DM  No outpatient meds on records  - iSS     HEME  - no active issues currently    DVT PPX  - Heparin gtt for AFib    GOC: Palliative care involved during recent admission given concern for recurrent decompensation. Per chart notes, there were concerns about the family's insight into the severity of the patient's illness. The patient remained full code at time of discharge.  - in view of cardiac arrest and anoxic brain injury will need Palliative care involvement and ongoing GOC discussions pending re-evaluation after cooling protocol.  - prognosis guarded Mr. Cal Lloyd is a 56 Y/O M w/PMH HTN, HLD, T2DM, Bipolar Disorder, ESRD on HD (TTS), Afib on Eliquis who [facility] requiring CPR and was admitted to CCU for management of possible anterior STEMI and cardiac arrest with anoxic brain injury.    NEURO  # Anoxic Brain Injury  As seen on CT 3/18. Found with cardiac arrest on floor of nursing home  - hypothermia started 3/18 1 PM, plan to rewarm 3/19 1 PM.  - repeat neuro examination off sedation after completion of cooling protocol  - brain death criteria not met as pt able to take spontaneous breaths during apnea test; all brainstem reflexes absent however  - palliative to guide discussion    CARDIAC  # Cardiac Arrest  Unclear cause, discharged after recent admission of respiratory failure requiring intubation. S/p cardiac arrest at nursing home. This is second cardiac arrest in as many months. ROSC achieved. Previous cardiac arrest in 02/23 in s/o complete lung atelectasis and mucus plugging. Initial EKG indicating possible STEMI; latest EKG back to baseline, minimal troponin elevations without significant delta.  - SpO2 goal > 94%; avoid hyperventilation  - Off NTG, MAP~ 80  - S/p therapeutic hypothermia protocol      # HFpEF  EF > 70% 3/10, moderate diastolic dysfunction, severe concentric left ventricular hypertrophy  - 3/18 TTE RF 59%, moderate diastolic dysfunction, severely dilated LA, moderate pericardial effusion, b/l pleural effusions    #HTN  Likely a result of autonomic dysfunction in s/o anoxic brain injury  - Lopressor 5mg IV q6h PRN for SBP > 170  - Hydralazine 5mg IV q6hPRN SP > 180  - Started amlodipine 10 standing     #AFib  - Heparin gtt for AFib    RESPIRATORY  # intubated and sedated currently   - in s/o anoxic brain injury, will need further evaluation before any SBT/SATs  - Current vent settings: 20| 380| 5| 40%    GI  - no active issues   - keep NPO for now      # ESRD on HD (TTS)  - Renal for HD  - monitor electrolytes during cooling/rewarming  - condom catheter  - monitor I &Os  - currently undergoing maintenance HD    ID  #Elevated WBC Count  -3/19 WBC 29K, Lactate 2.3  -3/22 WBC 20; lactate 0.7  -c/w Zosyn 2.25g q8h (3/19-3/24) for empiric therapy for 5 days   - recurrent infections complicated by decompensation requiring ICU care  - adjust antimicrobial coverage pending data  - coverage for nosocomial pathogens      ENDO  # T2DM  No outpatient meds on records  - iSS     HEME  - no active issues currently    DVT PPX  - Heparin gtt for AFib    GOC: Palliative care involved during recent admission given concern for recurrent decompensation. Per chart notes, there were concerns about the family's insight into the severity of the patient's illness. The patient remained full code at time of discharge.  - in view of cardiac arrest and anoxic brain injury will need Palliative care involvement and ongoing GOC discussions pending re-evaluation after cooling protocol.  - prognosis guarded

## 2023-03-27 NOTE — PROGRESS NOTE ADULT - SUBJECTIVE AND OBJECTIVE BOX
[facility] Division of Kidney Diseases & Hypertension  FOLLOW UP NOTE  --------------------------------------------------------------------------------  Chief Complaint: Cardiac Arrest    24 hour events/subjective: Patient seen and evaluated at bedside this morning in the CCU.  unresponsive intubated and sedated.        PAST HISTORY  --------------------------------------------------------------------------------  No significant changes to PMH, PSH, FHx, SHx, unless otherwise noted    ALLERGIES & MEDICATIONS  --------------------------------------------------------------------------------  Allergies    No Known Allergies    Intolerances      Standing Inpatient Medications  amLODIPine   Tablet 10 milliGRAM(s) Oral daily  artificial tears (preservative free) Ophthalmic Solution 1 Drop(s) Both EYES every 4 hours  aspirin  chewable 81 milliGRAM(s) Enteral Tube daily  calcium acetate 667 milliGRAM(s) Oral every 8 hours  chlorhexidine 0.12% Liquid 15 milliLiter(s) Oral Mucosa two times a day  chlorhexidine 2% Cloths 1 Application(s) Topical <User Schedule>  darbepoetin Injectable ViaL 80 MICROGram(s) IV Push <User Schedule>  pantoprazole  Injectable 40 milliGRAM(s) IV Push daily    PRN Inpatient Medications  acetaminophen   Oral Liquid .. 650 milliGRAM(s) Enteral Tube every 6 hours PRN  hydrALAZINE Injectable 5 milliGRAM(s) IV Push every 6 hours PRN  metoprolol tartrate Injectable 5 milliGRAM(s) IV Push every 6 hours PRN      REVIEW OF SYSTEMS  --------------------------------------------------------------------------------  unable to obtain    VITALS/PHYSICAL EXAM  --------------------------------------------------------------------------------  T(C): 36.4 (03-27-23 @ 09:15), Max: 36.4 (03-27-23 @ 09:15)  HR: 103 (03-27-23 @ 11:30) (65 - 126)  BP: --  ABP: 133/47 (03-27-23 @ 11:00) (118/43 - 207/93)  ABP(mean): 72 (03-27-23 @ 11:00) (67 - 137)  RR: 17 (03-27-23 @ 10:00) (14 - 28)  SpO2: 99% (03-27-23 @ 11:30) (98% - 100%)  CVP(mm Hg): --        03-26-23 @ 07:01  -  03-27-23 @ 07:00  --------------------------------------------------------  IN: 360 mL / OUT: 0 mL / NET: 360 mL      Physical Exam:  	Gen:  NAD intubated  	Pulm: CTA B/L anteriorly on vent  	CV: S1S2   	Abd: Soft  	Ext: No LE edema   	Neuro: non responsive  	Skin: Warm and dry  	Vascular access: LUE AVF with palpable pulse and bruit heard    LABS/STUDIES  --------------------------------------------------------------------------------              9.9    13.62 >-----------<  337      [03-27-23 @ 01:08]              32.5     136  |  99  |  33  ----------------------------<  128      [03-27-23 @ 01:08]  4.5   |  24  |  3.22        Ca     8.6     [03-27-23 @ 01:08]      Mg     2.50     [03-27-23 @ 01:08]      Phos  5.1     [03-27-23 @ 01:08]    TPro  5.7  /  Alb  2.3  /  TBili  1.1  /  DBili  x   /  AST  64  /  ALT  24  /  AlkPhos  1289  [03-27-23 @ 01:08]          Creatinine Trend:  SCr 3.22 [03-27 @ 01:08]  SCr 2.67 [03-26 @ 01:45]  SCr 2.12 [03-25 @ 02:45]  SCr 2.94 [03-24 @ 02:25]  SCr 2.28 [03-23 @ 04:34]

## 2023-03-27 NOTE — PROGRESS NOTE ADULT - PROBLEM SELECTOR PLAN 1
Pt. with ESRD on HD TIW who presented for cardiac arrest with evidence of anoxic brain injury.  Seen and evaluated at bedside today in the CCU.  Tolerated HD friday 1.5 kg UF. Plan for maintenance HD today. Ongoing goals of care. ca/mg/p and BP trend reviewed.

## 2023-03-27 NOTE — PROGRESS NOTE ADULT - SUBJECTIVE AND OBJECTIVE BOX
Patient is a 57y old  Male who presents with a chief complaint of cardiac arrest (26 Mar 2023 08:18)    HPI:  Mr. Cal Lloyd is a 56 Y/O M with HTN, HLD, T2DM, Bipolar Disorder, ESRD on HD (TTS), Afib on Eliquis who returned to [facility] from [facility] after cardiac arrest and CPR. He was discharged on 3/17 after long hospital course, requiring MICU care for septic shock.    Per ED Physician Note:   "Arrives intubated by EMS with tube at 21cm at lips, patient with ROSC at arrival with BP 120s systolic, HR 90s-100s.  Per EMS report, at 08:00 this morning, patient was eating breakfast in usual state of health at [facility], after recent discharge from hospital involving MICU course for pneumonia.  Then at 08:20 patient found pulseless and unresponsive, CPR initiated by [place] staff then taken over by EMS, CPR for <20 minutes with 2 doses epinephrine given, then sustained ROSC obtained."    "Patient then had additional cardiac arrest in ED from 09:06 - 09:15 with epi given at 09:07 and 09:13.  Cardioversion given at 09:15 for possible VTach although rhythm in retrospect may have more likely been significant ST elevations with RBBB and LPFB mimicking VTach, given no change in rhythm.  Initial EKG during rosc shows STEMI.  Cardiology interventionalist Sandra valdez called, recommended CCU consult"    Of note, patient was recently discharged (3/9-3/17) from [facility] after MICU admission for Acute Hypoxic Respiratory Failure due to Aspiration PNA and COVID, was discharged after being weaned back to room air, in stable condition. He had a recent admission ( - ) at [facility] for septic shock and AHRF likely 2/2 aspiration pneumonia and Covid, hospital course c/b cardiac arrest iso compete lung atelectasis and mucus plugging, discharged to [facility] on room air.    Also with recent admission on  to [place] for septic shock.    During previous admission, on 3/16, Palliative Care carried out a GOC discussion, where patient expressed desire to remain full code. (18 Mar 2023 11:25)       INTERVAL HPI/OVERNIGHT EVENTS:   No overnight events   Afebrile, hemodynamically stable     Subjective: NONE. Received PRN hydral this AM. Pt A/O0.     ICU Vital Signs Last 24 Hrs  T(C): 35.6 (27 Mar 2023 04:00), Max: 36.3 (26 Mar 2023 12:00)  T(F): 96 (27 Mar 2023 04:00), Max: 97.4 (26 Mar 2023 12:00)  HR: 68 (27 Mar 2023 07:00) (65 - 104)  BP: --  BP(mean): --  ABP: 150/50 (27 Mar 2023 07:00) (118/43 - 207/93)  ABP(mean): 85 (27 Mar 2023 07:00) (67 - 137)  RR: 14 (27 Mar 2023 07:00) (14 - 28)  SpO2: 99% (27 Mar 2023 07:00) (98% - 100%)    O2 Parameters below as of 27 Mar 2023 07:00  Patient On (Oxygen Delivery Method): ventilator    O2 Concentration (%): 40      I&O's Summary    26 Mar 2023 07:01  -  27 Mar 2023 07:00  --------------------------------------------------------  IN: 360 mL / OUT: 0 mL / NET: 360 mL      Mode: AC/ CMV (Assist Control/ Continuous Mandatory Ventilation)  RR (machine): 14  TV (machine): 380  FiO2: 40  PEEP: 5  ITime: 0.9  MAP: 9  PIP: 21      Daily     Daily Weight in k.8 (27 Mar 2023 01:00)    MEDICATIONS  (STANDING):  amLODIPine   Tablet 10 milliGRAM(s) Oral daily  artificial tears (preservative free) Ophthalmic Solution 1 Drop(s) Both EYES every 4 hours  aspirin  chewable 81 milliGRAM(s) Enteral Tube daily  calcium acetate 667 milliGRAM(s) Oral every 8 hours  chlorhexidine 0.12% Liquid 15 milliLiter(s) Oral Mucosa two times a day  chlorhexidine 2% Cloths 1 Application(s) Topical <User Schedule>  darbepoetin Injectable ViaL 80 MICROGram(s) IV Push <User Schedule>  pantoprazole  Injectable 40 milliGRAM(s) IV Push daily    MEDICATIONS  (PRN):  acetaminophen   Oral Liquid .. 650 milliGRAM(s) Enteral Tube every 6 hours PRN Temp greater or equal to 38C (100.4F)  hydrALAZINE Injectable 5 milliGRAM(s) IV Push every 6 hours PRN SBP > 180  metoprolol tartrate Injectable 5 milliGRAM(s) IV Push every 6 hours PRN Hypertension      PHYSICAL EXAM:  GENERAL: NAD, AO0, intubated and ventilated   HEAD:  Atraumatic, Normocephalic  EYES: EOMI, PERRLA, conjunctiva and sclera clear  NECK: Supple, No JVD, Normal thyroid, no enlarged nodes  NERVOUS SYSTEM:  Alert & Awake.   CHEST/LUNG: B/L good air entry; No rales, rhonchi, or wheezing  HEART: S1S2 normal, no S3, Regular rate and rhythm; No murmurs  ABDOMEN: Soft, Nontender, Nondistended; Bowel sounds present  EXTREMITIES:  2+ Peripheral Pulses, No clubbing, cyanosis, or edema  LYMPH: No lymphadenopathy noted  SKIN: No rashes or lesions    LABS:                        9.9    13.62 )-----------( 337      ( 27 Mar 2023 01:08 )             32.5     03-    136  |  99  |  33<H>  ----------------------------<  128<H>  4.5   |  24  |  3.22<H>    Ca    8.6      27 Mar 2023 01:08  Phos  5.1       Mg     2.50         TPro  5.7<L>  /  Alb  2.3<L>  /  TBili  1.1  /  DBili  x   /  AST  64<H>  /  ALT  24  /  AlkPhos  1289<H>  03-27    LIVER FUNCTIONS - ( 27 Mar 2023 01:08 )  Alb: 2.3 g/dL / Pro: 5.7 g/dL / ALK PHOS: 1289 U/L / ALT: 24 U/L / AST: 64 U/L / GGT: x             CAPILLARY BLOOD GLUCOSE      POCT Blood Glucose.: 159 mg/dL (26 Mar 2023 17:37)  POCT Blood Glucose.: 129 mg/dL (26 Mar 2023 12:36)    ABG - ( 27 Mar 2023 01:08 )  pH, Arterial: 7.42  pH, Blood: x     /  pCO2: 40    /  pO2: 147   / HCO3: 26    / Base Excess: 1.3   /  SaO2: 99.6                          RADIOLOGY & ADDITIONAL TESTS:  CXR:        Care Discussed with Consultants/Other Providers [ x] YES  [ ] NO

## 2023-03-27 NOTE — PROGRESS NOTE ADULT - PROBLEM SELECTOR PLAN 2
Pt. with anemia in the setting of ESRD. Hgb below target range will monitor hgb for now. received aranesp last week will monitor.

## 2023-03-27 NOTE — CHART NOTE - NSCHARTNOTEFT_GEN_A_CORE
Pt seen for malnutrition follow up.    Medical Course:  - Per chart, pt is 57 year old male PMH HTN, HLD, type 2 DM, bipolar disorder, ESRD on HD, [facility] requiring CPR admitted to CCU for management of possible anterior STEMI and cardiac arrest with anoxic brain injury. Nephrology following, last HD today. Palliative care on board, ongoing GOC discussions.     Nutrition Course:  - Pt intubated. Pt continues on trickle feeds, Nepro visibly running at 10 mL/hr via pump at time of visit. Last BM 3/26 per flowsheets, fecal incontinence noted.     Diet Prescription:   - NPO with Tube Feed: Nepro with Carb Steady via NGT at a goal rate of 10 mL/hr x24 hrs     Pertinent Medications:   - calcium acetate, pantoprazole IV     Pertinent Labs:   - (3/27) Na 136 mmol/L Glu 128 mg/dL<H> K+ 4.5 mmol/L Cr 3.22 mg/dL<H> BUN 33 mg/dL<H> Phos 5.1 mg/dL<H> Alb 2.3 g/dL<L>    Weight: (3/24 post-HD) 126.3 lbs / 57.3 kg, (3/22) 131.1 lbs / 59.5 kg, (3/20 post-HD) 132 lbs / 59.9 kg, (3/18 dosing/admission) 132.7 lbs / 60.2 kg  Weight Assessment: Apparent ~6 lb (5%) weight loss since admission (9 days).   Height: 69 in / 175.26 cm  IBW: 160 lbs / 72.7 kg +/-10%  BMI: 18.6 kg/m^2 (at most recent weight)    Physical Assessment:  Edema, per flowsheets: 2+ bilateral hand/bilateral arm  Pressure Injury, per Wound Care consult (3/21): sacrum DTPI    Estimated Needs:   [X] Recalculated, based on current weight 126.3 lbs / 57.3 kg  8754-5252 kcal daily @20-25 kcal/kg, 85..14 gm protein daily @1.5-1.8 gm/kg     Previous Nutrition Diagnosis: [X] Severe malnutrition in the context of acute on chronic illness  New Nutrition Diagnosis: [X] not applicable     Recommendations:  1) Nutrition care plan to align with GOC. Advance Nepro to goal rate of 30 mL/hr x24 hrs + No Carb Prosource 2x daily to provide a total of 720 mL formula, 1416 kcal (1296 kcal from formula), 88.32 gm protein (58.32 gm protein from formula), 523 mL free water daily. This provides 24.7 kcal/kg, 1.54 gm protein/kg @ ABW 57.3 kg.   2) Obtain pre/post-HD weights.     Monitor & Evaluate:  Tolerance to EN, nutrition related lab values, weight trends, BMs/GI distress, hydration status, skin integrity.  Yandy Chong, MARIAMN, CDN #[id number]  Also available on [employer] Teams. Pt seen for malnutrition follow up.    Medical Course:  - Per chart, pt is 57 year old male PMH HTN, HLD, type 2 DM, bipolar disorder, ESRD on HD, Afib presenting from [facility] for cardiac arrest requiring CPR admitted to CCU for management of possible anterior STEMI and cardiac arrest with anoxic brain injury. Nephrology following, last HD today. Palliative care on board, ongoing GOC discussions.     Nutrition Course:  - Pt intubated. Pt continues on trickle feeds, Nepro visibly running at 10 mL/hr via pump at time of visit. Labs notable for hypophosphatemia. Last BM 3/26 per flowsheets, fecal incontinence noted.     Diet Prescription:   - NPO with Tube Feed: Nepro with Carb Steady via NGT at a goal rate of 10 mL/hr x24 hrs     Pertinent Medications:   - calcium acetate, pantoprazole IV     Pertinent Labs:   - (3/27) Na 136 mmol/L Glu 128 mg/dL<H> K+ 4.5 mmol/L Cr 3.22 mg/dL<H> BUN 33 mg/dL<H> Phos 5.1 mg/dL<H> Alb 2.3 g/dL<L>    Weight: (3/24 post-HD) 126.3 lbs / 57.3 kg, (3/22) 131.1 lbs / 59.5 kg, (3/20 post-HD) 132 lbs / 59.9 kg, (3/18 dosing/admission) 132.7 lbs / 60.2 kg  Weight Assessment: Apparent ~6 lb (5%) weight loss since admission (9 days).   Height: 69 in / 175.26 cm  IBW: 160 lbs / 72.7 kg +/-10%  BMI: 18.6 kg/m^2 (at most recent weight)    Physical Assessment:  Edema, per flowsheets: 2+ bilateral hand/bilateral arm  Pressure Injury, per Wound Care consult (3/21): sacrum DTPI    Estimated Needs:   [X] Recalculated, based on current weight 126.3 lbs / 57.3 kg  6706-3291 kcal daily @20-25 kcal/kg, 85..14 gm protein daily @1.5-1.8 gm/kg     Previous Nutrition Diagnosis: [X] Severe malnutrition in the context of acute on chronic illness  New Nutrition Diagnosis: [X] not applicable     Recommendations:  1) Nutrition care plan to align with GOC. As appropriate/medically feasible, advance Nepro to goal rate of 30 mL/hr x24 hrs + No Carb Prosource 2x daily to provide a total of 720 mL formula, 1416 kcal (1296 kcal from formula), 88.32 gm protein (58.32 gm protein from formula), 523 mL free water daily. This provides 24.7 kcal/kg, 1.54 gm protein/kg @ ABW 57.3 kg. Additional free water provision per medical discretion.  2) Obtain pre/post-HD weights.     Monitor & Evaluate:  Tolerance to EN, nutrition related lab values, weight trends, BMs/GI distress, hydration status, skin integrity.  Yandy Chong, GILBERT, CDN #[id number]  Also available on [employer] Teams.

## 2023-03-28 NOTE — PROGRESS NOTE ADULT - SUBJECTIVE AND OBJECTIVE BOX
[facility] Division of Kidney Diseases & Hypertension  FOLLOW UP NOTE  --------------------------------------------------------------------------------  Chief Complaint: Cardiac Arrest    24 hour events/subjective: Patient seen and evaluated at bedside this morning in the CCU.  unresponsive intubated and sedated.      PAST HISTORY  --------------------------------------------------------------------------------  No significant changes to PMH, PSH, FHx, SHx, unless otherwise noted    ALLERGIES & MEDICATIONS  --------------------------------------------------------------------------------  Allergies    No Known Allergies    Intolerances      Standing Inpatient Medications  amLODIPine   Tablet 10 milliGRAM(s) Oral daily  artificial tears (preservative free) Ophthalmic Solution 1 Drop(s) Both EYES every 4 hours  aspirin  chewable 81 milliGRAM(s) Enteral Tube daily  carvedilol 3.125 milliGRAM(s) Oral every 12 hours  chlorhexidine 0.12% Liquid 15 milliLiter(s) Oral Mucosa two times a day  chlorhexidine 2% Cloths 1 Application(s) Topical <User Schedule>  heparin   Injectable 5000 Unit(s) SubCutaneous every 8 hours  pantoprazole  Injectable 40 milliGRAM(s) IV Push daily    PRN Inpatient Medications  acetaminophen   Oral Liquid .. 650 milliGRAM(s) Enteral Tube every 6 hours PRN  hydrALAZINE Injectable 5 milliGRAM(s) IV Push every 6 hours PRN      REVIEW OF SYSTEMS  --------------------------------------------------------------------------------  unable to obtain    VITALS/PHYSICAL EXAM  --------------------------------------------------------------------------------  T(C): 36.1 (03-28-23 @ 08:00), Max: 36.4 (03-27-23 @ 12:22)  HR: 71 (03-28-23 @ 11:06) (58 - 104)  BP: --  ABP: 178/72 (03-28-23 @ 10:00) (133/53 - 204/83)  ABP(mean): 113 (03-28-23 @ 10:00) (79 - 133)  RR: 15 (03-28-23 @ 05:00) (13 - 15)  SpO2: 100% (03-28-23 @ 11:06) (99% - 100%)  CVP(mm Hg): --        03-27-23 @ 07:01  -  03-28-23 @ 07:00  --------------------------------------------------------  IN: 465 mL / OUT: 1400 mL / NET: -935 mL    03-28-23 @ 07:01  -  03-28-23 @ 11:20  --------------------------------------------------------  IN: 40 mL / OUT: 0 mL / NET: 40 mL    Physical Exam:  	Gen:  NAD intubated  	Pulm: CTA B/L anteriorly on vent  	CV: S1S2   	Abd: Soft  	Ext: No LE edema   	Neuro: non responsive  	Skin: Warm and dry  	Vascular access: LUE AVF with palpable pulse and bruit heard    LABS/STUDIES  --------------------------------------------------------------------------------              9.9    16.72 >-----------<  432      [03-28-23 @ 04:36]              32.7     137  |  99  |  22  ----------------------------<  120      [03-28-23 @ 04:36]  4.0   |  26  |  2.57        Ca     8.6     [03-28-23 @ 04:36]      Mg     2.40     [03-28-23 @ 04:36]      Phos  4.0     [03-28-23 @ 04:36]    TPro  6.0  /  Alb  2.5  /  TBili  1.1  /  DBili  x   /  AST  71  /  ALT  27  /  AlkPhos  1342  [03-28-23 @ 04:36]          Creatinine Trend:  SCr 2.57 [03-28 @ 04:36]  SCr 3.22 [03-27 @ 01:08]  SCr 2.67 [03-26 @ 01:45]  SCr 2.12 [03-25 @ 02:45]  SCr 2.94 [03-24 @ 02:25]

## 2023-03-28 NOTE — PROGRESS NOTE ADULT - ASSESSMENT
Patient is a 56 Y/O M with HTN/T2DM/Bipolar Disorder, ESRD on HD (TTS), Afib on Eliquis who returned to [facility] from [facility] after cardiac arrest and CPR. He was discharged on 3/17 after long hospital course, requiring MICU care for septic shock. Per EMS report, at 08:00 in the morning, patient was eating breakfast in usual state of health at [facility], after recent discharge from hospital involving MICU course for pneumonia.  Then at 08:20 patient found pulseless and unresponsive, CPR initiated by [place] staff then taken over by EMS, CPR for <20 minutes with 2 doses epinephrine given, then sustained ROSC obtained. Patient then had additional cardiac arrest in ED from 09:06 - 09:15 with epi given at 09:07 and 09:13.  Cardioversion given at 09:15 for possible VTach although rhythm in retrospect may have more likely been significant ST elevations with RBBB and LPFB mimicking VTach, given no change in rhythm.  Initial EKG during rosc shows STEMI.  Cardiology interventionalist Sandra valdez called, recommended CCU consult. During previous admission, on 3/16, Palliative Care carried out a GOC discussion, where patient expressed desire to remain full code. Palliative consulted for complex medical decision making in the setting of serious illness.

## 2023-03-28 NOTE — PROGRESS NOTE ADULT - SUBJECTIVE AND OBJECTIVE BOX
[facility]-- WOUND TEAM -- FOLLOW UP NOTE  --------------------------------------------------------------------------------    subjective: Patient seen and examined at bedside. Patient unable to make needs known secondary to mental status     Interval HPI/24 hour events:   Afebrile   FARZANA elevated when turned   Ongoing GOC pending family meeting with parents tomorrow   Chart reviewed including labs and relevant images      Diet:  Diet, NPO with Tube Feed:   Tube Feeding Modality: Nasogastric  Nepro with Carb Steady (NEPRORTH)  Total Volume for 24 Hours (mL): 240  Continuous  Starting Tube Feed Rate mL per Hour: 10  Increase Tube Feed Rate by (mL): 0  Until Goal Tube Feed Rate (mL per Hour): 10  Tube Feed Duration (in Hours): 24  Tube Feed Start Time: 06:00 (03-21-23 @ 07:36)      ROS: pt unable to offer    ALLERGIES & MEDICATIONS  --------------------------------------------------------------------------------  Allergies    No Known Allergies    Intolerances    STANDING INPATIENT MEDICATIONS    amLODIPine   Tablet 10 milliGRAM(s) Oral daily  artificial tears (preservative free) Ophthalmic Solution 1 Drop(s) Both EYES every 4 hours  aspirin  chewable 81 milliGRAM(s) Enteral Tube daily  carvedilol 3.125 milliGRAM(s) Oral every 12 hours  chlorhexidine 0.12% Liquid 15 milliLiter(s) Oral Mucosa two times a day  chlorhexidine 2% Cloths 1 Application(s) Topical <User Schedule>  heparin   Injectable 5000 Unit(s) SubCutaneous every 8 hours  pantoprazole  Injectable 40 milliGRAM(s) IV Push daily      PRN INPATIENT MEDICATION  acetaminophen   Oral Liquid .. 650 milliGRAM(s) Enteral Tube every 6 hours PRN  hydrALAZINE Injectable 5 milliGRAM(s) IV Push every 6 hours PRN      Vital signs:  T(C): 36.1 (03-28-23 @ 08:00), Max: 36.4 (03-27-23 @ 12:22)  HR: 71 (03-28-23 @ 11:06) (58 - 104)  BP: --  RR: 15 (03-28-23 @ 05:00) (13 - 15)  SpO2: 100% (03-28-23 @ 11:06) (99% - 100%)    Wt -- 118.6 kg/m2 (03-28-23)      03-27-23 @ 07:01  -  03-28-23 @ 07:00  --------------------------------------------------------  IN: 465 mL / OUT: 1400 mL / NET: -935 mL    03-28-23 @ 07:01  -  03-28-23 @ 11:16  --------------------------------------------------------  IN: 40 mL / OUT: 0 mL / NET: 40 mL    Constitutional: NAD, intubated. obtunded. Flaccidity of all extremities, does not respond to stimuli.   (+) low airloss support surface, (+) fluidized positioning devices, (+) complete cair boots. Head and ears offloaded with Z fluidized pillows:  NC/AT. Dry oral mucosa.   Cardiovascular: rate regular   Respiratory: Intubated, FIO2 40%   Gastrointestinal: soft NT/ND, incontinent of pasty/loose brown stool during skin assessment, perineal care provided   : HD   Neurology: Unable to follow commands.   Vascular: BLE equally cool to distal toes, no pulses palpated, + Monophasic doppler sounds. No overt ischemia, right toe first ray amputation. Capillary < 3 seconds. No edema.   Right 5th metatarsal head with callus and purple maroon base- [id number], prev 1cmx0.5cmx0, no palpable skin changes, no drainage. No associated cellulitis  Left lateral 5th met head purple maroon discoloration- [id number]. Np associated cellulitis.   Skin:   Generalized dryness, petechia in left clavicle   Bilateral upper extremities ecchymosis without hematoma.  Sacrum to coccyx (Patient with mynor prominence)- evolving deep tissue pressure injury- 8.3ywf7csj8.2cm, prev 6.7akf2hcq6.2cm, 70% deep purple-maroon discoloration and 30% fibrinous tissue in superior aspect of wound. Minimal drainage. No palpable skin changes. Periwound skin intact. No induration, no fluctuance, no crepitus.   Psych: unable to assess     LABS/ CULTURES/ RADIOLOGY:              9.9    16.72 >-----------<  432      [03-28-23 @ 04:36]              32.7     137  |  99  |  22  ----------------------------<  120      [03-28-23 @ 04:36]  4.0   |  26  |  2.57        Ca     8.6     [03-28-23 @ 04:36]      Mg     2.40     [03-28-23 @ 04:36]      Phos  4.0     [03-28-23 @ 04:36]    TPro  6.0  /  Alb  2.5  /  TBili  1.1  /  DBili  x   /  AST  71  /  ALT  27  /  AlkPhos  1342  [03-28-23 @ 04:36]    CAPILLARY BLOOD GLUCOSE    A1C with Estimated Average Glucose Result: 4.8 % (02-10-23 @ 00:05)

## 2023-03-28 NOTE — PROGRESS NOTE ADULT - PROBLEM SELECTOR PLAN 1
Pt. with ESRD on HD TIW who presented for cardiac arrest with evidence of anoxic brain injury.  Seen and evaluated at bedside today in the CCU.  Tolerated HD yesterday.  Plan for maintenance HD tomorrow.  Ongoing goals of care. ca/mg/p and BP trend reviewed.  d/w primary team

## 2023-03-28 NOTE — PROGRESS NOTE ADULT - ASSESSMENT
Assessment/Plan: Mr. Cal Lloyd is a 58 Y/O M w/PMH HTN, HLD, T2DM, Bipolar Disorder, ESRD on HD (TTS), Afib on Eliquis who [facility] and was admitted to CCU for management of possible anterior STEMI and cardiac arrest with anoxic brain injury. Patient seen by Palliative team for GOC discussion, patient remains full code.     Wound care f/u for Sacrum to coccyx evolving deep tissue pressure injury.      Exam: Patient intubated, obtunded with anoxic brain injury. Patient receiving nutrition via OT tube.   Sacrum to coccyx evolving deep tissue pressure injury, mostly deep purple maroon discoloration, some fibrinous tissue. NO s/s of infection. WBC remains elevated, wound does not appear infected.   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   Right and left lateral 5th metatarsal head purple-maroon discoloration; mixed etiology deep tissue pressure injury and arterial insufficiency.    Continue with current recommendations:    - Sacrum/coccyx- cleanse with NS, Pat dry. Cover with silicone foam with border, change daily. Continue to offload pressure, monitor for changes in tissue type. Perineal care per protocol.  - right and left lateral 5th metatarsal head purple-maroon discoloration- paint with betadine daily, may leave open to air.  -Continue to offload as per hospital protocol  -Monitor for tissue type changes  -Consider external fecal  if loose BM's occur     Additional recommendations:  Upper and lower extremities, Moisturize intact skin w/ SWEEN cream daily avoid between toes.  Appreciate Nutrition Consult for optimization as tolerated in patient with severe protein calorie malnutrition   Continue w/ low air loss fluidized bed surface   Continue turning and positioning w/ offloading assistive devices as per protocol  Continue w/ Pericare as per protocol  Waffle Cushion to chair when oob to chair    Findings and plan discussed with primary RN and primary team.     Upon discharge f/u as outpatient at [facility] [address] [place] [address] [phone] unless under hospice services   Will continue to follow periodically throughout hospitalization, please reconsult as needed.    QUE Menchaca, RAFFY (pager #[phone] or available in MS Teams)    If after 4PM or before 7:30AM on Mon-Friday or weekend/holiday please contact general surgery for urgent matters.   Team A- [id number]   Team B- [id number]  For non-urgent matters e-mail [email or web address]    I spent 35 minutes face to face with this patient of which more than 50% of the time was spent counseling & coordinating care of this pt

## 2023-03-28 NOTE — PROGRESS NOTE ADULT - PROBLEM SELECTOR PLAN 2
- per prior GOC note from (3/16) - patient was hesitant about mechanical ventilation but wanted CPR. Ultimately decided to be full code  - 3/22- discussed poor prognosis with patient's mother and brother. Family is struggling   - See GOC note. I spent 25 minutes addressing advanced care planning with patient and/or decision maker(s)   - Decision makers: surrogates are patient's parents. However complex dynamics since parents are  with poor relations and do not feel the other should have decision making abilities. Legally both parents do have equal say in decision making, explained this to patient's brother Jake.
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
## 2023-03-28 NOTE — PROGRESS NOTE ADULT - CONVERSATION DETAILS
Spoke to patient's mother Dawna on the phone, was wondering if a repeat brain imaging could be done. Explained to her that imaging would not , and clinically he is still the same. She says "I miss him a great deal." Later spoke to patient's brother Jake, who says his parents have been struggling. He adds that patient's father is still hopeful patient will recover, however Jake himself understands patient has irreversible brain damage. Their parents are  and do not have good relations. He says their father does not feel the mother should be allowed to make decisions. Explained that legally both parents must make decisions together, despite their history. Asked for Jake to explain this to his parents and if he can coordinate a family meeting with both his parents present. He will ask them and call back with a date and time.
Meeting held today with CCU team and patient's mother Dawna and patient's brother Jake. Patient's mother had numerous questions regarding patient's condition, specifically his brain function. She reports being told patient sustained "some" brain damage and questions how it became "near total" as was told this morning. Much of meeting was mother asking questions surrounding his cardiac arrests and prior hospital course. Explained to her that the neurologic damage at this point was very severe, irreversible, and she should expect it may even progress to brain death soon. Explained he was currently alive because of artificial life sustaining treatment such as the ventilator. Mother was having great difficulty accepting his poor prognosis. Brother Jake seemed to understand the severity and requested some time alone with their mother.

## 2023-03-28 NOTE — PROGRESS NOTE ADULT - ASSESSMENT
Mr. Cal Lloyd is a 56 Y/O M w/PMH HTN, HLD, T2DM, Bipolar Disorder, ESRD on HD (TTS), Afib on Eliquis who [facility] for cardiac arrest requiring CPR and was admitted to CCU for management of possible anterior STEMI and cardiac arrest with anoxic brain injury.    NEURO  # Anoxic Brain Injury  As seen on CT 3/18. Found with cardiac arrest on floor of nursing home  - hypothermia started 3/18 1 PM  - brain death criteria not met as pt able to take spontaneous breaths during apnea test; all brainstem reflexes absent however  - palliative to guide discussion    CARDIAC  # Cardiac Arrest  Unclear cause, discharged after recent admission of respiratory failure requiring intubation. S/p cardiac arrest at nursing home. This is second cardiac arrest in as many months. ROSC achieved. Previous cardiac arrest in 02/23 in s/o complete lung atelectasis and mucus plugging. Initial EKG indicating possible STEMI; latest EKG back to baseline, minimal troponin elevations without significant delta.  - SpO2 goal > 94%; avoid hyperventilation  - Off NTG, MAP~ 80  - S/p therapeutic hypothermia protocol    # HFpEF  EF > 70% 3/10, moderate diastolic dysfunction, severe concentric left ventricular hypertrophy  - 3/18 TTE RF 59%, moderate diastolic dysfunction, severely dilated LA, moderate pericardial effusion, b/l pleural effusions    #HTN  Likely a result of autonomic dysfunction in s/o anoxic brain injury  - Lopressor 5mg IV q6h PRN for SBP > 170  - Hydralazine 5mg IV q6hPRN SP > 180  - Started amlodipine 10 standing     #AFib  - Heparin gtt for AFib    RESPIRATORY  # intubated and sedated currently   - in s/o anoxic brain injury, will need further evaluation before any SBT/SATs    GI  - no active issues   - keep NPO for now      # ESRD on HD (TTS)  - Renal for HD  - monitor electrolytes during cooling/rewarming  - condom catheter  - monitor I &Os  - currently undergoing maintenance HD    ID  #Elevated WBC Count  -3/19 WBC 29K, Lactate 2.3  -3/22 WBC 20; lactate 0.7  -c/w Zosyn 2.25g q8h (3/19-3/24) for empiric therapy for 5 days   - recurrent infections complicated by decompensation requiring ICU care  - adjust antimicrobial coverage pending data  - coverage for nosocomial pathogens      ENDO  # T2DM  No outpatient meds on records  - iSS     HEME  - no active issues currently    DVT PPX  - Heparin gtt for AFib    GOC: Palliative care involved during recent admission given concern for recurrent decompensation. Per chart notes, there were concerns about the family's insight into the severity of the patient's illness. The patient remained full code at time of discharge.  GOC meeting held last week with family asking for time to consider options after medical and palliative teams explained pt's current status  3/26 Family with discord over deciding who will be HCP. Given pt has no spouse, next of kin would be parents, neither of whom wants to share HCP decision-making with the other. Resulted in rapid escalation of conversation with security needing to be called on family for disturbance of the unit. Will require palliative-assisted GOC given complicated family situation.  Mr. Cal Lloyd is a 58 Y/O M w/PMH HTN, HLD, T2DM, Bipolar Disorder, ESRD on HD (TTS), Afib on Eliquis who [facility] requiring CPR and was admitted to CCU for management of possible anterior STEMI and cardiac arrest with anoxic brain injury.    NEURO  # Anoxic Brain Injury  As seen on CT 3/18. Found with cardiac arrest on floor of nursing home  - s/p hypothermia protocol  - brain death criteria not met as pt able to take spontaneous breaths during apnea test; all brainstem reflexes absent however. Performed daily apnea test for a week with pt passing all   - palliative to guide discussion on GOC     CARDIAC  # Cardiac Arrest  Unclear cause, discharged after recent admission of respiratory failure requiring intubation. S/p cardiac arrest at nursing home. This is second cardiac arrest in as many months. ROSC achieved. Previous cardiac arrest in 02/23 in s/o complete lung atelectasis and mucus plugging. Initial EKG indicating possible STEMI; latest EKG back to baseline, minimal troponin elevations without significant delta.  - SpO2 goal > 94%; avoid hyperventilation  - S/p therapeutic hypothermia protocol    # HFpEF  EF > 70% 3/10, moderate diastolic dysfunction, severe concentric left ventricular hypertrophy  - 3/18 TTE RF 59%, moderate diastolic dysfunction, severely dilated LA, moderate pericardial effusion, b/l pleural effusions    #HTN  Likely a result of autonomic dysfunction in s/o anoxic brain injury  - PRN Hydralazine 5mg IV q6hPRN SP > 180  - Started amlodipine and coreg standing     #AFib  - Heparin gtt for AFib d/c'd given bloody sputum via tube last week     RESPIRATORY  # intubated without sedation   - in s/o anoxic brain injury, will need further evaluation before any SBT/SATs    GI  - no active issues   - TFs running       # ESRD on HD (TTS)  - Renal for HD  - monitor electrolytes during cooling/rewarming  - condom catheter  - monitor I &Os  - currently undergoing maintenance HD    ID  #Elevated WBC Count  -3/19 WBC 29K, Lactate 2.3  -3/22 WBC 20; lactate 0.7  - s/p Zosyn 2.25g q8h (3/19-3/24) for empiric therapy for 5 days. WBC rising s/p zosyn, no addl abx for now   - recurrent infections complicated by decompensation requiring ICU care    ENDO  # T2DM  No outpatient meds on records  - iSS     HEME  -Leukocytosis as above     DVT PPX  -Subq heparin     GOC: Palliative care involved during recent admission given concern for recurrent decompensation. Per chart notes, there were concerns about the family's insight into the severity of the patient's illness. The patient remained full code at time of discharge.  GOC meeting held last week with family asking for time to consider options after medical and palliative teams explained pt's current status  3/26 Family with discord over deciding who will be HCP. Given pt has no spouse, next of kin would be parents, neither of whom wants to share HCP decision-making with the other. Resulted in rapid escalation of conversation with security needing to be called on family for disturbance of the unit. Will require palliative-assisted GOC given complicated family situation.

## 2023-03-28 NOTE — PROGRESS NOTE ADULT - PROBLEM SELECTOR PLAN 3
Palliative consulted for complex medical decision making in the setting of serious illness.  Requested patient's brother Jkae coordinate a meeting in which both their parents will be present, he will call back palliative team.

## 2023-03-28 NOTE — PROGRESS NOTE ADULT - SUBJECTIVE AND OBJECTIVE BOX
Patient is a 57y old  Male who presents with a chief complaint of cardiac arrest (27 Mar 2023 12:03)    HPI:  Mr. Cal Lloyd is a 56 Y/O M with HTN, HLD, T2DM, Bipolar Disorder, ESRD on HD (TTS), Afib on Eliquis who [facility] after cardiac arrest and CPR. He was discharged on 3/17 after long hospital course, requiring MICU care for septic shock.    Per ED Physician Note:   "Arrives intubated by EMS with tube at 21cm at lips, patient with ROSC at arrival with BP 120s systolic, HR 90s-100s.  Per EMS report, at 08:00 this morning, patient was eating breakfast in usual state of health at [facility], after recent discharge from hospital involving MICU course for pneumonia.  Then at 08:20 patient found pulseless and unresponsive, CPR initiated by [place] staff then taken over by EMS, CPR for <20 minutes with 2 doses epinephrine given, then sustained ROSC obtained."    "Patient then had additional cardiac arrest in ED from 09:06 - 09:15 with epi given at 09:07 and 09:13.  Cardioversion given at 09:15 for possible VTach although rhythm in retrospect may have more likely been significant ST elevations with RBBB and LPFB mimicking VTach, given no change in rhythm.  Initial EKG during rosc shows STEMI.  Cardiology interventionalist Sandra valdez called, recommended CCU consult"      Of note, patient was recently discharged (3[facility]9-3/17) [facility] after [facility] admission for Acute Hypoxic Respiratory Failure due to Aspiration PNA and COVID, was discharged after being weaned back to room air, in stable condition. He had a recent admission ( - ) at [facility] for septic shock and AHRF likely 2/2 aspiration pneumonia and Covid, hospital course c/b cardiac arrest iso compete lung atelectasis and mucus plugging, discharged to [facility] on room air.    Also with recent admission on  to [place] for septic shock.    During previous admission, on 3/16, Palliative Care carried out a GOC discussion, where patient expressed desire to remain full code. (18 Mar 2023 11:25)       INTERVAL HPI/OVERNIGHT EVENTS:   No overnight events   Afebrile, hemodynamically stable     Subjective:    ICU Vital Signs Last 24 Hrs  T(C): 36.1 (28 Mar 2023 08:00), Max: 36.4 (27 Mar 2023 09:15)  T(F): 97 (28 Mar 2023 08:00), Max: 97.6 (27 Mar 2023 09:15)  HR: 70 (28 Mar 2023 08:00) (58 - 126)  BP: --  BP(mean): --  ABP: 188/79 (28 Mar 2023 08:00) (133/47 - 204/83)  ABP(mean): 123 (28 Mar 2023 08:00) (72 - 133)  RR: 15 (28 Mar 2023 05:00) (13 - 17)  SpO2: 100% (28 Mar 2023 08:00) (98% - 100%)    O2 Parameters below as of 28 Mar 2023 08:00  Patient On (Oxygen Delivery Method): ventilator,AC 14 Vt 380 PEEP 5    O2 Concentration (%): 40      I&O's Summary    27 Mar 2023 07:01  -  28 Mar 2023 07:00  --------------------------------------------------------  IN: 465 mL / OUT: 1400 mL / NET: -935 mL    28 Mar 2023 07:01  -  28 Mar 2023 09:01  --------------------------------------------------------  IN: 10 mL / OUT: 0 mL / NET: 10 mL      Mode: AC/ CMV (Assist Control/ Continuous Mandatory Ventilation)  RR (machine): 14  TV (machine): 380  FiO2: 40  PEEP: 5  ITime: 0.88  MAP: 8  PIP: 19      Daily     Daily Weight in k.8 (28 Mar 2023 04:00)    Adult Advanced Hemodynamics Last 24 Hrs  CVP(mm Hg): --  CVP(cm H2O): --  CO: --  CI: --  PA: --  PA(mean): --  PCWP: --  SVR: --  SVRI: --  PVR: --  PVRI: --    EKG/Telemetry Events:    MEDICATIONS  (STANDING):  amLODIPine   Tablet 10 milliGRAM(s) Oral daily  artificial tears (preservative free) Ophthalmic Solution 1 Drop(s) Both EYES every 4 hours  aspirin  chewable 81 milliGRAM(s) Enteral Tube daily  chlorhexidine 0.12% Liquid 15 milliLiter(s) Oral Mucosa two times a day  chlorhexidine 2% Cloths 1 Application(s) Topical <User Schedule>  pantoprazole  Injectable 40 milliGRAM(s) IV Push daily    MEDICATIONS  (PRN):  acetaminophen   Oral Liquid .. 650 milliGRAM(s) Enteral Tube every 6 hours PRN Temp greater or equal to 38C (100.4F)  hydrALAZINE Injectable 5 milliGRAM(s) IV Push every 6 hours PRN SBP > 180  metoprolol tartrate Injectable 5 milliGRAM(s) IV Push every 6 hours PRN Hypertension      PHYSICAL EXAM:  GENERAL:   HEAD:  Atraumatic, Normocephalic  EYES: EOMI, PERRLA, conjunctiva and sclera clear  NECK: Supple, No JVD, Normal thyroid, no enlarged nodes  NERVOUS SYSTEM:  Alert & Awake.   CHEST/LUNG: B/L good air entry; No rales, rhonchi, or wheezing  HEART: S1S2 normal, no S3, Regular rate and rhythm; No murmurs  ABDOMEN: Soft, Nontender, Nondistended; Bowel sounds present  EXTREMITIES:  2+ Peripheral Pulses, No clubbing, cyanosis, or edema  LYMPH: No lymphadenopathy noted  SKIN: No rashes or lesions    LABS:                        9.9    16.72 )-----------( 432      ( 28 Mar 2023 04:36 )             32.7         137  |  99  |  22  ----------------------------<  120<H>  4.0   |  26  |  2.57<H>    Ca    8.6      28 Mar 2023 04:36  Phos  4.0       Mg     2.40         TPro  6.0  /  Alb  2.5<L>  /  TBili  1.1  /  DBili  x   /  AST  71<H>  /  ALT  27  /  AlkPhos  1342<H>      LIVER FUNCTIONS - ( 28 Mar 2023 04:36 )  Alb: 2.5 g/dL / Pro: 6.0 g/dL / ALK PHOS: 1342 U/L / ALT: 27 U/L / AST: 71 U/L / GGT: x             CAPILLARY BLOOD GLUCOSE        ABG - ( 28 Mar 2023 04:36 )  pH, Arterial: 7.45  pH, Blood: x     /  pCO2: 42    /  pO2: 140   / HCO3: 29    / Base Excess: 4.7   /  SaO2: 99.3                          RADIOLOGY & ADDITIONAL TESTS:  CXR:        Care Discussed with Consultants/Other Providers [ x] YES  [ ] NO           Patient is a 57y old  Male who presents with a chief complaint of cardiac arrest (27 Mar 2023 12:03)    HPI:  Mr. Cal Lloyd is a 56 Y/O M with HTN, HLD, T2DM, Bipolar Disorder, ESRD on HD (TTS), Afib on Eliquis who returned to [facility] from [facility] after cardiac arrest and CPR. He was discharged on 3/17 after long hospital course, requiring MICU care for septic shock.    Per ED Physician Note:   "Arrives intubated by EMS with tube at 21cm at lips, patient with ROSC at arrival with BP 120s systolic, HR 90s-100s.  Per EMS report, at 08:00 this morning, patient was eating breakfast in usual state of health at [facility], after recent discharge from hospital involving MICU course for pneumonia.  Then at 08:20 patient found pulseless and unresponsive, CPR initiated by [place] staff then taken over by EMS, CPR for <20 minutes with 2 doses epinephrine given, then sustained ROSC obtained."    "Patient then had additional cardiac arrest in ED from 09:06 - 09:15 with epi given at 09:07 and 09:13.  Cardioversion given at 09:15 for possible VTach although rhythm in retrospect may have more likely been significant ST elevations with RBBB and LPFB mimicking VTach, given no change in rhythm.  Initial EKG during rosc shows STEMI.  Cardiology interventionalist Sandra valdez called, recommended CCU consult"      Of note, patient was recently discharged (3/9-3/17) from [facility] after MICU admission for Acute Hypoxic Respiratory Failure due to Aspiration PNA and COVID, was discharged after being weaned back to room air, in stable condition. He had a recent admission ( - ) at [facility] for septic shock and AHRF likely 2/2 aspiration pneumonia and Covid, hospital course c/b cardiac arrest iso compete lung atelectasis and mucus plugging, discharged to [facility] on room air.    Also with recent admission on  to [place] for septic shock.    During previous admission, on 3/16, Palliative Care carried out a GOC discussion, where patient expressed desire to remain full code. (18 Mar 2023 11:25)       INTERVAL HPI/OVERNIGHT EVENTS:   No overnight events   Afebrile, hemodynamically stable     Subjective: NAONE    ICU Vital Signs Last 24 Hrs  T(C): 36.1 (28 Mar 2023 08:00), Max: 36.4 (27 Mar 2023 09:15)  T(F): 97 (28 Mar 2023 08:00), Max: 97.6 (27 Mar 2023 09:15)  HR: 70 (28 Mar 2023 08:00) (58 - 126)  BP: --  BP(mean): --  ABP: 188/79 (28 Mar 2023 08:00) (133/47 - 204/83)  ABP(mean): 123 (28 Mar 2023 08:00) (72 - 133)  RR: 15 (28 Mar 2023 05:00) (13 - 17)  SpO2: 100% (28 Mar 2023 08:00) (98% - 100%)    O2 Parameters below as of 28 Mar 2023 08:00  Patient On (Oxygen Delivery Method): ventilator,AC 14 Vt 380 PEEP 5    O2 Concentration (%): 40      I&O's Summary    27 Mar 2023 07:01  -  28 Mar 2023 07:00  --------------------------------------------------------  IN: 465 mL / OUT: 1400 mL / NET: -935 mL    28 Mar 2023 07:01  -  28 Mar 2023 09:01  --------------------------------------------------------  IN: 10 mL / OUT: 0 mL / NET: 10 mL      Mode: AC/ CMV (Assist Control/ Continuous Mandatory Ventilation)  RR (machine): 14  TV (machine): 380  FiO2: 40  PEEP: 5  ITime: 0.88  MAP: 8  PIP: 19      Daily     Daily Weight in k.8 (28 Mar 2023 04:00)      EKG/Telemetry Events: Tachycardic     MEDICATIONS  (STANDING):  amLODIPine   Tablet 10 milliGRAM(s) Oral daily  artificial tears (preservative free) Ophthalmic Solution 1 Drop(s) Both EYES every 4 hours  aspirin  chewable 81 milliGRAM(s) Enteral Tube daily  chlorhexidine 0.12% Liquid 15 milliLiter(s) Oral Mucosa two times a day  chlorhexidine 2% Cloths 1 Application(s) Topical <User Schedule>  pantoprazole  Injectable 40 milliGRAM(s) IV Push daily    MEDICATIONS  (PRN):  acetaminophen   Oral Liquid .. 650 milliGRAM(s) Enteral Tube every 6 hours PRN Temp greater or equal to 38C (100.4F)  hydrALAZINE Injectable 5 milliGRAM(s) IV Push every 6 hours PRN SBP > 180  metoprolol tartrate Injectable 5 milliGRAM(s) IV Push every 6 hours PRN Hypertension      PHYSICAL EXAM:  GENERAL: NAD, AO0  HEAD:  Atraumatic, Normocephalic  EYES: EOMI, PERRLA, conjunctiva and sclera clear  NECK: Supple, No JVD, Normal thyroid, no enlarged nodes  NERVOUS SYSTEM:  Alert & Awake.   CHEST/LUNG: B/L good air entry; No rales, rhonchi, or wheezing  HEART: S1S2 normal, no S3, Regular rate and rhythm; No murmurs  ABDOMEN: Soft, Nontender, Nondistended; Bowel sounds present  EXTREMITIES:  2+ Peripheral Pulses, No clubbing, cyanosis, or edema  LYMPH: No lymphadenopathy noted  SKIN: No rashes or lesions    LABS:                        9.9    16.72 )-----------( 432      ( 28 Mar 2023 04:36 )             32.7         137  |  99  |  22  ----------------------------<  120<H>  4.0   |  26  |  2.57<H>    Ca    8.6      28 Mar 2023 04:36  Phos  4.0       Mg     2.40         TPro  6.0  /  Alb  2.5<L>  /  TBili  1.1  /  DBili  x   /  AST  71<H>  /  ALT  27  /  AlkPhos  1342<H>      LIVER FUNCTIONS - ( 28 Mar 2023 04:36 )  Alb: 2.5 g/dL / Pro: 6.0 g/dL / ALK PHOS: 1342 U/L / ALT: 27 U/L / AST: 71 U/L / GGT: x             CAPILLARY BLOOD GLUCOSE        ABG - ( 28 Mar 2023 04:36 )  pH, Arterial: 7.45  pH, Blood: x     /  pCO2: 42    /  pO2: 140   / HCO3: 29    / Base Excess: 4.7   /  SaO2: 99.3                          RADIOLOGY & ADDITIONAL TESTS:  CXR:        Care Discussed with Consultants/Other Providers [ x] YES  [ ] NO

## 2023-03-29 NOTE — PROGRESS NOTE ADULT - ASSESSMENT
Mr. Cal Lloyd is a 56 Y/O M w/PMH HTN, HLD, T2DM, Bipolar Disorder, ESRD on HD (TTS), Afib on Eliquis who [facility] for [facility] requiring CPR and was admitted to CCU for management of possible anterior STEMI and cardiac arrest with anoxic brain injury.    NEURO  # Anoxic Brain Injury  As seen on CT 3/18. Found with cardiac arrest on floor of nursing home  - s/p hypothermia protocol  - brain death criteria not met as pt able to take spontaneous breaths during apnea test; all brainstem reflexes absent however. Performed daily apnea test for a week with pt passing all   - palliative to guide discussion on GOC     CARDIAC  # Cardiac Arrest  Unclear cause, discharged after recent admission of respiratory failure requiring intubation. S/p cardiac arrest at nursing home. This is second cardiac arrest in as many months. ROSC achieved. Previous cardiac arrest in 02/23 in s/o complete lung atelectasis and mucus plugging. Initial EKG indicating possible STEMI; latest EKG back to baseline, minimal troponin elevations without significant delta.  - SpO2 goal > 94%; avoid hyperventilation  - S/p therapeutic hypothermia protocol    # HFpEF  EF > 70% 3/10, moderate diastolic dysfunction, severe concentric left ventricular hypertrophy  - 3/18 TTE RF 59%, moderate diastolic dysfunction, severely dilated LA, moderate pericardial effusion, b/l pleural effusions    #HTN  Likely a result of autonomic dysfunction in s/o anoxic brain injury  - PRN Hydralazine 5mg IV q6hPRN SP > 180  - Started amlodipine and coreg standing     #AFib  - Heparin gtt for AFib d/c'd given bloody sputum via tube last week     RESPIRATORY  # intubated without sedation   - in s/o anoxic brain injury, will need further evaluation before any SBT/SATs    GI  - no active issues   - TFs running       # ESRD on HD (TTS)  - Renal for HD  - monitor electrolytes during cooling/rewarming  - condom catheter  - monitor I &Os  - currently undergoing maintenance HD    ID  #Elevated WBC Count  -3/19 WBC 29K, Lactate 2.3  -3/22 WBC 20; lactate 0.7  - s/p Zosyn 2.25g q8h (3/19-3/24) for empiric therapy for 5 days. WBC rising s/p zosyn, no addl abx for now   - recurrent infections complicated by decompensation requiring ICU care    ENDO  # T2DM  No outpatient meds on records  - iSS     HEME  -Leukocytosis as above     DVT PPX  -Subq heparin     GOC: Palliative care involved during recent admission given concern for recurrent decompensation. Per chart notes, there were concerns about the family's insight into the severity of the patient's illness. The patient remained full code at time of discharge.  GOC meeting held last week with family asking for time to consider options after medical and palliative teams explained pt's current status  3/26 Family with discord over deciding who will be HCP. Given pt has no spouse, next of kin would be parents, neither of whom wants to share HCP decision-making with the other. Resulted in rapid escalation of conversation with security needing to be called on family for disturbance of the unit. Will require palliative-assisted GOC given complicated family situation.   Attempted to contact numbers in chart, both of which are out of service. On Sunday, family had verbally reported their return to the hospital on 3/29 between noon and 1 PM to have a meeting. Will await their arrival for GOC discussion with palliative present today.

## 2023-03-29 NOTE — PROGRESS NOTE ADULT - SUBJECTIVE AND OBJECTIVE BOX
Patient is a 57y old  Male who presents with a chief complaint of cardiac arrest (28 Mar 2023 14:59)    HPI:  Mr. Cal Lloyd is a 58 Y/O M with HTN, HLD, T2DM, Bipolar Disorder, ESRD on HD (TTS), Afib on Eliquis who returned to [facility] from [facility] after cardiac arrest and CPR. He was discharged on 3/17 after long hospital course, requiring MICU care for septic shock.    Per ED Physician Note:   "Arrives intubated by EMS with tube at 21cm at lips, patient with ROSC at arrival with BP 120s systolic, HR 90s-100s.  Per EMS report, at 08:00 this morning, patient was eating breakfast in usual state of health at [facility], after recent discharge from hospital involving MICU course for pneumonia.  Then at 08:20 patient found pulseless and unresponsive, CPR initiated by [facility] staff then taken over by EMS, CPR for <20 minutes with 2 doses epinephrine given, then sustained ROSC obtained."    "Patient then had additional cardiac arrest in ED from 09:06 - 09:15 with epi given at 09:07 and 09:13.  Cardioversion given at 09:15 for possible VTach although rhythm in retrospect may have more likely been significant ST elevations with RBBB and LPFB mimicking VTach, given no change in rhythm.  Initial EKG during rosc shows STEMI.  Cardiology interventionalist Sandra valdez called, recommended CCU consult"      Of note, patient was recently discharged (3/9-3/17) from [facility] after MICU admission for Acute Hypoxic Respiratory Failure due to Aspiration PNA and COVID, was discharged after being weaned back to room air, in stable condition. He had a recent admission ( - ) at [facility] for septic shock and AHRF likely 2/2 aspiration pneumonia and Covid, hospital course c/b cardiac arrest iso compete lung atelectasis and mucus plugging, discharged to [facility] on room air.    Also with recent admission on  to [place] for septic shock.    During previous admission, on 3/16, Palliative Care carried out a GOC discussion, where patient expressed desire to remain full code. (18 Mar 2023 11:25)       INTERVAL HPI/OVERNIGHT EVENTS:   No overnight events   Afebrile, hemodynamically stable     ICU Vital Signs Last 24 Hrs  T(C): 37 (29 Mar 2023 05:00), Max: 37 (29 Mar 2023 05:00)  T(F): 98.6 (29 Mar 2023 05:00), Max: 98.6 (29 Mar 2023 05:00)  HR: 73 (29 Mar 2023 07:00) (57 - 86)  BP: 137/61 (29 Mar 2023 07:00) (135/70 - 183/76)  BP(mean): 78 (29 Mar 2023 07:00) (73 - 102)  ABP: 155/49 (28 Mar 2023 15:00) (140/53 - 178/72)  ABP(mean): 88 (28 Mar 2023 15:00) (82 - 123)  RR: 14 (29 Mar 2023 07:00) (14 - 15)  SpO2: 100% (29 Mar 2023 07:00) (100% - 100%)    O2 Parameters below as of 29 Mar 2023 07:00  Patient On (Oxygen Delivery Method): ventilator,tv380 40 % ac 14 peep5    O2 Concentration (%): 40      I&O's Summary    28 Mar 2023 07:01  -  29 Mar 2023 07:00  --------------------------------------------------------  IN: 180 mL / OUT: 0 mL / NET: 180 mL      Mode: AC/ CMV (Assist Control/ Continuous Mandatory Ventilation)  RR (machine): 14  TV (machine): 380  FiO2: 40  PEEP: 5  ITime: 0.9  MAP: 8  PIP: 19      Daily     Daily Weight in k.6 (29 Mar 2023 01:00)      MEDICATIONS  (STANDING):  amLODIPine   Tablet 10 milliGRAM(s) Oral daily  artificial tears (preservative free) Ophthalmic Solution 1 Drop(s) Both EYES every 4 hours  aspirin  chewable 81 milliGRAM(s) Enteral Tube daily  carvedilol 3.125 milliGRAM(s) Oral every 12 hours  chlorhexidine 0.12% Liquid 15 milliLiter(s) Oral Mucosa two times a day  chlorhexidine 2% Cloths 1 Application(s) Topical <User Schedule>  heparin   Injectable 5000 Unit(s) SubCutaneous every 8 hours  pantoprazole  Injectable 40 milliGRAM(s) IV Push daily    MEDICATIONS  (PRN):  acetaminophen   Oral Liquid .. 650 milliGRAM(s) Enteral Tube every 6 hours PRN Temp greater or equal to 38C (100.4F)  hydrALAZINE Injectable 5 milliGRAM(s) IV Push every 6 hours PRN SBP > 180      PHYSICAL EXAM:  GENERAL: NAD, AO0  HEAD:  Atraumatic, Normocephalic  EYES: Taped closed  NECK: Supple, No JVD, Normal thyroid, no enlarged nodes  NERVOUS SYSTEM:  Alert & Awake.   CHEST/LUNG: B/L good air entry; No rales, rhonchi, or wheezing  HEART: S1S2 normal, no S3, Regular rate and rhythm; No murmurs  ABDOMEN: Soft, Nontender, Nondistended; Bowel sounds present  EXTREMITIES:  2+ Peripheral Pulses, No clubbing, cyanosis, or edema  LYMPH: No lymphadenopathy noted  SKIN: No rashes or lesions    LABS:                        8.9    11.42 )-----------( 401      ( 29 Mar 2023 03:02 )             28.8         136  |  99  |  27<H>  ----------------------------<  105<H>  4.0   |  26  |  3.07<H>    Ca    8.7      29 Mar 2023 03:02  Phos  4.4       Mg     2.40         TPro  5.6<L>  /  Alb  2.5<L>  /  TBili  1.0  /  DBili  x   /  AST  63<H>  /  ALT  21  /  AlkPhos  1224<H>      LIVER FUNCTIONS - ( 29 Mar 2023 03:02 )  Alb: 2.5 g/dL / Pro: 5.6 g/dL / ALK PHOS: 1224 U/L / ALT: 21 U/L / AST: 63 U/L / GGT: x             CAPILLARY BLOOD GLUCOSE        ABG - ( 28 Mar 2023 04:36 )  pH, Arterial: 7.45  pH, Blood: x     /  pCO2: 42    /  pO2: 140   / HCO3: 29    / Base Excess: 4.7   /  SaO2: 99.3                          RADIOLOGY & ADDITIONAL TESTS:  CXR:        Care Discussed with Consultants/Other Providers [ x] YES  [ ] NO

## 2023-03-29 NOTE — PROGRESS NOTE ADULT - PROBLEM SELECTOR PLAN 1
Pt. with ESRD on HD TIW who presented for cardiac arrest with evidence of anoxic brain injury.  Seen and evaluated at bedside today in the CCU.  Tolerated HD monday.  Plan for maintenance HD today. Ongoing goals of care. ca/mg/p and BP trend reviewed.

## 2023-03-29 NOTE — PROGRESS NOTE ADULT - SUBJECTIVE AND OBJECTIVE BOX
[facility] [place] Division of Kidney Diseases & Hypertension  FOLLOW UP NOTE  --------------------------------------------------------------------------------  Chief Complaint: Cardiac Arrest    24 hour events/subjective: Patient was seen and evaluated at bedside this morning. Non repsonsive on vent.        PAST HISTORY  --------------------------------------------------------------------------------  No significant changes to PMH, PSH, FHx, SHx, unless otherwise noted    ALLERGIES & MEDICATIONS  --------------------------------------------------------------------------------  Allergies    No Known Allergies    Intolerances      Standing Inpatient Medications  amLODIPine   Tablet 10 milliGRAM(s) Oral daily  artificial tears (preservative free) Ophthalmic Solution 1 Drop(s) Both EYES every 4 hours  aspirin  chewable 81 milliGRAM(s) Enteral Tube daily  carvedilol 3.125 milliGRAM(s) Oral every 12 hours  chlorhexidine 0.12% Liquid 15 milliLiter(s) Oral Mucosa two times a day  chlorhexidine 2% Cloths 1 Application(s) Topical <User Schedule>  heparin   Injectable 5000 Unit(s) SubCutaneous every 8 hours  pantoprazole  Injectable 40 milliGRAM(s) IV Push daily    PRN Inpatient Medications  acetaminophen   Oral Liquid .. 650 milliGRAM(s) Enteral Tube every 6 hours PRN  hydrALAZINE Injectable 5 milliGRAM(s) IV Push every 6 hours PRN      REVIEW OF SYSTEMS  --------------------------------------------------------------------------------  unable to obtain    VITALS/PHYSICAL EXAM  --------------------------------------------------------------------------------  T(C): 37 (03-29-23 @ 05:00), Max: 37 (03-29-23 @ 05:00)  HR: 72 (03-29-23 @ 06:00) (57 - 86)  BP: 159/70 (03-29-23 @ 06:00) (135/70 - 183/76)  ABP: 155/49 (03-28-23 @ 15:00) (140/53 - 178/72)  ABP(mean): 88 (03-28-23 @ 15:00) (82 - 123)  RR: 14 (03-29-23 @ 06:00) (14 - 15)  SpO2: 100% (03-29-23 @ 06:00) (100% - 100%)  CVP(mm Hg): --        03-28-23 @ 07:01  -  03-29-23 @ 07:00  --------------------------------------------------------  IN: 180 mL / OUT: 0 mL / NET: 180 mL    Physical Exam:  	Gen:  NAD intubated  	Pulm: CTA B/L anteriorly on vent  	CV: S1S2   	Abd: Soft  	Ext: No LE edema   	Neuro: non responsive  	Skin: Warm and dry  	Vascular access: LUE AVF with palpable pulse and bruit heard    LABS/STUDIES  --------------------------------------------------------------------------------              8.9    11.42 >-----------<  401      [03-29-23 @ 03:02]              28.8     136  |  99  |  27  ----------------------------<  105      [03-29-23 @ 03:02]  4.0   |  26  |  3.07        Ca     8.7     [03-29-23 @ 03:02]      Mg     2.40     [03-29-23 @ 03:02]      Phos  4.4     [03-29-23 @ 03:02]    TPro  5.6  /  Alb  2.5  /  TBili  1.0  /  DBili  x   /  AST  63  /  ALT  21  /  AlkPhos  1224  [03-29-23 @ 03:02]          Creatinine Trend:  SCr 3.07 [03-29 @ 03:02]  SCr 2.57 [03-28 @ 04:36]  SCr 3.22 [03-27 @ 01:08]  SCr 2.67 [03-26 @ 01:45]  SCr 2.12 [03-25 @ 02:45]

## 2023-03-30 NOTE — PROGRESS NOTE ADULT - PROBLEM SELECTOR PLAN 1
Pt. with ESRD on HD TIW who presented for cardiac arrest with evidence of anoxic brain injury.  Seen and evaluated at bedside today in the CCU.  Tolerated HD yesterday.  Plan for maintenance HD tomorrow.  Ongoing goals of care. ca/mg/p and BP trend reviewed.

## 2023-03-30 NOTE — PROGRESS NOTE ADULT - ASSESSMENT
Mr. Cal Lloyd is a 56 Y/O M w/PMH HTN, HLD, T2DM, Bipolar Disorder, ESRD on HD (TTS), Afib on Eliquis who [facility] for [facility] arrest requiring CPR and was admitted to CCU for management of possible anterior STEMI and cardiac arrest with anoxic brain injury.    NEURO  # Anoxic Brain Injury  As seen on CT 3/18. Found with cardiac arrest on floor of nursing home  - s/p hypothermia protocol  - brain death criteria not met as pt able to take spontaneous breaths during apnea test; all brainstem reflexes absent however. Performed daily apnea test for a week with pt passing all   - palliative to guide discussion on GOC     CARDIAC  # Cardiac Arrest  Unclear cause, discharged after recent admission of respiratory failure requiring intubation. S/p cardiac arrest at nursing home. This is second cardiac arrest in as many months. ROSC achieved. Previous cardiac arrest in 02/23 in s/o complete lung atelectasis and mucus plugging. Initial EKG indicating possible STEMI; latest EKG back to baseline, minimal troponin elevations without significant delta.  - SpO2 goal > 94%; avoid hyperventilation  - S/p therapeutic hypothermia protocol    # HFpEF  EF > 70% 3/10, moderate diastolic dysfunction, severe concentric left ventricular hypertrophy  - 3/18 TTE RF 59%, moderate diastolic dysfunction, severely dilated LA, moderate pericardial effusion, b/l pleural effusions    #HTN  Likely a result of autonomic dysfunction in s/o anoxic brain injury  - PRN Hydralazine 5mg IV q6hPRN SP > 180  - Started amlodipine and coreg standing     #AFib  - Heparin gtt for AFib d/c'd given bloody sputum via tube last week     RESPIRATORY  # intubated without sedation   - in s/o anoxic brain injury, will need further evaluation before any SBT/SATs    GI  - no active issues   - TFs running       # ESRD on HD (TTS)  - Renal for HD  - monitor electrolytes during cooling/rewarming  - condom catheter  - monitor I &Os  - currently undergoing maintenance HD    ID  #Elevated WBC Count  -3/19 WBC 29K, Lactate 2.3  -3/22 WBC 20; lactate 0.7  - s/p Zosyn 2.25g q8h (3/19-3/24) for empiric therapy for 5 days. WBC rising s/p zosyn, no addl abx for now   - recurrent infections complicated by decompensation requiring ICU care    ENDO  # T2DM  No outpatient meds on records  - iSS     HEME  -Leukocytosis as above     DVT PPX  -Subq heparin     GOC: Palliative care involved during recent admission given concern for recurrent decompensation. Per chart notes, there were concerns about the family's insight into the severity of the patient's illness. The patient remained full code at time of discharge.  GOC meeting held last week with family asking for time to consider options after medical and palliative teams explained pt's current status  3/26 Family with discord over deciding who will be HCP. Given pt has no spouse, next of kin would be parents, neither of whom wants to share HCP decision-making with the other. Resulted in rapid escalation of conversation with security needing to be called on family for disturbance of the unit. Will require palliative-assisted GOC given complicated family situation.   Attempted to contact numbers in chart, both of which are out of service. On Sunday, family had verbally reported their return to the hospital on 3/29 between noon and 1 PM to have a meeting. Will await their arrival for GOC discussion with palliative present today.  >Family called unit, plan for family meeting Friday, April 1, at noon

## 2023-03-30 NOTE — PROGRESS NOTE ADULT - SUBJECTIVE AND OBJECTIVE BOX
Patient is a 57y old  Male who presents with a chief complaint of cardiac arrest (30 Mar 2023 07:00)    HPI:  Mr. Cal Lloyd is a 58 Y/O M with HTN, HLD, T2DM, Bipolar Disorder, ESRD on HD (TTS), Afib on Eliquis who returned to [facility] from [facility] after cardiac arrest and CPR. He was discharged on 3/17 after long hospital course, requiring MICU care for septic shock.    Per ED Physician Note:   "Arrives intubated by EMS with tube at 21cm at lips, patient with ROSC at arrival with BP 120s systolic, HR 90s-100s.  Per EMS report, at 08:00 this morning, patient was eating breakfast in usual state of health at [facility], after recent discharge from hospital involving MICU course for pneumonia.  Then at 08:20 patient found pulseless and unresponsive, CPR initiated by [place] staff then taken over by EMS, CPR for <20 minutes with 2 doses epinephrine given, then sustained ROSC obtained."    "Patient then had additional cardiac arrest in ED from 09:06 - 09:15 with epi given at 09:07 and 09:13.  Cardioversion given at 09:15 for possible VTach although rhythm in retrospect may have more likely been significant ST elevations with RBBB and LPFB mimicking VTach, given no change in rhythm.  Initial EKG during rosc shows STEMI.  Cardiology interventionalist Sandra valdez called, recommended CCU consult"      Of note, patient was recently discharged (3/9-3/17) from [facility] after MICU admission for Acute Hypoxic Respiratory Failure due to Aspiration PNA and COVID, was discharged after being weaned back to room air, in stable condition. He had a recent admission ( - ) at [facility] for septic shock and AHRF likely 2/2 aspiration pneumonia and Covid, hospital course c/b cardiac arrest iso compete lung atelectasis and mucus plugging, discharged to [facility] on room air.    Also with recent admission on  to [place] for septic shock.    During previous admission, on 3/16, Palliative Care carried out a GOC discussion, where patient expressed desire to remain full code. (18 Mar 2023 11:25)       INTERVAL HPI/OVERNIGHT EVENTS:   No overnight events   Afebrile, hemodynamically stable     Subjective: NAONE    ICU Vital Signs Last 24 Hrs  T(C): 37.6 (30 Mar 2023 04:00), Max: 37.8 (30 Mar 2023 00:00)  T(F): 99.7 (30 Mar 2023 04:00), Max: 100 (30 Mar 2023 00:00)  HR: 86 (30 Mar 2023 06:00) (58 - 87)  BP: 174/65 (30 Mar 2023 06:00) (119/54 - 213/77)  BP(mean): 93 (30 Mar 2023 06:00) (68 - 111)  ABP: --  ABP(mean): --  RR: 14 (30 Mar 2023 06:00) (14 - 16)  SpO2: 100% (30 Mar 2023 06:00) (96% - 100%)    O2 Parameters below as of 30 Mar 2023 06:00  Patient On (Oxygen Delivery Method): ventilator    O2 Concentration (%): 40      I&O's Summary    29 Mar 2023 07:01  -  30 Mar 2023 07:00  --------------------------------------------------------  IN: 850 mL / OUT: 1400 mL / NET: -550 mL      Mode: AC/ CMV (Assist Control/ Continuous Mandatory Ventilation)  RR (machine): 14  TV (machine): 380  FiO2: 40  PEEP: 5  ITime: 0.89  MAP: 8  PIP: 20      Daily     Daily Weight in k.7 (30 Mar 2023 04:00)      MEDICATIONS  (STANDING):  amLODIPine   Tablet 10 milliGRAM(s) Oral daily  artificial tears (preservative free) Ophthalmic Solution 1 Drop(s) Both EYES every 4 hours  aspirin  chewable 81 milliGRAM(s) Enteral Tube daily  carvedilol 3.125 milliGRAM(s) Oral every 12 hours  chlorhexidine 0.12% Liquid 15 milliLiter(s) Oral Mucosa two times a day  chlorhexidine 2% Cloths 1 Application(s) Topical <User Schedule>  heparin   Injectable 5000 Unit(s) SubCutaneous every 8 hours  pantoprazole  Injectable 40 milliGRAM(s) IV Push daily    MEDICATIONS  (PRN):  acetaminophen   Oral Liquid .. 650 milliGRAM(s) Enteral Tube every 6 hours PRN Temp greater or equal to 38C (100.4F)  hydrALAZINE Injectable 5 milliGRAM(s) IV Push every 6 hours PRN SBP > 180      PHYSICAL EXAM:  GENERAL: NAD, AO0  HEAD:  Eyes taped closed   EYES: EOMI, PERRLA, conjunctiva and sclera clear  NECK: Supple, No JVD, Normal thyroid, no enlarged nodes  NERVOUS SYSTEM:  Alert & Awake.   CHEST/LUNG: Intubated at 380/14/5/40  HEART: S1S2 normal, no S3, Regular rate and rhythm; No murmurs  ABDOMEN: Soft, Nontender, Nondistended; Bowel sounds present  EXTREMITIES:  2+ Peripheral Pulses, No clubbing, cyanosis; dependent edema   LYMPH: No lymphadenopathy noted  SKIN: No rashes or lesions    LABS:                        8.9    12.50 )-----------( 450      ( 30 Mar 2023 04:50 )             29.1     03-30    140  |  100  |  17  ----------------------------<  102<H>  3.9   |  28  |  2.31<H>    Ca    8.6      30 Mar 2023 04:50  Phos  3.0     03-30  Mg     2.30     03-30    TPro  6.2  /  Alb  2.5<L>  /  TBili  1.0  /  DBili  x   /  AST  93<H>  /  ALT  29  /  AlkPhos  1675<H>  03-30    LIVER FUNCTIONS - ( 30 Mar 2023 04:50 )  Alb: 2.5 g/dL / Pro: 6.2 g/dL / ALK PHOS: 1675 U/L / ALT: 29 U/L / AST: 93 U/L / GGT: x             CAPILLARY BLOOD GLUCOSE                      RADIOLOGY & ADDITIONAL TESTS:  CXR:        Care Discussed with Consultants/Other Providers [ x] YES  [ ] NO

## 2023-03-30 NOTE — PROGRESS NOTE ADULT - SUBJECTIVE AND OBJECTIVE BOX
[facility] Division of Kidney Diseases & Hypertension  FOLLOW UP NOTE  --------------------------------------------------------------------------------  Chief Complaint: came in with cardiac arrest    24 hour events/subjective: Patient was seen and evaluated at bedside.  unresponsive.        PAST HISTORY  --------------------------------------------------------------------------------  No significant changes to PMH, PSH, FHx, SHx, unless otherwise noted    ALLERGIES & MEDICATIONS  --------------------------------------------------------------------------------  Allergies    No Known Allergies    Intolerances      Standing Inpatient Medications  amLODIPine   Tablet 10 milliGRAM(s) Oral daily  artificial tears (preservative free) Ophthalmic Solution 1 Drop(s) Both EYES every 4 hours  aspirin  chewable 81 milliGRAM(s) Enteral Tube daily  carvedilol 3.125 milliGRAM(s) Oral every 12 hours  chlorhexidine 0.12% Liquid 15 milliLiter(s) Oral Mucosa two times a day  chlorhexidine 2% Cloths 1 Application(s) Topical <User Schedule>  heparin   Injectable 5000 Unit(s) SubCutaneous every 8 hours  pantoprazole  Injectable 40 milliGRAM(s) IV Push daily    PRN Inpatient Medications  acetaminophen   Oral Liquid .. 650 milliGRAM(s) Enteral Tube every 6 hours PRN  hydrALAZINE Injectable 5 milliGRAM(s) IV Push every 6 hours PRN      REVIEW OF SYSTEMS  --------------------------------------------------------------------------------  unable to obtain    VITALS/PHYSICAL EXAM  --------------------------------------------------------------------------------  T(C): 37.6 (03-30-23 @ 04:00), Max: 37.8 (03-30-23 @ 00:00)  HR: 86 (03-30-23 @ 06:00) (58 - 87)  BP: 174/65 (03-30-23 @ 06:00) (119/54 - 213/77)  ABP: --  ABP(mean): --  RR: 14 (03-30-23 @ 06:00) (14 - 16)  SpO2: 100% (03-30-23 @ 06:00) (96% - 100%)  CVP(mm Hg): --        03-29-23 @ 07:01  -  03-30-23 @ 07:00  --------------------------------------------------------  IN: 850 mL / OUT: 1400 mL / NET: -550 mL    Physical Exam:  	Gen:  NAD intubated  	Pulm: CTA B/L anteriorly on vent  	CV: S1S2   	Abd: Soft  	Ext: No LE edema   	Neuro: non responsive  	Skin: Warm and dry  	Vascular access: LUE AVF with palpable pulse and bruit heard    LABS/STUDIES  --------------------------------------------------------------------------------              8.9    12.50 >-----------<  450      [03-30-23 @ 04:50]              29.1     140  |  100  |  17  ----------------------------<  102      [03-30-23 @ 04:50]  3.9   |  28  |  2.31        Ca     8.6     [03-30-23 @ 04:50]      Mg     2.30     [03-30-23 @ 04:50]      Phos  3.0     [03-30-23 @ 04:50]    TPro  6.2  /  Alb  2.5  /  TBili  1.0  /  DBili  x   /  AST  93  /  ALT  29  /  AlkPhos  1675  [03-30-23 @ 04:50]          Creatinine Trend:  SCr 2.31 [03-30 @ 04:50]  SCr 3.07 [03-29 @ 03:02]  SCr 2.57 [03-28 @ 04:36]  SCr 3.22 [03-27 @ 01:08]  SCr 2.67 [03-26 @ 01:45]

## 2023-03-31 NOTE — PROGRESS NOTE ADULT - PROBLEM SELECTOR PLAN 1
Pt. with ESRD on HD TIW who presented for cardiac arrest with evidence of anoxic brain injury.  Seen and evaluated at bedside today in the CCU.  Tolerated HD wednesday. Seen during maintenance HD today.  Ca/mg/p/k reviewed.  plan for next HD monday.  on going goals of care.  1 kg UF goal today.

## 2023-03-31 NOTE — PROGRESS NOTE ADULT - SUBJECTIVE AND OBJECTIVE BOX
[facility] Division of Kidney Diseases & Hypertension  FOLLOW UP NOTE  --------------------------------------------------------------------------------  Chief Complaint: cardiac arrest    24 hour events/subjective: Patient was seen and evaluated at bedside non responsive.    PAST HISTORY  --------------------------------------------------------------------------------  No significant changes to PMH, PSH, FHx, SHx, unless otherwise noted    ALLERGIES & MEDICATIONS  --------------------------------------------------------------------------------  Allergies    No Known Allergies    Intolerances      Standing Inpatient Medications  amLODIPine   Tablet 10 milliGRAM(s) Oral daily  artificial tears (preservative free) Ophthalmic Solution 1 Drop(s) Both EYES every 4 hours  aspirin  chewable 81 milliGRAM(s) Enteral Tube daily  carvedilol 3.125 milliGRAM(s) Oral every 12 hours  chlorhexidine 0.12% Liquid 15 milliLiter(s) Oral Mucosa two times a day  chlorhexidine 2% Cloths 1 Application(s) Topical <User Schedule>  heparin   Injectable 5000 Unit(s) SubCutaneous every 8 hours  pantoprazole  Injectable 40 milliGRAM(s) IV Push daily    PRN Inpatient Medications  acetaminophen   Oral Liquid .. 650 milliGRAM(s) Enteral Tube every 6 hours PRN  hydrALAZINE Injectable 5 milliGRAM(s) IV Push every 6 hours PRN      REVIEW OF SYSTEMS  --------------------------------------------------------------------------------  unable     VITALS/PHYSICAL EXAM  --------------------------------------------------------------------------------  T(C): 36.3 (03-31-23 @ 08:00), Max: 37.3 (03-30-23 @ 21:00)  HR: 68 (03-31-23 @ 08:00) (55 - 109)  BP: 139/81 (03-31-23 @ 08:00) (131/59 - 198/79)  ABP: --  ABP(mean): --  RR: 14 (03-31-23 @ 08:00) (14 - 19)  SpO2: 98% (03-31-23 @ 08:00) (94% - 100%)  CVP(mm Hg): --        03-30-23 @ 07:01  -  03-31-23 @ 07:00  --------------------------------------------------------  IN: 460 mL / OUT: 0 mL / NET: 460 mL    03-31-23 @ 07:01  -  03-31-23 @ 08:52  --------------------------------------------------------  IN: 10 mL / OUT: 0 mL / NET: 10 mL    Physical Exam:  	Gen:  NAD intubated  	Pulm: CTA B/L anteriorly on vent  	CV: S1S2   	Abd: Soft  	Ext: No LE edema   	Neuro: non responsive  	Skin: Warm and dry  	Vascular access: LUE AVF with palpable pulse and bruit heard    LABS/STUDIES  --------------------------------------------------------------------------------              9.2    12.46 >-----------<  472      [03-31-23 @ 02:16]              30.7     140  |  101  |  22  ----------------------------<  106      [03-31-23 @ 02:16]  3.9   |  27  |  2.88        Ca     8.7     [03-31-23 @ 02:16]      Mg     2.40     [03-31-23 @ 02:16]      Phos  3.5     [03-31-23 @ 02:16]    TPro  5.8  /  Alb  2.4  /  TBili  1.0  /  DBili  x   /  AST  79  /  ALT  23  /  AlkPhos  1521  [03-31-23 @ 02:16]          Creatinine Trend:  SCr 2.88 [03-31 @ 02:16]  SCr 2.31 [03-30 @ 04:50]  SCr 3.07 [03-29 @ 03:02]  SCr 2.57 [03-28 @ 04:36]  SCr 3.22 [03-27 @ 01:08]

## 2023-03-31 NOTE — CHART NOTE - NSCHARTNOTEFT_GEN_A_CORE
Source: Patient [ ]    Family [ ]     other [x ]RN, Chart review    Current Diet : Diet, NPO with Tube Feed:   Tube Feeding Modality: Nasogastric  Nepro with Carb Steady (NEPRORTH)  Total Volume for 24 Hours (mL): 240  Continuous  Starting Tube Feed Rate {mL per Hour}: 10  Increase Tube Feed Rate by (mL): 0  Until Goal Tube Feed Rate (mL per Hour): 10  Tube Feed Duration (in Hours): 24  Tube Feed Start Time: 06:00 (03-21-23 @ 07:36) [Active]    Height (cm): 175.3 (18 Mar 2023 12:37)  Weight (kg): 54kg (3/31), 57.3kg (3/24), 60.5kg (3/18)  BMI (kg/m2): 17.5 (3/31/2023)    Nutrition Note: Pt. with ESRD on HD TIW presented with cardiac arrest, per chart.   Patient is on trickle feeds, receiving tube feeding: Nepro 10ml/hr during visit. As per RN, patient is tolerating tube feeding well. No reports of any nausea, vomiting, diarrhea, constipation at this time. Last bowel movement 3/30/2023, per RN flow sheet. Noted patient has -3.3kg/5.7%BWloss x 1 week. Will continue to monitor weight trend. As per RN flow sheet, patient has 2+ generalized edema, 3+ edema to left arm, right arm. Patient has suspected DTI to sacrum, skin tear to left forearm. Current tube feeding order is not meeting patient's estimated needs, recommend as appropriate/medically feasible, advance Nepro to goal rate of 30 mL/hr x24 hrs + No Carb Prosource 2x daily to provide a total of 720 mL formula, 1416 kcal (1296 kcal from formula), 88.32 gm protein (58.32 gm protein from formula), 523 mL free water from feed daily. Additional free water provision per medical discretion.    __________________ Pertinent Medications__________________   MEDICATIONS  (STANDING):  amLODIPine   Tablet 10 milliGRAM(s) Oral daily  artificial tears (preservative free) Ophthalmic Solution 1 Drop(s) Both EYES every 4 hours  aspirin  chewable 81 milliGRAM(s) Enteral Tube daily  carvedilol 3.125 milliGRAM(s) Oral every 12 hours  chlorhexidine 0.12% Liquid 15 milliLiter(s) Oral Mucosa two times a day  chlorhexidine 2% Cloths 1 Application(s) Topical <User Schedule>  heparin   Injectable 5000 Unit(s) SubCutaneous every 8 hours  pantoprazole  Injectable 40 milliGRAM(s) IV Push daily    MEDICATIONS  (PRN):  acetaminophen   Oral Liquid .. 650 milliGRAM(s) Enteral Tube every 6 hours PRN Temp greater or equal to 38C (100.4F)  hydrALAZINE Injectable 5 milliGRAM(s) IV Push every 6 hours PRN SBP > 180      __________________ Pertinent Labs__________________   03-31 Na140 mmol/L Glu 106 mg/dL<H> K+ 3.9 mmol/L Cr  2.88 mg/dL<H> BUN 22 mg/dL 03-31 Phos 3.5 mg/dL 03-31 Alb 2.4 g/dL<L>      Estimated Needs:   [x ] no change since previous assessment  Based on current weight 126.3 lbs / 57.3 kg  2223-8621 kcal daily @20-25 kcal/kg, 85..14 gm protein daily @1.5-1.8 gm/kg       Previous Nutrition Diagnosis:    [x] Severe Malnutrition   Nutrition Diagnosis is [x ] ongoing    New Nutrition Diagnosis: [x ] not applicable    Interventions:   Recommend  [x ]As appropriate/medically feasible, advance Nepro to goal rate of 30 mL/hr x24 hrs + No Carb Prosource 2x daily to provide a total of 720 mL formula, 1416 kcal (1296 kcal from formula), 88.32 gm protein (58.32 gm protein from formula), 523 mL free water from feed daily. Additional free water provision per medical discretion.  [x] Consider adding Nephro-meet and vitamin c for micronutrient support.   [x] Obtain pre/post-HD weights.     Monitoring and Evaluation:   [x ] TF tolerance [ x] weights [x ] follow up per protocol  [x ] other: bowel movement, skin integrity, labs.

## 2023-03-31 NOTE — PROGRESS NOTE ADULT - SUBJECTIVE AND OBJECTIVE BOX
Patient is a 57y old  Male who presents with a chief complaint of cardiac arrest (30 Mar 2023 07:37)    HPI:  Mr. Cal Lloyd is a 58 Y/O M with HTN, HLD, T2DM, Bipolar Disorder, ESRD on HD (TTS), Afib on Eliquis who returned to [facility] from [facility] after cardiac arrest and CPR. He was discharged on 3/17 after long hospital course, requiring MICU care for septic shock.    Per ED Physician Note:   "Arrives intubated by EMS with tube at 21cm at lips, patient with ROSC at arrival with BP 120s systolic, HR 90s-100s.  Per EMS report, at 08:00 this morning, patient was eating breakfast in usual state of health at [facility], after recent discharge from hospital involving MICU course for pneumonia.  Then at 08:20 patient found pulseless and unresponsive, CPR initiated by [place] staff then taken over by EMS, CPR for <20 minutes with 2 doses epinephrine given, then sustained ROSC obtained."    "Patient then had additional cardiac arrest in ED from 09:06 - 09:15 with epi given at 09:07 and 09:13.  Cardioversion given at 09:15 for possible VTach although rhythm in retrospect may have more likely been significant ST elevations with RBBB and LPFB mimicking VTach, given no change in rhythm.  Initial EKG during rosc shows STEMI.  Cardiology interventionalist Sandra valdez called, recommended CCU consult"      Of note, patient was recently discharged (3/9-3/17) from [facility] after MICU admission for Acute Hypoxic Respiratory Failure due to Aspiration PNA and COVID, was discharged after being weaned back to room air, in stable condition. He had a recent admission ( - ) at [facility] for septic shock and AHRF likely 2/2 aspiration pneumonia and Covid, hospital course c/b cardiac arrest iso compete lung atelectasis and mucus plugging, discharged to [facility] on room air.    Also with recent admission on  to [place] for septic shock.    During previous admission, on 3/16, Palliative Care carried out a GOC discussion, where patient expressed desire to remain full code. (18 Mar 2023 11:25)       INTERVAL HPI/OVERNIGHT EVENTS:   No overnight events   Afebrile, hemodynamically stable     Subjective:    ICU Vital Signs Last 24 Hrs  T(C): 36.3 (31 Mar 2023 07:00), Max: 37.3 (30 Mar 2023 21:00)  T(F): 97.4 (31 Mar 2023 07:00), Max: 99.1 (30 Mar 2023 21:00)  HR: 109 (31 Mar 2023 07:00) (54 - 109)  BP: 192/75 (31 Mar 2023 07:00) (131/59 - 198/79)  BP(mean): 105 (31 Mar 2023 07:00) (76 - 110)  ABP: --  ABP(mean): --  RR: 14 (31 Mar 2023 01:00) (14 - 19)  SpO2: 98% (31 Mar 2023 07:00) (94% - 100%)    O2 Parameters below as of 31 Mar 2023 06:00  Patient On (Oxygen Delivery Method): ventilator    O2 Concentration (%): 40      I&O's Summary    30 Mar 2023 07:01  -  31 Mar 2023 07:00  --------------------------------------------------------  IN: 450 mL / OUT: 0 mL / NET: 450 mL      Mode: AC/ CMV (Assist Control/ Continuous Mandatory Ventilation)  RR (machine): 14  TV (machine): 380  FiO2: 40  PEEP: 5  ITime: 0.92  MAP: 10  PIP: 31      Daily     Daily Weight in k (31 Mar 2023 04:00)    Adult Advanced Hemodynamics Last 24 Hrs  CVP(mm Hg): --  CVP(cm H2O): --  CO: --  CI: --  PA: --  PA(mean): --  PCWP: --  SVR: --  SVRI: --  PVR: --  PVRI: --    EKG/Telemetry Events:    MEDICATIONS  (STANDING):  amLODIPine   Tablet 10 milliGRAM(s) Oral daily  artificial tears (preservative free) Ophthalmic Solution 1 Drop(s) Both EYES every 4 hours  aspirin  chewable 81 milliGRAM(s) Enteral Tube daily  carvedilol 3.125 milliGRAM(s) Oral every 12 hours  chlorhexidine 0.12% Liquid 15 milliLiter(s) Oral Mucosa two times a day  chlorhexidine 2% Cloths 1 Application(s) Topical <User Schedule>  heparin   Injectable 5000 Unit(s) SubCutaneous every 8 hours  pantoprazole  Injectable 40 milliGRAM(s) IV Push daily    MEDICATIONS  (PRN):  acetaminophen   Oral Liquid .. 650 milliGRAM(s) Enteral Tube every 6 hours PRN Temp greater or equal to 38C (100.4F)  hydrALAZINE Injectable 5 milliGRAM(s) IV Push every 6 hours PRN SBP > 180      PHYSICAL EXAM:  GENERAL: NAD, AO0, Intubated  HEAD:  Atraumatic, Normocephalic  EYES: EOMI, PERRLA, conjunctiva and sclera clear  NECK: Supple, No JVD, Normal thyroid, no enlarged nodes  NERVOUS SYSTEM:  Alert & Awake.   CHEST/LUNG: Intubated  HEART: S1S2 normal, no S3, Regular rate and rhythm; No murmurs  ABDOMEN: Soft, Nontender, Nondistended; Bowel sounds present  EXTREMITIES:  2+ Peripheral Pulses, No clubbing, cyanosis; Dependent edema in b/l UE  LYMPH: No lymphadenopathy noted  SKIN: No rashes or lesions    LABS:                        9.2    12.46 )-----------( 472      ( 31 Mar 2023 02:16 )             30.7     03-31    140  |  101  |  22  ----------------------------<  106<H>  3.9   |  27  |  2.88<H>    Ca    8.7      31 Mar 2023 02:16  Phos  3.5     03-31  Mg     2.40     03-31    TPro  5.8<L>  /  Alb  2.4<L>  /  TBili  1.0  /  DBili  x   /  AST  79<H>  /  ALT  23  /  AlkPhos  1521<H>  03-31    LIVER FUNCTIONS - ( 31 Mar 2023 02:16 )  Alb: 2.4 g/dL / Pro: 5.8 g/dL / ALK PHOS: 1521 U/L / ALT: 23 U/L / AST: 79 U/L / GGT: x             CAPILLARY BLOOD GLUCOSE        RADIOLOGY & ADDITIONAL TESTS:  CXR: ETT in place        Care Discussed with Consultants/Other Providers [ x] YES  [ ] NO

## 2023-03-31 NOTE — PROGRESS NOTE ADULT - ASSESSMENT
Mr. Cal Lloyd is a 56 Y/O M w/PMH HTN, HLD, T2DM, Bipolar Disorder, ESRD on HD (TTS), Afib on Eliquis who [facility] requiring CPR and was admitted to CCU for management of possible anterior STEMI and cardiac arrest with anoxic brain injury.    NEURO  # Anoxic Brain Injury  As seen on CT 3/18. Found with cardiac arrest on floor of nursing home  - s/p hypothermia protocol  - brain death criteria not met as pt able to take spontaneous breaths during apnea test; all brainstem reflexes absent however. Performed daily apnea test for a week with pt passing all   - palliative to guide discussion on GOC     CARDIAC  # Cardiac Arrest  Unclear cause, discharged after recent admission of respiratory failure requiring intubation. S/p cardiac arrest at nursing home. This is second cardiac arrest in as many months. ROSC achieved. Previous cardiac arrest in 02/23 in s/o complete lung atelectasis and mucus plugging. Initial EKG indicating possible STEMI; latest EKG back to baseline, minimal troponin elevations without significant delta.  - SpO2 goal > 94%; avoid hyperventilation  - S/p therapeutic hypothermia protocol    # HFpEF  EF > 70% 3/10, moderate diastolic dysfunction, severe concentric left ventricular hypertrophy  - 3/18 TTE RF 59%, moderate diastolic dysfunction, severely dilated LA, moderate pericardial effusion, b/l pleural effusions    #HTN  Likely a result of autonomic dysfunction in s/o anoxic brain injury  - PRN Hydralazine 5mg IV q6hPRN SP > 180  - Started amlodipine and coreg standing     #AFib  - Heparin gtt for AFib d/c'd given bloody sputum via tube last week     RESPIRATORY  # intubated without sedation   - in s/o anoxic brain injury, will need further evaluation before any SBT/SATs    GI  - no active issues   - TFs running       # ESRD on HD (TTS)  - Renal for HD  - monitor electrolytes during cooling/rewarming  - condom catheter  - monitor I &Os  - currently undergoing maintenance HD    ID  #Elevated WBC Count  -3/19 WBC 29K, Lactate 2.3  -3/22 WBC 20; lactate 0.7  - s/p Zosyn 2.25g q8h (3/19-3/24) for empiric therapy for 5 days. WBC rising s/p zosyn, no addl abx for now   - recurrent infections complicated by decompensation requiring ICU care    ENDO  # T2DM  No outpatient meds on records  - iSS     HEME  -Leukocytosis as above     DVT PPX  -Subq heparin     GOC: Palliative care involved during recent admission given concern for recurrent decompensation. Per chart notes, there were concerns about the family's insight into the severity of the patient's illness. The patient remained full code at time of discharge.  GOC meeting held last week with family asking for time to consider options after medical and palliative teams explained pt's current status  3/26 Family with discord over deciding who will be HCP. Given pt has no spouse, next of kin would be parents, neither of whom wants to share HCP decision-making with the other. Resulted in rapid escalation of conversation with security needing to be called on family for disturbance of the unit. Will require palliative-assisted GOC given complicated family situation.   Attempted to contact numbers in chart, both of which are out of service. On Sunday, family had verbally reported their return to the hospital on 3/29 between noon and 1 PM to have a meeting. Will await their arrival for GOC discussion with palliative present today.  >Family called unit, plan for family meeting Friday 3/31 at noon

## 2023-03-31 NOTE — CHART NOTE - NSCHARTNOTEFT_GEN_A_CORE
Pt's son called unit reporting he/his parents will not be present today for meeting given family health issue. Palliative informed.

## 2023-04-01 NOTE — PROGRESS NOTE ADULT - SUBJECTIVE AND OBJECTIVE BOX
Patient is a 57y old  Male who presents with a chief complaint of cardiac arrest (31 Mar 2023 08:52)    HPI:  Mr. Cal Lloyd is a 56 Y/O M with HTN, HLD, T2DM, Bipolar Disorder, ESRD on HD (TTS), Afib on Eliquis who returned to [facility] from [facility] after cardiac arrest and CPR. He was discharged on 3/17 after long hospital course, requiring MICU care for septic shock.    Per ED Physician Note:   "Arrives intubated by EMS with tube at 21cm at lips, patient with ROSC at arrival with BP 120s systolic, HR 90s-100s.  Per EMS report, at 08:00 this morning, patient was eating breakfast in usual state of health at [facility], after recent discharge from hospital involving MICU course for pneumonia.  Then at 08:20 patient found pulseless and unresponsive, CPR initiated by [place] staff then taken over by EMS, CPR for <20 minutes with 2 doses epinephrine given, then sustained ROSC obtained."    "Patient then had additional cardiac arrest in ED from 09:06 - 09:15 with epi given at 09:07 and 09:13.  Cardioversion given at 09:15 for possible VTach although rhythm in retrospect may have more likely been significant ST elevations with RBBB and LPFB mimicking VTach, given no change in rhythm.  Initial EKG during rosc shows STEMI.  Cardiology interventionalist Sandra valdez called, recommended CCU consult"      Of note, patient was recently discharged (3/9-3/17) from [facility] after MICU admission for Acute Hypoxic Respiratory Failure due to Aspiration PNA and COVID, was discharged after being weaned back to room air, in stable condition. He had a recent admission (2-7 - 2/14) at [facility] for septic shock and AHRF likely 2/2 aspiration pneumonia and Covid, hospital course c/b cardiac arrest iso compete lung atelectasis and mucus plugging, discharged to [facility] on room air.    Also with recent admission on 1/14 to [place] for septic shock.    During previous admission, on 3/16, Palliative Care carried out a GOC discussion, where patient expressed desire to remain full code. (18 Mar 2023 11:25)       INTERVAL HPI/OVERNIGHT EVENTS:   No overnight events   Afebrile, hemodynamically stable     ICU Vital Signs Last 24 Hrs  T(C): 36.8 (01 Apr 2023 04:00), Max: 37 (01 Apr 2023 00:00)  T(F): 98.3 (01 Apr 2023 04:00), Max: 98.6 (01 Apr 2023 00:00)  HR: 56 (01 Apr 2023 07:43) (56 - 100)  BP: 186/83 (01 Apr 2023 07:00) (103/53 - 196/79)  BP(mean): 109 (01 Apr 2023 07:00) (65 - 113)  ABP: --  ABP(mean): --  RR: 13 (01 Apr 2023 07:00) (13 - 14)  SpO2: 100% (01 Apr 2023 07:43) (93% - 100%)    O2 Parameters below as of 01 Apr 2023 07:00  Patient On (Oxygen Delivery Method): ventilator    O2 Concentration (%): 40      I&O's Summary    31 Mar 2023 07:01  -  01 Apr 2023 07:00  --------------------------------------------------------  IN: 980 mL / OUT: 2500 mL / NET: -1520 mL      Mode: AC/ CMV (Assist Control/ Continuous Mandatory Ventilation)  RR (machine): 14  TV (machine): 360  FiO2: 100  PEEP: 5  ITime: 0.76  MAP: 9  PIP: 25      Daily     Daily     MEDICATIONS  (STANDING):  amLODIPine   Tablet 10 milliGRAM(s) Oral daily  artificial tears (preservative free) Ophthalmic Solution 1 Drop(s) Both EYES every 4 hours  aspirin  chewable 81 milliGRAM(s) Enteral Tube daily  carvedilol 3.125 milliGRAM(s) Oral every 12 hours  chlorhexidine 0.12% Liquid 15 milliLiter(s) Oral Mucosa two times a day  chlorhexidine 2% Cloths 1 Application(s) Topical <User Schedule>  heparin   Injectable 5000 Unit(s) SubCutaneous every 8 hours  pantoprazole  Injectable 40 milliGRAM(s) IV Push daily    MEDICATIONS  (PRN):  acetaminophen   Oral Liquid .. 650 milliGRAM(s) Enteral Tube every 6 hours PRN Temp greater or equal to 38C (100.4F)  hydrALAZINE Injectable 5 milliGRAM(s) IV Push every 6 hours PRN SBP > 180      PHYSICAL EXAM:  GENERAL: NAD, AO0  HEAD:  Atraumatic, Normocephalic  EYES: EOMI, PERRLA, conjunctiva and sclera clear, eyes taped closed   NECK: Supple, No JVD, Normal thyroid, no enlarged nodes  NERVOUS SYSTEM:  Alert & Awake.   CHEST/LUNG: Ventilated with above settings  HEART: S1S2 normal, no S3, Regular rate and rhythm; No murmurs  ABDOMEN: Soft, Nontender, Nondistended; Bowel sounds present  EXTREMITIES:  2+ Peripheral Pulses, No clubbing, cyanosis; b/l UE dependent edema  LYMPH: No lymphadenopathy noted  SKIN: No rashes or lesions    LABS:                        9.1    11.62 )-----------( 522      ( 01 Apr 2023 04:29 )             29.8     04-01    138  |  98  |  20  ----------------------------<  124<H>  3.8   |  29  |  2.38<H>    Ca    8.7      01 Apr 2023 04:29  Phos  3.2     04-01  Mg     2.30     04-01    TPro  6.1  /  Alb  2.5<L>  /  TBili  1.0  /  DBili  x   /  AST  71<H>  /  ALT  26  /  AlkPhos  1437<H>  04-01    LIVER FUNCTIONS - ( 01 Apr 2023 04:29 )  Alb: 2.5 g/dL / Pro: 6.1 g/dL / ALK PHOS: 1437 U/L / ALT: 26 U/L / AST: 71 U/L / GGT: x             CAPILLARY BLOOD GLUCOSE    RADIOLOGY & ADDITIONAL TESTS:  CXR:    Care Discussed with Consultants/Other Providers [ x] YES  [ ] NO

## 2023-04-01 NOTE — PROGRESS NOTE ADULT - ASSESSMENT
Mr. Cal Lloyd is a 58 Y/O M w/PMH HTN, HLD, T2DM, Bipolar Disorder, ESRD on HD (TTS), Afib on Eliquis who [facility] from [facility] requiring CPR and was admitted to CCU for management of possible anterior STEMI and cardiac arrest with anoxic brain injury.    NEURO  # Anoxic Brain Injury  As seen on CT 3/18. Found with cardiac arrest on floor of nursing home  - s/p hypothermia protocol  - brain death criteria not met as pt able to take spontaneous breaths during apnea test; all brainstem reflexes absent however. Performed daily apnea test for a week with pt passing all   - palliative to guide discussion on GOC     CARDIAC  # Cardiac Arrest  Unclear cause, discharged after recent admission of respiratory failure requiring intubation. S/p cardiac arrest at nursing home. This is second cardiac arrest in as many months. ROSC achieved. Previous cardiac arrest in 02/23 in s/o complete lung atelectasis and mucus plugging. Initial EKG indicating possible STEMI; latest EKG back to baseline, minimal troponin elevations without significant delta.  - SpO2 goal > 94%; avoid hyperventilation  - S/p therapeutic hypothermia protocol    # HFpEF  EF > 70% 3/10, moderate diastolic dysfunction, severe concentric left ventricular hypertrophy  - 3/18 TTE RF 59%, moderate diastolic dysfunction, severely dilated LA, moderate pericardial effusion, b/l pleural effusions    #HTN  Likely a result of autonomic dysfunction in s/o anoxic brain injury  - PRN Hydralazine 5mg IV q6hPRN SP > 180  - Started amlodipine and coreg standing     #AFib  - Heparin gtt for AFib d/c'd given bloody sputum via tube last week     RESPIRATORY  # intubated without sedation   - in s/o anoxic brain injury, will need further evaluation before any SBT/SATs    GI  - no active issues   - TFs running       # ESRD on HD (TTS)  - Renal for HD  - monitor electrolytes during cooling/rewarming  - condom catheter  - monitor I &Os  - currently undergoing maintenance HD    ID  #Elevated WBC Count  -3/19 WBC 29K, Lactate 2.3  -3/22 WBC 20; lactate 0.7  - s/p Zosyn 2.25g q8h (3/19-3/24) for empiric therapy for 5 days. WBC rising s/p zosyn, no addl abx for now   - recurrent infections complicated by decompensation requiring ICU care    ENDO  # T2DM  No outpatient meds on records  - iSS     HEME  -Leukocytosis as above     DVT PPX  -Subq heparin     GOC: Palliative care involved during recent admission given concern for recurrent decompensation. Per chart notes, there were concerns about the family's insight into the severity of the patient's illness. The patient remained full code at time of discharge.  GOC meeting held last week with family asking for time to consider options after medical and palliative teams explained pt's current status  3/26 Family with discord over deciding who will be HCP. Given pt has no spouse, next of kin would be parents, neither of whom wants to share HCP decision-making with the other. Resulted in rapid escalation of conversation with security needing to be called on family for disturbance of the unit. Will require palliative-assisted GOC given complicated family situation.   Attempted to contact numbers in chart, both of which are out of service. On Sunday, family had verbally reported their return to the hospital on 3/29 between noon and 1 PM to have a meeting. Will await their arrival for GOC discussion with palliative present today.  >Family called unit, plan for family meeting Friday 3/31 at noon  >Family estrada dunit 3/31 stating will not be coming to hospital for family meeting given health issue. Will suggest conference call to expedite conversation as no HCP has been determined at present moment and GOC need clarification.

## 2023-04-02 NOTE — CONSULT NOTE ADULT - ASSESSMENT
ASSESSMENT   56 Y/O M with HTN, HLD, T2DM, Bipolar Disorder, ESRD on HD (TTS), Afib on Eliquis who returned to [facility] from [facility] after cardiac arrest and CPR.    IMPRESSION   Anoxic brain injury 2/2 cardiac arrest     RECOMMENDATION   [ ] MRI brain w and w/o contrast   [ ] Routine EEG (awake/asleep)  [ ] Check TSH, T3/T4, B1, B6, B12, Vitamin D (25 Hydroxy), and Vitamin E  [ ] Management of other toxic/metabolic derangements per primary team  [ ] GOC per primary team    Patient to be seen by team and attending. Note finalized upon attending attestation.  ASSESSMENT   56 Y/O M with HTN, HLD, T2DM, Bipolar Disorder, ESRD on HD (TTS), Afib on Eliquis who returned to [facility] from [facility] after cardiac arrest and CPR.    IMPRESSION   Anoxic brain injury 2/2 cardiac arrest     RECOMMENDATION   [ ] CT head non con  [ ] MRI brain w and w/o contrast   [ ] Routine EEG (awake/asleep)  [ ] Check TSH, T3/T4, B1, B6, B12, Vitamin D (25 Hydroxy), and Vitamin E  [ ] Management of other toxic/metabolic derangements per primary team  [ ] GOC per primary team    Patient to be seen by team and attending. Note finalized upon attending attestation.

## 2023-04-02 NOTE — PROGRESS NOTE ADULT - SUBJECTIVE AND OBJECTIVE BOX
PATIENT:  NANCY SEPULVEDA  [id number]    CHIEF COMPLAINT:  Patient is a 57y old  Male who presents with a chief complaint of cardiac arrest (2023 08:11)      INTERVAL HISTORY/OVERNIGHT EVENTS:    Overnight, pt placed on CPAP trial 10/5. Was only able to tolerate for ~30 min due to episodes of apnea lasting ~45 sec.     Pt seen and examined at bedside.   Intubated.       MEDICATIONS:  MEDICATIONS  (STANDING):  amLODIPine   Tablet 10 milliGRAM(s) Oral daily  artificial tears (preservative free) Ophthalmic Solution 1 Drop(s) Both EYES every 4 hours  ascorbic acid 500 milliGRAM(s) Oral daily  aspirin  chewable 81 milliGRAM(s) Enteral Tube daily  carvedilol 3.125 milliGRAM(s) Oral every 12 hours  chlorhexidine 0.12% Liquid 15 milliLiter(s) Oral Mucosa two times a day  chlorhexidine 2% Cloths 1 Application(s) Topical <User Schedule>  heparin   Injectable 5000 Unit(s) SubCutaneous every 8 hours  pantoprazole  Injectable 40 milliGRAM(s) IV Push daily    MEDICATIONS  (PRN):  acetaminophen   Oral Liquid .. 650 milliGRAM(s) Enteral Tube every 6 hours PRN Temp greater or equal to 38C (100.4F)  hydrALAZINE Injectable 5 milliGRAM(s) IV Push every 6 hours PRN SBP > 180      ALLERGIES:  Allergies    No Known Allergies    Intolerances        OBJECTIVE:  ICU Vital Signs Last 24 Hrs  T(C): 36.3 (2023 04:00), Max: 36.8 (2023 20:00)  T(F): 97.3 (2023 04:00), Max: 98.2 (2023 20:00)  HR: 64 (2023 07:26) (63 - 80)  BP: 144/65 (2023 06:00) (102/54 - 171/66)  BP(mean): 86 (2023 06:00) (66 - 97)  ABP: --  ABP(mean): --  RR: 14 (2023 06:00) (13 - 16)  SpO2: 100% (2023 07:26) (92% - 100%)    O2 Parameters below as of 2023 06:00  Patient On (Oxygen Delivery Method): ventilator    O2 Concentration (%): 40      Mode: AC/ CMV (Assist Control/ Continuous Mandatory Ventilation)  RR (machine): 14  TV (machine): 380  FiO2: 40  PEEP: 5  ITime: 0.76  MAP: 9  PIP: 24    Adult Advanced Hemodynamics Last 24 Hrs  CVP(mm Hg): --  CVP(cm H2O): --  CO: --  CI: --  PA: --  PA(mean): --  PCWP: --  SVR: --  SVRI: --  PVR: --  PVRI: --  CAPILLARY BLOOD GLUCOSE        CAPILLARY BLOOD GLUCOSE        I&O's Summary    2023 07:01  -  2023 07:00  --------------------------------------------------------  IN: 240 mL / OUT: 0 mL / NET: 240 mL      Daily     Daily Weight in k (2023 04:00)    PHYSICAL EXAMINATION:  General: NAD   HEENT: PERRLA, conjunctiva and sclera clear   Neurology: A&Ox0,   Respiratory: Intubated  CV: +S1S2, no murmurs appreciated   Abdominal: Soft, non-distended   Extremities: No edema, + peripheral pulses  Tubes: +Loza, +    LABS:                          8.8    11.68 )-----------( 474      ( 2023 04:25 )             28.9     04-02    135  |  97<L>  |  30<H>  ----------------------------<  124<H>  4.3   |  26  |  3.00<H>    Ca    8.6      2023 04:25  Phos  4.1     04-02  Mg     2.30     04-02    TPro  5.7<L>  /  Alb  2.2<L>  /  TBili  0.8  /  DBili  x   /  AST  84<H>  /  ALT  28  /  AlkPhos  1447<H>  04-02    LIVER FUNCTIONS - ( 2023 04:25 )  Alb: 2.2 g/dL / Pro: 5.7 g/dL / ALK PHOS: 1447 U/L / ALT: 28 U/L / AST: 84 U/L / GGT: x                       TELEMETRY:     EKG:     IMAGING:       PATIENT:  NANCY SEPULVEDA  [id number]    CHIEF COMPLAINT:  Patient is a 57y old  Male who presents with a chief complaint of cardiac arrest (2023 08:11)      INTERVAL HISTORY/OVERNIGHT EVENTS:    Overnight, pt placed on CPAP trial 10/5. Was only able to tolerate for ~30 min due to episodes of apnea lasting ~45 sec.     Pt seen and examined at bedside.   Intubated.        MEDICATIONS:  MEDICATIONS  (STANDING):  amLODIPine   Tablet 10 milliGRAM(s) Oral daily  artificial tears (preservative free) Ophthalmic Solution 1 Drop(s) Both EYES every 4 hours  ascorbic acid 500 milliGRAM(s) Oral daily  aspirin  chewable 81 milliGRAM(s) Enteral Tube daily  carvedilol 3.125 milliGRAM(s) Oral every 12 hours  chlorhexidine 0.12% Liquid 15 milliLiter(s) Oral Mucosa two times a day  chlorhexidine 2% Cloths 1 Application(s) Topical <User Schedule>  heparin   Injectable 5000 Unit(s) SubCutaneous every 8 hours  pantoprazole  Injectable 40 milliGRAM(s) IV Push daily    MEDICATIONS  (PRN):  acetaminophen   Oral Liquid .. 650 milliGRAM(s) Enteral Tube every 6 hours PRN Temp greater or equal to 38C (100.4F)  hydrALAZINE Injectable 5 milliGRAM(s) IV Push every 6 hours PRN SBP > 180      ALLERGIES:  Allergies    No Known Allergies    Intolerances        OBJECTIVE:  ICU Vital Signs Last 24 Hrs  T(C): 36.3 (2023 04:00), Max: 36.8 (2023 20:00)  T(F): 97.3 (2023 04:00), Max: 98.2 (2023 20:00)  HR: 64 (2023 07:26) (63 - 80)  BP: 144/65 (2023 06:00) (102/54 - 171/66)  BP(mean): 86 (2023 06:00) (66 - 97)  ABP: --  ABP(mean): --  RR: 14 (2023 06:00) (13 - 16)  SpO2: 100% (2023 07:26) (92% - 100%)    O2 Parameters below as of 2023 06:00  Patient On (Oxygen Delivery Method): ventilator    O2 Concentration (%): 40      Mode: AC/ CMV (Assist Control/ Continuous Mandatory Ventilation)  RR (machine): 14  TV (machine): 380  FiO2: 40  PEEP: 5  ITime: 0.76  MAP: 9  PIP: 24    Adult Advanced Hemodynamics Last 24 Hrs  CVP(mm Hg): --  CVP(cm H2O): --  CO: --  CI: --  PA: --  PA(mean): --  PCWP: --  SVR: --  SVRI: --  PVR: --  PVRI: --  CAPILLARY BLOOD GLUCOSE        CAPILLARY BLOOD GLUCOSE        I&O's Summary    2023 07:01  -  2023 07:00  --------------------------------------------------------  IN: 240 mL / OUT: 0 mL / NET: 240 mL      Daily     Daily Weight in k (2023 04:00)    PHYSICAL EXAMINATION:  General: NAD   HEENT: eyes taped closed   Neurology: A&Ox0, unable to be aroused to speech or physical stimulation, no spontaneous movements observed   Respiratory: Intubated, +ventilated breath sounds b/l   CV: +S1S2, no murmurs appreciated   Abdominal: Soft, non-distended   Extremities: No extremity edema, SCDs in place   Tubes: +NGT    LABS:                          8.8    11.68 )-----------( 474      ( 2023 04:25 )             28.9     04-02    135  |  97<L>  |  30<H>  ----------------------------<  124<H>  4.3   |  26  |  3.00<H>    Ca    8.6      2023 04:25  Phos  4.1     04-02  Mg     2.30     04-02    TPro  5.7<L>  /  Alb  2.2<L>  /  TBili  0.8  /  DBili  x   /  AST  84<H>  /  ALT  28  /  AlkPhos  1447<H>  04-02    LIVER FUNCTIONS - ( 2023 04:25 )  Alb: 2.2 g/dL / Pro: 5.7 g/dL / ALK PHOS: 1447 U/L / ALT: 28 U/L / AST: 84 U/L / GGT: x                       TELEMETRY:     EKG:     IMAGING:       PATIENT:  NANCY SEPULVEDA  [id number]    CHIEF COMPLAINT:  Patient is a 57y old  Male who presents with a chief complaint of cardiac arrest (2023 08:11)      INTERVAL HISTORY/OVERNIGHT EVENTS:    Overnight, pt placed on CPAP trial 10/5. Was only able to tolerate for ~30 min due to episodes of apnea lasting ~45 sec.     Pt seen and examined at bedside.   Intubated.        MEDICATIONS:  MEDICATIONS  (STANDING):  amLODIPine   Tablet 10 milliGRAM(s) Oral daily  artificial tears (preservative free) Ophthalmic Solution 1 Drop(s) Both EYES every 4 hours  ascorbic acid 500 milliGRAM(s) Oral daily  aspirin  chewable 81 milliGRAM(s) Enteral Tube daily  carvedilol 3.125 milliGRAM(s) Oral every 12 hours  chlorhexidine 0.12% Liquid 15 milliLiter(s) Oral Mucosa two times a day  chlorhexidine 2% Cloths 1 Application(s) Topical <User Schedule>  heparin   Injectable 5000 Unit(s) SubCutaneous every 8 hours  pantoprazole  Injectable 40 milliGRAM(s) IV Push daily    MEDICATIONS  (PRN):  acetaminophen   Oral Liquid .. 650 milliGRAM(s) Enteral Tube every 6 hours PRN Temp greater or equal to 38C (100.4F)  hydrALAZINE Injectable 5 milliGRAM(s) IV Push every 6 hours PRN SBP > 180      ALLERGIES:  Allergies    No Known Allergies    Intolerances        OBJECTIVE:  ICU Vital Signs Last 24 Hrs  T(C): 36.3 (2023 04:00), Max: 36.8 (2023 20:00)  T(F): 97.3 (2023 04:00), Max: 98.2 (2023 20:00)  HR: 64 (2023 07:26) (63 - 80)  BP: 144/65 (2023 06:00) (102/54 - 171/66)  BP(mean): 86 (2023 06:00) (66 - 97)  ABP: --  ABP(mean): --  RR: 14 (2023 06:00) (13 - 16)  SpO2: 100% (2023 07:26) (92% - 100%)    O2 Parameters below as of 2023 06:00  Patient On (Oxygen Delivery Method): ventilator    O2 Concentration (%): 40      Mode: AC/ CMV (Assist Control/ Continuous Mandatory Ventilation)  RR (machine): 14  TV (machine): 380  FiO2: 40  PEEP: 5  ITime: 0.76  MAP: 9  PIP: 24    Adult Advanced Hemodynamics Last 24 Hrs  CVP(mm Hg): --  CVP(cm H2O): --  CO: --  CI: --  PA: --  PA(mean): --  PCWP: --  SVR: --  SVRI: --  PVR: --  PVRI: --  CAPILLARY BLOOD GLUCOSE        CAPILLARY BLOOD GLUCOSE        I&O's Summary    2023 07:01  -  2023 07:00  --------------------------------------------------------  IN: 240 mL / OUT: 0 mL / NET: 240 mL      Daily     Daily Weight in k (2023 04:00)    PHYSICAL EXAMINATION:  General: NAD   HEENT: eyes taped closed   Neurology: A&Ox0, unable to be aroused to speech or physical stimulation, no spontaneous movements observed   Respiratory: Intubated, +ventilated breath sounds b/l   CV: +S1S2, no murmurs appreciated   Abdominal: Soft, non-distended   Extremities: No extremity edema noted, SCDs in place   Tubes: +OGT    LABS:                          8.8    11.68 )-----------( 474      ( 2023 04:25 )             28.9     04-02    135  |  97<L>  |  30<H>  ----------------------------<  124<H>  4.3   |  26  |  3.00<H>    Ca    8.6      2023 04:25  Phos  4.1     04-02  Mg     2.30     04-02    TPro  5.7<L>  /  Alb  2.2<L>  /  TBili  0.8  /  DBili  x   /  AST  84<H>  /  ALT  28  /  AlkPhos  1447<H>  04-02    LIVER FUNCTIONS - ( 2023 04:25 )  Alb: 2.2 g/dL / Pro: 5.7 g/dL / ALK PHOS: 1447 U/L / ALT: 28 U/L / AST: 84 U/L / GGT: x                       TELEMETRY:     EKG:     IMAGING:

## 2023-04-02 NOTE — CHART NOTE - NSCHARTNOTEFT_GEN_A_CORE
Pt's vent mode changed to CPAP 10/5 40% from AC 14  40%, peep 5. After few minutes, patient triggered apnea alarm. Respiratory therapist at bedside and reports that patient went apneic for >40 secs. Pt placed back on AC.

## 2023-04-02 NOTE — CONSULT NOTE ADULT - SUBJECTIVE AND OBJECTIVE BOX
Neurology - Consult Note    -  Spectra: [phone] ([facility]), [phone] ([facility])  -    Mr. Cal Lloyd is a 56 Y/O M with HTN, HLD, T2DM, Bipolar Disorder, ESRD on HD (TTS), Afib on Eliquis who returned to [facility] from [facility] after cardiac arrest and CPR. He was discharged on 3/17 after long hospital course, requiring MICU care for septic shock.    Per ED Physician Note:   "Arrives intubated by EMS with tube at 21cm at lips, patient with ROSC at arrival with BP 120s systolic, HR 90s-100s.  Per EMS report, at 08:00 this morning (3/18), patient was eating breakfast in usual state of health at [facility], after recent discharge from hospital involving MICU course for pneumonia.  Then at 08:20 patient found pulseless and unresponsive, CPR initiated by [place] staff then taken over by EMS, CPR for <20 minutes with 2 doses epinephrine given, then sustained ROSC obtained."    "Patient then had additional cardiac arrest in ED from 09:06 - 09:15 with epi given at 09:07 and 09:13.  Cardioversion given at 09:15 for possible VTach although rhythm in retrospect may have more likely been significant ST elevations with RBBB and LPFB mimicking VTach, given no change in rhythm.  Initial EKG during rosc shows STEMI.  Cardiology interventionalist Sandra Galaviz called, recommended CCU consult"     Patient acquired CT head suspicious for global hypoxic-ischemic injury. Neurology consulted for neuroprognostication. Of note, patient was recently discharged (3/9-3/17) from [facility] after MICU admission for Acute Hypoxic Respiratory Failure due to Aspiration PNA and COVID, was discharged after being weaned back to room air, in stable condition. He had a recent admission (2-7 - 2/14) at [facility] for septic shock and AHRF likely 2/2 aspiration pneumonia and Covid, hospital course c/b cardiac arrest iso compete lung atelectasis and mucus plugging, discharged to [facility] on room air. Also with recent admission on 1/14 to [place] for septic shock. During previous admission, on 3/16, Palliative Care carried out a GOC discussion, where patient expressed desire to remain full code.        Review of Systems:  Given mental status unable to elicit ROS     Allergies:  No Known Allergies      PMHx/PSHx/Family Hx: As above, otherwise see below   Diabetes mellitus    Diabetes mellitus    Depression    High cholesterol    Bipolar affective disorder in remission    Chronic kidney disease, unspecified    Hypertension    Arterial insufficiency    Anemia    Hemodialysis patient    ESRD on dialysis    Cardiac arrest        Social Hx:  No current use of tobacco, alcohol, or illicit drugs  Lives with ***    Medications:  MEDICATIONS  (STANDING):  amLODIPine   Tablet 10 milliGRAM(s) Oral daily  artificial tears (preservative free) Ophthalmic Solution 1 Drop(s) Both EYES every 4 hours  ascorbic acid 500 milliGRAM(s) Oral daily  aspirin  chewable 81 milliGRAM(s) Enteral Tube daily  carvedilol 3.125 milliGRAM(s) Oral every 12 hours  chlorhexidine 0.12% Liquid 15 milliLiter(s) Oral Mucosa two times a day  chlorhexidine 2% Cloths 1 Application(s) Topical <User Schedule>  heparin   Injectable 5000 Unit(s) SubCutaneous every 8 hours  pantoprazole  Injectable 40 milliGRAM(s) IV Push daily    MEDICATIONS  (PRN):  acetaminophen   Oral Liquid .. 650 milliGRAM(s) Enteral Tube every 6 hours PRN Temp greater or equal to 38C (100.4F)  hydrALAZINE Injectable 5 milliGRAM(s) IV Push every 6 hours PRN SBP > 180      Vitals:  T(C): 36.6 (04-02-23 @ 12:00), Max: 36.8 (04-01-23 @ 20:00)  HR: 67 (04-02-23 @ 13:00) (57 - 80)  BP: 175/68 (04-02-23 @ 13:00) (102/54 - 181/72)  RR: 14 (04-02-23 @ 13:00) (14 - 16)  SpO2: 100% (04-02-23 @ 13:00) (92% - 100%)    INCOMPLETE  - Pupils (SIZE DIFFERENCE WITH LIGHT SHINING i.e. 2 --> 1, L,R,B)  - If seizure, any head turning preference?   - Any eye opening to pain?  - Verbalization?   - Preseverating?  - Following commands and answer questions?   - How patient reacts to noxious stimuli or spontaneously at which limb?   - Tone?   - Reflexes (1+,2+,3+?) corneal, oculocephalic, gag, babinski      General: lying down in bed, no apparent distress - sedated - no spontaneous movements  CVS: RRR, no carotid bruit.  Lungs: on mechanical ventillation, clear breath sounds  Abdomen: Soft, NTTP  Extremities: no edema or cyanosis  Neuro:  MENTAL STATUS: sedated on propofol  LANG/SPEECH: non-verbal (sedated)  CRANIAL NERVES: Pupils are equal and reactive, face symmetric - poor cough and gag to suctioning, rest of cranial nerves were deferred due to sedation.  MOTOR: no spontaneous movements - no withdrawal to pain on either side (sedated)  REFLEXES: hyporeflexic bilaterally  SENSORY: no reaction to pain in both sides  COORD: deferred - sedated  GAIT: deferred - sedated        Labs:                        8.8    11.68 )-----------( 474      ( 02 Apr 2023 04:25 )             28.9     04-02    135  |  97<L>  |  30<H>  ----------------------------<  124<H>  4.3   |  26  |  3.00<H>    Ca    8.6      02 Apr 2023 04:25  Phos  4.1     04-02  Mg     2.30     04-02    TPro  5.7<L>  /  Alb  2.2<L>  /  TBili  0.8  /  DBili  x   /  AST  84<H>  /  ALT  28  /  AlkPhos  1447<H>  04-02    CAPILLARY BLOOD GLUCOSE        LIVER FUNCTIONS - ( 02 Apr 2023 04:25 )  Alb: 2.2 g/dL / Pro: 5.7 g/dL / ALK PHOS: 1447 U/L / ALT: 28 U/L / AST: 84 U/L / GGT: x               CSF:                  Radiology:  < from: CT Head No Cont (03.18.23 @ 10:19) >  IMPRESSION:    Compared with 3/10/2023.    1. Findings suspicious for global hypoxic-ischemic injury, as above. No   evidence of acute intracranial hemorrhage.  2. Progressive paranasal sinus, bilateral mastoid air cells and middle   ear disease, as above.  3. Additional findings described in detail above.    < end of copied text >      < from: CT Head No Cont (03.10.23 @ 01:26) >  FINDINGS:  There is no acute intracranial hemorrhage, mass effect, hydrocephalus, or   midline shift.  There are no extra-axial collections.  The basal cisterns   are patent.    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    There is sulcal and ventricular prominence consistent with age   appropriate involutional change. There are periventricular and   subcortical white matter hypodensities, consistent with mild   microvascular changes.    The visualized paranasal sinuses and mastoid air cells are clear.    The calvarium is intact.    Secretions noted in the oropharynx.    IMPRESSION:  No acute intracranial hemorrhage, mass effect, or midline shift.    < end of copied text >     Neurology - Consult Note    -  Spectra: [phone] ([facility]), [phone] ([facility])  -    Mr. Cal Lloyd is a 58 Y/O M with HTN, HLD, T2DM, Bipolar Disorder, ESRD on HD (TTS), Afib on Eliquis who returned to [facility] from [facility] after cardiac arrest and CPR. He was discharged on 3/17 after long hospital course, requiring MICU care for septic shock.    Per ED Physician Note:   "Arrives intubated by EMS with tube at 21cm at lips, patient with ROSC at arrival with BP 120s systolic, HR 90s-100s.  Per EMS report, at 08:00 this morning (3/18), patient was eating breakfast in usual state of health at [facility], after recent discharge from hospital involving MICU course for pneumonia.  Then at 08:20 patient found pulseless and unresponsive, CPR initiated by [place] staff then taken over by EMS, CPR for <20 minutes with 2 doses epinephrine given, then sustained ROSC obtained."    "Patient then had additional cardiac arrest in ED from 09:06 - 09:15 with epi given at 09:07 and 09:13.  Cardioversion given at 09:15 for possible VTach although rhythm in retrospect may have more likely been significant ST elevations with RBBB and LPFB mimicking VTach, given no change in rhythm.  Initial EKG during rosc shows STEMI.  Cardiology interventionalist Sandra Galaviz called, recommended CCU consult"     Patient acquired CT head suspicious for global hypoxic-ischemic injury. Neurology consulted for neuroprognostication. Of note, patient was recently discharged (3/9-3/17) from [facility] after MICU admission for Acute Hypoxic Respiratory Failure due to Aspiration PNA and COVID, was discharged after being weaned back to room air, in stable condition. He had a recent admission (2-7 - 2/14) at [facility] for septic shock and AHRF likely 2/2 aspiration pneumonia and Covid, hospital course c/b cardiac arrest iso compete lung atelectasis and mucus plugging, discharged to [facility] on room air. Also with recent admission on 1/14 to [place] for septic shock. During previous admission, on 3/16, Palliative Care carried out a GOC discussion, where patient expressed desire to remain full code.        Review of Systems:  Given mental status unable to elicit ROS     Allergies:  No Known Allergies      PMHx/PSHx/Family Hx: As above, otherwise see below   Diabetes mellitus    Diabetes mellitus    Depression    High cholesterol    Bipolar affective disorder in remission    Chronic kidney disease, unspecified    Hypertension    Arterial insufficiency    Anemia    Hemodialysis patient    ESRD on dialysis    Cardiac arrest        Social Hx:  No current use of tobacco, alcohol, or illicit drugs  Lives with ***    Medications:  MEDICATIONS  (STANDING):  amLODIPine   Tablet 10 milliGRAM(s) Oral daily  artificial tears (preservative free) Ophthalmic Solution 1 Drop(s) Both EYES every 4 hours  ascorbic acid 500 milliGRAM(s) Oral daily  aspirin  chewable 81 milliGRAM(s) Enteral Tube daily  carvedilol 3.125 milliGRAM(s) Oral every 12 hours  chlorhexidine 0.12% Liquid 15 milliLiter(s) Oral Mucosa two times a day  chlorhexidine 2% Cloths 1 Application(s) Topical <User Schedule>  heparin   Injectable 5000 Unit(s) SubCutaneous every 8 hours  pantoprazole  Injectable 40 milliGRAM(s) IV Push daily    MEDICATIONS  (PRN):  acetaminophen   Oral Liquid .. 650 milliGRAM(s) Enteral Tube every 6 hours PRN Temp greater or equal to 38C (100.4F)  hydrALAZINE Injectable 5 milliGRAM(s) IV Push every 6 hours PRN SBP > 180      Vitals:  T(C): 36.6 (04-02-23 @ 12:00), Max: 36.8 (04-01-23 @ 20:00)  HR: 67 (04-02-23 @ 13:00) (57 - 80)  BP: 175/68 (04-02-23 @ 13:00) (102/54 - 181/72)  RR: 14 (04-02-23 @ 13:00) (14 - 16)  SpO2: 100% (04-02-23 @ 13:00) (92% - 100%)    General: lying down in bed, no apparent distress no spontaneous movements  Lungs: on mechanical ventilation  Extremities: no edema   Neuro:  MENTAL STATUS: does not follow commands   LANG/SPEECH: non-verbal (sedated)  CRANIAL NERVES: No pupillary response to light bilaterally 2mm, no corneal reflex, L eye exotropia, face symmetric - no cough and gag to suctioning, rest of cranial nerves were deferred due to mental status.  MOTOR: no spontaneous movements    REFLEXES: Biceps 2+ bilaterally Brachioradialis 0+ bilaterally Triceps(R/L) 1+/2+ Lower extremities 0 bilaterally   SENSORY: - withdrawal to pain only lateral upper extremities  COORD: deferred - sedated  GAIT: deferred - sedated  Decreased bulk and tone         Labs:                        8.8    11.68 )-----------( 474      ( 02 Apr 2023 04:25 )             28.9     04-02    135  |  97<L>  |  30<H>  ----------------------------<  124<H>  4.3   |  26  |  3.00<H>    Ca    8.6      02 Apr 2023 04:25  Phos  4.1     04-02  Mg     2.30     04-02    TPro  5.7<L>  /  Alb  2.2<L>  /  TBili  0.8  /  DBili  x   /  AST  84<H>  /  ALT  28  /  AlkPhos  1447<H>  04-02    CAPILLARY BLOOD GLUCOSE        LIVER FUNCTIONS - ( 02 Apr 2023 04:25 )  Alb: 2.2 g/dL / Pro: 5.7 g/dL / ALK PHOS: 1447 U/L / ALT: 28 U/L / AST: 84 U/L / GGT: x               CSF:                  Radiology:  < from: CT Head No Cont (03.18.23 @ 10:19) >  IMPRESSION:    Compared with 3/10/2023.    1. Findings suspicious for global hypoxic-ischemic injury, as above. No   evidence of acute intracranial hemorrhage.  2. Progressive paranasal sinus, bilateral mastoid air cells and middle   ear disease, as above.  3. Additional findings described in detail above.    < end of copied text >      < from: CT Head No Cont (03.10.23 @ 01:26) >  FINDINGS:  There is no acute intracranial hemorrhage, mass effect, hydrocephalus, or   midline shift.  There are no extra-axial collections.  The basal cisterns   are patent.    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    There is sulcal and ventricular prominence consistent with age   appropriate involutional change. There are periventricular and   subcortical white matter hypodensities, consistent with mild   microvascular changes.    The visualized paranasal sinuses and mastoid air cells are clear.    The calvarium is intact.    Secretions noted in the oropharynx.    IMPRESSION:  No acute intracranial hemorrhage, mass effect, or midline shift.    < end of copied text >     Neurology - Consult Note    -  Spectra: [phone] ([facility]), [phone] ([facility])  -    Mr. Cal Lloyd is a 56 Y/O M with HTN, HLD, T2DM, Bipolar Disorder, ESRD on HD (TTS), Afib on Eliquis who returned to [facility] from [facility] after cardiac arrest and CPR. He was discharged on 3/17 after long hospital course, requiring MICU care for septic shock.    Per ED Physician Note:   "Arrives intubated by EMS with tube at 21cm at lips, patient with ROSC at arrival with BP 120s systolic, HR 90s-100s.  Per EMS report, at 08:00 this morning (3/18), patient was eating breakfast in usual state of health at [facility], after recent discharge from hospital involving MICU course for pneumonia.  Then at 08:20 patient found pulseless and unresponsive, CPR initiated by [place] staff then taken over by EMS, CPR for <20 minutes with 2 doses epinephrine given, then sustained ROSC obtained."    "Patient then had additional cardiac arrest in ED from 09:06 - 09:15 with epi given at 09:07 and 09:13.  Cardioversion given at 09:15 for possible VTach although rhythm in retrospect may have more likely been significant ST elevations with RBBB and LPFB mimicking VTach, given no change in rhythm.  Initial EKG during rosc shows STEMI.  Cardiology interventionalist Sandra Galaviz called, recommended CCU consult"     Patient acquired CT head suspicious for global hypoxic-ischemic injury. Neurology consulted for neuroprognostication. Of note, patient was recently discharged (3/9-3/17) from [facility] after MICU admission for Acute Hypoxic Respiratory Failure due to Aspiration PNA and COVID, was discharged after being weaned back to room air, in stable condition. He had a recent admission (2-7 - 2/14) at [facility] for septic shock and AHRF likely 2/2 aspiration pneumonia and Covid, hospital course c/b cardiac arrest iso compete lung atelectasis and mucus plugging, discharged to [facility] on room air. Also with recent admission on 1/14 to [place] for septic shock. During previous admission, on 3/16, Palliative Care carried out a GOC discussion, where patient expressed desire to remain full code.        Review of Systems:  Given mental status unable to elicit ROS     Allergies:  No Known Allergies      PMHx/PSHx/Family Hx: As above, otherwise see below   Diabetes mellitus    Diabetes mellitus    Depression    High cholesterol    Bipolar affective disorder in remission    Chronic kidney disease, unspecified    Hypertension    Arterial insufficiency    Anemia    Hemodialysis patient    ESRD on dialysis    Cardiac arrest        Social Hx:  No current use of tobacco, alcohol, or illicit drugs  Lives with ***    Medications:  MEDICATIONS  (STANDING):  amLODIPine   Tablet 10 milliGRAM(s) Oral daily  artificial tears (preservative free) Ophthalmic Solution 1 Drop(s) Both EYES every 4 hours  ascorbic acid 500 milliGRAM(s) Oral daily  aspirin  chewable 81 milliGRAM(s) Enteral Tube daily  carvedilol 3.125 milliGRAM(s) Oral every 12 hours  chlorhexidine 0.12% Liquid 15 milliLiter(s) Oral Mucosa two times a day  chlorhexidine 2% Cloths 1 Application(s) Topical <User Schedule>  heparin   Injectable 5000 Unit(s) SubCutaneous every 8 hours  pantoprazole  Injectable 40 milliGRAM(s) IV Push daily    MEDICATIONS  (PRN):  acetaminophen   Oral Liquid .. 650 milliGRAM(s) Enteral Tube every 6 hours PRN Temp greater or equal to 38C (100.4F)  hydrALAZINE Injectable 5 milliGRAM(s) IV Push every 6 hours PRN SBP > 180      Vitals:  T(C): 36.6 (04-02-23 @ 12:00), Max: 36.8 (04-01-23 @ 20:00)  HR: 67 (04-02-23 @ 13:00) (57 - 80)  BP: 175/68 (04-02-23 @ 13:00) (102/54 - 181/72)  RR: 14 (04-02-23 @ 13:00) (14 - 16)  SpO2: 100% (04-02-23 @ 13:00) (92% - 100%)    General: lying down in bed, no apparent distress no spontaneous movements  Lungs: on mechanical ventilation  Extremities: no edema   Neuro:  MENTAL STATUS: does not follow commands   LANG/SPEECH: non-verbal (sedated)  CRANIAL NERVES: No pupillary response to light bilaterally 2mm, no corneal reflex, L eye exotropia, face symmetric - no cough and gag to suctioning, rest of cranial nerves were deferred due to mental status.  MOTOR: no spontaneous movements    REFLEXES: Biceps 2+ bilaterally Brachioradialis 0+ bilaterally Triceps(R/L) 1+/2+ Lower extremities 0 bilaterally   SENSORY: - withdrawal to pain only bilateral upper extremities  COORD: deferred - sedated  GAIT: deferred - sedated  Decreased bulk and tone         Labs:                        8.8    11.68 )-----------( 474      ( 02 Apr 2023 04:25 )             28.9     04-02    135  |  97<L>  |  30<H>  ----------------------------<  124<H>  4.3   |  26  |  3.00<H>    Ca    8.6      02 Apr 2023 04:25  Phos  4.1     04-02  Mg     2.30     04-02    TPro  5.7<L>  /  Alb  2.2<L>  /  TBili  0.8  /  DBili  x   /  AST  84<H>  /  ALT  28  /  AlkPhos  1447<H>  04-02    CAPILLARY BLOOD GLUCOSE        LIVER FUNCTIONS - ( 02 Apr 2023 04:25 )  Alb: 2.2 g/dL / Pro: 5.7 g/dL / ALK PHOS: 1447 U/L / ALT: 28 U/L / AST: 84 U/L / GGT: x               CSF:                  Radiology:  < from: CT Head No Cont (03.18.23 @ 10:19) >  IMPRESSION:    Compared with 3/10/2023.    1. Findings suspicious for global hypoxic-ischemic injury, as above. No   evidence of acute intracranial hemorrhage.  2. Progressive paranasal sinus, bilateral mastoid air cells and middle   ear disease, as above.  3. Additional findings described in detail above.    < end of copied text >      < from: CT Head No Cont (03.10.23 @ 01:26) >  FINDINGS:  There is no acute intracranial hemorrhage, mass effect, hydrocephalus, or   midline shift.  There are no extra-axial collections.  The basal cisterns   are patent.    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    There is sulcal and ventricular prominence consistent with age   appropriate involutional change. There are periventricular and   subcortical white matter hypodensities, consistent with mild   microvascular changes.    The visualized paranasal sinuses and mastoid air cells are clear.    The calvarium is intact.    Secretions noted in the oropharynx.    IMPRESSION:  No acute intracranial hemorrhage, mass effect, or midline shift.    < end of copied text >     Neurology - Consult Note    -  Spectra: [phone] ([facility]), [phone] ([facility])  -  Mr. Cal Lloyd is a 56 Y/O M with HTN, HLD, T2DM, Bipolar Disorder, ESRD on HD (TTS), Afib on Eliquis who returned to [facility] from [facility] after cardiac arrest and CPR. He was discharged on 3/17 after long hospital course, requiring MICU care for septic shock.    Per ED Physician Note:   "Arrives intubated by EMS with tube at 21cm at lips, patient with ROSC at arrival with BP 120s systolic, HR 90s-100s.  Per EMS report, at 08:00 this morning (3/18), patient was eating breakfast in usual state of health at [facility], after recent discharge from hospital involving MICU course for pneumonia.  Then at 08:20 patient found pulseless and unresponsive, CPR initiated by [place] staff then taken over by EMS, CPR for <20 minutes with 2 doses epinephrine given, then sustained ROSC obtained."    "Patient then had additional cardiac arrest in ED from 09:06 - 09:15 with epi given at 09:07 and 09:13.  Cardioversion given at 09:15 for possible VTach although rhythm in retrospect may have more likely been significant ST elevations with RBBB and LPFB mimicking VTach, given no change in rhythm.  Initial EKG during rosc shows STEMI.  Cardiology interventionalist Sandra Galaviz called, recommended CCU consult"     Patient acquired CT head suspicious for global hypoxic-ischemic injury. Neurology consulted for neuroprognostication. Of note, patient was recently discharged (3/9-3/17) from [facility] after MICU admission for Acute Hypoxic Respiratory Failure due to Aspiration PNA and COVID, was discharged after being weaned back to room air, in stable condition. He had a recent admission (2-7 - 2/14) at [facility] for septic shock and AHRF likely 2/2 aspiration pneumonia and Covid, hospital course c/b cardiac arrest iso compete lung atelectasis and mucus plugging, discharged to [facility] on room air. Also with recent admission on 1/14 to [place] for septic shock. During previous admission, on 3/16, Palliative Care carried out a GOC discussion, where patient expressed desire to remain full code.        Review of Systems:  Given mental status unable to elicit ROS     Allergies:  No Known Allergies      PMHx/PSHx/Family Hx: As above, otherwise see below   Diabetes mellitus    Diabetes mellitus    Depression    High cholesterol    Bipolar affective disorder in remission    Chronic kidney disease, unspecified    Hypertension    Arterial insufficiency    Anemia    Hemodialysis patient    ESRD on dialysis    Cardiac arrest        Social Hx:  No current use of tobacco, alcohol, or illicit drugs  Lives with ***    Medications:  MEDICATIONS  (STANDING):  amLODIPine   Tablet 10 milliGRAM(s) Oral daily  artificial tears (preservative free) Ophthalmic Solution 1 Drop(s) Both EYES every 4 hours  ascorbic acid 500 milliGRAM(s) Oral daily  aspirin  chewable 81 milliGRAM(s) Enteral Tube daily  carvedilol 3.125 milliGRAM(s) Oral every 12 hours  chlorhexidine 0.12% Liquid 15 milliLiter(s) Oral Mucosa two times a day  chlorhexidine 2% Cloths 1 Application(s) Topical <User Schedule>  heparin   Injectable 5000 Unit(s) SubCutaneous every 8 hours  pantoprazole  Injectable 40 milliGRAM(s) IV Push daily    MEDICATIONS  (PRN):  acetaminophen   Oral Liquid .. 650 milliGRAM(s) Enteral Tube every 6 hours PRN Temp greater or equal to 38C (100.4F)  hydrALAZINE Injectable 5 milliGRAM(s) IV Push every 6 hours PRN SBP > 180      Vitals:  T(C): 36.6 (04-02-23 @ 12:00), Max: 36.8 (04-01-23 @ 20:00)  HR: 67 (04-02-23 @ 13:00) (57 - 80)  BP: 175/68 (04-02-23 @ 13:00) (102/54 - 181/72)  RR: 14 (04-02-23 @ 13:00) (14 - 16)  SpO2: 100% (04-02-23 @ 13:00) (92% - 100%)    General: lying down in bed, no apparent distress no spontaneous movements  Lungs: on mechanical ventilation  Extremities: no edema   Neuro:  MENTAL STATUS: does not follow commands   LANG/SPEECH: non-verbal (sedated)  CRANIAL NERVES: No pupillary response to light bilaterally 2mm, no corneal reflex, L eye exotropia, face symmetric - no cough and gag to suctioning, rest of cranial nerves were deferred due to mental status.  MOTOR: no spontaneous movements    REFLEXES: Biceps 2+ bilaterally Brachioradialis 0+ bilaterally Triceps(R/L) 1+/2+ Lower extremities 0 bilaterally   SENSORY: - withdrawal to pain only bilateral upper extremities  COORD: deferred - sedated  GAIT: deferred - sedated  Decreased bulk and tone         Labs:                        8.8    11.68 )-----------( 474      ( 02 Apr 2023 04:25 )             28.9     04-02    135  |  97<L>  |  30<H>  ----------------------------<  124<H>  4.3   |  26  |  3.00<H>    Ca    8.6      02 Apr 2023 04:25  Phos  4.1     04-02  Mg     2.30     04-02    TPro  5.7<L>  /  Alb  2.2<L>  /  TBili  0.8  /  DBili  x   /  AST  84<H>  /  ALT  28  /  AlkPhos  1447<H>  04-02    CAPILLARY BLOOD GLUCOSE        LIVER FUNCTIONS - ( 02 Apr 2023 04:25 )  Alb: 2.2 g/dL / Pro: 5.7 g/dL / ALK PHOS: 1447 U/L / ALT: 28 U/L / AST: 84 U/L / GGT: x               CSF:                  Radiology:  < from: CT Head No Cont (03.18.23 @ 10:19) >  IMPRESSION:    Compared with 3/10/2023.    1. Findings suspicious for global hypoxic-ischemic injury, as above. No   evidence of acute intracranial hemorrhage.  2. Progressive paranasal sinus, bilateral mastoid air cells and middle   ear disease, as above.  3. Additional findings described in detail above.    < end of copied text >      < from: CT Head No Cont (03.10.23 @ 01:26) >  FINDINGS:  There is no acute intracranial hemorrhage, mass effect, hydrocephalus, or   midline shift.  There are no extra-axial collections.  The basal cisterns   are patent.    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    There is sulcal and ventricular prominence consistent with age   appropriate involutional change. There are periventricular and   subcortical white matter hypodensities, consistent with mild   microvascular changes.    The visualized paranasal sinuses and mastoid air cells are clear.    The calvarium is intact.    Secretions noted in the oropharynx.    IMPRESSION:  No acute intracranial hemorrhage, mass effect, or midline shift.    < end of copied text >

## 2023-04-02 NOTE — CONSULT NOTE ADULT - ATTENDING COMMENTS
Mr. Lloyd is a 57 year old right handed  man with  PMHx of Afib on Eliquis, vascular risk factors including HTN, HLD, T2DM, and  Bipolar Disorder who was admitted due to the s/p cardiac arrest. Possible anoxic brain injury. Family consider palliative care and currently patient was extubated. No further neurology work up needed.   Thank you.
Patient seen and evaluated hospital course reviewed, potassium, calcium pH reviewed saturating well on ventilator.  No indication for hemodialysis today.  Discussed with primary team.

## 2023-04-02 NOTE — PROGRESS NOTE ADULT - ASSESSMENT
Mr. Cal Lloyd is a 56 Y/O M w/PMH HTN, HLD, T2DM, Bipolar Disorder, ESRD on HD (TTS), Afib on Eliquis who [facility] and was admitted to CCU for management of possible anterior STEMI and cardiac arrest with anoxic brain injury.    NEURO  # Anoxic Brain Injury  As seen on CT 3/18. Found with cardiac arrest on floor of nursing home  - s/p hypothermia protocol  - brain death criteria not met as pt able to take spontaneous breaths during apnea test; all brainstem reflexes absent however. Performed daily apnea test for a week with pt passing all   - palliative to guide discussion on GOC     CARDIAC  # Cardiac Arrest  Unclear cause, discharged after recent admission of respiratory failure requiring intubation. S/p cardiac arrest at nursing home. This is second cardiac arrest in as many months. ROSC achieved. Previous cardiac arrest in 02/23 in s/o complete lung atelectasis and mucus plugging. Initial EKG indicating possible STEMI; latest EKG back to baseline, minimal troponin elevations without significant delta.  - SpO2 goal > 94%; avoid hyperventilation  - S/p therapeutic hypothermia protocol    # HFpEF  EF > 70% 3/10, moderate diastolic dysfunction, severe concentric left ventricular hypertrophy  - 3/18 TTE RF 59%, moderate diastolic dysfunction, severely dilated LA, moderate pericardial effusion, b/l pleural effusions    #HTN  Likely a result of autonomic dysfunction in s/o anoxic brain injury  - PRN Hydralazine 5mg IV q6hPRN SP > 180  - Continue amlodipine and coreg standing     #AFib  - Heparin gtt for AFib d/c'd given bloody sputum via tube last week     RESPIRATORY  # intubated without sedation   - CPAP trial 4/2 overnight on 10/5, pt only able to tolerate for ~30min due to episodes of apnea     GI  - no active issues   - TFs running       # ESRD on HD (TTS)  - Renal for HD  - monitor electrolytes during cooling/rewarming  - condom catheter  - monitor I &Os  - currently undergoing maintenance HD    ID  #Elevated WBC Count  -3/19 WBC 29K, Lactate 2.3  -3/22 WBC 20; lactate 0.7  - s/p Zosyn 2.25g q8h (3/19-3/24) for empiric therapy for 5 days. WBC remains elevated s/p zosyn, no addl abx for now   - recurrent infections complicated by decompensation requiring ICU care    ENDO  # T2DM  No outpatient meds on records  - iSS     HEME  #Anemia  -Hgb levels from prior admissions generally range 8-10  -H&H appears stable compared to prior   -continue to monitor H&H       DVT PPX  -Subq heparin     GOC: Palliative care involved during recent admission given concern for recurrent decompensation. Per chart notes, there were concerns about the family's insight into the severity of the patient's illness. The patient remained full code at time of discharge.  GOC meeting held last week with family asking for time to consider options after medical and palliative teams explained pt's current status  3/26 Family with discord over deciding who will be HCP. Given pt has no spouse, next of kin would be parents, neither of whom wants to share HCP decision-making with the other. Resulted in rapid escalation of conversation with security needing to be called on family for disturbance of the unit. Will require palliative-assisted GOC given complicated family situation.   Attempted to contact numbers in chart, both of which are out of service. On Sunday, family had verbally reported their return to the hospital on 3/29 between noon and 1 PM to have a meeting. Will await their arrival for GOC discussion with palliative present today.  >Family called unit, plan for family meeting Friday 3/31 at noon  >Family called unit 3/31 stating will not be coming to hospital for family meeting given health issue. Will suggest conference call to expedite conversation as no HCP has been determined at present moment and GOC need clarification.  Mr. Cal Lloyd is a 58 Y/O M w/PMH HTN, HLD, T2DM, Bipolar Disorder, ESRD on HD (TTS), Afib on Eliquis [facility] and was admitted to CCU for management of possible anterior STEMI and cardiac arrest with anoxic brain injury.    NEURO  # Anoxic Brain Injury  As seen on CT 3/18. Found with cardiac arrest on floor of nursing home  - s/p hypothermia protocol  - brain death criteria not met as pt able to take spontaneous breaths during apnea test; all brainstem reflexes absent however. Performed daily apnea test for a week with pt passing all   - palliative to guide discussion on GOC     CARDIAC  # Cardiac Arrest  Unclear cause, discharged after recent admission of respiratory failure requiring intubation. S/p cardiac arrest at nursing home. This is second cardiac arrest in as many months. ROSC achieved. Previous cardiac arrest in 02/23 in s/o complete lung atelectasis and mucus plugging. Initial EKG indicating possible STEMI; latest EKG back to baseline, minimal troponin elevations without significant delta.  - SpO2 goal > 94%; avoid hyperventilation  - S/p therapeutic hypothermia protocol    # HFpEF  EF > 70% 3/10, moderate diastolic dysfunction, severe concentric left ventricular hypertrophy  - 3/18 TTE RF 59%, moderate diastolic dysfunction, severely dilated LA, moderate pericardial effusion, b/l pleural effusions    #HTN  Likely a result of autonomic dysfunction in s/o anoxic brain injury  - PRN Hydralazine 5mg IV q6hPRN SP > 180  - Continue amlodipine and coreg standing     #AFib  - Heparin gtt for AFib d/c'd given bloody sputum via tube last week     RESPIRATORY  # intubated without sedation   - CPAP trial 4/2 overnight on 10/5, pt only able to tolerate for ~30min due to episodes of apnea     GI  - AST elevated but downtrending from admission  - AlkPhos also elevated, noted elevated since 2/2022  - TFs running       # ESRD on HD (TTS)  - Renal for HD  - monitor electrolytes during cooling/rewarming  - condom catheter  - monitor I &Os  - currently undergoing maintenance HD    ID  #Elevated WBC Count  -3/19 WBC 29K, Lactate 2.3  -3/22 WBC 20; lactate 0.7  - s/p Zosyn 2.25g q8h (3/19-3/24) for empiric therapy for 5 days. WBC remains elevated s/p zosyn, no addl abx for now   - recurrent infections complicated by decompensation requiring ICU care    ENDO  # T2DM  No outpatient meds on records  - iSS     HEME  #Anemia  -Hgb levels from prior admissions generally range 8-10  -H&H appears stable compared to prior   -continue to monitor H&H       DVT PPX  -Subq heparin     GOC: Palliative care involved during recent admission given concern for recurrent decompensation. Per chart notes, there were concerns about the family's insight into the severity of the patient's illness. The patient remained full code at time of discharge.  GOC meeting held last week with family asking for time to consider options after medical and palliative teams explained pt's current status  3/26 Family with discord over deciding who will be HCP. Given pt has no spouse, next of kin would be parents, neither of whom wants to share HCP decision-making with the other. Resulted in rapid escalation of conversation with security needing to be called on family for disturbance of the unit. Will require palliative-assisted GOC given complicated family situation.   Attempted to contact numbers in chart, both of which are out of service. On Sunday, family had verbally reported their return to the hospital on 3/29 between noon and 1 PM to have a meeting. Will await their arrival for GOC discussion with palliative present today.  >Family called unit, plan for family meeting Friday 3/31 at noon  >Family called unit 3/31 stating will not be coming to hospital for family meeting given health issue. Will suggest conference call to expedite conversation as no HCP has been determined at present moment and GOC need clarification.  Mr. Cal Lloyd is a 56 Y/O M w/PMH HTN, HLD, T2DM, Bipolar Disorder, ESRD on HD (TTS), Afib on Eliquis [facility] and was admitted to CCU for management of possible anterior STEMI and cardiac arrest with anoxic brain injury.    NEURO  # Anoxic Brain Injury  As seen on CT 3/18. Found with cardiac arrest on floor of nursing home  - s/p hypothermia protocol  - brain death criteria not met as pt able to take spontaneous breaths during apnea test; all brainstem reflexes absent however. Performed daily apnea test for a week with pt passing all   - palliative to guide discussion on GOC     CARDIAC  # Cardiac Arrest  Unclear cause, discharged after recent admission of respiratory failure requiring intubation. S/p cardiac arrest at nursing home. This is second cardiac arrest in as many months. ROSC achieved. Previous cardiac arrest in 02/23 in s/o complete lung atelectasis and mucus plugging. Initial EKG indicating possible STEMI; latest EKG back to baseline, minimal troponin elevations without significant delta.  - SpO2 goal > 94%; avoid hyperventilation  - S/p therapeutic hypothermia protocol    # HFpEF  EF > 70% 3/10, moderate diastolic dysfunction, severe concentric left ventricular hypertrophy  - 3/18 TTE RF 59%, moderate diastolic dysfunction, severely dilated LA, moderate pericardial effusion, b/l pleural effusions    #HTN  Likely a result of autonomic dysfunction in s/o anoxic brain injury  - PRN Hydralazine 5mg IV q6hPRN SP > 180  - Continue amlodipine and coreg standing     #AFib  - Heparin gtt for AFib d/c'd given bloody sputum via tube last week     RESPIRATORY  # intubated without sedation   - CPAP trial 4/2 overnight on 10/5, pt only able to tolerate for ~30min due to episodes of apnea     GI  - AST elevated but downtrending from admission  - AlkPhos also elevated, noted persistently elevated since 2/2023, RUQ negative in Feb 2023  - TFs running       # ESRD on HD (TTS)  - Renal for HD  - monitor electrolytes during cooling/rewarming  - condom catheter  - monitor I &Os  - currently undergoing maintenance HD    ID  #Elevated WBC Count  -3/19 WBC 29K, Lactate 2.3  -3/22 WBC 20; lactate 0.7  - s/p Zosyn 2.25g q8h (3/19-3/24) for empiric therapy for 5 days. WBC remains elevated s/p zosyn, no addl abx for now   - recurrent infections complicated by decompensation requiring ICU care    ENDO  # T2DM  No outpatient meds on records  - iSS     HEME  #Anemia  -Hgb levels from prior admissions generally range 8-10  -H&H appears stable compared to prior   -continue to monitor H&H       DVT PPX  -Subq heparin     GOC: Palliative care involved during recent admission given concern for recurrent decompensation. Per chart notes, there were concerns about the family's insight into the severity of the patient's illness. The patient remained full code at time of discharge.  GOC meeting held last week with family asking for time to consider options after medical and palliative teams explained pt's current status  3/26 Family with discord over deciding who will be HCP. Given pt has no spouse, next of kin would be parents, neither of whom wants to share HCP decision-making with the other. Resulted in rapid escalation of conversation with security needing to be called on family for disturbance of the unit. Will require palliative-assisted GOC given complicated family situation.   Attempted to contact numbers in chart, both of which are out of service. On Sunday, family had verbally reported their return to the hospital on 3/29 between noon and 1 PM to have a meeting. Will await their arrival for GOC discussion with palliative present today.  >Family called unit, plan for family meeting Friday 3/31 at noon  >Family called unit 3/31 stating will not be coming to hospital for family meeting given health issue. Will suggest conference call to expedite conversation as no HCP has been determined at present moment and GOC need clarification.  Mr. Cal Lloyd is a 56 Y/O M w/PMH HTN, HLD, T2DM, Bipolar Disorder, ESRD on HD (TTS), Afib on Eliquis [facility] and was admitted to CCU for management of possible anterior STEMI and cardiac arrest with anoxic brain injury.    NEURO  # Anoxic Brain Injury  As seen on CT 3/18. Found with cardiac arrest on floor of nursing home  - s/p hypothermia protocol  - brain death criteria not met as pt able to take spontaneous breaths during apnea test; all brainstem reflexes absent however. Performed daily apnea test for a week with pt passing all   - palliative to guide discussion on GOC     CARDIAC  # Cardiac Arrest  Unclear cause, discharged after recent admission of respiratory failure requiring intubation. S/p cardiac arrest at nursing home. This is second cardiac arrest in as many months. ROSC achieved. Previous cardiac arrest in 02/23 in s/o complete lung atelectasis and mucus plugging. Initial EKG indicating possible STEMI; latest EKG back to baseline, minimal troponin elevations without significant delta.  - SpO2 goal > 94%; avoid hyperventilation  - S/p therapeutic hypothermia protocol    # HFpEF  EF > 70% 3/10, moderate diastolic dysfunction, severe concentric left ventricular hypertrophy  - 3/18 TTE RF 59%, moderate diastolic dysfunction, severely dilated LA, moderate pericardial effusion, b/l pleural effusions    #HTN  Likely a result of autonomic dysfunction in s/o anoxic brain injury  - PRN Hydralazine 5mg IV q6hPRN SP > 180  - Continue amlodipine and coreg standing     #AFib  - Heparin gtt for AFib d/c'd given bloody sputum via tube last week     RESPIRATORY  # intubated without sedation   - CPAP trial 4/2 overnight on 10/5, pt only able to tolerate for ~30min due to episodes of apnea     GI  - AST elevated but downtrending from admission  - AlkPhos also elevated, noted persistently elevated since 2/2023, RUQ negative in Feb 2023  - TFs running       # ESRD on HD (TTS)  - Renal for HD  - monitor electrolytes during cooling/rewarming  - condom catheter  - monitor I &Os  - currently undergoing maintenance HD    ID  #Elevated WBC Count  -3/19 WBC 29K, Lactate 2.3  -3/22 WBC 20; lactate 0.7  - s/p Zosyn 2.25g q8h (3/19-3/24) for empiric therapy for 5 days. WBC remains elevated s/p zosyn, no addl abx for now   - recurrent infections complicated by decompensation requiring ICU care    ENDO  # T2DM  No outpatient meds on records  - iSS     HEME  #Anemia  -Hgb levels from prior admissions generally range 8-10  -H&H appears stable compared to prior   -continue to monitor H&H       DVT PPX  -Subq heparin     GOC: Palliative care involved during recent admission given concern for recurrent decompensation. Per chart notes, there were concerns about the family's insight into the severity of the patient's illness. The patient remained full code at time of discharge.  GOC meeting held last week with family asking for time to consider options after medical and palliative teams explained pt's current status  3/26 Family with discord over deciding who will be HCP. Given pt has no spouse, next of kin would be parents, neither of whom wants to share HCP decision-making with the other. Resulted in rapid escalation of conversation with security needing to be called on family for disturbance of the unit. Will require palliative-assisted GOC given complicated family situation.   Attempted to contact numbers in chart, both of which are out of service. On Sunday, family had verbally reported their return to the hospital on 3/29 between noon and 1 PM to have a meeting. Will await their arrival for GOC discussion with palliative present today.  >Family called unit, plan for family meeting Friday 3/31 at noon  >Family called unit 3/31 stating will not be coming to hospital for family meeting given health issue. Will suggest conference call to expedite conversation as no HCP has been determined at present moment and GOC need clarification.  >Consider Ethics consult  Mr. Cal Lloyd is a 58 Y/O M w/PMH HTN, HLD, T2DM, Bipolar Disorder, ESRD on HD (TTS), Afib on Eliquis [facility] and was admitted to CCU for management of possible anterior STEMI and cardiac arrest with anoxic brain injury.    NEURO  # Anoxic Brain Injury  As seen on CT 3/18. Found with cardiac arrest on floor of nursing home  - s/p hypothermia protocol  - brain death criteria not met as pt able to take spontaneous breaths during apnea test; all brainstem reflexes absent however. Performed daily apnea test for a week with pt passing all   - palliative to guide discussion on GOC     CARDIAC  # Cardiac Arrest  Unclear cause, discharged after recent admission of respiratory failure requiring intubation. S/p cardiac arrest at nursing home. This is second cardiac arrest in as many months. ROSC achieved. Previous cardiac arrest in 02/23 in s/o complete lung atelectasis and mucus plugging. Initial EKG indicating possible STEMI; latest EKG back to baseline, minimal troponin elevations without significant delta.  - SpO2 goal > 94%; avoid hyperventilation  - S/p therapeutic hypothermia protocol    # HFpEF  EF > 70% 3/10, moderate diastolic dysfunction, severe concentric left ventricular hypertrophy  - 3/18 TTE RF 59%, moderate diastolic dysfunction, severely dilated LA, moderate pericardial effusion, b/l pleural effusions    #HTN  Likely a result of autonomic dysfunction in s/o anoxic brain injury vs ESRD  - PRN Hydralazine 5mg IV q6hPRN SP > 180  - Continue amlodipine and coreg standing     #AFib  - Heparin gtt for AFib d/c'd given bloody sputum via tube last week     RESPIRATORY  # intubated without sedation   - CPAP trial 4/2 overnight on 10/5, pt only able to tolerate for ~30min due to episodes of apnea     GI  - AST elevated but downtrending from admission  - AlkPhos also elevated, noted persistently elevated since 2/2023, RUQ negative in Feb 2023  - TFs running       # ESRD on HD (TTS)  - Renal for HD  - monitor electrolytes during cooling/rewarming  - condom catheter  - monitor I &Os  - currently undergoing maintenance HD    ID  #Elevated WBC Count  -3/19 WBC 29K, Lactate 2.3  -3/22 WBC 20; lactate 0.7  - s/p Zosyn 2.25g q8h (3/19-3/24) for empiric therapy for 5 days. WBC remains elevated s/p zosyn, no addl abx for now   - recurrent infections complicated by decompensation requiring ICU care    ENDO  # T2DM  No outpatient meds on records  - iSS     HEME  #Anemia  -Hgb levels from prior admissions generally range 8-10  -H&H appears stable compared to prior   -continue to monitor H&H       DVT PPX  -Subq heparin     GOC: Palliative care involved during recent admission given concern for recurrent decompensation. Per chart notes, there were concerns about the family's insight into the severity of the patient's illness. The patient remained full code at time of discharge.  GOC meeting held last week with family asking for time to consider options after medical and palliative teams explained pt's current status  3/26 Family with discord over deciding who will be HCP. Given pt has no spouse, next of kin would be parents, neither of whom wants to share HCP decision-making with the other. Resulted in rapid escalation of conversation with security needing to be called on family for disturbance of the unit. Will require palliative-assisted GOC given complicated family situation.   Attempted to contact numbers in chart, both of which are out of service. On Sunday, family had verbally reported their return to the hospital on 3/29 between noon and 1 PM to have a meeting. Will await their arrival for GOC discussion with palliative present today.  >Family called unit, plan for family meeting Friday 3/31 at noon  >Family called unit 3/31 stating will not be coming to hospital for family meeting given health issue. Will suggest conference call to expedite conversation as no HCP has been determined at present moment and GOC need clarification.  >Consider Ethics consult

## 2023-04-03 NOTE — PROGRESS NOTE ADULT - PROBLEM SELECTOR PLAN 3
Phos is well controlled.     If you have any questions, please feel free to contact me  Rodney Langston  Nephrology Fellow  [phone]; Prefer [employer] TEAMS  (After 5pm or on weekends please page the on-call fellow).

## 2023-04-03 NOTE — PROGRESS NOTE ADULT - PROBLEM SELECTOR PLAN 2
Pt. with anemia in the setting of ESRD. Hgb below target range will monitor hgb for now. Aranesp weekly ordered.

## 2023-04-03 NOTE — PROGRESS NOTE ADULT - ASSESSMENT
Mr. Cal Lloyd is a 56 Y/O M w/PMH HTN, HLD, T2DM, Bipolar Disorder, ESRD on HD (TTS), Afib on Eliquis who presented from [facility] for cardiac arrest requiring CPR and was admitted to CCU for management of possible anterior STEMI and cardiac arrest with anoxic brain injury.    NEURO  # Anoxic Brain Injury  As seen on CT 3/18. Found with cardiac arrest on floor of nursing home  - s/p hypothermia protocol  - brain death criteria not met as pt able to take spontaneous breaths during apnea test; all brainstem reflexes absent however. Performed daily apnea test for a week with pt passing all   - palliative to guide discussion on GOC     CARDIAC  # Cardiac Arrest  Unclear cause, discharged after recent admission of respiratory failure requiring intubation. S/p cardiac arrest at nursing home. This is second cardiac arrest in as many months. ROSC achieved. Previous cardiac arrest in 02/23 in s/o complete lung atelectasis and mucus plugging. Initial EKG indicating possible STEMI; latest EKG back to baseline, minimal troponin elevations without significant delta.  - SpO2 goal > 94%; avoid hyperventilation  - S/p therapeutic hypothermia protocol    # HFpEF  EF > 70% 3/10, moderate diastolic dysfunction, severe concentric left ventricular hypertrophy  - 3/18 TTE RF 59%, moderate diastolic dysfunction, severely dilated LA, moderate pericardial effusion, b/l pleural effusions    #HTN  Likely a result of autonomic dysfunction in s/o anoxic brain injury vs ESRD  - PRN Hydralazine 5mg IV q6hPRN SP > 180  - Continue amlodipine and coreg standing     #AFib  - Heparin gtt for AFib d/c'd given bloody sputum via tube last week     RESPIRATORY  # intubated without sedation   - CPAP trial 4/2 overnight on 10/5, pt only able to tolerate for ~30min due to episodes of apnea     GI  - AST elevated but downtrending from admission  - AlkPhos also elevated, noted persistently elevated since 2/2023, RUQ negative in Feb 2023  - TFs running       # ESRD on HD (TTS)  - Renal for HD  - monitor electrolytes during cooling/rewarming  - condom catheter  - monitor I &Os  - currently undergoing maintenance HD    ID  #Elevated WBC Count  -3/19 WBC 29K, Lactate 2.3  -3/22 WBC 20; lactate 0.7  - s/p Zosyn 2.25g q8h (3/19-3/24) for empiric therapy for 5 days. WBC remains elevated s/p zosyn, no addl abx for now   - recurrent infections complicated by decompensation requiring ICU care    ENDO  # T2DM  No outpatient meds on records  - iSS     HEME  #Anemia  -Hgb levels from prior admissions generally range 8-10  -H&H appears stable compared to prior   -continue to monitor H&H       DVT PPX  -Subq heparin     GOC: Palliative care involved during recent admission given concern for recurrent decompensation. Per chart notes, there were concerns about the family's insight into the severity of the patient's illness. The patient remained full code at time of discharge.  GOC meeting held last week with family asking for time to consider options after medical and palliative teams explained pt's current status  3/26 Family with discord over deciding who will be HCP. Given pt has no spouse, next of kin would be parents, neither of whom wants to share HCP decision-making with the other. Resulted in rapid escalation of conversation with security needing to be called on family for disturbance of the unit. Will require palliative-assisted GOC given complicated family situation.   Attempted to contact numbers in chart, both of which are out of service. On Sunday, family had verbally reported their return to the hospital on 3/29 between noon and 1 PM to have a meeting. Will await their arrival for GOC discussion with palliative present today.  >Family called unit, plan for family meeting Friday 3/31 at noon  >Family called unit 3/31 stating will not be coming to hospital for family meeting given health issue. Will suggest conference call to expedite conversation as no HCP has been determined at present moment and GOC need clarification.  >Consider Ethics consult  Mr. Cal Lloyd is a 58 Y/O M w/PMH HTN, HLD, T2DM, Bipolar Disorder, ESRD on HD (TTS), Afib on Eliquis who [facility] requiring CPR and was admitted to CCU for management of possible anterior STEMI and cardiac arrest with anoxic brain injury.    NEURO  # Anoxic Brain Injury  As seen on CT 3/18. Found with cardiac arrest on floor of nursing home  - s/p hypothermia protocol  - brain death criteria not met as pt able to take spontaneous breaths during apnea test; all brainstem reflexes absent however. Performed daily apnea test for a week with pt passing all   - Pending Routine EEG (awake/asleep)  - Neuro consulted, recs appreciated  - palliative to guide discussion on GOC     CARDIAC  # Cardiac Arrest  Unclear cause, discharged after recent admission of respiratory failure requiring intubation. S/p cardiac arrest at nursing home. This is second cardiac arrest in as many months. ROSC achieved. Previous cardiac arrest in 02/23 in s/o complete lung atelectasis and mucus plugging. Initial EKG indicating possible STEMI; latest EKG back to baseline, minimal troponin elevations without significant delta.  - SpO2 goal > 94%; avoid hyperventilation  - S/p therapeutic hypothermia protocol    # HFpEF  EF > 70% 3/10, moderate diastolic dysfunction, severe concentric left ventricular hypertrophy  - 3/18 TTE RF 59%, moderate diastolic dysfunction, severely dilated LA, moderate pericardial effusion, b/l pleural effusions    #HTN  Likely a result of autonomic dysfunction in s/o anoxic brain injury vs ESRD  - PRN Hydralazine 5mg IV q6hPRN SP > 180  - Continue amlodipine and coreg standing     #AFib  - Heparin gtt for AFib d/c'd given bloody sputum via tube last week     RESPIRATORY  # intubated without sedation   - CPAP trial 4/2 overnight on 10/5, pt only able to tolerate for ~30min due to episodes of apnea     GI  - AST elevated but downtrending from admission  - AlkPhos also elevated, noted persistently elevated since 2/2023, RUQ negative in Feb 2023  - TFs running       # ESRD on HD (TTS)  - Renal for HD  - monitor electrolytes during cooling/rewarming  - condom catheter  - monitor I &Os  - currently undergoing maintenance HD    ID  #Elevated WBC Count  -3/19 WBC 29K, Lactate 2.3  -3/22 WBC 20; lactate 0.7  - s/p Zosyn 2.25g q8h (3/19-3/24) for empiric therapy for 5 days. WBC remains elevated s/p zosyn, no addl abx for now   - recurrent infections complicated by decompensation requiring ICU care    ENDO  # T2DM  No outpatient meds on records  - iSS     HEME  #Anemia  -Hgb levels from prior admissions generally range 8-10  -H&H appears stable compared to prior   -continue to monitor H&H       DVT PPX  -Subq heparin     GOC: Palliative care involved during recent admission given concern for recurrent decompensation. Per chart notes, there were concerns about the family's insight into the severity of the patient's illness. The patient remained full code at time of discharge.  GOC meeting held last week with family asking for time to consider options after medical and palliative teams explained pt's current status  3/26 Family with discord over deciding who will be HCP. Given pt has no spouse, next of kin would be parents, neither of whom wants to share HCP decision-making with the other. Resulted in rapid escalation of conversation with security needing to be called on family for disturbance of the unit. Will require palliative-assisted GOC given complicated family situation.   Attempted to contact numbers in chart, both of which are out of service. On Sunday, family had verbally reported their return to the hospital on 3/29 between noon and 1 PM to have a meeting. Will await their arrival for GOC discussion with palliative present today.  >Family called unit, plan for family meeting Friday 3/31 at noon  >Family called unit 3/31 stating will not be coming to hospital for family meeting given health issue. Will suggest conference call to expedite conversation as no HCP has been determined at present moment and GOC need clarification.  >Consider Ethics consult  Mr. Cal Lloyd is a 56 Y/O M w/PMH HTN, HLD, T2DM, Bipolar Disorder, ESRD on HD (TTS), Afib on Eliquis who [facility] requiring CPR and was admitted to CCU for management of possible anterior STEMI and cardiac arrest with anoxic brain injury.    NEURO  # Anoxic Brain Injury  As seen on CT 3/18. Found with cardiac arrest on floor of nursing home  - s/p hypothermia protocol  - brain death criteria not met as pt able to take spontaneous breaths during apnea test; all brainstem reflexes absent however. Performed daily apnea test for a week with pt passing all   - Pending Routine EEG (awake/asleep)  - Neuro consulted, recs appreciated  - palliative to guide discussion on GOC     CARDIAC  # Cardiac Arrest  Unclear cause, discharged after recent admission of respiratory failure requiring intubation. S/p cardiac arrest at nursing home. This is second cardiac arrest in as many months. ROSC achieved. Previous cardiac arrest in 02/23 in s/o complete lung atelectasis and mucus plugging. Initial EKG indicating possible STEMI; latest EKG back to baseline, minimal troponin elevations without significant delta.  - SpO2 goal > 94%; avoid hyperventilation  - S/p therapeutic hypothermia protocol    # HFpEF  EF > 70% 3/10, moderate diastolic dysfunction, severe concentric left ventricular hypertrophy  - 3/18 TTE RF 59%, moderate diastolic dysfunction, severely dilated LA, moderate pericardial effusion, b/l pleural effusions    #HTN  Likely a result of autonomic dysfunction in s/o anoxic brain injury vs ESRD  - PRN Hydralazine 5mg IV q6hPRN SP > 180  - Continue amlodipine and coreg standing     #AFib  - Heparin gtt for AFib d/c'd given bloody sputum via tube last week     RESPIRATORY  # intubated without sedation   - CPAP trial 4/2 overnight on 10/5, pt only able to tolerate for ~30min due to episodes of apnea     GI  - AST elevated but downtrending from admission  - AlkPhos also elevated, noted persistently elevated since 2/2023, RUQ negative in Feb 2023  - TFs running       # ESRD on HD (TTS)  - Renal for HD  - monitor electrolytes during cooling/rewarming  - condom catheter  - monitor I &Os  - currently undergoing maintenance HD    ID  #Elevated WBC Count  -3/19 WBC 29K, Lactate 2.3  -3/22 WBC 20; lactate 0.7  - s/p Zosyn 2.25g q8h (3/19-3/24) for empiric therapy for 5 days. WBC remains elevated s/p zosyn, no addl abx for now   - recurrent infections complicated by decompensation requiring ICU care    ENDO  # T2DM  No outpatient meds on records  - iSS     HEME  #Anemia  -Hgb levels from prior admissions generally range 8-10  -H&H appears stable compared to prior   -continue to monitor H&H       DVT PPX  -Subq heparin     GOC: Palliative care involved during recent admission given concern for recurrent decompensation. Per chart notes, there were concerns about the family's insight into the severity of the patient's illness. The patient remained full code at time of discharge.  GOC meeting held last week with family asking for time to consider options after medical and palliative teams explained pt's current status  3/26 Family with discord over deciding who will be HCP. Given pt has no spouse, next of kin would be parents, neither of whom wants to share HCP decision-making with the other. Resulted in rapid escalation of conversation with security needing to be called on family for disturbance of the unit. Will require palliative-assisted GOC given complicated family situation.   Attempted to contact numbers in chart, both of which are out of service. On Sunday, family had verbally reported their return to the hospital on 3/29 between noon and 1 PM to have a meeting. Will await their arrival for GOC discussion with palliative present today.  >Family called unit, plan for family meeting Friday 3/31 at noon  >Family called unit 3/31 stating will not be coming to hospital for family meeting given health issue. Will suggest conference call to expedite conversation as no HCP has been determined at present moment and GOC need clarification.  >Ethics consulted on 4/2.

## 2023-04-03 NOTE — PROGRESS NOTE ADULT - SUBJECTIVE AND OBJECTIVE BOX
Indication of Geriatrics and Palliative Medicine Services:  [X  ] Complex Medical Decision Making   [  ] Symptom/Pain management     DNR on chart: No     INTERVAL EVENTS: Patient seen this AM, unresponsive, making spontaneous breaths on vent. Patient's parents and brother Jake at bedside. See separate [facility] note     -------------------------------------------------------------------------------------------------------    PRESENT SYMPTOMS:     [ ] No     [X ] Unable to self-report      [ ] CPOT (ICU)     [ ] PAINADs     [X ] RDOS 0    [ ] Yes     Source if other than patient:  [ ]Family   [ ]Team     PAIN:   If blank, patient unable to specify   [ ]yes [ ]no  QOL impact-   Location -                    Aggravating factors -  Quality -  Radiation -  Timing-  Pain at most severe level (0-10 scale):  Pain at minimal acceptable level/Pain Goal (0-10 scale):     SYMPTOMS:   Dyspnea:                           [ ]Mild [ ]Moderate [ ]Severe  Anxiety:                             [ ]Mild [ ]Moderate [ ]Severe  Fatigue:                             [ ]Mild [ ]Moderate [ ]Severe  Nausea/Vomiting:              [ ]Mild [ ]Moderate [ ]Severe  Loss of appetite:                [ ]Mild [ ]Moderate [ ]Severe  Constipation:                     [ ]Mild [ ]Moderate [ ]Severe    Other Symptoms:  [X ]All other review of systems negative     Home Medications for symptoms if any:  I-Stop Reference No:      -------------------------------------------------------------------------------------------------------    ITEMS UNCHECKED ARE NOT PRESENT    PHYSICAL:  Vital Signs Last 24 Hrs  T(C): 36.5 (03 Apr 2023 08:11), Max: 36.6 (02 Apr 2023 16:00)  T(F): 97.7 (03 Apr 2023 08:11), Max: 97.9 (02 Apr 2023 16:00)  HR: 63 (03 Apr 2023 12:00) (58 - 71)  BP: 174/78 (03 Apr 2023 12:00) (103/48 - 190/69)  BP(mean): 103 (03 Apr 2023 12:00) (63 - 103)  RR: 14 (03 Apr 2023 10:00) (14 - 14)  SpO2: 100% (03 Apr 2023 12:00) (99% - 100%)    Parameters below as of 03 Apr 2023 08:00  Patient On (Oxygen Delivery Method): ventilator    O2 Concentration (%): 40    GENERAL:  [ ]Cachexia  [X ] Frail  [ ]Awake  [ ]Oriented x   [ ]Lethargic  [X ]Unarousable  [ ]Verbal  [ ]Non-Verbal  Intubated, off sedation     BEHAVIORAL:   [ ] Anxiety  [ ] Delirium [ ] Agitation [ ] Other    HEENT:   [ ]Normal   [ ]Dry mouth   [X ]ET Tube/Trach  [ ]Oral lesions    PULMONARY:   [X ]Clear [ ]Tachypnea  [ ]Audible excessive secretions   [ ]Rhonchi        [ ]Right [ ]Left [ ]Bilateral  [ ]Crackles        [ ]Right [ ]Left [ ]Bilateral  [ ]Wheezing     [ ]Right [ ]Left [ ]Bilateral  [ ]Diminished breath sounds [ ]right [ ]left [ ]bilateral    CARDIOVASCULAR:    [X ]Regular [ ]Irregular [ ]Tachy  [ ]Chace [ ]Murmur [ ]Other    GASTROINTESTINAL:  [X ]Soft  [ ]Distended   [ ]+BS  [X ]Non tender [ ]Tender  [ ]Other [ ]PEG [ ]OGT/ NGT      GENITOURINARY:  [ ]Normal [ ] Incontinent   [ ]Oliguria/Anuria   [X ]Loza    MUSCULOSKELETAL:   [ ]Normal   [ ]Weakness  [X ]Bed/Wheelchair bound [ ]Edema    NEUROLOGIC:   [X ]No focal deficits  [ ]Cognitive impairment  [ ]Dysphagia [ ]Dysarthria [ ]Paresis [ ]Other     SKIN:   [X ]Normal  [ ]Rash  [ ]Other  [ ]Pressure ulcer(s)       Present on admission [ ]y [ ]n(from initial)     -------------------------------------------------------------------------------------------------------    LABS:                                   8.7    9.10  )-----------( 466      ( 03 Apr 2023 02:50 )             28.6     04-03    137  |  98  |  40<H>  ----------------------------<  100<H>  4.3   |  26  |  3.67<H>    Ca    8.7      03 Apr 2023 02:50  Phos  4.4     04-03  Mg     2.40     04-03    TPro  5.8<L>  /  Alb  2.4<L>  /  TBili  0.8  /  DBili  x   /  AST  72<H>  /  ALT  25  /  AlkPhos  1330<H>  04-03    -------------------------------------------------------------------------------------------------------  RADIOLOGY & ADDITIONAL STUDIES:       < from: Xray Chest 1 View- PORTABLE-Routine (Xray Chest 1 View- PORTABLE-Routine in AM.) (03.20.23 @ 08:15) >  FINDINGS:  3/19/2023 at 9:06 AM  Endotracheal tube tip in the distal trachea. Enteric tube courses below   the diaphragm and out of the field-of-view. Left IJ central line   terminates in the SVC.  Cardiomegaly. Left basilar pleural effusion/atelectasis. Hazy bilateral   opacities.    < end of copied text >    < from: CT Chest No Cont (03.18.23 @ 10:20) >  IMPRESSION:  Endotracheal tube terminates in the proximal trachea, at approximately   the inferior margin of the thyroid cartilage. Hyperinflated balloon cuff   distends the tracheal lumen. Adjustment is recommended.    Centrilobular groundglass nodules in the right upper lobe, likely   reflecting bronchiolar inflammation and/or infection.    Large right and moderate left pleural effusions with adjacent atelectasis.    Occluded right lower lobe bronchi, likely by mucus.    Cardiomegaly with small pericardial effusion.    < end of copied text >    < from: CT Head No Cont (03.18.23 @ 10:19) >    1. Findings suspicious for global hypoxic-ischemic injury, as above.No   evidence of acute intracranial hemorrhage.  2. Progressive paranasal sinus, bilateral mastoid air cells and middle   ear disease, as above.  3. Additional findings described in detail above.    < end of copied text >    -------------------------------------------------------------------------------------------------------  MEDICATIONS:     MEDICATIONS  (STANDING):  artificial tears (preservative free) Ophthalmic Solution 1 Drop(s) Both EYES every 4 hours  aspirin  chewable 81 milliGRAM(s) Enteral Tube daily  chlorhexidine 0.12% Liquid 15 milliLiter(s) Oral Mucosa two times a day  chlorhexidine 2% Cloths 1 Application(s) Topical <User Schedule>  darbepoetin Injectable ViaL 80 MICROGram(s) IV Push <User Schedule>  dextrose 50% Injectable 25 Gram(s) IV Push once  dextrose 50% Injectable 12.5 Gram(s) IV Push once  dextrose 50% Injectable 25 Gram(s) IV Push once  dextrose Oral Gel 15 Gram(s) Oral once  glucagon  Injectable 1 milliGRAM(s) IntraMuscular once  insulin lispro (ADMELOG) corrective regimen sliding scale   SubCutaneous every 6 hours  pantoprazole  Injectable 40 milliGRAM(s) IV Push daily  piperacillin/tazobactam IVPB.. 3.375 Gram(s) IV Intermittent every 12 hours    MEDICATIONS  (PRN):  acetaminophen   Oral Liquid .. 650 milliGRAM(s) Enteral Tube every 6 hours PRN Temp greater or equal to 38C (100.4F)  hydrALAZINE Injectable 10 milliGRAM(s) IV Push every 6 hours PRN SBP > 170    -------------------------------------------------------------------------------------------------------    CRITICAL CARE:  [ ]Shock Present  [ ]Septic [ ]Cardiogenic [ ]Neurologic [ ]Hypovolemic [ ]Undifferentiated    [ ]Vasopressors [ ]Inotropes    [X ]Respiratory failure present   [X ]Acute  [ ]Chronic [ ]Hypoxic  [ ]Hypercarbic [ ]Mixed   [X ]Mechanical Ventilation [ ]Non-invasive ventilatory support [ ]High-Flow Mode: AC/ CMV (Assist Control/ Continuous Mandatory Ventilation), RR (machine): 20, TV (machine): 380, FiO2: 40, PEEP: 5, MAP: 8, PIP: 22    [ ]Other organ failure     -------------------------------------------------------------------------------------------------------  REFERRALS:   [ ]Chaplaincy  [ ]Hospice  [ ]Child Life  [ ]Social Work  [ ]Case management [ ]Holistic Therapy    Verbal/written post procedure instructions were given to patient/caregiver.

## 2023-04-03 NOTE — PROGRESS NOTE ADULT - PROBLEM SELECTOR PLAN 3
Palliative consulted for complex medical decision making in the setting of serious illness.    Parents agreed to - per prior [facility] note from (3/16) - patient was hesitant about mechanical ventilation but wanted CPR. Ultimately decided to be full code  - 3/22- discussed poor prognosis with patient's mother and brother. Family is struggling   - 3/28- Decision makers: surrogates are patient's parents. However complex dynamics since parents are  with poor relations and do not feel the other should have decision making abilities. Legally both parents do have equal say in decision making, explained this to patient's brother Jake.  - 4/3- -See GO note. I spent 60 minutes addressing advanced care planning with patient and/or decision maker(s) Parents acknowledge patient suffered severe and irreversible neurologic damage, agreed to palliative extubation, MOLST completed for DNR/DNI

## 2023-04-03 NOTE — PROGRESS NOTE ADULT - ASSESSMENT
Patient is a 56 Y/O M with HTN/T2DM/Bipolar Disorder, ESRD on HD (TTS), Afib on Eliquis who returned to [facility] from [facility] after cardiac arrest and CPR. He was discharged on 3/17 after long hospital course, requiring MICU care for septic shock. Per EMS report, at 08:00 in the morning, patient was eating breakfast in usual state of health at [facility], after recent discharge from hospital involving MICU course for pneumonia.  Then at 08:20 patient found pulseless and unresponsive, CPR initiated by [place] staff then taken over by EMS, CPR for <20 minutes with 2 doses epinephrine given, then sustained ROSC obtained. Patient then had additional cardiac arrest in ED from 09:06 - 09:15 with epi given at 09:07 and 09:13.  Cardioversion given at 09:15 for possible VTach although rhythm in retrospect may have more likely been significant ST elevations with RBBB and LPFB mimicking VTach, given no change in rhythm.  Initial EKG during rosc shows STEMI.  Cardiology interventionalist Sandra valdez called, recommended CCU consult. During previous admission, on 3/16, Palliative Care carried out a GOC discussion, where patient expressed desire to remain full code. Palliative consulted for complex medical decision making in the setting of serious illness.

## 2023-04-03 NOTE — PROGRESS NOTE ADULT - ATTENDING SUPERVISION STATEMENT
"   02/19/21 0900   Group Therapy Session   Group Attendance attended group session   Time Session Began 0930   Time Session Ended 1000   Total Time (minutes) 30   Group Type psychotherapeutic   Group Topic Covered other (see comments)   Literature/Videos Given other (see comments)   Literature/Videos Given Comments NA   Group Session Detail Dual Process Group- 6 group members   Patient Participation/Contribution listened actively;cooperative with task   Patient Participation Detail Quiet in group but engaged when prompted. Completed introduction.       INTRODUCTION    City pt lives in:  [place]  Age: 16  Who does pt live with? How is the relationship? 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
Resident
Resident/Fellow
Resident
Resident/Fellow
Resident
Resident/Fellow
Resident
Fellow
Fellow

## 2023-04-03 NOTE — PROGRESS NOTE ADULT - PROBLEM SELECTOR PLAN 1
Pt. with ESRD on HD MWF who presented for cardiac arrest with evidence of anoxic brain injury.  Seen and evaluated at bedside today in the CCU.  Tolerated HD Friday.  Labs reviewed.  Plan HD today. UF goal 1.5L Pt. with ESRD on HD TIW (MWF) now with anoxic brain injury following cardiac arrest. Last HD treatment was on 3/31/23. Labs reviewed.  Plan HD today. GOC being discussed. Overall prognosis guarded.     If you have any questions, please feel free to contact me  Rodney Langston  Nephrology Fellow  [phone]; Prefer [employer] TEAMS  (After 5pm or on weekends please page the on-call fellow).

## 2023-04-03 NOTE — PROGRESS NOTE ADULT - PROBLEM SELECTOR PLAN 1
- 2/2 two cardiac arrests  - unresponsive off sedation  - assessed for brain death - patient has no brainstem reflexes, but has spontaneous breaths   - poor prognosis - started on IV dilaudid 0.5 mg q1h PRN for dyspnea

## 2023-04-03 NOTE — PROGRESS NOTE ADULT - NS ATTEST RISK PROBLEM GEN_ALL_CORE FT
Patient is seriously ill with two cardiac arrests, now with severe anoxic brain injury with no brainstem reflexes   Acute respiratory failure on mechanical ventilator, ESRD, CCU level of care  High risk of morbidity and mortality Patient is seriously ill with two cardiac arrests, now with severe anoxic brain injury  Acute respiratory failure on mechanical ventilator, ESRD, CCU level of care  High risk of morbidity and mortality  Decision for DNR/DNI   Palliative extubation

## 2023-04-03 NOTE — PROGRESS NOTE ADULT - PROBLEM SELECTOR PLAN 2
- per prior [facility] note from (3/16) - patient was hesitant about mechanical ventilation but wanted CPR. Ultimately decided to be full code  - 3/22- discussed poor prognosis with patient's mother and brother. Family is struggling   - 3/28- Decision makers: surrogates are patient's parents. However complex dynamics since parents are  with poor relations and do not feel the other should have decision making abilities. Legally both parents do have equal say in decision making, explained this to patient's brother Jake.  - 4/3- Parents acknowledge patient suffered from severe and irreversible neurologic damage, agreed to palliative extubation, MOLST completed for DNR/DNI - 2/2 two cardiac arrests  - unresponsive off sedation  - assessed for brain death - patient has no brainstem reflexes, but has spontaneous breaths   - poor prognosis

## 2023-04-03 NOTE — PROGRESS NOTE ADULT - ATTENDING COMMENTS
57 year man who was admitted from [place] with cardiac arrest and anoxic brain injury. Known Bipolar, ESRD on HD, recent cardiac arrest from aspiration PNA. Neuro eval showed no brainstem reflexes however did take a few spontaneous breathes on apnea test    Meds:  ASA  Norvasc 10  Coreg 3.25 mg BID  Hydralazine prn  Sliding scale    #Neuro- Now with anoxic brain injury  Prognosis poor  Family aware  Neuro evaluation appreciated    #Pulm- Continue vent support  Continue to monitor    #CV- Cardiac arrest- likely secondary to aspiration    #ID- Aspiration with elevated WBC  Continue Zosyn    #Renal- HD per renal  Follow labs    #Palliative care- consult appreciated; ongoing GOC discussion with family
57 year man who was admitted from [place] with cardiac arrest and anoxic brain injury. Known Bipolar, ESRD on HD, recent cardiac arrest from aspiration PNA. Neuro eval showed no brainstem reflexes however did take a few spontaneous breathes on apnea test    Meds:  ASA  Norvasc 10  Coreg 3.25 mg BID  Hydralazine prn  Sliding scale    #Neuro- Now with anoxic brain injury  Prognosis poor  Family aware  Neuro evaluation appreciated    #Pulm- Continue vent support  Continue to monitor    #CV- Cardiac arrest- likely secondary to aspiration  HTN- Continue Coreg and Norvasc    #ID- Aspiration with elevated WBC  SP Abx    #Renal- HD per renal  Follow labs    #Palliative care- consult appreciated; ongoing GOC discussion with family
57 year man who was admitted from [place] with cardiac arrest and anoxic brain injury. Known Bipolar, ESRD on HD, recent cardiac arrest from aspiration PNA. Neuro eval showed no brainstem reflexes however did take a few spontaneous breathes on apnea test    Meds:  Zosyn  ASA  Sliding scale    #Neuro- Now with anoxic brain injury  Prognosis poor  Family aware  Neuro evaluation appreciated    #Pulm- Continue vent support  Continue to monitor    #CV- Cardiac arrest- likely secondary to aspiration    #ID- Aspiration with elevated WBC  Continue Zosyn    #Renal- HD per renal  Follow labs    #Palliative care- consult appreciated; ongoing GOC discussion with family
57 year man who was admitted from [place] with cardiac arrest and anoxic brain injury. Known Bipolar, ESRD on HD, recent cardiac arrest from aspiration PNA.    Meds:  Heparin gtt  Zosyn    #Neuro- Now with anoxic brain injury  Prognosis poor  Family aware  Neuro to evaluate patient for anoxia/brain death    #Pulm- AC 20  PEEP 5 FIO2 40%  Continue to monitor    #CV- Cardiac arrest- likely secondary to aspiration    #ID- Aspiration with elevated WBC  Continue Zosyn    #Renal- HD per renal  Follow labs    #Palliative care- patient was seen on previous admission; would reconsult given new arrest
57 year man who was admitted from [place] with cardiac arrest and anoxic brain injury. Known Bipolar, ESRD on HD, recent cardiac arrest from aspiration PNA. Neuro eval showed no brainstem reflexes however did take a few spontaneous breathes on apnea test    Meds:  ASA  Norvasc 10  Coreg 3.25 mg BID  Hydralazine prn  Sliding scale    #Neuro- Now with anoxic brain injury  Prognosis poor  Family aware  Neuro evaluation appreciated    #Pulm- Continue vent support  Continue to monitor    #CV- Cardiac arrest- likely secondary to aspiration  HTN- Continue Coreg and Norvasc    #ID- Aspiration with elevated WBC  SP Abx    #Renal- HD per renal  Follow labs    #Palliative care- consult appreciated; ongoing GOC discussion with family
Neuro f/u.  Needs GOC discussion.  Continue current regimen.
57 year man who was admitted from [place] with cardiac arrest and anoxic brain injury. Known Bipolar, ESRD on HD, recent cardiac arrest from aspiration PNA. Neuro eval showed no brainstem reflexes however did take a few spontaneous breathes on apnea test    Meds:  ASA  Norvasc 10  Coreg 3.25 mg BID  Hydralazine prn  Sliding scale    #Neuro- Now with anoxic brain injury  Prognosis poor  Family aware  Neuro evaluation appreciated    #Pulm- Continue vent support  Continue to monitor    #CV- Cardiac arrest- likely secondary to aspiration  HTN- Continue Coreg and Norvasc    #ID- Aspiration with elevated WBC  SP Abx    #Renal- HD per renal  Follow labs    #Palliative care- consult appreciated; ongoing GOC discussion with family
57 year man who was admitted from [place] with cardiac arrest and anoxic brain injury. Known Bipolar, ESRD on HD, recent cardiac arrest from aspiration PNA.    Meds:  Heparin gtt  Zosyn    #Neuro- Now with anoxic brain injury  Prognosis poor  Family aware    #Pulm- AC 20  PEEP 5 FIO2 40%  Continue to monitor    #CV- Cardiac arrest- likely secondary to aspiration  Does not appear to MI  Can dc heparin gtt    #ID- Aspiration with elevated WBC  Continue Zosyn    #Renal- HD per renal  Follow labs    #Palliative care- patient was seen on previous admission; would reconsult given new arrest
57 year man who was admitted from [place] with cardiac arrest and anoxic brain injury. Known Bipolar, ESRD on HD, recent cardiac arrest from aspiration PNA. Neuro eval showed no brainstem reflexes however did take a few spontaneous breathes on apnea test    Meds:  Zosyn  ASA  Sliding scale    #Neuro- Now with anoxic brain injury  Prognosis poor  Family aware  Neuro evaluation appreciated, will also call palliative care    #Pulm- AC 20  PEEP 5 FIO2 40%  Continue to monitor    #CV- Cardiac arrest- likely secondary to aspiration    #ID- Aspiration with elevated WBC  Continue Zosyn    #Renal- HD per renal  Follow labs    #Palliative care- patient was seen on previous admission; would reconsult given new arrest
57 year man who was admitted from [place] with cardiac arrest and anoxic brain injury. Known Bipolar, ESRD on HD, recent cardiac arrest from aspiration PNA. Neuro eval showed no brainstem reflexes however did take a few spontaneous breathes on apnea test    Meds:  Zosyn  ASA  Sliding scale    #Neuro- Now with anoxic brain injury  Prognosis poor  Family aware  Neuro evaluation appreciated    #Pulm- AC 16  PEEP 5 FIO2 40%  Continue to monitor    #CV- Cardiac arrest- likely secondary to aspiration    #ID- Aspiration with elevated WBC  Continue Zosyn    #Renal- HD per renal  Follow labs    #Palliative care- consult appreciated; ongoing GOC discussion with family
Admitted to CCU for hypothermia protocol after cardiac arrest with evidence of anoxic brain injury.  Remains unresponsive today  To wean sedation after completion of hypothermia protocol and re-assess  Will need HD - renal notified  Palliative Care and family meeting after completion of cooling protocol and reassessment  Prognosis guarded
Patient seen and examined by me during CCU morning rounds.  Assessment and plan reviewed during rounds, and as outlined above.  Will re-address goals of care with family when feasible.  Day #9 on mechanical ventilation.
57 year man who was admitted from [place] with cardiac arrest and anoxic brain injury. Known Bipolar, ESRD on HD, recent cardiac arrest from aspiration PNA. Neuro eval showed no brainstem reflexes however did take a few spontaneous breathes on apnea test    Meds:  Zosyn  ASA  Sliding scale    #Neuro- Now with anoxic brain injury  Prognosis poor  Family aware  Neuro evaluation appreciated    #Pulm- AC 16  PEEP 5 FIO2 40%  Continue to monitor    #CV- Cardiac arrest- likely secondary to aspiration    #ID- Aspiration with elevated WBC  Continue Zosyn    #Renal- HD per renal  Follow labs    #Palliative care- consult appreciated; ongoing GOC discussion with family
Patient seen and examined by me during CCU morning rounds.  Assessment and plan reviewed during rounds, and as outlined above.  Will need to re-address patient's goals of care when feasible. Day # 8 on mechanical ventilation support.
Family meeting with Palliative Care and Family for discussion of GOC.   Follow up Neuro recommendations.
Must arrange GOC meeting with family.  Ethics consult re: trach and PEG for pt who remains with no cortical function.  Neuro f/u.
Pt. with ESRD on HD now with anoxic brain injury following cardiac arrest. GOC note from today (4/3/23) reviewed. Family elected for compassionate extubation and DNR, as per GOC note from today. No further plan for HD. Will discontinue follow-up. Reconsult as needed.

## 2023-04-03 NOTE — CHART NOTE - NSCHARTNOTEFT_GEN_A_CORE
Had family meeting with patient's mother, father, brother, Dr. Holley from palliative, social work, and myself. Family understands that patient is seriously ill with irreversible anoxic brain injury. Decision was made for palliative extubation tonight. Decision was also made for DNR/DNI.

## 2023-04-03 NOTE — PROGRESS NOTE ADULT - SUBJECTIVE AND OBJECTIVE BOX
[facility] Division of Kidney Diseases & Hypertension  FOLLOW UP NOTE  [phone]--------------------------------------------------------------------------------  Chief Complaint: ESRD MWF here after Cardiac arrest  now with hypoxic brain injury.         24 hour events/subjective:  Patient was seen and evaluated at bedside in CICU. Patient continues to be intubated and sedated.       PAST HISTORY  --------------------------------------------------------------------------------  No significant changes to PMH, PSH, FHx, SHx, unless otherwise noted    ALLERGIES & MEDICATIONS  --------------------------------------------------------------------------------  Allergies    No Known Allergies    Intolerances      Standing Inpatient Medications  amLODIPine   Tablet 10 milliGRAM(s) Oral daily  artificial tears (preservative free) Ophthalmic Solution 1 Drop(s) Both EYES every 4 hours  ascorbic acid 500 milliGRAM(s) Oral daily  aspirin  chewable 81 milliGRAM(s) Enteral Tube daily  carvedilol 3.125 milliGRAM(s) Oral every 12 hours  chlorhexidine 0.12% Liquid 15 milliLiter(s) Oral Mucosa two times a day  chlorhexidine 2% Cloths 1 Application(s) Topical <User Schedule>  heparin   Injectable 5000 Unit(s) SubCutaneous every 8 hours  pantoprazole  Injectable 40 milliGRAM(s) IV Push daily    PRN Inpatient Medications  acetaminophen   Oral Liquid .. 650 milliGRAM(s) Enteral Tube every 6 hours PRN  hydrALAZINE Injectable 5 milliGRAM(s) IV Push every 6 hours PRN      REVIEW OF SYSTEMS  --------------------------------------------------------------------------------  Unable to be obtained       All other systems were reviewed and are negative, except as noted.    VITALS/PHYSICAL EXAM  --------------------------------------------------------------------------------  T(C): 36.5 (04-03-23 @ 08:11), Max: 36.6 (04-02-23 @ 12:00)  HR: 60 (04-03-23 @ 10:00) (58 - 71)  BP: 164/71 (04-03-23 @ 10:00) (103/48 - 190/69)  RR: 14 (04-03-23 @ 10:00) (14 - 14)  SpO2: 100% (04-03-23 @ 10:00) (99% - 100%)  Wt(kg): --        04-02-23 @ 07:01  -  04-03-23 @ 07:00  --------------------------------------------------------  IN: 490 mL / OUT: 0 mL / NET: 490 mL    04-03-23 @ 07:01  -  04-03-23 @ 10:46  --------------------------------------------------------  IN: 40 mL / OUT: 0 mL / NET: 40 mL      Physical Exam:  	Gen:  NAD intubated  	Pulm: CTA B/L anteriorly on vent  	CV: S1S2   	Abd: Soft  	Ext: No LE edema   	Neuro: non responsive  	Skin: Warm and dry  	Vascular access: LUE AVF with palpable pulse and bruit heard    LABS/STUDIES  --------------------------------------------------------------------------------              8.7    9.10  >-----------<  466      [04-03-23 @ 02:50]              28.6     137  |  98  |  40  ----------------------------<  100      [04-03-23 @ 02:50]  4.3   |  26  |  3.67        Ca     8.7     [04-03-23 @ 02:50]      Mg     2.40     [04-03-23 @ 02:50]      Phos  4.4     [04-03-23 @ 02:50]    TPro  5.8  /  Alb  2.4  /  TBili  0.8  /  DBili  x   /  AST  72  /  ALT  25  /  AlkPhos  1330  [04-03-23 @ 02:50]          Creatinine Trend:  SCr 3.67 [04-03 @ 02:50]  SCr 3.00 [04-02 @ 04:25]  SCr 2.38 [04-01 @ 04:29]  SCr 2.88 [03-31 @ 02:16]  SCr 2.31 [03-30 @ 04:50]        Vitamin D (25OH) 10.5      [04-03-23 @ 02:50]  TSH 1.31      [04-03-23 @ 02:50]       [facility] Division of Kidney Diseases & Hypertension  FOLLOW UP NOTE  [phone]--------------------------------------------------------------------------------    Chief Complaint: Pt. with ESRD on HD (MWF) now with anoxic brain injury following cardiac arrest.     24 hour events/subjective: Pt. was seen and evaluated in CCU. Pt. intubated, unresponsive     PAST HISTORY  --------------------------------------------------------------------------------  No significant changes to PMH, PSH, FHx, SHx, unless otherwise noted    ALLERGIES & MEDICATIONS  --------------------------------------------------------------------------------  Allergies    No Known Allergies    Intolerances    Standing Inpatient Medications  amLODIPine   Tablet 10 milliGRAM(s) Oral daily  artificial tears (preservative free) Ophthalmic Solution 1 Drop(s) Both EYES every 4 hours  ascorbic acid 500 milliGRAM(s) Oral daily  aspirin  chewable 81 milliGRAM(s) Enteral Tube daily  carvedilol 3.125 milliGRAM(s) Oral every 12 hours  chlorhexidine 0.12% Liquid 15 milliLiter(s) Oral Mucosa two times a day  chlorhexidine 2% Cloths 1 Application(s) Topical <User Schedule>  heparin   Injectable 5000 Unit(s) SubCutaneous every 8 hours  pantoprazole  Injectable 40 milliGRAM(s) IV Push daily    PRN Inpatient Medications  acetaminophen   Oral Liquid .. 650 milliGRAM(s) Enteral Tube every 6 hours PRN  hydrALAZINE Injectable 5 milliGRAM(s) IV Push every 6 hours PRN    REVIEW OF SYSTEMS  --------------------------------------------------------------------------------  Unable to be obtain ROS    VITALS/PHYSICAL EXAM  --------------------------------------------------------------------------------  T(C): 36.5 (04-03-23 @ 08:11), Max: 36.6 (04-02-23 @ 12:00)  HR: 60 (04-03-23 @ 10:00) (58 - 71)  BP: 164/71 (04-03-23 @ 10:00) (103/48 - 190/69)  RR: 14 (04-03-23 @ 10:00) (14 - 14)  SpO2: 100% (04-03-23 @ 10:00) (99% - 100%)  Wt(kg): --    04-02-23 @ 07:01  -  04-03-23 @ 07:00  --------------------------------------------------------  IN: 490 mL / OUT: 0 mL / NET: 490 mL    04-03-23 @ 07:01  -  04-03-23 @ 10:46  --------------------------------------------------------  IN: 40 mL / OUT: 0 mL / NET: 40 mL    Physical Exam:  	Gen: intubated  	Pulm: Fair air entry B/L  	CV: S1S2+   	Abd: Soft  	Ext: No LE edema   	Neuro: Unresponsive  	Skin: Warm and dry  	Vascular access: LUE AVF with palpable pulse and bruit heard    LABS/STUDIES  --------------------------------------------------------------------------------              8.7    9.10  >-----------<  466      [04-03-23 @ 02:50]              28.6     137  |  98  |  40  ----------------------------<  100      [04-03-23 @ 02:50]  4.3   |  26  |  3.67        Ca     8.7     [04-03-23 @ 02:50]      Mg     2.40     [04-03-23 @ 02:50]      Phos  4.4     [04-03-23 @ 02:50]    TPro  5.8  /  Alb  2.4  /  TBili  0.8  /  DBili  x   /  AST  72  /  ALT  25  /  AlkPhos  1330  [04-03-23 @ 02:50]    Creatinine Trend:  SCr 3.67 [04-03 @ 02:50]  SCr 3.00 [04-02 @ 04:25]  SCr 2.38 [04-01 @ 04:29]  SCr 2.88 [03-31 @ 02:16]  SCr 2.31 [03-30 @ 04:50]

## 2023-04-03 NOTE — PROGRESS NOTE ADULT - SUBJECTIVE AND OBJECTIVE BOX
Patient is a 57y old  Male who presents with a chief complaint of cardiac arrest (2023 13:50)      INTERVAL HPI/OVERNIGHT EVENTS:   No overnight events   Afebrile, hemodynamically stable     Subjective:    ICU Vital Signs Last 24 Hrs  T(C): 36.1 (2023 08:00), Max: 36.6 (2023 12:00)  T(F): 96.9 (2023 08:00), Max: 97.9 (2023 12:00)  HR: 63 (2023 07:56) (58 - 71)  BP: 163/80 (2023 06:00) (103/48 - 190/69)  BP(mean): 102 (2023 06:00) (63 - 102)  ABP: --  ABP(mean): --  RR: 14 (2023 06:00) (14 - 15)  SpO2: 100% (2023 07:56) (99% - 100%)    O2 Parameters below as of 2023 06:00  Patient On (Oxygen Delivery Method): ventilator    O2 Concentration (%): 40      I&O's Summary    2023 07:01  -  2023 07:00  --------------------------------------------------------  IN: 490 mL / OUT: 0 mL / NET: 490 mL    2023 07:01  -  2023 08:05  --------------------------------------------------------  IN: 10 mL / OUT: 0 mL / NET: 10 mL      Mode: AC/ CMV (Assist Control/ Continuous Mandatory Ventilation)  RR (machine): 14  TV (machine): 380  FiO2: 40  PEEP: 5  ITime: 0.76  MAP: 8  PIP: 23      Daily     Daily Weight in k.9 (2023 04:00)    Adult Advanced Hemodynamics Last 24 Hrs  CVP(mm Hg): --  CVP(cm H2O): --  CO: --  CI: --  PA: --  PA(mean): --  PCWP: --  SVR: --  SVRI: --  PVR: --  PVRI: --    EKG/Telemetry Events:    MEDICATIONS  (STANDING):  amLODIPine   Tablet 10 milliGRAM(s) Oral daily  artificial tears (preservative free) Ophthalmic Solution 1 Drop(s) Both EYES every 4 hours  ascorbic acid 500 milliGRAM(s) Oral daily  aspirin  chewable 81 milliGRAM(s) Enteral Tube daily  carvedilol 3.125 milliGRAM(s) Oral every 12 hours  chlorhexidine 0.12% Liquid 15 milliLiter(s) Oral Mucosa two times a day  chlorhexidine 2% Cloths 1 Application(s) Topical <User Schedule>  heparin   Injectable 5000 Unit(s) SubCutaneous every 8 hours  pantoprazole  Injectable 40 milliGRAM(s) IV Push daily    MEDICATIONS  (PRN):  acetaminophen   Oral Liquid .. 650 milliGRAM(s) Enteral Tube every 6 hours PRN Temp greater or equal to 38C (100.4F)  hydrALAZINE Injectable 5 milliGRAM(s) IV Push every 6 hours PRN SBP > 180      PHYSICAL EXAM:  GENERAL: Laying comfortably, NAD  HEENT: NCAT, PERRLA, EOMI, no scleral icterus, no LAD  NECK: No JVD  LUNG: CTABL; No wheezes, crackles, or rhonchi  HEART: RRR; normal S1/S2; No murmurs, rubs, or gallops  ABDOMEN: +BS, soft, nontender, nondistended, no HSM; No rebound, guarding, or rigidity  EXTREMITIES:  No LE edema b/l, 2+ Peripheral Pulses, No clubbing or cyanosis  NEUROLOGY: AOx3, non-focal, strength 5/5 in all extremities, sensation intact  PSYCH: calm and cooperative  SKIN: No rashes or lesions    LABS:                        8.7    9.10  )-----------( 466      ( 2023 02:50 )             28.6     04-03    137  |  98  |  40<H>  ----------------------------<  100<H>  4.3   |  26  |  3.67<H>    Ca    8.7      2023 02:50  Phos  4.4     04-03  Mg     2.40     04-03    TPro  5.8<L>  /  Alb  2.4<L>  /  TBili  0.8  /  DBili  x   /  AST  72<H>  /  ALT  25  /  AlkPhos  1330<H>  04-03    LIVER FUNCTIONS - ( 2023 02:50 )  Alb: 2.4 g/dL / Pro: 5.8 g/dL / ALK PHOS: 1330 U/L / ALT: 25 U/L / AST: 72 U/L / GGT: x             CAPILLARY BLOOD GLUCOSE                      RADIOLOGY & ADDITIONAL TESTS:  CXR:          Care Discussed with Consultants/Other Providers [ x] YES  [ ] NO           Patient is a 57y old  Male who presents with a chief complaint of cardiac arrest (2023 13:50)      INTERVAL HPI/OVERNIGHT EVENTS:   Patient on ventilator. Does not respond to painful stimulation or sternal rub this AM. No wheezes, rubs or gallops heard on auscultation.           Subjective:    ICU Vital Signs Last 24 Hrs  T(C): 36.1 (2023 08:00), Max: 36.6 (2023 12:00)  T(F): 96.9 (2023 08:00), Max: 97.9 (2023 12:00)  HR: 63 (2023 07:56) (58 - 71)  BP: 163/80 (2023 06:00) (103/48 - 190/69)  BP(mean): 102 (2023 06:00) (63 - 102)  ABP: --  ABP(mean): --  RR: 14 (2023 06:00) (14 - 15)  SpO2: 100% (2023 07:56) (99% - 100%)    O2 Parameters below as of 2023 06:00  Patient On (Oxygen Delivery Method): ventilator    O2 Concentration (%): 40      I&O's Summary    2023 07:01  -  2023 07:00  --------------------------------------------------------  IN: 490 mL / OUT: 0 mL / NET: 490 mL    2023 07:01  -  2023 08:05  --------------------------------------------------------  IN: 10 mL / OUT: 0 mL / NET: 10 mL    Mode: AC/ CMV (Assist Control/ Continuous Mandatory Ventilation)  RR (machine): 14  TV (machine): 380  FiO2: 40  PEEP: 5  ITime: 0.76  MAP: 8  PIP: 23      Daily     Daily Weight in k.9 (2023 04:00)    Adult Advanced Hemodynamics Last 24 Hrs  CVP(mm Hg): --  CVP(cm H2O): --  CO: --  CI: --  PA: --  PA(mean): --  PCWP: --  SVR: --  SVRI: --  PVR: --  PVRI: --    EKG/Telemetry Events:    MEDICATIONS  (STANDING):  amLODIPine   Tablet 10 milliGRAM(s) Oral daily  artificial tears (preservative free) Ophthalmic Solution 1 Drop(s) Both EYES every 4 hours  ascorbic acid 500 milliGRAM(s) Oral daily  aspirin  chewable 81 milliGRAM(s) Enteral Tube daily  carvedilol 3.125 milliGRAM(s) Oral every 12 hours  chlorhexidine 0.12% Liquid 15 milliLiter(s) Oral Mucosa two times a day  chlorhexidine 2% Cloths 1 Application(s) Topical <User Schedule>  heparin   Injectable 5000 Unit(s) SubCutaneous every 8 hours  pantoprazole  Injectable 40 milliGRAM(s) IV Push daily    MEDICATIONS  (PRN):  acetaminophen   Oral Liquid .. 650 milliGRAM(s) Enteral Tube every 6 hours PRN Temp greater or equal to 38C (100.4F)  hydrALAZINE Injectable 5 milliGRAM(s) IV Push every 6 hours PRN SBP > 180      PHYSICAL EXAM:  GENERAL: chronically ill appearing gentleman on ventilator   HEENT: NCAT, PERRLA, EOMI, no scleral icterus, no LAD  NECK: No JVD  LUNG: CTABL; No wheezes, crackles, or rhonchi  HEART: RRR; normal S1/S2; No murmurs, rubs, or gallops  ABDOMEN: +BS, soft, nontender, nondistended, no HSM; No rebound, guarding, or rigidity  EXTREMITIES:  No LE edema b/l, 2+ Peripheral Pulses, No clubbing or cyanosis  NEUROLOGY: AOx0. unresponsive to painful stimuli or sternal rub today.   PSYCH: calm and cooperative  SKIN: No rashes or lesions    LABS:                        8.7    9.10  )-----------( 466      ( 2023 02:50 )             28.6     04-03    137  |  98  |  40<H>  ----------------------------<  100<H>  4.3   |  26  |  3.67<H>    Ca    8.7      2023 02:50  Phos  4.4     04-03  Mg     2.40     04-03    TPro  5.8<L>  /  Alb  2.4<L>  /  TBili  0.8  /  DBili  x   /  AST  72<H>  /  ALT  25  /  AlkPhos  1330<H>  04-03    LIVER FUNCTIONS - ( 2023 02:50 )  Alb: 2.4 g/dL / Pro: 5.8 g/dL / ALK PHOS: 1330 U/L / ALT: 25 U/L / AST: 72 U/L / GGT: x             CAPILLARY BLOOD GLUCOSE      RADIOLOGY & ADDITIONAL TESTS:  CXR:      Care Discussed with Consultants/Other Providers [ x] YES  [ ] NO         Patient is a 57y old  Male who presents with a chief complaint of cardiac arrest (2023 13:50)      INTERVAL HPI/OVERNIGHT EVENTS:   Patient on ventilator. Does not respond to painful stimulation or sternal rub this AM. No wheezes, rubs or gallops heard on auscultation. Has awake/asleep EEG pending.    Subjective:    ICU Vital Signs Last 24 Hrs  T(C): 36.1 (2023 08:00), Max: 36.6 (2023 12:00)  T(F): 96.9 (2023 08:00), Max: 97.9 (2023 12:00)  HR: 63 (2023 07:56) (58 - 71)  BP: 163/80 (2023 06:00) (103/48 - 190/69)  BP(mean): 102 (2023 06:00) (63 - 102)  ABP: --  ABP(mean): --  RR: 14 (2023 06:00) (14 - 15)  SpO2: 100% (2023 07:56) (99% - 100%)    O2 Parameters below as of 2023 06:00  Patient On (Oxygen Delivery Method): ventilator    O2 Concentration (%): 40      I&O's Summary    2023 07:01  -  2023 07:00  --------------------------------------------------------  IN: 490 mL / OUT: 0 mL / NET: 490 mL    2023 07:01  -  2023 08:05  --------------------------------------------------------  IN: 10 mL / OUT: 0 mL / NET: 10 mL    Mode: AC/ CMV (Assist Control/ Continuous Mandatory Ventilation)  RR (machine): 14  TV (machine): 380  FiO2: 40  PEEP: 5  ITime: 0.76  MAP: 8  PIP: 23      Daily     Daily Weight in k.9 (2023 04:00)    Adult Advanced Hemodynamics Last 24 Hrs  CVP(mm Hg): --  CVP(cm H2O): --  CO: --  CI: --  PA: --  PA(mean): --  PCWP: --  SVR: --  SVRI: --  PVR: --  PVRI: --    EKG/Telemetry Events:    MEDICATIONS  (STANDING):  amLODIPine   Tablet 10 milliGRAM(s) Oral daily  artificial tears (preservative free) Ophthalmic Solution 1 Drop(s) Both EYES every 4 hours  ascorbic acid 500 milliGRAM(s) Oral daily  aspirin  chewable 81 milliGRAM(s) Enteral Tube daily  carvedilol 3.125 milliGRAM(s) Oral every 12 hours  chlorhexidine 0.12% Liquid 15 milliLiter(s) Oral Mucosa two times a day  chlorhexidine 2% Cloths 1 Application(s) Topical <User Schedule>  heparin   Injectable 5000 Unit(s) SubCutaneous every 8 hours  pantoprazole  Injectable 40 milliGRAM(s) IV Push daily    MEDICATIONS  (PRN):  acetaminophen   Oral Liquid .. 650 milliGRAM(s) Enteral Tube every 6 hours PRN Temp greater or equal to 38C (100.4F)  hydrALAZINE Injectable 5 milliGRAM(s) IV Push every 6 hours PRN SBP > 180      PHYSICAL EXAM:  GENERAL: chronically ill appearing gentleman on ventilator   HEENT: NCAT, PERRLA, EOMI, no scleral icterus, no LAD  NECK: No JVD  LUNG: CTABL; No wheezes, crackles, or rhonchi  HEART: RRR; normal S1/S2; No murmurs, rubs, or gallops  ABDOMEN: +BS, soft, nontender, nondistended, no HSM; No rebound, guarding, or rigidity  EXTREMITIES:  No LE edema b/l, 2+ Peripheral Pulses, No clubbing or cyanosis  NEUROLOGY: AOx0. unresponsive to painful stimuli or sternal rub today.   PSYCH: calm and cooperative  SKIN: No rashes or lesions    LABS:                        8.7    9.10  )-----------( 466      ( 2023 02:50 )             28.6     04-03    137  |  98  |  40<H>  ----------------------------<  100<H>  4.3   |  26  |  3.67<H>    Ca    8.7      2023 02:50  Phos  4.4     04-03  Mg     2.40     04-03    TPro  5.8<L>  /  Alb  2.4<L>  /  TBili  0.8  /  DBili  x   /  AST  72<H>  /  ALT  25  /  AlkPhos  1330<H>  04-03    LIVER FUNCTIONS - ( 2023 02:50 )  Alb: 2.4 g/dL / Pro: 5.8 g/dL / ALK PHOS: 1330 U/L / ALT: 25 U/L / AST: 72 U/L / GGT: x             CAPILLARY BLOOD GLUCOSE      RADIOLOGY & ADDITIONAL TESTS:  CXR:      Care Discussed with Consultants/Other Providers [ x] YES  [ ] NO

## 2023-04-03 NOTE — GOALS OF CARE CONVERSATION - ADVANCED CARE PLANNING - CONVERSATION DETAILS
Referral to palliative care for complex decision making and symptom management in setting of advanced illness.  Meeting held with pt's mother, father and brother.  Reviewed pt's current clinical status. Reviewed that Cal has diagnosis of anoxic brain injury that is irreversible. Reviewed that Cal is dependent upon life support and is not able to be medically extubated.  Reviewed the options of continuing life support via trach and peg with eventual transfer to long term care facility vs compassionate extubation/liberation from mechanical ventilator. Family understand that once liberated Cal would not be able to breath on his own and would pass away.   Family elected for compassionate extubation and DNR.    Offered chaplaincy for added support and prayer/blessing. Mother requested "last rite prayer"  Chaplain Guzman contacted and able to provide prayer for family.    Support provided

## 2023-04-04 NOTE — PROGRESS NOTE ADULT - SUBJECTIVE AND OBJECTIVE BOX
PATIENT:  NANCY SEPULVEDA  [id number]    CHIEF COMPLAINT:  Patient is a 57y old  Male who presents with a chief complaint of cardiac arrest (04 Apr 2023 07:29)      INTERVAL HISTORY/OVERNIGHT EVENTS:    Overnight, pt extubated after GOC discussion with palliative team and pt's family. Decision made for compassionate extubation and code status DNR/DNI.     Pt seen and examined at bedside this morning.       MEDICATIONS:  MEDICATIONS  (STANDING):    MEDICATIONS  (PRN):  HYDROmorphone  Injectable 0.5 milliGRAM(s) IV Push every 1 hour PRN Moderate Pain (4 - 6)      ALLERGIES:  Allergies    No Known Allergies    Intolerances        OBJECTIVE:  ICU Vital Signs Last 24 Hrs  T(C): 36.5 (03 Apr 2023 08:11), Max: 36.5 (03 Apr 2023 08:11)  T(F): 97.7 (03 Apr 2023 08:11), Max: 97.7 (03 Apr 2023 08:11)  HR: 58 (03 Apr 2023 21:00) (57 - 78)  BP: 132/64 (03 Apr 2023 19:00) (132/64 - 174/78)  BP(mean): 81 (03 Apr 2023 19:00) (81 - 103)  ABP: --  ABP(mean): --  RR: 10 (03 Apr 2023 16:27) (10 - 14)  SpO2: 100% (03 Apr 2023 21:00) (83% - 100%)    O2 Parameters below as of 03 Apr 2023 21:00  Patient On (Oxygen Delivery Method): room air          Mode: AC/ CMV (Assist Control/ Continuous Mandatory Ventilation)  RR (machine): 14  TV (machine): 380  FiO2: 40  PEEP: 5  ITime: 0.76  MAP: 8  PIP: 22    Adult Advanced Hemodynamics Last 24 Hrs  CVP(mm Hg): --  CVP(cm H2O): --  CO: --  CI: --  PA: --  PA(mean): --  PCWP: --  SVR: --  SVRI: --  PVR: --  PVRI: --  CAPILLARY BLOOD GLUCOSE        CAPILLARY BLOOD GLUCOSE        I&O's Summary    03 Apr 2023 07:01  -  04 Apr 2023 07:00  --------------------------------------------------------  IN: 140 mL / OUT: 0 mL / NET: 140 mL      Daily     Daily     PHYSICAL EXAMINATION:  General: WN/WD NAD  HEENT: PERRLA, EOMI, moist mucous membranes  Neurology: A&Ox3, nonfocal, MCKEON x 4  Respiratory: CTA B/L, normal respiratory effort, no wheezes, crackles, rales  CV: RRR, S1S2, no murmurs, rubs or gallops  Abdominal: Soft, NT, ND +BS, Last BM  Extremities: No edema, + peripheral pulses  Incisions:   Tubes:    LABS:                          8.7    9.10  )-----------( 466      ( 03 Apr 2023 02:50 )             28.6     04-03    137  |  98  |  40<H>  ----------------------------<  100<H>  4.3   |  26  |  3.67<H>    Ca    8.7      03 Apr 2023 02:50  Phos  4.4     04-03  Mg     2.40     04-03    TPro  5.8<L>  /  Alb  2.4<L>  /  TBili  0.8  /  DBili  x   /  AST  72<H>  /  ALT  25  /  AlkPhos  1330<H>  04-03    LIVER FUNCTIONS - ( 03 Apr 2023 02:50 )  Alb: 2.4 g/dL / Pro: 5.8 g/dL / ALK PHOS: 1330 U/L / ALT: 25 U/L / AST: 72 U/L / GGT: x                       TELEMETRY:     EKG:     IMAGING:       PATIENT:  NANCY SEPULVEDA  [id number]    CHIEF COMPLAINT:  Patient is a 57y old  Male who presents with a chief complaint of cardiac arrest (04 Apr 2023 07:29)      INTERVAL HISTORY/OVERNIGHT EVENTS:    Overnight, pt extubated after GOC discussion with palliative team and pt's family. Decision made for compassionate extubation and code status DNR/DNI.     Pt seen and examined at bedside this morning. Unable to elicit response to verbal or physical stimuli. No response elicited with sternal rub.       MEDICATIONS:  MEDICATIONS  (STANDING):    MEDICATIONS  (PRN):  HYDROmorphone  Injectable 0.5 milliGRAM(s) IV Push every 1 hour PRN Moderate Pain (4 - 6)      ALLERGIES:  Allergies    No Known Allergies    Intolerances        OBJECTIVE:  ICU Vital Signs Last 24 Hrs  T(C): 36.5 (03 Apr 2023 08:11), Max: 36.5 (03 Apr 2023 08:11)  T(F): 97.7 (03 Apr 2023 08:11), Max: 97.7 (03 Apr 2023 08:11)  HR: 58 (03 Apr 2023 21:00) (57 - 78)  BP: 132/64 (03 Apr 2023 19:00) (132/64 - 174/78)  BP(mean): 81 (03 Apr 2023 19:00) (81 - 103)  ABP: --  ABP(mean): --  RR: 10 (03 Apr 2023 16:27) (10 - 14)  SpO2: 100% (03 Apr 2023 21:00) (83% - 100%)    O2 Parameters below as of 03 Apr 2023 21:00  Patient On (Oxygen Delivery Method): room air          Mode: AC/ CMV (Assist Control/ Continuous Mandatory Ventilation)  RR (machine): 14  TV (machine): 380  FiO2: 40  PEEP: 5  ITime: 0.76  MAP: 8  PIP: 22    Adult Advanced Hemodynamics Last 24 Hrs  CVP(mm Hg): --  CVP(cm H2O): --  CO: --  CI: --  PA: --  PA(mean): --  PCWP: --  SVR: --  SVRI: --  PVR: --  PVRI: --  CAPILLARY BLOOD GLUCOSE        CAPILLARY BLOOD GLUCOSE        I&O's Summary    03 Apr 2023 07:01  -  04 Apr 2023 07:00  --------------------------------------------------------  IN: 140 mL / OUT: 0 mL / NET: 140 mL      Daily     Daily     PHYSICAL EXAMINATION:  General: NAD  HEENT: eyes taped closed, moist mucous membranes   Neurology: A&Ox0, unable to be aroused to verbal or physical stimulation, no spontaneous movements observed, unable to follow basic commands.   Respiratory: b/l coarse breath sounds  CV: +S1S2, no murmurs appreciated   Abdominal: Soft, ND, +BS  Extremities: No LE edema, SCDs in place       LABS:                          8.7    9.10  )-----------( 466      ( 03 Apr 2023 02:50 )             28.6     04-03    137  |  98  |  40<H>  ----------------------------<  100<H>  4.3   |  26  |  3.67<H>    Ca    8.7      03 Apr 2023 02:50  Phos  4.4     04-03  Mg     2.40     04-03    TPro  5.8<L>  /  Alb  2.4<L>  /  TBili  0.8  /  DBili  x   /  AST  72<H>  /  ALT  25  /  AlkPhos  1330<H>  04-03    LIVER FUNCTIONS - ( 03 Apr 2023 02:50 )  Alb: 2.4 g/dL / Pro: 5.8 g/dL / ALK PHOS: 1330 U/L / ALT: 25 U/L / AST: 72 U/L / GGT: x                       TELEMETRY:     EKG:     IMAGING:

## 2023-04-04 NOTE — CONSULT NOTE ADULT - CONSULT REASON
complex medical decision making in the setting of serious illness
ESRD
Neuroprognostication
To assist in the ethical dilemma of a patient with anoxic brain injury with discordant decision makers.
Sacrum pressure injury

## 2023-04-04 NOTE — CHART NOTE - NSCHARTNOTESELECT_GEN_ALL_CORE
CCU GOC Discussion/Event Note
CCU/Event Note
CPAP trial/Event Note
Follow Up/Nutrition Services
Follow-up/Nutrition Services
Apnea test/Event Note
Death/Event Note
Follow Up/Nutrition Services
GOC/Event Note

## 2023-04-04 NOTE — CONSULT NOTE ADULT - SUBJECTIVE AND OBJECTIVE BOX
Patient is a 57 year old male with past medical history of hypertension, diabetes mellitis type 2, bipolar disorder, end stage renal disease on hemodialysis (Tuesday, Thursday, Saturday schedule), A-fib on [facility] who recently admitted on 3/10/2023, was readmitted 3/18/2023. During his previous hospitalization, he was discharged on 3/17 after long hospital course, requiring MICU care for septic shock.    Per EMS report, at 08:00 in the morning, patient was eating breakfast in usual state of health at [facility], after recent discharge from hospital involving MICU course for pneumonia.  Then at 08:20 patient found pulseless and unresponsive, CPR initiated by [facility] staff then taken over by EMS, CPR for <20 minutes with 2 doses epinephrine given, then sustained ROSC obtained. Patient then had additional cardiac arrest in ED from 09:06 - 09:15 with epi given at 09:07 and 09:13. Cardioversion given at 09:15 for possible VTach although rhythm in retrospect may have more likely been significant ST elevations with RBBB and LPFB mimicking VTach, given no change in rhythm, with anoxic brain injury. Initial EKG during ROSC shows STEMI. Cardiology interventionalist called, who recommended CCU consult. During previous admission, on 3/16, Palliative Care carried out a goals of care discussion, where patient expressed desire to remain full code. Ethics was called during current admission to assist in goals of care where surrogate decision makers (parents) are in discordance regarding their son’s care.     Prognosis Estimate (survival in days, wks, mos, yrs): Poor due to medical condition.  Patient Decision-Making Capacity:  Retains Capacity  Lacks Capacity due to anoxic brain injury   Patient Aware of:  Diagnosis: Unknown ; Prognosis: Unknown   Name of medical decision-maker should patient lack capacity: Dawna [phone] sultana Baez   Relationship:  Parents  Role: 	Legal Surrogate	    Other ‘stakeholders’: Jake (Brother) [phone]   Other Decision-Maker (i.e., HCA or Surrogate) Aware of:     Diagnosis: Yes No Prognosis:  Yes No   Evidence of Patient’s Preference of Life-Sustaining Treatment (Written or Oral):   Resuscitation status:   DNR:  Yes   No      DNI:  Yes   No    Code status changed to DNR on day of consult after meeting with Palliative care     Discussion    4/3/2023 9:45 am – 9:55 am Discussion between Dawna (Mother), Jake (Brother) and Yumiko Helm (Ethics Fellow): Ethics fellow introduced herself and role of ethics. It was unclear how much education was provided to the family in regard to the patient’s prognosis as they were unable to discuss further when asked regarding patient’s course of care. Dawna handed phone to Jake to which he reported the family is having a meeting scheduled today around 11:30 am to have further discussion with Dr. Holley from Palliative Care.   4/3/2023 10:00 am – 10:10 am Discussion between Karen Holley (Palliative MD) and Yumiko Helm (Ethics Fellow): Discussed patient’s previous palliative encounters and goal of meeting.

## 2023-04-04 NOTE — PROGRESS NOTE ADULT - ASSESSMENT
Patient is a 56 Y/O M with HTN/T2DM/Bipolar Disorder, ESRD on HD (TTS), Afib on Eliquis who returned to [facility] from [facility] after cardiac arrest and CPR. He was discharged on 3/17 after long hospital course, requiring MICU care for septic shock. Per EMS report, at 08:00 in the morning, patient was eating breakfast in usual state of health at [facility], after recent discharge from hospital involving MICU course for pneumonia.  Then at 08:20 patient found pulseless and unresponsive, CPR initiated by [facility] staff then taken over by EMS, CPR for <20 minutes with 2 doses epinephrine given, then sustained ROSC obtained. Patient then had additional cardiac arrest in ED from 09:06 - 09:15 with epi given at 09:07 and 09:13.  Cardioversion given at 09:15 for possible VTach although rhythm in retrospect may have more likely been significant ST elevations with RBBB and LPFB mimicking VTach, given no change in rhythm.  Initial EKG during rosc shows STEMI.  Cardiology interventionalist Sandra valdez called, recommended CCU consult. During previous admission, on 3/16, Palliative Care carried out a GOC discussion, where patient expressed desire to remain full code. Palliative consulted for complex medical decision making in the setting of serious illness.

## 2023-04-04 NOTE — PROGRESS NOTE ADULT - NUTRITIONAL ASSESSMENT
This patient has been assessed with a concern for Malnutrition and has been determined to have a diagnosis/diagnoses of Severe protein-calorie malnutrition.    This patient is being managed with:   Diet NPO with Tube Feed-  Tube Feeding Modality: Nasogastric  Nepro with Carb Steady (NEPRORTH)  Total Volume for 24 Hours (mL): 240  Continuous  Starting Tube Feed Rate {mL per Hour}: 10  Increase Tube Feed Rate by (mL): 0  Until Goal Tube Feed Rate (mL per Hour): 10  Tube Feed Duration (in Hours): 24  Tube Feed Start Time: 06:00  Entered: Mar 21 2023  7:35AM  
This patient has been assessed with a concern for Malnutrition and has been determined to have a diagnosis/diagnoses of Severe protein-calorie malnutrition.    This patient is being managed with:   Diet NPO with Tube Feed-  Tube Feeding Modality: Nasogastric  Nepro with Carb Steady (NEPRORTH)  Total Volume for 24 Hours (mL): 720  Continuous  Starting Tube Feed Rate {mL per Hour}: 10  Increase Tube Feed Rate by (mL): 0  Until Goal Tube Feed Rate (mL per Hour): 30  Tube Feed Duration (in Hours): 24  Tube Feed Start Time: 06:00  No Carb Prosource TF     Qty per Day:  2  Entered: Apr 1 2023  8:55AM  
This patient has been assessed with a concern for Malnutrition and has been determined to have a diagnosis/diagnoses of Severe protein-calorie malnutrition.    This patient is being managed with:   Diet NPO with Tube Feed-  Tube Feeding Modality: Nasogastric  Nepro with Carb Steady (NEPRORTH)  Total Volume for 24 Hours (mL): 240  Continuous  Starting Tube Feed Rate {mL per Hour}: 10  Increase Tube Feed Rate by (mL): 0  Until Goal Tube Feed Rate (mL per Hour): 10  Tube Feed Duration (in Hours): 24  Tube Feed Start Time: 06:00  Entered: Mar 21 2023  7:35AM  
This patient has been assessed with a concern for Malnutrition and has been determined to have a diagnosis/diagnoses of Severe protein-calorie malnutrition.    This patient is being managed with:   Diet NPO with Tube Feed-  Tube Feeding Modality: Nasogastric  Nepro with Carb Steady (NEPRORTH)  Total Volume for 24 Hours (mL): 240  Continuous  Starting Tube Feed Rate {mL per Hour}: 10  Increase Tube Feed Rate by (mL): 0  Until Goal Tube Feed Rate (mL per Hour): 10  Tube Feed Duration (in Hours): 24  Tube Feed Start Time: 06:00  Entered: Mar 21 2023  7:35AM    This patient has been assessed with a concern for Malnutrition and has been determined to have a diagnosis/diagnoses of Severe protein-calorie malnutrition.    This patient is being managed with:   Diet NPO with Tube Feed-  Tube Feeding Modality: Nasogastric  Nepro with Carb Steady (NEPRORTH)  Total Volume for 24 Hours (mL): 240  Continuous  Starting Tube Feed Rate {mL per Hour}: 10  Increase Tube Feed Rate by (mL): 0  Until Goal Tube Feed Rate (mL per Hour): 10  Tube Feed Duration (in Hours): 24  Tube Feed Start Time: 06:00  Entered: Mar 21 2023  7:35AM  
This patient has been assessed with a concern for Malnutrition and has been determined to have a diagnosis/diagnoses of Severe protein-calorie malnutrition.    This patient is being managed with:   Diet NPO-  Except Medications  Entered: Mar 18 2023  4:06PM    
This patient has been assessed with a concern for Malnutrition and has been determined to have a diagnosis/diagnoses of Severe protein-calorie malnutrition.    This patient is being managed with:   Diet NPO with Tube Feed-  Tube Feeding Modality: Nasogastric  Nepro with Carb Steady (NEPRORTH)  Total Volume for 24 Hours (mL): 240  Continuous  Starting Tube Feed Rate {mL per Hour}: 10  Increase Tube Feed Rate by (mL): 0  Until Goal Tube Feed Rate (mL per Hour): 10  Tube Feed Duration (in Hours): 24  Tube Feed Start Time: 06:00  Entered: Mar 21 2023  7:35AM  
This patient has been assessed with a concern for Malnutrition and has been determined to have a diagnosis/diagnoses of Severe protein-calorie malnutrition.    This patient is being managed with:   Diet NPO with Tube Feed-  Tube Feeding Modality: Nasogastric  Nepro with Carb Steady (NEPRORTH)  Total Volume for 24 Hours (mL): 240  Continuous  Starting Tube Feed Rate {mL per Hour}: 10  Increase Tube Feed Rate by (mL): 0  Until Goal Tube Feed Rate (mL per Hour): 10  Tube Feed Duration (in Hours): 24  Tube Feed Start Time: 06:00  Entered: Mar 21 2023  7:35AM    
This patient has been assessed with a concern for Malnutrition and has been determined to have a diagnosis/diagnoses of Severe protein-calorie malnutrition.    This patient is being managed with:   Diet NPO-  Except Medications  Entered: Mar 18 2023  4:06PM    
This patient has been assessed with a concern for Malnutrition and has been determined to have a diagnosis/diagnoses of Severe protein-calorie malnutrition.    This patient is being managed with:   Diet NPO with Tube Feed-  Tube Feeding Modality: Nasogastric  Nepro with Carb Steady (NEPRORTH)  Total Volume for 24 Hours (mL): 240  Continuous  Starting Tube Feed Rate {mL per Hour}: 10  Increase Tube Feed Rate by (mL): 0  Until Goal Tube Feed Rate (mL per Hour): 10  Tube Feed Duration (in Hours): 24  Tube Feed Start Time: 06:00  Entered: Mar 21 2023  7:35AM  
This patient has been assessed with a concern for Malnutrition and has been determined to have a diagnosis/diagnoses of Severe protein-calorie malnutrition.    This patient is being managed with:   Diet NPO with Tube Feed-  Tube Feeding Modality: Nasogastric  Nepro with Carb Steady (NEPRORTH)  Total Volume for 24 Hours (mL): 720  Continuous  Starting Tube Feed Rate {mL per Hour}: 10  Increase Tube Feed Rate by (mL): 0  Until Goal Tube Feed Rate (mL per Hour): 30  Tube Feed Duration (in Hours): 24  Tube Feed Start Time: 06:00  No Carb Prosource TF     Qty per Day:  2  Entered: Apr 1 2023  8:55AM

## 2023-04-04 NOTE — PROGRESS NOTE ADULT - REASON FOR ADMISSION
cardiac arrest

## 2023-04-04 NOTE — PROGRESS NOTE ADULT - PROBLEM SELECTOR PLAN 4
Palliative consulted for complex medical decision making in the setting of serious illness.    Parents agreed to palliative extubation. Family would like to spend time with patient today, they do not want to be present for the extubation. Son Jake to be notified of patient's expiration. Chaplaincy at bedside to support family.
Palliative consulted for complex medical decision making in the setting of serious illness.    Parents agreed to palliative extubation. Family would like to spend time with patient today, they do not want to be present for the extubation. Son Jake to be notified of patient's expiration. Chaplaincy at bedside to support family.

## 2023-04-04 NOTE — CHART NOTE - NSCHARTNOTEFT_GEN_A_CORE
DEATH NOTE    Called to bedside to evaluate the patient for asystole.     On physical exam, patient did not respond to verbal or noxious stimuli.  No spontaneous respirations.  Absent heart and breath sounds.  Absent radial and carotid pulses.   Pupils are fixed and dilated, no corneal reflex.  EKG rhythm strip shows asystole.   Patient pronounced dead at 04-Apr-2023 10:35 am.  Attending notified.  Family declines autopsy.

## 2023-04-04 NOTE — CONSULT NOTE ADULT - ASSESSMENT
BIOETHICS ANALYSIS:  THE BIOETHICAL ISSUE IS PATIENT AUTONOMY.    Autonomy is the capacity of self-governance such as understanding, reasoning, deliberating, managing, and independent choosing (Sylvain & Jono, 2019). Capacity is determined utilizing the mnemonic (C-U-A-R); communication, understanding, appreciation and reasoning.  In the context of healthcare, Autonomy is defined as the right of a patient to make decisions about their medical care without experiencing undue influence from their healthcare providers. At the of his previous admission, Cal was asked about his code status in which he decided on full code status. However, the issue at hand is now that the patient lacks capacity; not able to comprehend, understand, appreciate, and respond to the fact that he currently is on ventilator support, with severe anoxic brain injury and most likely will succumb to his medical condition.     The patient's prior wishes are not concrete, given the changing landscape and gravity of his current medical status and that we are uncertain if he want to continue aggressive life sustaining treatment given the gravity of his current illness. To treat people who are not the same as though they were the same is just as discriminatory as treating people differently even though they in fact are the same. In each such case, what is done is ethically inappropriate (Tim E.H., 2008).     The issue on choosing on behalf of what the patient would have wanted is more ambiguous. Studies have shown that people have changed their medical wishes over time, making choosing on their behalf challenging.3 A narrative approach to decision making has been suggested as an alternative by Mateusz et al, in which they state, “Surrogates consider the life story of the patient and make decisions that seem consistent in light of the patient’s previous choices and experiences”, which would facilitate respecting the patient’s dignity and individuality.3 In a situation in which the decision is not so clear, the pillar of Beneficence can act as a guide to help decision makers choose what is in the best interest of the patient’s health.     The patient’s parents, Janeen are his legal surrogates under the Family Healthcare Decisions Act, in which they are required to make a joint decision in order to move forward in his care and are to do what is within the patient’s best interest. Providers should continue to educate the patient’s family and inform them of his prognosis, and the risks and benefits to weigh life sustaining treatment and palliative care.     CONCLUSION:     The patient's family attended goals of care meeting with palliative care and agreed on palliative extubation.      CASE DISCUSSED WITH MEDICAL ETHICIST DR. AMAURI HUGHES, [facility], [facility] (DIRECTOR OF MEDICAL ETHICS)    MORE THAN 50% OF THE TIME OF THIS CONSULTATION WAS SPENT IN COORDINATION OF CARE OF PATIENT	    References     1.	[place] Health Law § 2994-d.4???   2.	RADHA Narayan, & VICENTE De La Cruz (2009). Principles of Biomedical Ethics. [employer].  3.	MARLENE Waldron. “Incompetent Patients, Substitute Decision Making, and Quality of Life: Some Ethical Considerations” Medscape Journal of Medicine, October 14, 2008.  4.	Mateusz AM, Syed ALEGRE, Julio MELO. Substituted judgment: the limitations of autonomy in surrogate decision making. J Gen Intern Med. 2008 Sep;23(9):1514-7. doi: 10.1007/u27719-263-5818-3. Epub 2008 Jul 10. PMID: [id number]; PMCID: [id number].

## 2023-04-04 NOTE — PROGRESS NOTE ADULT - ASSESSMENT
Mr. Cal Lloyd is a 58 Y/O M w/PMH HTN, HLD, T2DM, Bipolar Disorder, ESRD on HD (TTS), Afib on Eliquis who presented from [facility] for cardiac arrest requiring CPR and was admitted to CCU for management of possible anterior STEMI and cardiac arrest with anoxic brain injury.    NEURO  # Anoxic Brain Injury  As seen on CT 3/18. Found with cardiac arrest on floor of nursing home  - s/p hypothermia protocol  - brain death criteria not met as pt able to take spontaneous breaths during apnea test; all brainstem reflexes absent however. Performed daily apnea test for a week with pt passing all   - Neuro consulted, recs appreciated  - GOC discussion conducted with family 4/3 -> decision to palliatively extubate, and code status DNR/DNI     CARDIAC  # Cardiac Arrest  Unclear cause, discharged after recent admission of respiratory failure requiring intubation. S/p cardiac arrest at nursing home. This is second cardiac arrest in as many months. ROSC achieved. Previous cardiac arrest in 02/23 in s/o complete lung atelectasis and mucus plugging. Initial EKG indicating possible STEMI; latest EKG back to baseline, minimal troponin elevations without significant delta.  - SpO2 goal > 94%; avoid hyperventilation  - S/p therapeutic hypothermia protocol    # HFpEF  EF > 70% 3/10, moderate diastolic dysfunction, severe concentric left ventricular hypertrophy  -3/18 TTE RF 59%, moderate diastolic dysfunction, severely dilated LA, moderate pericardial effusion, b/l pleural effusions    #HTN  Likely a result of autonomic dysfunction in s/o anoxic brain injury vs ESRD      #AFib  - Heparin gtt for AFib d/c'd given bloody sputum via tube last week     RESPIRATORY  - s/p compassionate extubation on 4/3    GI  - AST elevated but downtrending from admission  - AlkPhos also elevated, noted persistently elevated since 2/2023, RUQ negative in Feb 2023  - TFs running       # ESRD on HD (TTS)  - Renal for HD  - monitor electrolytes during cooling/rewarming  - condom catheter  - monitor I &Os  - currently undergoing maintenance HD    ID  #Elevated WBC Count  -3/19 WBC 29K, Lactate 2.3  -3/22 WBC 20; lactate 0.7  - s/p Zosyn 2.25g q8h (3/19-3/24) for empiric therapy for 5 days. WBC remains elevated s/p zosyn, no addl abx for now   - recurrent infections complicated by decompensation requiring ICU care    ENDO  # T2DM  No outpatient meds on records  - iSS     HEME  #Anemia  -Hgb levels from prior admissions generally range 8-10  -H&H appears stable compared to prior       DVT PPX  -Subq heparin     GOC: Palliative care involved during recent admission given concern for recurrent decompensation. Per chart notes, there were concerns about the family's insight into the severity of the patient's illness. The patient remained full code at time of discharge.  GOC meeting held last week with family asking for time to consider options after medical and palliative teams explained pt's current status  3/26 Family with discord over deciding who will be HCP. Given pt has no spouse, next of kin would be parents, neither of whom wants to share HCP decision-making with the other. Resulted in rapid escalation of conversation with security needing to be called on family for disturbance of the unit. Will require palliative-assisted GOC given complicated family situation.   Attempted to contact numbers in chart, both of which are out of service. On Sunday, family had verbally reported their return to the hospital on 3/29 between noon and 1 PM to have a meeting. Will await their arrival for GOC discussion with palliative present today.  >Family called unit, plan for family meeting Friday 3/31 at noon  >Family called unit 3/31 stating will not be coming to hospital for family meeting given health issue. Will suggest conference call to expedite conversation as no HCP has been determined at present moment and GOC need clarification.  >Ethics consulted on 4/2.  >GOC discussion with family and palliative team on 4/3 -> decision made for compassionate extubation and DNR/DNI  Mr. Cal Lloyd is a 56 Y/O M w/PMH HTN, HLD, T2DM, Bipolar Disorder, ESRD on HD (TTS), Afib on Eliquis who [facility] for cardiac arrest requiring CPR and was admitted to CCU for management of possible anterior STEMI and cardiac arrest with anoxic brain injury.    NEURO  # Anoxic Brain Injury  As seen on CT 3/18. Found with cardiac arrest on floor of nursing home  - s/p hypothermia protocol  - brain death criteria not met as pt able to take spontaneous breaths during apnea test; all brainstem reflexes absent however. Performed daily apnea test for a week with pt passing all   - Neuro consulted, recs appreciated  - GOC discussion conducted with family 4/3 -> decision to palliatively extubate, and code status DNR/DNI   - Dilaudid PRN per palliative service reccs    CARDIAC  # Cardiac Arrest  Unclear cause, discharged after recent admission of respiratory failure requiring intubation. S/p cardiac arrest at nursing home. This is second cardiac arrest in as many months. ROSC achieved. Previous cardiac arrest in 02/23 in s/o complete lung atelectasis and mucus plugging. Initial EKG indicating possible STEMI; latest EKG back to baseline, minimal troponin elevations without significant delta.  - SpO2 goal > 94%; avoid hyperventilation  - S/p therapeutic hypothermia protocol    # HFpEF  EF > 70% 3/10, moderate diastolic dysfunction, severe concentric left ventricular hypertrophy  -3/18 TTE RF 59%, moderate diastolic dysfunction, severely dilated LA, moderate pericardial effusion, b/l pleural effusions    #HTN  Likely a result of autonomic dysfunction in s/o anoxic brain injury vs ESRD  -standing and PRN meds d/c after GOC discussion      #AFib  - Heparin gtt for AFib d/c'd given bloody sputum via tube last week     RESPIRATORY  - s/p compassionate extubation on 4/3  - currently on room air     GI  - AST elevated but downtrending from admission  - AlkPhos also elevated, noted persistently elevated since 2/2023, RUQ negative in Feb 2023  -no further lab draws ordered at this time after GOC discussion  - OGT removed, TFs held at this time after GOC discussion      # ESRD on HD (TTS)  -Renal for HD  -condom catheter  -currently undergoing maintenance HD  -no further lab draws ordered at this time after GOC discussion    ID  #Elevated WBC Count  -3/19 WBC 29K, Lactate 2.3  -3/22 WBC 20; lactate 0.7  -s/p Zosyn 2.25g q8h (3/19-3/24) for empiric therapy for 5 days  -4/3 WBC wnl   -no further lab draws ordered at this time after GOC discussion    ENDO  # T2DM  No outpatient meds on records  -ISS d/c after GOC discussion    HEME  #Anemia  -Hgb levels from prior admissions generally range 8-10  -last H&H appears stable compared to prior   -no further lab draws ordered at this time after GOC discussion    DVT PPX  -SCDs      GOC: Palliative care involved during recent admission given concern for recurrent decompensation. Per chart notes, there were concerns about the family's insight into the severity of the patient's illness. The patient remained full code at time of discharge.  GOC meeting held last week with family asking for time to consider options after medical and palliative teams explained pt's current status  3/26 Family with discord over deciding who will be HCP. Given pt has no spouse, next of kin would be parents, neither of whom wants to share HCP decision-making with the other. Resulted in rapid escalation of conversation with security needing to be called on family for disturbance of the unit. Will require palliative-assisted GOC given complicated family situation.   Attempted to contact numbers in chart, both of which are out of service. On Sunday, family had verbally reported their return to the hospital on 3/29 between noon and 1 PM to have a meeting. Will await their arrival for GOC discussion with palliative present today.  >Family called unit, plan for family meeting Friday 3/31 at noon  >Family called unit 3/31 stating will not be coming to hospital for family meeting given health issue. Will suggest conference call to expedite conversation as no HCP has been determined at present moment and GOC need clarification.  >Ethics consulted on 4/2.  >GOC discussion with family and palliative team on 4/3 -> decision made for compassionate extubation and DNR/DNI  Mr. Cal Lloyd is a 56 Y/O M w/PMH HTN, HLD, T2DM, Bipolar Disorder, ESRD on HD (TTS), Afib on Eliquis who presented from [facility] for cardiac arrest requiring CPR and was admitted to CCU for management of possible anterior STEMI and cardiac arrest with anoxic brain injury.    NEURO  # Anoxic Brain Injury  As seen on CT 3/18. Found with cardiac arrest on floor of nursing home  - s/p hypothermia protocol  - brain death criteria not met as pt able to take spontaneous breaths during apnea test; all brainstem reflexes absent however. Performed daily apnea test for a week with pt passing all   - Neuro consulted, recs appreciated  - GOC discussion conducted with family 4/3 -> decision to palliatively extubate, and code status DNR/DNI   - Dilaudid PRN per palliative service reccs    CARDIAC  # Cardiac Arrest  Unclear cause, discharged after recent admission of respiratory failure requiring intubation. S/p cardiac arrest at nursing home. This is second cardiac arrest in as many months. ROSC achieved. Previous cardiac arrest in 02/23 in s/o complete lung atelectasis and mucus plugging. Initial EKG indicating possible STEMI; latest EKG back to baseline, minimal troponin elevations without significant delta.  - SpO2 goal > 94%; avoid hyperventilation  - S/p therapeutic hypothermia protocol    # HFpEF  EF > 70% 3/10, moderate diastolic dysfunction, severe concentric left ventricular hypertrophy  -3/18 TTE RF 59%, moderate diastolic dysfunction, severely dilated LA, moderate pericardial effusion, b/l pleural effusions    #HTN  Likely a result of autonomic dysfunction in s/o anoxic brain injury vs ESRD  -standing and PRN meds d/c after GOC discussion      #AFib  - Heparin gtt for AFib d/c'd given bloody sputum via tube last week     RESPIRATORY  - s/p compassionate extubation on 4/3  - currently on room air     GI  - AST elevated but downtrending from admission  - AlkPhos also elevated, noted persistently elevated since 2/2023, RUQ negative in Feb 2023  -no further lab draws ordered at this time after GOC discussion  - OGT removed, TFs held at this time after GOC discussion      # ESRD on HD (TTS)  -Renal for HD  -condom catheter  -currently not planned for further HD sessions after GOC discussion   -no further lab draws ordered at this time after GOC discussion    ID  #Elevated WBC Count  -3/19 WBC 29K, Lactate 2.3  -3/22 WBC 20; lactate 0.7  -s/p Zosyn 2.25g q8h (3/19-3/24) for empiric therapy for 5 days  -4/3 WBC wnl   -no further lab draws ordered at this time after GOC discussion    ENDO  # T2DM  No outpatient meds on records  -ISS d/c after GOC discussion    HEME  #Anemia  -Hgb levels from prior admissions generally range 8-10  -last H&H appears stable compared to prior   -no further lab draws ordered at this time after GOC discussion    DVT PPX  -SCDs      GOC: Palliative care involved during recent admission given concern for recurrent decompensation. Per chart notes, there were concerns about the family's insight into the severity of the patient's illness. The patient remained full code at time of discharge.  GOC meeting held last week with family asking for time to consider options after medical and palliative teams explained pt's current status  3/26 Family with discord over deciding who will be HCP. Given pt has no spouse, next of kin would be parents, neither of whom wants to share HCP decision-making with the other. Resulted in rapid escalation of conversation with security needing to be called on family for disturbance of the unit. Will require palliative-assisted GOC given complicated family situation.   Attempted to contact numbers in chart, both of which are out of service. On Sunday, family had verbally reported their return to the hospital on 3/29 between noon and 1 PM to have a meeting. Will await their arrival for GOC discussion with palliative present today.  >Family called unit, plan for family meeting Friday 3/31 at noon  >Family called unit 3/31 stating will not be coming to hospital for family meeting given health issue. Will suggest conference call to expedite conversation as no HCP has been determined at present moment and GOC need clarification.  >Ethics consulted on 4/2.  >GOC discussion with family and palliative team on 4/3 -> decision made for compassionate extubation and DNR/DNI  Mr. Cal Lloyd is a 56 Y/O M w/PMH HTN, HLD, T2DM, Bipolar Disorder, ESRD on HD (TTS), Afib on Eliquis who [facility] requiring CPR and was admitted to CCU for management of possible anterior STEMI and cardiac arrest with anoxic brain injury.    NEURO  # Anoxic Brain Injury  As seen on CT 3/18. Found with cardiac arrest on floor of nursing home  - s/p hypothermia protocol  - brain death criteria not met as pt able to take spontaneous breaths during apnea test; all brainstem reflexes absent however. Performed daily apnea test for a week with pt passing all   - Neuro consulted, recs appreciated  - GOC discussion conducted with family 4/3 -> decision to palliatively extubate, and code status DNR/DNI   - Dilaudid PRN per palliative service reccs    CARDIAC  # Cardiac Arrest  Unclear cause, discharged after recent admission of respiratory failure requiring intubation. S/p cardiac arrest at nursing home. This is second cardiac arrest in as many months. ROSC achieved. Previous cardiac arrest in 02/23 in s/o complete lung atelectasis and mucus plugging. Initial EKG indicating possible STEMI; latest EKG back to baseline, minimal troponin elevations without significant delta.  - SpO2 goal > 94%; avoid hyperventilation  - S/p therapeutic hypothermia protocol    # HFpEF  EF > 70% 3/10, moderate diastolic dysfunction, severe concentric left ventricular hypertrophy  -3/18 TTE RF 59%, moderate diastolic dysfunction, severely dilated LA, moderate pericardial effusion, b/l pleural effusions    #HTN  Likely a result of autonomic dysfunction in s/o anoxic brain injury vs ESRD  -standing and PRN meds d/c after GOC discussion      #AFib  - Heparin gtt for AFib d/c'd given bloody sputum via tube last week     RESPIRATORY  - s/p compassionate extubation on 4/3    GI  - AST elevated but downtrending from admission  - AlkPhos also elevated, noted persistently elevated since 2/2023, RUQ negative in Feb 2023  -no further lab draws ordered at this time after GOC discussion  - OGT removed, TFs held at this time after GOC discussion      # ESRD on HD (TTS)  -Renal for HD  -condom catheter  -currently not planned for further HD sessions after GOC discussion   -no further lab draws ordered at this time after GOC discussion    ID  #Elevated WBC Count  -3/19 WBC 29K, Lactate 2.3  -3/22 WBC 20; lactate 0.7  -s/p Zosyn 2.25g q8h (3/19-3/24) for empiric therapy for 5 days  -4/3 WBC wnl   -no further lab draws ordered at this time after GOC discussion    ENDO  # T2DM  No outpatient meds on records  -ISS d/c after GOC discussion    HEME  #Anemia  -Hgb levels from prior admissions generally range 8-10  -last H&H appears stable compared to prior   -no further lab draws ordered at this time after GOC discussion    DVT PPX  -SCDs      GOC: Palliative care involved during recent admission given concern for recurrent decompensation. Per chart notes, there were concerns about the family's insight into the severity of the patient's illness. The patient remained full code at time of discharge.  GOC meeting held last week with family asking for time to consider options after medical and palliative teams explained pt's current status  3/26 Family with discord over deciding who will be HCP. Given pt has no spouse, next of kin would be parents, neither of whom wants to share HCP decision-making with the other. Resulted in rapid escalation of conversation with security needing to be called on family for disturbance of the unit. Will require palliative-assisted GOC given complicated family situation.   Attempted to contact numbers in chart, both of which are out of service. On Sunday, family had verbally reported their return to the hospital on 3/29 between noon and 1 PM to have a meeting. Will await their arrival for GOC discussion with palliative present today.  >Family called unit, plan for family meeting Friday 3/31 at noon  >Family called unit 3/31 stating will not be coming to hospital for family meeting given health issue. Will suggest conference call to expedite conversation as no HCP has been determined at present moment and GOC need clarification.  >Ethics consulted on 4/2.  >GOC discussion with family and palliative team on 4/3 -> decision made for compassionate extubation and DNR/DNI

## 2023-04-04 NOTE — PROGRESS NOTE ADULT - PROBLEM SELECTOR PLAN 3
- per prior [facility] note from (3/16) - patient was hesitant about mechanical ventilation but wanted CPR. Ultimately decided to be full code  - 3/22- discussed poor prognosis with patient's mother and brother. Family is struggling   - 3/28- Decision makers: surrogates are patient's parents. However complex dynamics since parents are  with poor relations and do not feel the other should have decision making abilities. Legally both parents do have equal say in decision making, explained this to patient's brother Jake.  - 4/3- -See GO note. I spent 60 minutes addressing advanced care planning with patient and/or decision maker(s) Parents acknowledge patient suffered severe and irreversible neurologic damage, agreed to palliative extubation, MOLST completed for DNR/DNI

## 2023-04-04 NOTE — DISCHARGE NOTE FOR THE EXPIRED PATIENT - HOSPITAL COURSE
Mr. Cal Lloyd is a 58 Y/O M w/PMH HTN, HLD, T2DM, Bipolar Disorder, ESRD on HD (TTS), Afib on Eliquis who presented from [facility] for cardiac arrest requiring CPR and was admitted to CCU for management of possible anterior STEMI and cardiac arrest with anoxic brain injury. Patient returned to [facility] from [facility] after cardiac arrest and CPR. He was discharged on 3/17 after long hospital course, requiring MICU care for septic shock.intubated by EMS with tube at 21cm at lips, patient with ROSC at arrival with BP 120s systolic, HR 90s-100s.  Per EMS report, at 08:00 this morning (3/18), patient was eating breakfast in usual state of health at [facility], after recent discharge from hospital involving MICU course for pneumonia.  Then at 08:20 patient found pulseless and unresponsive, CPR initiated by [place] staff then taken over by EMS, CPR for <20 minutes with 2 doses epinephrine given, then sustained ROSC obtained."  Patient then had additional cardiac arrest in ED from 09:06 - 09:15 with epi given at 09:07 and 09:13.  Cardioversion given at 09:15 for possible VTach although rhythm in retrospect may have more likely been significant ST elevations with RBBB and LPFB mimicking VTach, given no change in rhythm.  Initial EKG during rosc shows STEMI.  Cardiology interventionalist Sandra Galaviz called, recommended CCU consult"      Of note, patient was recently discharged (3/9-3/17) from [facility] after MICU admission for Acute Hypoxic Respiratory Failure due to Aspiration PNA and COVID, was discharged after being weaned back to room air, in stable condition. He had a recent admission ( - ) at [facility] for septic shock and AHRF likely 2/2 aspiration pneumonia and Covid, hospital course c/b cardiac arrest iso compete lung atelectasis and mucus plugging, discharged to [facility] on room air. Also with recent admission on  to [facility] for septic shock. During previous admission, on 3/16, Palliative Care carried out a GOC discussion, where patient expressed desire to remain full code.    Yesterday, 4/3, another goals of care discussion with palliative care took place. Family's wishes were to make patient a DNR and for palliative extubation and withdrawal of care. Ethics consulted as well and neurology closely following with cardiology. At 4pm on 4/3 patient was extubated and placed on oxygen. He  peacefully at 10:35am today. Family was notified by telephone. Dr. Beckie Mcmillan spoke with patient's brother Jake and also with patient's father, who is very elderly, 96 years old, but he understood his son passed away and is going to the [facility].

## 2023-04-04 NOTE — PROGRESS NOTE ADULT - SUBJECTIVE AND OBJECTIVE BOX
Indication of Geriatrics and Palliative Medicine Services:  [X  ] Complex Medical Decision Making   [  ] Symptom/Pain management     DNR on chart: No     INTERVAL EVENTS: Patient was extubated yesterday around 4 PM. Patient seen this morning, no mental status however breathing spontaneously on room air RR ~10. Shortly afterward, patient then began to desaturate,  10:35 AM. Palliative team present with CCU, called patient's son and father to inform them of patient's expiration. Provided support.     -------------------------------------------------------------------------------------------------------    PRESENT SYMPTOMS:     [ ] No     [X ] Unable to self-report      [ ] CPOT (ICU)     [ ] PAINADs     [X ] RDOS 0    [ ] Yes     Source if other than patient:  [ ]Family   [ ]Team     PAIN:   If blank, patient unable to specify   [ ]yes [ ]no  QOL impact-   Location -                    Aggravating factors -  Quality -  Radiation -  Timing-  Pain at most severe level (0-10 scale):  Pain at minimal acceptable level/Pain Goal (0-10 scale):     SYMPTOMS:   Dyspnea:                           [ ]Mild [ ]Moderate [ ]Severe  Anxiety:                             [ ]Mild [ ]Moderate [ ]Severe  Fatigue:                             [ ]Mild [ ]Moderate [ ]Severe  Nausea/Vomiting:              [ ]Mild [ ]Moderate [ ]Severe  Loss of appetite:                [ ]Mild [ ]Moderate [ ]Severe  Constipation:                     [ ]Mild [ ]Moderate [ ]Severe    Other Symptoms:  [X ]All other review of systems negative     Home Medications for symptoms if any:  I-Stop Reference No:      -------------------------------------------------------------------------------------------------------    ITEMS UNCHECKED ARE NOT PRESENT    PHYSICAL:  Vital Signs Last 24 Hrs  T(C): 36.5 (2023 08:11), Max: 36.6 (2023 16:00)  T(F): 97.7 (2023 08:11), Max: 97.9 (2023 16:00)  HR: 63 (2023 12:00) (58 - 71)  BP: 174/78 (2023 12:00) (103/48 - 190/69)  BP(mean): 103 (2023 12:00) (63 - 103)  RR: 14 (2023 10:00) (14 - 14)  SpO2: 100% (2023 12:00) (99% - 100%)    Parameters below as of 2023 08:00  Patient On (Oxygen Delivery Method): ventilator    O2 Concentration (%): 40    GENERAL:  [ ]Cachexia  [X ] Frail  [ ]Awake  [ ]Oriented x   [ ]Lethargic  [X ]Unarousable  [ ]Verbal  [ ]Non-Verbal  Intubated, off sedation     BEHAVIORAL:   [ ] Anxiety  [ ] Delirium [ ] Agitation [ ] Other    HEENT:   [ ]Normal   [ ]Dry mouth   [X ]ET Tube/Trach  [ ]Oral lesions    PULMONARY:   [X ]Clear [ ]Tachypnea  [ ]Audible excessive secretions   [ ]Rhonchi        [ ]Right [ ]Left [ ]Bilateral  [ ]Crackles        [ ]Right [ ]Left [ ]Bilateral  [ ]Wheezing     [ ]Right [ ]Left [ ]Bilateral  [ ]Diminished breath sounds [ ]right [ ]left [ ]bilateral    CARDIOVASCULAR:    [X ]Regular [ ]Irregular [ ]Tachy  [ ]Chace [ ]Murmur [ ]Other    GASTROINTESTINAL:  [X ]Soft  [ ]Distended   [ ]+BS  [X ]Non tender [ ]Tender  [ ]Other [ ]PEG [ ]OGT/ NGT      GENITOURINARY:  [ ]Normal [ ] Incontinent   [ ]Oliguria/Anuria   [X ]Loza    MUSCULOSKELETAL:   [ ]Normal   [ ]Weakness  [X ]Bed/Wheelchair bound [ ]Edema    NEUROLOGIC:   [X ]No focal deficits  [ ]Cognitive impairment  [ ]Dysphagia [ ]Dysarthria [ ]Paresis [ ]Other     SKIN:   [X ]Normal  [ ]Rash  [ ]Other  [ ]Pressure ulcer(s)       Present on admission [ ]y [ ]n(from initial)     -------------------------------------------------------------------------------------------------------    LABS:                        -------------------------------------------------------------------------------------------------------  RADIOLOGY & ADDITIONAL STUDIES:       < from: Xray Chest 1 View- PORTABLE-Routine (Xray Chest 1 View- PORTABLE-Routine in AM.) (23 @ 08:15) >  FINDINGS:  3/19/2023 at 9:06 AM  Endotracheal tube tip in the distal trachea. Enteric tube courses below   the diaphragm and out of the field-of-view. Left IJ central line   terminates in the SVC.  Cardiomegaly. Left basilar pleural effusion/atelectasis. Hazy bilateral   opacities.    < end of copied text >    < from: CT Chest No Cont (23 @ 10:20) >  IMPRESSION:  Endotracheal tube terminates in the proximal trachea, at approximately   the inferior margin of the thyroid cartilage. Hyperinflated balloon cuff   distends the tracheal lumen. Adjustment is recommended.    Centrilobular groundglass nodules in the right upper lobe, likely   reflecting bronchiolar inflammation and/or infection.    Large right and moderate left pleural effusions with adjacent atelectasis.    Occluded right lower lobe bronchi, likely by mucus.    Cardiomegaly with small pericardial effusion.    < end of copied text >    < from: CT Head No Cont (23 @ 10:19) >    1. Findings suspicious for global hypoxic-ischemic injury, as above.No   evidence of acute intracranial hemorrhage.  2. Progressive paranasal sinus, bilateral mastoid air cells and middle   ear disease, as above.  3. Additional findings described in detail above.    < end of copied text >    -------------------------------------------------------------------------------------------------------  MEDICATIONS:     MEDICATIONS  (STANDING):  artificial tears (preservative free) Ophthalmic Solution 1 Drop(s) Both EYES every 4 hours  aspirin  chewable 81 milliGRAM(s) Enteral Tube daily  chlorhexidine 0.12% Liquid 15 milliLiter(s) Oral Mucosa two times a day  chlorhexidine 2% Cloths 1 Application(s) Topical <User Schedule>  darbepoetin Injectable ViaL 80 MICROGram(s) IV Push <User Schedule>  dextrose 50% Injectable 25 Gram(s) IV Push once  dextrose 50% Injectable 12.5 Gram(s) IV Push once  dextrose 50% Injectable 25 Gram(s) IV Push once  dextrose Oral Gel 15 Gram(s) Oral once  glucagon  Injectable 1 milliGRAM(s) IntraMuscular once  insulin lispro (ADMELOG) corrective regimen sliding scale   SubCutaneous every 6 hours  pantoprazole  Injectable 40 milliGRAM(s) IV Push daily  piperacillin/tazobactam IVPB.. 3.375 Gram(s) IV Intermittent every 12 hours    MEDICATIONS  (PRN):  acetaminophen   Oral Liquid .. 650 milliGRAM(s) Enteral Tube every 6 hours PRN Temp greater or equal to 38C (100.4F)  hydrALAZINE Injectable 10 milliGRAM(s) IV Push every 6 hours PRN SBP > 170    -------------------------------------------------------------------------------------------------------    CRITICAL CARE:  [ ]Shock Present  [ ]Septic [ ]Cardiogenic [ ]Neurologic [ ]Hypovolemic [ ]Undifferentiated    [ ]Vasopressors [ ]Inotropes    [X ]Respiratory failure present   [X ]Acute  [ ]Chronic [ ]Hypoxic  [ ]Hypercarbic [ ]Mixed   [X ]Mechanical Ventilation [ ]Non-invasive ventilatory support [ ]High-Flow Mode: AC/ CMV (Assist Control/ Continuous Mandatory Ventilation), RR (machine): 20, TV (machine): 380, FiO2: 40, PEEP: 5, MAP: 8, PIP: 22    [ ]Other organ failure     -------------------------------------------------------------------------------------------------------  REFERRALS:   [ ]Gabriele  [ ]Hospice  [ ]Child Life  [ ]Social Work  [ ]Case management [ ]Holistic Therapy    Indication of Geriatrics and Palliative Medicine Services:  [X  ] Complex Medical Decision Making   [  ] Symptom/Pain management     DNR on chart: No     INTERVAL EVENTS: Patient was extubated yesterday around 4 PM. Patient seen this morning, no mental status however breathing spontaneously on room air RR ~10. Shortly afterward, patient then began to desaturate,  10:35 AM. Palliative team present with CCU, called patient's son and father to inform them of patient's expiration. Provided emotional support, offered brother Jake christine number for bereavement counseling for his family.     -------------------------------------------------------------------------------------------------------    PRESENT SYMPTOMS:     [ ] No     [X ] Unable to self-report      [ ] CPOT (ICU)     [ ] PAINADs     [X ] RDOS 0    [ ] Yes     Source if other than patient:  [ ]Family   [ ]Team     PAIN:   If blank, patient unable to specify   [ ]yes [ ]no  QOL impact-   Location -                    Aggravating factors -  Quality -  Radiation -  Timing-  Pain at most severe level (0-10 scale):  Pain at minimal acceptable level/Pain Goal (0-10 scale):     SYMPTOMS:   Dyspnea:                           [ ]Mild [ ]Moderate [ ]Severe  Anxiety:                             [ ]Mild [ ]Moderate [ ]Severe  Fatigue:                             [ ]Mild [ ]Moderate [ ]Severe  Nausea/Vomiting:              [ ]Mild [ ]Moderate [ ]Severe  Loss of appetite:                [ ]Mild [ ]Moderate [ ]Severe  Constipation:                     [ ]Mild [ ]Moderate [ ]Severe    Other Symptoms:  [X ]All other review of systems negative     Home Medications for symptoms if any:  I-Stop Reference No:      -------------------------------------------------------------------------------------------------------    ITEMS UNCHECKED ARE NOT PRESENT    PHYSICAL:  Vital Signs Last 24 Hrs  T(C): 36.5 (2023 08:11), Max: 36.6 (2023 16:00)  T(F): 97.7 (2023 08:11), Max: 97.9 (2023 16:00)  HR: 63 (2023 12:00) (58 - 71)  BP: 174/78 (2023 12:00) (103/48 - 190/69)  BP(mean): 103 (2023 12:00) (63 - 103)  RR: 14 (2023 10:00) (14 - 14)  SpO2: 100% (2023 12:00) (99% - 100%)    Parameters below as of 2023 08:00  Patient On (Oxygen Delivery Method): ventilator    O2 Concentration (%): 40    GENERAL:  [ ]Cachexia  [X ] Frail  [ ]Awake  [ ]Oriented x   [ ]Lethargic  [X ]Unarousable  [ ]Verbal  [ ]Non-Verbal  Intubated, off sedation     BEHAVIORAL:   [ ] Anxiety  [ ] Delirium [ ] Agitation [ ] Other    HEENT:   [ ]Normal   [ ]Dry mouth   [X ]ET Tube/Trach  [ ]Oral lesions    PULMONARY:   [X ]Clear [ ]Tachypnea  [ ]Audible excessive secretions   [ ]Rhonchi        [ ]Right [ ]Left [ ]Bilateral  [ ]Crackles        [ ]Right [ ]Left [ ]Bilateral  [ ]Wheezing     [ ]Right [ ]Left [ ]Bilateral  [ ]Diminished breath sounds [ ]right [ ]left [ ]bilateral    CARDIOVASCULAR:    [X ]Regular [ ]Irregular [ ]Tachy  [ ]Chace [ ]Murmur [ ]Other    GASTROINTESTINAL:  [X ]Soft  [ ]Distended   [ ]+BS  [X ]Non tender [ ]Tender  [ ]Other [ ]PEG [ ]OGT/ NGT      GENITOURINARY:  [ ]Normal [ ] Incontinent   [ ]Oliguria/Anuria   [X ]Loza    MUSCULOSKELETAL:   [ ]Normal   [ ]Weakness  [X ]Bed/Wheelchair bound [ ]Edema    NEUROLOGIC:   [X ]No focal deficits  [ ]Cognitive impairment  [ ]Dysphagia [ ]Dysarthria [ ]Paresis [ ]Other     SKIN:   [X ]Normal  [ ]Rash  [ ]Other  [ ]Pressure ulcer(s)       Present on admission [ ]y [ ]n(from initial)     -------------------------------------------------------------------------------------------------------    LABS:                        -------------------------------------------------------------------------------------------------------  RADIOLOGY & ADDITIONAL STUDIES:       < from: Xray Chest 1 View- PORTABLE-Routine (Xray Chest 1 View- PORTABLE-Routine in AM.) (23 @ 08:15) >  FINDINGS:  3/19/2023 at 9:06 AM  Endotracheal tube tip in the distal trachea. Enteric tube courses below   the diaphragm and out of the field-of-view. Left IJ central line   terminates in the SVC.  Cardiomegaly. Left basilar pleural effusion/atelectasis. Hazy bilateral   opacities.    < end of copied text >    < from: CT Chest No Cont (23 @ 10:20) >  IMPRESSION:  Endotracheal tube terminates in the proximal trachea, at approximately   the inferior margin of the thyroid cartilage. Hyperinflated balloon cuff   distends the tracheal lumen. Adjustment is recommended.    Centrilobular groundglass nodules in the right upper lobe, likely   reflecting bronchiolar inflammation and/or infection.    Large right and moderate left pleural effusions with adjacent atelectasis.    Occluded right lower lobe bronchi, likely by mucus.    Cardiomegaly with small pericardial effusion.    < end of copied text >    < from: CT Head No Cont (23 @ 10:19) >    1. Findings suspicious for global hypoxic-ischemic injury, as above.No   evidence of acute intracranial hemorrhage.  2. Progressive paranasal sinus, bilateral mastoid air cells and middle   ear disease, as above.  3. Additional findings described in detail above.    < end of copied text >    -------------------------------------------------------------------------------------------------------  MEDICATIONS:     MEDICATIONS  (STANDING):  artificial tears (preservative free) Ophthalmic Solution 1 Drop(s) Both EYES every 4 hours  aspirin  chewable 81 milliGRAM(s) Enteral Tube daily  chlorhexidine 0.12% Liquid 15 milliLiter(s) Oral Mucosa two times a day  chlorhexidine 2% Cloths 1 Application(s) Topical <User Schedule>  darbepoetin Injectable ViaL 80 MICROGram(s) IV Push <User Schedule>  dextrose 50% Injectable 25 Gram(s) IV Push once  dextrose 50% Injectable 12.5 Gram(s) IV Push once  dextrose 50% Injectable 25 Gram(s) IV Push once  dextrose Oral Gel 15 Gram(s) Oral once  glucagon  Injectable 1 milliGRAM(s) IntraMuscular once  insulin lispro (ADMELOG) corrective regimen sliding scale   SubCutaneous every 6 hours  pantoprazole  Injectable 40 milliGRAM(s) IV Push daily  piperacillin/tazobactam IVPB.. 3.375 Gram(s) IV Intermittent every 12 hours    MEDICATIONS  (PRN):  acetaminophen   Oral Liquid .. 650 milliGRAM(s) Enteral Tube every 6 hours PRN Temp greater or equal to 38C (100.4F)  hydrALAZINE Injectable 10 milliGRAM(s) IV Push every 6 hours PRN SBP > 170    -------------------------------------------------------------------------------------------------------    CRITICAL CARE:  [ ]Shock Present  [ ]Septic [ ]Cardiogenic [ ]Neurologic [ ]Hypovolemic [ ]Undifferentiated    [ ]Vasopressors [ ]Inotropes    [X ]Respiratory failure present   [X ]Acute  [ ]Chronic [ ]Hypoxic  [ ]Hypercarbic [ ]Mixed   [X ]Mechanical Ventilation [ ]Non-invasive ventilatory support [ ]High-Flow Mode: AC/ CMV (Assist Control/ Continuous Mandatory Ventilation), RR (machine): 20, TV (machine): 380, FiO2: 40, PEEP: 5, MAP: 8, PIP: 22    [ ]Other organ failure     -------------------------------------------------------------------------------------------------------  REFERRALS:   [ ]Gabriele  [ ]Hospice  [ ]Child Life  [ ]Social Work  [ ]Case management [ ]Holistic Therapy

## 2023-04-05 LAB
CULTURE RESULTS: SIGNIFICANT CHANGE UP
SPECIMEN SOURCE: SIGNIFICANT CHANGE UP

## 2023-04-06 LAB — VIT B1 SERPL-MCNC: 142.8 NMOL/L — SIGNIFICANT CHANGE UP (ref 66.5–200)

## 2023-04-07 LAB — PYRIDOXAL PHOS SERPL-MCNC: SIGNIFICANT CHANGE UP UG/L

## 2023-04-08 LAB
A-TOCOPHEROL VIT E SERPL-MCNC: 12.8 MG/L — SIGNIFICANT CHANGE UP (ref 7–25.1)
BETA+GAMMA TOCOPHEROL SERPL-MCNC: 0.6 MG/L — SIGNIFICANT CHANGE UP (ref 0.5–5.5)

## 2023-04-12 LAB
CULTURE RESULTS: SIGNIFICANT CHANGE UP
SPECIMEN SOURCE: SIGNIFICANT CHANGE UP

## 2023-04-26 NOTE — H&P PST ADULT - EKG AND INTERPRETATION
Addended by: JOSE MANUEL AGUSTIN on: 4/25/2023 07:54 PM     Modules accepted: Orders     done copy in chart
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
## 2023-05-07 NOTE — PROCEDURE NOTE - PROCEDURE DATE TIME, MLM
07-Feb-2023 17:00 PAST MEDICAL HISTORY:  Chronic kidney failure, stage 4 (severe)     COPD (chronic obstructive pulmonary disease)     GERD (gastroesophageal reflux disease)     H/O ulcerative colitis     HTN (hypertension)

## 2023-05-27 NOTE — ASU PATIENT PROFILE, ADULT - SITE
[facility]   Hospitalist Progress Note  Date: 2023  Patient Name: Isha Llanes  : 1966  MRN: [id number]  Date of admission: 2023      Subjective   Subjective     Chief Complaint: Follow-up altered mental status    Summary:Isha Llanes is a 56 y.o. female   history of aortic stenosis status post TAVR, Hodgkin's lymphoma, recently diagnosed throat cancer, lupus, chronic back pain post intrathecal pain pump implant brought into the emergency department for evaluation of altered mental status.    Patient found unconscious in her kitchen.  She was slumped over in her wheelchair upon arrival.  Patient is wheelchair-bound at baseline. EMS was called, was transported to emergency department for evaluation.  Daughter reports that the patient had defecated on herself at the time of being found.  Daughter reports that the patient has a left upper chest wall Mediport, and history of persistently positive blood cultures, requiring frequent IV antibiotic infusions.  She currently the process of getting the Mediport removed as it is inaccessible.  Last known normal was the patient was able for presentation.  In the emergency department patient found to be initially afebrile developed a temperature of 102, sinus tach 90s to 100 on telemetry review, hypotensive 80s over 50s, requiring 2 L nasal cannula keep sats greater than 90%.  ABG showed respiratory compensation of metabolic acidosis, troponin elevated greater than 300 initially.  EKG negative for acute ischemic changes.  Redemonstrated chronic left bundle branch block.  Creatinine elevated 2.07, lactate greater than 9, ALT AST mildly elevated Pro-Ge greater than 400.  Urinalysis negative for pyuria.  Tox screen positive for opioids.  Patient does have a Dilaudid pain pump.  CT head negative for any acute intracranial abnormalities.  CT abdomen pelvis demonstrated hepatic steatosis nonobstructive right renal stone and mild pelvic ascites.  CT  chest demonstrated patchy bilateral consolidations worse in the left upper lobe concerning for multifocal pneumonia.  Also noted to have a small right pleural effusion and moderate emphysema.  Patient bolused IV fluids started on empiric antibiotics after cultures were drawn.  Patient admitted to ICU for further evaluation and treatment of septic shock thought secondary to multifocal pneumonia with associated encephalopathy, acute kidney injury and NSTEMI.  Pulmonology critical care consulted.  Continued on empiric antibiotics.  Despite volume resuscitation blood pressure remained low required initiation of Levophed and vasopressin.    Interval Followup: Patient lying in bed appears to be resting comfortably.  Patient awakens fairly easily.  Patient more oriented today on exam.  Patient complaining of generalized pain improved following IV analgesics.  Patient does not currently have her pain pump controller so replacing with IV Dilaudid.  Patient had a couple episodes of liquid stools this morning per nursing though not enough to collect.  Patient febrile overnight Tmax 100.6.  Sinus rhythm 90s to 110s on telemetry review.  Blood pressure remains low requiring Levophed.  Vasopressin has been weaned off.  Requiring 4 L nasal cannula to keep sats greater than 90%.  proBNP elevated this morning.  Blood sugars improved.  Creatinine back to baseline.  LFTs remain mildly elevated.  Lactate trending down.  White blood cell count trending up.  Platelet count trending down.  Hep C antibody positive.  Wound culture growing Staph aureus.  Respiratory panel negative.  Blood cultures negative x24 hours.  No other issues per nursing.    Review of Systems  Constitutional: Positive for fatigue and fever.   HENT: Negative for sore throat and trouble swallowing.    Eyes: Negative for pain and discharge.   Respiratory: Negative for cough and shortness of breath.    Cardiovascular: Negative for chest pain and palpitations.    Gastrointestinal: Negative for abdominal pain, nausea and vomiting.   Endocrine: Negative for cold intolerance and heat intolerance.   Genitourinary: Negative for difficulty urinating and dysuria.   Musculoskeletal: Negative for back pain and neck stiffness.   Skin: Positive for venous stasis ulcer  Neurological: Negative for syncope and headaches.   Hematological: Negative for adenopathy.   Psychiatric/Behavioral: Positive for confusion and negative hallucinations.    Objective   Objective     Vitals:   Temp:  [97.8 °F (36.6 °C)-100.6 °F (38.1 °C)] 98.3 °F (36.8 °C)  Heart Rate:  [] 84  BP: ()/() 92/67  Flow (L/min):  [4] 4  Physical Exam   Gen. well-developed appearing stated age in no acute distress  HEENT: Normocephalic atraumatic moist membranes pupils equal round reactive light, no scleral icterus no conjunctival injection  Cardiovascular: regular tachycardic, 1+ lower extremity edema appreciated  Pulmonary: Crackles bilateral posterior lung fields, no wheezes rales or rhonchi symmetric chest expansion, unlabored, no conversational dyspnea appreciated  Gastrointestinal: Soft nontender nondistended positive bowel sounds all 4 quadrants no rebound or guarding  Musculoskeletal: No clubbing cyanosis, warm and well-perfused, calves soft symmetric nontender bilaterally  Skin: Clean dry chronic skin changes bilateral lower extremities, venous stasis ulcer of the right calf  Neuro: Cranial nerves II through XII intact grossly no sensorimotor deficits appreciated bilateral upper and lower extremities  Psych: Patient is sleepy though able to wake up and is cooperative and appropriate with exam not responding to internal stimuli  : No Keating catheter no bladder distention no suprapubic tenderness    Result Review    Result Review:  I have personally reviewed these results and agree with these findings:  [x]  Laboratory  LAB RESULTS:      Lab 05/27/23  0613 05/27/23  0102 05/26/23  1828 05/26/23  1200  Fistula 05/26/23  0708 05/26/23  0455 05/26/23  0142   WBC 19.09*  --   --   --   --   --  10.29   HEMOGLOBIN 11.6*  --   --   --   --   --  15.1   HEMATOCRIT 35.1  --   --   --   --   --  46.4   PLATELETS 50*  --   --   --   --   --  95*   NEUTROS ABS 18.14*  --   --   --   --   --  9.34*   IMMATURE GRANS (ABS)  --   --   --   --   --   --  0.11*   LYMPHS ABS  --   --   --   --   --   --  0.67*   MONOS ABS  --   --   --   --   --   --  0.10   EOS ABS  --   --   --   --   --   --  0.04   MCV 82.8  --   --   --   --   --  85.0   PROCALCITONIN  --   --   --   --   --   --  >400.00*   LACTATE  --  3.0* 3.7* 4.8* 3.1* 4.9* 9.6*   LACTATE, ARTERIAL  --   --   --   --   --   --  6.56*   PROTIME  --   --   --   --   --   --  17.1*   APTT 41.3*  --   --   --  37.3*  --   --          Lab 05/27/23  0613 05/26/23  0708 05/26/23  0142   SODIUM 138 136 137   SODIUM, ARTERIAL  --   --  134.6*   POTASSIUM 3.8 3.5 3.4*   CHLORIDE 107 103 96*   CO2 22.7 17.9* 19.0*   ANION GAP 8.3 15.1* 22.0*   BUN 19 30* 25*   CREATININE 0.57 1.32* 2.07*   EGFR 106.1 47.2* 27.5*   GLUCOSE 116* 168* 172*   GLUCOSE, ARTERIAL  --   --  181*   CALCIUM 7.7* 8.0* 9.6   IONIZED CALCIUM  --   --  1.11*   MAGNESIUM 2.4 1.4*  --    PHOSPHORUS 3.1  --   --    TSH  --  1.100  --          Lab 05/27/23  0613 05/26/23  1200 05/26/23  0142   TOTAL PROTEIN 5.8*  --  7.2   ALBUMIN 2.9*  --  3.8   GLOBULIN 2.9  --  3.4   ALT (SGPT) 132*  --  99*   AST (SGOT) 177*  --  192*   BILIRUBIN 0.6  --  1.4*   ALK PHOS 89  --  129*   LIPASE  --  8*  --          Lab 05/27/23  0613 05/26/23  0338 05/26/23  0142   PROBNP 12,931.0*  --   --    HSTROP T  --  256* 303*   PROTIME  --   --  17.1*   INR  --   --  1.40*                 Lab 05/26/23  0142   PH, ARTERIAL 7.429   PCO2, ARTERIAL 29.8*   PO2 ART 78.0*   O2 SATURATION ART 95.3   FIO2 28   HCO3 ART 19.3*   BASE EXCESS ART -3.7*   CARBOXYHEMOGLOBIN 2.0*     Brief Urine Lab Results  (Last result in the past 365 days)      Color    Clarity   Blood   Leuk Est   Nitrite   Protein   CREAT   Urine HCG        05/26/23 0225 Yellow   Clear   Moderate (2+)   Negative   Negative   100 mg/dL (2+)               Microbiology Results (last 10 days)     Procedure Component Value - Date/Time    Respiratory Panel PCR w/COVID-19(SARS-CoV-2) TANIKA/MARY/MINNIE/PAD/COR/MAD/ELLEN In-House, NP Swab in UTM/VTM, 3-4 HR TAT - Swab, Nasopharynx [[id number]]  (Normal) Collected: 05/27/23 0925    Lab Status: Final result Specimen: Swab from Nasopharynx Updated: 05/27/23 1104     ADENOVIRUS, PCR Not Detected     Coronavirus 229E Not Detected     Coronavirus HKU1 Not Detected     Coronavirus NL63 Not Detected     Coronavirus OC43 Not Detected     COVID19 Not Detected     Human Metapneumovirus Not Detected     Human Rhinovirus/Enterovirus Not Detected     Influenza A PCR Not Detected     Influenza B PCR Not Detected     Parainfluenza Virus 1 Not Detected     Parainfluenza Virus 2 Not Detected     Parainfluenza Virus 3 Not Detected     Parainfluenza Virus 4 Not Detected     RSV, PCR Not Detected     Bordetella pertussis pcr Not Detected     Bordetella parapertussis PCR Not Detected     Chlamydophila pneumoniae PCR Not Detected     Mycoplasma pneumo by PCR Not Detected    Narrative:      In the setting of a positive respiratory panel with a viral infection PLUS a negative procalcitonin without other underlying concern for bacterial infection, consider observing off antibiotics or discontinuation of antibiotics and continue supportive care. If the respiratory panel is positive for atypical bacterial infection (Bordetella pertussis, Chlamydophila pneumoniae, or Mycoplasma pneumoniae), consider antibiotic de-escalation to target atypical bacterial infection.    MRSA Screen, PCR (Inpatient) - Swab, Nares [[id number]]  (Normal) Collected: 05/26/23 1640    Lab Status: Final result Specimen: Swab from Nares Updated: 05/26/23 1808     MRSA PCR No MRSA Detected    Narrative:      The negative  predictive value of this diagnostic test is high and should only be used to consider de-escalating anti-MRSA therapy. A positive result may indicate colonization with MRSA and must be correlated clinically.    Wound Culture - Swab, Leg, Right [[id number]]  (Abnormal) Collected: 05/26/23 0305    Lab Status: Preliminary result Specimen: Swab from Leg, Right Updated: 05/27/23 1212     Wound Culture Moderate growth (3+) Staphylococcus aureus     Gram Stain Many (4+) WBCs seen      Few (2+) Gram positive cocci in pairs    S. Pneumo Ag Urine or CSF - Urine, Straight Cath [[id number]]  (Normal) Collected: 05/26/23 0225    Lab Status: Final result Specimen: Urine from Straight Cath Updated: 05/26/23 1208     Strep Pneumo Ag Negative    Legionella Antigen, Urine - Urine, Straight Cath [[id number]]  (Normal) Collected: 05/26/23 0225    Lab Status: Final result Specimen: Urine from Straight Cath Updated: 05/26/23 1208     LEGIONELLA ANTIGEN, URINE Negative    Blood Culture - Blood, Arm, Left [[id number]]  (Normal) Collected: 05/26/23 0142    Lab Status: Preliminary result Specimen: Blood from Arm, Left Updated: 05/27/23 0201     Blood Culture No growth at 24 hours    Blood Culture - Blood, Arm, Left [[id number]]  (Normal) Collected: 05/26/23 0142    Lab Status: Preliminary result Specimen: Blood from Arm, Left Updated: 05/27/23 0201     Blood Culture No growth at 24 hours          [x]  Microbiology  [x]  Radiology  CT Abdomen Pelvis Without Contrast    Result Date: 5/26/2023    1. Hepatic steatosis. 2. Nonobstructing right renal stone. 3. Mild pelvic ascites.     MORALES TESFAYE MD       Electronically Signed and Approved By: MORALES TESFAYE MD on 5/26/2023 at 17:55             CT Head Without Contrast    Result Date: 5/26/2023   No acute brain abnormality is seen. Specifically, no acute intracranial hemorrhage, no acute infarct, no significant intracranial mass lesion, and no acute intracranial mass effect are appreciated.   The  study is motion-limited.    Please note that portions of this note were completed with a voice recognition program.  JEN JONES JR, MD       Electronically Signed and Approved By: JEN JONES JR, MD on 5/26/2023 at 2:22              CT Chest Without Contrast Diagnostic    Result Date: 5/26/2023    1. Patchy bilateral consolidations worse in the left upper lobe, suggesting multifocal pneumonia. 2. Small right pleural effusion with bibasilar atelectasis. 3. Moderate emphysema.     MORALES TESFAYE MD       Electronically Signed and Approved By: MORALES TESFAYE MD on 5/26/2023 at 17:50             XR Chest 1 View    Result Date: 5/26/2023    No acute infiltrate is appreciated.    Please note that portions of this note were completed with a voice recognition program.  JEN JONES JR, MD       Electronically Signed and Approved By: JEN JONES JR, MD on 5/26/2023 at 2:27                [x]  EKG/Telemetry   [x]  Cardiology/Vascular   Echocardiogram 5/26/2023    •  Left ventricular ejection fraction appears to be 46 - 50%.  •  Left ventricular wall thickness is consistent with mild concentric hypertrophy.  •  Left ventricular diastolic function was indeterminate.  •  There is a TAVR valve present.  •  Moderate to severe tricuspid valve regurgitation is present.  •  Estimated right ventricular systolic pressure from tricuspid regurgitation is mildly elevated (35-45 mmHg).    []  Pathology  []  Old records  [x]  Other:  Scheduled Meds:atorvastatin, 80 mg, Nasogastric, Nightly  cefTRIAXone, 1 g, Intravenous, Q24H  clopidogrel, 75 mg, Nasogastric, Daily  enoxaparin, 40 mg, Subcutaneous, Q24H  insulin detemir, 15 Units, Subcutaneous, Nightly  insulin regular, 2-7 Units, Subcutaneous, Q6H  midodrine, 5 mg, Nasogastric, TID AC  nicotine, 1 patch, Transdermal, Q24H  senna-docusate sodium, 2 tablet, Nasogastric, BID  sodium chloride, 10 mL, Intravenous, Q12H      Continuous Infusions:norepinephrine, 0.02-0.5  mcg/kg/min, Last Rate: 0.12 mcg/kg/min (05/27/23 1269)  pain,   Pharmacy to Dose enoxaparin (LOVENOX),       PRN Meds:.•  acetaminophen  •  senna-docusate sodium **AND** polyethylene glycol **AND** [DISCONTINUED] bisacodyl **AND** bisacodyl  •  dextrose  •  dextrose  •  glucagon (human recombinant)  •  haloperidol lactate  •  HYDROmorphone  •  nitroglycerin  •  ondansetron  •  Pharmacy to Dose enoxaparin (LOVENOX)  •  sodium chloride  •  sodium chloride      Assessment & Plan   Assessment / Plan     Assessment/Plan:  Septic shock  Lactic acidosis  Acute metabolic encephalopathy secondary to septic shock  Multifocal pneumonia  Staph aureus infection of right venous stasis ulcer  Acute on chronic hypoxic respiratory failure  NSTEMI secondary to septic shock  Acute kidney injury secondary to septic shock  Acute systolic congestive heart failure  Transaminitis  Lactic acidosis  Hepatic cirrhosis  Hep C antibody positive  Insulin-dependent diabetes mellitus  Diabetic neuropathy  Aortic stenosis status post TAVR  Hodgkin's lymphoma  Recently diagnosed throat cancer  Chronic back pain status post pain pump implant  Hypokalemia  Hypomagnesemia  Thrombocytopenia concern secondary to cirrhosis  Wheelchair-bound        Patient admitted for further evaluation and treatment  Pulmonology critical care and cardiology consulted thank you for assistance  Continue ceftriaxone empirically and follow culture results  Continue Levophed as needed  Continue midodrine and titrate per pulmonology  Continue regular reorientation  Check ammonia level  Continue bronchopulmonary hygiene protocol  Lower extremity venous duplex due to history of DVTs not on anticoagulation  Continue wound care  Follow-up wound culture sensitivities  Continue supplemental nasal cannula oxygen titrated keep sats greater than 90%  Continue Plavix and atorvastatin  Cardiology recommending cardiac catheterization once stable  Renal function back to baseline  Hold off  on further IV fluids  Plan to initiate diuresis once blood pressure stabilizes  Continue to monitor transaminases  Continue to monitor lactic acidosis  Get hep C viral load  Continue tube feeds until more alert  Continue Levemir and titrate as needed  Continue sliding scale insulin  Patient will need to follow-up with oncology as an outpatient once stable  Patient's pain pump controller currently not available  Continue IV Dilaudid as needed to avoid withdrawal symptoms  Continue to monitor electrolytes and replace as needed  Continue monitor thrombocytopenia suspect secondary to splenic sequestration  Further inpatient orders recommendations pending clinical course           Discussed plan with bedside RN as well as pulmonology critical care team.    Disposition: We will evaluate once patient is more alert and can discuss goals of care.    DVT prophylaxis:  Medical DVT prophylaxis orders are present.    CODE STATUS:   Level Of Support Discussed With: Next of Kin (If No Surrogate)  Code Status (Patient has no pulse and is not breathing): CPR (Attempt to Resuscitate)  Medical Interventions (Patient has pulse or is breathing): Full Support  Comments: daughter at bedside confirmed code status

## 2023-05-31 NOTE — ED PROVIDER NOTE - ATTENDING CONTRIBUTION TO CARE
Comment: Had a virtual visit with pediatrician and was diagnosed as scabies. She was treated with permethrin cream which didn’t seem to improve her symptoms immediately, but have slowly improved over the past 2 weeks. Mom showed me a photo from when she was more flared and it was more consistent with chigger bites than scabies, but is mostly improved today. Parents report she has been going outside at her nature school where they have very wooded areas that aren’t well kept. Advised they are healing well, discussed sending in topical steroid if needed. Render Risk Assessment In Note?: no Detail Level: Simple critical pt     57-year-old male history of ESRD on dialysis, diabetes, hypertension, bipolar, now brought in by EMS from his doctor after being found lethargic which is unlike his usual verbal self also found to be hypoxic on room air which improved to 95 after oxygen and hypotensive to 89/50.  Patient brought directly to room.    VS: afebrile, yelena to 55, 95%  Gen: ill appearing  Head: NC/AT  ENT:   Neck: trachea midline  Resp:  +distress, decr L>R breath sounds  Ext: no deformities  Neuro:  A&O x1 (name)  Skin:  Warm and dry as visualized    MDM:   critical pt - doesn't appear to be fluid overoad but large pleural effusions vs infection vs electrolytes  plan for labs, O2 & bipap if needed, IVF then pressors, CT imaging, possible thora, possible MICU

## 2023-07-26 NOTE — PATIENT PROFILE ADULT - NSPROPOAPRESSUREINJURY_GEN_A_NUR
Last office visit date: 05/19/23    Next appointment scheduled?: No; N/A     Number of refills given: 3     no

## 2023-07-28 NOTE — ED PROVIDER NOTE - TOBACCO USE
Called pharmacy patient has 3 refills left. Left message on patients voicemail. Current every day smoker

## 2023-08-08 NOTE — ED ADULT NURSE NOTE - NS ED NURSE REPORT GIVEN DT
10-Mar-2023 00:40 21 yo M PMH schizophrenia, PTSD, Mood disorder presenting for suicidal ideations with a plan and means. States that it was only in the moment and that he no longer feels the desire to take his own life. Is responding to internal stimuli laughing and talking to himself. Is denying SI/HI/AVH and PI. Makes large repetitive movements with neck and arms while not responding to outside stimuli. Seeking medication and therapy at this time. Plan to continue Zyprexa 5mg for psychosis, PRN ativan 2mg, benadryl 50mg, and haldol 5mg for agitation.    Thorazine 100mg bid

## 2023-09-15 NOTE — PACU DISCHARGE NOTE - NS MD DISCHARGE NOTE DISCHARGE
Belkys Goodwin Patient Age: 35 year old  MESSAGE: Interpreting service used: No    Insurance on file confirmed with caller: Yes    Obstetrics & Gynecology- Reason for call: Symptom- Yellow symptoms-   Gyne- UTI/Vaginal symptoms             Caller connected to triage- Yes- Connected call to Call Center Triage Queue.  Routed to provider's clinical pool.    Message read back to caller for accuracy: Yes       ALLERGIES:  Patient has no known allergies.  Current Outpatient Medications   Medication Sig Dispense Refill   • dicloxacillin (DYNAPEN) 500 MG capsule Take 1 capsule by mouth 4 times daily. 40 capsule 0   • metroNIDAZOLE (METROGEL VAGINAL) 0.75 % vaginal gel Use vaginally at night for 5 nights 70 g 0   • hydroCORTisone (ANUSOL-HC) 2.5 % rectal cream Place 1 application rectally 2 times daily. 30 g 1   • escitalopram (LEXAPRO) 20 MG tablet Take 1 tablet by mouth daily. 90 tablet 3   • Prenatal Vit-Fe Fumarate-FA (PRENATAL PO)        No current facility-administered medications for this visit.     PHARMACY to use:           Pharmacy preference(s) on file:   [id number] - [place], [place] - [address] [facility] [address]  [facility]  [address]  [facility] [address]  Phone: [phone] Fax: [phone]      CALL BACK INFO: Ok to leave response (including medical information) with family member or on answering machine      PCP: Pcp, No         INS: Payor: BLUE [employer]OSS BLUE [place]ELD [place] / Plan: PPO [id number] / Product Type: PPO MISC   PATIENT ADDRESS:  [address] [place] [id number]     Floor

## 2023-10-18 NOTE — PATIENT PROFILE ADULT. - VISION (WITH CORRECTIVE LENSES IF THE PATIENT USUALLY WEARS THEM):
Normal vision: sees adequately in most situations; can see medication labels, newsprint 5-Fu Counseling: 5-Fluorouracil Counseling:  I discussed with the patient the risks of 5-fluorouracil including but not limited to erythema, scaling, itching, weeping, crusting, and pain.

## 2023-10-20 NOTE — H&P ADULT - HISTORY OF PRESENT ILLNESS
no PCP 52M with DM, HLD, HTN, bipolar disorder sent from his podiatrist, Dr. Olivier, with right great toe pain. The patient states that he first noticed the pain about one week ago and his podiatrist had been debriding in the office. He does notice some increased redness today. Patient is ambulatory at baseline but does endorse claudication after 1-2 blocks. He also notes some "shooting pain" up his legs even while seated about once every ten minutes. Patient has had no fevers or chills, but has noticed some drainage from the site.

## 2023-10-20 NOTE — PROGRESS NOTE ADULT - PROBLEM SELECTOR PROBLEM 5
US guided right mid thyroid nodule fine needle aspiration done by Dr. Robin; NON-SEDATION (no H&P required as this is a NON SEDATION procedure) right anterior aspect of neck access site, dressing CDI; x1 jar of cytolyt obtained, x1 vial afirma obtained and hand delivered to pathology lab. Pt tolerated the procedure well. Pt hemodynamically stable pre/intra/post procedure; all questions and concerns answered prior to being d/c; patient provided with appropriate education for procedure; pt d/c home.   
Dysphagia

## 2023-10-30 NOTE — H&P ADULT - GASTROINTESTINAL
Is This A New Presentation, Or A Follow-Up?: Moles How Severe Is Your Skin Lesion?: moderate Has Your Skin Lesion Been Treated?: not been treated Additional History: Patient requested full body skin check. details… detailed exam

## 2024-01-23 NOTE — PROGRESS NOTE BEHAVIORAL HEALTH - NS ED [facility] MED ROS GENITOURINARY
Medicine Progress Note    Patient is a 69y old  Male who presents with a chief complaint of Bloody diarrhea (23 Jan 2024 10:19)      SUBJECTIVE / OVERNIGHT EVENTS: no events, diarrhea resolved     ADDITIONAL REVIEW OF SYSTEMS: negative     MEDICATIONS  (STANDING):  atorvastatin 40 milliGRAM(s) Oral at bedtime  lactated ringers. 1000 milliLiter(s) (75 mL/Hr) IV Continuous <Continuous>  lactobacillus acidophilus 1 Tablet(s) Oral daily  lidocaine   4% Patch 1 Patch Transdermal daily  psyllium Powder 2 Packet(s) Oral two times a day  tamsulosin 0.4 milliGRAM(s) Oral at bedtime    MEDICATIONS  (PRN):  acetaminophen     Tablet .. 650 milliGRAM(s) Oral every 6 hours PRN Temp greater or equal to 38C (100.4F), Mild Pain (1 - 3)  melatonin 3 milliGRAM(s) Oral at bedtime PRN Insomnia  ondansetron Injectable 4 milliGRAM(s) IV Push every 8 hours PRN Nausea and/or Vomiting    CAPILLARY BLOOD GLUCOSE        I&O's Summary      PHYSICAL EXAM:  Vital Signs Last 24 Hrs  T(C): 36.6 (23 Jan 2024 11:54), Max: 36.8 (22 Jan 2024 22:58)  T(F): 97.8 (23 Jan 2024 11:54), Max: 98.2 (22 Jan 2024 22:58)  HR: 54 (23 Jan 2024 11:54) (50 - 82)  BP: 152/84 (23 Jan 2024 11:54) (125/95 - 152/84)  BP(mean): --  RR: 18 (23 Jan 2024 09:30) (16 - 18)  SpO2: 100% (23 Jan 2024 09:30) (96% - 100%)    Parameters below as of 23 Jan 2024 07:29  Patient On (Oxygen Delivery Method): room air      CONSTITUTIONAL: NAD  ENMT: Moist oral mucosa, no pharyngeal injection or exudates;  RESPIRATORY: Normal respiratory effort; lungs are clear to auscultation bilaterally  CARDIOVASCULAR: Regular rate and rhythm, normal S1 and S2, ; No lower extremity edema;   ABDOMEN: Nontender to palpation, normoactive bowel sounds, no rebound/guarding;   PSYCH: A+O to person, place, and time; affect appropriate  NEUROLOGY: CN 2-12 are intact and symmetric; no gross sensory deficits   SKIN: No rashes; no palpable lesions    LABS:                        14.1   6.10  )-----------( 201      ( 23 Jan 2024 05:15 )             41.7     01-23    140  |  107  |  12  ----------------------------<  91  4.1   |  23  |  0.89    Ca    9.0      23 Jan 2024 05:15  Phos  3.1     01-23  Mg     2.10     01-23    TPro  6.7  /  Alb  3.6  /  TBili  0.7  /  DBili  x   /  AST  22  /  ALT  25  /  AlkPhos  65  01-22          Urinalysis Basic - ( 23 Jan 2024 05:15 )    Color: x / Appearance: x / SG: x / pH: x  Gluc: 91 mg/dL / Ketone: x  / Bili: x / Urobili: x   Blood: x / Protein: x / Nitrite: x   Leuk Esterase: x / RBC: x / WBC x   Sq Epi: x / Non Sq Epi: x / Bacteria: x            RADIOLOGY & ADDITIONAL TESTS:  Imaging from Last 24 Hours:    Electrocardiogram/QTc Interval:    COORDINATION OF CARE:  Care Discussed with Consultants/Other Providers: ACP    No complaints

## 2024-03-07 NOTE — ASU DISCHARGE PLAN (ADULT/PEDIATRIC) - PROCEDURE
Left arm fistulogram and balloon angioplasty Detail Level: Zone Render In Strict Bullet Format?: Yes Continue Regimen: Halobetasol 0.05% topical cream QD/BID x 2 weeks for major flares\\n\\nTriamcinolone 0.1% topical cream QD/BID x 2 weeks for minor flares.\\nCerave cream QD-BID Discontinue Regimen: Hold: Clobetasol 0.05% topical cream

## 2024-03-26 NOTE — PATIENT PROFILE ADULT. - AS SC BRADEN FRICTION
Jose Del Rio MD   
(2) potential problem

## 2024-05-30 NOTE — ED PROVIDER NOTE - EKG ADDITIONAL QUESTION - PERFORMED INDEPENDENT VISUALIZATION
Detail Level: Detailed Depth Of Biopsy: dermis Was A Bandage Applied: Yes Size Of Lesion In Cm: 0.5 X Size Of Lesion In Cm: 0 Biopsy Type: H and E Biopsy Method: Personna blade Anesthesia Type: 2% lidocaine with epinephrine Hemostasis: Electrocautery Wound Care: Petrolatum Dressing: Band-Aid Destruction After The Procedure: No Type Of Destruction Used: Cryotherapy Curettage Text: The wound bed was treated with curettage after the biopsy was performed. Cryotherapy Text: The wound bed was treated with cryotherapy after the biopsy was performed. Electrodesiccation Text: The wound bed was treated with electrodesiccation after the biopsy was performed. Electrodesiccation And Curettage Text: The wound bed was treated with electrodesiccation and curettage after the biopsy was performed. Silver Nitrate Text: The wound bed was treated with silver nitrate after the biopsy was performed. Lab: [id number] Lab Facility: [id number] Consent: Verbal consent was obtained and risks were reviewed including but not limited to scarring, infection, bleeding, scabbing, incomplete removal, nerve damage and allergy to anesthesia. Post-Care Instructions: I reviewed with the patient in detail post-care instructions. Patient is to keep the biopsy site dry overnight, and then apply bacitracin twice daily until healed. Patient may apply hydrogen peroxide soaks to remove any crusting. Notification Instructions: Patient will be notified of biopsy results. However, patient instructed to call the office if not contacted within 2 weeks. Billing Type: Third-Party Bill Information: Selecting Yes will display possible errors in your note based on the variables you have selected. This validation is only offered as a suggestion for you. PLEASE NOTE THAT THE VALIDATION TEXT WILL BE REMOVED WHEN YOU FINALIZE YOUR NOTE. IF YOU WANT TO FAX A PRELIMINARY NOTE YOU WILL NEED TO TOGGLE THIS TO 'NO' IF YOU DO NOT WANT IT IN YOUR FAXED NOTE. Yes

## 2024-06-07 NOTE — ASU PREOP CHECKLIST - LOOSE TEETH
Attempted to contact patient at this time, patient did not answer. LVM instructing patient to call back clinic.     Per chart review, labs completed on 06/04/24 have not yet been reviewed by PCP. Please review patient's recent lab results and advise.    no

## 2024-07-15 NOTE — H&P PST ADULT - NEGATIVE OPHTHALMOLOGIC SYMPTOMS
You can access the FollowMyHealth Patient Portal offered by [facility] by registering at the following website: [email or web address]. By joining [facility]’s FollowMyHealth portal, you will also be able to view your health information using other applications (apps) compatible with our system.
no photophobia/no discharge L/no pain R/no blurred vision R/no discharge R/no diplopia/no pain L/no irritation L/no irritation R/no blurred vision L

## 2024-08-19 NOTE — CONSULT NOTE ADULT - CARDIOVASCULAR
negative Patient seen and examined at bedside. No acute events overnight. Patient feels well. Radial access site looks good. LHC w/o obstruction. RHC with low pressures so diuresis stopped and a little fluid given back. Seems to be tolerating GDMT. Will touch base with cardiology for final recommendations. 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    DC Time: 35 minutes Patient seen and examined at bedside. No acute events overnight. Patient feels well. Radial access site looks good. LHC w/o obstruction. RHC with low pressures so diuresis stopped and a little fluid given back. Seems to be tolerating GDMT. Will touch base with cardiology for final recommendations. 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    Of note cardiology also recommending inpatient MRI, though patient electing to leave. If there is an abnormality it is likely chronic and can be addressed as an outpatient. Should follow up with Dr. Oneal for further optimization of GDMT and see if he qualifies for an AICD eventually.    DC Time: 35 minutes not examined

## 2024-11-22 NOTE — ED ADULT NURSE NOTE - CAS DISCH BELONGINGS RETURNED
Assessment and Plan:  Problem List Items Addressed This Visit          Endocrine and Metabolic    Vitamin D deficiency       Vitamin D deficiency - Stable on 72366 units of vitamin D weekly. Denies side effects from medications. Patient denies bone pain, muscle weakness, fractures, or difficulty walking. Vitamin D, 25-Hydroxy (ng/mL)       Date                     Value                 05/23/2024               16.6 (L)         Instructed on increasing foods rich in Vitamin D: salmon, mushrooms, fortified milk, soy milk, tofu, yogurt, orange juice, pork chops, eggs, and breakfast cereal.         Relevant Orders    Vitamin D -25 Hydroxy    Hypothyroid - Primary     Hypothyroid- Denies fatigue, cold intolerance, weight gain, constipation, dry skin, or myalgia.  BP runs.  Denies neck swelling or difficulty swallowing  Most recent thyroid labs:  Lab Results       Component                Value               Date                       TSH                      2.414               05/23/2024            Patient's thyroid function studies Have been stable. On 50 mcg Levothyroxine          Relevant Orders    Thyroid Stimulating Hormone Reflex       Hematology and Neoplasia    Iron deficiency anemia     Not taking a supplement. Information given, labs drawn again          Relevant Orders    CBC with Automated Differential    Iron And total Iron Binding Capacity    Ferritin    Reticulocyte Count Automated     Other Visit Diagnoses       Encounter for screening mammogram for malignant neoplasm of breast        Relevant Orders    MAMMO SCREENING BILATERAL W CHAY               SUBJECTIVE:   Lorena Mello is a 39 year old female here for   Chief Complaint   Patient presents with    Office Visit     Follow up thyroid       Hypothyroid- Denies fatigue, cold intolerance, weight gain, constipation, dry skin, or myalgia.  BP runs.  Denies neck swelling or difficulty swallowing  Most recent thyroid labs:  Lab Results       Component                 Value               Date                       TSH                      2.414               05/23/2024            Patient's thyroid function studies Have been stable. On 50 mcg Levothyroxine       Vitamin D deficiency - Stable on 37958 units of vitamin D weekly. Denies side effects from medications. Patient denies bone pain, muscle weakness, fractures, or difficulty walking. Vitamin D, 25-Hydroxy (ng/mL)       Date                     Value                 05/23/2024               16.6 (L)         Instructed on increasing foods rich in Vitamin D: salmon, mushrooms, fortified milk, soy milk, tofu, yogurt, orange juice, pork chops, eggs, and breakfast cereal.              Current Outpatient Medications   Medication Sig Dispense Refill    levothyroxine 50 MCG tablet Take 1 tablet by mouth daily. 90 tablet 3    meloxicam (Mobic) 15 MG tablet Take 1 tablet by mouth daily. (Patient not taking: Reported on 11/26/2024) 30 tablet 0    vitamin D3 (CHOLECALCIFEROL) 1.25 mg(50,000 units) capsule Take 1 capsule by mouth 1 day a week. One tablet per week for 3 months (Patient not taking: Reported on 11/26/2024) 12 capsule 0    IRON PO  (Patient not taking: Reported on 10/18/2024)       No current facility-administered medications for this visit.       FAMILY HISTORY:  Family History   Problem Relation Age of Onset    Bipolar disorder Mother     Patient is unaware of any medical problems Father     Patient is unaware of any medical problems Sister     Patient is unaware of any medical problems Sister     Patient is unaware of any medical problems Sister     Thyroid Brother     Cancer, Breast Maternal Grandmother      SOCIAL HISTORY:  Social History     Tobacco Use    Smoking status: Never     Passive exposure: Never    Smokeless tobacco: Never   Vaping Use    Vaping status: never used   Substance Use Topics    Alcohol use: Never    Drug use: Never     Past Surgical HISTORY  Past Surgical History:   Procedure Laterality Date      section, low transverse      Tubal ligation           Review of Systems:  Negative unless listed above or in HPI    OBJECTIVE:   Visit Vitals  BP 96/69 (BP Location: LUE - Left upper extremity, Patient Position: Sitting, Cuff Size: Regular)   Pulse 69   Temp 97.9 °F (36.6 °C) (Oral)   Wt 64.8 kg (142 lb 15.5 oz)   LMP 2024 (Exact Date) Comment: Tubal ligation   SpO2 100%   BMI 28.88 kg/m²     Physical Exam  Vitals and nursing note reviewed.   Constitutional:       Appearance: Normal appearance.   Cardiovascular:      Rate and Rhythm: Normal rate and regular rhythm.      Pulses: Normal pulses.      Heart sounds: Normal heart sounds.   Pulmonary:      Effort: Pulmonary effort is normal.      Breath sounds: Normal breath sounds.   Musculoskeletal:         General: Normal range of motion.   Skin:     General: Skin is warm and dry.   Neurological:      General: No focal deficit present.      Mental Status: She is alert and oriented to person, place, and time. Mental status is at baseline.   Psychiatric:         Mood and Affect: Mood normal.         Behavior: Behavior normal.         Thought Content: Thought content normal.         Judgment: Judgment normal.             LABS:  Lab Results   Component Value Date    BUN 12 2024    GLUCOSE 94 2024    CREATININE 0.70 2024    ALBUMIN 4.2 2024    AST 18 2024    GPT 25 2024    HGB 10.9 (L) 10/30/2024    HGB 11.6 (L) 2024    HCT 34.6 (L) 10/30/2024    HCT 36.3 2024    WBC 5.9 10/30/2024    WBC 4.2 2024        HDL (mg/dL)   Date Value   2024 85     LDL (mg/dL)   Date Value   2024 67     Triglycerides (mg/dL)   Date Value   2024 32     Cholesterol (mg/dL)   Date Value   2024 158       Hemoglobin A1C (%)   Date Value   2024 5.1          Please see patient instructions for any further recommendations.    Return in about 6 months (around 2025) for physical .       Refer to  orders.  Medical compliance with plan discussed and risks of non-compliance reviewed.  Patient education completed on disease process, etiology & prognosis.  Proper usage and side effects of medications reviewed & discussed.  Return to clinic as clinically indicated as discussed with the patient who verbalized understanding of the plan and is in agreement with the plan.   Yes Arava Counseling:  Patient counseled regarding adverse effects of Arava including but not limited to nausea, vomiting, abnormalities in liver function tests. Patients may develop mouth sores, rash, diarrhea, and abnormalities in blood counts. The patient understands that monitoring is required including LFTs and blood counts.  There is a rare possibility of scarring of the liver and lung problems that can occur when taking methotrexate. Persistent nausea, loss of appetite, pale stools, dark urine, cough, and shortness of breath should be reported immediately. Patient advised to discontinue Arava treatment and consult with a physician prior to attempting conception. The patient will have to undergo a treatment to eliminate Arava from the body prior to conception.

## 2025-01-27 NOTE — ASU DISCHARGE PLAN (ADULT/PEDIATRIC) - OK TO LEAVE MESSAGE ON VOICEMAIL
Rhomboid Transposition Flap Text: The defect edges were debeveled with a #15 scalpel blade.  Given the location of the defect and the proximity to free margins a rhomboid transposition flap was deemed most appropriate.  Using a sterile surgical marker, an appropriate rhomboid flap was drawn incorporating the defect.    The area thus outlined was incised deep to adipose tissue with a #15 scalpel blade.  The skin margins were undermined to an appropriate distance in all directions utilizing iris scissors. Quadrant Reporting?: no Special Stains Stage 10 - Results: Base On Clearance Noted Above Eyelid Partial Thickness Repair - 82727: The eyelid defect was partial thickness which required a wedge repair of the eyelid. Special care was taken to ensure that the eyelid margin was realligned when placing sutures. Stage 12: Additional Anesthesia Volume In Cc: 0 Flip-Flop Flap Text: The defect edges were debeveled with a #15 blade scalpel.  Given the location of the defect and the proximity to free margins a flip-flop flap was deemed most appropriate. Using a sterile surgical marker, the appropriate flap was drawn incorporating the defect and placing the expected incisions between the helical rim and antihelix where possible.  The area thus outlined was incised through and through with a #15 scalpel blade. Following this, the designed flap was carried over into the primary defect and sutured into place. Closure 2 Information: This tab is for additional flaps and grafts, including complex repair and grafts and complex repair and flaps. You can also specify a different location for the additional defect, if the location is the same you do not need to select a new one. We will insert the automated text for the repair you select below just as we do for solitary flaps and grafts. Please note that at this time if you select a location with a different insurance zone you will need to override the ICD10 and CPT if appropriate. Crescentic Complex Repair Preamble Text (Leave Blank If You Do Not Want): Extensive wide undermining was performed. Mid-Level Procedure Text (D): After obtaining clear surgical margins the patient was sent to a mid-level provider for surgical repair.  The patient understands they will receive post-surgical care and follow-up from the mid-level provider. Number Of Hemigard Strips Per Side: 1 Hemostasis: Electrocautery Star Wedge Flap Text: The defect edges were debeveled with a #15 scalpel blade.  Given the location of the defect, shape of the defect and the proximity to free margins a star wedge flap was deemed most appropriate.  Using a sterile surgical marker, an appropriate rotation flap was drawn incorporating the defect and placing the expected incisions within the relaxed skin tension lines where possible. The area thus outlined was incised deep to adipose tissue with a #15 scalpel blade.  The skin margins were undermined to an appropriate distance in all directions utilizing iris scissors. Donor Site Anesthesia Type: same as repair anesthesia Helical Rim Text: The closure involved the helical rim. Dermal Autograft Text: The defect edges were debeveled with a #15 scalpel blade.  Given the location of the defect, shape of the defect and the proximity to free margins a dermal autograft was deemed most appropriate.  Using a sterile surgical marker, the primary defect shape was transferred to the donor site. The area thus outlined was incised deep to adipose tissue with a #15 scalpel blade.  The harvested graft was then trimmed of adipose and epidermal tissue until only dermis was left.  The skin graft was then placed in the primary defect and oriented appropriately. Show Quadrant Variables In The Stage Tabs: Yes Repair Type: Complex Repair Intermediate Repair And Flap Additional Text (Will Appearing After The Standard Complex Repair Text): The intermediate repair was not sufficient to completely close the primary defect. The remaining additional defect was repaired with the flap mentioned below. Hemigard Retention Suture: 0-0 Nylon Repair Performed By Another Provider Text (Leave Blank If You Do Not Want): After obtaining clear surgical margins the defect was repaired by another provider. Intermediate Repair Preamble Text (Leave Blank If You Do Not Want): Undermining was performed with blunt dissection. Stage 6: Additional Anesthesia Type: 1% lidocaine with epinephrine Was The Patient On Physician Recommended Anticoagulation Therapy?: Please Select the Appropriate Response Epidermal Closure Graft Donor Site (Optional): running Partial Purse String (Intermediate) Text: Given the location of the defect and the characteristics of the surrounding skin an intermediate purse string closure was deemed most appropriate.  Undermining was performed circumfirentially around the surgical defect.  A purse string suture was then placed and tightened. Wound tension only allowed a partial closure of the circular defect. Surgeon/Pathologist Verbiage (Will Incorporate Name Of Surgeon From Intro If Not Blank): operated in two distinct and integrated capacities as the surgeon and pathologist. Z Plasty Text: The lesion was extirpated to the level of the fat with a #15 scalpel blade.  Given the location of the defect, shape of the defect and the proximity to free margins a Z-plasty was deemed most appropriate for repair.  Using a sterile surgical marker, the appropriate transposition arms of the Z-plasty were drawn incorporating the defect and placing the expected incisions within the relaxed skin tension lines where possible.    The area thus outlined was incised deep to adipose tissue with a #15 scalpel blade.  The skin margins were undermined to an appropriate distance in all directions utilizing iris scissors.  The opposing transposition arms were then transposed into place in opposite direction and anchored with interrupted buried subcutaneous sutures. Suturegard Body: The suture ends were repeatedly re-tightened and re-clamped to achieve the desired tissue expansion. Eye Protection Verbiage: Before proceeding with the stage, a plastic scleral shield was inserted. The globe was anesthetized with a few drops of 1% lidocaine with 1:100,000 epinephrine. Then, an appropriate sized scleral shield was chosen and coated with lacrilube ointment. The shield was gently inserted and left in place for the duration of each stage. After the stage was completed, the shield was gently removed. Plastic Surgeon Procedure Text (D): After obtaining clear surgical margins the patient was sent to plastics for surgical repair.  The patient understands they will receive post-surgical care and follow-up from the referring physician's office. Asc Procedure Text (A): After obtaining clear surgical margins the patient was sent to an ASC for surgical repair.  The patient understands they will receive post-surgical care and follow-up from the ASC physician. Complex Repair And Flap Additional Text (Will Appearing After The Standard Complex Repair Text): The complex repair was not sufficient to completely close the primary defect. The remaining additional defect was repaired with the flap mentioned below. Anesthesia Type: 1% lidocaine with epinephrine and a 1:10 solution of 8.4% sodium bicarbonate Island Pedicle Flap With Canthal Suspension Text: The defect edges were debeveled with a #15 scalpel blade.  Given the location of the defect, shape of the defect and the proximity to free margins an island pedicle advancement flap was deemed most appropriate.  Using a sterile surgical marker, an appropriate advancement flap was drawn incorporating the defect, outlining the appropriate donor tissue and placing the expected incisions within the relaxed skin tension lines where possible. The area thus outlined was incised deep to adipose tissue with a #15 scalpel blade.  The skin margins were undermined to an appropriate distance in all directions around the primary defect and laterally outward around the island pedicle utilizing iris scissors.  There was minimal undermining beneath the pedicle flap. A suspension suture was placed in the canthal tendon to prevent tension and prevent ectropion. Referring Physician (Optional): matos Double Island Pedicle Flap Text: The defect edges were debeveled with a #15 scalpel blade.  Given the location of the defect, shape of the defect and the proximity to free margins a double island pedicle advancement flap was deemed most appropriate.  Using a sterile surgical marker, an appropriate advancement flap was drawn incorporating the defect, outlining the appropriate donor tissue and placing the expected incisions within the relaxed skin tension lines where possible.    The area thus outlined was incised deep to adipose tissue with a #15 scalpel blade.  The skin margins were undermined to an appropriate distance in all directions around the primary defect and laterally outward around the island pedicle utilizing iris scissors.  There was minimal undermining beneath the pedicle flap. Yes Referred To Oculoplastics For Closure Text (Leave Blank If You Do Not Want): After obtaining clear surgical margins the patient was sent to oculoplastics for surgical repair.  The patient understands they will receive post-surgical care and follow-up from the referring physician's office. O-T Advancement Flap Text: The defect edges were debeveled with a #15 scalpel blade.  Given the location of the defect, shape of the defect and the proximity to free margins an O-T advancement flap was deemed most appropriate.  Using a sterile surgical marker, an appropriate advancement flap was drawn incorporating the defect and placing the expected incisions within the relaxed skin tension lines where possible.    The area thus outlined was incised deep to adipose tissue with a #15 scalpel blade.  The skin margins were undermined to an appropriate distance in all directions utilizing iris scissors. Tumor Depth: Less than 6mm from granular layer and no invasion beyond the subcutaneous fat Otolaryngologist Procedure Text (A): After obtaining clear surgical margins the patient was sent to otolaryngology for surgical repair.  The patient understands they will receive post-surgical care and follow-up from the referring physician's office. Staged Advancement Flap Text: The defect edges were debeveled with a #15 scalpel blade.  Given the location of the defect, shape of the defect and the proximity to free margins a staged advancement flap was deemed most appropriate.  Using a sterile surgical marker, an appropriate advancement flap was drawn incorporating the defect and placing the expected incisions within the relaxed skin tension lines where possible. The area thus outlined was incised deep to adipose tissue with a #15 scalpel blade.  The skin margins were undermined to an appropriate distance in all directions utilizing iris scissors. O-T Plasty Text: The defect edges were debeveled with a #15 scalpel blade.  Given the location of the defect, shape of the defect and the proximity to free margins an O-T plasty was deemed most appropriate.  Using a sterile surgical marker, an appropriate O-T plasty was drawn incorporating the defect and placing the expected incisions within the relaxed skin tension lines where possible.    The area thus outlined was incised deep to adipose tissue with a #15 scalpel blade.  The skin margins were undermined to an appropriate distance in all directions utilizing iris scissors. Dorsal Nasal Flap Text: The defect edges were debeveled with a #15 scalpel blade.  Given the location of the defect and the proximity to free margins a dorsal nasal flap was deemed most appropriate.  Using a sterile surgical marker, an appropriate dorsal nasal flap was drawn around the defect.    The area thus outlined was incised deep to adipose tissue with a #15 scalpel blade.  The skin margins were undermined to an appropriate distance in all directions utilizing iris scissors. Rhombic Flap Text: The defect edges were debeveled with a #15 scalpel blade.  Given the location of the defect and the proximity to free margins a rhombic flap was deemed most appropriate.  Using a sterile surgical marker, an appropriate rhombic flap was drawn incorporating the defect.    The area thus outlined was incised deep to adipose tissue with a #15 scalpel blade.  The skin margins were undermined to an appropriate distance in all directions utilizing iris scissors. Simple / Intermediate / Complex Repair - Final Wound Length In Cm: 3.5 Advancement-Rotation Flap Text: The defect edges were debeveled with a #15 scalpel blade.  Given the location of the defect, shape of the defect and the proximity to free margins an advancement-rotation flap was deemed most appropriate.  Using a sterile surgical marker, an appropriate flap was drawn incorporating the defect and placing the expected incisions within the relaxed skin tension lines where possible. The area thus outlined was incised deep to adipose tissue with a #15 scalpel blade.  The skin margins were undermined to an appropriate distance in all directions utilizing iris scissors. Number Of Stages: 2 Eyelid Full Thickness Repair - 29067: The eyelid defect was full thickness which required a wedge repair of the eyelid. Special care was taken to ensure that the eyelid margin was realligned when placing sutures. Adjacent Tissue Transfer Text: The defect edges were debeveled with a #15 scalpel blade.  Given the location of the defect and the proximity to free margins an adjacent tissue transfer was deemed most appropriate.  Using a sterile surgical marker, an appropriate flap was drawn incorporating the defect and placing the expected incisions within the relaxed skin tension lines where possible.    The area thus outlined was incised deep to adipose tissue with a #15 scalpel blade.  The skin margins were undermined to an appropriate distance in all directions utilizing iris scissors. Bcc Infiltrative Histology Text: There were numerous aggregates of basaloid cells demonstrating an infiltrative pattern. Mastoid Interpolation Flap Text: A decision was made to reconstruct the defect utilizing an interpolation axial flap and a staged reconstruction.  A telfa template was made of the defect.  This telfa template was then used to outline the mastoid interpolation flap.  The donor area for the pedicle flap was then injected with anesthesia.  The flap was excised through the skin and subcutaneous tissue down to the layer of the underlying musculature.  The pedicle flap was carefully excised within this deep plane to maintain its blood supply.  The edges of the donor site were undermined.   The donor site was closed in a primary fashion.  The pedicle was then rotated into position and sutured.  Once the tube was sutured into place, adequate blood supply was confirmed with blanching and refill.  The pedicle was then wrapped with xeroform gauze and dressed appropriately with a telfa and gauze bandage to ensure continued blood supply and protect the attached pedicle. V-Y Flap Text: The defect edges were debeveled with a #15 scalpel blade.  Given the location of the defect, shape of the defect and the proximity to free margins a V-Y flap was deemed most appropriate.  Using a sterile surgical marker, an appropriate advancement flap was drawn incorporating the defect and placing the expected incisions within the relaxed skin tension lines where possible.    The area thus outlined was incised deep to adipose tissue with a #15 scalpel blade.  The skin margins were undermined to an appropriate distance in all directions utilizing iris scissors. Surgical Defect Width In Cm (Optional): 1.2 Medical Necessity Statement: Based on my medical judgement, Mohs surgery is the most appropriate treatment for this cancer compared to other treatments. Consent (Near Eyelid Margin)/Introductory Paragraph: The rationale for Mohs was explained to the patient and consent was obtained. The risks, benefits and alternatives to therapy were discussed in detail. Specifically, the risks of ectropion or eyelid deformity, infection, scarring, bleeding, prolonged wound healing, incomplete removal, allergy to anesthesia, nerve injury and recurrence were addressed. Prior to the procedure, the treatment site was clearly identified and confirmed by the patient. All components of Universal Protocol/PAUSE Rule completed. Nasolabial Transposition Flap Text: The defect edges were debeveled with a #15 scalpel blade.  Given the size, depth and location of the defect and the proximity to free margins a nasolabial transposition flap was deemed most appropriate. Using a sterile surgical marker, an appropriate flap was drawn incorporating the defect. The area thus outlined was incised with a #15 scalpel blade. The skin margins were undermined to an appropriate distance in all directions utilizing iris scissors. Following this, the designed flap was carried into the primary defect and sutured into place. Transposition Flap Text: The defect edges were debeveled with a #15 scalpel blade.  Given the location of the defect and the proximity to free margins a transposition flap was deemed most appropriate.  Using a sterile surgical marker, an appropriate transposition flap was drawn incorporating the defect.    The area thus outlined was incised deep to adipose tissue with a #15 scalpel blade.  The skin margins were undermined to an appropriate distance in all directions utilizing iris scissors. Anesthesia Volume In Cc: 3 Subsequent Stages Histo Method Verbiage: Using a similar technique to that described above, a thin layer of tissue was removed from all areas where tumor was visible on the previous stage.  The tissue was again oriented, mapped, dyed, and processed as above. Mercedes Flap Text: The defect edges were debeveled with a #15 scalpel blade.  Given the location of the defect, shape of the defect and the proximity to free margins a Mercedes flap was deemed most appropriate.  Using a sterile surgical marker, an appropriate advancement flap was drawn incorporating the defect and placing the expected incisions within the relaxed skin tension lines where possible. The area thus outlined was incised deep to adipose tissue with a #15 scalpel blade.  The skin margins were undermined to an appropriate distance in all directions utilizing iris scissors. Estlander Flap (Lower To Upper Lip) Text: The defect of the lower lip was assessed and measured.  Given the location and size of the defect, an Estlander flap was deemed most appropriate.  Using a sterile surgical marker, an appropriate Estlander flap was measured and drawn on the upper lip. Local anesthesia was then infiltrated. A scalpel was then used to incise the lateral aspect of the flap, through skin, muscle and mucosa, leaving the flap pedicled medially.  The flap was then rotated and positioned to fill the lower lip defect.  The flap was then sutured into place with a three layer technique, closing the orbicularis oris muscle layer with subcutaneous buried sutures, followed by a mucosal layer and an epidermal layer. Consent (Marginal Mandibular)/Introductory Paragraph: The rationale for Mohs was explained to the patient and consent was obtained. The risks, benefits and alternatives to therapy were discussed in detail. Specifically, the risks of damage to the marginal mandibular branch of the facial nerve, infection, scarring, bleeding, prolonged wound healing, incomplete removal, allergy to anesthesia, and recurrence were addressed. Prior to the procedure, the treatment site was clearly identified and confirmed by the patient. All components of Universal Protocol/PAUSE Rule completed. Suture Removal: 7 days Localized Dermabrasion With Sand Papertext: The patient was draped in routine manner.  Localized dermabrasion using sterile sand paper was performed in routine manner to papillary dermis. This spot dermabrasion is being performed to complete skin cancer reconstruction. It also will eliminate the other sun damaged precancerous cells that are known to be part of the regional effect of a lifetime's worth of sun exposure. This localized dermabrasion is therapeutic and should not be considered cosmetic in any regard. Closure 4 Information: This tab is for additional flaps and grafts above and beyond our usual structured repairs.  Please note if you enter information here it will not currently bill and you will need to add the billing information manually. Bilobed Flap Text: The defect edges were debeveled with a #15 scalpel blade.  Given the location of the defect and the proximity to free margins a bilobe flap was deemed most appropriate.  Using a sterile surgical marker, an appropriate bilobe flap drawn around the defect.    The area thus outlined was incised deep to adipose tissue with a #15 scalpel blade.  The skin margins were undermined to an appropriate distance in all directions utilizing iris scissors. Localized Dermabrasion With Wire Brush Text: The patient was draped in routine manner.  Localized dermabrasion using 3 x 17 mm wire brush was performed in routine manner to papillary dermis. This spot dermabrasion is being performed to complete skin cancer reconstruction. It also will eliminate the other sun damaged precancerous cells that are known to be part of the regional effect of a lifetime's worth of sun exposure. This localized dermabrasion is therapeutic and should not be considered cosmetic in any regard. Split-Thickness Skin Graft Text: The defect edges were debeveled with a #15 scalpel blade.  Given the location of the defect, shape of the defect and the proximity to free margins a split thickness skin graft was deemed most appropriate.  Using a sterile surgical marker, the primary defect shape was transferred to the donor site. The split thickness graft was then harvested.  The skin graft was then placed in the primary defect and oriented appropriately. Advancement Flap (Double) Text: The defect edges were debeveled with a #15 scalpel blade.  Given the location of the defect and the proximity to free margins a double advancement flap was deemed most appropriate.  Using a sterile surgical marker, the appropriate advancement flaps were drawn incorporating the defect and placing the expected incisions within the relaxed skin tension lines where possible.    The area thus outlined was incised deep to adipose tissue with a #15 scalpel blade.  The skin margins were undermined to an appropriate distance in all directions utilizing iris scissors. Which Instrument Did You Use For Dermabrasion?: Wire Brush Partial Purse String (Simple) Text: Given the location of the defect and the characteristics of the surrounding skin a simple purse string closure was deemed most appropriate.  Undermining was performed circumfirentially around the surgical defect.  A purse string suture was then placed and tightened. Wound tension only allowed a partial closure of the circular defect. Area L Indication Text: Tumors in this location are included in Area L (trunk and extremities).  Mohs surgery is indicated for larger tumors, or tumors with aggressive histologic features, in these anatomic locations. Localized Dermabrasion With 15 Blade Text: The patient was draped in routine manner.  Localized dermabrasion using a 15 blade was performed in routine manner to papillary dermis. This spot dermabrasion is being performed to complete skin cancer reconstruction. It also will eliminate the other sun damaged precancerous cells that are known to be part of the regional effect of a lifetime's worth of sun exposure. This localized dermabrasion is therapeutic and should not be considered cosmetic in any regard. Double O-Z Flap Text: The defect edges were debeveled with a #15 scalpel blade.  Given the location of the defect, shape of the defect and the proximity to free margins a Double O-Z flap was deemed most appropriate.  Using a sterile surgical marker, an appropriate transposition flap was drawn incorporating the defect and placing the expected incisions within the relaxed skin tension lines where possible. The area thus outlined was incised deep to adipose tissue with a #15 scalpel blade.  The skin margins were undermined to an appropriate distance in all directions utilizing iris scissors. Manual Repair Warning Statement: We plan on removing the manually selected variable below in favor of our much easier automatic structured text blocks found in the previous tab. We decided to do this to help make the flow better and give you the full power of structured data. Manual selection is never going to be ideal in our platform and I would encourage you to avoid using manual selection from this point on, especially since I will be sunsetting this feature. It is important that you do one of two things with the customized text below. First, you can save all of the text in a word file so you can have it for future reference. Second, transfer the text to the appropriate area in the Library tab. Lastly, if there is a flap or graft type which we do not have you need to let us know right away so I can add it in before the variable is hidden. No need to panic, we plan to give you roughly 6 months to make the change. No Repair - Repaired With Adjacent Surgical Defect Text (Leave Blank If You Do Not Want): After obtaining clear surgical margins the defect was repaired concurrently with another surgical defect which was in close approximation. W Plasty Text: The lesion was extirpated to the level of the fat with a #15 scalpel blade.  Given the location of the defect, shape of the defect and the proximity to free margins a W-plasty was deemed most appropriate for repair.  Using a sterile surgical marker, the appropriate transposition arms of the W-plasty were drawn incorporating the defect and placing the expected incisions within the relaxed skin tension lines where possible.    The area thus outlined was incised deep to adipose tissue with a #15 scalpel blade.  The skin margins were undermined to an appropriate distance in all directions utilizing iris scissors.  The opposing transposition arms were then transposed into place in opposite direction and anchored with interrupted buried subcutaneous sutures. Rotation Flap Text: The defect edges were debeveled with a #15 scalpel blade.  Given the location of the defect, shape of the defect and the proximity to free margins a rotation flap was deemed most appropriate.  Using a sterile surgical marker, an appropriate rotation flap was drawn incorporating the defect and placing the expected incisions within the relaxed skin tension lines where possible.    The area thus outlined was incised deep to adipose tissue with a #15 scalpel blade.  The skin margins were undermined to an appropriate distance in all directions utilizing iris scissors. Posterior Auricular Interpolation Flap Text: A decision was made to reconstruct the defect utilizing an interpolation axial flap and a staged reconstruction.  A telfa template was made of the defect.  This telfa template was then used to outline the posterior auricular interpolation flap.  The donor area for the pedicle flap was then injected with anesthesia.  The flap was excised through the skin and subcutaneous tissue down to the layer of the underlying musculature.  The pedicle flap was carefully excised within this deep plane to maintain its blood supply.  The edges of the donor site were undermined.   The donor site was closed in a primary fashion.  The pedicle was then rotated into position and sutured.  Once the tube was sutured into place, adequate blood supply was confirmed with blanching and refill.  The pedicle was then wrapped with xeroform gauze and dressed appropriately with a telfa and gauze bandage to ensure continued blood supply and protect the attached pedicle. Nostril Rim Text: The closure involved the nostril rim. X Size Of Lesion In Cm (Optional): 0.6 Epidermal Sutures: 5-0 Surgipro Purse String (Simple) Text: Given the location of the defect and the characteristics of the surrounding skin a purse string closure was deemed most appropriate.  Undermining was performed circumfirentially around the surgical defect.  A purse string suture was then placed and tightened. Abbe Flap (Upper To Lower Lip) Text: The defect of the lower lip was assessed and measured.  Given the location and size of the defect, an Abbe flap was deemed most appropriate.  Using a sterile surgical marker, an appropriate Abbe flap was measured and drawn on the upper lip. Local anesthesia was then infiltrated.  A scalpel was then used to incise the upper lip through and through the skin, vermilion, muscle and mucosa, leaving the flap pedicled on the opposite side.  The flap was then rotated and transferred to the lower lip defect.  The flap was then sutured into place with a three layer technique, closing the orbicularis oris muscle layer with subcutaneous buried sutures, followed by a mucosal layer and an epidermal layer. Area M Indication Text: Tumors in this location are included in Area M (cheek, forehead, scalp, neck, jawline and pretibial skin).  Mohs surgery is indicated for tumors in these anatomic locations. Pain Refusal Text: I offered to prescribe pain medication but the patient refused to take this medication. Cheiloplasty (Complex) Text: A decision was made to reconstruct the defect with a  cheiloplasty.  The defect was undermined extensively.  Additional obicularis oris muscle was excised with a 15 blade scalpel.  The defect was converted into a full thickness wedge to facilite a better cosmetic result.  Small vessels were then tied off with 5-0 monocyrl. The obicularis oris, superficial fascia, adipose and dermis were then reapproximated.  After the deeper layers were approximated the epidermis was reapproximated with particular care given to realign the vermilion border. Mucosal Advancement Flap Text: Given the location of the defect, shape of the defect and the proximity to free margins a mucosal advancement flap was deemed most appropriate. Incisions were made with a 15 blade scalpel in the appropriate fashion along the cutaneous vermilion border and the mucosal lip. The remaining actinically damaged mucosal tissue was excised.  The mucosal advancement flap was then elevated to the gingival sulcus with care taken to preserve the neurovascular structures and advanced into the primary defect. Care was taken to ensure that precise realignment of the vermilion border was achieved. Muscle Hinge Flap Text: The defect edges were debeveled with a #15 scalpel blade.  Given the size, depth and location of the defect and the proximity to free margins a muscle hinge flap was deemed most appropriate.  Using a sterile surgical marker, an appropriate hinge flap was drawn incorporating the defect. The area thus outlined was incised with a #15 scalpel blade.  The skin margins were undermined to an appropriate distance in all directions utilizing iris scissors. Consent (Temporal Branch)/Introductory Paragraph: The rationale for Mohs was explained to the patient and consent was obtained. The risks, benefits and alternatives to therapy were discussed in detail. Specifically, the risks of damage to the temporal branch of the facial nerve, infection, scarring, bleeding, prolonged wound healing, incomplete removal, allergy to anesthesia, and recurrence were addressed. Prior to the procedure, the treatment site was clearly identified and confirmed by the patient. All components of Universal Protocol/PAUSE Rule completed. Peng Advancement Flap Text: The defect edges were debeveled with a #15 scalpel blade.  Given the location of the defect, shape of the defect and the proximity to free margins a Peng advancement flap was deemed most appropriate.  Using a sterile surgical marker, an appropriate advancement flap was drawn incorporating the defect and placing the expected incisions within the relaxed skin tension lines where possible. The area thus outlined was incised deep to adipose tissue with a #15 scalpel blade.  The skin margins were undermined to an appropriate distance in all directions utilizing iris scissors. Presence Of Scar Tissue (For Histology): present Pinch Graft Text: The defect edges were debeveled with a #15 scalpel blade. Given the location of the defect, shape of the defect and the proximity to free margins a pinch graft was deemed most appropriate. Using a sterile surgical marker, the primary defect shape was transferred to the donor site. The area thus outlined was incised deep to adipose tissue with a #15 scalpel blade.  The harvested graft was then trimmed of adipose tissue until only dermis and epidermis was left. The skin margins of the secondary defect were undermined to an appropriate distance in all directions utilizing iris scissors.  The secondary defect was closed with interrupted buried subcutaneous sutures.  The skin edges were then re-apposed with running  sutures.  The skin graft was then placed in the primary defect and oriented appropriately. Bill 59 Modifier?: No - Continue to Bill 79 Modifier Crescentic Advancement Flap Text: The defect edges were debeveled with a #15 scalpel blade.  Given the location of the defect and the proximity to free margins a crescentic advancement flap was deemed most appropriate.  Using a sterile surgical marker, the appropriate advancement flap was drawn incorporating the defect and placing the expected incisions within the relaxed skin tension lines where possible.    The area thus outlined was incised deep to adipose tissue with a #15 scalpel blade.  The skin margins were undermined to an appropriate distance in all directions utilizing iris scissors. H Plasty Text: Given the location of the defect, shape of the defect and the proximity to free margins a H-plasty was deemed most appropriate for repair.  Using a sterile surgical marker, the appropriate advancement arms of the H-plasty were drawn incorporating the defect and placing the expected incisions within the relaxed skin tension lines where possible. The area thus outlined was incised deep to adipose tissue with a #15 scalpel blade. The skin margins were undermined to an appropriate distance in all directions utilizing iris scissors.  The opposing advancement arms were then advanced into place in opposite direction and anchored with interrupted buried subcutaneous sutures. Mustarde Flap Text: The defect edges were debeveled with a #15 scalpel blade.  Given the size, depth and location of the defect and the proximity to free margins a Mustarde flap was deemed most appropriate.  Using a sterile surgical marker, an appropriate flap was drawn incorporating the defect. The area thus outlined was incised with a #15 scalpel blade.  The skin margins were undermined to an appropriate distance in all directions utilizing iris scissors. Deep Sutures: 5-0 Maxon Information: Selecting Yes will display possible errors in your note based on the variables you have selected. This validation is only offered as a suggestion for you. PLEASE NOTE THAT THE VALIDATION TEXT WILL BE REMOVED WHEN YOU FINALIZE YOUR NOTE. IF YOU WANT TO FAX A PRELIMINARY NOTE YOU WILL NEED TO TOGGLE THIS TO 'NO' IF YOU DO NOT WANT IT IN YOUR FAXED NOTE. Same Histology In Subsequent Stages Text: The pattern and morphology of the tumor is as described in the first stage. Tumor Debulked?: curette Repair Hemostasis (Optional): Pinpoint electrocautery Additional Anesthesia Volume In Cc: 6 Unna Boot Text: An Unna boot was placed to help immobilize the limb and facilitate more rapid healing. Burow's Advancement Flap Text: The defect edges were debeveled with a #15 scalpel blade.  Given the location of the defect and the proximity to free margins a Burow's advancement flap was deemed most appropriate.  Using a sterile surgical marker, the appropriate advancement flap was drawn incorporating the defect and placing the expected incisions within the relaxed skin tension lines where possible.    The area thus outlined was incised deep to adipose tissue with a #15 scalpel blade.  The skin margins were undermined to an appropriate distance in all directions utilizing iris scissors. Epidermal Closure: running cuticular O-Z Flap Text: The defect edges were debeveled with a #15 scalpel blade.  Given the location of the defect, shape of the defect and the proximity to free margins an O-Z flap was deemed most appropriate.  Using a sterile surgical marker, an appropriate transposition flap was drawn incorporating the defect and placing the expected incisions within the relaxed skin tension lines where possible. The area thus outlined was incised deep to adipose tissue with a #15 scalpel blade.  The skin margins were undermined to an appropriate distance in all directions utilizing iris scissors. Xenograft Text: The defect edges were debeveled with a #15 scalpel blade.  Given the location of the defect, shape of the defect and the proximity to free margins a xenograft was deemed most appropriate.  The graft was then trimmed to fit the size of the defect.  The graft was then placed in the primary defect and oriented appropriately. Primary Defect Length In Cm (Final Defect Size - Required For Flaps/Grafts): 1.6 A-T Advancement Flap Text: The defect edges were debeveled with a #15 scalpel blade.  Given the location of the defect, shape of the defect and the proximity to free margins an A-T advancement flap was deemed most appropriate.  Using a sterile surgical marker, an appropriate advancement flap was drawn incorporating the defect and placing the expected incisions within the relaxed skin tension lines where possible.    The area thus outlined was incised deep to adipose tissue with a #15 scalpel blade.  The skin margins were undermined to an appropriate distance in all directions utilizing iris scissors. Mohs Rapid Report Verbiage: The area of clinically evident tumor was marked with skin marking ink and appropriately hatched.  The initial incision was made following the Mohs approach through the skin.  The specimen was taken to the lab, divided into the necessary number of pieces, chromacoded and processed according to the Mohs protocol.  This was repeated in successive stages until a tumor free defect was achieved. Modified Advancement Flap Text: The defect edges were debeveled with a #15 scalpel blade.  Given the location of the defect, shape of the defect and the proximity to free margins a modified advancement flap was deemed most appropriate.  Using a sterile surgical marker, an appropriate advancement flap was drawn incorporating the defect and placing the expected incisions within the relaxed skin tension lines where possible.    The area thus outlined was incised deep to adipose tissue with a #15 scalpel blade.  The skin margins were undermined to an appropriate distance in all directions utilizing iris scissors. Hemigard Intro: Due to skin fragility and wound tension, it was decided to use HEMIGARD adhesive retention suture devices to permit a linear closure. The skin was cleaned and dried for a 6cm distance away from the wound. Excessive hair, if present, was removed to allow for adhesion. Full Thickness Lip Wedge Repair (Flap) Text: Given the location of the defect and the proximity to free margins a full thickness wedge repair was deemed most appropriate.  Using a sterile surgical marker, the appropriate repair was drawn incorporating the defect and placing the expected incisions perpendicular to the vermilion border.  The vermilion border was also meticulously outlined to ensure appropriate reapproximation during the repair.  The area thus outlined was incised through and through with a #15 scalpel blade.  The muscularis and dermis were reaproximated with deep sutures following hemostasis. Care was taken to realign the vermilion border before proceeding with the superficial closure.  Once the vermilion was realigned the superfical and mucosal closure was finished. Secondary Intention Text (Leave Blank If You Do Not Want): The defect will heal with secondary intention. Post-Care Instructions: I reviewed with the patient in detail post-care instructions. Patient is not to engage in any heavy lifting, exercise, or swimming for the next 14 days. Should the patient develop any fevers, chills, bleeding, severe pain patient will contact the office immediately. Consent (Lip)/Introductory Paragraph: The rationale for Mohs was explained to the patient and consent was obtained. The risks, benefits and alternatives to therapy were discussed in detail. Specifically, the risks of lip deformity, changes in the oral aperture, infection, scarring, bleeding, prolonged wound healing, incomplete removal, allergy to anesthesia, nerve injury and recurrence were addressed. Prior to the procedure, the treatment site was clearly identified and confirmed by the patient. All components of Universal Protocol/PAUSE Rule completed. Perineural Invasion (For Histology - Be Specific If Possible): absent Previous Accession (Optional): [id number] Surgeon Performing Repair (Optional): Rohit Mei MD Consent (Spinal Accessory)/Introductory Paragraph: The rationale for Mohs was explained to the patient and consent was obtained. The risks, benefits and alternatives to therapy were discussed in detail. Specifically, the risks of damage to the spinal accessory nerve, infection, scarring, bleeding, prolonged wound healing, incomplete removal, allergy to anesthesia, and recurrence were addressed. Prior to the procedure, the treatment site was clearly identified and confirmed by the patient. All components of Universal Protocol/PAUSE Rule completed. Burow's Graft Text: The defect edges were debeveled with a #15 scalpel blade.  Given the location of the defect, shape of the defect, the proximity to free margins and the presence of a standing cone deformity a Burow's skin graft was deemed most appropriate. The standing cone was removed and this tissue was then trimmed to the shape of the primary defect. The adipose tissue was also removed until only dermis and epidermis were left.  The skin margins of the secondary defect were undermined to an appropriate distance in all directions utilizing iris scissors.  The secondary defect was closed with interrupted buried subcutaneous sutures.  The skin edges were then re-apposed with running  sutures.  The skin graft was then placed in the primary defect and oriented appropriately. Bilobed Transposition Flap Text: The defect edges were debeveled with a #15 scalpel blade.  Given the location of the defect and the proximity to free margins a bilobed transposition flap was deemed most appropriate.  Using a sterile surgical marker, an appropriate bilobe flap drawn around the defect.    The area thus outlined was incised deep to adipose tissue with a #15 scalpel blade.  The skin margins were undermined to an appropriate distance in all directions utilizing iris scissors. Ftsg Text: The defect edges were debeveled with a #15 scalpel blade.  Given the location of the defect, shape of the defect and the proximity to free margins a full thickness skin graft was deemed most appropriate.  Using a sterile surgical marker, the primary defect shape was transferred to the donor site. The area thus outlined was incised deep to adipose tissue with a #15 scalpel blade.  The harvested graft was then trimmed of adipose tissue until only dermis and epidermis was left.  The skin margins of the secondary defect were undermined to an appropriate distance in all directions utilizing iris scissors.  The secondary defect was closed with interrupted buried subcutaneous sutures.  The skin edges were then re-apposed with running  sutures.  The skin graft was then placed in the primary defect and oriented appropriately. Depth Of Tumor Invasion (For Histology): adipose tissue Intermediate Repair And Graft Additional Text (Will Appearing After The Standard Complex Repair Text): The intermediate repair was not sufficient to completely close the primary defect. The remaining additional defect was repaired with the graft mentioned below. Hemigard Postcare Instructions: The HEMIGARD strips are to remain completely dry for at least 5-7 days. Double Z Plasty Text: The lesion was extirpated to the level of the fat with a #15 scalpel blade. Given the location of the defect, shape of the defect and the proximity to free margins a double Z-plasty was deemed most appropriate for repair. Using a sterile surgical marker, the appropriate transposition arms of the double Z-plasty were drawn incorporating the defect and placing the expected incisions within the relaxed skin tension lines where possible. The area thus outlined was incised deep to adipose tissue with a #15 scalpel blade. The skin margins were undermined to an appropriate distance in all directions utilizing iris scissors. The opposing transposition arms were then transposed and carried over into place in opposite direction and anchored with interrupted buried subcutaneous sutures. Tissue Cultured Epidermal Autograft Text: The defect edges were debeveled with a #15 scalpel blade.  Given the location of the defect, shape of the defect and the proximity to free margins a tissue cultured epidermal autograft was deemed most appropriate.  The graft was then trimmed to fit the size of the defect.  The graft was then placed in the primary defect and oriented appropriately. Complex Repair And Graft Additional Text (Will Appearing After The Standard Complex Repair Text): The complex repair was not sufficient to completely close the primary defect. The remaining additional defect was repaired with the graft mentioned below. Cartilage Graft Text: The defect edges were debeveled with a #15 scalpel blade.  Given the location of the defect, shape of the defect, the fact the defect involved a full thickness cartilage defect a cartilage graft was deemed most appropriate.  An appropriate donor site was identified, cleansed, and anesthetized. The cartilage graft was then harvested and transferred to the recipient site, oriented appropriately and then sutured into place.  The secondary defect was then repaired using a primary closure. Consent (Nose)/Introductory Paragraph: The rationale for Mohs was explained to the patient and consent was obtained. The risks, benefits and alternatives to therapy were discussed in detail. Specifically, the risks of nasal deformity, changes in the flow of air through the nose, infection, scarring, bleeding, prolonged wound healing, incomplete removal, allergy to anesthesia, nerve injury and recurrence were addressed. Prior to the procedure, the treatment site was clearly identified and confirmed by the patient. All components of Universal Protocol/PAUSE Rule completed. Undermining Type: Entire Wound Surgical Defect Length In Cm (Optional): 1.4 O-L Flap Text: The defect edges were debeveled with a #15 scalpel blade.  Given the location of the defect, shape of the defect and the proximity to free margins an O-L flap was deemed most appropriate.  Using a sterile surgical marker, an appropriate advancement flap was drawn incorporating the defect and placing the expected incisions within the relaxed skin tension lines where possible.    The area thus outlined was incised deep to adipose tissue with a #15 scalpel blade.  The skin margins were undermined to an appropriate distance in all directions utilizing iris scissors. No Residual Tumor Seen Histology Text: There were no malignant cells seen in the sections examined. Debridement Text: The wound edges were debrided prior to proceeding with the closure to facilitate wound healing. Retention Suture Bite Size: 1 mm Melolabial Transposition Flap Text: The defect edges were debeveled with a #15 scalpel blade.  Given the location of the defect and the proximity to free margins a melolabial flap was deemed most appropriate.  Using a sterile surgical marker, an appropriate melolabial transposition flap was drawn incorporating the defect.    The area thus outlined was incised deep to adipose tissue with a #15 scalpel blade.  The skin margins were undermined to an appropriate distance in all directions utilizing iris scissors. Composite Graft Text: The defect edges were debeveled with a #15 scalpel blade.  Given the location of the defect, shape of the defect, the proximity to free margins and the fact the defect was full thickness a composite graft was deemed most appropriate.  The defect was outline and then transferred to the donor site.  A full thickness graft was then excised from the donor site. The graft was then placed in the primary defect, oriented appropriately and then sutured into place.  The secondary defect was then repaired using a primary closure. Home Suture Removal Text: Patient was provided instructions on removing sutures and will remove their sutures at home.  If they have any questions or difficulties they will call the office. Island Pedicle Flap-Requiring Vessel Identification Text: The defect edges were debeveled with a #15 scalpel blade.  Given the location of the defect, shape of the defect and the proximity to free margins an island pedicle advancement flap was deemed most appropriate.  Using a sterile surgical marker, an appropriate advancement flap was drawn, based on the axial vessel mentioned above, incorporating the defect, outlining the appropriate donor tissue and placing the expected incisions within the relaxed skin tension lines where possible.    The area thus outlined was incised deep to adipose tissue with a #15 scalpel blade.  The skin margins were undermined to an appropriate distance in all directions around the primary defect and laterally outward around the island pedicle utilizing iris scissors.  There was minimal undermining beneath the pedicle flap. Initial Size Of Lesion: 0.9 Bilateral Helical Rim Advancement Flap Text: The defect edges were debeveled with a #15 blade scalpel.  Given the location of the defect and the proximity to free margins (helical rim) a bilateral helical rim advancement flap was deemed most appropriate.  Using a sterile surgical marker, the appropriate advancement flaps were drawn incorporating the defect and placing the expected incisions between the helical rim and antihelix where possible.  The area thus outlined was incised through and through with a #15 scalpel blade.  With a skin hook and iris scissors, the flaps were gently and sharply undermined and freed up. Hatchet Flap Text: The defect edges were debeveled with a #15 scalpel blade.  Given the location of the defect, shape of the defect and the proximity to free margins a hatchet flap was deemed most appropriate.  Using a sterile surgical marker, an appropriate hatchet flap was drawn incorporating the defect and placing the expected incisions within the relaxed skin tension lines where possible.    The area thus outlined was incised deep to adipose tissue with a #15 scalpel blade.  The skin margins were undermined to an appropriate distance in all directions utilizing iris scissors. Consent 3/Introductory Paragraph: I gave the patient a chance to ask questions they had about the procedure.  Following this I explained the Mohs procedure and consent was obtained. The risks, benefits and alternatives to therapy were discussed in detail. Specifically, the risks of infection, scarring, bleeding, prolonged wound healing, incomplete removal, allergy to anesthesia, nerve injury and recurrence were addressed. Prior to the procedure, the treatment site was clearly identified and confirmed by the patient. All components of Universal Protocol/PAUSE Rule completed. Melolabial Interpolation Flap Text: A decision was made to reconstruct the defect utilizing an interpolation axial flap and a staged reconstruction.  A telfa template was made of the defect.  This telfa template was then used to outline the melolabial interpolation flap.  The donor area for the pedicle flap was then injected with anesthesia.  The flap was excised through the skin and subcutaneous tissue down to the layer of the underlying musculature.  The pedicle flap was carefully excised within this deep plane to maintain its blood supply.  The edges of the donor site were undermined.   The donor site was closed in a primary fashion.  The pedicle was then rotated into position and sutured.  Once the tube was sutured into place, adequate blood supply was confirmed with blanching and refill.  The pedicle was then wrapped with xeroform gauze and dressed appropriately with a telfa and gauze bandage to ensure continued blood supply and protect the attached pedicle. Staging Info: By selecting yes to the question above you will include information on AJCC 8 tumor staging in your Mohs note. Information on tumor staging will be automatically added for SCCs on the head and neck. AJCC 8 includes tumor size, tumor depth, perineural involvement and bone invasion. Consent (Scalp)/Introductory Paragraph: The rationale for Mohs was explained to the patient and consent was obtained. The risks, benefits and alternatives to therapy were discussed in detail. Specifically, the risks of changes in hair growth pattern secondary to repair, infection, scarring, bleeding, prolonged wound healing, incomplete removal, allergy to anesthesia, nerve injury and recurrence were addressed. Prior to the procedure, the treatment site was clearly identified and confirmed by the patient. All components of Universal Protocol/PAUSE Rule completed. Nasalis-Muscle-Based Myocutaneous Island Pedicle Flap Text: Using a #15 blade, an incision was made around the donor flap to the level of the nasalis muscle. Wide lateral undermining was then performed in both the subcutaneous plane above the nasalis muscle, and in a submuscular plane just above periosteum. This allowed the formation of a free nasalis muscle axial pedicle (based on the angular artery) which was still attached to the actual cutaneous flap, increasing its mobility and vascular viability. Hemostasis was obtained with pinpoint electrocoagulation. The flap was mobilized into position and the pivotal anchor points positioned and stabilized with buried interrupted sutures. Subcutaneous and dermal tissues were closed in a multilayered fashion with sutures. Tissue redundancies were excised, and the epidermal edges were apposed without significant tension and sutured with sutures. Advancement Flap (Single) Text: The defect edges were debeveled with a #15 scalpel blade.  Given the location of the defect and the proximity to free margins a single advancement flap was deemed most appropriate.  Using a sterile surgical marker, an appropriate advancement flap was drawn incorporating the defect and placing the expected incisions within the relaxed skin tension lines where possible.    The area thus outlined was incised deep to adipose tissue with a #15 scalpel blade.  The skin margins were undermined to an appropriate distance in all directions utilizing iris scissors. Trilobed Flap Text: The defect edges were debeveled with a #15 scalpel blade.  Given the location of the defect and the proximity to free margins a trilobed flap was deemed most appropriate.  Using a sterile surgical marker, an appropriate trilobed flap drawn around the defect.    The area thus outlined was incised deep to adipose tissue with a #15 scalpel blade.  The skin margins were undermined to an appropriate distance in all directions utilizing iris scissors. Consent (Ear)/Introductory Paragraph: The rationale for Mohs was explained to the patient and consent was obtained. The risks, benefits and alternatives to therapy were discussed in detail. Specifically, the risks of ear deformity, infection, scarring, bleeding, prolonged wound healing, incomplete removal, allergy to anesthesia, nerve injury and recurrence were addressed. Prior to the procedure, the treatment site was clearly identified and confirmed by the patient. All components of Universal Protocol/PAUSE Rule completed. Double O-Z Plasty Text: The defect edges were debeveled with a #15 scalpel blade.  Given the location of the defect, shape of the defect and the proximity to free margins a Double O-Z plasty (double transposition flap) was deemed most appropriate.  Using a sterile surgical marker, the appropriate transposition flaps were drawn incorporating the defect and placing the expected incisions within the relaxed skin tension lines where possible. The area thus outlined was incised deep to adipose tissue with a #15 scalpel blade.  The skin margins were undermined to an appropriate distance in all directions utilizing iris scissors.  Hemostasis was achieved with electrocautery.  The flaps were then transposed into place, one clockwise and the other counterclockwise, and anchored with interrupted buried subcutaneous sutures. Bcc Histology Text: There were numerous aggregates of basaloid cells. Postop Diagnosis: same Area H Indication Text: Tumors in this location are included in Area H (eyelids, eyebrows, nose, lips, chin, ear, pre-auricular, post-auricular, temple, genitalia, hands, feet, ankles and areola).  Tissue conservation is critical in these anatomic locations. V-Y Plasty Text: The defect edges were debeveled with a #15 scalpel blade.  Given the location of the defect, shape of the defect and the proximity to free margins an V-Y advancement flap was deemed most appropriate.  Using a sterile surgical marker, an appropriate advancement flap was drawn incorporating the defect and placing the expected incisions within the relaxed skin tension lines where possible.    The area thus outlined was incised deep to adipose tissue with a #15 scalpel blade.  The skin margins were undermined to an appropriate distance in all directions utilizing iris scissors. Helical Rim Advancement Flap Text: The defect edges were debeveled with a #15 blade scalpel.  Given the location of the defect and the proximity to free margins (helical rim) a double helical rim advancement flap was deemed most appropriate.  Using a sterile surgical marker, the appropriate advancement flaps were drawn incorporating the defect and placing the expected incisions between the helical rim and antihelix where possible.  The area thus outlined was incised through and through with a #15 scalpel blade.  With a skin hook and iris scissors, the flaps were gently and sharply undermined and freed up. Alar Island Pedicle Flap Text: The defect edges were debeveled with a #15 scalpel blade.  Given the location of the defect, shape of the defect and the proximity to the alar rim an island pedicle advancement flap was deemed most appropriate.  Using a sterile surgical marker, an appropriate advancement flap was drawn incorporating the defect, outlining the appropriate donor tissue and placing the expected incisions within the nasal ala running parallel to the alar rim. The area thus outlined was incised with a #15 scalpel blade.  The skin margins were undermined minimally to an appropriate distance in all directions around the primary defect and laterally outward around the island pedicle utilizing iris scissors.  There was minimal undermining beneath the pedicle flap. 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 The surgical margins are positive for tumor cells. Mohs Case Number: [id number] Skin Substitute Text: The defect edges were debeveled with a #15 scalpel blade.  Given the location of the defect, shape of the defect and the proximity to free margins a skin substitute graft was deemed most appropriate.  The graft material was trimmed to fit the size of the defect. The graft was then placed in the primary defect and oriented appropriately. Chonodrocutaneous Helical Advancement Flap Text: The defect edges were debeveled with a #15 scalpel blade.  Given the location of the defect and the proximity to free margins a chondrocutaneous helical advancement flap was deemed most appropriate.  Using a sterile surgical marker, the appropriate advancement flap was drawn incorporating the defect and placing the expected incisions within the relaxed skin tension lines where possible.    The area thus outlined was incised deep to adipose tissue with a #15 scalpel blade.  The skin margins were undermined to an appropriate distance in all directions utilizing iris scissors. Mart-1 - Negative Histology Text: MART-1 staining demonstrates a normal density and pattern of melanocytes along the dermal-epidermal junction. The surgical margins are negative for tumor cells. Wound Care (No Sutures): Petrolatum Banner Transposition Flap Text: The defect edges were debeveled with a #15 scalpel blade.  Given the location of the defect and the proximity to free margins a Banner transposition flap was deemed most appropriate.  Using a sterile surgical marker, an appropriate flap drawn around the defect. The area thus outlined was incised deep to adipose tissue with a #15 scalpel blade.  The skin margins were undermined to an appropriate distance in all directions utilizing iris scissors. Ear Star Wedge Flap Text: The defect edges were debeveled with a #15 blade scalpel.  Given the location of the defect and the proximity to free margins (helical rim) an ear star wedge flap was deemed most appropriate.  Using a sterile surgical marker, the appropriate flap was drawn incorporating the defect and placing the expected incisions between the helical rim and antihelix where possible.  The area thus outlined was incised through and through with a #15 scalpel blade. Consent 1/Introductory Paragraph: The rationale for Mohs was explained to the patient and consent was obtained. The risks, benefits and alternatives to therapy were discussed in detail. Specifically, the risks of infection, scarring, bleeding, prolonged wound healing, incomplete removal, allergy to anesthesia, nerve injury and recurrence were addressed. Prior to the procedure, the treatment site was clearly identified and confirmed by the patient. All components of Universal Protocol/PAUSE Rule completed. Estimated Blood Loss (Cc): minimal Alternatives Discussed Intro (Do Not Add Period): I discussed alternative treatments to Mohs surgery and specifically discussed the risks and benefits of Cheek Interpolation Flap Text: A decision was made to reconstruct the defect utilizing an interpolation axial flap and a staged reconstruction.  A telfa template was made of the defect.  This telfa template was then used to outline the Cheek Interpolation flap.  The donor area for the pedicle flap was then injected with anesthesia.  The flap was excised through the skin and subcutaneous tissue down to the layer of the underlying musculature.  The interpolation flap was carefully excised within this deep plane to maintain its blood supply.  The edges of the donor site were undermined.   The donor site was closed in a primary fashion.  The pedicle was then rotated into position and sutured.  Once the tube was sutured into place, adequate blood supply was confirmed with blanching and refill.  The pedicle was then wrapped with xeroform gauze and dressed appropriately with a telfa and gauze bandage to ensure continued blood supply and protect the attached pedicle. Graft Donor Site Dermal Sutures (Optional): 5-0 Polysorb Interpolation Flap Text: A decision was made to reconstruct the defect utilizing an interpolation axial flap and a staged reconstruction.  A telfa template was made of the defect.  This telfa template was then used to outline the interpolation flap.  The donor area for the pedicle flap was then injected with anesthesia.  The flap was excised through the skin and subcutaneous tissue down to the layer of the underlying musculature.  The interpolation flap was carefully excised within this deep plane to maintain its blood supply.  The edges of the donor site were undermined.   The donor site was closed in a primary fashion.  The pedicle was then rotated into position and sutured.  Once the tube was sutured into place, adequate blood supply was confirmed with blanching and refill.  The pedicle was then wrapped with xeroform gauze and dressed appropriately with a telfa and gauze bandage to ensure continued blood supply and protect the attached pedicle. Nasal Turnover Hinge Flap Text: The defect edges were debeveled with a #15 scalpel blade.  Given the size, depth, location of the defect and the defect being full thickness a nasal turnover hinge flap was deemed most appropriate.  Using a sterile surgical marker, an appropriate hinge flap was drawn incorporating the defect. The area thus outlined was incised with a #15 scalpel blade. The flap was designed to recreate the nasal mucosal lining and the alar rim. The skin margins were undermined to an appropriate distance in all directions utilizing iris scissors. Bi-Rhombic Flap Text: The defect edges were debeveled with a #15 scalpel blade.  Given the location of the defect and the proximity to free margins a bi-rhombic flap was deemed most appropriate.  Using a sterile surgical marker, an appropriate rhombic flap was drawn incorporating the defect. The area thus outlined was incised deep to adipose tissue with a #15 scalpel blade.  The skin margins were undermined to an appropriate distance in all directions utilizing iris scissors. Cheiloplasty (Less Than 50%) Text: A decision was made to reconstruct the defect with a  cheiloplasty.  The defect was undermined extensively.  Additional obicularis oris muscle was excised with a 15 blade scalpel.  The defect was converted into a full thickness wedge, of less than 50% of the vertical height of the lip, to facilite a better cosmetic result.  Small vessels were then tied off with 5-0 monocyrl. The obicularis oris, superficial fascia, adipose and dermis were then reapproximated.  After the deeper layers were approximated the epidermis was reapproximated with particular care given to realign the vermilion border. Consent 2/Introductory Paragraph: Mohs surgery was explained to the patient and consent was obtained. The risks, benefits and alternatives to therapy were discussed in detail. Specifically, the risks of infection, scarring, bleeding, prolonged wound healing, incomplete removal, allergy to anesthesia, nerve injury and recurrence were addressed. Prior to the procedure, the treatment site was clearly identified and confirmed by the patient. All components of Universal Protocol/PAUSE Rule completed. Vermilion Border Text: The closure involved the vermilion border. Dressing (No Sutures): pressure dressing with telfa Detail Level: Detailed Mohs Histo Method Verbiage: Each section was then chromacoded and processed in the Mohs lab using the Mohs protocol and submitted for frozen section. Wound Care: Vaseline Epidermal Autograft Text: The defect edges were debeveled with a #15 scalpel blade.  Given the location of the defect, shape of the defect and the proximity to free margins an epidermal autograft was deemed most appropriate.  Using a sterile surgical marker, the primary defect shape was transferred to the donor site. The epidermal graft was then harvested.  The skin graft was then placed in the primary defect and oriented appropriately. Tarsorrhaphy Text: A tarsorrhaphy was performed using Frost sutures. Which Eyelid Repair Cpt Are You Using?: 75775 Zygomaticofacial Flap Text: Given the location of the defect, shape of the defect and the proximity to free margins a zygomaticofacial flap was deemed most appropriate for repair.  Using a sterile surgical marker, the appropriate flap was drawn incorporating the defect and placing the expected incisions within the relaxed skin tension lines where possible. The area thus outlined was incised deep to adipose tissue with a #15 scalpel blade with preservation of a vascular pedicle.  The skin margins were undermined to an appropriate distance in all directions utilizing iris scissors.  The flap was then placed into the defect and anchored with interrupted buried subcutaneous sutures. Island Pedicle Flap Text: The defect edges were debeveled with a #15 scalpel blade.  Given the location of the defect, shape of the defect and the proximity to free margins an island pedicle advancement flap was deemed most appropriate.  Using a sterile surgical marker, an appropriate advancement flap was drawn incorporating the defect, outlining the appropriate donor tissue and placing the expected incisions within the relaxed skin tension lines where possible.    The area thus outlined was incised deep to adipose tissue with a #15 scalpel blade.  The skin margins were undermined to an appropriate distance in all directions around the primary defect and laterally outward around the island pedicle utilizing iris scissors.  There was minimal undermining beneath the pedicle flap. Inflammation Suggestive Of Cancer Camouflage Histology Text: There was a dense lymphocytic infiltrate which prevented adequate histologic evaluation of adjacent structures. Brow Lift Text: A midfrontal incision was made medially to the defect to allow access to the tissues just superior to the left eyebrow. Following careful dissection inferiorly in a supraperiosteal plane to the level of the left eyebrow, several 3-0 monocryl sutures were used to resuspend the eyebrow orbicularis oculi muscular unit to the superior frontal bone periosteum. This resulted in an appropriate reapproximation of static eyebrow symmetry and correction of the left brow ptosis. Purse String (Intermediate) Text: Given the location of the defect and the characteristics of the surrounding skin a purse string intermediate closure was deemed most appropriate.  Undermining was performed circumfirentially around the surgical defect.  A purse string suture was then placed and tightened. Suturegard Intro: Intraoperative tissue expansion was performed, utilizing the SUTUREGARD device, in order to reduce wound tension. Consent Type: Consent 1 (Standard) Non-Graft Cartilage Fenestration Text: The cartilage was fenestrated with a 2mm punch biopsy to help facilitate healing. Nasalis Myocutaneous Flap Text: Using a #15 blade, an incision was made around the donor flap to the level of the nasalis muscle. Wide lateral undermining was then performed in both the subcutaneous plane above the nasalis muscle, and in a submuscular plane just above periosteum. This allowed the formation of a free nasalis muscle axial pedicle which was still attached to the actual cutaneous flap, increasing its mobility and vascular viability. Hemostasis was obtained with pinpoint electrocoagulation. The flap was mobilized into position and the pivotal anchor points positioned and stabilized with buried interrupted sutures. Subcutaneous and dermal tissues were closed in a multilayered fashion with sutures. Tissue redundancies were excised, and the epidermal edges were apposed without significant tension and sutured with sutures. Cheek-To-Nose Interpolation Flap Text: A decision was made to reconstruct the defect utilizing an interpolation axial flap and a staged reconstruction.  A telfa template was made of the defect.  This telfa template was then used to outline the Cheek-To-Nose Interpolation flap.  The donor area for the pedicle flap was then injected with anesthesia.  The flap was excised through the skin and subcutaneous tissue down to the layer of the underlying musculature.  The interpolation flap was carefully excised within this deep plane to maintain its blood supply.  The edges of the donor site were undermined.   The donor site was closed in a primary fashion.  The pedicle was then rotated into position and sutured.  Once the tube was sutured into place, adequate blood supply was confirmed with blanching and refill.  The pedicle was then wrapped with xeroform gauze and dressed appropriately with a telfa and gauze bandage to ensure continued blood supply and protect the attached pedicle. Retention Suture Text: Retention sutures were placed to support the closure and prevent dehiscence. Mauc Instructions: By selecting yes to the question below the MAUC number will be added into the note.  This will be calculated automatically based on the diagnosis chosen, the size entered, the body zone selected (H,M,L) and the specific indications you chose. You will also have the option to override the Mohs AUC if you disagree with the automatically calculated number and this option is found in the Case Summary tab. Paramedian Forehead Flap Text: A decision was made to reconstruct the defect utilizing an interpolation axial flap and a staged reconstruction.  A telfa template was made of the defect.  This telfa template was then used to outline the paramedian forehead pedicle flap.  The donor area for the pedicle flap was then injected with anesthesia.  The flap was excised through the skin and subcutaneous tissue down to the layer of the underlying musculature.  The pedicle flap was carefully excised within this deep plane to maintain its blood supply.  The edges of the donor site were undermined.   The donor site was closed in a primary fashion.  The pedicle was then rotated into position and sutured.  Once the tube was sutured into place, adequate blood supply was confirmed with blanching and refill.  The pedicle was then wrapped with xeroform gauze and dressed appropriately with a telfa and gauze bandage to ensure continued blood supply and protect the attached pedicle. Keystone Flap Text: The defect edges were debeveled with a #15 scalpel blade.  Given the location of the defect, shape of the defect a keystone flap was deemed most appropriate.  Using a sterile surgical marker, an appropriate keystone flap was drawn incorporating the defect, outlining the appropriate donor tissue and placing the expected incisions within the relaxed skin tension lines where possible. The area thus outlined was incised deep to adipose tissue with a #15 scalpel blade.  The skin margins were undermined to an appropriate distance in all directions around the primary defect and laterally outward around the flap utilizing iris scissors. Bilateral Rotation Flap Text: The defect edges were debeveled with a #15 scalpel blade. Given the location of the defect, shape of the defect and the proximity to free margins a bilateral rotation flap was deemed most appropriate. Using a sterile surgical marker, an appropriate rotation flap was drawn incorporating the defect and placing the expected incisions within the relaxed skin tension lines where possible. The area thus outlined was incised deep to adipose tissue with a #15 scalpel blade. The skin margins were undermined to an appropriate distance in all directions utilizing iris scissors. Following this, the designed flap was carried over into the primary defect and sutured into place. Abbe Flap (Lower To Upper Lip) Text: The defect of the upper lip was assessed and measured.  Given the location and size of the defect, an Abbe flap was deemed most appropriate.  Using a sterile surgical marker, an appropriate Abbe flap was measured and drawn on the lower lip. Local anesthesia was then infiltrated. A scalpel was then used to incise the upper lip through and through the skin, vermilion, muscle and mucosa, leaving the flap pedicled on the opposite side.  The flap was then rotated and transferred to the lower lip defect.  The flap was then sutured into place with a three layer technique, closing the orbicularis oris muscle layer with subcutaneous buried sutures, followed by a mucosal layer and an epidermal layer. Graft Cartilage Fenestration Text: The cartilage was fenestrated with a 2mm punch biopsy to help facilitate graft survival and healing. Repair Anesthesia Method: local infiltration Graft Donor Site Bandage (Optional-Leave Blank If You Don't Want In Note): Aquaplast was fitted to the graft site and sewn into place. A pressure bandage were applied to the donor site and over the aquaplast bolster. Orbicularis Oris Muscle Flap Text: The defect edges were debeveled with a #15 scalpel blade.  Given that the defect affected the competency of the oral sphincter an orbicularis oris muscle flap was deemed most appropriate to restore this competency and normal muscle function.  Using a sterile surgical marker, an appropriate flap was drawn incorporating the defect. The area thus outlined was incised with a #15 scalpel blade. Undermining Location (Optional): in the superficial subcutaneous fat Where Do You Want The Question To Include Opioid Counseling Located?: Case Summary Tab Mohs Method Verbiage: An incision at a 45 degree angle following the standard Mohs approach was done and the specimen was harvested as a microscopic controlled layer. Wound Check: 6 weeks O-Z Plasty Text: The defect edges were debeveled with a #15 scalpel blade.  Given the location of the defect, shape of the defect and the proximity to free margins an O-Z plasty (double transposition flap) was deemed most appropriate.  Using a sterile surgical marker, the appropriate transposition flaps were drawn incorporating the defect and placing the expected incisions within the relaxed skin tension lines where possible.    The area thus outlined was incised deep to adipose tissue with a #15 scalpel blade.  The skin margins were undermined to an appropriate distance in all directions utilizing iris scissors.  Hemostasis was achieved with electrocautery.  The flaps were then transposed into place, one clockwise and the other counterclockwise, and anchored with interrupted buried subcutaneous sutures. Rectangular Flap Text: The defect edges were debeveled with a #15 scalpel blade. Given the location of the defect and the proximity to free margins a rectangular flap was deemed most appropriate. Using a sterile surgical marker, an appropriate rectangular flap was drawn incorporating the defect. The area thus outlined was incised deep to adipose tissue with a #15 scalpel blade. The skin margins were undermined to an appropriate distance in all directions utilizing iris scissors. Following this, the designed flap was carried over into the primary defect and sutured into place. Spiral Flap Text: The defect edges were debeveled with a #15 scalpel blade.  Given the location of the defect, shape of the defect and the proximity to free margins a spiral flap was deemed most appropriate.  Using a sterile surgical marker, an appropriate rotation flap was drawn incorporating the defect and placing the expected incisions within the relaxed skin tension lines where possible. The area thus outlined was incised deep to adipose tissue with a #15 scalpel blade.  The skin margins were undermined to an appropriate distance in all directions utilizing iris scissors. Ear Wedge Repair Text: A wedge excision was completed by carrying down an excision through the full thickness of the ear and cartilage with an inward facing Burow's triangle. The wound was then closed in a layered fashion. Surgical Defect Width In Cm (Optional): 1.0

## 2025-01-29 NOTE — DISCHARGE NOTE NURSING/CASE MANAGEMENT/SOCIAL WORK - NSDCVIVACCINE_GEN_ALL_CORE_FT
Fax Confirmation received.   Tdap; 11-Jun-2016 18:32; Dora Lentz (Administration); [employer]; [id number]; IntraMuscular; Deltoid Right.; 0.5 milliLiter(s); VIS (VIS Published: 09-May-2013, VIS Presented: 11-Jun-2016);

## 2025-02-26 NOTE — H&P PST ADULT - OPHTHALMOLOGIC
How Severe Is Your Skin Lesion?: mild
Has Your Skin Lesion Been Treated?: not been treated
Is This A New Presentation, Or A Follow-Up?: Skin Lesions
negative
